# Patient Record
Sex: FEMALE | Race: BLACK OR AFRICAN AMERICAN | NOT HISPANIC OR LATINO | Employment: UNEMPLOYED
[De-identification: names, ages, dates, MRNs, and addresses within clinical notes are randomized per-mention and may not be internally consistent; named-entity substitution may affect disease eponyms.]

---

## 2018-04-10 ENCOUNTER — RECORDS - HEALTHEAST (OUTPATIENT)
Dept: ADMINISTRATIVE | Facility: OTHER | Age: 53
End: 2018-04-10
Payer: MEDICAID

## 2018-04-10 ENCOUNTER — AMBULATORY - HEALTHEAST (OUTPATIENT)
Dept: INTERNAL MEDICINE | Facility: CLINIC | Age: 53
End: 2018-04-10

## 2018-04-11 ENCOUNTER — OFFICE VISIT - HEALTHEAST (OUTPATIENT)
Dept: INTERNAL MEDICINE | Facility: CLINIC | Age: 53
End: 2018-04-11

## 2018-04-11 DIAGNOSIS — Z12.31 VISIT FOR SCREENING MAMMOGRAM: ICD-10-CM

## 2018-04-11 DIAGNOSIS — R20.0 LEFT ARM NUMBNESS: ICD-10-CM

## 2018-04-11 DIAGNOSIS — Z86.73 HISTORY OF CVA (CEREBROVASCULAR ACCIDENT): ICD-10-CM

## 2018-04-11 DIAGNOSIS — I25.10 CAD (CORONARY ARTERY DISEASE): ICD-10-CM

## 2018-04-11 DIAGNOSIS — E11.42 TYPE 2 DIABETES MELLITUS WITH DIABETIC POLYNEUROPATHY, WITH LONG-TERM CURRENT USE OF INSULIN (H): ICD-10-CM

## 2018-04-11 DIAGNOSIS — F25.0 SCHIZOAFFECTIVE DISORDER, BIPOLAR TYPE (H): ICD-10-CM

## 2018-04-11 DIAGNOSIS — G89.29 OTHER CHRONIC PAIN: ICD-10-CM

## 2018-04-11 DIAGNOSIS — I10 ESSENTIAL HYPERTENSION: ICD-10-CM

## 2018-04-11 DIAGNOSIS — F19.20 DRUG DEPENDENCE (H): ICD-10-CM

## 2018-04-11 DIAGNOSIS — Z79.4 TYPE 2 DIABETES MELLITUS WITH DIABETIC POLYNEUROPATHY, WITH LONG-TERM CURRENT USE OF INSULIN (H): ICD-10-CM

## 2018-04-11 LAB
ATRIAL RATE - MUSE: 86 BPM
DIASTOLIC BLOOD PRESSURE - MUSE: NORMAL MMHG
INTERPRETATION ECG - MUSE: NORMAL
P AXIS - MUSE: 75 DEGREES
PR INTERVAL - MUSE: 166 MS
QRS DURATION - MUSE: 76 MS
QT - MUSE: 392 MS
QTC - MUSE: 469 MS
R AXIS - MUSE: 11 DEGREES
SYSTOLIC BLOOD PRESSURE - MUSE: NORMAL MMHG
T AXIS - MUSE: 87 DEGREES
VENTRICULAR RATE- MUSE: 86 BPM

## 2018-04-11 ASSESSMENT — MIFFLIN-ST. JEOR: SCORE: 1547.97

## 2018-04-12 ENCOUNTER — COMMUNICATION - HEALTHEAST (OUTPATIENT)
Dept: SCHEDULING | Facility: CLINIC | Age: 53
End: 2018-04-12

## 2018-04-17 ENCOUNTER — COMMUNICATION - HEALTHEAST (OUTPATIENT)
Dept: NURSING | Facility: CLINIC | Age: 53
End: 2018-04-17

## 2018-04-23 ENCOUNTER — AMBULATORY - HEALTHEAST (OUTPATIENT)
Dept: CARDIOLOGY | Facility: CLINIC | Age: 53
End: 2018-04-23

## 2018-04-23 ENCOUNTER — RECORDS - HEALTHEAST (OUTPATIENT)
Dept: ADMINISTRATIVE | Facility: OTHER | Age: 53
End: 2018-04-23

## 2018-04-25 ENCOUNTER — OFFICE VISIT - HEALTHEAST (OUTPATIENT)
Dept: CARDIOLOGY | Facility: CLINIC | Age: 53
End: 2018-04-25

## 2018-04-25 DIAGNOSIS — R07.9 CHEST PAIN: ICD-10-CM

## 2018-04-25 ASSESSMENT — MIFFLIN-ST. JEOR: SCORE: 1534.36

## 2018-04-26 LAB
ATRIAL RATE - MUSE: 80 BPM
DIASTOLIC BLOOD PRESSURE - MUSE: NORMAL MMHG
INTERPRETATION ECG - MUSE: NORMAL
P AXIS - MUSE: 58 DEGREES
PR INTERVAL - MUSE: 160 MS
QRS DURATION - MUSE: 82 MS
QT - MUSE: 408 MS
QTC - MUSE: 470 MS
R AXIS - MUSE: 5 DEGREES
SYSTOLIC BLOOD PRESSURE - MUSE: NORMAL MMHG
T AXIS - MUSE: 110 DEGREES
VENTRICULAR RATE- MUSE: 80 BPM

## 2018-04-27 ENCOUNTER — HOSPITAL ENCOUNTER (OUTPATIENT)
Dept: PHYSICAL MEDICINE AND REHAB | Facility: CLINIC | Age: 53
Discharge: HOME | End: 2018-04-27
Attending: INTERNAL MEDICINE

## 2018-04-27 DIAGNOSIS — M54.6 THORACIC SPINE PAIN: ICD-10-CM

## 2018-04-27 DIAGNOSIS — M79.2 NEUROPATHIC PAIN: ICD-10-CM

## 2018-04-27 DIAGNOSIS — M79.18 MYOFASCIAL PAIN: ICD-10-CM

## 2018-04-27 DIAGNOSIS — R20.0 LEFT ARM NUMBNESS: ICD-10-CM

## 2018-04-27 ASSESSMENT — MIFFLIN-ST. JEOR: SCORE: 1534.36

## 2018-04-30 ENCOUNTER — COMMUNICATION - HEALTHEAST (OUTPATIENT)
Dept: ENDOCRINOLOGY | Facility: CLINIC | Age: 53
End: 2018-04-30

## 2018-04-30 ENCOUNTER — AMBULATORY - HEALTHEAST (OUTPATIENT)
Dept: EDUCATION SERVICES | Facility: CLINIC | Age: 53
End: 2018-04-30

## 2018-04-30 DIAGNOSIS — E11.42 TYPE 2 DIABETES MELLITUS WITH DIABETIC POLYNEUROPATHY, WITH LONG-TERM CURRENT USE OF INSULIN (H): ICD-10-CM

## 2018-04-30 DIAGNOSIS — Z79.4 TYPE 2 DIABETES MELLITUS WITH DIABETIC POLYNEUROPATHY, WITH LONG-TERM CURRENT USE OF INSULIN (H): ICD-10-CM

## 2018-05-03 ENCOUNTER — COMMUNICATION - HEALTHEAST (OUTPATIENT)
Dept: INTERNAL MEDICINE | Facility: CLINIC | Age: 53
End: 2018-05-03

## 2018-05-04 ENCOUNTER — OFFICE VISIT - HEALTHEAST (OUTPATIENT)
Dept: PHYSICAL THERAPY | Facility: REHABILITATION | Age: 53
End: 2018-05-04

## 2018-05-04 DIAGNOSIS — M62.81 MUSCLE WEAKNESS (GENERALIZED): ICD-10-CM

## 2018-05-04 DIAGNOSIS — M54.6 PAIN IN THORACIC SPINE: ICD-10-CM

## 2018-05-04 DIAGNOSIS — M79.18 MYOFASCIAL PAIN: ICD-10-CM

## 2018-05-21 ENCOUNTER — COMMUNICATION - HEALTHEAST (OUTPATIENT)
Dept: PHYSICAL MEDICINE AND REHAB | Facility: CLINIC | Age: 53
End: 2018-05-21

## 2018-05-21 ENCOUNTER — COMMUNICATION - HEALTHEAST (OUTPATIENT)
Dept: PALLIATIVE MEDICINE | Facility: OTHER | Age: 53
End: 2018-05-21

## 2018-05-22 ENCOUNTER — COMMUNICATION - HEALTHEAST (OUTPATIENT)
Dept: PALLIATIVE MEDICINE | Facility: OTHER | Age: 53
End: 2018-05-22

## 2018-05-22 ENCOUNTER — COMMUNICATION - HEALTHEAST (OUTPATIENT)
Dept: INTERNAL MEDICINE | Facility: CLINIC | Age: 53
End: 2018-05-22

## 2018-06-14 ENCOUNTER — RECORDS - HEALTHEAST (OUTPATIENT)
Dept: RADIOLOGY | Facility: CLINIC | Age: 53
End: 2018-06-14

## 2018-06-16 ENCOUNTER — COMMUNICATION - HEALTHEAST (OUTPATIENT)
Dept: CARDIOLOGY | Facility: CLINIC | Age: 53
End: 2018-06-16

## 2018-06-16 DIAGNOSIS — R07.9 CHEST PAIN: ICD-10-CM

## 2018-10-23 ENCOUNTER — HOME CARE/HOSPICE - HEALTHEAST (OUTPATIENT)
Dept: HOME HEALTH SERVICES | Facility: HOME HEALTH | Age: 53
End: 2018-10-23

## 2018-10-25 ENCOUNTER — COMMUNICATION - HEALTHEAST (OUTPATIENT)
Dept: CARE COORDINATION | Facility: CLINIC | Age: 53
End: 2018-10-25

## 2018-10-26 ENCOUNTER — HOME CARE/HOSPICE - HEALTHEAST (OUTPATIENT)
Dept: HOME HEALTH SERVICES | Facility: HOME HEALTH | Age: 53
End: 2018-10-26

## 2018-10-27 ENCOUNTER — HOME CARE/HOSPICE - HEALTHEAST (OUTPATIENT)
Dept: HOME HEALTH SERVICES | Facility: HOME HEALTH | Age: 53
End: 2018-10-27

## 2018-10-29 ENCOUNTER — HOME CARE/HOSPICE - HEALTHEAST (OUTPATIENT)
Dept: HOME HEALTH SERVICES | Facility: HOME HEALTH | Age: 53
End: 2018-10-29

## 2018-11-01 ENCOUNTER — HOME CARE/HOSPICE - HEALTHEAST (OUTPATIENT)
Dept: HOME HEALTH SERVICES | Facility: HOME HEALTH | Age: 53
End: 2018-11-01

## 2018-11-01 ASSESSMENT — MIFFLIN-ST. JEOR: SCORE: 1511.68

## 2018-11-05 ENCOUNTER — HOME CARE/HOSPICE - HEALTHEAST (OUTPATIENT)
Dept: HOME HEALTH SERVICES | Facility: HOME HEALTH | Age: 53
End: 2018-11-05

## 2018-11-07 ENCOUNTER — HOME CARE/HOSPICE - HEALTHEAST (OUTPATIENT)
Dept: HOME HEALTH SERVICES | Facility: HOME HEALTH | Age: 53
End: 2018-11-07

## 2018-11-08 ENCOUNTER — HOME CARE/HOSPICE - HEALTHEAST (OUTPATIENT)
Dept: HOME HEALTH SERVICES | Facility: HOME HEALTH | Age: 53
End: 2018-11-08

## 2018-11-08 ENCOUNTER — COMMUNICATION - HEALTHEAST (OUTPATIENT)
Dept: CARDIOLOGY | Facility: CLINIC | Age: 53
End: 2018-11-08

## 2018-11-08 DIAGNOSIS — R55 SYNCOPE: ICD-10-CM

## 2018-11-09 ENCOUNTER — COMMUNICATION - HEALTHEAST (OUTPATIENT)
Dept: CARE COORDINATION | Facility: CLINIC | Age: 53
End: 2018-11-09

## 2018-11-12 ENCOUNTER — HOME CARE/HOSPICE - HEALTHEAST (OUTPATIENT)
Dept: HOME HEALTH SERVICES | Facility: HOME HEALTH | Age: 53
End: 2018-11-12

## 2019-04-30 ENCOUNTER — COMMUNICATION - HEALTHEAST (OUTPATIENT)
Dept: INTERNAL MEDICINE | Facility: CLINIC | Age: 54
End: 2019-04-30

## 2019-04-30 ENCOUNTER — COMMUNICATION - HEALTHEAST (OUTPATIENT)
Dept: SCHEDULING | Facility: CLINIC | Age: 54
End: 2019-04-30

## 2019-04-30 DIAGNOSIS — K21.9 GASTROESOPHAGEAL REFLUX DISEASE, ESOPHAGITIS PRESENCE NOT SPECIFIED: ICD-10-CM

## 2019-04-30 DIAGNOSIS — I25.10 CAD (CORONARY ARTERY DISEASE): ICD-10-CM

## 2019-05-09 ENCOUNTER — COMMUNICATION - HEALTHEAST (OUTPATIENT)
Dept: INTERNAL MEDICINE | Facility: CLINIC | Age: 54
End: 2019-05-09

## 2019-05-09 DIAGNOSIS — I25.10 CAD (CORONARY ARTERY DISEASE): ICD-10-CM

## 2019-06-09 ENCOUNTER — COMMUNICATION - HEALTHEAST (OUTPATIENT)
Dept: INTERNAL MEDICINE | Facility: CLINIC | Age: 54
End: 2019-06-09

## 2019-06-09 DIAGNOSIS — Z79.4 TYPE 2 DIABETES MELLITUS WITH DIABETIC POLYNEUROPATHY, WITH LONG-TERM CURRENT USE OF INSULIN (H): ICD-10-CM

## 2019-06-09 DIAGNOSIS — E11.42 TYPE 2 DIABETES MELLITUS WITH DIABETIC POLYNEUROPATHY, WITH LONG-TERM CURRENT USE OF INSULIN (H): ICD-10-CM

## 2019-06-09 DIAGNOSIS — I25.10 CAD (CORONARY ARTERY DISEASE): ICD-10-CM

## 2019-08-01 ENCOUNTER — COMMUNICATION - HEALTHEAST (OUTPATIENT)
Dept: INTERNAL MEDICINE | Facility: CLINIC | Age: 54
End: 2019-08-01

## 2019-08-01 DIAGNOSIS — E11.42 TYPE 2 DIABETES MELLITUS WITH DIABETIC POLYNEUROPATHY, WITH LONG-TERM CURRENT USE OF INSULIN (H): ICD-10-CM

## 2019-08-01 DIAGNOSIS — I25.10 CAD (CORONARY ARTERY DISEASE): ICD-10-CM

## 2019-08-01 DIAGNOSIS — Z79.4 TYPE 2 DIABETES MELLITUS WITH DIABETIC POLYNEUROPATHY, WITH LONG-TERM CURRENT USE OF INSULIN (H): ICD-10-CM

## 2019-09-02 ENCOUNTER — COMMUNICATION - HEALTHEAST (OUTPATIENT)
Dept: INTERNAL MEDICINE | Facility: CLINIC | Age: 54
End: 2019-09-02

## 2019-09-02 DIAGNOSIS — Z79.4 TYPE 2 DIABETES MELLITUS WITH DIABETIC POLYNEUROPATHY, WITH LONG-TERM CURRENT USE OF INSULIN (H): ICD-10-CM

## 2019-09-02 DIAGNOSIS — I25.10 CAD (CORONARY ARTERY DISEASE): ICD-10-CM

## 2019-09-02 DIAGNOSIS — E11.42 TYPE 2 DIABETES MELLITUS WITH DIABETIC POLYNEUROPATHY, WITH LONG-TERM CURRENT USE OF INSULIN (H): ICD-10-CM

## 2019-11-16 ENCOUNTER — COMMUNICATION - HEALTHEAST (OUTPATIENT)
Dept: INTERNAL MEDICINE | Facility: CLINIC | Age: 54
End: 2019-11-16

## 2019-11-16 DIAGNOSIS — I25.10 CAD (CORONARY ARTERY DISEASE): ICD-10-CM

## 2019-11-16 DIAGNOSIS — Z79.4 TYPE 2 DIABETES MELLITUS WITH DIABETIC POLYNEUROPATHY, WITH LONG-TERM CURRENT USE OF INSULIN (H): ICD-10-CM

## 2019-11-16 DIAGNOSIS — E11.42 TYPE 2 DIABETES MELLITUS WITH DIABETIC POLYNEUROPATHY, WITH LONG-TERM CURRENT USE OF INSULIN (H): ICD-10-CM

## 2020-05-08 ENCOUNTER — COMMUNICATION - HEALTHEAST (OUTPATIENT)
Dept: INTERNAL MEDICINE | Facility: CLINIC | Age: 55
End: 2020-05-08

## 2020-05-08 DIAGNOSIS — I25.10 CAD (CORONARY ARTERY DISEASE): ICD-10-CM

## 2020-05-15 DIAGNOSIS — Z12.4 CERVICAL CANCER SCREENING: Primary | ICD-10-CM

## 2021-05-02 ENCOUNTER — HOSPITAL ENCOUNTER (EMERGENCY)
Dept: EMERGENCY MEDICINE | Facility: HOSPITAL | Age: 56
Discharge: HOME | End: 2021-05-02
Attending: EMERGENCY MEDICINE
Payer: MEDICAID

## 2021-05-02 DIAGNOSIS — G89.29 CHRONIC MIDLINE LOW BACK PAIN, UNSPECIFIED WHETHER SCIATICA PRESENT: ICD-10-CM

## 2021-05-02 DIAGNOSIS — M79.604 PAIN OF RIGHT LOWER EXTREMITY: ICD-10-CM

## 2021-05-02 DIAGNOSIS — M54.50 CHRONIC MIDLINE LOW BACK PAIN, UNSPECIFIED WHETHER SCIATICA PRESENT: ICD-10-CM

## 2021-05-02 LAB
ANION GAP SERPL CALCULATED.3IONS-SCNC: 8 MMOL/L (ref 5–18)
BASOPHILS # BLD AUTO: 0 THOU/UL (ref 0–0.2)
BASOPHILS NFR BLD AUTO: 0 % (ref 0–2)
BUN SERPL-MCNC: 12 MG/DL (ref 8–22)
CALCIUM SERPL-MCNC: 8.3 MG/DL (ref 8.5–10.5)
CHLORIDE BLD-SCNC: 107 MMOL/L (ref 98–107)
CO2 SERPL-SCNC: 24 MMOL/L (ref 22–31)
CREAT SERPL-MCNC: 0.78 MG/DL (ref 0.6–1.1)
EOSINOPHIL # BLD AUTO: 0.4 THOU/UL (ref 0–0.4)
EOSINOPHIL NFR BLD AUTO: 4 % (ref 0–6)
ERYTHROCYTE [DISTWIDTH] IN BLOOD BY AUTOMATED COUNT: 13.4 % (ref 11–14.5)
GFR SERPL CREATININE-BSD FRML MDRD: >60 ML/MIN/1.73M2
GLUCOSE BLD-MCNC: 230 MG/DL (ref 70–125)
HCT VFR BLD AUTO: 32.1 % (ref 35–47)
HGB BLD-MCNC: 10 G/DL (ref 12–16)
IMM GRANULOCYTES # BLD: 0.1 THOU/UL
IMM GRANULOCYTES NFR BLD: 1 %
LYMPHOCYTES # BLD AUTO: 2.1 THOU/UL (ref 0.8–4.4)
LYMPHOCYTES NFR BLD AUTO: 23 % (ref 20–40)
MCH RBC QN AUTO: 26.6 PG (ref 27–34)
MCHC RBC AUTO-ENTMCNC: 31.2 G/DL (ref 32–36)
MCV RBC AUTO: 85 FL (ref 80–100)
MONOCYTES # BLD AUTO: 0.7 THOU/UL (ref 0–0.9)
MONOCYTES NFR BLD AUTO: 8 % (ref 2–10)
NEUTROPHILS # BLD AUTO: 5.7 THOU/UL (ref 2–7.7)
NEUTROPHILS NFR BLD AUTO: 63 % (ref 50–70)
PLATELET # BLD AUTO: 370 THOU/UL (ref 140–440)
PMV BLD AUTO: 11 FL (ref 8.5–12.5)
POTASSIUM BLD-SCNC: 3.7 MMOL/L (ref 3.5–5)
RBC # BLD AUTO: 3.76 MILL/UL (ref 3.8–5.4)
SODIUM SERPL-SCNC: 139 MMOL/L (ref 136–145)
WBC: 9.1 THOU/UL (ref 4–11)

## 2021-05-02 ASSESSMENT — MIFFLIN-ST. JEOR: SCORE: 1575.18

## 2021-05-03 ENCOUNTER — COMMUNICATION - HEALTHEAST (OUTPATIENT)
Dept: NEUROLOGY | Facility: CLINIC | Age: 56
End: 2021-05-03

## 2021-05-03 ENCOUNTER — HOSPITAL ENCOUNTER (EMERGENCY)
Dept: EMERGENCY MEDICINE | Facility: HOSPITAL | Age: 56
Discharge: HOME | End: 2021-05-03
Attending: EMERGENCY MEDICINE
Payer: MEDICAID

## 2021-05-03 DIAGNOSIS — M54.31 SCIATICA OF RIGHT SIDE: ICD-10-CM

## 2021-05-28 NOTE — TELEPHONE ENCOUNTER
Valerie Thacker for refills requested below?  Refills have been set up for you to review.  Dulce RAMEY CMA/MARTY....................3:21 PM

## 2021-05-28 NOTE — TELEPHONE ENCOUNTER
Medication Request  Medication name:   losartan (COZAAR) 50 MG tablet 30 tablet 0 10/24/2018     Sig - Route: Take 1 tablet (50 mg total) by mouth daily. - Oral    Sent to pharmacy as: losartan (COZAAR) 50 MG tablet      omeprazole (PRILOSEC) 20 MG capsule 30 capsule 0 10/24/2018     Sig - Route: Take 1 capsule (20 mg total) by mouth daily before breakfast. - Oral        Pharmacy Name and Location: [facility]  Reason for request: Fax request received from pharmacy.    Okay to leave a detailed message: yes     Statement Selected

## 2021-05-28 NOTE — TELEPHONE ENCOUNTER
Medication Request  Medication name: amlodipine 10 mg - take  Pharmacy Name and Location: [facility]  Reason for request: Refill  When did you use medication last?:  Last filled on 3/5/19 by a hospitalist  Patient offered appointment:  n/a  Okay to leave a detailed message: no

## 2021-05-29 NOTE — TELEPHONE ENCOUNTER
RN cannot approve Refill Request    RN can NOT refill this medication -->> PCP messaged that patient is overdue for Office Visit and Labs.  Last office visit: 4/11/2018.  No pending appt.    Stephanie Roger, [facility] Connection Triage/Med Refill 6/10/2019    Requested Prescriptions   Pending Prescriptions Disp Refills     metFORMIN (GLUCOPHAGE) 1000 MG tablet 60 tablet 0     Sig: Take 1 tablet (1,000 mg total) by mouth 2 (two) times a day with meals.       Metformin Refill Protocol Failed - 6/9/2019 10:33 PM        Failed - Visit with PCP or prescribing provider visit in last 6 months or next 3 months     Last office visit with prescriber/PCP: Visit date not found OR same dept: Visit date not found OR same specialty: Visit date not found Last physical: Visit date not found Last MTM visit: Visit date not found         Next appt within 3 mo: Visit date not found  Next physical within 3 mo: Visit date not found  Prescriber OR PCP: Fortunato Patterson MD  Last diagnosis associated with med order: 1. CAD (coronary artery disease)  - metFORMIN (GLUCOPHAGE) 1000 MG tablet; Take 1 tablet (1,000 mg total) by mouth 2 (two) times a day with meals.  Dispense: 60 tablet; Refill: 0    2. Type 2 diabetes mellitus with diabetic polyneuropathy, with long-term current use of insulin (H)  - metFORMIN (GLUCOPHAGE) 1000 MG tablet; Take 1 tablet (1,000 mg total) by mouth 2 (two) times a day with meals.  Dispense: 60 tablet; Refill: 0     If protocol passes may refill for 12 months if within 3 months of last provider visit (or a total of 15 months).           Failed - A1C in last 6 months     Hemoglobin A1c   Date Value Ref Range Status   11/08/2018 12.1 (H) 4.2 - 6.1 % Final               Failed - Microalbumin in last year      No results found for: MICROALBUR               Passed - Blood pressure in last 12 months     BP Readings from Last 1 Encounters:   06/06/19 143/77             Passed - LFT or AST or ALT in last 12 months      Albumin   Date Value Ref Range Status   11/07/2018 4.2 3.5 - 5.0 g/dL Final     Bilirubin, Total   Date Value Ref Range Status   11/07/2018 0.4 0.0 - 1.0 mg/dL Final     Bilirubin, Direct   Date Value Ref Range Status   04/18/2014 0.2 <0.6 mg/dL Final     Alkaline Phosphatase   Date Value Ref Range Status   11/07/2018 72 45 - 120 U/L Final     AST   Date Value Ref Range Status   11/07/2018 10 0 - 40 U/L Final     ALT   Date Value Ref Range Status   11/07/2018 16 0 - 45 U/L Final     Protein, Total   Date Value Ref Range Status   11/07/2018 7.3 6.0 - 8.0 g/dL Final                Passed - GFR or Serum Creatinine in last 6 months     GFR MDRD Non Af Amer   Date Value Ref Range Status   11/08/2018 >60 >60 mL/min/1.73m2 Final     GFR MDRD Af Amer   Date Value Ref Range Status   11/08/2018 >60 >60 mL/min/1.73m2 Final

## 2021-05-30 ENCOUNTER — RECORDS - HEALTHEAST (OUTPATIENT)
Dept: ADMINISTRATIVE | Facility: CLINIC | Age: 56
End: 2021-05-30

## 2021-05-30 ENCOUNTER — RECORDS - HEALTHEAST (OUTPATIENT)
Dept: ADMINISTRATIVE | Facility: OTHER | Age: 56
End: 2021-05-30

## 2021-05-30 ASSESSMENT — MIFFLIN-ST. JEOR: SCORE: 1508.49

## 2021-05-31 ENCOUNTER — RECORDS - HEALTHEAST (OUTPATIENT)
Dept: ADMINISTRATIVE | Facility: OTHER | Age: 56
End: 2021-05-31

## 2021-05-31 ENCOUNTER — RECORDS - HEALTHEAST (OUTPATIENT)
Dept: ADMINISTRATIVE | Facility: CLINIC | Age: 56
End: 2021-05-31

## 2021-05-31 ASSESSMENT — MIFFLIN-ST. JEOR: SCORE: 1502.14

## 2021-05-31 NOTE — TELEPHONE ENCOUNTER
RN cannot approve Refill Request    RN can NOT refill this medication PCP messaged that patient is overdue for Labs and Office Visit and Protocol failed and NO refill given. No established PCP in chart.     Analy Salgado, [facility] [place] Connection Triage/Med Refill 9/2/2019    Requested Prescriptions   Pending Prescriptions Disp Refills     metFORMIN (GLUCOPHAGE) 1000 MG tablet [Pharmacy Med Name: METFORMIN HCL 1,000 MG TABLET] 60 tablet 0     Sig: TAKE 1 TABLET BY MOUTH TWICE A DAY WITH MEALS       Metformin Refill Protocol Failed - 9/2/2019 11:18 AM        Failed - Visit with PCP or prescribing provider visit in last 6 months or next 3 months     Last office visit with prescriber/PCP: Visit date not found OR same dept: Visit date not found OR same specialty: Visit date not found Last physical: Visit date not found Last MTM visit: Visit date not found         Next appt within 3 mo: Visit date not found  Next physical within 3 mo: Visit date not found  Prescriber OR PCP: Fortunato Patterson MD  Last diagnosis associated with med order: 1. CAD (coronary artery disease)  - metFORMIN (GLUCOPHAGE) 1000 MG tablet [Pharmacy Med Name: METFORMIN HCL 1,000 MG TABLET]; TAKE 1 TABLET BY MOUTH TWICE A DAY WITH MEALS  Dispense: 60 tablet; Refill: 0    2. Type 2 diabetes mellitus with diabetic polyneuropathy, with long-term current use of insulin (H)  - metFORMIN (GLUCOPHAGE) 1000 MG tablet [Pharmacy Med Name: METFORMIN HCL 1,000 MG TABLET]; TAKE 1 TABLET BY MOUTH TWICE A DAY WITH MEALS  Dispense: 60 tablet; Refill: 0     If protocol passes may refill for 12 months if within 3 months of last provider visit (or a total of 15 months).           Failed - A1C in last 6 months     Hemoglobin A1c   Date Value Ref Range Status   11/08/2018 12.1 (H) 4.2 - 6.1 % Final               Failed - Microalbumin in last year      No results found for: MICROALBUR               Passed - Blood pressure in last 12 months     BP Readings from Last 1  Encounters:   07/15/19 152/89             Passed - LFT or AST or ALT in last 12 months     Albumin   Date Value Ref Range Status   11/07/2018 4.2 3.5 - 5.0 g/dL Final     Bilirubin, Total   Date Value Ref Range Status   11/07/2018 0.4 0.0 - 1.0 mg/dL Final     Bilirubin, Direct   Date Value Ref Range Status   04/18/2014 0.2 <0.6 mg/dL Final     Alkaline Phosphatase   Date Value Ref Range Status   11/07/2018 72 45 - 120 U/L Final     AST   Date Value Ref Range Status   11/07/2018 10 0 - 40 U/L Final     ALT   Date Value Ref Range Status   11/07/2018 16 0 - 45 U/L Final     Protein, Total   Date Value Ref Range Status   11/07/2018 7.3 6.0 - 8.0 g/dL Final                Passed - GFR or Serum Creatinine in last 6 months     GFR MDRD Non Af Amer   Date Value Ref Range Status   06/22/2019 >60 >60 mL/min/1.73m2 Final     GFR MDRD Af Amer   Date Value Ref Range Status   06/22/2019 >60 >60 mL/min/1.73m2 Final

## 2021-05-31 NOTE — TELEPHONE ENCOUNTER
RN cannot approve Refill Request    RN can NOT refill this medication PCP messaged that patient is overdue for Labs and Office Visit. Last office visit: Visit date not found Last Physical: 4/11/2018 Last MTM visit: Visit date not found Last visit same specialty: Visit date not found.  Next visit within 3 mo: Visit date not found  Next physical within 3 mo: Visit date not found      Hang Harmon, [facility] Triage/Med Refill 8/1/2019    Requested Prescriptions   Pending Prescriptions Disp Refills     metFORMIN (GLUCOPHAGE) 1000 MG tablet [Pharmacy Med Name: METFORMIN HCL 1,000 MG TABLET] 60 tablet 0     Sig: TAKE 1 TABLET BY MOUTH TWICE A DAY WITH MEALS       Metformin Refill Protocol Failed - 8/1/2019 10:09 AM        Failed - Visit with PCP or prescribing provider visit in last 6 months or next 3 months     Last office visit with prescriber/PCP: Visit date not found OR same dept: Visit date not found OR same specialty: Visit date not found Last physical: Visit date not found Last MTM visit: Visit date not found         Next appt within 3 mo: Visit date not found  Next physical within 3 mo: Visit date not found  Prescriber OR PCP: Fortunato Patterson MD  Last diagnosis associated with med order: 1. CAD (coronary artery disease)  - metFORMIN (GLUCOPHAGE) 1000 MG tablet [Pharmacy Med Name: METFORMIN HCL 1,000 MG TABLET]; TAKE 1 TABLET BY MOUTH TWICE A DAY WITH MEALS  Dispense: 60 tablet; Refill: 0    2. Type 2 diabetes mellitus with diabetic polyneuropathy, with long-term current use of insulin (H)  - metFORMIN (GLUCOPHAGE) 1000 MG tablet [Pharmacy Med Name: METFORMIN HCL 1,000 MG TABLET]; TAKE 1 TABLET BY MOUTH TWICE A DAY WITH MEALS  Dispense: 60 tablet; Refill: 0     If protocol passes may refill for 12 months if within 3 months of last provider visit (or a total of 15 months).           Failed - A1C in last 6 months     Hemoglobin A1c   Date Value Ref Range Status   11/08/2018 12.1 (H) 4.2 - 6.1 % Final                Failed - Microalbumin in last year      No results found for: MICROALBUR               Passed - Blood pressure in last 12 months     BP Readings from Last 1 Encounters:   07/15/19 152/89             Passed - LFT or AST or ALT in last 12 months     Albumin   Date Value Ref Range Status   11/07/2018 4.2 3.5 - 5.0 g/dL Final     Bilirubin, Total   Date Value Ref Range Status   11/07/2018 0.4 0.0 - 1.0 mg/dL Final     Bilirubin, Direct   Date Value Ref Range Status   04/18/2014 0.2 <0.6 mg/dL Final     Alkaline Phosphatase   Date Value Ref Range Status   11/07/2018 72 45 - 120 U/L Final     AST   Date Value Ref Range Status   11/07/2018 10 0 - 40 U/L Final     ALT   Date Value Ref Range Status   11/07/2018 16 0 - 45 U/L Final     Protein, Total   Date Value Ref Range Status   11/07/2018 7.3 6.0 - 8.0 g/dL Final                Passed - GFR or Serum Creatinine in last 6 months     GFR MDRD Non Af Amer   Date Value Ref Range Status   06/22/2019 >60 >60 mL/min/1.73m2 Final     GFR MDRD Af Amer   Date Value Ref Range Status   06/22/2019 >60 >60 mL/min/1.73m2 Final

## 2021-06-01 ENCOUNTER — RECORDS - HEALTHEAST (OUTPATIENT)
Dept: ADMINISTRATIVE | Facility: CLINIC | Age: 56
End: 2021-06-01

## 2021-06-01 ENCOUNTER — RECORDS - HEALTHEAST (OUTPATIENT)
Dept: ADMINISTRATIVE | Facility: OTHER | Age: 56
End: 2021-06-01

## 2021-06-01 VITALS — WEIGHT: 201 LBS | HEIGHT: 67 IN | BODY MASS INDEX: 31.55 KG/M2

## 2021-06-01 VITALS — BODY MASS INDEX: 31.55 KG/M2 | HEIGHT: 67 IN | WEIGHT: 201 LBS

## 2021-06-01 VITALS — BODY MASS INDEX: 32.02 KG/M2 | HEIGHT: 67 IN | WEIGHT: 204 LBS

## 2021-06-01 ASSESSMENT — MIFFLIN-ST. JEOR: SCORE: 1507.13

## 2021-06-02 ENCOUNTER — RECORDS - HEALTHEAST (OUTPATIENT)
Dept: ADMINISTRATIVE | Facility: CLINIC | Age: 56
End: 2021-06-02

## 2021-06-02 ENCOUNTER — SURGERY - HEALTHEAST (OUTPATIENT)
Dept: CARDIOLOGY | Facility: HOSPITAL | Age: 56
End: 2021-06-02
Payer: MEDICAID

## 2021-06-02 ENCOUNTER — RECORDS - HEALTHEAST (OUTPATIENT)
Dept: ADMINISTRATIVE | Facility: OTHER | Age: 56
End: 2021-06-02

## 2021-06-02 VITALS — WEIGHT: 196 LBS | BODY MASS INDEX: 30.76 KG/M2 | HEIGHT: 67 IN

## 2021-06-02 ASSESSMENT — MIFFLIN-ST. JEOR: SCORE: 1499.88

## 2021-06-03 ENCOUNTER — RECORDS - HEALTHEAST (OUTPATIENT)
Dept: ADMINISTRATIVE | Facility: OTHER | Age: 56
End: 2021-06-03

## 2021-06-03 ASSESSMENT — MIFFLIN-ST. JEOR: SCORE: 1518.02

## 2021-06-03 NOTE — TELEPHONE ENCOUNTER
Former patient of ? & has not established care with another provider.  Please assign refill request to covering provider per Clinic standard process.      RN cannot approve Refill Request    RN can NOT refill this medication Protocol failed and NO refill given.       Maryjane Bhardwaj, [facility] Triage/Med Refill 11/17/2019    Requested Prescriptions   Pending Prescriptions Disp Refills     metFORMIN (GLUCOPHAGE) 1000 MG tablet [Pharmacy Med Name: METFORMIN HCL 1,000 MG TABLET] 180 tablet 0     Sig: TAKE 1 TABLET BY MOUTH TWICE A DAY WITH MEALS       Metformin Refill Protocol Failed - 11/16/2019  9:42 AM        Failed - LFT or AST or ALT in last 12 months     Albumin   Date Value Ref Range Status   11/07/2018 4.2 3.5 - 5.0 g/dL Final     Bilirubin, Total   Date Value Ref Range Status   11/07/2018 0.4 0.0 - 1.0 mg/dL Final     Bilirubin, Direct   Date Value Ref Range Status   04/18/2014 0.2 <0.6 mg/dL Final     Alkaline Phosphatase   Date Value Ref Range Status   11/07/2018 72 45 - 120 U/L Final     AST   Date Value Ref Range Status   11/07/2018 10 0 - 40 U/L Final     ALT   Date Value Ref Range Status   11/07/2018 16 0 - 45 U/L Final     Protein, Total   Date Value Ref Range Status   11/07/2018 7.3 6.0 - 8.0 g/dL Final                Failed - Visit with PCP or prescribing provider visit in last 6 months or next 3 months     Last office visit with prescriber/PCP: Visit date not found OR same dept: Visit date not found OR same specialty: Visit date not found Last physical: Visit date not found Last MTM visit: Visit date not found         Next appt within 3 mo: Visit date not found  Next physical within 3 mo: Visit date not found  Prescriber OR PCP: Fortunato Patterson MD  Last diagnosis associated with med order: 1. CAD (coronary artery disease)  - metFORMIN (GLUCOPHAGE) 1000 MG tablet [Pharmacy Med Name: METFORMIN HCL 1,000 MG TABLET]; TAKE 1 TABLET BY MOUTH TWICE A DAY WITH MEALS  Dispense: 60 tablet; Refill:  0    2. Type 2 diabetes mellitus with diabetic polyneuropathy, with long-term current use of insulin (H)  - metFORMIN (GLUCOPHAGE) 1000 MG tablet [Pharmacy Med Name: METFORMIN HCL 1,000 MG TABLET]; TAKE 1 TABLET BY MOUTH TWICE A DAY WITH MEALS  Dispense: 60 tablet; Refill: 0     If protocol passes may refill for 12 months if within 3 months of last provider visit (or a total of 15 months).           Failed - A1C in last 6 months     Hemoglobin A1c   Date Value Ref Range Status   11/08/2018 12.1 (H) 4.2 - 6.1 % Final               Failed - Microalbumin in last year      No results found for: MICROALBUR               Passed - Blood pressure in last 12 months     BP Readings from Last 1 Encounters:   07/15/19 152/89             Passed - GFR or Serum Creatinine in last 6 months     GFR MDRD Non Af Amer   Date Value Ref Range Status   06/22/2019 >60 >60 mL/min/1.73m2 Final     GFR MDRD Af Amer   Date Value Ref Range Status   06/22/2019 >60 >60 mL/min/1.73m2 Final

## 2021-06-04 ENCOUNTER — RECORDS - HEALTHEAST (OUTPATIENT)
Dept: ADMINISTRATIVE | Facility: OTHER | Age: 56
End: 2021-06-04

## 2021-06-04 ASSESSMENT — MIFFLIN-ST. JEOR: SCORE: 1517.57

## 2021-06-05 VITALS — BODY MASS INDEX: 32.89 KG/M2 | WEIGHT: 210 LBS | HEIGHT: 67 IN | WEIGHT: 210 LBS | BODY MASS INDEX: 32.96 KG/M2

## 2021-06-09 ENCOUNTER — RECORDS - HEALTHEAST (OUTPATIENT)
Dept: ADMINISTRATIVE | Facility: CLINIC | Age: 56
End: 2021-06-09
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
## 2021-06-17 NOTE — TREATMENT PLAN
Discussed MRI lumbar with Dr Reyes and clinical exam. Moderate to severe stenosis due to disc protrusion at L3-4, but also similar to 2019 MRI. Right S1 nerve root impacted by bulge also seen on 2019 MRI - did have discectomy L5-S1 with Dr Reaves 6/2019.  No red flags on exam. Patient endorses back pain, right leg pain. No numbness or tingling. MRI reviewed. Patient goes to pain clinic - has a number of pain meds and muscle relaxer's on his list. Has DM which makes steroids less optional. We can arrange OP consultation with patient.  Encourage her to seek attention sooner if any red flags develop.    Shaista Bledsoe, JIMMY-CNP  [facility]  O: [phone]

## 2021-06-17 NOTE — TELEPHONE ENCOUNTER
5/6/21 Called patient for the third time. Voicemail still full. Sent letter regarding scheduling w Neurosurgery and scanned to chart.

## 2021-06-17 NOTE — PROGRESS NOTES
[facility] Certification Request    May 7, 2018      Patient: Sarah Rahman  MR Number: [id number]  YOB: 1965  Date of Visit: 5/4/2018      Dear Dr. Curt Ramos, DO:    Thank you for this referral.   We are seeing Sarah Rahman for Physical Therapy.    Medicare and/or Medicaid requires physician review and approval of the treatment plan. Please review the plan of care and verify that you agree with the therapy plan of care by co-signing this note.      Plan of Care  Authorization / Certification Start Date: 05/04/18  Authorization / Certification End Date: 06/29/18  Communication with: Referral Source  Patient Related Instruction: Nature of Condition;Treatment plan and rationale;Self Care instruction;Basis of treatment;Body mechanics;Posture;Precautions;Next steps;Expected outcome  Times per Week: 1  Number of Weeks: 8  Number of Visits: up tp 8 sessions  Therapeutic Exercise: ROM;Stretching;Strengthening  Neuromuscular Reeducation: kinesio tape;posture;postural restoration;core  Manual Therapy: soft tissue mobilization;myofascial release;joint mobilization;muscle energy;strain counterstrain;craniosacral therapy  Modalities: electrical stimulation;TENS;ultrasound;cold pack;hot pack    Goals:  Pt. will be independent with home exercise program in : 6 weeks  Pt. will have improved quality of sleep: with less pain;waking less times/night;in 6 weeks  Pt. will bend: to dress;to clean;with less pain;with less difficulty;for self care;in 6 weeks  Patient will twist/turn : in bed;while standing;while walking;with less difficulty;with less pain;for bed mobilitiy;for cooking/cleaning;in 6 weeks  No Data Recorded      If you have any questions or concerns, please don't hesitate to call.    Sincerely,      Darshana Otero, PT        Physician recommendation:     __X_ Follow therapist's recommendation        ___ Modify therapy      *Physician co-signature indicates they certify the need for  these services furnished within this plan and while under their care.          [facility]   [facility] Initial Evaluation    Patient Name: Sarah Rahman  Date of evaluation: 5/4/2018  Referral Diagnosis: Thoracic spine pain  Referring provider: Curt Ramos DO  Visit Diagnosis:     ICD-10-CM    1. Pain in thoracic spine M54.6        Assessment:     Sarah Rahman is a 52 y.o. female who presents to therapy today with chief complaints of lower left thoracic and upper lumbar pain that started in January 2018 without a known injury and has been progressively getting worse. She started seeing the pain clinic finally after a couple of months of seeing her primary MD and they did an MRI per the patient report, they told her there was nothing on the MRI to explain the pain. Patient was seen at spine center and then referred to PT for assessment and TENS trial. Patient is limited in sleep, lifting, bending and twisting motions that make ADLs and recreational activities difficult in general. Patient has a PMHx that includes double bypass CAD, CVA with left sided weakness and DM type II. Patient has limited ROM in all motions due to pain, MMT indicated some pain with resistance. She has pain with SLR and slump on the left side and is very tender to palpation and gentle PAs to the throacic spine and left rib angles. Patient will benefit from skilled PT services and a trial of TENS to decrease pain and inflammation to the area and improve sleep and ADLs.     Impairments in  pain, posture, ROM, joint mobility, strength  The POC is dynamic and will be modified on an ongoing basis.  Barriers to achieving goals as noted in the assessment section may affect outcome.  Prognosis to achieve goals is  fair   Pt. is appropriate for skilled PT intervention as outlined in the Plan of Care (POC).  Pt. is a good candidate for skilled PT services to improve pain levels and function.    Goals:  Pt. will be  independent with home exercise program in : 6 weeks  Pt. will have improved quality of sleep: with less pain;waking less times/night;in 6 weeks  Pt. will bend: to dress;to clean;with less pain;with less difficulty;for self care;in 6 weeks  Patient will twist/turn : in bed;while standing;while walking;with less difficulty;with less pain;for bed mobilitiy;for cooking/cleaning;in 6 weeks  No Data Recorded    Patient's expectations/goals are realistic.    Barriers to Learning or Achieving Goals:  Chronicity of the problem.  Co-morbidities or other medical factors.  CAD, HTN, hx of CVA, asthma, schizoaffective disorder, bipolar, DM type II       Plan / Patient Instructions:        Plan of Care:   Authorization / Certification Start Date: 05/04/18  Authorization / Certification End Date: 06/29/18  Communication with: Referral Source  Patient Related Instruction: Nature of Condition;Treatment plan and rationale;Self Care instruction;Basis of treatment;Body mechanics;Posture;Precautions;Next steps;Expected outcome  Times per Week: 1  Number of Weeks: 8  Number of Visits: up tp 8 sessions  Therapeutic Exercise: ROM;Stretching;Strengthening  Neuromuscular Reeducation: kinesio tape;posture;postural restoration;core  Manual Therapy: soft tissue mobilization;myofascial release;joint mobilization;muscle energy;strain counterstrain;craniosacral therapy  Modalities: electrical stimulation;TENS;ultrasound;cold pack;hot pack    Plan for next visit: TENS, heat, trial light ROM and stretching exercises, gentle PAs or STM as tolerated      Subjective:         Social information:   Living Situation:single family home and lives with others    Occupation:retired and unemployed   Work Status:NA   Equipment Available: None    History of Present Illness:    Sarah is a 52 y.o. female who presents to therapy today with complaints of left sided lower thoracolumar pain that started in Jan 2018 without a known injury. She states she had a a  fine day on news year eve and day and then early in the morning on Jan 2nd she woke up to a feeling of burning hot pain and swelling along the left mid back after having vommitted and using the restroom at the same time. She started to see her MD throughout January until she finally had an MRI on the low back. She was told she did not have any issues for the the low back, no tumor, no cyst to explain her pain but per chart review the MRI report is not scanned into out system at this time. She has been seeing the Pain clinic at [facility] and they did the MRI, results are not in the system. Denies hx of the back pain in the past. She is frustrated with her care so far and she is not understanding why she has pain or why no one is making her pain better after going to so many appointments. Extra time spent with patient today.  Nothing changes her pain, she will get increase in pain without a known reason. Heat makes the pain better but nothing else.     Pain Rating:10  Pain rating at best: 10  Pain rating at worst: 10  Pain description: burning, pain and soreness    Functional limitations are described as occurring with:   ascending and descending stairs or curbs  bending  lifting  looking up, looking down and turning head    Patient reports benefit from:  pain medication, heat         Objective:      Note: Items left blank indicates the item was not performed or not indicated at the time of the evaluation.    Patient Outcome Measures :    Modified Oswestry Low Back Pain Disablity Questionnaire  in %: 28   Scores range from 0-100%, where a score of 0% represents minimal pain and maximal function. The minimal clinically important difference is a score reduction of 12%.    Examination  1. Pain in thoracic spine       Involved side: Left  Posture Observation:      General sitting posture is  normal.  Cervical:  Mild forward head    Lumbar ROM:  5/4/2018  Date: 5/4/2018     *Indicate scale AROM AROM AROM   Lumbar  Flexion To mid tibia, pain in the low      Lumbar Extension Mod dec, pain in the left low back      Right Left Right Left Right Left   Lumbar Sidebending Min dec, pain in the left side  Mod dec, tight on the left        Lumbar Rotation         Thoracic Flexion      Thoracic Extension      Thoracic Sidebending         Thoracic Rotation Min dec, pain Mod pain         Lower Extremity Strength:   5/4/2018  Date: 5/4/2018     LE strength/5 Right Left Right Left Right Left   Hip Flexion (L1-3) 5 4 pain in back       Hip Extension (L5-S1)         Hip Abduction (L4-5)  Painful in sitting       Hip Adduction (L2-3)  5 pain in back       Hip External Rotation         Hip Internal Rotation         Knee Extension (L3-4) 5 5       Knee Flexion 5 painful       Ankle Dorsiflexion (L4-5)  Able to heel walk       Great Toe Extension (L5)         Ankle Plantar flexion (S1)  Unable to toe walk due to pain and weakness       Abdominals        Sensation    5/4/2018 reports diminished on the left side from T4 down to T/L junction out along the ribs  Reflex Testing - Not tested today     Palpation: moderate tenderness to the left paraspinals and muscle along the left rib cage with increase in tension noted today tried light PAs to the mid thoracic spine was but very tender to light pressure T4-6, T10-12 as well     Lumbar Special Tests:   5/4/2018  Lumbar Special Tests Right Left SI Tests Right  Left   Quadrant test   SI Compression     Straight leg raise  Pain in back and thoracic spine at 35 def SI Distraction     Crossover response   POSH Test     Slump  Painful  Sacral Thrust     Sit-up test  FADIR - Tight and pain in back   Trunk extensor endurance test  Resisted Abduction     Prone instability test  Other: MANJEET - Pain in the back   Pubic shotgun  Other:         Treatment Today   5/4/2018  TREATMENT MINUTES COMMENTS   Evaluation 40    Self-care/ Home management     Manual therapy     Neuromuscular Re-education     Therapeutic  Activity     Therapeutic Exercises     Gait training     Modality______TENS and Heat Pad ____________ 20 Set up and explanation of the TENS unit and how it works on managing pain.   Set up TENS in 2 electrode pattern on left thoracic spine  Added heat pad and had patient in sitting               Total 60     Blank areas are intentional and mean the treatment did not include these items.   PT Evaluation Code: (Please list factors)  Patient History/Comorbidities: as above   Examination: as above   Clinical Presentation: stable   Clinical Decision Making: low     Patient History/  Comorbidities Examination  (body structures and functions, activity limitations, and/or participation restrictions) Clinical Presentation Clinical Decision Making (Complexity)   No documented Comorbidities or personal factors 1-2 Elements Stable and/or uncomplicated Low   1-2 documented comorbidities or personal factor 3 Elements Evolving clinical presentation with changing characteristics Moderate   3-4 documented comorbidities or personal factors 4 or more Unstable and unpredictable High Darshana Otero, PT, DPT   5/4/2018  2:47 PM

## 2021-06-17 NOTE — TELEPHONE ENCOUNTER
5/3/21 Attempted to reach patient. No answer, voicemail was full. First available surgeon appt okay to schedule.

## 2021-06-17 NOTE — PROGRESS NOTES
Sarah Rahman did not show for the appointment with the diabetes educator.  Will attempt to reschedule.

## 2021-06-17 NOTE — PROGRESS NOTES
Patient was referred for CCC. Patient has not established care with a [facility] provider and is therefore ineligible for CCC at this time. Chart will be reviewed again in 1 month for updates.
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
## 2021-06-17 NOTE — TELEPHONE ENCOUNTER
5/4/21 Attempted to reach patient again this morning. Voicemail still full, unable to leave message.

## 2021-06-17 NOTE — PROGRESS NOTES
Assessment/Plan:      Diagnoses and all orders for this visit:    Thoracic spine pain  -     Ambulatory referral to Physical Therapy    Left arm numbness  -     EMG- Left Arm; Standing  -     EMG- Left Arm    Neuropathic pain  -     Discontinue: capsaicin (ZOSTRIX) 0.025 % cream; Apply to affected area three times daily  Dispense: 60 g; Refill: 0  -     Ambulatory referral to Physical Therapy  -     capsaicin (ZOSTRIX) 0.025 % cream; Apply to affected area three times daily  Dispense: 60 g; Refill: 0    Myofascial pain  -     Discontinue: capsaicin (ZOSTRIX) 0.025 % cream; Apply to affected area three times daily  Dispense: 60 g; Refill: 0  -     Ambulatory referral to Physical Therapy  -     capsaicin (ZOSTRIX) 0.025 % cream; Apply to affected area three times daily  Dispense: 60 g; Refill: 0        Assessment: 52-year-old female with a past medical history of diabetes mellitus, COPD, reflux, hypertension,Substance abuse, schizoaffective disorder with:    1.  2-3 months of significant left flank pain at the thoracolumbar junction in the T11-L1 region with burning pain consistent with neuropathic pain.  Has benefited from intercostal blocks but only for several hours.  I question neuropathic pain such as shingles/postherpetic neuralgia or radicular pain around T11 region.  2.  Myofascial pain.  3.  She has associated pain at the thoracolumbar junction T10 through L1 region as well.  4.  Left arm paresthesias with potential weakness of .  Had been initially scheduled for EMG wanted to do a consultation today for the flank pain.      Discussion:    1.  I discussed options and diagnosis.  Difficult to know what is causing her pain.  This seems most consistent with a lower thoracic radiculopathy versus myofascial pain/neuropathic pain.  We discussed that an MRI was done we discussed options of therapy along with topical medications.  2.  Trial capsaicin cream 3 times a day prescription provided.  3.  I will obtain  MRI images of the chest to evaluate for any central stenosis or foraminal stenosis.  4.  Start physical therapy.  Thoracolumbar strengthening TENS unit may be beneficial.  5.  Would like to avoid medications at this time.  She has a pain clinic patient at [place].  Tells me that she is only a patient therefore this current condition.  6.  She initially was scheduled for an EMG of the left upper extremity to evaluate the paresthesias per PCP.  She wanted to hold off on that today and do a consultation for her flank pain thoracolumbar pain.  She will reschedule EMG.  7.  We will follow-up with her for her EMG at her earliest convenience per    It was our pleasure caring for your patient today, if there any questions or concerns please do not hesitate to contact us.      Subjective:   Patient ID: Sarah Rahman is a 52 y.o. female.    History of Present Illness: Patient presents for an evaluation of left-sided thoracolumbar pain.  She sees Dr. Fortunato Patterson.  She has some left arm numbness tingling throughout all the fingers and the whole arm with pain and weakness as well.  Was initially scheduled for an EMG but she does not want the EMG today and wants to have a consult for her left thoracolumbar pain.  We will reschedule the EMG if she desires.    For the past 3 months she has had pain over the left thoracolumbar junction and flank just under her brassiere strap on the left side.  Insidious in onset.  She feels that this is a constant pain feels as if she is being stung by bees with burning stabbing sensations in the cervical and the inferior aspect of the angle of the scapula through the thoracolumbar junction.  Does not extend laterally to the midaxillary line.  She feels this is red hot swollen at times but no rashes.  No illnesses prior to this no fevers chills or signs of shingles.    Her pain has been persistent and quite severe.  Seems to be better with heat.  She has tried ibuprofen Tylenol icy hot.  Not  had physical therapy.  She has been seen at [facility] on a few occasions and received intercostal nerve blocks from Dr. Vera.  MRI of the chest has been done.  Intercostal nerve blocks to last for approximately 1 day and then the symptoms returned.  She is not on any medications for the pain.  There is no radiation down the legs numbness tingling or weakness.      Imaging: MRI chest wall results from March 2018 revealed postoperative sternotomy.  No spinal cord signal abnormality no spinal canal or neuroforaminal stenosis no masses or fluid collection or signal abnormality near the marker of her pain.  Exam otherwise negative.    Review of Systems: Complains of changes in vision chest pressure dizziness swelling of the feet shortness of breath anxiety abdominal pain poor sleep back pain joint pain leg pain depression blood sugar changes in color changes of her hands and feet. Remainder of 12 point review systems negative unless listed above.    Past Medical History:   Diagnosis Date     Asthma      CAD (coronary artery disease)      COPD (chronic obstructive pulmonary disease)      CVA (cerebral infarction)      Diabetes      GERD (gastroesophageal reflux disease)      Hypertension      Polysubstance abuse      S/P CABG (coronary artery bypass graft)      Schizoaffective disorder        Social history: She is on disability.  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  She also has a PCA.    The following portions of the patient's history were reviewed and updated as appropriate: allergies, current medications, past family history, past medical history, past social history, past surgical history and problem list.      WHO 5: 2    HELEN Score: 52      Objective:   Physical Exam:    Vitals:    04/27/18 1036   BP: (!) 174/96   Pulse: 90       General:  Well-appearing female in no acute distress.  Pleasant, cooperative, and interactive throughout the examination and interview.   CV: No lower extremity edema on inspection or paltation.  Lymphatics: No cervical lymphadenopathy palpated. Eyes: sclera clear. Skin: No rashes or lesions seen over the head/neck, hairline, arms, legs, trunk.  Respirations unlabored.  MSK: Gait is normal.  Able to heel-toe stand without difficulty.  Negative Romberg.  Spine: normal AP curves of the C, T, and L spine.  Decreased thoracolumbar flexion secondary to pain..  Palpation: Tenderness to palpation over left thoracolumbar paraspinals T10 through L1 region.  Minimal tenderness along the ribs on the left.  Negative Ankur's test on the left.* Extremities: Full range of motion of the elbows, and wrists with no effusions or tenderness to palpation.   Full range of motion of the hips, knees, and ankles with no effusions or tenderness to palpation.    No hypermobility of the upper or lower extremities.  Neurologic exam: Mental status: Patient is alert and oriented with normal affect.  Attention, knowledge, memory, and language are intact.  Normal coordination throughout the examination.  Reflexes are  1+patellar, and Achilles with  Negative Fernando's.  Sensation is intact to light touch throughout the upper and lower extremities bilaterally.  Manual muscle testing reveals 5 out of 5 in the hip flexors, knee flexors/extensors, ankle plantar flexors, ankle  dorsiflexors, and EHL.  Upper extremities: May have slight weakness of the left arm and .  Normal muscle bulk and tone in the arms and legs.    Negative seated straight leg raise bilaterally.

## 2021-06-17 NOTE — TELEPHONE ENCOUNTER
Clinic appointment with first available surgeon for lumbar stenosis. Has MRI from ED visit. Not urgent. No red flags on exam.     HARIS Paz  [facility]  O: [phone]

## 2021-06-17 NOTE — PROGRESS NOTES
Office Visit - New Patient   Sarah Rahman   52 y.o.  female    Date of visit: 4/11/2018  Physician: Fortunato Patterson MD     Assessment and Plan   1. Visit for screening mammogram    2. Type 2 diabetes mellitus with diabetic polyneuropathy, with long-term current use of insulin  She is poorly controlled and needs a comprehensive management plan  - Ambulatory referral to Diabetes Education Program (Existing Diagnosis)  - Ambulatory referral to Medication Management    3. Schizoaffective disorder, bipolar type  - Ambulatory referral to Care Management (Primary Care)  - Ambulatory referral to Psychiatry    4. CAD (coronary artery disease)  Continue secondary prevention with aspirin statin, beta-blocker, excellent blood pressure control  - Ambulatory referral to Cardiology    5. Essential hypertension  She is on numerous medications, not entirely clear what she is taking will have her meet with her pharmacist    6. Drug dependence  She does have some drug-seeking behavior, not clear if she is currently using    7. History of CVA (cerebrovascular accident)  Continue secondary prevention    8. Other chronic pain  - Ambulatory referral to Pain Clinic    9. Left arm numbness  Her numbness appears neuropathic we will evaluate with an EMG, no evidence of acute coronary syndrome  - EMG- Left Arm; Future  - Electrocardiogram Perform - Clinic    Return in about 4 weeks (around 5/9/2018) for recheck.     Chief Complaint   Chief Complaint   Patient presents with     [facility]        Patient Profile   Social History     Social History Narrative    Lives alone in a condo.  On disability.  Three living children.          Past Medical History   Patient Active Problem List   Diagnosis     Chest pain     ACP (advance care planning)     CAD (coronary artery disease)     Drug dependence     Essential hypertension     GERD (gastroesophageal reflux disease)     History of CVA (cerebrovascular accident)     Moderate  persistent asthma     Schizoaffective disorder, bipolar type     Type 2 diabetes mellitus with diabetic polyneuropathy, with long-term current use of insulin       Past Surgical History  She has a past surgical history that includes Coronary artery bypass graft (2009); Total abdominal hysterectomy w/ bilateral salpingoophorectomy; ORIF ulnar / radial shaft fracture (Right); and Coronary stent placement.     History of Present Illness   This 52 y.o. old woman comes in for an initial visit.  She is a complicated patient and has a history of significant chronic problems along with significant mental illness.  She is a history of drug dependence and drug-seeking tendencies.  She is unclear if she was establish care.  In numerous times during the visit she states that I am just like her review his physicians and she does not want me to be her physician.  She then tends to change her mind.  She does not definitively establish care today.  She has chronic chest pain.  She has been evaluated for acute coronary syndrome numerous times including recently in our emergency room.  She recently had an MRI of her chest wall.  She does have history of coronary heart disease.  She is prescribed excellent medications although I am not sure she is taking these.  She does continue to smoke.  She would like a new pain clinic.  She would like to control her diabetes but does not take her insulin.  She does take metformin per her report.  She apparently has a history of a stroke although details are not entirely clear.  She does not currently have a psychiatrist although she has a diagnosis of schizoaffective disorder and bipolar disease.  She has chronic numbness of her left arm and hand.  She states she has never had an EMG.  She denies any neck pain.  She denies ongoing use of illicit drugs.    Review of Systems: A comprehensive review of systems was negative except as noted.     Medications and Allergies   Current Outpatient  Prescriptions   Medication Sig Dispense Refill     acetaminophen (TYLENOL) 325 MG tablet Take 650 mg by mouth every 6 (six) hours as needed for pain.       albuterol (PROVENTIL HFA;VENTOLIN HFA) 90 mcg/actuation inhaler Inhale 2 puffs every 6 (six) hours as needed for wheezing.       amLODIPine (NORVASC) 10 MG tablet Take 10 mg by mouth daily.       aspirin 81 MG EC tablet Take 81 mg by mouth daily.       atenolol (TENORMIN) 50 MG tablet Take 50 mg by mouth daily.       atorvastatin (LIPITOR) 80 MG tablet Take 80 mg by mouth.       budesonide-formoterol (SYMBICORT) 160-4.5 mcg/actuation inhaler Inhale.       carvedilol (COREG) 6.25 MG tablet Take 6.25 mg by mouth 2 (two) times a day with meals.       insulin glargine (LANTUS) 100 unit/mL injection Inject 40 Units under the skin bedtime.       loratadine (CLARITIN) 10 mg tablet Take 10 mg by mouth daily as needed for allergies.       metFORMIN (GLUMETZA) 500 MG (MOD) 24 hr tablet Take 1,000 mg by mouth daily with breakfast.       omeprazole (PRILOSEC) 20 MG capsule TAKE 1 CAPSULE BY ORAL ROUTE 2 TIMES EVERY DAY BEFORE A MEAL  0     polyethylene glycol (MIRALAX) 17 gram packet Take 17 g by mouth daily.       senna (SENOKOT) 8.6 mg tablet Take 3 tablets by mouth daily.       No current facility-administered medications for this visit.      Allergies   Allergen Reactions     Tramadol Angiodema     Demerol [Meperidine]      Haldol Decanoate [Haloperidol Decanoate]      Latex Itching     Lisinopril      Penicillins      Thorazine [Chlorpromazine]      Hydroxyzine Rash     Tongue swelling        Family and Social History   Family History   Problem Relation Age of Onset     Heart disease Mother      Heart disease Father      Heart disease Sister      Diabetes Brother      Heart disease Sister         Social History   Substance Use Topics     Smoking status: Current Every Day Smoker     Smokeless tobacco: Never Used     Alcohol use Yes      Comment: occ        Physical Exam  "  General Appearance:   No acute distress    /70 (Patient Site: Right Arm, Patient Position: Sitting, Cuff Size: Adult Large)  Pulse 86  Ht 5' 7\" (1.702 m)  Wt 204 lb (92.5 kg)  SpO2 97%  BMI 31.95 kg/m2    EYES: Eyelids, conjunctiva, and sclera were normal. Pupils were normal. Cornea, iris, and lens were normal bilaterally.  HEAD, EARS, NOSE, MOUTH, AND THROAT: Head and face were normal. Hearing was normal to voice and the ears were normal to external exam. Nose appearance was normal and there was no discharge. Oropharynx was normal.  NECK: Neck appearance was normal. There were no neck masses and the thyroid was not enlarged.  RESPIRATORY: Breathing pattern was normal and the chest moved symmetrically.  Percussion/auscultatory percussion was normal.  Lung sounds were normal and there were no abnormal sounds.  CARDIOVASCULAR: Heart rate and rhythm were normal.  S1 and S2 were normal and there were no extra sounds or murmurs. Peripheral pulses in arms and legs were normal.  Jugular venous pressure was normal.  There was no peripheral edema.  GASTROINTESTINAL: The abdomen was normal in contour.  Bowel sounds were present.  Percussion detected no organ enlargement or tenderness.  Palpation detected no tenderness, mass, or enlarged organs.   MUSCULOSKELETAL: Skeletal configuration was normal and muscle mass was normal for age. Joint appearance was overall normal.  LYMPHATIC: There were no enlarged nodes.  SKIN/HAIR/NAILS: Skin color was normal.  There were no skin lesions.  Hair and nails were normal.  NEUROLOGIC: The patient was alert and oriented to person, place, time, and circumstance. Speech was normal. Cranial nerves were normal. Motor strength was normal for age. The patient was normally coordinated.  PSYCHIATRIC:  Mood and affect were normal and the patient had normal recent and remote memory. The patient's judgment and insight were probably normal       Additional Information   Review and/or order " of clinical lab tests: Stents of review  Review and/or order of radiology tests: Extensive review  Review and/or order of medicine tests:  Discussion of test results with performing physician:  Decision to obtain old records and/or obtain history from someone other than the patient:  Review and summarization of old records and/or obtaining history from someone other than the patient and.or discussion of case with another health care provider: Extensive review, summarized above  Independent visualization of image, tracing or specimen itself: EKG personally reviewed shows normal sinus rhythm, possible left atrial enlargement and nonspecific ST and T-wave abnormalities, compared to March 20 no significant change    Time: total time spent with the patient was 60 minutes of which >50% was spent in counseling and coordination of care     Fortunato Patterson MD  Internal Medicine  Contact me at None

## 2021-06-17 NOTE — ED NOTES
"Pt refused medications. Pt stated \"toradol, man, I got percocet at home. I'm never coming here again, [facility]'m going [facility]\".   "

## 2021-06-17 NOTE — ED NOTES
"Assisted pt to bathroom in lobby - pt states \"I cant walk\". Pt able to stand with no assistance and walk with no assistance in the bathroom.   "

## 2021-06-17 NOTE — PROGRESS NOTES
Consultation - [facility]  Sarah Rahman,  1965, MRN [id number]    PCP: Fortunato Patterson MD, [phone]    Assessment and Plan: Chest pain syndrome atypical for angina, history of coronary artery disease, history of coronary bypass surgery two-vessel LIMA to the LAD vein graft to the diagonal, hypertension, hyperlipidemia.  History of muscle skeletal chest pains or nonischemic noncardiac chest pain  Recommendations: I obtain an EKG in clinic here today and I do not see any acute changes.  His appears to be normal tracing with some flattening of the T waves however there is significant artifact as she was unable to stay motionless for the study.  I do not believe that the symptoms she is describing anyway suggest an acute ischemic syndrome.  I seems to be a chronic pain and more musculoskeletal in origin.  She has had extensive evaluations frequently at [facility] for the symptoms.  Including a angiogram 1 year ago.  Reluctant to make any changes in her medical regimen.  She should continue with aspirin atenolol atorvastatin insulin metformin.  We could offer her a stress echo study to assess whether or not there is evidence to suggest any recurrent ischemia.  The pains tends to intensify or if he more typical anginal symptom occurs recommend return to the ER for evaluation    Chief Complaint: Chest pain    HPI:  We have been requested by Dr. Patterson to evaluate Sarah Rahman for consultation who is a  52 y.o. year old female for chest pain.   Hx: 52-year-old woman who was seen in consultation for evaluation of chest pain syndrome.  Patient has had extensive evaluation both here and primarily at [facility] for the symptoms.  She states that I had seen her in consultation many years ago although I am unable to find records of this interaction.  In  she presented with a chest pain syndrome was found to have severe disease of the left anterior descending and first  diagonal and underwent two-vessel coronary bypass procedure LIMA to the LAD and a vein graft to the diagonal.  Since that time she has had almost constant recurrent chest pains.  His eyebrows to the records from [facility]'s ICU multiple evaluations in the ER and a clinic.  She has had evaluations that included angiography in 2012 2014 and last year 2017.  Each of these occasions mild grade coronary disease is seen except for the noted previous lesion and both bypass grafts are patent.  She has had a number of stress tests including recently stress nuclear study in 2011 echo study 2012 nuclear in 2014 and these have been consistently normal.  Cardiac echo most recently obtained last year was normal.    Her complaint is of a sharp infracostal chest pain on the left side.  Is a sharp stabbing knifelike pain.  The pain seems to be there most of the time.  She is very anxious about the symptom.  She also complains of a ache over her left deltoid.  Again this is a chronic pain is seems to have been there for some time likely at least several days.  Which is difficult is getting her to pin down exactly the type of symptom the nature of the symptoms duration.  She is very anxious and agitated here in clinic.  Is not complaining of acute dyspnea.  The pain syndromes that she is experiencing have    Medical History  Active Ambulatory (Non-Hospital) Problems    Diagnosis     Schizoaffective disorder, bipolar type     Chest pain     ACP (advance care planning)     GERD (gastroesophageal reflux disease)     History of CVA (cerebrovascular accident)     Moderate persistent asthma     Type 2 diabetes mellitus with diabetic polyneuropathy, with long-term current use of insulin     CAD (coronary artery disease)     Drug dependence     Essential hypertension     Past Medical History:   Diagnosis Date     Asthma      CAD (coronary artery disease)      COPD (chronic obstructive pulmonary disease)      CVA (cerebral infarction)       Diabetes      GERD (gastroesophageal reflux disease)      Hypertension      Polysubstance abuse      S/P CABG (coronary artery bypass graft)      Schizoaffective disorder        Surgical History  She  has a past surgical history that includes Coronary artery bypass graft (2009); Total abdominal hysterectomy w/ bilateral salpingoophorectomy; ORIF ulnar / radial shaft fracture (Right); and Coronary stent placement.    Social History  Reviewed, and she  reports that she has been smoking.  She has never used smokeless tobacco. She reports that she drinks alcohol. She reports that she does not use illicit drugs.  Smoking status reviewed.  Social history othrwise not contributory to HPI.  Allergies  Allergies   Allergen Reactions     Tramadol Angiodema     Demerol [Meperidine]      Haldol Decanoate [Haloperidol Decanoate]      Latex Itching     Lisinopril      Penicillins      Thorazine [Chlorpromazine]      Hydroxyzine Rash     Tongue swelling       Family History  Reviewed, and family history includes Diabetes in her brother; Heart disease in her father, mother, sister, and sister.  Extended Emergency Contact Information  Primary Emergency Contact: Zay Moya   [facility]  Home Phone: [phone]  Relation: Friend  Family history otherwise negative or not conributory to HPI.    Psychosocial Needs  Social History     Social History Narrative    Lives alone in a condo.  On disability.  Three living children.       Additional psychosocial needs reviewed per nursing assessment.    Prior to Admission Medications    (Not in a hospital admission)    Review of Systems:  A 12 point comprehensive review of systems was negative except as noted.  Review of systems is negative except for HPI  Physical Exam:  Less than 10 mL of urine  Vitals:    04/25/18 1538   BP: 130/80   Pulse: 76   Resp: 18     Head and neck without focal cranial neurologic defects.  JVD not distended.  Carotid upstroke normal without  bruit.  External eye exam normal without icterus.  External ear exam normal.  Neck without cervical lymphadenopathy or thyromegaly.  Lungs: clear to auscultation bilaterally  Heart: regular rate and rhythm, S1, S2 normal, no murmur, click, rub or gallop   Abdomen with normal bowel tones.  Skin without rash, ecchymosis, lesions.  Neuromuscular tone normal.  Peripheral pulse intact and equal.  Joints without swelling or erythema.      [unfilled]    Pertinent Labs  Lab Results: personally reviewed.   Lab Results   Component Value Date     03/20/2018    K 3.6 03/20/2018     03/20/2018    CO2 25 03/20/2018    BUN 10 03/20/2018    CREATININE 0.71 03/20/2018    CALCIUM 9.0 03/20/2018     Lab Results   Component Value Date    CKTOTAL 58 01/21/2011    CKMB 1 10/02/2015    TROPONINI <0.01 03/20/2018     Lab Results   Component Value Date    WBC 6.6 03/20/2018    WBC 5.6 03/16/2015    HGB 11.8 (L) 03/20/2018    HCT 35.8 03/20/2018    MCV 87 03/20/2018     03/20/2018     Lab Results   Component Value Date    CHOL 141 12/13/2009    TRIG 110 12/13/2009    HDL 35 (L) 12/13/2009       Pertinent Radiology  Radiology Results: See Report  EKG Results: personally reviewed.  and See Report       Current Outpatient Prescriptions:      acetaminophen (TYLENOL) 325 MG tablet, Take 650 mg by mouth every 6 (six) hours as needed for pain., Disp: , Rfl:      albuterol (PROVENTIL HFA;VENTOLIN HFA) 90 mcg/actuation inhaler, Inhale 2 puffs every 6 (six) hours as needed for wheezing., Disp: , Rfl:      amLODIPine (NORVASC) 10 MG tablet, Take 10 mg by mouth daily., Disp: , Rfl:      aspirin 81 MG EC tablet, Take 81 mg by mouth daily., Disp: , Rfl:      atenolol (TENORMIN) 50 MG tablet, Take 50 mg by mouth daily., Disp: , Rfl:      atorvastatin (LIPITOR) 80 MG tablet, Take 80 mg by mouth., Disp: , Rfl:      budesonide-formoterol (SYMBICORT) 160-4.5 mcg/actuation inhaler, Inhale., Disp: , Rfl:      carvedilol (COREG) 6.25 MG  tablet, Take 6.25 mg by mouth 2 (two) times a day with meals., Disp: , Rfl:      insulin glargine (LANTUS) 100 unit/mL injection, Inject 40 Units under the skin bedtime., Disp: , Rfl:      loratadine (CLARITIN) 10 mg tablet, Take 10 mg by mouth daily as needed for allergies., Disp: , Rfl:      metFORMIN (GLUMETZA) 500 MG (MOD) 24 hr tablet, Take 1,000 mg by mouth daily with breakfast., Disp: , Rfl:      omeprazole (PRILOSEC) 20 MG capsule, TAKE 1 CAPSULE BY ORAL ROUTE 2 TIMES EVERY DAY BEFORE A MEAL, Disp: , Rfl: 0     polyethylene glycol (MIRALAX) 17 gram packet, Take 17 g by mouth daily., Disp: , Rfl:      senna (SENOKOT) 8.6 mg tablet, Take 3 tablets by mouth daily., Disp: , Rfl:

## 2021-06-17 NOTE — ED TRIAGE NOTES
Patient presents here with back pain and she states that she can't walk on her feet. She was seen in this ED last night for the same symptoms. She was brought here by [facility]. Her blood glucose was 335. She is here because her symptoms are the same.

## 2021-06-21 NOTE — PROGRESS NOTES
TCM DISCHARGE FOLLOW UP CALL    Discharge Date:  10/24/2018  Reason for hospital stay (discharge diagnosis)::  Syncope, groin pain. leg weakness  Are you feeling better, the same or worse since your discharge?:  Patient is feeling the same (weakness in legs continues, (L) hand tingling and weak)  Do you feel like you have a plan in the event of a health emergency?: No    (RN) Patient provided with information to call us in the event of a health emergency: Yes (Informed pt of nurse triage availability)    As part of your discharge plan, were  home care services ordered for you?: Yes    Have you seen them yet, or are they scheduled to visit?: Yes    Do you have any follow up visits scheduled with your PCP or Specialist?:  Yes, with PCP  (RN) Is PCP appt scheduled soon enough (within 14 days of discharge date)?: Yes    RN NOTES::  Pt hadn't picked up omeprazole from pharmacy yet. Reviewed instructions with pt.

## 2021-06-21 NOTE — PROGRESS NOTES
Admission History and Physical 
[facility] Patient: Lance Morales MRN: [id number]  [facility]: [id number] YOB: 1940  Age: 77 y.o. Sex: male DOA: 7/9/2018 HPI:  
 
Lance Morales is a 77 y.o. male with PMH of COPD, CHF EF of 55% (4/18), persistent Afib on coumadin, AAA repair (0259,0751) now presenting with complaint of fatigue, nausea, SOB, and trouble sleeping and admitted for hyponatremia. Patient states over the past several weeks he has been \"feeling crappy\". He reports fatigue, nausea, and SOB, on exertion. He has lost his appetite because he has been feeling so bad. He was recently admitted in the hospital for for a CHF exacerbation and PNA after being seen by his physician. During this admission he was also found to be hyponatremic. He denies any headache or changes in his vision. Patient denies any alcohol use. He is a former smoker and quit in 2012. He takes coumadin for his Afib (2.5 mg Wednesday and Friday, and 5 mg the remaining days). Patient is also on digoxin 125 mcg/day. ED Course: In the ED he was found to be hyponatremic at 119. Also with a K of 3.1. He was ordered: CXR, chest CT for concern of lung mass on CXR, EKG showed Afib, and troponin's 0.02. He was started on 75 mL/ NS. Review of Systems Constitutional: Negative for fever, chills and diaphoresis. HENT: Negative for hearing loss and sore throat. Eyes: Negative for blurred vision and double vision. Respiratory: Negative for cough and hemoptysis. Cardiovascular: Negative for chest pain and palpitations. Gastrointestinal: Positive for nausea. Negative for vomiting. Genitourinary: Negative for dysuria and hematuria. Musculoskeletal: Negative for myalgias and joint pain. Skin: Negative for itching and rash. Neurological: Negative for dizziness and headaches. Psychiatric: Negative for depression and suicidal ideas.   
 
 
Past Medical History: TCM DISCHARGE FOLLOW UP CALL    Discharge Date:  11/8/2018  Reason for hospital stay (discharge diagnosis)::  Syncope  Are you feeling better, the same or worse since your discharge?:  Patient is feeling better (No further episodes of passing out)  Do you feel like you have a plan in the event of a health emergency?: Yes (Call 911, or use her life line button)    As part of your discharge plan, were  home care services ordered for you?: Yes    Have you seen them yet, or are they scheduled to visit?: Yes    Do you have any follow up visits scheduled with your PCP or Specialist?:  Yes, with PCP (Dr. Patterson 11/13/18)  (RN) Is PCP appt scheduled soon enough (within 14 days of discharge date)?: Yes        May Montana RN Care Manager, Population Health     Diagnosis Date  Aneurysm ([facility] AAA repair 2006 & 2012  Aorto-iliac duplex 02/13/2015 Tech difficult. Patent aorta bi-iliac endograft w/o leak or limb dysfunction.  Arrhythmia   
 a fib  Cardiac cath 11/01/2012 RCA (sm, nondom) patent. LM patent. LAD 25%. CX (dom) 45% mid. LVEDP 12 mmHg. No WMA. PA 27/12. W 10-12. CO/CI 5.2/2.6 (TD).  Cardiac echocardiogram, abnormal 02/20/2016 EF 55%. No WMA. Indeterminate diastolic fx. RVSP 60 mmHg. Severe LAE. Mild MR. Mod TR.  IVCE. Similar to study of 10/26/12.  Cardiovascular aorto-iliac duplex 02/13/2015 Patent aorta bi-iliac endovascular graft w/o leak or limb dysfunction. Sac measures 4.21 x 4.47 cm (4.4 x 4.6 cm on 1/31/14).  Cardiovascular LE peripheral arterial testing 07/29/2013 No significant LE arterial disease bilaterally. R GHADA 1. 12.  L GHADA 1. 12.  R DBI 0.83. L DBI 0.71. Exercise deferred.  Cardiovascular renal duplex 10/31/2012 No RA stenosis. Intrinsic/med disease in left kidney. Patent aortic endograft. Patent, thrombus-free renal veins bilaterally.  Carotid duplex 07/29/2013 Mild <50% bilateral ICA plaquing.  Chronic lung disease  Cigarette smoker  Congestive heart failure (CHF) ([facility]  COPD (chronic obstructive pulmonary disease) (HCC)   
 and emphysema; likely secondary to tobacco abuse  Difficult intubation  Dyslipidemia   
 low HDL  Emphysema   
 HTN (hypertension)  Hypercholesterolemia  Increased prostate specific antigen (PSA) velocity  Long term (current) use of anticoagulants   
 coumadin  Osteoarthritis  Osteomyelitis ([address]  Paroxysmal atrial fibrillation (HCC)  Peripheral vascular disease ([facility] AAA repair 12/2007  Persistent atrial Fibrillation  Persistent atrial fibrillation (HCC)  Unspecified adverse effect of anesthesia   
 difficulty breathing placed in ICU Oct 2012 (AAA repair) Past Surgical History:  
Procedure Laterality Date  BRONCHOSCOPY DIAGNOSTIC  11/2/2012  CARDIAC CATHETERIZATION  11/1/2012  COLONOSCOPY N/A 7/26/2016 COLONOSCOPY with Polypectomies performed by Sonal Mott MD at [address] HX AAA REPAIR    
 2006 & 2012  HX HEART CATHETERIZATION  3/4/2009 1. RCA small, nondominant; patent. 2. LMCA patent. 3. LAD is long, wrapped around apical vessel. Diffuse, 20-30% stenosis noted. 4. CCA is large, dominant vessel. Diffuse 20-30% stenosis. 5. LVEDP is 16 mmHg. 6. Overall left ventricular systolic function mildly diminshed with est. EF 45%. Mild, global hypokinesis noted. 7. No significant mitral regurgitation or aortic stenosis noted. (see report)  HX HERNIA REPAIR  2/2014  
 rt inguinal   
 HX HERNIA REPAIR  01/2016 LEFT INGUINAL HERNIA REPAIR DR. Natalie Griffiths  REPAIR ING HERNIA,5+Y/O,JESSIE Left 1-20-16 Dr. Lemuel LONG  11/1/2012 Family History Problem Relation Age of Onset  Heart Surgery Father BYPASS  Stroke Father  Heart Surgery Mother BYPASS  Coronary Artery Disease Mother  Stroke Mother Social History Social History  Marital status:  Spouse name: N/A  
 Number of children: N/A  
 Years of education: N/A Social History Main Topics  Smoking status: Former Smoker Packs/day: 1.00 Years: 54.00 Types: Cigarettes Quit date: 10/23/2012  Smokeless tobacco: Never Used  Alcohol use No  
   Comment: quit drinking alcohol 26 years ago, patient states stopped in \"1501\"  Drug use: No  
 Sexual activity: Not on file Other Topics Concern  Not on file Social History Narrative Allergies Allergen Reactions  Codeine Swelling  Morphine Itching  Sulfa (Sulfonamide Antibiotics) Other (comments) Kidney problems. Prior to Admission Medications Prescriptions Last Dose Informant Patient Reported? Taking?   
OXYGEN-AIR DELIVERY SYSTEMS   Yes No  
Si L/min by Does Not Apply route daily. Continuous. Company is [employer] 
   
albuterol (PROVENTIL VENTOLIN) 2.5 mg /3 mL (0.083 %) nebulizer solution   No No  
Sig: 3 mL by Nebulization route every four (4) hours as needed for Wheezing. amLODIPine (NORVASC) 10 mg tablet   Yes No  
Sig: Take 10 mg by mouth daily. aspirin delayed-release 81 mg tablet   Yes No  
Sig: Take 81 mg by mouth daily. chlorthalidone (HYGROTEN) 25 mg tablet   Yes No  
Sig: Take 25 mg by mouth daily. digoxin (DIGITEK) 0.125 mg tablet   No No  
Sig: take 1 tablet by mouth once daily  
docusate sodium (COLACE) 100 mg capsule   Yes No  
Sig: Take 100 mg by mouth two (2) times daily as needed for Constipation. levoFLOXacin (LEVAQUIN) 250 mg tablet   No No  
Sig: Take 1 Tab by mouth every twenty-four (24) hours. melatonin 3 mg tablet   No No  
Sig: Take 1 Tab by mouth nightly as needed. ondansetron hcl (ZOFRAN) 4 mg tablet   No No  
Sig: Take 1 Tab by mouth every eight (8) hours as needed for Nausea. polyethylene glycol (MIRALAX) 17 gram packet   Yes No  
Sig: Take 17 g by mouth daily as needed. potassium chloride (KLOR-CON) 20 mEq packet   No No  
Sig: Take 2 Packets by mouth two (2) times daily (with meals). pravastatin (PRAVACHOL) 40 mg tablet   Yes No  
Sig: Take 40 mg by mouth nightly. tiotropium-olodaterol (STIOLTO RESPIMAT) 2.5-2.5 mcg/actuation mist   No No  
Sig: Take 2 Inhalation by inhalation daily. warfarin (COUMADIN) 2.5 mg tablet   No No  
Sig: Take 1 Tab by mouth daily. Take 2 pills (5 mg) Mon, Tues, Wed, Thur, Sat, Sun; and 1 pill only (2.5 mg) Fri Patient taking differently: Take 2.5 mg by mouth daily. Take 2 pills (5 mg) Mon, Tues, Thur, Sat, Sun; and 1 pill only (2.5 mg) Wed, Fri Facility-Administered Medications: None Physical Exam:  
 
Patient Vitals for the past 24 hrs: 
 Temp Pulse Resp BP SpO2  
18 1152 97.1 °F (36.2 °C) 71 18 157/77 93 % Physical Exam:  
General:  Alert and Responsive and in No acute distress. HEENT: Conjunctiva pink, sclera anicteric. EOMI. Pharynx moist, nonerythematous. Moist mucous membranes. No cervical, supraclavicular, occipital or submandibular lymphadenopathy. No other gross abnormalities appreciated. CV:  Irregular rate. No murmurs, rubs, or gallops appreciated. RESP:  Unlabored breathing. Lungs clear to auscultation. No wheeze, rales, or rhonchi. ABD:  Soft, nontender, nondistended. Normoactive bowel sounds. MS:  No joint deformity or instability. No atrophy. Neuro:  5/5 strength bilateral upper extremities and lower extremities. Ext:  2+ peripheral edema. 2+ radial and dp pulses bilaterally. Skin:  No rashes, lesions, or ulcers. IMAGING: Xr Chest [facility] Result Date: 7/9/2018 IMPRESSION: Further increased interstitial lung process since 6/27/2018, new from 6/17/2018. Stable additional recently new small left pleural effusion. Consider worsening or recurrent interstitial infection, and ongoing interstitial pneumonitis. Consider CT chest evaluation to exclude mass as discussed on prior chest film. Recent Results (from the past 24 hour(s)) EKG, 12 LEAD, INITIAL Collection Time: 07/09/18 11:56 AM  
Result Value Ref Range Ventricular Rate 71 BPM  
 Atrial Rate 77 BPM  
 QRS Duration 110 ms  
 Q-T Interval 390 ms QTC Calculation (Bezet) 423 ms Calculated R Axis -41 degrees Calculated T Axis 89 degrees Diagnosis Atrial fibrillation Left axis deviation Anteroseptal infarct (cited on or before 25-AUG-2017) Abnormal ECG When compared with ECG of 27-JUN-2018 17:10, 
QRS duration has decreased Confirmed by Brandie Reina ([id number]) on 7/9/2018 12:40:11 PM 
  
CBC WITH AUTOMATED DIFF Collection Time: 07/09/18 12:00 PM  
Result Value Ref Range WBC 7.1 4.6 - 13.2 K/uL  
 RBC 3.61 (L) 4.70 - 5.50 M/uL HGB 8.2 (L) 13.0 - 16.0 g/dL HCT 26.1 (L) 36.0 - 48.0 % MCV 72.3 (L) 74.0 - 97.0 FL  
 MCH 22.7 (L) 24.0 - 34.0 PG  
 MCHC 31.4 31.0 - 37.0 g/dL  
 RDW 15.1 (H) 11.6 - 14.5 % PLATELET 118 407 - 147 K/uL MPV 8.3 (L) 9.2 - 11.8 FL  
 NEUTROPHILS 78 (H) 40 - 73 % LYMPHOCYTES 14 (L) 21 - 52 % MONOCYTES 8 3 - 10 % EOSINOPHILS 0 0 - 5 % BASOPHILS 0 0 - 2 %  
 ABS. NEUTROPHILS 5.5 1.8 - 8.0 K/UL  
 ABS. LYMPHOCYTES 1.0 0.9 - 3.6 K/UL  
 ABS. MONOCYTES 0.6 0.05 - 1.2 K/UL  
 ABS. EOSINOPHILS 0.0 0.0 - 0.4 K/UL  
 ABS. BASOPHILS 0.0 0.0 - 0.06 K/UL  
 DF AUTOMATED METABOLIC PANEL, COMPREHENSIVE Collection Time: 07/09/18 12:00 PM  
Result Value Ref Range Sodium 119 (LL) 136 - 145 mmol/L Potassium 3.1 (L) 3.5 - 5.5 mmol/L Chloride 79 (L) 100 - 108 mmol/L  
 CO2 32 21 - 32 mmol/L Anion gap 8 3.0 - 18 mmol/L Glucose 106 (H) 74 - 99 mg/dL BUN 13 7.0 - 18 MG/DL Creatinine 1.17 0.6 - 1.3 MG/DL  
 BUN/Creatinine ratio 11 (L) 12 - 20 GFR est AA >60 >60 ml/min/1.73m2 GFR est non-AA >60 >60 ml/min/1.73m2 Calcium 8.5 8.5 - 10.1 MG/DL Bilirubin, total 0.7 0.2 - 1.0 MG/DL  
 ALT (SGPT) 26 16 - 61 U/L  
 AST (SGOT) 34 15 - 37 U/L Alk. phosphatase 84 45 - 117 U/L Protein, total 7.7 6.4 - 8.2 g/dL Albumin 2.8 (L) 3.4 - 5.0 g/dL Globulin 4.9 (H) 2.0 - 4.0 g/dL A-G Ratio 0.6 (L) 0.8 - 1.7 PROTHROMBIN TIME + INR Collection Time: 07/09/18 12:00 PM  
Result Value Ref Range Prothrombin time 19.3 (H) 11.5 - 15.2 sec INR 1.7 (H) 0.8 - 1.2 MAGNESIUM Collection Time: 07/09/18 12:00 PM  
Result Value Ref Range Magnesium 2.0 1.6 - 2.6 mg/dL CARDIAC PANEL,(CK, CKMB & TROPONIN) Collection Time: 07/09/18 12:00 PM  
Result Value Ref Range CK 41 39 - 308 U/L  
 CK - MB 1.7 <3.6 ng/ml CK-MB Index 4.1 (H) 0.0 - 4.0 % Troponin-I, Qt. 0.02 0.0 - 0.045 NG/ML  
LIPASE Collection Time: 07/09/18 12:00 PM  
Result Value Ref Range Lipase 130 73 - 393 U/L Assessment/Plan:  
77 y.o. male with PMH of COPD, CHF, AFIB , presenting with several weeks fatigue, SOB, and trouble sleeping now admitted with hyopnatremia. Hyponatremia Likely in setting of volume loss in setting of nausea, and poor PO intake. Patient appears euvolemic. Electrolyte abnormalities were noted on last admission. Other potential causes CHF, renal losses in setting of diuretic use. Less likely causes SIADH. -BMP every 6 hours  
-75 mL NS with goal to not exceed 6-8 mEq/l/day 
-hold Chlorthalidone  
- zofran for nausea 
-FU CT chest  
-FU urine and serum osmolality Hypokalemia 3.1 today. Likely in setting of nausea and and GI losses, vs diuretics. -hold chlorthalidone  
-replete K  
-FU BMP 
 
COPD Patient reports SOB over the past several weeks. He is on 4L O2 at home.  
- duonebs  
- continue home stiloto 
- supplemental oxygen CHF, HTN, HLD last echo April 2018 showed EF 55%  
-continue home amlodipine  
- will start on lisinopril  
- will order pravastatin AFib he takes warfarin at home. 2.5 mg on wednesdays and fridays and 5 mg for the remaining days - INR 1.7 
-Daily coumadin dosing - 5 mg today 
-FU PT/INR Diet: cardiac DVT prophylaxis: SCDs Code: Full POC: Niko Gosselin [phone] Randa Guzman MD PGY-1 
[facility]  
 
 
  
Senior Resident History and Physical 
[facility] 
  
HPI:  
  
Lillian Sosa is a 77 y.o. male with a PMH of afib, anemia, BPH, CAD, CHF, HTN, pulmonary HTN, HLD, chronic back pain now admitted with complaint of intractable nausea and vomiting. 
  
Patient reports that he developed nausea and vomiting about four days ago and it has been persistent, seems to be getting worse. He reports that he has had very little eat or drink in the past four days due to the nausea. He was recently admitted at the end of June 2018 for CHF exacerbation. 
  
Of note, a prior chest xray recommended CT for possible lung mass.   Patient quit smoking in 2012 and denies any tobacco use since. Denies alcohol or illicit drug use. Denies weight loss.  
  
He is on coumadin for his afib, however is INR is subtherapeutic today in the ER. Patient noted to be hyponatremic with sodium of 119. He does have a history of hyponatremia, with values mostly in the 125-135 range during previous admission and on outpatient records. 
  
HPI, ROS, PMH, PSH, Family Hx, Social Hx, home medications, and allergies as above in intern H&P. I agree with history as above. Additional comments to the subjective history include: 
  
Physical Exam:  
  
    
Visit Vitals  /77 (BP 1 Location: Left arm)  Pulse 71  Temp 97.1 °F (36.2 °C)  Resp 18  Ht 5' 6\" (1.676 m)  Wt 74.4 kg (164 lb)  SpO2 93%  BMI 26.47 kg/m2  
  
  
General:  WD, WN, NAD HEENT:  NCAT. Conjunctiva pink, sclera anicteric. PERRL. EOMI. Pharynx moist, nonerythematous. Moist mucous membranes. Thyroid not enlarged, no nodules. No cervical, supraclavicular, or submandibular lymphadenopathy. CV:  Irregularly irregular rhythm, no murmurs appreciated. PMI not displaced. RESP:  Unlabored breathing. CTAB, no wheeze, rales, or rhonchi. ABD:  Soft, nontender, nondistended. Normoactive bowel sounds. No hepatosplenomegaly. No suprapubic tenderness. No CVA tenderness. MS:  No joint deformity or instability. No atrophy. Neuro:  5/5 strength bilateral upper extremities and lower extremities. CN II-XII intact. Sensation grossly intact. A+Ox3. Ext:  1+ pitting edema to the bilateral shins. 2+ radial and dp pulses bilaterally. Skin:  No rashes, lesions, or ulcers. Good turgor. 
  
Labs and imaging reviewed 
  
Assessment/Plan:  
77 y.o. male with a PMH of afib, CHF, CAD, HTN, COPD on home oxygen, pulmonary HTN now admitted with intractable nausea and vomiting. 
  
Nausea and Vomiting in the Setting of Hyponatremia:  Patient appears to be euvolemic at this time with very minimal pitting edema.   DDx includes SIADH vs lung mass vs thiazide diuretic use.  
-will obtain serum and urine osmolarity. -BMP q6h. Goal increase in sodium is 4-6 mEq in a 24 hour period, not to exceed 8. -ER ordered chest CT without contrast, will follow. 
-zofran for nausea. -will start normal saline at 75mL/hr 
  
COPD 
-Continue PRN oxygen. 
-Continue home stiolto respimat (tiotropim-olodatero) 2 puffs daily 
-PRN duonebs. 
  
Atrial Fibrillation: INR subtherapeutic in the ER. 
-admit to tele 
-continue home coumadin 5mg daily with 2.5mg on Wednesday and Friday 
-continue digoxin 125mcg daily 
-daily INR 
  
CHF, HTN, HLD:  BP above goal in the ED. 
-Continue home amlodipine, pravastatin. 
-Hold chlorthalidone in the setting of hyponatremia 
-Will start 20mg lisinopril. 
  
*Please see intern note above for additional chronic disease mgmt. 
  
Jamar Farrar MD 
7/9/2018, 1:20 PM 
   
   
  
Revision History    
  Date/Time User Provider Type Action  
  07/09/18 1420 Maryfrances Oppenheim, MD Resident Share  
  07/09/18 1410 Maryfrances Oppenheim, MD Resident Share  
  07/09/18 1344 Maryfrances Oppenheim, MD Resident Share  
  07/09/18 1343 Maryfrances Oppenheim, MD Resident Share  
  07/09/18 1342 Maryfrances Oppenheim, MD Resident Share  
  View Details Report

## 2021-07-06 VITALS
HEIGHT: 67 IN | HEIGHT: 67 IN | WEIGHT: 198.5 LBS | BODY MASS INDEX: 31.09 KG/M2 | WEIGHT: 204 LBS | BODY MASS INDEX: 30.76 KG/M2 | WEIGHT: 196.4 LBS | WEIGHT: 196.1 LBS | BODY MASS INDEX: 30.46 KG/M2 | BODY MASS INDEX: 30.71 KG/M2 | BODY MASS INDEX: 31.07 KG/M2 | BODY MASS INDEX: 30.46 KG/M2 | BODY MASS INDEX: 30.76 KG/M2 | WEIGHT: 198.4 LBS | WEIGHT: 195 LBS | BODY MASS INDEX: 31.95 KG/M2 | WEIGHT: 194.5 LBS | BODY MASS INDEX: 30.54 KG/M2

## 2021-07-09 ENCOUNTER — HOSPITAL ENCOUNTER (EMERGENCY)
Dept: EMERGENCY MEDICINE | Facility: HOSPITAL | Age: 56
Discharge: HOME | End: 2021-07-09
Attending: EMERGENCY MEDICINE
Payer: MEDICAID

## 2021-07-09 DIAGNOSIS — R06.00 DYSPNEA, UNSPECIFIED TYPE: ICD-10-CM

## 2021-07-09 DIAGNOSIS — R07.9 CHEST PAIN, UNSPECIFIED TYPE: ICD-10-CM

## 2021-07-09 DIAGNOSIS — J15.9 COMMUNITY ACQUIRED BACTERIAL PNEUMONIA: ICD-10-CM

## 2021-07-09 LAB
ANION GAP SERPL CALCULATED.3IONS-SCNC: 6 MMOL/L (ref 5–18)
BASOPHILS # BLD AUTO: 0.1 THOU/UL (ref 0–0.2)
BASOPHILS NFR BLD AUTO: 1 % (ref 0–2)
BUN SERPL-MCNC: 17 MG/DL (ref 8–22)
CALCIUM SERPL-MCNC: 8.7 MG/DL (ref 8.5–10.5)
CHLORIDE BLD-SCNC: 111 MMOL/L (ref 98–107)
CO2 SERPL-SCNC: 23 MMOL/L (ref 22–31)
CREAT SERPL-MCNC: 1.01 MG/DL (ref 0.6–1.1)
D DIMER PPP FEU-MCNC: 0.46 FEU UG/ML
EOSINOPHIL # BLD AUTO: 0.3 THOU/UL (ref 0–0.4)
EOSINOPHIL NFR BLD AUTO: 6 % (ref 0–6)
ERYTHROCYTE [DISTWIDTH] IN BLOOD BY AUTOMATED COUNT: 14.4 % (ref 11–14.5)
GFR SERPL CREATININE-BSD FRML MDRD: 57 ML/MIN/1.73M2
GLUCOSE BLD-MCNC: 173 MG/DL (ref 70–125)
HCT VFR BLD AUTO: 31 % (ref 35–47)
HGB BLD-MCNC: 9.5 G/DL (ref 12–16)
IMM GRANULOCYTES # BLD: 0 THOU/UL
IMM GRANULOCYTES NFR BLD: 0 %
INR PPP: 1.01 (ref 0.9–1.1)
LYMPHOCYTES # BLD AUTO: 2.4 THOU/UL (ref 0.8–4.4)
LYMPHOCYTES NFR BLD AUTO: 47 % (ref 20–40)
MAGNESIUM SERPL-MCNC: 2.1 MG/DL (ref 1.8–2.6)
MCH RBC QN AUTO: 26.4 PG (ref 27–34)
MCHC RBC AUTO-ENTMCNC: 30.6 G/DL (ref 32–36)
MCV RBC AUTO: 86 FL (ref 80–100)
MONOCYTES # BLD AUTO: 0.3 THOU/UL (ref 0–0.9)
MONOCYTES NFR BLD AUTO: 6 % (ref 2–10)
NEUTROPHILS # BLD AUTO: 2.1 THOU/UL (ref 2–7.7)
NEUTROPHILS NFR BLD AUTO: 40 % (ref 50–70)
PLATELET # BLD AUTO: 205 THOU/UL (ref 140–440)
PMV BLD AUTO: 11.7 FL (ref 8.5–12.5)
POTASSIUM BLD-SCNC: 3.6 MMOL/L (ref 3.5–5)
RBC # BLD AUTO: 3.6 MILL/UL (ref 3.8–5.4)
SODIUM SERPL-SCNC: 140 MMOL/L (ref 136–145)
TROPONIN I SERPL-MCNC: <0.01 NG/ML (ref 0–0.29)
TROPONIN I SERPL-MCNC: <0.01 NG/ML (ref 0–0.29)
WBC: 5.2 THOU/UL (ref 4–11)

## 2021-07-09 ASSESSMENT — MIFFLIN-ST. JEOR: SCORE: 1529.82

## 2021-07-09 NOTE — ED NOTES
ED Notes by Torsten Bhatt RN at 7/9/2021  9:09 AM     Author: Torsten Bhatt RN Service: -- Author Type: Registered Nurse    Filed: 7/9/2021  9:10 AM Date of Service: 7/9/2021  9:09 AM Status: Signed    : Torsten Bhatt RN (Registered Nurse)       She has ambulated to the restroom

## 2021-07-09 NOTE — ED TRIAGE NOTES
ED Triage Notes by Curtis Dowell RN at 7/9/2021  5:55 AM     Author: Curtis Dowell RN Service: -- Author Type: Registered Nurse    Filed: 7/9/2021  5:57 AM Date of Service: 7/9/2021  5:55 AM Status: Signed    : Curtis Dowell RN (Registered Nurse)       Patient arrives via [facility] coming from her apartment. Patient complains of chest pain that started this morning around 0530AM. Patient has hx of MI in 2009, diabetes, and new onset a-fib. Patient refused IV, ASA, and nitroglycerin from EMS. EMS reports patient HR high 40s-170s and patient hypertensive. Patient endorses bilateral shoulder pain and jaw pain.

## 2021-07-09 NOTE — ED NOTES
ED Notes by Torsten Bhatt RN at 7/9/2021  8:52 AM     Author: Torsten Bhatt RN Service: -- Author Type: Registered Nurse    Filed: 7/9/2021  8:52 AM Date of Service: 7/9/2021  8:52 AM Status: Signed    : Torsten Bhatt RN (Registered Nurse)       She feels her breathing is better. But her chest pain has not changed. She still has 10 out of 10 chest pain.

## 2021-07-09 NOTE — ED NOTES
ED Notes by Curtis Dowell, RN at 7/9/2021  6:38 AM     Author: Curtis Dowell RN Service: -- Author Type: Registered Nurse    Filed: 7/9/2021  6:38 AM Date of Service: 7/9/2021  6:38 AM Status: Signed    : Curtis Dowell RN (Registered Nurse)       Portable XR at bedside.

## 2021-07-09 NOTE — ED NOTES
"ED Notes by Torsten Bhatt, RN at 7/9/2021  7:15 AM     Author: Torsten Bhatt, RN Service: -- Author Type: Registered Nurse    Filed: 7/9/2021  7:47 AM Date of Service: 7/9/2021  7:15 AM Status: Signed    : Torsten Bhatt RN (Registered Nurse)       She is quite upset. I walked in as the first provider was finishing talking to her. The plan at that time was to have toradol and percocet and delta trop in a few hours. She has calmed somewhat and is agreeable with the treatment plan.     She is complaining of chest pain 10 out of 10. She says she cannot breathe. She has a great deal of nasal congestion and she has been blowing her nose. She says she woke up because she could not breathe through her nose. She is asking for a \"breathing treatment.\" She sates the provider was going to give her one. I did not hear that part of the plan from the provider as she left at the end of her shift. I did speak to the new provider about the neb treatment and her congestion. He was going to place an order for it. The plan with the new provider is not to give any IV narcotics per the treatment plan from 2015 in the chart. I did start her on 1Lpm oxygen as she feels she needs it.       "

## 2021-07-09 NOTE — ED NOTES
ED Notes by Curtis Dowell RN at 7/9/2021  7:12 AM     Author: Curtis Dowell RN Service: -- Author Type: Registered Nurse    Filed: 7/9/2021  7:12 AM Date of Service: 7/9/2021  7:12 AM Status: Signed    : Curtis Dowell RN (Registered Nurse)       Report given to CHEO Sarmiento.

## 2021-07-09 NOTE — ED NOTES
"ED Notes by Cutris Dowell, RN at 7/9/2021  7:07 AM     Author: Curtis Dowell RN Service: -- Author Type: Registered Nurse    Filed: 7/9/2021  7:08 AM Date of Service: 7/9/2021  7:07 AM Status: Signed    : Curtis Dowell RN (Registered Nurse)       Patient is stating, \" I can't breathe, I can't breathe\". Writer informed patient that her oxygen saturation is 100% on room air. Patient asked for MD. Writer notified MD.       "

## 2021-07-09 NOTE — ED PROVIDER NOTES
ED Provider Notes by Papo Dang MD at 7/9/2021  7:31 AM     Author: Papo Dang MD Service: -- Author Type: Physician    Filed: 7/9/2021 11:16 AM Date of Service: 7/9/2021  7:31 AM Status: Addendum    : Papo Dang MD (Physician)    Related Notes: Original Note by Papo Dang MD (Physician) filed at 7/9/2021  9:27 AM       eMERGENCY dEPARTMENT PROGRESS NOTE         ED COURSE AND MEDICAL DECISION MAKING  Patient was signed out to me by Dr. Casas at 7:31 AM      Sarah Rahman is a 55 y.o. female who presents chest pain.     The patient reports she awoke suddenly 20 minutes ago with left-sided chest pain, radiating to her jaw, and dyspnea. She was feeling well prior to going to bed. She took one nitroglycerin at home in addition to her daily 81 mg aspirin; she had mild chest pain alleviation. However, since taking the nitroglycerin, she developed a mild headache as well.      At the time of shift change the plan was to do a delta troponin and EKG and discharge to have her follow-up with cardiology if this returns negative.    I did independently interpret repeat EKG at 9:05 AM today.  Normal sinus rhythm with a rate of 72 bpm.  Prolonged QT.  The remainder of the intervals are normal.  Axis is normal.  No significant ST elevation or depression.  No pathological Q waves.  Similar to EKG that was done earlier today at 5:57 AM    Second troponin returns negative as well.  Given that she had a recent angiogram and cardiology states that should be medically managed anyway plan would be to have her discharge and follow-up with her cardiologist.      She does endorse a cough and some left-sided chest pain.  She does have some evidence for pneumonia on that left side, however there is no signs of sepsis or respiratory failure here.  I do feel that this can be managed as an outpatient have prescribed antibiotics.      I did notify her of these findings and I recommended and  prescribed azithromycin for her.  She did seem angry about her results and stated that she would leave here to go to a different emergency department.  I did explain to her that I cannot stop her from doing that but I did recommend that she fill her antibiotics and take them.            LAB  Pertinent labs results reviewed   Results for orders placed or performed during the hospital encounter of 07/09/21   Basic Metabolic Panel   Result Value Ref Range    Sodium 140 136 - 145 mmol/L    Potassium 3.6 3.5 - 5.0 mmol/L    Chloride 111 (H) 98 - 107 mmol/L    CO2 23 22 - 31 mmol/L    Anion Gap, Calculation 6 5 - 18 mmol/L    Glucose 173 (H) 70 - 125 mg/dL    Calcium 8.7 8.5 - 10.5 mg/dL    BUN 17 8 - 22 mg/dL    Creatinine 1.01 0.60 - 1.10 mg/dL    GFR MDRD Af Amer >60 >60 mL/min/1.73m2    GFR MDRD Non Af Amer 57 (L) >60 mL/min/1.73m2   Magnesium   Result Value Ref Range    Magnesium 2.1 1.8 - 2.6 mg/dL   INR   Result Value Ref Range    INR 1.01 0.90 - 1.10   Troponin I   Result Value Ref Range    Troponin I <0.01 0.00 - 0.29 ng/mL   D-dimer, Quantitative   Result Value Ref Range    D-Dimer, Quant 0.46 <=0.50 FEU ug/mL   HM1 (CBC with Diff)   Result Value Ref Range    WBC 5.2 4.0 - 11.0 thou/uL    RBC 3.60 (L) 3.80 - 5.40 mill/uL    Hemoglobin 9.5 (L) 12.0 - 16.0 g/dL    Hematocrit 31.0 (L) 35.0 - 47.0 %    MCV 86 80 - 100 fL    MCH 26.4 (L) 27.0 - 34.0 pg    MCHC 30.6 (L) 32.0 - 36.0 g/dL    RDW 14.4 11.0 - 14.5 %    Platelets 205 140 - 440 thou/uL    MPV 11.7 8.5 - 12.5 fL    Neutrophils % 40 (L) 50 - 70 %    Lymphocytes % 47 (H) 20 - 40 %    Monocytes % 6 2 - 10 %    Eosinophils % 6 0 - 6 %    Basophils % 1 0 - 2 %    Immature Granulocyte % 0 <=0 %    Neutrophils Absolute 2.1 2.0 - 7.7 thou/uL    Lymphocytes Absolute 2.4 0.8 - 4.4 thou/uL    Monocytes Absolute 0.3 0.0 - 0.9 thou/uL    Eosinophils Absolute 0.3 0.0 - 0.4 thou/uL    Basophils Absolute 0.1 0.0 - 0.2 thou/uL    Immature Granulocyte Absolute 0.0 <=0.0  [facility]/         RADIOLOGY    Pertinent imaging reviewed   Please see official radiology report.  XR Chest 1 View Portable   Final Result   Cardiac silhouette mildly enlarged, status post CABG. Near complete resolution of previous left mid and lower lung zone opacities. Right lung remains clear. No visible pneumothorax.          FINAL IMPRESSION    Final diagnoses:   Chest pain, unspecified type   Dyspnea, unspecified type               Papo Dang MD  07/09/21 0977       Papo Dang MD  07/09/21 1110

## 2021-07-09 NOTE — ED NOTES
ED Notes by Curtis Dowell RN at 7/9/2021  6:40 AM     Author: Curtis Dowell RN Service: -- Author Type: Registered Nurse    Filed: 7/9/2021  6:40 AM Date of Service: 7/9/2021  6:40 AM Status: Signed    : Curtis Dowell RN (Registered Nurse)       PICC at bedside.

## 2021-07-09 NOTE — ED NOTES
ED Notes by Curtis Dowell, RN at 7/9/2021  6:29 AM     Author: Curtis Dowell RN Service: -- Author Type: Registered Nurse    Filed: 7/9/2021  6:40 AM Date of Service: 7/9/2021  6:29 AM Status: Signed    : Curtis Dowell RN (Registered Nurse)       Per Charge RN, writer to hold NTG until IV access is gained.

## 2021-07-09 NOTE — ED PROVIDER NOTES
ED Provider Notes by Analy Casas MD at 2021  5:55 AM     Author: Analy Casas MD Service: Emergency Medicine Author Type: Physician    Filed: 2021  6:55 AM Date of Service: 2021  5:55 AM Status: Signed    : Analy Casas MD (Physician)       EMERGENCY DEPARTMENT ENCOUNTER      NAME: Sarah Rahman  AGE: 55 y.o. female  YOB: 1965  MRN: [id number]  EVALUATION DATE & TIME: 2021  5:48 AM    PCP: Provider, No Primary Care    ED PROVIDER: Analy Casas M.D.      Chief Complaint   Patient presents with   ? Chest Pain         FINAL IMPRESSION:  1. Chest pain, unspecified type    2. Dyspnea, unspecified type          ED COURSE & MEDICAL DECISION MAKIN:47 AM I met with the patient to gather history and to perform my initial exam. I discussed the plan for care while in the Emergency Department. PPE (gloves, eye protection, N95 mask, cloth cap, and cloth mask) was worn by me during patient encounters while patient wore a mask.     Pertinent Labs & Imaging studies reviewed. (See chart for details)  55 y.o. female presents to the Emergency Department for evaluation of chest pain and difficulty breathing.  Her vital signs are notable for hypertension.  Oxygenation, heart rate and temperature are all normal.  She is highly anxious in appearance.  I reviewed her most recent hospital admission on 21.  CT angiogram to evaluate for PE was negative.  Was positive for COVID-19 at that time.  He recently had a coronary angiogram on 21.  This shows some remaining disease including 100% stenosis of the distal LM to mid LAD and 100% stenosis of origin to insertion lesion.  Her LIMA to LAD is patent without obstruction.  The cardiology plan during this visit was to continue with medical management including aspirin, statin, Coreg, losartan and nitroglycerin.  Her ECG does not show any concerning ST changes.  She has some nonspecific ST findings but these are present on  her prior study.  I actually suspect more of an anxiety presentation but because of her double bypass surgery and known coronary disease, will get labs, chest x-ray, and give her metoprolol, nitroglycerin and pain medications.    Checks x-ray shows resolution of her Covid pneumonia.  No other abnormalities.  Unfortunately due to difficulty even with ultrasound-guided IV, the PICC team will be needed in order to get blood.  She will be signed out to Dr. Dang pending the results of her labs.  Based upon her risk factors, will likely need Magallanes on cardiac telemetry for rule out.      MEDICATIONS GIVEN IN THE EMERGENCY:  Medications   aspirin chewable tablet 324 mg (has no administration in time range)   metoprolol tartrate injection 5 mg (LOPRESSOR) (has no administration in time range)   nitroglycerin SL tablet 0.4 mg (NITROSTAT) (has no administration in time range)   ketorolac injection 15 mg (TORADOL) (has no administration in time range)       NEW PRESCRIPTIONS STARTED AT TODAY'S ER VISIT  Current Discharge Medication List      CONTINUE these medications which have NOT CHANGED    Details   acetaminophen (TYLENOL) 325 MG tablet Take 1-2 tablets (325-650 mg total) by mouth every 4 (four) hours as needed.  Qty:  , Refills: 0    Associated Diagnoses: Unstable angina pectoris (H)      albuterol (PROVENTIL HFA;VENTOLIN HFA) 90 mcg/actuation inhaler Inhale 2 puffs every 6 (six) hours as needed for wheezing.      aspirin 81 MG EC tablet Take 1 tablet (81 mg total) by mouth daily.  Qty: 30 tablet, Refills: 0    Associated Diagnoses: CAD (coronary artery disease)      atorvastatin (LIPITOR) 80 MG tablet Take 1 tablet (80 mg total) by mouth at bedtime.  Qty: 30 tablet, Refills: 0    Associated Diagnoses: Coronary artery disease involving native coronary artery of native heart without angina pectoris      budesonide-formoterol (SYMBICORT) 160-4.5 mcg/actuation inhaler Inhale 2 puffs 2 (two) times a day.       carvediloL  (COREG) 25 MG tablet Take 1 tablet (25 mg total) by mouth 2 (two) times a day with meals.  Qty: 60 tablet, Refills: 0    Associated Diagnoses: Essential hypertension, benign      diphenhydrAMINE (BENADRYL) 25 mg tablet Take 25-50 mg by mouth every 6 (six) hours as needed.      fluticasone propionate (FLONASE) 50 mcg/actuation nasal spray Apply 1 spray into each nostril daily.      glipiZIDE (GLUCOTROL XL) 5 MG 24 hr tablet Take 10 mg by mouth daily with breakfast.      ibuprofen (ADVIL,MOTRIN) 800 MG tablet Take 800 mg by mouth 2 (two) times a day as needed for pain.      ipratropium-albuterol (DUO-NEB) 0.5-2.5 mg/3 mL nebulizer Inhale 3 mL every 6 (six) hours as needed.      LANTUS SOLOSTAR U-100 INSULIN 100 unit/mL (3 mL) pen Inject 10 Units under the skin at bedtime. 11.65 Type 2 with hyperglycemia  Contact provider if insulin prescribed is not the preferred insulin per insurance.  Qty: 15 mL, Refills: 0    Comments: BD Ultra-fine [employer] - NDC [address]-89 - dispense 1 case, refill PRN for 1 year  Associated Diagnoses: Type 2 diabetes mellitus with diabetic polyneuropathy, with long-term current use of insulin (H)      lidocaine (XYLOCAINE) 5 % ointment Apply topically 3 (three) times a day.  Qty: 35.44 g, Refills: 0    Associated Diagnoses: Chronic midline low back pain without sciatica      losartan (COZAAR) 50 MG tablet Take 1 tablet (50 mg total) by mouth daily.  Qty: 30 tablet, Refills: 2    Associated Diagnoses: CAD (coronary artery disease)      !! nitroglycerin (NITROSTAT) 0.4 MG SL tablet PLACE 1 TABLET (0.4 MG TOTAL) UNDER THE TONGUE EVERY 5 (FIVE) MINUTES AS NEEDED FOR CHEST PAIN.  Qty: 3 Bottle, Refills: 2    Associated Diagnoses: Chest pain      !! nitroglycerin (NITROSTAT) 0.4 MG SL tablet Place 1 tablet (0.4 mg total) under the tongue every 5 (five) minutes as needed for chest pain.  Qty: 1 Bottle, Refills: 0    Associated Diagnoses: Other forms of angina pectoris (H)      omeprazole  (PRILOSEC) 20 MG capsule Take 1 capsule (20 mg total) by mouth daily before breakfast.  Qty: 30 capsule, Refills: 2    Associated Diagnoses: Gastroesophageal reflux disease, esophagitis presence not specified      oxyCODONE-acetaminophen (PERCOCET/ENDOCET) 5-325 mg per tablet Take 1-2 tablets by mouth every 6 (six) hours as needed.  Qty: 13 tablet, Refills: 0    Associated Diagnoses: Chronic midline low back pain without sciatica      pregabalin (LYRICA) 150 MG capsule Take 150 mg by mouth 3 (three) times a day.      senna (SENOKOT) 8.6 mg tablet Take 2 tablets by mouth daily as needed.       SITagliptin (JANUVIA) 100 MG tablet Take 1 tablet (100 mg total) by mouth daily.  Qty: 30 tablet, Refills: 0    Associated Diagnoses: Type 2 diabetes mellitus with diabetic polyneuropathy, with long-term current use of insulin (H)      sodium chloride (OCEAN) 0.65 % nasal spray 1 spray into each nostril 4 (four) times a day as needed for congestion.  Qty: 15 mL, Refills: 0    Associated Diagnoses: Chronic obstructive pulmonary disease, unspecified COPD type (H)      triamterene-hydrochlorothiazide (DYAZIDE) 37.5-25 mg per capsule Take 1 capsule by mouth every morning.       !! - Potential duplicate medications found. Please discuss with provider.             =================================================================    HPI  Patient information was obtained from: Patient  Use of Intrepreter: N/A       Sarah CLARK Rahman is a 55 y.o. female with a pertinent history of CAD s/p CABG (2009), CHF, COPD, asthma, previous CVA, DM II, HTN, HLD, chronic pain with signed controlled substance agreement, and schizoaffective disorder, who presents to the ED by EMS from home for evaluation of chest pain and dyspnea.     The patient reports she awoke suddenly 20 minutes ago with left-sided chest pain, radiating to her jaw, and dyspnea. She was feeling well prior to going to bed. She took one nitroglycerin at home in addition to her  daily 81 mg aspirin; she had mild chest pain alleviation. However, since taking the nitroglycerin, she developed a mild headache as well.     Per medics, patient had oxygen saturation levels at 96% on RA and she was given supplemental oxygen by re-breather for comfort. She was also tachypneic due to anxiety per medics.     Patient otherwise denies recent fevers, cough, emesis, abdominal pain, or additional medical concerns or complaints at this time.     Patient denies a personal hx of pulmonary emboli. No recent surgeries. She is not anticoagulated.    Coronary angiogram 06/02/21: Ramus lesion is 50% stenosed. Ost LM to Dist LM lesion is 30% stenosed. Origin to Insertion lesion is 100% stenosed. Dist LM to Mid LAD lesion is 100% stenosed. LIMA to LAD is patent without obstruction. SVG to small 1st diagonal is occluded and fills from LAD collaterals.     NM Cardiac stress test 06/01/21: ischemia in anterior segments.    Echocardiogram 10/2018: When compared to the previous study dated 1/9/2011, no significant change. Mild concentric left ventricular hypertrophy. LVEF is normal at an estimated 60%. Normal right ventricular size and systolic function. No hemodynamically significant valvular heart abnormalities.    Patient was advised to continue medication management of her chest pain with aspirin, statin, coreg, losartan, and nitroglycerin PRN, after she was admitted for chest pain in 05/2021.     REVIEW OF SYSTEMS   Review of Systems   Constitutional: Negative for chills and fever.   HENT:        Positive for jaw pain (radiation from CP).   Respiratory: Positive for shortness of breath. Negative for cough.    Cardiovascular: Positive for chest pain (L-sided).   Gastrointestinal: Negative for abdominal pain and vomiting.   All other systems reviewed and are negative.       PAST MEDICAL HISTORY:  Past Medical History:   Diagnosis Date   ? Asthma    ? CAD (coronary artery disease)    ? COPD (chronic obstructive  pulmonary disease) (H)    ? CVA (cerebral infarction)    ? Diabetes (H)    ? GERD (gastroesophageal reflux disease)    ? Hypertension    ? Polysubstance abuse (H)    ? S/P CABG (coronary artery bypass graft)    ? S/P lumbar discectomy 06/13/2019    L5/S1 by  Dr. Hamm at [facility]   ? Schizoaffective disorder (H)        PAST SURGICAL HISTORY:  Past Surgical History:   Procedure Laterality Date   ? CORONARY ARTERY BYPASS GRAFT  2009    x2   ? CORONARY STENT PLACEMENT     ? CV CORONARY ANGIOGRAM N/A 6/2/2021    Procedure: Coronary Angiogram;  Surgeon: Juventino Rivera MD;  Location: [facility] Cardiac Cath Lab;  Service: Cardiology   ? ORIF ULNAR / RADIAL SHAFT FRACTURE Right    ? TOTAL ABDOMINAL HYSTERECTOMY W/ BILATERAL SALPINGOOPHORECTOMY         CURRENT MEDICATIONS:    No current facility-administered medications on file prior to encounter.      Current Outpatient Medications on File Prior to Encounter   Medication Sig   ? acetaminophen (TYLENOL) 325 MG tablet Take 1-2 tablets (325-650 mg total) by mouth every 4 (four) hours as needed.   ? albuterol (PROVENTIL HFA;VENTOLIN HFA) 90 mcg/actuation inhaler Inhale 2 puffs every 6 (six) hours as needed for wheezing.   ? aspirin 81 MG EC tablet Take 1 tablet (81 mg total) by mouth daily.   ? atorvastatin (LIPITOR) 80 MG tablet Take 1 tablet (80 mg total) by mouth at bedtime.   ? budesonide-formoterol (SYMBICORT) 160-4.5 mcg/actuation inhaler Inhale 2 puffs 2 (two) times a day.    ? carvediloL (COREG) 25 MG tablet Take 1 tablet (25 mg total) by mouth 2 (two) times a day with meals.   ? diphenhydrAMINE (BENADRYL) 25 mg tablet Take 25-50 mg by mouth every 6 (six) hours as needed.   ? fluticasone propionate (FLONASE) 50 mcg/actuation nasal spray Apply 1 spray into each nostril daily.   ? glipiZIDE (GLUCOTROL XL) 5 MG 24 hr tablet Take 10 mg by mouth daily with breakfast.   ? ibuprofen (ADVIL,MOTRIN) 800 MG tablet Take 800 mg by mouth 2 (two) times a day as needed for  pain.   ? ipratropium-albuterol (DUO-NEB) 0.5-2.5 mg/3 mL nebulizer Inhale 3 mL every 6 (six) hours as needed.   ? LANTUS SOLOSTAR U-100 INSULIN 100 unit/mL (3 mL) pen Inject 10 Units under the skin at bedtime. 11.65 Type 2 with hyperglycemia  Contact provider if insulin prescribed is not the preferred insulin per insurance.   ? lidocaine (XYLOCAINE) 5 % ointment Apply topically 3 (three) times a day.   ? losartan (COZAAR) 50 MG tablet Take 1 tablet (50 mg total) by mouth daily.   ? nitroglycerin (NITROSTAT) 0.4 MG SL tablet PLACE 1 TABLET (0.4 MG TOTAL) UNDER THE TONGUE EVERY 5 (FIVE) MINUTES AS NEEDED FOR CHEST PAIN.   ? nitroglycerin (NITROSTAT) 0.4 MG SL tablet Place 1 tablet (0.4 mg total) under the tongue every 5 (five) minutes as needed for chest pain.   ? omeprazole (PRILOSEC) 20 MG capsule Take 1 capsule (20 mg total) by mouth daily before breakfast.   ? oxyCODONE-acetaminophen (PERCOCET/ENDOCET) 5-325 mg per tablet Take 1-2 tablets by mouth every 6 (six) hours as needed.   ? pregabalin (LYRICA) 150 MG capsule Take 150 mg by mouth 3 (three) times a day.   ? senna (SENOKOT) 8.6 mg tablet Take 2 tablets by mouth daily as needed.    ? SITagliptin (JANUVIA) 100 MG tablet Take 1 tablet (100 mg total) by mouth daily.   ? sodium chloride (OCEAN) 0.65 % nasal spray 1 spray into each nostril 4 (four) times a day as needed for congestion.   ? triamterene-hydrochlorothiazide (DYAZIDE) 37.5-25 mg per capsule Take 1 capsule by mouth every morning.       ALLERGIES:  Allergies   Allergen Reactions   ? Tramadol Angioedema   ? Demerol [Meperidine]    ? Haldol Decanoate [Haloperidol Decanoate]    ? Latex Itching   ? Lisinopril    ? Penicillins    ? Thorazine [Chlorpromazine]    ? Hydroxyzine Rash     Tongue swelling       FAMILY HISTORY:  Family History   Problem Relation Age of Onset   ? Heart disease Mother    ? Heart disease Father    ? Heart disease Sister    ? Diabetes Brother    ? Heart disease Sister        SOCIAL  "HISTORY:   Social History     Socioeconomic History   ? Marital status: Single     Spouse name: None   ? Number of children: None   ? Years of education: None   ? Highest education level: None   Occupational History   ? None   Social Needs   ? Financial resource strain: None   ? Food insecurity     Worry: None     Inability: None   ? Transportation needs     Medical: None     Non-medical: None   Tobacco Use   ? Smoking status: Current Every Day Smoker   ? Smokeless tobacco: Never Used   Substance and Sexual Activity   ? Alcohol use: Yes     Comment: occ   ? Drug use: No   ? Sexual activity: None   Lifestyle   ? Physical activity     Days per week: None     Minutes per session: None   ? Stress: None   Relationships   ? Social connections     Talks on phone: None     Gets together: None     Attends Cheondoism service: None     Active member of club or organization: None     Attends meetings of clubs or organizations: None     Relationship status: None   ? Intimate partner violence     Fear of current or ex partner: None     Emotionally abused: None     Physically abused: None     Forced sexual activity: None   Other Topics Concern   ? None   Social History Narrative    Lives alone in a condo.  On disability.  Three living children.         VITALS:  Patient Vitals for the past 24 hrs:   BP Temp Temp src Pulse Resp SpO2 Height Weight   07/09/21 0630 139/80 -- -- 72 27 95 % -- --   07/09/21 0557 (!) 232/113 98.5  F (36.9  C) Oral 73 (!) 37 99 % 5' 7\" (1.702 m) 200 lb (90.7 kg)       PHYSICAL EXAM    Constitutional:  Well developed, well nourished, highly anxious, tearful  EYES: Conjunctivae clear  HENT:  Atraumatic, normocephalic. Bilateral external ears normal, nose normal, oropharynx moist. Neck- supple   Respiratory:  Tachypneic, no accessory muscle use, normal breath sounds, speaking in full sentences, no rales, no wheezing, no cough, no stridor   Cardiovascular:  Normal rate, normal rhythm, no murmurs, capillary " refill normal.  No chest wall tenderness  GI:  Obese, soft, nondistended, nontender, no palpable masses, no rebound, no guarding   : No CVA tenderness.    Musculoskeletal:  No edema.  No cyanosis.  Range of motion major extremities intact. No tenderness to palpation or major deformities noted.    Integument: Warm, Dry, No erythema, No rash.   Neurologic:  Alert & oriented, no focal deficits noted, ambulatory  Psych: Anxious affect, Mood normal.     LAB:  Pending    RADIOLOGY:  Reviewed all pertinent imaging. Please see official radiology report.  Xr Chest 1 View Portable    Result Date: 7/9/2021  EXAM: XR CHEST 1 VIEW PORTABLE LOCATION: [facility] DATE/TIME: 7/9/2021 6:41 AM INDICATION: chest pain COMPARISON: 05/30/2021.     Cardiac silhouette mildly enlarged, status post CABG. Near complete resolution of previous left mid and lower lung zone opacities. Right lung remains clear. No visible pneumothorax.      EKG:    Normal sinus rhythm  Nonspecific ST and T wave abnormality that appears similar to prior ECG of 5-30-21  QTC is slightly prolonged at 472    I have independently reviewed and interpreted the EKG(s) documented above.      I, Rylee Yakymi, am serving as a scribe to document services personally performed by Analy Casas M.D. based on my observation and the provider's statements to me. I, Analy Casas MD attest that Rylee Yakymi is acting in a scribe capacity, has observed my performance of the services and has documented them in accordance with my direction.    Analy Casas M.D.  Emergency Medicine  [facility] EMERGENCY DEPARTMENT  [address]  [facility] [address]  Dept: [phone]  Loc: [phone]       Analy Casas MD  07/09/21 0655

## 2021-07-09 NOTE — ED NOTES
ED Notes by Torsten Bhatt RN at 7/9/2021  7:47 AM     Author: Torsten Bhatt RN Service: -- Author Type: Registered Nurse    Filed: 7/9/2021  7:48 AM Date of Service: 7/9/2021  7:47 AM Status: Signed    : Torsten Bhatt RN (Registered Nurse)       She is feeling much better now with the 1Lpm oxygen. Likely more of a psychological sense of relief as her oxygen saturations have always been % on room air. She has calmed down quite a bit now after her arrival in the ED. She is cooperative and more relaxed.

## 2021-07-09 NOTE — PROGRESS NOTES
"Progress Notes by Julian Miller LRT at 7/9/2021  8:01 AM     Author: Julian Miller LRT Service: Respiratory Care Author Type: Respiratory Therapist    Filed: 7/9/2021 11:07 AM Date of Service: 7/9/2021  8:01 AM Status: Signed    : Julian Miller LRT (Respiratory Therapist)       PT was on 1L NC with an SpO2 of 99%.  Breathing pattern regular Breath sounds diminished pre and post Cough type none noted.  Duoneb given as directed.  PT tolerated treatments well.  RT will continue to follow.      /80   Pulse 77   Temp 98.5  F (36.9  C) (Oral)   Resp 20   Ht 5' 7\" (1.702 m)   Wt 200 lb (90.7 kg)   SpO2 95%   BMI 31.32 kg/m      JABARI Stevens  7/9/2021           "

## 2021-07-09 NOTE — ED NOTES
"ED Notes by Torsten Bhatt RN at 7/9/2021 11:25 AM     Author: Torsten Bhatt RN Service: -- Author Type: Registered Nurse    Filed: 7/9/2021 11:31 AM Date of Service: 7/9/2021 11:25 AM Status: Signed    : Torsten Bhatt RN (Registered Nurse)       I walked into the room to perform the discharge and provider was just finishing up talking to her. She was quite upset and throwing items around the room saying \"you don't give a damn about me!\" \"You only treat white people here!\" She removed her own IV and threw it across the room. She removed all monitoring devices as well. She is yelling about \"how they ever listened to me!\" \"I have pneumonia and I will die!\" She does say to me \"You are the only one who cared about me today.\" We talked about her discharge and the need to take the antibiotics the provider prescribed. She said \"I don't need no antibiotics!\" I told her with pneumonia the antibiotics will help. In the end she left and took her paperwork but would not sign the discharge form. Walking out she yelled at the proiders area \"you only care about white people!\" I was able to get her relatively peacefully out the door.       "

## 2021-07-09 NOTE — ED NOTES
ED Notes by Torsten Bhatt RN at 7/9/2021 10:12 AM     Author: Torsten Bhatt RN Service: -- Author Type: Registered Nurse    Filed: 7/9/2021 10:15 AM Date of Service: 7/9/2021 10:12 AM Status: Signed    : Torsten Bhatt RN (Registered Nurse)       She continues to struggle with pain in her chest up into her left shoulder. She becomes upset and she escalates her emotional state. She does not feel her pain and her heart are being addressed here. She is talking about leaving. I am able to calm and de-escalate her. She is back to feeling more relaxed and is willing to wait for the provider to come in and see her. Provider is aware of her pain.

## 2021-07-10 VITALS — HEIGHT: 67 IN | BODY MASS INDEX: 31.39 KG/M2 | WEIGHT: 200 LBS

## 2021-07-10 LAB
ATRIAL RATE - MUSE: 72 BPM
ATRIAL RATE - MUSE: 75 BPM
DIASTOLIC BLOOD PRESSURE - MUSE: 113 MMHG
DIASTOLIC BLOOD PRESSURE - MUSE: 80 MMHG
INTERPRETATION ECG - MUSE: NORMAL
INTERPRETATION ECG - MUSE: NORMAL
P AXIS - MUSE: 72 DEGREES
P AXIS - MUSE: 79 DEGREES
PR INTERVAL - MUSE: 176 MS
PR INTERVAL - MUSE: 186 MS
QRS DURATION - MUSE: 80 MS
QRS DURATION - MUSE: 80 MS
QT - MUSE: 426 MS
QT - MUSE: 426 MS
QTC - MUSE: 466 MS
QTC - MUSE: 472 MS
R AXIS - MUSE: 0 DEGREES
R AXIS - MUSE: 17 DEGREES
SYSTOLIC BLOOD PRESSURE - MUSE: 148 MMHG
SYSTOLIC BLOOD PRESSURE - MUSE: 232 MMHG
T AXIS - MUSE: 81 DEGREES
T AXIS - MUSE: 94 DEGREES
VENTRICULAR RATE- MUSE: 72 BPM
VENTRICULAR RATE- MUSE: 74 BPM

## 2021-07-21 ENCOUNTER — TRANSFERRED RECORDS (OUTPATIENT)
Dept: HEALTH INFORMATION MANAGEMENT | Facility: CLINIC | Age: 56
End: 2021-07-21

## 2021-10-27 ENCOUNTER — APPOINTMENT (OUTPATIENT)
Dept: CT IMAGING | Facility: HOSPITAL | Age: 56
End: 2021-10-27
Attending: STUDENT IN AN ORGANIZED HEALTH CARE EDUCATION/TRAINING PROGRAM
Payer: COMMERCIAL

## 2021-10-27 ENCOUNTER — HOSPITAL ENCOUNTER (EMERGENCY)
Facility: HOSPITAL | Age: 56
Discharge: HOME | End: 2021-10-27
Attending: STUDENT IN AN ORGANIZED HEALTH CARE EDUCATION/TRAINING PROGRAM | Admitting: STUDENT IN AN ORGANIZED HEALTH CARE EDUCATION/TRAINING PROGRAM
Payer: COMMERCIAL

## 2021-10-27 ENCOUNTER — APPOINTMENT (OUTPATIENT)
Dept: RADIOLOGY | Facility: HOSPITAL | Age: 56
End: 2021-10-27
Attending: STUDENT IN AN ORGANIZED HEALTH CARE EDUCATION/TRAINING PROGRAM
Payer: COMMERCIAL

## 2021-10-27 VITALS
OXYGEN SATURATION: 98 % | HEIGHT: 67 IN | SYSTOLIC BLOOD PRESSURE: 169 MMHG | WEIGHT: 200 LBS | HEART RATE: 71 BPM | TEMPERATURE: 98.6 F | RESPIRATION RATE: 18 BRPM | BODY MASS INDEX: 31.39 KG/M2 | DIASTOLIC BLOOD PRESSURE: 78 MMHG

## 2021-10-27 DIAGNOSIS — M54.42 CHRONIC LEFT-SIDED LOW BACK PAIN WITH LEFT-SIDED SCIATICA: ICD-10-CM

## 2021-10-27 DIAGNOSIS — G89.29 CHRONIC LEFT-SIDED LOW BACK PAIN WITH LEFT-SIDED SCIATICA: ICD-10-CM

## 2021-10-27 LAB
ALBUMIN SERPL-MCNC: 3.3 G/DL (ref 3.5–5)
ALBUMIN UR-MCNC: NEGATIVE MG/DL
ALP SERPL-CCNC: 76 U/L (ref 45–120)
ALT SERPL W P-5'-P-CCNC: 17 U/L (ref 0–45)
ANION GAP SERPL CALCULATED.3IONS-SCNC: 8 MMOL/L (ref 5–18)
APPEARANCE UR: CLEAR
AST SERPL W P-5'-P-CCNC: 10 U/L (ref 0–40)
BASOPHILS # BLD AUTO: 0 10E3/UL (ref 0–0.2)
BASOPHILS NFR BLD AUTO: 0 %
BILIRUB SERPL-MCNC: 0.3 MG/DL (ref 0–1)
BILIRUB UR QL STRIP: NEGATIVE
BUN SERPL-MCNC: 16 MG/DL (ref 8–22)
CALCIUM SERPL-MCNC: 8.8 MG/DL (ref 8.5–10.5)
CHLORIDE BLD-SCNC: 105 MMOL/L (ref 98–107)
CO2 SERPL-SCNC: 25 MMOL/L (ref 22–31)
COLOR UR AUTO: COLORLESS
CREAT SERPL-MCNC: 0.85 MG/DL (ref 0.6–1.1)
EOSINOPHIL # BLD AUTO: 0.2 10E3/UL (ref 0–0.7)
EOSINOPHIL NFR BLD AUTO: 2 %
ERYTHROCYTE [DISTWIDTH] IN BLOOD BY AUTOMATED COUNT: 13.5 % (ref 10–15)
GFR SERPL CREATININE-BSD FRML MDRD: 76 ML/MIN/1.73M2
GLUCOSE BLD-MCNC: 436 MG/DL (ref 70–125)
GLUCOSE UR STRIP-MCNC: >1000 MG/DL
HCT VFR BLD AUTO: 35.4 % (ref 35–47)
HGB BLD-MCNC: 11.2 G/DL (ref 11.7–15.7)
HGB UR QL STRIP: NEGATIVE
IMM GRANULOCYTES # BLD: 0 10E3/UL
IMM GRANULOCYTES NFR BLD: 1 %
KETONES UR STRIP-MCNC: NEGATIVE MG/DL
LEUKOCYTE ESTERASE UR QL STRIP: NEGATIVE
LYMPHOCYTES # BLD AUTO: 2.1 10E3/UL (ref 0.8–5.3)
LYMPHOCYTES NFR BLD AUTO: 24 %
MCH RBC QN AUTO: 27.1 PG (ref 26.5–33)
MCHC RBC AUTO-ENTMCNC: 31.6 G/DL (ref 31.5–36.5)
MCV RBC AUTO: 86 FL (ref 78–100)
MONOCYTES # BLD AUTO: 0.3 10E3/UL (ref 0–1.3)
MONOCYTES NFR BLD AUTO: 4 %
NEUTROPHILS # BLD AUTO: 5.9 10E3/UL (ref 1.6–8.3)
NEUTROPHILS NFR BLD AUTO: 69 %
NITRATE UR QL: NEGATIVE
NRBC # BLD AUTO: 0 10E3/UL
NRBC BLD AUTO-RTO: 0 /100
PH UR STRIP: 6 [PH] (ref 5–7)
PLATELET # BLD AUTO: 197 10E3/UL (ref 150–450)
POTASSIUM BLD-SCNC: 4.3 MMOL/L (ref 3.5–5)
PROT SERPL-MCNC: 6.3 G/DL (ref 6–8)
RBC # BLD AUTO: 4.13 10E6/UL (ref 3.8–5.2)
RBC URINE: 1 /HPF
SODIUM SERPL-SCNC: 138 MMOL/L (ref 136–145)
SP GR UR STRIP: 1.04 (ref 1–1.03)
SQUAMOUS EPITHELIAL: 1 /HPF
UROBILINOGEN UR STRIP-MCNC: <2 MG/DL
WBC # BLD AUTO: 8.5 10E3/UL (ref 4–11)
WBC URINE: 1 /HPF

## 2021-10-27 PROCEDURE — 85025 COMPLETE CBC W/AUTO DIFF WBC: CPT | Performed by: STUDENT IN AN ORGANIZED HEALTH CARE EDUCATION/TRAINING PROGRAM

## 2021-10-27 PROCEDURE — 99285 EMERGENCY DEPT VISIT HI MDM: CPT | Mod: 25

## 2021-10-27 PROCEDURE — 71046 X-RAY EXAM CHEST 2 VIEWS: CPT

## 2021-10-27 PROCEDURE — 250N000011 HC RX IP 250 OP 636: Performed by: STUDENT IN AN ORGANIZED HEALTH CARE EDUCATION/TRAINING PROGRAM

## 2021-10-27 PROCEDURE — 96374 THER/PROPH/DIAG INJ IV PUSH: CPT | Mod: 59

## 2021-10-27 PROCEDURE — 96376 TX/PRO/DX INJ SAME DRUG ADON: CPT | Mod: 59

## 2021-10-27 PROCEDURE — 82040 ASSAY OF SERUM ALBUMIN: CPT | Performed by: STUDENT IN AN ORGANIZED HEALTH CARE EDUCATION/TRAINING PROGRAM

## 2021-10-27 PROCEDURE — 96375 TX/PRO/DX INJ NEW DRUG ADDON: CPT | Mod: 59

## 2021-10-27 PROCEDURE — 70498 CT ANGIOGRAPHY NECK: CPT

## 2021-10-27 PROCEDURE — 36415 COLL VENOUS BLD VENIPUNCTURE: CPT | Performed by: STUDENT IN AN ORGANIZED HEALTH CARE EDUCATION/TRAINING PROGRAM

## 2021-10-27 PROCEDURE — 81003 URINALYSIS AUTO W/O SCOPE: CPT | Performed by: STUDENT IN AN ORGANIZED HEALTH CARE EDUCATION/TRAINING PROGRAM

## 2021-10-27 RX ORDER — METHYLPREDNISOLONE 4 MG
TABLET, DOSE PACK ORAL
Qty: 21 TABLET | Refills: 0 | Status: SHIPPED | OUTPATIENT
Start: 2021-10-27 | End: 2022-04-03

## 2021-10-27 RX ORDER — KETOROLAC TROMETHAMINE 30 MG/ML
30 INJECTION, SOLUTION INTRAMUSCULAR; INTRAVENOUS ONCE
Status: COMPLETED | OUTPATIENT
Start: 2021-10-27 | End: 2021-10-27

## 2021-10-27 RX ORDER — IOPAMIDOL 755 MG/ML
75 INJECTION, SOLUTION INTRAVASCULAR ONCE
Status: COMPLETED | OUTPATIENT
Start: 2021-10-27 | End: 2021-10-27

## 2021-10-27 RX ADMIN — KETOROLAC TROMETHAMINE 30 MG: 30 INJECTION, SOLUTION INTRAMUSCULAR; INTRAVENOUS at 13:17

## 2021-10-27 RX ADMIN — HYDROMORPHONE HYDROCHLORIDE 0.5 MG: 1 INJECTION, SOLUTION INTRAMUSCULAR; INTRAVENOUS; SUBCUTANEOUS at 13:16

## 2021-10-27 RX ADMIN — IOPAMIDOL 75 ML: 755 INJECTION, SOLUTION INTRAVENOUS at 14:17

## 2021-10-27 RX ADMIN — HYDROMORPHONE HYDROCHLORIDE 1 MG: 1 INJECTION, SOLUTION INTRAMUSCULAR; INTRAVENOUS; SUBCUTANEOUS at 14:45

## 2021-10-27 ASSESSMENT — MIFFLIN-ST. JEOR: SCORE: 1524.82

## 2021-10-27 ASSESSMENT — ENCOUNTER SYMPTOMS: ABDOMINAL PAIN: 1

## 2021-10-27 NOTE — ED NOTES
Introduced self to pt, she refused heart monitor placement, and A/C IV writer stated would have to place if MD ordered will wait to see what is ordered.

## 2021-10-27 NOTE — ED PROVIDER NOTES
EMERGENCY DEPARTMENT ENCOUNTER      NAME: Sarah Rahman  AGE: 56 year old female  YOB: 1965  MRN: [id number]  EVALUATION DATE & TIME: 10/27/2021 11:54 AM    PCP: Emigdio Martinez    ED PROVIDER: Aroldo Mcdaniel M.D.      Chief Complaint   Patient presents with     right sided swelling and pain.         FINAL IMPRESSION:  1. Chronic left-sided low back pain with left-sided sciatica          ED COURSE & MEDICAL DECISION MAKING:    Pertinent Labs & Imaging studies reviewed. (See chart for details)  56 year old female presents to the Emergency Department for evaluation of multiple symptoms including pain along her entire right side and sweling in her jaw.  Recent admission for stroke at [facility].  Patient is very dififcuilt historian. CTA of neck is normal.  No vascular issue.  No blockage.  CXR is normal.  Blood work is unremarkable.  I think the pain she describes is her sciatica and we will treat that.  No evidence of facial or dental infection to account for facial swelling.  I dont appreciate any significan swelling. Will try to reassure patient and have her follow up.     12:24 PM I met with the patient for the initial interview and physical examination. Discussed plan for treatment and workup in the ED.     At the conclusion of the encounter I discussed the results of all of the tests and the disposition. The questions were answered. The patient or family acknowledged understanding and was agreeable with the care plan.         PPE: Provider wore gloves, N95 mask, eye protection  MEDICATIONS GIVEN IN THE EMERGENCY:  Medications   ketorolac (TORADOL) injection 30 mg (30 mg Intravenous Given 10/27/21 1317)   HYDROmorphone (DILAUDID) injection 0.5 mg (0.5 mg Intravenous Given 10/27/21 1316)   iopamidol (ISOVUE-370) solution 75 mL (75 mLs Intravenous Given 10/27/21 1417)   HYDROmorphone (DILAUDID) injection 1 mg (1 mg Intravenous Given 10/27/21 1445)       NEW PRESCRIPTIONS STARTED AT TODAY'S ER  VISIT  Discharge Medication List as of 10/27/2021  3:19 PM      START taking these medications    Details   methylPREDNISolone (MEDROL DOSEPAK) 4 MG tablet therapy pack Follow Package Directions, Disp-21 tablet, R-0, Local Print                =================================================================    HPI    Patient information was obtained from: Patient    Use of : N/A        Sarahcarolee Rahman is a 56 year old female with a pertinent history of coronary atherosclerosis, diabetes mellitus type 2, schizoaffective disorder, CVA, COPD, hypertension, sensorineural hearing loss, and CHF who presents to this ED via private car for evaluation of right sided swelling and pain.    Per chart review (10/25/2021), the patient presented to Dr. Gabi Galicia, ENT, for evaluation of right acute labyrinthitis with right severe sensorineural hearing loss. Upon evaluation of carotid ultrasound, it is unclear is acute labyrinthitis was viral vascular in etiology. Patient feels imbalanced and used a tripod cane from home. She took the recommended steroids, but declines undergoing a rescue treatment with steroid injection.     Per chart review (10/25/2021), the patient presented to Dr. Dandre Ward, Vascular Surgery, for further evaluation of a left internal carotid stenosis with soft atheroma.    Since yesterday (10/26), the patient endorses pain and swelling to her right side. She states that the swelling is from her right side face down to her right foot. This past week, the swelling was so bad that her right eye was shut, and she was unable to open her right hand. Additionally, she endorses mild neck pain. 3 weeks ago, the patient had a stroke, and has had problems with her right side since. She also lost approximately 60% of her hearing on that side due to the stroke. 2 days ago (10/25), she visited the ENT for evaluation of her facial swelling, and she was encouraged to present to the ED. Afterward,  "she visited with her surgeon for evaluation of a blocked left artery in her neck, due to the stroke. The patient denies pain to teeth.    Additionally, the patient has been experiencing urinary frequency, but denies dysuria. Due to the excessive wiping, she endorses a rash to that region. She has not seen her PCP for any of these issues.    The patient endorses tingling along her right leg, all going down to her right foot. She states that her right foot feels heavy, like it is a \"ball of fire.\" She has visited Dr. Hughes, the pain doctor at [place]. The patient takes percocet and has a lidocaine 5% patch for the pain, although she states that it does not provide relief. This tingling and foot pain are not new.    REVIEW OF SYSTEMS   Review of Systems   HENT:        Positive for facial swelling   Gastrointestinal: Positive for abdominal pain (right side. pain and swelling).   Musculoskeletal:        Positive for swelling to right side, down right leg.   All other systems reviewed and are negative.       PAST MEDICAL HISTORY:  Past Medical History:   Diagnosis Date     Asthma      CAD (coronary artery disease)      COPD (chronic obstructive pulmonary disease) (H)      CVA (cerebral infarction)      Diabetes (H)      GERD (gastroesophageal reflux disease)      Hypertension      Polysubstance abuse (H)      S/P CABG (coronary artery bypass graft)      S/P lumbar discectomy 06/13/2019    L5/S1 by  Dr. Hamm at [facility]     Schizoaffective disorder (H)        PAST SURGICAL HISTORY:  Past Surgical History:   Procedure Laterality Date     BYPASS GRAFT ARTERY CORONARY  2009    x2     CORONARY STENT PLACEMENT       CV CORONARY ANGIOGRAM N/A 6/2/2021    Procedure: Coronary Angiogram;  Surgeon: Juventino Rivera MD;  Location: [facility] Cardiac Cath Lab;  Service: Cardiology     HYSTERECTOMY TOTAL ABDOMINAL, BILATERAL SALPINGO-OOPHORECTOMY, COMBINED       ORIF ULNAR / RADIAL SHAFT FRACTURE Right            CURRENT " "MEDICATIONS:    methylPREDNISolone (MEDROL DOSEPAK) 4 MG tablet therapy pack  acyclovir (ZOVIRAX) 800 MG tablet  albuterol (PROAIR HFA) 108 (90 BASE) MCG/ACT inhaler  aspirin 325 MG tablet  atenolol (TENORMIN) 50 MG tablet  Esomeprazole Magnesium (NEXIUM PO)  gabapentin (NEURONTIN) 300 MG capsule  glucose blood VI test strips strip  hydrochlorothiazide (HYDRODIURIL) 25 MG tablet  Insulin Aspart Prot & Aspart (NOVOLOG MIX 70/30 FLEXPEN SC)  insulin pen needle 31G X 8 MM MISC  ketorolac (TORADOL) 30 MG/ML injection  loratadine (CLARITIN) 10 MG capsule  losartan (COZAAR) 50 MG tablet  metFORMIN (GLUCOPHAGE) 500 MG tablet  naphazoline-pheniramine (NAPHCON-A) 0.025-0.3 % SOLN  nicotine (NICODERM CQ) 14 MG/24HR patch 2h hr  nitroglycerin (NITROSTAT) 0.4 MG SL tablet  ONETOUCH ULTRASOFT LANCETS  PARoxetine (PAXIL) 20 MG tablet  Propylene Glycol-Glycerin (ARTIFICIAL TEARS) 1-0.3 % SOLN  QUEtiapine (SEROQUEL) 300 MG tablet  simvastatin (ZOCOR) 40 MG tablet  sulfamethoxazole-trimethoprim (BACTRIM DS,SEPTRA DS) 800-160 MG per tablet        ALLERGIES:  Allergies   Allergen Reactions     Haloperidol Unknown     Patient states it stops her heart       Phenothiazines Other (See Comments)     Other reaction(s): CARDIAC DISTURBANCES  Patient states it stops her heart  \"I swell up\"  \"stopped by heart\"  \"I swell up\"  \"I swell up\"       Tramadol Other (See Comments)     Other reaction(s): Angioedema  Throat itch       Demerol      Latex Itching     Lisinopril Other (See Comments)     ACE cough  Itchy throat  Throat itches  Itchy throat       Penicillins      Hydroxyzine Other (See Comments) and Rash     Tongue swelling  Tongue swelling  Tongue swelling         FAMILY HISTORY:  Family History   Problem Relation Age of Onset     Heart Disease Mother      Heart Disease Father      Heart Disease Sister      Diabetes Brother      Heart Disease Sister        SOCIAL HISTORY:   Social History     Socioeconomic History     Marital status: " "Single     Spouse name: Not on file     Number of children: Not on file     Years of education: Not on file     Highest education level: Not on file   Occupational History     Not on file   Tobacco Use     Smoking status: Current Every Day Smoker     Smokeless tobacco: Never Used   Substance and Sexual Activity     Alcohol use: Yes     Comment: Alcoholic Drinks/day: occ     Drug use: No     Sexual activity: Not on file   Other Topics Concern     Not on file   Social History Narrative    Lives alone in a condo.  On disability.  Three living children.       Social Determinants of Health     Financial Resource Strain:      Difficulty of Paying Living Expenses:    Food Insecurity:      Worried About Running Out of Food in the Last Year:      Ran Out of Food in the Last Year:    Transportation Needs:      Lack of Transportation (Medical):      Lack of Transportation (Non-Medical):    Physical Activity:      Days of Exercise per Week:      Minutes of Exercise per Session:    Stress:      Feeling of Stress :    Social Connections:      Frequency of Communication with Friends and Family:      Frequency of Social Gatherings with Friends and Family:      Attends Holiness Services:      Active Member of Clubs or Organizations:      Attends Club or Organization Meetings:      Marital Status:    Intimate Partner Violence:      Fear of Current or Ex-Partner:      Emotionally Abused:      Physically Abused:      Sexually Abused:        VITALS:  BP (!) 169/78   Pulse 71   Temp 98.6  F (37  C) (Oral)   Resp 18   Ht 1.702 m (5' 7\")   Wt 90.7 kg (200 lb)   SpO2 98%   BMI 31.32 kg/m        PHYSICAL EXAM    Constitutional: Well developed, Well nourished, NAD, GCS 15  HENT: Normocephalic, Atraumatic, Bilateral external ears normal, Oropharynx normal, mucous membranes moist, Nose normal. Neck-  Normal range of motion, No tenderness, Supple, No stridor. Mild swelling to right cheek. No intra-aural lesions or abscess.  Eyes: PERRL, " EOMI, Conjunctiva normal, No discharge.   Respiratory: Normal breath sounds, No respiratory distress, No wheezing, Speaks full sentences easily. No cough.  Cardiovascular: Normal heart rate, Regular rhythm, No murmurs, No rubs, No gallops. Chest wall nontender.  GI:Soft, No tenderness, No masses, No flank tenderness. No rebound or guarding.   Musculoskeletal: 2+ DP pulses. No edema.No cyanosis, No clubbing. Good range of motion in all major joints. No tenderness to palpation or major deformities noted.   Integument: Warm, Dry, No erythema, No rash. No petechiae.   Neurologic: Alert & oriented x 3,  CN 3-12 intact Normal motor function, Normal sensory function, No focal deficits noted. Normal gait. Normal finger to nose bilaterally  Psychiatric: Affect normal, Judgment normal, Mood normal. Cooperative.          LAB:  All pertinent labs reviewed and interpreted.  Labs Ordered and Resulted from Time of ED Arrival to Time of ED Departure   ROUTINE UA WITH MICROSCOPIC REFLEX TO CULTURE - Abnormal       Result Value    Color Urine Colorless      Appearance Urine Clear      Glucose Urine >1000 (*)     Bilirubin Urine Negative      Ketones Urine Negative      Specific Gravity Urine 1.039 (*)     Blood Urine Negative      pH Urine 6.0      Protein Albumin Urine Negative      Urobilinogen Urine <2.0      Nitrite Urine Negative      Leukocyte Esterase Urine Negative      RBC Urine 1      WBC Urine 1      Squamous Epithelials Urine 1     COMPREHENSIVE METABOLIC PANEL - Abnormal    Sodium 138      Potassium 4.3      Chloride 105      Carbon Dioxide (CO2) 25      Anion Gap 8      Urea Nitrogen 16      Creatinine 0.85      Calcium 8.8      Glucose 436 (*)     Alkaline Phosphatase 76      AST 10      ALT 17      Protein Total 6.3      Albumin 3.3 (*)     Bilirubin Total 0.3      GFR Estimate 76     CBC WITH PLATELETS AND DIFFERENTIAL - Abnormal    WBC Count 8.5      RBC Count 4.13      Hemoglobin 11.2 (*)     Hematocrit 35.4       MCV 86      MCH 27.1      MCHC 31.6      RDW 13.5      Platelet Count 197      % Neutrophils 69      % Lymphocytes 24      % Monocytes 4      % Eosinophils 2      % Basophils 0      % Immature Granulocytes 1      NRBCs per 100 WBC 0      Absolute Neutrophils 5.9      Absolute Lymphocytes 2.1      Absolute Monocytes 0.3      Absolute Eosinophils 0.2      Absolute Basophils 0.0      Absolute Immature Granulocytes 0.0      Absolute NRBCs 0.0         RADIOLOGY:  Reviewed all pertinent imaging. Please see official radiology report.  Chest XR,  PA & LAT   Final Result   IMPRESSION:       Unchanged enlargement of the cardiac silhouette. Findings of previous median sternotomy and coronary revascularization are present. The vascular pedicle with is not increased.      The lungs are symmetrically inflated. No lung edema or consolidation is present. Diaphragm curvature is preserved. No pleural fluid.      Mild diffuse thoracic spine degenerative disc disease. No acute displaced rib fractures. Sternal wires are aligned in the midline.      CTA Neck with Contrast   Final Result   IMPRESSION:    1.  No interval change versus 10/5/2021.   2.  65% stenosis proximal left internal carotid artery.   3.  Less than 50% stenosis proximal right internal carotid   4.  Severe narrowing proximal left vertebral artery. No dissection.   5.  Dental disease as above.                I, Ambika Zarco, am serving as a scribe to document services personally performed by Dr. Aroldo Mcdaniel based on my observation and the provider's statements to me. I, Aroldo Mcdaniel MD attest that Ambika Zarco is acting in a scribe capacity, has observed my performance of the services and has documented them in accordance with my direction.    Aroldo Mcdaniel M.D.  Emergency Medicine  [facility] EMERGENCY DEPARTMENT  [address] [id number] [phone]  Dept: [phone]       Aroldo Mcdaniel  MD HOWIE  11/05/21 [id number]

## 2021-10-27 NOTE — ED TRIAGE NOTES
Pt was at [facility] 3 weeks ago with a stroke, residual right sided weakness. She is having swelling and pain in her right side, that is what brings her in. She does take percocet and lyrica for the pain She is scheduled 11/17 for a right carotid endart.

## 2021-10-27 NOTE — DISCHARGE INSTRUCTIONS
Please follow up with your primary physician to discuss the ongoing pain and to adjust your pain medicines.  The steriods will help for now.    Your physician may also want to address the facial swelling if it continues.

## 2022-01-12 VITALS — WEIGHT: 198.4 LBS | BODY MASS INDEX: 31.14 KG/M2 | HEIGHT: 67 IN

## 2022-04-03 ENCOUNTER — HOSPITAL ENCOUNTER (OUTPATIENT)
Facility: HOSPITAL | Age: 57
Setting detail: OBSERVATION
Discharge: HOME | End: 2022-04-07
Attending: EMERGENCY MEDICINE | Admitting: FAMILY MEDICINE
Payer: COMMERCIAL

## 2022-04-03 ENCOUNTER — APPOINTMENT (OUTPATIENT)
Dept: MRI IMAGING | Facility: HOSPITAL | Age: 57
End: 2022-04-03
Attending: EMERGENCY MEDICINE
Payer: COMMERCIAL

## 2022-04-03 ENCOUNTER — APPOINTMENT (OUTPATIENT)
Dept: RADIOLOGY | Facility: HOSPITAL | Age: 57
End: 2022-04-03
Attending: EMERGENCY MEDICINE
Payer: COMMERCIAL

## 2022-04-03 ENCOUNTER — APPOINTMENT (OUTPATIENT)
Dept: CT IMAGING | Facility: HOSPITAL | Age: 57
End: 2022-04-03
Attending: EMERGENCY MEDICINE
Payer: COMMERCIAL

## 2022-04-03 DIAGNOSIS — N89.8 VAGINAL ITCHING: ICD-10-CM

## 2022-04-03 DIAGNOSIS — Z79.4 TYPE 2 DIABETES MELLITUS WITH DIABETIC POLYNEUROPATHY, WITH LONG-TERM CURRENT USE OF INSULIN (H): Primary | ICD-10-CM

## 2022-04-03 DIAGNOSIS — E11.42 TYPE 2 DIABETES MELLITUS WITH DIABETIC POLYNEUROPATHY, WITH LONG-TERM CURRENT USE OF INSULIN (H): Primary | ICD-10-CM

## 2022-04-03 DIAGNOSIS — M54.41 BILATERAL LOW BACK PAIN WITH RIGHT-SIDED SCIATICA, UNSPECIFIED CHRONICITY: ICD-10-CM

## 2022-04-03 DIAGNOSIS — R29.90 STROKE-LIKE SYMPTOMS: ICD-10-CM

## 2022-04-03 LAB
ALBUMIN UR-MCNC: NEGATIVE MG/DL
ANION GAP SERPL CALCULATED.3IONS-SCNC: 10 MMOL/L (ref 5–18)
APPEARANCE UR: CLEAR
APTT PPP: 27 SECONDS (ref 22–38)
BASOPHILS # BLD AUTO: 0.1 10E3/UL (ref 0–0.2)
BASOPHILS NFR BLD AUTO: 1 %
BILIRUB UR QL STRIP: NEGATIVE
BNP SERPL-MCNC: 139 PG/ML (ref 0–87)
BUN SERPL-MCNC: 19 MG/DL (ref 8–22)
CALCIUM SERPL-MCNC: 8.8 MG/DL (ref 8.5–10.5)
CHLORIDE BLD-SCNC: 103 MMOL/L (ref 98–107)
CO2 SERPL-SCNC: 23 MMOL/L (ref 22–31)
COLOR UR AUTO: ABNORMAL
CREAT SERPL-MCNC: 0.68 MG/DL (ref 0.6–1.1)
CREAT SERPL-MCNC: 0.92 MG/DL (ref 0.6–1.1)
EOSINOPHIL # BLD AUTO: 0.3 10E3/UL (ref 0–0.7)
EOSINOPHIL NFR BLD AUTO: 5 %
ERYTHROCYTE [DISTWIDTH] IN BLOOD BY AUTOMATED COUNT: 13 % (ref 10–15)
GFR SERPL CREATININE-BSD FRML MDRD: 72 ML/MIN/1.73M2
GFR SERPL CREATININE-BSD FRML MDRD: >90 ML/MIN/1.73M2
GLUCOSE BLD-MCNC: 345 MG/DL (ref 70–125)
GLUCOSE BLDC GLUCOMTR-MCNC: 107 MG/DL (ref 70–99)
GLUCOSE BLDC GLUCOMTR-MCNC: 319 MG/DL (ref 70–99)
GLUCOSE UR STRIP-MCNC: >1000 MG/DL
HBA1C MFR BLD: >14 %
HCT VFR BLD AUTO: 37.8 % (ref 35–47)
HGB BLD-MCNC: 12.1 G/DL (ref 11.7–15.7)
HGB UR QL STRIP: NEGATIVE
IMM GRANULOCYTES # BLD: 0 10E3/UL
IMM GRANULOCYTES NFR BLD: 0 %
INR PPP: 0.95 (ref 0.9–1.15)
KETONES BLD-SCNC: 0.12 MMOL/L
KETONES UR STRIP-MCNC: NEGATIVE MG/DL
LEUKOCYTE ESTERASE UR QL STRIP: NEGATIVE
LYMPHOCYTES # BLD AUTO: 2.1 10E3/UL (ref 0.8–5.3)
LYMPHOCYTES NFR BLD AUTO: 37 %
MCH RBC QN AUTO: 26.9 PG (ref 26.5–33)
MCHC RBC AUTO-ENTMCNC: 32 G/DL (ref 31.5–36.5)
MCV RBC AUTO: 84 FL (ref 78–100)
MONOCYTES # BLD AUTO: 0.3 10E3/UL (ref 0–1.3)
MONOCYTES NFR BLD AUTO: 5 %
NEUTROPHILS # BLD AUTO: 3.1 10E3/UL (ref 1.6–8.3)
NEUTROPHILS NFR BLD AUTO: 52 %
NITRATE UR QL: NEGATIVE
NRBC # BLD AUTO: 0 10E3/UL
NRBC BLD AUTO-RTO: 0 /100
PH UR STRIP: 6.5 [PH] (ref 5–7)
PLATELET # BLD AUTO: 237 10E3/UL (ref 150–450)
POTASSIUM BLD-SCNC: 4.3 MMOL/L (ref 3.5–5)
RBC # BLD AUTO: 4.49 10E6/UL (ref 3.8–5.2)
RBC URINE: 2 /HPF
SARS-COV-2 RNA RESP QL NAA+PROBE: NEGATIVE
SODIUM SERPL-SCNC: 136 MMOL/L (ref 136–145)
SP GR UR STRIP: >1.05 (ref 1–1.03)
SQUAMOUS EPITHELIAL: 1 /HPF
TROPONIN I SERPL-MCNC: <0.01 NG/ML (ref 0–0.29)
UROBILINOGEN UR STRIP-MCNC: <2 MG/DL
WBC # BLD AUTO: 5.8 10E3/UL (ref 4–11)
WBC URINE: <1 /HPF

## 2022-04-03 PROCEDURE — 82962 GLUCOSE BLOOD TEST: CPT

## 2022-04-03 PROCEDURE — 80048 BASIC METABOLIC PNL TOTAL CA: CPT | Performed by: EMERGENCY MEDICINE

## 2022-04-03 PROCEDURE — 87635 SARS-COV-2 COVID-19 AMP PRB: CPT | Performed by: EMERGENCY MEDICINE

## 2022-04-03 PROCEDURE — 71046 X-RAY EXAM CHEST 2 VIEWS: CPT

## 2022-04-03 PROCEDURE — 93005 ELECTROCARDIOGRAM TRACING: CPT | Performed by: EMERGENCY MEDICINE

## 2022-04-03 PROCEDURE — 84484 ASSAY OF TROPONIN QUANT: CPT | Performed by: EMERGENCY MEDICINE

## 2022-04-03 PROCEDURE — 96372 THER/PROPH/DIAG INJ SC/IM: CPT | Performed by: EMERGENCY MEDICINE

## 2022-04-03 PROCEDURE — 258N000003 HC RX IP 258 OP 636: Performed by: EMERGENCY MEDICINE

## 2022-04-03 PROCEDURE — 85004 AUTOMATED DIFF WBC COUNT: CPT | Performed by: EMERGENCY MEDICINE

## 2022-04-03 PROCEDURE — 36415 COLL VENOUS BLD VENIPUNCTURE: CPT | Performed by: EMERGENCY MEDICINE

## 2022-04-03 PROCEDURE — 99451 NTRPROF PH1/NTRNET/EHR 5/>: CPT | Performed by: STUDENT IN AN ORGANIZED HEALTH CARE EDUCATION/TRAINING PROGRAM

## 2022-04-03 PROCEDURE — 72148 MRI LUMBAR SPINE W/O DYE: CPT

## 2022-04-03 PROCEDURE — 96361 HYDRATE IV INFUSION ADD-ON: CPT

## 2022-04-03 PROCEDURE — G0378 HOSPITAL OBSERVATION PER HR: HCPCS

## 2022-04-03 PROCEDURE — 250N000013 HC RX MED GY IP 250 OP 250 PS 637: Performed by: MASSAGE THERAPIST

## 2022-04-03 PROCEDURE — 250N000011 HC RX IP 250 OP 636: Performed by: EMERGENCY MEDICINE

## 2022-04-03 PROCEDURE — 83036 HEMOGLOBIN GLYCOSYLATED A1C: CPT | Performed by: MASSAGE THERAPIST

## 2022-04-03 PROCEDURE — 81001 URINALYSIS AUTO W/SCOPE: CPT | Performed by: EMERGENCY MEDICINE

## 2022-04-03 PROCEDURE — 96374 THER/PROPH/DIAG INJ IV PUSH: CPT | Mod: XU

## 2022-04-03 PROCEDURE — 250N000012 HC RX MED GY IP 250 OP 636 PS 637: Performed by: EMERGENCY MEDICINE

## 2022-04-03 PROCEDURE — 82565 ASSAY OF CREATININE: CPT | Performed by: MASSAGE THERAPIST

## 2022-04-03 PROCEDURE — C9803 HOPD COVID-19 SPEC COLLECT: HCPCS

## 2022-04-03 PROCEDURE — 82010 KETONE BODYS QUAN: CPT | Performed by: EMERGENCY MEDICINE

## 2022-04-03 PROCEDURE — 96372 THER/PROPH/DIAG INJ SC/IM: CPT | Mod: XU | Performed by: MASSAGE THERAPIST

## 2022-04-03 PROCEDURE — 85610 PROTHROMBIN TIME: CPT | Performed by: EMERGENCY MEDICINE

## 2022-04-03 PROCEDURE — 99285 EMERGENCY DEPT VISIT HI MDM: CPT | Mod: 25

## 2022-04-03 PROCEDURE — 85730 THROMBOPLASTIN TIME PARTIAL: CPT | Performed by: EMERGENCY MEDICINE

## 2022-04-03 PROCEDURE — 250N000012 HC RX MED GY IP 250 OP 636 PS 637: Performed by: MASSAGE THERAPIST

## 2022-04-03 PROCEDURE — 250N000011 HC RX IP 250 OP 636: Performed by: MASSAGE THERAPIST

## 2022-04-03 PROCEDURE — 250N000013 HC RX MED GY IP 250 OP 250 PS 637: Performed by: EMERGENCY MEDICINE

## 2022-04-03 PROCEDURE — 36415 COLL VENOUS BLD VENIPUNCTURE: CPT | Performed by: MASSAGE THERAPIST

## 2022-04-03 PROCEDURE — 70496 CT ANGIOGRAPHY HEAD: CPT

## 2022-04-03 PROCEDURE — 83880 ASSAY OF NATRIURETIC PEPTIDE: CPT | Performed by: EMERGENCY MEDICINE

## 2022-04-03 PROCEDURE — 70551 MRI BRAIN STEM W/O DYE: CPT

## 2022-04-03 RX ORDER — CARVEDILOL 12.5 MG/1
12.5 TABLET ORAL 2 TIMES DAILY WITH MEALS
Status: DISCONTINUED | OUTPATIENT
Start: 2022-04-03 | End: 2022-04-07 | Stop reason: HOSPADM

## 2022-04-03 RX ORDER — DEXTROSE MONOHYDRATE 25 G/50ML
25-50 INJECTION, SOLUTION INTRAVENOUS
Status: DISCONTINUED | OUTPATIENT
Start: 2022-04-03 | End: 2022-04-07 | Stop reason: HOSPADM

## 2022-04-03 RX ORDER — IOPAMIDOL 755 MG/ML
75 INJECTION, SOLUTION INTRAVASCULAR ONCE
Status: COMPLETED | OUTPATIENT
Start: 2022-04-03 | End: 2022-04-03

## 2022-04-03 RX ORDER — LIDOCAINE 40 MG/G
CREAM TOPICAL
Status: DISCONTINUED | OUTPATIENT
Start: 2022-04-03 | End: 2022-04-07 | Stop reason: HOSPADM

## 2022-04-03 RX ORDER — ROSUVASTATIN CALCIUM 20 MG/1
20 TABLET, COATED ORAL DAILY
Status: ON HOLD | COMMUNITY
End: 2022-09-29

## 2022-04-03 RX ORDER — LOSARTAN POTASSIUM 25 MG/1
50 TABLET ORAL DAILY
Status: DISCONTINUED | OUTPATIENT
Start: 2022-04-03 | End: 2022-04-07 | Stop reason: HOSPADM

## 2022-04-03 RX ORDER — NITROGLYCERIN 0.4 MG/1
0.4 TABLET SUBLINGUAL EVERY 5 MIN PRN
COMMUNITY
End: 2022-12-11

## 2022-04-03 RX ORDER — NALOXONE HYDROCHLORIDE 0.4 MG/ML
0.4 INJECTION, SOLUTION INTRAMUSCULAR; INTRAVENOUS; SUBCUTANEOUS
Status: DISCONTINUED | OUTPATIENT
Start: 2022-04-03 | End: 2022-04-07 | Stop reason: HOSPADM

## 2022-04-03 RX ORDER — LIDOCAINE 50 MG/G
1 PATCH TOPICAL EVERY 24 HOURS
COMMUNITY
End: 2024-01-14

## 2022-04-03 RX ORDER — MONTELUKAST SODIUM 10 MG/1
10 TABLET ORAL AT BEDTIME
COMMUNITY
End: 2023-05-05

## 2022-04-03 RX ORDER — OXYCODONE AND ACETAMINOPHEN 5; 325 MG/1; MG/1
2 TABLET ORAL ONCE
Status: COMPLETED | OUTPATIENT
Start: 2022-04-03 | End: 2022-04-03

## 2022-04-03 RX ORDER — FUROSEMIDE 20 MG
20 TABLET ORAL DAILY PRN
COMMUNITY
End: 2023-05-05

## 2022-04-03 RX ORDER — FLUTICASONE PROPIONATE 50 MCG
1 SPRAY, SUSPENSION (ML) NASAL DAILY PRN
COMMUNITY
End: 2024-01-14

## 2022-04-03 RX ORDER — ROSUVASTATIN CALCIUM 10 MG/1
20 TABLET, COATED ORAL DAILY
Status: DISCONTINUED | OUTPATIENT
Start: 2022-04-03 | End: 2022-04-07 | Stop reason: HOSPADM

## 2022-04-03 RX ORDER — MONTELUKAST SODIUM 10 MG/1
10 TABLET ORAL AT BEDTIME
Status: DISCONTINUED | OUTPATIENT
Start: 2022-04-03 | End: 2022-04-07 | Stop reason: HOSPADM

## 2022-04-03 RX ORDER — ACETAMINOPHEN 325 MG/1
975 TABLET ORAL EVERY 8 HOURS
Status: DISCONTINUED | OUTPATIENT
Start: 2022-04-03 | End: 2022-04-07 | Stop reason: HOSPADM

## 2022-04-03 RX ORDER — NALOXONE HYDROCHLORIDE 0.4 MG/ML
0.2 INJECTION, SOLUTION INTRAMUSCULAR; INTRAVENOUS; SUBCUTANEOUS
Status: DISCONTINUED | OUTPATIENT
Start: 2022-04-03 | End: 2022-04-07 | Stop reason: HOSPADM

## 2022-04-03 RX ORDER — AMOXICILLIN 250 MG
1 CAPSULE ORAL 2 TIMES DAILY PRN
Status: DISCONTINUED | OUTPATIENT
Start: 2022-04-03 | End: 2022-04-05

## 2022-04-03 RX ORDER — AMOXICILLIN 250 MG
2 CAPSULE ORAL 2 TIMES DAILY PRN
Status: DISCONTINUED | OUTPATIENT
Start: 2022-04-03 | End: 2022-04-05

## 2022-04-03 RX ORDER — OXYCODONE AND ACETAMINOPHEN 10; 325 MG/1; MG/1
1 TABLET ORAL EVERY 6 HOURS PRN
Status: ON HOLD | COMMUNITY
End: 2022-04-07

## 2022-04-03 RX ORDER — CODEINE PHOSPHATE AND GUAIFENESIN 10; 100 MG/5ML; MG/5ML
1-2 SOLUTION ORAL EVERY 4 HOURS PRN
Status: ON HOLD | COMMUNITY
End: 2022-09-29

## 2022-04-03 RX ORDER — PREGABALIN 150 MG/1
150 CAPSULE ORAL 3 TIMES DAILY
Status: ON HOLD | COMMUNITY
End: 2022-07-20

## 2022-04-03 RX ORDER — LORAZEPAM 0.5 MG/1
1 TABLET ORAL ONCE
Status: COMPLETED | OUTPATIENT
Start: 2022-04-03 | End: 2022-04-03

## 2022-04-03 RX ORDER — POLYETHYLENE GLYCOL 3350 17 G/17G
17 POWDER, FOR SOLUTION ORAL DAILY PRN
Status: DISCONTINUED | OUTPATIENT
Start: 2022-04-03 | End: 2022-04-07 | Stop reason: HOSPADM

## 2022-04-03 RX ORDER — AMOXICILLIN 250 MG
1 CAPSULE ORAL DAILY PRN
Status: ON HOLD | COMMUNITY
End: 2022-09-29

## 2022-04-03 RX ORDER — METHOCARBAMOL 750 MG/1
750 TABLET, FILM COATED ORAL 4 TIMES DAILY
Status: DISCONTINUED | OUTPATIENT
Start: 2022-04-03 | End: 2022-04-07 | Stop reason: HOSPADM

## 2022-04-03 RX ORDER — FLUTICASONE PROPIONATE 50 MCG
1 SPRAY, SUSPENSION (ML) NASAL DAILY
Status: DISCONTINUED | OUTPATIENT
Start: 2022-04-03 | End: 2022-04-07 | Stop reason: HOSPADM

## 2022-04-03 RX ORDER — METOCLOPRAMIDE HYDROCHLORIDE 5 MG/ML
5 INJECTION INTRAMUSCULAR; INTRAVENOUS ONCE
Status: COMPLETED | OUTPATIENT
Start: 2022-04-03 | End: 2022-04-03

## 2022-04-03 RX ORDER — DIPHENHYDRAMINE HCL 25 MG
25 TABLET ORAL EVERY 6 HOURS PRN
Status: ON HOLD | COMMUNITY
End: 2022-09-29

## 2022-04-03 RX ORDER — ONDANSETRON 4 MG/1
4 TABLET, ORALLY DISINTEGRATING ORAL EVERY 6 HOURS PRN
Status: DISCONTINUED | OUTPATIENT
Start: 2022-04-03 | End: 2022-04-07 | Stop reason: HOSPADM

## 2022-04-03 RX ORDER — CETIRIZINE HYDROCHLORIDE 10 MG/1
10 TABLET ORAL DAILY
COMMUNITY
End: 2023-01-17

## 2022-04-03 RX ORDER — ALBUTEROL SULFATE 90 UG/1
1-2 AEROSOL, METERED RESPIRATORY (INHALATION) EVERY 4 HOURS PRN
Status: DISCONTINUED | OUTPATIENT
Start: 2022-04-03 | End: 2022-04-07 | Stop reason: HOSPADM

## 2022-04-03 RX ORDER — CARVEDILOL 12.5 MG/1
12.5 TABLET ORAL 2 TIMES DAILY WITH MEALS
Status: ON HOLD | COMMUNITY
End: 2022-09-29

## 2022-04-03 RX ORDER — HYDROMORPHONE HYDROCHLORIDE 4 MG/1
4 TABLET ORAL EVERY 4 HOURS PRN
Status: DISCONTINUED | OUTPATIENT
Start: 2022-04-03 | End: 2022-04-05

## 2022-04-03 RX ORDER — IPRATROPIUM BROMIDE AND ALBUTEROL SULFATE 2.5; .5 MG/3ML; MG/3ML
1 SOLUTION RESPIRATORY (INHALATION) EVERY 6 HOURS PRN
COMMUNITY
End: 2024-01-14

## 2022-04-03 RX ORDER — NICOTINE POLACRILEX 4 MG
15-30 LOZENGE BUCCAL
Status: DISCONTINUED | OUTPATIENT
Start: 2022-04-03 | End: 2022-04-07 | Stop reason: HOSPADM

## 2022-04-03 RX ORDER — BUDESONIDE AND FORMOTEROL FUMARATE DIHYDRATE 160; 4.5 UG/1; UG/1
2 AEROSOL RESPIRATORY (INHALATION) 2 TIMES DAILY
COMMUNITY
End: 2023-05-05

## 2022-04-03 RX ORDER — ONDANSETRON 2 MG/ML
4 INJECTION INTRAMUSCULAR; INTRAVENOUS EVERY 6 HOURS PRN
Status: DISCONTINUED | OUTPATIENT
Start: 2022-04-03 | End: 2022-04-07 | Stop reason: HOSPADM

## 2022-04-03 RX ORDER — METHOCARBAMOL 750 MG/1
750 TABLET, FILM COATED ORAL 4 TIMES DAILY
Status: ON HOLD | COMMUNITY
End: 2022-07-21

## 2022-04-03 RX ORDER — PREGABALIN 75 MG/1
150 CAPSULE ORAL 3 TIMES DAILY
Status: DISCONTINUED | OUTPATIENT
Start: 2022-04-03 | End: 2022-04-07 | Stop reason: HOSPADM

## 2022-04-03 RX ORDER — DULOXETIN HYDROCHLORIDE 30 MG/1
30 CAPSULE, DELAYED RELEASE ORAL 2 TIMES DAILY
Status: DISCONTINUED | OUTPATIENT
Start: 2022-04-03 | End: 2022-04-07 | Stop reason: HOSPADM

## 2022-04-03 RX ORDER — NICOTINE POLACRILEX 4 MG/1
20 GUM, CHEWING ORAL DAILY
COMMUNITY
End: 2023-06-01

## 2022-04-03 RX ORDER — LIDOCAINE 4 G/G
2 PATCH TOPICAL
Status: DISCONTINUED | OUTPATIENT
Start: 2022-04-03 | End: 2022-04-07 | Stop reason: HOSPADM

## 2022-04-03 RX ORDER — PANTOPRAZOLE SODIUM 20 MG/1
40 TABLET, DELAYED RELEASE ORAL
Status: DISCONTINUED | OUTPATIENT
Start: 2022-04-03 | End: 2022-04-07 | Stop reason: HOSPADM

## 2022-04-03 RX ORDER — GLIPIZIDE 5 MG/1
10 TABLET, FILM COATED, EXTENDED RELEASE ORAL DAILY
Status: ON HOLD | COMMUNITY
End: 2022-04-07

## 2022-04-03 RX ADMIN — ROSUVASTATIN CALCIUM 20 MG: 10 TABLET, FILM COATED ORAL at 20:40

## 2022-04-03 RX ADMIN — LORAZEPAM 1 MG: 0.5 TABLET ORAL at 16:18

## 2022-04-03 RX ADMIN — ACETAMINOPHEN 975 MG: 325 TABLET ORAL at 21:02

## 2022-04-03 RX ADMIN — HYDROMORPHONE HYDROCHLORIDE 4 MG: 4 TABLET ORAL at 20:39

## 2022-04-03 RX ADMIN — INSULIN ASPART 4 UNITS: 100 INJECTION, SOLUTION INTRAVENOUS; SUBCUTANEOUS at 21:50

## 2022-04-03 RX ADMIN — SODIUM CHLORIDE 500 ML: 9 INJECTION, SOLUTION INTRAVENOUS at 15:30

## 2022-04-03 RX ADMIN — LOSARTAN POTASSIUM 50 MG: 25 TABLET, FILM COATED ORAL at 20:39

## 2022-04-03 RX ADMIN — ASPIRIN 325 MG: 325 TABLET ORAL at 20:39

## 2022-04-03 RX ADMIN — MONTELUKAST 10 MG: 10 TABLET, FILM COATED ORAL at 20:39

## 2022-04-03 RX ADMIN — DULOXETINE HYDROCHLORIDE 30 MG: 30 CAPSULE, DELAYED RELEASE ORAL at 20:39

## 2022-04-03 RX ADMIN — INSULIN GLARGINE 12 UNITS: 100 INJECTION, SOLUTION SUBCUTANEOUS at 21:05

## 2022-04-03 RX ADMIN — PANTOPRAZOLE SODIUM 40 MG: 20 TABLET, DELAYED RELEASE ORAL at 21:02

## 2022-04-03 RX ADMIN — LIDOCAINE 2 PATCH: 246 PATCH TOPICAL at 22:01

## 2022-04-03 RX ADMIN — PREGABALIN 150 MG: 75 CAPSULE ORAL at 21:02

## 2022-04-03 RX ADMIN — METHOCARBAMOL 750 MG: 750 TABLET ORAL at 20:39

## 2022-04-03 RX ADMIN — OXYCODONE HYDROCHLORIDE AND ACETAMINOPHEN 2 TABLET: 5; 325 TABLET ORAL at 17:09

## 2022-04-03 RX ADMIN — CARVEDILOL 12.5 MG: 12.5 TABLET, FILM COATED ORAL at 21:02

## 2022-04-03 RX ADMIN — IOPAMIDOL 75 ML: 755 INJECTION, SOLUTION INTRAVENOUS at 14:55

## 2022-04-03 RX ADMIN — METOCLOPRAMIDE HYDROCHLORIDE 5 MG: 5 INJECTION INTRAMUSCULAR; INTRAVENOUS at 16:21

## 2022-04-03 RX ADMIN — INSULIN ASPART 6 UNITS: 100 INJECTION, SOLUTION INTRAVENOUS; SUBCUTANEOUS at 18:45

## 2022-04-03 RX ADMIN — ENOXAPARIN SODIUM 40 MG: 40 INJECTION SUBCUTANEOUS at 20:43

## 2022-04-03 NOTE — ED PROVIDER NOTES
Emergency Department Encounter     Evaluation Date & Time:   No admission date for patient encounter.    CHIEF COMPLAINT:  Generalized Weakness      Triage Note:No notes on file      Impression and Plan       FINAL IMPRESSION:    ICD-10-CM    1. Bilateral low back pain with right-sided sciatica, unspecified chronicity  M54.41    2. Stroke-like symptoms  R29.90          ED COURSE & MEDICAL DECISION MAKIN:07 PM I met with the patient, obtained history, performed an initial exam, and discussed options and plan for diagnostics and treatment here in the ED. PPE worn: N95 mask, surgical mask, gown  2:29 PM I spoke to stroke neurology.  3:00 PM I updated the patient.  3:12 PM I spoke to radiology.  6:27 PM I spoke to [place].     55 yo F, history of reported previous stroke (10/2021), CAD s/p CABG and stent placement, DM, HTN, COPD and schizoaffective disorder, who presents for evaluation of stroke-like symptoms. Patient reports onset of BL (R > L) and RUE weakness and paresthesias. She also reports right-sided facial numbness. Initially she reported that the leg weakness started ~1 day ago, but then reported that it started last night before bed and was significantly worse upon waking this morning at 10:30. The arm and facial symptoms were present upon waking this morning (3.5-4 hours ago).    On exam, she has right-sided facial droop with 3/5 strength RUE and RLE with associated decreased sensation to light touch right side.    Tier 2 Stroke Code called.  I spoke with Dr. Sena, Stroke Neurologist, who recommends proceeding with CT / CTA imaging and, if normal, performing MRI imaging.    Patient placed on cardiac monitor, IV access established and blood sent for labs.    Head CT performed and negative for acute findings.     CTA head performed and demonstrated:  1.  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  2.  Mild to moderate multifocal atherosclerotic stenoses of the bilateral  cavernous internal carotid artery segments, also unchanged.     CTA neck performed and demonstrated:  1.  Less than 50% atherosclerotic stenosis in the proximal internal carotid arteries BL.   2.  The balanced vertebral arteries widely patent in the neck.    I spoke with the Stroke Neurologist who agrees with plan to proceed with brain MRI (with and without contrast). Given low back pain and history of sciatica, will also proceed with MRI lumbar spine.    EKG performed and demonstrated NSR with no ischemic changes. Troponin WNL (<0.01).     CXR performed given cough and demonstrated:  1.  Lungs are underpenetrated compared to the prior study.  2.  Cardiac silhouette enlargement, sternal cerclage wires and multiple mediastinal clips (indicating prior CABG) are again noted.  3.  Pulmonary vascular congestion appears slightly worsened as compared to the prior study. This could be partially due to technique but could also represent mild congestive heart failure. BNP mildly elevated (139). COVID negative.  4.  Mild perihilar peribronchial cuffing could be associated with mild bronchitis.  5.  No definite pneumothorax or significant pleural fluid collection.     Labs otherwise remarkable for hyperglycemia (glucose 345) with no acidosis or ketosis to suggest DKA. She has no leukocytosis, anemia, significant electrolyte derangements or renal impairment. Coags WNL.    MRI imaging brain was motion degraded, however demonstrated no acute findings with age-related changes.    MRI lumbar spine demonstrated:  1.  Progressive disc degeneration at L5-S1 with new moderate endplate edema most consistent with type I endplate change. Right-sided facet edema at L5-S1 consistent with active facet arthropathy. Moderate to severe BL foraminal stenoses at this level.  2.  Unchanged severe disc degeneration L3-L4 with disc bulge and marginal osteophyte resulting in moderate spinal canal and foraminal stenoses.  3.  Mild spondylosis at other  levels without significant spinal canal or foraminal stenosis.    On repeat exam, patient still with RUE weakness, however now has full BL lower extremity strength.     Given ongoing weakness and lumbar spine MRI findings, decision made to admit patient for further monitoring and evaluation. Patient stable throughout ED course.      At the conclusion of the encounter I discussed the results of all the tests and the disposition. The questions were answered. The patient acknowledged understanding and was agreeable with the care plan.      MEDICATIONS GIVEN IN THE EMERGENCY DEPARTMENT:  Medications   iopamidol (ISOVUE-370) solution 75 mL (75 mLs Intravenous Given 4/3/22 1455)   metoclopramide (REGLAN) injection 5 mg (5 mg Intravenous Given 4/3/22 1621)   0.9% sodium chloride BOLUS (0 mLs Intravenous Stopped 4/3/22 1804)   LORazepam (ATIVAN) tablet 1 mg (1 mg Oral Given 4/3/22 1618)   insulin aspart (NovoLOG) injection (RAPID ACTING) (6 Units Subcutaneous Given 4/3/22 1845)   oxyCODONE-acetaminophen (PERCOCET) 5-325 MG per tablet 2 tablet (2 tablets Oral Given 4/3/22 1709)       NEW PRESCRIPTIONS STARTED AT TODAY'S ED VISIT:  New Prescriptions    No medications on file       HPI     HPI     Sarah Rahman is a 56 year old female, history of reported previous stroke (10/2021), CAD s/p CABG and stent placement, DM, HTN, COPD and schizoaffective disorder, who presents to this ED by walk-in for evaluation of weakness.     The patient reports waking up today at approximately 10:30 AM (3.5-4 hours ago), getting out of bed and falling due to generalized weakness in both her legs and feet bilaterally, right greater than left, along with weakness in her right arm. The patient also reports burning and tingling in both feet and numbness to her right face. She notes that before going to bed, she had some sensation of weakness in her legs, however it was much worse today. She went to bed at approximately 11:00 PM last night.  The patient has been dealing with weakness for awhile due to her sciatica. She also endorses a generalized headache that has been ongoing x 2 days and blurry vision in her right eye since yesterday.     The patient also reports urinary frequency, but denies urinary and fecal incontinence.    She is not on blood thinners, but takes aspirin daily. She does report a history of a stroke back in October 2021 where she has since lost hearing in her right ear.    She reports cough productive of green sputum with subjective fever one day ago. She has otherwise been in her usual state of health and denies chest pain, shortness of breath, abdominal pain and N/V/D.     REVIEW OF SYSTEMS:  All other systems reviewed and are negative.      Medical History     Past Medical History:   Diagnosis Date     Asthma      CAD (coronary artery disease)      COPD (chronic obstructive pulmonary disease) (H)      CVA (cerebral infarction)      Diabetes (H)      GERD (gastroesophageal reflux disease)      Hypertension      Polysubstance abuse (H)      S/P CABG (coronary artery bypass graft)      S/P lumbar discectomy 06/13/2019     Schizoaffective disorder (H)        Past Surgical History:   Procedure Laterality Date     BYPASS GRAFT ARTERY CORONARY  2009    x2     CORONARY STENT PLACEMENT       CV CORONARY ANGIOGRAM N/A 6/2/2021    Procedure: Coronary Angiogram;  Surgeon: Juventino Rivera MD;  Location: [facility] Cardiac Cath Lab;  Service: Cardiology     HYSTERECTOMY TOTAL ABDOMINAL, BILATERAL SALPINGO-OOPHORECTOMY, COMBINED       ORIF ULNAR / RADIAL SHAFT FRACTURE Right        Family History   Problem Relation Age of Onset     Heart Disease Mother      Heart Disease Father      Heart Disease Sister      Diabetes Brother      Heart Disease Sister        Social History     Tobacco Use     Smoking status: Current Every Day Smoker     Smokeless tobacco: Never Used   Substance Use Topics     Alcohol use: Yes     Comment: Alcoholic Drinks/day:  occ     Drug use: No       albuterol (PROAIR HFA) 108 (90 BASE) MCG/ACT inhaler  aspirin (ASA) 325 MG EC tablet  budesonide-formoterol (SYMBICORT) 160-4.5 MCG/ACT Inhaler  carvedilol (COREG) 12.5 MG tablet  cetirizine (ZYRTEC) 10 MG tablet  COMPOUND CONTAINING CONTROLLED SUBSTANCE (CMPD RX) - PHARMACY TO MIX COMPOUNDED MEDICATION  diclofenac (VOLTAREN) 1 % topical gel  diphenhydrAMINE (BENADRYL) 25 MG tablet  DULoxetine (CYMBALTA) 30 MG capsule  fluticasone (FLONASE) 50 MCG/ACT nasal spray  furosemide (LASIX) 20 MG tablet  glipiZIDE (GLUCOTROL XL) 5 MG 24 hr tablet  guaiFENesin-codeine (ROBITUSSIN AC) 100-10 MG/5ML solution  insulin glargine (LANTUS PEN) 100 UNIT/ML pen  ipratropium - albuterol 0.5 mg/2.5 mg/3 mL (DUONEB) 0.5-2.5 (3) MG/3ML neb solution  Lidocaine (LIDOCARE) 4 % Patch  losartan (COZAAR) 50 MG tablet  methocarbamol (ROBAXIN) 750 MG tablet  montelukast (SINGULAIR) 10 MG tablet  nitroGLYcerin (NITROSTAT) 0.4 MG sublingual tablet  omeprazole 20 MG tablet  oxyCODONE-acetaminophen (PERCOCET)  MG per tablet  pregabalin (LYRICA) 150 MG capsule  rosuvastatin (CRESTOR) 20 MG tablet  senna-docusate (SENOKOT-S/PERICOLACE) 8.6-50 MG tablet  urea (DERMAL THERAPY FINGER CARE) 20 % external lotion        Physical Exam     First Vitals:  Patient Vitals for the past 24 hrs:   BP Temp Temp src Pulse Resp SpO2 Weight   04/03/22 1426 -- -- -- -- -- -- 91.6 kg (202 lb)   04/03/22 1424 -- 98.2  F (36.8  C) Oral -- -- -- --   04/03/22 1415 (!) 195/94 -- -- 72 12 100 % --       PHYSICAL EXAM:   Physical Exam    GENERAL: Awake, alert.  In no acute distress.   HEENT: Normocephalic, atraumatic. Pupils equal, round and reactive. Conjunctiva normal. EOMI. No visual field deficits. Tongue is midline.   NECK: No stridor.  PULMONARY: Symmetrical breath sounds without distress.  Lungs clear to auscultation bilaterally without wheezes, rhonchi or rales.  CARDIO: Regular rate and rhythm.  No significant murmur, rub or gallop.   Radial pulses strong and symmetrical.  ABDOMINAL: Abdomen soft, non-distended and non-tender to palpation.    EXTREMITIES: No lower extremity swelling or edema.      NEURO: Alert and oriented to person, place and time. Right-sided facial droop and decreased sensation to light touch right face compared to left face; cranial nerves II-XII otherwise intact.  Strength 3/5 RUE and RLE compared to 5/5 LUE and LLE (hip flexion, knee flexion / extension, dorsiflexion / plantarflexion ankle and great toe). Decreased sensation to light touch of RUE and RLE compared to LUE and LLE. Normal finger-nose-finger testing LUE; grossly normal RUE, however some difficulty fully testing due to RUE weakness. No ataxia on heel-shin testing LLE; grossly normal RLE, however some difficulty fully testing due to RLE weakness. Unable to test for drift RUE and RLE secondary to patient stating inability to lift either RUE or RLE.    [employer] Stroke Scale  Exam Interval: Baseline   Score    Level of consciousness: (0)   Alert, keenly responsive    LOC questions: (0)   Answers both questions correctly    LOC commands: (0)   Performs both tasks correctly    Best gaze: (0)   Normal    Visual: (0)   No visual loss    Facial palsy: (1)   Minor paralysis (flat nasolabial fold, smile asymmetry)    Motor arm (left): (0)   No drift    Motor arm (right): (2)   Some effort against gravity    Motor leg (left): (0)   No drift    Motor leg (right): (2)   Some effort against gravity    Limb ataxia: (0)   Absent    Sensory: (1)   Mild to moderate sensory loss    Best language: (0)   Normal- no aphasia    Dysarthria: (0)   Normal    Extinction and inattention: (0)   No abnormality        Total Score:  6     PSYCH: Normal mood and affect.  SKIN: No rashes.     Results     LAB:  All pertinent labs reviewed and interpreted  Labs Ordered and Resulted from Time of ED Arrival to Time of ED Departure   BASIC METABOLIC PANEL - Abnormal       Result  Value    Sodium 136      Potassium 4.3      Chloride 103      Carbon Dioxide (CO2) 23      Anion Gap 10      Urea Nitrogen 19      Creatinine 0.92      Calcium 8.8      Glucose 345 (*)     GFR Estimate 72     GLUCOSE BY METER - Abnormal    GLUCOSE BY METER POCT 319 (*)    ROUTINE UA WITH MICROSCOPIC REFLEX TO CULTURE - Abnormal    Color Urine Light Yellow      Appearance Urine Clear      Glucose Urine >1000 (*)     Bilirubin Urine Negative      Ketones Urine Negative      Specific Gravity Urine >1.050 (*)     Blood Urine Negative      pH Urine 6.5      Protein Albumin Urine Negative      Urobilinogen Urine <2.0      Nitrite Urine Negative      Leukocyte Esterase Urine Negative      RBC Urine 2      WBC Urine <1      Squamous Epithelials Urine 1     B-TYPE NATRIURETIC PEPTIDE ([facility] ONLY) - Abnormal     (*)    INR - Normal    INR 0.95     PARTIAL THROMBOPLASTIN TIME - Normal    aPTT 27     TROPONIN I - Normal    Troponin I <0.01     KETONE BETA-HYDROXYBUTYRATE QUANTITATIVE, RAPID - Normal    Ketone (Beta-Hydroxybutyrate) Quantitative 0.12     COVID-19 VIRUS (CORONAVIRUS) BY PCR - Normal    SARS CoV2 PCR Negative     GLUCOSE MONITOR NURSING POCT   CBC WITH PLATELETS AND DIFFERENTIAL    WBC Count 5.8      RBC Count 4.49      Hemoglobin 12.1      Hematocrit 37.8      MCV 84      MCH 26.9      MCHC 32.0      RDW 13.0      Platelet Count 237      % Neutrophils 52      % Lymphocytes 37      % Monocytes 5      % Eosinophils 5      % Basophils 1      % Immature Granulocytes 0      NRBCs per 100 WBC 0      Absolute Neutrophils 3.1      Absolute Lymphocytes 2.1      Absolute Monocytes 0.3      Absolute Eosinophils 0.3      Absolute Basophils 0.1      Absolute Immature Granulocytes 0.0      Absolute NRBCs 0.0         RADIOLOGY:  Chest XR,  PA & LAT   Final Result   IMPRESSION:    1.  Lungs are underpenetrated as compared to the prior study.   2.  Cardiac silhouette enlargement, sternal cerclage wires and multiple  mediastinal clips (indicating prior CABG) are again noted.   3.  Pulmonary vascular congestion appears slightly worsened as compared to the prior study. This could be partially due to technique but could also represent mild congestive heart failure.   4.  Mild perihilar peribronchial cuffing could be associated with mild bronchitis.   5.  No definite pneumothorax or significant pleural fluid collection. No acute osseous fracture.      MR Brain w/o Contrast   Final Result   IMPRESSION:   1.  Motion degraded exam.   2.  No acute findings.   3.  Age-related changes.      Lumbar spine MRI w/o contrast   Final Result   IMPRESSION:   1.  Progressive disc degeneration at L5-S1 with new moderate endplate edema most consistent with type I endplate change. Right-sided facet edema at L5-S1 consistent with active facet arthropathy. Moderate to severe bilateral foraminal stenoses at this    level.   2.  Unchanged severe disc degeneration L3-L4 with disc bulge and marginal osteophyte resulting in moderate spinal canal and foraminal stenoses.   3.  Mild spondylosis at other levels without significant spinal canal or foraminal stenosis.      CTA Head Neck with Contrast   Final Result   IMPRESSION:    HEAD CT:   1.  No acute intracranial hemorrhage, mass effect, or CT evidence for acute infarction.   2.  Mild global brain parenchymal volume loss with presumed sequelae of mild chronic small vessel ischemic disease.      HEAD CTA:    1.  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   2.  Mild to moderate multifocal atherosclerotic stenoses of the bilateral cavernous internal carotid artery segments, also unchanged.      NECK CTA:   1.  Less than 50% atherosclerotic stenosis in the proximal internal carotid arteries bilaterally.   2.  The balanced vertebral arteries widely patent in the neck.         Results discussed with Vonnie Flood on 4/3/2022 2:57 PM.   Results discussed with Vonnie Flood on 4/3/2022 3:13 PM.         EC/3/2022, 15:13; NSR with rate of 70 bpm; normal intervals; normal conduction; non-specific T wave flattening / inversions with no ST-T wave elevation consistent with ACS or pericarditis; compared to previous EKG dated 2021, there are no significant changes      EKG independently reviewed and interpreted by Vonnie Otto MD      I, Eric Green, am serving as a scribe to document services personally performed by Vonnie Otto MD based on my observation and the provider's statements to me. I, Vonnie Otto MD attest that Eric Green is acting in a scribe capacity, has observed my performance of the services and has documented them in accordance with my direction.    Vonnie Otto MD  Emergency Medicine  [facility] EMERGENCY DEPARTMENT         Vonnie Otto MD  22 [id number]

## 2022-04-03 NOTE — H&P
[facility]    History and Physical - Hospitalist Service       Date of Admission:  4/3/2022    Assessment & Plan      Sarah Rahman is a 56 year old female admitted on 4/3/2022. 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    Bilateral leg pain and weakness, R> L  Right arm weakness  Chronic pain exacerbation  Patient describes 1 day of burning pain in both feet that radiates up to her lower back, morning of admission also noted weakness in her legs which made it difficult to ambulate and weakness in her right arm.  MRI brain on admission negative for acute process.  MR lumbar spine with progressive worsening of her degenerative disc disease, but no signs of abscess, malignancy or other acute process.  Have lower suspicion for, but still considering GBS, she has been hypertensive here but afebrile, normal white count and not with flaccid paralysis.  Most likely etiology appears to be lumbar radiculopathy perhaps combined with diabetic peripheral neuropathy.  -Pain team consulted-appreciate recommendations  -Consider neurosurgery consult in a.m.  -Current pain plan  Scheduled Tylenol  Lidocaine patch  IV Dilaudid for breakthrough pain  Gabapentin  Continue PTA Lyrica, Robaxin, Voltaren gel  -Every 4 hours neuro checks    Diabetes mellitus.   Blood sugar on arrival 345.  Glucose on UA.  She reports she is not currently on insulin at home  -12 units Lantus at bedtime-this will likely need to be titrated up  -Carb coverage and medium sliding scale  -A1c pending     COPD  Asthma  Continue PTA albuterol, Symbicort, Singulair    CAD, s/p CABG  Hypertension  Hyperlipidemia  Some pulmonary congestion seen on chest x-ray, does not appear significantly volume up on exam  Continue PTA aspirin, carvedilol, losartan,  rosuvastatin    Seasonal allergies- Continue PTA Zyrtec, Flonase  Schizoaffective disorder- Continue PTA Cymbalta  GERD-continue PTA omeprazole       Diet: Regular diet  DVT Prophylaxis: Enoxaparin (Lovenox) SQ  Diehl Catheter: Not present  Fluids: PO  Central Lines: None  Cardiac Monitoring: None  Code Status:   Full Code    Clinically Significant Risk Factors Present on Admission              # Platelet Defect: home medication list includes an antiplatelet medication       Disposition Plan   Expected Discharge: 04/05/2022   Anticipated discharge location:  Awaiting care coordination huddle  Delays: TBD          The patient's care was discussed with the Patient.    Joel Washburn MD, PGY1   [facility]   Pgr: [phone]  [facility]        ______________________________________________________________________    Chief Complaint   Bilateral leg pain, weakness    History is obtained from the patient    History of Present Illness   Sarah Ramhan is a 56 year old female who has a history of chronic pain, CVA (10/2021), CAD (s/p CABG), DM, HTN, COPD, schizoaffective disorder and is admitted for pain and weakness in her bilateral legs, right arm.  She has been in her usual state of health until the night before admission when she had burning pain in both feet that radiated up her legs.  When she awoke the morning of admission the pain was worse and she also noted weakness in her legs, right more than left.  Weakness was to the point that she was having trouble walking.  Also felt like she had some weakness in her right arm, but no pain in her right arm.  She does endorse some sore throat.  But denies headache, fever, chills, belly pain, dysuria, vomiting, diarrhea, unintentional changes in weight, loss of bowel or bladder control.      Review of Systems    The 10 point Review of Systems is negative other than noted in the HPI or here.     Past Medical History     I have reviewed this patient's medical history and updated it with pertinent information if needed.   Past Medical History:   Diagnosis Date     Asthma      CAD (coronary artery disease)      COPD (chronic obstructive pulmonary disease) (H)      CVA (cerebral infarction)      Diabetes (H)      GERD (gastroesophageal reflux disease)      Hypertension      Polysubstance abuse (H)      S/P CABG (coronary artery bypass graft)      S/P lumbar discectomy 06/13/2019    L5/S1 by  Dr. Hamm at [facility]     Schizoaffective disorder (H)         Past Surgical History   I have reviewed this patient's surgical history and updated it with pertinent information if needed.  Past Surgical History:   Procedure Laterality Date     BYPASS GRAFT ARTERY CORONARY  2009    x2     CORONARY STENT PLACEMENT       CV CORONARY ANGIOGRAM N/A 6/2/2021    Procedure: Coronary Angiogram;  Surgeon: Juventino Rivera MD;  Location: [facility];  Service: Cardiology     HYSTERECTOMY TOTAL ABDOMINAL, BILATERAL SALPINGO-OOPHORECTOMY, COMBINED       ORIF ULNAR / RADIAL SHAFT FRACTURE Right        Social History   I have reviewed this patient's social history and updated it with pertinent information if needed. Sarah E Josiah  reports that she has been smoking. She has never used smokeless tobacco. She reports current alcohol use. She reports that she does not use drugs.  She lives in an apartment building alone.  Is on disability, used to work as a PCA.    Family History   I have reviewed this patient's family history and updated it with pertinent information if needed.  Family History   Problem Relation Age of Onset     Heart Disease Mother      Heart Disease Father      Heart Disease Sister      Diabetes Brother      Heart Disease Sister        Prior to Admission Medications   Prior to Admission Medications   Prescriptions Last Dose Informant Patient Reported? Taking?   COMPOUND CONTAINING CONTROLLED SUBSTANCE (CMPD RX) -  PHARMACY TO MIX COMPOUNDED MEDICATION   Yes Yes   Sig: neuro PLO pain gel w/ lidocaine(ketamine 8%-gabapentin 6%-lidocaine 2.5%)([facility] AMB MIXTURE)   DULoxetine (CYMBALTA) 30 MG capsule 4/2/2022 at pm  Yes Yes   Sig: Take 30 mg by mouth 2 times daily   Lidocaine (LIDOCARE) 4 % Patch Past Week  Yes Yes   Sig: Place 1 patch onto the skin every 24 hours To prevent lidocaine toxicity, patient should be patch free for 12 hrs daily. BACK   albuterol (PROAIR HFA) 108 (90 BASE) MCG/ACT inhaler   Yes Yes   Sig: Inhale  into the lungs. Inhale 1-2 puffs every 4 to 6 hours as needed   aspirin (ASA) 325 MG EC tablet 4/2/2022 at am  Yes Yes   Sig: Take 325 mg by mouth daily    budesonide-formoterol (SYMBICORT) 160-4.5 MCG/ACT Inhaler 4/2/2022 at pm  Yes Yes   Sig: Inhale 2 puffs into the lungs 2 times daily   carvedilol (COREG) 12.5 MG tablet 4/2/2022 at pm  Yes Yes   Sig: Take 12.5 mg by mouth 2 times daily (with meals)   cetirizine (ZYRTEC) 10 MG tablet 4/2/2022 at am  Yes Yes   Sig: Take 10 mg by mouth daily   diclofenac (VOLTAREN) 1 % topical gel More than a month  Yes Yes   Sig: Apply 2 g topically 3 times daily as needed for moderate pain (feet)   diphenhydrAMINE (BENADRYL) 25 MG tablet   Yes Yes   Sig: Take 25 mg by mouth every 6 hours as needed for itching or allergies   fluticasone (FLONASE) 50 MCG/ACT nasal spray 4/2/2022 at am  Yes Yes   Sig: Spray 1 spray into both nostrils daily   furosemide (LASIX) 20 MG tablet   Yes Yes   Sig: Take 20 mg by mouth daily as needed (swelling)   glipiZIDE (GLUCOTROL XL) 5 MG 24 hr tablet 4/2/2022 at am  Yes Yes   Sig: Take 10 mg by mouth daily   guaiFENesin-codeine (ROBITUSSIN AC) 100-10 MG/5ML solution   Yes Yes   Sig: Take 1-2 teaspoonful by mouth every 4 hours as needed for cough   insulin glargine (LANTUS PEN) 100 UNIT/ML pen   Yes Yes   Sig: Inject 20 Units Subcutaneous At Bedtime   ipratropium - albuterol 0.5 mg/2.5 mg/3 mL (DUONEB) 0.5-2.5 (3) MG/3ML neb solution   Yes Yes   Sig:  "Take 1 vial by nebulization every 6 hours as needed for shortness of breath / dyspnea or wheezing   losartan (COZAAR) 50 MG tablet 4/2/2022 at am  Yes Yes   Sig: Take 50 mg by mouth daily.   methocarbamol (ROBAXIN) 750 MG tablet 4/2/2022 at pm  Yes Yes   Sig: Take 750 mg by mouth 4 times daily   montelukast (SINGULAIR) 10 MG tablet 4/2/2022 at hs  Yes Yes   Sig: Take 10 mg by mouth At Bedtime   nitroGLYcerin (NITROSTAT) 0.4 MG sublingual tablet   Yes Yes   Sig: Place 0.4 mg under the tongue every 5 minutes as needed for chest pain For chest pain place 1 tablet under the tongue every 5 minutes for 3 doses. If symptoms persist 5 minutes after 1st dose call 911.   omeprazole 20 MG tablet 4/2/2022 at am  Yes Yes   Sig: Take 20 mg by mouth daily   oxyCODONE-acetaminophen (PERCOCET)  MG per tablet   Yes Yes   Sig: Take 1 tablet by mouth every 6 hours as needed for severe pain   pregabalin (LYRICA) 150 MG capsule 4/2/2022 at pm  Yes Yes   Sig: Take 150 mg by mouth 3 times daily   rosuvastatin (CRESTOR) 20 MG tablet 4/2/2022 at am  Yes Yes   Sig: Take 20 mg by mouth daily   senna-docusate (SENOKOT-S/PERICOLACE) 8.6-50 MG tablet 4/2/2022 at am  Yes Yes   Sig: Take 1 tablet by mouth daily   urea (DERMAL THERAPY FINGER CARE) 20 % external lotion   Yes Yes   Sig: Feet as needed       Facility-Administered Medications: None     Allergies   Allergies   Allergen Reactions     Haloperidol Unknown     Patient states it stops her heart       Phenothiazines Other (See Comments)     Other reaction(s): CARDIAC DISTURBANCES  Patient states it stops her heart  \"I swell up\"  \"stopped by heart\"  \"I swell up\"  \"I swell up\"       Tramadol Other (See Comments)     Other reaction(s): Angioedema  Throat itch       Demerol      Latex Itching     Lisinopril Other (See Comments)     ACE cough  Itchy throat  Throat itches  Itchy throat       Penicillins      Hydroxyzine Other (See Comments) and Rash     Tongue swelling  Tongue swelling  Tongue " swelling         Physical Exam   Vital Signs: Temp: 98.2  F (36.8  C) Temp src: Oral BP: (!) 195/94 Pulse: 72   Resp: 12 SpO2: 100 %      Weight: 202 lbs 0 oz    EXAM  General: Alert, well-appearing, Sleepy but arousable  Head: Nontraumatic   Eyes: PERRL, EOM intact   Oropharynx: moist oral mucus membranes  Pulm: CTA BL, no tachypnea, speaking in full sentences  CV: RRR, no murmur  Abd: soft, NTND, no masses  Ext: Warm, well perfused, 2+ BL radial pulses, no LE edema  Skin: No rash on limited skin exam  Neuro: CN II-XII intact, weakness with right , moves toes on both legs, weakness r>l with both plantar and dorsi flexion, normal finger-nose-finger, although she uses her left hand to steady her right  Psych: Mood appropriate to visit content, full affect, rational thought content and process      Data   Data reviewed today: I reviewed all medications, new labs and imaging results over the last 24 hours. I personally reviewed the EKG tracing showing Sinus rhythm, the chest x-ray image(s) showing Pulmonary congestion, the brain MRI image(s) showing No acute intracranial process and the CT head image(s) showing No acute intracranial process.  MR lumbar spine with progression of degenerative disc disease    Recent Labs   Lab 04/03/22  1436 04/03/22  1432   WBC 5.8  --    HGB 12.1  --    MCV 84  --      --    INR 0.95  --      --    POTASSIUM 4.3  --    CHLORIDE 103  --    CO2 23  --    BUN 19  --    CR 0.92  --    ANIONGAP 10  --    MAXIMO 8.8  --    * 319*

## 2022-04-03 NOTE — ED TRIAGE NOTES
Patient presents with weakness since last night with numb numbess as of this am. Left side is weaker. Hx of stroke in 10/2021

## 2022-04-03 NOTE — CONSULTS
"    [facility]    Stroke Telephone Note    I was called by Vonnie Otto on 04/03/22 regarding patient Sarah Rahman. The patient is a 56 year old female with history of reported stroke in 10/2021 who presents as a stroke code for burning in the feet and legs, more so in the RLE in the past couple of days, worsening the evening prior to presentation and now RUE weakness and R facial numbness noted upon awakening this morning.    Stroke Code Data (for stroke code without tele)  Stroke code activated 04/03/22   1427   Stroke provider first response  04/03/22   1430            Last known normal 04/02/22   2200        Time of discovery   (or onset of symptoms) 04/03/22   1030   Head CT read by Stroke Neuro Dr/Provider 04/03/22   1445   Was stroke code de-escalated? Yes 04/03/22 1455          Imaging Findings   No hemorrhage or early ischemic changes on CT head  CTA notable for multifocal intracranial stenoses but no LVO    Thrombolytic Treatment   Not given due to:   - unclear or unfavorable risk-benefit profile for extended window thrombolysis beyond the conventional 4.5 hour time window    Endovascular Treatment  Not initiated due to absence of proximal vessel occlusion    Impression  R hemiparesis and sensory disturbance- etiology indeterminate, cannot exclude cerebral ischemia    Recommendations   Brain MRI w/ and w/o contrast    My recommendations are based on the information provided over the phone by Sarah Rahman's in-person providers. I spent >5 minutes in consultation with them. They are not intended to replace the clinical judgment of her in-person providers. I was not requested to personally see or examine the patient at this time.    Tierra Sena MD  Vascular Neurology  To page me or covering stroke neurology team member, click here: AMCOM  Choose \"On Call\" tab at top, then search dropdown box for \"Neurology Adult\", select location, press Enter, then look for " stroke/neuro ICU/Telestroke.

## 2022-04-04 LAB
ANION GAP SERPL CALCULATED.3IONS-SCNC: 10 MMOL/L (ref 5–18)
ATRIAL RATE - MUSE: 70 BPM
BUN SERPL-MCNC: 21 MG/DL (ref 8–22)
CALCIUM SERPL-MCNC: 8.2 MG/DL (ref 8.5–10.5)
CHLORIDE BLD-SCNC: 106 MMOL/L (ref 98–107)
CO2 SERPL-SCNC: 19 MMOL/L (ref 22–31)
CREAT SERPL-MCNC: 1.05 MG/DL (ref 0.6–1.1)
DIASTOLIC BLOOD PRESSURE - MUSE: 93 MMHG
ERYTHROCYTE [DISTWIDTH] IN BLOOD BY AUTOMATED COUNT: 13.1 % (ref 10–15)
GFR SERPL CREATININE-BSD FRML MDRD: 62 ML/MIN/1.73M2
GLUCOSE BLD-MCNC: 277 MG/DL (ref 70–125)
GLUCOSE BLDC GLUCOMTR-MCNC: 217 MG/DL (ref 70–99)
GLUCOSE BLDC GLUCOMTR-MCNC: 283 MG/DL (ref 70–99)
GLUCOSE BLDC GLUCOMTR-MCNC: 316 MG/DL (ref 70–99)
GLUCOSE BLDC GLUCOMTR-MCNC: 328 MG/DL (ref 70–99)
GLUCOSE BLDC GLUCOMTR-MCNC: 368 MG/DL (ref 70–99)
HCT VFR BLD AUTO: 35.8 % (ref 35–47)
HGB BLD-MCNC: 11.7 G/DL (ref 11.7–15.7)
INTERPRETATION ECG - MUSE: NORMAL
MCH RBC QN AUTO: 26.9 PG (ref 26.5–33)
MCHC RBC AUTO-ENTMCNC: 32.7 G/DL (ref 31.5–36.5)
MCV RBC AUTO: 82 FL (ref 78–100)
P AXIS - MUSE: 66 DEGREES
PLATELET # BLD AUTO: 211 10E3/UL (ref 150–450)
POTASSIUM BLD-SCNC: 4.7 MMOL/L (ref 3.5–5)
PR INTERVAL - MUSE: 188 MS
QRS DURATION - MUSE: 82 MS
QT - MUSE: 420 MS
QTC - MUSE: 453 MS
R AXIS - MUSE: -25 DEGREES
RBC # BLD AUTO: 4.35 10E6/UL (ref 3.8–5.2)
SODIUM SERPL-SCNC: 135 MMOL/L (ref 136–145)
SYSTOLIC BLOOD PRESSURE - MUSE: 158 MMHG
T AXIS - MUSE: 90 DEGREES
VENTRICULAR RATE- MUSE: 70 BPM
WBC # BLD AUTO: 5.9 10E3/UL (ref 4–11)

## 2022-04-04 PROCEDURE — 250N000011 HC RX IP 250 OP 636: Performed by: MASSAGE THERAPIST

## 2022-04-04 PROCEDURE — 96375 TX/PRO/DX INJ NEW DRUG ADDON: CPT

## 2022-04-04 PROCEDURE — 85027 COMPLETE CBC AUTOMATED: CPT | Performed by: MASSAGE THERAPIST

## 2022-04-04 PROCEDURE — 250N000013 HC RX MED GY IP 250 OP 250 PS 637: Performed by: MASSAGE THERAPIST

## 2022-04-04 PROCEDURE — 96372 THER/PROPH/DIAG INJ SC/IM: CPT | Mod: XU | Performed by: MASSAGE THERAPIST

## 2022-04-04 PROCEDURE — 82962 GLUCOSE BLOOD TEST: CPT

## 2022-04-04 PROCEDURE — 250N000013 HC RX MED GY IP 250 OP 250 PS 637: Performed by: CLINICAL NURSE SPECIALIST

## 2022-04-04 PROCEDURE — 80048 BASIC METABOLIC PNL TOTAL CA: CPT | Performed by: MASSAGE THERAPIST

## 2022-04-04 PROCEDURE — 36415 COLL VENOUS BLD VENIPUNCTURE: CPT | Performed by: MASSAGE THERAPIST

## 2022-04-04 PROCEDURE — 99205 OFFICE O/P NEW HI 60 MIN: CPT | Performed by: CLINICAL NURSE SPECIALIST

## 2022-04-04 PROCEDURE — 250N000013 HC RX MED GY IP 250 OP 250 PS 637

## 2022-04-04 PROCEDURE — 99204 OFFICE O/P NEW MOD 45 MIN: CPT | Performed by: NURSE PRACTITIONER

## 2022-04-04 PROCEDURE — 250N000011 HC RX IP 250 OP 636: Performed by: CLINICAL NURSE SPECIALIST

## 2022-04-04 PROCEDURE — G0378 HOSPITAL OBSERVATION PER HR: HCPCS

## 2022-04-04 PROCEDURE — 99220 PR INITIAL OBSERVATION CARE,LEVEL III: CPT | Mod: GC | Performed by: MASSAGE THERAPIST

## 2022-04-04 RX ORDER — OXYCODONE HYDROCHLORIDE 5 MG/1
5-10 TABLET ORAL EVERY 4 HOURS PRN
Status: DISCONTINUED | OUTPATIENT
Start: 2022-04-04 | End: 2022-04-05

## 2022-04-04 RX ORDER — OXYCODONE AND ACETAMINOPHEN 5; 325 MG/1; MG/1
1 TABLET ORAL EVERY 4 HOURS PRN
Status: DISCONTINUED | OUTPATIENT
Start: 2022-04-04 | End: 2022-04-07 | Stop reason: HOSPADM

## 2022-04-04 RX ORDER — HYDROMORPHONE HYDROCHLORIDE 1 MG/ML
0.5 INJECTION, SOLUTION INTRAMUSCULAR; INTRAVENOUS; SUBCUTANEOUS ONCE
Status: COMPLETED | OUTPATIENT
Start: 2022-04-04 | End: 2022-04-04

## 2022-04-04 RX ORDER — HYDROMORPHONE HYDROCHLORIDE 2 MG/1
2 TABLET ORAL ONCE
Status: COMPLETED | OUTPATIENT
Start: 2022-04-04 | End: 2022-04-04

## 2022-04-04 RX ADMIN — INSULIN GLARGINE 12 UNITS: 100 INJECTION, SOLUTION SUBCUTANEOUS at 20:40

## 2022-04-04 RX ADMIN — FLUTICASONE FUROATE AND VILANTEROL TRIFENATATE 1 PUFF: 200; 25 POWDER RESPIRATORY (INHALATION) at 08:40

## 2022-04-04 RX ADMIN — ENOXAPARIN SODIUM 40 MG: 40 INJECTION SUBCUTANEOUS at 20:40

## 2022-04-04 RX ADMIN — OXYCODONE HYDROCHLORIDE 10 MG: 5 TABLET ORAL at 20:41

## 2022-04-04 RX ADMIN — INSULIN ASPART 8 UNITS: 100 INJECTION, SOLUTION INTRAVENOUS; SUBCUTANEOUS at 18:23

## 2022-04-04 RX ADMIN — METHOCARBAMOL 750 MG: 750 TABLET ORAL at 08:40

## 2022-04-04 RX ADMIN — MONTELUKAST 10 MG: 10 TABLET, FILM COATED ORAL at 20:41

## 2022-04-04 RX ADMIN — ACETAMINOPHEN 975 MG: 325 TABLET ORAL at 14:16

## 2022-04-04 RX ADMIN — OXYCODONE HYDROCHLORIDE AND ACETAMINOPHEN 1 TABLET: 5; 325 TABLET ORAL at 08:46

## 2022-04-04 RX ADMIN — DULOXETINE HYDROCHLORIDE 30 MG: 30 CAPSULE, DELAYED RELEASE ORAL at 08:40

## 2022-04-04 RX ADMIN — OXYCODONE HYDROCHLORIDE 10 MG: 5 TABLET ORAL at 12:59

## 2022-04-04 RX ADMIN — ROSUVASTATIN CALCIUM 20 MG: 10 TABLET, FILM COATED ORAL at 08:52

## 2022-04-04 RX ADMIN — METHOCARBAMOL 750 MG: 750 TABLET ORAL at 20:41

## 2022-04-04 RX ADMIN — OXYCODONE HYDROCHLORIDE AND ACETAMINOPHEN 1 TABLET: 5; 325 TABLET ORAL at 04:17

## 2022-04-04 RX ADMIN — METHOCARBAMOL 750 MG: 750 TABLET ORAL at 16:24

## 2022-04-04 RX ADMIN — LIDOCAINE 2 PATCH: 246 PATCH TOPICAL at 20:37

## 2022-04-04 RX ADMIN — PREGABALIN 150 MG: 75 CAPSULE ORAL at 08:40

## 2022-04-04 RX ADMIN — ASPIRIN 325 MG: 325 TABLET ORAL at 08:40

## 2022-04-04 RX ADMIN — PANTOPRAZOLE SODIUM 40 MG: 20 TABLET, DELAYED RELEASE ORAL at 06:39

## 2022-04-04 RX ADMIN — HYDROMORPHONE HYDROCHLORIDE 4 MG: 4 TABLET ORAL at 02:10

## 2022-04-04 RX ADMIN — FLUTICASONE PROPIONATE 1 SPRAY: 50 SPRAY, METERED NASAL at 08:40

## 2022-04-04 RX ADMIN — PREGABALIN 150 MG: 75 CAPSULE ORAL at 20:40

## 2022-04-04 RX ADMIN — HYDROMORPHONE HYDROCHLORIDE 0.5 MG: 1 INJECTION, SOLUTION INTRAMUSCULAR; INTRAVENOUS; SUBCUTANEOUS at 11:45

## 2022-04-04 RX ADMIN — METHOCARBAMOL 750 MG: 750 TABLET ORAL at 12:59

## 2022-04-04 RX ADMIN — LOSARTAN POTASSIUM 50 MG: 25 TABLET, FILM COATED ORAL at 08:40

## 2022-04-04 RX ADMIN — DULOXETINE HYDROCHLORIDE 30 MG: 30 CAPSULE, DELAYED RELEASE ORAL at 20:41

## 2022-04-04 RX ADMIN — CARVEDILOL 12.5 MG: 12.5 TABLET, FILM COATED ORAL at 16:24

## 2022-04-04 RX ADMIN — CARVEDILOL 12.5 MG: 12.5 TABLET, FILM COATED ORAL at 08:40

## 2022-04-04 RX ADMIN — HYDROMORPHONE HYDROCHLORIDE 2 MG: 2 TABLET ORAL at 09:54

## 2022-04-04 RX ADMIN — PREGABALIN 150 MG: 75 CAPSULE ORAL at 14:16
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
## 2022-04-04 NOTE — CONSULTS
DIABETES CARE  Situation:  Consulted by Provider for Diabetes Education  56 year old female with type 2 Diabetes. Patient was admitted for pain and weakness in both legs and right arm.  .     Background:  Related Co-morbidities include: CAD, HTN, COPD, CABG sp, CVA  PCP: Emigdio Martinez MD  Social:Lives at home with spouse  Diabetes History:   History of type 2 diabetes with orals and has taken insulin in the past.       Meds for BG Management PTA:  Glipizide ER 10 mg once daily     Current Inpatient Meds for BG Management:  Novolog 1:15 meals  Novolog mediuam correction scale  Lantus 12 units daily.     Labs:  Hemoglobin A1C:>14  SCr: 1.05  GFR: 62   Blood Glucose POC:   Results for LAUREN HAIRSTON (MRN [id number]) as of 4/4/2022 10:59   Ref. Range 6/3/2021 20:56 6/4/2021 07:56 6/4/2021 12:32 6/4/2021 17:55 6/4/2021 20:58 6/5/2021 08:44 7/9/2021 06:51 10/27/2021 12:58 4/3/2022 14:32 4/3/2022 14:36 4/3/2022 21:03 4/4/2022 06:21 4/4/2022 07:27 4/4/2022 12:06   Glucose Latest Ref Range: 70 - 125 mg/dL       173 (H) 436 (HH)  345 (H)  277 (H)     GLUCOSE BY METER POCT Latest Ref Range: 70 - 99 mg/dL 288 (H) 163 (H) 234 (H) 240 (H) 189 (H) 212 (H)   319 (H)  107 (H)  283 (H) 217 (H)     Diet Order:  Regular diet   Intake: 100%   Weight: 89.7kg    BMI: 30.96    DM EDUCATION/COUNSELING:  Barriers to Learning and/or DM Self-Management: None known  Previous DM Education: yes  Current Education and/or visit with Patient and/or caregiver  Educated/reviewed diabetes basics: pathophysiology, hyperglycemia, long term complications, treatment.  Educated/reviewed hypoglycemia: sx, causes, treatment/ She states she knows how to treat lows.    Explained normal/goals of blood sugar control,A1C, when to call provider.    Reviewed meter and she knows how to use. Left the meter with her and she will get supplies ordered at discharge.   Did tell her she will likely be discharge on some insulin. She states she knows how to  "inject.  Will have nurses have her do some injections while hospitalized.     Also discussed site rotation, proper storage and safe needle disposal.   Carbohydrates were briefly discussed and their effect on BG. Reinforced healthy eating.      Did not want any handouts.      Recommendations:  1. Insulin as prescribed by attending provider.  2. 1. Accuchek Guide strips box of 100  2. SoftClix lancets, 1 box  Type 2 Insulin requiring  To test before meals and at bedtime as needed.   3. May want to get CGM and can meet with Outpatient Diabetes or endocrinology to do so.   Refer to \"Guidelines for Insulin Initiation and Care in Hospitalized Adults\"  link in Diabetes Management Order set for dosing guidelines    Hospital BG goals for blood glucose levels are < 180 for improved health outcomes.      DISCHARGE NEEDS:   RX needed at DC (preferred by patient's insurance):  1. Accuchek Guide strips, box of 100   2. Softclix lancets, box of 100  Insulin requiring  E11.65  To test BG 3-4 x per day.  Insulin pens as needed  Pen needles 32 gauge x 4mm    Thank you,   Florida Brice RN, [facility]  VM: [phone]  Pager: [phone]             "

## 2022-04-04 NOTE — PROVIDER NOTIFICATION
"House Officer notified.  Sign and held order for dilaudid triggers warning when released d/t patient's allergy of tramadol. Says \"Cross-sensitive Class Match with TRAMADOL\"    Wondering if medication is still OK to give patient?    Geneva Langley RN        "

## 2022-04-04 NOTE — PHARMACY-ADMISSION MEDICATION HISTORY
Pharmacy Note - Admission Medication History    Pertinent Provider Information: complains of constant burning in her skin. Duloxetine dose recently increased. Unclear if taking Lantus. She states no but refill history suggests otherwise.      ______________________________________________________________________    Prior To Admission (PTA) med list completed and updated in EMR.       PTA Med List   Medication Sig Last Dose     albuterol (PROAIR HFA) 108 (90 BASE) MCG/ACT inhaler Inhale  into the lungs. Inhale 1-2 puffs every 4 to 6 hours as needed      aspirin (ASA) 325 MG EC tablet Take 325 mg by mouth daily  4/2/2022 at am     budesonide-formoterol (SYMBICORT) 160-4.5 MCG/ACT Inhaler Inhale 2 puffs into the lungs 2 times daily 4/2/2022 at pm     carvedilol (COREG) 12.5 MG tablet Take 12.5 mg by mouth 2 times daily (with meals) 4/2/2022 at pm     cetirizine (ZYRTEC) 10 MG tablet Take 10 mg by mouth daily 4/2/2022 at am     COMPOUND CONTAINING CONTROLLED SUBSTANCE (CMPD RX) - PHARMACY TO MIX COMPOUNDED MEDICATION neuro PLO pain gel w/ lidocaine(ketamine 8%-gabapentin 6%-lidocaine 2.5%)([facility] AMB MIXTURE)      diclofenac (VOLTAREN) 1 % topical gel Apply 2 g topically 3 times daily as needed for moderate pain (feet) More than a month     diphenhydrAMINE (BENADRYL) 25 MG tablet Take 25 mg by mouth every 6 hours as needed for itching or allergies      DULoxetine (CYMBALTA) 30 MG capsule Take 30 mg by mouth 2 times daily 4/2/2022 at pm     fluticasone (FLONASE) 50 MCG/ACT nasal spray Spray 1 spray into both nostrils daily 4/2/2022 at am     furosemide (LASIX) 20 MG tablet Take 20 mg by mouth daily as needed (swelling)      glipiZIDE (GLUCOTROL XL) 5 MG 24 hr tablet Take 10 mg by mouth daily 4/2/2022 at am     guaiFENesin-codeine (ROBITUSSIN AC) 100-10 MG/5ML solution Take 1-2 teaspoonful by mouth every 4 hours as needed for cough      ipratropium - albuterol 0.5 mg/2.5 mg/3 mL (DUONEB) 0.5-2.5 (3) MG/3ML neb solution Take  1 vial by nebulization every 6 hours as needed for shortness of breath / dyspnea or wheezing      Lidocaine (LIDOCARE) 4 % Patch Place 1 patch onto the skin every 24 hours To prevent lidocaine toxicity, patient should be patch free for 12 hrs daily. BACK Past Week     losartan (COZAAR) 50 MG tablet Take 50 mg by mouth daily. 4/2/2022 at am     methocarbamol (ROBAXIN) 750 MG tablet Take 750 mg by mouth 4 times daily 4/2/2022 at pm     montelukast (SINGULAIR) 10 MG tablet Take 10 mg by mouth At Bedtime 4/2/2022 at hs     omeprazole 20 MG tablet Take 20 mg by mouth daily 4/2/2022 at am     oxyCODONE-acetaminophen (PERCOCET)  MG per tablet Take 1 tablet by mouth every 6 hours as needed for severe pain      pregabalin (LYRICA) 150 MG capsule Take 150 mg by mouth 3 times daily 4/2/2022 at pm     rosuvastatin (CRESTOR) 20 MG tablet Take 20 mg by mouth daily 4/2/2022 at am     senna-docusate (SENOKOT-S/PERICOLACE) 8.6-50 MG tablet Take 1 tablet by mouth daily 4/2/2022 at am     Urea 40 % CREA Feet as needed      [DISCONTINUED] nitroglycerin (NITROSTAT) 0.4 MG SL tablet Place  under the tongue every 5 minutes as needed.        Information source(s): Patient, Clinic records and [facility]  Method of interview communication: in-person    Summary of Changes to PTA Med List  New/discontinued: to numerous to list as last time list was updated was 2013.  Changed: increased duloxetine     Patient was asked about OTC/herbal products specifically.  PTA med list reflects this.    In the past week, patient estimated taking medication this percent of the time:  greater than 90%.    Allergies were reviewed, assessed, and updated with the patient.      Patient did not bring any medications to the hospital and can't retrieve from home. No multi-dose medications are available for use during hospital stay.     The information provided in this note is only as accurate as the sources available at the time of the  update(s).    Thank you for the opportunity to participate in the care of this patient.    Dakotah Santana [facility]  4/3/2022 7:24 PM

## 2022-04-04 NOTE — CONSULTS
[facility] ACUTE PAIN SERVICE    ([facility], [facility], [facility])   Consult Note    Date of Admission:  4/3/2022  Date of Consult: 04/04/22    Physician requesting consult: Joel Washburn MD   Reason for consult: Chronic pain patient, here with new leg pain and weakness     Assessment/Plan:     Sarah Rahman is a 56 year old female who was admitted on 4/3/2022.   Pain Service is asked to see the patient for evaluation after falling out of bed due to weakness in both her legs and feet right side greater than left along with right arm weakness. 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   Neurology has been Consulted.    CT imaging of head not demonstrating any acute findings, early ischemic changes R hemiparesis and sensory disturbance- etiology indeterminate, cannot exclude cerebral ischemia   MR lumbar spine with progressive worsening of her degenerative disc disease, but no signs of abscess, malignancy or other acute process.    Chronic pain which patient states is not under control.  Pain in lower back and goes down into feet and up her spine.  Pain is burning lower extremities.  She follows at [facility] and is frustrated that they aren't doing more to give her pain relief.  She is not interested in injections, spinal stimulator which has been discussed with her in the past.    The patient does smoke and drinks alcohol denies chemical dependency history.     PLAN:   1) Pain is consistent with chronic pain. The patient's home MME was up to 60 mg daily.   2)Multimodal Medication Therapy  Topical: lidocaine patches for back and lidocaine cream to lower extremity   NSAID'S: none  Muscle Relaxants: Robaxin 750 mg 4 times daily  Adjuvants: acetaminophen 975 mg every 8 hours (currently being held), lyrica 150 mg tid  Antidepressants/anxiolytics:cymbalta 30 bid bid,   Opioids: oxycodone 5-10  mg every four hours prn   IV Pain medication: one time dose of IV hydromorphone now then d/c  3)Non-medication interventions  Pharmacy consult- appreciate recommendations   Acupuncture consult- as available Mon and Thursday    Integrative consult - called referral to [phone]   4)Constipation Prophylaxis  Senna/docusate 1 tablet bid prn, miralax daily prn  5) Follow up   -Opioid prescriber has been [facility]  -Discharge Recommendations - We recommend prescribing the following at the time of discharge: no opioid prescriptions follow up with Chronic Pain Specialist.  Resume home prescription          History of Present Illness (HPI):       Sarah Rahman is a 56 year old female with chronic pain.  The pain is reported to be acute worsening of  Chronic pain.  Pain is located entire spine, starts in lower back and goes up back with pain down bilateral lower extremities and feet.  Pain in lower extremities in burning making it difficult to walk.        Past pain treatments have included pain clinic    Last UA:none in chart       [place]  pulled from system on 04/04/22. Last refill on 3/31/22 Codeine-Guaifen Cough Syrup  3/12/22 Lyrica 150 mg 90 for 30 days (monthly prescription)   [id number] Oxycodone/APAP 10/325 mg tablets 120 for 30 days (monthly prescription)   9 total number of prescribing providers noted. Most common prescriber is Chuy Hughes MD [facility]        Medical History  Patient Active Problem List    Diagnosis Date Noted     Bilateral low back pain with right-sided sciatica, unspecified chronicity 04/03/2022     Priority: Medium     Superficial injury of cornea 03/21/2013     Priority: Medium     Coronary atherosclerosis 03/21/2013     Priority: Medium     Problem list name updated by automated process. Provider to review       Diabetes mellitus, type 2 (H) 03/21/2013     Priority: Medium     Problem list name updated by automated process. Provider to review       Schizoaffective disorder (H)  "03/21/2013     Priority: Medium     Problem list name updated by automated process. Provider to review  Diagnosis updated by automated process. Provider to review and confirm.       Health Care Home 03/21/2013     Priority: Medium     Tier 1  DX V65.8 REPLACED WITH 21987 HEALTH CARE HOME (04/08/2013)          Surgical History  She  has a past surgical history that includes Bypass graft artery coronary (2009); Hysterectomy total abdominal, bilateral salpingo-oophorectomy, combined; Orif Ulnar / Radial Shaft Fracture (Right); Coronary Stent Placement; and Cv Coronary Angiogram (N/A, 6/2/2021).     Past Surgical History:   Procedure Laterality Date     BYPASS GRAFT ARTERY CORONARY  2009    x2     CORONARY STENT PLACEMENT       CV CORONARY ANGIOGRAM N/A 6/2/2021    Procedure: Coronary Angiogram;  Surgeon: Juventino Rivera MD;  Location: [facility] Cardiac Cath Lab;  Service: Cardiology     HYSTERECTOMY TOTAL ABDOMINAL, BILATERAL SALPINGO-OOPHORECTOMY, COMBINED       ORIF ULNAR / RADIAL SHAFT FRACTURE Right        Allergies  Allergies   Allergen Reactions     Haloperidol Unknown     Patient states it stops her heart       Phenothiazines Other (See Comments)     Other reaction(s): CARDIAC DISTURBANCES  Patient states it stops her heart  \"I swell up\"  \"stopped by heart\"  \"I swell up\"  \"I swell up\"       Tramadol Other (See Comments)     Other reaction(s): Angioedema  Throat itch       Demerol      Latex Itching     Lisinopril Other (See Comments)     ACE cough  Itchy throat  Throat itches  Itchy throat       Penicillins      Hydroxyzine Other (See Comments) and Rash     Tongue swelling  Tongue swelling  Tongue swelling         Prior to Admission Medications   Medications Prior to Admission   Medication Sig Dispense Refill Last Dose     albuterol (PROAIR HFA) 108 (90 BASE) MCG/ACT inhaler Inhale  into the lungs. Inhale 1-2 puffs every 4 to 6 hours as needed        aspirin (ASA) 325 MG EC tablet Take 325 mg by mouth daily   "  4/2/2022 at am     budesonide-formoterol (SYMBICORT) 160-4.5 MCG/ACT Inhaler Inhale 2 puffs into the lungs 2 times daily   4/2/2022 at pm     carvedilol (COREG) 12.5 MG tablet Take 12.5 mg by mouth 2 times daily (with meals)   4/2/2022 at pm     cetirizine (ZYRTEC) 10 MG tablet Take 10 mg by mouth daily   4/2/2022 at am     COMPOUND CONTAINING CONTROLLED SUBSTANCE (CMPD RX) - PHARMACY TO MIX COMPOUNDED MEDICATION neuro PLO pain gel w/ lidocaine(ketamine 8%-gabapentin 6%-lidocaine 2.5%)([facility] AMB MIXTURE)        diclofenac (VOLTAREN) 1 % topical gel Apply 2 g topically 3 times daily as needed for moderate pain (feet)   More than a month     diphenhydrAMINE (BENADRYL) 25 MG tablet Take 25 mg by mouth every 6 hours as needed for itching or allergies        DULoxetine (CYMBALTA) 30 MG capsule Take 30 mg by mouth 2 times daily   4/2/2022 at pm     fluticasone (FLONASE) 50 MCG/ACT nasal spray Spray 1 spray into both nostrils daily   4/2/2022 at am     furosemide (LASIX) 20 MG tablet Take 20 mg by mouth daily as needed (swelling)        glipiZIDE (GLUCOTROL XL) 5 MG 24 hr tablet Take 10 mg by mouth daily   4/2/2022 at am     guaiFENesin-codeine (ROBITUSSIN AC) 100-10 MG/5ML solution Take 1-2 teaspoonful by mouth every 4 hours as needed for cough        insulin glargine (LANTUS PEN) 100 UNIT/ML pen Inject 20 Units Subcutaneous At Bedtime        ipratropium - albuterol 0.5 mg/2.5 mg/3 mL (DUONEB) 0.5-2.5 (3) MG/3ML neb solution Take 1 vial by nebulization every 6 hours as needed for shortness of breath / dyspnea or wheezing        Lidocaine (LIDOCARE) 4 % Patch Place 1 patch onto the skin every 24 hours To prevent lidocaine toxicity, patient should be patch free for 12 hrs daily. BACK   Past Week     losartan (COZAAR) 50 MG tablet Take 50 mg by mouth daily.   4/2/2022 at am     methocarbamol (ROBAXIN) 750 MG tablet Take 750 mg by mouth 4 times daily   4/2/2022 at pm     montelukast (SINGULAIR) 10 MG tablet Take 10 mg by mouth  "At Bedtime   4/2/2022 at hs     nitroGLYcerin (NITROSTAT) 0.4 MG sublingual tablet Place 0.4 mg under the tongue every 5 minutes as needed for chest pain For chest pain place 1 tablet under the tongue every 5 minutes for 3 doses. If symptoms persist 5 minutes after 1st dose call 911.        omeprazole 20 MG tablet Take 20 mg by mouth daily   4/2/2022 at am     oxyCODONE-acetaminophen (PERCOCET)  MG per tablet Take 1 tablet by mouth every 6 hours as needed for severe pain        pregabalin (LYRICA) 150 MG capsule Take 150 mg by mouth 3 times daily   4/2/2022 at pm     rosuvastatin (CRESTOR) 20 MG tablet Take 20 mg by mouth daily   4/2/2022 at am     senna-docusate (SENOKOT-S/PERICOLACE) 8.6-50 MG tablet Take 1 tablet by mouth daily   4/2/2022 at am     urea (DERMAL THERAPY FINGER CARE) 20 % external lotion Feet as needed           Social History  Reviewed, and she  reports that she has been smoking. She has never used smokeless tobacco. She reports current alcohol use. She reports that she does not use drugs.  Social History     Tobacco Use     Smoking status: Current Every Day Smoker     Smokeless tobacco: Never Used   Substance Use Topics     Alcohol use: Yes     Comment: Alcoholic Drinks/day: occ       Family History  Reviewed, and family history includes Diabetes in her brother; Heart Disease in her father, mother, sister, and sister.    Review of Systems  Complete ROS reviewed, unless noted, all other systems reviewed and found to be negative.        Objective:     Physical Exam:  /72 (BP Location: Left arm)   Pulse 65   Temp 97.2  F (36.2  C) (Oral)   Resp 18   Ht 1.702 m (5' 7\")   Wt 89.7 kg (197 lb 11.2 oz)   SpO2 96%   Breastfeeding No   BMI 30.96 kg/m    Weight:   Weight change:   Body mass index is 30.96 kg/m .      General Appearance:  Alert, irritable, sitting up in room   Head:  Normocephalic, without obvious abnormality, atraumatic   Eyes:  PERRL, conjunctiva/corneas clear, EOM's " intact   ENT/Throat: Lips, mucosa, and tongue normal; teeth and gums normal   Lymph/Neck: Supple, symmetrical, trachea midline   Lungs:   respirations unlabored   Chest Wall:  No tenderness or deformity   Cardiovascular/Heart:  Regular rate and rhythm, . Edema: trace   Abdomen:   Soft, non-tender, bowel sounds    Musculoskeletal: Spine and extremities normal, atraumatic, range of motion,    Skin: Skin color, texture, turgor normal, no rashes or lesions   Neurologic: cooridanated movement   Alert and oriented X 3       Psych: Affect is limited range, somatic focus            Imaging Reviewed   MR Brain w/o Contrast    Result Date: 4/3/2022  EXAM: MR BRAIN W/O CONTRAST LOCATION: [facility] DATE/TIME: 4/3/2022 4:58 PM INDICATION: Right-sided weakness and numbness COMPARISON: CTA head and neck on the same day TECHNIQUE: Routine multiplanar multisequence head MRI without intravenous contrast.     IMPRESSION: 1.  Motion degraded exam. 2.  No acute findings. 3.  Age-related changes.    Chest XR,  PA & LAT    Result Date: 4/3/2022  EXAM: XR CHEST 2 VW LOCATION: [facility] DATE/TIME: 4/3/2022 5:34 PM INDICATION: Cough. COMPARISON: Chest x-rays dated 10/27/2021.     IMPRESSION: 1.  Lungs are underpenetrated as compared to the prior study. 2.  Cardiac silhouette enlargement, sternal cerclage wires and multiple mediastinal clips (indicating prior CABG) are again noted. 3.  Pulmonary vascular congestion appears slightly worsened as compared to the prior study. This could be partially due to technique but could also represent mild congestive heart failure. 4.  Mild perihilar peribronchial cuffing could be associated with mild bronchitis. 5.  No definite pneumothorax or significant pleural fluid collection. No acute osseous fracture.    Lumbar spine MRI w/o contrast    Result Date: 4/3/2022  EXAM: MR LUMBAR SPINE W/O CONTRAST LOCATION: [facility] DATE/TIME:  4/3/2022 4:58 PM INDICATION: Bilateral leg weakness, right more than left. COMPARISON: Lumbar MRI 05/02/2021. TECHNIQUE: Routine Lumbar Spine MRI without IV contrast.    IMPRESSION: 1.  Progressive disc degeneration at L5-S1 with new moderate endplate edema most consistent with type I endplate change. Right-sided facet edema at L5-S1 consistent with active facet arthropathy. Moderate to severe bilateral foraminal stenoses at this level. 2.  Unchanged severe disc degeneration L3-L4 with disc bulge and marginal osteophyte resulting in moderate spinal canal and foraminal stenoses. 3.  Mild spondylosis at other levels without significant spinal canal or foraminal stenosis.    CTA Head Neck with Contrast    Result Date: 4/3/2022  EXAM: CTA  HEAD NECK WITH CONTRAST LOCATION: [facility] DATE/TIME: 4/3/2022 2:53 PM INDICATION: Code Stroke to evaluate for potential thrombolysis and thrombectomy.  PLEASE READ IMMEDIATELY. COMPARISON: 12/03/2021 CONTRAST: ISOVUE 370 75 mL TECHNIQUE: Head and neck CT angiogram with IV contrast. Noncontrast head CT followed by axial helical CT images of the head and neck vessels obtained during the arterial phase of intravenous contrast administration. Axial 2D reconstructed images and multiplanar 3D MIP reconstructed images of the head and neck vessels were performed by the technologist. Dose reduction techniques were used. All stenosis measurements made according to NASCET criteria unless otherwise specified.    IMPRESSION: HEAD CT: 1.  No acute intracranial hemorrhage, mass effect, or CT evidence for acute infarction. 2.  Mild global brain parenchymal volume loss with presumed sequelae of mild chronic small vessel ischemic disease. HEAD CTA: 1.  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 2.  Mild to moderate multifocal atherosclerotic stenoses of the bilateral cavernous internal carotid artery segments, also unchanged. NECK CTA: 1.  Less than 50%  atherosclerotic stenosis in the proximal internal carotid arteries bilaterally. 2.  The balanced vertebral arteries widely patent in the neck. Results discussed with Vonnie Flood on 4/3/2022 2:57 PM. Results discussed with Vonnie Flood on 4/3/2022 3:13 PM.      Labs Reviewed       Total time spent 82 minutes with greater than 50% in consultation, education and coordination of care.     Also discussed with RN    Thank you for this consultation.      Lore Nguyen APRN, CNS-BC, DNP  Acute Care Pain Management Program  [facility] ([place], [place])   With questions call [phone]  Preference if for [employer] Paging - Patrick  Click HERE to page [place]

## 2022-04-04 NOTE — PLAN OF CARE
Problem: Plan of Care - These are the overarching goals to be used throughout the patient stay.    Goal: Absence of Hospital-Acquired Illness or Injury  Intervention: Identify and Manage Fall Risk  Recent Flowsheet Documentation  Taken 4/4/2022 0100 by Rowdy Michel RN  Safety Promotion/Fall Prevention:   activity supervised   assistive device/personal items within reach   bed alarm on   increased rounding and observation   lighting adjusted   mobility aid in reach   nonskid shoes/slippers when out of bed   Goal Outcome Evaluation:  Pt to call for assistance before getting up,no fall during hospital stay

## 2022-04-04 NOTE — ED NOTES
[facility] ED Handoff Report    ED Chief Complaint: Weakness    ED Diagnosis:  (M54.41) Bilateral low back pain with right-sided sciatica, unspecified chronicity  Comment:   Plan:        PMH:    Past Medical History:   Diagnosis Date     Asthma      CAD (coronary artery disease)      COPD (chronic obstructive pulmonary disease) (H)      CVA (cerebral infarction)      Diabetes (H)      GERD (gastroesophageal reflux disease)      Hypertension      Polysubstance abuse (H)      S/P CABG (coronary artery bypass graft)      S/P lumbar discectomy 06/13/2019    L5/S1 by  Dr. Hamm at [facility]     Schizoaffective disorder (H)         Code Status:  No Order     Falls Risk: Yes Band: Applied    Current Living Situation/Residence: lives alone     Elimination Status: Continent: Yes and bedpan     Activity Level: 2 assist    Patients Preferred Language:  English     Needed: No    Vital Signs:  BP (!) 195/94   Pulse 72   Temp 98.2  F (36.8  C) (Oral)   Resp 12   Wt 91.6 kg (202 lb)   SpO2 100%   Breastfeeding No   BMI 31.64 kg/m       Cardiac Rhythm: Sinus    Pain Score: 10/10- patient is allergic to many things and only wanted percocet. When she finally got it, she was sleeping, but upon sleeping patient still reports pain.   Is the Patient Confused:  No    Last Food or Drink: 04/03/22 at sips of water in ER    Focused Assessment:  Patient presents with stroke like symptoms with increasing weakness and sensation this am. CT unremarkable. Patient has chronic back problems with small changes. Diabetic, insulin given. Patient with many demands.     Tests Performed: Done: Labs and Imaging    Treatments Provided:  Labs, medications, therapeutic interventions    Family Dynamics/Concerns: No    Family Updated On Visitor Policy: Yes    Plan of Care Communicated to Family: No    Who Was Updated about Plan of Care: patient able to communicate with family     Belongings Checklist Done and Signed by Patient:  No    Medications sent with patient: insulin pen    Covid: asymptomatic , negative    Additional Information: none    RN: Kranthi Houston   4/3/2022 7:18 PM

## 2022-04-04 NOTE — PLAN OF CARE
"Goal Outcome Evaluation:    Plan of Care Reviewed With: patient   Pt alert and oriented x4, VSS,cratinine 1.05 today MD aware, encourage po hydration, acute on chronic pain 10/10, MD aware, seen by pain team with medication adjustment, one time IV dose dilaudid given, oxycodone with scheduled tylenol, pt reports having some pain decrease 7/10, verbalizes understanding of POC, one assist to BSC,   Problem: Plan of Care - These are the overarching goals to be used throughout the patient stay.    Goal: Plan of Care Review/Shift Note  Description: The Plan of Care Review/Shift note should be completed every shift.  The Outcome Evaluation is a brief statement about your assessment that the patient is improving, declining, or no change.  This information will be displayed automatically on your shift note.  Outcome: Ongoing, Progressing  Flowsheets (Taken 4/4/2022 1434)  Plan of Care Reviewed With: patient  Goal: Patient-Specific Goal (Individualized)  Description: You can add care plan individualizations to a care plan. Examples of Individualization might be:  \"Parent requests to be called daily at 9am for status\", \"I have a hard time hearing out of my right ear\", or \"Do not touch me to wake me up as it startles me\".  Outcome: Ongoing, Progressing  Goal: Absence of Hospital-Acquired Illness or Injury  Outcome: Ongoing, Progressing  Intervention: Identify and Manage Fall Risk  Recent Flowsheet Documentation  Taken 4/4/2022 1300 by Beba Weaver RN  Safety Promotion/Fall Prevention: activity supervised  Taken 4/4/2022 0900 by Beab Weaver RN  Safety Promotion/Fall Prevention: activity supervised  Intervention: Prevent Skin Injury  Recent Flowsheet Documentation  Taken 4/4/2022 1300 by Beba Weaver, RN  Body Position: position changed independently  Taken 4/4/2022 0900 by Beba Weaver, RN  Body Position: position changed independently  Intervention: Prevent and Manage VTE (Venous Thromboembolism) Risk  Recent Flowsheet " Documentation  Taken 4/4/2022 1300 by Beba Weaver, RN  Activity Management: activity adjusted per tolerance  Taken 4/4/2022 0900 by Beba Weaver, RN  Activity Management: activity adjusted per tolerance  Goal: Optimal Comfort and Wellbeing  Outcome: Ongoing, Progressing  Goal: Readiness for Transition of Care  Outcome: Ongoing, Progressing    will continue to monitor

## 2022-04-04 NOTE — PLAN OF CARE
"Pt came up to unit around 2000. Drowsy and sleeping in between cares. Disoriented to time. Upon assessment, pt stated \"I will not do anything until I get something for my pain.\"  Pain in back and BLE. Given PRN Dilaudid and scheduled robaxin and tylenol. Effective per pt. Refused inhaler and flonase at this time.  at bedtime. Pt states she has not eaten since 12PM yesterday. Ate late dinner this evening. Given 4units of novolog for carb coverage and Lantus 12 units. Pt reports right extremities weaker than left with numbness and tingling in BLE, including R/L arm. Pt states, \"I'm in so much pain and I can't do anything.\" VSS.     PRIMARY DIAGNOSIS: GENERALIZED WEAKNESS    OUTPATIENT/OBSERVATION GOALS TO BE MET BEFORE DISCHARGE  1. Orthostatic performed: N/A    2. Tolerating PO medications: Yes    3. Return to near baseline physical activity: No    4. Cleared for discharge by consultants (if involved): No    Discharge Planner Nurse   Safe discharge environment identified: No  Barriers to discharge: Yes       Entered by: Luis Green 04/03/2022 10:23 PM     Please review provider order for any additional goals.   Nurse to notify provider when observation goals have been met and patient is ready for discharge.     Luis Green RN on 4/3/2022 at 10:29 PM        "

## 2022-04-04 NOTE — PROGRESS NOTES
PRIMARY DIAGNOSIS: GENERALIZED WEAKNESS    OUTPATIENT/OBSERVATION GOALS TO BE MET BEFORE DISCHARGE  1. Orthostatic performed: N/A    2. Tolerating PO medications: Yes    3. Return to near baseline physical activity: No    4. Cleared for discharge by consultants (if involved): No    Discharge Planner Nurse   Safe discharge environment identified: Yes  Barriers to discharge: Yes weakness/pain        Entered by: Beba Weaver 04/04/2022 11:22 AM     Please review provider order for any additional goals.   Nurse to notify provider when observation goals have been met and patient is ready for discharge.

## 2022-04-05 ENCOUNTER — APPOINTMENT (OUTPATIENT)
Dept: OCCUPATIONAL THERAPY | Facility: HOSPITAL | Age: 57
End: 2022-04-05
Payer: COMMERCIAL

## 2022-04-05 ENCOUNTER — APPOINTMENT (OUTPATIENT)
Dept: PHYSICAL THERAPY | Facility: HOSPITAL | Age: 57
End: 2022-04-05
Payer: COMMERCIAL

## 2022-04-05 LAB
GLUCOSE BLDC GLUCOMTR-MCNC: 145 MG/DL (ref 70–99)
GLUCOSE BLDC GLUCOMTR-MCNC: 252 MG/DL (ref 70–99)
GLUCOSE BLDC GLUCOMTR-MCNC: 301 MG/DL (ref 70–99)
GLUCOSE BLDC GLUCOMTR-MCNC: 381 MG/DL (ref 70–99)
GLUCOSE BLDC GLUCOMTR-MCNC: 383 MG/DL (ref 70–99)

## 2022-04-05 PROCEDURE — 99226 PR SUBSEQUENT OBSERVATION CARE,LEVEL III: CPT | Mod: GC | Performed by: STUDENT IN AN ORGANIZED HEALTH CARE EDUCATION/TRAINING PROGRAM

## 2022-04-05 PROCEDURE — 97535 SELF CARE MNGMENT TRAINING: CPT | Mod: GO

## 2022-04-05 PROCEDURE — 97161 PT EVAL LOW COMPLEX 20 MIN: CPT | Mod: GP

## 2022-04-05 PROCEDURE — 250N000013 HC RX MED GY IP 250 OP 250 PS 637: Performed by: MASSAGE THERAPIST

## 2022-04-05 PROCEDURE — 82962 GLUCOSE BLOOD TEST: CPT

## 2022-04-05 PROCEDURE — 97116 GAIT TRAINING THERAPY: CPT | Mod: GP

## 2022-04-05 PROCEDURE — 250N000011 HC RX IP 250 OP 636: Performed by: MASSAGE THERAPIST

## 2022-04-05 PROCEDURE — G0378 HOSPITAL OBSERVATION PER HR: HCPCS

## 2022-04-05 PROCEDURE — 99205 OFFICE O/P NEW HI 60 MIN: CPT | Performed by: PSYCHIATRY & NEUROLOGY

## 2022-04-05 PROCEDURE — 96372 THER/PROPH/DIAG INJ SC/IM: CPT | Performed by: MASSAGE THERAPIST

## 2022-04-05 PROCEDURE — 250N000013 HC RX MED GY IP 250 OP 250 PS 637: Performed by: CLINICAL NURSE SPECIALIST

## 2022-04-05 PROCEDURE — 250N000013 HC RX MED GY IP 250 OP 250 PS 637: Performed by: PAIN MEDICINE

## 2022-04-05 PROCEDURE — 99214 OFFICE O/P EST MOD 30 MIN: CPT | Performed by: PAIN MEDICINE

## 2022-04-05 PROCEDURE — 97166 OT EVAL MOD COMPLEX 45 MIN: CPT | Mod: GO

## 2022-04-05 RX ORDER — AMOXICILLIN 250 MG
2 CAPSULE ORAL 2 TIMES DAILY
Status: DISCONTINUED | OUTPATIENT
Start: 2022-04-05 | End: 2022-04-07 | Stop reason: HOSPADM

## 2022-04-05 RX ADMIN — OXYCODONE HYDROCHLORIDE 10 MG: 5 TABLET ORAL at 01:36

## 2022-04-05 RX ADMIN — DULOXETINE HYDROCHLORIDE 30 MG: 30 CAPSULE, DELAYED RELEASE ORAL at 20:17

## 2022-04-05 RX ADMIN — METHOCARBAMOL 750 MG: 750 TABLET ORAL at 07:57

## 2022-04-05 RX ADMIN — ROSUVASTATIN CALCIUM 20 MG: 10 TABLET, FILM COATED ORAL at 07:55

## 2022-04-05 RX ADMIN — INSULIN ASPART 9 UNITS: 100 INJECTION, SOLUTION INTRAVENOUS; SUBCUTANEOUS at 17:15

## 2022-04-05 RX ADMIN — OXYCODONE HYDROCHLORIDE AND ACETAMINOPHEN 1 TABLET: 5; 325 TABLET ORAL at 13:27

## 2022-04-05 RX ADMIN — OXYCODONE HYDROCHLORIDE AND ACETAMINOPHEN 1 TABLET: 5; 325 TABLET ORAL at 17:24

## 2022-04-05 RX ADMIN — METHOCARBAMOL 750 MG: 750 TABLET ORAL at 13:30

## 2022-04-05 RX ADMIN — DICLOFENAC SODIUM 2 G: 10 GEL TOPICAL at 13:39

## 2022-04-05 RX ADMIN — ACETAMINOPHEN 975 MG: 325 TABLET ORAL at 05:54

## 2022-04-05 RX ADMIN — LOSARTAN POTASSIUM 50 MG: 25 TABLET, FILM COATED ORAL at 07:57

## 2022-04-05 RX ADMIN — METHOCARBAMOL 750 MG: 750 TABLET ORAL at 17:16

## 2022-04-05 RX ADMIN — LIDOCAINE 2 PATCH: 246 PATCH TOPICAL at 20:16

## 2022-04-05 RX ADMIN — PREGABALIN 150 MG: 75 CAPSULE ORAL at 13:25

## 2022-04-05 RX ADMIN — MONTELUKAST 10 MG: 10 TABLET, FILM COATED ORAL at 20:17

## 2022-04-05 RX ADMIN — CARVEDILOL 12.5 MG: 12.5 TABLET, FILM COATED ORAL at 07:50

## 2022-04-05 RX ADMIN — OXYCODONE HYDROCHLORIDE 10 MG: 5 TABLET ORAL at 05:54

## 2022-04-05 RX ADMIN — CARVEDILOL 12.5 MG: 12.5 TABLET, FILM COATED ORAL at 17:16

## 2022-04-05 RX ADMIN — PANTOPRAZOLE SODIUM 40 MG: 20 TABLET, DELAYED RELEASE ORAL at 05:55

## 2022-04-05 RX ADMIN — PREGABALIN 150 MG: 75 CAPSULE ORAL at 09:15

## 2022-04-05 RX ADMIN — OXYCODONE HYDROCHLORIDE AND ACETAMINOPHEN 1 TABLET: 5; 325 TABLET ORAL at 09:15

## 2022-04-05 RX ADMIN — DICLOFENAC SODIUM 2 G: 10 GEL TOPICAL at 20:18

## 2022-04-05 RX ADMIN — PREGABALIN 150 MG: 75 CAPSULE ORAL at 20:17

## 2022-04-05 RX ADMIN — OXYCODONE HYDROCHLORIDE AND ACETAMINOPHEN 1 TABLET: 5; 325 TABLET ORAL at 22:17

## 2022-04-05 RX ADMIN — DOCUSATE SODIUM 50 MG AND SENNOSIDES 8.6 MG 2 TABLET: 8.6; 5 TABLET, FILM COATED ORAL at 14:46

## 2022-04-05 RX ADMIN — ACETAMINOPHEN 650 MG: 325 TABLET ORAL at 13:26

## 2022-04-05 RX ADMIN — ASPIRIN 325 MG: 325 TABLET ORAL at 07:58

## 2022-04-05 RX ADMIN — ENOXAPARIN SODIUM 40 MG: 40 INJECTION SUBCUTANEOUS at 20:18

## 2022-04-05 RX ADMIN — METHOCARBAMOL 750 MG: 750 TABLET ORAL at 20:17

## 2022-04-05 RX ADMIN — DULOXETINE HYDROCHLORIDE 30 MG: 30 CAPSULE, DELAYED RELEASE ORAL at 07:54

## 2022-04-05 RX ADMIN — DOCUSATE SODIUM 50 MG AND SENNOSIDES 8.6 MG 2 TABLET: 8.6; 5 TABLET, FILM COATED ORAL at 20:17

## 2022-04-05 RX ADMIN — ACETAMINOPHEN 650 MG: 325 TABLET ORAL at 22:17

## 2022-04-05 NOTE — PROGRESS NOTES
[facility]    Medicine Progress Note - Hospitalist Service    Date of Admission:  4/3/2022    Assessment & Plan          Sarah Rahman is a 56 year old female admitted on 4/3/2022. 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 Continues to be admitted for PT and DM management.    Bilateral leg pain and weakness, R> L -- improving  Right arm weakness   Chronic pain exacerbation  Patient describes 1 day of burning pain in both feet that radiates up to her lower back, morning of admission also noted weakness in her legs which made it difficult to ambulate and weakness in her right arm. Patient reports history of CVA in October, and a cervical MRI from that time shows myelomalacia. MRI brain on admission negative for acute process.  MR lumbar spine with progressive worsening of her degenerative disc disease, but no signs of abscess, malignancy or other acute process.  Most likely etiology appears to be lumbar radiculopathy combined with undiagnosed diabetic peripheral neuropathy. Neuruosurgery saw patient and recommended outpatient follow-up if patient desires but no surgical need at this time. They recommended neurology, DM control and PT.  Patient doing much better today, sensation improved to RLE and is more willing to try PT today.   -Pain team consulted-appreciate recommendations   oxycodone PRN   acupuncture and integrative medicine  -Neurosurgery consult -- no surgery at this time, can follow OP if patient desires   Neurology consult   OP EMG  -Current pain plan  Scheduled Tylenol  Lidocaine patch  Cymbalta  Continue PTA Lyrica, Robaxin, Voltaren gel  -Every 4 hours neuro checks  -PT/OT     Diabetes mellitus  Blood sugar on arrival 345.  Glucose on UA. A1c >14.0  She reports she is not currently on insulin at  "home, appears she was potentially on it in the passed. Diabetes education saw patient yesterday as well. Sugars continue to be elevated today. Will increase to Lantus. Meal time carb coverage was adjusted late last night. Patient aware will need to be on insulin on discharge.  -15 units Lantus at bedtime  -1:10 Carb coverage  - Medium correction scale    Chronic concerns:   COPD  Asthma  Continue PTA albuterol, Symbicort, Singulair    CAD, s/p CABG  Hypertension  Hyperlipidemia  Some pulmonary congestion seen on admission chest x-ray, not appear significantly volume up on exam. Satting well on RA.  Continue PTA aspirin, carvedilol, losartan, rosuvastatin    Seasonal allergies- Continue PTA Zyrtec, Flonase  Schizoaffective disorder- Continue PTA Cymbalta  GERD-continue PTA omeprazole       Diet: Combination Diet Regular Diet Adult    DVT Prophylaxis: Enoxaparin (Lovenox) SQ  Diehl Catheter: Not present  Central Lines: None  Cardiac Monitoring: None  Code Status: Full Code      Disposition Plan   Expected Discharge: 04/06/2022     Anticipated discharge location:  Awaiting PT -- patient declined PT yesterday  Delays:  pain control, uncontrolled DM, weakness       The patient's care was discussed with the Attending Physician, Dr. Rosenstein.    Deana Meza MD  Hospitalist Service  [facility]  Securely message with the Vocera Web Console (learn more here)  Text page via [employer] Paging/Directory         Clinically Significant Risk Factors Present on Admission                # Platelet Defect: home medication list includes an antiplatelet medication   # Obesity: Estimated body mass index is 30.96 kg/m  as calculated from the following:    Height as of this encounter: 1.702 m (5' 7\").    Weight as of this encounter: 89.7 kg (197 lb 11.2 oz).      ______________________________________________________________________    Interval History   Patient feeling much better this morning. Her pain is much " improved. She has sensation to RLE now which was not present yesterday. Moving her legs well today. She is willing to attempt PT today. Also discussed DM and education done yesterday. She feels comfortable being sent home on insulin.    Data reviewed today: I reviewed all medications, new labs and imaging results over the last 24 hours. I personally reviewed no images or EKG's today.    Physical Exam   Vital Signs: Temp: 98  F (36.7  C) Temp src: Oral BP: (!) 168/95 Pulse: 72   Resp: 18 SpO2: 100 % O2 Device: None (Room air)    Weight: 197 lbs 11.2 oz  Constitutional: laying comfortably in bed, awake, alert, and appears stated age  Eyes: Lids and lashes normal, pupils equal, round and reactive to light conjunctiva normal  Respiratory: No increased work of breathing, good air exchange, clear to auscultation bilaterally, no crackles or wheezing  Cardiovascular: regular rate and rhythm, normal S1 and S2, no S3 or S4, and no murmur noted  GI: normal bowel sounds and non-distended  Skin: no bruising or bleeding and normal skin color, texture, turgor  Musculoskeletal: no lower extremity pitting edema present, bilateral LE strength, ROM and sensation intact      Data   Recent Labs   Lab 04/05/22  0720 04/05/22  0143 04/04/22  2055 04/04/22  0727 04/04/22  0621 04/03/22  2130 04/03/22  2103 04/03/22  1436   WBC  --   --   --   --  5.9  --   --  5.8   HGB  --   --   --   --  11.7  --   --  12.1   MCV  --   --   --   --  82  --   --  84   PLT  --   --   --   --  211  --   --  237   INR  --   --   --   --   --   --   --  0.95   NA  --   --   --   --  135*  --   --  136   POTASSIUM  --   --   --   --  4.7  --   --  4.3   CHLORIDE  --   --   --   --  106  --   --  103   CO2  --   --   --   --  19*  --   --  23   BUN  --   --   --   --  21  --   --  19   CR  --   --   --   --  1.05 0.68  --  0.92   ANIONGAP  --   --   --   --  10  --   --  10   MAXIMO  --   --   --   --  8.2*  --   --  8.8   * 383* 368*   < > 277*  --    < >  345*    < > = values in this interval not displayed.     No results found for this or any previous visit (from the past 24 hour(s)).

## 2022-04-05 NOTE — PROGRESS NOTES
[facility]      OUTPATIENT OCCUPATIONAL THERAPY  EVALUATION  PLAN OF TREATMENT FOR OUTPATIENT REHABILITATION  (COMPLETE FOR INITIAL CLAIMS ONLY)  Patient's Last Name, First Name, M.I.  YOB: 1965  Sarah Rahman                          Provider's Name  [facility] Medical Record No.  [id number]                               Onset Date:  04/03/22   Start of Care Date:  04/05/22     Type:     ___PT   _X_OT   ___SLP Medical Diagnosis:  stroke like symptoms                        OT Diagnosis:  impaired self cares and functional mobility   Visits from SOC:  1   _________________________________________________________________________________  Plan of Treatment/Functional Goals    Planned Interventions: ADL retraining, balance training, fine motor coordination training, motor coordination training, ROM, strengthening, transfer training, home program guidelines, progressive activity/exercise   Goals: See Occupational Therapy Goals on Care Plan in [employer] electronic health record.    Therapy Frequency: Daily  Predicted Duration of Therapy Intervention: 04/12/22  _________________________________________________________________________________    I CERTIFY THE NEED FOR THESE SERVICES FURNISHED UNDER        THIS PLAN OF TREATMENT AND WHILE UNDER MY CARE     (Physician co-signature of this document indicates review and certification of the therapy plan).                Certification date from: 04/05/22, Certification date to: 04/12/22    Referring Physician: Joel Washburn MD            Initial Assessment        See Occupational Therapy evaluation dated 04/05/22 in Epic electronic health record.

## 2022-04-05 NOTE — PLAN OF CARE
"Goal Outcome Evaluation:        Pt is A&Ox4, able to make needs known. Pt can be demanding, refused cares but cooperated after re-approached and cares/meds/ procedures explained and pt then complied. Pt used bedside commode with assist x1. Reported pain in low back/ celina LE, received prn oxycodone x1 and scheduled medications which was effective. Pt stated her pain was 10/10 after being woke up, pt then was able to get herself to edge of the bed and transferred to commode with SBA. Eating/drinking well. Pt \"accidentally\" removed IV. Bed alarm in [facility]. Encouraged pt to get out of bed, pt slept majority of the shift.  Problem: Mobility Impairment  Goal: Optimal Mobility  4/4/2022 2235 by Shaista Bah RN  Outcome: Ongoing, Progressing  4/4/2022 2232 by Shaista Bah RN  Outcome: Ongoing, Progressing  Intervention: Optimize Mobility  Recent Flowsheet Documentation  Taken 4/4/2022 2013 by Shaista Bah, RN  Assistive Device Utilized:   walker   gait belt  Activity Management: activity encouraged  Taken 4/4/2022 1640 by Shaista Bah, RN  Assistive Device Utilized:   gait belt   walker  Activity Management:   activity adjusted per tolerance   activity encouraged     Problem: Pain Acute  Goal: Acceptable Pain Control and Functional Ability  Outcome: Ongoing, Progressing     Problem: Plan of Care - These are the overarching goals to be used throughout the patient stay.    Goal: Absence of Hospital-Acquired Illness or Injury  Intervention: Identify and Manage Fall Risk  Recent Flowsheet Documentation  Taken 4/4/2022 2013 by Shaista Bah, RN  Safety Promotion/Fall Prevention:   activity supervised   assistive device/personal items within reach   bed alarm on   chair alarm on   patient and family education   nonskid shoes/slippers when out of bed  Taken 4/4/2022 1640 by Shaista Bah, RN  Safety Promotion/Fall Prevention:   activity supervised   assistive device/personal items within reach   patient and family " education   nonskid shoes/slippers when out of bed  Intervention: Prevent Skin Injury  Recent Flowsheet Documentation  Taken 4/4/2022 2013 by Shaista Bah RN  Body Position: position changed independently  Taken 4/4/2022 1640 by Shaista Bah, RN  Body Position: position changed independently  Intervention: Prevent and Manage VTE (Venous Thromboembolism) Risk  Recent Flowsheet Documentation  Taken 4/4/2022 2013 by Shaista Bah, RN  Activity Management: activity encouraged  Taken 4/4/2022 1640 by Shaista Bah RN  Activity Management:   activity adjusted per tolerance   activity encouraged

## 2022-04-05 NOTE — PROGRESS NOTES
[facility]      OUTPATIENT PHYSICAL THERAPY EVALUATION  PLAN OF TREATMENT FOR OUTPATIENT REHABILITATION  (COMPLETE FOR INITIAL CLAIMS ONLY)  Patient's Last Name, First Name, M.I.  YOB: 1965  Serg Rahmanonette  CLARK                        Provider's Name  [facility] Medical Record No.  [id number]                               Onset Date:  04/03/22   Start of Care Date:  04/05/22      Type:     _X_PT   ___OT   ___SLP Medical Diagnosis:  bilateral low back pain with right sided sciatica                        PT Diagnosis:  impaired functional moility, gait abnormality   Visits from SOC:  1   _________________________________________________________________________________  Plan of Treatment/Functional Goals    Planned Interventions: balance training, gait training, bed mobility training, home exercise program, neuromuscular re-education, patient/family education, ROM (range of motion), stair training, strengthening, transfer training     Goals: See Physical Therapy Goals on Care Plan in [employer] electronic health record.    Therapy Frequency: Daily  Predicted Duration of Therapy Intervention: 04/12/22  _________________________________________________________________________________    I CERTIFY THE NEED FOR THESE SERVICES FURNISHED UNDER        THIS PLAN OF TREATMENT AND WHILE UNDER MY CARE     (Physician co-signature of this document indicates review and certification of the therapy plan).                Certification date from: 04/05/22, Certification date to: 04/12/22    Referring Physician: Joel Washburn MD/Benjamin Rosenstein, MD            Initial Assessment        See Physical Therapy evaluation dated 04/05/22 in Epic electronic health record.

## 2022-04-05 NOTE — PROGRESS NOTES
Per Dr Meza, pt needs a new PCP, as one listed in her chart no longer accepts pt's insurance. Doctor requested that CM speak with pt to assist her with this process.  Per PT Sherly, recommendation is for home PT.  OT is recommending home OT as well.      FOLLOW UP NEEDED on 4/6 with pt regarding securing a new PCP as well as to review home care recommendations.      CALI Novak, LGSW 04/05/22 6:27 PM
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
## 2022-04-05 NOTE — PROGRESS NOTES
[facility]    Medicine Progress Note - Hospitalist Service    Date of Admission:  4/3/2022    Assessment & Plan               Sarah Rahman is a 56 year old female admitted on 4/3/2022. 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    Bilateral leg pain and weakness, R> L  Right arm weakness  Chronic pain exacerbation  Patient describes 1 day of burning pain in both feet that radiates up to her lower back, morning of admission also noted weakness in her legs which made it difficult to ambulate and weakness in her right arm. Patient reports history of CVA in October, and a cervical MRI from that time shows myelomalacia. MRI brain on admission negative for acute process.  MR lumbar spine with progressive worsening of her degenerative disc disease, but no signs of abscess, malignancy or other acute process.  Have lower suspicion for, but still considering GBS, she has been hypertensive here but afebrile, normal white count and not with flaccid paralysis.  Most likely etiology appears to be lumbar radiculopathy combined with undiagnosed diabetic peripheral neuropathy. Neuruosurgery saw patient today and recommended outpatient follow-up if patient desires but no surgical need at this time. They recommended neurology, DM control and PT.  During my exam today continued to have R>L lower extremity weakness and decreased sensation to right. Able to move self to commode while I was in the toom.   -Pain team consulted-appreciate recommendations   oxycodone PRN   acupuncture and integrative medicine  -Neurosurgery consult -- no surgery at this time, can follow OP if patient desires   Neurology consult   OP EMG  -Current pain plan  Scheduled Tylenol  Lidocaine patch  Cymbalta  Continue PTA Lyrica, Robaxin, Voltaren gel  -Every 4  "hours neuro checks  -PT/OT     Diabetes mellitus.   Blood sugar on arrival 345.  Glucose on UA. A1c >14.0  She reports she is not currently on insulin at home. Will watch sugars today and adjust as needed after more data.   -12 units Lantus at bedtime  -1:15 Carb coverage and medium sliding scale    Chronic concerns:   COPD  Asthma  Continue PTA albuterol, Symbicort, Singulair    CAD, s/p CABG  Hypertension  Hyperlipidemia  Some pulmonary congestion seen on chest x-ray, does not appear significantly volume up on exam.  Continue PTA aspirin, carvedilol, losartan, rosuvastatin    Seasonal allergies- Continue PTA Zyrtec, Flonase  Schizoaffective disorder- Continue PTA Cymbalta  GERD-continue PTA omeprazole         Diet: Combination Diet Regular Diet Adult    DVT Prophylaxis: Enoxaparin (Lovenox) SQ  Diehl Catheter: Not present  Central Lines: None  Cardiac Monitoring: None  Code Status: Full Code      Disposition Plan   Expected Discharge: 04/05/2022     Anticipated discharge location:  Awaiting PT -- patient declined PT today  Delays:  pain control, uncontrolled DM, weakness       The patient's care was discussed with the Attending Physician, Dr. Rosenstein.    Deana Meza MD  Hospitalist Service  [facility]  Securely message with the Vocera Web Console (learn more here)  Text page via [employer] Paging/Directory         Clinically Significant Risk Factors Present on Admission                # Platelet Defect: home medication list includes an antiplatelet medication   # Obesity: Estimated body mass index is 30.96 kg/m  as calculated from the following:    Height as of this encounter: 1.702 m (5' 7\").    Weight as of this encounter: 89.7 kg (197 lb 11.2 oz).      ______________________________________________________________________    Interval History   Patient noting weakness and tingling to bilateral lower and upper extremities. Also notes significant low back pain that is chronic for her. No " breathing issues or chest pain. She says this was new a couple of days ago. She endorses right sided deficits since her stroke in October.     Data reviewed today: I reviewed all medications, new labs and imaging results over the last 24 hours. I personally reviewed no images or EKG's today.    Physical Exam   Vital Signs: Temp: 98.4  F (36.9  C) Temp src: Oral BP: 120/57 Pulse: 68   Resp: 16 SpO2: 97 % O2 Device: None (Room air)    Weight: 197 lbs 11.2 oz  Constitutional: laying comfortably in bed but noting 10/10 pain, awake, alert, and appears stated age  Eyes: Lids and lashes normal, pupils equal, round and reactive to light conjunctiva normal  Respiratory: No increased work of breathing, good air exchange, clear to auscultation bilaterally, no crackles or wheezing  Cardiovascular: regular rate and rhythm, normal S1 and S2, no S3 or S4, and no murmur noted  GI: normal bowel sounds and non-distended  Skin: no bruising or bleeding and normal skin color, texture, turgor  Musculoskeletal: no lower extremity pitting edema present  right hand grasp decreased compared to left  Right ANKLE:  Unable to dorsiflex or plantarflex, no sensation at all to right leg, Left leg/ankle: 4/5 strength, able to dorsi and plantar flex. Moved to commode on her own slowly  Neurologic: Awake, alert, oriented to name, place and time.  Cranial nerves II-XII are grossly intact.  Sensory is intact (except RLE).        Data   Recent Labs   Lab 04/04/22 2055 04/04/22 2037 04/04/22  1731 04/04/22  0727 04/04/22  0621 04/03/22  2130 04/03/22  2103 04/03/22  1436   WBC  --   --   --   --  5.9  --   --  5.8   HGB  --   --   --   --  11.7  --   --  12.1   MCV  --   --   --   --  82  --   --  84   PLT  --   --   --   --  211  --   --  237   INR  --   --   --   --   --   --   --  0.95   NA  --   --   --   --  135*  --   --  136   POTASSIUM  --   --   --   --  4.7  --   --  4.3   CHLORIDE  --   --   --   --  106  --   --  103   CO2  --   --   --    --  19*  --   --  23   BUN  --   --   --   --  21  --   --  19   CR  --   --   --   --  1.05 0.68  --  0.92   ANIONGAP  --   --   --   --  10  --   --  10   MAXIMO  --   --   --   --  8.2*  --   --  8.8   * 328* 316*   < > 277*  --    < > 345*    < > = values in this interval not displayed.     No results found for this or any previous visit (from the past 24 hour(s)).

## 2022-04-05 NOTE — PROGRESS NOTES
[facility] ACUTE PAIN SERVICE    ([facility], [facility], [facility])  Daily PAIN Progress Note    Assessment/Plan:  Sarah Rahman is a 56 year old female who was admitted on 4/3/2022.   Pain Service is asked to see the patient for evaluation after falling out of bed due to weakness in both her legs and feet right side greater than left along with right arm weakness. 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   Neurology has been Consulted.   CT imaging of head not demonstrating any acute findings.   MR lumbar spine with progressive worsening of her degenerative disc disease, but no signs of abscess, malignancy or other acute process.     Chronic pain which patient states is not under control. Pain in lower back and goes down into feet and up her spine. Pain is 10/10 burning/tingling in right foot.  She follows at [facility] and is frustrated that they aren't doing more to give her pain relief. She reports injections do not help and is not interested in spinal stimulator due to infection risk which has been discussed with her in the past.      The patient does smoke (about 1/4 PPD) and drinks alcohol denies chemical dependency history (although malingering and cocaine abuse noted in her chart for >10 years, also pos for cannabinoids in urine).       PLAN:   1) Chronic lower extremity pain and acute right sided worsening of pain. The patient's home MME was up to 60 mg daily. Discussed follow-up with neurology to investigate neuropathy etiology that can help with pain control. Discontinued oxycodone--use Percocet prn. Recommend using Voltaren for right foot arthritis. Recommended smoking cessation that may be contributing to lower extremity pain. Senokot-s scheduled for chronic constipation. Minimal benefit with lyrica and would not recommend robaxin for this pain.   2)Multimodal Medication  "Therapy  Topical: lidocaine patches for back, voltaren gel to lower extremities TID prn  NSAID'S: none  Muscle Relaxants: Robaxin 750 mg 4 times daily  Adjuvants: acetaminophen 975 mg every 8 hours, lyrica 150 mg tid  Antidepressants/anxiolytics: cymbalta 30 bid per home dose   Opioids: Continue percocet 5-325 mg q4h prn   IV Pain medication: None. One time dose of IV hydromorphone was given 4/4/2022  3)Non-medication interventions- would recommend weight loss, exercise program, CBT   4)Constipation Prophylaxis  Senna/docusate 1 tablet bid switched to scheduled, miralax daily prn  5) Follow up   -Opioid prescriber has been [facility]  -Discharge Recommendations - We recommend prescribing the following at the time of discharge: no opioid prescriptions. Referral to a different Pain Clinic per patient request.  Resume home prescription - Do NOT refill.   Agree with EMG at discharge         Subjective:  Patient awake in bed. Reporting 10/10 burning/tingling pain in her right foot and slight swelling. States that \"nothing helps\" the pain. We discussed the significance of her diabetes contributing to neuropathy and other widespread complications. Poor insight.. We discussed the importance of smoking cessation and the use of Chantix. She does not say if she is actually interested in that.  Patient states that she does not have a PCP--will set up referral. Patient also states that she will not be returning to the [facility] because provider retired on Friday (Dr. Chuy Hughes)--will set up referral to a different pain clinic. Has not had a BM since admission--will schedule senakot-s. Denies SOB, nausea, vomiting, chest pain.            <principal problem not specified>   Patient Active Problem List   Diagnosis     Superficial injury of cornea     Coronary atherosclerosis     Diabetes mellitus, type 2 (H)     Schizoaffective disorder (H)     Health Care Home     Bilateral low back pain with right-sided sciatica, " "unspecified chronicity        History   Drug Use No         Tobacco Use      Smoking status: Current Every Day Smoker      Smokeless tobacco: Never Used          acetaminophen  975 mg Oral Q8H     aspirin  325 mg Oral Daily     carvedilol  12.5 mg Oral BID w/meals     DULoxetine  30 mg Oral BID     enoxaparin ANTICOAGULANT  40 mg Subcutaneous Q24H     fluticasone  1 spray Both Nostrils Daily     fluticasone-vilanterol  1 puff Inhalation Daily     insulin aspart   Subcutaneous Daily with breakfast     insulin aspart   Subcutaneous Daily with lunch     insulin aspart   Subcutaneous Daily with supper     insulin aspart  1-7 Units Subcutaneous TID AC     insulin aspart  1-5 Units Subcutaneous At Bedtime     insulin glargine  12 Units Subcutaneous At Bedtime     lidocaine  2 patch Transdermal Q24h    And     lidocaine   Transdermal Q8H     losartan  50 mg Oral Daily     methocarbamol  750 mg Oral 4x Daily     montelukast  10 mg Oral At Bedtime     pantoprazole  40 mg Oral QAM AC     pregabalin  150 mg Oral TID     rosuvastatin  20 mg Oral Daily     sodium chloride (PF)  3 mL Intracatheter Q8H       Objective:  Vital signs in last 24 hours:  B/P: 168/95, T: 98, P: 72, R: 18   Blood pressure (!) 168/95, pulse 72, temperature 98  F (36.7  C), temperature source Oral, resp. rate 18, height 1.702 m (5' 7\"), weight 89.7 kg (197 lb 11.2 oz), SpO2 100 %, not currently breastfeeding.      Weight:   Wt Readings from Last 2 Encounters:   04/03/22 89.7 kg (197 lb 11.2 oz)   10/27/21 90.7 kg (200 lb)           Intake/Output:    Intake/Output Summary (Last 24 hours) at 4/5/2022 0917  Last data filed at 4/5/2022 0724  Gross per 24 hour   Intake 580 ml   Output 1025 ml   Net -445 ml        Review of Systems:   As per subjective, all others negative.    Physical Exam:     General Appearance:  Alert, no distress, appears stated age  Patient is awake in bed, somewhat guarded with interview questions.   Head:  Normocephalic, without obvious " abnormality, atraumatic   Eyes:  PERRL, conjunctiva/corneas clear, EOM's intact   ENT/Throat: Lips normal    Lymph/Neck: Supple, symmetrical, trachea midline, no adenopathy, thyroid: not enlarged, symmetric    Lungs:   Clear to auscultation bilaterally, respirations unlabored   Chest Wall:  No tenderness or deformity   Cardiovascular/Heart:  Regular rate and rhythm, S1, S2 normal,no murmur, rub or gallop.     Abdomen:   Soft, non-tender, bowel sounds active all four quadrants,  no masses, no organomegaly   Musculoskeletal: Extremities normal, atraumatic   Skin: Skin color normal, no lesions noted   Neurologic: Alert and oriented X 3, Moves all 4 extremities       Psych: Affect is somatic focused.  Grooming is adequate            Imaging:  Personally Reviewed.  No images are attached to the encounter.     Lab Results:  Personally Reviewed.   Last Comprehensive Metabolic Panel:  Sodium   Date Value Ref Range Status   04/04/2022 135 (L) 136 - 145 mmol/L Final   05/18/2010 136 133 - 144 mmol/L Final     Potassium   Date Value Ref Range Status   04/04/2022 4.7 3.5 - 5.0 mmol/L Final   05/18/2010 3.7 3.4 - 5.3 mmol/L Final     Chloride   Date Value Ref Range Status   04/04/2022 106 98 - 107 mmol/L Final   05/18/2010 105 94 - 109 mmol/L Final     Carbon Dioxide   Date Value Ref Range Status   05/18/2010 26 20 - 32 mmol/L Final     Carbon Dioxide (CO2)   Date Value Ref Range Status   04/04/2022 19 (L) 22 - 31 mmol/L Final     Anion Gap   Date Value Ref Range Status   04/04/2022 10 5 - 18 mmol/L Final   05/18/2010 6 6 - 17 mmol/L Final     Glucose   Date Value Ref Range Status   04/04/2022 277 (H) 70 - 125 mg/dL Final   04/22/2011 159.0 (H) 60.0 - 109.0 mg/dL Final     GLUCOSE BY METER POCT   Date Value Ref Range Status   04/05/2022 252 (H) 70 - 99 mg/dL Final     Urea Nitrogen   Date Value Ref Range Status   04/04/2022 21 8 - 22 mg/dL Final   05/18/2010 15 5 - 24 mg/dL Final     Creatinine   Date Value Ref Range Status    04/04/2022 1.05 0.60 - 1.10 mg/dL Final   05/18/2010 0.68 0.52 - 1.04 mg/dL Final     Comment:     New IDMS-traceable calibration  beginning 5/1/08     GFR Estimate   Date Value Ref Range Status   04/04/2022 62 >60 mL/min/1.73m2 Final     Comment:     Effective December 21, 2021 eGFRcr in adults is calculated using the 2021 CKD-EPI creatinine equation which includes age and gender (Cary et al., [place], DOI: 10.1056/WKFHim1754866)   07/09/2021 57 (L) >60 mL/min/1.73m2 Final   05/18/2010 >90 >60 mL/min/1.7m2 Final     Calcium   Date Value Ref Range Status   04/04/2022 8.2 (L) 8.5 - 10.5 mg/dL Final   05/18/2010 8.2 (L) 8.5 - 10.4 mg/dL Final        UA:   Amphetamines Urine   Date Value Ref Range Status   10/23/2018 Screen Negative Screen Negative Final     Barbiturates Urine   Date Value Ref Range Status   10/23/2018 Screen Negative Screen Negative Final     Cannabinoids Urine   Date Value Ref Range Status   10/23/2018 (A) Screen Negative Final    Screen Positive (Confirmation available on request)     Cocaine Urine   Date Value Ref Range Status   10/23/2018 Screen Negative Screen Negative Final     Opiates Urine   Date Value Ref Range Status   10/23/2018 (A) Screen Negative Final    Screen Positive (Confirmation available on request)     PCP Urine   Date Value Ref Range Status   10/23/2018 Screen Negative Screen Negative Final            Total time spent 27 minutes with greater than 50% in consultation, education and coordination of care.     Also discussed with RN.         I, Avril Miner CNP personally performed the services described in this documentation, as scribed by Travis Castelan  (DNPstudent) in my presence, and it is both accurate and complete.       Avril Miner APRN, CNS-BC, CNP, ACHPN  Acute Care Pain Management Program Hour 7a-1700  [facility] ([place], [place], [place])   Page via [employer]- Click HERE to page Avril or call [phone]

## 2022-04-05 NOTE — PROGRESS NOTES
04/05/22 1055   Quick Adds   Type of Visit Initial Occupational Therapy Evaluation   Living Environment   People in Home alone   Current Living Arrangements apartment   Home Accessibility no concerns  (elevator)   Living Environment Comments tub/shower combo, no GB or chair   Self-Care   Usual Activity Tolerance good   Current Activity Tolerance fair   Equipment Currently Used at Home cane, quad  (Currently does not use)   Fall history within last six months yes   Number of times patient has fallen within last six months 1   Activity/Exercise/Self-Care Comment Ind. with ADL/IADL, family assists with driving since CVA in 10/21   General Information   Onset of Illness/Injury or Date of Surgery 04/03/22   Referring Physician Joel Washburn MD   Patient/Family Therapy Goal Statement (OT) Return home   Existing Precautions/Restrictions fall   General Observations and Info Pt. admit w/ stroke like symptoms, fall, RUE/LE weakness and pain. PMH: chronic pain, CVA (10/2021, with residual right-sided hearing loss), CAD (s/p CABG), DM, HTN, COPD, schizoaffective disorder   Cognitive Status Examination   Orientation Status orientation to person, place and time   Follows Commands WFL   Visual Perception   Visual Impairment/Limitations WNL   Impact of Vision Impairment on Function (Vision) Pt. reports she has no vision deficits/changes   Sensory   Sensory Comments Partial deficits in RUE.    Pain Assessment   Patient Currently in Pain Yes, see Vital Sign flowsheet   Integumentary/Edema   Integumentary/Edema no deficits were identifed   Posture   Posture not impaired   Range of Motion Comprehensive   General Range of Motion upper extremity range of motion deficits identified   General Upper Extremity Assessment (Range of Motion)   Upper Extremity: Range of Motion shoulder, right: UE ROM;elbow/forearm, right: UE ROM;wrist, right: UE ROM   Strength Comprehensive (MMT)   General Manual Muscle Testing (MMT) Assessment upper  extremity strength deficits identified   Upper Extremity (Manual Muscle Testing)   Upper Extremity: Manual Muscle Testing (MMT) right shoulder strength deficit;right elbow/forearm strength deficit;right wrist strength deficit   Comment, MMT: Upper Extremity Grossly 2+-3/5   Coordination   Upper Extremity Coordination Right UE impaired   Gross Motor Coordination Impaired   Fine Motor Coordination Impaired   Bed Mobility   Comment (Bed Mobility) SBA supine to sit bed flat w/ railing   Transfers   Transfer Comments CGA bed, toilet and chair transf. without device.    Balance   Balance Comments CGA static/dynamic standing balance.    Activities of Daily Living   BADL Assessment/Intervention toileting;lower body dressing;grooming   Lower Body Dressing Assessment/Training   Comment, (Lower Body Dressing) Ind. don/doff socks   Grooming Assessment/Training   [place] Level (Grooming) contact guard assist   Position (Grooming) supported standing   Toileting   [place] Level (Toileting) contact guard assist   Comment, (Toileting) Ind. toilet hygiene seated, CGA clothing mgmt. standing.    Clinical Impression   Criteria for Skilled Therapeutic Interventions Met (OT) Yes, treatment indicated   OT Diagnosis impaired self cares and functional mobility   OT Problem List-Impairments impacting ADL balance;coordination;mobility;motor control;range of motion (ROM);strength   Assessment of Occupational Performance 3-5 Performance Deficits   Identified Performance Deficits toileting, dressing, g/h, transfers, IADL   Planned Therapy Interventions (OT) ADL retraining;balance training;fine motor coordination training;motor coordination training;ROM;strengthening;transfer training;home program guidelines;progressive activity/exercise   Clinical Decision Making Complexity (OT) moderate complexity   Anticipated Equipment Needs Upon Discharge (OT) shower chair  (grab bars by toilet and in shower)   Risk & Benefits of therapy have been  explained evaluation/treatment results reviewed;care plan/treatment goals reviewed;participants voiced agreement with care plan;participants included;patient   OT Discharge Planning   OT Discharge Recommendation (DC Rec) Transitional Care Facility;home with home care occupational therapy   OT Rationale for DC Rec Pt. completes basic self cares/mobility w/ CGA.  Pt. will benefit from further rehab services, however pt. does not want TCU.  Pt. states she is hoping a friend will stay with her during recovery.  Rec.  OT to assist with safe transition then progress to outpatient as needed.     Therapy Certification   Start of Care Date 04/05/22   Certification date from 04/05/22   Certification date to 04/12/22   Medical Diagnosis stroke like symptoms   Total Evaluation Time (Minutes)   Total Evaluation Time (Minutes) 10   OT Goals   Therapy Frequency (OT) Daily   OT Predicated Duration/Target Date for Goal Attainment 04/12/22   OT Goals Transfers;Toilet Transfer/Toileting;OT Goal 1;OT Goal 2;Hygiene/Grooming   OT: Hygiene/Grooming modified independent;while standing   OT: Transfer Modified independent;with assistive device   OT: Toilet Transfer/Toileting Modified independent;cleaning and garment management   OT: Goal 1 Pt. will complete full body dressing w/ mod. I.    OT: Goal 2 Pt. will complete RUE ROM/Strengthening HEP with min. cues using handout.

## 2022-04-05 NOTE — UTILIZATION REVIEW
Concurrent stay review; Secondary Review Determination     Under the authority of the Utilization Management Committee, the utilization review process indicated a secondary review on Sarah Rahman.  The review outcome is based on review of the medical records, discussions with staff, and applying clinical experience noted on the date of the review.        (x) Observation Status Appropriate - Concurrent stay review    RATIONALE FOR DETERMINATION   56 yr old female presented 4/3/22.  Chronic pain and hx CVA with CAD, DM, HLD, COPD, schizoaffective disorder.  Acute pain and weakness.  Neurosurgery reviewed. Outpatient f/u recommended.  Anticipated that her DM is exacerbating neuropathy.  Working on diabetic education and therapy.  Patient has been at times unwilling to participate.     Patient is clinically improving and there is no clear indication to change patient's status to inpatient. The severity of illness, intensity of service provided, expected LOS and risk for adverse outcome make the care appropriate for observation.    The information on this document is developed by the utilization review team in order for the business office to ensure compliance.  This only denotes the appropriateness of proper admission status and does not reflect the quality of care rendered.         The definitions of Inpatient Status and Observation Status used in making the determination above are those provided in the CMS Coverage Manual, Chapter 1 and Chapter 6, section 70.4.      Sincerely,   Aspen Mendes MD  Utilization Review  Physician Advisor  [facility]

## 2022-04-05 NOTE — PLAN OF CARE
"Patient mood liable, refusing cares, assessments, medications at times. Patient would not allow neuro assessment or full head to toe. Did not want Nursing student to assist in cares. Patient would ask questions and when Writer would try to explain to patient she would cut writer off and then just refuse care. Blood glucose elevated allowed insulin to be given in am but for lunch RN attempted to give carb count and sliding scale patient states \"that's not how I do it, I only take 3 units\" Attempted to educate patient however she states \"forget it I don't want any insulin you frustrate me your too slow\" Continues to have pain 10/10 back and shooting pain in right leg down to toes. Voiding independently in bathroom.      Problem: Plan of Care - These are the overarching goals to be used throughout the patient stay.    Goal: Plan of Care Review/Shift Note  Description: The Plan of Care Review/Shift note should be completed every shift.  The Outcome Evaluation is a brief statement about your assessment that the patient is improving, declining, or no change.  This information will be displayed automatically on your shift note.  Outcome: Ongoing, Progressing  Flowsheets (Taken 4/5/2022 1416)  Plan of Care Reviewed With: patient  Overall Patient Progress: no change     Problem: Pain Acute  Goal: Acceptable Pain Control and Functional Ability  Outcome: Ongoing, Progressing  Intervention: Develop Pain Management Plan  Recent Flowsheet Documentation  Taken 4/5/2022 0915 by Shikha Castrejon, RN  Pain Management Interventions: MD notified (comment)  Taken 4/5/2022 0746 by Shikha Castrejon, RN  Pain Management Interventions:   MD notified (comment)   Provider notified (comment)   Goal Outcome Evaluation:    Plan of Care Reviewed With: patient     Overall Patient Progress: no change           "

## 2022-04-05 NOTE — PROGRESS NOTES
04/05/22 1354   Quick Adds   Quick Adds Certification   Type of Visit Initial PT Evaluation   Living Environment   Living Environment Comments see OT note   Self-Care   Activity/Exercise/Self-Care Comment see OT note   General Information   Onset of Illness/Injury or Date of Surgery 04/03/22   Referring Physician Joel Washburn MD/Benjamin Rosenstein, MD   Patient/Family Therapy Goals Statement (PT) go home   Pertinent History of Current Problem (include personal factors and/or comorbidities that impact the POC) history of chronic pain, CVA (10/2021, with residual right-sided hearing loss), CAD (s/p CABG), DM, HTN, COPD, schizoaffective disorder and is admitted for pain and weakness in her bilateral legs, right arm   Pain Assessment   Patient Currently in Pain Yes, see Vital Sign flowsheet  (low back)   Range of Motion (ROM)   Range of Motion ROM is WFL   ROM Comment RLE dorsiflexion limited   Strength (Manual Muscle Testing)   Strength (Manual Muscle Testing) strength is WFL   Strength Comments LLE 3-5/5 throughout. RLE: DF 3-/5, PF 3+/5, KE 4-/5   Bed Mobility   Bed Mobility supine-sit;sit-supine   Supine-Sit [place] (Bed Mobility) independent   Sit-Supine [place] (Bed Mobility) independent   Comment, (Bed Mobility) HOB slightly elevated, does not complain of pain with bed mob   Transfers   Transfers sit-stand transfer   Sit-Stand Transfer   Sit-Stand [place] (Transfers) supervision   Assistive Device (Sit-Stand Transfers) other (see comments)  (none)   Comment, (Sit-Stand Transfer) impulsive, stands without warning   Gait/Stairs (Locomotion)   [place] Level (Gait) contact guard;supervision   Assistive Device (Gait) other (see comments)  (none)   Distance in Feet (Required for LE Total Joints) 150'   Comment, (Gait/Stairs) poor coordination with ambulation, slightly unsteady having several small LOB needing CGA for recovery. No AD - pt strongly refusing use of AD or rail in hallway.    Balance   Balance other (describe)   Balance Comments static standing upon first standing SBA x 30 seconds. During ambulation, pt having several small LOB needing CGA for recovery.   Clinical Impression   Criteria for Skilled Therapeutic Intervention Yes, treatment indicated   PT Diagnosis (PT) impaired functional moility, gait abnormality   Influenced by the following impairments pain, weakness, decreased endurance, decreased balance   Functional limitations due to impairments transfers, ambulation   Clinical Presentation (PT Evaluation Complexity) Stable/Uncomplicated   Clinical Presentation Rationale pt presents as medically diagnosed   Clinical Decision Making (Complexity) low complexity   Planned Therapy Interventions (PT) balance training;gait training;bed mobility training;home exercise program;neuromuscular re-education;patient/family education;ROM (range of motion);stair training;strengthening;transfer training   Anticipated Equipment Needs at Discharge (PT) other (see comments)  (none currently)   Risk & Benefits of therapy have been explained evaluation/treatment results reviewed;patient   PT Discharge Planning   PT Discharge Recommendation (DC Rec) home with assist;home with home care physical therapy   Therapy Certification   Start of care date 04/05/22   Certification date from 04/05/22   Certification date to 04/12/22   Medical Diagnosis bilateral low back pain with right sided sciatica   Total Evaluation Time   Total Evaluation Time (Minutes) 10   Physical Therapy Goals   PT Frequency Daily   PT Predicated Duration/Target Date for Goal Attainment 04/12/22   PT Goals Bed Mobility;Transfers;Gait;PT Goal 1   PT: Bed Mobility Independent;Supine to/from sit   PT: Transfers Independent;Sit to/from stand   PT: Gait Supervision/stand-by assist;150 feet   PT: Goal 1 SBA BLE ex to improve strength and endurance for functional mobility

## 2022-04-05 NOTE — PLAN OF CARE
Problem: Pain Acute  Goal: Acceptable Pain Control and Functional Ability  Outcome: Ongoing, Progressing  Intervention: Develop Pain Management Plan  Recent Flowsheet Documentation  Taken 4/5/2022 0123 by Mitzi Tsang RN  Pain Management Interventions: medication (see MAR)  Intervention: Prevent or Manage Pain  Recent Flowsheet Documentation  Taken 4/5/2022 0523 by Mitzi Tsang, RN  Medication Review/Management: medications reviewed  Taken 4/5/2022 0123 by Mitzi Tsang RN  Medication Review/Management: medications reviewed   Goal Outcome Evaluation:      Bilateral low back pain managed with scheduled Tylenol, PRN oxycodone 10 mg and ice packs. Mobility improved overnight. Able to transfer self to bedside commode with SBA.

## 2022-04-06 ENCOUNTER — APPOINTMENT (OUTPATIENT)
Dept: PHYSICAL THERAPY | Facility: HOSPITAL | Age: 57
End: 2022-04-06
Payer: COMMERCIAL

## 2022-04-06 LAB
AMPHETAMINES UR QL SCN: ABNORMAL
BARBITURATES UR QL: ABNORMAL
BENZODIAZ UR QL: ABNORMAL
CANNABINOIDS UR QL SCN: ABNORMAL
COCAINE UR QL: ABNORMAL
CREAT SERPL-MCNC: 0.81 MG/DL (ref 0.6–1.1)
CREAT UR-MCNC: 59 MG/DL
GFR SERPL CREATININE-BSD FRML MDRD: 84 ML/MIN/1.73M2
GLUCOSE BLDC GLUCOMTR-MCNC: 127 MG/DL (ref 70–99)
GLUCOSE BLDC GLUCOMTR-MCNC: 229 MG/DL (ref 70–99)
GLUCOSE BLDC GLUCOMTR-MCNC: 259 MG/DL (ref 70–99)
GLUCOSE BLDC GLUCOMTR-MCNC: 307 MG/DL (ref 70–99)
GLUCOSE BLDC GLUCOMTR-MCNC: 315 MG/DL (ref 70–99)
OPIATES UR QL SCN: ABNORMAL
OXYCODONE UR QL: ABNORMAL
PCP UR QL SCN: ABNORMAL
PLATELET # BLD AUTO: 205 10E3/UL (ref 150–450)

## 2022-04-06 PROCEDURE — 36415 COLL VENOUS BLD VENIPUNCTURE: CPT | Performed by: MASSAGE THERAPIST

## 2022-04-06 PROCEDURE — G0378 HOSPITAL OBSERVATION PER HR: HCPCS

## 2022-04-06 PROCEDURE — 96372 THER/PROPH/DIAG INJ SC/IM: CPT | Performed by: MASSAGE THERAPIST

## 2022-04-06 PROCEDURE — 80307 DRUG TEST PRSMV CHEM ANLYZR: CPT | Performed by: PAIN MEDICINE

## 2022-04-06 PROCEDURE — 99214 OFFICE O/P EST MOD 30 MIN: CPT | Performed by: PAIN MEDICINE

## 2022-04-06 PROCEDURE — 82565 ASSAY OF CREATININE: CPT | Performed by: MASSAGE THERAPIST

## 2022-04-06 PROCEDURE — 82962 GLUCOSE BLOOD TEST: CPT

## 2022-04-06 PROCEDURE — 85049 AUTOMATED PLATELET COUNT: CPT | Performed by: MASSAGE THERAPIST

## 2022-04-06 PROCEDURE — 97116 GAIT TRAINING THERAPY: CPT | Mod: GP

## 2022-04-06 PROCEDURE — 99225 PR SUBSEQUENT OBSERVATION CARE,LEVEL II: CPT | Mod: GC

## 2022-04-06 PROCEDURE — 250N000011 HC RX IP 250 OP 636: Performed by: MASSAGE THERAPIST

## 2022-04-06 PROCEDURE — 97110 THERAPEUTIC EXERCISES: CPT | Mod: GP

## 2022-04-06 PROCEDURE — 250N000013 HC RX MED GY IP 250 OP 250 PS 637

## 2022-04-06 PROCEDURE — 250N000013 HC RX MED GY IP 250 OP 250 PS 637: Performed by: PAIN MEDICINE

## 2022-04-06 PROCEDURE — 250N000013 HC RX MED GY IP 250 OP 250 PS 637: Performed by: MASSAGE THERAPIST

## 2022-04-06 RX ORDER — GLIPIZIDE 10 MG/1
10 TABLET, FILM COATED, EXTENDED RELEASE ORAL
Status: DISCONTINUED | OUTPATIENT
Start: 2022-04-07 | End: 2022-04-07 | Stop reason: HOSPADM

## 2022-04-06 RX ORDER — TRAZODONE HYDROCHLORIDE 50 MG/1
50 TABLET, FILM COATED ORAL AT BEDTIME
Status: DISCONTINUED | OUTPATIENT
Start: 2022-04-06 | End: 2022-04-07 | Stop reason: HOSPADM

## 2022-04-06 RX ADMIN — ACETAMINOPHEN 975 MG: 325 TABLET ORAL at 13:20

## 2022-04-06 RX ADMIN — METFORMIN HYDROCHLORIDE 500 MG: 500 TABLET, FILM COATED ORAL at 16:49

## 2022-04-06 RX ADMIN — MONTELUKAST 10 MG: 10 TABLET, FILM COATED ORAL at 22:00

## 2022-04-06 RX ADMIN — CARVEDILOL 12.5 MG: 12.5 TABLET, FILM COATED ORAL at 08:38

## 2022-04-06 RX ADMIN — DOCUSATE SODIUM 50 MG AND SENNOSIDES 8.6 MG 2 TABLET: 8.6; 5 TABLET, FILM COATED ORAL at 21:59

## 2022-04-06 RX ADMIN — ROSUVASTATIN CALCIUM 20 MG: 10 TABLET, FILM COATED ORAL at 08:38

## 2022-04-06 RX ADMIN — LIDOCAINE 2 PATCH: 246 PATCH TOPICAL at 20:23

## 2022-04-06 RX ADMIN — FLUTICASONE FUROATE AND VILANTEROL TRIFENATATE 1 PUFF: 200; 25 POWDER RESPIRATORY (INHALATION) at 08:41

## 2022-04-06 RX ADMIN — METHOCARBAMOL 750 MG: 750 TABLET ORAL at 21:59

## 2022-04-06 RX ADMIN — DICLOFENAC SODIUM 2 G: 10 GEL TOPICAL at 13:20

## 2022-04-06 RX ADMIN — OXYCODONE HYDROCHLORIDE AND ACETAMINOPHEN 1 TABLET: 5; 325 TABLET ORAL at 08:53

## 2022-04-06 RX ADMIN — ACETAMINOPHEN 650 MG: 325 TABLET ORAL at 22:00

## 2022-04-06 RX ADMIN — METHOCARBAMOL 750 MG: 750 TABLET ORAL at 16:49

## 2022-04-06 RX ADMIN — INSULIN ASPART 3 UNITS: 100 INJECTION, SOLUTION INTRAVENOUS; SUBCUTANEOUS at 17:50

## 2022-04-06 RX ADMIN — METHOCARBAMOL 750 MG: 750 TABLET ORAL at 08:40

## 2022-04-06 RX ADMIN — PANTOPRAZOLE SODIUM 40 MG: 20 TABLET, DELAYED RELEASE ORAL at 06:27

## 2022-04-06 RX ADMIN — DULOXETINE HYDROCHLORIDE 30 MG: 30 CAPSULE, DELAYED RELEASE ORAL at 21:58

## 2022-04-06 RX ADMIN — LOSARTAN POTASSIUM 50 MG: 25 TABLET, FILM COATED ORAL at 08:39

## 2022-04-06 RX ADMIN — ASPIRIN 325 MG: 325 TABLET ORAL at 08:40

## 2022-04-06 RX ADMIN — DOCUSATE SODIUM 50 MG AND SENNOSIDES 8.6 MG 2 TABLET: 8.6; 5 TABLET, FILM COATED ORAL at 08:40

## 2022-04-06 RX ADMIN — ENOXAPARIN SODIUM 40 MG: 40 INJECTION SUBCUTANEOUS at 22:02

## 2022-04-06 RX ADMIN — PREGABALIN 150 MG: 75 CAPSULE ORAL at 08:40

## 2022-04-06 RX ADMIN — PREGABALIN 150 MG: 75 CAPSULE ORAL at 21:59

## 2022-04-06 RX ADMIN — ACETAMINOPHEN 650 MG: 325 TABLET ORAL at 06:28

## 2022-04-06 RX ADMIN — DICLOFENAC SODIUM 2 G: 10 GEL TOPICAL at 22:02

## 2022-04-06 RX ADMIN — PREGABALIN 150 MG: 75 CAPSULE ORAL at 13:20

## 2022-04-06 RX ADMIN — METHOCARBAMOL 750 MG: 750 TABLET ORAL at 13:19

## 2022-04-06 RX ADMIN — DICLOFENAC SODIUM 2 G: 10 GEL TOPICAL at 08:43

## 2022-04-06 RX ADMIN — OXYCODONE HYDROCHLORIDE AND ACETAMINOPHEN 1 TABLET: 5; 325 TABLET ORAL at 16:10

## 2022-04-06 RX ADMIN — OXYCODONE HYDROCHLORIDE AND ACETAMINOPHEN 1 TABLET: 5; 325 TABLET ORAL at 02:41

## 2022-04-06 RX ADMIN — CARVEDILOL 12.5 MG: 12.5 TABLET, FILM COATED ORAL at 16:48

## 2022-04-06 RX ADMIN — DULOXETINE HYDROCHLORIDE 30 MG: 30 CAPSULE, DELAYED RELEASE ORAL at 08:38

## 2022-04-06 RX ADMIN — OXYCODONE HYDROCHLORIDE AND ACETAMINOPHEN 1 TABLET: 5; 325 TABLET ORAL at 20:23

## 2022-04-06 NOTE — PROGRESS NOTES
[facility] ACUTE PAIN SERVICE    ([facility], [facility], [facility])  Daily PAIN Progress Note    Assessment/Plan:  Sarah Rahman is a 56 year old female who was admitted on 4/3/2022.   Pain Service is asked to see the patient for evaluation after falling out of bed due to weakness in both her legs and feet right side greater than left along with right arm weakness. 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   Neurology has been Consulted.   CT imaging of head not demonstrating any acute findings.   MR lumbar spine with progressive worsening of her degenerative disc disease, but no signs of abscess, malignancy or other acute process.     Chronic pain which patient states is not under control. Pain in lower back and goes down into feet and up her spine. Pain is 10/10 burning/tingling in right foot.  She follows at [facility] and is frustrated that they aren't doing more to give her pain relief. She reports injections do not help and is not interested in spinal stimulator due to infection risk which has been discussed with her in the past.       The patient does smoke (about 1/4 PPD) and drinks alcohol denies chemical dependency history (although malingering and cocaine abuse noted in her chart for >10 years, also pos for cannabinoids in urine).     In the last 24 hours, she had 5 tablets of 5-325 mg percocet, about 37.5 MME/day.   PCP visit appointment (4/27 1540 at [facility] [place] [address]) and pain clinic ([facility]) referral were made.    PLAN:   1) Chronic lower extremity pain and acute right sided worsening of pain. The patient's home MME was up to 60 mg daily. Discussed follow-up with neurology to investigate neuropathy etiology that can help with pain control. Discontinued oxycodone--use Percocet prn. Recommend using Voltaren for right foot arthritis.  Recommended smoking cessation that may be contributing to lower extremity pain. Senokot-s scheduled for chronic constipation. Minimal benefit with lyrica and would not recommend robaxin for this pain.   2)Multimodal Medication Therapy  Topical: lidocaine patches for back, voltaren gel to lower extremities TID prn  NSAID'S: none  Muscle Relaxants: Robaxin 750 mg 4 times daily  Adjuvants: acetaminophen 975 mg every 8 hours, lyrica 150 mg tid  Antidepressants/anxiolytics: cymbalta 30 bid per home dose   Opioids: Continue percocet 5-325 mg q4h prn   IV Pain medication: None. One time dose of IV hydromorphone was given 4/4/2022  3)Non-medication interventions- would recommend weight loss, exercise program, CBT   4)Constipation Prophylaxis  Senna/docusate 1 tablet bid switched to scheduled, miralax daily prn  5) Follow up   -Opioid prescriber has been [facility]  -Discharge Recommendations - We recommend prescribing the following at the time of discharge: no opioid prescriptions. Referral to a different Pain Clinic per patient request.  Resume home prescription - Do NOT refill.   Agree with EMG at discharge and offered new pain clinic referral   -Made apt with PCP on April 27th at 3:40pm. [facility].       Subjective:  Patient was sleeping in bed upon arrival, easily arousal. Reported pain 10/10 over there right toes and foot. Described the pain as tingling and burning, and stated percocet did not help much with her pain. Explained to patient that her neuropathetic pain is could contributed by her uncontrolled diabetes, and opioids are not the best treatment for neuropathy and discussed with patient about the importance of diabetic management. Also discussed with patient about primary care clinic visit and pain clinic referral after discharge. Patient walked to bathroom independently, denied SOB, nausea, vomiting and constipation, last BM was yesterday.     Call [facility] pain clinic to make  "appointment.  Did need to fax referral to new pain clinic.    Did call [facility] primary care clinic to make primary care appointment.    <principal problem not specified>   Patient Active Problem List   Diagnosis     Superficial injury of cornea     Coronary atherosclerosis     Diabetes mellitus, type 2 (H)     Schizoaffective disorder (H)     Health Care Home     Bilateral low back pain with right-sided sciatica, unspecified chronicity        History   Drug Use No         Tobacco Use      Smoking status: Current Every Day Smoker      Smokeless tobacco: Never Used          acetaminophen  975 mg Oral Q8H     aspirin  325 mg Oral Daily     carvedilol  12.5 mg Oral BID w/meals     diclofenac  2 g Topical TID     DULoxetine  30 mg Oral BID     enoxaparin ANTICOAGULANT  40 mg Subcutaneous Q24H     fluticasone  1 spray Both Nostrils Daily     fluticasone-vilanterol  1 puff Inhalation Daily     insulin aspart   Subcutaneous Daily with breakfast     insulin aspart   Subcutaneous Daily with lunch     insulin aspart   Subcutaneous Daily with supper     insulin aspart  1-7 Units Subcutaneous TID AC     insulin aspart  1-5 Units Subcutaneous At Bedtime     insulin glargine  15 Units Subcutaneous At Bedtime     lidocaine  2 patch Transdermal Q24h    And     lidocaine   Transdermal Q8H     losartan  50 mg Oral Daily     methocarbamol  750 mg Oral 4x Daily     montelukast  10 mg Oral At Bedtime     pantoprazole  40 mg Oral QAM AC     pregabalin  150 mg Oral TID     rosuvastatin  20 mg Oral Daily     senna-docusate  2 tablet Oral BID     sodium chloride (PF)  3 mL Intracatheter Q8H       Objective:  Vital signs in last 24 hours:  B/P: 147/74, T: 97.8, P: 67, R: 18   Blood pressure (!) 147/74, pulse 67, temperature 97.8  F (36.6  C), temperature source Oral, resp. rate 18, height 1.702 m (5' 7\"), weight 89.7 kg (197 lb 11.2 oz), SpO2 98 %, not currently breastfeeding.      Weight:   Wt Readings from Last 2 " Encounters:   04/03/22 89.7 kg (197 lb 11.2 oz)   10/27/21 90.7 kg (200 lb)           Intake/Output:    Intake/Output Summary (Last 24 hours) at 4/6/2022 1021  Last data filed at 4/5/2022 1730  Gross per 24 hour   Intake 720 ml   Output --   Net 720 ml        Review of Systems:   As per subjective, all others negative.    Physical Exam:     General Appearance:  Alert, cooperative, no distress, appears stated age  Patient is sleeping and resting in bed   Head:  Normocephalic, without obvious abnormality, atraumatic   Eyes:  PERRL, conjunctiva/corneas clear, EOM's intact   ENT/Throat: Lips normal    Lymph/Neck: Supple, symmetrical, trachea midline, no adenopathy, thyroid: not enlarged, symmetric    Lungs:   Clear to auscultation bilaterally, respirations unlabored   Chest Wall:  No tenderness or deformity   Cardiovascular/Heart:  Regular rate and rhythm, S1, S2 normal,no murmur, rub or gallop.     Abdomen:   Soft, non-tender, bowel sounds active all four quadrants,  no masses, no organomegaly   Musculoskeletal: Extremities normal, atraumatic   Skin: Skin color normal, no lesions noted   Neurologic: Alert and oriented X 3, Moves all 4 extremities       Psych: Affect is somatic focused  Grooming is adequeat           Imaging:  Personally Reviewed.  No images are attached to the encounter.     Lab Results:  Personally Reviewed.   Last Comprehensive Metabolic Panel:  Sodium   Date Value Ref Range Status   04/04/2022 135 (L) 136 - 145 mmol/L Final   05/18/2010 136 133 - 144 mmol/L Final     Potassium   Date Value Ref Range Status   04/04/2022 4.7 3.5 - 5.0 mmol/L Final   05/18/2010 3.7 3.4 - 5.3 mmol/L Final     Chloride   Date Value Ref Range Status   04/04/2022 106 98 - 107 mmol/L Final   05/18/2010 105 94 - 109 mmol/L Final     Carbon Dioxide   Date Value Ref Range Status   05/18/2010 26 20 - 32 mmol/L Final     Carbon Dioxide (CO2)   Date Value Ref Range Status   04/04/2022 19 (L) 22 - 31 mmol/L Final     Anion Gap    Date Value Ref Range Status   04/04/2022 10 5 - 18 mmol/L Final   05/18/2010 6 6 - 17 mmol/L Final     Glucose   Date Value Ref Range Status   04/04/2022 277 (H) 70 - 125 mg/dL Final   04/22/2011 159.0 (H) 60.0 - 109.0 mg/dL Final     GLUCOSE BY METER POCT   Date Value Ref Range Status   04/06/2022 307 (H) 70 - 99 mg/dL Final     Urea Nitrogen   Date Value Ref Range Status   04/04/2022 21 8 - 22 mg/dL Final   05/18/2010 15 5 - 24 mg/dL Final     Creatinine   Date Value Ref Range Status   04/06/2022 0.81 0.60 - 1.10 mg/dL Final   05/18/2010 0.68 0.52 - 1.04 mg/dL Final     Comment:     New IDMS-traceable calibration  beginning 5/1/08     GFR Estimate   Date Value Ref Range Status   04/06/2022 84 >60 mL/min/1.73m2 Final     Comment:     Effective December 21, 2021 eGFRcr in adults is calculated using the 2021 CKD-EPI creatinine equation which includes age and gender (Cary et al., NEJM, DOI: 10.1056/JXLNlc2914229)   07/09/2021 57 (L) >60 mL/min/1.73m2 Final   05/18/2010 >90 >60 mL/min/1.7m2 Final     Calcium   Date Value Ref Range Status   04/04/2022 8.2 (L) 8.5 - 10.5 mg/dL Final   05/18/2010 8.2 (L) 8.5 - 10.4 mg/dL Final        UA:   Amphetamines Urine   Date Value Ref Range Status   10/23/2018 Screen Negative Screen Negative Final     Barbiturates Urine   Date Value Ref Range Status   10/23/2018 Screen Negative Screen Negative Final     Cannabinoids Urine   Date Value Ref Range Status   10/23/2018 (A) Screen Negative Final    Screen Positive (Confirmation available on request)     Cocaine Urine   Date Value Ref Range Status   10/23/2018 Screen Negative Screen Negative Final     Opiates Urine   Date Value Ref Range Status   10/23/2018 (A) Screen Negative Final    Screen Positive (Confirmation available on request)     PCP Urine   Date Value Ref Range Status   10/23/2018 Screen Negative Screen Negative Final            Total time spent 25 minutes with greater than 50% in consultation, education and coordination of  care.     Also discussed with CHEO Merritt,  we discussed the pain treatment plan and med changes.      I, Avril Miner CNP personally performed the services described in this documentation, as scribed by Ashley Patel ([facility]) in my presence, and it is both accurate and complete.       Avril BREWSTER, CNS-BC, CNP, [facility]  Acute Care Pain Management Program Hour 7a-1700  [facility] ([place], [place], [place])   Page via [facility]- Click HERE to page Avril or call [phone]

## 2022-04-06 NOTE — PROGRESS NOTES
[facility]    Medicine Progress Note - Hospitalist Service    Date of Admission:  4/3/2022    Assessment & Plan          Sarah Rahman is a 56 year old female admitted on 4/3/2022. 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 Continues to be admitted for PT and DM management.    Bilateral leg pain and weakness, R> L -- improving  Right arm weakness   Chronic pain exacerbation  Patient describes 1 day of burning pain in both feet that radiates up to her lower back, morning of admission also noted weakness in her legs which made it difficult to ambulate and weakness in her right arm. Patient reports history of CVA in October, and a cervical MRI from that time shows myelomalacia. MRI brain on admission negative for acute process.  MR lumbar spine with progressive worsening of her degenerative disc disease, but no signs of abscess, malignancy or other acute process.  Most likely etiology appears to be lumbar radiculopathy combined with diabetic peripheral neuropathy. Neuruosurgery saw patient and recommended outpatient follow-up if patient desires but no surgical need at this time.  Patient continues to improve and is now ambulating alone to the bathroom. Integrative medicine saw patient this morning and per chart review patient seemed to have good response to this.   -Pain team consulted-appreciate recommendations   Percocet PRN - to discontinue on discharge   acupuncture and integrative medicine -- will try to get set up with integrative medicine on discharge  -Will need outpatient EMG  -Current pain plan  Scheduled Tylenol  Lidocaine patch  Cymbalta  Continue PTA Lyrica, Robaxin, Voltaren gel  -PT/OT -- recommend outpatient therapy, resources provided    Diabetes mellitus  Blood sugar on arrival 345.  Glucose on UA.  A1c >14.0  She reports she is not currently on insulin at home, appears she was potentially on it in the passed. Diabetes education saw patient and discussed outpatient management. Sugars continue to be elevated and needing a lot of carb correction. Will increase to Lantus. Patient not on metformin so will add this today. Patient would likely benefit from GLP-1 or SGLT-2 outpatient in setting of other comorbidities -- potential financial concerns however. Will defer to new PCP for this.   -increase to 20 units Lantus at bedtime  -Metformin 500mg daily  -1:10 Carb coverage  - Medium correction scale  - holding PTA glipizide    Chronic concerns:   COPD  Asthma  Continue PTA albuterol, Symbicort, Singulair    CAD, s/p CABG  Hypertension  Hyperlipidemia  Some pulmonary congestion seen on admission chest x-ray, not appear significantly volume up on exam. Satting well on RA.  Continue PTA aspirin, carvedilol, losartan, rosuvastatin    Seasonal allergies- Continue PTA Zyrtec, Flonase  Schizoaffective disorder- Continue PTA Cymbalta  GERD-continue PTA omeprazole       Diet: Combination Diet Regular Diet Adult    DVT Prophylaxis: Enoxaparin (Lovenox) SQ  Diehl Catheter: Not present  Central Lines: None  Cardiac Monitoring: None  Code Status: Full Code      Disposition Plan   Expected Discharge: 04/06/2022     Anticipated discharge location:  Home  Delays:  pain control, uncontrolled DM, weakness       The patient's care was discussed with the Attending Physician, Dr. Dejesus.    Deana Meza MD  Hospitalist Service  [facility]  Securely message with the Vocera Web Console (learn more here)  Text page via [employer] Paging/Directory         Clinically Significant Risk Factors Present on Admission                # Platelet Defect: home medication list includes an antiplatelet medication   # Obesity: Estimated body mass index is 30.96 kg/m  as calculated from the following:    Height as of this encounter:  "1.702 m (5' 7\").    Weight as of this encounter: 89.7 kg (197 lb 11.2 oz).      ______________________________________________________________________    Interval History   Patient doing well this morning. Just waking up. Still rating high pain in legs however she is now able to ambulate on her own. Watched her ambulate to the bathroom without difficulty. She would like to stay another day. Denies shortness of breath, or chest pain.     Data reviewed today: I reviewed all medications, new labs and imaging results over the last 24 hours. I personally reviewed no images or EKG's today.    Physical Exam   Vital Signs: Temp: 97.6  F (36.4  C) Temp src: Oral BP: (!) 144/67 Pulse: 66   Resp: 19 SpO2: 100 % O2 Device: None (Room air)    Weight: 197 lbs 11.2 oz  Constitutional: laying comfortably in bed, awake, alert, and appears stated age. Ambulated to bathroom  Eyes: Lids and lashes normal, pupils equal, round and reactive to light conjunctiva normal  Respiratory: No increased work of breathing, good air exchange, clear to auscultation bilaterally, no crackles or wheezing  Cardiovascular: regular rate and rhythm, normal S1 and S2, no S3 or S4, and no murmur noted  GI: normal bowel sounds and non-distended  Skin: no bruising or bleeding and normal skin color, texture, turgor  Musculoskeletal: no lower extremity pitting edema present, bilateral LE strength 5/5, ROM and sensation intact      Data   Recent Labs   Lab 04/06/22  1249 04/06/22  0750 04/06/22  0624 04/06/22  0302 04/04/22  0727 04/04/22  0621 04/03/22  2130 04/03/22  2103 04/03/22  1436   WBC  --   --   --   --   --  5.9  --   --  5.8   HGB  --   --   --   --   --  11.7  --   --  12.1   MCV  --   --   --   --   --  82  --   --  84   PLT  --   --  205  --   --  211  --   --  237   INR  --   --   --   --   --   --   --   --  0.95   NA  --   --   --   --   --  135*  --   --  136   POTASSIUM  --   --   --   --   --  4.7  --   --  4.3   CHLORIDE  --   --   --   --   " --  106  --   --  103   CO2  --   --   --   --   --  19*  --   --  23   BUN  --   --   --   --   --  21  --   --  19   CR  --   --  0.81  --   --  1.05 0.68  --  0.92   ANIONGAP  --   --   --   --   --  10  --   --  10   MAXIMO  --   --   --   --   --  8.2*  --   --  8.8   * 307*  --  315*   < > 277*  --    < > 345*    < > = values in this interval not displayed.     No results found for this or any previous visit (from the past 24 hour(s)).

## 2022-04-06 NOTE — PLAN OF CARE
PRIMARY DIAGNOSIS: ACUTE PAIN/Bilateral lower back right leg.  OUTPATIENT/OBSERVATION GOALS TO BE MET BEFORE DISCHARGE:  1. Pain Status: Improved with use of alternative comfort measures i.e.: positioning    2. Return to near baseline physical activity: Yes    3. Cleared for discharge by consultants (if involved): Yes    Discharge Planner Nurse   Safe discharge environment identified: Yes  Barriers to discharge: No       Entered by: Shaina Rios 04/06/2022 12:16 PM     Please review provider order for any additional goals.   Nurse to notify provider when observation goals have been met and patient is ready for discharge.Goal Outcome Evaluation:    Patient continues to complain of back pain more so on right always 10/10. Getting scheduled and PRN medications. Has been up to bathroom  several time independently. Urine specimen collected and sent. Patient verbalizing wanting to stay till tomorrow. MD will discuss with patient. Eating well refuses diabetic diet. Complains of numbness and tingling of LE educated on neuropathy due to diabetes. No further issues at this time. Will continue to offer emotional support.

## 2022-04-06 NOTE — CONSULTS
Integrative Therapy Consult    Healing PresenceYes  Essential Oils: Topical (EO/Topical Oil)     Lavender Massage Oil - HC       Healing Music: TV/Care channel     Breathwork:       Guided Imagery:       Acupressure:       Oshibori:       Energy Therapy:       Healing Touch:       Reiki:       Qi Gong:     Massage: Foot      Targeted Massage:    Sleep Promotion:       Other Therapy:       Intervention Reason: Anxiety/Stress, Pain     Pre and Post Session Scores: Patient Desires Treatment: yes  Pre-Session Anxiety: 7  Post-session Anxiety: 3     Pre-session Pain: 10 Post-session Pain: 2               Delivery:         Referrals:      Arely Maynard

## 2022-04-06 NOTE — PLAN OF CARE
"  Problem: Mobility Impairment  Goal: Optimal Mobility  Outcome: Ongoing, Progressing     Problem: Pain Acute  Goal: Acceptable Pain Control and Functional Ability  Outcome: Ongoing, Progressing  Intervention: Develop Pain Management Plan  Recent Flowsheet Documentation  Taken 4/6/2022 0241 by Mitzi Tsang RN  Pain Management Interventions: medication (see MAR)  Intervention: Prevent or Manage Pain  Recent Flowsheet Documentation  Taken 4/6/2022 0030 by Mitzi Tsang RN  Medication Review/Management: medications reviewed   Goal Outcome Evaluation:        Observed pt ambulating into bathroom independently without difficulties. Pain appears to be managed with scheduled Tylenol and PRN Percocet. Consistently rates pain \"severe\" 10/10 although does not exhibit those symptoms. Has been lying in bed quietly, no tense facial appearance, talking normally.            "

## 2022-04-06 NOTE — DISCHARGE SUMMARY
[facility]  Hospitalist Discharge Summary      Date of Admission:  4/3/2022  Date of Discharge:  4/7/2022  Discharging Provider: Deana Meza MD  Discharge Service: Hospitalist Service    Discharge Diagnoses   Bilateral leg pain and weakness  Neuropathy, likely diabetic neuropathy  Acute on Chronic pain exacerbation  Uncontrolled DM    Follow-ups Needed After Discharge   -medication management    -insulin and metformin started on discharge   -EMG in June for confirmation of diabetic neuropathy  -PT/OT outpatient -- places provided on discharge  -new pain clinic referral sent and schedule as below  -integrative medicine services provided to patient as this was the most helpful with pain relief while inpatient    -Made apt with PCP on April 27th at 3:40pm. [facility].  -Made apt with Pain Clinic on May 4th at 10am. [facility] [place].  -Made apt for EMG on June 6th at 8:30am. [facility]    Discharge Disposition   Discharged to home  Condition at discharge: Stable    Hospital Course          Sarah Rahman is a 56 year old female admitted on 4/3/2022. 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 She was admitted for acute on chronic lower extremity pain and weakness in setting of uncontrolled DM and worsening lumbar disc disease.     Bilateral leg pain and weakness, R> L  Chronic pain exacerbation  Patient described 1 day of burning pain in both feet that radiated up to her lower back, morning of admission also noted weakness in her legs which made it difficult to ambulate and weakness in her right arm. Patient reports history of CVA in October (although unclear on this diagnosis), and a cervical MRI from that time shows myelomalacia. MRI brain on admission was  negative for acute process.  MR lumbar spine with progressive worsening of her degenerative disc disease. Most likely etiology of pain is lumbar radiculopathy combined with diabetic peripheral neuropathy in setting of uncontrolled diabetes (A1c of 14). Neuruosurgery saw patient while here and recommended outpatient follow-up if patient desires but no surgical need at this time.  She was ambulating on her own on day of discharge. Pain team followed along with patient and had integrative medicine see the patient. PT and OT recommended outpatient rehab and services were provided. She had the most success with pain relief with integrative medicine and massage while inpatient. Per pain team, no opioids prescribed on discharge given nerve pain as main cause of most of her pain.     Uncontrolled Diabetes mellitus  Blood sugar on arrival 345.  Glucose on UA. A1c >14.0  She was not on insulin prior to admission. Appears she was potentially on it in the passed. Diabetes education saw patient yesterday as well. Blood sugars slowly improved throughout stay. On day of discharge fasting was 163. She had been requiring significant amount of meal correction that was not continued on discharge as insulin was new for her. Her regimen sent on discharge was 20U of lantus, PTA 10mg glipizide, and metformin 500mg XL. Patient would likely benefit from GLP-1 or SGLT-2 outpatient in setting of other comorbidities -- potential financial concerns however. Will need ongoing titration of medications.     Chronic concerns:   COPD  Asthma  No issues while inpatient and her PTA meds were continued (albuterol, Symbicort, Singulair)    CAD, s/p CABG  Hypertension  Hyperlipidemia  Some pulmonary congestion seen on admission chest x-ray, not appear significantly volume up on exam. Remained on RA throughout stay. No further intervention. Continued PTA aspirin, carvedilol, losartan, rosuvastatin.    Consultations This Hospital Stay   PHYSICAL THERAPY  ADULT IP CONSULT  OCCUPATIONAL THERAPY ADULT IP CONSULT  PAIN MANAGEMENT ADULT IP CONSULT  DIABETES EDUCATION IP CONSULT  NEUROSURGERY IP CONSULT  NEUROLOGY IP CONSULT    Code Status   Full Code    Precepted patient with Dr. Saul Meza MD  [address] [address]  Phone: [phone]  Fax: [phone]  ______________________________________________________________________    Physical Exam   Vital Signs: Temp: 97.4  F (36.3  C) Temp src: Oral BP: 134/64 Pulse: 64   Resp: 18 SpO2: 96 % O2 Device: None (Room air)    Weight: 197 lbs 11.2 oz  Constitutional: laying comfortably in bed, awake, alert, and appears stated age.  Eyes: Lids and lashes normal, pupils equal, round and reactive to light conjunctiva normal  Respiratory: No increased work of breathing, good air exchange, clear to auscultation bilaterally, no crackles or wheezing  Cardiovascular: regular rate and rhythm, normal S1 and S2, no S3 or S4, and no murmur noted  GI: normal bowel sounds, nontender, and non-distended  Skin: no bruising or bleeding and normal skin color, texture, turgor  Musculoskeletal: no lower extremity pitting edema present, bilateral LE strength 5/5, ROM and sensation intact          Primary Care Physician   HARRIET RIZVI    Discharge Orders       Significant Results and Procedures   Most Recent 3 CBC's:Recent Labs   Lab Test 04/06/22  0624 04/04/22  0621 04/03/22  1436 10/27/21  1258   WBC  --  5.9 5.8 8.5   HGB  --  11.7 12.1 11.2*   MCV  --  82 84 86    211 237 197     Most Recent 3 BMP's:Recent Labs   Lab Test 04/07/22  0719 04/06/22  2115 04/06/22  1653 04/06/22  0750 04/06/22  0624 04/04/22  0727 04/04/22  0621 04/03/22 2130 04/03/22  2103 04/03/22  1436 04/03/22  1432 10/27/21  1258   NA  --   --   --   --   --   --  135*  --   --  136  --  138   POTASSIUM  --   --   --   --   --   --  4.7  --   --  4.3  --  4.3   CHLORIDE  --   --   --   --    --   --  106  --   --  103  --  105   CO2  --   --   --   --   --   --  19*  --   --  23  --  25   BUN  --   --   --   --   --   --  21  --   --  19  --  16   CR  --   --   --   --  0.81  --  1.05 0.68  --  0.92  --  0.85   ANIONGAP  --   --   --   --   --   --  10  --   --  10  --  8   MAXIMO  --   --   --   --   --   --  8.2*  --   --  8.8  --  8.8   * 259* 229*   < >  --    < > 277*  --    < > 345*   < > 436*    < > = values in this interval not displayed.     Most Recent Hemoglobin A1c:Recent Labs   Lab Test 04/03/22  1436   A1C >14.0*     Most Recent 6 glucoses:Recent Labs   Lab Test 04/07/22  0719 04/06/22  2115 04/06/22  1653 04/06/22  1249 04/06/22  0750 04/06/22  0302   * 259* 229* 127* 307* 315*   ,   Results for orders placed or performed during the hospital encounter of 04/03/22   CTA Head Neck with Contrast    Narrative    EXAM: CTA  HEAD NECK WITH CONTRAST  LOCATION: [facility]  DATE/TIME: 4/3/2022 2:53 PM    INDICATION: Code Stroke to evaluate for potential thrombolysis and thrombectomy.  PLEASE READ IMMEDIATELY.  COMPARISON: 12/03/2021  CONTRAST: ISOVUE 370 75 mL  TECHNIQUE: Head and neck CT angiogram with IV contrast. Noncontrast head CT followed by axial helical CT images of the head and neck vessels obtained during the arterial phase of intravenous contrast administration. Axial 2D reconstructed images and   multiplanar 3D MIP reconstructed images of the head and neck vessels were performed by the technologist. Dose reduction techniques were used. All stenosis measurements made according to NASCET criteria unless otherwise specified.    FINDINGS:   NONCONTRAST HEAD CT:   INTRACRANIAL CONTENTS: No intracranial hemorrhage, extraaxial collection, or mass effect.  No CT evidence of acute infarct. Mild presumed chronic small vessel ischemic changes. Mild generalized volume loss. No hydrocephalus.     VISUALIZED ORBITS/SINUSES/MASTOIDS: No intraorbital abnormality. No  paranasal sinus mucosal disease. No middle ear or mastoid effusion.    BONES/SOFT TISSUES: No acute abnormality.      HEAD CTA:  ANTERIOR CIRCULATION: Mild to moderate multifocal stenoses of the bilateral cavernous internal carotid arteries. No high-grade stenosis, vascular occlusion, aneurysm, or high flow vascular malformation. Standard [place] of [place] anatomy.    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 Mild focal stenosis of the proximal V4 segment of the right vertebral artery. No high-grade stenosis, vascular   occlusion, aneurysm, or high flow vascular malformation. Balanced vertebral arteries supply a normal basilar artery.     DURAL VENOUS SINUSES: Expected enhancement of the major dural venous sinuses.      NECK CTA:  RIGHT CAROTID: Atherosclerotic plaque results in less than 50% stenosis in the right ICA. No dissection.    LEFT CAROTID: Atherosclerotic plaque results in less than 50% stenosis in the left ICA. No dissection.    VERTEBRAL ARTERIES: No focal stenosis or dissection. Balanced vertebral arteries.    AORTIC ARCH: Common origin of the brachiocephalic and left common carotid arteries. No significant stenosis at the origin of the great vessels.    NONVASCULAR STRUCTURES: Mild degenerative cervical spondylosis. Visualized lung apices are unremarkable. Prior median sternotomy.          Impression    IMPRESSION:   HEAD CT:  1.  No acute intracranial hemorrhage, mass effect, or CT evidence for acute infarction.  2.  Mild global brain parenchymal volume loss with presumed sequelae of mild chronic small vessel ischemic disease.    HEAD CTA:   1.  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  2.  Mild to moderate multifocal atherosclerotic stenoses of the bilateral cavernous internal carotid artery segments, also unchanged.    NECK CTA:  1.  Less than 50% atherosclerotic stenosis in the  proximal internal carotid arteries bilaterally.  2.  The balanced vertebral arteries widely patent in the neck.      Results discussed with Vonnie Flood on 4/3/2022 2:57 PM.  Results discussed with Vonnie Flood on 4/3/2022 3:13 PM.   Lumbar spine MRI w/o contrast    Narrative    EXAM: MR LUMBAR SPINE W/O CONTRAST  LOCATION: [facility]  DATE/TIME: 4/3/2022 4:58 PM    INDICATION: Bilateral leg weakness, right more than left.  COMPARISON: Lumbar MRI 05/02/2021.  TECHNIQUE: Routine Lumbar Spine MRI without IV contrast.    FINDINGS: Image quality is degraded by motion. The patient terminated the study prior to acquisition of axial T1 imaging. Due to motion, the axial T2 images do not correlate with localization markers on sagittal images.    Nomenclature is based on 5 lumbar type vertebral bodies. Normal vertebral body heights and alignment. Redemonstrated disc degeneration with type II endplate changes L3-L4. Progressive disc degeneration at L5-S1 with development of prominent endplate   edema extending to the mid L5 vertebral body. Mild facet edema at L5-S1. Marrow signal intensity is otherwise unremarkable. Normal distal spinal cord and cauda equina with conus medullaris at mid L2. No extraspinal abnormality. Unremarkable visualized   bony pelvis.    T12-L1: Normal disc height without herniation. Mild facet arthropathy. No spinal canal or foraminal stenosis.     L1-L2: Normal disc height. No herniation. Mild facet arthropathy. No spinal canal or foraminal stenosis.     L2-L3: Mild disc space narrowing with mild disc bulge. No herniation. Mild facet arthropathy. No spinal canal or foraminal stenosis.    L3-L4: Severe disc space narrowing with moderate disc bulge and marginal osteophyte, similar to the previous study. Mild facet arthropathy. Moderate spinal canal stenosis. Moderate bilateral foraminal stenoses.     L4-L5: Mild disc space narrowing with mild disc bulge. Moderate facet arthropathy  on the right and mild facet arthropathy on the left. No spinal canal stenosis. No foraminal stenosis.    L5-S1: Severe disc space narrowing with moderate disc bulge and marginal osteophyte. Mild facet arthropathy. Previous right-sided laminectomy. No spinal canal stenosis. Moderate to severe bilateral foraminal stenoses.      Impression    IMPRESSION:  1.  Progressive disc degeneration at L5-S1 with new moderate endplate edema most consistent with type I endplate change. Right-sided facet edema at L5-S1 consistent with active facet arthropathy. Moderate to severe bilateral foraminal stenoses at this   level.  2.  Unchanged severe disc degeneration L3-L4 with disc bulge and marginal osteophyte resulting in moderate spinal canal and foraminal stenoses.  3.  Mild spondylosis at other levels without significant spinal canal or foraminal stenosis.   Chest XR,  PA & LAT    Narrative    EXAM: XR CHEST 2 VW  LOCATION: [facility]  DATE/TIME: 4/3/2022 5:34 PM    INDICATION: Cough.  COMPARISON: Chest x-rays dated 10/27/2021.      Impression    IMPRESSION:   1.  Lungs are underpenetrated as compared to the prior study.  2.  Cardiac silhouette enlargement, sternal cerclage wires and multiple mediastinal clips (indicating prior CABG) are again noted.  3.  Pulmonary vascular congestion appears slightly worsened as compared to the prior study. This could be partially due to technique but could also represent mild congestive heart failure.  4.  Mild perihilar peribronchial cuffing could be associated with mild bronchitis.  5.  No definite pneumothorax or significant pleural fluid collection. No acute osseous fracture.   MR Brain w/o Contrast    Narrative    EXAM: MR BRAIN W/O CONTRAST  LOCATION: [facility]  DATE/TIME: 4/3/2022 4:58 PM    INDICATION: Right-sided weakness and numbness  COMPARISON: CTA head and neck on the same day  TECHNIQUE: Routine multiplanar multisequence head MRI  without intravenous contrast.    FINDINGS:  The exam is moderately degraded by motion.    INTRACRANIAL CONTENTS: No acute or subacute infarct. No mass, acute hemorrhage, or extra-axial fluid collections. Patchy nonspecific T2/FLAIR hyperintensities within the cerebral white matter most consistent with mild to moderate chronic microvascular   ischemic change. Mild generalized cerebral atrophy. No hydrocephalus. Normal position of the cerebellar tonsils.     SELLA: No abnormality accounting for technique.    OSSEOUS STRUCTURES/SOFT TISSUES: Normal marrow signal. The major intracranial vascular flow voids are maintained.     ORBITS: No abnormality accounting for technique.     SINUSES/MASTOIDS: No paranasal sinus mucosal disease. Scattered fluid/membrane thickening in the right mastoid air cells. No apparent mass in the posterior nasopharynx or skull base.       Impression    IMPRESSION:  1.  Motion degraded exam.  2.  No acute findings.  3.  Age-related changes.       Discharge Medications   Current Discharge Medication List      START taking these medications    Details   acetaminophen (TYLENOL) 325 MG tablet Take 3 tablets (975 mg) by mouth every 8 hours  Qty: 270 tablet, Refills: 0    Associated Diagnoses: Bilateral low back pain with right-sided sciatica, unspecified chronicity      alcohol swab prep pads Use to swab area of injection/zac as directed.  Qty: 100 each, Refills: 6    Associated Diagnoses: Type 2 diabetes mellitus with diabetic polyneuropathy, with long-term current use of insulin (H)      blood glucose (ACCU-CHEK DARRIN PLUS) test strip Use to test blood sugar 3-4 times daily or as directed.  Qty: 100 strip, Refills: 0    Associated Diagnoses: Type 2 diabetes mellitus with diabetic polyneuropathy, with long-term current use of insulin (H)      blood glucose (ACCU-CHEK SOFTCLIX) lancing device Lancing device to be used with lancets.  Qty: 1 each, Refills: 0    Associated Diagnoses: Type 2 diabetes  mellitus with diabetic polyneuropathy, with long-term current use of insulin (H)      blood glucose monitoring (SOFTCLIX) lancets Use to test blood sugar 3-4 times daily.  Qty: 100 each, Refills: 1    Associated Diagnoses: Type 2 diabetes mellitus with diabetic polyneuropathy, with long-term current use of insulin (H)      insulin pen needle (32G X 4 MM) 32G X 4 MM miscellaneous Use 1 pen needles daily or as directed.  Qty: 100 each, Refills: 0    Associated Diagnoses: Type 2 diabetes mellitus with diabetic polyneuropathy, with long-term current use of insulin (H)      metFORMIN (GLUCOPHAGE) 500 MG tablet Take 1 tablet (500 mg) by mouth daily (with dinner)  Qty: 30 tablet, Refills: 0    Associated Diagnoses: Type 2 diabetes mellitus with diabetic polyneuropathy, with long-term current use of insulin (H)      nystatin (MYCOSTATIN) 299712 UNIT/GM external ointment Apply topically 2 times daily  Qty: 15 g, Refills: 0    Associated Diagnoses: Vaginal itching         CONTINUE these medications which have CHANGED    Details   glipiZIDE (GLUCOTROL XL) 10 MG 24 hr tablet Take 1 tablet (10 mg) by mouth daily (with breakfast)  Qty: 30 tablet, Refills: 0    Associated Diagnoses: Type 2 diabetes mellitus with diabetic polyneuropathy, with long-term current use of insulin (H)      insulin glargine (LANTUS PEN) 100 UNIT/ML pen Inject 20 Units Subcutaneous At Bedtime  Qty: 15 mL, Refills: 0    Comments: If Lantus is not covered by insurance, may substitute Basaglar or Semglee or other insulin glargine product per insurance preference at same dose and frequency.    Associated Diagnoses: Type 2 diabetes mellitus with diabetic polyneuropathy, with long-term current use of insulin (H)         CONTINUE these medications which have NOT CHANGED    Details   albuterol (PROAIR HFA) 108 (90 BASE) MCG/ACT inhaler Inhale  into the lungs. Inhale 1-2 puffs every 4 to 6 hours as needed      aspirin (ASA) 325 MG EC tablet Take 325 mg by mouth  daily       budesonide-formoterol (SYMBICORT) 160-4.5 MCG/ACT Inhaler Inhale 2 puffs into the lungs 2 times daily      carvedilol (COREG) 12.5 MG tablet Take 12.5 mg by mouth 2 times daily (with meals)      cetirizine (ZYRTEC) 10 MG tablet Take 10 mg by mouth daily      COMPOUND CONTAINING CONTROLLED SUBSTANCE (CMPD RX) - PHARMACY TO MIX COMPOUNDED MEDICATION neuro PLO pain gel w/ lidocaine(ketamine 8%-gabapentin 6%-lidocaine 2.5%)([facility] AMB MIXTURE)      diclofenac (VOLTAREN) 1 % topical gel Apply 2 g topically 3 times daily as needed for moderate pain (feet)      diphenhydrAMINE (BENADRYL) 25 MG tablet Take 25 mg by mouth every 6 hours as needed for itching or allergies      DULoxetine (CYMBALTA) 30 MG capsule Take 30 mg by mouth 2 times daily      fluticasone (FLONASE) 50 MCG/ACT nasal spray Spray 1 spray into both nostrils daily      furosemide (LASIX) 20 MG tablet Take 20 mg by mouth daily as needed (swelling)      guaiFENesin-codeine (ROBITUSSIN AC) 100-10 MG/5ML solution Take 1-2 teaspoonful by mouth every 4 hours as needed for cough      ipratropium - albuterol 0.5 mg/2.5 mg/3 mL (DUONEB) 0.5-2.5 (3) MG/3ML neb solution Take 1 vial by nebulization every 6 hours as needed for shortness of breath / dyspnea or wheezing      Lidocaine (LIDOCARE) 4 % Patch Place 1 patch onto the skin every 24 hours To prevent lidocaine toxicity, patient should be patch free for 12 hrs daily. BACK      losartan (COZAAR) 50 MG tablet Take 50 mg by mouth daily.      methocarbamol (ROBAXIN) 750 MG tablet Take 750 mg by mouth 4 times daily      montelukast (SINGULAIR) 10 MG tablet Take 10 mg by mouth At Bedtime      nitroGLYcerin (NITROSTAT) 0.4 MG sublingual tablet Place 0.4 mg under the tongue every 5 minutes as needed for chest pain For chest pain place 1 tablet under the tongue every 5 minutes for 3 doses. If symptoms persist 5 minutes after 1st dose call 911.      omeprazole 20 MG tablet Take 20 mg by mouth daily      pregabalin  "(LYRICA) 150 MG capsule Take 150 mg by mouth 3 times daily      rosuvastatin (CRESTOR) 20 MG tablet Take 20 mg by mouth daily      senna-docusate (SENOKOT-S/PERICOLACE) 8.6-50 MG tablet Take 1 tablet by mouth daily      urea (DERMAL THERAPY FINGER CARE) 20 % external lotion Feet as needed          STOP taking these medications       oxyCODONE-acetaminophen (PERCOCET)  MG per tablet Comments:   Reason for Stopping:             Allergies   Allergies   Allergen Reactions     Haloperidol Unknown     Patient states it stops her heart       Phenothiazines Other (See Comments)     Other reaction(s): CARDIAC DISTURBANCES  Patient states it stops her heart  \"I swell up\"  \"stopped by heart\"  \"I swell up\"  \"I swell up\"       Tramadol Other (See Comments)     Other reaction(s): Angioedema  Throat itch       Demerol      Latex Itching     Lisinopril Other (See Comments)     ACE cough  Itchy throat  Throat itches  Itchy throat       Penicillins      Hydroxyzine Other (See Comments) and Rash     Tongue swelling  Tongue swelling  Tongue swelling       "

## 2022-04-06 NOTE — PLAN OF CARE
PRIMARY DIAGNOSIS: ACUTE PAIN/bilateral lower back right leg  OUTPATIENT/OBSERVATION GOALS TO BE MET BEFORE DISCHARGE:  1. Pain Status: Improved-controlled with oral pain medications.    2. Return to near baseline physical activity: Yes    3. Cleared for discharge by consultants (if involved): Yes    Discharge Planner Nurse   Safe discharge environment identified: Yes  Barriers to discharge: No       Entered by: Shaina Rios 04/06/2022 1:27 PM     Please review provider order for any additional goals.   Nurse to notify provider when observation goals have been met and patient is ready for discharge.Goal Outcome Evaluation:  Patient reporting pain 5/10 this afternoon! Said therapist came in to room and did some touch therapy to her. Given scheduled medications this afternoon and warm blanket. Ate 100% meal. Patient carb intake this morning was 185, this afternoon 104 then ordered another snack worth 69 carbs.

## 2022-04-06 NOTE — PROGRESS NOTES
Care Management Follow Up    Length of Stay (days): 0    Expected Discharge Date: 04/06/2022     Concerns to be Addressed:     Medical Progression-pain control.   Patient plan of care discussed at interdisciplinary rounds: Yes    Anticipated Discharge Disposition:  Home     Anticipated Discharge Services:  Therapy recommending home care, due to no PCP, may need to attend outpatient therapy.   Anticipated Discharge DME:  To be determined    Patient/family educated on Medicare website which has current facility and service quality ratings:  Yes  Education Provided on the Discharge Plan:  Per Team  Patient/Family in Agreement with the Plan:  Yes    Referrals Placed by CM/SW:  None  Private pay costs discussed: Not applicable    Additional Information:  Met with patient in room to discuss new primary care provider; she does not have a clinic preference, would like to remain in [facility]. Patient needing new PCP as her current primary care clinic does not accept her insurance. Received notification from resident that the pain team has set up an appointment for new PCP with [facility] for 4/27. Patient would not qualify for home care until visit to establish primary care clinic. Attached list of outpatient therapy locations to discharge instructions. Family to transport. Care manager to follow.       Veena Sanford RN

## 2022-04-06 NOTE — PLAN OF CARE
Problem: Diabetes Comorbidity  Goal: Blood Glucose Level Within Targeted Range  Outcome: Ongoing, Progressing   Goal Outcome Evaluation:   and 127    Ate 100% meals. Patient carb intake this morning was 185, this afternoon 104 then ordered another snack worth 69 carbs. Received Insulin per meal time orders.

## 2022-04-06 NOTE — PLAN OF CARE
"PRIMARY DIAGNOSIS: \"GENERIC\" NURSING  OUTPATIENT/OBSERVATION GOALS TO BE MET BEFORE DISCHARGE:  ADLs back to baseline: No    Activity and level of assistance: Ambulating independently.    Pain status: Improved-controlled with oral pain medications.    Return to near baseline physical activity: Yes     Discharge Planner Nurse   Safe discharge environment identified: Yes  Barriers to discharge: Yes       Entered by: Ariana Langley 04/06/2022 5:25 PM     Please review provider order for any additional goals.   Nurse to notify provider when observation goals have been met and patient is ready for discharge.Goal Outcome Evaluation:                      "

## 2022-04-07 VITALS
WEIGHT: 197.7 LBS | SYSTOLIC BLOOD PRESSURE: 182 MMHG | OXYGEN SATURATION: 100 % | TEMPERATURE: 97.5 F | DIASTOLIC BLOOD PRESSURE: 84 MMHG | HEART RATE: 60 BPM | RESPIRATION RATE: 20 BRPM | BODY MASS INDEX: 31.03 KG/M2 | HEIGHT: 67 IN

## 2022-04-07 LAB
GLUCOSE BLDC GLUCOMTR-MCNC: 163 MG/DL (ref 70–99)
GLUCOSE BLDC GLUCOMTR-MCNC: 178 MG/DL (ref 70–99)

## 2022-04-07 PROCEDURE — 250N000013 HC RX MED GY IP 250 OP 250 PS 637: Performed by: FAMILY MEDICINE

## 2022-04-07 PROCEDURE — 99217 PR OBSERVATION CARE DISCHARGE: CPT | Mod: GC

## 2022-04-07 PROCEDURE — 99214 OFFICE O/P EST MOD 30 MIN: CPT | Performed by: PAIN MEDICINE

## 2022-04-07 PROCEDURE — 250N000013 HC RX MED GY IP 250 OP 250 PS 637: Performed by: PAIN MEDICINE

## 2022-04-07 PROCEDURE — G0378 HOSPITAL OBSERVATION PER HR: HCPCS

## 2022-04-07 PROCEDURE — 250N000013 HC RX MED GY IP 250 OP 250 PS 637: Performed by: MASSAGE THERAPIST

## 2022-04-07 PROCEDURE — 250N000013 HC RX MED GY IP 250 OP 250 PS 637: Performed by: STUDENT IN AN ORGANIZED HEALTH CARE EDUCATION/TRAINING PROGRAM

## 2022-04-07 PROCEDURE — 82962 GLUCOSE BLOOD TEST: CPT

## 2022-04-07 PROCEDURE — 96372 THER/PROPH/DIAG INJ SC/IM: CPT

## 2022-04-07 RX ORDER — GLUCOSAMINE HCL/CHONDROITIN SU 500-400 MG
CAPSULE ORAL
Qty: 100 EACH | Refills: 6 | Status: SHIPPED | OUTPATIENT
Start: 2022-04-07 | End: 2023-05-05

## 2022-04-07 RX ORDER — NYSTATIN 100000 U/G
OINTMENT TOPICAL 2 TIMES DAILY
Qty: 15 G | Refills: 0 | Status: SHIPPED | OUTPATIENT
Start: 2022-04-07 | End: 2022-09-24

## 2022-04-07 RX ORDER — LANCETS
EACH MISCELLANEOUS
Qty: 100 EACH | Refills: 1 | Status: SHIPPED | OUTPATIENT
Start: 2022-04-07 | End: 2023-05-05

## 2022-04-07 RX ORDER — BLOOD SUGAR DIAGNOSTIC
STRIP MISCELLANEOUS
Qty: 100 STRIP | Refills: 0 | Status: SHIPPED | OUTPATIENT
Start: 2022-04-07 | End: 2023-05-05

## 2022-04-07 RX ORDER — GLIPIZIDE 10 MG/1
10 TABLET, FILM COATED, EXTENDED RELEASE ORAL
Qty: 30 TABLET | Refills: 0 | Status: SHIPPED | OUTPATIENT
Start: 2022-04-08 | End: 2022-09-24

## 2022-04-07 RX ORDER — LANCING DEVICE/LANCETS
KIT MISCELLANEOUS
Qty: 1 EACH | Refills: 0 | Status: SHIPPED | OUTPATIENT
Start: 2022-04-07 | End: 2023-05-05

## 2022-04-07 RX ORDER — NYSTATIN 100000 U/G
OINTMENT TOPICAL 2 TIMES DAILY
Status: DISCONTINUED | OUTPATIENT
Start: 2022-04-07 | End: 2022-04-07 | Stop reason: HOSPADM

## 2022-04-07 RX ORDER — ACETAMINOPHEN 325 MG/1
975 TABLET ORAL EVERY 8 HOURS
Qty: 270 TABLET | Refills: 0 | Status: SHIPPED | OUTPATIENT
Start: 2022-04-07 | End: 2022-06-07

## 2022-04-07 RX ADMIN — PREGABALIN 150 MG: 75 CAPSULE ORAL at 08:15

## 2022-04-07 RX ADMIN — DOCUSATE SODIUM 50 MG AND SENNOSIDES 8.6 MG 2 TABLET: 8.6; 5 TABLET, FILM COATED ORAL at 08:15

## 2022-04-07 RX ADMIN — ACETAMINOPHEN 650 MG: 325 TABLET ORAL at 06:22

## 2022-04-07 RX ADMIN — DICLOFENAC SODIUM 2 G: 10 GEL TOPICAL at 06:23

## 2022-04-07 RX ADMIN — NYSTATIN: 100000 OINTMENT TOPICAL at 02:37

## 2022-04-07 RX ADMIN — NYSTATIN: 100000 OINTMENT TOPICAL at 08:22

## 2022-04-07 RX ADMIN — CARVEDILOL 12.5 MG: 12.5 TABLET, FILM COATED ORAL at 08:16

## 2022-04-07 RX ADMIN — FLUTICASONE PROPIONATE 1 SPRAY: 50 SPRAY, METERED NASAL at 08:23

## 2022-04-07 RX ADMIN — FLUTICASONE FUROATE AND VILANTEROL TRIFENATATE 1 PUFF: 200; 25 POWDER RESPIRATORY (INHALATION) at 08:23

## 2022-04-07 RX ADMIN — DULOXETINE HYDROCHLORIDE 30 MG: 30 CAPSULE, DELAYED RELEASE ORAL at 08:14

## 2022-04-07 RX ADMIN — OXYCODONE HYDROCHLORIDE AND ACETAMINOPHEN 1 TABLET: 5; 325 TABLET ORAL at 08:34

## 2022-04-07 RX ADMIN — METHOCARBAMOL 750 MG: 750 TABLET ORAL at 12:37

## 2022-04-07 RX ADMIN — OXYCODONE HYDROCHLORIDE AND ACETAMINOPHEN 1 TABLET: 5; 325 TABLET ORAL at 00:22

## 2022-04-07 RX ADMIN — OXYCODONE HYDROCHLORIDE AND ACETAMINOPHEN 1 TABLET: 5; 325 TABLET ORAL at 04:24

## 2022-04-07 RX ADMIN — PANTOPRAZOLE SODIUM 40 MG: 20 TABLET, DELAYED RELEASE ORAL at 06:37

## 2022-04-07 RX ADMIN — ASPIRIN 325 MG: 325 TABLET ORAL at 08:15

## 2022-04-07 RX ADMIN — OXYCODONE HYDROCHLORIDE AND ACETAMINOPHEN 1 TABLET: 5; 325 TABLET ORAL at 12:39

## 2022-04-07 RX ADMIN — ROSUVASTATIN CALCIUM 20 MG: 10 TABLET, FILM COATED ORAL at 08:14

## 2022-04-07 RX ADMIN — LOSARTAN POTASSIUM 50 MG: 25 TABLET, FILM COATED ORAL at 08:14

## 2022-04-07 RX ADMIN — GLIPIZIDE 10 MG: 10 TABLET, EXTENDED RELEASE ORAL at 08:14

## 2022-04-07 RX ADMIN — METHOCARBAMOL 750 MG: 750 TABLET ORAL at 08:14

## 2022-04-07 NOTE — PLAN OF CARE
Patient is A&O. She c/o low back pain at start of shift. Given prn Percocet with relief. She continues to have higher BG's and was given carb coverage and sliding scale. Patient ambulating to bathroom independently.   Problem: Plan of Care - These are the overarching goals to be used throughout the patient stay.    Goal: Absence of Hospital-Acquired Illness or Injury  Intervention: Identify and Manage Fall Risk  Recent Flowsheet Documentation  Taken 4/6/2022 1700 by Ariana Langley RN  Safety Promotion/Fall Prevention:   assistive device/personal items within reach   nonskid shoes/slippers when out of bed  Intervention: Prevent Skin Injury  Recent Flowsheet Documentation  Taken 4/6/2022 1700 by Ariana Langley RN  Body Position: position changed independently  Intervention: Prevent and Manage VTE (Venous Thromboembolism) Risk  Recent Flowsheet Documentation  Taken 4/6/2022 1700 by Ariana Langley RN  Activity Management: ambulated to bathroom  Goal: Optimal Comfort and Wellbeing  Intervention: Monitor Pain and Promote Comfort  Recent Flowsheet Documentation  Taken 4/6/2022 1700 by Ariana Langley RN  Pain Management Interventions: medication (see MAR)     Problem: Mobility Impairment  Goal: Optimal Mobility  Outcome: Ongoing, Progressing  Intervention: Optimize Mobility  Recent Flowsheet Documentation  Taken 4/6/2022 1700 by Ariana Langley RN  Assistive Device Utilized:   walker   gait belt  Activity Management: ambulated to bathroom  Positioning/Transfer Devices:   pillows   in use     Problem: Pain Acute  Goal: Acceptable Pain Control and Functional Ability  Outcome: Ongoing, Progressing  Intervention: Develop Pain Management Plan  Recent Flowsheet Documentation  Taken 4/6/2022 1700 by Ariana Langley RN  Pain Management Interventions: medication (see MAR)  Intervention: Prevent or Manage Pain  Recent Flowsheet Documentation  Taken 4/6/2022 1700 by Ariana Langley RN  Medication Review/Management: medications  reviewed

## 2022-04-07 NOTE — PLAN OF CARE
Problem: Plan of Care - These are the overarching goals to be used throughout the patient stay.    Goal: Plan of Care Review/Shift Note  Description: The Plan of Care Review/Shift note should be completed every shift.  The Outcome Evaluation is a brief statement about your assessment that the patient is improving, declining, or no change.  This information will be displayed automatically on your shift note.  Outcome: Adequate for Care Transition  Flowsheets (Taken 4/7/2022 1445)  Plan of Care Reviewed With: patient  Goal: Absence of Hospital-Acquired Illness or Injury  Intervention: Identify and Manage Fall Risk  Recent Flowsheet Documentation  Taken 4/7/2022 0815 by Shaina Rios, RN  Safety Promotion/Fall Prevention: assistive device/personal items within reach  Goal: Optimal Comfort and Wellbeing  Intervention: Monitor Pain and Promote Comfort  Recent Flowsheet Documentation  Taken 4/7/2022 0707 by Shaina Rios, RN  Pain Management Interventions: medication (see MAR)     Problem: Pain Acute  Goal: Acceptable Pain Control and Functional Ability  Intervention: Develop Pain Management Plan  Recent Flowsheet Documentation  Taken 4/7/2022 0707 by Shaina Rios, RN  Pain Management Interventions: medication (see MAR)   Goal Outcome Evaluation:    Plan of Care Reviewed With: patient   Patient discharge to home tried to go over discharge paper work but patient was in hurry hit the basics about insulin and blood sugars. Patient stated she'll read the information. Informed of appointments. Had complaints of pain this morning of lower back and right leg 10/10 requesting percocet x 2 this shift. Otherwise independent in room.  and 178.

## 2022-04-07 NOTE — PLAN OF CARE
Pt slept minimal amount overnight though she states this is not uncommon for her. Pt given PRN percocet 1 tab x 2 overnight for complaints of chronic pain, pt notes this dose is helpful for a couple of hours but then wears off. Pt also complained that her right leg was having a lot of nerve pain this AM and requested voltaren gel be applied early to see if this would help. Pt seemed to feel indifferent about the idea that she may be ready for discharge today.

## 2022-04-07 NOTE — PLAN OF CARE
Occupational Therapy Discharge Summary    Reason for therapy discharge:    Discharged to home with outpatient therapy.    Progress towards therapy goal(s). See goals on Care Plan in [facility] electronic health record for goal details.  Goals partially met.  Barriers to achieving goals:   discharge from facility.    Therapy recommendation(s):    Continued therapy is recommended.  Rationale/Recommendations:  Home OT evaluation or outpatient OT services to progress strength and functional independence. .

## 2022-04-07 NOTE — PROGRESS NOTES
[facility] ACUTE PAIN SERVICE    ([facility], [facility], [facility])  Daily PAIN Progress Note    Assessment/Plan:  Sarah Rahman is a 56 year old female who was admitted on 4/3/2022.   Pain Service is asked to see the patient for evaluation after falling out of bed due to weakness in both her legs and feet right side greater than left along with right arm weakness. 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   Neurology has been Consulted.   CT imaging of head not demonstrating any acute findings.   MR lumbar spine with progressive worsening of her degenerative disc disease, but no signs of abscess, malignancy or other acute process.     Chronic pain which patient states is not under control. Pain in lower back and goes down into feet and up her spine. Pain is 10/10 burning/tingling in right foot.  She follows at [facility] and is frustrated that they aren't doing more to give her pain relief. She reports injections do not help and is not interested in spinal stimulator due to infection risk which has been discussed with her in the past.       The patient does smoke (about 1/4 PPD) and drinks alcohol denies chemical dependency history (although malingering and cocaine abuse noted in her chart for >10 years, also pos for cannabinoids in urine).      In the last 24 hours, she had 5 tablets of 5-325 mg percocet, about 37.5 MME/day.   Appointment of PCP, pain clinic and EMG visit were made.    PLAN:   1) Chronic lower extremity pain and acute right sided worsening of pain. The patient's home MME was up to 60 mg daily. Discussed follow-up with neurology to investigate neuropathy etiology that can help with pain control. Discontinued oxycodone--use Percocet prn. Recommend using Voltaren for right foot arthritis. Recommended smoking cessation that may be contributing to lower extremity pain.  Senokot-s scheduled for chronic constipation. Minimal benefit with lyrica and would not recommend robaxin for this pain.   2)Multimodal Medication Therapy  Topical: lidocaine patches for back, voltaren gel to lower extremities TID prn  NSAID'S: none  Muscle Relaxants: Robaxin 750 mg 4 times daily  Adjuvants: acetaminophen 975 mg every 8 hours, lyrica 150 mg tid  Antidepressants/anxiolytics: cymbalta 30 bid per home dose   Opioids: Continue percocet 5-325 mg q4h prn   IV Pain medication: None. One time dose of IV hydromorphone was given 4/4/2022  3)Non-medication interventions- would recommend weight loss, exercise program, CBT   4)Constipation Prophylaxis  Senna/docusate 1 tablet bid switched to scheduled, miralax daily prn  5) Follow up   -Opioid prescriber has been [facility]  -Discharge Recommendations - We recommend prescribing the following at the time of discharge: no opioid prescriptions. Referral to a different Pain Clinic per patient request.  Resume home prescription - Do NOT refill.   Agree with EMG at discharge and offered new pain clinic referral   -Made apt with PCP on April 27th at 3:40pm. [facility].  -Made apt with Pain Clinic on May 4th at 10am. [facility] [place].  -Made apt for EMG on June 6th at 8:30am. [facility].          Subjective:  Patient was awake and resting in bed. Throughout the interview her 2 cell phones were ringing often. Patient described the pain as burning, tingling over her right toes, foot and leg, and radiates to her right waist. Present for years. Discussed with the patient about EMG to investigate further about her chronic pain. Patient also stated that the foot and leg massage helped a lot with pain alleviation. Instructed patient to find massage services closer to her and continue with this integrative therapy as needed. Reinforced the importance of diabetic managementand also recommenced patient to maintain  "routine follow ups with PCP.  We talked about integrative services available and the out of pocket cost. Patient states, \"Money is not a problem. I have plenty of money.\"     Discussed with attending.       <principal problem not specified>   Patient Active Problem List   Diagnosis     Superficial injury of cornea     Coronary atherosclerosis     Diabetes mellitus, type 2 (H)     Schizoaffective disorder (H)     Health Care Home     Bilateral low back pain with right-sided sciatica, unspecified chronicity        History   Drug Use No         Tobacco Use      Smoking status: Current Every Day Smoker      Smokeless tobacco: Never Used          acetaminophen  975 mg Oral Q8H     aspirin  325 mg Oral Daily     carvedilol  12.5 mg Oral BID w/meals     diclofenac  2 g Topical TID     DULoxetine  30 mg Oral BID     enoxaparin ANTICOAGULANT  40 mg Subcutaneous Q24H     fluticasone  1 spray Both Nostrils Daily     fluticasone-vilanterol  1 puff Inhalation Daily     glipiZIDE  10 mg Oral Daily with breakfast     insulin aspart   Subcutaneous Daily with breakfast     insulin aspart   Subcutaneous Daily with lunch     insulin aspart   Subcutaneous Daily with supper     insulin aspart  1-7 Units Subcutaneous TID AC     insulin aspart  1-5 Units Subcutaneous At Bedtime     insulin glargine  20 Units Subcutaneous At Bedtime     lidocaine  2 patch Transdermal Q24h    And     lidocaine   Transdermal Q8H     losartan  50 mg Oral Daily     metFORMIN  500 mg Oral Daily with supper     methocarbamol  750 mg Oral 4x Daily     montelukast  10 mg Oral At Bedtime     nystatin   Topical BID     pantoprazole  40 mg Oral QAM AC     pregabalin  150 mg Oral TID     rosuvastatin  20 mg Oral Daily     senna-docusate  2 tablet Oral BID     sodium chloride (PF)  3 mL Intracatheter Q8H     traZODone  50 mg Oral At Bedtime       Objective:  Vital signs in last 24 hours:  B/P: 134/64, T: 97.4, P: 64, R: 18   Blood pressure 134/64, pulse 64, temperature " "97.4  F (36.3  C), temperature source Oral, resp. rate 18, height 1.702 m (5' 7\"), weight 89.7 kg (197 lb 11.2 oz), SpO2 96 %, not currently breastfeeding.      Weight:   Wt Readings from Last 2 Encounters:   04/03/22 89.7 kg (197 lb 11.2 oz)   10/27/21 90.7 kg (200 lb)           Intake/Output:    Intake/Output Summary (Last 24 hours) at 4/7/2022 0932  Last data filed at 4/6/2022 2300  Gross per 24 hour   Intake 620 ml   Output --   Net 620 ml        Review of Systems:   As per subjective, all others negative.    Physical Exam:     General Appearance:  Alert, cooperative, no distress, appears stated age  Patient is awake in bed   Head:  Normocephalic, without obvious abnormality, atraumatic   Eyes:  PERRL, conjunctiva/corneas clear, EOM's intact   ENT/Throat: Lips normal    Lymph/Neck: Supple, symmetrical, trachea midline, no adenopathy, thyroid: not enlarged, symmetric    Lungs:   Clear to auscultation bilaterally, respirations unlabored   Chest Wall:  No tenderness or deformity   Cardiovascular/Heart:  Regular rate and rhythm, S1, S2 normal,no murmur, rub or gallop.     Abdomen:   Soft, non-tender, bowel sounds active all four quadrants,  no masses, no organomegaly   Musculoskeletal: Extremities normal, atraumatic   Skin: Skin color normal, no lesions noted   Neurologic: Alert and oriented X 3, Moves all 4 extremities       Psych: Affect is somatic focused  Grooming is adequant           Imaging:  Personally Reviewed.  No images are attached to the encounter.     Lab Results:  Personally Reviewed.   Last Comprehensive Metabolic Panel:  Sodium   Date Value Ref Range Status   04/04/2022 135 (L) 136 - 145 mmol/L Final   05/18/2010 136 133 - 144 mmol/L Final     Potassium   Date Value Ref Range Status   04/04/2022 4.7 3.5 - 5.0 mmol/L Final   05/18/2010 3.7 3.4 - 5.3 mmol/L Final     Chloride   Date Value Ref Range Status   04/04/2022 106 98 - 107 mmol/L Final   05/18/2010 105 94 - 109 mmol/L Final     Carbon Dioxide "   Date Value Ref Range Status   05/18/2010 26 20 - 32 mmol/L Final     Carbon Dioxide (CO2)   Date Value Ref Range Status   04/04/2022 19 (L) 22 - 31 mmol/L Final     Anion Gap   Date Value Ref Range Status   04/04/2022 10 5 - 18 mmol/L Final   05/18/2010 6 6 - 17 mmol/L Final     Glucose   Date Value Ref Range Status   04/04/2022 277 (H) 70 - 125 mg/dL Final   04/22/2011 159.0 (H) 60.0 - 109.0 mg/dL Final     GLUCOSE BY METER POCT   Date Value Ref Range Status   04/07/2022 163 (H) 70 - 99 mg/dL Final     Urea Nitrogen   Date Value Ref Range Status   04/04/2022 21 8 - 22 mg/dL Final   05/18/2010 15 5 - 24 mg/dL Final     Creatinine   Date Value Ref Range Status   04/06/2022 0.81 0.60 - 1.10 mg/dL Final   05/18/2010 0.68 0.52 - 1.04 mg/dL Final     Comment:     New IDMS-traceable calibration  beginning 5/1/08     GFR Estimate   Date Value Ref Range Status   04/06/2022 84 >60 mL/min/1.73m2 Final     Comment:     Effective December 21, 2021 eGFRcr in adults is calculated using the 2021 CKD-EPI creatinine equation which includes age and gender (Cary et al., NEJ, DOI: 10.1056/UQBMmb6811108)   07/09/2021 57 (L) >60 mL/min/1.73m2 Final   05/18/2010 >90 >60 mL/min/1.7m2 Final     Calcium   Date Value Ref Range Status   04/04/2022 8.2 (L) 8.5 - 10.5 mg/dL Final   05/18/2010 8.2 (L) 8.5 - 10.4 mg/dL Final        UA:   Amphetamines Urine   Date Value Ref Range Status   04/06/2022 Screen Negative Screen Negative Final     Barbiturates Urine   Date Value Ref Range Status   04/06/2022 Screen Negative Screen Negative Final     Cannabinoids Urine   Date Value Ref Range Status   04/06/2022 Screen Negative Screen Negative Final     Cocaine Urine   Date Value Ref Range Status   04/06/2022 Screen Negative Screen Negative Final     Opiates Urine   Date Value Ref Range Status   04/06/2022 Screen Positive (A) Screen Negative Final     PCP Urine   Date Value Ref Range Status   04/06/2022 Screen Negative Screen Negative Final             Total time spent 25 minutes with greater than 50% in consultation, education and coordination of care.     Also discussed with CHEO Merritt MD resident. We discussed the pain treatment plan and med changes.         I, Avril Miner CNP personally performed the services described in this documentation, as scribed by Ashley Patel (DNPstudent) in my presence, and it is both accurate and complete.         Avril Miner APRN, CNS-BC, CNP, ACHPN  Acute Care Pain Management Program Hour 7a-1700  [facility] ([facility], [place], [place])   Page via [facility]- Click HERE to page Avril or call [phone]

## 2022-04-07 NOTE — DISCHARGE INSTRUCTIONS
Upcoming appointments:  -new PCP on April 27th at 3:40pm. [facility].   -Pain Clinic on May 4th at 10am. [facility] [place].  -EMG on June 6th at 8:30am. [facility]    DIABETES REMINDERS:  1) Check your blood sugar before all meals and at bedtime if on meal insulin. See#2 for goals.   Always bring your blood sugar log and meter to your diabetes-related appointments.  2) Your blood sugar goals:  80-130mg/dL before eating  and  mg/dL 2 hours after eating (or per your doctor).  3) Always be prepared to treat a low blood sugar should it happen. Keep a sugar-containing beverage or food nearby.  4) When to call your clinic:     Blood sugar over 400 mg/dL.     If you have 2 to 3 low blood sugars (under 70mg/dL) in a row,     Low reading the same time of day several days in a row,    Blood sugars elevated and you can not get them down with your usual diabetes regimen,    You are ill and can't keep blood sugars controlled.   5) When to call 911:  If your blood glucose does not get better with treatment, or if you/someone else is unable to give you treatment.  6) Talk to your primary care doctor about an appointment with a Certified Diabetes Educator to assist you with your BG management.  7) Follow insulin regimen on discharge orders until able to see provider where doses may be adjusted based on blood sugar patterns.      [facility]  if you have any questions.     [facility]    [facility] [phone]  [facility] [place]  [address]  [place] [address]    [facility] [phone]  [address] [place] [address]    [facility] [phone]  [address] [place]  [address]    Yhupifw-Xcjdawum-[phone]  [facility]  [address] [place] [address]    [facility] [phone]  [address]  [place]   [address]    [facility] [phone]  [facility]  [address]  [place] [address]      [facility] [place] [phone]  [address] [place] [address]  [facility] [place]    [facility]    [facility] [phone]  [employer] Building  [address]  [place]  [address]    [facility] [phone]  [facility]  [address] [place]  [address]    [facility] [phone]  [address] [place]  [address]    [facility] [phone]  [address] [place] [address]    [facility] [phone] [address] [place] [address]    [facility] [phone]  [address] [place]  [address]    [facility] [phone]  [address] [place] [address]    [facility] [phone]  [address] [place] [address]     [facility] [phone]  [address]  [address]      [facility]    [facility] [phone]  [address] [place] [address], [address]  [place] [address]    [facility] [place] [phone]  [address]  [place], [place]    [facility] [place] [phone]  [address]  [place], [place]    [facility] [place] [phone]  [address] [place] [address], [address]  [place], [place]    [facility] [phone]  [address]  [place], [place]    [facility]    [facility] [phone]  [address] [place] [address]    [facility] [phone]  [address] [place] [address]    [facility] [phone]  [address] [place] [address]    [facility] [phone]  [address] [place] [address]    [facility] [phone]  [address] [place] [address]  [address]    [facility] [phone]  [address] [place] [address]    [facility] [phone]  [address] [place] [address]    [facility] [phone]  [address] [place] [address]          Integrative Therapies in the Community   Please use this as a resource for finding integrative therapy practitioners in your area. In  addition to the resources listed here, you can ask friends and families for referrals, look people  up on line and see if they have reviews from past patients/clients or ask the practitioner for a  patient you could call. Additionally, the [place] Department of Health has the Office of Unlicensed  Complementary and Alternative Health Care Practice. More information can be found at    http://www.health.[facility].[place]/divs/hpsc/hop/ocap/geninfo.html   Healing Touch   There are many Certified Healing Touch Practitioners in the community. You can search the  [employer] practitioner directory:    http://www.healingbeyondborders.org/index.php/member-login   website: www.healingBMdryondborders.org   Acupuncture   The national certification website has a search engine where you search by zip code. This is the  one search engine that will recommend individuals are nationally board certified.   Web site: www.nccaom.org   Massage   Go to ABMP.com and select \"find a massage therapist\". You can enter the zip code to find  therapists in that area. This listing includes practitioners that carry their insurance through  [facility], however, the registry is voluntary and not all therapists are listed.   This registry does not indicate if the therapist is nationally certified which is not required in the  [facility]. To find a nationally certified therapist who also has insurance coverage go to the    Website: http://www.ncbtmb.org/ and on the lower left side of the screen select, Find a Board  Certified Therapist. http://www.ncbtmb.org/tools/find-a-certified-massage-therapist   Other   [facility] is a non- profit organization that provides a variety of  therapies to individuals free of charge. All practitioners at [facility] are volunteers. You can  learn more about this by going to their website.   website: http://[facility].org/    INTEGRATIVE THERAPIES RESOURCES   Healing Touch: www.healingbeyondboarders.org   www.healingtouchprogram.com   Reiki: www.reiki.org   Acupuncture: www.nccaom.org   Guided Imagery: www.Patient Conversation Media   Breathwork: www.drweil.Mesh Systems   Healing Music: www.musicalreflections.com   Free apps for meditation, guided imagery and music   Calm, Insight, Headspace, Relax Meditation, [place]ton, Mindfulness  Mediation, Simply Being, Breathing Zone, Healing through Breath   Go to [employer] or Google Play and search in search bar.

## 2022-04-26 ENCOUNTER — TELEPHONE (OUTPATIENT)
Dept: INTERNAL MEDICINE | Facility: CLINIC | Age: 57
End: 2022-04-26

## 2022-05-18 ENCOUNTER — HOSPITAL ENCOUNTER (EMERGENCY)
Facility: HOSPITAL | Age: 57
Discharge: HOME | End: 2022-05-18
Attending: FAMILY MEDICINE | Admitting: FAMILY MEDICINE
Payer: COMMERCIAL

## 2022-05-18 ENCOUNTER — APPOINTMENT (OUTPATIENT)
Dept: CT IMAGING | Facility: HOSPITAL | Age: 57
End: 2022-05-18
Attending: FAMILY MEDICINE
Payer: COMMERCIAL

## 2022-05-18 VITALS
HEART RATE: 77 BPM | HEIGHT: 67 IN | OXYGEN SATURATION: 98 % | WEIGHT: 201 LBS | RESPIRATION RATE: 20 BRPM | DIASTOLIC BLOOD PRESSURE: 88 MMHG | SYSTOLIC BLOOD PRESSURE: 181 MMHG | TEMPERATURE: 98 F | BODY MASS INDEX: 31.55 KG/M2

## 2022-05-18 DIAGNOSIS — E11.65 TYPE 2 DIABETES MELLITUS WITH HYPERGLYCEMIA, WITH LONG-TERM CURRENT USE OF INSULIN (H): ICD-10-CM

## 2022-05-18 DIAGNOSIS — M54.41 BILATERAL LOW BACK PAIN WITH RIGHT-SIDED SCIATICA, UNSPECIFIED CHRONICITY: ICD-10-CM

## 2022-05-18 DIAGNOSIS — Z79.4 TYPE 2 DIABETES MELLITUS WITH HYPERGLYCEMIA, WITH LONG-TERM CURRENT USE OF INSULIN (H): ICD-10-CM

## 2022-05-18 LAB
ALBUMIN UR-MCNC: 20 MG/DL
AMPHETAMINES UR QL SCN: ABNORMAL
ANION GAP SERPL CALCULATED.3IONS-SCNC: 9 MMOL/L (ref 5–18)
APPEARANCE UR: CLEAR
BACTERIA #/AREA URNS HPF: ABNORMAL /HPF
BARBITURATES UR QL: ABNORMAL
BASOPHILS # BLD AUTO: 0 10E3/UL (ref 0–0.2)
BASOPHILS NFR BLD AUTO: 1 %
BENZODIAZ UR QL: ABNORMAL
BILIRUB UR QL STRIP: NEGATIVE
BUN SERPL-MCNC: 25 MG/DL (ref 8–22)
CALCIUM SERPL-MCNC: 8.5 MG/DL (ref 8.5–10.5)
CANNABINOIDS UR QL SCN: ABNORMAL
CHLORIDE BLD-SCNC: 102 MMOL/L (ref 98–107)
CO2 SERPL-SCNC: 23 MMOL/L (ref 22–31)
COCAINE UR QL: ABNORMAL
COLOR UR AUTO: ABNORMAL
CREAT SERPL-MCNC: 1.03 MG/DL (ref 0.6–1.1)
CREAT UR-MCNC: 141 MG/DL
EOSINOPHIL # BLD AUTO: 0.2 10E3/UL (ref 0–0.7)
EOSINOPHIL NFR BLD AUTO: 3 %
ERYTHROCYTE [DISTWIDTH] IN BLOOD BY AUTOMATED COUNT: 13 % (ref 10–15)
GFR SERPL CREATININE-BSD FRML MDRD: 63 ML/MIN/1.73M2
GLUCOSE BLD-MCNC: 472 MG/DL (ref 70–125)
GLUCOSE BLDC GLUCOMTR-MCNC: 200 MG/DL (ref 70–99)
GLUCOSE UR STRIP-MCNC: >1000 MG/DL
HCT VFR BLD AUTO: 36.5 % (ref 35–47)
HGB BLD-MCNC: 11.4 G/DL (ref 11.7–15.7)
HGB UR QL STRIP: NEGATIVE
IMM GRANULOCYTES # BLD: 0 10E3/UL
IMM GRANULOCYTES NFR BLD: 0 %
KETONES UR STRIP-MCNC: NEGATIVE MG/DL
LEUKOCYTE ESTERASE UR QL STRIP: NEGATIVE
LYMPHOCYTES # BLD AUTO: 1.8 10E3/UL (ref 0.8–5.3)
LYMPHOCYTES NFR BLD AUTO: 28 %
MCH RBC QN AUTO: 26.5 PG (ref 26.5–33)
MCHC RBC AUTO-ENTMCNC: 31.2 G/DL (ref 31.5–36.5)
MCV RBC AUTO: 85 FL (ref 78–100)
METHADONE UR QL SCN: ABNORMAL
MONOCYTES # BLD AUTO: 0.3 10E3/UL (ref 0–1.3)
MONOCYTES NFR BLD AUTO: 5 %
MUCOUS THREADS #/AREA URNS LPF: PRESENT /LPF
NEUTROPHILS # BLD AUTO: 4.1 10E3/UL (ref 1.6–8.3)
NEUTROPHILS NFR BLD AUTO: 63 %
NITRATE UR QL: NEGATIVE
NRBC # BLD AUTO: 0 10E3/UL
NRBC BLD AUTO-RTO: 0 /100
OPIATES UR QL SCN: ABNORMAL
OXYCODONE UR QL: ABNORMAL
PCP UR QL SCN: ABNORMAL
PH UR STRIP: 5.5 [PH] (ref 5–7)
PLAT MORPH BLD: NORMAL
PLATELET # BLD AUTO: 203 10E3/UL (ref 150–450)
POTASSIUM BLD-SCNC: 4 MMOL/L (ref 3.5–5)
RBC # BLD AUTO: 4.3 10E6/UL (ref 3.8–5.2)
RBC MORPH BLD: NORMAL
RBC URINE: 4 /HPF
SODIUM SERPL-SCNC: 134 MMOL/L (ref 136–145)
SP GR UR STRIP: 1.04 (ref 1–1.03)
SQUAMOUS EPITHELIAL: 9 /HPF
UROBILINOGEN UR STRIP-MCNC: <2 MG/DL
WBC # BLD AUTO: 6.4 10E3/UL (ref 4–11)
WBC URINE: 1 /HPF

## 2022-05-18 PROCEDURE — 80307 DRUG TEST PRSMV CHEM ANLYZR: CPT | Performed by: FAMILY MEDICINE

## 2022-05-18 PROCEDURE — 85025 COMPLETE CBC W/AUTO DIFF WBC: CPT | Performed by: FAMILY MEDICINE

## 2022-05-18 PROCEDURE — 96374 THER/PROPH/DIAG INJ IV PUSH: CPT

## 2022-05-18 PROCEDURE — 258N000003 HC RX IP 258 OP 636: Performed by: FAMILY MEDICINE

## 2022-05-18 PROCEDURE — 74176 CT ABD & PELVIS W/O CONTRAST: CPT

## 2022-05-18 PROCEDURE — 96375 TX/PRO/DX INJ NEW DRUG ADDON: CPT

## 2022-05-18 PROCEDURE — 96376 TX/PRO/DX INJ SAME DRUG ADON: CPT

## 2022-05-18 PROCEDURE — 36415 COLL VENOUS BLD VENIPUNCTURE: CPT | Performed by: FAMILY MEDICINE

## 2022-05-18 PROCEDURE — 99285 EMERGENCY DEPT VISIT HI MDM: CPT | Mod: 25

## 2022-05-18 PROCEDURE — 80048 BASIC METABOLIC PNL TOTAL CA: CPT | Performed by: FAMILY MEDICINE

## 2022-05-18 PROCEDURE — 250N000012 HC RX MED GY IP 250 OP 636 PS 637: Performed by: FAMILY MEDICINE

## 2022-05-18 PROCEDURE — 250N000011 HC RX IP 250 OP 636: Performed by: FAMILY MEDICINE

## 2022-05-18 PROCEDURE — 250N000013 HC RX MED GY IP 250 OP 250 PS 637: Performed by: FAMILY MEDICINE

## 2022-05-18 PROCEDURE — 81001 URINALYSIS AUTO W/SCOPE: CPT | Performed by: FAMILY MEDICINE

## 2022-05-18 RX ORDER — KETOROLAC TROMETHAMINE 30 MG/ML
15 INJECTION, SOLUTION INTRAMUSCULAR; INTRAVENOUS ONCE
Status: COMPLETED | OUTPATIENT
Start: 2022-05-18 | End: 2022-05-18

## 2022-05-18 RX ORDER — OXYCODONE AND ACETAMINOPHEN 5; 325 MG/1; MG/1
1 TABLET ORAL ONCE
Status: COMPLETED | OUTPATIENT
Start: 2022-05-18 | End: 2022-05-18

## 2022-05-18 RX ADMIN — HYDROMORPHONE HYDROCHLORIDE 0.5 MG: 1 INJECTION, SOLUTION INTRAMUSCULAR; INTRAVENOUS; SUBCUTANEOUS at 13:25

## 2022-05-18 RX ADMIN — HYDROMORPHONE HYDROCHLORIDE 0.5 MG: 1 INJECTION, SOLUTION INTRAMUSCULAR; INTRAVENOUS; SUBCUTANEOUS at 10:29

## 2022-05-18 RX ADMIN — KETOROLAC TROMETHAMINE 15 MG: 30 INJECTION, SOLUTION INTRAMUSCULAR at 08:21

## 2022-05-18 RX ADMIN — HYDROMORPHONE HYDROCHLORIDE 0.5 MG: 1 INJECTION, SOLUTION INTRAMUSCULAR; INTRAVENOUS; SUBCUTANEOUS at 09:30

## 2022-05-18 RX ADMIN — SODIUM CHLORIDE 10 UNITS: 9 INJECTION, SOLUTION INTRAVENOUS at 10:08

## 2022-05-18 RX ADMIN — OXYCODONE HYDROCHLORIDE AND ACETAMINOPHEN 1 TABLET: 5; 325 TABLET ORAL at 08:20

## 2022-05-18 ASSESSMENT — ENCOUNTER SYMPTOMS
HEMATURIA: 0
DIFFICULTY URINATING: 1
BACK PAIN: 1
FREQUENCY: 1

## 2022-05-18 NOTE — ED NOTES
"IV will not infuse. Pt states, \"please get me a professional. \" Pt refused another IV start by this RN. The pt swearing at RN. \"You told me you dont need urine, I cant pee.\" RN offered to cath patient for the urine and offered IV fluids. Pt states \"Im getting the fuck out ouf here.\" RN states that it is her right to leave if she wants. Pt states \"I know its my fucking Im 56 years old.\" Pt request a different RN. Charge RN notified.  "

## 2022-05-18 NOTE — ED TRIAGE NOTES
Right leg pain and right back pain for 2 days. She states she has HX of sciatica. Also having trouble urinating. Denies kidney stone HX. No intervention at home. Pain a 10. Denies injury or exertion.

## 2022-05-18 NOTE — ED NOTES
"Pt voided in the commode, states, \"You said I did not need it.\" Pt was informed she could go to CT scan prior to leaving urine sample. Pt re-informed urine sample requested. Pt talking on the phone at this time.   "

## 2022-05-18 NOTE — ED PROVIDER NOTES
EMERGENCY DEPARTMENT ENCOUNTER      NAME: Sarah Rahman  AGE: 56 year old female  YOB: 1965  MRN: [id number]  EVALUATION DATE & TIME: 5/18/2022  7:11 AM    PCP: Emigdio Martinez    ED PROVIDER: Santi Sharif M.D.    Chief Complaint   Patient presents with     right leg pain/back pain       FINAL IMPRESSION:  1. Bilateral low back pain with right-sided sciatica, unspecified chronicity    2. Type 2 diabetes mellitus with hyperglycemia, with long-term current use of insulin (H)        ED COURSE & MEDICAL DECISION MAKING:    Pertinent Labs & Imaging studies personally reviewed and interpreted by me. (See chart for details)  7:30 AM Patient seen and examined, prior records reviewed. PPE worn including surgical mask, surgical gloves.  Differential diagnosis includes but not limited to pyelonephritis, ureteral stone, aortic dissection, aortic aneurysm, musculoskeletal pain, disc herniation.  Patient with chronic back pain which has caused pain in the legs in the past, she is having this usual pain as well as some new right flank pain.  Also has difficulty urinating.  Bladder scan to be performed.  She has had urgency and frequency, possible UTI.  Concern also for possible renal stone.  CT scan is ordered along with Toradol and oxycodone  8:30 AM Glucose is 472.  Bladder scan 184mL.  CT scan is negative for any acute intra-abdominal findings, CBC is normal.  Patient is not yet provided urine sample.  Dilaudid IV is ordered as she is continuing to complain of severe pain.  9:41 AM labs reviewed, basic panel other than hyperglycemia is reassuring, no evidence for DKA.  Insulin IV is ordered.  12:10 PM I rechecked the patient, pain improved, encouraged her to give a urine sample to complete evaluation.  She is laying on her left side on the bed, is able to twist her torso fully to point to the commode on the right side of the bed.  1:34 PM  Repeat blood glucose 200.      At the conclusion of the  encounter I discussed the results of all of the tests and the disposition. The questions were answered. The patient or family acknowledged understanding and was agreeable with the care plan.     PROCEDURES:   Procedures    MEDICATIONS GIVEN IN THE EMERGENCY:  Medications   0.9% sodium chloride BOLUS (has no administration in time range)   ketorolac (TORADOL) injection 15 mg (15 mg Intravenous Given 5/18/22 0821)   oxyCODONE-acetaminophen (PERCOCET) 5-325 MG per tablet 1 tablet (1 tablet Oral Given 5/18/22 0820)   HYDROmorphone (DILAUDID) injection 0.5 mg (0.5 mg Intravenous Given 5/18/22 0930)   insulin regular 1 unit/mL injection 10 Units (10 Units Intravenous Given 5/18/22 1008)   HYDROmorphone (DILAUDID) injection 0.5 mg (0.5 mg Intravenous Given 5/18/22 1029)       NEW PRESCRIPTIONS STARTED AT TODAY'S ER VISIT  New Prescriptions    No medications on file       =================================================================    HPI    Patient information was obtained from: Patient      Sarah Rahman is a 56 year old female with a pertinent history of right sided sciatica, diabetes mellitus type 2, schizoaffective disorder, diabetes mellitus type 2, hyperlipidemia, hypertension, CVA who presents to this ED by walk in for evaluation of back pain, leg pain, difficulty urinating, multiple symptoms. Patient reports she has baseline right leg pain which is worse. She states this pain is consistent with her prior sciatica flare ups. Her pain is waxing and waning and takes 1 percocet when her pain is at its most mild and 3 when her pain is its most severe.    However, for the past 2 days (since 05/16), she reports new right low back pain which is not consistent with her prior sciatica symptoms. No associated palliative or provoking factors. Denies prior history of kidney stones.    For the past week, she reports she has been having difficulty urinating and states it takes her a long time to urinate. When she does  urinate, she often feels to need to urinate again soon afterwards. She states she has been unable to urinate since 6 AM this morning and could not urinate while in the ED. Denies hematuria. No other reported symptoms at this time.    Per chart review, patient was admitted to this hospital on 04/03/2022 - 04/07/2022. Patient initially presented to the ED with bilateral foot pain which shot up into her low back with associated bilateral leg weakness. MR brain without any acute process on admission. MR lumbar spine showed progressive worsening of her degenerative disc disease. Plan with plans for outpatient rehab, neurosurgery follow up.     REVIEW OF SYSTEMS   Review of Systems   Genitourinary: Positive for difficulty urinating and frequency. Negative for hematuria.   Musculoskeletal: Positive for back pain (right low back).        Positive for leg pain (right).   Neurological:        Positive for paraesthesias (bilateral feet).       All other systems reviewed and negative    PAST MEDICAL HISTORY:  Past Medical History:   Diagnosis Date     Asthma      CAD (coronary artery disease)      COPD (chronic obstructive pulmonary disease) (H)      CVA (cerebral infarction)      Diabetes (H)      GERD (gastroesophageal reflux disease)      Hypertension      Polysubstance abuse (H)      S/P CABG (coronary artery bypass graft)      S/P lumbar discectomy 06/13/2019    L5/S1 by  Dr. Hamm at [facility]     Schizoaffective disorder (H)        PAST SURGICAL HISTORY:  Past Surgical History:   Procedure Laterality Date     BYPASS GRAFT ARTERY CORONARY  2009    x2     CORONARY STENT PLACEMENT       CV CORONARY ANGIOGRAM N/A 6/2/2021    Procedure: Coronary Angiogram;  Surgeon: Juventino Rivera MD;  Location: [facility] Cardiac Cath Lab;  Service: Cardiology     HYSTERECTOMY TOTAL ABDOMINAL, BILATERAL SALPINGO-OOPHORECTOMY, COMBINED       ORIF ULNAR / RADIAL SHAFT FRACTURE Right        CURRENT MEDICATIONS:    Current  Facility-Administered Medications   Medication     0.9% sodium chloride BOLUS     Current Outpatient Medications   Medication     acetaminophen (TYLENOL) 325 MG tablet     albuterol (PROAIR HFA) 108 (90 BASE) MCG/ACT inhaler     alcohol swab prep pads     aspirin (ASA) 325 MG EC tablet     blood glucose (ACCU-CHEK DARRIN PLUS) test strip     blood glucose (ACCU-CHEK SOFTCLIX) lancing device     blood glucose monitoring (SOFTCLIX) lancets     budesonide-formoterol (SYMBICORT) 160-4.5 MCG/ACT Inhaler     carvedilol (COREG) 12.5 MG tablet     cetirizine (ZYRTEC) 10 MG tablet     COMPOUND CONTAINING CONTROLLED SUBSTANCE (CMPD RX) - PHARMACY TO MIX COMPOUNDED MEDICATION     diclofenac (VOLTAREN) 1 % topical gel     diphenhydrAMINE (BENADRYL) 25 MG tablet     DULoxetine (CYMBALTA) 30 MG capsule     fluticasone (FLONASE) 50 MCG/ACT nasal spray     furosemide (LASIX) 20 MG tablet     glipiZIDE (GLUCOTROL XL) 10 MG 24 hr tablet     guaiFENesin-codeine (ROBITUSSIN AC) 100-10 MG/5ML solution     insulin glargine (LANTUS PEN) 100 UNIT/ML pen     insulin pen needle (32G X 4 MM) 32G X 4 MM miscellaneous     ipratropium - albuterol 0.5 mg/2.5 mg/3 mL (DUONEB) 0.5-2.5 (3) MG/3ML neb solution     Lidocaine (LIDOCARE) 4 % Patch     losartan (COZAAR) 50 MG tablet     metFORMIN (GLUCOPHAGE) 500 MG tablet     methocarbamol (ROBAXIN) 750 MG tablet     montelukast (SINGULAIR) 10 MG tablet     nitroGLYcerin (NITROSTAT) 0.4 MG sublingual tablet     nystatin (MYCOSTATIN) 789947 UNIT/GM external ointment     omeprazole 20 MG tablet     pregabalin (LYRICA) 150 MG capsule     rosuvastatin (CRESTOR) 20 MG tablet     senna-docusate (SENOKOT-S/PERICOLACE) 8.6-50 MG tablet     urea (DERMAL THERAPY FINGER CARE) 20 % external lotion       ALLERGIES:  Allergies   Allergen Reactions     Haloperidol Unknown     Patient states it stops her heart       Phenothiazines Other (See Comments)     Other reaction(s): CARDIAC DISTURBANCES  Patient states it stops  "her heart  \"I swell up\"  \"stopped by heart\"  \"I swell up\"  \"I swell up\"       Tramadol Other (See Comments)     Other reaction(s): Angioedema  Throat itch       Demerol      Latex Itching     Lisinopril Other (See Comments)     ACE cough  Itchy throat  Throat itches  Itchy throat       Penicillins      Hydroxyzine Other (See Comments) and Rash     Tongue swelling  Tongue swelling  Tongue swelling         FAMILY HISTORY:  Family History   Problem Relation Age of Onset     Heart Disease Mother      Heart Disease Father      Heart Disease Sister      Diabetes Brother      Heart Disease Sister        SOCIAL HISTORY:   Social History     Socioeconomic History     Marital status: Single   Tobacco Use     Smoking status: Current Every Day Smoker     Smokeless tobacco: Never Used   Substance and Sexual Activity     Alcohol use: Yes     Comment: Alcoholic Drinks/day: occ     Drug use: No   Social History Narrative    Lives alone in a condo.  On disability.  Three living children.         VITALS:  BP (!) 181/88   Pulse 77   Temp 98  F (36.7  C) (Oral)   Resp 20   Ht 1.702 m (5' 7\")   Wt 91.2 kg (201 lb)   LMP  (LMP Unknown)   SpO2 98%   BMI 31.48 kg/m      PHYSICAL EXAM:  Physical Exam  Vitals and nursing note reviewed.   Constitutional:       Appearance: Normal appearance.   HENT:      Head: Normocephalic and atraumatic.      Right Ear: External ear normal.      Left Ear: External ear normal.      Nose: Nose normal.      Mouth/Throat:      Mouth: Mucous membranes are moist.   Eyes:      Extraocular Movements: Extraocular movements intact.      Conjunctiva/sclera: Conjunctivae normal.      Pupils: Pupils are equal, round, and reactive to light.   Cardiovascular:      Rate and Rhythm: Normal rate and regular rhythm.   Pulmonary:      Effort: Pulmonary effort is normal.      Breath sounds: Normal breath sounds. No wheezing or rales.   Abdominal:      General: Abdomen is flat. There is no distension.      Palpations: " Abdomen is soft.      Tenderness: There is no abdominal tenderness. There is no guarding.   Musculoskeletal:         General: Normal range of motion.      Cervical back: Normal range of motion and neck supple.      Right lower leg: No edema.      Left lower leg: No edema.      Comments: Right low lumbar tenderness.   Lymphadenopathy:      Cervical: No cervical adenopathy.   Skin:     General: Skin is warm and dry.   Neurological:      General: No focal deficit present.      Mental Status: She is alert and oriented to person, place, and time. Mental status is at baseline.      Comments: No gross focal neurologic deficits   Psychiatric:         Mood and Affect: Mood normal.         Behavior: Behavior normal.         Thought Content: Thought content normal.          LAB:  All pertinent labs reviewed and interpreted.  Results for orders placed or performed during the hospital encounter of 05/18/22   CT Abdomen Pelvis w/o Contrast    Impression    IMPRESSION:   1.  No abnormalities are seen to explain flank pain. Specifically, no evidence of calculi or obstruction.  2.  Appendix is normal.  3.  Moderate atherosclerotic calcification.     Basic metabolic panel   Result Value Ref Range    Sodium 134 (L) 136 - 145 mmol/L    Potassium 4.0 3.5 - 5.0 mmol/L    Chloride 102 98 - 107 mmol/L    Carbon Dioxide (CO2) 23 22 - 31 mmol/L    Anion Gap 9 5 - 18 mmol/L    Urea Nitrogen 25 (H) 8 - 22 mg/dL    Creatinine 1.03 0.60 - 1.10 mg/dL    Calcium 8.5 8.5 - 10.5 mg/dL    Glucose 472 (HH) 70 - 125 mg/dL    GFR Estimate 63 >60 mL/min/1.73m2   CBC with platelets and differential   Result Value Ref Range    WBC Count 6.4 4.0 - 11.0 10e3/uL    RBC Count 4.30 3.80 - 5.20 10e6/uL    Hemoglobin 11.4 (L) 11.7 - 15.7 g/dL    Hematocrit 36.5 35.0 - 47.0 %    MCV 85 78 - 100 fL    MCH 26.5 26.5 - 33.0 pg    MCHC 31.2 (L) 31.5 - 36.5 g/dL    RDW 13.0 10.0 - 15.0 %    Platelet Count 203 150 - 450 10e3/uL    % Neutrophils 63 %    % Lymphocytes 28  %    % Monocytes 5 %    % Eosinophils 3 %    % Basophils 1 %    % Immature Granulocytes 0 %    NRBCs per 100 WBC 0 <1 /100    Absolute Neutrophils 4.1 1.6 - 8.3 10e3/uL    Absolute Lymphocytes 1.8 0.8 - 5.3 10e3/uL    Absolute Monocytes 0.3 0.0 - 1.3 10e3/uL    Absolute Eosinophils 0.2 0.0 - 0.7 10e3/uL    Absolute Basophils 0.0 0.0 - 0.2 10e3/uL    Absolute Immature Granulocytes 0.0 <=0.4 10e3/uL    Absolute NRBCs 0.0 10e3/uL   RBC and Platelet Morphology   Result Value Ref Range    Platelet Assessment  Automated Count Confirmed. Platelet morphology is normal.     Automated Count Confirmed. Platelet morphology is normal.    RBC Morphology Confirmed RBC Indices        RADIOLOGY:  Reviewed all pertinent imaging. Please see official radiology report.  CT Abdomen Pelvis w/o Contrast   Final Result   IMPRESSION:    1.  No abnormalities are seen to explain flank pain. Specifically, no evidence of calculi or obstruction.   2.  Appendix is normal.   3.  Moderate atherosclerotic calcification.           I, Flash Katz, am serving as a scribe to document services personally performed by Dr. Sharif based on my observation and the provider's statements to me. I, Santi Sharif MD attest that Flash Katz is acting in a scribe capacity, has observed my performance of the services and has documented them in accordance with my direction.    Santi Sharif M.D.  Emergency Medicine  [facility] EMERGENCY DEPARTMENT  [facility] [facility] [id number] [phone]  Dept: [phone]     Santi Sharif MD  05/18/22 1604

## 2022-05-19 ENCOUNTER — HOSPITAL ENCOUNTER (EMERGENCY)
Facility: HOSPITAL | Age: 57
Discharge: HOME | End: 2022-05-19
Payer: COMMERCIAL

## 2022-05-20 ENCOUNTER — HOSPITAL ENCOUNTER (EMERGENCY)
Facility: HOSPITAL | Age: 57
Discharge: HOME | End: 2022-05-20
Attending: EMERGENCY MEDICINE | Admitting: EMERGENCY MEDICINE
Payer: COMMERCIAL

## 2022-05-20 VITALS
SYSTOLIC BLOOD PRESSURE: 192 MMHG | DIASTOLIC BLOOD PRESSURE: 84 MMHG | HEART RATE: 73 BPM | RESPIRATION RATE: 20 BRPM | BODY MASS INDEX: 32.49 KG/M2 | TEMPERATURE: 97.6 F | WEIGHT: 207 LBS | HEIGHT: 67 IN | OXYGEN SATURATION: 97 %

## 2022-05-20 DIAGNOSIS — M79.2 NEUROPATHIC PAIN: ICD-10-CM

## 2022-05-20 DIAGNOSIS — M54.10 RADICULAR LOW BACK PAIN: ICD-10-CM

## 2022-05-20 PROBLEM — K81.0 ACUTE CHOLECYSTITIS: Status: ACTIVE | Noted: 2022-05-20

## 2022-05-20 PROCEDURE — 96372 THER/PROPH/DIAG INJ SC/IM: CPT | Performed by: EMERGENCY MEDICINE

## 2022-05-20 PROCEDURE — 250N000012 HC RX MED GY IP 250 OP 636 PS 637: Performed by: EMERGENCY MEDICINE

## 2022-05-20 PROCEDURE — 120N000001 HC R&B MED SURG/OB

## 2022-05-20 PROCEDURE — 99285 EMERGENCY DEPT VISIT HI MDM: CPT | Mod: 25

## 2022-05-20 PROCEDURE — 250N000013 HC RX MED GY IP 250 OP 250 PS 637: Performed by: EMERGENCY MEDICINE

## 2022-05-20 PROCEDURE — 250N000011 HC RX IP 250 OP 636: Performed by: EMERGENCY MEDICINE

## 2022-05-20 RX ORDER — GABAPENTIN 300 MG/1
300 CAPSULE ORAL ONCE
Status: COMPLETED | OUTPATIENT
Start: 2022-05-20 | End: 2022-05-20

## 2022-05-20 RX ORDER — LIDOCAINE 4 G/G
1 PATCH TOPICAL
Status: DISCONTINUED | OUTPATIENT
Start: 2022-05-20 | End: 2022-05-20

## 2022-05-20 RX ORDER — LIDOCAINE 4 G/G
1 PATCH TOPICAL ONCE
Status: DISCONTINUED | OUTPATIENT
Start: 2022-05-20 | End: 2022-05-20 | Stop reason: HOSPADM

## 2022-05-20 RX ORDER — KETOROLAC TROMETHAMINE 10 MG/1
10 TABLET, FILM COATED ORAL EVERY 6 HOURS PRN
Qty: 20 TABLET | Refills: 0 | Status: SHIPPED | OUTPATIENT
Start: 2022-05-20 | End: 2022-07-29

## 2022-05-20 RX ORDER — KETOROLAC TROMETHAMINE 30 MG/ML
30 INJECTION, SOLUTION INTRAMUSCULAR; INTRAVENOUS ONCE
Status: COMPLETED | OUTPATIENT
Start: 2022-05-20 | End: 2022-05-20

## 2022-05-20 RX ADMIN — LIDOCAINE 1 PATCH: 246 PATCH TOPICAL at 03:32

## 2022-05-20 RX ADMIN — KETOROLAC TROMETHAMINE 30 MG: 30 INJECTION, SOLUTION INTRAMUSCULAR; INTRAVENOUS at 03:18

## 2022-05-20 RX ADMIN — HYDROMORPHONE HYDROCHLORIDE 1 MG: 1 INJECTION, SOLUTION INTRAMUSCULAR; INTRAVENOUS; SUBCUTANEOUS at 03:17

## 2022-05-20 RX ADMIN — LIDOCAINE 1 PATCH: 246 PATCH TOPICAL at 03:18

## 2022-05-20 RX ADMIN — GABAPENTIN 300 MG: 300 CAPSULE ORAL at 04:26

## 2022-05-20 RX ADMIN — INSULIN GLARGINE 20 UNITS: 100 INJECTION, SOLUTION SUBCUTANEOUS at 04:07

## 2022-05-20 NOTE — DISCHARGE INSTRUCTIONS
You were seen today in the emergency department at [facility] for a flareup of low back pain and burning nerve pain in your legs.  You were treated with topical lidocaine patches, intramuscular pain injections, and nerve pain medication here.  We also gave you your dose of long-acting insulin.  We agree with other providers that have seen you recently who feel that this is likely combination of diabetic neuropathy and degenerative disc disease in your back.  We agree with your plan to continue following up with pain management and primary care to discuss this chronic pain flareup.  In the meantime you can add Toradol 10 mg every 6 hours for pain and inflammation in addition to the other medications that you are taking like Lyrica, muscle relaxers, and Percocet as needed for breakthrough severe pain.
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
## 2022-05-20 NOTE — ED TRIAGE NOTES
"Complaining of back pain and foot pain. She is a poorly controlled diabetic seeking pain relief for her \"tingly feet\". DR Oliveira at bedside.     Triage Assessment     Row Name 05/20/22 0308       Triage Assessment (Adult)    Airway WDL WDL       Respiratory WDL    Respiratory WDL WDL       Skin Circulation/Temperature WDL    Skin Circulation/Temperature WDL WDL       Peripheral/Neurovascular WDL    Peripheral Neurovascular WDL X  tingly feet/back pain       Cognitive/Neuro/Behavioral WDL    Cognitive/Neuro/Behavioral WDL WDL       [place] Coma Scale    Best Eye Response 4-->(E4) spontaneous    Best Motor Response 6-->(M6) obeys commands    Best Verbal Response 5-->(V5) oriented    [place] Coma Scale Score 15              "

## 2022-05-21 DIAGNOSIS — Z71.89 OTHER SPECIFIED COUNSELING: ICD-10-CM

## 2022-05-22 ENCOUNTER — PATIENT OUTREACH (OUTPATIENT)
Dept: CARE COORDINATION | Facility: CLINIC | Age: 57
End: 2022-05-22
Payer: COMMERCIAL

## 2022-05-22 NOTE — PROGRESS NOTES
Clinic Care Coordination Contact  [facility]/Voicemail     Clinical Data: Care Coordinator Outreach - TCM      Outreach attempted x 1.  Left message on patient's voicemail, providing [facility]'s 24/7 scheduling and nurse triage phone number [phone] ([phone]) for questions/concerns and/or to schedule an appt with an [facility] provider, if they do not have a PCP.      Plan:  Care Coordinator will try to reach patient again in 1-2 business days.       May Montana RN  [facility], [facility]

## 2022-05-23 NOTE — PROGRESS NOTES
ANTICOAGULATION FOLLOW-UP CLINIC VISIT    Patient Name:  Lara Marc  Date:  7/10/2020  Contact Type:  Telephone/ Patient    SUBJECTIVE:  Patient Findings     Positives:   Change in activity (Some increase in activity. ), Other complaints (Increase in sweaty d/t heat and working outside. )    Comments:   The patient was assessed for diet and medication changes, missed or extra doses, bruising or bleeding, with no problem findings.           OBJECTIVE    Recent labs: (last 7 days)     07/10/20  1050   INR 2.20*       ASSESSMENT / PLAN  INR assessment THER    Recheck INR In: 2 WEEKS    INR Location Clinic      Anticoagulation Summary  As of 7/10/2020    INR goal:   2.0-3.0   TTR:   94.3 % (1.8 mo)   INR used for dosin.20 (7/10/2020)   Warfarin maintenance plan:   2.5 mg (2.5 mg x 1) every day   Full warfarin instructions:   2.5 mg every day   Weekly warfarin total:   17.5 mg   No change documented:   Monse Jackson RN   Plan last modified:   Monse Jackson RN (2020)   Next INR check:   2020   Target end date:   2020    Indications    Deep vein thrombosis (DVT) (H) [I82.409]  Chronic deep vein thrombosis (DVT) of distal vein of lower extremity  unspecified laterality (H) [I82.5Z9]             Anticoagulation Episode Summary     INR check location:       Preferred lab:       Send INR reminders to:   KRYSTLE WEINER    Comments:         Anticoagulation Care Providers     Provider Role Specialty Phone number    Yecenia Dickerson MD Referring [facility] [phone]            See the Encounter Report to view Anticoagulation Flowsheet and Dosing Calendar (Go to Encounters tab in chart review, and find the Anticoagulation Therapy Visit)    Patient advised to continue maintenance dose and recheck in two weeks.     Patient made aware if signs of clotting (pain, tenderness, swelling, or color change in any extremity) AND/OR bleeding occur (nosebleeds, bleeding gums, bruising, or blood  Clinic Care Coordination Contact  [facility]/Voicemail       Clinical Data: Care Coordinator Outreach  Outreach attempted x 2.  Left message on patient's voicemail with call back information and requested return call.  Plan: Care Coordinator will do no further outreaches at this time.        AIMEE Joyce  [phone]  [facility]     in stool or urine) to notify provider & seek medical attention. If severe symptoms develop, such as major bleeding, chest pain, shortness of breath, fall, trauma or s/s of stroke, patient to call 911 immediately.     Dosage adjustment made based on physician directed care plan.    Monse Jackson RN

## 2022-05-24 ENCOUNTER — HOSPITAL ENCOUNTER (EMERGENCY)
Facility: HOSPITAL | Age: 57
Discharge: HOME | End: 2022-05-24
Attending: STUDENT IN AN ORGANIZED HEALTH CARE EDUCATION/TRAINING PROGRAM | Admitting: STUDENT IN AN ORGANIZED HEALTH CARE EDUCATION/TRAINING PROGRAM
Payer: COMMERCIAL

## 2022-05-24 ENCOUNTER — APPOINTMENT (OUTPATIENT)
Dept: MRI IMAGING | Facility: HOSPITAL | Age: 57
End: 2022-05-24
Payer: COMMERCIAL

## 2022-05-24 VITALS
HEART RATE: 76 BPM | SYSTOLIC BLOOD PRESSURE: 174 MMHG | HEIGHT: 67 IN | RESPIRATION RATE: 18 BRPM | DIASTOLIC BLOOD PRESSURE: 118 MMHG | TEMPERATURE: 97.5 F | WEIGHT: 207 LBS | BODY MASS INDEX: 32.49 KG/M2 | OXYGEN SATURATION: 99 %

## 2022-05-24 DIAGNOSIS — R73.9 HYPERGLYCEMIA: ICD-10-CM

## 2022-05-24 DIAGNOSIS — M54.31 RIGHT SIDED SCIATICA: ICD-10-CM

## 2022-05-24 DIAGNOSIS — E11.42 TYPE 2 DIABETES MELLITUS WITH DIABETIC POLYNEUROPATHY (H): ICD-10-CM

## 2022-05-24 LAB
ALBUMIN UR-MCNC: 30 MG/DL
ANION GAP SERPL CALCULATED.3IONS-SCNC: 9 MMOL/L (ref 5–18)
APPEARANCE UR: CLEAR
APTT PPP: 22 SECONDS (ref 22–38)
ATRIAL RATE - MUSE: 69 BPM
BILIRUB UR QL STRIP: NEGATIVE
BUN SERPL-MCNC: 20 MG/DL (ref 8–22)
CALCIUM SERPL-MCNC: 9.2 MG/DL (ref 8.5–10.5)
CHLORIDE BLD-SCNC: 106 MMOL/L (ref 98–107)
CO2 SERPL-SCNC: 23 MMOL/L (ref 22–31)
COLOR UR AUTO: ABNORMAL
CREAT SERPL-MCNC: 0.97 MG/DL (ref 0.6–1.1)
DIASTOLIC BLOOD PRESSURE - MUSE: NORMAL MMHG
ERYTHROCYTE [DISTWIDTH] IN BLOOD BY AUTOMATED COUNT: 13.2 % (ref 10–15)
GFR SERPL CREATININE-BSD FRML MDRD: 68 ML/MIN/1.73M2
GLUCOSE BLD-MCNC: 360 MG/DL (ref 70–125)
GLUCOSE BLDC GLUCOMTR-MCNC: 317 MG/DL (ref 70–99)
GLUCOSE UR STRIP-MCNC: >1000 MG/DL
HCT VFR BLD AUTO: 39.6 % (ref 35–47)
HGB BLD-MCNC: 12.4 G/DL (ref 11.7–15.7)
HGB UR QL STRIP: NEGATIVE
HYALINE CASTS: 1 /LPF
INR PPP: 0.95 (ref 0.85–1.15)
INTERPRETATION ECG - MUSE: NORMAL
KETONES UR STRIP-MCNC: NEGATIVE MG/DL
LEUKOCYTE ESTERASE UR QL STRIP: NEGATIVE
MCH RBC QN AUTO: 26.5 PG (ref 26.5–33)
MCHC RBC AUTO-ENTMCNC: 31.3 G/DL (ref 31.5–36.5)
MCV RBC AUTO: 85 FL (ref 78–100)
MUCOUS THREADS #/AREA URNS LPF: PRESENT /LPF
NITRATE UR QL: NEGATIVE
P AXIS - MUSE: 67 DEGREES
PH UR STRIP: 6 [PH] (ref 5–7)
PLATELET # BLD AUTO: ABNORMAL 10*3/UL
POTASSIUM BLD-SCNC: 4.6 MMOL/L (ref 3.5–5)
PR INTERVAL - MUSE: 184 MS
QRS DURATION - MUSE: 76 MS
QT - MUSE: 412 MS
QTC - MUSE: 441 MS
R AXIS - MUSE: -1 DEGREES
RBC # BLD AUTO: 4.68 10E6/UL (ref 3.8–5.2)
RBC URINE: 5 /HPF
SODIUM SERPL-SCNC: 138 MMOL/L (ref 136–145)
SP GR UR STRIP: 1.03 (ref 1–1.03)
SQUAMOUS EPITHELIAL: 2 /HPF
SYSTOLIC BLOOD PRESSURE - MUSE: NORMAL MMHG
T AXIS - MUSE: 100 DEGREES
TROPONIN I SERPL-MCNC: 0.01 NG/ML (ref 0–0.29)
UROBILINOGEN UR STRIP-MCNC: 2 MG/DL
VENTRICULAR RATE- MUSE: 69 BPM
WBC # BLD AUTO: 6.9 10E3/UL (ref 4–11)
WBC URINE: 2 /HPF

## 2022-05-24 PROCEDURE — 85610 PROTHROMBIN TIME: CPT | Performed by: PHYSICIAN ASSISTANT

## 2022-05-24 PROCEDURE — 250N000013 HC RX MED GY IP 250 OP 250 PS 637: Performed by: PHYSICIAN ASSISTANT

## 2022-05-24 PROCEDURE — 93005 ELECTROCARDIOGRAM TRACING: CPT | Performed by: PHYSICIAN ASSISTANT

## 2022-05-24 PROCEDURE — 80048 BASIC METABOLIC PNL TOTAL CA: CPT | Performed by: PHYSICIAN ASSISTANT

## 2022-05-24 PROCEDURE — 81003 URINALYSIS AUTO W/O SCOPE: CPT | Performed by: PHYSICIAN ASSISTANT

## 2022-05-24 PROCEDURE — 85048 AUTOMATED LEUKOCYTE COUNT: CPT | Performed by: PHYSICIAN ASSISTANT

## 2022-05-24 PROCEDURE — 96374 THER/PROPH/DIAG INJ IV PUSH: CPT

## 2022-05-24 PROCEDURE — 70551 MRI BRAIN STEM W/O DYE: CPT

## 2022-05-24 PROCEDURE — 99285 EMERGENCY DEPT VISIT HI MDM: CPT | Mod: 25

## 2022-05-24 PROCEDURE — 85730 THROMBOPLASTIN TIME PARTIAL: CPT | Performed by: PHYSICIAN ASSISTANT

## 2022-05-24 PROCEDURE — 250N000011 HC RX IP 250 OP 636: Performed by: PHYSICIAN ASSISTANT

## 2022-05-24 PROCEDURE — 70544 MR ANGIOGRAPHY HEAD W/O DYE: CPT | Mod: XU

## 2022-05-24 PROCEDURE — 84484 ASSAY OF TROPONIN QUANT: CPT | Performed by: PHYSICIAN ASSISTANT

## 2022-05-24 PROCEDURE — 36415 COLL VENOUS BLD VENIPUNCTURE: CPT | Performed by: PHYSICIAN ASSISTANT

## 2022-05-24 PROCEDURE — 70547 MR ANGIOGRAPHY NECK W/O DYE: CPT

## 2022-05-24 RX ORDER — OXYCODONE HYDROCHLORIDE 5 MG/1
5 TABLET ORAL ONCE
Status: COMPLETED | OUTPATIENT
Start: 2022-05-24 | End: 2022-05-24

## 2022-05-24 RX ORDER — KETOROLAC TROMETHAMINE 30 MG/ML
15 INJECTION, SOLUTION INTRAMUSCULAR; INTRAVENOUS ONCE
Status: COMPLETED | OUTPATIENT
Start: 2022-05-24 | End: 2022-05-24

## 2022-05-24 RX ORDER — LORAZEPAM 2 MG/ML
1 INJECTION INTRAMUSCULAR ONCE
Status: DISCONTINUED | OUTPATIENT
Start: 2022-05-24 | End: 2022-05-24

## 2022-05-24 RX ORDER — LORAZEPAM 0.5 MG/1
2 TABLET ORAL ONCE
Status: COMPLETED | OUTPATIENT
Start: 2022-05-24 | End: 2022-05-24

## 2022-05-24 RX ORDER — KETOROLAC TROMETHAMINE 30 MG/ML
30 INJECTION, SOLUTION INTRAMUSCULAR; INTRAVENOUS ONCE
Status: DISCONTINUED | OUTPATIENT
Start: 2022-05-24 | End: 2022-05-24

## 2022-05-24 RX ORDER — GADOBUTROL 604.72 MG/ML
9 INJECTION INTRAVENOUS ONCE
Status: DISCONTINUED | OUTPATIENT
Start: 2022-05-24 | End: 2022-05-24 | Stop reason: HOSPADM

## 2022-05-24 RX ORDER — GABAPENTIN 300 MG/1
300 CAPSULE ORAL ONCE
Status: DISCONTINUED | OUTPATIENT
Start: 2022-05-24 | End: 2022-05-24

## 2022-05-24 RX ADMIN — KETOROLAC TROMETHAMINE 15 MG: 30 INJECTION, SOLUTION INTRAMUSCULAR at 10:38

## 2022-05-24 RX ADMIN — OXYCODONE HYDROCHLORIDE 5 MG: 5 TABLET ORAL at 12:49

## 2022-05-24 RX ADMIN — LORAZEPAM 2 MG: 0.5 TABLET ORAL at 12:49

## 2022-05-24 ASSESSMENT — ENCOUNTER SYMPTOMS
HEADACHES: 1
SHORTNESS OF BREATH: 0
WEAKNESS: 1
LIGHT-HEADEDNESS: 1
FEVER: 0
FREQUENCY: 1
ABDOMINAL PAIN: 0
CHILLS: 0
CONFUSION: 1
NUMBNESS: 1
DIZZINESS: 1

## 2022-05-24 NOTE — ED TRIAGE NOTES
Patient comes in weeping and crying.  Says that she is having sciatic nerve pain in her right leg which is causing her weakness. She also says that her blood sugar was over 700 this am.      Triage Assessment     Row Name 05/24/22 5561       Triage Assessment (Adult)    Airway WDL WDL       Respiratory WDL    Respiratory WDL WDL       Skin Circulation/Temperature WDL    Skin Circulation/Temperature WDL WDL       Cardiac WDL    Cardiac WDL WDL       Peripheral/Neurovascular WDL    Peripheral Neurovascular WDL WDL       Cognitive/Neuro/Behavioral WDL    Cognitive/Neuro/Behavioral WDL WDL

## 2022-05-24 NOTE — ED PROVIDER NOTES
"Emergency Department Midlevel Supervisory Note     I personally saw the patient and performed a substantive portion of the visit including all aspects of the medical decision making.    ED Course:  4:09 PM Kristel Lou PA-C staffed patient with me. I agree with their assessment and plan of management, and I will see the patient.  4:47 PM I met with the patient to introduce myself and update her on imaging results. She states she needs more management of her sciatica. I explained she should follow-up with her PCP. She reports she does not have a PCP. I offered to make a referral for a PCP. She said, \"get the fuck out of my face.\" I explained I would put in the referral and given normal work-up, she will be discharged home.        Brief HPI:     Sarah Rahman is a 56 year old female who presents for evaluation of right leg paresthesias, new right upper extremity paresthesias.  She reports going to sleep at 10 PM last evening, and waking up at 730 this morning with the symptoms.  The patient reported that she was unable to use her right arm, but then was able to use her arm to move her wheelchair.    Brief Physical Exam: BP (!) 174/118   Pulse 76   Temp 97.5  F (36.4  C) (Oral)   Resp 18   Ht 1.702 m (5' 7\")   Wt 93.9 kg (207 lb)   LMP  (LMP Unknown)   SpO2 99%   BMI 32.42 kg/m    Constitutional:  Alert, in no acute distress  EYES: Conjunctivae clear  HENT:  Atraumatic, normocephalic  Respiratory:  Respirations even, unlabored, in no acute respiratory distress  Musculoskeletal:  Movement of all extremities  Integument: Warm, Dry, No erythema, No rash.      MDM:  Patient was signed out to me to follow-up on MRI and MRA of the head and neck.  If there are no significant findings, the patient can be discharged home.       1. Hyperglycemia    2. Right sided sciatica    3. Type 2 diabetes mellitus with diabetic polyneuropathy (H)        Labs and Imaging:  Results for orders placed or performed during the " hospital encounter of 05/24/22   Glucose by meter   Result Value Ref Range    GLUCOSE BY METER POCT 317 (H) 70 - 99 mg/dL   UA reflex to Microscopic and Culture    Specimen: Urine, Clean Catch   Result Value Ref Range    Color Urine Light Yellow Colorless, Straw, Light Yellow, Yellow    Appearance Urine Clear Clear    Glucose Urine >1000 (A) Negative mg/dL    Bilirubin Urine Negative Negative    Ketones Urine Negative Negative mg/dL    Specific Gravity Urine 1.034 (H) 1.001 - 1.030    Blood Urine Negative Negative    pH Urine 6.0 5.0 - 7.0    Protein Albumin Urine 30  (A) Negative mg/dL    Urobilinogen Urine 2.0 (A) <2.0 mg/dL    Nitrite Urine Negative Negative    Leukocyte Esterase Urine Negative Negative    Mucus Urine Present (A) None Seen /LPF    RBC Urine 5 (H) <=2 /HPF    WBC Urine 2 <=5 /HPF    Squamous Epithelials Urine 2 (H) <=1 /HPF    Hyaline Casts Urine 1 <=2 /LPF   CBC (+ platelets, no diff)   Result Value Ref Range    WBC Count 6.9 4.0 - 11.0 10e3/uL    RBC Count 4.68 3.80 - 5.20 10e6/uL    Hemoglobin 12.4 11.7 - 15.7 g/dL    Hematocrit 39.6 35.0 - 47.0 %    MCV 85 78 - 100 fL    MCH 26.5 26.5 - 33.0 pg    MCHC 31.3 (L) 31.5 - 36.5 g/dL    RDW 13.2 10.0 - 15.0 %    Platelet Count     Basic metabolic panel   Result Value Ref Range    Sodium 138 136 - 145 mmol/L    Potassium 4.6 3.5 - 5.0 mmol/L    Chloride 106 98 - 107 mmol/L    Carbon Dioxide (CO2) 23 22 - 31 mmol/L    Anion Gap 9 5 - 18 mmol/L    Urea Nitrogen 20 8 - 22 mg/dL    Creatinine 0.97 0.60 - 1.10 mg/dL    Calcium 9.2 8.5 - 10.5 mg/dL    Glucose 360 (H) 70 - 125 mg/dL    GFR Estimate 68 >60 mL/min/1.73m2   Result Value Ref Range    INR 0.95 0.85 - 1.15   Result Value Ref Range    aPTT 22 22 - 38 Seconds   Troponin I (now)   Result Value Ref Range    Troponin I 0.01 0.00 - 0.29 ng/mL   ECG 12-LEAD WITH MUSE (LHE)   Result Value Ref Range    Systolic Blood Pressure  mmHg    Diastolic Blood Pressure  mmHg    Ventricular Rate 69 BPM    Atrial  Rate 69 BPM    ND Interval 184 ms    QRS Duration 76 ms     ms    QTc 441 ms    P Axis 67 degrees    R AXIS -1 degrees    T Axis 100 degrees    Interpretation ECG       Sinus rhythm  Possible Left atrial enlargement  T wave abnormality, consider lateral ischemia  Abnormal ECG  When compared with ECG of 03-APR-2022 15:13,  No significant change was found  Confirmed by SEE ED PROVIDER NOTE FOR, ECG INTERPRETATION ([id number]),  LISSET PATRICK ([id number]) on 5/24/2022 2:13:05 PM       I have reviewed the relevant laboratory and radiology studies    Tracy Godoy MD  [facility] EMERGENCY DEPARTMENT  [address] [place] [id number]  [phone]     Tracy Godoy MD  05/24/22 1800

## 2022-05-24 NOTE — ED PROVIDER NOTES
"Emergency Department Encounter   NAME: Sarah Rahman ; AGE: 56 year old female ; YOB: 1965 ; MRN: [id number] ; PCP: Emigdio Martinez   ED PROVIDER: Polly Lou PA-C    Evaluation Date & Time:   No admission date for patient encounter.    CHIEF COMPLAINT:  Leg Pain and Hyperglycemia      Impression and Plan   MDM: Sarah Rahman is a 56 year old female with a pertinent history of acute cholecystitis, diabetes mellitus type 2, CVA, chronic obstructive lung disease, GERD, syphilis, hyperlipidemia, hypertension, myalgia, nephrolithiasis, CHF, coronary atherosclerosis, and schizoaffective disorder who presents to the ED by private car for evaluation of leg pain and hyperglycemia.  The patient presents to the emergency department for evaluation after noticing urinary frequency and hyperglycemia with a blood glucose of 700 yesterday evening.  She took 20 units of her Lantus and went to bed around 10 PM.  She woke up this morning around 7 AM on the floor of her bedroom.  She is unsure of how she got there, and was having increased right-sided leg pain, numbness, and weakness as well as new tingling and weakness to the right hand and arm prompting her visit to the ED.  On initial exam, patient is intermittently sobbing -when asked why, she states she is \"tired of this\".  She is alert, oriented, and does not appear toxic.  She has a wavering right-sided facial droop on exam that she intermittently corrects as well as subjective decrease sensation to light touch over right lower face and right hand and leg.  She is moving her right arm freely except when asked to participate in strength exam at which time she is unable to lift the arm, drops the arm onto the wheelchair.  She states that she cannot move the right leg at all and does not participate in exam.  Patient does have a history of previous CVA in 2012.  She presented with similar symptoms in October 2021 at which time MR showed no " evidence of acute stroke.  She does have known bilateral carotid stenosis, worse on the left, with plan to continue to treat medically.  Patient is not a tPA candidate as this is a wake-up event.  My suspicion is that the symptoms are likely psychosomatic, however given her significant history, we will work her up further with MRI/MRA brain and neck.  EKG, blood work, and urine ordered.  Her blood glucose was ~300's in the waiting room.  Given her right leg pain/numbness which has thought to be due to a combination of degenerative disc disease as well as diabetic neuropathy after having a recent admission and MRI of her lumbar spine -we will treat this with Toradol and gabapentin as this appeared to work well at her last ED visit 4 days ago.     EKG shows normal sinus rhythm with possible left atrial enlargement.  She does have nonspecific T wave inversions in lead I, aVL, and aVR.  When compared to EKG from 4/3/2022, these T wave inversions are not new.  Troponin negative.  Low suspicion for ACS.  Normal coags.  Glucose 360 due to her poorly controlled diabetes as well as poor medication compliance.  Normal renal function.  No metabolic derangements.  No anemia.  UA with evidence of spilling glucose.  Contaminated with squamous epithelial cells though no nitrates, leukocytes, or WBCs to suggest infection.      Patient was noted by nursing staff and myself, to be pushing her wheels on her wheelchair around the emergency department waiting room successfully with good use of her right arm which does require some strength which is reassuring.  We are still awaiting MRI imaging to determine her ultimate disposition at this time.  Care signed out to my colleague at the end of my shift pending MR.     ED COURSE:  9:37 AM I met and introduced myself to the patient. I gathered initial history and performed my physical exam. We discussed plan for initial workup.   9:54 AM Staffed the patient with Dr. Mcdaneil.  10:37 AM Patient  told nursing staff that she is allergic to gabapentin.  11:55 AM Discussed with the patient, who is requesting pain medications. She is using her hands to wheel herself around in her wheelchair.  12:00 PM nursing staff is frustrated with the patient in triage.  She has been grabbing at every nurse that passes her by, is wheeling herself into the walkways and then not moving and requesting pain medication.  Was stating that she will leave.  12:01 PM Spoke with the patient again.  She is agreeable to stay and complete work-up, however was instructed that she needs to stay within the designated seating areas.  She is agreeable to this.  2:33 PM Patient is finally in MRI.   4:00 PM Patient's care signed out to Dr. Godoy, ED MD, at the end of my shift for follow up on MRI imaging and ultimate disposition.  At the conclusion of the encounter I discussed the results of all the tests and the disposition. The questions were answered. The patient or family acknowledged understanding and was agreeable with the care plan.    FINAL IMPRESSION:    ICD-10-CM    1. Hyperglycemia  R73.9    2. Right sided sciatica  M54.31    3. Type 2 diabetes mellitus with diabetic polyneuropathy (H)  E11.42          MEDICATIONS GIVEN IN THE EMERGENCY DEPARTMENT:  Medications   gadobutrol (GADAVIST) injection 9 mL (has no administration in time range)   ketorolac (TORADOL) injection 15 mg (15 mg Intravenous Given 5/24/22 1038)   oxyCODONE (ROXICODONE) tablet 5 mg (5 mg Oral Given 5/24/22 1249)   LORazepam (ATIVAN) tablet 2 mg (2 mg Oral Given 5/24/22 1249)         NEW PRESCRIPTIONS STARTED AT TODAY'S ED VISIT:  New Prescriptions    No medications on file         HPI   Patient information was obtained from: Patient  Use of Intrepreter: N/A    Sarah CLARK Rahman is a 56 year old female with a pertinent history of acute cholecystitis, diabetes mellitus type 2, CVA, chronic obstructive lung disease, GERD, syphilis, hyperlipidemia, hypertension,  "myalgia, nephrolithiasis, CHF, coronary atherosclerosis, and schizoaffective disorder who presents to the ED by private car for evaluation of leg pain and hyperglycemia.     Per chart review:  10/3/2021, the patient presents with right sided weakness. Patient stated she was unable to use fine motor tasks, later in ER, RN notes her spontaneously moving right upper and lower extremity. MRI brain negative for acute stroke.  4/30/2022, the patient reports chronic lower extremity pain radiating up lower back and weakness in legs making it difficult to ambulate. Preious MRI showed myelomalacia, MR lumbar spine shows progressively worsening degenerative disc disease, contributed by diabetic peripheral neuropathy. Patient is ambulating on day of discharge. Patient presents with sugars of 345, and glucose on UA A1c>14.0. Patient is not on insulin prior to admission. New regimen upon discharge is 20U lantus, PTA 10mg glipizide, and metformin 500mg XL.    Last night, the patient reports having urinary urgency and frequency, and measured her blood sugar at 700 (reports taking all medication yesterday). She took 20 units of insulin and went to bed at 2200. The patient noted her sciatica acting up, and at 0000 used a lidocaine patch on her back. She woke at 0730 on the foor of her home and was unsure of how she got there. She noted increased weakness and numbness to her right side that she says \"feels the same as when I had a stroke\" (10/2021, MRI negative). With her past stroke, she reported being unable to walk and loss of hearing in her right ear. She is not on any Plavix of statin. She states that she cannot feel her toes. This is her third visit to the ER this week, as well as an admission on 4/30. The numbness/weakness in her right leg is unchanged and not new, but she reports new numbness and weakness to her right arm. The patient lives by herself, and is normally able to ambulate by herself. When she woke up this morning, " "she felt \"confused, dizzy, lightheaded,\" and endorsed pain to her head. The patient states that she is scared and tired. In addition, she reports drooping and numbness to her right side face. The patient denies chest pain, abdominal pain, breathing problems, or any other complaints at this time.    REVIEW OF SYSTEMS:  Review of Systems   Constitutional: Negative for chills and fever.   Respiratory: Negative for shortness of breath.    Cardiovascular: Negative for chest pain.   Gastrointestinal: Negative for abdominal pain.   Genitourinary: Positive for frequency and urgency.   Neurological: Positive for dizziness, weakness, light-headedness, numbness and headaches.        Positive for weakness and numbness to right leg (old) and right arm (new)   Psychiatric/Behavioral: Positive for confusion.   All other systems reviewed and are negative.      Medical History     Past Medical History:   Diagnosis Date     Asthma      CAD (coronary artery disease)      COPD (chronic obstructive pulmonary disease) (H)      CVA (cerebral infarction)      Diabetes (H)      GERD (gastroesophageal reflux disease)      Hypertension      Polysubstance abuse (H)      S/P CABG (coronary artery bypass graft)      S/P lumbar discectomy 06/13/2019     Schizoaffective disorder (H)        Past Surgical History:   Procedure Laterality Date     BYPASS GRAFT ARTERY CORONARY  2009    x2     CORONARY STENT PLACEMENT       CV CORONARY ANGIOGRAM N/A 6/2/2021    Procedure: Coronary Angiogram;  Surgeon: Juventino Rivera MD;  Location: [facility] Cardiac Cath Lab;  Service: Cardiology     HYSTERECTOMY TOTAL ABDOMINAL, BILATERAL SALPINGO-OOPHORECTOMY, COMBINED       ORIF ULNAR / RADIAL SHAFT FRACTURE Right        Family History   Problem Relation Age of Onset     Heart Disease Mother      Heart Disease Father      Heart Disease Sister      Diabetes Brother      Heart Disease Sister        Social History     Tobacco Use     Smoking status: Current Every Day " "Smoker     Smokeless tobacco: Never Used   Substance Use Topics     Alcohol use: Yes     Comment: Alcoholic Drinks/day: occ     Drug use: No       acetaminophen (TYLENOL) 325 MG tablet  albuterol (PROAIR HFA) 108 (90 BASE) MCG/ACT inhaler  alcohol swab prep pads  aspirin (ASA) 325 MG EC tablet  blood glucose (ACCU-CHEK DARRIN PLUS) test strip  blood glucose (ACCU-CHEK SOFTCLIX) lancing device  blood glucose monitoring (SOFTCLIX) lancets  budesonide-formoterol (SYMBICORT) 160-4.5 MCG/ACT Inhaler  carvedilol (COREG) 12.5 MG tablet  cetirizine (ZYRTEC) 10 MG tablet  COMPOUND CONTAINING CONTROLLED SUBSTANCE (CMPD RX) - PHARMACY TO MIX COMPOUNDED MEDICATION  diclofenac (VOLTAREN) 1 % topical gel  diphenhydrAMINE (BENADRYL) 25 MG tablet  DULoxetine (CYMBALTA) 30 MG capsule  fluticasone (FLONASE) 50 MCG/ACT nasal spray  furosemide (LASIX) 20 MG tablet  glipiZIDE (GLUCOTROL XL) 10 MG 24 hr tablet  guaiFENesin-codeine (ROBITUSSIN AC) 100-10 MG/5ML solution  insulin glargine (LANTUS PEN) 100 UNIT/ML pen  insulin pen needle (32G X 4 MM) 32G X 4 MM miscellaneous  ipratropium - albuterol 0.5 mg/2.5 mg/3 mL (DUONEB) 0.5-2.5 (3) MG/3ML neb solution  ketorolac (TORADOL) 10 MG tablet  Lidocaine (LIDOCARE) 4 % Patch  losartan (COZAAR) 50 MG tablet  metFORMIN (GLUCOPHAGE) 500 MG tablet  methocarbamol (ROBAXIN) 750 MG tablet  montelukast (SINGULAIR) 10 MG tablet  nitroGLYcerin (NITROSTAT) 0.4 MG sublingual tablet  nystatin (MYCOSTATIN) 009601 UNIT/GM external ointment  omeprazole 20 MG tablet  pregabalin (LYRICA) 150 MG capsule  rosuvastatin (CRESTOR) 20 MG tablet  senna-docusate (SENOKOT-S/PERICOLACE) 8.6-50 MG tablet  urea (DERMAL THERAPY FINGER CARE) 20 % external lotion          Physical Exam     First Vitals:  Patient Vitals for the past 24 hrs:   BP Temp Temp src Pulse Resp SpO2 Height Weight   05/24/22 0852 (!) 174/118 97.5  F (36.4  C) Oral 76 18 99 % 1.702 m (5' 7\") 93.9 kg (207 lb)         PHYSICAL EXAM:   Physical " Exam  Vitals and nursing note reviewed.   Constitutional:       Appearance: She is not ill-appearing or toxic-appearing.      Comments: Patient intermittently sobbing.    HENT:      Head: Normocephalic and atraumatic.      Mouth/Throat:      Mouth: Mucous membranes are moist.      Pharynx: Oropharynx is clear.   Eyes:      Extraocular Movements: Extraocular movements intact.      Conjunctiva/sclera: Conjunctivae normal.      Pupils: Pupils are equal, round, and reactive to light.   Cardiovascular:      Rate and Rhythm: Normal rate and regular rhythm.   Pulmonary:      Effort: Pulmonary effort is normal.      Breath sounds: Normal breath sounds.   Musculoskeletal:      Cervical back: Normal range of motion and neck supple. No rigidity.      Comments: Tenderness over right buttock and posterior thigh.   Skin:     General: Skin is warm and dry.   Neurological:      Mental Status: She is alert and oriented to person, place, and time. Mental status is at baseline.      Comments: [place] right-sided facial droop noted that patient intermittently corrects.  Subjective decrease sensation to light touch over the right cheek.  Subjective decrease sensation diffusely to the right hand.  5 out of 5 strength with .  Patient refusing to participate in further upper right-sided extremity exam.  She does move the arm freely, however when we attempt pronator drift, arm drops to the chair.  She is unable to perform right hip flexion, right knee flexion or extension, or right dorsiflexion or plantarflexion.  Decreased effort to perform strength on the left which was a 4/5 with all previous noted.              Results     LAB:  All pertinent labs reviewed and interpreted  Labs Ordered and Resulted from Time of ED Arrival to Time of ED Departure   GLUCOSE BY METER - Abnormal       Result Value    GLUCOSE BY METER POCT 317 (*)    UA MACROSCOPIC WITH REFLEX TO MICRO AND CULTURE - Abnormal    Color Urine Light Yellow      Appearance  Urine Clear      Glucose Urine >1000 (*)     Bilirubin Urine Negative      Ketones Urine Negative      Specific Gravity Urine 1.034 (*)     Blood Urine Negative      pH Urine 6.0      Protein Albumin Urine 30  (*)     Urobilinogen Urine 2.0 (*)     Nitrite Urine Negative      Leukocyte Esterase Urine Negative      Mucus Urine Present (*)     RBC Urine 5 (*)     WBC Urine 2      Squamous Epithelials Urine 2 (*)     Hyaline Casts Urine 1     CBC WITH PLATELETS - Abnormal    WBC Count 6.9      RBC Count 4.68      Hemoglobin 12.4      Hematocrit 39.6      MCV 85      MCH 26.5      MCHC 31.3 (*)     RDW 13.2      Platelet Count       BASIC METABOLIC PANEL - Abnormal    Sodium 138      Potassium 4.6      Chloride 106      Carbon Dioxide (CO2) 23      Anion Gap 9      Urea Nitrogen 20      Creatinine 0.97      Calcium 9.2      Glucose 360 (*)     GFR Estimate 68     INR - Normal    INR 0.95     PARTIAL THROMBOPLASTIN TIME - Normal    aPTT 22     TROPONIN I - Normal    Troponin I 0.01         RADIOLOGY:  MR Brain w/o & w Contrast    (Results Pending)   MRA Brain ([place]) wo Contrast    (Results Pending)   MRA Neck (Carotids) wo & w Contrast    (Results Pending)     EKG:  Performed at: 24-MAY-2022 10:28:57  Impression: Normal sinus rhythm  Possible left atrial enlargement  T wave abnormality, consider lateral ischemia  Abnormal ECG    Rate: 69 BPM  MS Interval: 184 ms  QRS Duration: 76 ms  Qtc Interval: 441 ms  No previous available for comparison.    Dr. Mina Brush reviewed and interpreted this ECG and agrees with the above findings. See scanned document for further details.       I, Ambika Zarco, am serving as a scribe to document services personally performed by Polly Lou PA-C, based on my observation and the provider's statements to me. I, Polly Lou PA-C attest that Ambika Zarco is acting in a scribe capacity, has observed my performance of the services and has documented them in  accordance with my direction.       Polly Lou PA-C   Emergency Medicine   [facility] EMERGENCY DEPARTMENT       Polly Lou PA-C  05/24/22 2344

## 2022-05-24 NOTE — ED PROVIDER NOTES
Emergency Department Midlevel Supervisory Note     I personally saw the patient and performed a substantive portion of the visit including all aspects of the medical decision making.    ED Course:  9:41 AM Kristel Lou PA-C staffed patient with me. I agree with their assessment and plan of management, and I will see the patient.  1:13 PM*** I met with the patient to introduce myself, gather additional history, perform my initial exam, and discuss the plan.     Brief HPI:     Sarah Rahman is a 56 year old female who presents for evaluation of leg pain, hyperglycemia. Patient measured her blood sugar yesterday which was 700 and took 20 units of insulin and then went to bed. Todoay she woke up on the floor unsure of how she got there with increased right right-sided weakness, right-sided numbness, confusion, dizziness, lightheadedness at that time. She states these symptoms are similar to her prior stroke in 2021, but chart review shows her MRI on 10/2021 was negative.      Brief Physical Exam:  Constitutional:  Alert, in no acute distress  EYES: Conjunctivae clear  HENT:  Atraumatic, normocephalic  Respiratory:  Respirations even, unlabored, in no acute respiratory distress  Cardiovascular:  Regular rate and rhythm, good peripheral perfusion  GI: Soft, nondistended, nontender, no palpable masses, no rebound, no guarding   Musculoskeletal:  No edema. No cyanosis. Range of motion major extremities intact.    Integument: Warm, Dry, No erythema, No rash.   Neurologic:  Alert & oriented, no focal deficits noted  Psych: Normal mood and affect     MDM:  ***       No diagnosis found.    Labs and Imaging:  Results for orders placed or performed during the hospital encounter of 05/24/22   Glucose by meter   Result Value Ref Range    GLUCOSE BY METER POCT 317 (H) 70 - 99 mg/dL   UA reflex to Microscopic and Culture    Specimen: Urine, Clean Catch   Result Value Ref Range    Color Urine Light Yellow Colorless, Straw, Light  Yellow, Yellow    Appearance Urine Clear Clear    Glucose Urine >1000 (A) Negative mg/dL    Bilirubin Urine Negative Negative    Ketones Urine Negative Negative mg/dL    Specific Gravity Urine 1.034 (H) 1.001 - 1.030    Blood Urine Negative Negative    pH Urine 6.0 5.0 - 7.0    Protein Albumin Urine 30  (A) Negative mg/dL    Urobilinogen Urine 2.0 (A) <2.0 mg/dL    Nitrite Urine Negative Negative    Leukocyte Esterase Urine Negative Negative    Mucus Urine Present (A) None Seen /LPF    RBC Urine 5 (H) <=2 /HPF    WBC Urine 2 <=5 /HPF    Squamous Epithelials Urine 2 (H) <=1 /HPF    Hyaline Casts Urine 1 <=2 /LPF   CBC (+ platelets, no diff)   Result Value Ref Range    WBC Count 6.9 4.0 - 11.0 10e3/uL    RBC Count 4.68 3.80 - 5.20 10e6/uL    Hemoglobin 12.4 11.7 - 15.7 g/dL    Hematocrit 39.6 35.0 - 47.0 %    MCV 85 78 - 100 fL    MCH 26.5 26.5 - 33.0 pg    MCHC 31.3 (L) 31.5 - 36.5 g/dL    RDW 13.2 10.0 - 15.0 %    Platelet Count     Basic metabolic panel   Result Value Ref Range    Sodium 138 136 - 145 mmol/L    Potassium 4.6 3.5 - 5.0 mmol/L    Chloride 106 98 - 107 mmol/L    Carbon Dioxide (CO2) 23 22 - 31 mmol/L    Anion Gap 9 5 - 18 mmol/L    Urea Nitrogen 20 8 - 22 mg/dL    Creatinine 0.97 0.60 - 1.10 mg/dL    Calcium 9.2 8.5 - 10.5 mg/dL    Glucose 360 (H) 70 - 125 mg/dL    GFR Estimate 68 >60 mL/min/1.73m2   Result Value Ref Range    INR 0.95 0.85 - 1.15   Result Value Ref Range    aPTT 22 22 - 38 Seconds   Troponin I (now)   Result Value Ref Range    Troponin I 0.01 0.00 - 0.29 ng/mL     I have reviewed the relevant laboratory and radiology studies    Procedures:  I was present for the key portions of this procedure: ***none        I, Flash Katz, am serving as a scribe to document services personally performed by Dr. Aroldo Mcdaniel, based on my observations and the provider's statements to me.  I, Aroldo Mcdaniel MD, attest that Flash Katz is acting in a scribe capacity, has  observed my performance of the services and has documented them in accordance with my direction.    Aroldo Mcdaniel MD  [facility] EMERGENCY DEPARTMENT  [facility] [facility] [id number] [phone]

## 2022-05-24 NOTE — DISCHARGE INSTRUCTIONS
As we discussed, please continue to monitor your blood sugars closely and use your insulin as needed.  Please follow-up closely with your primary care provider to discuss her uncontrolled blood sugars.  Your right leg pain is likely due to inflammation of your sciatic nerve in combination with neuropathy from your uncontrolled diabetes.  Please rest, stretch, use your home with Toradol, lidocaine patches and topical lidocaine products to help with this.  If it anytime you develop any new or concerning symptoms such as severe headache, worsening weakness or numbness, loss of control of your bladder or bowel, numbness in the groin, vomiting, or any new or concerning symptoms please return to the ER for further evaluation.    Your blood pressure was elevated in the emergencydepartment today and requires recheck and close follow-up in your primary care clinic. Untreated blood pressure can cause serious complications including, but not limited to stroke, heart attack/failure, and kidney disease.  Please make a close follow-up appointment to have this recheck performed. Please return to the emergency department immediately if you develop a severe headache, vision changes, chest pain, shortness of breath, orabdominal pain.

## 2022-06-07 ENCOUNTER — OFFICE VISIT (OUTPATIENT)
Dept: INTERNAL MEDICINE | Facility: CLINIC | Age: 57
End: 2022-06-07
Payer: COMMERCIAL

## 2022-06-07 VITALS
SYSTOLIC BLOOD PRESSURE: 126 MMHG | HEIGHT: 67 IN | BODY MASS INDEX: 31.09 KG/M2 | WEIGHT: 198.1 LBS | DIASTOLIC BLOOD PRESSURE: 71 MMHG | OXYGEN SATURATION: 99 % | HEART RATE: 71 BPM | TEMPERATURE: 98.4 F | RESPIRATION RATE: 18 BRPM

## 2022-06-07 DIAGNOSIS — E11.42 DIABETIC POLYNEUROPATHY ASSOCIATED WITH TYPE 2 DIABETES MELLITUS (H): ICD-10-CM

## 2022-06-07 DIAGNOSIS — Z79.4 TYPE 2 DIABETES MELLITUS WITH HYPERGLYCEMIA, WITH LONG-TERM CURRENT USE OF INSULIN (H): ICD-10-CM

## 2022-06-07 DIAGNOSIS — J33.9 NASAL POLYP: ICD-10-CM

## 2022-06-07 DIAGNOSIS — M54.41 BILATERAL LOW BACK PAIN WITH RIGHT-SIDED SCIATICA, UNSPECIFIED CHRONICITY: Primary | ICD-10-CM

## 2022-06-07 DIAGNOSIS — E11.65 TYPE 2 DIABETES MELLITUS WITH HYPERGLYCEMIA, WITH LONG-TERM CURRENT USE OF INSULIN (H): ICD-10-CM

## 2022-06-07 PROCEDURE — 99205 OFFICE O/P NEW HI 60 MIN: CPT | Performed by: NURSE PRACTITIONER

## 2022-06-07 RX ORDER — FLASH GLUCOSE SENSOR
1 KIT MISCELLANEOUS
Qty: 2 EACH | Refills: 5 | Status: SHIPPED | OUTPATIENT
Start: 2022-06-07 | End: 2023-05-05

## 2022-06-07 RX ORDER — LIDOCAINE 50 MG/G
OINTMENT TOPICAL 3 TIMES DAILY PRN
Qty: 35 G | Refills: 1 | Status: ON HOLD | OUTPATIENT
Start: 2022-06-07 | End: 2022-07-20

## 2022-06-07 RX ORDER — ACETAMINOPHEN 325 MG/1
650 TABLET ORAL EVERY 8 HOURS
Qty: 270 TABLET | Refills: 0 | Status: SHIPPED | OUTPATIENT
Start: 2022-06-07 | End: 2022-09-24

## 2022-06-07 RX ORDER — CODEINE PHOSPHATE AND GUAIFENESIN 10; 100 MG/5ML; MG/5ML
1-2 SOLUTION ORAL EVERY 4 HOURS PRN
Status: CANCELLED | OUTPATIENT
Start: 2022-06-07

## 2022-06-07 RX ORDER — ACETAMINOPHEN 325 MG/1
975 TABLET ORAL EVERY 8 HOURS
Qty: 270 TABLET | Refills: 0 | Status: CANCELLED | OUTPATIENT
Start: 2022-06-07

## 2022-06-07 RX ORDER — FLASH GLUCOSE SCANNING READER
1 EACH MISCELLANEOUS ONCE
Qty: 1 EACH | Refills: 0 | Status: SHIPPED | OUTPATIENT
Start: 2022-06-07 | End: 2022-06-07

## 2022-06-07 RX ORDER — DULAGLUTIDE 0.75 MG/.5ML
0.75 INJECTION, SOLUTION SUBCUTANEOUS
Qty: 2 ML | Refills: 0 | Status: ON HOLD | OUTPATIENT
Start: 2022-06-07 | End: 2022-07-19

## 2022-06-07 ASSESSMENT — PATIENT HEALTH QUESTIONNAIRE - PHQ9
10. IF YOU CHECKED OFF ANY PROBLEMS, HOW DIFFICULT HAVE THESE PROBLEMS MADE IT FOR YOU TO DO YOUR WORK, TAKE CARE OF THINGS AT HOME, OR GET ALONG WITH OTHER PEOPLE: SOMEWHAT DIFFICULT
SUM OF ALL RESPONSES TO PHQ QUESTIONS 1-9: 9
SUM OF ALL RESPONSES TO PHQ QUESTIONS 1-9: 9

## 2022-06-07 ASSESSMENT — PAIN SCALES - GENERAL: PAINLEVEL: WORST PAIN (10)
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
## 2022-06-07 NOTE — PATIENT INSTRUCTIONS
- Do not take take over 3000 mg of acetaminophen (Tylenol) in a 24-hour period. There is 325 mg of acetaminophen (Tylenol) in each percocet tablet.

## 2022-06-08 ENCOUNTER — TELEPHONE (OUTPATIENT)
Dept: INTERNAL MEDICINE | Facility: CLINIC | Age: 57
End: 2022-06-08
Payer: COMMERCIAL

## 2022-06-10 PROBLEM — J44.9 CHRONIC OBSTRUCTIVE PULMONARY DISEASE (H): Status: ACTIVE | Noted: 2022-01-15

## 2022-06-10 PROBLEM — E11.42 DIABETIC POLYNEUROPATHY ASSOCIATED WITH TYPE 2 DIABETES MELLITUS (H): Status: ACTIVE | Noted: 2022-06-10

## 2022-06-10 PROBLEM — J33.9 NASAL POLYP: Status: ACTIVE | Noted: 2022-06-10

## 2022-06-10 PROBLEM — I63.239: Status: ACTIVE | Noted: 2021-10-17

## 2022-06-10 PROBLEM — F25.9 CHRONIC SCHIZOAFFECTIVE DISORDER (H): Status: ACTIVE | Noted: 2022-06-10

## 2022-06-10 NOTE — ASSESSMENT & PLAN NOTE
Advised fluticasone nasal spray 1 spray each nostril daily.  She declines a referral to ENT, she has had a nasal polyp in the past which responded to fluticasone.

## 2022-06-10 NOTE — ASSESSMENT & PLAN NOTE
Type 2 diabetes is not well controlled but she is using the Lantus injection regularly.  She ran out of the freestyle hosea sensors so she does not have any recent glucose readings to base medication adjustments on at this time.  She was prescribed Trulicity in the past but was nervous about taking this medication so she never started it.  We reviewed possible side effects associated with the medication and what she may expect in the first 2 weeks of taking this medication.  She would be comfortable trying this medication again, Trulicity 0.75 mg weekly sent to her pharmacy.  She is advised that this is a small dose and will need to be gradually increased in order for her to obtain maximal benefit from the medication.  She will be scheduled for a follow-up appointment and establish care visit with another provider in the next 4 weeks.  Of note, she would likely benefit from increasing her dose of metformin back to maximal dose but was concerned that this caused her to have increased urinary frequency.  We did not address this today.

## 2022-06-10 NOTE — ASSESSMENT & PLAN NOTE
Because of her uncontrolled diabetes, she is not a good candidate to use steroid medication to treat lumbar radiculopathy.  She is familiar with the effects of a steroid medication on her blood sugar and understands this conundrum.  Fortunately, she does experience some relief when she is given Toradol in the emergency department.  We discussed trial of a nonsteroidal anti-inflammatory medication, diclofenac 3 times daily.  She is open to this idea so this is sent to her pharmacy.  She is advised that she can continue with acetaminophen as well.

## 2022-06-10 NOTE — ASSESSMENT & PLAN NOTE
We reviewed that the paresthesias in the legs is likely due to a combination of diabetic neuropathy in addition to lumbar radiculopathy.  She is already taking pregabalin which is not providing her much relief.  She is also taking duloxetine.  I suggested a trial of lidocaine gel that she could apply to the feet when she goes to bed that would provide temporary anesthesia.  She is open to trying this.  An alternative option could be to wean off of the duloxetine and try nortriptyline or amitriptyline in the future.

## 2022-06-14 NOTE — TELEPHONE ENCOUNTER
Central Prior Authorization Team   Phone: [phone]    PA Initiation    Medication: Trulicity 0.75MG/0.5ML pen-injectors  Insurance Company: [employer] - Phone [phone] Fax [phone]  Pharmacy Filling the Rx: [place], [place] - [address]  Filling Pharmacy Phone: [phone]  Filling Pharmacy Fax:    Start Date: 6/14/2022

## 2022-06-16 NOTE — TELEPHONE ENCOUNTER
Prior Authorization Approval    Authorization Effective Date: 5/15/2022  Authorization Expiration Date: 6/15/2023  Medication: Trulicity 0.75MG/0.5ML pen-injectors  Approved Dose/Quantity:    Reference #:     Insurance Company: [employer] - Phone [phone] Fax [phone]  Expected CoPay:       CoPay Card Available:      Foundation Assistance Needed:    Which Pharmacy is filling the prescription (Not needed for infusion/clinic administered): [facility] [place], [place] - [address] [facility]  Pharmacy Notified: Yes  Patient Notified: Yes

## 2022-07-17 ENCOUNTER — APPOINTMENT (OUTPATIENT)
Dept: MRI IMAGING | Facility: HOSPITAL | Age: 57
End: 2022-07-17
Attending: EMERGENCY MEDICINE
Payer: COMMERCIAL

## 2022-07-17 ENCOUNTER — HOSPITAL ENCOUNTER (OUTPATIENT)
Facility: HOSPITAL | Age: 57
Setting detail: OBSERVATION
Discharge: HOME | End: 2022-07-21
Attending: EMERGENCY MEDICINE | Admitting: HOSPITALIST
Payer: COMMERCIAL

## 2022-07-17 ENCOUNTER — APPOINTMENT (OUTPATIENT)
Dept: RADIOLOGY | Facility: HOSPITAL | Age: 57
End: 2022-07-17
Attending: EMERGENCY MEDICINE
Payer: COMMERCIAL

## 2022-07-17 DIAGNOSIS — R53.1 RIGHT SIDED WEAKNESS: ICD-10-CM

## 2022-07-17 DIAGNOSIS — E11.65 TYPE 2 DIABETES MELLITUS WITH HYPERGLYCEMIA, WITH LONG-TERM CURRENT USE OF INSULIN (H): ICD-10-CM

## 2022-07-17 DIAGNOSIS — G89.4 CHRONIC PAIN SYNDROME: ICD-10-CM

## 2022-07-17 DIAGNOSIS — Z79.4 TYPE 2 DIABETES MELLITUS WITH HYPERGLYCEMIA, WITH LONG-TERM CURRENT USE OF INSULIN (H): ICD-10-CM

## 2022-07-17 DIAGNOSIS — E11.42 DIABETIC POLYNEUROPATHY ASSOCIATED WITH TYPE 2 DIABETES MELLITUS (H): Primary | ICD-10-CM

## 2022-07-17 DIAGNOSIS — M54.41 CHRONIC MIDLINE LOW BACK PAIN WITH RIGHT-SIDED SCIATICA: ICD-10-CM
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
## 2022-07-17 DIAGNOSIS — M62.830 BACK MUSCLE SPASM: ICD-10-CM

## 2022-07-17 DIAGNOSIS — G89.29 CHRONIC MIDLINE LOW BACK PAIN WITH RIGHT-SIDED SCIATICA: ICD-10-CM

## 2022-07-17 PROBLEM — M79.10 MYALGIA: Status: ACTIVE | Noted: 2018-02-20

## 2022-07-17 PROBLEM — M51.26 LUMBAR DISC HERNIATION: Status: ACTIVE | Noted: 2019-06-14

## 2022-07-17 PROBLEM — U09.9 POST-COVID SYNDROME: Status: ACTIVE | Noted: 2021-07-11

## 2022-07-17 PROBLEM — I65.22 CAROTID STENOSIS, LEFT: Status: ACTIVE | Noted: 2021-10-03

## 2022-07-17 PROBLEM — G58.8 INTERCOSTAL NEURITIS: Status: ACTIVE | Noted: 2018-02-06

## 2022-07-17 PROBLEM — J45.909 ASTHMA IN ADULT, UNSPECIFIED ASTHMA SEVERITY, UNCOMPLICATED: Status: ACTIVE | Noted: 2022-07-17

## 2022-07-17 PROBLEM — J01.01 ACUTE RECURRENT MAXILLARY SINUSITIS: Status: ACTIVE | Noted: 2021-10-17

## 2022-07-17 PROBLEM — F41.1 ANXIETY AS ACUTE REACTION TO GROSS STRESS: Status: ACTIVE | Noted: 2022-07-17

## 2022-07-17 PROBLEM — F43.0 ANXIETY AS ACUTE REACTION TO GROSS STRESS: Status: ACTIVE | Noted: 2022-07-17

## 2022-07-17 PROBLEM — G95.89 MYELOMALACIA OF CERVICAL CORD (H): Status: ACTIVE | Noted: 2021-10-08

## 2022-07-17 PROBLEM — E66.9 OBESITY: Status: ACTIVE | Noted: 2022-07-17

## 2022-07-17 PROBLEM — M53.3 SACROILIAC JOINT DISEASE: Status: ACTIVE | Noted: 2018-10-09

## 2022-07-17 PROBLEM — F19.11 HISTORY OF DRUG ABUSE (H): Status: ACTIVE | Noted: 2022-07-17

## 2022-07-17 PROBLEM — R55 SYNCOPE AND COLLAPSE: Status: ACTIVE | Noted: 2018-10-21

## 2022-07-17 PROBLEM — G47.9 SLEEP DIFFICULTIES: Status: ACTIVE | Noted: 2022-07-17

## 2022-07-17 PROBLEM — E78.5 DYSLIPIDEMIA: Status: ACTIVE | Noted: 2021-05-02

## 2022-07-17 PROBLEM — F06.30 MOOD DISORDER DUE TO MEDICAL CONDITION: Status: ACTIVE | Noted: 2022-07-17

## 2022-07-17 PROBLEM — M79.89 LEG SWELLING: Status: ACTIVE | Noted: 2019-10-11

## 2022-07-17 PROBLEM — H90.5 SENSORINEURAL HEARING LOSS (SNHL) OF RIGHT EAR: Status: ACTIVE | Noted: 2021-10-08

## 2022-07-17 PROBLEM — Z91.199 NONCOMPLIANCE: Status: ACTIVE | Noted: 2021-10-08

## 2022-07-17 PROBLEM — K81.0 ACUTE CHOLECYSTITIS: Status: RESOLVED | Noted: 2022-05-20 | Resolved: 2022-07-17

## 2022-07-17 PROBLEM — Z86.19 HX OF SYPHILIS: Status: ACTIVE | Noted: 2020-09-16

## 2022-07-17 LAB
ANION GAP SERPL CALCULATED.3IONS-SCNC: 7 MMOL/L (ref 5–18)
BASOPHILS # BLD MANUAL: 0 10E3/UL (ref 0–0.2)
BASOPHILS NFR BLD MANUAL: 0 %
BUN SERPL-MCNC: 19 MG/DL (ref 8–22)
CALCIUM SERPL-MCNC: 9.2 MG/DL (ref 8.5–10.5)
CHLORIDE BLD-SCNC: 107 MMOL/L (ref 98–107)
CO2 SERPL-SCNC: 25 MMOL/L (ref 22–31)
CREAT SERPL-MCNC: 0.78 MG/DL (ref 0.6–1.1)
EOSINOPHIL # BLD MANUAL: 0.1 10E3/UL (ref 0–0.7)
EOSINOPHIL NFR BLD MANUAL: 2 %
ERYTHROCYTE [DISTWIDTH] IN BLOOD BY AUTOMATED COUNT: 13.7 % (ref 10–15)
GFR SERPL CREATININE-BSD FRML MDRD: 88 ML/MIN/1.73M2
GLUCOSE BLD-MCNC: 162 MG/DL (ref 70–125)
HCT VFR BLD AUTO: 37.7 % (ref 35–47)
HGB BLD-MCNC: 11.8 G/DL (ref 11.7–15.7)
LYMPHOCYTES # BLD MANUAL: 2.5 10E3/UL (ref 0.8–5.3)
LYMPHOCYTES NFR BLD MANUAL: 41 %
MAGNESIUM SERPL-MCNC: 2 MG/DL (ref 1.8–2.6)
MCH RBC QN AUTO: 26.9 PG (ref 26.5–33)
MCHC RBC AUTO-ENTMCNC: 31.3 G/DL (ref 31.5–36.5)
MCV RBC AUTO: 86 FL (ref 78–100)
MONOCYTES # BLD MANUAL: 0.2 10E3/UL (ref 0–1.3)
MONOCYTES NFR BLD MANUAL: 4 %
NEUTROPHILS # BLD MANUAL: 3.3 10E3/UL (ref 1.6–8.3)
NEUTROPHILS NFR BLD MANUAL: 53 %
PLAT MORPH BLD: NORMAL
PLATELET # BLD AUTO: 233 10E3/UL (ref 150–450)
POTASSIUM BLD-SCNC: 3.9 MMOL/L (ref 3.5–5)
RBC # BLD AUTO: 4.39 10E6/UL (ref 3.8–5.2)
RBC MORPH BLD: NORMAL
SARS-COV-2 RNA RESP QL NAA+PROBE: NEGATIVE
SODIUM SERPL-SCNC: 139 MMOL/L (ref 136–145)
TROPONIN I SERPL-MCNC: <0.01 NG/ML (ref 0–0.29)
WBC # BLD AUTO: 6.2 10E3/UL (ref 4–11)

## 2022-07-17 PROCEDURE — 70544 MR ANGIOGRAPHY HEAD W/O DYE: CPT

## 2022-07-17 PROCEDURE — A9585 GADOBUTROL INJECTION: HCPCS | Performed by: EMERGENCY MEDICINE

## 2022-07-17 PROCEDURE — 70553 MRI BRAIN STEM W/O & W/DYE: CPT

## 2022-07-17 PROCEDURE — 87635 SARS-COV-2 COVID-19 AMP PRB: CPT | Performed by: EMERGENCY MEDICINE

## 2022-07-17 PROCEDURE — 96375 TX/PRO/DX INJ NEW DRUG ADDON: CPT

## 2022-07-17 PROCEDURE — G0378 HOSPITAL OBSERVATION PER HR: HCPCS

## 2022-07-17 PROCEDURE — 99285 EMERGENCY DEPT VISIT HI MDM: CPT | Mod: 25

## 2022-07-17 PROCEDURE — 85007 BL SMEAR W/DIFF WBC COUNT: CPT | Performed by: EMERGENCY MEDICINE

## 2022-07-17 PROCEDURE — 36415 COLL VENOUS BLD VENIPUNCTURE: CPT | Performed by: EMERGENCY MEDICINE

## 2022-07-17 PROCEDURE — 250N000012 HC RX MED GY IP 250 OP 636 PS 637: Performed by: HOSPITALIST

## 2022-07-17 PROCEDURE — 250N000013 HC RX MED GY IP 250 OP 250 PS 637: Performed by: EMERGENCY MEDICINE

## 2022-07-17 PROCEDURE — 96376 TX/PRO/DX INJ SAME DRUG ADON: CPT

## 2022-07-17 PROCEDURE — 80048 BASIC METABOLIC PNL TOTAL CA: CPT | Performed by: EMERGENCY MEDICINE

## 2022-07-17 PROCEDURE — 250N000011 HC RX IP 250 OP 636: Performed by: EMERGENCY MEDICINE

## 2022-07-17 PROCEDURE — 93005 ELECTROCARDIOGRAM TRACING: CPT | Performed by: EMERGENCY MEDICINE

## 2022-07-17 PROCEDURE — 85027 COMPLETE CBC AUTOMATED: CPT | Performed by: EMERGENCY MEDICINE

## 2022-07-17 PROCEDURE — C9803 HOPD COVID-19 SPEC COLLECT: HCPCS

## 2022-07-17 PROCEDURE — 70549 MR ANGIOGRAPH NECK W/O&W/DYE: CPT

## 2022-07-17 PROCEDURE — 999N000127 HC STATISTIC PERIPHERAL IV START W US GUIDANCE

## 2022-07-17 PROCEDURE — 258N000003 HC RX IP 258 OP 636: Performed by: EMERGENCY MEDICINE

## 2022-07-17 PROCEDURE — 82962 GLUCOSE BLOOD TEST: CPT

## 2022-07-17 PROCEDURE — 84484 ASSAY OF TROPONIN QUANT: CPT | Performed by: EMERGENCY MEDICINE

## 2022-07-17 PROCEDURE — 255N000002 HC RX 255 OP 636: Performed by: EMERGENCY MEDICINE

## 2022-07-17 PROCEDURE — 999N000285 HC STATISTIC VASC ACCESS LAB DRAW WITH PIV START

## 2022-07-17 PROCEDURE — 99220 PR INITIAL OBSERVATION CARE,LEVEL III: CPT | Performed by: HOSPITALIST

## 2022-07-17 PROCEDURE — 250N000013 HC RX MED GY IP 250 OP 250 PS 637: Performed by: HOSPITALIST

## 2022-07-17 PROCEDURE — 96361 HYDRATE IV INFUSION ADD-ON: CPT

## 2022-07-17 PROCEDURE — 96372 THER/PROPH/DIAG INJ SC/IM: CPT | Performed by: HOSPITALIST

## 2022-07-17 PROCEDURE — 71045 X-RAY EXAM CHEST 1 VIEW: CPT

## 2022-07-17 PROCEDURE — 83735 ASSAY OF MAGNESIUM: CPT | Performed by: EMERGENCY MEDICINE

## 2022-07-17 PROCEDURE — 96374 THER/PROPH/DIAG INJ IV PUSH: CPT

## 2022-07-17 RX ORDER — NICOTINE POLACRILEX 4 MG/1
20 GUM, CHEWING ORAL DAILY
Status: DISCONTINUED | OUTPATIENT
Start: 2022-07-18 | End: 2022-07-17

## 2022-07-17 RX ORDER — AMOXICILLIN 250 MG
2 CAPSULE ORAL 2 TIMES DAILY PRN
Status: DISCONTINUED | OUTPATIENT
Start: 2022-07-17 | End: 2022-07-21 | Stop reason: HOSPADM

## 2022-07-17 RX ORDER — FLUTICASONE PROPIONATE 50 MCG
1 SPRAY, SUSPENSION (ML) NASAL DAILY
Status: DISCONTINUED | OUTPATIENT
Start: 2022-07-18 | End: 2022-07-21 | Stop reason: HOSPADM

## 2022-07-17 RX ORDER — GADOBUTROL 604.72 MG/ML
9 INJECTION INTRAVENOUS ONCE
Status: COMPLETED | OUTPATIENT
Start: 2022-07-17 | End: 2022-07-17

## 2022-07-17 RX ORDER — METOCLOPRAMIDE HYDROCHLORIDE 5 MG/ML
10 INJECTION INTRAMUSCULAR; INTRAVENOUS ONCE
Status: COMPLETED | OUTPATIENT
Start: 2022-07-17 | End: 2022-07-17

## 2022-07-17 RX ORDER — LIDOCAINE 50 MG/G
OINTMENT TOPICAL 3 TIMES DAILY PRN
Status: DISCONTINUED | OUTPATIENT
Start: 2022-07-17 | End: 2022-07-18

## 2022-07-17 RX ORDER — NICOTINE POLACRILEX 4 MG
15-30 LOZENGE BUCCAL
Status: DISCONTINUED | OUTPATIENT
Start: 2022-07-17 | End: 2022-07-21 | Stop reason: HOSPADM

## 2022-07-17 RX ORDER — ACETAMINOPHEN 325 MG/1
650 TABLET ORAL EVERY 6 HOURS PRN
Status: DISCONTINUED | OUTPATIENT
Start: 2022-07-17 | End: 2022-07-21 | Stop reason: HOSPADM

## 2022-07-17 RX ORDER — CETIRIZINE HYDROCHLORIDE 10 MG/1
10 TABLET ORAL DAILY
Status: DISCONTINUED | OUTPATIENT
Start: 2022-07-18 | End: 2022-07-21 | Stop reason: HOSPADM

## 2022-07-17 RX ORDER — OXYCODONE HYDROCHLORIDE 5 MG/1
10 TABLET ORAL EVERY 6 HOURS PRN
Status: DISCONTINUED | OUTPATIENT
Start: 2022-07-17 | End: 2022-07-21 | Stop reason: HOSPADM

## 2022-07-17 RX ORDER — MORPHINE SULFATE 4 MG/ML
4 INJECTION, SOLUTION INTRAMUSCULAR; INTRAVENOUS ONCE
Status: COMPLETED | OUTPATIENT
Start: 2022-07-17 | End: 2022-07-17

## 2022-07-17 RX ORDER — POLYETHYLENE GLYCOL 3350 17 G/17G
17 POWDER, FOR SOLUTION ORAL DAILY PRN
Status: DISCONTINUED | OUTPATIENT
Start: 2022-07-17 | End: 2022-07-19

## 2022-07-17 RX ORDER — DOCUSATE SODIUM 100 MG/1
100 CAPSULE, LIQUID FILLED ORAL 2 TIMES DAILY
Status: DISCONTINUED | OUTPATIENT
Start: 2022-07-17 | End: 2022-07-21 | Stop reason: HOSPADM

## 2022-07-17 RX ORDER — ONDANSETRON 2 MG/ML
4 INJECTION INTRAMUSCULAR; INTRAVENOUS EVERY 6 HOURS PRN
Status: DISCONTINUED | OUTPATIENT
Start: 2022-07-17 | End: 2022-07-21 | Stop reason: HOSPADM

## 2022-07-17 RX ORDER — MONTELUKAST SODIUM 10 MG/1
10 TABLET ORAL AT BEDTIME
Status: DISCONTINUED | OUTPATIENT
Start: 2022-07-17 | End: 2022-07-21 | Stop reason: HOSPADM

## 2022-07-17 RX ORDER — HYDRALAZINE HYDROCHLORIDE 20 MG/ML
5 INJECTION INTRAMUSCULAR; INTRAVENOUS EVERY 6 HOURS PRN
Status: DISCONTINUED | OUTPATIENT
Start: 2022-07-17 | End: 2022-07-21 | Stop reason: HOSPADM

## 2022-07-17 RX ORDER — METHOCARBAMOL 500 MG/1
500 TABLET, FILM COATED ORAL 4 TIMES DAILY
Status: DISCONTINUED | OUTPATIENT
Start: 2022-07-18 | End: 2022-07-21 | Stop reason: HOSPADM

## 2022-07-17 RX ORDER — ROSUVASTATIN CALCIUM 10 MG/1
20 TABLET, COATED ORAL DAILY
Status: DISCONTINUED | OUTPATIENT
Start: 2022-07-18 | End: 2022-07-21 | Stop reason: HOSPADM

## 2022-07-17 RX ORDER — ACETAMINOPHEN 650 MG/1
650 SUPPOSITORY RECTAL EVERY 6 HOURS PRN
Status: DISCONTINUED | OUTPATIENT
Start: 2022-07-17 | End: 2022-07-21 | Stop reason: HOSPADM

## 2022-07-17 RX ORDER — DEXTROSE MONOHYDRATE 25 G/50ML
25-50 INJECTION, SOLUTION INTRAVENOUS
Status: DISCONTINUED | OUTPATIENT
Start: 2022-07-17 | End: 2022-07-21 | Stop reason: HOSPADM

## 2022-07-17 RX ORDER — PANTOPRAZOLE SODIUM 40 MG/1
40 TABLET, DELAYED RELEASE ORAL
Status: DISCONTINUED | OUTPATIENT
Start: 2022-07-18 | End: 2022-07-21 | Stop reason: HOSPADM

## 2022-07-17 RX ORDER — ONDANSETRON 4 MG/1
4 TABLET, ORALLY DISINTEGRATING ORAL EVERY 6 HOURS PRN
Status: DISCONTINUED | OUTPATIENT
Start: 2022-07-17 | End: 2022-07-21 | Stop reason: HOSPADM

## 2022-07-17 RX ORDER — AMOXICILLIN 250 MG
1 CAPSULE ORAL 2 TIMES DAILY PRN
Status: DISCONTINUED | OUTPATIENT
Start: 2022-07-17 | End: 2022-07-21 | Stop reason: HOSPADM

## 2022-07-17 RX ORDER — OXYCODONE AND ACETAMINOPHEN 10; 325 MG/1; MG/1
1 TABLET ORAL EVERY 6 HOURS PRN
COMMUNITY
End: 2024-08-26

## 2022-07-17 RX ORDER — IPRATROPIUM BROMIDE AND ALBUTEROL SULFATE 2.5; .5 MG/3ML; MG/3ML
1 SOLUTION RESPIRATORY (INHALATION) EVERY 6 HOURS PRN
Status: DISCONTINUED | OUTPATIENT
Start: 2022-07-17 | End: 2022-07-21 | Stop reason: HOSPADM

## 2022-07-17 RX ORDER — KETOROLAC TROMETHAMINE 15 MG/ML
15 INJECTION, SOLUTION INTRAMUSCULAR; INTRAVENOUS ONCE
Status: COMPLETED | OUTPATIENT
Start: 2022-07-17 | End: 2022-07-17

## 2022-07-17 RX ORDER — CARVEDILOL 3.12 MG/1
6.25 TABLET ORAL 2 TIMES DAILY WITH MEALS
Status: DISCONTINUED | OUTPATIENT
Start: 2022-07-18 | End: 2022-07-18

## 2022-07-17 RX ORDER — LIDOCAINE 4 G/G
1-2 PATCH TOPICAL
Status: DISCONTINUED | OUTPATIENT
Start: 2022-07-18 | End: 2022-07-18

## 2022-07-17 RX ORDER — DIPHENHYDRAMINE HYDROCHLORIDE 50 MG/ML
25 INJECTION INTRAMUSCULAR; INTRAVENOUS ONCE
Status: COMPLETED | OUTPATIENT
Start: 2022-07-17 | End: 2022-07-17

## 2022-07-17 RX ORDER — OXYCODONE AND ACETAMINOPHEN 5; 325 MG/1; MG/1
2 TABLET ORAL ONCE
Status: COMPLETED | OUTPATIENT
Start: 2022-07-17 | End: 2022-07-17

## 2022-07-17 RX ORDER — BISACODYL 10 MG
10 SUPPOSITORY, RECTAL RECTAL DAILY PRN
Status: DISCONTINUED | OUTPATIENT
Start: 2022-07-17 | End: 2022-07-21 | Stop reason: HOSPADM

## 2022-07-17 RX ORDER — ALBUTEROL SULFATE 90 UG/1
2 AEROSOL, METERED RESPIRATORY (INHALATION) EVERY 4 HOURS PRN
Status: DISCONTINUED | OUTPATIENT
Start: 2022-07-17 | End: 2022-07-21 | Stop reason: HOSPADM

## 2022-07-17 RX ORDER — DULOXETIN HYDROCHLORIDE 30 MG/1
30 CAPSULE, DELAYED RELEASE ORAL 2 TIMES DAILY
Status: DISCONTINUED | OUTPATIENT
Start: 2022-07-17 | End: 2022-07-21 | Stop reason: HOSPADM

## 2022-07-17 RX ORDER — LORAZEPAM 0.5 MG/1
1 TABLET ORAL ONCE
Status: COMPLETED | OUTPATIENT
Start: 2022-07-17 | End: 2022-07-17

## 2022-07-17 RX ADMIN — KETOROLAC TROMETHAMINE 15 MG: 15 INJECTION, SOLUTION INTRAMUSCULAR; INTRAVENOUS at 19:41

## 2022-07-17 RX ADMIN — MORPHINE SULFATE 4 MG: 4 INJECTION INTRAVENOUS at 16:08

## 2022-07-17 RX ADMIN — METOCLOPRAMIDE HYDROCHLORIDE 10 MG: 5 INJECTION INTRAMUSCULAR; INTRAVENOUS at 19:43

## 2022-07-17 RX ADMIN — SODIUM CHLORIDE 1000 ML: 9 INJECTION, SOLUTION INTRAVENOUS at 19:45

## 2022-07-17 RX ADMIN — DULOXETINE HYDROCHLORIDE 30 MG: 30 CAPSULE, DELAYED RELEASE ORAL at 23:56

## 2022-07-17 RX ADMIN — INSULIN GLARGINE 20 UNITS: 100 INJECTION, SOLUTION SUBCUTANEOUS at 23:57

## 2022-07-17 RX ADMIN — LORAZEPAM 1 MG: 0.5 TABLET ORAL at 15:38

## 2022-07-17 RX ADMIN — DIPHENHYDRAMINE HYDROCHLORIDE 25 MG: 50 INJECTION, SOLUTION INTRAMUSCULAR; INTRAVENOUS at 19:30

## 2022-07-17 RX ADMIN — MORPHINE SULFATE 4 MG: 4 INJECTION INTRAVENOUS at 17:33

## 2022-07-17 RX ADMIN — SODIUM CHLORIDE 500 ML: 9 INJECTION, SOLUTION INTRAVENOUS at 15:36

## 2022-07-17 RX ADMIN — DOCUSATE SODIUM 100 MG: 100 CAPSULE, LIQUID FILLED ORAL at 23:57

## 2022-07-17 RX ADMIN — OXYCODONE HYDROCHLORIDE AND ACETAMINOPHEN 2 TABLET: 5; 325 TABLET ORAL at 15:37

## 2022-07-17 RX ADMIN — GADOBUTROL 9 ML: 604.72 INJECTION INTRAVENOUS at 17:00

## 2022-07-17 RX ADMIN — MONTELUKAST SODIUM 10 MG: 10 TABLET, FILM COATED ORAL at 23:57

## 2022-07-17 NOTE — ED PROVIDER NOTES
EMERGENCY DEPARTMENT ENCOUNTER      NAME: Sarah Rahman  AGE: 56 year old female  YOB: 1965  MRN: [id number]  EVALUATION DATE & TIME: No admission date for patient encounter.    PCP: Emigdio Martinez    ED PROVIDER: Holli To M.D.      Chief Complaint   Patient presents with     Fall     Leg Pain     FINAL IMPRESSION:  1. Right sided weakness    2. Fall, initial encounter      ED COURSE & MEDICAL DECISION MAKING:    Pertinent Labs & Imaging studies reviewed. (See chart for details)  ED Course as of 07/17/22 2151   Sun Jul 17, 2022   1440 Patient is a 56-year-old female who comes in today for evaluation after she had a couple falls last 24 hours.  She then had an episode where she woke up on the ground and does not know why.  She complains of some right-sided weakness and does have a little bit of a facial droop and some weakness to her right arm for sure.  Leg testing was limited.  She has decreased sensation to her right arm and leg and face.  She had a history of a stroke before and it did affect her right side but she said this feels different.  She just feels like her right leg is weak.  She also complains of radicular pain down the leg in addition to the weakness of the leg and the arm and the face.  She like something for the pain.  She took a Percocet at home without good relief.  We can give her a couple of Percocet here while she is waiting in the waiting room and then go to something IV once an IV is placed.  I think with the fall we will get a CT scan of the head to make sure there is no blood and if that looks okay then we can get MRI imaging to look for any signs of a stroke.  She has had a left carotid endarterectomy but we will get MRA to evaluate her vessels as well.  We will check basic labs.  We will give her a little fluid and see how she does.  I discussed all this with the patient and she is in agreement with the plan.   1713 Patient's hemoglobin and white count  are normal.  Platelet count is within normal limits.  Her troponin is negative.  Glucose was slightly elevated at 162 but electrolytes look good.  Chest x-ray showed no edema but a little bit of vascular congestion.  She is not complaining of shortness of breath.  She is down an MRI right now and that should come back shortly.   1841 No new finding was seen.   1848 Imaging came back unremarkable.  I will discuss this with the patient and we will try to get her discharged home.   1901 Patient still feels quite weak on her entire right side.   1916 I briefly spoke with Dr. Fowler and was looking through the patient's history little bit more.  She has had several presentations like this in the past.  She has had MRIs that look okay in the past.  Willing to try a little different combination of medications and see if we can get her feeling better to a point that we can get her home.  I had her smile again and it is a weird exam.  Initially I thought she had some facial droop and now I wonder if she is malingering a little bit.  We will see where we are at in about an hour and if we can get her to a point where we can get her home then we will try to do that but if not then we can admit her to the hospitalist at that point.   1923 I went back and talk to the patient.  She feels like this is far different than anything she is ever presented with before.  We will give a try a little combination of medications and see if he gets better.  She is in agreement with the plan.   2102 Patient did not have significant provement in her symptoms so I called Dr. Fowler who accepted the patient as a MedSurg observation for this weakness and falls.       2:28 PM I met with the patient, obtained history, performed an initial exam, and discussed options and plan for diagnostics and treatment here in the ED.  7:12 PM I spoke to the Hospitalist, Dr. Fowler.       At the conclusion of the encounter I discussed  the results of all of the tests  "and the disposition with patient.   All questions were answered.  The patient acknowledged understanding and was involved in the decision making regarding the overall care plan.      MEDICATIONS GIVEN IN THE EMERGENCY:  Medications   0.9% sodium chloride BOLUS (0 mLs Intravenous Stopped 7/17/22 1615)   oxyCODONE-acetaminophen (PERCOCET) 5-325 MG per tablet 2 tablet (2 tablets Oral Given 7/17/22 1537)   LORazepam (ATIVAN) tablet 1 mg (1 mg Oral Given 7/17/22 1538)   morphine (PF) injection 4 mg (4 mg Intravenous Given 7/17/22 1608)   gadobutrol (GADAVIST) injection 9 mL (9 mLs Intravenous Given 7/17/22 1700)   morphine (PF) injection 4 mg (4 mg Intravenous Given 7/17/22 1733)   ketorolac (TORADOL) injection 15 mg (15 mg Intravenous Given 7/17/22 1941)   diphenhydrAMINE (BENADRYL) injection 25 mg (25 mg Intravenous Given 7/17/22 1930)   metoclopramide (REGLAN) injection 10 mg (10 mg Intravenous Given 7/17/22 1943)   0.9% sodium chloride BOLUS (1,000 mLs Intravenous New Bag 7/17/22 1945)     =================================================================    HPI    Triage Note: Patient states she stood up last night to use the bathroom and fell, states her legs have been feeling more weak lately and slightly dizzy, has had a couple falls over the past 2 days. States \"feels like pins and needles\" at times    Patient information was obtained from: patient    Use of : N/A    Sarah Rahman is a 56 year old female who presents with leg pain.     The patient has a pertinent medical history of bilateral lower back pain with right sided sciatica, hyperlipidemia, hypertension, CHF, COPD, DM2, drug dependence, CVA, asthma, obesity, depression with anxiety, and CAD s/p CABG.    Patient states she was feeling a flare up of her right sided sciatic back pain. Describes that her right leg feels \"stinging, burning, tingling, and achy\". She has also felt weakness throughout her right leg and her right foot is more " swollen than her left. Last night, patient had a fall which she thought was from standing up too quickly. Early this morning, patient was walking into her kitchen when she had another fall which she attributed to her right leg pain. She took a percocet at 9:00 AM without relief. While she remembers what happened in both of these falls and denies any head trauma, she woke up on the floor at 11:30 AM today with no memory of how she got there or how long she had been down. Patient attempted to get up by grabbing onto her lift chair nearby but her right leg was very weak and she was unable to walk. When she awoke on the floor, she also noticed left-sided chest pressure that has been constant since. Of note, her friend also remarked that the right side of her face appeared droopy.     Patient notes that she had a stroke in the past which had affected the entire right side of her body. States current symptoms feel different than anything she's experienced before. Denies being anticoagulated. Denies having a pacemaker. Patient denies shortness of breath, fever, chills, back pain, or any other complaints at this time.     REVIEW OF SYSTEMS   Except as stated in the HPI all other systems reviewed and are negative.    PAST MEDICAL HISTORY:  Past Medical History:   Diagnosis Date     Asthma      CAD (coronary artery disease)      COPD (chronic obstructive pulmonary disease) (H)      CVA (cerebral infarction)      Diabetes (H)      GERD (gastroesophageal reflux disease)      Hypertension      Polysubstance abuse (H)      S/P CABG (coronary artery bypass graft)      S/P lumbar discectomy 06/13/2019    L5/S1 by  Dr. Hamm at [facility]     Schizoaffective disorder (H)        PAST SURGICAL HISTORY:  Past Surgical History:   Procedure Laterality Date     BYPASS GRAFT ARTERY CORONARY  2009    x2     CORONARY STENT PLACEMENT       CV CORONARY ANGIOGRAM N/A 6/2/2021    Procedure: Coronary Angiogram;  Surgeon: Juventino Rivera,  MD;  Location: [facility] Cardiac Cath Lab;  Service: Cardiology     HYSTERECTOMY TOTAL ABDOMINAL, BILATERAL SALPINGO-OOPHORECTOMY, COMBINED       ORIF ULNAR / RADIAL SHAFT FRACTURE Right        CURRENT MEDICATIONS:    No current facility-administered medications for this encounter.    Current Outpatient Medications:      acetaminophen (TYLENOL) 325 MG tablet, Take 2 tablets (650 mg) by mouth every 8 hours, Disp: 270 tablet, Rfl: 0     albuterol (PROAIR HFA) 108 (90 BASE) MCG/ACT inhaler, Inhale 1-2 puffs every 4 to 6 hours as needed, Disp: , Rfl:      alcohol swab prep pads, Use to swab area of injection/zac as directed., Disp: 100 each, Rfl: 6     aspirin (ASA) 325 MG EC tablet, Take 325 mg by mouth daily , Disp: , Rfl:      blood glucose (ACCU-CHEK DARRIN PLUS) test strip, Use to test blood sugar 3-4 times daily or as directed., Disp: 100 strip, Rfl: 0     blood glucose (ACCU-CHEK SOFTCLIX) lancing device, Lancing device to be used with lancets., Disp: 1 each, Rfl: 0     blood glucose monitoring (SOFTCLIX) lancets, Use to test blood sugar 3-4 times daily., Disp: 100 each, Rfl: 1     budesonide-formoterol (SYMBICORT) 160-4.5 MCG/ACT Inhaler, Inhale 2 puffs into the lungs 2 times daily, Disp: , Rfl:      carvedilol (COREG) 12.5 MG tablet, Take 12.5 mg by mouth 2 times daily (with meals), Disp: , Rfl:      cetirizine (ZYRTEC) 10 MG tablet, Take 10 mg by mouth daily, Disp: , Rfl:      COMPOUND CONTAINING CONTROLLED SUBSTANCE (CMPD RX) - PHARMACY TO MIX COMPOUNDED MEDICATION, neuro PLO pain gel w/ lidocaine(ketamine 8%-gabapentin 6%-lidocaine 2.5%)([facility] AMB MIXTURE), Disp: , Rfl:      Continuous Blood Gluc Sensor ([employer] SHEBA 14 DAY SENSOR) [facility], 1 each every 14 days Use 1 Sensor every 14 days. Use to read blood sugars per 's instructions., Disp: 2 each, Rfl: 5     diclofenac (VOLTAREN) 1 % topical gel, Apply 2 g topically 3 times daily as needed for moderate pain (feet), Disp: , Rfl:      diclofenac  (VOLTAREN) 50 MG EC tablet, Take 1 tablet (50 mg) by mouth 3 times daily for 10 days, Disp: 30 tablet, Rfl: 0     diphenhydrAMINE (BENADRYL) 25 MG tablet, Take 25 mg by mouth every 6 hours as needed for itching or allergies, Disp: , Rfl:      dulaglutide (TRULICITY) 0.75 MG/0.5ML pen, Inject 0.75 mg Subcutaneous every 7 days, Disp: 2 mL, Rfl: 0     DULoxetine (CYMBALTA) 30 MG capsule, Take 30 mg by mouth 2 times daily, Disp: , Rfl:      fluticasone (FLONASE) 50 MCG/ACT nasal spray, Spray 1 spray into both nostrils daily, Disp: , Rfl:      furosemide (LASIX) 20 MG tablet, Take 20 mg by mouth daily as needed (swelling), Disp: , Rfl:      glipiZIDE (GLUCOTROL XL) 10 MG 24 hr tablet, Take 1 tablet (10 mg) by mouth daily (with breakfast), Disp: 30 tablet, Rfl: 0     guaiFENesin-codeine (ROBITUSSIN AC) 100-10 MG/5ML solution, Take 1-2 teaspoonful by mouth every 4 hours as needed for cough, Disp: , Rfl:      insulin glargine (LANTUS PEN) 100 UNIT/ML pen, Inject 20 Units Subcutaneous At Bedtime, Disp: 30 mL, Rfl: 0     insulin pen needle (32G X 4 MM) 32G X 4 MM miscellaneous, Use 1 pen needles daily or as directed., Disp: 100 each, Rfl: 0     ipratropium - albuterol 0.5 mg/2.5 mg/3 mL (DUONEB) 0.5-2.5 (3) MG/3ML neb solution, Take 1 vial by nebulization every 6 hours as needed for shortness of breath / dyspnea or wheezing, Disp: , Rfl:      ketorolac (TORADOL) 10 MG tablet, Take 1 tablet (10 mg) by mouth every 6 hours as needed for moderate pain, Disp: 20 tablet, Rfl: 0     Lidocaine (LIDOCARE) 4 % Patch, Place 1 patch onto the skin every 24 hours To prevent lidocaine toxicity, patient should be patch free for 12 hrs daily. BACK, Disp: , Rfl:      lidocaine (XYLOCAINE) 5 % external ointment, Apply topically 3 times daily as needed for other (feet neuropathy), Disp: 35 g, Rfl: 1     losartan (COZAAR) 50 MG tablet, Take 50 mg by mouth daily., Disp: , Rfl:      metFORMIN (GLUCOPHAGE) 500 MG tablet, Take 1 tablet (500 mg) by  "mouth daily (with dinner) (Patient not taking: Reported on 6/7/2022), Disp: 30 tablet, Rfl: 0     methocarbamol (ROBAXIN) 750 MG tablet, Take 750 mg by mouth 4 times daily, Disp: , Rfl:      montelukast (SINGULAIR) 10 MG tablet, Take 10 mg by mouth At Bedtime, Disp: , Rfl:      nitroGLYcerin (NITROSTAT) 0.4 MG sublingual tablet, Place 0.4 mg under the tongue every 5 minutes as needed for chest pain For chest pain place 1 tablet under the tongue every 5 minutes for 3 doses. If symptoms persist 5 minutes after 1st dose call 911., Disp: , Rfl:      nystatin (MYCOSTATIN) 996095 UNIT/GM external ointment, Apply topically 2 times daily (Patient not taking: Reported on 6/7/2022), Disp: 15 g, Rfl: 0     omeprazole 20 MG tablet, Take 20 mg by mouth daily (Patient not taking: Reported on 6/7/2022), Disp: , Rfl:      pregabalin (LYRICA) 150 MG capsule, Take 150 mg by mouth 3 times daily, Disp: , Rfl:      rosuvastatin (CRESTOR) 20 MG tablet, Take 20 mg by mouth daily, Disp: , Rfl:      senna-docusate (SENOKOT-S/PERICOLACE) 8.6-50 MG tablet, Take 1 tablet by mouth daily, Disp: , Rfl:      urea (DERMAL THERAPY FINGER CARE) 20 % external lotion, Feet as needed, Disp: , Rfl:     ALLERGIES:  Allergies   Allergen Reactions     Haloperidol Unknown     Patient states it stops her heart       Meperidine Angioedema, Difficulty breathing, Other (See Comments), Rash and Shortness Of Breath     Throat closes  Other reaction(s): Breathing Difficulty  Other reaction(s): Breathing Difficulty       Phenothiazines Other (See Comments)     Other reaction(s): CARDIAC DISTURBANCES  Patient states it stops her heart  \"I swell up\"  \"stopped by heart\"  \"I swell up\"  \"I swell up\"       Tramadol Other (See Comments)     Other reaction(s): Angioedema  Throat itch       Butyrophenones Angioedema     Demerol      Januvia [Sitagliptin] Other (See Comments)     Swelling in the neck      Latex Itching     Lisinopril Other (See Comments)     ACE cough  Itchy " "throat  Throat itches  Itchy throat       Penicillins      Hydroxyzine Other (See Comments) and Rash     Tongue swelling  Tongue swelling  Tongue swelling         FAMILY HISTORY:  Family History   Problem Relation Age of Onset     Heart Disease Mother      Heart Disease Father      Heart Disease Sister      Diabetes Brother      Heart Disease Sister        SOCIAL HISTORY:   Social History     Socioeconomic History     Marital status: Single   Tobacco Use     Smoking status: Current Every Day Smoker     Smokeless tobacco: Never Used   Substance and Sexual Activity     Alcohol use: Yes     Comment: Alcoholic Drinks/day: occ     Drug use: No   Social History Narrative    Lives alone in a condo.  On disability.  Three living children.         PHYSICAL EXAM    VITAL SIGNS: BP (!) 176/86   Pulse 72   Temp 98  F (36.7  C) (Oral)   Resp 16   Ht 1.702 m (5' 7\")   Wt 88.5 kg (195 lb)   LMP  (LMP Unknown)   SpO2 100%   BMI 30.54 kg/m     GENERAL: Awake, Alert, answering questions, No acute distress, Well nourished  HEENT: Normal cephalic, Atraumatic, bilateral external ears normal, No scleral icterus, mask in place  NECK: No obvious swelling or abnormality, No stridor  PULMONARY:Normal and symmetric breath sounds, No respiratory distress, Lungs clear to auscultation bilaterally. No wheezing  CARDIOVASCULAR: Regular rate and rhythm, Distal pulses present and normal.  ABDOMINAL: Soft, Nondistended, Nontender, No flank tenderness, No palpable masses  BACK: No tenderness.  EXTREMITIES: Moves all extremities spontaneously, warm, no edema, No major deformities  NEURO: Facial droop, Normal speech. Weak right arm. Decreased sensation to right face, arm, and leg.  PSYCH: Normal mood and affect  SKIN: No rashes on visualized skin, dry, warm     LAB:  All pertinent labs reviewed and interpreted.  Results for orders placed or performed during the hospital encounter of 07/17/22   MR Brain w/o & w Contrast    Impression    " CONCLUSION:  HEAD MRI:   1.  No acute/subacute infarction, intracranial hemorrhage, mass effect, hydrocephalus, or abnormal enhancement.  2.  Stable chronic ischemic and mild to moderate age-related changes.    HEAD MRA:   1.  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  2.  No new high-grade stenosis, branch vessel occlusion, aneurysm, or high flow vascular malformation.    NECK MRA:  1.  No significant stenosis in the neck vessels based on NASCET criteria.  2.  No evidence for dissection or pseudoaneurysm.   MRA Brain ([place]) wo Contrast    Impression    CONCLUSION:  HEAD MRI:   1.  No acute/subacute infarction, intracranial hemorrhage, mass effect, hydrocephalus, or abnormal enhancement.  2.  Stable chronic ischemic and mild to moderate age-related changes.    HEAD MRA:   1.  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  2.  No new high-grade stenosis, branch vessel occlusion, aneurysm, or high flow vascular malformation.    NECK MRA:  1.  No significant stenosis in the neck vessels based on NASCET criteria.  2.  No evidence for dissection or pseudoaneurysm.   MRA Neck (Carotids) wo & w Contrast    Impression    CONCLUSION:  HEAD MRI:   1.  No acute/subacute infarction, intracranial hemorrhage, mass effect, hydrocephalus, or abnormal enhancement.  2.  Stable chronic ischemic and mild to moderate age-related changes.    HEAD MRA:   1.  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  2.  No new high-grade stenosis, branch vessel occlusion, aneurysm, or high flow vascular malformation.    NECK MRA:  1.  No  significant stenosis in the neck vessels based on NASCET criteria.  2.  No evidence for dissection or pseudoaneurysm.   XR Chest 1 View    Impression    IMPRESSION: Stable enlarged cardiomediastinal silhouette status post median sternotomy and CABG. Likely pulmonary vascular congestion without michele edema or focal airspace disease. No pleural effusion or pneumothorax. No acute bony abnormality.   Basic metabolic panel   Result Value Ref Range    Sodium 139 136 - 145 mmol/L    Potassium 3.9 3.5 - 5.0 mmol/L    Chloride 107 98 - 107 mmol/L    Carbon Dioxide (CO2) 25 22 - 31 mmol/L    Anion Gap 7 5 - 18 mmol/L    Urea Nitrogen 19 8 - 22 mg/dL    Creatinine 0.78 0.60 - 1.10 mg/dL    Calcium 9.2 8.5 - 10.5 mg/dL    Glucose 162 (H) 70 - 125 mg/dL    GFR Estimate 88 >60 mL/min/1.73m2   Result Value Ref Range    Troponin I <0.01 0.00 - 0.29 ng/mL   Result Value Ref Range    Magnesium 2.0 1.8 - 2.6 mg/dL   Asymptomatic COVID-19 Virus (Coronavirus) by PCR Nasopharyngeal    Specimen: Nasopharyngeal; Swab   Result Value Ref Range    SARS CoV2 PCR Negative Negative   CBC with platelets and differential   Result Value Ref Range    WBC Count 6.2 4.0 - 11.0 10e3/uL    RBC Count 4.39 3.80 - 5.20 10e6/uL    Hemoglobin 11.8 11.7 - 15.7 g/dL    Hematocrit 37.7 35.0 - 47.0 %    MCV 86 78 - 100 fL    MCH 26.9 26.5 - 33.0 pg    MCHC 31.3 (L) 31.5 - 36.5 g/dL    RDW 13.7 10.0 - 15.0 %    Platelet Count 233 150 - 450 10e3/uL   Manual Differential   Result Value Ref Range    % Neutrophils 53 %    % Lymphocytes 41 %    % Monocytes 4 %    % Eosinophils 2 %    % Basophils 0 %    Absolute Neutrophils 3.3 1.6 - 8.3 10e3/uL    Absolute Lymphocytes 2.5 0.8 - 5.3 10e3/uL    Absolute Monocytes 0.2 0.0 - 1.3 10e3/uL    Absolute Eosinophils 0.1 0.0 - 0.7 10e3/uL    Absolute Basophils 0.0 0.0 - 0.2 10e3/uL    RBC Morphology Confirmed RBC Indices     Platelet Assessment  Automated Count Confirmed. Platelet morphology is normal.     Automated Count  Confirmed. Platelet morphology is normal.       RADIOLOGY:  MR Brain w/o & w Contrast   Final Result   CONCLUSION:   HEAD MRI:    1.  No acute/subacute infarction, intracranial hemorrhage, mass effect, hydrocephalus, or abnormal enhancement.   2.  Stable chronic ischemic and mild to moderate age-related changes.      HEAD MRA:    1.  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   2.  No new high-grade stenosis, branch vessel occlusion, aneurysm, or high flow vascular malformation.      NECK MRA:   1.  No significant stenosis in the neck vessels based on NASCET criteria.   2.  No evidence for dissection or pseudoaneurysm.      MRA Neck (Carotids) wo & w Contrast   Final Result   CONCLUSION:   HEAD MRI:    1.  No acute/subacute infarction, intracranial hemorrhage, mass effect, hydrocephalus, or abnormal enhancement.   2.  Stable chronic ischemic and mild to moderate age-related changes.      HEAD MRA:    1.  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   2.  No new high-grade stenosis, branch vessel occlusion, aneurysm, or high flow vascular malformation.      NECK MRA:   1.  No significant stenosis in the neck vessels based on NASCET criteria.   2.  No evidence for dissection or pseudoaneurysm.      MRA Brain ([place]) wo Contrast   Final Result   CONCLUSION:   HEAD MRI:    1.  No acute/subacute infarction, intracranial hemorrhage, mass effect, hydrocephalus, or abnormal enhancement.   2.  Stable chronic ischemic and mild to moderate age-related changes.      HEAD MRA:    1.  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   2.  No new high-grade stenosis, branch vessel occlusion, aneurysm, or high flow vascular malformation.      NECK MRA:   1.  No significant stenosis in the neck vessels based on NASCET criteria.   2.  No evidence for dissection or pseudoaneurysm.      XR Chest 1 View   Final Result   IMPRESSION: Stable enlarged cardiomediastinal silhouette status post median sternotomy and CABG. Likely pulmonary vascular congestion without michele edema or focal airspace disease. No pleural effusion or pneumothorax. No acute bony abnormality.          EKG:    Date and time: July 17, 2022 1822  Rate: 67 bpm  Rhythm: Sinus rhythm  SC interval: 192 ms  QRS interval: 78 ms  QT/QTc: 430/454 ms  ST changes or T wave changes: No acute ST or T wave abnormalities  Change from prior ECG: No significant change from prior  I have independently reviewed and interpreted this EKG.     I, Hira Reyes, am serving as a scribe to document services personally performed by Dr. To based on my observation and the provider's statements to me. I, Holli To MD attest that Hira Reyes is acting in a scribe capacity, has observed my performance of the services and has documented them in accordance with my direction.    Holli To M.D.  Emergency Medicine  [facility] EMERGENCY DEPARTMENT  [facility] [facility] [id number] [phone]  Dept: [phone]\     Holli To MD  07/17/22 7316

## 2022-07-17 NOTE — ED TRIAGE NOTES
"Patient states she stood up last night to use the bathroom and fell, states her legs have been feeling more weak lately and slightly dizzy, has had a couple falls over the past 2 days. States \"feels like pins and needles\" at times      "

## 2022-07-18 ENCOUNTER — APPOINTMENT (OUTPATIENT)
Dept: OCCUPATIONAL THERAPY | Facility: HOSPITAL | Age: 57
End: 2022-07-18
Attending: HOSPITALIST
Payer: COMMERCIAL

## 2022-07-18 ENCOUNTER — APPOINTMENT (OUTPATIENT)
Dept: PHYSICAL THERAPY | Facility: HOSPITAL | Age: 57
End: 2022-07-18
Attending: HOSPITALIST
Payer: COMMERCIAL

## 2022-07-18 LAB
GLUCOSE BLDC GLUCOMTR-MCNC: 108 MG/DL (ref 70–99)
GLUCOSE BLDC GLUCOMTR-MCNC: 144 MG/DL (ref 70–99)
GLUCOSE BLDC GLUCOMTR-MCNC: 176 MG/DL (ref 70–99)
GLUCOSE BLDC GLUCOMTR-MCNC: 88 MG/DL (ref 70–99)
GLUCOSE BLDC GLUCOMTR-MCNC: 98 MG/DL (ref 70–99)

## 2022-07-18 PROCEDURE — 250N000012 HC RX MED GY IP 250 OP 636 PS 637: Performed by: HOSPITALIST

## 2022-07-18 PROCEDURE — 250N000013 HC RX MED GY IP 250 OP 250 PS 637: Performed by: HOSPITALIST

## 2022-07-18 PROCEDURE — 82962 GLUCOSE BLOOD TEST: CPT

## 2022-07-18 PROCEDURE — 99417 PROLNG OP E/M EACH 15 MIN: CPT | Performed by: PSYCHIATRY & NEUROLOGY

## 2022-07-18 PROCEDURE — G0378 HOSPITAL OBSERVATION PER HR: HCPCS

## 2022-07-18 PROCEDURE — 97162 PT EVAL MOD COMPLEX 30 MIN: CPT | Mod: GP | Performed by: PHYSICAL THERAPIST

## 2022-07-18 PROCEDURE — 99225 PR SUBSEQUENT OBSERVATION CARE,LEVEL II: CPT | Performed by: HOSPITALIST

## 2022-07-18 PROCEDURE — 250N000011 HC RX IP 250 OP 636: Performed by: NURSE PRACTITIONER

## 2022-07-18 PROCEDURE — 96372 THER/PROPH/DIAG INJ SC/IM: CPT | Performed by: HOSPITALIST

## 2022-07-18 PROCEDURE — 96372 THER/PROPH/DIAG INJ SC/IM: CPT

## 2022-07-18 PROCEDURE — 250N000011 HC RX IP 250 OP 636: Performed by: HOSPITALIST

## 2022-07-18 PROCEDURE — 250N000013 HC RX MED GY IP 250 OP 250 PS 637: Performed by: NURSE PRACTITIONER

## 2022-07-18 PROCEDURE — 96376 TX/PRO/DX INJ SAME DRUG ADON: CPT

## 2022-07-18 PROCEDURE — 97166 OT EVAL MOD COMPLEX 45 MIN: CPT | Mod: GO

## 2022-07-18 PROCEDURE — 99215 OFFICE O/P EST HI 40 MIN: CPT | Performed by: PSYCHIATRY & NEUROLOGY

## 2022-07-18 PROCEDURE — 250N000013 HC RX MED GY IP 250 OP 250 PS 637: Performed by: PSYCHIATRY & NEUROLOGY

## 2022-07-18 PROCEDURE — 99220 PR INITIAL OBSERVATION CARE,LEVEL III: CPT | Performed by: NURSE PRACTITIONER

## 2022-07-18 RX ORDER — CARVEDILOL 3.12 MG/1
6.25 TABLET ORAL ONCE
Status: COMPLETED | OUTPATIENT
Start: 2022-07-18 | End: 2022-07-18

## 2022-07-18 RX ORDER — LOSARTAN POTASSIUM 50 MG/1
100 TABLET ORAL DAILY
Status: DISCONTINUED | OUTPATIENT
Start: 2022-07-19 | End: 2022-07-21 | Stop reason: HOSPADM

## 2022-07-18 RX ORDER — LIDOCAINE 4 G/G
1-2 PATCH TOPICAL
Status: DISCONTINUED | OUTPATIENT
Start: 2022-07-18 | End: 2022-07-18

## 2022-07-18 RX ORDER — LOSARTAN POTASSIUM 50 MG/1
50 TABLET ORAL ONCE
Status: COMPLETED | OUTPATIENT
Start: 2022-07-18 | End: 2022-07-18

## 2022-07-18 RX ORDER — MORPHINE SULFATE 4 MG/ML
4 INJECTION, SOLUTION INTRAMUSCULAR; INTRAVENOUS ONCE
Status: COMPLETED | OUTPATIENT
Start: 2022-07-18 | End: 2022-07-18

## 2022-07-18 RX ORDER — LOSARTAN POTASSIUM 25 MG/1
25 TABLET ORAL DAILY
Status: DISCONTINUED | OUTPATIENT
Start: 2022-07-18 | End: 2022-07-18

## 2022-07-18 RX ORDER — CARVEDILOL 12.5 MG/1
12.5 TABLET ORAL 2 TIMES DAILY WITH MEALS
Status: DISCONTINUED | OUTPATIENT
Start: 2022-07-18 | End: 2022-07-21 | Stop reason: HOSPADM

## 2022-07-18 RX ORDER — MORPHINE SULFATE 4 MG/ML
4 INJECTION, SOLUTION INTRAMUSCULAR; INTRAVENOUS
Status: COMPLETED | OUTPATIENT
Start: 2022-07-18 | End: 2022-07-18

## 2022-07-18 RX ORDER — LOSARTAN POTASSIUM 50 MG/1
50 TABLET ORAL DAILY
Status: DISCONTINUED | OUTPATIENT
Start: 2022-07-18 | End: 2022-07-18

## 2022-07-18 RX ADMIN — MORPHINE SULFATE 4 MG: 4 INJECTION INTRAVENOUS at 03:11

## 2022-07-18 RX ADMIN — CARVEDILOL 6.25 MG: 3.12 TABLET, FILM COATED ORAL at 11:05

## 2022-07-18 RX ADMIN — INSULIN ASPART 1 UNITS: 100 INJECTION, SOLUTION INTRAVENOUS; SUBCUTANEOUS at 13:02

## 2022-07-18 RX ADMIN — LOSARTAN POTASSIUM 50 MG: 50 TABLET, FILM COATED ORAL at 11:06

## 2022-07-18 RX ADMIN — AMITRIPTYLINE HYDROCHLORIDE 25 MG: 25 TABLET, FILM COATED ORAL at 21:20

## 2022-07-18 RX ADMIN — MORPHINE SULFATE 4 MG: 4 INJECTION INTRAVENOUS at 13:02

## 2022-07-18 RX ADMIN — DOCUSATE SODIUM 100 MG: 100 CAPSULE, LIQUID FILLED ORAL at 08:24

## 2022-07-18 RX ADMIN — PANTOPRAZOLE SODIUM 40 MG: 40 TABLET, DELAYED RELEASE ORAL at 08:23

## 2022-07-18 RX ADMIN — LOSARTAN POTASSIUM 50 MG: 50 TABLET, FILM COATED ORAL at 17:04

## 2022-07-18 RX ADMIN — ACETAMINOPHEN 650 MG: 325 TABLET ORAL at 22:06

## 2022-07-18 RX ADMIN — ACETAMINOPHEN 650 MG: 325 TABLET ORAL at 04:58

## 2022-07-18 RX ADMIN — CARVEDILOL 12.5 MG: 12.5 TABLET, FILM COATED ORAL at 17:06

## 2022-07-18 RX ADMIN — METHOCARBAMOL 500 MG: 500 TABLET ORAL at 11:07

## 2022-07-18 RX ADMIN — DICLOFENAC 2 G: 10 GEL TOPICAL at 22:09

## 2022-07-18 RX ADMIN — ASPIRIN 325 MG: 325 TABLET, COATED ORAL at 08:23

## 2022-07-18 RX ADMIN — OXYCODONE HYDROCHLORIDE 10 MG: 5 TABLET ORAL at 08:29

## 2022-07-18 RX ADMIN — DULOXETINE HYDROCHLORIDE 30 MG: 30 CAPSULE, DELAYED RELEASE ORAL at 21:20

## 2022-07-18 RX ADMIN — INSULIN ASPART 1 UNITS: 100 INJECTION, SOLUTION INTRAVENOUS; SUBCUTANEOUS at 18:47

## 2022-07-18 RX ADMIN — DULOXETINE HYDROCHLORIDE 30 MG: 30 CAPSULE, DELAYED RELEASE ORAL at 08:27

## 2022-07-18 RX ADMIN — OXYCODONE HYDROCHLORIDE 10 MG: 5 TABLET ORAL at 00:04

## 2022-07-18 RX ADMIN — CETIRIZINE HYDROCHLORIDE 10 MG: 10 TABLET ORAL at 08:28

## 2022-07-18 RX ADMIN — METHOCARBAMOL 500 MG: 500 TABLET ORAL at 21:20

## 2022-07-18 RX ADMIN — METHOCARBAMOL 500 MG: 500 TABLET ORAL at 17:04

## 2022-07-18 RX ADMIN — MONTELUKAST SODIUM 10 MG: 10 TABLET, FILM COATED ORAL at 21:20

## 2022-07-18 RX ADMIN — INSULIN GLARGINE 20 UNITS: 100 INJECTION, SOLUTION SUBCUTANEOUS at 22:07

## 2022-07-18 RX ADMIN — OXYCODONE HYDROCHLORIDE 10 MG: 5 TABLET ORAL at 23:47

## 2022-07-18 RX ADMIN — ROSUVASTATIN CALCIUM 20 MG: 10 TABLET, FILM COATED ORAL at 08:25

## 2022-07-18 RX ADMIN — METHOCARBAMOL 500 MG: 500 TABLET ORAL at 08:24

## 2022-07-18 RX ADMIN — LIDOCAINE 2 PATCH: 246 PATCH TOPICAL at 03:17

## 2022-07-18 RX ADMIN — ACETAMINOPHEN 650 MG: 325 TABLET ORAL at 11:03

## 2022-07-18 RX ADMIN — DICLOFENAC 2 G: 10 GEL TOPICAL at 13:03

## 2022-07-18 RX ADMIN — OXYCODONE HYDROCHLORIDE 10 MG: 5 TABLET ORAL at 17:03

## 2022-07-18 RX ADMIN — DOCUSATE SODIUM 100 MG: 100 CAPSULE, LIQUID FILLED ORAL at 21:20

## 2022-07-18 NOTE — TELEPHONE ENCOUNTER
"Routing refill request to provider for review/approval because:  Labs out of range:  A1C    Last Written Prescription Date:  6/7/2022  Last Fill Quantity: 2 ml,  # refills: 0   Last office visit provider:  6/7/2022     Requested Prescriptions   Pending Prescriptions Disp Refills     TRULICITY 0.75 MG/0.5ML pen [Pharmacy Med Name: TRULICITY 0.75MG/0.5ML SOPN] 2 mL 0     Sig: INJECT CONTENTS OF 1 SYRINGE (0.75 MG) SUBCUTANEOUSLY EVERY 7 DAYS       GLP-1 Agonists Protocol Failed - 7/18/2022  3:09 AM        Failed - HgbA1C in past 3 or 6 months     If HgbA1C is 8 or greater, it needs to be on file within the past 3 months.  If less than 8, must be on file within the past 6 months.     Recent Labs   Lab Test 04/03/22  1436   A1C >14.0*             Passed - Medication is active on med list        Passed - Patient is age 18 or older        Passed - No active pregnancy on record        Passed - Normal serum creatinine on file in past 12 months     Recent Labs   Lab Test 07/17/22  1520   CR 0.78       Ok to refill medication if creatinine is low          Passed - No positive pregnancy test in past 12 months        Passed - Recent (6 mo) or future (30 days) visit within the authorizing provider's specialty     Patient had office visit in the last 6 months or has a visit in the next 30 days with authorizing provider.  See \"Patient Info\" tab in inbasket, or \"Choose Columns\" in Meds & Orders section of the refill encounter.                 America Jeffries RN 07/18/22 3:09 AM  "

## 2022-07-18 NOTE — CONSULTS
NEUROLOGY CONSULTATION NOTE     Sarah Rahman,  1965, MRN [id number] Date: 2022     [facility]   Code status:  Full Code   PCP: Emigdio Martinez, [phone]      ASSESSMENT & PLAN   Diagnosis code: Right-sided weakness/pain, falls.    56-year-old woman with history of stroke presenting with right-sided weakness and falls.  History of known peripheral neuropathy, some lumbar degenerative changes.      Brain MRI shows nothing acute.     Vessel imaging shows unchanged stenosis of the proximal/mid basilar artery with reconstitution distally.  No new findings.    History of stroke: Continue home aspirin and statin.    Suspect chronic difficulties with the leg are due to neuropathy and lumbar radiculopathy.    PT/OT.    Continue current pain regimen as directed by hospitalist.  I did speak with Kristin about adding amitriptyline.  She has not tried this in the past.  Start with 25 mg 1-2 hours before bedtime.    Possible syncopal event as patient awoke on the floor at home.  EEG pending.  EEG from 2018 was normal.    Exam is tough because it is limited by pain.    Neurology will follow along.       Chief Complaint   Patient presents with     Fall     Leg Pain        HISTORY OF PRESENT ILLNESS     We have been requested by Dr. Fowler to evaluate Sarah Rahman who is a 56 year old female for right-sided weakness and numbness.  The patient presented to the emergency room on 22 after having had a couple of falls.  She apparently lost consciousness prior to presentation.  She reported right-sided weakness and some facial drooping.  Reported history of previous stroke affecting the right side but she felt that things were different.  She also reported pain down into the right leg.  History of left carotid endarterectomy, CABG, schizoaffective disorder, hypertension, asthma and GERD.  Imaging is without acute findings.  Emergency room physician noted that the patient had similar  presentations like this in the past.  Previous work-up was unremarkable.  She was given a migraine cocktail for headache and knee emergency as well.  Episodes and eye closing well hospitalist was interviewing the patient.    Our team has seen the patient in the past for peripheral neuropathy.  She has history of being followed through [facility] at the pain clinic.  Recent lumbar imaging was stable.  Her neuropathy was felt to be due to poorly controlled diabetes.  Notes indicate that the patient was not convinced that her neuropathy was due to her diabetes.    Kristin tells me that she is mainly having burning pain in the right leg and hand.  Both legs bother her but it is the right leg that is worse.  She relays to me that she has tried both gabapentin and Lyrica in the past with difficulties with swelling.  She is taking Cymbalta. She has not tried amitriptyline as far as she knows.  Kristin tells me that her blood sugars have been under pretty good control at home.  No history of seizures.  She does not think that she passed out at home, but rather went down due to pain and weakness.  She does remember waking on the floor however.     PAST MEDICAL & SURGICAL HISTORY     Medical History  Past Medical History:   Diagnosis Date     Asthma      CAD (coronary artery disease)      COPD (chronic obstructive pulmonary disease) (H)      CVA (cerebral infarction)      Diabetes (H)      GERD (gastroesophageal reflux disease)      Hypertension      Polysubstance abuse (H)      S/P CABG (coronary artery bypass graft)      S/P lumbar discectomy 06/13/2019    L5/S1 by  Dr. Hamm at [facility]     Schizoaffective disorder (H)      Surgical History  Past Surgical History:   Procedure Laterality Date     BYPASS GRAFT ARTERY CORONARY  2009    x2     CORONARY STENT PLACEMENT       CV CORONARY ANGIOGRAM N/A 6/2/2021    Procedure: Coronary Angiogram;  Surgeon: Juventino Rivera MD;  Location: [facility];   "Service: Cardiology     HYSTERECTOMY TOTAL ABDOMINAL, BILATERAL SALPINGO-OOPHORECTOMY, COMBINED       ORIF ULNAR / RADIAL SHAFT FRACTURE Right         SOCIAL HISTORY     Social History      FAMILY HISTORY     Reviewed, and family history includes Diabetes in her brother; Heart Disease in her father, mother, sister, and sister.     ALLERGIES     Allergies   Allergen Reactions     Haloperidol Unknown     Patient states it stops her heart       Meperidine Angioedema, Difficulty breathing, Other (See Comments), Rash and Shortness Of Breath     Throat closes  Other reaction(s): Breathing Difficulty  Other reaction(s): Breathing Difficulty       Phenothiazines Other (See Comments)     Other reaction(s): CARDIAC DISTURBANCES  Patient states it stops her heart  \"I swell up\"  \"stopped by heart\"  \"I swell up\"  \"I swell up\"       Tramadol Other (See Comments)     Other reaction(s): Angioedema  Throat itch       Butyrophenones Angioedema     Januvia [Sitagliptin] Other (See Comments)     Swelling in the neck      Latex Itching     Lisinopril Other (See Comments)     ACE cough  Itchy throat  Throat itches  Itchy throat       Penicillins Swelling     Hydroxyzine Other (See Comments) and Rash     Tongue swelling  Tongue swelling  Tongue swelling          REVIEW OF SYSTEMS     Pertinent items are noted in HPI.     HOME & HOSPITAL MEDICATIONS     Prior to Admission Medications  (Not in a hospital admission)      Hospital Medications    aspirin  325 mg Oral Daily     carvedilol  6.25 mg Oral BID w/meals     cetirizine  10 mg Oral Daily     docusate sodium  100 mg Oral BID     DULoxetine  30 mg Oral BID     fluticasone  1 spray Both Nostrils Daily     fluticasone-vilanterol  1 puff Inhalation Daily     insulin aspart  1-7 Units Subcutaneous TID AC     insulin glargine  20 Units Subcutaneous At Bedtime     lidocaine  1-2 patch Transdermal Q24H     lidocaine   Transdermal Q8H     methocarbamol  500 mg Oral 4x Daily     montelukast  10 " mg Oral At Bedtime     pantoprazole  40 mg Oral QAM AC     rosuvastatin  20 mg Oral Daily        PHYSICAL EXAM     Vital signs  Temp:  [97.1  F (36.2  C)-98.6  F (37  C)] 98.6  F (37  C)  Pulse:  [60-72] 61  Resp:  [15-47] 16  BP: (127-188)/() 163/113  SpO2:  [96 %-100 %] 100 %    Weight:   [unfilled]    General Physical Exam: Patient is alert and oriented x 3. Vital signs were reviewed and are documented in EMR. Neck was supple.  No carotid bruit, thyromegaly, JVD or lymphadenopathy noted.  Neurological Exam:  Mental status: Alert, attentive, interactive.  Speech: Fluent.  Cranial nerves: 2-12 intact, except the patient says the right cheek feels differently than the left to touch.  Motor: Difficult exam because there are limitations due to her pain.  Some give-way weakness with testing of both the right arm and right leg.  Left-sided strength appears normal.  Reflexes: Intact and symmetric.  Sensory: She reports decreased sensation throughout the right side to pinprick compared to the left.  Coordination: Normal fine motor movements of the hands, no dysmetria.  Gait: Deferred for safety.     DIAGNOSTIC STUDIES     Pertinent Radiology   Radiology Results: Personally reviewed image/s    MRI Lumbar Spine (4.3.22):  IMPRESSION:  1.  Progressive disc degeneration at L5-S1 with new moderate endplate edema most consistent with type I endplate change. Right-sided facet edema at L5-S1 consistent with active facet arthropathy. Moderate to severe bilateral foraminal stenoses at this   level.  2.  Unchanged severe disc degeneration L3-L4 with disc bulge and marginal osteophyte resulting in moderate spinal canal and foraminal stenoses.  3.  Mild spondylosis at other levels without significant spinal canal or foraminal stenosis.    MRI/MRA Head/Neck (7.17.22):  CONCLUSION:  HEAD MRI:   1.  No acute/subacute infarction, intracranial hemorrhage, mass effect, hydrocephalus, or abnormal enhancement.  2.  Stable chronic  ischemic and mild to moderate age-related changes.     HEAD MRA:   1.  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  2.  No new high-grade stenosis, branch vessel occlusion, aneurysm, or high flow vascular malformation.     NECK MRA:  1.  No significant stenosis in the neck vessels based on NASCET criteria.  2.  No evidence for dissection or pseudoaneurysm.     Electroencephalogram (2018):  Significant movement artifact during the recording  Impression  Essentially normal awake electroencephalogram    Pertinent Labs   Lab Results: personally reviewed.   Recent Results (from the past 24 hour(s))   Basic metabolic panel    Collection Time: 07/17/22  3:20 PM   Result Value Ref Range    Sodium 139 136 - 145 mmol/L    Potassium 3.9 3.5 - 5.0 mmol/L    Chloride 107 98 - 107 mmol/L    Carbon Dioxide (CO2) 25 22 - 31 mmol/L    Anion Gap 7 5 - 18 mmol/L    Urea Nitrogen 19 8 - 22 mg/dL    Creatinine 0.78 0.60 - 1.10 mg/dL    Calcium 9.2 8.5 - 10.5 mg/dL    Glucose 162 (H) 70 - 125 mg/dL    GFR Estimate 88 >60 mL/min/1.73m2   Troponin I    Collection Time: 07/17/22  3:20 PM   Result Value Ref Range    Troponin I <0.01 0.00 - 0.29 ng/mL   Magnesium    Collection Time: 07/17/22  3:20 PM   Result Value Ref Range    Magnesium 2.0 1.8 - 2.6 mg/dL   CBC with platelets and differential    Collection Time: 07/17/22  3:20 PM   Result Value Ref Range    WBC Count 6.2 4.0 - 11.0 10e3/uL    RBC Count 4.39 3.80 - 5.20 10e6/uL    Hemoglobin 11.8 11.7 - 15.7 g/dL    Hematocrit 37.7 35.0 - 47.0 %    MCV 86 78 - 100 fL    MCH 26.9 26.5 - 33.0 pg    MCHC 31.3 (L) 31.5 - 36.5 g/dL    RDW 13.7 10.0 - 15.0 %    Platelet Count 233 150 - 450 10e3/uL   Manual Differential    Collection Time: 07/17/22  3:20 PM   Result Value Ref Range    % Neutrophils 53 %    % Lymphocytes 41 %    % Monocytes 4 %    % Eosinophils 2 %     % Basophils 0 %    Absolute Neutrophils 3.3 1.6 - 8.3 10e3/uL    Absolute Lymphocytes 2.5 0.8 - 5.3 10e3/uL    Absolute Monocytes 0.2 0.0 - 1.3 10e3/uL    Absolute Eosinophils 0.1 0.0 - 0.7 10e3/uL    Absolute Basophils 0.0 0.0 - 0.2 10e3/uL    RBC Morphology Confirmed RBC Indices     Platelet Assessment  Automated Count Confirmed. Platelet morphology is normal.     Automated Count Confirmed. Platelet morphology is normal.   Asymptomatic COVID-19 Virus (Coronavirus) by PCR Nasopharyngeal    Collection Time: 07/17/22  5:49 PM    Specimen: Nasopharyngeal; Swab   Result Value Ref Range    SARS CoV2 PCR Negative Negative   Glucose by meter    Collection Time: 07/17/22 11:55 PM   Result Value Ref Range    GLUCOSE BY METER POCT 98 70 - 99 mg/dL   Glucose by meter    Collection Time: 07/18/22  8:11 AM   Result Value Ref Range    GLUCOSE BY METER POCT 108 (H) 70 - 99 mg/dL       Total time spent for face to face visit, reviewing labs/imaging studies, counseling and coordination of care was: 1 Hour 15 Minutes. More than 50% of this time was spent on counseling and coordination of care.    Dragon software used in the dictation of this note.    Avril Son MD  [facility] - [address], [address]  [place] [address] [phone]

## 2022-07-18 NOTE — PROGRESS NOTES
"Care Management Note    Length of Stay (days): 0    Expected Discharge Date: 07/18/2022    Concerns to be Addressed:   Discharge disposition: needing assist of two, adamantly refusing to consider TCU.  Patient plan of care discussed at interdisciplinary rounds: Yes    Anticipated Discharge Disposition:  Discharge goal is home pending response to treatment, medical needs and mobility closer to discharge.      Anticipated Discharge Services:  To be determined.  Patient does have a [facility] payer (fow which it is typically difficult to find home care)  Anticipated Discharge DME:  Per therapy (if indicated).    Patient/family educated on Medicare website which has current facility and service quality ratings:  Refused  Education Provided on the Discharge Plan:  Per team  Patient/Family in Agreement with the Plan:  See below    Referrals Placed by CM/SW:  none  Private pay costs discussed: Not applicable     Additional Information:  Patient lives alone in an apartment and has PCA services. Per therapy, patient is independent for most IADLs with assist from PCA for med setup. Patient drives \"sometimes\" but also gets assist for transportation. Per therapy notes, patient currently needing assist of two and TCU is recommended. Writer met with patient to review the plan and offer the list of local transitional care units (which includes the medicare.gov website) for patient and family to review. Patient became angry, began yelling at writer, stating \"I ain't going to no nursing home!\". When writer tried to explain that it is not a nursing home, patient cut writer off, yelling, \"I don't care how you explain it. I ain't going to no nursing home!\".  When asked if her plan was home with family support, patient stated, \"I take care of myself!\".  Conversation ended, writer exited room.     Tiara Aquino RN      "

## 2022-07-18 NOTE — PROGRESS NOTES
"Hospitalist Progress Note        CODE STATUS:  Full Code    07/18/22  Assessment/Plan:  Sarah Rahman is a 56 year old female admitted on 7/17/2022. She has history of coronary artery disease status post CABG, previous stroke, asthma, GERD, hypertension, schizoaffective disorder, substance abuse presents for evaluation of right-sided weakness and numbness     Right-sided weakness and numbness  Falling frequently  - Unclear etiology of weakness. She does also report \"burning, pins, needles, tingling\" pain of her both upper and lower extremities, worse in right which is suggestive of diabetic neuropathy.   - MRI showing small chronic lacunar infarctions in bilateral cerebellar hemispheres, mild to moderate chronic microvascular ischemic change. 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 No acute changes.   - TIA cannot be ruled out vs Atypical migraine   - Neurology consulted  - Continue ASA & Crestor  - EEG ordered and pending  - PT/OT     Type II DM  Diabetic Neuropathy, likely  - Home regimen Trulicity, glipizide, Lantus 20 units nightly. Does not take metformin.   - Continue home Lantus & sliding scale     Chronic pain syndrome  - Just saw her pain clinic ([facility]) on 7/15  - She states gabapentin makes her legs swell. She is prescribed lyrica but does not take as she reports this also makes her leg swell.  - She does not feel percocet is helping her current \"burning, pins, needles, tingling\" pain   - Requesting \" the IV pain med\" she got earlier, suspect morphine (4 mg IV x 2)  - Continue Tylenol, topicals PRN  - Continue decreased dose of robaxin (decreased due to falls)  - Continue home Cymbalta  - Consult pain management      HTN  - BP elevated. Doubt acute stroke so unlikely she needs permissive HTN  - Resume normal dose coreg. Add losartan 50 mg daily.     Hx CAD s/p CABG x2 in 2009  Home aspirin, statin     Asthma  - Continue Home albuterol, Symbicort, " "DuoNeb     GERD  - Continue PPI     Disposition: TCU recommended  Barrier to discharge: Neuro eval, Placement     LOS: 0 days     Subjective:  Interval History:   Patient seen and examined.   Reports weakness in right upper and lower extremities. Also reporting burning, pins, needles, tingling\" of all extremities but worse on the right side. States percocet does not help. States she received IV medication earlier that helped.  Does not want gabapentin or lyrica because they cause swelling.  \" Did you not read my chart?, they make my legs swell up\"  \" I do not think you know what you are doing\"  \" You are a doctor but I don't trust you to care for me\"  \" I need a new doctor\"  \"Why would you want to give me gabapentin or Lyrica when it makes me sick\"  \" I need a new doctor\" \"It is my right\"  \"If I were a white woman you would give me whatever I want\" \"This is why I can't work with black doctors\".  Patient asked that I leave and that she was calling her sister.     Discussed plan with RN.    Review of Systems:   As mentioned in subjective.    Patient Active Problem List   Diagnosis     Coronary atherosclerosis     Type 2 diabetes mellitus with diabetic polyneuropathy, with long-term current use of insulin (H)     Schizoaffective disorder (H)     Health Care Home     Bilateral low back pain with right-sided sciatica, unspecified chronicity     Diabetic polyneuropathy associated with type 2 diabetes mellitus (H)     Nasal polyp     Cerebrovascular accident (CVA) due to stenosis of carotid artery (H)     Chronic obstructive pulmonary disease (H)     Chronic schizoaffective disorder (H)     ACP (advance care planning)     Acute recurrent maxillary sinusitis     Anemia     Anxiety as acute reaction to gross stress     Carotid stenosis, left     Depression with anxiety     Drug dependence (H)     Dyshidrosis (pompholyx)     Dyslipidemia     GERD (gastroesophageal reflux disease)     Chest pain     History of CVA " (cerebrovascular accident)     History of drug abuse (H)     Hx of syphilis     Hyperlipidemia with target low density lipoprotein (LDL) cholesterol less than 70 mg/dL     Essential hypertension, benign     Intercostal neuritis     Leg swelling     Lumbar disc herniation     Menopausal flushing     Moderate persistent asthma     Mood disorder due to medical condition     Myalgia     Myelomalacia of cervical cord (H)     Nephrolithiasis     Noncompliance     Obesity     Post-COVID syndrome     Pulmonary nodule     Sacroiliac joint disease     Seasonal allergies     Sensorineural hearing loss (SNHL) of right ear     Sleep difficulties     Smoker     Syncope and collapse     Systolic and diastolic CHF, chronic (H)     Uncontrolled type 2 diabetes mellitus with hyperglycemia (H)     Weakness     Right sided weakness     Chronic pain syndrome     Asthma in adult, unspecified asthma severity, uncomplicated       Scheduled Meds:    aspirin  325 mg Oral Daily     carvedilol  6.25 mg Oral BID w/meals     cetirizine  10 mg Oral Daily     docusate sodium  100 mg Oral BID     DULoxetine  30 mg Oral BID     fluticasone  1 spray Both Nostrils Daily     fluticasone-vilanterol  1 puff Inhalation Daily     insulin aspart  1-7 Units Subcutaneous TID AC     insulin glargine  20 Units Subcutaneous At Bedtime     lidocaine  1-2 patch Transdermal Q24H     lidocaine   Transdermal Q8H     methocarbamol  500 mg Oral 4x Daily     montelukast  10 mg Oral At Bedtime     pantoprazole  40 mg Oral QAM AC     rosuvastatin  20 mg Oral Daily     Continuous Infusions:  PRN Meds:.acetaminophen **OR** acetaminophen, albuterol, bisacodyl, glucose **OR** dextrose **OR** glucagon, diclofenac, hydrALAZINE, ipratropium - albuterol 0.5 mg/2.5 mg/3 mL, lidocaine, melatonin, ondansetron **OR** ondansetron, oxyCODONE, polyethylene glycol, senna-docusate **OR** senna-docusate    Objective:  Vital signs in last 24 hours:  Temp:  [97.1  F (36.2  C)-98.6  F (37   C)] 98.6  F (37  C)  Pulse:  [60-72] 64  Resp:  [15-47] 20  BP: (127-207)/() 182/111  SpO2:  [96 %-100 %] 98 %        Intake/Output Summary (Last 24 hours) at 7/18/2022 0953  Last data filed at 7/17/2022 1615  Gross per 24 hour   Intake 500 ml   Output --   Net 500 ml       Physical Exam:  General: Well appearing, in NAD.  HEENT: NCAT, EOMI, MMM  Neck: Supple  CV: Normal S1S2, regular rhythm, normal rate  Lungs: CTAB, no crackles  Abdomen: Soft, NT, ND, +BS  Extremities: No LE edema b/l  Skin: No rashes on exposed skin  Neuro: Fully AAOx3. No slurred speech noted. Able to move all extremities. Normal strength of left upper and lower extremities.  She is able to lift RUE but then carries it with left hand. Normal  in right hand, normal strength of RLE. Notes pins and needle sensation R>L      Lab Results:    Recent Results (from the past 24 hour(s))   Basic metabolic panel    Collection Time: 07/17/22  3:20 PM   Result Value Ref Range    Sodium 139 136 - 145 mmol/L    Potassium 3.9 3.5 - 5.0 mmol/L    Chloride 107 98 - 107 mmol/L    Carbon Dioxide (CO2) 25 22 - 31 mmol/L    Anion Gap 7 5 - 18 mmol/L    Urea Nitrogen 19 8 - 22 mg/dL    Creatinine 0.78 0.60 - 1.10 mg/dL    Calcium 9.2 8.5 - 10.5 mg/dL    Glucose 162 (H) 70 - 125 mg/dL    GFR Estimate 88 >60 mL/min/1.73m2   Troponin I    Collection Time: 07/17/22  3:20 PM   Result Value Ref Range    Troponin I <0.01 0.00 - 0.29 ng/mL   Magnesium    Collection Time: 07/17/22  3:20 PM   Result Value Ref Range    Magnesium 2.0 1.8 - 2.6 mg/dL   CBC with platelets and differential    Collection Time: 07/17/22  3:20 PM   Result Value Ref Range    WBC Count 6.2 4.0 - 11.0 10e3/uL    RBC Count 4.39 3.80 - 5.20 10e6/uL    Hemoglobin 11.8 11.7 - 15.7 g/dL    Hematocrit 37.7 35.0 - 47.0 %    MCV 86 78 - 100 fL    MCH 26.9 26.5 - 33.0 pg    MCHC 31.3 (L) 31.5 - 36.5 g/dL    RDW 13.7 10.0 - 15.0 %    Platelet Count 233 150 - 450 10e3/uL   Manual Differential     Collection Time: 07/17/22  3:20 PM   Result Value Ref Range    % Neutrophils 53 %    % Lymphocytes 41 %    % Monocytes 4 %    % Eosinophils 2 %    % Basophils 0 %    Absolute Neutrophils 3.3 1.6 - 8.3 10e3/uL    Absolute Lymphocytes 2.5 0.8 - 5.3 10e3/uL    Absolute Monocytes 0.2 0.0 - 1.3 10e3/uL    Absolute Eosinophils 0.1 0.0 - 0.7 10e3/uL    Absolute Basophils 0.0 0.0 - 0.2 10e3/uL    RBC Morphology Confirmed RBC Indices     Platelet Assessment  Automated Count Confirmed. Platelet morphology is normal.     Automated Count Confirmed. Platelet morphology is normal.   Asymptomatic COVID-19 Virus (Coronavirus) by PCR Nasopharyngeal    Collection Time: 07/17/22  5:49 PM    Specimen: Nasopharyngeal; Swab   Result Value Ref Range    SARS CoV2 PCR Negative Negative   Glucose by meter    Collection Time: 07/17/22 11:55 PM   Result Value Ref Range    GLUCOSE BY METER POCT 98 70 - 99 mg/dL   Glucose by meter    Collection Time: 07/18/22  8:11 AM   Result Value Ref Range    GLUCOSE BY METER POCT 108 (H) 70 - 99 mg/dL   Glucose by meter    Collection Time: 07/18/22  9:40 AM   Result Value Ref Range    GLUCOSE BY METER POCT 88 70 - 99 mg/dL       Serum Glucose range:   Recent Labs   Lab 07/18/22  0940 07/18/22  0811 07/17/22  2355 07/17/22  1520   GLC 88 108* 98 162*     ABG: No lab results found in last 7 days.  CBC:   Recent Labs   Lab 07/17/22  1520   WBC 6.2   HGB 11.8   HCT 37.7   MCV 86      NEUTROPHIL 53   LYMPH 41   MONOCYTE 4   EOSINOPHIL 2     Chemistry:   Recent Labs   Lab 07/17/22  1520      POTASSIUM 3.9   CHLORIDE 107   CO2 25   BUN 19   CR 0.78   GFRESTIMATED 88   MAXIMO 9.2   MAG 2.0     Coags:  No results for input(s): INR, PROTIME, PTT in the last 168 hours.    Invalid input(s): APTT  Cardiac Markers:  Recent Labs   Lab 07/17/22  1520   TROPONINI <0.01          XR Chest 1 View    Result Date: 7/17/2022  EXAM: XR CHEST 1 VIEW LOCATION: [facility] DATE/TIME: 7/17/2022 4:07 PM  INDICATION: chest pain COMPARISON: Chest radiograph 04/03/2022     IMPRESSION: Stable enlarged cardiomediastinal silhouette status post median sternotomy and CABG. Likely pulmonary vascular congestion without michele edema or focal airspace disease. No pleural effusion or pneumothorax. No acute bony abnormality.    MRA Brain ([place]) wo Contrast    Result Date: 7/17/2022  [facility] 1. HEAD MRI WITHOUT AND WITH IV CONTRAST 2. HEAD MRA WITHOUT IV CONTRAST 3. NECK MRA WITHOUT AND WITH IV CONTRAST 7/17/2022 4:38 PM INDICATION: Right-sided weakness, dizziness, multiple recent falls. TECHNIQUE: 1. Head MRI without and with intravenous contrast. 2. 3D time-of-flight head MRA without intravenous contrast. 3. Neck MRA without and with intravenous contrast. CONTRAST: 9 ml Gadavist COMPARISON: 05/24/2022 FINDINGS: HEAD MRI: INTRACRANIAL CONTENTS: No evidence for acute or subacute infarction based on diffusion-weighted imaging small chronic lacunar infarctions in the bilateral cerebellar hemispheres. No mass, acute hemorrhage, or extra-axial fluid collections. Patchy nonspecific T2/FLAIR hyperintensities within the cerebral white matter and amelia most consistent with mild to moderate chronic microvascular ischemic change. Mild generalized cerebral atrophy. No hydrocephalus. Normal position of the cerebellar tonsils. No definite pathologic brain parenchymal or meningeal contrast enhancement, though evaluation is partially limited by motion artifact on postcontrast acquisitions. SELLA: No abnormality accounting for technique. OSSEOUS STRUCTURES/SOFT TISSUES: Normal marrow signal. The major intracranial vascular flow voids are maintained. ORBITS: No abnormality accounting for technique. SINUSES/MASTOIDS: Mild mucosal thickening scattered about the paranasal sinuses. Small mastoid effusions . HEAD MRA: ANTERIOR CIRCULATION: No high-grade stenosis, branch vessel occlusion, aneurysm, or high flow vascular  malformation. Standard [place] anatomy. 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 No other high-grade stenosis, branch vessel occlusion, aneurysm, or high flow vascular malformation. Mildly dominant right and smaller left vertebral artery contribute to a normal basilar artery. NECK MRA: RIGHT CAROTID: Mild atherosclerotic irregularity at the origin of the right internal carotid artery, though without measurable stenosis based on NASCET criteria. LEFT CAROTID: Mild atherosclerotic irregularity at the origin of the left internal carotid artery, though without measurable stenosis based on NASCET criteria. VERTEBRAL ARTERIES: Mildly dominant right and smaller left vertebral arteries are patent in the neck and into the head. AORTIC ARCH: Classic aortic arch anatomy with no significant stenosis at the origin of the great vessels.     CONCLUSION: HEAD MRI: 1.  No acute/subacute infarction, intracranial hemorrhage, mass effect, hydrocephalus, or abnormal enhancement. 2.  Stable chronic ischemic and mild to moderate age-related changes. HEAD MRA: 1.  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 2.  No new high-grade stenosis, branch vessel occlusion, aneurysm, or high flow vascular malformation. NECK MRA: 1.  No significant stenosis in the neck vessels based on NASCET criteria. 2.  No evidence for dissection or pseudoaneurysm.    MRA Neck (Carotids) wo & w Contrast    Result Date: 7/17/2022  [facility] 1. HEAD MRI WITHOUT AND WITH IV CONTRAST 2. HEAD MRA WITHOUT IV CONTRAST 3. NECK MRA WITHOUT AND WITH IV CONTRAST 7/17/2022 4:38 PM INDICATION: Right-sided weakness, dizziness, multiple recent falls.  TECHNIQUE: 1. Head MRI without and with intravenous contrast. 2. 3D time-of-flight head MRA without intravenous contrast. 3. Neck MRA without and with intravenous contrast. CONTRAST: 9 ml Gadavist COMPARISON: 05/24/2022 FINDINGS: HEAD MRI: INTRACRANIAL CONTENTS: No evidence for acute or subacute infarction based on diffusion-weighted imaging small chronic lacunar infarctions in the bilateral cerebellar hemispheres. No mass, acute hemorrhage, or extra-axial fluid collections. Patchy nonspecific T2/FLAIR hyperintensities within the cerebral white matter and amelia most consistent with mild to moderate chronic microvascular ischemic change. Mild generalized cerebral atrophy. No hydrocephalus. Normal position of the cerebellar tonsils. No definite pathologic brain parenchymal or meningeal contrast enhancement, though evaluation is partially limited by motion artifact on postcontrast acquisitions. SELLA: No abnormality accounting for technique. OSSEOUS STRUCTURES/SOFT TISSUES: Normal marrow signal. The major intracranial vascular flow voids are maintained. ORBITS: No abnormality accounting for technique. SINUSES/MASTOIDS: Mild mucosal thickening scattered about the paranasal sinuses. Small mastoid effusions . HEAD MRA: ANTERIOR CIRCULATION: No high-grade stenosis, branch vessel occlusion, aneurysm, or high flow vascular malformation. Standard [place] anatomy. 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 No other high-grade stenosis, branch vessel occlusion, aneurysm, or high flow vascular malformation. Mildly dominant right and smaller left vertebral artery contribute to a normal basilar artery. NECK MRA: RIGHT CAROTID: Mild atherosclerotic irregularity at the origin of the right internal carotid artery, though without measurable stenosis based on NASCET  criteria. LEFT CAROTID: Mild atherosclerotic irregularity at the origin of the left internal carotid artery, though without measurable stenosis based on NASCET criteria. VERTEBRAL ARTERIES: Mildly dominant right and smaller left vertebral arteries are patent in the neck and into the head. AORTIC ARCH: Classic aortic arch anatomy with no significant stenosis at the origin of the great vessels.     CONCLUSION: HEAD MRI: 1.  No acute/subacute infarction, intracranial hemorrhage, mass effect, hydrocephalus, or abnormal enhancement. 2.  Stable chronic ischemic and mild to moderate age-related changes. HEAD MRA: 1.  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 2.  No new high-grade stenosis, branch vessel occlusion, aneurysm, or high flow vascular malformation. NECK MRA: 1.  No significant stenosis in the neck vessels based on NASCET criteria. 2.  No evidence for dissection or pseudoaneurysm.    MR Brain w/o & w Contrast    Result Date: 7/17/2022  [facility] 1. HEAD MRI WITHOUT AND WITH IV CONTRAST 2. HEAD MRA WITHOUT IV CONTRAST 3. NECK MRA WITHOUT AND WITH IV CONTRAST 7/17/2022 4:38 PM INDICATION: Right-sided weakness, dizziness, multiple recent falls. TECHNIQUE: 1. Head MRI without and with intravenous contrast. 2. 3D time-of-flight head MRA without intravenous contrast. 3. Neck MRA without and with intravenous contrast. CONTRAST: 9 ml Gadavist COMPARISON: 05/24/2022 FINDINGS: HEAD MRI: INTRACRANIAL CONTENTS: No evidence for acute or subacute infarction based on diffusion-weighted imaging small chronic lacunar infarctions in the bilateral cerebellar hemispheres. No mass, acute hemorrhage, or extra-axial fluid collections. Patchy nonspecific T2/FLAIR hyperintensities within the cerebral white matter and amelia most consistent with mild to moderate chronic microvascular  ischemic change. Mild generalized cerebral atrophy. No hydrocephalus. Normal position of the cerebellar tonsils. No definite pathologic brain parenchymal or meningeal contrast enhancement, though evaluation is partially limited by motion artifact on postcontrast acquisitions. SELLA: No abnormality accounting for technique. OSSEOUS STRUCTURES/SOFT TISSUES: Normal marrow signal. The major intracranial vascular flow voids are maintained. ORBITS: No abnormality accounting for technique. SINUSES/MASTOIDS: Mild mucosal thickening scattered about the paranasal sinuses. Small mastoid effusions . HEAD MRA: ANTERIOR CIRCULATION: No high-grade stenosis, branch vessel occlusion, aneurysm, or high flow vascular malformation. Standard [place] anatomy. 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 No other high-grade stenosis, branch vessel occlusion, aneurysm, or high flow vascular malformation. Mildly dominant right and smaller left vertebral artery contribute to a normal basilar artery. NECK MRA: RIGHT CAROTID: Mild atherosclerotic irregularity at the origin of the right internal carotid artery, though without measurable stenosis based on NASCET criteria. LEFT CAROTID: Mild atherosclerotic irregularity at the origin of the left internal carotid artery, though without measurable stenosis based on NASCET criteria. VERTEBRAL ARTERIES: Mildly dominant right and smaller left vertebral arteries are patent in the neck and into the head. AORTIC ARCH: Classic aortic arch anatomy with no significant stenosis at the origin of the great vessels.     CONCLUSION: HEAD MRI: 1.  No acute/subacute infarction, intracranial hemorrhage, mass effect, hydrocephalus, or abnormal enhancement. 2.  Stable chronic ischemic and mild to moderate age-related changes. HEAD MRA: 1.  Unchanged  severe stenosis of the proximal/mid basilar artery, though with reconstitution of normal vascular caliber distally and maintained patency to the vertebrobasilar junction. 2.  No new high-grade stenosis, branch vessel occlusion, aneurysm, or high flow vascular malformation. NECK MRA: 1.  No significant stenosis in the neck vessels based on NASCET criteria. 2.  No evidence for dissection or pseudoaneurysm.          07/18/2022   Mahnaz Villegas MD  Hospitalist  Pager: [phone]

## 2022-07-18 NOTE — PROGRESS NOTES
[facility]      OUTPATIENT PHYSICAL THERAPY EVALUATION  PLAN OF TREATMENT FOR OUTPATIENT REHABILITATION  (COMPLETE FOR INITIAL CLAIMS ONLY)  Patient's Last Name, First Name, M.I.  YOB: 1965  Sarah Rahman                        Provider's Name  [facility] Medical Record No.  [id number]                               Onset Date:  07/17/22   Start of Care Date:  07/18/22      Type:     _X_PT   ___OT   ___SLP Medical Diagnosis:  (P) R sided weakness                        PT Diagnosis:  impaired functional mobility   Visits from SOC:  1   _________________________________________________________________________________  Plan of Treatment/Functional Goals    Planned Interventions: balance training, bed mobility training, gait training, home exercise program, neuromuscular re-education, strengthening, transfer training     Goals: See Physical Therapy Goals on Care Plan in [facility] electronic health record.    Therapy Frequency: Daily  Predicted Duration of Therapy Intervention: 07/25/22  _________________________________________________________________________________    I CERTIFY THE NEED FOR THESE SERVICES FURNISHED UNDER        THIS PLAN OF TREATMENT AND WHILE UNDER MY CARE     (Physician co-signature of this document indicates review and certification of the therapy plan).              Certification date from: 07/18/22, Certification date to: 07/25/22    Referring Physician: Sunshine Fowler MD            Initial Assessment        See Physical Therapy evaluation dated 07/18/22 in [employer] electronic health record.

## 2022-07-18 NOTE — ED NOTES
"[facility] ED Handoff Report    ED Chief Complaint: fall    ED Diagnosis:  (R53.1) Right sided weakness  Comment:   Plan:     (W19.XXXA) Fall, initial encounter  Comment:   Plan:        PMH:    Past Medical History:   Diagnosis Date     Asthma      CAD (coronary artery disease)      COPD (chronic obstructive pulmonary disease) (H)      CVA (cerebral infarction)      Diabetes (H)      GERD (gastroesophageal reflux disease)      Hypertension      Polysubstance abuse (H)      S/P CABG (coronary artery bypass graft)      S/P lumbar discectomy 06/13/2019    L5/S1 by  Dr. Hamm at [facility]     Schizoaffective disorder (H)         Code Status:  Full Code     Falls Risk: Yes Band: Applied    Current Living Situation/Residence: lives alone     Elimination Status: Continent: Yes     Activity Level: SBA    Patients Preferred Language:  English     Needed: No    Vital Signs:  BP (!) 159/86   Pulse 63   Temp 97.4  F (36.3  C)   Resp 25   Ht 1.702 m (5' 7\")   Wt 88.5 kg (195 lb)   LMP  (LMP Unknown)   SpO2 93%   BMI 30.54 kg/m       Cardiac Rhythm: NSR    Pain Score: 10/10    Is the Patient Confused:  No    Last Food or Drink: 07/18/22 at PM    Focused Assessment:  Pt is 55 yo pt with pain mangment. -200s. gen weaknbess. Refuses gabapentin. alerat and oriented x 4.     Tests Performed: Done: Labs and Imaging    Treatments Provided:  See chart    Family Dynamics/Concerns: No    Family Updated On Visitor Policy: No    Plan of Care Communicated to Family: No    Who Was Updated about Plan of Care: n/a    Belongings Checklist Done and Signed by Patient: No, belongings remain at bedside    Medications sent with patient: yes    Covid: asymptomatic , negative    Additional Information: see pain managment    RN: Leanna Khoury RN   7/18/2022 5:20 PM       "

## 2022-07-18 NOTE — PROGRESS NOTES
[facility]      OUTPATIENT OCCUPATIONAL THERAPY  EVALUATION  PLAN OF TREATMENT FOR OUTPATIENT REHABILITATION  (COMPLETE FOR INITIAL CLAIMS ONLY)  Patient's Last Name, First Name, M.I.  YOB: 1965  Sarah Rahman                          Provider's Name  [facility] Medical Record No.  [id number]                               Onset Date:  07/17/22   Start of Care Date:  07/18/22     Type:     ___PT   _X_OT   ___SLP Medical Diagnosis:  R side weakness                        OT Diagnosis:  dec ADL indep   Visits from SOC:  1   _________________________________________________________________________________  Plan of Treatment/Functional Goals    Planned Interventions: ADL retraining, IADL retraining, balance training, bed mobility training, fine motor coordination training, motor coordination training, neuromuscular re-education, ROM, strengthening, stretching, transfer training, home program guidelines, progressive activity/exercise   Goals: See Occupational Therapy Goals on Care Plan in [facility] electronic health record.    Therapy Frequency: Daily  Predicted Duration of Therapy Intervention: 07/25/22  _________________________________________________________________________________    I CERTIFY THE NEED FOR THESE SERVICES FURNISHED UNDER        THIS PLAN OF TREATMENT AND WHILE UNDER MY CARE     (Physician co-signature of this document indicates review and certification of the therapy plan).                Certification date from: 07/18/22, Certification date to: 07/25/22    Referring Physician: Sunshine Fowler MD            Initial Assessment        See Occupational Therapy evaluation dated 07/18/22 in Epic electronic health record.

## 2022-07-18 NOTE — CONSULTS
[facility] ACUTE PAIN SERVICE CONSULTATION     Date of Admission:  7/17/2022  Date of Consult (When I saw the patient): 07/18/22  Physician requesting consult: Dr Villegas  Reason for consult: acute on chronic pain      Assessment/Plan:     Sarah Rahman is a 56 year old female who was admitted on 7/17/2022.  Pain team was asked to see the patient for acute on chronic pain. Presented for evaluation of right-sided weakness and numbness. 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     Patient with history of chronic back pain, neuropathy pain. Follows with [facility] and has seen pain team in the past. Last seen by pain team 4/7/22. Last Lumbar MRI reviewed 4/3/22: MR lumbar spine with progressive worsening of her degenerative disc disease, but no signs of abscess, malignancy or other acute process. Patient reports today that pain is in both lower extremities, right worse than left. Pain is burning, tingling, aching. She reports she has not had pain like this before however per review of notes, when last seen by pain team 4/7/22 patient reported she had pain in lower back and went down into feet and up her spine. At that time, neuropathy was felt to be due to poorly controlled diabetes.  Notes indicate that the patient was not convinced that her neuropathy was due to her diabetes. Pain is currently 10/10 burning/tingling in mostly right foot and leg. She relays to me that she has tried both gabapentin and Lyrica in the past with difficulties with swelling.  She is taking Cymbalta. She has not tried amitriptyline and Neurology has started this.     The patient does smoke and she denies chemical dependency history  (although malingering and cocaine abuse noted in her chart for >10 years, also has had pos cannabinoids in  urine).     Opioid Induced Respiratory Depression Risk Assessment:?high  (Low 0-1; Moderate 2-4; or High >4 or >/= 3 if two of the risk factors are age > 60 and opioid naive) due to the following risk factors: [place], COPD/Asthma/pulmonary disease, CHF, renal dysfunction, hepatic dysfunction, Obesity, Smoker, Age>60, >2 opioid therapies, concomitant CNS depressants, opioid naive status, or post surgical ?     PLAN:   1) Pain is consistent with  chronic lower extremity pain and acute right sided worsening of pain. Patient reports new pain of the RLE but patient is with known history of known peripheral neuropathy, chronic back pain with lumbar degenerative changes on imaging.  Labs and imaging indicated: I have personally reviewed pertinent labs, tests, and radiologic imaging in patient's chart. MRI showing small chronic lacunar infarctions in bilateral cerebellar hemispheres, mild to moderate chronic microvascular ischemic change. 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 Vessel imaging shows unchanged stenosis of the proximal/mid basilar artery with reconstitution distally. No acute changes. TIA possible? Atypical migraine? Consult Neurology. EEG pending.  EEG from 2018 was normal. Treatment plan includes multimodal pain approach, Hospital Medicine Service for medical management, Neurology, Amitriptyline per Neurology. Patient educated regarding multimodal pain approach, medications as listed below. Patient reports minimal benefit with Lyrica and Gabapentin, also reports BLE edema with use of both. Reports little benefit with lidocaine, voltaren. Reports IV opioids given earlier were of most help at lowering pain. Discussed with patient that IV opioids and an increase in home opioids is not recommended for this pain. Patient is understanding of the plan. All questions and concerns addressed to patient's satisfaction.   2)Multimodal Medication Therapy  Topical: will order  Ketamine 8%, Gabapentin 6% & Lidocaine 2.5% PLO ointment - was recently recommended this per [facility] vist 7/15/22. She has script at [facility] for this but reports she has not picked this up yet. Will order for inpatient use. Discontinue lidocaine patches and ointment as patient refused. Discussed and patient refused capsaicin ointment   NSAID'S: crcl 91, voltaren gel prn   Muscle Relaxants: robaxin 500 mg qid   Adjuvants: APAP prn   Antidepressants/anxiolytics: Amitriptyline 25 mg at bedtime added per Neruology, Cymbalta 30 mg bid  Opioids: Oxycodone 10 mg q6h prn (home med Percocet  mg q6h prn)  IV Pain medication: one time IV Morphine 4 mg for severe acute pain - no further doses recommended    3)Non-medication interventions: rest, smoking cessation  Acupuncture consult - offered and declined  Integrative consult - offered and agreeable   4)Constipation Prophylaxis: Scheduled and prn    -Opioid prescriber has been [facility]   -[place]  pulled from system on 7/18/22. This indicates   7/5/22 Lyrica 150 mg #90 - same on 5/3/22  7/5/22 Percocet  mg #120 - same on 6/2/22, 5/3/22  Discharge Recommendations - We recommend prescribing the following at the time of discharge: home meds, topical PLO ointment ordered per [facility] at [facility].      History of Present Illness (HPI):       Sarah Rahman is a 56 year old old female who presented for right-sided weakness and numbness.  Past medical history as above. The pain is reported to be acute, chronic, located in the low back and RLE. Current pain is rated at 8-10/10 and goal is 2-4/10.  The patient presented to the emergency room on 7.17.22 after having had a couple of falls.  She apparently lost consciousness prior to presentation.  She reported right-sided weakness and some facial drooping.  Reported history of previous stroke affecting the right side but she felt that things were different.  She also reported pain down into the  "right leg. She reports this pain in the RLE is new and she has never had pain like this before. She does endorse pain in both legs but the right side is worse. Per notes she has had similar presentations like this in the past. She reports little to no benefit with current home medications at controlling her pain. She does report and does repeatedly ask for \"IV medication that was given earlier helped, please order this\".     Per [place]  review, the patient does have an opioid tolerance. Opioid induced side effects noted and include: disease of addiction noted in chart review.      Reviewed medical record, labs, imaging, ED note, and care everywhere:  Patient was seen 7/15/22 with [facility]. Plan for pain management at this visit.   1. Trial of compounded medication (Ketamine 8%, Gabapentin 6% & Lidocaine 2.5%). Discussed with patient that most compounded medications are not covered by insurance. Spoke with [facility] pharmacy staff during visit and the patient was notified of the range of cost. She was OK with sending the medication to the pharmacy and she will follow up directly with the pharmacy.   2. Order placed for lumbar medial branch block for management of facet arthritis.   3. SW to follow up with patient about establishing with a new PCP.  4. Discussed physical therapy but she /would like to hold off at this time.  5. Urine requested today to ensure safety and compliance monitoring.  6. Script of Toradol given for acute pain flare up.  7. Follow up in 3 month (s), sooner if needed with questions/concerns.     Past pain treatments have included: injections, percocet, lyrica, gabapentin, APAP, Toradol, tramadol, voltaren, lidocaine, icy hot    Last UDS: 5/18/22, 10/2018 pos for cannabis     Medical History   PAST MEDICAL HISTORY:   Past Medical History:   Diagnosis Date     Asthma      CAD (coronary artery disease)      COPD (chronic obstructive pulmonary disease) (H)      CVA (cerebral infarction)      " "Diabetes (H)      GERD (gastroesophageal reflux disease)      Hypertension      Polysubstance abuse (H)      S/P CABG (coronary artery bypass graft)      S/P lumbar discectomy 06/13/2019    L5/S1 by  Dr. Hamm at [facility]     Schizoaffective disorder (H)        PAST SURGICAL HISTORY:   Past Surgical History:   Procedure Laterality Date     BYPASS GRAFT ARTERY CORONARY  2009    x2     CORONARY STENT PLACEMENT       CV CORONARY ANGIOGRAM N/A 6/2/2021    Procedure: Coronary Angiogram;  Surgeon: Juventino Rivera MD;  Location: [facility] Cardiac Cath Lab;  Service: Cardiology     HYSTERECTOMY TOTAL ABDOMINAL, BILATERAL SALPINGO-OOPHORECTOMY, COMBINED       ORIF ULNAR / RADIAL SHAFT FRACTURE Right        FAMILY HISTORY:   Family History   Problem Relation Age of Onset     Heart Disease Mother      Heart Disease Father      Heart Disease Sister      Diabetes Brother      Heart Disease Sister        SOCIAL HISTORY:   Social History     Tobacco Use     Smoking status: Current Every Day Smoker     Packs/day: 0.50     Types: Cigarettes     Smokeless tobacco: Never Used   Substance Use Topics     Alcohol use: Yes     Comment: Alcoholic Drinks/day: [facility]        HEALTH & LIFESTYLE PRACTICES  Tobacco:  reports that she has been smoking cigarettes. She has been smoking about 0.50 packs per day. She has never used smokeless tobacco.  Alcohol:  reports current alcohol use.  Illicit drugs:  reports no history of drug use.    Allergies  Allergies   Allergen Reactions     Haloperidol Unknown     Patient states it stops her heart       Meperidine Angioedema, Difficulty breathing, Other (See Comments), Rash and Shortness Of Breath     Throat closes  Other reaction(s): Breathing Difficulty  Other reaction(s): Breathing Difficulty       Phenothiazines Other (See Comments)     Other reaction(s): CARDIAC DISTURBANCES  Patient states it stops her heart  \"I swell up\"  \"stopped by heart\"  \"I swell up\"  \"I swell up\"       Tramadol Other " (See Comments)     Other reaction(s): Angioedema  Throat itch       Butyrophenones Angioedema     Januvia [Sitagliptin] Other (See Comments)     Swelling in the neck      Latex Itching     Lisinopril Other (See Comments)     ACE cough  Itchy throat  Throat itches  Itchy throat       Penicillins Swelling     Hydroxyzine Other (See Comments) and Rash     Tongue swelling  Tongue swelling  Tongue swelling         Problem List  Patient Active Problem List    Diagnosis Date Noted     Anemia 07/17/2022     Priority: Medium     Anxiety as acute reaction to gross stress 07/17/2022     Priority: Medium     History of drug abuse (H) 07/17/2022     Priority: Medium     Formatting of this note might be different from the original.  Cocaine history, per psych records last 11/2010, used in setting of grief after her daughter's death  Formatting of this note might be different from the original.  Formatting of this note might be different from the original.  Cocaine history, per psych records last 11/2010, used in setting of grief after her daughter's death       Mood disorder due to medical condition 07/17/2022     Priority: Medium     Obesity 07/17/2022     Priority: Medium     Sleep difficulties 07/17/2022     Priority: Medium     Right sided weakness 07/17/2022     Priority: Medium     Chronic pain syndrome 07/17/2022     Priority: Medium     Asthma in adult, unspecified asthma severity, uncomplicated 07/17/2022     Priority: Medium     Diabetic polyneuropathy associated with type 2 diabetes mellitus (H) 06/10/2022     Priority: Medium     Nasal polyp 06/10/2022     Priority: Medium     Chronic schizoaffective disorder (H) 06/10/2022     Priority: Medium     Bilateral low back pain with right-sided sciatica, unspecified chronicity 04/03/2022     Priority: Medium     Chronic obstructive pulmonary disease (H) 01/15/2022     Priority: Medium     Uncontrolled type 2 diabetes mellitus with hyperglycemia (H) 12/13/2021     Priority:  Medium     Cerebrovascular accident (CVA) due to stenosis of carotid artery (H) 10/17/2021     Priority: Medium     Acute recurrent maxillary sinusitis 10/17/2021     Priority: Medium     Myelomalacia of cervical cord (H) 10/08/2021     Priority: Medium     Noncompliance 10/08/2021     Priority: Medium     Sensorineural hearing loss (SNHL) of right ear 10/08/2021     Priority: Medium     Carotid stenosis, left 10/03/2021     Priority: Medium     Formatting of this note might be different from the original.  Added automatically from request for surgery [id number]       Depression with anxiety 08/01/2021     Priority: Medium     Post-COVID syndrome 07/11/2021     Priority: Medium     Dyslipidemia 05/02/2021     Priority: Medium     Formatting of this note might be different from the original.  Formatting of this note might be different from the original.    Low HDL  Formatting of this note might be different from the original.    Low HDL       Hx of syphilis 09/16/2020     Priority: Medium     Formatting of this note might be different from the original.  09/08/20  TPPA positive, Treponema screen positive  09/14/20  Treatment: Doxycycline 100mg po bid x 30 days  10/27/20  TPPA positive       Weakness 01/29/2020     Priority: Medium     Leg swelling 10/11/2019     Priority: Medium     Lumbar disc herniation 06/14/2019     Priority: Medium     Syncope and collapse 10/21/2018     Priority: Medium     Sacroiliac joint disease 10/09/2018     Priority: Medium     Myalgia 02/20/2018     Priority: Medium     Intercostal neuritis 02/06/2018     Priority: Medium     Dyshidrosis (pompholyx) 10/21/2016     Priority: Medium     Chest pain 10/02/2015     Priority: Medium     Seasonal allergies 07/24/2015     Priority: Medium     Systolic and diastolic CHF, chronic (H) 09/15/2014     Priority: Medium     Formatting of this note might be different from the original.  Formatting of this note might be different from the original.  Sep  '14: Mildly diminished LV function ~50%, grade 1 diastolic dysfunction.  Formatting of this note might be different from the original.  Sep '14: Mildly diminished LV function ~50%, grade 1 diastolic dysfunction.       Pulmonary nodule 09/11/2014     Priority: Medium     Formatting of this note might be different from the original.  Needs repeat CT Chest late Sep 2014 for interval change  Formatting of this note might be different from the original.  Needs repeat CT Chest late Sep 2014 for interval change  Formatting of this note might be different from the original.  Formatting of this note might be different from the original.  Needs repeat CT Chest late Sep 2014 for interval change       Menopausal flushing 04/30/2014     Priority: Medium     ACP (advance care planning) 02/12/2014     Priority: Medium     Formatting of this note might be different from the original.  Overview:   Formatting of this note may be different from the original.  Health Care Directives: Patient has identified Health Care Agent(s): Yes  Add Health Care Agents: Yes    Health Care Agent(s):  Primary Health Care Agent: Elsei Rahman  Relationship: Daughter Phone: [phone]   Secondary Health Care Agent:  Relationship:  Phone:    Conservator:  Relationship:  Phone:    Guardian: Relationship:  Phone:      Patient has Advance Care Plan Documents (Health Care Directive, POLST): No, Health Care Packet given to patient.    Patient has identified Specific Treatment Preferences: No   Specific limits to treatment preferences NOT identified: ASSUME FULL TREATMENT.  Formatting of this note might be different from the original.  Overview:   Overview:   Formatting of this note may be different from the original.  Health Care Directives: Patient has identified Health Care Agent(s): Yes  Add Health Care Agents: Yes    Health Care Agent(s):  Primary Health Care Agent: Elsie Rahman  Relationship: Daughter Phone: [phone]   Secondary Health Care Agent:   Relationship:  Phone:    Conservator:  Relationship:  Phone:    Guardian: Relationship:  Phone:      Patient has Advance Care Plan Documents (Health Care Directive, POLST): No, Health Care Packet given to patient.    Patient has identified Specific Treatment Preferences: No   Specific limits to treatment preferences NOT identified: ASSUME FULL TREATMENT.  Formatting of this note might be different from the original.  Health Care Directives: Patient has identified Health Care Agent(s): YesAdd Health Care Agents: Yes  Health Care Agent(s):Primary Health Care Agent: Elsie Rahman  Relationship: Daughter Phone: [phone]   Secondary Health Care Agent:  Relationship:  Phone:    Conservator:  Relationship:  Phone:    Guardian: Relationship:  Phone:      Patient has Advance Care Plan Documents (Health Care Directive, POLST): No, Health Care Packet given to patient.    Patient has identified Specific Treatment Preferences: No   Specific limits to treatment preferences NOT identified: ASSUME FULL TREATMENT.       Nephrolithiasis 08/29/2013     Priority: Medium     Coronary atherosclerosis 03/21/2013     Priority: Medium     Type 2 diabetes mellitus with diabetic polyneuropathy, with long-term current use of insulin (H) 03/21/2013     Priority: Medium     Problem list name updated by automated process. Provider to review       Schizoaffective disorder (H) 03/21/2013     Priority: Medium     Problem list name updated by automated process. Provider to review  Diagnosis updated by automated process. Provider to review and confirm.       Health Care Home 03/21/2013     Priority: Medium     Tier 1  DX V65.8 REPLACED WITH [id number] HEALTH CARE HOME (04/08/2013)       GERD (gastroesophageal reflux disease) 10/15/2012     Priority: Medium     History of CVA (cerebrovascular accident) 04/01/2012     Priority: Medium     Formatting of this note might be different from the original.  Overview:   Bilateral subacute cerebellar infarcts  "seen on MRI at [facility] 4/2012.  Pt was noted to have no residual deficits at [facility].  Formatting of this note might be different from the original.  Bilateral subacute cerebellar infarcts seen on MRI at [facility] 4/2012.  Pt was noted to have no residual deficits at [facility].  Formatting of this note might be different from the original.  Bilateral subacute cerebellar infarcts seen on MRI at [facility] 4/2012.  Pt was noted to have no residual deficits at [facility].  Formatting of this note might be different from the original.  Formatting of this note might be different from the original.  Bilateral subacute cerebellar infarcts seen on MRI at [facility] 4/2012.  Pt was noted to have no residual deficits at [facility].       Hyperlipidemia with target low density lipoprotein (LDL) cholesterol less than 70 mg/dL 01/30/2012     Priority: Medium     Moderate persistent asthma 09/12/2011     Priority: Medium     Smoker 02/03/2008     Priority: Medium     Drug dependence (H) 02/15/2006     Priority: Medium     Formatting of this note might be different from the original.  Overview:   Epic       Essential hypertension, benign 02/15/2006     Priority: Medium     Formatting of this note might be different from the original.  Overview:   [facility]         Prior to Admission Medications   (Not in a hospital admission)      Review of Systems  Complete ROS reviewed, unless noted in HPI, all other systems reviewed (with patient) and all others found to be negative.      Objective:     Physical Exam:  BP (!) 177/95   Pulse 64   Temp 97.4  F (36.3  C)   Resp 20   Ht 1.702 m (5' 7\")   Wt 88.5 kg (195 lb)   LMP  (LMP Unknown)   SpO2 95%   BMI 30.54 kg/m    Weight:   Vitals:    07/17/22 1542   Weight: 88.5 kg (195 lb)      Body mass index is 30.54 kg/m .    General Appearance:  Alert, cooperative, no distress   Head:  Normocephalic, without obvious " abnormality, atraumatic   Eyes:  PERRL, conjunctiva/corneas clear, EOM's intact   ENT/Throat: Lips, mucosa, and tongue normal; teeth and gums normal   Lymph/Neck: Supple, symmetrical, trachea midline   Lungs:   Clear to auscultation bilaterally, respirations unlabored   Cardiovascular/Heart:  Regular rate and rhythm, S1, S2 normal,no murmur, rub or gallop.    Abdomen:   Soft, non-tender, bowel sounds active all four quadrants,  no masses, no organomegaly   Musculoskeletal: Extremities normal, atraumatic   Skin: Skin warm, dry    Neurologic: Alert and oriented X 3, difficult to assess strength due to pain, speech fluent     Psych: Affect is anxious     Imaging: Reviewed I have personally reviewed pertinent labs, tests, and radiologic imaging in patient's chart.  XR Chest 1 View    Result Date: 7/17/2022  EXAM: XR CHEST 1 VIEW LOCATION: [facility] DATE/TIME: 7/17/2022 4:07 PM INDICATION: chest pain COMPARISON: Chest radiograph 04/03/2022     IMPRESSION: Stable enlarged cardiomediastinal silhouette status post median sternotomy and CABG. Likely pulmonary vascular congestion without michele edema or focal airspace disease. No pleural effusion or pneumothorax. No acute bony abnormality.    MRA Brain ([place]) wo Contrast    Result Date: 7/17/2022  [facility] 1. HEAD MRI WITHOUT AND WITH IV CONTRAST 2. HEAD MRA WITHOUT IV CONTRAST 3. NECK MRA WITHOUT AND WITH IV CONTRAST 7/17/2022 4:38 PM INDICATION: Right-sided weakness, dizziness, multiple recent falls. TECHNIQUE: 1. Head MRI without and with intravenous contrast. 2. 3D time-of-flight head MRA without intravenous contrast. 3. Neck MRA without and with intravenous contrast. CONTRAST: 9 ml Gadavist COMPARISON: 05/24/2022 FINDINGS: HEAD MRI: INTRACRANIAL CONTENTS: No evidence for acute or subacute infarction based on diffusion-weighted imaging small chronic lacunar infarctions in the bilateral cerebellar hemispheres. No  mass, acute hemorrhage, or extra-axial fluid collections. Patchy nonspecific T2/FLAIR hyperintensities within the cerebral white matter and amelia most consistent with mild to moderate chronic microvascular ischemic change. Mild generalized cerebral atrophy. No hydrocephalus. Normal position of the cerebellar tonsils. No definite pathologic brain parenchymal or meningeal contrast enhancement, though evaluation is partially limited by motion artifact on postcontrast acquisitions. SELLA: No abnormality accounting for technique. OSSEOUS STRUCTURES/SOFT TISSUES: Normal marrow signal. The major intracranial vascular flow voids are maintained. ORBITS: No abnormality accounting for technique. SINUSES/MASTOIDS: Mild mucosal thickening scattered about the paranasal sinuses. Small mastoid effusions . HEAD MRA: ANTERIOR CIRCULATION: No high-grade stenosis, branch vessel occlusion, aneurysm, or high flow vascular malformation. Standard [place] anatomy. 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 No other high-grade stenosis, branch vessel occlusion, aneurysm, or high flow vascular malformation. Mildly dominant right and smaller left vertebral artery contribute to a normal basilar artery. NECK MRA: RIGHT CAROTID: Mild atherosclerotic irregularity at the origin of the right internal carotid artery, though without measurable stenosis based on NASCET criteria. LEFT CAROTID: Mild atherosclerotic irregularity at the origin of the left internal carotid artery, though without measurable stenosis based on NASCET criteria. VERTEBRAL ARTERIES: Mildly dominant right and smaller left vertebral arteries are patent in the neck and into the head. AORTIC ARCH: Classic aortic arch anatomy with no significant stenosis at the origin of the great vessels.     CONCLUSION: HEAD MRI: 1.   No acute/subacute infarction, intracranial hemorrhage, mass effect, hydrocephalus, or abnormal enhancement. 2.  Stable chronic ischemic and mild to moderate age-related changes. HEAD MRA: 1.  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 2.  No new high-grade stenosis, branch vessel occlusion, aneurysm, or high flow vascular malformation. NECK MRA: 1.  No significant stenosis in the neck vessels based on NASCET criteria. 2.  No evidence for dissection or pseudoaneurysm.    MRA Neck (Carotids) wo & w Contrast    Result Date: 7/17/2022  [facility] 1. HEAD MRI WITHOUT AND WITH IV CONTRAST 2. HEAD MRA WITHOUT IV CONTRAST 3. NECK MRA WITHOUT AND WITH IV CONTRAST 7/17/2022 4:38 PM INDICATION: Right-sided weakness, dizziness, multiple recent falls. TECHNIQUE: 1. Head MRI without and with intravenous contrast. 2. 3D time-of-flight head MRA without intravenous contrast. 3. Neck MRA without and with intravenous contrast. CONTRAST: 9 ml Gadavist COMPARISON: 05/24/2022 FINDINGS: HEAD MRI: INTRACRANIAL CONTENTS: No evidence for acute or subacute infarction based on diffusion-weighted imaging small chronic lacunar infarctions in the bilateral cerebellar hemispheres. No mass, acute hemorrhage, or extra-axial fluid collections. Patchy nonspecific T2/FLAIR hyperintensities within the cerebral white matter and amelia most consistent with mild to moderate chronic microvascular ischemic change. Mild generalized cerebral atrophy. No hydrocephalus. Normal position of the cerebellar tonsils. No definite pathologic brain parenchymal or meningeal contrast enhancement, though evaluation is partially limited by motion artifact on postcontrast acquisitions. SELLA: No abnormality accounting for technique. OSSEOUS STRUCTURES/SOFT TISSUES: Normal marrow signal. The major intracranial vascular  flow voids are maintained. ORBITS: No abnormality accounting for technique. SINUSES/MASTOIDS: Mild mucosal thickening scattered about the paranasal sinuses. Small mastoid effusions . HEAD MRA: ANTERIOR CIRCULATION: No high-grade stenosis, branch vessel occlusion, aneurysm, or high flow vascular malformation. Standard [place] of [place] anatomy. 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 No other high-grade stenosis, branch vessel occlusion, aneurysm, or high flow vascular malformation. Mildly dominant right and smaller left vertebral artery contribute to a normal basilar artery. NECK MRA: RIGHT CAROTID: Mild atherosclerotic irregularity at the origin of the right internal carotid artery, though without measurable stenosis based on NASCET criteria. LEFT CAROTID: Mild atherosclerotic irregularity at the origin of the left internal carotid artery, though without measurable stenosis based on NASCET criteria. VERTEBRAL ARTERIES: Mildly dominant right and smaller left vertebral arteries are patent in the neck and into the head. AORTIC ARCH: Classic aortic arch anatomy with no significant stenosis at the origin of the great vessels.     CONCLUSION: HEAD MRI: 1.  No acute/subacute infarction, intracranial hemorrhage, mass effect, hydrocephalus, or abnormal enhancement. 2.  Stable chronic ischemic and mild to moderate age-related changes. HEAD MRA: 1.  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 2.  No new high-grade stenosis, branch vessel occlusion, aneurysm, or high flow vascular malformation. NECK MRA: 1.  No significant stenosis in the neck vessels based on NASCET criteria. 2.  No evidence for dissection or pseudoaneurysm.    MR  Brain w/o & w Contrast    Result Date: 7/17/2022  [facility] 1. HEAD MRI WITHOUT AND WITH IV CONTRAST 2. HEAD MRA WITHOUT IV CONTRAST 3. NECK MRA WITHOUT AND WITH IV CONTRAST 7/17/2022 4:38 PM INDICATION: Right-sided weakness, dizziness, multiple recent falls. TECHNIQUE: 1. Head MRI without and with intravenous contrast. 2. 3D time-of-flight head MRA without intravenous contrast. 3. Neck MRA without and with intravenous contrast. CONTRAST: 9 ml Gadavist COMPARISON: 05/24/2022 FINDINGS: HEAD MRI: INTRACRANIAL CONTENTS: No evidence for acute or subacute infarction based on diffusion-weighted imaging small chronic lacunar infarctions in the bilateral cerebellar hemispheres. No mass, acute hemorrhage, or extra-axial fluid collections. Patchy nonspecific T2/FLAIR hyperintensities within the cerebral white matter and amelia most consistent with mild to moderate chronic microvascular ischemic change. Mild generalized cerebral atrophy. No hydrocephalus. Normal position of the cerebellar tonsils. No definite pathologic brain parenchymal or meningeal contrast enhancement, though evaluation is partially limited by motion artifact on postcontrast acquisitions. SELLA: No abnormality accounting for technique. OSSEOUS STRUCTURES/SOFT TISSUES: Normal marrow signal. The major intracranial vascular flow voids are maintained. ORBITS: No abnormality accounting for technique. SINUSES/MASTOIDS: Mild mucosal thickening scattered about the paranasal sinuses. Small mastoid effusions . HEAD MRA: ANTERIOR CIRCULATION: No high-grade stenosis, branch vessel occlusion, aneurysm, or high flow vascular malformation. Standard [place] of [place] anatomy. 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 No other high-grade stenosis, branch vessel occlusion, aneurysm, or high flow  vascular malformation. Mildly dominant right and smaller left vertebral artery contribute to a normal basilar artery. NECK MRA: RIGHT CAROTID: Mild atherosclerotic irregularity at the origin of the right internal carotid artery, though without measurable stenosis based on NASCET criteria. LEFT CAROTID: Mild atherosclerotic irregularity at the origin of the left internal carotid artery, though without measurable stenosis based on NASCET criteria. VERTEBRAL ARTERIES: Mildly dominant right and smaller left vertebral arteries are patent in the neck and into the head. AORTIC ARCH: Classic aortic arch anatomy with no significant stenosis at the origin of the great vessels.     CONCLUSION: HEAD MRI: 1.  No acute/subacute infarction, intracranial hemorrhage, mass effect, hydrocephalus, or abnormal enhancement. 2.  Stable chronic ischemic and mild to moderate age-related changes. HEAD MRA: 1.  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 2.  No new high-grade stenosis, branch vessel occlusion, aneurysm, or high flow vascular malformation. NECK MRA: 1.  No significant stenosis in the neck vessels based on NASCET criteria. 2.  No evidence for dissection or pseudoaneurysm.      Labs: Reviewed I have personally reviewed pertinent labs, tests, and radiologic imaging in patient's chart.  Recent Results (from the past 24 hour(s))   Basic metabolic panel    Collection Time: 07/17/22  3:20 PM   Result Value Ref Range    Sodium 139 136 - 145 mmol/L    Potassium 3.9 3.5 - 5.0 mmol/L    Chloride 107 98 - 107 mmol/L    Carbon Dioxide (CO2) 25 22 - 31 mmol/L    Anion Gap 7 5 - 18 mmol/L    Urea Nitrogen 19 8 - 22 mg/dL    Creatinine 0.78 0.60 - 1.10 mg/dL    Calcium 9.2 8.5 - 10.5 mg/dL    Glucose 162 (H) 70 - 125 mg/dL    GFR Estimate 88 >60 mL/min/1.73m2   Troponin I    Collection Time: 07/17/22  3:20  PM   Result Value Ref Range    Troponin I <0.01 0.00 - 0.29 ng/mL   Magnesium    Collection Time: 07/17/22  3:20 PM   Result Value Ref Range    Magnesium 2.0 1.8 - 2.6 mg/dL   CBC with platelets and differential    Collection Time: 07/17/22  3:20 PM   Result Value Ref Range    WBC Count 6.2 4.0 - 11.0 10e3/uL    RBC Count 4.39 3.80 - 5.20 10e6/uL    Hemoglobin 11.8 11.7 - 15.7 g/dL    Hematocrit 37.7 35.0 - 47.0 %    MCV 86 78 - 100 fL    MCH 26.9 26.5 - 33.0 pg    MCHC 31.3 (L) 31.5 - 36.5 g/dL    RDW 13.7 10.0 - 15.0 %    Platelet Count 233 150 - 450 10e3/uL   Manual Differential    Collection Time: 07/17/22  3:20 PM   Result Value Ref Range    % Neutrophils 53 %    % Lymphocytes 41 %    % Monocytes 4 %    % Eosinophils 2 %    % Basophils 0 %    Absolute Neutrophils 3.3 1.6 - 8.3 10e3/uL    Absolute Lymphocytes 2.5 0.8 - 5.3 10e3/uL    Absolute Monocytes 0.2 0.0 - 1.3 10e3/uL    Absolute Eosinophils 0.1 0.0 - 0.7 10e3/uL    Absolute Basophils 0.0 0.0 - 0.2 10e3/uL    RBC Morphology Confirmed RBC Indices     Platelet Assessment  Automated Count Confirmed. Platelet morphology is normal.     Automated Count Confirmed. Platelet morphology is normal.   Asymptomatic COVID-19 Virus (Coronavirus) by PCR Nasopharyngeal    Collection Time: 07/17/22  5:49 PM    Specimen: Nasopharyngeal; Swab   Result Value Ref Range    SARS CoV2 PCR Negative Negative   Glucose by meter    Collection Time: 07/17/22 11:55 PM   Result Value Ref Range    GLUCOSE BY METER POCT 98 70 - 99 mg/dL   Glucose by meter    Collection Time: 07/18/22  8:11 AM   Result Value Ref Range    GLUCOSE BY METER POCT 108 (H) 70 - 99 mg/dL   Glucose by meter    Collection Time: 07/18/22  9:40 AM   Result Value Ref Range    GLUCOSE BY METER POCT 88 70 - 99 mg/dL   Glucose by meter    Collection Time: 07/18/22 12:18 PM   Result Value Ref Range    GLUCOSE BY METER POCT 144 (H) 70 - 99 mg/dL       Total time spent 92 minutes with greater than 50% in consultation,  education and coordination of care.     Also discussed with RN, pharmacist.     Thank you for this consultation.    Nathaly BREWSTER, FNP-C  Acute Care Pain Management Program  [facility] ([place], [place], [place])  Monday-Friday 8a-4p   Page via online paging system or call [phone]

## 2022-07-18 NOTE — PLAN OF CARE
Problem: Plan of Care - These are the overarching goals to be used throughout the patient stay.    Goal: Absence of Hospital-Acquired Illness or Injury  Intervention: Identify and Manage Fall Risk  Recent Flowsheet Documentation  Taken 7/18/2022 1800 by Yahaira Olsen RN  Safety Promotion/Fall Prevention: activity supervised  Intervention: Prevent and Manage VTE (Venous Thromboembolism) Risk  Recent Flowsheet Documentation  Taken 7/18/2022 1800 by Yahaira Olsen RN  VTE Prevention/Management: SCDs (sequential compression devices) off  Goal: Optimal Comfort and Wellbeing  Intervention: Monitor Pain and Promote Comfort  Recent Flowsheet Documentation  Taken 7/18/2022 1800 by Yahaira Olsen RN  Pain Management Interventions: repositioned     Problem: Plan of Care - These are the overarching goals to be used throughout the patient stay.    Goal: Absence of Hospital-Acquired Illness or Injury  Intervention: Identify and Manage Fall Risk  Recent Flowsheet Documentation  Taken 7/18/2022 1800 by Yahaira Olsen RN  Safety Promotion/Fall Prevention: activity supervised  Intervention: Prevent and Manage VTE (Venous Thromboembolism) Risk  Recent Flowsheet Documentation  Taken 7/18/2022 1800 by Yahaira Olsen RN  VTE Prevention/Management: SCDs (sequential compression devices) off  Goal: Optimal Comfort and Wellbeing  Intervention: Monitor Pain and Promote Comfort  Recent Flowsheet Documentation  Taken 7/18/2022 1800 by Yahaira Olsen RN  Pain Management Interventions: repositioned   Goal Outcome Evaluation:  Pt reports pain is coming down in both legs after administration of Oxycodone and Robaxin in ED, but still a 7/10.  She requested her therapy dog to come and her  brought it.   will leave with the dog.

## 2022-07-18 NOTE — H&P
[facility]    History and Physical - Hospitalist Service       Date of Admission:  2022  Sarah Rahman,  1965, MRN [id number]  PCP: HARRIET RIZVI    Assessment & Plan      Sarah Rahman is a 56 year old female admitted on 2022. She has history of coronary artery disease status post CABG, previous stroke, asthma, GERD, hypertension, schizoaffective disorder, substance abuse presents for evaluation of right-sided weakness and numbness    #Right-sided weakness and numbness  #Falling frequently  Patient complains of new onset right-sided weakness, involving face, arm, leg.  The leg is the most affected.  Also complaining of some numbness in the leg.  Having difficulty walking and could not ambulate safely in the ED. Reporting falls at home.  MRI showing small chronic lacunar infarctions in bilateral cerebellar hemispheres, mild to moderate chronic microvascular ischemic change. 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 No acute changes.  TIA possible. Allow permissive HTN to some extent. Neuro checks q shift.  Possible atypical migraine, she did note a headache that began yesterday. Was given IV benadryl, toradol, morphine, Reglan, IVF for migraine treatment without improvement in symptoms.  Noted closing eyes mid sentence at times during encounter, easily roused and completely alert once roused- possible absence seizures? Will check EEG.  Will ask Neuro to see  PT and OT evals    #DM  Home regimen Trulicity, glipizide, Lantus 20 units nightly.  Not taking metformin although this remains on her med list  Continue home Lantus  Sliding scale    #Chronic pain syndrome  Just saw her pain clinic ([facility]) on 7/15  She is not taking Lyrica by report  Continue home oxycodone,  reviewed to confirm dose  Tylenol, topicals PRN  Decrease home Robaxin dose a bit due to weakness, falls, and ?spells  Continue home  "Cymbalta    #HTN  Home Coreg at half dose  Hold home losartan    #Hx CAD s/p CABG x2 in 2009  Home aspirin, statin    #Asthma  Home albuterol, Symbicort, DuoNeb    #GERD, home PPI       Diet: Moderate Consistent Carb (60 g CHO per Meal) Diet  DVT Prophylaxis: Moderate risk. ambulation, short stay    Diehl Catheter: Not present  Central Lines: None  Code Status: Full Code    Clinically Significant Risk Factors Present on Admission                 # Hypertension: home medication list includes antihypertensive(s)    # Obesity: Estimated body mass index is 30.54 kg/m  as calculated from the following:    Height as of this encounter: 1.702 m (5' 7\").    Weight as of this encounter: 88.5 kg (195 lb).        Disposition Plan      Expected Discharge Date: 07/18/2022                The patient's care was discussed with the Patient.    Sunshine Fowler MD  [facility]  Text page via [employer] Paging/Directory      ______________________________________________________________________    Chief Complaint   Sarah Rahman is a 56 year old female who presents for evaluation of right-sided weakness and numbness    History of Present Illness   Patient reports yesterday she began to have headache and dizziness.  This morning, she woke up on the floor of the kitchen.  Prior to that she remembered she had gotten up to get some water.  She is unsure how long she may have been unconscious for.  Complains of a swollen feeling in her feet as well as pain.  Endorses some chills.  Weakness on the right side of her body persists, she notes this is worse in her right leg.  She denies any chest pain, trouble breathing, nausea, vomiting, diarrhea, constipation, urinary issues.    Review of Systems    The 10 point Review of Systems is negative other than noted in the HPI or here.    Past Medical History    I have reviewed this patient's medical history and updated it with pertinent information if needed.   Past Medical " History:   Diagnosis Date     Asthma      CAD (coronary artery disease)      COPD (chronic obstructive pulmonary disease) (H)      CVA (cerebral infarction)      Diabetes (H)      GERD (gastroesophageal reflux disease)      Hypertension      Polysubstance abuse (H)      S/P CABG (coronary artery bypass graft)      S/P lumbar discectomy 06/13/2019    L5/S1 by  Dr. Hamm at [facility]     Schizoaffective disorder (H)        Past Surgical History   I have reviewed this patient's surgical history and updated it with pertinent information if needed.  Past Surgical History:   Procedure Laterality Date     BYPASS GRAFT ARTERY CORONARY  2009    x2     CORONARY STENT PLACEMENT       CV CORONARY ANGIOGRAM N/A 6/2/2021    Procedure: Coronary Angiogram;  Surgeon: Juventino Rivera MD;  Location: [facility] Cardiac Cath Lab;  Service: Cardiology     HYSTERECTOMY TOTAL ABDOMINAL, BILATERAL SALPINGO-OOPHORECTOMY, COMBINED       ORIF ULNAR / RADIAL SHAFT FRACTURE Right        Social History   I have reviewed this patient's social history and updated it with pertinent information if needed.  Social History     Tobacco Use     Smoking status: Current Every Day Smoker     Packs/day: 0.50     Types: Cigarettes     Smokeless tobacco: Never Used   Substance Use Topics     Alcohol use: Yes     Comment: Alcoholic Drinks/day: occ     Drug use: No       Family History   I have reviewed this patient's family history and updated it with pertinent information if needed.  Family History   Problem Relation Age of Onset     Heart Disease Mother      Heart Disease Father      Heart Disease Sister      Diabetes Brother      Heart Disease Sister        Prior to Admission Medications   Prior to Admission Medications   Prescriptions Last Dose Informant Patient Reported? Taking?   COMPOUND CONTAINING CONTROLLED SUBSTANCE (CMPD RX) - PHARMACY TO MIX COMPOUNDED MEDICATION   Yes No   Sig: neuro PLO pain gel w/ lidocaine(ketamine 8%-gabapentin  6%-lidocaine 2.5%)([facility] AMB MIXTURE)   Continuous Blood Gluc Sensor (FREESTYLE SHEBA 14 DAY SENSOR) [facility]   No No   Si each every 14 days Use 1 Sensor every 14 days. Use to read blood sugars per 's instructions.   DULoxetine (CYMBALTA) 30 MG capsule   Yes No   Sig: Take 30 mg by mouth 2 times daily   Lidocaine (LIDOCARE) 4 % Patch   Yes No   Sig: Place 1 patch onto the skin every 24 hours To prevent lidocaine toxicity, patient should be patch free for 12 hrs daily. BACK   acetaminophen (TYLENOL) 325 MG tablet   No No   Sig: Take 2 tablets (650 mg) by mouth every 8 hours   albuterol (PROAIR HFA) 108 (90 BASE) MCG/ACT inhaler   Yes No   Sig: Inhale 1-2 puffs every 4 to 6 hours as needed   alcohol swab prep pads   No No   Sig: Use to swab area of injection/zac as directed.   aspirin (ASA) 325 MG EC tablet   Yes No   Sig: Take 325 mg by mouth daily    blood glucose (ACCU-CHEK DARRIN PLUS) test strip   No No   Sig: Use to test blood sugar 3-4 times daily or as directed.   blood glucose (ACCU-CHEK SOFTCLIX) lancing device   No No   Sig: Lancing device to be used with lancets.   blood glucose monitoring (SOFTCLIX) lancets   No No   Sig: Use to test blood sugar 3-4 times daily.   budesonide-formoterol (SYMBICORT) 160-4.5 MCG/ACT Inhaler   Yes No   Sig: Inhale 2 puffs into the lungs 2 times daily   carvedilol (COREG) 12.5 MG tablet   Yes No   Sig: Take 12.5 mg by mouth 2 times daily (with meals)   cetirizine (ZYRTEC) 10 MG tablet   Yes No   Sig: Take 10 mg by mouth daily   diclofenac (VOLTAREN) 1 % topical gel   Yes No   Sig: Apply 2 g topically 3 times daily as needed for moderate pain (feet)   diclofenac (VOLTAREN) 50 MG EC tablet   No No   Sig: Take 1 tablet (50 mg) by mouth 3 times daily for 10 days   diphenhydrAMINE (BENADRYL) 25 MG tablet   Yes No   Sig: Take 25 mg by mouth every 6 hours as needed for itching or allergies   dulaglutide (TRULICITY) 0.75 MG/0.5ML pen   No No   Sig: Inject 0.75 mg  Subcutaneous every 7 days   fluticasone (FLONASE) 50 MCG/ACT nasal spray   Yes No   Sig: Spray 1 spray into both nostrils daily   furosemide (LASIX) 20 MG tablet   Yes No   Sig: Take 20 mg by mouth daily as needed (swelling)   glipiZIDE (GLUCOTROL XL) 10 MG 24 hr tablet   No No   Sig: Take 1 tablet (10 mg) by mouth daily (with breakfast)   guaiFENesin-codeine (ROBITUSSIN AC) 100-10 MG/5ML solution   Yes No   Sig: Take 1-2 teaspoonful by mouth every 4 hours as needed for cough   insulin glargine (LANTUS PEN) 100 UNIT/ML pen   No No   Sig: Inject 20 Units Subcutaneous At Bedtime   insulin pen needle (32G X 4 MM) 32G X 4 MM miscellaneous   No No   Sig: Use 1 pen needles daily or as directed.   ipratropium - albuterol 0.5 mg/2.5 mg/3 mL (DUONEB) 0.5-2.5 (3) MG/3ML neb solution   Yes No   Sig: Take 1 vial by nebulization every 6 hours as needed for shortness of breath / dyspnea or wheezing   ketorolac (TORADOL) 10 MG tablet   No No   Sig: Take 1 tablet (10 mg) by mouth every 6 hours as needed for moderate pain   lidocaine (XYLOCAINE) 5 % external ointment   No No   Sig: Apply topically 3 times daily as needed for other (feet neuropathy)   losartan (COZAAR) 50 MG tablet   Yes No   Sig: Take 50 mg by mouth daily.   metFORMIN (GLUCOPHAGE) 500 MG tablet   No No   Sig: Take 1 tablet (500 mg) by mouth daily (with dinner)   Patient not taking: Reported on 6/7/2022   methocarbamol (ROBAXIN) 750 MG tablet   Yes No   Sig: Take 750 mg by mouth 4 times daily   montelukast (SINGULAIR) 10 MG tablet   Yes No   Sig: Take 10 mg by mouth At Bedtime   nitroGLYcerin (NITROSTAT) 0.4 MG sublingual tablet   Yes No   Sig: Place 0.4 mg under the tongue every 5 minutes as needed for chest pain For chest pain place 1 tablet under the tongue every 5 minutes for 3 doses. If symptoms persist 5 minutes after 1st dose call 911.   nystatin (MYCOSTATIN) 239297 UNIT/GM external ointment   No No   Sig: Apply topically 2 times daily   Patient not taking:  "Reported on 6/7/2022   omeprazole 20 MG tablet   Yes No   Sig: Take 20 mg by mouth daily   Patient not taking: Reported on 6/7/2022   pregabalin (LYRICA) 150 MG capsule   Yes No   Sig: Take 150 mg by mouth 3 times daily   rosuvastatin (CRESTOR) 20 MG tablet   Yes No   Sig: Take 20 mg by mouth daily   senna-docusate (SENOKOT-S/PERICOLACE) 8.6-50 MG tablet   Yes No   Sig: Take 1 tablet by mouth daily   urea (DERMAL THERAPY FINGER CARE) 20 % external lotion   Yes No   Sig: Feet as needed      Facility-Administered Medications: None     Allergies   Allergies   Allergen Reactions     Haloperidol Unknown     Patient states it stops her heart       Meperidine Angioedema, Difficulty breathing, Other (See Comments), Rash and Shortness Of Breath     Throat closes  Other reaction(s): Breathing Difficulty  Other reaction(s): Breathing Difficulty       Phenothiazines Other (See Comments)     Other reaction(s): CARDIAC DISTURBANCES  Patient states it stops her heart  \"I swell up\"  \"stopped by heart\"  \"I swell up\"  \"I swell up\"       Tramadol Other (See Comments)     Other reaction(s): Angioedema  Throat itch       Butyrophenones Angioedema     Demerol      Januvia [Sitagliptin] Other (See Comments)     Swelling in the neck      Latex Itching     Lisinopril Other (See Comments)     ACE cough  Itchy throat  Throat itches  Itchy throat       Penicillins      Hydroxyzine Other (See Comments) and Rash     Tongue swelling  Tongue swelling  Tongue swelling         Physical Exam   Vital Signs: Temp: 98  F (36.7  C) Temp src: Oral BP: (!) 176/86 Pulse: 72   Resp: 16 SpO2: 100 % O2 Device: None (Room air)    Weight: 195 lbs 0 oz    General: in no apparent distress, non-toxic and alert female lying in hospital bed oriented x3  HEENT: Head normocephalic atraumatic, oral mucosa moist. Sclerae anicteric  CV: Regular rhythm, normal rate, no murmurs  Resp: No wheezes, no rales or rhonchi, no focal consolidations  GI: Belly soft, nondistended, " nontender, bowel sounds present  Skin: No rashes or lesions  Extremities: No peripheral edema  Psych: Normal affect, mood euthymic  Neuro: Speech is subtly slurred. Reports decreased light touch sensation RLE compared to LLE. Moving all 4 extremities. 2x during encounter she closed her eyes and stopped talking until I called her name, then opened eyes and was interactive again, stated the lights were just too bright.    Data   Data reviewed today: I reviewed all medications, new labs and imaging results over the last 24 hours.  EKG personally reviewed shows: NSR rate 67 bpm    Recent Results (from the past 12 hour(s))   Basic metabolic panel    Collection Time: 07/17/22  3:20 PM   Result Value Ref Range    Sodium 139 136 - 145 mmol/L    Potassium 3.9 3.5 - 5.0 mmol/L    Chloride 107 98 - 107 mmol/L    Carbon Dioxide (CO2) 25 22 - 31 mmol/L    Anion Gap 7 5 - 18 mmol/L    Urea Nitrogen 19 8 - 22 mg/dL    Creatinine 0.78 0.60 - 1.10 mg/dL    Calcium 9.2 8.5 - 10.5 mg/dL    Glucose 162 (H) 70 - 125 mg/dL    GFR Estimate 88 >60 mL/min/1.73m2   Troponin I    Collection Time: 07/17/22  3:20 PM   Result Value Ref Range    Troponin I <0.01 0.00 - 0.29 ng/mL   Magnesium    Collection Time: 07/17/22  3:20 PM   Result Value Ref Range    Magnesium 2.0 1.8 - 2.6 mg/dL   CBC with platelets and differential    Collection Time: 07/17/22  3:20 PM   Result Value Ref Range    WBC Count 6.2 4.0 - 11.0 10e3/uL    RBC Count 4.39 3.80 - 5.20 10e6/uL    Hemoglobin 11.8 11.7 - 15.7 g/dL    Hematocrit 37.7 35.0 - 47.0 %    MCV 86 78 - 100 fL    MCH 26.9 26.5 - 33.0 pg    MCHC 31.3 (L) 31.5 - 36.5 g/dL    RDW 13.7 10.0 - 15.0 %    Platelet Count 233 150 - 450 10e3/uL   Manual Differential    Collection Time: 07/17/22  3:20 PM   Result Value Ref Range    % Neutrophils 53 %    % Lymphocytes 41 %    % Monocytes 4 %    % Eosinophils 2 %    % Basophils 0 %    Absolute Neutrophils 3.3 1.6 - 8.3 10e3/uL    Absolute Lymphocytes 2.5 0.8 - 5.3 10e3/uL     Absolute Monocytes 0.2 0.0 - 1.3 10e3/uL    Absolute Eosinophils 0.1 0.0 - 0.7 10e3/uL    Absolute Basophils 0.0 0.0 - 0.2 10e3/uL    RBC Morphology Confirmed RBC Indices     Platelet Assessment  Automated Count Confirmed. Platelet morphology is normal.     Automated Count Confirmed. Platelet morphology is normal.   Asymptomatic COVID-19 Virus (Coronavirus) by PCR Nasopharyngeal    Collection Time: 07/17/22  5:49 PM    Specimen: Nasopharyngeal; Swab   Result Value Ref Range    SARS CoV2 PCR Negative Negative

## 2022-07-18 NOTE — PROGRESS NOTES
"   07/18/22 0741   Quick Adds   Quick Adds Certification   Type of Visit Initial Occupational Therapy Evaluation   Living Environment   People in Home alone   Current Living Arrangements apartment   Living Environment Comments Tub shower   Self-Care   Equipment Currently Used at Home grab bar, tub/shower  (had shower chair but doesn't use anymore)   Activity/Exercise/Self-Care Comment ind for BADLs   Instrumental Activities of Daily Living (IADL)   IADL Comments ind for most IADLs, assist from PCA for med setup, pt drives \"sometimes\" but also gets assist for transportation   General Information   Onset of Illness/Injury or Date of Surgery 07/17/22   Referring Physician Sunshine Fowler MD   Patient/Family Therapy Goal Statement (OT) feel better   Additional Occupational Profile Info/Pertinent History of Current Problem admitted with R sided weakness; hx of CVA, chronic pain syndrome, schizoaffective disorder   Existing Precautions/Restrictions fall   Cognitive Status Examination   Orientation Status orientation to person, place and time   Behavioral Issues other (see comments)  (emotional during session d/t LLE pain)   Affect/Mental Status (Cognitive) anxious   Follows Commands WFL   Sensory   Sensory Comments RUE forearm / hand light touch impaired   Pain Assessment   Patient Currently in Pain Yes, see Vital Sign flowsheet   Range of Motion Comprehensive   Comment, General Range of Motion R shoulder impaired   Strength Comprehensive (MMT)   Comment, General Manual Muscle Testing (MMT) Assessment R shoulder 2-/5 - unable to actively complete full ROM   Coordination   Fine Motor Coordination R opposition impaired   Bed Mobility   Bed Mobility supine-sit;sit-supine   Supine-Sit [place] (Bed Mobility) minimum assist (75% patient effort)   Sit-Supine [place] (Bed Mobility) minimum assist (75% patient effort)   Transfers   Transfers sit-stand transfer;toilet transfer   Sit-Stand Transfer   Sit-Stand [place] " (Transfers) minimum assist (75% patient effort);2 person assist   Toilet Transfer   Type (Toilet Transfer) sit-stand;stand-sit   [place] Level (Toilet Transfer) minimum assist (75% patient effort);2 person assist   Assistive Device (Toilet Transfer) commode chair   Balance   Balance Comments CGA / min A of 2 to maintain standing balance   Activities of Daily Living   BADL Assessment/Intervention lower body dressing;toileting;grooming   Lower Body Dressing Assessment/Training   [place] Level (Lower Body Dressing) dependent (less than 25% patient effort)   Grooming Assessment/Training   [place] Level (Grooming) dependent (less than 25% patient effort)   Comment, (Grooming) to tie hair scarf   Toileting   Comment, (Toileting) for bennie cares   [place] Level (Toileting) maximum assist (25% patient effort)   Clinical Impression   Criteria for Skilled Therapeutic Interventions Met (OT) Yes, treatment indicated   OT Diagnosis dec ADL indep   OT Problem List-Impairments impacting ADL problems related to;activity tolerance impaired;balance;coordination;mobility;sensation;strength;pain;sensory feedback   Assessment of Occupational Performance 3-5 Performance Deficits   Identified Performance Deficits dressing, toileting, grooming, bathing, transfers, functional mobility   Planned Therapy Interventions (OT) ADL retraining;IADL retraining;balance training;bed mobility training;fine motor coordination training;motor coordination training;neuromuscular re-education;ROM;strengthening;stretching;transfer training;home program guidelines;progressive activity/exercise   Clinical Decision Making Complexity (OT) moderate complexity   Risk & Benefits of therapy have been explained evaluation/treatment results reviewed;participants included;patient   OT Discharge Planning   OT Discharge Recommendation (DC Rec) Transitional Care Facility   OT Rationale for DC Rec Requires assist of 2 for functional transfers and ADL  today. Pt lives alone.   Therapy Certification   Start of Care Date 07/18/22   Certification date from 07/18/22   Certification date to 07/25/22   Medical Diagnosis R side weakness   Total Evaluation Time (Minutes)   Total Evaluation Time (Minutes) 20  (coeval with PT)   OT Goals   Therapy Frequency (OT) Daily   OT Predicted Duration/Target Date for Goal Attainment 07/25/22   OT Goals Hygiene/Grooming;Lower Body Dressing;Toilet Transfer/Toileting   OT: Hygiene/Grooming supervision/stand-by assist   OT: Lower Body Dressing Supervision/stand-by assist   OT: Toilet Transfer/Toileting Supervision/stand-by assist

## 2022-07-18 NOTE — PHARMACY-ADMISSION MEDICATION HISTORY
Pharmacy Note - Admission Medication History    Pertinent Provider Information: sporadic fill history on many medications. She states she needs refills for: Symbicort, Coreg, Trulicity, glipizide, and Singluair.      ______________________________________________________________________    Prior To Admission (PTA) med list completed and updated in EMR.       PTA Med List   Medication Sig Last Dose     acetaminophen (TYLENOL) 325 MG tablet Take 2 tablets (650 mg) by mouth every 8 hours 7/17/2022 at x2     albuterol (PROAIR HFA) 108 (90 BASE) MCG/ACT inhaler Inhale 2 puffs into the lungs every 4 hours as needed      aspirin (ASA) 325 MG EC tablet Take 325 mg by mouth daily  7/17/2022 at am     budesonide-formoterol (SYMBICORT) 160-4.5 MCG/ACT Inhaler Inhale 2 puffs into the lungs 2 times daily 7/17/2022 at am     carvedilol (COREG) 12.5 MG tablet Take 12.5 mg by mouth 2 times daily (with meals) 7/17/2022 at am     cetirizine (ZYRTEC) 10 MG tablet Take 10 mg by mouth daily 7/17/2022 at am     COMPOUND CONTAINING CONTROLLED SUBSTANCE (CMPD RX) - PHARMACY TO MIX COMPOUNDED MEDICATION neuro PLO pain gel w/ lidocaine(ketamine 8%-gabapentin 6%-lidocaine 2.5%)([facility] AMB MIXTURE) hasn't started taking yet     diclofenac (VOLTAREN) 1 % topical gel Apply 2 g topically 3 times daily as needed for moderate pain (feet)      diphenhydrAMINE (BENADRYL) 25 MG tablet Take 25 mg by mouth every 6 hours as needed for itching or allergies      dulaglutide (TRULICITY) 0.75 MG/0.5ML pen Inject 0.75 mg Subcutaneous every 7 days 7/12/2022 at am     DULoxetine (CYMBALTA) 30 MG capsule Take 30 mg by mouth 2 times daily 7/17/2022 at am     fluticasone (FLONASE) 50 MCG/ACT nasal spray Spray 1 spray into both nostrils daily 7/17/2022 at am     furosemide (LASIX) 20 MG tablet Take 20 mg by mouth daily as needed (swelling)      glipiZIDE (GLUCOTROL XL) 10 MG 24 hr tablet Take 1 tablet (10 mg) by mouth daily (with breakfast) 7/17/2022 at am      guaiFENesin-codeine (ROBITUSSIN AC) 100-10 MG/5ML solution Take 1-2 teaspoonful by mouth every 4 hours as needed for cough      insulin glargine (LANTUS PEN) 100 UNIT/ML pen Inject 20 Units Subcutaneous At Bedtime 7/16/2022 at hs     ipratropium - albuterol 0.5 mg/2.5 mg/3 mL (DUONEB) 0.5-2.5 (3) MG/3ML neb solution Take 1 vial by nebulization every 6 hours as needed for shortness of breath / dyspnea or wheezing      ketorolac (TORADOL) 10 MG tablet Take 1 tablet (10 mg) by mouth every 6 hours as needed for moderate pain      Lidocaine (LIDOCARE) 4 % Patch Place 1 patch onto the skin every 24 hours To prevent lidocaine toxicity, patient should be patch free for 12 hrs daily. BACK 7/17/2022 at am     lidocaine (XYLOCAINE) 5 % external ointment Apply topically 3 times daily as needed for other (feet neuropathy)      losartan (COZAAR) 100 MG tablet Take 100 mg by mouth daily 7/17/2022 at am     methocarbamol (ROBAXIN) 750 MG tablet Take 750 mg by mouth 4 times daily 7/17/2022 at x2     montelukast (SINGULAIR) 10 MG tablet Take 10 mg by mouth At Bedtime 7/16/2022 at hs     nitroGLYcerin (NITROSTAT) 0.4 MG sublingual tablet Place 0.4 mg under the tongue every 5 minutes as needed for chest pain For chest pain place 1 tablet under the tongue every 5 minutes for 3 doses. If symptoms persist 5 minutes after 1st dose call 911.      omeprazole 20 MG tablet Take 20 mg by mouth daily 7/17/2022 at am     oxyCODONE-acetaminophen (PERCOCET)  MG per tablet Take 1 tablet by mouth every 6 hours as needed for severe pain 7/17/2022 at x1     rosuvastatin (CRESTOR) 20 MG tablet Take 20 mg by mouth daily 7/17/2022 at am     senna-docusate (SENOKOT-S/PERICOLACE) 8.6-50 MG tablet Take 1 tablet by mouth daily as needed      urea (DERMAL THERAPY FINGER CARE) 20 % external lotion Externally apply topically 2 times daily as needed Feet        Information source(s): Patient, Clinic records and CareEverywhere/SureScripts  Method of  interview communication: in-person    Summary of Changes to PTA Med List  New: PLO pain gel, Trulicity   Discontinued: metformin, Lyrica  Changed: losartan, senna-s    Patient was asked about OTC/herbal products specifically.  PTA med list reflects this.    In the past week, patient estimated taking medication this percent of the time:  50-90% due to running out.    Allergies were reviewed, assessed, and updated with the patient.      Patient did not bring any medications to the hospital and can't retrieve from home. No multi-dose medications are available for use during hospital stay.     The information provided in this note is only as accurate as the sources available at the time of the update(s).    Thank you for the opportunity to participate in the care of this patient.    Dakotah Santana BLAISE  7/17/2022 10:20 PM

## 2022-07-18 NOTE — ED NOTES
"Pt up to bedside commode.  Pt requesting different doctor, which Dr. Villegas is aware of. States she needs something for pain control and doesn't feel like anyone is listening to her. Pt requesting morphine, which was given earlier in the night. Declines lyrica or gabapentin, stating that those medications make her legs and feet swell. Education provided by this RN regarding types of pain medication.   Pt feels like her feet are swollen currently. No edema noted in her feet by this RN.   Pt requesting to speak to \"head nurse.\" This RN explained that nurses do not have the ability to reassign doctors, but she does still want to speak to head nurse. Pt is cooperative and pleasant with this RN.   "

## 2022-07-18 NOTE — PROGRESS NOTES
"   07/18/22 0740   Quick Adds   Type of Visit Initial PT Evaluation   Living Environment   People in Home alone   Current Living Arrangements apartment   Home Accessibility no concerns  (elevator)   Self-Care   Fall history within last six months yes   Number of times patient has fallen within last six months 2   Activity/Exercise/Self-Care Comment Pt independent with ADLs/IADLs at baseline. Does not use AD for ambulation.   General Information   Onset of Illness/Injury or Date of Surgery 07/17/22   Referring Physician Sunshine Fowler MD   Patient/Family Therapy Goals Statement (PT) None stated.   Pertinent History of Current Problem (include personal factors and/or comorbidities that impact the POC) Per H&P: \"56 year old female admitted on 7/17/2022. She has history of coronary artery disease status post CABG, previous stroke, asthma, GERD, hypertension, schizoaffective disorder, substance abuse presents for evaluation of right-sided weakness and numbness\"   Existing Precautions/Restrictions fall   Pain Assessment   Patient Currently in Pain Yes, see Vital Sign flowsheet  (reports \"burning in B LE\")   Range of Motion (ROM)   Range of Motion ROM is WFL   Strength (Manual Muscle Testing)   Strength (Manual Muscle Testing) Deficits observed during functional mobility   Bed Mobility   Bed Mobility supine-sit   Supine-Sit [place] (Bed Mobility) minimum assist (75% patient effort);verbal cues   Bed Mobility Limitations decreased ability to use arms for pushing/pulling;decreased ability to use legs for bridging/pushing   Impairments Contributing to Impaired Bed Mobility impaired balance;decreased strength   Transfers   Transfers sit-stand transfer;bed-chair transfer   Transfer Safety Concerns Noted decreased weight-shifting ability   Impairments Contributing to Impaired Transfers impaired balance;pain   Bed-Chair Transfer   Assistive Device (Bed-Chair Transfers) other (see comments)  (arm-in-arm)   Bed-Chair " [place] (Transfers) minimum assist (75% patient effort);2 person assist   Sit-Stand Transfer   Sit-Stand [place] (Transfers) minimum assist (75% patient effort);2 person assist   Assistive Device (Sit-Stand Transfers) other (see comments)  (arm-in-arm)   Gait/Stairs (Locomotion)   [place] Level (Gait) minimum assist (75% patient effort);2 person assist   Assistive Device (Gait) other (see comments)  (arm-in-arm)   Distance in Feet (Required for LE Total Joints) 3 x 2 bed <> commode   Pattern (Gait) step-to   Deviations/Abnormal Patterns (Gait) terry decreased;gait speed decreased   Sensory Examination   Sensory Perception Comments Patient reports burning in B LEs. Light touch sensation testing completed, pt 25% accurate with R lower leg testing, 100% accurate with L lower leg testing. Continues to demonstrate inaccurate response to increased pressure.   Sensory Perception Quick Adds Light touch   Clinical Impression   Criteria for Skilled Therapeutic Intervention Yes, treatment indicated   PT Diagnosis (PT) impaired functional mobility   Influenced by the following impairments decreased strength, impaired balance, decreased sensation   Functional limitations due to impairments transfers, ambulation   Clinical Presentation (PT Evaluation Complexity) Evolving/Changing   Clinical Presentation Rationale Pt presents as medically diagnosed.   Clinical Decision Making (Complexity) moderate complexity   Planned Therapy Interventions (PT) balance training;bed mobility training;gait training;home exercise program;neuromuscular re-education;strengthening;transfer training   Anticipated Equipment Needs at Discharge (PT) walker, rolling  (pending progress with mobility)   Risk & Benefits of therapy have been explained evaluation/treatment results reviewed;participants voiced agreement with care plan;participants included;patient   PT Discharge Planning   PT Discharge Recommendation (DC Rec) Transitional Care  Facility   PT Rationale for DC Rec Pt lives alone at baseline. Currently requiring min assist of 2 for transfers. Recommend  TCU at discharge.   Total Evaluation Time   Total Evaluation Time (Minutes) 20   Physical Therapy Goals   PT Frequency Daily   PT Predicted Duration/Target Date for Goal Attainment 07/25/22   PT Goals Bed Mobility;Transfers;Gait   PT: Bed Mobility Independent;Supine to/from sit   PT: Transfers Supervision/stand-by assist;Sit to/from stand;Assistive device   PT: Gait Supervision/stand-by assist;Assistive device;Rolling walker;50 feet     Tierra García, PT  7/18/2022

## 2022-07-19 ENCOUNTER — APPOINTMENT (OUTPATIENT)
Dept: OCCUPATIONAL THERAPY | Facility: HOSPITAL | Age: 57
End: 2022-07-19
Payer: COMMERCIAL

## 2022-07-19 LAB
ATRIAL RATE - MUSE: 67 BPM
DIASTOLIC BLOOD PRESSURE - MUSE: NORMAL MMHG
GLUCOSE BLDC GLUCOMTR-MCNC: 118 MG/DL (ref 70–99)
GLUCOSE BLDC GLUCOMTR-MCNC: 130 MG/DL (ref 70–99)
GLUCOSE BLDC GLUCOMTR-MCNC: 185 MG/DL (ref 70–99)
GLUCOSE BLDC GLUCOMTR-MCNC: 208 MG/DL (ref 70–99)
GLUCOSE BLDC GLUCOMTR-MCNC: 92 MG/DL (ref 70–99)
HBA1C MFR BLD: 10.1 %
HOLD SPECIMEN: NORMAL
INTERPRETATION ECG - MUSE: NORMAL
P AXIS - MUSE: 73 DEGREES
PR INTERVAL - MUSE: 192 MS
QRS DURATION - MUSE: 78 MS
QT - MUSE: 430 MS
QTC - MUSE: 454 MS
R AXIS - MUSE: -34 DEGREES
SYSTOLIC BLOOD PRESSURE - MUSE: NORMAL MMHG
T AXIS - MUSE: 90 DEGREES
VENTRICULAR RATE- MUSE: 67 BPM

## 2022-07-19 PROCEDURE — 250N000013 HC RX MED GY IP 250 OP 250 PS 637: Performed by: NURSE PRACTITIONER

## 2022-07-19 PROCEDURE — 250N000013 HC RX MED GY IP 250 OP 250 PS 637: Performed by: INTERNAL MEDICINE

## 2022-07-19 PROCEDURE — 99225 PR SUBSEQUENT OBSERVATION CARE,LEVEL II: CPT | Performed by: HOSPITALIST

## 2022-07-19 PROCEDURE — 82962 GLUCOSE BLOOD TEST: CPT

## 2022-07-19 PROCEDURE — 36415 COLL VENOUS BLD VENIPUNCTURE: CPT | Performed by: HOSPITALIST

## 2022-07-19 PROCEDURE — 99214 OFFICE O/P EST MOD 30 MIN: CPT | Performed by: PSYCHIATRY & NEUROLOGY

## 2022-07-19 PROCEDURE — 83036 HEMOGLOBIN GLYCOSYLATED A1C: CPT | Performed by: HOSPITALIST

## 2022-07-19 PROCEDURE — G0378 HOSPITAL OBSERVATION PER HR: HCPCS

## 2022-07-19 PROCEDURE — 250N000013 HC RX MED GY IP 250 OP 250 PS 637: Performed by: HOSPITALIST

## 2022-07-19 PROCEDURE — 250N000013 HC RX MED GY IP 250 OP 250 PS 637: Performed by: PSYCHIATRY & NEUROLOGY

## 2022-07-19 PROCEDURE — 97110 THERAPEUTIC EXERCISES: CPT | Mod: GO

## 2022-07-19 PROCEDURE — 96372 THER/PROPH/DIAG INJ SC/IM: CPT

## 2022-07-19 RX ORDER — LIDOCAINE 4 G/G
2 PATCH TOPICAL
Status: DISCONTINUED | OUTPATIENT
Start: 2022-07-19 | End: 2022-07-21 | Stop reason: HOSPADM

## 2022-07-19 RX ORDER — DULAGLUTIDE 0.75 MG/.5ML
INJECTION, SOLUTION SUBCUTANEOUS
Qty: 2 ML | Refills: 0 | Status: SHIPPED | OUTPATIENT
Start: 2022-07-19 | End: 2022-09-24

## 2022-07-19 RX ORDER — POLYETHYLENE GLYCOL 3350 17 G/17G
17 POWDER, FOR SOLUTION ORAL DAILY
Status: DISCONTINUED | OUTPATIENT
Start: 2022-07-19 | End: 2022-07-21 | Stop reason: HOSPADM

## 2022-07-19 RX ADMIN — METHOCARBAMOL 500 MG: 500 TABLET ORAL at 08:54

## 2022-07-19 RX ADMIN — ACETAMINOPHEN 650 MG: 325 TABLET ORAL at 21:19

## 2022-07-19 RX ADMIN — DULOXETINE HYDROCHLORIDE 30 MG: 30 CAPSULE, DELAYED RELEASE ORAL at 20:26

## 2022-07-19 RX ADMIN — CARVEDILOL 12.5 MG: 12.5 TABLET, FILM COATED ORAL at 08:53

## 2022-07-19 RX ADMIN — ACETAMINOPHEN 650 MG: 325 TABLET ORAL at 14:01

## 2022-07-19 RX ADMIN — METHOCARBAMOL 500 MG: 500 TABLET ORAL at 20:25

## 2022-07-19 RX ADMIN — FLUTICASONE FUROATE AND VILANTEROL TRIFENATATE 1 PUFF: 200; 25 POWDER RESPIRATORY (INHALATION) at 08:56

## 2022-07-19 RX ADMIN — DULOXETINE HYDROCHLORIDE 30 MG: 30 CAPSULE, DELAYED RELEASE ORAL at 09:05

## 2022-07-19 RX ADMIN — MONTELUKAST SODIUM 10 MG: 10 TABLET, FILM COATED ORAL at 20:25

## 2022-07-19 RX ADMIN — AMITRIPTYLINE HYDROCHLORIDE 25 MG: 25 TABLET, FILM COATED ORAL at 20:25

## 2022-07-19 RX ADMIN — INSULIN ASPART 1 UNITS: 100 INJECTION, SOLUTION INTRAVENOUS; SUBCUTANEOUS at 13:10

## 2022-07-19 RX ADMIN — CETIRIZINE HYDROCHLORIDE 10 MG: 10 TABLET ORAL at 08:54

## 2022-07-19 RX ADMIN — POLYETHYLENE GLYCOL 3350 17 G: 17 POWDER, FOR SOLUTION ORAL at 17:35

## 2022-07-19 RX ADMIN — PANTOPRAZOLE SODIUM 40 MG: 40 TABLET, DELAYED RELEASE ORAL at 08:53

## 2022-07-19 RX ADMIN — CARVEDILOL 12.5 MG: 12.5 TABLET, FILM COATED ORAL at 17:35

## 2022-07-19 RX ADMIN — INSULIN GLARGINE 20 UNITS: 100 INJECTION, SOLUTION SUBCUTANEOUS at 20:26

## 2022-07-19 RX ADMIN — ASPIRIN 325 MG: 325 TABLET, COATED ORAL at 08:53

## 2022-07-19 RX ADMIN — LOSARTAN POTASSIUM 100 MG: 50 TABLET, FILM COATED ORAL at 08:54

## 2022-07-19 RX ADMIN — FLUTICASONE PROPIONATE 1 SPRAY: 50 SPRAY, METERED NASAL at 08:58

## 2022-07-19 RX ADMIN — METHOCARBAMOL 500 MG: 500 TABLET ORAL at 17:35

## 2022-07-19 RX ADMIN — LIDOCAINE 2 PATCH: 246 PATCH TOPICAL at 21:14

## 2022-07-19 RX ADMIN — ROSUVASTATIN CALCIUM 20 MG: 10 TABLET, FILM COATED ORAL at 09:05

## 2022-07-19 RX ADMIN — DOCUSATE SODIUM 100 MG: 100 CAPSULE, LIQUID FILLED ORAL at 20:25

## 2022-07-19 RX ADMIN — DOCUSATE SODIUM 100 MG: 100 CAPSULE, LIQUID FILLED ORAL at 08:53

## 2022-07-19 RX ADMIN — OXYCODONE HYDROCHLORIDE 10 MG: 5 TABLET ORAL at 09:05

## 2022-07-19 RX ADMIN — DICLOFENAC 2 G: 10 GEL TOPICAL at 11:24

## 2022-07-19 RX ADMIN — METHOCARBAMOL 500 MG: 500 TABLET ORAL at 13:10

## 2022-07-19 RX ADMIN — OXYCODONE HYDROCHLORIDE 10 MG: 5 TABLET ORAL at 17:35

## 2022-07-19 NOTE — PROGRESS NOTES
NEUROLOGY PROGRESS NOTE     ASSESSMENT & PLAN     Diagnosis code: Right-sided weakness/pain, falls.     56-year-old woman with history of stroke presenting with right-sided weakness and falls.  History of known peripheral neuropathy, some lumbar degenerative changes.       Brain MRI shows nothing acute.     Vessel imaging shows unchanged stenosis of the proximal/mid basilar artery with reconstitution distally.  No new findings.    History of stroke: Continue home aspirin and statin.    Suspect chronic difficulties with the leg are due to neuropathy and lumbar radiculopathy.  History of lumbar laminectomy.  Normal cervical spine CT in 2019.    PT/OT.    Continue current pain regimen as directed by hospitalist.  I did speak with Kristin about adding amitriptyline.  She has not tried this in the past.  Start with 25mg 1-2 hours before bedtime.  She can discuss this further with her primary pain physician after discharge.    Possible syncopal event as patient awoke on the floor at home.  EEG is attempted but could not do because of patient's hair extensions.  EEG from 2018 was normal.    Exam is improved today, at least in terms of strength, decreased pain.    Neurology will sign off.  I think would be useful for the patient to have an updated EMG of the right upper and lower extremity as an outpatient.  She should also continue to follow with her pain clinic.        Neurology Discharge Planning: Per primary team.    Patient Active Problem List    Diagnosis Date Noted     Anemia 07/17/2022     Priority: Medium     Anxiety as acute reaction to gross stress 07/17/2022     Priority: Medium     History of drug abuse (H) 07/17/2022     Priority: Medium     Formatting of this note might be different from the original.  Cocaine history, per psych records last 11/2010, used in setting of grief after her daughter's death  Formatting of this note might be different from the original.  Formatting of this note might be different  from the original.  Cocaine history, per psych records last 11/2010, used in setting of grief after her daughter's death       Mood disorder due to medical condition 07/17/2022     Priority: Medium     Obesity 07/17/2022     Priority: Medium     Sleep difficulties 07/17/2022     Priority: Medium     Right sided weakness 07/17/2022     Priority: Medium     Chronic pain syndrome 07/17/2022     Priority: Medium     Asthma in adult, unspecified asthma severity, uncomplicated 07/17/2022     Priority: Medium     Diabetic polyneuropathy associated with type 2 diabetes mellitus (H) 06/10/2022     Priority: Medium     Nasal polyp 06/10/2022     Priority: Medium     Chronic schizoaffective disorder (H) 06/10/2022     Priority: Medium     Bilateral low back pain with right-sided sciatica, unspecified chronicity 04/03/2022     Priority: Medium     Chronic obstructive pulmonary disease (H) 01/15/2022     Priority: Medium     Uncontrolled type 2 diabetes mellitus with hyperglycemia (H) 12/13/2021     Priority: Medium     Cerebrovascular accident (CVA) due to stenosis of carotid artery (H) 10/17/2021     Priority: Medium     Acute recurrent maxillary sinusitis 10/17/2021     Priority: Medium     Myelomalacia of cervical cord (H) 10/08/2021     Priority: Medium     Noncompliance 10/08/2021     Priority: Medium     Sensorineural hearing loss (SNHL) of right ear 10/08/2021     Priority: Medium     Carotid stenosis, left 10/03/2021     Priority: Medium     Formatting of this note might be different from the original.  Added automatically from request for surgery [id number]       Depression with anxiety 08/01/2021     Priority: Medium     Post-COVID syndrome 07/11/2021     Priority: Medium     Dyslipidemia 05/02/2021     Priority: Medium     Formatting of this note might be different from the original.  Formatting of this note might be different from the original.    Low HDL  Formatting of this note might be different from the original.     Low HDL       Hx of syphilis 09/16/2020     Priority: Medium     Formatting of this note might be different from the original.  09/08/20  TPPA positive, Treponema screen positive  09/14/20  Treatment: Doxycycline 100mg po bid x 30 days  10/27/20  TPPA positive       Weakness 01/29/2020     Priority: Medium     Leg swelling 10/11/2019     Priority: Medium     Lumbar disc herniation 06/14/2019     Priority: Medium     Syncope and collapse 10/21/2018     Priority: Medium     Sacroiliac joint disease 10/09/2018     Priority: Medium     Myalgia 02/20/2018     Priority: Medium     Intercostal neuritis 02/06/2018     Priority: Medium     Dyshidrosis (pompholyx) 10/21/2016     Priority: Medium     Chest pain 10/02/2015     Priority: Medium     Seasonal allergies 07/24/2015     Priority: Medium     Systolic and diastolic CHF, chronic (H) 09/15/2014     Priority: Medium     Formatting of this note might be different from the original.  Formatting of this note might be different from the original.  Sep '14: Mildly diminished LV function ~50%, grade 1 diastolic dysfunction.  Formatting of this note might be different from the original.  Sep '14: Mildly diminished LV function ~50%, grade 1 diastolic dysfunction.       Pulmonary nodule 09/11/2014     Priority: Medium     Formatting of this note might be different from the original.  Needs repeat CT Chest late Sep 2014 for interval change  Formatting of this note might be different from the original.  Needs repeat CT Chest late Sep 2014 for interval change  Formatting of this note might be different from the original.  Formatting of this note might be different from the original.  Needs repeat CT Chest late Sep 2014 for interval change       Menopausal flushing 04/30/2014     Priority: Medium     ACP (advance care planning) 02/12/2014     Priority: Medium     Formatting of this note might be different from the original.  Overview:   Formatting of this note may be different from  the original.  Health Care Directives: Patient has identified Health Care Agent(s): Yes  Add Health Care Agents: Yes    Health Care Agent(s):  Primary Health Care Agent: Elsie Rahman  Relationship: Daughter Phone: [phone]   Secondary Health Care Agent:  Relationship:  Phone:    Conservator:  Relationship:  Phone:    Guardian: Relationship:  Phone:      Patient has Advance Care Plan Documents (Health Care Directive, POLST): No, Health Care Packet given to patient.    Patient has identified Specific Treatment Preferences: No   Specific limits to treatment preferences NOT identified: ASSUME FULL TREATMENT.  Formatting of this note might be different from the original.  Overview:   Overview:   Formatting of this note may be different from the original.  Health Care Directives: Patient has identified Health Care Agent(s): Yes  Add Health Care Agents: Yes    Health Care Agent(s):  Primary Health Care Agent: Elsie Rahman  Relationship: Daughter Phone: [phone]   Secondary Health Care Agent:  Relationship:  Phone:    Conservator:  Relationship:  Phone:    Guardian: Relationship:  Phone:      Patient has Advance Care Plan Documents (Health Care Directive, POLST): No, Health Care Packet given to patient.    Patient has identified Specific Treatment Preferences: No   Specific limits to treatment preferences NOT identified: ASSUME FULL TREATMENT.  Formatting of this note might be different from the original.  Health Care Directives: Patient has identified Health Care Agent(s): YesAdd Health Care Agents: Yes  Health Care Agent(s):Primary Health Care Agent: Elsie Rahman  Relationship: Daughter Phone: [phone]   Secondary Health Care Agent:  Relationship:  Phone:    Conservator:  Relationship:  Phone:    Guardian: Relationship:  Phone:      Patient has Advance Care Plan Documents (Health Care Directive, POLST): No, Health Care Packet given to patient.    Patient has identified Specific Treatment Preferences: No    Specific limits to treatment preferences NOT identified: ASSUME FULL TREATMENT.       Nephrolithiasis 08/29/2013     Priority: Medium     Coronary atherosclerosis 03/21/2013     Priority: Medium     Type 2 diabetes mellitus with diabetic polyneuropathy, with long-term current use of insulin (H) 03/21/2013     Priority: Medium     Problem list name updated by automated process. Provider to review       Schizoaffective disorder (H) 03/21/2013     Priority: Medium     Problem list name updated by automated process. Provider to review  Diagnosis updated by automated process. Provider to review and confirm.       Health Care Home 03/21/2013     Priority: Medium     Tier 1  DX V65.8 REPLACED WITH [id number] HEALTH CARE HOME (04/08/2013)       GERD (gastroesophageal reflux disease) 10/15/2012     Priority: Medium     History of CVA (cerebrovascular accident) 04/01/2012     Priority: Medium     Formatting of this note might be different from the original.  Overview:   Bilateral subacute cerebellar infarcts seen on MRI at [facility] 4/2012.  Pt was noted to have no residual deficits at [facility].  Formatting of this note might be different from the original.  Bilateral subacute cerebellar infarcts seen on MRI at [facility] 4/2012.  Pt was noted to have no residual deficits at [facility].  Formatting of this note might be different from the original.  Bilateral subacute cerebellar infarcts seen on MRI at [facility] 4/2012.  Pt was noted to have no residual deficits at [facility].  Formatting of this note might be different from the original.  Formatting of this note might be different from the original.  Bilateral subacute cerebellar infarcts seen on MRI at [facility] 4/2012.  Pt was noted to have no residual deficits at [facility].       Hyperlipidemia with target low density lipoprotein (LDL) cholesterol less than 70 mg/dL 01/30/2012     Priority: Medium      Moderate persistent asthma 09/12/2011     Priority: Medium     Smoker 02/03/2008     Priority: Medium     Drug dependence (H) 02/15/2006     Priority: Medium     Formatting of this note might be different from the original.  Overview:   Epic       Essential hypertension, benign 02/15/2006     Priority: Medium     Formatting of this note might be different from the original.  Overview:   Epic       Medical History  Past Medical History:   Diagnosis Date     Asthma      CAD (coronary artery disease)      COPD (chronic obstructive pulmonary disease) (H)      CVA (cerebral infarction)      Diabetes (H)      GERD (gastroesophageal reflux disease)      Hypertension      Polysubstance abuse (H)      S/P CABG (coronary artery bypass graft)      S/P lumbar discectomy 06/13/2019    L5/S1 by  Dr. Hamm at [facility]     Schizoaffective disorder (H)         SUBJECTIVE     No acute events overnight.  Patient received oxycodone for pain.    Kristin says her pain has been under pretty good control.  No problems with the amitriptyline.     OBJECTIVE     Vital signs in last 24 hours  Temp:  [97.4  F (36.3  C)-98.3  F (36.8  C)] 98  F (36.7  C)  Pulse:  [63-79] 64  Resp:  [19-47] 20  BP: (138-228)/() 147/73  SpO2:  [92 %-100 %] 98 %    Weight:   [unfilled]    Review of Systems   Pertinent items are noted in HPI.    General Physical Exam: Patient is alert and oriented x 3. Vital signs were reviewed and are documented in EMR. Neck was supple.  No carotid bruit, thyromegaly, JVD or lymphadenopathy noted.  Neurological Exam:  Mental status: Alert, attentive, interactive.  Speech: Fluent.  Cranial nerves: 2-12 intact, except the patient says the right cheek feels differently than the left to touch.  Motor: Still somewhat limited effort with testing of both the right arm and right leg, however improved.  Left-sided strength appears normal.  Reflexes: Intact and symmetric.  Sensory: She reports decreased sensation throughout  the right side to pinprick compared to the left.  Coordination: Normal fine motor movements of the hands, no dysmetria.  Gait: Deferred for safety.     DIAGNOSTIC STUDIES     Pertinent Radiology   Radiology Results: Personally reviewed image/s    CT Cervical Spine (1.2.19):  CERVICAL SPINE CT:  1.  No CT evidence for acute fracture or post traumatic subluxation.    Pertinent Labs   Lab Results: personally reviewed.   Recent Results (from the past 24 hour(s))   Glucose by meter    Collection Time: 07/18/22  9:40 AM   Result Value Ref Range    GLUCOSE BY METER POCT 88 70 - 99 mg/dL   Glucose by meter    Collection Time: 07/18/22 12:18 PM   Result Value Ref Range    GLUCOSE BY METER POCT 144 (H) 70 - 99 mg/dL   Glucose by meter    Collection Time: 07/18/22  6:16 PM   Result Value Ref Range    GLUCOSE BY METER POCT 176 (H) 70 - 99 mg/dL   Glucose by meter    Collection Time: 07/18/22  9:44 PM   Result Value Ref Range    GLUCOSE BY METER POCT 208 (H) 70 - 99 mg/dL   Glucose by meter    Collection Time: 07/19/22  2:27 AM   Result Value Ref Range    GLUCOSE BY METER POCT 130 (H) 70 - 99 mg/dL         HOSPITAL MEDICATIONS       amitriptyline  25 mg Oral At Bedtime     aspirin  325 mg Oral Daily     carvedilol  12.5 mg Oral BID w/meals     cetirizine  10 mg Oral Daily     docusate sodium  100 mg Oral BID     DULoxetine  30 mg Oral BID     fluticasone  1 spray Both Nostrils Daily     fluticasone-vilanterol  1 puff Inhalation Daily     insulin aspart  1-7 Units Subcutaneous TID AC     insulin glargine  20 Units Subcutaneous At Bedtime     losartan  100 mg Oral Daily     methocarbamol  500 mg Oral 4x Daily     montelukast  10 mg Oral At Bedtime     pantoprazole  40 mg Oral QAM AC     rosuvastatin  20 mg Oral Daily        Total time spent for face to face visit, reviewing labs/imaging studies, counseling and coordination of care was: 30 Minutes. More than 50% of this time was spent on counseling and coordination of  care.    Dragon software used in the dictation on this note.    Avril Son MD  [facility] [address], [address]  [place] [address]

## 2022-07-19 NOTE — PLAN OF CARE
Pt  asked for Oxycodone 10 mg.  She is snoring, sleeping upon arrival to room with medication.  She reports pain is 9/10.

## 2022-07-19 NOTE — PROGRESS NOTES
"Hospitalist Progress Note        CODE STATUS:  Full Code    Assessment/Plan:  Sarah Rahman is a 56 year old female admitted on 7/17/2022. She has history of coronary artery disease status post CABG, previous stroke, asthma, GERD, hypertension, schizoaffective disorder, substance abuse presents for evaluation of right-sided weakness and numbness     Right-sided weakness and numbness  Falling frequently  - Unclear etiology of weakness but improving. She does also report \"burning, pins, needles, tingling\" pain of her both upper and lower extremities, worse in right which is suggestive of diabetic neuropathy.   - MRI showing small chronic lacunar infarctions in bilateral cerebellar hemispheres, mild to moderate chronic microvascular ischemic change. 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 No acute changes.   - Appreciate neurology consult  - Continue ASA & Crestor  - EEG could not be done due to patient's hair extensions   - PT/OT : recommended for TCU. She was initially against this but now open.     Type II DM  Diabetic Neuropathy, likely  - Home regimen Trulicity, glipizide, Lantus 20 units nightly. Does not take metformin.   - Continue home Lantus & sliding scale     Chronic pain syndrome  - Just saw her pain clinic ([facility]) on 7/15  - She states gabapentin makes her legs swell. She is prescribed lyrica but does not take as she reports this also makes her leg swell.  - She does not feel percocet is helping her current \"burning, pins, needles, tingling\" pain   - Requesting IV morphine. She has received 3 doses of morphine 4 mg IV x 3. Will avoid further doses  - Started on amitriptyline which was recommended by neuro for her neuropathic pain  - Appreciate pain management consult  - Continue Tylenol, topicals PRN  - Continue decreased dose of robaxin (decreased due to falls)  - Continue home Cymbalta     HTN  - Resume home medications : Coreg 12.5 mg BID & Losartan " 100 mg daily     Hx CAD s/p CABG x2 in 2009  - Continue home aspirin, statin     Asthma  - Continue Home albuterol, Symbicort, DuoNeb     GERD  - Continue PPI     Disposition: TCU recommended  Barrier to discharge: Neuro eval, Placement     LOS: 0 days     Subjective:  Interval History:   Patient seen and examined.    Nursing notes indicate patient has been refusing cares.   Seen with RN in note this AM.   Patient cooperative this morning.   Reports she is still feeling burning pains.  Feels weakness is improved.  Afebrile. No CP or SOB.      Review of Systems:   As mentioned in subjective.    Patient Active Problem List   Diagnosis     Coronary atherosclerosis     Type 2 diabetes mellitus with diabetic polyneuropathy, with long-term current use of insulin (H)     Schizoaffective disorder (H)     Health Care Home     Bilateral low back pain with right-sided sciatica, unspecified chronicity     Diabetic polyneuropathy associated with type 2 diabetes mellitus (H)     Nasal polyp     Cerebrovascular accident (CVA) due to stenosis of carotid artery (H)     Chronic obstructive pulmonary disease (H)     Chronic schizoaffective disorder (H)     ACP (advance care planning)     Acute recurrent maxillary sinusitis     Anemia     Anxiety as acute reaction to gross stress     Carotid stenosis, left     Depression with anxiety     Drug dependence (H)     Dyshidrosis (pompholyx)     Dyslipidemia     GERD (gastroesophageal reflux disease)     Chest pain     History of CVA (cerebrovascular accident)     History of drug abuse (H)     Hx of syphilis     Hyperlipidemia with target low density lipoprotein (LDL) cholesterol less than 70 mg/dL     Essential hypertension, benign     Intercostal neuritis     Leg swelling     Lumbar disc herniation     Menopausal flushing     Moderate persistent asthma     Mood disorder due to medical condition     Myalgia     Myelomalacia of cervical cord (H)     Nephrolithiasis     Noncompliance      Obesity     Post-COVID syndrome     Pulmonary nodule     Sacroiliac joint disease     Seasonal allergies     Sensorineural hearing loss (SNHL) of right ear     Sleep difficulties     Smoker     Syncope and collapse     Systolic and diastolic CHF, chronic (H)     Uncontrolled type 2 diabetes mellitus with hyperglycemia (H)     Weakness     Right sided weakness     Chronic pain syndrome     Asthma in adult, unspecified asthma severity, uncomplicated       Scheduled Meds:    amitriptyline  25 mg Oral At Bedtime     aspirin  325 mg Oral Daily     carvedilol  12.5 mg Oral BID w/meals     cetirizine  10 mg Oral Daily     docusate sodium  100 mg Oral BID     DULoxetine  30 mg Oral BID     fluticasone  1 spray Both Nostrils Daily     fluticasone-vilanterol  1 puff Inhalation Daily     insulin aspart  1-7 Units Subcutaneous TID AC     insulin glargine  20 Units Subcutaneous At Bedtime     losartan  100 mg Oral Daily     methocarbamol  500 mg Oral 4x Daily     montelukast  10 mg Oral At Bedtime     pantoprazole  40 mg Oral QAM AC     rosuvastatin  20 mg Oral Daily     Continuous Infusions:  PRN Meds:.acetaminophen **OR** acetaminophen, albuterol, bisacodyl, COMPOUND CONTAINING CONTROLLED SUBSTANCE, glucose **OR** dextrose **OR** glucagon, diclofenac, hydrALAZINE, ipratropium - albuterol 0.5 mg/2.5 mg/3 mL, melatonin, ondansetron **OR** ondansetron, oxyCODONE, polyethylene glycol, senna-docusate **OR** senna-docusate    Objective:  Vital signs in last 24 hours:  Temp:  [97.4  F (36.3  C)-98.3  F (36.8  C)] 98  F (36.7  C)  Pulse:  [63-79] 64  Resp:  [15-47] 20  BP: (138-228)/() 147/73  SpO2:  [92 %-100 %] 98 %        Intake/Output Summary (Last 24 hours) at 7/18/2022 0953  Last data filed at 7/17/2022 1615  Gross per 24 hour   Intake 500 ml   Output --   Net 500 ml       Physical Exam:  General: Well appearing, in NAD.  HEENT: NCAT, EOMI, MMM  CV: Normal S1S2, regular rhythm, normal rate  Lungs: CTAB  Abdomen: Soft, NT,  ND, +BS  Extremities: No LE edema b/l  Skin: No rashes on exposed skin  Neuro: AAOx3    Lab Results:    Recent Results (from the past 24 hour(s))   Glucose by meter    Collection Time: 07/18/22  8:11 AM   Result Value Ref Range    GLUCOSE BY METER POCT 108 (H) 70 - 99 mg/dL   Glucose by meter    Collection Time: 07/18/22  9:40 AM   Result Value Ref Range    GLUCOSE BY METER POCT 88 70 - 99 mg/dL   Glucose by meter    Collection Time: 07/18/22 12:18 PM   Result Value Ref Range    GLUCOSE BY METER POCT 144 (H) 70 - 99 mg/dL   Glucose by meter    Collection Time: 07/18/22  6:16 PM   Result Value Ref Range    GLUCOSE BY METER POCT 176 (H) 70 - 99 mg/dL   Glucose by meter    Collection Time: 07/18/22  9:44 PM   Result Value Ref Range    GLUCOSE BY METER POCT 208 (H) 70 - 99 mg/dL   Glucose by meter    Collection Time: 07/19/22  2:27 AM   Result Value Ref Range    GLUCOSE BY METER POCT 130 (H) 70 - 99 mg/dL       Serum Glucose range:   Recent Labs   Lab 07/19/22  0227 07/18/22  2144 07/18/22  1816 07/18/22  1218   * 208* 176* 144*     ABG: No lab results found in last 7 days.  CBC:   Recent Labs   Lab 07/17/22  1520   WBC 6.2   HGB 11.8   HCT 37.7   MCV 86      NEUTROPHIL 53   LYMPH 41   MONOCYTE 4   EOSINOPHIL 2     Chemistry:   Recent Labs   Lab 07/17/22  1520      POTASSIUM 3.9   CHLORIDE 107   CO2 25   BUN 19   CR 0.78   GFRESTIMATED 88   MAXIMO 9.2   MAG 2.0     Coags:  No results for input(s): INR, PROTIME, PTT in the last 168 hours.    Invalid input(s): APTT  Cardiac Markers:  Recent Labs   Lab 07/17/22  1520   TROPONINI <0.01          XR Chest 1 View    Result Date: 7/17/2022  EXAM: XR CHEST 1 VIEW LOCATION: [facility] DATE/TIME: 7/17/2022 4:07 PM INDICATION: chest pain COMPARISON: Chest radiograph 04/03/2022     IMPRESSION: Stable enlarged cardiomediastinal silhouette status post median sternotomy and CABG. Likely pulmonary vascular congestion without michele edema or focal  airspace disease. No pleural effusion or pneumothorax. No acute bony abnormality.    MRA Brain ([place]) wo Contrast    Result Date: 7/17/2022  [facility] 1. HEAD MRI WITHOUT AND WITH IV CONTRAST 2. HEAD MRA WITHOUT IV CONTRAST 3. NECK MRA WITHOUT AND WITH IV CONTRAST 7/17/2022 4:38 PM INDICATION: Right-sided weakness, dizziness, multiple recent falls. TECHNIQUE: 1. Head MRI without and with intravenous contrast. 2. 3D time-of-flight head MRA without intravenous contrast. 3. Neck MRA without and with intravenous contrast. CONTRAST: 9 ml Gadavist COMPARISON: 05/24/2022 FINDINGS: HEAD MRI: INTRACRANIAL CONTENTS: No evidence for acute or subacute infarction based on diffusion-weighted imaging small chronic lacunar infarctions in the bilateral cerebellar hemispheres. No mass, acute hemorrhage, or extra-axial fluid collections. Patchy nonspecific T2/FLAIR hyperintensities within the cerebral white matter and amelia most consistent with mild to moderate chronic microvascular ischemic change. Mild generalized cerebral atrophy. No hydrocephalus. Normal position of the cerebellar tonsils. No definite pathologic brain parenchymal or meningeal contrast enhancement, though evaluation is partially limited by motion artifact on postcontrast acquisitions. SELLA: No abnormality accounting for technique. OSSEOUS STRUCTURES/SOFT TISSUES: Normal marrow signal. The major intracranial vascular flow voids are maintained. ORBITS: No abnormality accounting for technique. SINUSES/MASTOIDS: Mild mucosal thickening scattered about the paranasal sinuses. Small mastoid effusions . HEAD MRA: ANTERIOR CIRCULATION: No high-grade stenosis, branch vessel occlusion, aneurysm, or high flow vascular malformation. Standard [place] anatomy. POSTERIOR CIRCULATION: Unchanged severe stenosis of the proximal/mid basilar artery, though with reconstitution of normal vascular caliber distally and maintained patency to the  vertebrobasilar junction. No other high-grade stenosis, branch vessel occlusion, aneurysm, or high flow vascular malformation. Mildly dominant right and smaller left vertebral artery contribute to a normal basilar artery. NECK MRA: RIGHT CAROTID: Mild atherosclerotic irregularity at the origin of the right internal carotid artery, though without measurable stenosis based on NASCET criteria. LEFT CAROTID: Mild atherosclerotic irregularity at the origin of the left internal carotid artery, though without measurable stenosis based on NASCET criteria. VERTEBRAL ARTERIES: Mildly dominant right and smaller left vertebral arteries are patent in the neck and into the head. AORTIC ARCH: Classic aortic arch anatomy with no significant stenosis at the origin of the great vessels.     CONCLUSION: HEAD MRI: 1.  No acute/subacute infarction, intracranial hemorrhage, mass effect, hydrocephalus, or abnormal enhancement. 2.  Stable chronic ischemic and mild to moderate age-related changes. HEAD MRA: 1.  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 2.  No new high-grade stenosis, branch vessel occlusion, aneurysm, or high flow vascular malformation. NECK MRA: 1.  No significant stenosis in the neck vessels based on NASCET criteria. 2.  No evidence for dissection or pseudoaneurysm.    MRA Neck (Carotids) wo & w Contrast    Result Date: 7/17/2022  [facility] 1. HEAD MRI WITHOUT AND WITH IV CONTRAST 2. HEAD MRA WITHOUT IV CONTRAST 3. NECK MRA WITHOUT AND WITH IV CONTRAST 7/17/2022 4:38 PM INDICATION: Right-sided weakness, dizziness, multiple recent falls. TECHNIQUE: 1. Head MRI without and with intravenous contrast. 2. 3D time-of-flight head MRA without intravenous contrast. 3. Neck MRA without and with intravenous contrast. CONTRAST: 9 ml Gadavist COMPARISON: 05/24/2022 FINDINGS:  HEAD MRI: INTRACRANIAL CONTENTS: No evidence for acute or subacute infarction based on diffusion-weighted imaging small chronic lacunar infarctions in the bilateral cerebellar hemispheres. No mass, acute hemorrhage, or extra-axial fluid collections. Patchy nonspecific T2/FLAIR hyperintensities within the cerebral white matter and amelia most consistent with mild to moderate chronic microvascular ischemic change. Mild generalized cerebral atrophy. No hydrocephalus. Normal position of the cerebellar tonsils. No definite pathologic brain parenchymal or meningeal contrast enhancement, though evaluation is partially limited by motion artifact on postcontrast acquisitions. SELLA: No abnormality accounting for technique. OSSEOUS STRUCTURES/SOFT TISSUES: Normal marrow signal. The major intracranial vascular flow voids are maintained. ORBITS: No abnormality accounting for technique. SINUSES/MASTOIDS: Mild mucosal thickening scattered about the paranasal sinuses. Small mastoid effusions . HEAD MRA: ANTERIOR CIRCULATION: No high-grade stenosis, branch vessel occlusion, aneurysm, or high flow vascular malformation. Standard [place] anatomy. 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 No other high-grade stenosis, branch vessel occlusion, aneurysm, or high flow vascular malformation. Mildly dominant right and smaller left vertebral artery contribute to a normal basilar artery. NECK MRA: RIGHT CAROTID: Mild atherosclerotic irregularity at the origin of the right internal carotid artery, though without measurable stenosis based on NASCET criteria. LEFT CAROTID: Mild atherosclerotic irregularity at the origin of the left internal carotid artery, though without measurable stenosis based on NASCET criteria. VERTEBRAL ARTERIES: Mildly dominant right and smaller left  vertebral arteries are patent in the neck and into the head. AORTIC ARCH: Classic aortic arch anatomy with no significant stenosis at the origin of the great vessels.     CONCLUSION: HEAD MRI: 1.  No acute/subacute infarction, intracranial hemorrhage, mass effect, hydrocephalus, or abnormal enhancement. 2.  Stable chronic ischemic and mild to moderate age-related changes. HEAD MRA: 1.  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 2.  No new high-grade stenosis, branch vessel occlusion, aneurysm, or high flow vascular malformation. NECK MRA: 1.  No significant stenosis in the neck vessels based on NASCET criteria. 2.  No evidence for dissection or pseudoaneurysm.    MR Brain w/o & w Contrast    Result Date: 7/17/2022  [facility] 1. HEAD MRI WITHOUT AND WITH IV CONTRAST 2. HEAD MRA WITHOUT IV CONTRAST 3. NECK MRA WITHOUT AND WITH IV CONTRAST 7/17/2022 4:38 PM INDICATION: Right-sided weakness, dizziness, multiple recent falls. TECHNIQUE: 1. Head MRI without and with intravenous contrast. 2. 3D time-of-flight head MRA without intravenous contrast. 3. Neck MRA without and with intravenous contrast. CONTRAST: 9 ml Gadavist COMPARISON: 05/24/2022 FINDINGS: HEAD MRI: INTRACRANIAL CONTENTS: No evidence for acute or subacute infarction based on diffusion-weighted imaging small chronic lacunar infarctions in the bilateral cerebellar hemispheres. No mass, acute hemorrhage, or extra-axial fluid collections. Patchy nonspecific T2/FLAIR hyperintensities within the cerebral white matter and amelia most consistent with mild to moderate chronic microvascular ischemic change. Mild generalized cerebral atrophy. No hydrocephalus. Normal position of the cerebellar tonsils. No definite pathologic brain parenchymal or meningeal contrast enhancement, though evaluation is partially limited  by motion artifact on postcontrast acquisitions. SELLA: No abnormality accounting for technique. OSSEOUS STRUCTURES/SOFT TISSUES: Normal marrow signal. The major intracranial vascular flow voids are maintained. ORBITS: No abnormality accounting for technique. SINUSES/MASTOIDS: Mild mucosal thickening scattered about the paranasal sinuses. Small mastoid effusions . HEAD MRA: ANTERIOR CIRCULATION: No high-grade stenosis, branch vessel occlusion, aneurysm, or high flow vascular malformation. Standard [place] anatomy. 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 No other high-grade stenosis, branch vessel occlusion, aneurysm, or high flow vascular malformation. Mildly dominant right and smaller left vertebral artery contribute to a normal basilar artery. NECK MRA: RIGHT CAROTID: Mild atherosclerotic irregularity at the origin of the right internal carotid artery, though without measurable stenosis based on NASCET criteria. LEFT CAROTID: Mild atherosclerotic irregularity at the origin of the left internal carotid artery, though without measurable stenosis based on NASCET criteria. VERTEBRAL ARTERIES: Mildly dominant right and smaller left vertebral arteries are patent in the neck and into the head. AORTIC ARCH: Classic aortic arch anatomy with no significant stenosis at the origin of the great vessels.     CONCLUSION: HEAD MRI: 1.  No acute/subacute infarction, intracranial hemorrhage, mass effect, hydrocephalus, or abnormal enhancement. 2.  Stable chronic ischemic and mild to moderate age-related changes. HEAD MRA: 1.  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 2.  No new high-grade stenosis, branch vessel occlusion,  aneurysm, or high flow vascular malformation. NECK MRA: 1.  No significant stenosis in the neck vessels based on NASCET criteria. 2.  No evidence for dissection or pseudoaneurysm.          07/19/2022   Mahnaz Villegas MD  Hospitalist  Pager: [phone]

## 2022-07-19 NOTE — PLAN OF CARE
Goal Outcome Evaluation:         PRIMARY DIAGNOSIS: GENERALIZED WEAKNESS    OUTPATIENT/OBSERVATION GOALS TO BE MET BEFORE DISCHARGE  1. Orthostatic performed: No    2. Tolerating PO medications: Yes    3. Return to near baseline physical activity: No    4. Cleared for discharge by consultants (if involved): No    Discharge Planner Nurse   Safe discharge environment identified: No  Barriers to discharge: Yes- awaiting therapy evaluation       Entered by: Eliza Espana RN 07/19/2022 12:12 PM     Please review provider order for any additional goals.   Nurse to notify provider when observation goals have been met and patient is ready for discharge.

## 2022-07-19 NOTE — PLAN OF CARE
Problem: Plan of Care - These are the overarching goals to be used throughout the patient stay.    Goal: Absence of Hospital-Acquired Illness or Injury  Intervention: Identify and Manage Fall Risk  Recent Flowsheet Documentation  Taken 7/18/2022 2130 by Yahaira Olsen RN  Safety Promotion/Fall Prevention: activity supervised  Taken 7/18/2022 1800 by Yahaira Olsen RN  Safety Promotion/Fall Prevention: activity supervised  Intervention: Prevent and Manage VTE (Venous Thromboembolism) Risk  Recent Flowsheet Documentation  Taken 7/18/2022 2130 by Yahaira Olsen RN  VTE Prevention/Management: SCDs (sequential compression devices) off  Taken 7/18/2022 1800 by Yahaira Olsen RN  VTE Prevention/Management: SCDs (sequential compression devices) off  Goal: Optimal Comfort and Wellbeing  Intervention: Monitor Pain and Promote Comfort  Recent Flowsheet Documentation  Taken 7/18/2022 2130 by Yahaira Olsen RN  Pain Management Interventions: repositioned  Taken 7/18/2022 1800 by Yahaira Olsen RN  Pain Management Interventions: repositioned     Goal Outcome Evaluation: Pt continues to report pain in the right foot 8/10 but is up walking and moving in bed without problems.  Neuro checks intact, she does report weakness in the right extremeties.

## 2022-07-19 NOTE — PROGRESS NOTES
[facility] ACUTE PAIN SERVICE    Daily PAIN Progress Note    Assessment/Plan:    Sarah Rahman is a 56 year old female who was admitted on 7/17/2022.  Pain team was asked to see the patient for acute on chronic pain. Presented for evaluation of right-sided weakness and numbness. 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   Patient with history of chronic back pain, neuropathy pain. Follows with [facility] and has seen pain team in the past. Last seen by pain team 4/7/22. Last Lumbar MRI reviewed 4/3/22: MR lumbar spine with progressive worsening of her degenerative disc disease, but no signs of abscess, malignancy or other acute process.   Opioid Induced Respiratory Depression Risk Assessment:?high    3x 10mg oxycodone in the last 24 hours along with 1 x morphine IV dose (now discontinued)     PLAN:   1) Pain is consistent with  chronic lower extremity pain and acute right sided worsening of pain. Patient reports new pain of the RLE but patient is with known history of known peripheral neuropathy, chronic back pain with lumbar degenerative changes on imaging.  Labs and imaging indicated: I have personally reviewed pertinent labs, tests, and radiologic imaging in patient's chart. MRI showing small chronic lacunar infarctions in bilateral cerebellar hemispheres, mild to moderate chronic microvascular ischemic change. 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 Vessel imaging shows unchanged stenosis of the proximal/mid basilar artery with reconstitution distally. No acute changes. TIA possible? Atypical migraine? Consult Neurology. EEG pending.  EEG from 2018 was normal. Treatment plan includes multimodal pain approach, Hospital Medicine Service for medical management, Neurology,  Amitriptyline per Neurology. Patient educated regarding multimodal pain approach, medications as listed below. Patient reports minimal benefit with Lyrica and Gabapentin, also reports BLE edema with use of both. Reports little benefit with lidocaine, voltaren. Reports IV opioids given earlier were of most help at lowering pain. Discussed with patient that IV opioids and an increase in home opioids is not recommended for this pain. Patient is understanding of the plan. All questions and concerns addressed to patient's satisfaction.   2)Multimodal Medication Therapy  Topical: Ketamine 8%, Gabapentin 6% & Lidocaine 2.5% PLO ointment - was recently recommended this per [facility] vist 7/15/22. She has script at [facility] for this but reports she has not picked this up yet. Will order for inpatient use. Discontinue lidocaine patches and ointment as patient refused. Discussed and patient refused capsaicin ointment   NSAID'S: crcl 91, voltaren gel prn   Muscle Relaxants: robaxin 500 mg qid   Adjuvants: APAP prn   Antidepressants/anxiolytics: Amitriptyline 25 mg at bedtime added per Neruology, Cymbalta 30 mg bid  Opioids: Oxycodone 10 mg q6h prn (home med Percocet  mg q6h prn)  IV Pain medication: no further doses recommended    3)Non-medication interventions: rest, smoking cessation  Acupuncture consult - offered and declined  Integrative consult - offered and agreeable   4)Constipation Prophylaxis: Scheduled and prn     -Opioid prescriber has been [facility]   -[place]  pulled from system on 7/18/22. This indicates   7/5/22 Lyrica 150 mg #90 - same on 5/3/22  7/5/22 Percocet  mg #120 - same on 6/2/22, 5/3/22  Discharge Recommendations - We recommend prescribing the following at the time of discharge: home meds, topical PLO ointment ordered per [facility] at [facility].     Subjective:  She is eagerly awaiting PLO cream (coming from [facility]--I will call if this does not arrive by mid  afternoon)    She is pleasant today, does not ask for more opioids    Does not notice an effect of amitriptyline yet, but also no side effects. Discussed this can often take a few doses to see improvement in neuropathic pain.  Is eating breakfast, eating well, no nausea. Has not had BM x 4 days. Says this is 'sort of' normal for 4. Does have bowel regimen: will keep monitoring. Not feeling constipated 'yet' but feels bowels are 'full'.     In no acute distress, eating breakfast and chatting on facetime. I do not think pain management is a barrier to discharge here: we will try the cream when it comes from compounding pharmacy. Would recommend vascular consult as outpatient--I do not know if there is a vascular component to pain or how much is from that, would require work up and consult.    Active Problems:    Type 2 diabetes mellitus with diabetic polyneuropathy, with long-term current use of insulin (H)    History of CVA (cerebrovascular accident)    Essential hypertension, benign    Right sided weakness    Chronic pain syndrome    Asthma in adult, unspecified asthma severity, uncomplicated    Radha Muñoz, Linnea  Acute Care Pain Management Program  [facility] ([place], [place], [place])  Monday-Friday 8a-4p   Page via online paging system or call [phone]

## 2022-07-19 NOTE — PLAN OF CARE
"  Problem: Plan of Care - These are the overarching goals to be used throughout the patient stay.    Goal: Absence of Hospital-Acquired Illness or Injury  Intervention: Identify and Manage Fall Risk  Recent Flowsheet Documentation  Taken 7/19/2022 0845 by Eliza Espana, RN  Safety Promotion/Fall Prevention:   activity supervised   clutter free environment maintained     Problem: Plan of Care - These are the overarching goals to be used throughout the patient stay.    Goal: Absence of Hospital-Acquired Illness or Injury  Intervention: Prevent and Manage VTE (Venous Thromboembolism) Risk  Recent Flowsheet Documentation  Taken 7/19/2022 0845 by Eliza Espana, RN  Activity Management: ambulated to bathroom   Goal Outcome Evaluation:  Complain of 9/10 pain in lower extremities and back, administered 10 mg oral oxycodone, patient stated \"she was comfortable after taking oxy but was short lived\". Ambulates to bathroom voids without difficulty.                     "

## 2022-07-19 NOTE — PLAN OF CARE
PRIMARY DIAGNOSIS: GENERALIZED WEAKNESS    OUTPATIENT/OBSERVATION GOALS TO BE MET BEFORE DISCHARGE  1. Orthostatic performed: No    2. Tolerating PO medications: Yes    3. Return to near baseline physical activity: Yes    4. Cleared for discharge by consultants (if involved): No    Discharge Planner Nurse   Safe discharge environment identified: No  Barriers to discharge: Yes  PT/OT eval       Entered by: Veena Cevallos RN 07/19/2022 3:09 AM     Please review provider order for any additional goals.   Nurse to notify provider when observation goals have been met and patient is ready for discharge.Goal Outcome Evaluation:  Pt reluctant to let staff do assessments during the night.  Attempted cares and assessments when pt awake and up to bathroom.  Pt then agreeable.  Ambulates with walker with slightly unsteady gait.

## 2022-07-19 NOTE — PROGRESS NOTES
Care Management Follow Up    Length of Stay (days): 0    Expected Discharge Date: 07/20/2022     Concerns to be Addressed:       Patient plan of care discussed at interdisciplinary rounds: Yes    Anticipated Discharge Disposition:  TCU     Anticipated Discharge Services:  TCU  Anticipated Discharge DME:  TBD by treatment team    Patient/family educated on Medicare website which has current facility and service quality ratings:    Education Provided on the Discharge Plan:  yes  Patient/Family in Agreement with the Plan:  yes    Referrals Placed by CM/SW:  TCU list given  Private pay costs discussed: Not applicable    Additional Information:  YANIV LAUREN met with Pt in room.  Pt lives alone in an apartment and reports having limited support.  Pt has a PCA 6 hours a day with [facility] and reports having transportation upon discharge.      SW talked with Pt about discharge recommendation of TCU.  Pt reports she is open to it and was accepting of TCU list.  Pt agreeable to reviewing it and identifying some TCU's that SW can make referrals to.  Pt also agreeable to in-home care (home PT/OT, nursing) if that becomes an option'/recommendation.      YANIV thanked Pt for her time and will follow up tomorrow.    Care management following for progression and discharge planning.      HEIDY FuchsSW

## 2022-07-19 NOTE — UTILIZATION REVIEW
Concurrent stay review; Secondary Review Determination     Under the authority of the Utilization Management Committee, the utilization review process indicated a secondary review on Sarah Rahman.  The review outcome is based on review of the medical records, discussions with staff, and applying clinical experience noted on the date of the review.        (x) Observation Status Appropriate - Concurrent stay review    RATIONALE FOR DETERMINATION   56 yr old female presented 7/17/22.  Hx CAD, prior CVA, asthma, GERD, HTN, schizoaffective disorder and substance abuse.  She presented with right sided numbness and weakness with frequent falls.  Neurology work-up unremarkable.  Pain team has seen and recommended no further IV medications.  Working on placement.  Medically stable.      Patient is clinically improving and there is no clear indication to change patient's status to inpatient. The severity of illness, intensity of service provided, expected LOS and risk for adverse outcome make the care appropriate for observation.    The information on this document is developed by the utilization review team in order for the business office to ensure compliance.  This only denotes the appropriateness of proper admission status and does not reflect the quality of care rendered.         The definitions of Inpatient Status and Observation Status used in making the determination above are those provided in the CMS Coverage Manual, Chapter 1 and Chapter 6, section 70.4.      Sincerely,   Aspen Mendes MD  Utilization Review  Physician Advisor  [facility]

## 2022-07-19 NOTE — PLAN OF CARE
PRIMARY DIAGNOSIS: GENERALIZED WEAKNESS    OUTPATIENT/OBSERVATION GOALS TO BE MET BEFORE DISCHARGE  1. Orthostatic performed: No    2. Tolerating PO medications: Yes    3. Return to near baseline physical activity: Yes    4. Cleared for discharge by consultants (if involved): No    Discharge Planner Nurse   Safe discharge environment identified: No  Barriers to discharge: Yes       Entered by: Veena Cevallos RN 07/19/2022 5:05 AM     Please review provider order for any additional goals.   Nurse to notify provider when observation goals have been met and patient is ready for discharge.Goal Outcome Evaluation:    No acute events overnight.  Pt sleeping between cares.   Encouraged to use call light for assistance.  Oxycodone utilized for pain control.

## 2022-07-20 ENCOUNTER — APPOINTMENT (OUTPATIENT)
Dept: PHYSICAL THERAPY | Facility: HOSPITAL | Age: 57
End: 2022-07-20
Payer: COMMERCIAL

## 2022-07-20 LAB
GLUCOSE BLDC GLUCOMTR-MCNC: 160 MG/DL (ref 70–99)
GLUCOSE BLDC GLUCOMTR-MCNC: 181 MG/DL (ref 70–99)
GLUCOSE BLDC GLUCOMTR-MCNC: 200 MG/DL (ref 70–99)
GLUCOSE BLDC GLUCOMTR-MCNC: 205 MG/DL (ref 70–99)

## 2022-07-20 PROCEDURE — 99225 PR SUBSEQUENT OBSERVATION CARE,LEVEL II: CPT | Performed by: HOSPITALIST

## 2022-07-20 PROCEDURE — 250N000013 HC RX MED GY IP 250 OP 250 PS 637: Performed by: HOSPITALIST

## 2022-07-20 PROCEDURE — 99225 PR SUBSEQUENT OBSERVATION CARE,LEVEL II: CPT | Performed by: NURSE PRACTITIONER

## 2022-07-20 PROCEDURE — 250N000013 HC RX MED GY IP 250 OP 250 PS 637: Performed by: INTERNAL MEDICINE

## 2022-07-20 PROCEDURE — 82962 GLUCOSE BLOOD TEST: CPT

## 2022-07-20 PROCEDURE — 250N000013 HC RX MED GY IP 250 OP 250 PS 637: Performed by: PSYCHIATRY & NEUROLOGY

## 2022-07-20 PROCEDURE — 97530 THERAPEUTIC ACTIVITIES: CPT | Mod: GP

## 2022-07-20 PROCEDURE — 96372 THER/PROPH/DIAG INJ SC/IM: CPT

## 2022-07-20 PROCEDURE — G0378 HOSPITAL OBSERVATION PER HR: HCPCS

## 2022-07-20 PROCEDURE — 97116 GAIT TRAINING THERAPY: CPT | Mod: GP

## 2022-07-20 PROCEDURE — 250N000013 HC RX MED GY IP 250 OP 250 PS 637: Performed by: NURSE PRACTITIONER

## 2022-07-20 RX ADMIN — METHOCARBAMOL 500 MG: 500 TABLET ORAL at 08:00

## 2022-07-20 RX ADMIN — INSULIN ASPART 1 UNITS: 100 INJECTION, SOLUTION INTRAVENOUS; SUBCUTANEOUS at 09:02

## 2022-07-20 RX ADMIN — ASPIRIN 325 MG: 325 TABLET, COATED ORAL at 09:04

## 2022-07-20 RX ADMIN — POLYETHYLENE GLYCOL 3350 17 G: 17 POWDER, FOR SOLUTION ORAL at 09:04

## 2022-07-20 RX ADMIN — METHOCARBAMOL 500 MG: 500 TABLET ORAL at 20:38

## 2022-07-20 RX ADMIN — METHOCARBAMOL 500 MG: 500 TABLET ORAL at 16:12

## 2022-07-20 RX ADMIN — MONTELUKAST SODIUM 10 MG: 10 TABLET, FILM COATED ORAL at 20:37

## 2022-07-20 RX ADMIN — CARVEDILOL 12.5 MG: 12.5 TABLET, FILM COATED ORAL at 09:04

## 2022-07-20 RX ADMIN — DOCUSATE SODIUM 100 MG: 100 CAPSULE, LIQUID FILLED ORAL at 09:05

## 2022-07-20 RX ADMIN — DULOXETINE HYDROCHLORIDE 30 MG: 30 CAPSULE, DELAYED RELEASE ORAL at 09:17

## 2022-07-20 RX ADMIN — PANTOPRAZOLE SODIUM 40 MG: 40 TABLET, DELAYED RELEASE ORAL at 07:18

## 2022-07-20 RX ADMIN — DOCUSATE SODIUM 100 MG: 100 CAPSULE, LIQUID FILLED ORAL at 20:37

## 2022-07-20 RX ADMIN — ACETAMINOPHEN 650 MG: 325 TABLET ORAL at 20:38

## 2022-07-20 RX ADMIN — CARVEDILOL 12.5 MG: 12.5 TABLET, FILM COATED ORAL at 16:17

## 2022-07-20 RX ADMIN — INSULIN GLARGINE 20 UNITS: 100 INJECTION, SOLUTION SUBCUTANEOUS at 20:37

## 2022-07-20 RX ADMIN — CETIRIZINE HYDROCHLORIDE 10 MG: 10 TABLET ORAL at 09:05

## 2022-07-20 RX ADMIN — OXYCODONE HYDROCHLORIDE 10 MG: 5 TABLET ORAL at 22:53

## 2022-07-20 RX ADMIN — METHOCARBAMOL 500 MG: 500 TABLET ORAL at 12:18

## 2022-07-20 RX ADMIN — DULOXETINE HYDROCHLORIDE 30 MG: 30 CAPSULE, DELAYED RELEASE ORAL at 20:43

## 2022-07-20 RX ADMIN — LOSARTAN POTASSIUM 100 MG: 50 TABLET, FILM COATED ORAL at 09:04

## 2022-07-20 RX ADMIN — CETIRIZINE HYDROCHLORIDE 10 MG: 10 TABLET ORAL at 20:36

## 2022-07-20 RX ADMIN — METHOCARBAMOL 500 MG: 500 TABLET ORAL at 20:36

## 2022-07-20 RX ADMIN — OXYCODONE HYDROCHLORIDE 10 MG: 5 TABLET ORAL at 16:12

## 2022-07-20 RX ADMIN — OXYCODONE HYDROCHLORIDE 10 MG: 5 TABLET ORAL at 00:15

## 2022-07-20 RX ADMIN — FLUTICASONE FUROATE AND VILANTEROL TRIFENATATE 1 PUFF: 200; 25 POWDER RESPIRATORY (INHALATION) at 09:06

## 2022-07-20 RX ADMIN — OXYCODONE HYDROCHLORIDE 10 MG: 5 TABLET ORAL at 09:04

## 2022-07-20 RX ADMIN — INSULIN ASPART 2 UNITS: 100 INJECTION, SOLUTION INTRAVENOUS; SUBCUTANEOUS at 16:14

## 2022-07-20 RX ADMIN — AMITRIPTYLINE HYDROCHLORIDE 25 MG: 25 TABLET, FILM COATED ORAL at 20:38

## 2022-07-20 RX ADMIN — ACETAMINOPHEN 650 MG: 325 TABLET ORAL at 12:18

## 2022-07-20 RX ADMIN — INSULIN ASPART 1 UNITS: 100 INJECTION, SOLUTION INTRAVENOUS; SUBCUTANEOUS at 12:19

## 2022-07-20 RX ADMIN — ROSUVASTATIN CALCIUM 20 MG: 10 TABLET, FILM COATED ORAL at 09:04

## 2022-07-20 RX ADMIN — LIDOCAINE 2 PATCH: 246 PATCH TOPICAL at 20:35

## 2022-07-20 NOTE — PLAN OF CARE
PRIMARY DIAGNOSIS: GENERALIZED WEAKNESS    OUTPATIENT/OBSERVATION GOALS TO BE MET BEFORE DISCHARGE  1. Orthostatic performed: N/A    2. Tolerating PO medications: Yes    3. Return to near baseline physical activity: Yes    4. Cleared for discharge by consultants (if involved): Yes    Discharge Planner Nurse   Safe discharge environment identified: Yes  Barriers to discharge: No       Entered by: Veena Cevallos RN 07/20/2022 7:40 AM   No events overnight.  Slept the whole shift.  Please review provider order for any additional goals.   Nurse to notify provider when observation goals have been met and patient is ready for discharge.Goal Outcome Evaluation:

## 2022-07-20 NOTE — PLAN OF CARE
"PRIMARY DIAGNOSIS: \"GENERIC\" NURSING  OUTPATIENT/OBSERVATION GOALS TO BE MET BEFORE DISCHARGE:  ADLs back to baseline: No    Activity and level of assistance: Ambulating independently.    Pain status: Improved-controlled with oral pain medications.    Return to near baseline physical activity: Yes     Discharge Planner Nurse   Safe discharge environment identified: Yes  Barriers to discharge: Yes       Entered by: Ermelinda Charles RN 07/19/2022 8:48 PM     Please review provider order for any additional goals.   Nurse to notify provider when observation goals have been met and patient is ready for discharge.Goal Outcome Evaluation:                      "

## 2022-07-20 NOTE — PLAN OF CARE
PRIMARY DIAGNOSIS: GENERALIZED WEAKNESS    OUTPATIENT/OBSERVATION GOALS TO BE MET BEFORE DISCHARGE  1. Orthostatic performed: N/A    2. Tolerating PO medications: Yes    3. Return to near baseline physical activity: Yes    4. Cleared for discharge by consultants (if involved): Yes    Discharge Planner Nurse   Safe discharge environment identified: Yes  Barriers to discharge: No       Entered by: Veena Cevallos RN 07/20/2022 1:16 AM     Please review provider order for any additional goals.   Nurse to notify provider when observation goals have been met and patient is ready for discharge.Goal Outcome Evaluation:  Pt continues to c/o RLE pain from hip to foot.  Oxycodone given and ketamine cream applied.

## 2022-07-20 NOTE — PLAN OF CARE
"PRIMARY DIAGNOSIS: \"GENERIC\" NURSING  OUTPATIENT/OBSERVATION GOALS TO BE MET BEFORE DISCHARGE:  ADLs back to baseline: No    Activity and level of assistance: Ambulating independently.    Pain status: Improved-controlled with oral pain medications.    Return to near baseline physical activity: No     Discharge Planner Nurse   Safe discharge environment identified: Yes  Barriers to discharge: Yes       Entered by: Ermelinda Charles RN 07/20/2022 12:45 PM     Please review provider order for any additional goals.   Nurse to notify provider when observation goals have been met and patient is ready for discharge.Goal Outcome Evaluation:                      "

## 2022-07-20 NOTE — PLAN OF CARE
"PRIMARY DIAGNOSIS: \"GENERIC\" NURSING  OUTPATIENT/OBSERVATION GOALS TO BE MET BEFORE DISCHARGE:  ADLs back to baseline: No    Activity and level of assistance: Ambulating independently.    Pain status: Improved-controlled with oral pain medications.    Return to near baseline physical activity: No     Discharge Planner Nurse   Safe discharge environment identified: Yes  Barriers to discharge: Yes       Entered by: Ermelinda Charles RN 07/20/2022 9:46 AM     Please review provider order for any additional goals.   Nurse to notify provider when observation goals have been met and patient is ready for discharge.Goal Outcome Evaluation:                      "

## 2022-07-20 NOTE — PROGRESS NOTES
"Hospitalist Progress Note        CODE STATUS:  Full Code    Assessment/Plan:  Sarah Rahman is a 56 year old female admitted on 7/17/2022. She has history of coronary artery disease status post CABG, previous stroke, asthma, GERD, hypertension, schizoaffective disorder, substance abuse presents for evaluation of right-sided weakness and numbness     Right-sided weakness and numbness  Falling frequently  - Continues to express improvement in weakness.  \"burning, pins, needles, tingling\" pain of her both upper and lower extremities, worse in right which is suggestive of diabetic neuropathy. Stable.   - MRI showing small chronic lacunar infarctions in bilateral cerebellar hemispheres, mild to moderate chronic microvascular ischemic change. 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 No acute changes.   - Appreciate neurology consult  - Continue ASA & Crestor  - EEG could not be done due to patient's hair extensions   - PT/OT : recommended for TCU. She was initially against this but now open and SW aware.     Type II DM  Diabetic Neuropathy, likely  - HgbA1C 10%  - Home regimen Trulicity, glipizide, Lantus 20 units nightly. Does not take metformin.   - Continue home Lantus & sliding scale     Chronic pain syndrome  - Just saw her pain clinic ([facility]) on 7/15  - She states gabapentin makes her legs swell. She is prescribed lyrica but does not take as she reports this also makes her leg swell.  - She does not feel percocet is helping her current \"burning, pins, needles, tingling\" pain   - Requesting IV morphine. She has received 3 doses of morphine 4 mg IV x 3. Will avoid further doses  - Started on amitriptyline which was recommended by neuro for her neuropathic pain  - Appreciate pain management consult  - Continue Tylenol, topicals PRN  - Continue decreased dose of robaxin (decreased due to falls)  - Continue home Cymbalta     HTN  - Resume home medications : Coreg 12.5 " mg BID & Losartan 100 mg daily     Hx CAD s/p CABG x2 in 2009  - Continue home aspirin, statin     Asthma  - Continue Home albuterol, Symbicort, DuoNeb     GERD  - Continue PPI     Disposition: TCU recommended  Barrier to discharge: Neuro eval, Placement     LOS: 0 days     Subjective:  Interval History:   Patient seen and examined.    No events overnight.  Continues to feel weakness is improved.  Afebrile. No CP or SOB.      Review of Systems:   As mentioned in subjective.    Patient Active Problem List   Diagnosis     Coronary atherosclerosis     Type 2 diabetes mellitus with diabetic polyneuropathy, with long-term current use of insulin (H)     Schizoaffective disorder (H)     Health Care Home     Bilateral low back pain with right-sided sciatica, unspecified chronicity     Diabetic polyneuropathy associated with type 2 diabetes mellitus (H)     Nasal polyp     Cerebrovascular accident (CVA) due to stenosis of carotid artery (H)     Chronic obstructive pulmonary disease (H)     Chronic schizoaffective disorder (H)     ACP (advance care planning)     Acute recurrent maxillary sinusitis     Anemia     Anxiety as acute reaction to gross stress     Carotid stenosis, left     Depression with anxiety     Drug dependence (H)     Dyshidrosis (pompholyx)     Dyslipidemia     GERD (gastroesophageal reflux disease)     Chest pain     History of CVA (cerebrovascular accident)     History of drug abuse (H)     Hx of syphilis     Hyperlipidemia with target low density lipoprotein (LDL) cholesterol less than 70 mg/dL     Essential hypertension, benign     Intercostal neuritis     Leg swelling     Lumbar disc herniation     Menopausal flushing     Moderate persistent asthma     Mood disorder due to medical condition     Myalgia     Myelomalacia of cervical cord (H)     Nephrolithiasis     Noncompliance     Obesity     Post-COVID syndrome     Pulmonary nodule     Sacroiliac joint disease     Seasonal allergies     Sensorineural  hearing loss (SNHL) of right ear     Sleep difficulties     Smoker     Syncope and collapse     Systolic and diastolic CHF, chronic (H)     Uncontrolled type 2 diabetes mellitus with hyperglycemia (H)     Weakness     Right sided weakness     Chronic pain syndrome     Asthma in adult, unspecified asthma severity, uncomplicated       Scheduled Meds:    amitriptyline  25 mg Oral At Bedtime     aspirin  325 mg Oral Daily     carvedilol  12.5 mg Oral BID w/meals     cetirizine  10 mg Oral Daily     docusate sodium  100 mg Oral BID     DULoxetine  30 mg Oral BID     fluticasone  1 spray Both Nostrils Daily     fluticasone-vilanterol  1 puff Inhalation Daily     insulin aspart  1-7 Units Subcutaneous TID AC     insulin glargine  20 Units Subcutaneous At Bedtime     lidocaine  2 patch Transdermal Q24h    And     lidocaine   Transdermal Q8H TREVOR     losartan  100 mg Oral Daily     methocarbamol  500 mg Oral 4x Daily     montelukast  10 mg Oral At Bedtime     pantoprazole  40 mg Oral QAM AC     polyethylene glycol  17 g Oral Daily     rosuvastatin  20 mg Oral Daily     Continuous Infusions:  PRN Meds:.acetaminophen **OR** acetaminophen, albuterol, bisacodyl, COMPOUND CONTAINING CONTROLLED SUBSTANCE, glucose **OR** dextrose **OR** glucagon, diclofenac, hydrALAZINE, ipratropium - albuterol 0.5 mg/2.5 mg/3 mL, melatonin, ondansetron **OR** ondansetron, oxyCODONE, senna-docusate **OR** senna-docusate    Objective:  Vital signs in last 24 hours:  Temp:  [98  F (36.7  C)-98.6  F (37  C)] 98.3  F (36.8  C)  Pulse:  [62-65] 65  Resp:  [18-20] 18  BP: (124-148)/(52-77) 148/77  SpO2:  [98 %-100 %] 100 %        Intake/Output Summary (Last 24 hours) at 7/18/2022 0953  Last data filed at 7/17/2022 1615  Gross per 24 hour   Intake 500 ml   Output --   Net 500 ml       Physical Exam:  General: Well appearing, in NAD.  HEENT: NCAT, EOMI, MMM  CV: Normal S1S2, regular rhythm, normal rate  Lungs: CTAB  Abdomen: Soft, NT, ND, +BS  Extremities:  No LE edema b/l  Skin: No rashes on exposed skin  Neuro: AAOx3    Lab Results:    Recent Results (from the past 24 hour(s))   Glucose by meter    Collection Time: 07/19/22  8:38 AM   Result Value Ref Range    GLUCOSE BY METER POCT 92 70 - 99 mg/dL   Hemoglobin A1c    Collection Time: 07/19/22  3:31 PM   Result Value Ref Range    Hemoglobin A1C 10.1 (H) <=5.6 %   Extra Blue Top Tube    Collection Time: 07/19/22  3:31 PM   Result Value Ref Range    Hold Specimen JIC    Extra Red Top Tube    Collection Time: 07/19/22  3:31 PM   Result Value Ref Range    Hold Specimen JIC    Extra Green Top (Lithium Heparin) Tube    Collection Time: 07/19/22  3:31 PM   Result Value Ref Range    Hold Specimen JIC    Extra Purple Top Tube    Collection Time: 07/19/22  3:31 PM   Result Value Ref Range    Hold Specimen JIC    Glucose by meter    Collection Time: 07/19/22  6:04 PM   Result Value Ref Range    GLUCOSE BY METER POCT 118 (H) 70 - 99 mg/dL   Glucose by meter    Collection Time: 07/19/22  8:24 PM   Result Value Ref Range    GLUCOSE BY METER POCT 185 (H) 70 - 99 mg/dL       Serum Glucose range:   Recent Labs   Lab 07/19/22  2024 07/19/22  1804 07/19/22  0838 07/19/22  0227   * 118* 92 130*     ABG: No lab results found in last 7 days.  CBC:   Recent Labs   Lab 07/17/22  1520   WBC 6.2   HGB 11.8   HCT 37.7   MCV 86      NEUTROPHIL 53   LYMPH 41   MONOCYTE 4   EOSINOPHIL 2     Chemistry:   Recent Labs   Lab 07/17/22  1520      POTASSIUM 3.9   CHLORIDE 107   CO2 25   BUN 19   CR 0.78   GFRESTIMATED 88   MAXMIO 9.2   MAG 2.0     Coags:  No results for input(s): INR, PROTIME, PTT in the last 168 hours.    Invalid input(s): APTT  Cardiac Markers:  Recent Labs   Lab 07/17/22  1520   TROPONINI <0.01          XR Chest 1 View    Result Date: 7/17/2022  EXAM: XR CHEST 1 VIEW LOCATION: [facility] DATE/TIME: 7/17/2022 4:07 PM INDICATION: chest pain COMPARISON: Chest radiograph 04/03/2022      IMPRESSION: Stable enlarged cardiomediastinal silhouette status post median sternotomy and CABG. Likely pulmonary vascular congestion without michele edema or focal airspace disease. No pleural effusion or pneumothorax. No acute bony abnormality.    MRA Brain ([place]) wo Contrast    Result Date: 7/17/2022  [facility] 1. HEAD MRI WITHOUT AND WITH IV CONTRAST 2. HEAD MRA WITHOUT IV CONTRAST 3. NECK MRA WITHOUT AND WITH IV CONTRAST 7/17/2022 4:38 PM INDICATION: Right-sided weakness, dizziness, multiple recent falls. TECHNIQUE: 1. Head MRI without and with intravenous contrast. 2. 3D time-of-flight head MRA without intravenous contrast. 3. Neck MRA without and with intravenous contrast. CONTRAST: 9 ml Gadavist COMPARISON: 05/24/2022 FINDINGS: HEAD MRI: INTRACRANIAL CONTENTS: No evidence for acute or subacute infarction based on diffusion-weighted imaging small chronic lacunar infarctions in the bilateral cerebellar hemispheres. No mass, acute hemorrhage, or extra-axial fluid collections. Patchy nonspecific T2/FLAIR hyperintensities within the cerebral white matter and amelia most consistent with mild to moderate chronic microvascular ischemic change. Mild generalized cerebral atrophy. No hydrocephalus. Normal position of the cerebellar tonsils. No definite pathologic brain parenchymal or meningeal contrast enhancement, though evaluation is partially limited by motion artifact on postcontrast acquisitions. SELLA: No abnormality accounting for technique. OSSEOUS STRUCTURES/SOFT TISSUES: Normal marrow signal. The major intracranial vascular flow voids are maintained. ORBITS: No abnormality accounting for technique. SINUSES/MASTOIDS: Mild mucosal thickening scattered about the paranasal sinuses. Small mastoid effusions . HEAD MRA: ANTERIOR CIRCULATION: No high-grade stenosis, branch vessel occlusion, aneurysm, or high flow vascular malformation. Standard [place] anatomy. POSTERIOR  CIRCULATION: Unchanged severe stenosis of the proximal/mid basilar artery, though with reconstitution of normal vascular caliber distally and maintained patency to the vertebrobasilar junction. No other high-grade stenosis, branch vessel occlusion, aneurysm, or high flow vascular malformation. Mildly dominant right and smaller left vertebral artery contribute to a normal basilar artery. NECK MRA: RIGHT CAROTID: Mild atherosclerotic irregularity at the origin of the right internal carotid artery, though without measurable stenosis based on NASCET criteria. LEFT CAROTID: Mild atherosclerotic irregularity at the origin of the left internal carotid artery, though without measurable stenosis based on NASCET criteria. VERTEBRAL ARTERIES: Mildly dominant right and smaller left vertebral arteries are patent in the neck and into the head. AORTIC ARCH: Classic aortic arch anatomy with no significant stenosis at the origin of the great vessels.     CONCLUSION: HEAD MRI: 1.  No acute/subacute infarction, intracranial hemorrhage, mass effect, hydrocephalus, or abnormal enhancement. 2.  Stable chronic ischemic and mild to moderate age-related changes. HEAD MRA: 1.  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 2.  No new high-grade stenosis, branch vessel occlusion, aneurysm, or high flow vascular malformation. NECK MRA: 1.  No significant stenosis in the neck vessels based on NASCET criteria. 2.  No evidence for dissection or pseudoaneurysm.    MRA Neck (Carotids) wo & w Contrast    Result Date: 7/17/2022  [facility] 1. HEAD MRI WITHOUT AND WITH IV CONTRAST 2. HEAD MRA WITHOUT IV CONTRAST 3. NECK MRA WITHOUT AND WITH IV CONTRAST 7/17/2022 4:38 PM INDICATION: Right-sided weakness, dizziness, multiple recent falls. TECHNIQUE: 1. Head MRI without and with intravenous contrast. 2.  3D time-of-flight head MRA without intravenous contrast. 3. Neck MRA without and with intravenous contrast. CONTRAST: 9 ml Gadavist COMPARISON: 05/24/2022 FINDINGS: HEAD MRI: INTRACRANIAL CONTENTS: No evidence for acute or subacute infarction based on diffusion-weighted imaging small chronic lacunar infarctions in the bilateral cerebellar hemispheres. No mass, acute hemorrhage, or extra-axial fluid collections. Patchy nonspecific T2/FLAIR hyperintensities within the cerebral white matter and amelia most consistent with mild to moderate chronic microvascular ischemic change. Mild generalized cerebral atrophy. No hydrocephalus. Normal position of the cerebellar tonsils. No definite pathologic brain parenchymal or meningeal contrast enhancement, though evaluation is partially limited by motion artifact on postcontrast acquisitions. SELLA: No abnormality accounting for technique. OSSEOUS STRUCTURES/SOFT TISSUES: Normal marrow signal. The major intracranial vascular flow voids are maintained. ORBITS: No abnormality accounting for technique. SINUSES/MASTOIDS: Mild mucosal thickening scattered about the paranasal sinuses. Small mastoid effusions . HEAD MRA: ANTERIOR CIRCULATION: No high-grade stenosis, branch vessel occlusion, aneurysm, or high flow vascular malformation. Standard [place] of [place] anatomy. 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 No other high-grade stenosis, branch vessel occlusion, aneurysm, or high flow vascular malformation. Mildly dominant right and smaller left vertebral artery contribute to a normal basilar artery. NECK MRA: RIGHT CAROTID: Mild atherosclerotic irregularity at the origin of the right internal carotid artery, though without measurable stenosis based on NASCET criteria. LEFT CAROTID: Mild atherosclerotic irregularity at the  origin of the left internal carotid artery, though without measurable stenosis based on NASCET criteria. VERTEBRAL ARTERIES: Mildly dominant right and smaller left vertebral arteries are patent in the neck and into the head. AORTIC ARCH: Classic aortic arch anatomy with no significant stenosis at the origin of the great vessels.     CONCLUSION: HEAD MRI: 1.  No acute/subacute infarction, intracranial hemorrhage, mass effect, hydrocephalus, or abnormal enhancement. 2.  Stable chronic ischemic and mild to moderate age-related changes. HEAD MRA: 1.  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 2.  No new high-grade stenosis, branch vessel occlusion, aneurysm, or high flow vascular malformation. NECK MRA: 1.  No significant stenosis in the neck vessels based on NASCET criteria. 2.  No evidence for dissection or pseudoaneurysm.    MR Brain w/o & w Contrast    Result Date: 7/17/2022  [facility] 1. HEAD MRI WITHOUT AND WITH IV CONTRAST 2. HEAD MRA WITHOUT IV CONTRAST 3. NECK MRA WITHOUT AND WITH IV CONTRAST 7/17/2022 4:38 PM INDICATION: Right-sided weakness, dizziness, multiple recent falls. TECHNIQUE: 1. Head MRI without and with intravenous contrast. 2. 3D time-of-flight head MRA without intravenous contrast. 3. Neck MRA without and with intravenous contrast. CONTRAST: 9 ml Gadavist COMPARISON: 05/24/2022 FINDINGS: HEAD MRI: INTRACRANIAL CONTENTS: No evidence for acute or subacute infarction based on diffusion-weighted imaging small chronic lacunar infarctions in the bilateral cerebellar hemispheres. No mass, acute hemorrhage, or extra-axial fluid collections. Patchy nonspecific T2/FLAIR hyperintensities within the cerebral white matter and amelia most consistent with mild to moderate chronic microvascular ischemic change. Mild generalized cerebral atrophy. No  hydrocephalus. Normal position of the cerebellar tonsils. No definite pathologic brain parenchymal or meningeal contrast enhancement, though evaluation is partially limited by motion artifact on postcontrast acquisitions. SELLA: No abnormality accounting for technique. OSSEOUS STRUCTURES/SOFT TISSUES: Normal marrow signal. The major intracranial vascular flow voids are maintained. ORBITS: No abnormality accounting for technique. SINUSES/MASTOIDS: Mild mucosal thickening scattered about the paranasal sinuses. Small mastoid effusions . HEAD MRA: ANTERIOR CIRCULATION: No high-grade stenosis, branch vessel occlusion, aneurysm, or high flow vascular malformation. Standard [place] anatomy. 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 No other high-grade stenosis, branch vessel occlusion, aneurysm, or high flow vascular malformation. Mildly dominant right and smaller left vertebral artery contribute to a normal basilar artery. NECK MRA: RIGHT CAROTID: Mild atherosclerotic irregularity at the origin of the right internal carotid artery, though without measurable stenosis based on NASCET criteria. LEFT CAROTID: Mild atherosclerotic irregularity at the origin of the left internal carotid artery, though without measurable stenosis based on NASCET criteria. VERTEBRAL ARTERIES: Mildly dominant right and smaller left vertebral arteries are patent in the neck and into the head. AORTIC ARCH: Classic aortic arch anatomy with no significant stenosis at the origin of the great vessels.     CONCLUSION: HEAD MRI: 1.  No acute/subacute infarction, intracranial hemorrhage, mass effect, hydrocephalus, or abnormal enhancement. 2.  Stable chronic ischemic and mild to moderate age-related changes. HEAD MRA: 1.  Unchanged severe stenosis of the proximal/mid basilar artery,  though with reconstitution of normal vascular caliber distally and maintained patency to the vertebrobasilar junction. 2.  No new high-grade stenosis, branch vessel occlusion, aneurysm, or high flow vascular malformation. NECK MRA: 1.  No significant stenosis in the neck vessels based on NASCET criteria. 2.  No evidence for dissection or pseudoaneurysm.          07/20/2022   Mahnaz Villegas MD  Hospitalist  Pager: [phone]

## 2022-07-20 NOTE — PLAN OF CARE
"Goal Outcome Evaluation:                    PRIMARY DIAGNOSIS: \"GENERIC\" NURSING  OUTPATIENT/OBSERVATION GOALS TO BE MET BEFORE DISCHARGE:  ADLs back to baseline: No    Activity and level of assistance: Ambulating independently.    Pain status: Improved-controlled with oral pain medications.    Return to near baseline physical activity: No     Discharge Planner Nurse   Safe discharge environment identified: Yes  Barriers to discharge: Yes       Entered by: Ermelinda Charles RN 07/20/2022 6:10 PM     Please review provider order for any additional goals.   Nurse to notify provider when observation goals have been met and patient is ready for discharge.  "

## 2022-07-20 NOTE — PROGRESS NOTES
Care Management Follow Up    Length of Stay (days): 0    Expected Discharge Date: 07/21/2022     Concerns to be Addressed: discharge planning      Patient plan of care discussed at interdisciplinary rounds: Yes    Anticipated Discharge Disposition:  TCU vs. Home w/home care     Anticipated Discharge Services: TCU vs. Home PT, OT, skilled nursing  Anticipated Discharge DME:  TBD     Patient/family educated on Medicare website which has current facility and service quality ratings:    Education Provided on the Discharge Plan:  YES  Patient/Family in Agreement with the Plan:  Other (see comments)    Referrals Placed by CM/SW:  Home care referrals sent to:  [facility], [facility], [facility]), [facility], and [facility].    Private pay costs discussed: Not applicable    Additional Information:    YANIV LAUREN received call from Jhonatan Alvarez, mental health  with [facility] ([facility]).  YANIV requested REGINALD to continue speaking with Jhonatan and asked for it to be faxed to CM office.  Jhonatan provided call back number of [phone].      Fax received and placed in Pt's chart.  YANIV returned call to Jhonatan.  Jhonatan has worked with Pt for about 3 years.  Pt has diagnosis of Schizoaffective disorder, PTSD, depression.  Pt mistrustful of hospital staff; had situation at [facility] a few years ago where she was given an injectable medication involuntarily (unknown if Florian or not) and Pt had bad reaction which may have triggered PTSD per Jhonatan.  Per Jhonatan, Pt has a topical cream prescribed while inpatient at [facility] that is not covered by insurance and would like SW/CM assistance in helping Pt appeal insurance.  YANIV explained she was unsure whether this is something she can assist with and would need to look into this more.  Jhonatan stated that [facility]  looked into cost and they cannot assist.     Jhonatan also stated that Pt needs a new diagnostic assessment to continue her case management  "services with [facility].  YANIV explained that Pt can have a diagnostic assessment (DA); Jhonatan can have staff or come in and do this with Pt.  Jhonatan stated he and [facility] not able to come in hospital to do this and inquired if hospital staff can do DA.  YANIV stated that she can inquire.  Jhonatan asked if psychiatry can do DA.  YANIV explained psychiatry can do psychiatric consult, but not DA as they are billed differently.      Jhonatan asked that nursing be provided his contact info and reach out to discuss Pt's care.      YANIV informed several times in afternoon that Pt was demanding to see SWCM immediately by nursing staff.  SW let nursing staff know that SW would talk to Pt as soon as SW was able.  SW informed this was in regards to a medication.  YANIV spoke with bedside RN and also spoke with  Care Management Manager Christine Hannah.  SW instructed that we do not appeal insurance on behalf of patients and instructed to call pharmacy liaison.      SW spoke with pharmacy liaison and then pain medicine provider.  After speaking with both, YANIV met with Pt in Pt's room.      Pt verbalized frustration that SW was \"just now\" getting to her, and was speaking rapidly at SW.  Once done, SW validated Pt's feelings and explained that SW wanted to ensure that SW had answers before coming in to see Pt as well as urgent matters that were being addressed earlier and Pt was not being ignored.    Pt angry that mental health  contacted SW and stated she did not sign REGINALD.  SW offered to provide Pt copy of REGINALD; Pt accepted and stated it was not her signature.  She called  while SW in room and left a voice mail that she did not give consent for him to call SW and was calling his supervisor.  Pt proceeded to share she was firing her .  SW talked with Pt about concerns; Pt stated she is aware her medication is not covered by insurance.  Pt stated she was not agreeable to TCU but agreeable to home care.  SW stated that was Pt's " decision and that SW wanted to ensure a safe discharge plan.  Pt has a PCA, and Pt was talking about son and daughter in law (not previously mentioned to SW).  Pt did not provide names of those individuals.      Pt relaxed and was able to express desire to return home and that she will be able to have needs met upon return home.  SW let Pt know she would put home care referrals in, that this SW was not in tomorrow but that Care Management was here and would work to help with discharge planning.  Pt was agreeable with that plan.    Care management following for progression and discharge planning.          Yoshi Flores, HEIDYSW

## 2022-07-20 NOTE — PROGRESS NOTES
[facility] ACUTE PAIN SERVICE    Daily PAIN Progress Note    Assessment/Plan:  Sarah Rahman is a 56 year old female who was admitted on 7/17/2022 for evaluation of right-sided weakness and numbness. Pain team was asked to see the patient for acute on chronic pain. 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 The patient does smoke (reported that she was thinking about quitting) and she denies chemical dependency history (although malingering and cocaine abuse noted in her chart for >10 years, also has had positive cannabinoids in urine).     Of note:  Patient does have a history of known peripheral neuropathy and chronic low back pain with lumbar degenerative changes noted on imaging. Follows with [facility] and has seen pain team in the past. Last seen by pain team 4/7/22. 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 Vessel imaging shows unchanged stenosis of the proximal/mid basilar artery with reconstitution distally. Patient has tried gabapentin and lyrica in the past without noted improvement, so neurology started Amitriptyline at HS on this admission.    In the last 24 hours, patient has utilized four doses of 10 mg PO oxycodone for an MME 60 mg (yesterday MME was 60 mg).    Opioid Induced Respiratory Depression Risk Assessment: moderate r/t COPD, obesity, and concomitant CNS depressants. ?    PLAN:   1) Pain is consistent with chronic right lower extremity pain with acute right sided worsening of neuropathic pain with associated weakness likely residual from previous stroke. Patient does have a history of known peripheral neuropathy and chronic low  back pain with lumbar degenerative changes noted on imaging. Labs and imaging indicated: I have personally reviewed pertinent labs, tests, and radiologic imaging in patient's chart. Treatment plan includes: multimodal pain approach, Hospital Medicine Service for medical management, neurology, amitriptyline per neurology, and PT/OT. Patient educated regarding: multimodal pain approach, medications as listed below, educated on tapering off as pain improves, watch for constipation and stool softeners. Patient is understanding of the plan. All questions and concerns addressed to patient's satisfaction.   2) Multimodal Medication Therapy  Topical: PLO gel (lidocaine, ketamine, gabapentin) BID PRN, lidocaine 4% patch x2, 2g voltaren 1% gel TID  NSAID'S: none, CrCl: 94.7ml/min  Muscle Relaxants: 500mg Robaxin QID  Adjuvants: 650mg tylenol Q6H PRN,   Antidepressants/anxiolytics: 25mg Amitriptyline at bedtime added per Neruology, 30mg Cymbalta BID  Opioids: 10mg Oxycodone Q6H PRN (PTA percocet 10-325mg Q6H PRN)  IV Pain medication: none  3) Non-medication interventions: ice, heat, OT, PT  Acupuncture consult - offered and declined  Integrative consult - offered and agreeable  4) Constipation Prophylaxis: Scheduled and PRN ordered  5) Discharge Recommendations: Continue to follow up with pain clinic. We recommend prescribing the following at the time of discharge: home meds, continue adjuvants/topicals that patient finds useful for pain management. Topical PLO ointment was ordered per [facility] and is available at [facility] for patient to pick-up; patient aware.    - Opioid prescriber has been [facility]  - [place]  pulled from system on 7/18/22. This indicates:   - 7/5/22 Lyrica 150 mg #90 - same on 5/3/22   - 7/5/22 Percocet  mg #120 - same on 6/2/22, 5/3/22    Subjective:  Patient lying in bed in no apparent distress. Patient reported PLO cream started the previous day helped dull the right foot pain.  "Patient rated pain on as 5/10 after intervention, but reported that this increases to a 10/10 as the cream and medications wear off. Patient expressed fear of falling due to weakness in RLE. Encouraged patient to continue using PLO cream and particpating with PT/OT for rehab and strengthing of her RLE. Also encouraged her to continue to follow up with her pain center.     Active Problems:    Type 2 diabetes mellitus with diabetic polyneuropathy, with long-term current use of insulin (H)    History of CVA (cerebrovascular accident)    Essential hypertension, benign    Right sided weakness    Chronic pain syndrome    Asthma in adult, unspecified asthma severity, uncomplicated      Objective:  Vital signs in last 24 hours:  /82 (BP Location: Right arm)   Pulse 60   Temp 98.1  F (36.7  C) (Oral)   Resp 20   Ht 1.702 m (5' 7\")   Wt 96.2 kg (212 lb)   LMP  (LMP Unknown)   SpO2 98%   BMI 33.20 kg/m    Weight:   Vitals:    07/17/22 1542 07/18/22 1745   Weight: 88.5 kg (195 lb) 96.2 kg (212 lb)      Weight change:   Body mass index is 33.2 kg/m .    Intake/Output last 3 shifts:  I/O last 3 completed shifts:  In: 980 [P.O.:980]  Out: -   Intake/Output this shift:  No intake/output data recorded.    Review of Systems:   As per subjective, all others negative.    Physical Exam:  General Appearance:  Alert, cooperative, no distress   Head:  Normocephalic, without obvious abnormality   Eyes:  PERRL, conjunctiva/corneas clear, EOM's intact   Nose: Nares normal, septum midline   Throat: Lips, mucosa, and tongue normal; teeth and gums normal   Neck: Supple, symmetrical, trachea midline   Back:   Symmetric, no curvature, ROM normal   Lungs:   Room air, respirations unlabored   Chest Wall:  No tenderness or deformity   Abdomen:   Soft, non-tender, bowel sounds active all four quadrants   Extremities: Extremities normal, atraumatic   Skin: Skin intact, warm, and dry.   Neurologic: Alert and oriented X 3, Moves all 4 " extremities     Imaging: Reviewed I have personally reviewed pertinent labs, tests, and radiologic imaging in patient's chart.      Labs: Reviewed I have personally reviewed pertinent labs, tests, and radiologic imaging in patient's chart.  Recent Results (from the past 24 hour(s))   Hemoglobin A1c    Collection Time: 07/19/22  3:31 PM   Result Value Ref Range    Hemoglobin A1C 10.1 (H) <=5.6 %   Extra Blue Top Tube    Collection Time: 07/19/22  3:31 PM   Result Value Ref Range    Hold Specimen JIC    Extra Red Top Tube    Collection Time: 07/19/22  3:31 PM   Result Value Ref Range    Hold Specimen JIC    Extra Green Top (Lithium Heparin) Tube    Collection Time: 07/19/22  3:31 PM   Result Value Ref Range    Hold Specimen JIC    Extra Purple Top Tube    Collection Time: 07/19/22  3:31 PM   Result Value Ref Range    Hold Specimen JIC    Glucose by meter    Collection Time: 07/19/22  6:04 PM   Result Value Ref Range    GLUCOSE BY METER POCT 118 (H) 70 - 99 mg/dL   Glucose by meter    Collection Time: 07/19/22  8:24 PM   Result Value Ref Range    GLUCOSE BY METER POCT 185 (H) 70 - 99 mg/dL   Glucose by meter    Collection Time: 07/20/22  8:45 AM   Result Value Ref Range    GLUCOSE BY METER POCT 160 (H) 70 - 99 mg/dL       Total time spent 26 minutes with greater than 50% in consultation, education and coordination of care.     Also discussed with pharmacist.       I was present with Natividad Byrd the [employer]ARC[employer] student who participated in the service and in the documentation of the note. I have verified the history and personally performed the physical exam and medical decision making. I agree with the assessment and plan of care as documented in the note.    LEV Cancino, DNP Student   7/20/2022       Nathaly BREWSTER, FNP-C  Acute Care Pain Management Program  [facility] ([place], [place], [place])  Monday-Friday 8a-4p   Page via online paging system or call [phone]

## 2022-07-20 NOTE — PLAN OF CARE
"PRIMARY DIAGNOSIS: \"GENERIC\" NURSING  OUTPATIENT/OBSERVATION GOALS TO BE MET BEFORE DISCHARGE:  ADLs back to baseline: No    Activity and level of assistance: Ambulating independently.    Pain status: Improved-controlled with oral pain medications.    Return to near baseline physical activity: Yes     Discharge Planner Nurse   Safe discharge environment identified: Yes  Barriers to discharge: Yes       Entered by: Ermelinda Charles RN 07/19/2022 8:50 PM     Please review provider order for any additional goals.   Nurse to notify provider when observation goals have been met and patient is ready for discharge.Goal Outcome Evaluation:                      "

## 2022-07-21 VITALS
HEART RATE: 60 BPM | DIASTOLIC BLOOD PRESSURE: 79 MMHG | OXYGEN SATURATION: 100 % | RESPIRATION RATE: 16 BRPM | TEMPERATURE: 97.8 F | BODY MASS INDEX: 33.27 KG/M2 | SYSTOLIC BLOOD PRESSURE: 157 MMHG | HEIGHT: 67 IN | WEIGHT: 212 LBS

## 2022-07-21 LAB — GLUCOSE BLDC GLUCOMTR-MCNC: 140 MG/DL (ref 70–99)

## 2022-07-21 PROCEDURE — 96372 THER/PROPH/DIAG INJ SC/IM: CPT

## 2022-07-21 PROCEDURE — 250N000013 HC RX MED GY IP 250 OP 250 PS 637: Performed by: NURSE PRACTITIONER

## 2022-07-21 PROCEDURE — 250N000013 HC RX MED GY IP 250 OP 250 PS 637: Performed by: HOSPITALIST

## 2022-07-21 PROCEDURE — G0378 HOSPITAL OBSERVATION PER HR: HCPCS

## 2022-07-21 PROCEDURE — 99217 PR OBSERVATION CARE DISCHARGE: CPT | Performed by: HOSPITALIST

## 2022-07-21 PROCEDURE — 82962 GLUCOSE BLOOD TEST: CPT

## 2022-07-21 RX ORDER — METHOCARBAMOL 500 MG/1
500 TABLET, FILM COATED ORAL 4 TIMES DAILY
Qty: 28 TABLET | Refills: 0 | Status: SHIPPED | OUTPATIENT
Start: 2022-07-21 | End: 2022-07-29

## 2022-07-21 RX ADMIN — ROSUVASTATIN CALCIUM 20 MG: 10 TABLET, FILM COATED ORAL at 09:34

## 2022-07-21 RX ADMIN — Medication 1 MG: at 00:03

## 2022-07-21 RX ADMIN — INSULIN ASPART 1 UNITS: 100 INJECTION, SOLUTION INTRAVENOUS; SUBCUTANEOUS at 09:33

## 2022-07-21 RX ADMIN — FLUTICASONE FUROATE AND VILANTEROL TRIFENATATE 1 PUFF: 200; 25 POWDER RESPIRATORY (INHALATION) at 09:33

## 2022-07-21 RX ADMIN — DOCUSATE SODIUM 100 MG: 100 CAPSULE, LIQUID FILLED ORAL at 09:35

## 2022-07-21 RX ADMIN — METHOCARBAMOL 500 MG: 500 TABLET ORAL at 09:35

## 2022-07-21 RX ADMIN — ASPIRIN 325 MG: 325 TABLET, COATED ORAL at 09:35

## 2022-07-21 RX ADMIN — LOSARTAN POTASSIUM 100 MG: 50 TABLET, FILM COATED ORAL at 09:34

## 2022-07-21 RX ADMIN — CARVEDILOL 12.5 MG: 12.5 TABLET, FILM COATED ORAL at 09:35

## 2022-07-21 RX ADMIN — PANTOPRAZOLE SODIUM 40 MG: 40 TABLET, DELAYED RELEASE ORAL at 09:35

## 2022-07-21 RX ADMIN — CETIRIZINE HYDROCHLORIDE 10 MG: 10 TABLET ORAL at 09:34

## 2022-07-21 RX ADMIN — DICLOFENAC 2 G: 10 GEL TOPICAL at 00:07

## 2022-07-21 RX ADMIN — ACETAMINOPHEN 650 MG: 325 TABLET ORAL at 02:17

## 2022-07-21 RX ADMIN — DULOXETINE HYDROCHLORIDE 30 MG: 30 CAPSULE, DELAYED RELEASE ORAL at 09:43

## 2022-07-21 RX ADMIN — OXYCODONE HYDROCHLORIDE 10 MG: 5 TABLET ORAL at 09:34

## 2022-07-21 RX ADMIN — OXYCODONE HYDROCHLORIDE 10 MG: 5 TABLET ORAL at 04:49

## 2022-07-21 NOTE — PLAN OF CARE
"PRIMARY DIAGNOSIS: \"GENERIC\" NURSING  OUTPATIENT/OBSERVATION GOALS TO BE MET BEFORE DISCHARGE:  ADLs back to baseline: Yes    Activity and level of assistance: Ambulating independently.    Pain status: Improved-controlled with oral pain medications.    Return to near baseline physical activity: Yes     Discharge Planner Nurse   Safe discharge environment identified: Yes  Barriers to discharge: No       Entered by: Ermelinda Charles RN 07/20/2022 9:37 PM     Please review provider order for any additional goals.   Nurse to notify provider when observation goals have been met and patient is ready for discharge.Goal Outcome Evaluation:                      "

## 2022-07-21 NOTE — DISCHARGE SUMMARY
[facility] MEDICINE  DISCHARGE SUMMARY     Primary Care Physician: Emigdio Rizvi  Admission Date: 7/17/2022   Discharge Provider: Mahnaz Villegas MD Discharge Date: 7/21/2022   Diet: Low Carb, Low salt   Code Status: Full Code   Activity: DCACTIVITY: Activity as tolerated        Condition at Discharge: Stable     REASON FOR PRESENTATION(See Admission Note for Details)   Weakness     PRINCIPAL & ACTIVE DISCHARGE DIAGNOSES     Active Problems:    Type 2 diabetes mellitus with diabetic polyneuropathy, with long-term current use of insulin (H)    History of CVA (cerebrovascular accident)    Essential hypertension, benign    Right sided weakness    Chronic pain syndrome    Asthma in adult, unspecified asthma severity, uncomplicated      PENDING LABS     Unresulted Labs Ordered in the Past 30 Days of this Admission     No orders found from 6/17/2022 to 7/18/2022.            PROCEDURES ( this hospitalization only)          RECOMMENDATIONS TO OUTPATIENT PROVIDER FOR F/U VISIT     Follow-up Appointments     Follow-up and recommended labs and tests       Follow up with primary care provider, EMIGDIO RIZVI, within 7 days   for hospital follow- up.        Follow up with [facility] for hospital follow- up.                 DISPOSITION     Home with home care    SUMMARY OF HOSPITAL COURSE:    Sarah Rahman is a 56 year old female admitted on 7/17/2022. She has history of coronary artery disease status post CABG, previous stroke, asthma, GERD, hypertension, schizoaffective disorder, substance abuse presents for evaluation of right-sided weakness and numbness     Right-sided weakness and numbness  Falling frequently  - Weakness is improved. Patient is now able to independently ambulate.   - MRI showing small chronic lacunar infarctions in bilateral cerebellar hemispheres, mild to moderate chronic microvascular ischemic change. Chronic severe stenosis of the proximal/mix  "basilar artery with distal reconstitution. No acute changes.   - Appreciate neurology consult  - Continue ASA & Crestor  - EEG could not be done due to patient's hair extensions   - PT/OT : recommended for TCU. She was initially against this and then became open to it. Now that her weakness is improved and she is ambulating well, she wants to go home with home PT.     Type II DM  Diabetic Neuropathy, likely  - HgbA1C 10%  - Resume home medications.     Chronic pain syndrome  - Continues to express improvement in weakness.  \"burning, pins, needles, tingling\" pain of her both upper and lower extremities, worse in right which is consistent with her chronic diabetic neuropathy. Followed in pain clinic.  - Just saw her pain clinic ([facility]) on 7/15  - She states gabapentin makes her legs swell. She is prescribed lyrica but does not take as she reports this also makes her leg swell.  - She does not feel percocet is helping her current \"burning, pins, needles, tingling\" pain   - Requesting IV morphine. She has received 3 doses of morphine 4 mg IV x 3. Avoided any further doses.  - Started on amitriptyline which was recommended by neuro for her neuropathic pain  - Appreciate pain management consult & involvement.   - No changes made to her regimen besides addition of amitriptyline.   - Continue home Cymbalta  - Follow up in pain clinic      HTN  - Continue home meds: Coreg 12.5 mg BID & Losartan 100 mg daily     Hx CAD s/p CABG x2 in 2009  - Continue home aspirin, statin     Asthma  - Continue Home albuterol, Symbicort, DuoNeb     GERD  - Continue PPI    Discharge Medications with Med changes:     Current Discharge Medication List      START taking these medications    Details   amitriptyline (ELAVIL) 25 MG tablet Take 1 tablet (25 mg) by mouth At Bedtime for 30 days  Qty: 30 tablet, Refills: 0    Associated Diagnoses: Diabetic polyneuropathy associated with type 2 diabetes mellitus (H)         CONTINUE these medications " which have NOT CHANGED    Details   acetaminophen (TYLENOL) 325 MG tablet Take 2 tablets (650 mg) by mouth every 8 hours  Qty: 270 tablet, Refills: 0    Associated Diagnoses: Bilateral low back pain with right-sided sciatica, unspecified chronicity      albuterol (PROAIR HFA) 108 (90 BASE) MCG/ACT inhaler Inhale 2 puffs into the lungs every 4 hours as needed      aspirin (ASA) 325 MG EC tablet Take 325 mg by mouth daily       budesonide-formoterol (SYMBICORT) 160-4.5 MCG/ACT Inhaler Inhale 2 puffs into the lungs 2 times daily      carvedilol (COREG) 12.5 MG tablet Take 12.5 mg by mouth 2 times daily (with meals)      cetirizine (ZYRTEC) 10 MG tablet Take 10 mg by mouth daily      COMPOUND CONTAINING CONTROLLED SUBSTANCE (CMPD RX) - PHARMACY TO MIX COMPOUNDED MEDICATION neuro PLO pain gel w/ lidocaine(ketamine 8%-gabapentin 6%-lidocaine 2.5%)([facility] AMB MIXTURE)      diclofenac (VOLTAREN) 1 % topical gel Apply 2 g topically 3 times daily as needed for moderate pain (feet)      diphenhydrAMINE (BENADRYL) 25 MG tablet Take 25 mg by mouth every 6 hours as needed for itching or allergies      DULoxetine (CYMBALTA) 30 MG capsule Take 30 mg by mouth 2 times daily      fluticasone (FLONASE) 50 MCG/ACT nasal spray Spray 1 spray into both nostrils daily      furosemide (LASIX) 20 MG tablet Take 20 mg by mouth daily as needed (swelling)      glipiZIDE (GLUCOTROL XL) 10 MG 24 hr tablet Take 1 tablet (10 mg) by mouth daily (with breakfast)  Qty: 30 tablet, Refills: 0    Associated Diagnoses: Type 2 diabetes mellitus with diabetic polyneuropathy, with long-term current use of insulin (H)      guaiFENesin-codeine (ROBITUSSIN AC) 100-10 MG/5ML solution Take 1-2 teaspoonful by mouth every 4 hours as needed for cough      insulin glargine (LANTUS PEN) 100 UNIT/ML pen Inject 20 Units Subcutaneous At Bedtime  Qty: 30 mL, Refills: 0    Comments: If Lantus is not covered by insurance, may substitute Basaglar or Semglee or other insulin  glargine product per insurance preference at same dose and frequency.    Associated Diagnoses: Type 2 diabetes mellitus with diabetic polyneuropathy, with long-term current use of insulin (H)      ipratropium - albuterol 0.5 mg/2.5 mg/3 mL (DUONEB) 0.5-2.5 (3) MG/3ML neb solution Take 1 vial by nebulization every 6 hours as needed for shortness of breath / dyspnea or wheezing      ketorolac (TORADOL) 10 MG tablet Take 1 tablet (10 mg) by mouth every 6 hours as needed for moderate pain  Qty: 20 tablet, Refills: 0      Lidocaine (LIDOCARE) 4 % Patch Place 1 patch onto the skin every 24 hours To prevent lidocaine toxicity, patient should be patch free for 12 hrs daily. BACK      losartan (COZAAR) 100 MG tablet Take 100 mg by mouth daily      methocarbamol (ROBAXIN) 750 MG tablet Take 750 mg by mouth 4 times daily      montelukast (SINGULAIR) 10 MG tablet Take 10 mg by mouth At Bedtime      nitroGLYcerin (NITROSTAT) 0.4 MG sublingual tablet Place 0.4 mg under the tongue every 5 minutes as needed for chest pain For chest pain place 1 tablet under the tongue every 5 minutes for 3 doses. If symptoms persist 5 minutes after 1st dose call 911.      omeprazole 20 MG tablet Take 20 mg by mouth daily      oxyCODONE-acetaminophen (PERCOCET)  MG per tablet Take 1 tablet by mouth every 6 hours as needed for severe pain      rosuvastatin (CRESTOR) 20 MG tablet Take 20 mg by mouth daily      senna-docusate (SENOKOT-S/PERICOLACE) 8.6-50 MG tablet Take 1 tablet by mouth daily as needed      urea (DERMAL THERAPY FINGER CARE) 20 % external lotion Externally apply topically 2 times daily as needed Feet      alcohol swab prep pads Use to swab area of injection/zac as directed.  Qty: 100 each, Refills: 6    Associated Diagnoses: Type 2 diabetes mellitus with diabetic polyneuropathy, with long-term current use of insulin (H)      blood glucose (ACCU-CHEK DARRIN PLUS) test strip Use to test blood sugar 3-4 times daily or as  directed.  Qty: 100 strip, Refills: 0    Associated Diagnoses: Type 2 diabetes mellitus with diabetic polyneuropathy, with long-term current use of insulin (H)      blood glucose (ACCU-CHEK SOFTCLIX) lancing device Lancing device to be used with lancets.  Qty: 1 each, Refills: 0    Associated Diagnoses: Type 2 diabetes mellitus with diabetic polyneuropathy, with long-term current use of insulin (H)      blood glucose monitoring (SOFTCLIX) lancets Use to test blood sugar 3-4 times daily.  Qty: 100 each, Refills: 1    Associated Diagnoses: Type 2 diabetes mellitus with diabetic polyneuropathy, with long-term current use of insulin (H)      Continuous Blood Gluc Sensor (FREESTYLE SHEBA 14 DAY SENSOR) MISC 1 each every 14 days Use 1 Sensor every 14 days. Use to read blood sugars per 's instructions.  Qty: 2 each, Refills: 5    Associated Diagnoses: Type 2 diabetes mellitus with hyperglycemia, with long-term current use of insulin (H)      insulin pen needle (32G X 4 MM) 32G X 4 MM miscellaneous Use 1 pen needles daily or as directed.  Qty: 100 each, Refills: 0    Associated Diagnoses: Type 2 diabetes mellitus with diabetic polyneuropathy, with long-term current use of insulin (H)      metFORMIN (GLUCOPHAGE) 500 MG tablet Take 1 tablet (500 mg) by mouth daily (with dinner)  Qty: 30 tablet, Refills: 0    Associated Diagnoses: Type 2 diabetes mellitus with diabetic polyneuropathy, with long-term current use of insulin (H)      nystatin (MYCOSTATIN) 867213 UNIT/GM external ointment Apply topically 2 times daily  Qty: 15 g, Refills: 0    Associated Diagnoses: Vaginal itching      TRULICITY 0.75 MG/0.5ML pen INJECT CONTENTS OF 1 SYRINGE (0.75 MG) SUBCUTANEOUSLY EVERY 7 DAYS  Qty: 2 mL, Refills: 0    Associated Diagnoses: Type 2 diabetes mellitus with hyperglycemia, with long-term current use of insulin (H)         STOP taking these medications       diclofenac (VOLTAREN) 50 MG EC tablet Comments:   Reason for  Stopping:         lidocaine (XYLOCAINE) 5 % external ointment Comments:   Reason for Stopping:         pregabalin (LYRICA) 150 MG capsule Comments:   Reason for Stopping:               Consults   Neurology, Acute Pain Team      Immunizations given this encounter     Most Recent Immunizations   Administered Date(s) Administered     COVID-19,PF,Pfizer (12+ Yrs) 07/15/2021     COVID-19,PF,Pfizer 12+ Yrs (2022 and After) 02/05/2022     TD (ADULT, 7+) 01/09/2000           Anticoagulation Information      Recent INR results: No results for input(s): INR in the last 168 hours.      SIGNIFICANT IMAGING FINDINGS     Results for orders placed or performed during the hospital encounter of 07/17/22   MR Brain w/o & w Contrast    Impression    CONCLUSION:  HEAD MRI:   1.  No acute/subacute infarction, intracranial hemorrhage, mass effect, hydrocephalus, or abnormal enhancement.  2.  Stable chronic ischemic and mild to moderate age-related changes.    HEAD MRA:   1.  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  2.  No new high-grade stenosis, branch vessel occlusion, aneurysm, or high flow vascular malformation.    NECK MRA:  1.  No significant stenosis in the neck vessels based on NASCET criteria.  2.  No evidence for dissection or pseudoaneurysm.   MRA Brain ([place]) wo Contrast    Impression    CONCLUSION:  HEAD MRI:   1.  No acute/subacute infarction, intracranial hemorrhage, mass effect, hydrocephalus, or abnormal enhancement.  2.  Stable chronic ischemic and mild to moderate age-related changes.    HEAD MRA:   1.  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  2.  No new high-grade stenosis, branch vessel occlusion, aneurysm, or high flow vascular  malformation.    NECK MRA:  1.  No significant stenosis in the neck vessels based on NASCET criteria.  2.  No evidence for dissection or pseudoaneurysm.   MRA Neck (Carotids) wo & w Contrast    Impression    CONCLUSION:  HEAD MRI:   1.  No acute/subacute infarction, intracranial hemorrhage, mass effect, hydrocephalus, or abnormal enhancement.  2.  Stable chronic ischemic and mild to moderate age-related changes.    HEAD MRA:   1.  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  2.  No new high-grade stenosis, branch vessel occlusion, aneurysm, or high flow vascular malformation.    NECK MRA:  1.  No significant stenosis in the neck vessels based on NASCET criteria.  2.  No evidence for dissection or pseudoaneurysm.   XR Chest 1 View    Impression    IMPRESSION: Stable enlarged cardiomediastinal silhouette status post median sternotomy and CABG. Likely pulmonary vascular congestion without michele edema or focal airspace disease. No pleural effusion or pneumothorax. No acute bony abnormality.       SIGNIFICANT LABORATORY FINDINGS     Discharge Orders        Home Care Referral      Reason for your hospital stay    Weakness     Activity    Your activity upon discharge: activity as tolerated     Follow-up and recommended labs and tests     Follow up with primary care provider, HARRIET RIZVI, within 7 days for hospital follow- up.        Follow up with [facility] for hospital follow- up.     Diet    Follow this diet upon discharge: Carb controlled diet       Examination   General: Well appearing, in NAD.  HEENT: NCAT, EOMI, MMM  CV: Normal S1S2, regular rhythm, normal rate  Lungs: CTAB  Abdomen: Soft, NT, ND, +BS  Extremities: No LE edema b/l  Neuro: AAOx3    Please see EMR for more detailed significant labs, imaging, consultant notes etc.    IMahnaz MD, personally  saw the patient today and spent greater than 30 minutes discharging this patient.    Mahnaz Villegas MD  [facility]    CC:Emigdio Martinez

## 2022-07-21 NOTE — PLAN OF CARE
Occupational Therapy Discharge Summary    Reason for therapy discharge:    Discharged to home.    Progress towards therapy goal(s). See goals on Care Plan in [facility] electronic health record for goal details.  Goals partially met.  Barriers to achieving goals:   discharge from facility.    Therapy recommendation(s):    To improve overall ADL independence/safety by engaging in home therapy     Goal Outcome Evaluation:

## 2022-07-21 NOTE — PLAN OF CARE
Problem: Plan of Care - These are the overarching goals to be used throughout the patient stay.    Goal: Plan of Care Review/Shift Note  Description: The Plan of Care Review/Shift note should be completed every shift.  The Outcome Evaluation is a brief statement about your assessment that the patient is improving, declining, or no change.  This information will be displayed automatically on your shift note.  Outcome: Ongoing, Progressing   Goal Outcome Evaluation:  Plan of care discussed.      Problem: Plan of Care - These are the overarching goals to be used throughout the patient stay.    Goal: Optimal Comfort and Wellbeing  Outcome: Ongoing, Progressing       Pain in bilateral feet (right more than left) was rated about a 7/10.  Patient requested Oxycodone and has already had topical pain cream applied to her foot.  She is being seen by the pain team and will follow up after discharge.  She already knows she needs to get her topical cream from [facility] in [place].

## 2022-07-21 NOTE — PROGRESS NOTES
"Care Management Discharge Note    Discharge Date: 07/21/2022       Discharge Disposition: Home    Discharge Services: PCA    Discharge DME:      Discharge Transportation:      Private pay costs discussed: Not applicable    PAS Confirmation Code:    Patient/family educated on Medicare website which has current facility and service quality ratings:      Education Provided on the Discharge Plan:    Persons Notified of Discharge Plans: patient   Patient/Family in Agreement with the Plan:      Handoff Referral Completed: Yes    Additional Information:  See below         Tiara Lyons RN              Additional home care referrals sent. Many declining due to insurance. Met with patient. Told her I am still trying to find home care but might not have anything secured before discharge and she may need to follow up with PCP. Patient says she doesn't have a PCP and the PCP listed is not accurate. Patient requests new PCP appointment be made with a provider \"across the street\". Patient understands and is agreeable that she might not have home care upon discharge. MD updated.     Spoke to nurse Gann. She will attempt to set up PCP appointment for patient.     Patient says she will have transport at discharge.   "

## 2022-07-21 NOTE — PLAN OF CARE
PRIMARY DIAGNOSIS: ACUTE PAIN  OUTPATIENT/OBSERVATION GOALS TO BE MET BEFORE DISCHARGE:  1. Pain Status: Improved-controlled with oral pain medications.    2. Return to near baseline physical activity: Yes    3. Cleared for discharge by consultants (if involved): No    Discharge Planner Nurse   Safe discharge environment identified: Yes  Barriers to discharge: No       Entered by: Laura Caldwlel RN 07/21/2022 6:27 AM     Please review provider order for any additional goals.   Nurse to notify provider when observation goals have been met and patient is ready for discharge.Goal Outcome Evaluation:

## 2022-07-21 NOTE — PROGRESS NOTES
[facility] ACUTE PAIN SERVICE    Daily PAIN Progress Note     Assessment/Plan:  Sarah Rahman is a 56 year old female who was admitted on 7/17/2022 for evaluation of right-sided weakness and numbness. Pain team was asked to see the patient for acute on chronic pain. 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 The patient does smoke (reported that she was thinking about quitting) and she denies chemical dependency history (although malingering and cocaine abuse noted in her chart for >10 years, also has had positive cannabinoids in urine).     No new concerns with pain during assessment, pt looking forward to going home and asking questions about medications. Questions answered educated her to not take her home robaxin 750 mg, to use the 500 mg dose instead, home percocet, and to fill the compounded gel at the compounding pharmacy to fill. Pt verbalized understanding, stated she was going to dispose of the robaxin 750 mg dose. Also asking if she was going to go home with amitripytline, assured her she would get a prescription for this and to follow up with PCP, pain clinic, or regulary rx prescriber to continue rx. Lidocaine patch resumed at discharge.      PLAN:   1) Pain is consistent with chronic right lower extremity pain with acute right sided worsening of neuropathic pain with associated weakness likely residual from previous stroke. Patient does have a history of known peripheral neuropathy and chronic low back pain with lumbar degenerative changes noted on imaging. Labs and imaging indicated: I have personally reviewed pertinent labs, tests, and radiologic imaging in patient's chart. Treatment plan includes: multimodal pain approach, Hospital Medicine Service for medical management, neurology, amitriptyline per neurology, and PT/OT. Patient educated regarding:  multimodal pain approach, medications as listed below, educated on tapering off as pain improves, watch for constipation and stool softeners. Patient is understanding of the plan. All questions and concerns addressed to patient's satisfaction.   2) Multimodal Medication Therapy  Topical: PLO gel (lidocaine, ketamine, gabapentin) BID PRN, lidocaine 4% patch x2, 2g voltaren 1% gel TID  NSAID'S: none, CrCl: 94.7ml/min  Muscle Relaxants: 500mg Robaxin QID  Adjuvants: 650mg tylenol Q6H PRN,   Antidepressants/anxiolytics: 25mg Amitriptyline at bedtime added per Neruology, 30mg Cymbalta BID  Opioids: 10mg Oxycodone Q6H PRN (PTA percocet 10-325mg Q6H PRN)  IV Pain medication: none  3) Non-medication interventions: ice, heat, OT, PT  Acupuncture consult - offered and declined  Integrative consult - offered and agreeable  4) Constipation Prophylaxis: Scheduled and PRN ordered  5) Discharge Recommendations: Continue to follow up with pain clinic. Agree with discharge scripts.      - Opioid prescriber has been [facility]  - [place]  pulled from system on 7/18/22. This indicates:              - 7/5/22 Lyrica 150 mg #90 - same on 5/3/22              - 7/5/22 Percocet  mg #120 - same on 6/2/22, 5/3/22    Edwin Rodriguez, PharmD, BCPS, CPE  Acute Care Pain Management Program  [facility] ([facility], [place], [place])  Monday-Friday 8a-4p   Page via online paging system or call [phone]

## 2022-07-22 ENCOUNTER — PATIENT OUTREACH (OUTPATIENT)
Dept: CARE COORDINATION | Facility: CLINIC | Age: 57
End: 2022-07-22

## 2022-07-22 DIAGNOSIS — Z71.89 OTHER SPECIFIED COUNSELING: ICD-10-CM

## 2022-07-22 NOTE — PROGRESS NOTES
Clinic Care Coordination Contact  [facility]/Voicemail       Clinical Data: Care Coordinator Outreach  Outreach attempted x 1.  Left message on patient's voicemail with call back information and requested return call.   Care Coordinator will try to reach patient again in 1-2 business days.      Donna Aldana  [phone]  Care

## 2022-07-23 NOTE — PROGRESS NOTES
Clinic Care Coordination Contact  [facility]/Voicemail       Clinical Data: Care Coordinator Outreach  Outreach attempted x 2.  Left message on patient's voicemail with call back information and requested return call.  Plan:  Care Coordinator will do no further outreaches at this time.    Saloni TRIVEDI Community Health Worker  Clinic Care Coordination  [facility]  Phone: [phone]

## 2022-07-29 ENCOUNTER — OFFICE VISIT (OUTPATIENT)
Dept: FAMILY MEDICINE | Facility: CLINIC | Age: 57
End: 2022-07-29
Payer: COMMERCIAL

## 2022-07-29 VITALS
OXYGEN SATURATION: 100 % | DIASTOLIC BLOOD PRESSURE: 82 MMHG | HEART RATE: 60 BPM | BODY MASS INDEX: 31.23 KG/M2 | SYSTOLIC BLOOD PRESSURE: 132 MMHG | WEIGHT: 199 LBS | TEMPERATURE: 98.2 F | HEIGHT: 67 IN | RESPIRATION RATE: 20 BRPM

## 2022-07-29 DIAGNOSIS — E11.42 TYPE 2 DIABETES MELLITUS WITH DIABETIC POLYNEUROPATHY, WITH LONG-TERM CURRENT USE OF INSULIN (H): ICD-10-CM

## 2022-07-29 DIAGNOSIS — Z79.4 TYPE 2 DIABETES MELLITUS WITH DIABETIC POLYNEUROPATHY, WITH LONG-TERM CURRENT USE OF INSULIN (H): ICD-10-CM

## 2022-07-29 DIAGNOSIS — M54.41 BILATERAL LOW BACK PAIN WITH RIGHT-SIDED SCIATICA, UNSPECIFIED CHRONICITY: Primary | ICD-10-CM

## 2022-07-29 PROCEDURE — 99214 OFFICE O/P EST MOD 30 MIN: CPT | Performed by: FAMILY MEDICINE

## 2022-07-29 RX ORDER — MELOXICAM 15 MG/1
15 TABLET ORAL DAILY
Qty: 30 TABLET | Refills: 0 | Status: SHIPPED | OUTPATIENT
Start: 2022-07-29 | End: 2022-09-24

## 2022-07-29 RX ORDER — ONDANSETRON 8 MG/1
8 TABLET, FILM COATED ORAL EVERY 8 HOURS PRN
COMMUNITY
Start: 2022-07-26 | End: 2023-05-18

## 2022-07-29 RX ORDER — METHOCARBAMOL 500 MG/1
500 TABLET, FILM COATED ORAL 4 TIMES DAILY
Qty: 120 TABLET | Refills: 3 | Status: SHIPPED | OUTPATIENT
Start: 2022-07-29 | End: 2022-09-24

## 2022-07-29 ASSESSMENT — PAIN SCALES - GENERAL: PAINLEVEL: EXTREME PAIN (8)

## 2022-08-30 DIAGNOSIS — E11.42 DIABETIC POLYNEUROPATHY ASSOCIATED WITH TYPE 2 DIABETES MELLITUS (H): ICD-10-CM

## 2022-08-30 NOTE — TELEPHONE ENCOUNTER
"Routing refill request to provider for review/approval because:  rx sig was for 30 days    Last Written Prescription Date:  7/21/22  Last Fill Quantity: 30,  # refills: 0   Last office visit provider:  7/29/22     Requested Prescriptions   Pending Prescriptions Disp Refills     amitriptyline (ELAVIL) 25 MG tablet 30 tablet 0     Sig: Take 1 tablet (25 mg) by mouth At Bedtime       Tricyclic Agents ( Annual appt and no PHQ9) Passed - 8/30/2022 10:22 AM        Passed - Blood Pressure under 140/90 in past 12 mos     BP Readings from Last 3 Encounters:   07/29/22 132/82   07/21/22 (!) 157/79   06/07/22 126/71                 Passed - Recent (12 mo) or future (30 days) visit within authorizing provider's specialty     Patient has had an office visit with the authorizing provider or a provider within the authorizing providers department within the previous 12 mos or has a future within next 30 days. See \"Patient Info\" tab in inbasket, or \"Choose Columns\" in Meds & Orders section of the refill encounter.              Passed - Medication is active on med list        Passed - Patient is age 18 or older        Passed - Patient is not pregnant        Passed - No positive pregnancy test on record in past 12 mos             Maryjane Bhardwaj RN 08/30/22 6:08 PM  "

## 2022-08-31 NOTE — TELEPHONE ENCOUNTER
"Routing refill request to provider for review/approval because:  rx sig  For 30 days    Last Written Prescription Date:  7/21/22  Last Fill Quantity: 30,  # refills: 0   Last office visit provider:  7/29/22     Requested Prescriptions   Pending Prescriptions Disp Refills     amitriptyline (ELAVIL) 25 MG tablet [Pharmacy Med Name: AMITRIPTYLINE HCL 25MG TABS] 30 tablet 0     Sig: TAKE 1 TABLET (25 MG) BY MOUTH AT BEDTIME FOR 30 DAYS       Tricyclic Agents ( Annual appt and no PHQ9) Passed - 8/30/2022  3:28 PM        Passed - Blood Pressure under 140/90 in past 12 mos     BP Readings from Last 3 Encounters:   07/29/22 132/82   07/21/22 (!) 157/79   06/07/22 126/71                 Passed - Recent (12 mo) or future (30 days) visit within authorizing provider's specialty     Patient has had an office visit with the authorizing provider or a provider within the authorizing providers department within the previous 12 mos or has a future within next 30 days. See \"Patient Info\" tab in inbasket, or \"Choose Columns\" in Meds & Orders section of the refill encounter.              Passed - Medication is active on med list        Passed - Patient is age 18 or older        Passed - Patient is not pregnant        Passed - No positive pregnancy test on record in past 12 mos             Maryjane Bhardwaj RN 08/30/22 8:33 PM  "

## 2022-09-15 NOTE — ED NOTES
Bed: JNED[facility]-01  Expected date: 5/20/22  Expected time: 2:53 AM  Means of arrival: Ambulance  Comments:  [place]  Back/Leg pain, hyperglycemia  
none

## 2022-09-24 ENCOUNTER — HOSPITAL ENCOUNTER (OUTPATIENT)
Facility: HOSPITAL | Age: 57
Setting detail: OBSERVATION
Discharge: HOME | End: 2022-09-29
Attending: EMERGENCY MEDICINE | Admitting: RADIOLOGY
Payer: COMMERCIAL

## 2022-09-24 ENCOUNTER — APPOINTMENT (OUTPATIENT)
Dept: CT IMAGING | Facility: HOSPITAL | Age: 57
End: 2022-09-24
Attending: EMERGENCY MEDICINE
Payer: COMMERCIAL

## 2022-09-24 ENCOUNTER — APPOINTMENT (OUTPATIENT)
Dept: MRI IMAGING | Facility: HOSPITAL | Age: 57
End: 2022-09-24
Attending: EMERGENCY MEDICINE
Payer: COMMERCIAL

## 2022-09-24 DIAGNOSIS — E78.5 DYSLIPIDEMIA: ICD-10-CM

## 2022-09-24 DIAGNOSIS — M79.605 BILATERAL LEG PAIN: ICD-10-CM

## 2022-09-24 DIAGNOSIS — Z79.4 TYPE 2 DIABETES MELLITUS WITH DIABETIC POLYNEUROPATHY, WITH LONG-TERM CURRENT USE OF INSULIN (H): ICD-10-CM

## 2022-09-24 DIAGNOSIS — M54.41 BILATERAL LOW BACK PAIN WITH RIGHT-SIDED SCIATICA, UNSPECIFIED CHRONICITY: ICD-10-CM

## 2022-09-24 DIAGNOSIS — E11.42 TYPE 2 DIABETES MELLITUS WITH DIABETIC POLYNEUROPATHY, WITH LONG-TERM CURRENT USE OF INSULIN (H): ICD-10-CM

## 2022-09-24 DIAGNOSIS — M79.604 BILATERAL LEG PAIN: ICD-10-CM

## 2022-09-24 DIAGNOSIS — Z86.79 HISTORY OF CORONARY ARTERY DISEASE: ICD-10-CM

## 2022-09-24 DIAGNOSIS — R07.9 CHEST PAIN, UNSPECIFIED TYPE: ICD-10-CM

## 2022-09-24 DIAGNOSIS — F11.90 CHRONIC, CONTINUOUS USE OF OPIOIDS: ICD-10-CM

## 2022-09-24 DIAGNOSIS — I10 ESSENTIAL HYPERTENSION, BENIGN: Primary | ICD-10-CM

## 2022-09-24 DIAGNOSIS — K59.00 CONSTIPATION, UNSPECIFIED CONSTIPATION TYPE: ICD-10-CM

## 2022-09-24 LAB
AMPHETAMINES UR QL SCN: ABNORMAL
ANION GAP SERPL CALCULATED.3IONS-SCNC: 12 MMOL/L (ref 7–15)
BARBITURATES UR QL SCN: ABNORMAL
BASOPHILS # BLD MANUAL: 0 10E3/UL (ref 0–0.2)
BASOPHILS NFR BLD MANUAL: 0 %
BENZODIAZ UR QL SCN: ABNORMAL
BUN SERPL-MCNC: 14.6 MG/DL (ref 6–20)
BZE UR QL SCN: ABNORMAL
CALCIUM SERPL-MCNC: 9.2 MG/DL (ref 8.6–10)
CANNABINOIDS UR QL SCN: ABNORMAL
CHLORIDE SERPL-SCNC: 101 MMOL/L (ref 98–107)
CREAT SERPL-MCNC: 0.76 MG/DL (ref 0.51–0.95)
CRP SERPL-MCNC: 6.5 MG/L
D DIMER PPP FEU-MCNC: 0.56 UG/ML FEU (ref 0–0.5)
DEPRECATED HCO3 PLAS-SCNC: 24 MMOL/L (ref 22–29)
EOSINOPHIL # BLD MANUAL: 0.3 10E3/UL (ref 0–0.7)
EOSINOPHIL NFR BLD MANUAL: 6 %
ERYTHROCYTE [DISTWIDTH] IN BLOOD BY AUTOMATED COUNT: 12.8 % (ref 10–15)
GFR SERPL CREATININE-BSD FRML MDRD: 90 ML/MIN/1.73M2
GLUCOSE BLDC GLUCOMTR-MCNC: 210 MG/DL (ref 70–99)
GLUCOSE BLDC GLUCOMTR-MCNC: 326 MG/DL (ref 70–99)
GLUCOSE SERPL-MCNC: 201 MG/DL (ref 70–99)
HCT VFR BLD AUTO: 39.2 % (ref 35–47)
HGB BLD-MCNC: 12.4 G/DL (ref 11.7–15.7)
HOLD SPECIMEN: NORMAL
LYMPHOCYTES # BLD MANUAL: 2 10E3/UL (ref 0.8–5.3)
LYMPHOCYTES NFR BLD MANUAL: 37 %
MCH RBC QN AUTO: 27.1 PG (ref 26.5–33)
MCHC RBC AUTO-ENTMCNC: 31.6 G/DL (ref 31.5–36.5)
MCV RBC AUTO: 86 FL (ref 78–100)
MONOCYTES # BLD MANUAL: 0.2 10E3/UL (ref 0–1.3)
MONOCYTES NFR BLD MANUAL: 3 %
NEUTROPHILS # BLD MANUAL: 3 10E3/UL (ref 1.6–8.3)
NEUTROPHILS NFR BLD MANUAL: 54 %
OPIATES UR QL SCN: ABNORMAL
PCP QUAL URINE (ROCHE): ABNORMAL
PLAT MORPH BLD: NORMAL
PLATELET # BLD AUTO: 263 10E3/UL (ref 150–450)
POTASSIUM SERPL-SCNC: 4.4 MMOL/L (ref 3.4–5.3)
RBC # BLD AUTO: 4.57 10E6/UL (ref 3.8–5.2)
RBC MORPH BLD: NORMAL
SARS-COV-2 RNA RESP QL NAA+PROBE: NEGATIVE
SODIUM SERPL-SCNC: 137 MMOL/L (ref 136–145)
TROPONIN T SERPL HS-MCNC: 13 NG/L
TROPONIN T SERPL HS-MCNC: 15 NG/L
TROPONIN T SERPL HS-MCNC: 16 NG/L
WBC # BLD AUTO: 5.5 10E3/UL (ref 4–11)

## 2022-09-24 PROCEDURE — 96372 THER/PROPH/DIAG INJ SC/IM: CPT | Performed by: INTERNAL MEDICINE

## 2022-09-24 PROCEDURE — 86140 C-REACTIVE PROTEIN: CPT | Performed by: EMERGENCY MEDICINE

## 2022-09-24 PROCEDURE — 93005 ELECTROCARDIOGRAM TRACING: CPT | Performed by: EMERGENCY MEDICINE

## 2022-09-24 PROCEDURE — 72158 MRI LUMBAR SPINE W/O & W/DYE: CPT

## 2022-09-24 PROCEDURE — 250N000012 HC RX MED GY IP 250 OP 636 PS 637: Performed by: INTERNAL MEDICINE

## 2022-09-24 PROCEDURE — 99220 PR INITIAL OBSERVATION CARE,LEVEL III: CPT | Performed by: INTERNAL MEDICINE

## 2022-09-24 PROCEDURE — 96374 THER/PROPH/DIAG INJ IV PUSH: CPT

## 2022-09-24 PROCEDURE — 250N000011 HC RX IP 250 OP 636: Performed by: EMERGENCY MEDICINE

## 2022-09-24 PROCEDURE — 250N000013 HC RX MED GY IP 250 OP 250 PS 637: Performed by: INTERNAL MEDICINE

## 2022-09-24 PROCEDURE — 96376 TX/PRO/DX INJ SAME DRUG ADON: CPT

## 2022-09-24 PROCEDURE — 80048 BASIC METABOLIC PNL TOTAL CA: CPT | Performed by: EMERGENCY MEDICINE

## 2022-09-24 PROCEDURE — 96375 TX/PRO/DX INJ NEW DRUG ADDON: CPT

## 2022-09-24 PROCEDURE — 99213 OFFICE O/P EST LOW 20 MIN: CPT | Performed by: PHYSICIAN ASSISTANT

## 2022-09-24 PROCEDURE — 85379 FIBRIN DEGRADATION QUANT: CPT | Performed by: EMERGENCY MEDICINE

## 2022-09-24 PROCEDURE — 71275 CT ANGIOGRAPHY CHEST: CPT

## 2022-09-24 PROCEDURE — 250N000011 HC RX IP 250 OP 636: Performed by: INTERNAL MEDICINE

## 2022-09-24 PROCEDURE — G0378 HOSPITAL OBSERVATION PER HR: HCPCS

## 2022-09-24 PROCEDURE — 36415 COLL VENOUS BLD VENIPUNCTURE: CPT | Performed by: EMERGENCY MEDICINE

## 2022-09-24 PROCEDURE — 84484 ASSAY OF TROPONIN QUANT: CPT | Performed by: INTERNAL MEDICINE

## 2022-09-24 PROCEDURE — 99285 EMERGENCY DEPT VISIT HI MDM: CPT | Mod: 25

## 2022-09-24 PROCEDURE — 999N000127 HC STATISTIC PERIPHERAL IV START W US GUIDANCE

## 2022-09-24 PROCEDURE — 255N000002 HC RX 255 OP 636: Performed by: EMERGENCY MEDICINE

## 2022-09-24 PROCEDURE — 250N000013 HC RX MED GY IP 250 OP 250 PS 637: Performed by: EMERGENCY MEDICINE

## 2022-09-24 PROCEDURE — A9585 GADOBUTROL INJECTION: HCPCS | Performed by: EMERGENCY MEDICINE

## 2022-09-24 PROCEDURE — 84484 ASSAY OF TROPONIN QUANT: CPT | Performed by: EMERGENCY MEDICINE

## 2022-09-24 PROCEDURE — C9803 HOPD COVID-19 SPEC COLLECT: HCPCS

## 2022-09-24 PROCEDURE — 85007 BL SMEAR W/DIFF WBC COUNT: CPT | Performed by: EMERGENCY MEDICINE

## 2022-09-24 PROCEDURE — 36415 COLL VENOUS BLD VENIPUNCTURE: CPT | Performed by: INTERNAL MEDICINE

## 2022-09-24 PROCEDURE — 85027 COMPLETE CBC AUTOMATED: CPT | Performed by: EMERGENCY MEDICINE

## 2022-09-24 PROCEDURE — U0003 INFECTIOUS AGENT DETECTION BY NUCLEIC ACID (DNA OR RNA); SEVERE ACUTE RESPIRATORY SYNDROME CORONAVIRUS 2 (SARS-COV-2) (CORONAVIRUS DISEASE [COVID-19]), AMPLIFIED PROBE TECHNIQUE, MAKING USE OF HIGH THROUGHPUT TECHNOLOGIES AS DESCRIBED BY CMS-2020-01-R: HCPCS | Performed by: EMERGENCY MEDICINE

## 2022-09-24 PROCEDURE — 82962 GLUCOSE BLOOD TEST: CPT

## 2022-09-24 PROCEDURE — 80307 DRUG TEST PRSMV CHEM ANLYZR: CPT | Performed by: EMERGENCY MEDICINE

## 2022-09-24 RX ORDER — DEXTROSE MONOHYDRATE 25 G/50ML
25-50 INJECTION, SOLUTION INTRAVENOUS
Status: DISCONTINUED | OUTPATIENT
Start: 2022-09-24 | End: 2022-09-29 | Stop reason: HOSPADM

## 2022-09-24 RX ORDER — CARVEDILOL 12.5 MG/1
12.5 TABLET ORAL 2 TIMES DAILY WITH MEALS
Status: DISCONTINUED | OUTPATIENT
Start: 2022-09-24 | End: 2022-09-27

## 2022-09-24 RX ORDER — MORPHINE SULFATE 4 MG/ML
4 INJECTION, SOLUTION INTRAMUSCULAR; INTRAVENOUS ONCE
Status: COMPLETED | OUTPATIENT
Start: 2022-09-24 | End: 2022-09-24

## 2022-09-24 RX ORDER — NITROGLYCERIN 0.4 MG/1
0.4 TABLET SUBLINGUAL EVERY 5 MIN PRN
Status: DISCONTINUED | OUTPATIENT
Start: 2022-09-24 | End: 2022-09-29 | Stop reason: HOSPADM

## 2022-09-24 RX ORDER — ASPIRIN 325 MG
325 TABLET ORAL ONCE
Status: COMPLETED | OUTPATIENT
Start: 2022-09-24 | End: 2022-09-24

## 2022-09-24 RX ORDER — ACETAMINOPHEN 325 MG/1
650 TABLET ORAL EVERY 8 HOURS PRN
COMMUNITY
End: 2024-01-14

## 2022-09-24 RX ORDER — METHYLPREDNISOLONE 4 MG/1
4 TABLET ORAL ONCE
Status: COMPLETED | OUTPATIENT
Start: 2022-09-24 | End: 2022-09-24

## 2022-09-24 RX ORDER — OXYCODONE HYDROCHLORIDE 5 MG/1
5-10 TABLET ORAL EVERY 4 HOURS PRN
Status: DISCONTINUED | OUTPATIENT
Start: 2022-09-24 | End: 2022-09-29 | Stop reason: HOSPADM

## 2022-09-24 RX ORDER — METHYLPREDNISOLONE 4 MG/1
8 TABLET ORAL AT BEDTIME
Status: COMPLETED | OUTPATIENT
Start: 2022-09-24 | End: 2022-09-25

## 2022-09-24 RX ORDER — ACETAMINOPHEN 325 MG/1
975 TABLET ORAL 3 TIMES DAILY
Status: DISCONTINUED | OUTPATIENT
Start: 2022-09-24 | End: 2022-09-29 | Stop reason: HOSPADM

## 2022-09-24 RX ORDER — FLUTICASONE PROPIONATE 50 MCG
1 SPRAY, SUSPENSION (ML) NASAL DAILY
Status: DISCONTINUED | OUTPATIENT
Start: 2022-09-24 | End: 2022-09-29 | Stop reason: HOSPADM

## 2022-09-24 RX ORDER — GADOBUTROL 604.72 MG/ML
10 INJECTION INTRAVENOUS ONCE
Status: COMPLETED | OUTPATIENT
Start: 2022-09-24 | End: 2022-09-24

## 2022-09-24 RX ORDER — DULOXETIN HYDROCHLORIDE 30 MG/1
30 CAPSULE, DELAYED RELEASE ORAL 2 TIMES DAILY
Status: DISCONTINUED | OUTPATIENT
Start: 2022-09-24 | End: 2022-09-29 | Stop reason: HOSPADM

## 2022-09-24 RX ORDER — METHYLPREDNISOLONE 4 MG/1
4 TABLET ORAL AT BEDTIME
Status: DISCONTINUED | OUTPATIENT
Start: 2022-09-26 | End: 2022-09-27

## 2022-09-24 RX ORDER — METHYLPREDNISOLONE 4 MG/1
4 TABLET ORAL
Status: COMPLETED | OUTPATIENT
Start: 2022-09-25 | End: 2022-09-27

## 2022-09-24 RX ORDER — METHYLPREDNISOLONE 4 MG/1
4 TABLET ORAL
Status: DISCONTINUED | OUTPATIENT
Start: 2022-09-25 | End: 2022-09-27

## 2022-09-24 RX ORDER — PREGABALIN 150 MG/1
150 CAPSULE ORAL 3 TIMES DAILY
COMMUNITY
End: 2022-09-29

## 2022-09-24 RX ORDER — NICOTINE POLACRILEX 4 MG
15-30 LOZENGE BUCCAL
Status: DISCONTINUED | OUTPATIENT
Start: 2022-09-24 | End: 2022-09-29 | Stop reason: HOSPADM

## 2022-09-24 RX ORDER — EXENATIDE 2 MG/.85ML
2 INJECTION, SUSPENSION, EXTENDED RELEASE SUBCUTANEOUS
COMMUNITY
End: 2023-01-17

## 2022-09-24 RX ORDER — MONTELUKAST SODIUM 10 MG/1
10 TABLET ORAL AT BEDTIME
Status: DISCONTINUED | OUTPATIENT
Start: 2022-09-24 | End: 2022-09-29 | Stop reason: HOSPADM

## 2022-09-24 RX ORDER — KETOROLAC TROMETHAMINE 15 MG/ML
15 INJECTION, SOLUTION INTRAMUSCULAR; INTRAVENOUS ONCE
Status: COMPLETED | OUTPATIENT
Start: 2022-09-24 | End: 2022-09-24

## 2022-09-24 RX ORDER — GLIPIZIDE 5 MG/1
5 TABLET, FILM COATED, EXTENDED RELEASE ORAL DAILY
Status: ON HOLD | COMMUNITY
End: 2022-09-29

## 2022-09-24 RX ORDER — CETIRIZINE HYDROCHLORIDE 10 MG/1
10 TABLET ORAL DAILY
Status: DISCONTINUED | OUTPATIENT
Start: 2022-09-24 | End: 2022-09-29 | Stop reason: HOSPADM

## 2022-09-24 RX ORDER — LOSARTAN POTASSIUM 50 MG/1
100 TABLET ORAL DAILY
Status: DISCONTINUED | OUTPATIENT
Start: 2022-09-24 | End: 2022-09-28

## 2022-09-24 RX ORDER — METHYLPREDNISOLONE 4 MG/1
8 TABLET ORAL ONCE
Status: COMPLETED | OUTPATIENT
Start: 2022-09-24 | End: 2022-09-24

## 2022-09-24 RX ORDER — ROSUVASTATIN CALCIUM 10 MG/1
20 TABLET, COATED ORAL DAILY
Status: DISCONTINUED | OUTPATIENT
Start: 2022-09-24 | End: 2022-09-26

## 2022-09-24 RX ORDER — PANTOPRAZOLE SODIUM 20 MG/1
20 TABLET, DELAYED RELEASE ORAL DAILY
Status: DISCONTINUED | OUTPATIENT
Start: 2022-09-24 | End: 2022-09-29 | Stop reason: HOSPADM

## 2022-09-24 RX ORDER — NORTRIPTYLINE HCL 10 MG
10 CAPSULE ORAL AT BEDTIME
COMMUNITY
End: 2024-03-05

## 2022-09-24 RX ORDER — METHOCARBAMOL 500 MG/1
500 TABLET, FILM COATED ORAL 4 TIMES DAILY
Status: DISCONTINUED | OUTPATIENT
Start: 2022-09-24 | End: 2022-09-29 | Stop reason: HOSPADM

## 2022-09-24 RX ORDER — METHOCARBAMOL 500 MG/1
500 TABLET, FILM COATED ORAL 4 TIMES DAILY PRN
COMMUNITY
End: 2023-01-17

## 2022-09-24 RX ORDER — HEPARIN SODIUM 10000 [USP'U]/100ML
0-5000 INJECTION, SOLUTION INTRAVENOUS CONTINUOUS
Status: DISCONTINUED | OUTPATIENT
Start: 2022-09-24 | End: 2022-09-25

## 2022-09-24 RX ORDER — IOPAMIDOL 755 MG/ML
100 INJECTION, SOLUTION INTRAVASCULAR ONCE
Status: COMPLETED | OUTPATIENT
Start: 2022-09-24 | End: 2022-09-24

## 2022-09-24 RX ORDER — NORTRIPTYLINE HCL 10 MG
10 CAPSULE ORAL AT BEDTIME
Status: DISCONTINUED | OUTPATIENT
Start: 2022-09-24 | End: 2022-09-29 | Stop reason: HOSPADM

## 2022-09-24 RX ORDER — PREGABALIN 75 MG/1
150 CAPSULE ORAL 3 TIMES DAILY
Status: DISCONTINUED | OUTPATIENT
Start: 2022-09-24 | End: 2022-09-25

## 2022-09-24 RX ORDER — LORAZEPAM 2 MG/ML
0.5 INJECTION INTRAMUSCULAR ONCE
Status: COMPLETED | OUTPATIENT
Start: 2022-09-24 | End: 2022-09-24

## 2022-09-24 RX ORDER — AMOXICILLIN 250 MG
1 CAPSULE ORAL DAILY PRN
Status: DISCONTINUED | OUTPATIENT
Start: 2022-09-24 | End: 2022-09-29 | Stop reason: HOSPADM

## 2022-09-24 RX ORDER — METHYLPREDNISOLONE 4 MG/1
4 TABLET ORAL
Status: COMPLETED | OUTPATIENT
Start: 2022-09-25 | End: 2022-09-26

## 2022-09-24 RX ORDER — MORPHINE SULFATE 2 MG/ML
2-4 INJECTION, SOLUTION INTRAMUSCULAR; INTRAVENOUS EVERY 4 HOURS PRN
Status: DISCONTINUED | OUTPATIENT
Start: 2022-09-24 | End: 2022-09-25

## 2022-09-24 RX ADMIN — GADOBUTROL 10 ML: 604.72 INJECTION INTRAVENOUS at 09:21

## 2022-09-24 RX ADMIN — MORPHINE SULFATE 4 MG: 2 INJECTION, SOLUTION INTRAMUSCULAR; INTRAVENOUS at 17:00

## 2022-09-24 RX ADMIN — AMITRIPTYLINE HYDROCHLORIDE 25 MG: 25 TABLET, FILM COATED ORAL at 21:26

## 2022-09-24 RX ADMIN — MONTELUKAST 10 MG: 10 TABLET, FILM COATED ORAL at 21:26

## 2022-09-24 RX ADMIN — MORPHINE SULFATE 4 MG: 4 INJECTION INTRAVENOUS at 11:22

## 2022-09-24 RX ADMIN — DULOXETINE HYDROCHLORIDE 30 MG: 30 CAPSULE, DELAYED RELEASE ORAL at 20:07

## 2022-09-24 RX ADMIN — KETOROLAC TROMETHAMINE 15 MG: 15 INJECTION, SOLUTION INTRAMUSCULAR; INTRAVENOUS at 07:58

## 2022-09-24 RX ADMIN — DICLOFENAC 2 G: 10 GEL TOPICAL at 20:21

## 2022-09-24 RX ADMIN — NORTRIPTYLINE HYDROCHLORIDE 10 MG: 10 CAPSULE ORAL at 21:27

## 2022-09-24 RX ADMIN — METHYLPREDNISOLONE 4 MG: 4 TABLET ORAL at 20:00

## 2022-09-24 RX ADMIN — PANTOPRAZOLE SODIUM 20 MG: 20 TABLET, DELAYED RELEASE ORAL at 15:57

## 2022-09-24 RX ADMIN — METHYLPREDNISOLONE 8 MG: 4 TABLET ORAL at 23:58

## 2022-09-24 RX ADMIN — LOSARTAN POTASSIUM 100 MG: 50 TABLET, FILM COATED ORAL at 15:57

## 2022-09-24 RX ADMIN — OXYCODONE HYDROCHLORIDE 10 MG: 5 TABLET ORAL at 23:54

## 2022-09-24 RX ADMIN — IOPAMIDOL 100 ML: 755 INJECTION, SOLUTION INTRAVENOUS at 11:54

## 2022-09-24 RX ADMIN — CETIRIZINE HYDROCHLORIDE 10 MG: 10 TABLET ORAL at 15:58

## 2022-09-24 RX ADMIN — MORPHINE SULFATE 4 MG: 2 INJECTION, SOLUTION INTRAMUSCULAR; INTRAVENOUS at 21:18

## 2022-09-24 RX ADMIN — ROSUVASTATIN CALCIUM 20 MG: 10 TABLET, FILM COATED ORAL at 15:59

## 2022-09-24 RX ADMIN — INSULIN GLARGINE 20 UNITS: 100 INJECTION, SOLUTION SUBCUTANEOUS at 21:30

## 2022-09-24 RX ADMIN — METHOCARBAMOL 500 MG: 500 TABLET, FILM COATED ORAL at 20:08

## 2022-09-24 RX ADMIN — ASPIRIN 325 MG: 325 TABLET ORAL at 09:02

## 2022-09-24 RX ADMIN — METHOCARBAMOL 500 MG: 500 TABLET, FILM COATED ORAL at 15:57

## 2022-09-24 RX ADMIN — OXYCODONE HYDROCHLORIDE 10 MG: 5 TABLET ORAL at 18:26

## 2022-09-24 RX ADMIN — CARVEDILOL 12.5 MG: 12.5 TABLET, FILM COATED ORAL at 18:26

## 2022-09-24 RX ADMIN — MORPHINE SULFATE 4 MG: 4 INJECTION, SOLUTION INTRAMUSCULAR; INTRAVENOUS at 08:00

## 2022-09-24 RX ADMIN — HEPARIN SODIUM AND DEXTROSE 1150 UNITS/HR: 10000; 5 INJECTION INTRAVENOUS at 20:00

## 2022-09-24 RX ADMIN — LORAZEPAM 0.5 MG: 2 INJECTION INTRAMUSCULAR; INTRAVENOUS at 09:01

## 2022-09-24 RX ADMIN — METHYLPREDNISOLONE 4 MG: 4 TABLET ORAL at 21:14

## 2022-09-24 RX ADMIN — DICLOFENAC 2 G: 10 GEL TOPICAL at 16:00

## 2022-09-24 RX ADMIN — METHYLPREDNISOLONE 8 MG: 4 TABLET ORAL at 18:38

## 2022-09-24 ASSESSMENT — ENCOUNTER SYMPTOMS
HEMATURIA: 0
MYALGIAS: 1
COUGH: 0
ABDOMINAL PAIN: 0
CHILLS: 0
FEVER: 0
JOINT SWELLING: 1
SHORTNESS OF BREATH: 0

## 2022-09-24 ASSESSMENT — ACTIVITIES OF DAILY LIVING (ADL)
ADLS_ACUITY_SCORE: 32
ADLS_ACUITY_SCORE: 32
ADLS_ACUITY_SCORE: 37
ADLS_ACUITY_SCORE: 38
ADLS_ACUITY_SCORE: 37
ADLS_ACUITY_SCORE: 38
ADLS_ACUITY_SCORE: 32
ADLS_ACUITY_SCORE: 38
ADLS_ACUITY_SCORE: 37

## 2022-09-24 NOTE — ED NOTES
The pt requested to speak with the charge nurse. This author responded. The pt reports her leg pain is terrible and she would like medication for her discomfort. This author notified the patient that her primary nurse would be consulted and MD as needed.

## 2022-09-24 NOTE — ED TRIAGE NOTES
" Bilateral back and leg pain \"shooting down both legs, hard to lift my feet to walk, pain a 10, since 11PM last night\". She took Percocet and muscle relaxer at 11PM. Tried Tylenol at 0200 with no relief. She wants pain relief. She reports HX of double bypass in 6/2009.     Triage Assessment     Row Name 09/24/22 0528       Triage Assessment (Adult)    Airway WDL WDL       Respiratory WDL    Respiratory WDL WDL       Skin Circulation/Temperature WDL    Skin Circulation/Temperature WDL WDL       Cardiac WDL    Cardiac WDL WDL       Peripheral/Neurovascular WDL    Peripheral Neurovascular WDL X  shooting pains in legs       Cognitive/Neuro/Behavioral WDL    Cognitive/Neuro/Behavioral WDL WDL              "
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
## 2022-09-24 NOTE — ED NOTES
Patient sleeping, woke patient to give pain medications and informed patient that we will ambulate her after her CT.  Patient reports pain is too bad to walk.  Will allow pain medications to work and we will walk then.
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
## 2022-09-24 NOTE — PHARMACY-ADMISSION MEDICATION HISTORY
Pharmacy Note - Admission Medication History    Pertinent Provider Information: patient states she is taking both nortriptyline and amitriptyline at home, she was started on 9/23/22 on nortriptyline, with no reference to discontinuing amitriptyline.    ______________________________________________________________________    Prior To Admission (PTA) med list completed and updated in EMR.       PTA Med List   Medication Sig Note Last Dose     acetaminophen (TYLENOL) 325 MG tablet Take 650 mg by mouth every 8 hours as needed for mild pain  9/23/2022 at Unknown time     albuterol (PROAIR HFA) 108 (90 BASE) MCG/ACT inhaler Inhale 1-2 puffs into the lungs every 4 hours as needed  Past Week at Unknown time     amitriptyline (ELAVIL) 25 MG tablet Take 1 tablet (25 mg) by mouth At Bedtime  9/23/2022 at Unknown time     aspirin (ASA) 325 MG EC tablet Take 325 mg by mouth daily   9/23/2022 at Unknown time     budesonide-formoterol (SYMBICORT) 160-4.5 MCG/ACT Inhaler Inhale 2 puffs into the lungs 2 times daily 9/24/2022: Patient states she takes every day, last fill was 2/23/22 9/23/2022 at Unknown time     carvedilol (COREG) 12.5 MG tablet Take 12.5 mg by mouth 2 times daily (with meals)  9/23/2022 at Unknown time     cetirizine (ZYRTEC) 10 MG tablet Take 10 mg by mouth daily  9/23/2022 at Unknown time     diclofenac (VOLTAREN) 1 % topical gel Apply 2 g topically 3 times daily as needed for moderate pain (feet)  9/23/2022 at Unknown time     diphenhydrAMINE (BENADRYL) 25 MG tablet Take 25 mg by mouth every 6 hours as needed for itching or allergies  9/23/2022 at Unknown time     DULoxetine (CYMBALTA) 30 MG capsule Take 30 mg by mouth 2 times daily  9/23/2022 at Unknown time     exenatide ER (BYDUREON BCISE) 2 MG/0.85ML auto-injector Inject 2 mg Subcutaneous every 7 days On Sundays  9/18/2022     fluticasone (FLONASE) 50 MCG/ACT nasal spray Spray 1 spray into both nostrils daily  9/23/2022 at Unknown time     furosemide  (LASIX) 20 MG tablet Take 20 mg by mouth daily as needed (swelling)  9/23/2022 at Unknown time     glipiZIDE (GLUCOTROL XL) 5 MG 24 hr tablet Take 5 mg by mouth daily  9/23/2022 at Unknown time     guaiFENesin-codeine (ROBITUSSIN AC) 100-10 MG/5ML solution Take 1-2 teaspoonful by mouth every 4 hours as needed for cough  Past Month at Unknown time     insulin glargine (LANTUS PEN) 100 UNIT/ML pen Inject 20 Units Subcutaneous At Bedtime  9/23/2022 at Unknown time     ipratropium - albuterol 0.5 mg/2.5 mg/3 mL (DUONEB) 0.5-2.5 (3) MG/3ML neb solution Take 1 vial by nebulization every 6 hours as needed for shortness of breath / dyspnea or wheezing  Past Week at Unknown time     lidocaine (LIDODERM) 5 % patch Place 1 patch onto the skin every 24 hours To prevent lidocaine toxicity, patient should be patch free for 12 hrs daily. BACK  9/23/2022 at Unknown time     losartan (COZAAR) 100 MG tablet Take 100 mg by mouth daily  9/23/2022 at Unknown time     methocarbamol (ROBAXIN) 500 MG tablet Take 500 mg by mouth 4 times daily as needed for muscle spasms  9/23/2022 at Unknown time     naloxone (NARCAN) 4 MG/0.1ML nasal spray Spray 4 mg into one nostril alternating nostrils as needed for opioid reversal every 2-3 minutes until assistance arrives  Unknown at Unknown time     nitroGLYcerin (NITROSTAT) 0.4 MG sublingual tablet Place 0.4 mg under the tongue every 5 minutes as needed for chest pain For chest pain place 1 tablet under the tongue every 5 minutes for 3 doses. If symptoms persist 5 minutes after 1st dose call 911.  Unknown at Unknown time     nortriptyline (PAMELOR) 10 MG capsule Take 10 mg by mouth At Bedtime  9/23/2022 at Unknown time     omeprazole 20 MG tablet Take 20 mg by mouth daily  Unknown at Unknown time     ondansetron (ZOFRAN) 8 MG tablet Take 8 mg by mouth every 8 hours as needed  Unknown at Unknown time     oxyCODONE-acetaminophen (PERCOCET)  MG per tablet Take 1 tablet by mouth every 6 hours as  needed for severe pain  Past Week at Unknown time     pregabalin (LYRICA) 150 MG capsule Take 150 mg by mouth 3 times daily  9/23/2022 at Unknown time     rosuvastatin (CRESTOR) 20 MG tablet Take 20 mg by mouth daily  9/23/2022 at Unknown time     senna-docusate (SENOKOT-S/PERICOLACE) 8.6-50 MG tablet Take 1 tablet by mouth daily as needed  Past Week at Unknown time     urea (DERMAL THERAPY FINGER CARE) 20 % external lotion Externally apply topically 2 times daily as needed Feet  9/23/2022 at Unknown time       Information source(s): Patient and CareEverywhere/SureScripts  Method of interview communication: in-person    Summary of Changes to PTA Med List  New: nortriptyline, lyrica, Bydureon  Discontinued: trulicity, nystatin, neuro PLO gel  Changed: glipizide (from 10mg to 5mg)    Patient was asked about OTC/herbal products specifically.  PTA med list reflects this.    In the past week, patient estimated taking medication this percent of the time:  greater than 90%.    Allergies were reviewed, assessed, and updated with the patient.      Patient does not anticipate needing any multi-use medications during admission.    The information provided in this note is only as accurate as the sources available at the time of the update(s).    Thank you for the opportunity to participate in the care of this patient.    Carolyne Diamond  9/24/2022 11:06 AM

## 2022-09-24 NOTE — ED NOTES
"Patient requesting pain medications via her IV.  Will page hospitalist with patient request.  Patient states \"you no giving me anything because I'm black\"  Informed patient that there is no other pain medications that she has not declined ordered.  Patient angry because her food tray did not have salt and pepper on it.    "

## 2022-09-24 NOTE — ED NOTES
Patient requesting food, writer brought patient a menu and patient is sleeping.  Left menu in room.

## 2022-09-24 NOTE — ED NOTES
"14:53 ED Notes Filed [facility] ED Handoff Report     ED Chief Complaint: Back pain     ED Diagnosis:  (R07.9) Chest pain, unspecified type  Comment: Patient denies pain at this time  Plan:      (M79.604,  M79.605) Bilateral leg pain  Comment: Back pain radiates down both legs  Plan:      (Z86.79) History of coronary artery disease  Comment:   Plan:      (F11.90) Chronic, continuous use of opioids  Comment: Patient reports last dose of Percocet was 1130 last night, UDS from this am was negative for opiates  Plan:         PMH:         Past Medical History:   Diagnosis Date     Asthma       CAD (coronary artery disease)       COPD (chronic obstructive pulmonary disease) (H)       CVA (cerebral infarction)       Diabetes (H)       GERD (gastroesophageal reflux disease)       Hypertension       Polysubstance abuse (H)       S/P CABG (coronary artery bypass graft)       S/P lumbar discectomy 06/13/2019     L5/S1 by  Dr. Hamm at [facility]     Schizoaffective disorder (H)           Code Status:  Prior      Falls Risk: No Band: Not applicable     Current Living Situation/Residence: lives with a significant other      Elimination Status: Continent: Yes and No      Activity Level: SBA     Patients Preferred Language:  English      Needed: No     Vital Signs:  /61   Pulse 80   Temp 98.6  F (37  C) (Oral)   Resp 14   Ht 1.702 m (5' 7\")   Wt 95.3 kg (210 lb)   LMP  (LMP Unknown)   SpO2 100%   BMI 32.89 kg/m        Cardiac Rhythm: NSR- patient has removed monitoring several times.     Pain Score: 8/10     Is the Patient Confused:  No     Last Food or Drink: 09/24/22 at tray ordered at 1430     Focused Assessment:  Reports back pain- chronic, worsening since last night.  Reports radiation down bilateral legs.  Patient able to move all extremities.     Tests Performed: Done: Labs and Imaging     Treatments Provided:  SEE MAR     Family Dynamics/Concerns: No     Family Updated On Visitor " Policy: Yes     Plan of Care Communicated to Family: Yes     Who Was Updated about Plan of Care: Patient updating family      Belongings Checklist Done and Signed by Patient: Yes     Medications sent with patient: NA     Covid: asymptomatic , negative     Additional Information: Neurosurgery consulted with patient in ED.  Patient able to get in and out of bed to use commode unassisted.  Continues to rate pain 8/10.  Given 3 doses of Morphine, 1 dose of Toradol and Ativan.  Is A&O x 4, can verbalize needs.   Last dose of IV morphine given at 1700- ordered every 4 hours.  Patient is agitated about lack of pain medications.    Troponin is trending upward, patient declined draw but has since said she would allow it.  Phlebotomist has been called.      RN: Ermelinda Zepeda RN   9/24/2022 2:53 PM
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
## 2022-09-25 ENCOUNTER — APPOINTMENT (OUTPATIENT)
Dept: CARDIOLOGY | Facility: HOSPITAL | Age: 57
End: 2022-09-25
Attending: INTERNAL MEDICINE
Payer: COMMERCIAL

## 2022-09-25 LAB
GLUCOSE BLDC GLUCOMTR-MCNC: 360 MG/DL (ref 70–99)
GLUCOSE BLDC GLUCOMTR-MCNC: 367 MG/DL (ref 70–99)
GLUCOSE BLDC GLUCOMTR-MCNC: 368 MG/DL (ref 70–99)
GLUCOSE BLDC GLUCOMTR-MCNC: 388 MG/DL (ref 70–99)
HOLD SPECIMEN: NORMAL
LVEF ECHO: NORMAL
TROPONIN T SERPL HS-MCNC: 11 NG/L
TROPONIN T SERPL HS-MCNC: 12 NG/L
TROPONIN T SERPL HS-MCNC: 13 NG/L
UFH PPP CHRO-ACNC: 1.07 IU/ML
UFH PPP CHRO-ACNC: <0.1 IU/ML

## 2022-09-25 PROCEDURE — G0378 HOSPITAL OBSERVATION PER HR: HCPCS

## 2022-09-25 PROCEDURE — 36415 COLL VENOUS BLD VENIPUNCTURE: CPT | Performed by: INTERNAL MEDICINE

## 2022-09-25 PROCEDURE — 82962 GLUCOSE BLOOD TEST: CPT

## 2022-09-25 PROCEDURE — 93306 TTE W/DOPPLER COMPLETE: CPT | Mod: 26 | Performed by: GENERAL ACUTE CARE HOSPITAL

## 2022-09-25 PROCEDURE — 250N000011 HC RX IP 250 OP 636: Performed by: INTERNAL MEDICINE

## 2022-09-25 PROCEDURE — 93306 TTE W/DOPPLER COMPLETE: CPT

## 2022-09-25 PROCEDURE — 96372 THER/PROPH/DIAG INJ SC/IM: CPT | Mod: XU | Performed by: INTERNAL MEDICINE

## 2022-09-25 PROCEDURE — 99226 PR SUBSEQUENT OBSERVATION CARE,LEVEL III: CPT | Performed by: INTERNAL MEDICINE

## 2022-09-25 PROCEDURE — 250N000012 HC RX MED GY IP 250 OP 636 PS 637: Performed by: INTERNAL MEDICINE

## 2022-09-25 PROCEDURE — 250N000013 HC RX MED GY IP 250 OP 250 PS 637: Performed by: INTERNAL MEDICINE

## 2022-09-25 PROCEDURE — 84484 ASSAY OF TROPONIN QUANT: CPT | Performed by: INTERNAL MEDICINE

## 2022-09-25 PROCEDURE — 99204 OFFICE O/P NEW MOD 45 MIN: CPT | Performed by: INTERNAL MEDICINE

## 2022-09-25 PROCEDURE — 85520 HEPARIN ASSAY: CPT | Performed by: INTERNAL MEDICINE

## 2022-09-25 PROCEDURE — 96376 TX/PRO/DX INJ SAME DRUG ADON: CPT

## 2022-09-25 RX ORDER — NALOXONE HYDROCHLORIDE 0.4 MG/ML
0.2 INJECTION, SOLUTION INTRAMUSCULAR; INTRAVENOUS; SUBCUTANEOUS
Status: DISCONTINUED | OUTPATIENT
Start: 2022-09-25 | End: 2022-09-29 | Stop reason: HOSPADM

## 2022-09-25 RX ORDER — MORPHINE SULFATE 2 MG/ML
2 INJECTION, SOLUTION INTRAMUSCULAR; INTRAVENOUS EVERY 12 HOURS PRN
Status: DISPENSED | OUTPATIENT
Start: 2022-09-25 | End: 2022-09-26

## 2022-09-25 RX ORDER — NALOXONE HYDROCHLORIDE 0.4 MG/ML
0.4 INJECTION, SOLUTION INTRAMUSCULAR; INTRAVENOUS; SUBCUTANEOUS
Status: DISCONTINUED | OUTPATIENT
Start: 2022-09-25 | End: 2022-09-29 | Stop reason: HOSPADM

## 2022-09-25 RX ADMIN — DICLOFENAC 2 G: 10 GEL TOPICAL at 20:34

## 2022-09-25 RX ADMIN — METHYLPREDNISOLONE 4 MG: 4 TABLET ORAL at 13:18

## 2022-09-25 RX ADMIN — FLUTICASONE PROPIONATE 1 SPRAY: 50 SPRAY, METERED NASAL at 08:20

## 2022-09-25 RX ADMIN — METHOCARBAMOL 500 MG: 500 TABLET, FILM COATED ORAL at 20:33

## 2022-09-25 RX ADMIN — OXYCODONE HYDROCHLORIDE 10 MG: 5 TABLET ORAL at 16:32

## 2022-09-25 RX ADMIN — DICLOFENAC 2 G: 10 GEL TOPICAL at 08:19

## 2022-09-25 RX ADMIN — OXYCODONE HYDROCHLORIDE 10 MG: 5 TABLET ORAL at 23:47

## 2022-09-25 RX ADMIN — METHYLPREDNISOLONE 8 MG: 4 TABLET ORAL at 21:43

## 2022-09-25 RX ADMIN — AMITRIPTYLINE HYDROCHLORIDE 25 MG: 25 TABLET, FILM COATED ORAL at 21:43

## 2022-09-25 RX ADMIN — MORPHINE SULFATE 4 MG: 2 INJECTION, SOLUTION INTRAMUSCULAR; INTRAVENOUS at 02:51

## 2022-09-25 RX ADMIN — ROSUVASTATIN CALCIUM 20 MG: 10 TABLET, FILM COATED ORAL at 08:19

## 2022-09-25 RX ADMIN — OXYCODONE HYDROCHLORIDE 10 MG: 5 TABLET ORAL at 05:08

## 2022-09-25 RX ADMIN — CARVEDILOL 12.5 MG: 12.5 TABLET, FILM COATED ORAL at 18:58

## 2022-09-25 RX ADMIN — METHOCARBAMOL 500 MG: 500 TABLET, FILM COATED ORAL at 08:18

## 2022-09-25 RX ADMIN — MORPHINE SULFATE 4 MG: 2 INJECTION, SOLUTION INTRAMUSCULAR; INTRAVENOUS at 06:59

## 2022-09-25 RX ADMIN — METHOCARBAMOL 500 MG: 500 TABLET, FILM COATED ORAL at 16:27

## 2022-09-25 RX ADMIN — DICLOFENAC 2 G: 10 GEL TOPICAL at 16:27

## 2022-09-25 RX ADMIN — LOSARTAN POTASSIUM 100 MG: 50 TABLET, FILM COATED ORAL at 08:19

## 2022-09-25 RX ADMIN — METHOCARBAMOL 500 MG: 500 TABLET, FILM COATED ORAL at 12:07

## 2022-09-25 RX ADMIN — PANTOPRAZOLE SODIUM 20 MG: 20 TABLET, DELAYED RELEASE ORAL at 08:19

## 2022-09-25 RX ADMIN — NORTRIPTYLINE HYDROCHLORIDE 10 MG: 10 CAPSULE ORAL at 21:43

## 2022-09-25 RX ADMIN — ASPIRIN 325 MG: 325 TABLET, COATED ORAL at 08:30

## 2022-09-25 RX ADMIN — ACETAMINOPHEN 975 MG: 325 TABLET, FILM COATED ORAL at 08:18

## 2022-09-25 RX ADMIN — DICLOFENAC 2 G: 10 GEL TOPICAL at 12:09

## 2022-09-25 RX ADMIN — ACETAMINOPHEN 975 MG: 325 TABLET, FILM COATED ORAL at 20:33

## 2022-09-25 RX ADMIN — METHYLPREDNISOLONE 4 MG: 4 TABLET ORAL at 16:26

## 2022-09-25 RX ADMIN — OXYCODONE HYDROCHLORIDE 10 MG: 5 TABLET ORAL at 10:43

## 2022-09-25 RX ADMIN — INSULIN ASPART 10 UNITS: 100 INJECTION, SOLUTION INTRAVENOUS; SUBCUTANEOUS at 07:10

## 2022-09-25 RX ADMIN — CARVEDILOL 12.5 MG: 12.5 TABLET, FILM COATED ORAL at 08:18

## 2022-09-25 RX ADMIN — MORPHINE SULFATE 2 MG: 2 INJECTION, SOLUTION INTRAMUSCULAR; INTRAVENOUS at 18:58

## 2022-09-25 RX ADMIN — HEPARIN SODIUM AND DEXTROSE 1450 UNITS/HR: 10000; 5 INJECTION INTRAVENOUS at 05:07

## 2022-09-25 RX ADMIN — DULOXETINE HYDROCHLORIDE 30 MG: 30 CAPSULE, DELAYED RELEASE ORAL at 08:29

## 2022-09-25 RX ADMIN — ACETAMINOPHEN 975 MG: 325 TABLET, FILM COATED ORAL at 13:18

## 2022-09-25 RX ADMIN — CETIRIZINE HYDROCHLORIDE 10 MG: 10 TABLET ORAL at 08:18

## 2022-09-25 RX ADMIN — DULOXETINE HYDROCHLORIDE 30 MG: 30 CAPSULE, DELAYED RELEASE ORAL at 20:37

## 2022-09-25 RX ADMIN — INSULIN ASPART 5 UNITS: 100 INJECTION, SOLUTION INTRAVENOUS; SUBCUTANEOUS at 13:20

## 2022-09-25 RX ADMIN — METHYLPREDNISOLONE 4 MG: 4 TABLET ORAL at 06:58

## 2022-09-25 RX ADMIN — MONTELUKAST 10 MG: 10 TABLET, FILM COATED ORAL at 21:43

## 2022-09-25 ASSESSMENT — ACTIVITIES OF DAILY LIVING (ADL)
ADLS_ACUITY_SCORE: 33
ADLS_ACUITY_SCORE: 32
ADLS_ACUITY_SCORE: 33
ADLS_ACUITY_SCORE: 32
ADLS_ACUITY_SCORE: 33
DEPENDENT_IADLS:: TRANSPORTATION
ADLS_ACUITY_SCORE: 33

## 2022-09-25 NOTE — PROGRESS NOTES
[facility] ACUTE PAIN SERVICE CONSULTATION     Date of Admission:  9/24/2022  Date of Consult (When I saw the patient): 09/25/22   Reason for consult: acute on chronic low back pain  Primary Care Physician: Flako Rosen MD     Assessment/Plan:     Sarah Rahman is a 57 year old female who was admitted on 9/24/2022.  Pain team was asked to see the patient for acute on chronic low back pain. Admitted for pain. History of Diabetes, hypertension, CAD s/p CABG, asthma, chronic lower back pain, schizoaffective disorder, and substance abuse presents to ER for evaluation of back pain and chest pain.     .  Describes pain as 8/10 and a little light headed. The patient denies nausea. Diet is low carb. The patient does   The patient does smoke (reported that she was thinking about quitting) and she denies chemical dependency history (although malingering and cocaine abuse noted in her chart for >10 years, also has had positive cannabinoids in urine).     Patient is well known to our team, last seen 7/ 2022. During last admission, found success with PLO gel, lidocaine patches, Amitryptyline, Cymbalta, and Percocet. She follows at [facility]. She is not interested in injections, spinal stimulator which has been discussed with her in the past.      Opioid Induced Respiratory Depression Risk Assessment: high?multiple sedatives\    She tells me that there was no oxycodone in her urinalysis because 'I wanted to see how long I could go without taking it so I didn't take it for 4 days prior to admission'. Discussed if this was possibly a precipitating event to needing to come to ER with severe pain. She is unsure. Discussed caution with self tapering and self restarting sedatives such as opioids. She declines using opioids in any way or for any other people other than prescribed.     Did discuss that we will taper down (today) and then off (tomorrow AM) IV opioids, as I do not see a clear indication per  Imaging and Neurosurgery consult for IV opioids or dose escalations. She does feel frustrated by this change, as 'my pain is so bad though'. I do verbalize empathy for this and discuss non opioid pharmacologic and non pharmacologic approaches to dealing with these acute on chronic painful flares. She does endorse that this feels 'the same' as her chronic pain, just worse (aka no different sensations, locations, etc. Just worse than usual). Will decrease dose and frequency of IV today, and plan to stop order first thing tomorrow AM. She verbalizes frustration of this but does acknowledge understanding.     PLAN:   1) Pain is consistent with chronic leg and back pain.  Labs and imaging indicated: I have personally reviewed pertinent labs, tests, and radiologic imaging in patient's chart. Treatment plan includes multimodal pain approach, Hospital Medicine Service for medical management.  Patient does have a history of known peripheral neuropathy and chronic low back pain with lumbar degenerative changes noted on imaging.  . Patient educated regarding multimodal pain approach, medications as listed below, reviewed discharge medications, advised keeping track of doses, educated on tapering off as pain improves, watch for constipation and stool softeners, no driving or alcohol with opioids, store medications in a safe place, advised to take no more than the prescribed dose as increased doses may cause respiratory depression or death, . Patient is understanding of the plan. All questions and concerns addressed to patient's satisfaction.   2)Multimodal Medication Therapy  Topical: diclofenac gel qid  NSAID'S: CrCl 95ml/min, none  Medrol dose presley. : this should be extremely beneficial for current inflammatory pain, discussed with patient that this will likely make even more of an impact that an opioid escalation would, she is thankful for this discussion on how the steroids work.  Muscle Relaxants: Robaxin 500mg  qid  Adjuvants:  APAP 975mg TID, Lyrica 150mg TID; 'i'm not going to take this, it doesn't help and it just keeps me swollen up'. Patient says she does not use this at home.  Will discontinue this now ,watch for any s/s of withdrawal just in case she has been using it but is unaware (she tells me she hasn't taken this for weeks)  She does not want to try gabapentin instead, despite our discussion on how the gabapentinoids are first line for nerve pain.  Antidepressants/anxiolytics: Cymbalta 30mg BID, amitriptyline and nortriptyline: it does appear she uses both of these, and she verbalizes this. (has picked up both from pharmacy within last 30 days: 2 different prescribers, patient says that her care team purposely wants her on both of these)  Opioids: oxycodone 5-10mg q4hprn: discussed that tomorrow we will decrease this to (home) frequency of q6h. She is unsure if she will be 'ready' but we discussed that we don't really have an indication for increase in opioids here, per MRI reports.   IV Pain medication: Morphine 2-4mg q4hprn: decrease to 2mg [id number], order to stop tomorrow AM at 0900.     3)Non-medication interventions:   Acupuncture consult - offered and would like to receive this week     4)Constipation Prophylaxis: Scheduled and prn: sennaS prn  5) Care Teams: cardiology  neurosurgery  -Opioid prescriber has been Gustavo VILLALOBOS PMP pulled from system on 9/25. This indicates   Chronic pregabalin  Chronic percocet, 9/2 10/325mg QID, also 8/3, 7/5  Ketamine powder  Discharge Recommendations - We recommend prescribing the following at the time of discharge: TBD, no opioid Rx, follows with pain clinic, last got 30 day supply on 9/2/22.  . Follow up : pain clinic and primary care.       History of Present Illness (HPI):       Sarah Rahman is a 57 year old old female who presented for back pain.  Past medical history as above. The pain is reported to be acute on chronic , located in the low back, and it  does l radiate to legs.        Per [place]  review, the patient does  have an opioid tolerance. Opioid induced side effects noted and include: sedation, nausea, and constipation, sleep dysfunction, sleep apnea, and disease of addiction: denies.      Reviewed medical record, labs, imaging, ED note, and care everywhere.     Past pain treatments have included: Pain Clinic      Home pain medications/psych medications/anticoagulation medications include: as above per      Video-Visit Details    Type of service:  Video Visit    Video Start Time (time video started): 0835    Video End Time (time video stopped): 0850    Originating Location: remote    Distant Location (location):  [facility]     Mode of Communication:  Video Conference via [employer]    Physician has received verbal consent for a Video Visit from the patient? Yes      Medical History   PAST MEDICAL HISTORY:   Past Medical History:   Diagnosis Date     Asthma      CAD (coronary artery disease)      COPD (chronic obstructive pulmonary disease) (H)      CVA (cerebral infarction)      Diabetes (H)      GERD (gastroesophageal reflux disease)      Hypertension      Polysubstance abuse (H)      S/P CABG (coronary artery bypass graft)      S/P lumbar discectomy 06/13/2019    L5/S1 by  Dr. Hamm at [facility]     Schizoaffective disorder (H)        PAST SURGICAL HISTORY:   Past Surgical History:   Procedure Laterality Date     BYPASS GRAFT ARTERY CORONARY  2009    x2     CORONARY STENT PLACEMENT       CV CORONARY ANGIOGRAM N/A 6/2/2021    Procedure: Coronary Angiogram;  Surgeon: Juventino Rivera MD;  Location: [facility] Cardiac Cath Lab;  Service: Cardiology     HYSTERECTOMY TOTAL ABDOMINAL, BILATERAL SALPINGO-OOPHORECTOMY, COMBINED       ORIF ULNAR / RADIAL SHAFT FRACTURE Right        FAMILY HISTORY:   Family History   Problem Relation Age of Onset     Heart Disease Mother      Heart Disease Father      Heart Disease  "Sister      Diabetes Brother      Heart Disease Sister        SOCIAL HISTORY:   Social History     Tobacco Use     Smoking status: Current Every Day Smoker     Packs/day: 0.50     Types: Cigarettes     Smokeless tobacco: Never Used   Substance Use Topics     Alcohol use: Not Currently     Comment: Alcoholic Drinks/day: [facility]        HEALTH & LIFESTYLE PRACTICES  Tobacco:  reports that she has been smoking cigarettes. She has been smoking about 0.50 packs per day. She has never used smokeless tobacco.  Alcohol:  reports previous alcohol use.  Illicit drugs:  reports no history of drug use.    Allergies  Allergies   Allergen Reactions     Haloperidol Unknown     Patient states it stops her heart       Meperidine Angioedema, Difficulty breathing, Other (See Comments), Rash and Shortness Of Breath     Throat closes  Other reaction(s): Breathing Difficulty  Other reaction(s): Breathing Difficulty       Phenothiazines Other (See Comments)     Other reaction(s): CARDIAC DISTURBANCES  Patient states it stops her heart  \"I swell up\"  \"stopped by heart\"  \"I swell up\"  \"I swell up\"       Tramadol Other (See Comments)     Other reaction(s): Angioedema  Throat itch       Butyrophenones Angioedema     Januvia [Sitagliptin] Other (See Comments)     Swelling in the neck      Latex Itching     Lisinopril Other (See Comments)     ACE cough  Itchy throat  Throat itches  Itchy throat       Penicillins Swelling     Hydroxyzine Other (See Comments) and Rash     Tongue swelling  Tongue swelling  Tongue swelling         Problem List  Patient Active Problem List    Diagnosis Date Noted     Anemia 07/17/2022     Priority: Medium     Anxiety as acute reaction to gross stress 07/17/2022     Priority: Medium     History of drug abuse (H) 07/17/2022     Priority: Medium     Formatting of this note might be different from the original.  Cocaine history, per psych records last 11/2010, used in setting of grief after her daughter's death  Formatting " of this note might be different from the original.  Formatting of this note might be different from the original.  Cocaine history, per psych records last 11/2010, used in setting of grief after her daughter's death       Mood disorder due to medical condition 07/17/2022     Priority: Medium     Obesity 07/17/2022     Priority: Medium     Sleep difficulties 07/17/2022     Priority: Medium     Right sided weakness 07/17/2022     Priority: Medium     Chronic pain syndrome 07/17/2022     Priority: Medium     Asthma in adult, unspecified asthma severity, uncomplicated 07/17/2022     Priority: Medium     Diabetic polyneuropathy associated with type 2 diabetes mellitus (H) 06/10/2022     Priority: Medium     Nasal polyp 06/10/2022     Priority: Medium     Chronic schizoaffective disorder (H) 06/10/2022     Priority: Medium     Bilateral low back pain with right-sided sciatica, unspecified chronicity 04/03/2022     Priority: Medium     Chronic obstructive pulmonary disease (H) 01/15/2022     Priority: Medium     Uncontrolled type 2 diabetes mellitus with hyperglycemia (H) 12/13/2021     Priority: Medium     Cerebrovascular accident (CVA) due to stenosis of carotid artery (H) 10/17/2021     Priority: Medium     Acute recurrent maxillary sinusitis 10/17/2021     Priority: Medium     Myelomalacia of cervical cord (H) 10/08/2021     Priority: Medium     Noncompliance 10/08/2021     Priority: Medium     Sensorineural hearing loss (SNHL) of right ear 10/08/2021     Priority: Medium     Carotid stenosis, left 10/03/2021     Priority: Medium     Formatting of this note might be different from the original.  Added automatically from request for surgery [id number]       Depression with anxiety 08/01/2021     Priority: Medium     Post-COVID syndrome 07/11/2021     Priority: Medium     Dyslipidemia 05/02/2021     Priority: Medium     Formatting of this note might be different from the original.  Formatting of this note might be different  from the original.    Low HDL  Formatting of this note might be different from the original.    Low HDL       Hx of syphilis 09/16/2020     Priority: Medium     Formatting of this note might be different from the original.  09/08/20  TPPA positive, Treponema screen positive  09/14/20  Treatment: Doxycycline 100mg po bid x 30 days  10/27/20  TPPA positive       Weakness 01/29/2020     Priority: Medium     Leg swelling 10/11/2019     Priority: Medium     Lumbar disc herniation 06/14/2019     Priority: Medium     Syncope and collapse 10/21/2018     Priority: Medium     Sacroiliac joint disease 10/09/2018     Priority: Medium     Myalgia 02/20/2018     Priority: Medium     Intercostal neuritis 02/06/2018     Priority: Medium     Dyshidrosis (pompholyx) 10/21/2016     Priority: Medium     Chest pain 10/02/2015     Priority: Medium     Seasonal allergies 07/24/2015     Priority: Medium     Systolic and diastolic CHF, chronic (H) 09/15/2014     Priority: Medium     Formatting of this note might be different from the original.  Formatting of this note might be different from the original.  Sep '14: Mildly diminished LV function ~50%, grade 1 diastolic dysfunction.  Formatting of this note might be different from the original.  Sep '14: Mildly diminished LV function ~50%, grade 1 diastolic dysfunction.       Pulmonary nodule 09/11/2014     Priority: Medium     Formatting of this note might be different from the original.  Needs repeat CT Chest late Sep 2014 for interval change  Formatting of this note might be different from the original.  Needs repeat CT Chest late Sep 2014 for interval change  Formatting of this note might be different from the original.  Formatting of this note might be different from the original.  Needs repeat CT Chest late Sep 2014 for interval change       Menopausal flushing 04/30/2014     Priority: Medium     ACP (advance care planning) 02/12/2014     Priority: Medium     Formatting of this note  might be different from the original.  Overview:   Formatting of this note may be different from the original.  Health Care Directives: Patient has identified Health Care Agent(s): Yes  Add Health Care Agents: Yes    Health Care Agent(s):  Primary Health Care Agent: Elsie Rahman  Relationship: Daughter Phone: [phone]   Secondary Health Care Agent:  Relationship:  Phone:    Conservator:  Relationship:  Phone:    Guardian: Relationship:  Phone:      Patient has Advance Care Plan Documents (Health Care Directive, POLST): No, Health Care Packet given to patient.    Patient has identified Specific Treatment Preferences: No   Specific limits to treatment preferences NOT identified: ASSUME FULL TREATMENT.  Formatting of this note might be different from the original.  Overview:   Overview:   Formatting of this note may be different from the original.  Health Care Directives: Patient has identified Health Care Agent(s): Yes  Add Health Care Agents: Yes    Health Care Agent(s):  Primary Health Care Agent: Elsie Rahman  Relationship: Daughter Phone: [phone]   Secondary Health Care Agent:  Relationship:  Phone:    Conservator:  Relationship:  Phone:    Guardian: Relationship:  Phone:      Patient has Advance Care Plan Documents (Health Care Directive, POLST): No, Health Care Packet given to patient.    Patient has identified Specific Treatment Preferences: No   Specific limits to treatment preferences NOT identified: ASSUME FULL TREATMENT.  Formatting of this note might be different from the original.  Health Care Directives: Patient has identified Health Care Agent(s): YesAdd Health Care Agents: Yes  Health Care Agent(s):Primary Health Care Agent: Elsie Rahman  Relationship: Daughter Phone: [phone]   Secondary Health Care Agent:  Relationship:  Phone:    Conservator:  Relationship:  Phone:    Guardian: Relationship:  Phone:      Patient has Advance Care Plan Documents (Health Care Directive, POLST): No,  Health Care Packet given to patient.    Patient has identified Specific Treatment Preferences: No   Specific limits to treatment preferences NOT identified: ASSUME FULL TREATMENT.       Nephrolithiasis 08/29/2013     Priority: Medium     Coronary atherosclerosis 03/21/2013     Priority: Medium     Type 2 diabetes mellitus with diabetic polyneuropathy, with long-term current use of insulin (H) 03/21/2013     Priority: Medium     Problem list name updated by automated process. Provider to review       Schizoaffective disorder (H) 03/21/2013     Priority: Medium     Problem list name updated by automated process. Provider to review  Diagnosis updated by automated process. Provider to review and confirm.       Health Care Home 03/21/2013     Priority: Medium     Tier 1  DX V65.8 REPLACED WITH [id number] HEALTH CARE HOME (04/08/2013)       GERD (gastroesophageal reflux disease) 10/15/2012     Priority: Medium     History of CVA (cerebrovascular accident) 04/01/2012     Priority: Medium     Formatting of this note might be different from the original.  Overview:   Bilateral subacute cerebellar infarcts seen on MRI at [facility] 4/2012.  Pt was noted to have no residual deficits at [facility].  Formatting of this note might be different from the original.  Bilateral subacute cerebellar infarcts seen on MRI at [facility] 4/2012.  Pt was noted to have no residual deficits at [facility].  Formatting of this note might be different from the original.  Bilateral subacute cerebellar infarcts seen on MRI at [facility] 4/2012.  Pt was noted to have no residual deficits at [facility].  Formatting of this note might be different from the original.  Formatting of this note might be different from the original.  Bilateral subacute cerebellar infarcts seen on MRI at [facility] 4/2012.  Pt was noted to have no residual deficits at [facility].       Hyperlipidemia with target  low density lipoprotein (LDL) cholesterol less than 70 mg/dL 01/30/2012     Priority: Medium     Moderate persistent asthma 09/12/2011     Priority: Medium     Smoker 02/03/2008     Priority: Medium     Drug dependence (H) 02/15/2006     Priority: Medium     Formatting of this note might be different from the original.  Overview:   Epic       Essential hypertension, benign 02/15/2006     Priority: Medium     Formatting of this note might be different from the original.  Overview:   [facility]         Prior to Admission Medications   Medications Prior to Admission   Medication Sig Dispense Refill Last Dose     acetaminophen (TYLENOL) 325 MG tablet Take 650 mg by mouth every 8 hours as needed for mild pain   9/23/2022 at Unknown time     albuterol (PROAIR HFA) 108 (90 BASE) MCG/ACT inhaler Inhale 1-2 puffs into the lungs every 4 hours as needed   Past Week at Unknown time     amitriptyline (ELAVIL) 25 MG tablet Take 1 tablet (25 mg) by mouth At Bedtime 90 tablet 3 9/23/2022 at Unknown time     aspirin (ASA) 325 MG EC tablet Take 325 mg by mouth daily    9/23/2022 at Unknown time     budesonide-formoterol (SYMBICORT) 160-4.5 MCG/ACT Inhaler Inhale 2 puffs into the lungs 2 times daily   9/23/2022 at Unknown time     carvedilol (COREG) 12.5 MG tablet Take 12.5 mg by mouth 2 times daily (with meals)   9/23/2022 at Unknown time     cetirizine (ZYRTEC) 10 MG tablet Take 10 mg by mouth daily   9/23/2022 at Unknown time     diclofenac (VOLTAREN) 1 % topical gel Apply 2 g topically 3 times daily as needed for moderate pain (feet)   9/23/2022 at Unknown time     diphenhydrAMINE (BENADRYL) 25 MG tablet Take 25 mg by mouth every 6 hours as needed for itching or allergies   9/23/2022 at Unknown time     DULoxetine (CYMBALTA) 30 MG capsule Take 30 mg by mouth 2 times daily   9/23/2022 at Unknown time     exenatide ER (BYDUREON BCISE) 2 MG/0.85ML auto-injector Inject 2 mg Subcutaneous every 7 days On Sundays   9/18/2022     fluticasone  (FLONASE) 50 MCG/ACT nasal spray Spray 1 spray into both nostrils daily   9/23/2022 at Unknown time     furosemide (LASIX) 20 MG tablet Take 20 mg by mouth daily as needed (swelling)   9/23/2022 at Unknown time     glipiZIDE (GLUCOTROL XL) 5 MG 24 hr tablet Take 5 mg by mouth daily   9/23/2022 at Unknown time     guaiFENesin-codeine (ROBITUSSIN AC) 100-10 MG/5ML solution Take 1-2 teaspoonful by mouth every 4 hours as needed for cough   Past Month at Unknown time     insulin glargine (LANTUS PEN) 100 UNIT/ML pen Inject 20 Units Subcutaneous At Bedtime 30 mL 0 9/23/2022 at Unknown time     ipratropium - albuterol 0.5 mg/2.5 mg/3 mL (DUONEB) 0.5-2.5 (3) MG/3ML neb solution Take 1 vial by nebulization every 6 hours as needed for shortness of breath / dyspnea or wheezing   Past Week at Unknown time     lidocaine (LIDODERM) 5 % patch Place 1 patch onto the skin every 24 hours To prevent lidocaine toxicity, patient should be patch free for 12 hrs daily. BACK   9/23/2022 at Unknown time     losartan (COZAAR) 100 MG tablet Take 100 mg by mouth daily   9/23/2022 at Unknown time     methocarbamol (ROBAXIN) 500 MG tablet Take 500 mg by mouth 4 times daily as needed for muscle spasms   9/23/2022 at Unknown time     naloxone (NARCAN) 4 MG/0.1ML nasal spray Spray 4 mg into one nostril alternating nostrils as needed for opioid reversal every 2-3 minutes until assistance arrives   Unknown at Unknown time     nitroGLYcerin (NITROSTAT) 0.4 MG sublingual tablet Place 0.4 mg under the tongue every 5 minutes as needed for chest pain For chest pain place 1 tablet under the tongue every 5 minutes for 3 doses. If symptoms persist 5 minutes after 1st dose call 911.   Unknown at Unknown time     nortriptyline (PAMELOR) 10 MG capsule Take 10 mg by mouth At Bedtime   9/23/2022 at Unknown time     omeprazole 20 MG tablet Take 20 mg by mouth daily   Unknown at Unknown time     ondansetron (ZOFRAN) 8 MG tablet Take 8 mg by mouth every 8 hours as  needed   Unknown at Unknown time     oxyCODONE-acetaminophen (PERCOCET)  MG per tablet Take 1 tablet by mouth every 6 hours as needed for severe pain   Past Week at Unknown time     pregabalin (LYRICA) 150 MG capsule Take 150 mg by mouth 3 times daily   9/23/2022 at Unknown time     rosuvastatin (CRESTOR) 20 MG tablet Take 20 mg by mouth daily   9/23/2022 at Unknown time     senna-docusate (SENOKOT-S/PERICOLACE) 8.6-50 MG tablet Take 1 tablet by mouth daily as needed   Past Week at Unknown time     urea (DERMAL THERAPY FINGER CARE) 20 % external lotion Externally apply topically 2 times daily as needed Feet   9/23/2022 at Unknown time     alcohol swab prep pads Use to swab area of injection/zac as directed. 100 each 6      blood glucose (ACCU-CHEK DARRIN PLUS) test strip Use to test blood sugar 3-4 times daily or as directed. 100 strip 0      blood glucose (ACCU-CHEK SOFTCLIX) lancing device Lancing device to be used with lancets. 1 each 0      blood glucose monitoring (SOFTCLIX) lancets Use to test blood sugar 3-4 times daily. 100 each 1      Continuous Blood Gluc Sensor (FREESTYLE SHEBA 14 DAY SENSOR) MIS 1 each every 14 days Use 1 Sensor every 14 days. Use to read blood sugars per 's instructions. 2 each 5      insulin pen needle (32G X 4 MM) 32G X 4 MM miscellaneous Use 1 pen needles daily or as directed. 100 each 0      montelukast (SINGULAIR) 10 MG tablet Take 10 mg by mouth At Bedtime          Radha Muñoz PharmD  Acute Care Pain Management Program  [facility] ([place], [place], [place])  Monday-Friday 8a-4p   Page via online paging system or call [phone]

## 2022-09-25 NOTE — PLAN OF CARE
Problem: Pain Chronic (Persistent)  Goal: Acceptable Pain Control and Functional Ability  Outcome: Ongoing, Not Progressing  Intervention: Manage Persistent Pain  Recent Flowsheet Documentation  Taken 9/24/2022 2118 by Alba Alonso, RN  Medication Review/Management: medications reviewed  Taken 9/24/2022 1806 by Alba Alonso, RN  Medication Review/Management: medications reviewed     Problem: Chest Pain  Goal: Resolution of Chest Pain Symptoms  Outcome: Ongoing, Progressing     Goal Outcome Evaluation:         Troponin mildly elevated. Started on heparin drip. Denied any chest pain. Cardiology to see. Complaint of right leg pain, 10 out of 10. Gave PRN IV morphine 4 mg and oxycodone 10 mg. Also getting medrol dose pack, she does has vidya edema on her L5-S1 on her MRI. Blood sugar elevated 210 and 306. Got insulin coverage as well as lantus.

## 2022-09-25 NOTE — CONSULTS
Cardiology Consult Note    Thank you, Dr. Miller, for asking the [facility] team to see Sarah Rahman in consultation at [facility] to evaluate chest pain, mild troponin elevation.      Assessment:   1.  Chest pain, etiology unclear as patient describes both sharp jabbing chest discomfort as well as a pressure feeling with discomfort into her left arm.  Troponin minimally elevated and not consistent with acute myocardial injury.  ECG also shows no ischemic changes despite prolonged discomfort.  Await results of echocardiogram.  If no evidence of a regional wall motion abnormality, would discontinue heparin infusion.  2.  Coronary artery disease, status post two-vessel coronary artery bypass grafting in 2009.  Her last coronary angiogram 1 year ago demonstrated patent LIMA to the LAD but occluded saphenous vein graft to a diagonal branch which was filled by collaterals.  Left circumflex, ramus intermedius and right coronary artery unremarkable.  3.  Right lower back pain due to sciatica.  4.  Essential hypertension, borderline controlled.  Resume prior to admission losartan, carvedilol.  If blood pressure remains high, consider addition of low-dose amlodipine which would provide coronary vasodilatation.     Plan:   1.  Review echocardiogram.  If no evidence of regional wall motion abnormality, would discontinue heparin  2.  Consider adding amlodipine 2.5 mg daily for additional blood pressure control as well as coronary vasodilatation    Primary cardiologist: Dr. Pedrito Munoz     Current History:   Ms. Sarah Rahman is a 57 year old female with history of coronary artery disease, status post two-vessel coronary bypass grafting in 2009 with LIMA to the LAD and vein graft to diagonal branch with last angiogram in June 2021 demonstrating occlusion of the vein graft for patency of the LIMA graft, essential hypertension, type 2 diabetes mellitus, hypercholesterolemia who  presented to the ED yesterday for evaluation of chest discomfort as well as lower back pain.  States she began to have pain in her lower back beginning 2 days prior to her presentation with radiation down the right leg.  The following day, she began to note sharp stabbing pain in the left upper chest region as well as left arm.  The sharpness went away but she began to note pressure feeling in the upper chest.  This persisted over the course of the next 24 hours at which point she began to note again sharp stabbing feelings and subsequently presented to the ED for evaluation.  Her initial troponin was normal with EKG showing no acute ischemic changes.  She did undergo evaluation of her lower back which was followed by a second troponin which came back elevated at 15.  She has had 2 additional troponins at 16 and more recently 13 and cardiac consult requested.  Was initiated on heparin infusion following the second troponin.  Currently reports no chest discomfort.    Past Medical History:     Past Medical History:   Diagnosis Date     Asthma      CAD (coronary artery disease)      COPD (chronic obstructive pulmonary disease) (H)      CVA (cerebral infarction)      Diabetes (H)      GERD (gastroesophageal reflux disease)      Hypertension      Polysubstance abuse (H)      S/P CABG (coronary artery bypass graft)      S/P lumbar discectomy 06/13/2019    L5/S1 by  Dr. Hamm at [facility]     Schizoaffective disorder (H)        Past Surgical History:     Past Surgical History:   Procedure Laterality Date     BYPASS GRAFT ARTERY CORONARY  2009    x2     CORONARY STENT PLACEMENT       CV CORONARY ANGIOGRAM N/A 6/2/2021    Procedure: Coronary Angiogram;  Surgeon: Juventino Rivera MD;  Location: [facility] Cardiac [facility] Lab;  Service: Cardiology     HYSTERECTOMY TOTAL ABDOMINAL, BILATERAL SALPINGO-OOPHORECTOMY, COMBINED       ORIF ULNAR / RADIAL SHAFT FRACTURE Right        Family History:     Family History   Problem  Relation Age of Onset     Heart Disease Mother      Heart Disease Father      Heart Disease Sister      Diabetes Brother      Heart Disease Sister        Social History:    reports that she has been smoking cigarettes. She has been smoking about 0.50 packs per day. She has never used smokeless tobacco. She reports previous alcohol use. She reports that she does not use drugs.    Meds:     Current Facility-Administered Medications:      acetaminophen (TYLENOL) tablet 975 mg, 975 mg, Oral, TID, Sukumar Miller MD     amitriptyline (ELAVIL) tablet 25 mg, 25 mg, Oral, At Bedtime, Sukumar Miller MD, 25 mg at 09/24/22 2126     aspirin (ASA) EC tablet 325 mg, 325 mg, Oral, Daily, Sukumar Miller MD     carvedilol (COREG) tablet 12.5 mg, 12.5 mg, Oral, BID w/meals, Sukumar Miller MD, 12.5 mg at 09/24/22 1826     cetirizine (zyrTEC) tablet 10 mg, 10 mg, Oral, Daily, Sukumar Miller MD, 10 mg at 09/24/22 1558     glucose gel 15-30 g, 15-30 g, Oral, Q15 Min PRN **OR** dextrose 50 % injection 25-50 mL, 25-50 mL, Intravenous, Q15 Min PRN **OR** glucagon injection 1 mg, 1 mg, Subcutaneous, Q15 Min PRN, Sukumar Miller MD     diclofenac (VOLTAREN) 1 % topical gel 2 g, 2 g, Topical, 4x Daily, Sukumar Miller MD, 2 g at 09/24/22 2021     DULoxetine (CYMBALTA) DR capsule 30 mg, 30 mg, Oral, BID, Sukumar Miller MD, 30 mg at 09/24/22 2007     fluticasone (FLONASE) 50 MCG/ACT spray 1 spray, 1 spray, Both Nostrils, Daily, Sukumar Miller MD     heparin infusion 25,000 units in D5W 250 mL ANTICOAGULANT, 0-5,000 Units/hr, Intravenous, Continuous, Sukumar Miller MD, Last Rate: 14.5 mL/hr at 09/25/22 0507, 1,450 Units/hr at 09/25/22 0507     insulin aspart (NovoLOG) injection (RAPID ACTING), 1-10 Units, Subcutaneous, TID AC, Sukumar Miller MD, 10 Units at 09/25/22 0710     insulin aspart (NovoLOG) injection (RAPID ACTING), 1-7 Units, Subcutaneous, At Bedtime, Sukumar Miller MD, 6 Units at 09/24/22 2127     insulin aspart (NovoLOG) injection (RAPID  ACTING), , Subcutaneous, Daily with breakfast, Sukumar Miller MD     insulin aspart (NovoLOG) injection (RAPID ACTING), , Subcutaneous, Daily with lunch, Sukumar Miller MD     insulin aspart (NovoLOG) injection (RAPID ACTING), , Subcutaneous, Daily with supper, Sukumar Miller MD     insulin glargine (LANTUS PEN) injection 20 Units, 20 Units, Subcutaneous, BID, KyleGaetano MD     losartan (COZAAR) tablet 100 mg, 100 mg, Oral, Daily, Sukumar Miller MD, 100 mg at 09/24/22 1557     melatonin tablet 1 mg, 1 mg, Oral, At Bedtime PRN, Sukumar Miller MD     methocarbamol (ROBAXIN) tablet 500 mg, 500 mg, Oral, 4x Daily, Sukumar Miller MD, 500 mg at 09/24/22 2008     [COMPLETED] methylPREDNISolone (MEDROL) tablet 8 mg, 8 mg, Oral, Once, 8 mg at 09/24/22 1838 **AND** [COMPLETED] methylPREDNISolone (MEDROL) tablet 4 mg, 4 mg, Oral, Once, 4 mg at 09/24/22 2000 **AND** [COMPLETED] methylPREDNISolone (MEDROL) tablet 4 mg, 4 mg, Oral, Once, 4 mg at 09/24/22 2114 **AND** methylPREDNISolone (MEDROL) tablet 8 mg, 8 mg, Oral, At Bedtime, 8 mg at 09/24/22 2358 **AND** methylPREDNISolone (MEDROL) tablet 4 mg, 4 mg, Oral, QAM AC, 4 mg at 09/25/22 0658 **AND** methylPREDNISolone (MEDROL) tablet 4 mg, 4 mg, Oral, Daily with lunch **AND** methylPREDNISolone (MEDROL) tablet 4 mg, 4 mg, Oral, Daily with supper **AND** [START ON 9/26/2022] methylPREDNISolone (MEDROL) tablet 4 mg, 4 mg, Oral, At Bedtime, Sukumar Miller MD     montelukast (SINGULAIR) tablet 10 mg, 10 mg, Oral, At Bedtime, Sukumar Miller MD, 10 mg at 09/24/22 2126     morphine (PF) injection 2-4 mg, 2-4 mg, Intravenous, Q4H PRN, Sukumar Miller MD, 4 mg at 09/25/22 0659     nitroGLYcerin (NITROSTAT) sublingual tablet 0.4 mg, 0.4 mg, Sublingual, Q5 Min PRN, Teri Vu MD     nortriptyline (PAMELOR) capsule 10 mg, 10 mg, Oral, At Bedtime, Sukumar Miller MD, 10 mg at 09/24/22 2127     oxyCODONE (ROXICODONE) tablet 5-10 mg, 5-10 mg, Oral, Q4H PRN, Sukumar Miller MD, 10 mg at  09/25/22 0508     pantoprazole (PROTONIX) EC tablet 20 mg, 20 mg, Oral, Daily, Sukumar Miller MD, 20 mg at 09/24/22 1557     pregabalin (LYRICA) capsule 150 mg, 150 mg, Oral, TID, Sukumar Miller MD     rosuvastatin (CRESTOR) tablet 20 mg, 20 mg, Oral, Daily, Sukumar Miller MD, 20 mg at 09/24/22 1559     senna-docusate (SENOKOT-S/PERICOLACE) 8.6-50 MG per tablet 1 tablet, 1 tablet, Oral, Daily PRN, Sukumar Miller MD    acetaminophen  975 mg Oral TID     amitriptyline  25 mg Oral At Bedtime     aspirin  325 mg Oral Daily     carvedilol  12.5 mg Oral BID w/meals     cetirizine  10 mg Oral Daily     diclofenac  2 g Topical 4x Daily     DULoxetine  30 mg Oral BID     fluticasone  1 spray Both Nostrils Daily     insulin aspart  1-10 Units Subcutaneous TID AC     insulin aspart  1-7 Units Subcutaneous At Bedtime     insulin aspart   Subcutaneous Daily with breakfast     insulin aspart   Subcutaneous Daily with lunch     insulin aspart   Subcutaneous Daily with supper     insulin glargine  20 Units Subcutaneous BID     losartan  100 mg Oral Daily     methocarbamol  500 mg Oral 4x Daily     methylPREDNISolone  8 mg Oral At Bedtime    And     methylPREDNISolone  4 mg Oral QAM AC    And     methylPREDNISolone  4 mg Oral Daily with lunch    And     methylPREDNISolone  4 mg Oral Daily with supper    And     [START ON 9/26/2022] methylPREDNISolone  4 mg Oral At Bedtime     montelukast  10 mg Oral At Bedtime     nortriptyline  10 mg Oral At Bedtime     pantoprazole  20 mg Oral Daily     pregabalin  150 mg Oral TID     rosuvastatin  20 mg Oral Daily       Allergies:   Haloperidol, Meperidine, Phenothiazines, Tramadol, Butyrophenones, Januvia [sitagliptin], Latex, Lisinopril, Penicillins, and Hydroxyzine    Review of Systems:   A 12 point comprehensive review of systems was negative except as noted in the current history.    Objective:      Physical Exam  Body mass index is 32.89 kg/m .  /65 (BP Location: Left arm)   Pulse 77  "  Temp 97.8  F (36.6  C) (Oral)   Resp 18   Ht 1.702 m (5' 7\")   Wt 95.3 kg (210 lb)   LMP  (LMP Unknown)   SpO2 96%   BMI 32.89 kg/m      General Appearance:    Well-developed, well-nourished -American female in no acute distress although reports ongoing back pain.   HEENT:   Normocephalic, atraumatic.  Sclera nonicteric.  Mucous membranes moist.   Neck:  No jugular venous distention or carotid bruit   Chest:  Symmetric with normal AP diameter.  No chest wall tenderness on exam   Lungs:    Clear to auscultation percussion bilaterally   Cardiovascular:    Regular rate and rhythm.  S1, S2 normal.  No murmur or gallop   Abdomen:   Soft, nondistended, nontender.  No organomegaly or mass.   Extremities:  No peripheral edema, clubbing or cyanosis   Skin:  No rash or lesions   Neurologic:  Alert and oriented x3.  No gross focal deficits.             Cardiographics (personally reviewed)  ECG demonstrates normal sinus rhythm with nonspecific T wave abnormality.  No change from prior ECGs.    Imaging (personally reviewed)  Echocardiogram currently pending    EXAM: CT CHEST PULMONARY EMBOLISM WITH CONTRAST  LOCATION: [facility]  DATE/TIME: 09/24/2022, 11:53 AM    IMPRESSION:  1.  No evidence for pulmonary embolism.  2.  No cause for chest pain is identified.           Lab Review (personally reviewed all results)  Recent Labs   Lab Test 10/24/18  0523   CHOL 210*   HDL 33*   *   TRIG 219*     Recent Labs   Lab Test 10/24/18  0523   *     Recent Labs   Lab Test 09/25/22  0659 09/24/22  1824 09/24/22  0742   NA  --   --  137   POTASSIUM  --   --  4.4   CHLORIDE  --   --  101   CO2  --   --  24   *   < > 201*   BUN  --   --  14.6   CR  --   --  0.76   GFRESTIMATED  --   --  90   MAXIMO  --   --  9.2    < > = values in this interval not displayed.     Recent Labs   Lab Test 09/24/22  0742 07/17/22  1520 05/24/22  1022   CR 0.76 0.78 0.97     Recent Labs   Lab Test " 07/19/22  1531 04/03/22  1436 05/31/21  0548   A1C 10.1* >14.0* 10.0*          Recent Labs   Lab Test 09/24/22  0742   WBC 5.5   HGB 12.4   HCT 39.2   MCV 86        Recent Labs   Lab Test 09/24/22  0742 07/17/22  1520 05/24/22  1022   HGB 12.4 11.8 12.4    Recent Labs   Lab Test 07/17/22  1520 05/24/22  1022 04/03/22  1436   TROPONINI <0.01 0.01 <0.01     Recent Labs   Lab Test 04/03/22  1436 05/30/21  1101 10/21/18  1335   * 103* 140*     No results for input(s): TSH in the last 36082 hours.  Recent Labs   Lab Test 05/24/22  1022 04/03/22  1436 07/09/21  0651   INR 0.95 0.95 1.01

## 2022-09-25 NOTE — PROGRESS NOTES
Got PRN morphine for 7/10 lower back and right leg pain this morning. Took scheduled methyl prednisone. She ordered breakfast. Blood sugar was 388; got 10 units of insulin before food tray had arrived.     Heparin continues at 1450 Units/hr. Next anti-xa 0830

## 2022-09-25 NOTE — PROGRESS NOTES
PRIMARY DIAGNOSIS: ACUTE PAIN  OUTPATIENT/OBSERVATION GOALS TO BE MET BEFORE DISCHARGE:  1. Pain Status: in pain; 10/10, given PRN Oxy and scheduled methyl prednisone     2. Return to near baseline physical activity: Yes    3. Cleared for discharge by consultants (if involved): No; cards consulted and to see patient; echo ordered    Discharge Planner Nurse   Safe discharge environment identified: Yes home  Barriers to discharge: Pain control       Entered by: Veena Hobbs RN 09/25/2022 5:52 AM     Please review provider order for any additional goals.   Nurse to notify provider when observation goals have been met and patient is ready for discharge.

## 2022-09-25 NOTE — PROGRESS NOTES
PRIMARY DIAGNOSIS: CHEST PAIN  OUTPATIENT/OBSERVATION GOALS TO BE MET BEFORE DISCHARGE:  1. Ruled out acute coronary syndrome (negative or stable Troponin):  No; trops have been positive  2. Pain Status: In pain, low back and right leg; PRN pain medicines Q4hr  3. Appropriate provocative testing performed: Ordered, to be completed: echo  - Stress Test Procedure: to be determined; to be seen by cardiology today  - Interpretation of cardiac rhythm per telemetry tech: NSR    4. Cleared by Consultants (if applicable): No, cards consult to see  5. Return to near baseline physical activity: yes   Discharge Planner Nurse   Safe discharge environment identified: yes  Barriers to discharge: Heparin drip with positive trops; acute pain management       Entered by: Veena Hobbs RN 09/25/2022 5:54 AM     Please review provider order for any additional goals.   Nurse to notify provider when observation goals have been met and patient is ready for discharge.

## 2022-09-25 NOTE — PROGRESS NOTES
[facility]    PROGRESS NOTE - Hospitalist Service    Assessment and Plan    57 year old female with past medical history of Diabetes, hypertension, CAD s/p CABG, asthma, chronic lower back pain, schizoaffective disorder, and substance abuse presents to ER for evaluation of back pain and chest pain.    She was admitted with,     Chest pain:   - Patient reports having left sided chest pain.   - Her troponin initially was at the upper normal, then slightly went up above the normal range. At the time of admission, patient reports that she continues to have left sided chest pain.  - Continue telemetry  - Continue IV heparin for now  - Cardiology consult, appreciate input  - Pending echocardiogram  - Defer further cardiac work-up to cardiology     Bilateral lower back pain with right sided siatica:   - Presents with acute on chronic lower back pain, radiating down her right leg.   -, MRI reveals marked degenerative changes in the lumbar spine at L5-S1 with severe right and moderate to severe left neural foraminal narrowing, marked edema at the L5-S1 level also extending to the right facet joint.   - Neurosurgery consult, appreciate input  - Per ER communication with neurosurgery, no surgical intervention and recommends pain control  - Start medro dose pack. Patient is agreeable  - Scheduled tylenol tid  - Continue PTA robaxin, topical diclofenac, lyrica, amitriptyline   - She takes percocet  10 mg q4h prn at home. Typically, takes 3 tabs a day. Will continue with oxycodone 5-10 mg q4h prn. Start morphine IV 2-4 mg prn.  - Pain management consult, appreciate input  - PT/OT evaluation     History of CAD:   - S/P  CABG in 2009.   - Continue PTA aspirin, carvedilol, and lipitor  - Chest pain management as above  - Cardiology is following     Diabetes, type II:   - not well controlled with HbA1C 10.1 on 7/19/22.   - PTA Lantus, glipizide 10 mg daily and exenatide  - Significant elevation of blood  glucose today secondary to steroid  - Restart home Lantus.  Increase dose today to 30 units twice daily  - Add Novolog carb count at 1:5 ratio   - Continue sliding scale coverage     Asthma:  -  no tin exacerbation.  -  Continue home inhalers.  - Patient is on Medrol pack for back pain      Essential hypertension:   - Stable blood pressure   - Resume PTA losartan and carvedilol    Active Problems:    * No active hospital problems. *      VTE prophylaxis: IV heparin  DIET: Orders Placed This Encounter      Combination Diet Moderate Consistent Carb (60 g CHO per Meal) Diet; 2 gm NA Diet      Disposition/Barriers to discharge: Pending echo, monitor troponin, IV heparin and pain control  Code Status: Full Code    Subjective:  Kristin is feeling better today, improving back pain but still has some.  Denies any chest pain or shortness of breath    PHYSICAL EXAM  Vitals:    09/24/22 0525   Weight: 95.3 kg (210 lb)     B/P:130/60 T:98 P:72 R:18     Intake/Output Summary (Last 24 hours) at 9/25/2022 1408  Last data filed at 9/25/2022 1329  Gross per 24 hour   Intake 720 ml   Output --   Net 720 ml      Body mass index is 32.89 kg/m .    Constitutional: awake, alert, cooperative, no apparent distress, and appears stated age  Respiratory: No increased work of breathing, good air exchange, clear to auscultation bilaterally, no crackles or wheezing  Cardiovascular: Normal apical impulse, regular rate and rhythm, normal S1 and S2, no S3 or S4, and no murmur noted  GI: No scars, normal bowel sounds, soft, non-distended, non-tender, no masses palpated, no hepatosplenomegally  Skin: no bruising or bleeding and normal skin color, texture, turgor  Musculoskeletal: There is no redness, warmth, or swelling of the joints.  Full range of motion noted.  no lower extremity pitting edema present  Neurologic: Awake, alert, oriented to name, place and time.  Cranial nerves II-XII are grossly intact.  Motor is 5 out of 5 bilaterally.    Sensory is intact.    Neuropsychiatric: Appropriate with examiner      PERTINENT LABS/IMAGING:  Recent Labs   Lab 09/25/22  1135 09/25/22  0659 09/24/22  2116 09/24/22  1824 09/24/22  0742   WBC  --   --   --   --  5.5   HGB  --   --   --   --  12.4   MCV  --   --   --   --  86   PLT  --   --   --   --  263   NA  --   --   --   --  137   POTASSIUM  --   --   --   --  4.4   CHLORIDE  --   --   --   --  101   CO2  --   --   --   --  24   BUN  --   --   --   --  14.6   CR  --   --   --   --  0.76   ANIONGAP  --   --   --   --  12   MAXIMO  --   --   --   --  9.2   * 388* 326*   < > 201*    < > = values in this interval not displayed.       Discussed with patient, family, cardiology, nursing staff and discharge planner    Gaetano Wright MD  [facility] Medicine Service  [phone]

## 2022-09-25 NOTE — PROGRESS NOTES
Heparin drip is on hold for one hour per protocol. Discussed with Dr. Trevino. After discussion she would like to continue to hold heparin gtt until Echo is resulted to guide further treatment.     Heparin drip was discontinued.

## 2022-09-25 NOTE — PLAN OF CARE
Problem: Plan of Care - These are the overarching goals to be used throughout the patient stay.    Goal: Plan of Care Review/Shift Note  Description: The Plan of Care Review/Shift note should be completed every shift.  The Outcome Evaluation is a brief statement about your assessment that the patient is improving, declining, or no change.  This information will be displayed automatically on your shift note.  Outcome: Ongoing, Progressing  Goal: Optimal Comfort and Wellbeing  Outcome: Ongoing, Progressing     Problem: Pain Chronic (Persistent)  Goal: Acceptable Pain Control and Functional Ability  Outcome: Ongoing, Progressing  Intervention: Manage Persistent Pain  Recent Flowsheet Documentation  Taken 9/25/2022 1330 by Sherry Merritt RN  Medication Review/Management: medications reviewed  Taken 9/25/2022 0830 by Sherry Merritt RN  Medication Review/Management: medications reviewed     Problem: Plan of Care - These are the overarching goals to be used throughout the patient stay.    Goal: Absence of Hospital-Acquired Illness or Injury  Intervention: Identify and Manage Fall Risk  Recent Flowsheet Documentation  Taken 9/25/2022 1330 by Sherry Merritt RN  Safety Promotion/Fall Prevention:   activity supervised   assistive device/personal items within reach  Taken 9/25/2022 0830 by Sherry Merritt RN  Safety Promotion/Fall Prevention:   activity supervised   assistive device/personal items within reach  Intervention: Prevent and Manage VTE (Venous Thromboembolism) Risk  Recent Flowsheet Documentation  Taken 9/25/2022 1330 by Sherry Merritt RN  Activity Management:   activity adjusted per tolerance   up ad addi  Taken 9/25/2022 0830 by Sherry Merritt RN  Activity Management:   activity adjusted per tolerance   up ad addi   Goal Outcome Evaluation:      Patient is alert and able to let her needs be known. Denies any chest pain or shortness of breath this shift. Report pain to lower back  and feet bilat., PRN oxycodone was given x1, this was effective for her. ECHO was done per orders. Cardiology following. Discontinued heparin drip. Pain team following. MS was changed to Q12hrs. Discontinued lyrica. Neurosurgery was also consulted- put in for IR consult for TF DIAZ(steriod injection). This is not done on weekend, will be done tomorrow possibly. VSS and is NSR On the monitor. Will continue to monitor.

## 2022-09-25 NOTE — PLAN OF CARE
Problem: Chest Pain  Goal: Resolution of Chest Pain Symptoms  Outcome: Ongoing, Progressing     Problem: Pain Chronic (Persistent)  Goal: Acceptable Pain Control and Functional Ability  Outcome: Ongoing, Progressing    A&O x 4. PRN morphine given x1 for pain of 8/10. PRN Oxy was given x1 for pain of 7/10. BS's have been high 368 at supper time and 367 at HS.  She can make her needs known and uses call light appropriately. NSR.
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
## 2022-09-25 NOTE — CONSULTS
Care Management Initial Consult    General Information  Assessment completed with: Patient, Patient  Type of CM/SW Visit: Initial Assessment    Primary Care Provider verified and updated as needed: Yes   Readmission within the last 30 days: no previous admission in last 30 days         Advance Care Planning: Advance Care Planning Reviewed: no concerns identified          Communication Assessment  Patient's communication style: spoken language (English or Bilingual)    Hearing Difficulty or Deaf: no        Cognitive  Cognitive/Neuro/Behavioral: WDL                      Living Environment:   People in home: alone     Current living Arrangements: apartment      Able to return to prior arrangements: yes       Family/Social Support:  Care provided by: self  Provides care for: no one  Marital Status: Single  Other (specify) ()          Description of Support System: Supportive         Current Resources:   Patient receiving home care services: Yes     Community Resources: PCA  Equipment currently used at home: cane, straight  Supplies currently used at home:      Employment/Financial:  Employment Status:          Financial Concerns:             Lifestyle & Psychosocial Needs:  Social Determinants of Health     Tobacco Use: High Risk     Smoking Tobacco Use: Current Every Day Smoker     Smokeless Tobacco Use: Never Used   Alcohol Use: Not on file   Financial Resource Strain: Not on file   Food Insecurity: Not on file   Transportation Needs: Not on file   Physical Activity: Not on file   Stress: Not on file   Social Connections: Not on file   Intimate Partner Violence: Not on file   Depression: At risk     PHQ-2 Score: 3   Housing Stability: Not on file       Functional Status:  Prior to admission patient needed assistance:   Dependent ADLs:: Ambulation-cane  Dependent IADLs:: Transportation (Says drives but would need a cab home which is very close to hosptial)       Mental Health Status:          Chemical Dependency  Status:                Values/Beliefs:  Spiritual, Cultural Beliefs, Methodist Practices, Values that affect care:                 Additional Information:  Assessed. Pt lives alone in an apartment. There is elevator access. Ind, but moves at times around with a cane when leg gives out. Pt states having PCA services, for assistance, does not know the name of the company. Did not have any financial concerns. Has [employer] for coverage.  is support system. Pt states, still being able to drive, but would need a cab ride home.    CM will continue to follow plan of care, review recommendations, and assist with any discharge needs anticipated.     Laura Wasserman RN

## 2022-09-26 ENCOUNTER — TELEPHONE (OUTPATIENT)
Dept: NEUROSURGERY | Facility: CLINIC | Age: 57
End: 2022-09-26

## 2022-09-26 LAB
ALBUMIN SERPL BCG-MCNC: 3.8 G/DL (ref 3.5–5.2)
ALP SERPL-CCNC: 95 U/L (ref 35–104)
ALT SERPL W P-5'-P-CCNC: 22 U/L (ref 10–35)
ANION GAP SERPL CALCULATED.3IONS-SCNC: 9 MMOL/L (ref 7–15)
AST SERPL W P-5'-P-CCNC: 18 U/L (ref 10–35)
BILIRUB SERPL-MCNC: <0.2 MG/DL
BUN SERPL-MCNC: 22.7 MG/DL (ref 6–20)
CALCIUM SERPL-MCNC: 8.9 MG/DL (ref 8.6–10)
CHLORIDE SERPL-SCNC: 99 MMOL/L (ref 98–107)
CREAT SERPL-MCNC: 0.75 MG/DL (ref 0.51–0.95)
DEPRECATED HCO3 PLAS-SCNC: 21 MMOL/L (ref 22–29)
GFR SERPL CREATININE-BSD FRML MDRD: >90 ML/MIN/1.73M2
GLUCOSE BLDC GLUCOMTR-MCNC: 167 MG/DL (ref 70–99)
GLUCOSE BLDC GLUCOMTR-MCNC: 293 MG/DL (ref 70–99)
GLUCOSE BLDC GLUCOMTR-MCNC: 318 MG/DL (ref 70–99)
GLUCOSE SERPL-MCNC: 339 MG/DL (ref 70–99)
POTASSIUM SERPL-SCNC: 4.8 MMOL/L (ref 3.4–5.3)
PROT SERPL-MCNC: 6.8 G/DL (ref 6.4–8.3)
SODIUM SERPL-SCNC: 129 MMOL/L (ref 136–145)

## 2022-09-26 PROCEDURE — 99215 OFFICE O/P EST HI 40 MIN: CPT | Performed by: NURSE PRACTITIONER

## 2022-09-26 PROCEDURE — 250N000011 HC RX IP 250 OP 636: Performed by: INTERNAL MEDICINE

## 2022-09-26 PROCEDURE — 82962 GLUCOSE BLOOD TEST: CPT

## 2022-09-26 PROCEDURE — 99225 PR SUBSEQUENT OBSERVATION CARE,LEVEL II: CPT | Performed by: HOSPITALIST

## 2022-09-26 PROCEDURE — 99417 PROLNG OP E/M EACH 15 MIN: CPT | Performed by: NURSE PRACTITIONER

## 2022-09-26 PROCEDURE — 250N000012 HC RX MED GY IP 250 OP 636 PS 637: Performed by: INTERNAL MEDICINE

## 2022-09-26 PROCEDURE — 99214 OFFICE O/P EST MOD 30 MIN: CPT | Performed by: INTERNAL MEDICINE

## 2022-09-26 PROCEDURE — 250N000013 HC RX MED GY IP 250 OP 250 PS 637: Performed by: INTERNAL MEDICINE

## 2022-09-26 PROCEDURE — 36415 COLL VENOUS BLD VENIPUNCTURE: CPT | Performed by: INTERNAL MEDICINE

## 2022-09-26 PROCEDURE — 80053 COMPREHEN METABOLIC PANEL: CPT | Performed by: INTERNAL MEDICINE

## 2022-09-26 PROCEDURE — 96376 TX/PRO/DX INJ SAME DRUG ADON: CPT | Mod: XU

## 2022-09-26 PROCEDURE — 96372 THER/PROPH/DIAG INJ SC/IM: CPT | Mod: XU

## 2022-09-26 PROCEDURE — 82040 ASSAY OF SERUM ALBUMIN: CPT | Performed by: INTERNAL MEDICINE

## 2022-09-26 PROCEDURE — G0378 HOSPITAL OBSERVATION PER HR: HCPCS

## 2022-09-26 RX ORDER — AMLODIPINE BESYLATE 2.5 MG/1
2.5 TABLET ORAL DAILY
Status: DISCONTINUED | OUTPATIENT
Start: 2022-09-26 | End: 2022-09-26

## 2022-09-26 RX ORDER — ROSUVASTATIN CALCIUM 40 MG/1
40 TABLET, COATED ORAL DAILY
Status: DISCONTINUED | OUTPATIENT
Start: 2022-09-27 | End: 2022-09-29 | Stop reason: HOSPADM

## 2022-09-26 RX ORDER — AMLODIPINE BESYLATE 5 MG/1
5 TABLET ORAL DAILY
Status: DISCONTINUED | OUTPATIENT
Start: 2022-09-27 | End: 2022-09-28

## 2022-09-26 RX ORDER — MORPHINE SULFATE 2 MG/ML
2 INJECTION, SOLUTION INTRAMUSCULAR; INTRAVENOUS EVERY 6 HOURS PRN
Status: ACTIVE | OUTPATIENT
Start: 2022-09-26 | End: 2022-09-27

## 2022-09-26 RX ADMIN — METHYLPREDNISOLONE 4 MG: 4 TABLET ORAL at 18:16

## 2022-09-26 RX ADMIN — NORTRIPTYLINE HYDROCHLORIDE 10 MG: 10 CAPSULE ORAL at 21:36

## 2022-09-26 RX ADMIN — CETIRIZINE HYDROCHLORIDE 10 MG: 10 TABLET ORAL at 08:14

## 2022-09-26 RX ADMIN — AMLODIPINE BESYLATE 2.5 MG: 2.5 TABLET ORAL at 12:05

## 2022-09-26 RX ADMIN — FLUTICASONE PROPIONATE 1 SPRAY: 50 SPRAY, METERED NASAL at 08:24

## 2022-09-26 RX ADMIN — MONTELUKAST 10 MG: 10 TABLET, FILM COATED ORAL at 21:36

## 2022-09-26 RX ADMIN — METHOCARBAMOL 500 MG: 500 TABLET, FILM COATED ORAL at 08:13

## 2022-09-26 RX ADMIN — ROSUVASTATIN CALCIUM 20 MG: 10 TABLET, FILM COATED ORAL at 08:13

## 2022-09-26 RX ADMIN — PANTOPRAZOLE SODIUM 20 MG: 20 TABLET, DELAYED RELEASE ORAL at 08:13

## 2022-09-26 RX ADMIN — AMITRIPTYLINE HYDROCHLORIDE 25 MG: 25 TABLET, FILM COATED ORAL at 21:37

## 2022-09-26 RX ADMIN — LOSARTAN POTASSIUM 100 MG: 50 TABLET, FILM COATED ORAL at 08:13

## 2022-09-26 RX ADMIN — ACETAMINOPHEN 975 MG: 325 TABLET, FILM COATED ORAL at 19:53

## 2022-09-26 RX ADMIN — DULOXETINE HYDROCHLORIDE 30 MG: 30 CAPSULE, DELAYED RELEASE ORAL at 19:53

## 2022-09-26 RX ADMIN — DICLOFENAC 2 G: 10 GEL TOPICAL at 01:43

## 2022-09-26 RX ADMIN — MORPHINE SULFATE 2 MG: 2 INJECTION, SOLUTION INTRAMUSCULAR; INTRAVENOUS at 08:24

## 2022-09-26 RX ADMIN — ACETAMINOPHEN 975 MG: 325 TABLET, FILM COATED ORAL at 15:15

## 2022-09-26 RX ADMIN — CARVEDILOL 12.5 MG: 12.5 TABLET, FILM COATED ORAL at 18:16

## 2022-09-26 RX ADMIN — INSULIN ASPART 2 UNITS: 100 INJECTION, SOLUTION INTRAVENOUS; SUBCUTANEOUS at 12:06

## 2022-09-26 RX ADMIN — METHYLPREDNISOLONE 4 MG: 4 TABLET ORAL at 08:13

## 2022-09-26 RX ADMIN — METHOCARBAMOL 500 MG: 500 TABLET, FILM COATED ORAL at 12:05

## 2022-09-26 RX ADMIN — DULOXETINE HYDROCHLORIDE 30 MG: 30 CAPSULE, DELAYED RELEASE ORAL at 08:22

## 2022-09-26 RX ADMIN — METHOCARBAMOL 500 MG: 500 TABLET, FILM COATED ORAL at 19:53

## 2022-09-26 RX ADMIN — ACETAMINOPHEN 975 MG: 325 TABLET, FILM COATED ORAL at 08:13

## 2022-09-26 RX ADMIN — OXYCODONE HYDROCHLORIDE 10 MG: 5 TABLET ORAL at 05:08

## 2022-09-26 RX ADMIN — METHYLPREDNISOLONE 4 MG: 4 TABLET ORAL at 21:36

## 2022-09-26 RX ADMIN — OXYCODONE HYDROCHLORIDE 10 MG: 5 TABLET ORAL at 15:15

## 2022-09-26 RX ADMIN — OXYCODONE HYDROCHLORIDE 10 MG: 5 TABLET ORAL at 20:01

## 2022-09-26 RX ADMIN — INSULIN ASPART 7 UNITS: 100 INJECTION, SOLUTION INTRAVENOUS; SUBCUTANEOUS at 08:14

## 2022-09-26 RX ADMIN — METHYLPREDNISOLONE 4 MG: 4 TABLET ORAL at 12:06

## 2022-09-26 RX ADMIN — ASPIRIN 325 MG: 325 TABLET, COATED ORAL at 08:22

## 2022-09-26 RX ADMIN — DICLOFENAC 2 G: 10 GEL TOPICAL at 20:02

## 2022-09-26 RX ADMIN — METHOCARBAMOL 500 MG: 500 TABLET, FILM COATED ORAL at 15:15

## 2022-09-26 RX ADMIN — CARVEDILOL 12.5 MG: 12.5 TABLET, FILM COATED ORAL at 08:13

## 2022-09-26 ASSESSMENT — ACTIVITIES OF DAILY LIVING (ADL)
ADLS_ACUITY_SCORE: 33

## 2022-09-26 NOTE — PLAN OF CARE
Problem: Pain Chronic (Persistent)  Goal: Acceptable Pain Control and Functional Ability  Outcome: Ongoing, Progressing  Intervention: Manage Persistent Pain  Recent Flowsheet Documentation  Taken 9/26/2022 1515 by Lanie Martinez RN  Medication Review/Management: medications reviewed  Taken 9/26/2022 0825 by Lanie Martinez RN  Medication Review/Management: medications reviewed   Unable to do IR procedure today as patient ate breakfast and wanted sedation for the procedure. Sleeping after prn oxycodone.

## 2022-09-26 NOTE — PLAN OF CARE
See previous note. Patient continues to report pain 7-8/10; gave PRN oxy twice during the night; patient expressed being frustrated that they changed the frequency of the IV morphine and she is still in this pain. She also requested a dose of the topical voltaren gel for the pain, dose rescheduled and was given. Patient is getting more and more frustrated with routines and vital signs this morning. Tele NSR. She does doze off between cares but is also easily aroused by voice. Hopefully IR sees the patient and can develop a plan with the patient. Sticky note left for physicians about her complaints and demands while she is still here including checking her hgb A1c and reducing lab draws.     Vitals:    09/25/22 1614 09/25/22 1900 09/25/22 2341 09/26/22 0504   BP: 131/66 (!) 164/74 (!) 156/72 (!) 169/79   BP Location: Right arm Right arm Right arm Right arm   Patient Position:  Supine     Cuff Size:  Adult Regular     Pulse: 86 81 75 70   Resp: 20  19 18   Temp: 98.2  F (36.8  C) 97.8  F (36.6  C) 97.8  F (36.6  C) 97.7  F (36.5  C)   TempSrc: Oral Oral Oral Oral   SpO2: 99% 98% 96% 94%   Weight:    90.9 kg (200 lb 7 oz)   Height:            Problem: Plan of Care - These are the overarching goals to be used throughout the patient stay.    Goal: Optimal Comfort and Wellbeing  Outcome: Ongoing, Progressing  Intervention: Monitor Pain and Promote Comfort  Recent Flowsheet Documentation  Taken 9/26/2022 0504 by Veena Hobbs RN  Pain Management Interventions: medication (see MAR)  Taken 9/25/2022 2341 by Veena Hobbs RN  Pain Management Interventions: medication (see MAR)     Problem: Pain Chronic (Persistent)  Goal: Acceptable Pain Control and Functional Ability  Outcome: Ongoing, Progressing  Intervention: Develop Pain Management Plan  Recent Flowsheet Documentation  Taken 9/26/2022 0504 by Veena Hobbs RN  Pain Management Interventions: medication (see MAR)  Taken 9/25/2022 2341 by Veena Hobbs  NINA, RN  Pain Management Interventions: medication (see MAR)  Intervention: Manage Persistent Pain  Recent Flowsheet Documentation  Taken 9/25/2022 2343 by Veena Hobbs, RN  Medication Review/Management: medications reviewed   Goal Outcome Evaluation:

## 2022-09-26 NOTE — PROVIDER NOTIFICATION
Patient insisting to have her hgb A1c checked.  Writer informed her that the lab should be ordered by the day shift doctors and the patient INSISTED asking the doctor NOW.     Dr. Thompson paged via [employer] asking to order and add-on A1c and he responded to leave a sticky note and have the day shift address.     Was able to confirm with Lab that they have the sample from 9/24 which they could use for the test ordered, so the patient doesn't need to have blood drawn again for this test.     She is getting upset. She is unhappy with the changed frequency of morphine PRN (went from Q4 to Q12). She doesn't want daily blood draws at all. She wants her hgb A1C checked; again patient doesn't want her blood drawn again today. In general she doesn't like it here, doesn't like the food, is resistive to having vitals signs checked or weight obtained.

## 2022-09-26 NOTE — TELEPHONE ENCOUNTER
PATIENT NAME:  Sarah Rahman  YOB: 1965  MRN: [id number]  SURGEON: Dr. Mcdaniel  DATE of CONSULT: 09/24/2022  Consult for lumbar radiculopathy    FOLLOW-UP PLAN:    Hospital Follow Up Visit: 2 weeks  Provider: TODD    DIAGNOSTICS: n/a  DISPOSITION:  pending    ADDITIONAL INSTRUCTIONS FOR MEDICAL STAFF:      Kaycee Wei RN, CNRN

## 2022-09-26 NOTE — PROGRESS NOTES
[facility]    PROGRESS NOTE - Hospitalist Service    Assessment and Plan    57 year old female with past medical history of Diabetes, hypertension, CAD s/p CABG, asthma, chronic lower back pain, schizoaffective disorder, and substance abuse presents to ER for evaluation of back pain and chest pain.    She was admitted with,     Chest pain: On off chest discomfort with very minimally elevated troponin.  No EKG changes or evidence of segmental wall motion abnormality on echocardiogram  CAD, History of CABG x2 in 2009 with known occluded SVG to diagonal with collaterals from LIMA to LAD  - Patient reports having on off left sided chest pain, denies specific triggers. It can happen with or without exertion   - Her troponin initially was at the upper normal, then slightly went up above the normal range.  - Cardiology consult, appreciate input  - Echocardiogram did not show any new wall motion changes. EF is  65-70%. E/E prime ratio is elevated suggestive of diastolic HF. Sevee LA enlargement noted.   - Defer further cardiac work-up to cardiology  - Cardiology upped the Lipitor and added Norvasc and recommend smoking cessation  - Ct ASA, Coreg, Crestor, losartan  - Cardiology added Norvasc 5 mg po daily for better BP control     Bilateral lower back pain with right sided siatica:   - Presents with acute on chronic lower back pain, radiating down her right leg.   -, MRI reveals marked degenerative changes in the lumbar spine at L5-S1 with severe right and moderate to severe left neural foraminal narrowing, marked edema at the L5-S1 level also extending to the right facet joint.   - Neurosurgery consult, appreciate input. They plan to do DIAZ with IR.   - Ctt medro dose pack  - Scheduled tylenol tid  - Continue PTA robaxin, topical diclofenac, lyrica, amitriptyline   - She takes percocet  10 mg q4h prn at home. Typically, takes 3 tabs a day. Will continue with oxycodone 5-10 mg q4h prn. Start  morphine IV 2-4 mg prn.  - Pain management consult, appreciate input  - PT/OT evaluation     History of CAD:   - S/P  CABG in 2009.   - Continue - Ct ASA, Coreg, Crestor, losartan. Plan is to add norvasc for better BP control and probably will help anginal symptoms too.   - Chest pain management as above  - Cardiology is following     Diabetes, type II:   - not well controlled with HbA1C 10.1 on 7/19/22.   - PTA Lantus, glipizide 10 mg daily and exenatide  - Significant elevation of blood glucose today secondary to steroid  - Restart home Lantus.  Increase dose today to 30 units twice daily  - Add Novolog carb count at 1:5 ratio   - Continue sliding scale coverage     Asthma:  - No exacerbation.  - Continue home inhalers.  - Patient is on Medrol pack for back pain      Essential hypertension:   - Stable blood pressure   - Resume PTA losartan and carvedilol    VTE prophylaxis: SCDs  DIET: Orders Placed This Encounter      NPO for Medical/Clinical Reasons Except for: Meds, Ice Chips      Disposition/Barriers to discharge: Pending echo, monitor troponin, IV heparin and pain control  Code Status: Full Code    Subjective:    She continues to report chronic back pain with right foot radicular symptoms  Reports chronic right leg numbness  Does not use Lyrica as it causes numbness  No fevers or chills  Reports on off chest pain as ongoing issues  Some Sob with activity      PHYSICAL EXAM  Vitals:    09/24/22 0525 09/26/22 0504   Weight: 95.3 kg (210 lb) 90.9 kg (200 lb 7 oz)     B/P:130/60 T:98 P:72 R:18     Intake/Output Summary (Last 24 hours) at 9/25/2022 1408  Last data filed at 9/25/2022 1329  Gross per 24 hour   Intake 720 ml   Output --   Net 720 ml      Body mass index is 31.39 kg/m .    Constitutional: awake, alert, cooperative, no apparent distress, and appears stated age  Respiratory: No increased work of breathing, good air exchange, clear to auscultation bilaterally, no crackles or wheezing  Cardiovascular:  Normal apical impulse, regular rate and rhythm, normal S1 and S2, no S3 or S4, and no murmur noted  GI: No scars, normal bowel sounds, soft, non-distended, non-tender, no masses palpated, no hepatosplenomegally  Skin: no bruising or bleeding and normal skin color, texture, turgor  Musculoskeletal: There is no redness, warmth, or swelling of the joints.  Full range of motion noted.  no lower extremity pitting edema present  Neurologic: Awake, alert, oriented to name, place and time.  Cranial nerves II-XII are grossly intact.  Motor is 5 out of 5 bilaterally.   Sensory is intact.    Neuropsychiatric: Appropriate with examiner      PERTINENT LABS/IMAGING:  Recent Labs   Lab 09/26/22  1201 09/26/22  0755 09/26/22  0511 09/24/22  1824 09/24/22  0742   WBC  --   --   --   --  5.5   HGB  --   --   --   --  12.4   MCV  --   --   --   --  86   PLT  --   --   --   --  263   NA  --   --  129*  --  137   POTASSIUM  --   --  4.8  --  4.4   CHLORIDE  --   --  99  --  101   CO2  --   --  21*  --  24   BUN  --   --  22.7*  --  14.6   CR  --   --  0.75  --  0.76   ANIONGAP  --   --  9  --  12   MAXIMO  --   --  8.9  --  9.2   * 293* 339*   < > 201*   ALBUMIN  --   --  3.8  --   --    PROTTOTAL  --   --  6.8  --   --    BILITOTAL  --   --  <0.2  --   --    ALKPHOS  --   --  95  --   --    ALT  --   --  22  --   --    AST  --   --  18  --   --     < > = values in this interval not displayed.

## 2022-09-26 NOTE — PROGRESS NOTES
"  HEART CARE CONSULTATON NOTE        Assessment/Recommendations   Assessment:  1.  Chest pain: Constant chest discomfort with very minimally elevated troponin.  No EKG changes or evidence of segmental wall motion abnormality on echocardiogram  2.  Coronary artery disease: History of CABG x2 in 2009 with known occluded SVG to diagonal with collaterals from LIMA to LAD  3.  Chronic pain syndrome: With severe back pain with plans for interventional neurology pain relief  4.  Hypertension: Poorly controlled  5.  History of polysubstance abuse  6.  Tobacco abuse  7.  Schizoaffective disorder  Plan:  1.  Add Norvasc for better blood pressure control  2.  Increase Crestor to 40 mg daily  3.  Smoking cessation counseling       History of Present Illness/Subjective    Continued back pain and chest discomfort.  Chest pain described as \"sharp\" in nature    Coronary angiogram 6/2/2020    Ramus lesion is 50% stenosed.    Ost LM to Dist LM lesion is 30% stenosed.    Origin to Insertion lesion is 100% stenosed.    Dist LM to Mid LAD lesion is 100% stenosed.    LIMA to LAD is patent without obstruction    SVG to small 1st diagonal is occluded and fills from LAD collaterals          Physical Examination  Review of Systems   VITALS: /59 (BP Location: Right arm)   Pulse 68   Temp 97.8  F (36.6  C) (Oral)   Resp 20   Ht 1.702 m (5' 7\")   Wt 90.9 kg (200 lb 7 oz)   LMP  (LMP Unknown)   SpO2 97%   BMI 31.39 kg/m    BMI: Body mass index is 31.39 kg/m .  Wt Readings from Last 3 Encounters:   09/26/22 90.9 kg (200 lb 7 oz)   07/29/22 90.3 kg (199 lb)   07/18/22 96.2 kg (212 lb)       Intake/Output Summary (Last 24 hours) at 9/26/2022 1231  Last data filed at 9/26/2022 0825  Gross per 24 hour   Intake 840 ml   Output --   Net 840 ml     General Appearance:   no distress, normal body habitus   ENT/Mouth: membranes moist, no oral lesions or bleeding gums.      EYES:  no scleral icterus, normal conjunctivae   Neck: no carotid " bruits or thyromegaly   Chest/Lungs:   lungs are clear to auscultation   Cardiovascular:   Regular. Normal first and second heart sounds with no murmurs no edema bilaterally    Abdomen:  no organomegaly, masses, bruits, or tenderness; bowel sounds are present   Extremities: no cyanosis or clubbing   Skin: no xanthelasma, warm.    Neurologic: normal  bilateral, no tremors     Psychiatric: alert and oriented x3, calm     Review Of Systems  Skin: negative  Eyes: negative  Ears/Nose/Throat: negative  Respiratory: No shortness of breath, dyspnea on exertion, cough, or hemoptysis  Cardiovascular: negative  Gastrointestinal: negative  Genitourinary: negative  Musculoskeletal: negative  Neurologic: negative  Psychiatric: negative  Hematologic/Lymphatic/Immunologic: negative  Endocrine: negative          Lab Results    Chemistry/lipid CBC Cardiac Enzymes/BNP/TSH/INR   Recent Labs   Lab Test 10/24/18  0523   CHOL 210*   HDL 33*   *   TRIG 219*     Recent Labs   Lab Test 10/24/18  0523   *     Recent Labs   Lab Test 09/26/22  1201 09/26/22  0755 09/26/22  0511   NA  --   --  129*   POTASSIUM  --   --  4.8   CHLORIDE  --   --  99   CO2  --   --  21*   *   < > 339*   BUN  --   --  22.7*   CR  --   --  0.75   GFRESTIMATED  --   --  >90   MAXIMO  --   --  8.9    < > = values in this interval not displayed.     Recent Labs   Lab Test 09/26/22  0511 09/24/22  0742 07/17/22  1520   CR 0.75 0.76 0.78     Recent Labs   Lab Test 07/19/22  1531 04/03/22  1436 05/31/21  0548   A1C 10.1* >14.0* 10.0*          Recent Labs   Lab Test 09/24/22  0742   WBC 5.5   HGB 12.4   HCT 39.2   MCV 86        Recent Labs   Lab Test 09/24/22  0742 07/17/22  1520 05/24/22  1022   HGB 12.4 11.8 12.4    Recent Labs   Lab Test 07/17/22  1520 05/24/22  1022 04/03/22  1436   TROPONINI <0.01 0.01 <0.01     Recent Labs   Lab Test 04/03/22  1436 05/30/21  1101 10/21/18  1335   * 103* 140*     No results for input(s): TSH in the  last 65440 hours.  Recent Labs   Lab Test 05/24/22  1022 04/03/22  1436 07/09/21  0651   INR 0.95 0.95 1.01        Medical History  Surgical History Family History Social History   Past Medical History:   Diagnosis Date     Asthma      CAD (coronary artery disease)      COPD (chronic obstructive pulmonary disease) (H)      CVA (cerebral infarction)      Diabetes (H)      GERD (gastroesophageal reflux disease)      Hypertension      Polysubstance abuse (H)      S/P CABG (coronary artery bypass graft)      S/P lumbar discectomy 06/13/2019    L5/S1 by  Dr. Hamm at [facility]     Schizoaffective disorder (H)      Past Surgical History:   Procedure Laterality Date     BYPASS GRAFT ARTERY CORONARY  2009    x2     CORONARY STENT PLACEMENT       CV CORONARY ANGIOGRAM N/A 6/2/2021    Procedure: Coronary Angiogram;  Surgeon: Juventino Rivera MD;  Location: [facility] Cardiac Cath Lab;  Service: Cardiology     HYSTERECTOMY TOTAL ABDOMINAL, BILATERAL SALPINGO-OOPHORECTOMY, COMBINED       ORIF ULNAR / RADIAL SHAFT FRACTURE Right      Family History   Problem Relation Age of Onset     Heart Disease Mother      Heart Disease Father      Heart Disease Sister      Diabetes Brother      Heart Disease Sister         Social History     Socioeconomic History     Marital status: Single     Spouse name: Not on file     Number of children: Not on file     Years of education: Not on file     Highest education level: Not on file   Occupational History     Not on file   Tobacco Use     Smoking status: Current Every Day Smoker     Packs/day: 0.50     Types: Cigarettes     Smokeless tobacco: Never Used   Vaping Use     Vaping Use: Never used   Substance and Sexual Activity     Alcohol use: Not Currently     Comment: Alcoholic Drinks/day: occ     Drug use: No     Sexual activity: Not Currently   Other Topics Concern     Not on file   Social History Narrative    Lives alone in a condo.  On disability.  Three living children.       Social  "Determinants of Health     Financial Resource Strain: Not on file   Food Insecurity: Not on file   Transportation Needs: Not on file   Physical Activity: Not on file   Stress: Not on file   Social Connections: Not on file   Intimate Partner Violence: Not on file   Housing Stability: Not on file         Medications  Allergies   No current outpatient medications on file.        Allergies   Allergen Reactions     Haloperidol Unknown     Patient states it stops her heart       Meperidine Angioedema, Difficulty breathing, Other (See Comments), Rash and Shortness Of Breath     Throat closes  Other reaction(s): Breathing Difficulty  Other reaction(s): Breathing Difficulty       Phenothiazines Other (See Comments)     Other reaction(s): CARDIAC DISTURBANCES  Patient states it stops her heart  \"I swell up\"  \"stopped by heart\"  \"I swell up\"  \"I swell up\"       Tramadol Other (See Comments)     Other reaction(s): Angioedema  Throat itch       Butyrophenones Angioedema     Januvia [Sitagliptin] Other (See Comments)     Swelling in the neck      Latex Itching     Lisinopril Other (See Comments)     ACE cough  Itchy throat  Throat itches  Itchy throat       Penicillins Swelling     Hydroxyzine Other (See Comments) and Rash     Tongue swelling  Tongue swelling  Tongue swelling           Daysi Vegas MD    "

## 2022-09-26 NOTE — CONSULTS
Interventional Neuroradiology    Received order from RADHA Diaz for bilateral L5-S1 TF DIAZ.  Will schedule for today    NARCISA Vaca CNP

## 2022-09-26 NOTE — CONSULTS
[facility] ACUTE PAIN SERVICE CONSULTATION     Date of Admission:  9/24/2022  Date of Consult (When I saw the patient): 09/26/22  Physician requesting consult: Dr Jam Miller      Assessment/Plan:     Sarah Rahman is a 57 year old female who was admitted on 9/24/2022.  Pain team was asked to see the patient for acute on chronic low back pain. Admitted for pain. History of Diabetes, hypertension, CAD s/p CABG, asthma, chronic lower back pain, schizoaffective disorder, and substance abuse. Presented to ER for evaluation of back pain and chest pain. Describes pain as 4-6/10 and aching in the low back and radiates to RLE. She denies chest pain at time of visit. Denies nausea, vomiting, constipation, diarrhea, shortness of breath. The patient does smoke (reported that she was thinking about quitting) and she denies chemical dependency history ( per notes malingering and cocaine abuse noted in her chart for >10 years, also has had positive cannabinoids in urine in the past).     UDS 9/24/22: positive for benzodiazepines, no opiates in urine - not expected - is prescribed opioids per .       Patient is well known to our team, last seen 7/ 2022. During last admission, found success with PLO gel, lidocaine patches, Amitryptyline, Cymbalta, and Percocet. She follows at [facility].      Opioid Induced Respiratory Depression Risk Assessment: high?    PLAN:   1) Pain is consistent with acute on chronic back pain, lumbar radiculopathy.   Labs and imaging indicated: I have personally reviewed pertinent labs, tests, and radiologic imaging in patient's chart. Treatment plan includes multimodal pain approach, Hospital Medicine Service for medical management, NRS, IR for injection. Patient educated regarding multimodal pain approach, medications as listed below. Patient is understanding of the plan. All questions and concerns addressed to patient's satisfaction.   2)Multimodal Medication Therapy  Topical:  diclofenac gel qid  NSAID'S: CrCl 95ml/min, none. Medrol dose presley.  Muscle Relaxants: Robaxin 500mg qid  Adjuvants:  APAP 975mg TID, Lyrica 150mg TID (stopped taking, reports she will not take this, and reports edema with use. She does not want to try gabapentin instead, despite our discussion on how the gabapentinoids are first line for nerve pain.   Antidepressants/anxiolytics: Cymbalta 30mg BID, amitriptyline and nortriptyline (it does appear she uses both of these, and she verbalizes this. Has picked up both from pharmacy within last 30 days: 2 different prescribers)  Opioids: Oxycodone 5-10 mg q4h prn   IV Pain medication: discontinue morphine   3)Non-medication interventions: ice, rest, pt, ot, IR for injection   Acupuncture consult - offered and agreeable   Integrative consult - offered and agreeable   4)Constipation Prophylaxis: Scheduled and prn  5) Care Teams: hms, NRS, Cardiology    -Opioid prescriber has been Gustavo Reinoso  -[place]  pulled from system on 9/25. This indicates   Chronic pregabalin  Chronic percocet, 9/2 10/325mg QID, also 8/3, 7/5  Ketamine powder  Discharge Recommendations - We recommend prescribing the following at the time of discharge: No opioids, follows with pain clinic, last got 30 day supply on 9/2/22. Follow up : pain clinic and primary care.     History of Present Illness (HPI):       Sarah Rahman is a 57 year old old female who presented for back pain.  Past medical history as above. The pain is reported to be acute on chronic , located in the low back, and it does radiate to RLE.         Per [place]  review, the patient does  have an opioid tolerance. Opioid induced side effects noted and include: history hemical dependency history ( per notes malingering and cocaine abuse noted in her chart for >10 years, also has had positive cannabinoids in urine in the past).     Reviewed medical record, labs, imaging, ED note, and care everywhere.      Past pain treatments have  included: Pain Clinic, APAP, Amitriptyline, Nortriptyline, Voltaren,  Cymbalta, lidocaine patches, Percocet, Lyrica (reports not taking), Robaxin      Home pain medications/psych medications/anticoagulation medications include: as above per     Medical History   PAST MEDICAL HISTORY:   Past Medical History:   Diagnosis Date     Asthma      CAD (coronary artery disease)      COPD (chronic obstructive pulmonary disease) (H)      CVA (cerebral infarction)      Diabetes (H)      GERD (gastroesophageal reflux disease)      Hypertension      Polysubstance abuse (H)      S/P CABG (coronary artery bypass graft)      S/P lumbar discectomy 06/13/2019    L5/S1 by  Dr. Hamm at [facility]     Schizoaffective disorder (H)        PAST SURGICAL HISTORY:   Past Surgical History:   Procedure Laterality Date     BYPASS GRAFT ARTERY CORONARY  2009    x2     CORONARY STENT PLACEMENT       CV CORONARY ANGIOGRAM N/A 6/2/2021    Procedure: Coronary Angiogram;  Surgeon: Juventino Rivera MD;  Location: [facility] Cardiac Cath Lab;  Service: Cardiology     HYSTERECTOMY TOTAL ABDOMINAL, BILATERAL SALPINGO-OOPHORECTOMY, COMBINED       ORIF ULNAR / RADIAL SHAFT FRACTURE Right        FAMILY HISTORY:   Family History   Problem Relation Age of Onset     Heart Disease Mother      Heart Disease Father      Heart Disease Sister      Diabetes Brother      Heart Disease Sister        SOCIAL HISTORY:   Social History     Tobacco Use     Smoking status: Current Every Day Smoker     Packs/day: 0.50     Types: Cigarettes     Smokeless tobacco: Never Used   Substance Use Topics     Alcohol use: Not Currently     Comment: Alcoholic Drinks/day: [facility]        [employer]  Tobacco:  reports that she has been smoking cigarettes. She has been smoking about 0.50 packs per day. She has never used smokeless tobacco.  Alcohol:  reports previous alcohol use.  Illicit drugs:  reports no history of drug use.    Allergies  Allergies   Allergen  "Reactions     Haloperidol Unknown     Patient states it stops her heart       Meperidine Angioedema, Difficulty breathing, Other (See Comments), Rash and Shortness Of Breath     Throat closes  Other reaction(s): Breathing Difficulty  Other reaction(s): Breathing Difficulty       Phenothiazines Other (See Comments)     Other reaction(s): CARDIAC DISTURBANCES  Patient states it stops her heart  \"I swell up\"  \"stopped by heart\"  \"I swell up\"  \"I swell up\"       Tramadol Other (See Comments)     Other reaction(s): Angioedema  Throat itch       Butyrophenones Angioedema     Januvia [Sitagliptin] Other (See Comments)     Swelling in the neck      Latex Itching     Lisinopril Other (See Comments)     ACE cough  Itchy throat  Throat itches  Itchy throat       Penicillins Swelling     Hydroxyzine Other (See Comments) and Rash     Tongue swelling  Tongue swelling  Tongue swelling         Problem List  Patient Active Problem List    Diagnosis Date Noted     Anemia 07/17/2022     Priority: Medium     Anxiety as acute reaction to gross stress 07/17/2022     Priority: Medium     History of drug abuse (H) 07/17/2022     Priority: Medium     Formatting of this note might be different from the original.  Cocaine history, per psych records last 11/2010, used in setting of grief after her daughter's death  Formatting of this note might be different from the original.  Formatting of this note might be different from the original.  Cocaine history, per psych records last 11/2010, used in setting of grief after her daughter's death       Mood disorder due to medical condition 07/17/2022     Priority: Medium     Obesity 07/17/2022     Priority: Medium     Sleep difficulties 07/17/2022     Priority: Medium     Right sided weakness 07/17/2022     Priority: Medium     Chronic pain syndrome 07/17/2022     Priority: Medium     Asthma in adult, unspecified asthma severity, uncomplicated 07/17/2022     Priority: Medium     Diabetic " polyneuropathy associated with type 2 diabetes mellitus (H) 06/10/2022     Priority: Medium     Nasal polyp 06/10/2022     Priority: Medium     Chronic schizoaffective disorder (H) 06/10/2022     Priority: Medium     Bilateral low back pain with right-sided sciatica, unspecified chronicity 04/03/2022     Priority: Medium     Chronic obstructive pulmonary disease (H) 01/15/2022     Priority: Medium     Uncontrolled type 2 diabetes mellitus with hyperglycemia (H) 12/13/2021     Priority: Medium     Cerebrovascular accident (CVA) due to stenosis of carotid artery (H) 10/17/2021     Priority: Medium     Acute recurrent maxillary sinusitis 10/17/2021     Priority: Medium     Myelomalacia of cervical cord (H) 10/08/2021     Priority: Medium     Noncompliance 10/08/2021     Priority: Medium     Sensorineural hearing loss (SNHL) of right ear 10/08/2021     Priority: Medium     Carotid stenosis, left 10/03/2021     Priority: Medium     Formatting of this note might be different from the original.  Added automatically from request for surgery [id number]       Depression with anxiety 08/01/2021     Priority: Medium     Post-COVID syndrome 07/11/2021     Priority: Medium     Dyslipidemia 05/02/2021     Priority: Medium     Formatting of this note might be different from the original.  Formatting of this note might be different from the original.    Low HDL  Formatting of this note might be different from the original.    Low HDL       Hx of syphilis 09/16/2020     Priority: Medium     Formatting of this note might be different from the original.  09/08/20  TPPA positive, Treponema screen positive  09/14/20  Treatment: Doxycycline 100mg po bid x 30 days  10/27/20  TPPA positive       Weakness 01/29/2020     Priority: Medium     Leg swelling 10/11/2019     Priority: Medium     Lumbar disc herniation 06/14/2019     Priority: Medium     Syncope and collapse 10/21/2018     Priority: Medium     Sacroiliac joint disease 10/09/2018      Priority: Medium     Myalgia 02/20/2018     Priority: Medium     Intercostal neuritis 02/06/2018     Priority: Medium     Dyshidrosis (pompholyx) 10/21/2016     Priority: Medium     Chest pain 10/02/2015     Priority: Medium     Seasonal allergies 07/24/2015     Priority: Medium     Systolic and diastolic CHF, chronic (H) 09/15/2014     Priority: Medium     Formatting of this note might be different from the original.  Formatting of this note might be different from the original.  Sep '14: Mildly diminished LV function ~50%, grade 1 diastolic dysfunction.  Formatting of this note might be different from the original.  Sep '14: Mildly diminished LV function ~50%, grade 1 diastolic dysfunction.       Pulmonary nodule 09/11/2014     Priority: Medium     Formatting of this note might be different from the original.  Needs repeat CT Chest late Sep 2014 for interval change  Formatting of this note might be different from the original.  Needs repeat CT Chest late Sep 2014 for interval change  Formatting of this note might be different from the original.  Formatting of this note might be different from the original.  Needs repeat CT Chest late Sep 2014 for interval change       Menopausal flushing 04/30/2014     Priority: Medium     ACP (advance care planning) 02/12/2014     Priority: Medium     Formatting of this note might be different from the original.  Overview:   Formatting of this note may be different from the original.  Health Care Directives: Patient has identified Health Care Agent(s): Yes  Add Health Care Agents: Yes    Health Care Agent(s):  Primary Health Care Agent: Elsie Rahman  Relationship: Daughter Phone: [phone]   Secondary Health Care Agent:  Relationship:  Phone:    Conservator:  Relationship:  Phone:    Guardian: Relationship:  Phone:      Patient has Advance Care Plan Documents (Health Care Directive, POLST): No, Health Care Packet given to patient.    Patient has identified Specific Treatment  Preferences: No   Specific limits to treatment preferences NOT identified: ASSUME FULL TREATMENT.  Formatting of this note might be different from the original.  Overview:   Overview:   Formatting of this note may be different from the original.  Health Care Directives: Patient has identified Health Care Agent(s): Yes  Add Health Care Agents: Yes    Health Care Agent(s):  Primary Health Care Agent: Elsie Rahman  Relationship: Daughter Phone: [phone]   Secondary Health Care Agent:  Relationship:  Phone:    Conservator:  Relationship:  Phone:    Guardian: Relationship:  Phone:      Patient has Advance Care Plan Documents (Health Care Directive, POLST): No, Health Care Packet given to patient.    Patient has identified Specific Treatment Preferences: No   Specific limits to treatment preferences NOT identified: ASSUME FULL TREATMENT.  Formatting of this note might be different from the original.  Health Care Directives: Patient has identified Health Care Agent(s): YesAdd Health Care Agents: Yes  Health Care Agent(s):Primary Health Care Agent: Elsie Rahman  Relationship: Daughter Phone: [phone]   Secondary Health Care Agent:  Relationship:  Phone:    Conservator:  Relationship:  Phone:    Guardian: Relationship:  Phone:      Patient has Advance Care Plan Documents (Health Care Directive, POLST): No, Health Care Packet given to patient.    Patient has identified Specific Treatment Preferences: No   Specific limits to treatment preferences NOT identified: ASSUME FULL TREATMENT.       Nephrolithiasis 08/29/2013     Priority: Medium     Coronary atherosclerosis 03/21/2013     Priority: Medium     Type 2 diabetes mellitus with diabetic polyneuropathy, with long-term current use of insulin (H) 03/21/2013     Priority: Medium     Problem list name updated by automated process. Provider to review       Schizoaffective disorder (H) 03/21/2013     Priority: Medium     Problem list name updated by automated process.  Provider to review  Diagnosis updated by automated process. Provider to review and confirm.       Health Care Home 03/21/2013     Priority: Medium     Tier 1  DX V65.8 REPLACED WITH 13918 HEALTH CARE HOME (04/08/2013)       GERD (gastroesophageal reflux disease) 10/15/2012     Priority: Medium     History of CVA (cerebrovascular accident) 04/01/2012     Priority: Medium     Formatting of this note might be different from the original.  Overview:   Bilateral subacute cerebellar infarcts seen on MRI at [facility] 4/2012.  Pt was noted to have no residual deficits at [facility].  Formatting of this note might be different from the original.  Bilateral subacute cerebellar infarcts seen on MRI at [facility] 4/2012.  Pt was noted to have no residual deficits at [facility].  Formatting of this note might be different from the original.  Bilateral subacute cerebellar infarcts seen on MRI at [facility] 4/2012.  Pt was noted to have no residual deficits at [facility].  Formatting of this note might be different from the original.  Formatting of this note might be different from the original.  Bilateral subacute cerebellar infarcts seen on MRI at [facility] 4/2012.  Pt was noted to have no residual deficits at [facility].       Hyperlipidemia with target low density lipoprotein (LDL) cholesterol less than 70 mg/dL 01/30/2012     Priority: Medium     Moderate persistent asthma 09/12/2011     Priority: Medium     Smoker 02/03/2008     Priority: Medium     Drug dependence (H) 02/15/2006     Priority: Medium     Formatting of this note might be different from the original.  Overview:   Epic       Essential hypertension, benign 02/15/2006     Priority: Medium     Formatting of this note might be different from the original.  Overview:   [facility]         Prior to Admission Medications   Medications Prior to Admission   Medication Sig Dispense Refill Last Dose      acetaminophen (TYLENOL) 325 MG tablet Take 650 mg by mouth every 8 hours as needed for mild pain   9/23/2022 at Unknown time     albuterol (PROAIR HFA) 108 (90 BASE) MCG/ACT inhaler Inhale 1-2 puffs into the lungs every 4 hours as needed   Past Week at Unknown time     amitriptyline (ELAVIL) 25 MG tablet Take 1 tablet (25 mg) by mouth At Bedtime 90 tablet 3 9/23/2022 at Unknown time     aspirin (ASA) 325 MG EC tablet Take 325 mg by mouth daily    9/23/2022 at Unknown time     budesonide-formoterol (SYMBICORT) 160-4.5 MCG/ACT Inhaler Inhale 2 puffs into the lungs 2 times daily   9/23/2022 at Unknown time     carvedilol (COREG) 12.5 MG tablet Take 12.5 mg by mouth 2 times daily (with meals)   9/23/2022 at Unknown time     cetirizine (ZYRTEC) 10 MG tablet Take 10 mg by mouth daily   9/23/2022 at Unknown time     diclofenac (VOLTAREN) 1 % topical gel Apply 2 g topically 3 times daily as needed for moderate pain (feet)   9/23/2022 at Unknown time     diphenhydrAMINE (BENADRYL) 25 MG tablet Take 25 mg by mouth every 6 hours as needed for itching or allergies   9/23/2022 at Unknown time     DULoxetine (CYMBALTA) 30 MG capsule Take 30 mg by mouth 2 times daily   9/23/2022 at Unknown time     exenatide ER (BYDUREON BCISE) 2 MG/0.85ML auto-injector Inject 2 mg Subcutaneous every 7 days On Sundays   9/18/2022     fluticasone (FLONASE) 50 MCG/ACT nasal spray Spray 1 spray into both nostrils daily   9/23/2022 at Unknown time     furosemide (LASIX) 20 MG tablet Take 20 mg by mouth daily as needed (swelling)   9/23/2022 at Unknown time     glipiZIDE (GLUCOTROL XL) 5 MG 24 hr tablet Take 5 mg by mouth daily   9/23/2022 at Unknown time     guaiFENesin-codeine (ROBITUSSIN AC) 100-10 MG/5ML solution Take 1-2 teaspoonful by mouth every 4 hours as needed for cough   Past Month at Unknown time     insulin glargine (LANTUS PEN) 100 UNIT/ML pen Inject 20 Units Subcutaneous At Bedtime 30 mL 0 9/23/2022 at Unknown time     ipratropium -  albuterol 0.5 mg/2.5 mg/3 mL (DUONEB) 0.5-2.5 (3) MG/3ML neb solution Take 1 vial by nebulization every 6 hours as needed for shortness of breath / dyspnea or wheezing   Past Week at Unknown time     lidocaine (LIDODERM) 5 % patch Place 1 patch onto the skin every 24 hours To prevent lidocaine toxicity, patient should be patch free for 12 hrs daily. BACK   9/23/2022 at Unknown time     losartan (COZAAR) 100 MG tablet Take 100 mg by mouth daily   9/23/2022 at Unknown time     methocarbamol (ROBAXIN) 500 MG tablet Take 500 mg by mouth 4 times daily as needed for muscle spasms   9/23/2022 at Unknown time     naloxone (NARCAN) 4 MG/0.1ML nasal spray Spray 4 mg into one nostril alternating nostrils as needed for opioid reversal every 2-3 minutes until assistance arrives   Unknown at Unknown time     nitroGLYcerin (NITROSTAT) 0.4 MG sublingual tablet Place 0.4 mg under the tongue every 5 minutes as needed for chest pain For chest pain place 1 tablet under the tongue every 5 minutes for 3 doses. If symptoms persist 5 minutes after 1st dose call 911.   Unknown at Unknown time     nortriptyline (PAMELOR) 10 MG capsule Take 10 mg by mouth At Bedtime   9/23/2022 at Unknown time     omeprazole 20 MG tablet Take 20 mg by mouth daily   Unknown at Unknown time     ondansetron (ZOFRAN) 8 MG tablet Take 8 mg by mouth every 8 hours as needed   Unknown at Unknown time     oxyCODONE-acetaminophen (PERCOCET)  MG per tablet Take 1 tablet by mouth every 6 hours as needed for severe pain   Past Week at Unknown time     pregabalin (LYRICA) 150 MG capsule Take 150 mg by mouth 3 times daily   9/23/2022 at Unknown time     rosuvastatin (CRESTOR) 20 MG tablet Take 20 mg by mouth daily   9/23/2022 at Unknown time     senna-docusate (SENOKOT-S/PERICOLACE) 8.6-50 MG tablet Take 1 tablet by mouth daily as needed   Past Week at Unknown time     urea (DERMAL THERAPY FINGER CARE) 20 % external lotion Externally apply topically 2 times daily as  "needed Feet   9/23/2022 at Unknown time     alcohol swab prep pads Use to swab area of injection/zac as directed. 100 each 6      blood glucose (ACCU-CHEK DARRIN PLUS) test strip Use to test blood sugar 3-4 times daily or as directed. 100 strip 0      blood glucose (ACCU-CHEK SOFTCLIX) lancing device Lancing device to be used with lancets. 1 each 0      blood glucose monitoring (SOFTCLIX) lancets Use to test blood sugar 3-4 times daily. 100 each 1      Continuous Blood Gluc Sensor (FREESTYLE SHEBA 14 DAY SENSOR) [facility] 1 each every 14 days Use 1 Sensor every 14 days. Use to read blood sugars per 's instructions. 2 each 5      insulin pen needle (32G X 4 MM) 32G X 4 MM miscellaneous Use 1 pen needles daily or as directed. 100 each 0      montelukast (SINGULAIR) 10 MG tablet Take 10 mg by mouth At Bedtime          Review of Systems  Complete ROS reviewed, unless noted in HPI, all other systems reviewed (with patient) and all others found to be negative.      Objective:     Physical Exam:  /59 (BP Location: Right arm)   Pulse 68   Temp 97.8  F (36.6  C) (Oral)   Resp 20   Ht 1.702 m (5' 7\")   Wt 90.9 kg (200 lb 7 oz)   LMP  (LMP Unknown)   SpO2 97%   BMI 31.39 kg/m    Weight:   Vitals:    09/24/22 0525 09/26/22 0504   Weight: 95.3 kg (210 lb) 90.9 kg (200 lb 7 oz)      Body mass index is 31.39 kg/m .    General Appearance:  Alert, cooperative, no distress   Head:  Normocephalic, without obvious abnormality, atraumatic   Eyes:  PERRL, conjunctiva/corneas clear, EOM's intact   ENT/Throat: Lips, mucosa, and tongue normal; teeth and gums normal   Lymph/Neck: Supple, symmetrical, trachea midline   Lungs:   Clear to auscultation bilaterally, respirations unlabored, room air   Chest Wall:  No tenderness or deformity   Cardiovascular/Heart:  Regular rate and rhythm, S1, S2 normal   Abdomen:   Soft, non-tender, bowel sounds active all four quadrants   Musculoskeletal: Extremities normal, atraumatic "   Skin: Skin warm, dry    Neurologic: Alert and oriented X 3, Moves all 4 extremities     Psych: Affect is appropriate      Imaging: Reviewed I have personally reviewed pertinent labs, tests, and radiologic imaging in patient's chart.  Echocardiogram Complete    Result Date: 2022  [id number] [address]  Name: LAUREN HAIRSTON MRN: [id number] : 1965 Study Date: 2022 10:17 AM Age: 57 yrs Gender: Female Patient Location: [facility] Reason For Study: Chest Pain Ordering Physician: LIZZ JAIMES Referring Physician: LIZZ JAIMES Performed By: SYLVIA  BSA: 2.1 m2 Height: 67 in Weight: 210 lb HR: 72 ______________________________________________________________________________ Procedure Complete Echo Adult. Adequate quality two-dimensional was performed and interpreted. Adequate quality color and spectral Doppler were performed and interpreted. Compared to the prior study dated 10/23/2018, there have been no changes. ______________________________________________________________________________ Interpretation Summary  1. Left ventricular size, wall thickness, and systolic function are normal. The estimated left ventricular ejection fraction is 65-70%. 2. Right ventricular size and systolic function are normal. 3. Severe left atrial enlargement. 4. No hemodynamically significant valvular abnormalities. 5. Compared to the prior study dated 10/23/2018, there has been no significant change. ______________________________________________________________________________ I      WMSI = 1.00     % Normal = 100  X - Cannot   0 -                      (2) - Mildly 2 -          Segments  Size Interpret    Hyperkinetic 1 - Normal  Hypokinetic  Hypokinetic  1-2     small                                                    7 -          3-5    moderate 3 - Akinetic 4 -          5 -         6 - Akinetic Dyskinetic   6-14    large               Dyskinetic   Aneurysmal  w/scar       w/scar       15-16   diffuse  Left Ventricle The left ventricle is normal in size. There is normal left ventricular wall thickness. Left ventricular systolic function is normal. The visual ejection fraction is 65-70%. Left ventricular diastolic function is abnormal. Diastolic Doppler findings (E/E' ratio and/or other parameters) suggest left ventricular filling pressures are increased. No regional wall motion abnormalities noted.  Right Ventricle Normal right ventricle size and systolic function.  Atria The left atrium is severely dilated. Right atrial size is normal.  Mitral Valve The mitral valve leaflets are mildly thickened. There is mild (1+) mitral regurgitation. There is no mitral valve stenosis.  Tricuspid Valve Tricuspid valve leaflets appear normal. There is mild (1+) tricuspid regurgitation. The right ventricular systolic pressure is approximated at 29.8 mmHg plus the right atrial pressure. Right ventricle systolic pressure estimate normal. There is no tricuspid stenosis.  Aortic Valve Aortic valve leaflets appear normal. The aortic valve is trileaflet. No aortic regurgitation is present. No aortic stenosis is present.  Pulmonic Valve The pulmonic valve is not well seen, but is grossly normal. There is trace pulmonic valvular regurgitation. There is no pulmonic valvular stenosis.  Vessels The aorta root is normal. The thoracic aorta is normal. IVC diameter <2.1 cm collapsing >50% with sniff suggests a normal RA pressure of 3 mmHg.  Pericardium There is no pericardial effusion.  Rhythm Sinus rhythm was noted.  ______________________________________________________________________________ MMode/2D Measurements & Calculations IVSd: 0.90 cm LVIDd: 4.9 cm LVIDs: 3.5 cm LVPWd: 0.90 cm FS: 29.1 %  LV mass(C)d: 153.5 grams LV mass(C)dI: 74.3 grams/m2 Ao root diam: 2.8 cm LA dimension: 4.5 cm asc Aorta Diam: 3.4 cm LA/Ao: 1.6 LVOT diam: 2.0 cm LVOT area: 3.1 cm2 LA Volume  Indexed (AL/bp): 62.0 ml/m2 RWT: 0.37  Time Measurements MM HR: 72.0 BPM  Doppler Measurements & Calculations MV E max jairo: 134.0 cm/sec MV A max jairo: 161.0 cm/sec MV E/A: 0.83 MV max P.1 mmHg MV mean P.0 mmHg MV V2 VTI: 51.0 cm MVA(VTI): 1.5 cm2 MV dec slope: 453.0 cm/sec2 MV dec time: 0.30 sec Ao V2 max: 150.0 cm/sec Ao max P.0 mmHg Ao V2 mean: 97.9 cm/sec Ao mean P.0 mmHg Ao V2 VTI: 28.6 cm HERNANDEZ(I,D): 2.7 cm2 HERNANDEZ(V,D): 2.7 cm2 LV V1 max P.5 mmHg LV V1 max: 127.0 cm/sec LV V1 VTI: 24.6 cm SV(LVOT): 77.3 ml SI(LVOT): 37.4 ml/m2 TV max P.0 mmHg PA acc time: 0.11 sec TR max jairo: 270.0 cm/sec TR max P.8 mmHg AV Jairo Ratio (DI): 0.85 HERNANDEZ Index (cm2/m2): 1.3 E/E' av.6 Lateral E/e': 15.6 Medial E/e': 25.7  ______________________________________________________________________________ Report approved by: Kyrie Ceron 2022 11:58 AM       MR Lumbar Spine w/o & w Contrast    Result Date: 2022  EXAM: MR LUMBAR SPINE WITHOUT AND WITH CONTRAST LOCATION: [facility] DATE/TIME: 2022, 10:00 AM INDICATION: Back pain. COMPARISON: Lumbar spine MR 2022. CONTRAST: 10 mL Gadavist. TECHNIQUE: Routine Lumbar Spine MRI without and with IV contrast. FINDINGS: Nomenclature is based on five lumbar-type vertebral bodies. Normal distal spinal cord and cauda equina with conus medullaris at L2-L3. No abnormal enhancement of the conus or cauda equina nerve roots. Lumbar spine alignment is grossly within normal limits. No significant loss of vertebral body height. Presumed Schmorl's node deformities at the opposing L3 and L4 endplates. Marked STIR hyperintense endplate edema (Modic type I changes) at L5-S1. Moderate edema also within the right L5-S1 facet joints. Postoperative changes of right-sided laminectomy at L5-S1. No suspicious fluid collection in the laminectomy bed. No extraspinal abnormality. Unremarkable visualized bony pelvis. T12-L1: Normal disc  height and signal. No herniation. Normal facets. No spinal canal or neural foraminal stenosis. L1-L2: Normal disc height and signal. No herniation. Right greater than left facet hypertrophy. No spinal canal narrowing. No significant neural foraminal narrowing. L2-L3: Mild loss of disc height and signal. Circumferential disc bulge. Mild facet hypertrophy. No spinal canal narrowing. No significant neural foraminal narrowing. L3-L4: Marked loss of disc height and signal with degenerative endplate changes. Circumferential disc bulge with endplate osteophytic spurring. Bilateral facet hypertrophy. Moderate to severe spinal canal narrowing. Moderate bilateral neural foraminal narrowing. L4-L5: Mild loss of disc height and signal. Shallow posterior disc bulge. Moderate bilateral facet hypertrophy. No spinal canal narrowing. No significant neural foraminal narrowing. L5-S1: Marked loss of disc height and signal with degenerative endplate changes. Circumferential disc bulge with endplate osteophytic spurring. Sequela of previous right-sided laminectomy. Bilateral facet hypertrophy. No spinal canal narrowing. Severe right neural foraminal narrowing. Moderate to severe left neural foraminal narrowing.     IMPRESSION: 1.  Marked degenerative changes in the lumbar spine at L5-S1 and to lesser extent L3-L4 as detailed above. 2.  Marked edema at the L5-S1 level also extending to the right facet joint. This could be a source of pain. 3.  At L5-S1, there is severe right and moderate to severe left neural foraminal narrowing. 4.  At L3-L4, there is moderate to severe spinal canal narrowing as well as moderate bilateral neural foraminal narrowing. 5.  Additional mild degenerative changes at the other levels in the lumbar spine as detailed above. 6.  Overall, no significant change since prior MR 04/03/2022.     CT Chest Pulmonary Embolism w Contrast    Result Date: 9/24/2022  EXAM: CT CHEST PULMONARY EMBOLISM WITH CONTRAST LOCATION: [facility] DATE/TIME: 09/24/2022, 11:53 AM INDICATION: Chest pain. Elevated d-dimer. COMPARISON: CT of the chest, abdomen, and pelvis performed 07/09/2021. TECHNIQUE: CT chest pulmonary angiogram during arterial phase injection of IV contrast. Multiplanar reformats and MIP reconstructions were performed. Dose reduction techniques were used. CONTRAST: Isovue 370, 100 mL. FINDINGS: ANGIOGRAM CHEST: Pulmonary arteries are normal caliber and negative for pulmonary emboli. The thoracic aorta is not well opacified, however, there is no evidence for thoracic aortic aneurysm. No CT evidence of right heart strain. LUNGS AND PLEURA: No pleural effusions. A 0.4 cm right lower lobe pulmonary nodule (series 6 image 132), unchanged. No lung masses or consolidations. No pneumothorax. MEDIASTINUM/AXILLAE: No enlarged lymph nodes in the chest. No pericardial effusion. CORONARY ARTERY CALCIFICATION: Previous intervention (stents or CABG). UPPER ABDOMEN: Small hiatal hernia. Limited views of the upper abdomen are unremarkable. MUSCULOSKELETAL: Sternotomy. No destructive bony lesions are identified.     IMPRESSION: 1.  No evidence for pulmonary embolism. 2.  No cause for chest pain is identified.       Labs: Reviewed I have personally reviewed pertinent labs, tests, and radiologic imaging in patient's chart.  Recent Results (from the past 24 hour(s))   Glucose by meter    Collection Time: 09/25/22  4:14 PM   Result Value Ref Range    GLUCOSE BY METER POCT 368 (H) 70 - 99 mg/dL   Troponin T, High Sensitivity    Collection Time: 09/25/22  7:52 PM   Result Value Ref Range    Troponin T, High Sensitivity 11 <=14 ng/L   Glucose by meter    Collection Time: 09/25/22  9:38 PM   Result Value Ref Range    GLUCOSE BY METER POCT 367 (H) 70 - 99 mg/dL   Comprehensive metabolic panel    Collection Time: 09/26/22  5:11 AM   Result Value Ref Range    Sodium 129 (L) 136 - 145 mmol/L    Potassium 4.8 3.4 - 5.3 mmol/L    Chloride  99 98 - 107 mmol/L    Carbon Dioxide (CO2) 21 (L) 22 - 29 mmol/L    Anion Gap 9 7 - 15 mmol/L    Urea Nitrogen 22.7 (H) 6.0 - 20.0 mg/dL    Creatinine 0.75 0.51 - 0.95 mg/dL    Calcium 8.9 8.6 - 10.0 mg/dL    Glucose 339 (H) 70 - 99 mg/dL    Alkaline Phosphatase 95 35 - 104 U/L    AST 18 10 - 35 U/L    ALT 22 10 - 35 U/L    Protein Total 6.8 6.4 - 8.3 g/dL    Albumin 3.8 3.5 - 5.2 g/dL    Bilirubin Total <0.2 <=1.2 mg/dL    GFR Estimate >90 >60 mL/min/1.73m2   Glucose by meter    Collection Time: 09/26/22  7:55 AM   Result Value Ref Range    GLUCOSE BY METER POCT 293 (H) 70 - 99 mg/dL   Glucose by meter    Collection Time: 09/26/22 12:01 PM   Result Value Ref Range    GLUCOSE BY METER POCT 167 (H) 70 - 99 mg/dL       Total time spent 84 minutes with greater than 50% in consultation, education and coordination of care.     Also discussed with RN, pharmacist.     Thank you for this consultation.    DORIS PriestP-C  Acute Care Pain Management Program  [facility] ([place], [place], [place])  Monday-Friday 8a-4p   Page via online paging system or call [phone]

## 2022-09-27 ENCOUNTER — APPOINTMENT (OUTPATIENT)
Dept: INTERVENTIONAL RADIOLOGY/VASCULAR | Facility: HOSPITAL | Age: 57
End: 2022-09-27
Attending: PHYSICIAN ASSISTANT
Payer: COMMERCIAL

## 2022-09-27 LAB
ANION GAP SERPL CALCULATED.3IONS-SCNC: 10 MMOL/L (ref 7–15)
ATRIAL RATE - MUSE: 75 BPM
BUN SERPL-MCNC: 24.7 MG/DL (ref 6–20)
CALCIUM SERPL-MCNC: 8.7 MG/DL (ref 8.6–10)
CHLORIDE SERPL-SCNC: 99 MMOL/L (ref 98–107)
CREAT SERPL-MCNC: 0.82 MG/DL (ref 0.51–0.95)
DEPRECATED HCO3 PLAS-SCNC: 25 MMOL/L (ref 22–29)
DIASTOLIC BLOOD PRESSURE - MUSE: NORMAL MMHG
ERYTHROCYTE [DISTWIDTH] IN BLOOD BY AUTOMATED COUNT: 12.7 % (ref 10–15)
GFR SERPL CREATININE-BSD FRML MDRD: 82 ML/MIN/1.73M2
GLUCOSE BLDC GLUCOMTR-MCNC: 139 MG/DL (ref 70–99)
GLUCOSE BLDC GLUCOMTR-MCNC: 305 MG/DL (ref 70–99)
GLUCOSE BLDC GLUCOMTR-MCNC: 307 MG/DL (ref 70–99)
GLUCOSE BLDC GLUCOMTR-MCNC: 322 MG/DL (ref 70–99)
GLUCOSE BLDC GLUCOMTR-MCNC: 431 MG/DL (ref 70–99)
GLUCOSE BLDC GLUCOMTR-MCNC: 508 MG/DL (ref 70–99)
GLUCOSE BLDC GLUCOMTR-MCNC: 513 MG/DL (ref 70–99)
GLUCOSE SERPL-MCNC: 349 MG/DL (ref 70–99)
HCT VFR BLD AUTO: 34.5 % (ref 35–47)
HGB BLD-MCNC: 11 G/DL (ref 11.7–15.7)
HOLD SPECIMEN: NORMAL
INTERPRETATION ECG - MUSE: NORMAL
MCH RBC QN AUTO: 27.4 PG (ref 26.5–33)
MCHC RBC AUTO-ENTMCNC: 31.9 G/DL (ref 31.5–36.5)
MCV RBC AUTO: 86 FL (ref 78–100)
P AXIS - MUSE: 29 DEGREES
PLATELET # BLD AUTO: 260 10E3/UL (ref 150–450)
POTASSIUM SERPL-SCNC: 5.1 MMOL/L (ref 3.4–5.3)
PR INTERVAL - MUSE: 198 MS
QRS DURATION - MUSE: 80 MS
QT - MUSE: 410 MS
QTC - MUSE: 457 MS
R AXIS - MUSE: 11 DEGREES
RBC # BLD AUTO: 4.01 10E6/UL (ref 3.8–5.2)
SODIUM SERPL-SCNC: 134 MMOL/L (ref 136–145)
SYSTOLIC BLOOD PRESSURE - MUSE: NORMAL MMHG
T AXIS - MUSE: 92 DEGREES
VENTRICULAR RATE- MUSE: 75 BPM
WBC # BLD AUTO: 8.3 10E3/UL (ref 4–11)

## 2022-09-27 PROCEDURE — 250N000013 HC RX MED GY IP 250 OP 250 PS 637: Performed by: INTERNAL MEDICINE

## 2022-09-27 PROCEDURE — 255N000002 HC RX 255 OP 636: Performed by: RADIOLOGY

## 2022-09-27 PROCEDURE — 82962 GLUCOSE BLOOD TEST: CPT

## 2022-09-27 PROCEDURE — 96372 THER/PROPH/DIAG INJ SC/IM: CPT | Mod: XU

## 2022-09-27 PROCEDURE — 99224 PR SUBSEQUENT OBSERVATION CARE,LEVEL I: CPT | Performed by: HOSPITALIST

## 2022-09-27 PROCEDURE — G0378 HOSPITAL OBSERVATION PER HR: HCPCS

## 2022-09-27 PROCEDURE — 96375 TX/PRO/DX INJ NEW DRUG ADDON: CPT | Mod: XU

## 2022-09-27 PROCEDURE — 85014 HEMATOCRIT: CPT | Performed by: INTERNAL MEDICINE

## 2022-09-27 PROCEDURE — 250N000011 HC RX IP 250 OP 636: Performed by: RADIOLOGY

## 2022-09-27 PROCEDURE — 36415 COLL VENOUS BLD VENIPUNCTURE: CPT | Performed by: INTERNAL MEDICINE

## 2022-09-27 PROCEDURE — 99152 MOD SED SAME PHYS/QHP 5/>YRS: CPT

## 2022-09-27 PROCEDURE — 250N000012 HC RX MED GY IP 250 OP 636 PS 637: Performed by: INTERNAL MEDICINE

## 2022-09-27 PROCEDURE — 80048 BASIC METABOLIC PNL TOTAL CA: CPT | Performed by: HOSPITALIST

## 2022-09-27 PROCEDURE — 99214 OFFICE O/P EST MOD 30 MIN: CPT | Performed by: INTERNAL MEDICINE

## 2022-09-27 RX ORDER — FLUMAZENIL 0.1 MG/ML
0.2 INJECTION, SOLUTION INTRAVENOUS
Status: DISCONTINUED | OUTPATIENT
Start: 2022-09-27 | End: 2022-09-29 | Stop reason: HOSPADM

## 2022-09-27 RX ORDER — NALOXONE HYDROCHLORIDE 0.4 MG/ML
0.2 INJECTION, SOLUTION INTRAMUSCULAR; INTRAVENOUS; SUBCUTANEOUS
Status: DISCONTINUED | OUTPATIENT
Start: 2022-09-27 | End: 2022-09-27

## 2022-09-27 RX ORDER — NICOTINE POLACRILEX 4 MG
15-30 LOZENGE BUCCAL
Status: DISCONTINUED | OUTPATIENT
Start: 2022-09-27 | End: 2022-09-29 | Stop reason: HOSPADM

## 2022-09-27 RX ORDER — SODIUM CHLORIDE 9 MG/ML
INJECTION, SOLUTION INTRAVENOUS CONTINUOUS
Status: DISCONTINUED | OUTPATIENT
Start: 2022-09-27 | End: 2022-09-29

## 2022-09-27 RX ORDER — DEXTROSE MONOHYDRATE 25 G/50ML
25-50 INJECTION, SOLUTION INTRAVENOUS
Status: DISCONTINUED | OUTPATIENT
Start: 2022-09-27 | End: 2022-09-29 | Stop reason: HOSPADM

## 2022-09-27 RX ORDER — DEXAMETHASONE SODIUM PHOSPHATE 10 MG/ML
20 INJECTION, SOLUTION INTRAMUSCULAR; INTRAVENOUS ONCE
Status: COMPLETED | OUTPATIENT
Start: 2022-09-27 | End: 2022-09-27

## 2022-09-27 RX ORDER — NALOXONE HYDROCHLORIDE 0.4 MG/ML
0.4 INJECTION, SOLUTION INTRAMUSCULAR; INTRAVENOUS; SUBCUTANEOUS
Status: DISCONTINUED | OUTPATIENT
Start: 2022-09-27 | End: 2022-09-27

## 2022-09-27 RX ORDER — DEXTROSE MONOHYDRATE 100 MG/ML
INJECTION, SOLUTION INTRAVENOUS CONTINUOUS PRN
Status: DISCONTINUED | OUTPATIENT
Start: 2022-09-27 | End: 2022-09-29 | Stop reason: HOSPADM

## 2022-09-27 RX ORDER — CARVEDILOL 12.5 MG/1
25 TABLET ORAL 2 TIMES DAILY WITH MEALS
Status: DISCONTINUED | OUTPATIENT
Start: 2022-09-27 | End: 2022-09-29 | Stop reason: HOSPADM

## 2022-09-27 RX ORDER — FENTANYL CITRATE 50 UG/ML
25-50 INJECTION, SOLUTION INTRAMUSCULAR; INTRAVENOUS EVERY 5 MIN PRN
Status: DISCONTINUED | OUTPATIENT
Start: 2022-09-27 | End: 2022-09-27

## 2022-09-27 RX ADMIN — CARVEDILOL 25 MG: 12.5 TABLET, FILM COATED ORAL at 17:06

## 2022-09-27 RX ADMIN — DICLOFENAC 2 G: 10 GEL TOPICAL at 20:51

## 2022-09-27 RX ADMIN — CARVEDILOL 12.5 MG: 12.5 TABLET, FILM COATED ORAL at 08:13

## 2022-09-27 RX ADMIN — ACETAMINOPHEN 975 MG: 325 TABLET, FILM COATED ORAL at 08:12

## 2022-09-27 RX ADMIN — FENTANYL CITRATE 25 MCG: 50 INJECTION, SOLUTION INTRAMUSCULAR; INTRAVENOUS at 10:55

## 2022-09-27 RX ADMIN — AMLODIPINE BESYLATE 5 MG: 5 TABLET ORAL at 08:12

## 2022-09-27 RX ADMIN — DEXAMETHASONE SODIUM PHOSPHATE 20 MG: 10 INJECTION INTRAMUSCULAR; INTRAVENOUS at 10:54

## 2022-09-27 RX ADMIN — METHYLPREDNISOLONE 4 MG: 4 TABLET ORAL at 06:35

## 2022-09-27 RX ADMIN — INSULIN ASPART 10 UNITS: 100 INJECTION, SOLUTION INTRAVENOUS; SUBCUTANEOUS at 16:32

## 2022-09-27 RX ADMIN — METHOCARBAMOL 500 MG: 500 TABLET, FILM COATED ORAL at 20:52

## 2022-09-27 RX ADMIN — METHOCARBAMOL 500 MG: 500 TABLET, FILM COATED ORAL at 08:13

## 2022-09-27 RX ADMIN — DICLOFENAC 2 G: 10 GEL TOPICAL at 17:07

## 2022-09-27 RX ADMIN — ACETAMINOPHEN 975 MG: 325 TABLET, FILM COATED ORAL at 20:50

## 2022-09-27 RX ADMIN — MIDAZOLAM HYDROCHLORIDE 1 MG: 1 INJECTION, SOLUTION INTRAMUSCULAR; INTRAVENOUS at 10:45

## 2022-09-27 RX ADMIN — PANTOPRAZOLE SODIUM 20 MG: 20 TABLET, DELAYED RELEASE ORAL at 08:12

## 2022-09-27 RX ADMIN — ASPIRIN 325 MG: 325 TABLET, COATED ORAL at 08:12

## 2022-09-27 RX ADMIN — CETIRIZINE HYDROCHLORIDE 10 MG: 10 TABLET ORAL at 08:12

## 2022-09-27 RX ADMIN — FENTANYL CITRATE 50 MCG: 50 INJECTION, SOLUTION INTRAMUSCULAR; INTRAVENOUS at 10:47

## 2022-09-27 RX ADMIN — MONTELUKAST 10 MG: 10 TABLET, FILM COATED ORAL at 20:53

## 2022-09-27 RX ADMIN — OXYCODONE HYDROCHLORIDE 5 MG: 5 TABLET ORAL at 21:00

## 2022-09-27 RX ADMIN — METHOCARBAMOL 500 MG: 500 TABLET, FILM COATED ORAL at 11:19

## 2022-09-27 RX ADMIN — OXYCODONE HYDROCHLORIDE 10 MG: 5 TABLET ORAL at 09:04

## 2022-09-27 RX ADMIN — OXYCODONE HYDROCHLORIDE 5 MG: 5 TABLET ORAL at 17:07

## 2022-09-27 RX ADMIN — FLUTICASONE PROPIONATE 1 SPRAY: 50 SPRAY, METERED NASAL at 08:13

## 2022-09-27 RX ADMIN — AMITRIPTYLINE HYDROCHLORIDE 25 MG: 25 TABLET, FILM COATED ORAL at 20:53

## 2022-09-27 RX ADMIN — OXYCODONE HYDROCHLORIDE 10 MG: 5 TABLET ORAL at 04:42

## 2022-09-27 RX ADMIN — DICLOFENAC 2 G: 10 GEL TOPICAL at 08:12

## 2022-09-27 RX ADMIN — OXYCODONE HYDROCHLORIDE 10 MG: 5 TABLET ORAL at 00:04

## 2022-09-27 RX ADMIN — ROSUVASTATIN CALCIUM 40 MG: 40 TABLET, FILM COATED ORAL at 08:12

## 2022-09-27 RX ADMIN — DULOXETINE HYDROCHLORIDE 30 MG: 30 CAPSULE, DELAYED RELEASE ORAL at 08:12

## 2022-09-27 RX ADMIN — LOSARTAN POTASSIUM 100 MG: 50 TABLET, FILM COATED ORAL at 08:12

## 2022-09-27 RX ADMIN — METHYLPREDNISOLONE 4 MG: 4 TABLET ORAL at 11:19

## 2022-09-27 RX ADMIN — NORTRIPTYLINE HYDROCHLORIDE 10 MG: 10 CAPSULE ORAL at 20:54

## 2022-09-27 RX ADMIN — METHOCARBAMOL 500 MG: 500 TABLET, FILM COATED ORAL at 17:05

## 2022-09-27 RX ADMIN — IOHEXOL 2 ML: 180 INJECTION INTRAVENOUS at 11:04

## 2022-09-27 RX ADMIN — INSULIN GLARGINE 36 UNITS: 100 INJECTION, SOLUTION SUBCUTANEOUS at 20:37

## 2022-09-27 RX ADMIN — DULOXETINE HYDROCHLORIDE 30 MG: 30 CAPSULE, DELAYED RELEASE ORAL at 21:00

## 2022-09-27 RX ADMIN — INSULIN ASPART 7 UNITS: 100 INJECTION, SOLUTION INTRAVENOUS; SUBCUTANEOUS at 08:15

## 2022-09-27 RX ADMIN — MIDAZOLAM HYDROCHLORIDE 0.5 MG: 1 INJECTION, SOLUTION INTRAMUSCULAR; INTRAVENOUS at 10:53

## 2022-09-27 ASSESSMENT — ACTIVITIES OF DAILY LIVING (ADL)
ADLS_ACUITY_SCORE: 33

## 2022-09-27 NOTE — UTILIZATION REVIEW
Continued stay Observation     Concurrent stay review; Secondary Review Determination         Under the authority of the Utilization Management Committee, the utilization review process indicated a secondary review on the above patient.  The review outcome is based on review of the medical records, discussions with staff, and applying clinical experience noted on the date of the review.          (x) Observation Status Appropriate - Concurrent stay review    RATIONALE FOR DETERMINATION   57 year old female with past medical history of Diabetes, hypertension, CAD s/p CABG, asthma, chronic lower back pain, schizoaffective disorder, and substance abuse presents to ER for evaluation of back pain and chest pain.  Work-up for acute coronary syndrome has been negative.  Cardiologist does not recommend any further cardiac work-up at this point.  Neurosurgery was consulted for acute on chronic low back pain.  Plan for DIAZ with IR.  In the meantime continue with pain management and steroid.      Patient is clinically improving and there is no clear indication to change patient's status to inpatient. The severity of illness, intensity of service provided, expected LOS and risk for adverse outcome make the care appropriate for observation.    The information on this document is developed by the utilization review team in order for the business office to ensure compliance.  This only denotes the appropriateness of proper admission status and does not reflect the quality of care rendered.         The definitions of Inpatient Status and Observation Status used in making the determination above are those provided in the CMS Coverage Manual, Chapter 1 and Chapter 6, section 70.4.      Sincerely,   Keenan Teresa MD    Utilization Review  Physician Advisor  [facility].

## 2022-09-27 NOTE — PROGRESS NOTES
Neurosurgery chart check    Patient went for DIAZ today with IR. We will follow up with patient tomorrow to eval response. Please feel free to contact our team with questions or concerns    Vonnie REYNAGA  [facility] Neurosurgery  . [phone]
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
## 2022-09-27 NOTE — PROGRESS NOTES
"  Neurointerventional Surgery  Pre Sedation Assessment    We are asked to perform a bilateral L5-S1 transforaminal DIAZ on this 57 year old female     HPI: 57 year old female who presents with past medical history of CAD, COPD, diabetes, hypertension and polysubstance abuse presented to ED with worsening complaint of acute on chronic low back pain and bilateral leg pain.       Allergies   Allergen Reactions     Haloperidol Unknown     Patient states it stops her heart       Meperidine Angioedema, Difficulty breathing, Other (See Comments), Rash and Shortness Of Breath     Throat closes  Other reaction(s): Breathing Difficulty  Other reaction(s): Breathing Difficulty       Phenothiazines Other (See Comments)     Other reaction(s): CARDIAC DISTURBANCES  Patient states it stops her heart  \"I swell up\"  \"stopped by heart\"  \"I swell up\"  \"I swell up\"       Tramadol Other (See Comments)     Other reaction(s): Angioedema  Throat itch       Butyrophenones Angioedema     Januvia [Sitagliptin] Other (See Comments)     Swelling in the neck      Latex Itching     Lisinopril Other (See Comments)     ACE cough  Itchy throat  Throat itches  Itchy throat       Penicillins Swelling     Hydroxyzine Other (See Comments) and Rash     Tongue swelling  Tongue swelling  Tongue swelling         Sedation history: Previous problems with sedation. No though states she has a \"high tolerance\"  History of sleep apnea No    Exam:   BP (!) 173/84 (BP Location: Right arm)   Pulse 84   Temp 97.7  F (36.5  C) (Oral)   Resp 20   Ht 5' 7\" (1.702 m)   Wt 201 lb 8 oz (91.4 kg)   LMP  (LMP Unknown)   SpO2 100%   BMI 31.56 kg/m    Neuro: A/O x4, speech clear. EVERETT  Cardiac: regular  Lungs: clear  Mallampati:  III - Only soft palate is visible. Intubation is predicted to be difficult  ASA: 2 - Mild systemic disease, 3 - Severe systemic disease, but not incapacitating  NPO since last evening    Attempted to perform DIAZ yesterday however patient refused " unless she had sedation and she was not NPO. Okay to proceed with planned procedure using moderate IV sedation today    Procedure its risks, benefits, and alternatives were discussed with patient.  Questions and answered and they give written and verbal consent to proceed.    Patient states she has had some procedure at a pain center (?[facility] trigger point?) and she received versed, fentanyl, lidocaine IV, and propofol.  I discussed with her that we are using versed and fentanyl IV, lidocaine in her back, but no propofol.  She states her understanding.    NARCISA Vaca CNP    Office: [phone]

## 2022-09-27 NOTE — PROGRESS NOTES
3:03 PM  Chart reviewed. Pt lives alone in an apartment. Pt is independent at baseline and uses a cane as needed. Pt has PCA services for support. CM to check-in with Pt about transportation home when medically ready for discharge.     PATRICIA Chew

## 2022-09-27 NOTE — PROCEDURES
Neurointerventional Surgery  Post-procedure     Patient Name: Sarah Rahman  MRN: [id number]  Date of Procedure: September 27, 2022    Procedure: bilateral L5-S1 TFESI  Radiologist: Fredo Foreman MD    Contrast: 2 ml  Fluoro Time: 1.6 minutes  Air Kerma: 74 mGy    Medications: Versed 1.5 mg IV                       Fentanyl 75 mcg IV  Sedation Time: 30 minutes    EBL: 0 ml   Complications: none    Patient reevaluated immediately prior to sedation and prior to procedure.    Preliminary Report:   (See dictation for full detail)   bilateral L5-S1 TFESI    Assess/Plan:  Bedrest x 2 hours       Fredo Foreman MD MD  Emergency pager: [phone]  Office: [phone]

## 2022-09-27 NOTE — PLAN OF CARE
Problem: Pain Chronic (Persistent)  Goal: Acceptable Pain Control and Functional Ability  Outcome: Ongoing, Progressing  Intervention: Develop Pain Management Plan  Recent Flowsheet Documentation  Taken 9/27/2022 0442 by Wilian Green RN  Pain Management Interventions:   medication (see MAR)   emotional support   repositioned   rest  Taken 9/27/2022 0004 by Wilian Green RN  Pain Management Interventions:   medication (see MAR)   emotional support   repositioned   rest  Taken 9/26/2022 2001 by Wilian Green RN  Pain Management Interventions:   medication (see MAR)   emotional support   pillow support provided   repositioned   rest  Intervention: Manage Persistent Pain  Recent Flowsheet Documentation  Taken 9/27/2022 0004 by Wliian Green RN  Medication Review/Management: medications reviewed  Taken 9/26/2022 2011 by Wilian Green RN  Medication Review/Management: medications reviewed     Problem: Chest Pain  Goal: Resolution of Chest Pain Symptoms  Outcome: Ongoing, Progressing   Goal Outcome Evaluation:  NPO for epidural lumbar injection in IR today. Patient understanding of POC.  Reported ongoing chronic back pain radiating to bilateral legs and feet. PRN oxycodone given several times overnight with effectiveness noted. Patient sleeping upon each pain recheck.  HS BG elevated, covered with sliding scale insulin and Lantus.

## 2022-09-27 NOTE — CONSULTS
Integrative Therapy Consult    Healing PresenceYes  Essential Oils: Inhalation (EO/Topical oil), Topical (EO/Topical Oil)     Support Healing process blend - HC, Lavender Massage Oil - HC       Healing Music: TV/Care channel     Breathwork:       Guided Imagery:       Acupressure:       Oshibori:       Energy Therapy:       Healing Touch:       Reiki:       Qi Gong:     Massage: Foot      Targeted Massage:    Sleep Promotion:       Other Therapy:       Intervention Reason: Anxiety/Stress, Pain     Pre and Post Session Scores: Patient Desires Treatment: yes  Pre-Session Anxiety: 7  Post-session Anxiety: 3     Pre-session Pain: 10 Post-session Pain: 8               Delivery:         Referrals:      Areyl Maynard

## 2022-09-27 NOTE — PROGRESS NOTES
"  HEART CARE CONSULTATON NOTE        Assessment/Recommendations   Assessment:  1.  Chest pain: Admitted with constant chest discomfort with very minimally elevated troponin.  No EKG changes or evidence of segmental wall motion abnormality on echocardiogram  2.  Coronary artery disease: History of CABG x2 in 2009 with known occluded SVG to diagonal with collaterals from LIMA to LAD  3.  Chronic pain syndrome: With severe back pain with plans for interventional neurology pain relief  4.  Hypertension: Poorly controlled  5.  History of polysubstance abuse  6.  Tobacco abuse  7.  Schizoaffective disorder  8.  Diabetes mellitus type 2: Poorly controlled  Plan:  1.    Increase carvedilol for better blood pressure control  2.    Continue amlodipine, consider increasing further if blood pressure still poorly controlled  3.    Continue losartan, statin therapy  Primary cardiologist Dr. Munoz.  Recommend follow-up as outpatient     History of Present Illness/Subjective    Continued complaints of back pain.  No complaints of chest pain       Physical Examination  Review of Systems   VITALS: BP (!) 170/100   Pulse 80   Temp 97.4  F (36.3  C) (Oral)   Resp 17   Ht 1.702 m (5' 7\")   Wt 91.4 kg (201 lb 8 oz)   LMP  (LMP Unknown)   SpO2 91%   BMI 31.56 kg/m    BMI: Body mass index is 31.56 kg/m .  Wt Readings from Last 3 Encounters:   09/27/22 91.4 kg (201 lb 8 oz)   07/29/22 90.3 kg (199 lb)   07/18/22 96.2 kg (212 lb)       Intake/Output Summary (Last 24 hours) at 9/27/2022 1229  Last data filed at 9/27/2022 0620  Gross per 24 hour   Intake 960 ml   Output 0 ml   Net 960 ml     General Appearance:   no distress, normal body habitus   ENT/Mouth: membranes moist, no oral lesions or bleeding gums.      EYES:  no scleral icterus, normal conjunctivae   Neck: no carotid bruits or thyromegaly   Chest/Lungs:   lungs are clear to auscultation   Cardiovascular:   Regular. Normal first and second heart sounds with no murmurs  no " edema bilaterally    Abdomen:  no organomegaly, masses, bruits, or tenderness; bowel sounds are present   Extremities: no cyanosis or clubbing   Skin: no xanthelasma, warm.    Neurologic: normal  bilateral, no tremors     Psychiatric: alert and oriented x3, calm     Review Of Systems  Skin: negative  Eyes: negative  Ears/Nose/Throat: negative  Respiratory: No shortness of breath, dyspnea on exertion, cough, or hemoptysis  Cardiovascular: negative  Gastrointestinal: negative  Genitourinary: negative  Musculoskeletal: negative  Neurologic: negative  Psychiatric: negative  Hematologic/Lymphatic/Immunologic: negative  Endocrine: negative          Lab Results    Chemistry/lipid CBC Cardiac Enzymes/BNP/TSH/INR   Recent Labs   Lab Test 10/24/18  0523   CHOL 210*   HDL 33*   *   TRIG 219*     Recent Labs   Lab Test 10/24/18  0523   *     Recent Labs   Lab Test 09/27/22  1121 09/27/22  0634 09/27/22  0433   NA  --   --  134*   POTASSIUM  --   --  5.1   CHLORIDE  --   --  99   CO2  --   --  25   *   < > 349*   BUN  --   --  24.7*   CR  --   --  0.82   GFRESTIMATED  --   --  82   MAXIMO  --   --  8.7    < > = values in this interval not displayed.     Recent Labs   Lab Test 09/27/22  0433 09/26/22  0511 09/24/22  0742   CR 0.82 0.75 0.76     Recent Labs   Lab Test 07/19/22  1531 04/03/22  1436 05/31/21  0548   A1C 10.1* >14.0* 10.0*          Recent Labs   Lab Test 09/27/22  0436   WBC 8.3   HGB 11.0*   HCT 34.5*   MCV 86        Recent Labs   Lab Test 09/27/22  0436 09/24/22  0742 07/17/22  1520   HGB 11.0* 12.4 11.8    Recent Labs   Lab Test 07/17/22  1520 05/24/22  1022 04/03/22  1436   TROPONINI <0.01 0.01 <0.01     Recent Labs   Lab Test 04/03/22  1436 05/30/21  1101 10/21/18  1335   * 103* 140*     No results for input(s): TSH in the last [id number] hours.  Recent Labs   Lab Test 05/24/22  1022 04/03/22  1436 07/09/21  0651   INR 0.95 0.95 1.01        Medical History  Surgical History Family  History Social History   Past Medical History:   Diagnosis Date     Asthma      CAD (coronary artery disease)      COPD (chronic obstructive pulmonary disease) (H)      CVA (cerebral infarction)      Diabetes (H)      GERD (gastroesophageal reflux disease)      Hypertension      Polysubstance abuse (H)      S/P CABG (coronary artery bypass graft)      S/P lumbar discectomy 06/13/2019    L5/S1 by  Dr. Hamm at [facility]     Schizoaffective disorder (H)      Past Surgical History:   Procedure Laterality Date     BYPASS GRAFT ARTERY CORONARY  2009    x2     CORONARY STENT PLACEMENT       CV CORONARY ANGIOGRAM N/A 6/2/2021    Procedure: Coronary Angiogram;  Surgeon: Juventino Rivera MD;  Location: [facility] Cardiac Cath Lab;  Service: Cardiology     HYSTERECTOMY TOTAL ABDOMINAL, BILATERAL SALPINGO-OOPHORECTOMY, COMBINED       ORIF ULNAR / RADIAL SHAFT FRACTURE Right      Family History   Problem Relation Age of Onset     Heart Disease Mother      Heart Disease Father      Heart Disease Sister      Diabetes Brother      Heart Disease Sister         Social History     Socioeconomic History     Marital status: Single     Spouse name: Not on file     Number of children: Not on file     Years of education: Not on file     Highest education level: Not on file   Occupational History     Not on file   Tobacco Use     Smoking status: Current Every Day Smoker     Packs/day: 0.50     Types: Cigarettes     Smokeless tobacco: Never Used   Vaping Use     Vaping Use: Never used   Substance and Sexual Activity     Alcohol use: Not Currently     Comment: Alcoholic Drinks/day: occ     Drug use: No     Sexual activity: Not Currently   Other Topics Concern     Not on file   Social History Narrative    Lives alone in a condo.  On disability.  Three living children.       Social Determinants of Health     Financial Resource Strain: Not on file   Food Insecurity: Not on file   Transportation Needs: Not on file   Physical Activity: Not  "on file   Stress: Not on file   Social Connections: Not on file   Intimate Partner Violence: Not on file   Housing Stability: Not on file         Medications  Allergies   No current outpatient medications on file.        Allergies   Allergen Reactions     Haloperidol Unknown     Patient states it stops her heart       Meperidine Angioedema, Difficulty breathing, Other (See Comments), Rash and Shortness Of Breath     Throat closes  Other reaction(s): Breathing Difficulty  Other reaction(s): Breathing Difficulty       Phenothiazines Other (See Comments)     Other reaction(s): CARDIAC DISTURBANCES  Patient states it stops her heart  \"I swell up\"  \"stopped by heart\"  \"I swell up\"  \"I swell up\"       Tramadol Other (See Comments)     Other reaction(s): Angioedema  Throat itch       Butyrophenones Angioedema     Januvia [Sitagliptin] Other (See Comments)     Swelling in the neck      Latex Itching     Lisinopril Other (See Comments)     ACE cough  Itchy throat  Throat itches  Itchy throat       Penicillins Swelling     Hydroxyzine Other (See Comments) and Rash     Tongue swelling  Tongue swelling  Tongue swelling           Daysi Vegas MD  "

## 2022-09-27 NOTE — PROGRESS NOTES
BG are very high after Epidural steroid injection. She is on oral steroids too for back pain per spine team. We will start her on insulin drip as BG are more than 500. Keep Meal time novolog per carb counts. Hold lantus and stop sliding scale insulin.    Apparently, they can not do insulin drip on the regular floor. She needs to go to ICU it seems for insulin drip. We will try to give her bigger dose of novolog and see if BG comes down. increase lantus to 36 units BID. If BG still remain out of control, consider transfer to ICU for insulin drip .     To nursing, I am still waiting for the mammogram report from 10/18/18. Not sure how to get the report–it says \"exam ended\" in epic without report. No fax report received yet. Thanks.

## 2022-09-27 NOTE — PLAN OF CARE
"  Problem: Pain Chronic (Persistent)  Goal: Acceptable Pain Control and Functional Ability  Outcome: Ongoing, Progressing  Intervention: Develop Pain Management Plan  Recent Flowsheet Documentation  Taken 9/27/2022 0904 by Lanie Martinez RN  Pain Management Interventions: medication (see MAR)  Taken 9/27/2022 0815 by Lanie Martinez RN  Pain Management Interventions: medication (see MAR)  Intervention: Manage Persistent Pain  Recent Flowsheet Documentation  Taken 9/27/2022 0815 by Lanie Martinez RN  Medication Review/Management: medications reviewed   IR procedure done this morning. Sleeping on and off post procedure.   Pt very irritable about diet, insistent that she can have \"more carbs and sweets\" because she \"monitors it at home\". Education provided about effects of steroids on BG.   "

## 2022-09-27 NOTE — PROGRESS NOTES
[facility]  Internal Medicine Progress Note    Assessment and Plans:     57 year old female with past medical history of Diabetes, hypertension, CAD s/p CABG, asthma, chronic lower back pain, schizoaffective disorder, and substance abuse presents to ER for evaluation of back pain and chest pain.    She was admitted with,     Chest pain: On off chest discomfort with very minimally elevated troponin.  No EKG changes or evidence of segmental wall motion abnormality on echocardiogram  CAD, History of CABG x2 in 2009 with known occluded SVG to diagonal with collaterals from LIMA to LAD  - Patient reports having on off left sided chest pain, denies specific triggers. It can happen with or without exertion   - Her troponin initially was at the upper normal, then slightly went up above the normal range.  - Cardiology consult, appreciate input  - Echocardiogram did not show any new wall motion changes. EF is  65-70%. E/E prime ratio is elevated suggestive of diastolic HF. Sevee LA enlargement noted.   - Cardiology not planning any further work-up at this point  - Cardiology upped the Lipitor and added Norvasc and increased coreg to better control BP.   - Recommend smoking cessation  - Ct ASA, Coreg, Crestor, losartan  - If BP still high side, can up the dose of Norvasc     Bilateral lower back pain with right sided siatica:   - Presents with acute on chronic lower back pain, radiating down her right leg.   - MRI reveals marked degenerative changes in the lumbar spine at L5-S1 with severe right and moderate to severe left neural foraminal narrowing, marked edema at the L5-S1 level also extending to the right facet joint.   - Neurosurgery consult, appreciate input. Plan is to do DIAZ with IR.   - Ct medro dose pack  - Scheduled tylenol tid  - Continue PTA robaxin, topical diclofenac, lyrica, amitriptyline   - She takes percocet  10 mg q4h prn at home. Typically, takes 3 tabs a day. Will continue  with oxycodone 5-10 mg q4h prn. Start morphine IV 2-4 mg prn.  - Pain management consult, appreciate input  - PT/OT evaluation     History of CAD:   - S/P  CABG in 2009.   - Continue - Ct ASA, Coreg, Crestor, losartan. Plan is to add norvasc for better BP control and probably will help anginal symptoms too.   - Chest pain management as above  - Cardiology is following     Diabetes, type II:   - not well controlled with HbA1C 10.1 on 7/19/22.   - PTA Lantus, glipizide 10 mg daily and exenatide  - Significant elevation of blood glucose today secondary to steroid  - Restart home Lantus.  Increase dose today to 30 units twice daily  - Add Novolog carb count at 1:5 ratio   - Resume Home Exenatide which she takes every Sunday. She missed last Sunday dose  - Continue sliding scale coverage     Asthma:  - No exacerbation.  - Continue home inhalers.  - Patient is on Medrol pack for back pain      Essential hypertension:   - BP poorly controlled.    - Resume PTA losartan 100 mg every day. Cardiology increased carvedilol to 25 mg BID from 12.5 mg BID  - Cardiology added Norvasc 5 mg every day as well.   - Based on BP, titrate Norvasc.   - At home on lasix at 20 mg every day  As needed.  - Na on low side. Defer diuretics for now.     Diet: Carb consistent  DVT Prophylaxis: SCDs  Code Status: Full Code  Disposition/Barriers:  1-2 days, based on progress with back pain. Home Vs TCU      Montse Nieto MD, MPH.  Internal Medicine Attending  [place], [place]    Click on the link below to page me.   Text Page  (7am - 5pm, M-F)    Subjective:     Last night had some chest pain while resting in the bed  Denies any new sob  No cough  Still has back pain as before limiting activity  No fevers or chills    -Data reviewed today: I reviewed all new labs and imaging results over the last 24 hours.     Exam:       Temp: 97.4  F (36.3  C) Temp src: Oral BP: 131/65 Pulse: 86   Resp: 17 SpO2: 100 % O2 Device:  None (Room air)    Vitals:    09/24/22 0525 09/26/22 0504 09/27/22 0444   Weight: 95.3 kg (210 lb) 90.9 kg (200 lb 7 oz) 91.4 kg (201 lb 8 oz)     Vital Signs with Ranges  Temp:  [97.4  F (36.3  C)-98.2  F (36.8  C)] 97.4  F (36.3  C)  Pulse:  [68-86] 86  Resp:  [16-20] 17  BP: (131-173)/() 131/65  SpO2:  [91 %-100 %] 100 %  I/O last 3 completed shifts:  In: 1200 [P.O.:1200]  Out: 0     Constitutional: No distress noted, Alert, Answering questions appropriately  Respiratory: AE is good on both sides, no wheezing onr crackles  Cardiovascular: S1S2 normal, no new murmur  GI: Soft, non tender  Skin/Integumen: No rash      Medications       acetaminophen  975 mg Oral TID     amitriptyline  25 mg Oral At Bedtime     amLODIPine  5 mg Oral Daily     aspirin  325 mg Oral Daily     carvedilol  25 mg Oral BID w/meals     cetirizine  10 mg Oral Daily     diclofenac  2 g Topical 4x Daily     DULoxetine  30 mg Oral BID     fluticasone  1 spray Both Nostrils Daily     insulin aspart  1-10 Units Subcutaneous TID AC     insulin aspart  1-7 Units Subcutaneous At Bedtime     insulin aspart   Subcutaneous Daily with breakfast     insulin aspart   Subcutaneous Daily with lunch     insulin aspart   Subcutaneous Daily with supper     insulin glargine  30 Units Subcutaneous BID     losartan  100 mg Oral Daily     methocarbamol  500 mg Oral 4x Daily     methylPREDNISolone  4 mg Oral QAM AC    And     methylPREDNISolone  4 mg Oral At Bedtime     montelukast  10 mg Oral At Bedtime     nortriptyline  10 mg Oral At Bedtime     pantoprazole  20 mg Oral Daily     rosuvastatin  40 mg Oral Daily       Data   Recent Labs   Lab 09/27/22  1121 09/27/22  0736 09/27/22  0634 09/27/22  0436 09/27/22  0433 09/26/22  0755 09/26/22  0511 09/24/22  1824 09/24/22  0742   WBC  --   --   --  8.3  --   --   --   --  5.5   HGB  --   --   --  11.0*  --   --   --   --  12.4   MCV  --   --   --  86  --   --   --   --  86   PLT  --   --   --  260  --   --   --    --  263   NA  --   --   --   --  134*  --  129*  --  137   POTASSIUM  --   --   --   --  5.1  --  4.8  --  4.4   CHLORIDE  --   --   --   --  99  --  99  --  101   CO2  --   --   --   --  25  --  21*  --  24   BUN  --   --   --   --  24.7*  --  22.7*  --  14.6   CR  --   --   --   --  0.82  --  0.75  --  0.76   ANIONGAP  --   --   --   --  10  --  9  --  12   MAXIMO  --   --   --   --  8.7  --  8.9  --  9.2   * 305* 307*  --  349*   < > 339*   < > 201*   ALBUMIN  --   --   --   --   --   --  3.8  --   --    PROTTOTAL  --   --   --   --   --   --  6.8  --   --    BILITOTAL  --   --   --   --   --   --  <0.2  --   --    ALKPHOS  --   --   --   --   --   --  95  --   --    ALT  --   --   --   --   --   --  22  --   --    AST  --   --   --   --   --   --  18  --   --     < > = values in this interval not displayed.       No results found for this or any previous visit (from the past 24 hour(s)).

## 2022-09-28 LAB
GLUCOSE BLDC GLUCOMTR-MCNC: 172 MG/DL (ref 70–99)
GLUCOSE BLDC GLUCOMTR-MCNC: 186 MG/DL (ref 70–99)
GLUCOSE BLDC GLUCOMTR-MCNC: 215 MG/DL (ref 70–99)
GLUCOSE BLDC GLUCOMTR-MCNC: 240 MG/DL (ref 70–99)
GLUCOSE BLDC GLUCOMTR-MCNC: 240 MG/DL (ref 70–99)
GLUCOSE BLDC GLUCOMTR-MCNC: 272 MG/DL (ref 70–99)
GLUCOSE BLDC GLUCOMTR-MCNC: 423 MG/DL (ref 70–99)

## 2022-09-28 PROCEDURE — 250N000013 HC RX MED GY IP 250 OP 250 PS 637: Performed by: CLINICAL NURSE SPECIALIST

## 2022-09-28 PROCEDURE — G0378 HOSPITAL OBSERVATION PER HR: HCPCS

## 2022-09-28 PROCEDURE — 99225 PR SUBSEQUENT OBSERVATION CARE,LEVEL II: CPT | Performed by: HOSPITALIST

## 2022-09-28 PROCEDURE — 99214 OFFICE O/P EST MOD 30 MIN: CPT | Performed by: INTERNAL MEDICINE

## 2022-09-28 PROCEDURE — 82962 GLUCOSE BLOOD TEST: CPT

## 2022-09-28 PROCEDURE — 250N000013 HC RX MED GY IP 250 OP 250 PS 637: Performed by: INTERNAL MEDICINE

## 2022-09-28 PROCEDURE — 250N000013 HC RX MED GY IP 250 OP 250 PS 637: Performed by: HOSPITALIST

## 2022-09-28 PROCEDURE — 99214 OFFICE O/P EST MOD 30 MIN: CPT | Performed by: CLINICAL NURSE SPECIALIST

## 2022-09-28 PROCEDURE — 96372 THER/PROPH/DIAG INJ SC/IM: CPT | Mod: XU

## 2022-09-28 RX ORDER — HYDRALAZINE HYDROCHLORIDE 25 MG/1
25 TABLET, FILM COATED ORAL 3 TIMES DAILY
Status: DISCONTINUED | OUTPATIENT
Start: 2022-09-28 | End: 2022-09-28

## 2022-09-28 RX ORDER — ISOSORBIDE MONONITRATE 30 MG/1
30 TABLET, EXTENDED RELEASE ORAL DAILY
Status: DISCONTINUED | OUTPATIENT
Start: 2022-09-28 | End: 2022-09-29 | Stop reason: HOSPADM

## 2022-09-28 RX ORDER — VALSARTAN 80 MG/1
80 TABLET ORAL DAILY
Status: DISCONTINUED | OUTPATIENT
Start: 2022-09-28 | End: 2022-09-29 | Stop reason: HOSPADM

## 2022-09-28 RX ORDER — SENNOSIDES 8.6 MG
2 TABLET ORAL 2 TIMES DAILY
Status: DISCONTINUED | OUTPATIENT
Start: 2022-09-28 | End: 2022-09-29 | Stop reason: HOSPADM

## 2022-09-28 RX ORDER — POLYETHYLENE GLYCOL 3350 17 G/17G
17 POWDER, FOR SOLUTION ORAL DAILY
Status: DISCONTINUED | OUTPATIENT
Start: 2022-09-28 | End: 2022-09-29 | Stop reason: HOSPADM

## 2022-09-28 RX ORDER — AMLODIPINE BESYLATE 5 MG/1
5 TABLET ORAL 2 TIMES DAILY
Status: DISCONTINUED | OUTPATIENT
Start: 2022-09-28 | End: 2022-09-29 | Stop reason: HOSPADM

## 2022-09-28 RX ADMIN — NORTRIPTYLINE HYDROCHLORIDE 10 MG: 10 CAPSULE ORAL at 21:12

## 2022-09-28 RX ADMIN — METHOCARBAMOL 500 MG: 500 TABLET, FILM COATED ORAL at 12:09

## 2022-09-28 RX ADMIN — ISOSORBIDE MONONITRATE 30 MG: 30 TABLET, EXTENDED RELEASE ORAL at 10:58

## 2022-09-28 RX ADMIN — INSULIN GLARGINE 36 UNITS: 100 INJECTION, SOLUTION SUBCUTANEOUS at 21:19

## 2022-09-28 RX ADMIN — ROSUVASTATIN CALCIUM 40 MG: 40 TABLET, FILM COATED ORAL at 08:03

## 2022-09-28 RX ADMIN — AMLODIPINE BESYLATE 5 MG: 5 TABLET ORAL at 08:08

## 2022-09-28 RX ADMIN — INSULIN GLARGINE 36 UNITS: 100 INJECTION, SOLUTION SUBCUTANEOUS at 09:03

## 2022-09-28 RX ADMIN — POLYETHYLENE GLYCOL 3350 17 G: 17 POWDER, FOR SOLUTION ORAL at 13:02

## 2022-09-28 RX ADMIN — AMLODIPINE BESYLATE 5 MG: 5 TABLET ORAL at 21:14

## 2022-09-28 RX ADMIN — OXYCODONE HYDROCHLORIDE 10 MG: 5 TABLET ORAL at 05:36

## 2022-09-28 RX ADMIN — DULOXETINE HYDROCHLORIDE 30 MG: 30 CAPSULE, DELAYED RELEASE ORAL at 08:15

## 2022-09-28 RX ADMIN — METHOCARBAMOL 500 MG: 500 TABLET, FILM COATED ORAL at 08:02

## 2022-09-28 RX ADMIN — OXYCODONE HYDROCHLORIDE 10 MG: 5 TABLET ORAL at 17:14

## 2022-09-28 RX ADMIN — OXYCODONE HYDROCHLORIDE 10 MG: 5 TABLET ORAL at 12:18

## 2022-09-28 RX ADMIN — ACETAMINOPHEN 975 MG: 325 TABLET, FILM COATED ORAL at 13:02

## 2022-09-28 RX ADMIN — AMITRIPTYLINE HYDROCHLORIDE 25 MG: 25 TABLET, FILM COATED ORAL at 21:12

## 2022-09-28 RX ADMIN — OXYCODONE HYDROCHLORIDE 5 MG: 5 TABLET ORAL at 21:11

## 2022-09-28 RX ADMIN — METHOCARBAMOL 500 MG: 500 TABLET, FILM COATED ORAL at 17:10

## 2022-09-28 RX ADMIN — PANTOPRAZOLE SODIUM 20 MG: 20 TABLET, DELAYED RELEASE ORAL at 08:04

## 2022-09-28 RX ADMIN — CARVEDILOL 25 MG: 12.5 TABLET, FILM COATED ORAL at 17:10

## 2022-09-28 RX ADMIN — OXYCODONE HYDROCHLORIDE 10 MG: 5 TABLET ORAL at 01:08

## 2022-09-28 RX ADMIN — CETIRIZINE HYDROCHLORIDE 10 MG: 10 TABLET ORAL at 08:03

## 2022-09-28 RX ADMIN — ACETAMINOPHEN 975 MG: 325 TABLET, FILM COATED ORAL at 08:02

## 2022-09-28 RX ADMIN — ASPIRIN 325 MG: 325 TABLET, COATED ORAL at 08:15

## 2022-09-28 RX ADMIN — MONTELUKAST 10 MG: 10 TABLET, FILM COATED ORAL at 21:13

## 2022-09-28 RX ADMIN — SENNOSIDES 2 TABLET: 8.6 TABLET, FILM COATED ORAL at 21:14

## 2022-09-28 RX ADMIN — CARVEDILOL 25 MG: 12.5 TABLET, FILM COATED ORAL at 08:03

## 2022-09-28 RX ADMIN — ACETAMINOPHEN 975 MG: 325 TABLET, FILM COATED ORAL at 21:13

## 2022-09-28 RX ADMIN — DICLOFENAC 2 G: 10 GEL TOPICAL at 08:04

## 2022-09-28 RX ADMIN — DICLOFENAC 2 G: 10 GEL TOPICAL at 21:18

## 2022-09-28 RX ADMIN — DULOXETINE HYDROCHLORIDE 30 MG: 30 CAPSULE, DELAYED RELEASE ORAL at 21:16

## 2022-09-28 RX ADMIN — METHOCARBAMOL 500 MG: 500 TABLET, FILM COATED ORAL at 21:13

## 2022-09-28 RX ADMIN — VALSARTAN 80 MG: 80 TABLET, FILM COATED ORAL at 09:38

## 2022-09-28 RX ADMIN — SENNOSIDES AND DOCUSATE SODIUM 1 TABLET: 50; 8.6 TABLET ORAL at 12:26

## 2022-09-28 RX ADMIN — DICLOFENAC 2 G: 10 GEL TOPICAL at 17:10

## 2022-09-28 ASSESSMENT — ACTIVITIES OF DAILY LIVING (ADL)
ADLS_ACUITY_SCORE: 33

## 2022-09-28 NOTE — PLAN OF CARE
Problem: Chest Pain  Goal: Resolution of Chest Pain Symptoms  Outcome: Ongoing, Progressing     Problem: Pain Chronic (Persistent)  Goal: Acceptable Pain Control and Functional Ability  Outcome: Ongoing, Progressing  Intervention: Develop Pain Management Plan  Recent Flowsheet Documentation  Taken 9/28/2022 0107 by Mahi Devlin RN  Pain Management Interventions: medication (see MAR)  Taken 9/28/2022 0019 by Mahi Devlin RN  Pain Management Interventions: (patient would like to wait until 0100, so she can have prn pain med.)    other (see comments)    pillow support provided  Intervention: Manage Persistent Pain  Recent Flowsheet Documentation  Taken 9/28/2022 0019 by Mahi Devlin RN  Medication Review/Management: medications reviewed    Goal Outcome Evaluation: Patient is alert and oriented. Patient c/o slight chest pain which resolved without intervention. Patient also c/o back pain, which radiates to right leg. Patient rated back pain as 10/10. Prn 10 mg PO oxycodone given x2. Pain only down to 7/10. Patient's SBP also elevated to 170s with pain. SBP decreased to 160s after prn pain med.  around 2226. MD notified. One time 5 units rapid acting insulin given at 0000. Recheck BG around 0200 down to 240. Continue to monitor.
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
## 2022-09-28 NOTE — PROGRESS NOTES
[facility]  Internal Medicine Progress Note    Assessment and Plans:     57 year old female with past medical history of Diabetes, hypertension, CAD s/p CABG, asthma, chronic lower back pain, schizoaffective disorder, and substance abuse presents to ER for evaluation of back pain and chest pain.    She was admitted with,     Chest pain: On off chest discomfort with very minimally elevated troponin.  No EKG changes or evidence of segmental wall motion abnormality on echocardiogram  CAD, History of CABG x2 in 2009 with known occluded SVG to diagonal with collaterals from LIMA to LAD  - Patient reports having on off left sided chest pain, denies specific triggers. It can happen with or without exertion   - Her troponin initially was at the upper normal, then slightly went up above the normal range.  - Cardiology consult, appreciate input  - Echocardiogram did not show any new wall motion changes. EF is  65-70%. E/E prime ratio is elevated suggestive of diastolic HF. Sevee LA enlargement noted.   - Cardiology not planning any further work-up at this point  - Needs better control of BP.   - Recommend smoking cessation  - Ct ASA. New Crestor instead of lipitor. Coreg dose increased to 25 mg BID to control BP better. Change losartan to valsartan for better BP control.   - Cardiology added Imdur 30 mg every day for angina control  - We added Norvasc and increased it to 5 mg BID for BP control and angina control.      Bilateral lower back pain with right sided siatica:   - Presents with acute on chronic lower back pain, radiating down her right leg.   - MRI reveals marked degenerative changes in the lumbar spine at L5-S1 with severe right and moderate to severe left neural foraminal narrowing, marked edema at the L5-S1 level also extending to the right facet joint.   - Neurosurgery consult, appreciate input. Plan is to do DIAZ with IR.   - SP medrol dose pack. Now stopped after DIAZ.   - Scheduled  tylenol tid  - Continue PTA robaxin, topical diclofenac, lyrica, amitriptyline   - She takes percocet  10 mg q4h prn at home. Typically, takes 3 tabs a day. Will continue with oxycodone 5-10 mg q4h prn. Start morphine IV 2-4 mg prn.  - SP DIAZ on 09/27/22, she has significant improvement in pain. Follow-up with Neuro surgery as out pt  - Pain management consult, appreciate input  - PT/OT evaluation     History of CAD:   - S/P  CABG in 2009.   - Continue - Ct ASA, Coreg, Crestor, Valsartan, Norvasc, Imdur. Plan discussed as above.   - Chest pain management as above  - Cardiology is following, no further work up     Diabetes, type II:   - not well controlled with HbA1C 10.1 on 7/19/22.   - PTA Lantus, glipizide 10 mg daily and exenatide  - She can not get exenatide here. Glipizide on hold.  - Significant elevation of blood glucose today secondary to steroid  - Increase Lantus to 36 units BID  - Ct Novolog carb count at 1:5 ratio and high sliding scale insulin     Asthma:  - No exacerbation.  - Continue home inhalers.      Essential hypertension:   - BP poorly controlled.    - See aobove manage of BP meds  - If BP still runs high, she needs diuretics.  - At home on lasix at 20 mg every day  As needed.  - Na on low side. Defer diuretics for now.     Diet: Carb consistent  DVT Prophylaxis: SCDs  Code Status: Full Code  Disposition/Barriers:  1-2 days, based on progress with back pain. Home Vs TCU      Montse Nieto MD, MPH.  Internal Medicine Attending  [place], [place]    Click on the link below to page me.   Text Page  (7am - 5pm, M-F)    Subjective:     SP DIAZ  She feels better  She has some tingling in the feet after DIAZ  Chest pain is better too  Denies any new sob  No cough  No fevers or chills    -Data reviewed today: I reviewed all new labs and imaging results over the last 24 hours.     Exam:       Temp: 98.2  F (36.8  C) Temp src: Oral BP: 138/82 Pulse: 75   Resp: 20  SpO2: 98 % O2 Device: None (Room air)    Vitals:    09/24/22 0525 09/26/22 0504 09/27/22 0444   Weight: 95.3 kg (210 lb) 90.9 kg (200 lb 7 oz) 91.4 kg (201 lb 8 oz)     Vital Signs with Ranges  Temp:  [98  F (36.7  C)-98.2  F (36.8  C)] 98.2  F (36.8  C)  Pulse:  [70-92] 75  Resp:  [16-20] 20  BP: (138-173)/(67-90) 138/82  SpO2:  [93 %-100 %] 98 %  I/O last 3 completed shifts:  In: 960 [P.O.:960]  Out: 0     Constitutional: No distress noted, Alert, Answering questions appropriately  Respiratory: AE is good on both sides, no wheezing onr crackles  Cardiovascular: S1S2 normal, no new murmur  GI: Soft, non tender  Skin/Integumen: No rash      Medications     dextrose       sodium chloride         acetaminophen  975 mg Oral TID     amitriptyline  25 mg Oral At Bedtime     amLODIPine  5 mg Oral BID     aspirin  325 mg Oral Daily     carvedilol  25 mg Oral BID w/meals     cetirizine  10 mg Oral Daily     diclofenac  2 g Topical 4x Daily     DULoxetine  30 mg Oral BID     [START ON 9/30/2022] exenatide ER  2 mg Subcutaneous Q7 Days     fluticasone  1 spray Both Nostrils Daily     insulin aspart  1-10 Units Subcutaneous TID AC     insulin aspart  1-7 Units Subcutaneous At Bedtime     insulin aspart   Subcutaneous Daily with breakfast     insulin aspart   Subcutaneous Daily with lunch     insulin aspart   Subcutaneous Daily with supper     insulin glargine  36 Units Subcutaneous BID     isosorbide mononitrate  30 mg Oral Daily     methocarbamol  500 mg Oral 4x Daily     montelukast  10 mg Oral At Bedtime     nortriptyline  10 mg Oral At Bedtime     pantoprazole  20 mg Oral Daily     polyethylene glycol  17 g Oral Daily     rosuvastatin  40 mg Oral Daily     sennosides  2 tablet Oral BID     valsartan  80 mg Oral Daily       Data   Recent Labs   Lab 09/28/22  1111 09/28/22  0833 09/28/22  0241 09/27/22  0634 09/27/22  0436 09/27/22  0433 09/26/22  0755 09/26/22  0511 09/24/22  1824 09/24/22  0742   WBC  --   --   --   --   8.3  --   --   --   --  5.5   HGB  --   --   --   --  11.0*  --   --   --   --  12.4   MCV  --   --   --   --  86  --   --   --   --  86   PLT  --   --   --   --  260  --   --   --   --  263   NA  --   --   --   --   --  134*  --  129*  --  137   POTASSIUM  --   --   --   --   --  5.1  --  4.8  --  4.4   CHLORIDE  --   --   --   --   --  99  --  99  --  101   CO2  --   --   --   --   --  25  --  21*  --  24   BUN  --   --   --   --   --  24.7*  --  22.7*  --  14.6   CR  --   --   --   --   --  0.82  --  0.75  --  0.76   ANIONGAP  --   --   --   --   --  10  --  9  --  12   MAXIMO  --   --   --   --   --  8.7  --  8.9  --  9.2   * 172* 240*   < >  --  349*   < > 339*   < > 201*   ALBUMIN  --   --   --   --   --   --   --  3.8  --   --    PROTTOTAL  --   --   --   --   --   --   --  6.8  --   --    BILITOTAL  --   --   --   --   --   --   --  <0.2  --   --    ALKPHOS  --   --   --   --   --   --   --  95  --   --    ALT  --   --   --   --   --   --   --  22  --   --    AST  --   --   --   --   --   --   --  18  --   --     < > = values in this interval not displayed.       No results found for this or any previous visit (from the past 24 hour(s)).
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
## 2022-09-28 NOTE — PLAN OF CARE
PRIMARY DIAGNOSIS: ACUTE PAIN  OUTPATIENT/OBSERVATION GOALS TO BE MET BEFORE DISCHARGE:  1. Pain Status: Pain free.    2. Return to near baseline physical activity: Yes    3. Cleared for discharge by consultants (if involved): No    Discharge Planner Nurse   Safe discharge environment identified: Yes  Barriers to discharge: Yes       Entered by: Lore Lynn RN 09/28/2022 10:28 AM   Patient said she feels  a little better but wanted her pain med for her chronic pain. The pain med was too early to give since last given at 5:36 am and q 4 hours prn. BP elevated and BP meds adjusted. Blood sugar much improved since steroids held. Blood sugar 172. Appetite good and on insulin and carb count. NSR. Call light in reach. Walked the bathroom independently with steady gait to void. She did not use walker.   Please review provider order for any additional goals.   Nurse to notify provider when observation goals have been met and patient is ready for discharge.Goal Outcome Evaluation:

## 2022-09-28 NOTE — PROGRESS NOTES
"  HEART CARE CONSULTATON NOTE        Assessment/Recommendations   Assessment:  1.  Chest pain: Admitted with constant chest discomfort with very minimally elevated troponin.  No EKG changes or evidence of segmental wall motion abnormality on echocardiogram  2.  Coronary artery disease: History of CABG x2 in 2009 with known occluded SVG to diagonal with collaterals from LIMA to LAD  3.  Chronic pain syndrome: With severe back pain with plans for interventional neurology pain relief  4.  Hypertension: Poorly controlled  5.  History of polysubstance abuse  6.  Tobacco abuse  7.  Schizoaffective disorder  8.  Diabetes mellitus type 2: Poorly controlled  Plan:  1.      Continue carvedilol.  Amlodipine increased for better blood pressure control and add a low-dose of Imdur  2.       Continue losartan, statin therapy  No further cardiac recommendations please call if any further questions or concerns.  Primary cardiologist Dr. Munoz.  Recommend follow-up as outpatient     History of Present Illness/Subjective    Complains of back and leg pain.  No complaints of chest pain       Physical Examination  Review of Systems   VITALS: /82 (BP Location: Right arm)   Pulse 75   Temp 98.2  F (36.8  C) (Oral)   Resp 20   Ht 1.702 m (5' 7\")   Wt 91.4 kg (201 lb 8 oz)   LMP  (LMP Unknown)   SpO2 98%   BMI 31.56 kg/m    BMI: Body mass index is 31.56 kg/m .  Wt Readings from Last 3 Encounters:   09/27/22 91.4 kg (201 lb 8 oz)   07/29/22 90.3 kg (199 lb)   07/18/22 96.2 kg (212 lb)       Intake/Output Summary (Last 24 hours) at 9/28/2022 1224  Last data filed at 9/28/2022 1200  Gross per 24 hour   Intake 1700 ml   Output 0 ml   Net 1700 ml     General Appearance:   no distress, normal body habitus   ENT/Mouth: membranes moist, no oral lesions or bleeding gums.      EYES:  no scleral icterus, normal conjunctivae       Chest/Lungs:   lungs are clear to auscultation   Cardiovascular:   Regular. Normal first and second heart " sounds with no murmurs  no edema bilaterally    Abdomen:  no organomegaly, masses, bruits, or tenderness; bowel sounds are present   Extremities: no cyanosis or clubbing   Skin: no xanthelasma, warm.    Neurologic: normal  bilateral, no tremors     Psychiatric: alert and oriented x3, calm     Review Of Systems  Skin: negative  Eyes: negative  Ears/Nose/Throat: negative  Respiratory: No shortness of breath, dyspnea on exertion, cough, or hemoptysis  Cardiovascular: negative  Gastrointestinal: negative  Genitourinary: negative  Musculoskeletal: negative  Neurologic: negative  Psychiatric: negative  Hematologic/Lymphatic/Immunologic: negative  Endocrine: negative          Lab Results    Chemistry/lipid CBC Cardiac Enzymes/BNP/TSH/INR   Recent Labs   Lab Test 10/24/18  0523   CHOL 210*   HDL 33*   *   TRIG 219*     Recent Labs   Lab Test 10/24/18  0523   *     Recent Labs   Lab Test 09/28/22  1111 09/27/22  0634 09/27/22  0433   NA  --   --  134*   POTASSIUM  --   --  5.1   CHLORIDE  --   --  99   CO2  --   --  25   *   < > 349*   BUN  --   --  24.7*   CR  --   --  0.82   GFRESTIMATED  --   --  82   MAXIMO  --   --  8.7    < > = values in this interval not displayed.     Recent Labs   Lab Test 09/27/22  0433 09/26/22  0511 09/24/22  0742   CR 0.82 0.75 0.76     Recent Labs   Lab Test 07/19/22  1531 04/03/22  1436 05/31/21  0548   A1C 10.1* >14.0* 10.0*          Recent Labs   Lab Test 09/27/22  0436   WBC 8.3   HGB 11.0*   HCT 34.5*   MCV 86        Recent Labs   Lab Test 09/27/22  0436 09/24/22  0742 07/17/22  1520   HGB 11.0* 12.4 11.8    Recent Labs   Lab Test 07/17/22  1520 05/24/22  1022 04/03/22  1436   TROPONINI <0.01 0.01 <0.01     Recent Labs   Lab Test 04/03/22  1436 05/30/21  1101 10/21/18  1335   * 103* 140*     No results for input(s): TSH in the last 73433 hours.  Recent Labs   Lab Test 05/24/22  1022 04/03/22  1436 07/09/21  0651   INR 0.95 0.95 1.01        Medical  History  Surgical History Family History Social History   Past Medical History:   Diagnosis Date     Asthma      CAD (coronary artery disease)      COPD (chronic obstructive pulmonary disease) (H)      CVA (cerebral infarction)      Diabetes (H)      GERD (gastroesophageal reflux disease)      Hypertension      Polysubstance abuse (H)      S/P CABG (coronary artery bypass graft)      S/P lumbar discectomy 06/13/2019    L5/S1 by  Dr. Hamm at [facility]     Schizoaffective disorder (H)      Past Surgical History:   Procedure Laterality Date     BYPASS GRAFT ARTERY CORONARY  2009    x2     CORONARY STENT PLACEMENT       CV CORONARY ANGIOGRAM N/A 6/2/2021    Procedure: Coronary Angiogram;  Surgeon: Juventino Rivera MD;  Location: [facility] Cardiac Cath Lab;  Service: Cardiology     HYSTERECTOMY TOTAL ABDOMINAL, BILATERAL SALPINGO-OOPHORECTOMY, COMBINED       ORIF ULNAR / RADIAL SHAFT FRACTURE Right      Family History   Problem Relation Age of Onset     Heart Disease Mother      Heart Disease Father      Heart Disease Sister      Diabetes Brother      Heart Disease Sister         Social History     Socioeconomic History     Marital status: Single     Spouse name: Not on file     Number of children: Not on file     Years of education: Not on file     Highest education level: Not on file   Occupational History     Not on file   Tobacco Use     Smoking status: Current Every Day Smoker     Packs/day: 0.50     Types: Cigarettes     Smokeless tobacco: Never Used   Vaping Use     Vaping Use: Never used   Substance and Sexual Activity     Alcohol use: Not Currently     Comment: Alcoholic Drinks/day: occ     Drug use: No     Sexual activity: Not Currently   Other Topics Concern     Not on file   Social History Narrative    Lives alone in a condo.  On disability.  Three living children.       Social Determinants of Health     Financial Resource Strain: Not on file   Food Insecurity: Not on file   Transportation Needs: Not  "on file   Physical Activity: Not on file   Stress: Not on file   Social Connections: Not on file   Intimate Partner Violence: Not on file   Housing Stability: Not on file         Medications  Allergies   No current outpatient medications on file.        Allergies   Allergen Reactions     Haloperidol Unknown     Patient states it stops her heart       Meperidine Angioedema, Difficulty breathing, Other (See Comments), Rash and Shortness Of Breath     Throat closes  Other reaction(s): Breathing Difficulty  Other reaction(s): Breathing Difficulty       Phenothiazines Other (See Comments)     Other reaction(s): CARDIAC DISTURBANCES  Patient states it stops her heart  \"I swell up\"  \"stopped by heart\"  \"I swell up\"  \"I swell up\"       Tramadol Other (See Comments)     Other reaction(s): Angioedema  Throat itch       Butyrophenones Angioedema     Januvia [Sitagliptin] Other (See Comments)     Swelling in the neck      Latex Itching     Lisinopril Other (See Comments)     ACE cough  Itchy throat  Throat itches  Itchy throat       Penicillins Swelling     Hydroxyzine Other (See Comments) and Rash     Tongue swelling  Tongue swelling  Tongue swelling           Daysi Vegas MD    "

## 2022-09-28 NOTE — DISCHARGE INSTRUCTIONS
Call Neurosurgery  if you have return of your back and leg symptoms that you were having while hospitalized. HARIS Paz  [facility] Neurosurgery  O: [phone]    Daysi Vegas MD [facility] o: [phone]

## 2022-09-28 NOTE — PROGRESS NOTES
Chart review. Oral steroids are still ordered. Patient had bilateral DIAZ today. Will discontinue oral steroids. Please call with questions.     HARIS Paz  [facility] Neurosurgery  O: [phone]

## 2022-09-28 NOTE — PROGRESS NOTES
Increase norvasc to 10 mg daily. Change Losartan to Valsartan to better control BP. BP still very high

## 2022-09-28 NOTE — PLAN OF CARE
Problem: Plan of Care - These are the overarching goals to be used throughout the patient stay.    Goal: Optimal Comfort and Wellbeing  Outcome: Ongoing, Progressing  Intervention: Monitor Pain and Promote Comfort  Recent Flowsheet Documentation  Taken 9/27/2022 2200 by Jeanette Menjivar RN  Pain Management Interventions: medication (see MAR)  Taken 9/27/2022 1900 by Jeanette Menjivar RN  Pain Management Interventions: medication (see MAR)  Taken 9/27/2022 1800 by Jeanette Menjivar RN  Pain Management Interventions: medication (see MAR)  Taken 9/27/2022 1707 by Jeanette Menjivar RN  Pain Management Interventions: medication (see MAR)     Problem: Pain Chronic (Persistent)  Goal: Acceptable Pain Control and Functional Ability  Outcome: Ongoing, Progressing  Intervention: Develop Pain Management Plan  Recent Flowsheet Documentation  Taken 9/27/2022 2200 by Jeanette Menjivar RN  Pain Management Interventions: medication (see MAR)  Taken 9/27/2022 1900 by Jeanette Menjivar RN  Pain Management Interventions: medication (see MAR)  Taken 9/27/2022 1800 by Jeanette Menjivar RN  Pain Management Interventions: medication (see MAR)  Taken 9/27/2022 1707 by Jeanette Menjivar RN  Pain Management Interventions: medication (see MAR)  Intervention: Manage Persistent Pain  Recent Flowsheet Documentation  Taken 9/27/2022 1923 by Jeanette Menjivar RN  Medication Review/Management: medications reviewed  Taken 9/27/2022 1600 by Jeanette Menjivar RN  Medication Review/Management: medications reviewed       Goal Outcome Evaluation:    Pt aox4. LS clear. Tele NSR. Pt  at 1700 today, Dr. Nieto notified, insulin orders changed. BG trending down.  at 2230. Dr. Shukla notified. Prn oxycodone given for back and leg pain.

## 2022-09-28 NOTE — PLAN OF CARE
PRIMARY DIAGNOSIS: ACUTE PAIN  OUTPATIENT/OBSERVATION GOALS TO BE MET BEFORE DISCHARGE:  1. Pain Status: Improved-controlled with oral pain medications.    2. Return to near baseline physical activity: Yes    3. Cleared for discharge by consultants (if involved): No    Discharge Planner Nurse   Safe discharge environment identified: Yes  Barriers to discharge: Yes       Entered by: Lore Lynn RN 09/28/2022 1:16 PM   The patient talked with pain nurse and she ordered stool softeners for the patient since no BM for a couple days. Patient requested pain meds oxycodone for her back and legs and given. BP medications adjusted and cardiology signed off. NSR . Plan for discharge tomorrow if able. Call light in reach.   Please review provider order for any additional goals.   Nurse to notify provider when observation goals have been met and patient is ready for discharge.Goal Outcome Evaluation:

## 2022-09-29 VITALS
BODY MASS INDEX: 31.63 KG/M2 | OXYGEN SATURATION: 98 % | HEART RATE: 80 BPM | WEIGHT: 201.5 LBS | HEIGHT: 67 IN | TEMPERATURE: 97.8 F | DIASTOLIC BLOOD PRESSURE: 60 MMHG | RESPIRATION RATE: 18 BRPM | SYSTOLIC BLOOD PRESSURE: 105 MMHG

## 2022-09-29 LAB
GLUCOSE BLDC GLUCOMTR-MCNC: 92 MG/DL (ref 70–99)
GLUCOSE BLDC GLUCOMTR-MCNC: 94 MG/DL (ref 70–99)

## 2022-09-29 PROCEDURE — 82962 GLUCOSE BLOOD TEST: CPT

## 2022-09-29 PROCEDURE — 250N000013 HC RX MED GY IP 250 OP 250 PS 637: Performed by: INTERNAL MEDICINE

## 2022-09-29 PROCEDURE — G0378 HOSPITAL OBSERVATION PER HR: HCPCS

## 2022-09-29 PROCEDURE — 250N000013 HC RX MED GY IP 250 OP 250 PS 637: Performed by: CLINICAL NURSE SPECIALIST

## 2022-09-29 PROCEDURE — 250N000012 HC RX MED GY IP 250 OP 636 PS 637: Performed by: HOSPITALIST

## 2022-09-29 PROCEDURE — 99217 PR OBSERVATION CARE DISCHARGE: CPT | Performed by: HOSPITALIST

## 2022-09-29 PROCEDURE — 96372 THER/PROPH/DIAG INJ SC/IM: CPT | Performed by: HOSPITALIST

## 2022-09-29 PROCEDURE — 250N000013 HC RX MED GY IP 250 OP 250 PS 637: Performed by: HOSPITALIST

## 2022-09-29 RX ORDER — ISOSORBIDE MONONITRATE 30 MG/1
30 TABLET, EXTENDED RELEASE ORAL DAILY
Qty: 30 TABLET | Refills: 0 | Status: SHIPPED | OUTPATIENT
Start: 2022-09-30 | End: 2022-09-29

## 2022-09-29 RX ORDER — ISOSORBIDE MONONITRATE 30 MG/1
30 TABLET, EXTENDED RELEASE ORAL DAILY
Qty: 30 TABLET | Refills: 0 | Status: SHIPPED | OUTPATIENT
Start: 2022-09-30 | End: 2023-05-05

## 2022-09-29 RX ORDER — SENNOSIDES 8.6 MG
2 TABLET ORAL 2 TIMES DAILY
Qty: 60 TABLET | Refills: 0 | Status: SHIPPED | OUTPATIENT
Start: 2022-09-29 | End: 2022-09-29

## 2022-09-29 RX ORDER — GLIPIZIDE 5 MG/1
10 TABLET, FILM COATED, EXTENDED RELEASE ORAL DAILY
Qty: 30 TABLET | Refills: 0 | Status: SHIPPED | OUTPATIENT
Start: 2022-09-29 | End: 2022-09-29

## 2022-09-29 RX ORDER — CARVEDILOL 25 MG/1
25 TABLET ORAL 2 TIMES DAILY WITH MEALS
Qty: 60 TABLET | Refills: 0 | Status: SHIPPED | OUTPATIENT
Start: 2022-09-29 | End: 2022-09-29

## 2022-09-29 RX ORDER — VALSARTAN 80 MG/1
80 TABLET ORAL DAILY
Qty: 30 TABLET | Refills: 0 | Status: SHIPPED | OUTPATIENT
Start: 2022-09-30 | End: 2022-09-29

## 2022-09-29 RX ORDER — SENNOSIDES 8.6 MG
2 TABLET ORAL 2 TIMES DAILY
Qty: 60 TABLET | Refills: 0 | Status: SHIPPED | OUTPATIENT
Start: 2022-09-29 | End: 2023-05-05

## 2022-09-29 RX ORDER — ROSUVASTATIN CALCIUM 20 MG/1
40 TABLET, COATED ORAL DAILY
Qty: 30 TABLET | Refills: 0 | Status: SHIPPED | OUTPATIENT
Start: 2022-09-29 | End: 2022-09-29

## 2022-09-29 RX ORDER — POLYETHYLENE GLYCOL 3350 17 G/17G
17 POWDER, FOR SOLUTION ORAL DAILY
Qty: 510 G | Refills: 0 | Status: SHIPPED | OUTPATIENT
Start: 2022-09-29 | End: 2022-09-29

## 2022-09-29 RX ORDER — ROSUVASTATIN CALCIUM 20 MG/1
40 TABLET, COATED ORAL DAILY
Qty: 30 TABLET | Refills: 0 | Status: SHIPPED | OUTPATIENT
Start: 2022-09-29 | End: 2023-05-05

## 2022-09-29 RX ORDER — CARVEDILOL 25 MG/1
25 TABLET ORAL 2 TIMES DAILY WITH MEALS
Qty: 60 TABLET | Refills: 0 | Status: ON HOLD | OUTPATIENT
Start: 2022-09-29 | End: 2022-10-08

## 2022-09-29 RX ORDER — BLOOD PRESSURE TEST KIT
KIT MISCELLANEOUS
Qty: 100 EACH | Refills: 0 | Status: SHIPPED | OUTPATIENT
Start: 2022-09-29 | End: 2023-05-05

## 2022-09-29 RX ORDER — POLYETHYLENE GLYCOL 3350 17 G/17G
17 POWDER, FOR SOLUTION ORAL DAILY
Qty: 510 G | Refills: 0 | Status: SHIPPED | OUTPATIENT
Start: 2022-09-29 | End: 2023-05-05

## 2022-09-29 RX ORDER — AMLODIPINE BESYLATE 5 MG/1
5 TABLET ORAL 2 TIMES DAILY
Qty: 60 TABLET | Refills: 0 | Status: SHIPPED | OUTPATIENT
Start: 2022-09-29 | End: 2022-10-08

## 2022-09-29 RX ORDER — GLIPIZIDE 5 MG/1
10 TABLET, FILM COATED, EXTENDED RELEASE ORAL DAILY
Qty: 30 TABLET | Refills: 0 | Status: SHIPPED | OUTPATIENT
Start: 2022-09-29 | End: 2023-03-13

## 2022-09-29 RX ORDER — VALSARTAN 80 MG/1
80 TABLET ORAL DAILY
Qty: 30 TABLET | Refills: 0 | Status: ON HOLD | OUTPATIENT
Start: 2022-09-30 | End: 2022-10-08

## 2022-09-29 RX ORDER — AMLODIPINE BESYLATE 5 MG/1
5 TABLET ORAL 2 TIMES DAILY
Qty: 60 TABLET | Refills: 0 | Status: SHIPPED | OUTPATIENT
Start: 2022-09-29 | End: 2022-09-29

## 2022-09-29 RX ADMIN — CETIRIZINE HYDROCHLORIDE 10 MG: 10 TABLET ORAL at 08:02

## 2022-09-29 RX ADMIN — ACETAMINOPHEN 975 MG: 325 TABLET, FILM COATED ORAL at 08:03

## 2022-09-29 RX ADMIN — INSULIN GLARGINE 20 UNITS: 100 INJECTION, SOLUTION SUBCUTANEOUS at 08:06

## 2022-09-29 RX ADMIN — DULOXETINE HYDROCHLORIDE 30 MG: 30 CAPSULE, DELAYED RELEASE ORAL at 08:28

## 2022-09-29 RX ADMIN — ACETAMINOPHEN 975 MG: 325 TABLET, FILM COATED ORAL at 13:44

## 2022-09-29 RX ADMIN — METHOCARBAMOL 500 MG: 500 TABLET, FILM COATED ORAL at 08:02

## 2022-09-29 RX ADMIN — ISOSORBIDE MONONITRATE 30 MG: 30 TABLET, EXTENDED RELEASE ORAL at 05:01

## 2022-09-29 RX ADMIN — OXYCODONE HYDROCHLORIDE 10 MG: 5 TABLET ORAL at 13:44

## 2022-09-29 RX ADMIN — VALSARTAN 80 MG: 80 TABLET, FILM COATED ORAL at 08:04

## 2022-09-29 RX ADMIN — INSULIN GLARGINE 16 UNITS: 100 INJECTION, SOLUTION SUBCUTANEOUS at 10:23

## 2022-09-29 RX ADMIN — METHOCARBAMOL 500 MG: 500 TABLET, FILM COATED ORAL at 11:55

## 2022-09-29 RX ADMIN — OXYCODONE HYDROCHLORIDE 10 MG: 5 TABLET ORAL at 01:11

## 2022-09-29 RX ADMIN — CARVEDILOL 25 MG: 12.5 TABLET, FILM COATED ORAL at 08:03

## 2022-09-29 RX ADMIN — OXYCODONE HYDROCHLORIDE 10 MG: 5 TABLET ORAL at 05:45

## 2022-09-29 RX ADMIN — PANTOPRAZOLE SODIUM 20 MG: 20 TABLET, DELAYED RELEASE ORAL at 08:03

## 2022-09-29 RX ADMIN — ASPIRIN 325 MG: 325 TABLET, COATED ORAL at 08:09

## 2022-09-29 RX ADMIN — SENNOSIDES 2 TABLET: 8.6 TABLET, FILM COATED ORAL at 08:03

## 2022-09-29 RX ADMIN — ROSUVASTATIN CALCIUM 40 MG: 40 TABLET, FILM COATED ORAL at 08:03

## 2022-09-29 RX ADMIN — AMLODIPINE BESYLATE 5 MG: 5 TABLET ORAL at 08:03

## 2022-09-29 RX ADMIN — OXYCODONE HYDROCHLORIDE 10 MG: 5 TABLET ORAL at 10:23

## 2022-09-29 RX ADMIN — DICLOFENAC 2 G: 10 GEL TOPICAL at 05:47

## 2022-09-29 RX ADMIN — FLUTICASONE PROPIONATE 1 SPRAY: 50 SPRAY, METERED NASAL at 08:05

## 2022-09-29 ASSESSMENT — ACTIVITIES OF DAILY LIVING (ADL)
ADLS_ACUITY_SCORE: 33

## 2022-09-29 NOTE — PROGRESS NOTES
Discharge pharmacy stated they will have to fill blood glucose strips late. Pt updated and will  glucose strips tomorrow at our pharmacy. Awaiting for ride to discharge home.

## 2022-09-29 NOTE — PLAN OF CARE
PRIMARY DIAGNOSIS: ACUTE PAIN  OUTPATIENT/OBSERVATION GOALS TO BE MET BEFORE DISCHARGE:  1. Pain Status: Improved-controlled with oral pain medications.    2. Return to near baseline physical activity: Yes    3. Cleared for discharge by consultants (if involved): No    Discharge Planner Nurse   Safe discharge environment identified: Yes  Barriers to discharge: Yes       Entered by: Sherly Pardo RN 09/29/2022 2:30 AM     Please review provider order for any additional goals.   Nurse to notify provider when observation goals have been met and patient is ready for discharge.Goal Outcome Evaluation:     Patient is alert and oriented continues to request pain medication for back pain.  Heart rhythm NSR.  Hopefully with discharge in am.  Will continue to monitor.

## 2022-09-29 NOTE — DISCHARGE SUMMARY
[facility]  Discharge Summary    Sarah Rahman MRN# [id number]   YOB: 1965 Age: 57 year old     Date of Admission:  9/24/2022  Date of Discharge:  9/29/2022  Admitting Physician:  Ambika Allen MD  Discharge Physician:  Montse Nieto MD  Discharging Service:  Internal Medicine     Primary Provider: Flako Rosen              Discharge Diagnosis:     Primary Discharge Diagnosis:  Anginal chest pain, Stable Angina. On off chest discomfort with very minimally elevated troponin.  No EKG changes or evidence of segmental wall motion abnormality on echocardiogram  CAD, History of CABG x2 in 2009 with known occluded SVG to diagonal with collaterals from WILLARD to LAD  Bilateral lower back pain with right sided siatica, SP Epidural Steroid injection   History of CAD, S/P  CABG in 2009.   Diabetes, type II, Poor control  Asthma    Essential hypertension:       Past Medical History:   Diagnosis Date     Asthma      CAD (coronary artery disease)      COPD (chronic obstructive pulmonary disease) (H)      CVA (cerebral infarction)      Diabetes (H)      GERD (gastroesophageal reflux disease)      Hypertension      Polysubstance abuse (H)      S/P CABG (coronary artery bypass graft)      S/P lumbar discectomy 06/13/2019    L5/S1 by  Dr. Hamm at [facility]     Schizoaffective disorder (H)                     Discharge Medications:     Current Discharge Medication List      START taking these medications    Details   !! Alcohol Swabs PADS Use to swab the area of the injection or zac as directed Per insurance coverage  Qty: 100 each, Refills: 0    Associated Diagnoses: Type 2 diabetes mellitus with diabetic polyneuropathy, with long-term current use of insulin (H)      amLODIPine (NORVASC) 5 MG tablet Take 1 tablet (5 mg) by mouth 2 times daily  Qty: 60 tablet, Refills: 0    Associated Diagnoses: Essential hypertension, benign      blood glucose (NO BRAND SPECIFIED) lancets  standard To use to test glucose level in the blood Use to test blood sugar  4  times daily as directed. To accompany glucose monitor brands per insurance coverage.  Qty: 100 each, Refills: 0    Associated Diagnoses: Type 2 diabetes mellitus with diabetic polyneuropathy, with long-term current use of insulin (H)      !! blood glucose (NO BRAND SPECIFIED) test strip To use to test glucose level in the blood Use to test blood sugar  4 times daily as directed. To accompany glucose monitor brands per insurance coverage.  Qty: 100 strip, Refills: 0    Associated Diagnoses: Type 2 diabetes mellitus with diabetic polyneuropathy, with long-term current use of insulin (H)      blood glucose calibration (NO BRAND SPECIFIED) solution Used to calibrate the blood glucose monitor as needed and as directed.  To accompany  blood glucose brands per insurance coverage  Qty: 2 each, Refills: 0    Associated Diagnoses: Type 2 diabetes mellitus with diabetic polyneuropathy, with long-term current use of insulin (H)      blood glucose monitoring (NO BRAND SPECIFIED) meter device kit Use as directed Per insurance coverage  Qty: 1 kit, Refills: 0    Associated Diagnoses: Type 2 diabetes mellitus with diabetic polyneuropathy, with long-term current use of insulin (H)      isosorbide mononitrate (IMDUR) 30 MG 24 hr tablet Take 1 tablet (30 mg) by mouth daily  Qty: 30 tablet, Refills: 0    Associated Diagnoses: Essential hypertension, benign      polyethylene glycol (MIRALAX) 17 GM/Dose powder Take 17 g by mouth daily  Qty: 510 g, Refills: 0    Associated Diagnoses: Constipation, unspecified constipation type      sennosides (SENOKOT) 8.6 MG tablet Take 2 tablets by mouth 2 times daily  Qty: 60 tablet, Refills: 0    Associated Diagnoses: Constipation, unspecified constipation type      valsartan (DIOVAN) 80 MG tablet Take 1 tablet (80 mg) by mouth daily  Qty: 30 tablet, Refills: 0    Associated Diagnoses: Essential hypertension, benign        !! - Potential duplicate medications found. Please discuss with provider.      CONTINUE these medications which have CHANGED    Details   carvedilol (COREG) 25 MG tablet Take 1 tablet (25 mg) by mouth 2 times daily (with meals)  Qty: 60 tablet, Refills: 0    Associated Diagnoses: Essential hypertension, benign      glipiZIDE (GLUCOTROL XL) 5 MG 24 hr tablet Take 2 tablets (10 mg) by mouth daily  Qty: 30 tablet, Refills: 0    Associated Diagnoses: Type 2 diabetes mellitus with diabetic polyneuropathy, with long-term current use of insulin (H)      insulin glargine (LANTUS PEN) 100 UNIT/ML pen Inject 25 Units Subcutaneous 2 times daily  Qty: 30 mL, Refills: 0    Comments: If Lantus is not covered by insurance, may substitute Basaglar or Semglee or other insulin glargine product per insurance preference at same dose and frequency.    Associated Diagnoses: Type 2 diabetes mellitus with diabetic polyneuropathy, with long-term current use of insulin (H)      rosuvastatin (CRESTOR) 20 MG tablet Take 2 tablets (40 mg) by mouth daily  Qty: 30 tablet, Refills: 0    Associated Diagnoses: Dyslipidemia         CONTINUE these medications which have NOT CHANGED    Details   acetaminophen (TYLENOL) 325 MG tablet Take 650 mg by mouth every 8 hours as needed for mild pain      albuterol (PROAIR HFA) 108 (90 BASE) MCG/ACT inhaler Inhale 1-2 puffs into the lungs every 4 hours as needed      amitriptyline (ELAVIL) 25 MG tablet Take 1 tablet (25 mg) by mouth At Bedtime  Qty: 90 tablet, Refills: 3    Associated Diagnoses: Diabetic polyneuropathy associated with type 2 diabetes mellitus (H)      aspirin (ASA) 325 MG EC tablet Take 325 mg by mouth daily       budesonide-formoterol (SYMBICORT) 160-4.5 MCG/ACT Inhaler Inhale 2 puffs into the lungs 2 times daily      cetirizine (ZYRTEC) 10 MG tablet Take 10 mg by mouth daily      diclofenac (VOLTAREN) 1 % topical gel Apply 2 g topically 3 times daily as needed for moderate pain (feet)       DULoxetine (CYMBALTA) 30 MG capsule Take 30 mg by mouth 2 times daily      exenatide ER (BYDUREON BCISE) 2 MG/0.85ML auto-injector Inject 2 mg Subcutaneous every 7 days On Sundays      fluticasone (FLONASE) 50 MCG/ACT nasal spray Spray 1 spray into both nostrils daily      furosemide (LASIX) 20 MG tablet Take 20 mg by mouth daily as needed (swelling)      ipratropium - albuterol 0.5 mg/2.5 mg/3 mL (DUONEB) 0.5-2.5 (3) MG/3ML neb solution Take 1 vial by nebulization every 6 hours as needed for shortness of breath / dyspnea or wheezing      lidocaine (LIDODERM) 5 % patch Place 1 patch onto the skin every 24 hours To prevent lidocaine toxicity, patient should be patch free for 12 hrs daily. BACK      methocarbamol (ROBAXIN) 500 MG tablet Take 500 mg by mouth 4 times daily as needed for muscle spasms      naloxone (NARCAN) 4 MG/0.1ML nasal spray Spray 4 mg into one nostril alternating nostrils as needed for opioid reversal every 2-3 minutes until assistance arrives      nitroGLYcerin (NITROSTAT) 0.4 MG sublingual tablet Place 0.4 mg under the tongue every 5 minutes as needed for chest pain For chest pain place 1 tablet under the tongue every 5 minutes for 3 doses. If symptoms persist 5 minutes after 1st dose call 911.      nortriptyline (PAMELOR) 10 MG capsule Take 10 mg by mouth At Bedtime      omeprazole 20 MG tablet Take 20 mg by mouth daily      ondansetron (ZOFRAN) 8 MG tablet Take 8 mg by mouth every 8 hours as needed      oxyCODONE-acetaminophen (PERCOCET)  MG per tablet Take 1 tablet by mouth every 6 hours as needed for severe pain             urea (DERMAL THERAPY FINGER CARE) 20 % external lotion Externally apply topically 2 times daily as needed Feet      !! alcohol swab prep pads Use to swab area of injection/zac as directed.  Qty: 100 each, Refills: 6    Associated Diagnoses: Type 2 diabetes mellitus with diabetic polyneuropathy, with long-term current use of insulin (H)      !! blood glucose  (ACCU-CHEK DARRIN PLUS) test strip Use to test blood sugar 3-4 times daily or as directed.  Qty: 100 strip, Refills: 0    Associated Diagnoses: Type 2 diabetes mellitus with diabetic polyneuropathy, with long-term current use of insulin (H)      blood glucose (ACCU-CHEK SOFTCLIX) lancing device Lancing device to be used with lancets.  Qty: 1 each, Refills: 0    Associated Diagnoses: Type 2 diabetes mellitus with diabetic polyneuropathy, with long-term current use of insulin (H)      blood glucose monitoring (SOFTCLIX) lancets Use to test blood sugar 3-4 times daily.  Qty: 100 each, Refills: 1    Associated Diagnoses: Type 2 diabetes mellitus with diabetic polyneuropathy, with long-term current use of insulin (H)      Continuous Blood Gluc Sensor (FREESTYLE SHEBA 14 DAY SENSOR) MISC 1 each every 14 days Use 1 Sensor every 14 days. Use to read blood sugars per 's instructions.  Qty: 2 each, Refills: 5    Associated Diagnoses: Type 2 diabetes mellitus with hyperglycemia, with long-term current use of insulin (H)      insulin pen needle (32G X 4 MM) 32G X 4 MM miscellaneous Use 1 pen needles daily or as directed.  Qty: 100 each, Refills: 0    Associated Diagnoses: Type 2 diabetes mellitus with diabetic polyneuropathy, with long-term current use of insulin (H)      montelukast (SINGULAIR) 10 MG tablet Take 10 mg by mouth At Bedtime       !! - Potential duplicate medications found. Please discuss with provider.      STOP taking these medications       diphenhydrAMINE (BENADRYL) 25 MG tablet Comments:   Reason for Stopping:         guaiFENesin-codeine (ROBITUSSIN AC) 100-10 MG/5ML solution Comments:   Reason for Stopping:         losartan (COZAAR) 100 MG tablet Comments:   Reason for Stopping:         senna-docusate (SENOKOT-S/PERICOLACE) 8.6-50 MG tablet Comments:   Reason for Stopping:                    Hospital Course:     57 year old female with past medical history of Diabetes, hypertension, CAD s/p CABG,  asthma, chronic lower back pain, schizoaffective disorder, and substance abuse presents to ER for evaluation of back pain and chest pain.  Pleas see admission HP for details.      Chest pain: On off chest discomfort with very minimally elevated troponin.  No EKG changes or evidence of segmental wall motion abnormality on echocardiogram  CAD, History of CABG x2 in 2009 with known occluded SVG to diagonal with collaterals from LIM[place] to LAD  - Patient reports having on off left sided chest pain, denies specific triggers. It can happen with or without exertion   - Her troponin initially was at the upper normal, then slightly went up above the normal range.  - Cardiology consult, appreciate input  - Echocardiogram did not show any new wall motion changes. EF is  65-70%. E/E prime ratio is elevated suggestive of diastolic HF. Sevee LA enlargement noted.   - Cardiology not planning any further work-up at this point  - Needs better control of BP and better diabetes control. Her HbA1c was 10.   - Recommend smoking cessation as well.   - Ct ASA. New Crestor instead of lipitor. Coreg dose increased to 25 mg BID to control BP better. Change losartan to valsartan for better BP control.   - Cardiology added Imdur 30 mg every day for angina control  - Cardiology added Norvasc and increased it to 5 mg BID for BP control and angina control.   - Out pt follow up with cardiology     Bilateral lower back pain with right sided siatica:   - Presents with acute on chronic lower back pain, radiating down her right leg.   - MRI reveals marked degenerative changes in the lumbar spine at L5-S1 with severe right and moderate to severe left neural foraminal narrowing, marked edema at the L5-S1 level also extending to the right facet joint.   - Neurosurgery consult, appreciate input. DIAZ with IR.   - She was tried on medrol dose pack without improvement. Then she underwent Epidural steroids injection by IR with good relief of pain.   - Scheduled  tylenol tid  - Continue PTA robaxin, topical diclofenac, amitriptyline. She does not take lyrica as it caused leg edema.   - She takes percocet  10 mg q4h prn at home. Typically, takes 3 tabs a day.  - SP DIAZ on 09/27/22, she has significant improvement in pain.   - Pain management consult, appreciate input  - Out pt PT follow up  - Follow-up with Neuro surgery as out pt     History of CAD:   - S/P  CABG in 2009.   - Continue - Ct ASA, Coreg, Crestor, Valsartan, Norvasc, Imdur. Plan discussed as above.   - Chest pain management as above  - Cardiology - no further work up  - Follow-up as out pt     Diabetes, type II:   - not well controlled with HbA1C 10.1 on 7/19/22.   - PTA Lantus, glipizide 10 mg daily and exenatide  - She can not get exenatide here. Glipizide on hold. She got steroids too. BG was running very high here.   - Her Hb in the past has been 10. BG are not well controlled.   - We increased her lantus to 25 units bid and Ct glipizide at 10 mg and exenatide at current dose.   - Out pt referral to endocrine given  - She does not check her BG at home. We gave her a glucometer and lancets and test strips to go with     Asthma:  - No exacerbation.  - Continue home inhalers.      Essential hypertension:   - BP poorly controlled.    - See aobove manage of BP meds  - If BP still runs high, she needs diuretics.  - At home on lasix at 20 mg every day  As needed.  - Na on low side. Defer diuretics for now.      Diet: Carb consistent  DVT Prophylaxis: SCDs  Code Status: Full Code  Disposition/Barriers:  1-2 days, based on progress with back pain. Home Vs TCU         Discharge Disposition:   Discharged to home           Condition on Discharge:   Discharge condition: Stable   Code status on discharge: Full Code           Procedures:   DIAZ in the lumbar spine          Consultations:   Consultation during this admission received from Cardiology, Neuro SX, PT, OT.               Final Day of Progress before  "Discharge:       Physical Exam:  Blood pressure 105/60, pulse 80, temperature 97.8  F (36.6  C), temperature source Oral, resp. rate 18, height 1.702 m (5' 7\"), weight 91.4 kg (201 lb 8 oz), SpO2 98 %, not currently breastfeeding.  GENERAL: Alert and oriented x 3. NAD.   HEENT: Anicteric sclera. PERRL. Mucous membranes moist and without lesions.   CV: RRR. S1, S2. No murmurs appreciated.   RESPIRATORY: Effort normal. Lungs CTAB with no wheezing, rales, rhonchi.   GI: Abdomen soft and non distended with normoactive bowel sounds present in all quadrants. No tenderness, rebound, guarding.      Data:  All laboratory data reviewed             Significant Results:     Results for orders placed or performed during the hospital encounter of 09/24/22   MR Lumbar Spine w/o & w Contrast     Status: None    Narrative    EXAM: MR LUMBAR SPINE WITHOUT AND WITH CONTRAST  LOCATION: [facility]  DATE/TIME: 09/24/2022, 10:00 AM    INDICATION: Back pain.  COMPARISON: Lumbar spine MR 04/03/2022.  CONTRAST: 10 mL Gadavist.   TECHNIQUE: Routine Lumbar Spine MRI without and with IV contrast.    FINDINGS:   Nomenclature is based on five lumbar-type vertebral bodies. Normal distal spinal cord and cauda equina with conus medullaris at L2-L3. No abnormal enhancement of the conus or cauda equina nerve roots.    Lumbar spine alignment is grossly within normal limits. No significant loss of vertebral body height. Presumed Schmorl's node deformities at the opposing L3 and L4 endplates. Marked STIR hyperintense endplate edema (Modic type I changes) at L5-S1.   Moderate edema also within the right L5-S1 facet joints. Postoperative changes of right-sided laminectomy at L5-S1. No suspicious fluid collection in the laminectomy bed.    No extraspinal abnormality. Unremarkable visualized bony pelvis.    T12-L1: Normal disc height and signal. No herniation. Normal facets. No spinal canal or neural foraminal stenosis.     L1-L2: " Normal disc height and signal. No herniation. Right greater than left facet hypertrophy. No spinal canal narrowing. No significant neural foraminal narrowing.    L2-L3: Mild loss of disc height and signal. Circumferential disc bulge. Mild facet hypertrophy. No spinal canal narrowing. No significant neural foraminal narrowing.     L3-L4: Marked loss of disc height and signal with degenerative endplate changes. Circumferential disc bulge with endplate osteophytic spurring. Bilateral facet hypertrophy. Moderate to severe spinal canal narrowing. Moderate bilateral neural foraminal   narrowing.    L4-L5: Mild loss of disc height and signal. Shallow posterior disc bulge. Moderate bilateral facet hypertrophy. No spinal canal narrowing. No significant neural foraminal narrowing.    L5-S1: Marked loss of disc height and signal with degenerative endplate changes. Circumferential disc bulge with endplate osteophytic spurring. Sequela of previous right-sided laminectomy. Bilateral facet hypertrophy. No spinal canal narrowing. Severe   right neural foraminal narrowing. Moderate to severe left neural foraminal narrowing.      Impression    IMPRESSION:  1.  Marked degenerative changes in the lumbar spine at L5-S1 and to lesser extent L3-L4 as detailed above.  2.  Marked edema at the L5-S1 level also extending to the right facet joint. This could be a source of pain.  3.  At L5-S1, there is severe right and moderate to severe left neural foraminal narrowing.  4.  At L3-L4, there is moderate to severe spinal canal narrowing as well as moderate bilateral neural foraminal narrowing.  5.  Additional mild degenerative changes at the other levels in the lumbar spine as detailed above.  6.  Overall, no significant change since prior MR 04/03/2022.     CT Chest Pulmonary Embolism w Contrast     Status: None    Narrative    EXAM: CT CHEST PULMONARY EMBOLISM WITH CONTRAST  LOCATION: [facility]  DATE/TIME:  09/24/2022, 11:53 AM    INDICATION: Chest pain. Elevated d-dimer.  COMPARISON: CT of the chest, abdomen, and pelvis performed 07/09/2021.  TECHNIQUE: CT chest pulmonary angiogram during arterial phase injection of IV contrast. Multiplanar reformats and MIP reconstructions were performed. Dose reduction techniques were used.   CONTRAST: Isovue 370, 100 mL.    FINDINGS:  ANGIOGRAM CHEST: Pulmonary arteries are normal caliber and negative for pulmonary emboli. The thoracic aorta is not well opacified, however, there is no evidence for thoracic aortic aneurysm. No CT evidence of right heart strain.    LUNGS AND PLEURA: No pleural effusions. A 0.4 cm right lower lobe pulmonary nodule (series 6 image 132), unchanged. No lung masses or consolidations. No pneumothorax.    MEDIASTINUM/AXILLAE: No enlarged lymph nodes in the chest. No pericardial effusion.    CORONARY ARTERY CALCIFICATION: Previous intervention (stents or CABG).    UPPER ABDOMEN: Small hiatal hernia. Limited views of the upper abdomen are unremarkable.    MUSCULOSKELETAL: Sternotomy. No destructive bony lesions are identified.      Impression    IMPRESSION:  1.  No evidence for pulmonary embolism.  2.  No cause for chest pain is identified.     IR Translaminar Epidural Lumbar Inj Incl Imaging     Status: None    [facility]  INTERVENTIONAL NEURORADIOLOGY  9/27/2022 11:05 AM    FLUOROSCOPICALLY GUIDED PERCUTANEOUS TRANSFORAMINAL LUMBOSACRAL EPIDURAL STEROID INJECTION (RIGHT AND LEFT L5-S1)    INDICATION: History of right and left lower extremity radiculopathy. Epidural steroid injection requested due to poor pain control via conservative measures.    CONSENT: The procedure and its indications, major risks, benefits, and alternatives were discussed. Risks including, but not limited to, pain, headache, hemorrhage, infection, nerve damage, spinal cord damage, and allergic reaction were discussed.   Understanding was  acknowledged, and a signed informed consent was obtained.    MODERATE SEDATION: Versed 1.5 mg. Fentanyl 75 mcg. The procedure was performed with the administration of intravenous conscious sedation with appropriate preoperative, intraoperative, and postoperative evaluation. 10 minutes of supervised face to face   conscious sedation time was provided by a radiology nurse under my direct supervision. During the time-out, immediately prior to administration of medications, the patient was re-assessed for adequacy to receive conscious sedation.    MEDICATIONS:  Versed 1.5 mg IV  Fentanyl 75 mcg IV      FLUOROSCOPIC TIME: 1.6 minutes (5 images)    DOSE: Air Kerma: 74 mGy    ESTIMATED BLOOD LOSS: Minimal    PERFORMING PHYSICIAN(S):  Fredo Foreman M.D.  [facility]    PROCEDURE: The patient was placed in the prone position upon the fluoroscopic table. The skin of the back was prepped and draped in sterile fashion. Using fluoroscopic guidance, the left L5-S1 level was localized. The skin was infiltrated with 1%   lidocaine. Using direct fluoroscopic visualization a 22 G spinal needle was navigated to the region of the left L5-S1 neural foramen. Appropriate positioning was confirmed with the injection of 1 cc of Omnipaque 180. No arterial opacification was   evident. We then proceeded with injection of a solution containing 3 cc of 1% lidocaine and 1 cc of dexamethasone 10 mg/mL. The needle was then removed. A dressing was applied. The procedure appeared well-tolerated. There was no apparent complication.    This same procedure was then utilized to perform right L5-S1 transforaminal epidural steroid injection.      Impression    CONCLUSION:     1. Technically successful fluoroscopically-guided percutaneous transforaminal lumbosacral epidural steroid injection (right and left L5-S1).    Pain severity:  Pre-procedure: 10/10  Post-procedure: 7/10  _____________________________________________       Basic metabolic  panel     Status: Abnormal   Result Value Ref Range    Sodium 137 136 - 145 mmol/L    Potassium 4.4 3.4 - 5.3 mmol/L    Chloride 101 98 - 107 mmol/L    Carbon Dioxide (CO2) 24 22 - 29 mmol/L    Anion Gap 12 7 - 15 mmol/L    Urea Nitrogen 14.6 6.0 - 20.0 mg/dL    Creatinine 0.76 0.51 - 0.95 mg/dL    Calcium 9.2 8.6 - 10.0 mg/dL    Glucose 201 (H) 70 - 99 mg/dL    GFR Estimate 90 >60 mL/min/1.73m2   CRP inflammation     Status: Abnormal   Result Value Ref Range    CRP Inflammation 6.50 (H) <5.00 mg/L   Troponin T, High Sensitivity (now)     Status: Normal   Result Value Ref Range    Troponin T, High Sensitivity 13 <=14 ng/L   CBC with platelets and differential     Status: Normal   Result Value Ref Range    WBC Count 5.5 4.0 - 11.0 10e3/uL    RBC Count 4.57 3.80 - 5.20 10e6/uL    Hemoglobin 12.4 11.7 - 15.7 g/dL    Hematocrit 39.2 35.0 - 47.0 %    MCV 86 78 - 100 fL    MCH 27.1 26.5 - 33.0 pg    MCHC 31.6 31.5 - 36.5 g/dL    RDW 12.8 10.0 - 15.0 %    Platelet Count 263 150 - 450 10e3/uL   Drug abuse screen 77 urine (FL, RH, SH)     Status: Abnormal   Result Value Ref Range    Amphetamines Urine Screen Negative Screen Negative    Barbituates Urine Screen Negative Screen Negative    Benzodiazepine Urine Screen Positive (A) Screen Negative    Cannabinoids Urine Screen Negative Screen Negative    Opiates Urine Screen Negative Screen Negative    PCP Urine Screen Negative Screen Negative    Cocaine Urine Screen Negative Screen Negative   D dimer quantitative     Status: Abnormal   Result Value Ref Range    D-Dimer Quantitative 0.56 (H) 0.00 - 0.50 ug/mL FEU    Narrative    This D-dimer assay is intended for use in conjunction with a clinical pretest probability assessment model to exclude pulmonary embolism (PE) and deep venous thrombosis (DVT) in outpatients suspected of PE or DVT. The cut-off value is 0.50 ug/mL FEU.   Troponin T, High Sensitivity (now)     Status: Abnormal   Result Value Ref Range    Troponin T, High  Sensitivity 15 (H) <=14 ng/L   Manual Differential     Status: None   Result Value Ref Range    % Neutrophils 54 %    % Lymphocytes 37 %    % Monocytes 3 %    % Eosinophils 6 %    % Basophils 0 %    Absolute Neutrophils 3.0 1.6 - 8.3 10e3/uL    Absolute Lymphocytes 2.0 0.8 - 5.3 10e3/uL    Absolute Monocytes 0.2 0.0 - 1.3 10e3/uL    Absolute Eosinophils 0.3 0.0 - 0.7 10e3/uL    Absolute Basophils 0.0 0.0 - 0.2 10e3/uL    RBC Morphology Confirmed RBC Indices     Platelet Assessment  Automated Count Confirmed. Platelet morphology is normal.     Automated Count Confirmed. Platelet morphology is normal.   Asymptomatic COVID-19 Virus (Coronavirus) by PCR Nasopharyngeal     Status: Normal    Specimen: Nasopharyngeal; Swab   Result Value Ref Range    SARS CoV2 PCR Negative Negative    Narrative    Testing was performed using the Xpert Xpress SARS-CoV-2 Assay on the   Cepheid Gene-Xpert Instrument Systems. Additional information about   this Emergency Use Authorization (EUA) assay can be found via the Lab   Guide. This test should be ordered for the detection of SARS-CoV-2 in   individuals who meet SARS-CoV-2 clinical and/or epidemiological   criteria. Test performance is unknown in asymptomatic patients. This   test is for in vitro diagnostic use under the FDA EUA for   laboratories certified under CLIA to perform high complexity testing.   This test has not been FDA cleared or approved. A negative result   does not rule out the presence of PCR inhibitors in the specimen or   target RNA in concentration below the limit of detection for the   assay. The possibility of a false negative should be considered if   the patient's recent exposure or clinical presentation suggests   COVID-19. This test was validated by the [facility]. This laboratory is certified under the Clinical Laboratory Improvement Amendments of 1988 (CLIA-88) as qualified to perform high complexity laboratory testing.      Troponin T, High Sensitivity     Status: Abnormal   Result Value Ref Range    Troponin T, High Sensitivity 16 (H) <=14 ng/L   Extra Tube     Status: None    Narrative    The following orders were created for panel order Extra Tube.  Procedure                               Abnormality         Status                     ---------                               -----------         ------                     Extra Red Top Tube[[id number]]                               Final result                 Please view results for these tests on the individual orders.   Extra Red Top Tube     Status: None   Result Value Ref Range    Hold Specimen [facility]    Glucose by meter     Status: Abnormal   Result Value Ref Range    GLUCOSE BY METER POCT 210 (H) 70 - 99 mg/dL   Troponin T, High Sensitivity     Status: Normal   Result Value Ref Range    Troponin T, High Sensitivity 13 <=14 ng/L   Heparin Unfractionated Anti Xa Level     Status: Normal   Result Value Ref Range    Anti Xa Unfractionated Heparin <0.10 For Reference Range, See Comment IU/mL    Narrative    Therapeutic Range: UFH: 0.25-0.50 IU/mL for low intensity dosing,  0.30-0.70 IU/mL for high intensity dosing DVT and PE.  This test is not validated for other direct factor X inhibitors (e.g. rivaroxaban, apixaban, edoxaban, betrixaban, fondaparinux) and should not be used for monitoring of other medications.   Glucose by meter     Status: Abnormal   Result Value Ref Range    GLUCOSE BY METER POCT 326 (H) 70 - 99 mg/dL   Troponin T, High Sensitivity     Status: Normal   Result Value Ref Range    Troponin T, High Sensitivity 12 <=14 ng/L   Heparin Unfractionated Anti Xa Level     Status: Normal   Result Value Ref Range    Anti Xa Unfractionated Heparin 1.07 For Reference Range, See Comment IU/mL    Narrative    Therapeutic Range: UFH: 0.25-0.50 IU/mL for low intensity dosing,  0.30-0.70 IU/mL for high intensity dosing DVT and PE.  This test is not validated for other direct factor X  inhibitors (e.g. rivaroxaban, apixaban, edoxaban, betrixaban, fondaparinux) and should not be used for monitoring of other medications.   Glucose by meter     Status: Abnormal   Result Value Ref Range    GLUCOSE BY METER POCT 388 (H) 70 - 99 mg/dL   Extra Tube     Status: None    Narrative    The following orders were created for panel order Extra Tube.  Procedure                               Abnormality         Status                     ---------                               -----------         ------                     Extra Red Top Tube[[id number]]                               Final result                 Please view results for these tests on the individual orders.   Extra Red Top Tube     Status: None   Result Value Ref Range    Hold Specimen [facility]    Glucose by meter     Status: Abnormal   Result Value Ref Range    GLUCOSE BY METER POCT 360 (H) 70 - 99 mg/dL   Troponin T, High Sensitivity     Status: Normal   Result Value Ref Range    Troponin T, High Sensitivity 11 <=14 ng/L   Glucose by meter     Status: Abnormal   Result Value Ref Range    GLUCOSE BY METER POCT 368 (H) 70 - 99 mg/dL   Glucose by meter     Status: Abnormal   Result Value Ref Range    GLUCOSE BY METER POCT 367 (H) 70 - 99 mg/dL   Comprehensive metabolic panel     Status: Abnormal   Result Value Ref Range    Sodium 129 (L) 136 - 145 mmol/L    Potassium 4.8 3.4 - 5.3 mmol/L    Chloride 99 98 - 107 mmol/L    Carbon Dioxide (CO2) 21 (L) 22 - 29 mmol/L    Anion Gap 9 7 - 15 mmol/L    Urea Nitrogen 22.7 (H) 6.0 - 20.0 mg/dL    Creatinine 0.75 0.51 - 0.95 mg/dL    Calcium 8.9 8.6 - 10.0 mg/dL    Glucose 339 (H) 70 - 99 mg/dL    Alkaline Phosphatase 95 35 - 104 U/L    AST 18 10 - 35 U/L    ALT 22 10 - 35 U/L    Protein Total 6.8 6.4 - 8.3 g/dL    Albumin 3.8 3.5 - 5.2 g/dL    Bilirubin Total <0.2 <=1.2 mg/dL    GFR Estimate >90 >60 mL/min/1.73m2   Glucose by meter     Status: Abnormal   Result Value Ref Range    GLUCOSE BY METER POCT 293 (H) 70 -  99 mg/dL   Glucose by meter     Status: Abnormal   Result Value Ref Range    GLUCOSE BY METER POCT 167 (H) 70 - 99 mg/dL   Glucose by meter     Status: Abnormal   Result Value Ref Range    GLUCOSE BY METER POCT 318 (H) 70 - 99 mg/dL   CBC with platelets     Status: Abnormal   Result Value Ref Range    WBC Count 8.3 4.0 - 11.0 10e3/uL    RBC Count 4.01 3.80 - 5.20 10e6/uL    Hemoglobin 11.0 (L) 11.7 - 15.7 g/dL    Hematocrit 34.5 (L) 35.0 - 47.0 %    MCV 86 78 - 100 fL    MCH 27.4 26.5 - 33.0 pg    MCHC 31.9 31.5 - 36.5 g/dL    RDW 12.7 10.0 - 15.0 %    Platelet Count 260 150 - 450 10e3/uL   Glucose by meter     Status: Abnormal   Result Value Ref Range    GLUCOSE BY METER POCT 307 (H) 70 - 99 mg/dL   Glucose by meter     Status: Abnormal   Result Value Ref Range    GLUCOSE BY METER POCT 305 (H) 70 - 99 mg/dL   Basic metabolic panel     Status: Abnormal   Result Value Ref Range    Sodium 134 (L) 136 - 145 mmol/L    Potassium 5.1 3.4 - 5.3 mmol/L    Chloride 99 98 - 107 mmol/L    Carbon Dioxide (CO2) 25 22 - 29 mmol/L    Anion Gap 10 7 - 15 mmol/L    Urea Nitrogen 24.7 (H) 6.0 - 20.0 mg/dL    Creatinine 0.82 0.51 - 0.95 mg/dL    Calcium 8.7 8.6 - 10.0 mg/dL    Glucose 349 (H) 70 - 99 mg/dL    GFR Estimate 82 >60 mL/min/1.73m2   Extra Tube     Status: None    Narrative    The following orders were created for panel order Extra Tube.  Procedure                               Abnormality         Status                     ---------                               -----------         ------                     Extra Red Top Tube[[id number]]                               Final result               Extra Purple Top Tube[[id number]]                                                         Please view results for these tests on the individual orders.   Extra Red Top Tube     Status: None   Result Value Ref Range    Hold Specimen [facility]    Glucose by meter     Status: Abnormal   Result Value Ref Range    GLUCOSE BY METER POCT 139 (H)  70 - 99 mg/dL   Glucose by meter     Status: Abnormal   Result Value Ref Range    GLUCOSE BY METER POCT 513 (HH) 70 - 99 mg/dL   Glucose by meter     Status: Abnormal   Result Value Ref Range    GLUCOSE BY METER POCT 508 (HH) 70 - 99 mg/dL   Glucose by meter     Status: Abnormal   Result Value Ref Range    GLUCOSE BY METER POCT 431 (H) 70 - 99 mg/dL   Glucose by meter     Status: Abnormal   Result Value Ref Range    GLUCOSE BY METER POCT 322 (H) 70 - 99 mg/dL   Glucose by meter     Status: Abnormal   Result Value Ref Range    GLUCOSE BY METER POCT 272 (H) 70 - 99 mg/dL   Glucose by meter     Status: Abnormal   Result Value Ref Range    GLUCOSE BY METER POCT 240 (H) 70 - 99 mg/dL   Glucose by meter     Status: Abnormal   Result Value Ref Range    GLUCOSE BY METER POCT 172 (H) 70 - 99 mg/dL   Glucose by meter     Status: Abnormal   Result Value Ref Range    GLUCOSE BY METER POCT 186 (H) 70 - 99 mg/dL   Glucose by meter     Status: Abnormal   Result Value Ref Range    GLUCOSE BY METER POCT 423 (H) 70 - 99 mg/dL   Glucose by meter     Status: Abnormal   Result Value Ref Range    GLUCOSE BY METER POCT 240 (H) 70 - 99 mg/dL   Glucose by meter     Status: Abnormal   Result Value Ref Range    GLUCOSE BY METER POCT 215 (H) 70 - 99 mg/dL   Glucose by meter     Status: Normal   Result Value Ref Range    GLUCOSE BY METER POCT 92 70 - 99 mg/dL   Glucose by meter     Status: Normal   Result Value Ref Range    GLUCOSE BY METER POCT 94 70 - 99 mg/dL   ECG 12-LEAD WITH MUSE (LHE)     Status: None   Result Value Ref Range    Systolic Blood Pressure  mmHg    Diastolic Blood Pressure  mmHg    Ventricular Rate 75 BPM    Atrial Rate 75 BPM    MN Interval 198 ms    QRS Duration 80 ms     ms    QTc 457 ms    P Axis 29 degrees    R AXIS 11 degrees    T Axis 92 degrees    Interpretation ECG       Sinus rhythm  Nonspecific ST and T wave abnormality  Abnormal ECG  When compared with ECG of 17-JUL-2022 18:22,  No significant change was  found  Confirmed by SEE ED PROVIDER NOTE FOR, ECG INTERPRETATION ([id number]),  LISSET PATRICK ([id number]) on 2022 8:23:57 AM     Echocardiogram Complete     Status: None   Result Value Ref Range    LVEF  65-70%     [facility]    [id number]  [id number]  [id number]  [id number] [address]     Name: LAUREN HAIRSTON  MRN: [id number]  : 1965  Study Date: 2022 10:17 AM  Age: 57 yrs  Gender: Female  Patient Location: [facility]  Reason For Study: Chest Pain  Ordering Physician: LIZZ JAIMES  Referring Physician: LIZZ JAIMES  Performed By: SYLVIA     BSA: 2.1 m2  Height: 67 in  Weight: 210 lb  HR: 72  ______________________________________________________________________________  Procedure  Complete Echo Adult. Adequate quality two-dimensional was performed and  interpreted. Adequate quality color and spectral Doppler were performed and  interpreted. Compared to the prior study dated 10/23/2018, there have been no  changes.  ______________________________________________________________________________  Interpretation Summary     1. Left ventricular size, wall thickness, and systolic function are normal.  The estimated left ventricular ejection fraction is 65-70%.  2. Right ventricular size and systolic function are normal.  3. Severe left atrial enlargement.  4. No hemodynamically significant valvular abnormalities.  5. Compared to the prior study dated 10/23/2018, there has been no significant  change.  ______________________________________________________________________________  I      WMSI = 1.00     % Normal = 100     X - Cannot   0 -                      (2) - Mildly 2 -          Segments  Size  Interpret    Hyperkinetic 1 - Normal  Hypokinetic  Hypokinetic  1-2     small                                                     7 -          3-5      moderate  3 - Akinetic 4 -          5 -         6 - Akinetic Dyskinetic   6-14    large                Dyskinetic   Aneurysmal  w/scar       w/scar       15-16   diffuse     Left Ventricle  The left ventricle is normal in size. There is normal left ventricular wall  thickness. Left ventricular systolic function is normal. The visual ejection  fraction is 65-70%. Left ventricular diastolic function is abnormal. Diastolic  Doppler findings (E/E' ratio and/or other parameters) suggest left ventricular  filling pressures are increased. No regional wall motion abnormalities noted.     Right Ventricle  Normal right ventricle size and systolic function.     Atria  The left atrium is severely dilated. Right atrial size is normal.     Mitral Valve  The mitral valve leaflets are mildly thickened. There is mild (1+) mitral  regurgitation. There is no mitral valve stenosis.     Tricuspid Valve  Tricuspid valve leaflets appear normal. There is mild (1+) tricuspid  regurgitation. The right ventricular systolic pressure is approximated at 29.8  mmHg plus the right atrial pressure. Right ventricle systolic pressure  estimate normal. There is no tricuspid stenosis.     Aortic Valve  Aortic valve leaflets appear normal. The aortic valve is trileaflet. No aortic  regurgitation is present. No aortic stenosis is present.     Pulmonic Valve  The pulmonic valve is not well seen, but is grossly normal. There is trace  pulmonic valvular regurgitation. There is no pulmonic valvular stenosis.     Vessels  The aorta root is normal. The thoracic aorta is normal. IVC diameter <2.1 cm  collapsing >50% with sniff suggests a normal RA pressure of 3 mmHg.     Pericardium  There is no pericardial effusion.     Rhythm  Sinus rhythm was noted.     ______________________________________________________________________________  MMode/2D Measurements & Calculations  IVSd: 0.90 cm  LVIDd: 4.9 cm  LVIDs: 3.5 cm  LVPWd: 0.90 cm  FS: 29.1 %     LV mass(C)d: 153.5 grams  LV mass(C)dI: 74.3 grams/m2  Ao root diam: 2.8 cm  LA dimension: 4.5 cm  asc Aorta Diam:  3.4 cm  LA/Ao: 1.6  LVOT diam: 2.0 cm  LVOT area: 3.1 cm2  LA Volume Indexed (AL/bp): 62.0 ml/m2  RWT: 0.37     Time Measurements  MM HR: 72.0 BPM     Doppler Measurements & Calculations  MV E max jairo: 134.0 cm/sec  MV A max jairo: 161.0 cm/sec  MV E/A: 0.83  MV max P.1 mmHg  MV mean P.0 mmHg  MV V2 VTI: 51.0 cm  MVA(VTI): 1.5 cm2  MV dec slope: 453.0 cm/sec2  MV dec time: 0.30 sec  Ao V2 max: 150.0 cm/sec  Ao max P.0 mmHg  Ao V2 mean: 97.9 cm/sec  Ao mean P.0 mmHg  Ao V2 VTI: 28.6 cm  HERNANDEZ(I,D): 2.7 cm2  HERNANDEZ(V,D): 2.7 cm2  LV V1 max P.5 mmHg  LV V1 max: 127.0 cm/sec  LV V1 VTI: 24.6 cm  SV(LVOT): 77.3 ml  SI(LVOT): 37.4 ml/m2  TV max P.0 mmHg  PA acc time: 0.11 sec  TR max jairo: 270.0 cm/sec  TR max P.8 mmHg  AV Jairo Ratio (DI): 0.85  HERNANDEZ Index (cm2/m2): 1.3  E/E' av.6  Lateral E/e': 15.6  Medial E/e': 25.7     ______________________________________________________________________________  Report approved by: Kyrie Ceron 2022 11:58 AM         Urine Drugs of Abuse Screen Panel 1 - Drug Screen (Full) *Canceled*     Status: None ()    Narrative    The following orders were created for panel order Urine Drugs of Abuse Screen Panel 1 - Drug Screen (Full).  Procedure                               Abnormality         Status                     ---------                               -----------         ------                       Please view results for these tests on the individual orders.   CBC with platelets + differential     Status: None    Narrative    The following orders were created for panel order CBC with platelets + differential.  Procedure                               Abnormality         Status                     ---------                               -----------         ------                     CBC with platelets and d...[[id number]]  Normal              Final result               Manual Differential[[id number]]                              Final result                  Please view results for these tests on the individual orders.   Urine Drugs of Abuse Screen Panel 1 - Drug Screen (Full) *Canceled*     Status: None ()    Narrative    The following orders were created for panel order Urine Drugs of Abuse Screen Panel 1 - Drug Screen (Full).  Procedure                               Abnormality         Status                     ---------                               -----------         ------                     Drugs of Abuse 1 Panel, ...[[id number]]                                                   Please view results for these tests on the individual orders.      No results found for this or any previous visit (from the past 48 hour(s)).             Pending Results:   Unresulted Labs Ordered in the Past 30 Days of this Admission     No orders found from 8/25/2022 to 9/25/2022.                  Discharge Instructions and Follow-Up:     Discharge Procedure Orders   Physical Therapy Referral   Standing Status: Future   Referral Priority: Routine Referral Type: Rehab Therapy Physical Therapy   Number of Visits Requested: 1     Adult Endocrinology  Referral   Standing Status: Future   Referral Priority: Routine: Next available opening Referral Type: Consultation   Requested Specialty: Endocrinology, Diabetes, and Metabolism   Number of Visits Requested: 1     Primary Care - Care Coordination Referral   Standing Status: Future   Referral Priority: Routine: Next available opening Referral Type: Care Coordination   Number of Visits Requested: 1     Reason for your hospital stay   Order Comments: See discharge summary for details. Admitted for back pain due to spondylosis, SP DIAZ. Had anginal Chest pain. Conservative treatment. Out Pt Follow-up with PCP and Cardiology and spine surgery.     Follow-up and recommended labs and tests    Order Comments: Follow up with primary care provider, Flako Rosen, within 7 days for hospital follow- up.  No follow up labs or test are  needed.    Follow-up with Neuro Surgery as out pt in 2 weeks time    Follow-up with Cardiology as out pt in 1 months time.     Activity   Order Comments: Your activity upon discharge: activity as tolerated     Order Specific Question Answer Comments   Is discharge order? Yes      Diet   Order Comments: Follow this diet upon discharge: Orders Placed This Encounter      Room Service      Consistent Carbohydrate Diet Moderate Consistent Carb (60 g CHO per Meal) Diet     Order Specific Question Answer Comments   Is discharge order? Yes             Montse Nieto MD  Internal Medicine Staff Hospitalist  [phone]  September 29, 2022        Time spent on patient: 45 minutes total including face to face and coordinating care time reviewing current illness, any medication changes, and the care plan for today.

## 2022-09-29 NOTE — PROGRESS NOTES
Care Management Discharge Note    Discharge Date: 09/29/2022       Discharge Disposition:  Home    Discharge Services:  NA    Discharge DME:  NA    Discharge Transportation: public transportation    Private pay costs discussed: Not applicable      Education Provided on the Discharge Plan:  Yes  Persons Notified of Discharge Plans: MD, RN, SW, Patient  Patient/Family in Agreement with the Plan:  Yes    Handoff Referral Completed: Yes    Additional Information:   Pt lives alone in an apartment. There is elevator access. Ind, but moves at times around with a cane when leg gives out. Pt states having PCA services, for assistance, does not know the name of the company. Did not have any financial concerns. Has [email or web address] for coverage.  is support system. Pt states, still being able to drive.     Patient will discharge home today with no services. Anticipate patient needing cab voucher.         Lo Tucker

## 2022-09-29 NOTE — PLAN OF CARE
Goal Outcome Evaluation:    Plan of Care Reviewed With: patient     Overall Patient Progress: improvingPRIMARY DIAGNOSIS:Pain, chest pain  OUTPATIENT/OBSERVATION GOALS TO BE MET BEFORE DISCHARGE:  ADLs back to baseline: Yes    Activity and level of assistance: Ambulating independently.    Pain status: Improved-controlled with oral pain medications.    Return to near baseline physical activity: Yes     Discharge Planner Nurse   Safe discharge environment identified: Yes  Barriers to discharge: No       Entered by: Ramya Narvaez RN 09/29/2022 10:18 AM     Please review provider order for any additional goals.   Nurse to notify provider when observation goals have been met and patient is ready for discharge.

## 2022-09-29 NOTE — PLAN OF CARE
"Goal Outcome Evaluation:    Plan of Care Reviewed With: patient     Overall Patient Progress: improving    Outcome Evaluation: Pt is ready for discharge.    Update 1430- RN attempted to go over the discharge paper work with pt.  Pt is refusing to go over it with nurse.  Pt states she wants to read it herself in a \"little bit\".  RN explained the importance of going over tthe paperwork to make sure pt does not have any questions about her meds and her upcoming appointments.  Pt adamantly refused still.  RN left paperwork at bedside for pt to read.  RN will check to make sure that pt has no questions.      Meds filled by [facility] pharmacy and pt has them at bedside.    Ride will be here at 1600.    "

## 2022-09-29 NOTE — PLAN OF CARE
PRIMARY DIAGNOSIS: ACUTE PAIN  OUTPATIENT/OBSERVATION GOALS TO BE MET BEFORE DISCHARGE:  1. Pain Status: Improved-controlled with oral pain medications.    2. Return to near baseline physical activity: Yes    3. Cleared for discharge by consultants (if involved): No    Discharge Planner Nurse   Safe discharge environment identified: Yes  Barriers to discharge: Yes       Entered by: Sherly Pardo RN 09/29/2022 5:10 AM     Please review provider order for any additional goals.   Nurse to notify provider when observation goals have been met and patient is ready for discharge.Goal Outcome Evaluation:        Patient is alert and oriented.  She reported oxycodone given at 0111 brought pain from 10/10 to 5/10.  Vital signs stable.  Patient declined to be weighed on standing scale.  Able to ambulate independently in room.  Will continue to monitor.

## 2022-09-29 NOTE — PROGRESS NOTES
PRIMARY DIAGNOSIS: bilateral leg pain, chest pain  OUTPATIENT/OBSERVATION GOALS TO BE MET BEFORE DISCHARGE:  1. ADLs back to baseline: Yes    2. Activity and level of assistance: Ambulating independently.    3. Pain status: chronic back pain rated 9/10 this afternoon.    4. Return to near baseline physical activity: Yes     Discharge Planner Nurse   Safe discharge environment identified: Yes  Barriers to discharge: Yes       Entered by: Willow Green RN 09/28/2022 7:20 PM   NSR, lungs clear, VSS. C/O back pain 9/10 and oxycodone administered; pain reassessment pt reported 7/10 and tolerable. Ambulating independently in room. Blood sugar at supper was 240 and insulin provided. Reports last BM was 4 days ago and not normal pattern. Senna scheduled this evening.   Please review provider order for any additional goals.   Nurse to notify provider when observation goals have been met and patient is ready for discharge.

## 2022-09-29 NOTE — PROGRESS NOTES
PRIMARY DIAGNOSIS: Bilateral leg pain, chest pain  OUTPATIENT/OBSERVATION GOALS TO BE MET BEFORE DISCHARGE:  1. ADLs back to baseline: Yes    2. Activity and level of assistance: Ambulating independently.    3. Pain status: Improved-controlled with oral pain medications.    4. Return to near baseline physical activity: Yes     Discharge Planner Nurse   Safe discharge environment identified: Yes  Barriers to discharge: Yes       Entered by: Willow Green RN 09/28/2022 10:22 PM   Pt reported back and leg pain 9/10. Oxycodone and scheduled tylenol administered and effective per pt. Bedtime blood sugar was 215. Sinus dysrhythmia, VSS. Lung sounds clear. Independent in room.   Please review provider order for any additional goals.   Nurse to notify provider when observation goals have been met and patient is ready for discharge.

## 2022-09-30 NOTE — TELEPHONE ENCOUNTER
Spoke with patient and she states that she has some upcoming appt lined up and services from Neurosurg is not needed at this time.   She states that she will call back if anything changes,

## 2022-10-01 ENCOUNTER — PATIENT OUTREACH (OUTPATIENT)
Dept: CARE COORDINATION | Facility: CLINIC | Age: 57
End: 2022-10-01

## 2022-10-01 NOTE — PROGRESS NOTES
[facility] Contact  [facility]/Voicemail     Clinical Data: Transitional Care Management Outreach     Outreach attempted x 2.  Left message on patient's voicemail, providing [facility]'s 24/7 scheduling and nurse triage phone number [phone] ([phone]) for questions/concerns and/or to schedule an appt with an [facility] provider, if they do not have a PCP.      Plan:  [facility] will do no further outreaches at this time.       Rani Christine RN  Connected Care Resource [place], [facility]    *Connected Care Resource Team does NOT follow patient ongoing. Referrals are identified based on internal discharge reports and the outreach is to ensure patient has an understanding of their discharge instructions.

## 2022-10-06 ENCOUNTER — HOSPITAL ENCOUNTER (OUTPATIENT)
Facility: HOSPITAL | Age: 57
Setting detail: OBSERVATION
Discharge: HOME | End: 2022-10-08
Attending: EMERGENCY MEDICINE | Admitting: EMERGENCY MEDICINE
Payer: COMMERCIAL

## 2022-10-06 ENCOUNTER — APPOINTMENT (OUTPATIENT)
Dept: CT IMAGING | Facility: HOSPITAL | Age: 57
End: 2022-10-06
Attending: EMERGENCY MEDICINE
Payer: COMMERCIAL

## 2022-10-06 ENCOUNTER — APPOINTMENT (OUTPATIENT)
Dept: MRI IMAGING | Facility: HOSPITAL | Age: 57
End: 2022-10-06
Attending: PHYSICIAN ASSISTANT
Payer: COMMERCIAL

## 2022-10-06 DIAGNOSIS — R53.1 RIGHT SIDED WEAKNESS: ICD-10-CM

## 2022-10-06 DIAGNOSIS — I10 ESSENTIAL HYPERTENSION, BENIGN: ICD-10-CM

## 2022-10-06 DIAGNOSIS — G45.9 TIA (TRANSIENT ISCHEMIC ATTACK): Primary | ICD-10-CM

## 2022-10-06 LAB
ANION GAP SERPL CALCULATED.3IONS-SCNC: 11 MMOL/L (ref 7–15)
APTT PPP: 24 SECONDS (ref 22–38)
BASOPHILS # BLD AUTO: 0.1 10E3/UL (ref 0–0.2)
BASOPHILS NFR BLD AUTO: 1 %
BUN SERPL-MCNC: 15.5 MG/DL (ref 6–20)
CALCIUM SERPL-MCNC: 8.6 MG/DL (ref 8.6–10)
CHLORIDE SERPL-SCNC: 104 MMOL/L (ref 98–107)
CREAT SERPL-MCNC: 0.7 MG/DL (ref 0.51–0.95)
CREAT SERPL-MCNC: 0.76 MG/DL (ref 0.51–0.95)
DEPRECATED HCO3 PLAS-SCNC: 22 MMOL/L (ref 22–29)
EOSINOPHIL # BLD AUTO: 0.4 10E3/UL (ref 0–0.7)
EOSINOPHIL NFR BLD AUTO: 5 %
ERYTHROCYTE [DISTWIDTH] IN BLOOD BY AUTOMATED COUNT: 13 % (ref 10–15)
GFR SERPL CREATININE-BSD FRML MDRD: 90 ML/MIN/1.73M2
GFR SERPL CREATININE-BSD FRML MDRD: >90 ML/MIN/1.73M2
GLUCOSE BLDC GLUCOMTR-MCNC: 145 MG/DL (ref 70–99)
GLUCOSE BLDC GLUCOMTR-MCNC: 202 MG/DL (ref 70–99)
GLUCOSE BLDC GLUCOMTR-MCNC: 227 MG/DL (ref 70–99)
GLUCOSE SERPL-MCNC: 187 MG/DL (ref 70–99)
HCT VFR BLD AUTO: 36 % (ref 35–47)
HGB BLD-MCNC: 11 G/DL (ref 11.7–15.7)
HOLD SPECIMEN: NORMAL
IMM GRANULOCYTES # BLD: 0.1 10E3/UL
IMM GRANULOCYTES NFR BLD: 1 %
INR PPP: 1.04 (ref 0.85–1.15)
LYMPHOCYTES # BLD AUTO: 2.1 10E3/UL (ref 0.8–5.3)
LYMPHOCYTES NFR BLD AUTO: 28 %
MCH RBC QN AUTO: 26.6 PG (ref 26.5–33)
MCHC RBC AUTO-ENTMCNC: 30.6 G/DL (ref 31.5–36.5)
MCV RBC AUTO: 87 FL (ref 78–100)
MONOCYTES # BLD AUTO: 0.5 10E3/UL (ref 0–1.3)
MONOCYTES NFR BLD AUTO: 6 %
NEUTROPHILS # BLD AUTO: 4.5 10E3/UL (ref 1.6–8.3)
NEUTROPHILS NFR BLD AUTO: 59 %
NRBC # BLD AUTO: 0 10E3/UL
NRBC BLD AUTO-RTO: 1 /100
PLATELET # BLD AUTO: 287 10E3/UL (ref 150–450)
POTASSIUM SERPL-SCNC: 4.1 MMOL/L (ref 3.4–5.3)
RBC # BLD AUTO: 4.14 10E6/UL (ref 3.8–5.2)
SARS-COV-2 RNA RESP QL NAA+PROBE: NEGATIVE
SODIUM SERPL-SCNC: 137 MMOL/L (ref 136–145)
TROPONIN T SERPL HS-MCNC: 13 NG/L
TSH SERPL DL<=0.005 MIU/L-ACNC: 1.38 UIU/ML (ref 0.3–4.2)
WBC # BLD AUTO: 7.5 10E3/UL (ref 4–11)

## 2022-10-06 PROCEDURE — 250N000011 HC RX IP 250 OP 636: Performed by: EMERGENCY MEDICINE

## 2022-10-06 PROCEDURE — 82607 VITAMIN B-12: CPT | Performed by: PSYCHIATRY & NEUROLOGY

## 2022-10-06 PROCEDURE — 84443 ASSAY THYROID STIM HORMONE: CPT | Performed by: PSYCHIATRY & NEUROLOGY

## 2022-10-06 PROCEDURE — 84484 ASSAY OF TROPONIN QUANT: CPT | Performed by: EMERGENCY MEDICINE

## 2022-10-06 PROCEDURE — G0378 HOSPITAL OBSERVATION PER HR: HCPCS

## 2022-10-06 PROCEDURE — 36415 COLL VENOUS BLD VENIPUNCTURE: CPT | Performed by: EMERGENCY MEDICINE

## 2022-10-06 PROCEDURE — 85730 THROMBOPLASTIN TIME PARTIAL: CPT | Performed by: EMERGENCY MEDICINE

## 2022-10-06 PROCEDURE — 80048 BASIC METABOLIC PNL TOTAL CA: CPT | Performed by: EMERGENCY MEDICINE

## 2022-10-06 PROCEDURE — 70551 MRI BRAIN STEM W/O DYE: CPT

## 2022-10-06 PROCEDURE — 250N000011 HC RX IP 250 OP 636: Performed by: INTERNAL MEDICINE

## 2022-10-06 PROCEDURE — 82962 GLUCOSE BLOOD TEST: CPT

## 2022-10-06 PROCEDURE — 250N000013 HC RX MED GY IP 250 OP 250 PS 637: Performed by: EMERGENCY MEDICINE

## 2022-10-06 PROCEDURE — U0003 INFECTIOUS AGENT DETECTION BY NUCLEIC ACID (DNA OR RNA); SEVERE ACUTE RESPIRATORY SYNDROME CORONAVIRUS 2 (SARS-COV-2) (CORONAVIRUS DISEASE [COVID-19]), AMPLIFIED PROBE TECHNIQUE, MAKING USE OF HIGH THROUGHPUT TECHNOLOGIES AS DESCRIBED BY CMS-2020-01-R: HCPCS | Performed by: EMERGENCY MEDICINE

## 2022-10-06 PROCEDURE — 250N000012 HC RX MED GY IP 250 OP 636 PS 637: Performed by: INTERNAL MEDICINE

## 2022-10-06 PROCEDURE — 70496 CT ANGIOGRAPHY HEAD: CPT

## 2022-10-06 PROCEDURE — C9803 HOPD COVID-19 SPEC COLLECT: HCPCS

## 2022-10-06 PROCEDURE — 85610 PROTHROMBIN TIME: CPT | Performed by: EMERGENCY MEDICINE

## 2022-10-06 PROCEDURE — 99285 EMERGENCY DEPT VISIT HI MDM: CPT | Mod: 25

## 2022-10-06 PROCEDURE — 99220 PR INITIAL OBSERVATION CARE,LEVEL III: CPT | Performed by: INTERNAL MEDICINE

## 2022-10-06 PROCEDURE — 250N000013 HC RX MED GY IP 250 OP 250 PS 637: Performed by: PSYCHIATRY & NEUROLOGY

## 2022-10-06 PROCEDURE — 99215 OFFICE O/P EST HI 40 MIN: CPT | Performed by: PSYCHIATRY & NEUROLOGY

## 2022-10-06 PROCEDURE — 85025 COMPLETE CBC W/AUTO DIFF WBC: CPT | Performed by: EMERGENCY MEDICINE

## 2022-10-06 PROCEDURE — 999N000127 HC STATISTIC PERIPHERAL IV START W US GUIDANCE

## 2022-10-06 PROCEDURE — 99207 PR NO CHARGE LOS: CPT | Performed by: STUDENT IN AN ORGANIZED HEALTH CARE EDUCATION/TRAINING PROGRAM

## 2022-10-06 PROCEDURE — 96375 TX/PRO/DX INJ NEW DRUG ADDON: CPT | Mod: XU

## 2022-10-06 PROCEDURE — 82565 ASSAY OF CREATININE: CPT | Mod: 91 | Performed by: INTERNAL MEDICINE

## 2022-10-06 PROCEDURE — 96372 THER/PROPH/DIAG INJ SC/IM: CPT | Performed by: INTERNAL MEDICINE

## 2022-10-06 PROCEDURE — 93005 ELECTROCARDIOGRAM TRACING: CPT | Performed by: EMERGENCY MEDICINE

## 2022-10-06 PROCEDURE — 999N000285 HC STATISTIC VASC ACCESS LAB DRAW WITH PIV START

## 2022-10-06 PROCEDURE — 96374 THER/PROPH/DIAG INJ IV PUSH: CPT | Mod: 59

## 2022-10-06 PROCEDURE — 36415 COLL VENOUS BLD VENIPUNCTURE: CPT | Performed by: PSYCHIATRY & NEUROLOGY

## 2022-10-06 PROCEDURE — 70498 CT ANGIOGRAPHY NECK: CPT

## 2022-10-06 PROCEDURE — 99417 PROLNG OP E/M EACH 15 MIN: CPT | Performed by: PSYCHIATRY & NEUROLOGY

## 2022-10-06 PROCEDURE — 250N000013 HC RX MED GY IP 250 OP 250 PS 637: Performed by: INTERNAL MEDICINE

## 2022-10-06 RX ORDER — ROSUVASTATIN CALCIUM 40 MG/1
40 TABLET, COATED ORAL EVERY EVENING
Status: DISCONTINUED | OUTPATIENT
Start: 2022-10-06 | End: 2022-10-08 | Stop reason: HOSPADM

## 2022-10-06 RX ORDER — ACETAMINOPHEN 325 MG/1
650 TABLET ORAL EVERY 8 HOURS PRN
Status: DISCONTINUED | OUTPATIENT
Start: 2022-10-06 | End: 2022-10-08 | Stop reason: HOSPADM

## 2022-10-06 RX ORDER — OXYCODONE HYDROCHLORIDE 5 MG/1
5 TABLET ORAL ONCE
Status: COMPLETED | OUTPATIENT
Start: 2022-10-06 | End: 2022-10-06

## 2022-10-06 RX ORDER — VALSARTAN 80 MG/1
80 TABLET ORAL DAILY
Status: DISCONTINUED | OUTPATIENT
Start: 2022-10-06 | End: 2022-10-07

## 2022-10-06 RX ORDER — UREA 200 MG/G
CREAM TOPICAL 2 TIMES DAILY PRN
Status: DISCONTINUED | OUTPATIENT
Start: 2022-10-06 | End: 2022-10-08 | Stop reason: HOSPADM

## 2022-10-06 RX ORDER — ONDANSETRON 4 MG/1
4 TABLET, FILM COATED ORAL EVERY 8 HOURS PRN
Status: DISCONTINUED | OUTPATIENT
Start: 2022-10-06 | End: 2022-10-08 | Stop reason: HOSPADM

## 2022-10-06 RX ORDER — FUROSEMIDE 20 MG
20 TABLET ORAL DAILY PRN
Status: DISCONTINUED | OUTPATIENT
Start: 2022-10-06 | End: 2022-10-08 | Stop reason: HOSPADM

## 2022-10-06 RX ORDER — NALOXONE HYDROCHLORIDE 0.4 MG/ML
0.4 INJECTION, SOLUTION INTRAMUSCULAR; INTRAVENOUS; SUBCUTANEOUS
Status: DISCONTINUED | OUTPATIENT
Start: 2022-10-06 | End: 2022-10-08 | Stop reason: HOSPADM

## 2022-10-06 RX ORDER — DEXTROSE MONOHYDRATE 25 G/50ML
25-50 INJECTION, SOLUTION INTRAVENOUS
Status: DISCONTINUED | OUTPATIENT
Start: 2022-10-06 | End: 2022-10-08 | Stop reason: HOSPADM

## 2022-10-06 RX ORDER — ACETAMINOPHEN 325 MG/1
325 TABLET ORAL EVERY 6 HOURS PRN
Status: DISCONTINUED | OUTPATIENT
Start: 2022-10-06 | End: 2022-10-07 | Stop reason: ALTCHOICE

## 2022-10-06 RX ORDER — NITROGLYCERIN 0.4 MG/1
0.4 TABLET SUBLINGUAL EVERY 5 MIN PRN
Status: DISCONTINUED | OUTPATIENT
Start: 2022-10-06 | End: 2022-10-08 | Stop reason: HOSPADM

## 2022-10-06 RX ORDER — ISOSORBIDE MONONITRATE 30 MG/1
30 TABLET, EXTENDED RELEASE ORAL DAILY
Status: DISCONTINUED | OUTPATIENT
Start: 2022-10-06 | End: 2022-10-08 | Stop reason: HOSPADM

## 2022-10-06 RX ORDER — MORPHINE SULFATE 2 MG/ML
1 INJECTION, SOLUTION INTRAMUSCULAR; INTRAVENOUS EVERY 6 HOURS PRN
Status: DISCONTINUED | OUTPATIENT
Start: 2022-10-06 | End: 2022-10-08

## 2022-10-06 RX ORDER — METHOCARBAMOL 500 MG/1
500 TABLET, FILM COATED ORAL ONCE
Status: COMPLETED | OUTPATIENT
Start: 2022-10-06 | End: 2022-10-06

## 2022-10-06 RX ORDER — IOPAMIDOL 755 MG/ML
75 INJECTION, SOLUTION INTRAVASCULAR ONCE
Status: COMPLETED | OUTPATIENT
Start: 2022-10-06 | End: 2022-10-06

## 2022-10-06 RX ORDER — IPRATROPIUM BROMIDE AND ALBUTEROL SULFATE 2.5; .5 MG/3ML; MG/3ML
1 SOLUTION RESPIRATORY (INHALATION) EVERY 6 HOURS PRN
Status: DISCONTINUED | OUTPATIENT
Start: 2022-10-06 | End: 2022-10-08 | Stop reason: HOSPADM

## 2022-10-06 RX ORDER — NALOXONE HYDROCHLORIDE 0.4 MG/ML
0.2 INJECTION, SOLUTION INTRAMUSCULAR; INTRAVENOUS; SUBCUTANEOUS
Status: DISCONTINUED | OUTPATIENT
Start: 2022-10-06 | End: 2022-10-08 | Stop reason: HOSPADM

## 2022-10-06 RX ORDER — KETOROLAC TROMETHAMINE 15 MG/ML
15 INJECTION, SOLUTION INTRAMUSCULAR; INTRAVENOUS ONCE
Status: COMPLETED | OUTPATIENT
Start: 2022-10-06 | End: 2022-10-06

## 2022-10-06 RX ORDER — MONTELUKAST SODIUM 10 MG/1
10 TABLET ORAL AT BEDTIME
Status: DISCONTINUED | OUTPATIENT
Start: 2022-10-06 | End: 2022-10-08 | Stop reason: HOSPADM

## 2022-10-06 RX ORDER — NICOTINE POLACRILEX 4 MG
15-30 LOZENGE BUCCAL
Status: DISCONTINUED | OUTPATIENT
Start: 2022-10-06 | End: 2022-10-08 | Stop reason: HOSPADM

## 2022-10-06 RX ORDER — ALBUTEROL SULFATE 90 UG/1
1-2 AEROSOL, METERED RESPIRATORY (INHALATION) EVERY 6 HOURS PRN
Status: DISCONTINUED | OUTPATIENT
Start: 2022-10-06 | End: 2022-10-08 | Stop reason: HOSPADM

## 2022-10-06 RX ORDER — OXYCODONE AND ACETAMINOPHEN 10; 325 MG/1; MG/1
1 TABLET ORAL EVERY 6 HOURS PRN
Status: DISCONTINUED | OUTPATIENT
Start: 2022-10-06 | End: 2022-10-06 | Stop reason: CLARIF

## 2022-10-06 RX ORDER — METHOCARBAMOL 500 MG/1
500 TABLET, FILM COATED ORAL 4 TIMES DAILY PRN
Status: DISCONTINUED | OUTPATIENT
Start: 2022-10-06 | End: 2022-10-08 | Stop reason: HOSPADM

## 2022-10-06 RX ORDER — NORTRIPTYLINE HCL 10 MG
10 CAPSULE ORAL AT BEDTIME
Status: DISCONTINUED | OUTPATIENT
Start: 2022-10-06 | End: 2022-10-08 | Stop reason: HOSPADM

## 2022-10-06 RX ORDER — CARVEDILOL 12.5 MG/1
25 TABLET ORAL 2 TIMES DAILY WITH MEALS
Status: DISCONTINUED | OUTPATIENT
Start: 2022-10-06 | End: 2022-10-07

## 2022-10-06 RX ORDER — LIDOCAINE 40 MG/G
CREAM TOPICAL
Status: DISCONTINUED | OUTPATIENT
Start: 2022-10-06 | End: 2022-10-08 | Stop reason: HOSPADM

## 2022-10-06 RX ORDER — SENNOSIDES 8.6 MG
2 TABLET ORAL 2 TIMES DAILY
Status: DISCONTINUED | OUTPATIENT
Start: 2022-10-06 | End: 2022-10-08 | Stop reason: HOSPADM

## 2022-10-06 RX ORDER — POLYETHYLENE GLYCOL 3350 17 G/17G
17 POWDER, FOR SOLUTION ORAL DAILY
Status: DISCONTINUED | OUTPATIENT
Start: 2022-10-07 | End: 2022-10-08 | Stop reason: HOSPADM

## 2022-10-06 RX ORDER — CLOPIDOGREL BISULFATE 75 MG/1
75 TABLET ORAL DAILY
Status: DISCONTINUED | OUTPATIENT
Start: 2022-10-06 | End: 2022-10-08 | Stop reason: HOSPADM

## 2022-10-06 RX ORDER — PANTOPRAZOLE SODIUM 40 MG/1
40 TABLET, DELAYED RELEASE ORAL
Status: DISCONTINUED | OUTPATIENT
Start: 2022-10-06 | End: 2022-10-08 | Stop reason: HOSPADM

## 2022-10-06 RX ORDER — DULOXETIN HYDROCHLORIDE 30 MG/1
30 CAPSULE, DELAYED RELEASE ORAL 2 TIMES DAILY
Status: DISCONTINUED | OUTPATIENT
Start: 2022-10-06 | End: 2022-10-08 | Stop reason: HOSPADM

## 2022-10-06 RX ORDER — LIDOCAINE 4 G/G
1 PATCH TOPICAL EVERY 24 HOURS
Status: DISCONTINUED | OUTPATIENT
Start: 2022-10-07 | End: 2022-10-08 | Stop reason: HOSPADM

## 2022-10-06 RX ORDER — ONDANSETRON 2 MG/ML
4 INJECTION INTRAMUSCULAR; INTRAVENOUS EVERY 30 MIN PRN
Status: DISCONTINUED | OUTPATIENT
Start: 2022-10-06 | End: 2022-10-08 | Stop reason: HOSPADM

## 2022-10-06 RX ORDER — NICOTINE POLACRILEX 4 MG/1
20 GUM, CHEWING ORAL DAILY
Status: DISCONTINUED | OUTPATIENT
Start: 2022-10-06 | End: 2022-10-06 | Stop reason: CLARIF

## 2022-10-06 RX ORDER — AMLODIPINE BESYLATE 5 MG/1
5 TABLET ORAL 2 TIMES DAILY
Status: DISCONTINUED | OUTPATIENT
Start: 2022-10-06 | End: 2022-10-07

## 2022-10-06 RX ORDER — CETIRIZINE HYDROCHLORIDE 5 MG/1
5 TABLET ORAL DAILY
Status: DISCONTINUED | OUTPATIENT
Start: 2022-10-06 | End: 2022-10-08 | Stop reason: HOSPADM

## 2022-10-06 RX ORDER — GLIPIZIDE 10 MG/1
10 TABLET, FILM COATED, EXTENDED RELEASE ORAL DAILY
Status: DISCONTINUED | OUTPATIENT
Start: 2022-10-07 | End: 2022-10-08 | Stop reason: HOSPADM

## 2022-10-06 RX ORDER — OXYCODONE HYDROCHLORIDE 5 MG/1
10 TABLET ORAL EVERY 6 HOURS PRN
Status: DISCONTINUED | OUTPATIENT
Start: 2022-10-06 | End: 2022-10-08 | Stop reason: HOSPADM

## 2022-10-06 RX ORDER — ENOXAPARIN SODIUM 100 MG/ML
40 INJECTION SUBCUTANEOUS EVERY 24 HOURS
Status: DISCONTINUED | OUTPATIENT
Start: 2022-10-06 | End: 2022-10-08 | Stop reason: HOSPADM

## 2022-10-06 RX ORDER — MORPHINE SULFATE 2 MG/ML
1 INJECTION, SOLUTION INTRAMUSCULAR; INTRAVENOUS ONCE
Status: COMPLETED | OUTPATIENT
Start: 2022-10-06 | End: 2022-10-06

## 2022-10-06 RX ORDER — FLUTICASONE PROPIONATE 50 MCG
1 SPRAY, SUSPENSION (ML) NASAL DAILY
Status: DISCONTINUED | OUTPATIENT
Start: 2022-10-06 | End: 2022-10-08 | Stop reason: HOSPADM

## 2022-10-06 RX ORDER — ASPIRIN 81 MG/1
81 TABLET ORAL DAILY
Status: DISCONTINUED | OUTPATIENT
Start: 2022-10-07 | End: 2022-10-08 | Stop reason: HOSPADM

## 2022-10-06 RX ADMIN — INSULIN ASPART 1 UNITS: 100 INJECTION, SOLUTION INTRAVENOUS; SUBCUTANEOUS at 17:35

## 2022-10-06 RX ADMIN — MORPHINE SULFATE 1 MG: 2 INJECTION, SOLUTION INTRAMUSCULAR; INTRAVENOUS at 15:40

## 2022-10-06 RX ADMIN — OXYCODONE HYDROCHLORIDE 10 MG: 5 TABLET ORAL at 20:31

## 2022-10-06 RX ADMIN — VALSARTAN 80 MG: 80 TABLET, FILM COATED ORAL at 17:36

## 2022-10-06 RX ADMIN — METHOCARBAMOL 500 MG: 500 TABLET, FILM COATED ORAL at 16:34

## 2022-10-06 RX ADMIN — IOPAMIDOL 75 ML: 755 INJECTION, SOLUTION INTRAVENOUS at 09:04

## 2022-10-06 RX ADMIN — DICLOFENAC SODIUM 2 G: 10 GEL TOPICAL at 17:38

## 2022-10-06 RX ADMIN — AMITRIPTYLINE HYDROCHLORIDE 25 MG: 25 TABLET, FILM COATED ORAL at 20:31

## 2022-10-06 RX ADMIN — DULOXETINE HYDROCHLORIDE 30 MG: 30 CAPSULE, DELAYED RELEASE ORAL at 22:15

## 2022-10-06 RX ADMIN — CARVEDILOL 25 MG: 12.5 TABLET, FILM COATED ORAL at 16:33

## 2022-10-06 RX ADMIN — INSULIN GLARGINE 25 UNITS: 100 INJECTION, SOLUTION SUBCUTANEOUS at 20:33

## 2022-10-06 RX ADMIN — KETOROLAC TROMETHAMINE 15 MG: 15 INJECTION, SOLUTION INTRAMUSCULAR; INTRAVENOUS at 12:01

## 2022-10-06 RX ADMIN — ROSUVASTATIN CALCIUM 40 MG: 40 TABLET, COATED ORAL at 20:31

## 2022-10-06 RX ADMIN — OXYCODONE HYDROCHLORIDE 5 MG: 5 TABLET ORAL at 14:28

## 2022-10-06 RX ADMIN — METHOCARBAMOL 500 MG: 500 TABLET, FILM COATED ORAL at 12:01

## 2022-10-06 RX ADMIN — ASPIRIN 325 MG: 325 TABLET, COATED ORAL at 16:33

## 2022-10-06 RX ADMIN — ISOSORBIDE MONONITRATE 30 MG: 30 TABLET, EXTENDED RELEASE ORAL at 16:32

## 2022-10-06 RX ADMIN — SENNOSIDES 2 TABLET: 8.6 TABLET, FILM COATED ORAL at 20:32

## 2022-10-06 RX ADMIN — CLOPIDOGREL BISULFATE 75 MG: 75 TABLET ORAL at 20:32

## 2022-10-06 RX ADMIN — ACETAMINOPHEN 650 MG: 325 TABLET, FILM COATED ORAL at 16:32

## 2022-10-06 RX ADMIN — NORTRIPTYLINE HYDROCHLORIDE 10 MG: 10 CAPSULE ORAL at 20:31

## 2022-10-06 RX ADMIN — AMLODIPINE BESYLATE 5 MG: 5 TABLET ORAL at 20:32

## 2022-10-06 RX ADMIN — METHOCARBAMOL 500 MG: 500 TABLET, FILM COATED ORAL at 20:31

## 2022-10-06 RX ADMIN — PANTOPRAZOLE SODIUM 40 MG: 40 TABLET, DELAYED RELEASE ORAL at 16:33

## 2022-10-06 RX ADMIN — FLUTICASONE PROPIONATE 1 SPRAY: 50 SPRAY, METERED NASAL at 16:35

## 2022-10-06 RX ADMIN — MONTELUKAST 10 MG: 10 TABLET, FILM COATED ORAL at 20:31

## 2022-10-06 RX ADMIN — ENOXAPARIN SODIUM 40 MG: 40 INJECTION SUBCUTANEOUS at 20:32

## 2022-10-06 RX ADMIN — CETIRIZINE HYDROCHLORIDE 5 MG: 5 TABLET ORAL at 16:34

## 2022-10-06 RX ADMIN — FLUTICASONE FUROATE AND VILANTEROL TRIFENATATE 1 PUFF: 100; 25 POWDER RESPIRATORY (INHALATION) at 20:33

## 2022-10-06 ASSESSMENT — ACTIVITIES OF DAILY LIVING (ADL)
ADLS_ACUITY_SCORE: 36
ADLS_ACUITY_SCORE: 35
ADLS_ACUITY_SCORE: 36
ADLS_ACUITY_SCORE: 35
ADLS_ACUITY_SCORE: 35
ADLS_ACUITY_SCORE: 36
ADLS_ACUITY_SCORE: 36
ADLS_ACUITY_SCORE: 35

## 2022-10-06 NOTE — ED PROVIDER NOTES
EMERGENCY DEPARTMENT ENCOUNTER      NAME: Sarah Rahman  AGE: 57 year old female  YOB: 1965  MRN: [id number]  EVALUATION DATE & TIME: No admission date for patient encounter.    PCP: Flako Rosen    ED PROVIDER: Koko Koch M.D.      Chief Complaint   Patient presents with     Stroke Symptoms         FINAL IMPRESSION:  1. Right sided weakness          ED COURSE & MEDICAL DECISION MAKIN:35 AM Called to triage for neurological evaluation.  8:38 AM Tier I stroke code called.  8:45 AM Spoke with stroke neurology team, Kirsten Edwards PA-C.  8:58 AM Spoke with the neuroradiologist, noncontrast head CT unremarkable.  9:16 AM Spoke with the neuroradiologist, CTA unchanged from prior.  9:17 AM Rechecked and updated the patient. Stroke neurology team performing bedside evaluation.  9:31 AM Spoke with the stroke neurology team, Kirsten Edwards PA-C, after completion of their bedside evaluation. Does not recommend tenecteplase at this time. Recommend otaining MRI imaging. They will place hyperacute MRI order and provide further recommendations when imaging has resulted.  9:44 AM Rechecked the patient. Nursing staff notified me that they are struggling to remove the patient's earring prior to MRI.  10:21 AM Spoke with stroke neurology team. Stroke code deescalated. MRI unremarkable Recommends patient be admitted with plan for neurology consult while admitted.  10:22 AM Repeat exam benign, patient endorses her chronic low back pain. Updated patient. Patient willing to transfer, will start looking for beds at other hospitals.  1:02 PM Call placed to admitting physician.  Unable to find open beds for transfer.  1:16 PM I spoke with the hospitalist, Dr. Pascual. We discussed the patient's case, and they agree to admit the patient.    Pertinent Labs & Imaging studies reviewed. (See chart for details)  57 year old female presents to the Emergency Department for evaluation of low back and leg pain,  right-sided weakness. Patient appears non toxic with stable vitals signs, patient is afebrile with no tachycardia or hypoxia, no increased work of breathing.  Exam significant for right upper and lower extremity weakness with right-sided facial droop, symptoms started at approximately 7 AM, she awoke at 5 AM without the symptoms.  Given timeline and focal deficits on exam concern for CVA, patient has prior history, review of the medical record shows no history of intracranial bleed or use of anticoagulation.  Tier 1 code stroke was activated from triage.  Did discuss patient case with the stroke neurology team who agrees with current care plan and will update after imaging studies resulted.  We also obtain screening labs and ECG as well as the CTA imaging of the head and neck.    Reassessment: Labs, ECG and CT imaging reported no acute concerning findings.  Discussed patient case with neuro stroke team who at this time does not recommend acute intervention and so therefore code stroke was de-escalated.  Neuro stroke recommended MRI imaging which was obtained and reported no acute concerning findings.  At this time neuro stroke is recommending admission for observation and neurology consultation.  Discussed these findings recommendations the patient was in agreement.  Discussed care plan to admitting service.    At the conclusion of the encounter I discussed the results of all of the tests and the disposition. The questions were answered and return precautions provided. The patient or family acknowledged understanding and was agreeable with the care plan.         MEDICATIONS GIVEN IN THE EMERGENCY:  Medications   ondansetron (ZOFRAN) injection 4 mg (has no administration in time range)   oxyCODONE (ROXICODONE) tablet 5 mg (has no administration in time range)   iopamidol (ISOVUE-370) solution 75 mL (75 mLs Intravenous Given 10/6/22 0904)   ketorolac (TORADOL) injection 15 mg (15 mg Intravenous Given 10/6/22 1201)    methocarbamol (ROBAXIN) tablet 500 mg (500 mg Oral Given 10/6/22 1201)       NEW PRESCRIPTIONS STARTED AT TODAY'S ER VISIT  New Prescriptions    No medications on file            =================================================================    HPI    Patient information was obtained from: Patient    Use of Intrepreter: N/A       Sarahcarolee Rahman is a 57 year old female, history of CVA, COPD, STEMI, diabetes mellitus type II, HLD, HTN, CHF, who presents for evaluation of back pain and right sided weakness.    Per chart review, patient was admitted to [facility] from 11/28/12 to 11/30/12. She initially presented with left arm weakness and mouth tingling. CT Head with no acute findings. MRA brain with a redemonstration of a focal high-grade stenosis involving a left M2 branch, no definite slow limitation or major vessel occlusion.    Per chart review, the patient was admitted to [facility] from 7/17/22 to 7/21/22 (4 days). The patient initially presented to the ED for evaluation of right sided weakness and numbness with frequent falls. 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 Neurology consulted, recommends cotinuing ASA and Crestor. On labs, HgbA1C was 10%, resumed home mediations for diabetes management. Patient was discharged to home with home PT.    The patient reports yesterday afternoon between 1500 and 1900 she developed low back pain which radiated down her right leg and felt similar to her previous episodes of sciatica. This morning she woke up around 0500 and still had her back pain, but otherwise felt her normal self. Around 0700 she noticed right upper extremity, right lower extremity, and right facial numbness which has since progressed  to right upper extremity weakness, right lower extremity weakness, and a right sided facial droop. She describes the extremity weakness as if her limbs are being pulled down.    The patient reports she had a stroke in October 2021, and her current symptoms feel similar to her previous stroke. 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      REVIEW OF SYSTEMS   Constitutional:  Denies fever, chills  Respiratory:  Denies productive cough or increased work of breathing  Cardiovascular:  Denies chest pain, palpitations  GI:  Denies abdominal pain, nausea, vomiting, or change in bowel or bladder habits   Musculoskeletal:  Denies any new muscle/joint swelling. Endorses back pain.  Skin:  Denies rash   Neurologic:  Endorses right upper extremity and right lower extremity weakness and numbness, right facial asymmetry.  All systems negative except as marked.     PAST MEDICAL HISTORY:  Past Medical History:   Diagnosis Date     Asthma      CAD (coronary artery disease)      COPD (chronic obstructive pulmonary disease) (H)      CVA (cerebral infarction)      Diabetes (H)      GERD (gastroesophageal reflux disease)      Hypertension      Polysubstance abuse (H)      S/P CABG (coronary artery bypass graft)      S/P lumbar discectomy 06/13/2019    L5/S1 by  Dr. Hamm at [facility]     Schizoaffective disorder (H)        PAST SURGICAL HISTORY:  Past Surgical History:   Procedure Laterality Date     BYPASS GRAFT ARTERY CORONARY  2009    x2     CORONARY STENT PLACEMENT       CV CORONARY ANGIOGRAM N/A 6/2/2021    Procedure: Coronary Angiogram;  Surgeon: Juventino Rivera MD;  Location: [facility] Cardiac Cath Lab;  Service: Cardiology     HYSTERECTOMY TOTAL ABDOMINAL, BILATERAL SALPINGO-OOPHORECTOMY, COMBINED       IR TRANSLAMINAR EPIDURAL LUMBAR INJ INCL IMAGING  9/27/2022     ORIF ULNAR / RADIAL SHAFT FRACTURE Right          CURRENT MEDICATIONS:    Prior to Admission medications     Medication Sig Start Date End Date Taking? Authorizing Provider   acetaminophen (TYLENOL) 325 MG tablet Take 650 mg by mouth every 8 hours as needed for mild pain    Unknown, Entered By History   albuterol (PROAIR HFA) 108 (90 BASE) MCG/ACT inhaler Inhale 1-2 puffs into the lungs every 4 hours as needed    Reported, Patient   alcohol swab prep pads Use to swab area of injection/zac as directed. 4/7/22   Deana Meza MD   Alcohol Swabs PADS Use to swab the area of the injection or zac as directed Per insurance coverage 9/29/22   Montse Nieto MD   amitriptyline (ELAVIL) 25 MG tablet Take 1 tablet (25 mg) by mouth At Bedtime 8/31/22   Flako Rosen MD   amLODIPine (NORVASC) 5 MG tablet Take 1 tablet (5 mg) by mouth 2 times daily 9/29/22   Montse Nieto MD   aspirin (ASA) 325 MG EC tablet Take 325 mg by mouth daily     Reported, Patient   blood glucose (ACCU-CHEK DARRIN PLUS) test strip Use to test blood sugar 3-4 times daily or as directed. 4/7/22   Deana Meza MD   blood glucose (ACCU-CHEK SOFTCLIX) lancing device Lancing device to be used with lancets. 4/7/22   Deana Meza MD   blood glucose (NO BRAND SPECIFIED) lancets standard To use to test glucose level in the blood Use to test blood sugar  4  times daily as directed. To accompany glucose monitor brands per insurance coverage. 9/29/22   Montse Nieto MD   blood glucose (NO BRAND SPECIFIED) test strip To use to test glucose level in the blood Use to test blood sugar  4 times daily as directed. To accompany glucose monitor brands per insurance coverage. 9/29/22   Montse Nieto MD   blood glucose calibration (NO BRAND SPECIFIED) solution Used to calibrate the blood glucose monitor as needed and as directed.  To accompany  blood glucose brands per insurance coverage 9/29/22   Montse Nieto MD   blood glucose monitoring (NO BRAND SPECIFIED) meter device kit Use as directed Per insurance coverage 9/29/22   Montse Nieto MD    blood glucose monitoring (SOFTCLIX) lancets Use to test blood sugar 3-4 times daily. 4/7/22   Deana Meza MD   budesonide-formoterol (SYMBICORT) 160-4.5 MCG/ACT Inhaler Inhale 2 puffs into the lungs 2 times daily    Unknown, Entered By History   carvedilol (COREG) 25 MG tablet Take 1 tablet (25 mg) by mouth 2 times daily (with meals) 9/29/22   Montse Nieto MD   cetirizine (ZYRTEC) 10 MG tablet Take 10 mg by mouth daily    Unknown, Entered By History   Continuous Blood Gluc Sensor (FREESTYLE SHEBA 14 DAY SENSOR) MISC 1 each every 14 days Use 1 Sensor every 14 days. Use to read blood sugars per 's instructions. 6/7/22   Mara Scott NP   diclofenac (VOLTAREN) 1 % topical gel Apply 2 g topically 3 times daily as needed for moderate pain (feet)    Unknown, Entered By History   DULoxetine (CYMBALTA) 30 MG capsule Take 30 mg by mouth 2 times daily    Unknown, Entered By History   exenatide ER (BYDUREON BCISE) 2 MG/0.85ML auto-injector Inject 2 mg Subcutaneous every 7 days On Sundays    Unknown, Entered By History   fluticasone (FLONASE) 50 MCG/ACT nasal spray Spray 1 spray into both nostrils daily    Unknown, Entered By History   furosemide (LASIX) 20 MG tablet Take 20 mg by mouth daily as needed (swelling)    Unknown, Entered By History   glipiZIDE (GLUCOTROL XL) 5 MG 24 hr tablet Take 2 tablets (10 mg) by mouth daily 9/29/22   Montse Nieto MD   insulin glargine (LANTUS PEN) 100 UNIT/ML pen Inject 25 Units Subcutaneous 2 times daily 9/29/22   Montse Nieto MD   insulin pen needle (32G X 4 MM) 32G X 4 MM miscellaneous Use 1 pen needles daily or as directed. 4/7/22   Deana Meza MD   ipratropium - albuterol 0.5 mg/2.5 mg/3 mL (DUONEB) 0.5-2.5 (3) MG/3ML neb solution Take 1 vial by nebulization every 6 hours as needed for shortness of breath / dyspnea or wheezing    Unknown, Entered By History   isosorbide mononitrate (IMDUR) 30 MG 24 hr tablet Take 1 tablet (30 mg) by mouth daily 9/30/22    Montse Nieto MD   lidocaine (LIDODERM) 5 % patch Place 1 patch onto the skin every 24 hours To prevent lidocaine toxicity, patient should be patch free for 12 hrs daily. BACK    Unknown, Entered By History   methocarbamol (ROBAXIN) 500 MG tablet Take 500 mg by mouth 4 times daily as needed for muscle spasms    Unknown, Entered By History   montelukast (SINGULAIR) 10 MG tablet Take 10 mg by mouth At Bedtime    Unknown, Entered By History   naloxone (NARCAN) 4 MG/0.1ML nasal spray Spray 4 mg into one nostril alternating nostrils as needed for opioid reversal every 2-3 minutes until assistance arrives    Unknown, Entered By History   nitroGLYcerin (NITROSTAT) 0.4 MG sublingual tablet Place 0.4 mg under the tongue every 5 minutes as needed for chest pain For chest pain place 1 tablet under the tongue every 5 minutes for 3 doses. If symptoms persist 5 minutes after 1st dose call 911.    Unknown, Entered By History   nortriptyline (PAMELOR) 10 MG capsule Take 10 mg by mouth At Bedtime    Unknown, Entered By History   omeprazole 20 MG tablet Take 20 mg by mouth daily    Unknown, Entered By History   ondansetron (ZOFRAN) 8 MG tablet Take 8 mg by mouth every 8 hours as needed 7/26/22   Reported, Patient   oxyCODONE-acetaminophen (PERCOCET)  MG per tablet Take 1 tablet by mouth every 6 hours as needed for severe pain    Unknown, Entered By History   polyethylene glycol (MIRALAX) 17 GM/Dose powder Take 17 g by mouth daily 9/29/22   Montse Nieto MD   rosuvastatin (CRESTOR) 20 MG tablet Take 2 tablets (40 mg) by mouth daily 9/29/22   Montse Nieto MD   sennosides (SENOKOT) 8.6 MG tablet Take 2 tablets by mouth 2 times daily 9/29/22   Montse Nieto MD   urea (DERMAL THERAPY FINGER CARE) 20 % external lotion Externally apply topically 2 times daily as needed Feet    Unknown, Entered By History   valsartan (DIOVAN) 80 MG tablet Take 1 tablet (80 mg) by mouth daily 9/30/22   Montse Nieto MD   diclofenac  "(VOLTAREN) 50 MG EC tablet Take 1 tablet (50 mg) by mouth 3 times daily for 10 days 6/7/22 7/20/22  Mara Scott, ANDREEA   pregabalin (LYRICA) 150 MG capsule Take 150 mg by mouth 3 times daily  9/29/22  Unknown, Entered By History        ALLERGIES:  Allergies   Allergen Reactions     Haloperidol Unknown     Patient states it stops her heart       Meperidine Angioedema, Difficulty breathing, Other (See Comments), Rash and Shortness Of Breath     Throat closes  Other reaction(s): Breathing Difficulty  Other reaction(s): Breathing Difficulty       Phenothiazines Other (See Comments)     Other reaction(s): CARDIAC DISTURBANCES  Patient states it stops her heart  \"I swell up\"  \"stopped by heart\"  \"I swell up\"  \"I swell up\"       Tramadol Other (See Comments)     Other reaction(s): Angioedema  Throat itch       Butyrophenones Angioedema     Januvia [Sitagliptin] Other (See Comments)     Swelling in the neck      Latex Itching     Lisinopril Other (See Comments)     ACE cough  Itchy throat  Throat itches  Itchy throat       Penicillins Swelling     Hydroxyzine Other (See Comments) and Rash     Tongue swelling  Tongue swelling  Tongue swelling         FAMILY HISTORY:  Family History   Problem Relation Age of Onset     Heart Disease Mother      Heart Disease Father      Heart Disease Sister      Diabetes Brother      Heart Disease Sister        SOCIAL HISTORY:   Social History     Socioeconomic History     Marital status: Single   Tobacco Use     Smoking status: Current Every Day Smoker     Packs/day: 0.50     Types: Cigarettes     Smokeless tobacco: Never Used   Vaping Use     Vaping Use: Never used   Substance and Sexual Activity     Alcohol use: Not Currently     Comment: Alcoholic Drinks/day: occ     Drug use: No     Sexual activity: Not Currently   Social History Narrative    Lives alone in a condo.  On disability.  Three living children.         VITALS:  Patient Vitals for the past 24 hrs:   BP Temp Temp src Pulse Resp SpO2 " "Height Weight   10/06/22 1046 -- -- -- 78 23 100 % -- --   10/06/22 0951 (!) 176/96 -- -- 82 -- 100 % -- --   10/06/22 0930 (!) 159/74 -- -- 80 24 100 % -- --   10/06/22 0927 (!) 159/74 -- -- 80 19 100 % -- --   10/06/22 0839 (!) 147/90 97.3  F (36.3  C) Temporal 88 20 99 % 1.702 m (5' 7\") 89.4 kg (197 lb)        PHYSICAL EXAM    Constitutional:  Awake, alert, in no apparent distress  HENT:  Normocephalic, Atraumatic. Bilateral external ears normal. Oropharynx moist. Nose normal. Neck- Normal range of motion with no guarding, No midline cervical tenderness, Supple, No stridor.   Eyes:  PERRL, EOMI with no signs of entrapment, Conjunctiva normal, No discharge.   Respiratory:  Normal breath sounds, No respiratory distress, No wheezing.    Cardiovascular:  Normal heart rate, Normal rhythm, No appreciable rubs or gallops.   GI:  Soft, No tenderness, No distension, No palpable masses  Musculoskeletal:  Intact distal pulses, No edema. Good range of motion in all major joints. No tenderness to palpation or major deformities noted.  Integument:  Warm, Dry, No erythema, No rash.   Neurologic:  Alert & oriented, focal weakness to right upper and lower extremities with drift of the right upper extremity and difficulty keeping the arm elevated against gravity, endorse decreased sensation to the right face and upper and lower extremity, mild right-sided facial droop.  Psychiatric:  Affect normal, Judgment normal, Mood normal.       National Institutes of Health Stroke Scale  Exam Interval: Baseline   Score    Level of consciousness: (0)   Alert, keenly responsive    LOC questions: (0)   Answers both questions correctly    LOC commands: (0)   Performs both tasks correctly    Best gaze: (0)   Normal    Visual: (0)   No visual loss    Facial palsy: (1)   Minor paralysis (flat nasolabial fold, smile asymmetry)    Motor arm (left): (0)   No drift    Motor arm (right): (2)   Some effort against gravity    Motor leg (left): (0)   No " drift    Motor leg (right): (2)   Some effort against gravity    Limb ataxia: (0)   Absent    Sensory: (1)   Mild to moderate sensory loss    Best language: (0)   Normal- no aphasia    Dysarthria: (0)   Normal    Extinction and inattention: (0)   No abnormality        Total Score:  6         LAB:  All pertinent labs reviewed and interpreted.  Results for orders placed or performed during the hospital encounter of 10/06/22   CTA Head Neck with Contrast    Impression    CONCLUSION:  HEAD CT:  1.  No intracranial hemorrhage, mass, or definite CT evidence of recent ischemia.    HEAD CTA:  1.  No interval change versus 4/3/2022.  2.  Severe narrowing of the proximal basilar artery is unchanged.  3.  Anterior circulation atherosclerosis predominantly affecting the carotid siphons without high-grade narrowing.  4.  No proximal large vessel occlusion.    NECK CTA:  1.  Mild carotid artery atherosclerosis without hemodynamically significant narrowing in either vessel by NASCET criteria.  2.  Unchanged moderate to severe proximal left vertebral artery narrowing.    Noncontrast head CT findings were discussed with Dr. Koko Koch via telephone at 857 hours, and CTA results were discussed with Dr. Koko Koch via telephone at 916 hours on 10/6/2022.     MR Brain w/o Contrast    Impression    IMPRESSION:  1.  Atrophy and chronic small vessel vascular/lacunar changes.    2.  Negative for acute intracranial process.   Basic metabolic panel   Result Value Ref Range    Sodium 137 136 - 145 mmol/L    Potassium 4.1 3.4 - 5.3 mmol/L    Chloride 104 98 - 107 mmol/L    Carbon Dioxide (CO2) 22 22 - 29 mmol/L    Anion Gap 11 7 - 15 mmol/L    Urea Nitrogen 15.5 6.0 - 20.0 mg/dL    Creatinine 0.70 0.51 - 0.95 mg/dL    Calcium 8.6 8.6 - 10.0 mg/dL    Glucose 187 (H) 70 - 99 mg/dL    GFR Estimate >90 >60 mL/min/1.73m2   Result Value Ref Range    INR 1.04 0.85 - 1.15   Partial thromboplastin time   Result Value Ref Range    aPTT 24 22 -  38 Seconds   Result Value Ref Range    Troponin T, High Sensitivity 13 <=14 ng/L   Glucose by meter   Result Value Ref Range    GLUCOSE BY METER POCT 202 (H) 70 - 99 mg/dL   CBC with platelets and differential   Result Value Ref Range    WBC Count 7.5 4.0 - 11.0 10e3/uL    RBC Count 4.14 3.80 - 5.20 10e6/uL    Hemoglobin 11.0 (L) 11.7 - 15.7 g/dL    Hematocrit 36.0 35.0 - 47.0 %    MCV 87 78 - 100 fL    MCH 26.6 26.5 - 33.0 pg    MCHC 30.6 (L) 31.5 - 36.5 g/dL    RDW 13.0 10.0 - 15.0 %    Platelet Count 287 150 - 450 10e3/uL    % Neutrophils 59 %    % Lymphocytes 28 %    % Monocytes 6 %    % Eosinophils 5 %    % Basophils 1 %    % Immature Granulocytes 1 %    NRBCs per 100 WBC 1 (H) <1 /100    Absolute Neutrophils 4.5 1.6 - 8.3 10e3/uL    Absolute Lymphocytes 2.1 0.8 - 5.3 10e3/uL    Absolute Monocytes 0.5 0.0 - 1.3 10e3/uL    Absolute Eosinophils 0.4 0.0 - 0.7 10e3/uL    Absolute Basophils 0.1 0.0 - 0.2 10e3/uL    Absolute Immature Granulocytes 0.1 <=0.4 10e3/uL    Absolute NRBCs 0.0 10e3/uL   Extra Red Top Tube   Result Value Ref Range    Hold Specimen [facility]        RADIOLOGY:  MR Brain w/o Contrast   Final Result   IMPRESSION:   1.  Atrophy and chronic small vessel vascular/lacunar changes.      2.  Negative for acute intracranial process.      CTA Head Neck with Contrast   Final Result   CONCLUSION:   HEAD CT:   1.  No intracranial hemorrhage, mass, or definite CT evidence of recent ischemia.      HEAD CTA:   1.  No interval change versus 4/3/2022.   2.  Severe narrowing of the proximal basilar artery is unchanged.   3.  Anterior circulation atherosclerosis predominantly affecting the carotid siphons without high-grade narrowing.   4.  No proximal large vessel occlusion.      NECK CTA:   1.  Mild carotid artery atherosclerosis without hemodynamically significant narrowing in either vessel by NASCET criteria.   2.  Unchanged moderate to severe proximal left vertebral artery narrowing.      Noncontrast head CT  findings were discussed with Dr. Koko Koch via telephone at 857 hours, and CTA results were discussed with Dr. Koko Koch via telephone at 916 hours on 10/6/2022.                EKG:    Sinus rhythm, no specific ST acute ischemic changes, no concerning dysrhythmias or interval prolongation, compared to ECG of September 24, 2022, no specific ST acute ischemic changes appreciated  I have independently reviewed and interpreted the EKG(s) documented above.    PROCEDURES:        [employer] [facility] Documentation:   CMS Diagnoses: The patient has stroke symptoms:         ED Stroke specific documentation           NIHSS PDF     Patient last known well time: yesterday  ED Provider first to bedside at: see above  CT Results received at: see above    Thrombolytics:   Not given due to:   - Negative work up, Neuro Stroke did not recommend    If treating with thrombolytics: Ensure SBP<180 and DBP<105 prior to treatment with thrombolytics.  Administering thrombolytics after treatment with IV labetalol, hydralazine, or nicardipine is reasonable once BP control is established.    Endovascular Retrieval:  Not initiated due to absence of proximal vessel occlusion    National Institutes of Health Stroke Scale (Baseline)  Time Performed: see above            I, Marcus Alarcon, am serving as a scribe to document services personally performed by Koko Koch MD, based on my observation and the provider's statements to me. I, Koko Koch MD attest that Marcus Alarcon is acting in a scribe capacity, has observed my performance of the services and has documented them in accordance with my direction.    Koko Koch M.D.  Emergency Medicine  [facility] EMERGENCY DEPARTMENT  [facility] [facility] [address]  [phone]  Dept: [phone]       Koko Koch MD  10/06/22 2972

## 2022-10-06 NOTE — ED NOTES
"[facility] ED Handoff Report    ED Chief Complaint: EMS to triage.  Pt called ems for burning in legs and back pain and R shoulder pain since about 0300 today.  Was seen here last week for sciatica.  Pt also with hx of stroke 1 yr ago.  Upon arrival ems reports pt started c/o R arm weaker and bilat blurry vision and R face numbness and R facial droop.  Denies headache or pain.  Provider to triage for assessment.  Teir 1 stroke code called.  .  GCS-15.  Pt to CT.            ED Diagnosis:  (R53.1) Right sided weakness  Comment: weak hand  right hand when arrived and now pt seen using hand without problems.  Plan: neuro consult       PMH:    Past Medical History:   Diagnosis Date     Asthma      CAD (coronary artery disease)      COPD (chronic obstructive pulmonary disease) (H)      CVA (cerebral infarction)      Diabetes (H)      GERD (gastroesophageal reflux disease)      Hypertension      Polysubstance abuse (H)      S/P CABG (coronary artery bypass graft)      S/P lumbar discectomy 06/13/2019    L5/S1 by  Dr. Hamm at [facility]     Schizoaffective disorder (H)         Code Status:  Prior     Falls Risk: Yes Band: Applied    Current Living Situation/Residence: lives with a significant other     Elimination Status: Continent: Yes     Activity Level: Independent    Patients Preferred Language:  English     Needed: No    Vital Signs:  BP (!) 176/96   Pulse 78   Temp 97.3  F (36.3  C) (Temporal)   Resp 23   Ht 1.702 m (5' 7\")   Wt 89.4 kg (197 lb)   LMP  (LMP Unknown)   SpO2 100%   BMI 30.85 kg/m       Cardiac Rhythm: sr    Pain Score: 6/10    Is the Patient Confused:  No    Last Food or Drink: 10/06/22 at     Focused Assessment:  neuro    Tests Performed: Done: Labs and Imaging  Labs Ordered and Resulted from Time of ED Arrival to Time of ED Departure   BASIC METABOLIC PANEL - Abnormal       Result Value    Sodium 137      Potassium 4.1      Chloride 104      Carbon Dioxide " (CO2) 22      Anion Gap 11      Urea Nitrogen 15.5      Creatinine 0.70      Calcium 8.6      Glucose 187 (*)     GFR Estimate >90     GLUCOSE BY METER - Abnormal    GLUCOSE BY METER POCT 202 (*)    CBC WITH PLATELETS AND DIFFERENTIAL - Abnormal    WBC Count 7.5      RBC Count 4.14      Hemoglobin 11.0 (*)     Hematocrit 36.0      MCV 87      MCH 26.6      MCHC 30.6 (*)     RDW 13.0      Platelet Count 287      % Neutrophils 59      % Lymphocytes 28      % Monocytes 6      % Eosinophils 5      % Basophils 1      % Immature Granulocytes 1      NRBCs per 100 WBC 1 (*)     Absolute Neutrophils 4.5      Absolute Lymphocytes 2.1      Absolute Monocytes 0.5      Absolute Eosinophils 0.4      Absolute Basophils 0.1      Absolute Immature Granulocytes 0.1      Absolute NRBCs 0.0     INR - Normal    INR 1.04     PARTIAL THROMBOPLASTIN TIME - Normal    aPTT 24     TROPONIN T, HIGH SENSITIVITY - Normal    Troponin T, High Sensitivity 13     COVID-19 VIRUS (CORONAVIRUS) BY PCR - Normal    SARS CoV2 PCR Negative     GLUCOSE MONITOR NURSING POCT     MR Brain w/o Contrast   Final Result   IMPRESSION:   1.  Atrophy and chronic small vessel vascular/lacunar changes.      2.  Negative for acute intracranial process.      CTA Head Neck with Contrast   Final Result   CONCLUSION:   HEAD CT:   1.  No intracranial hemorrhage, mass, or definite CT evidence of recent ischemia.      HEAD CTA:   1.  No interval change versus 4/3/2022.   2.  Severe narrowing of the proximal basilar artery is unchanged.   3.  Anterior circulation atherosclerosis predominantly affecting the carotid siphons without high-grade narrowing.   4.  No proximal large vessel occlusion.      NECK CTA:   1.  Mild carotid artery atherosclerosis without hemodynamically significant narrowing in either vessel by NASCET criteria.   2.  Unchanged moderate to severe proximal left vertebral artery narrowing.      Noncontrast head CT findings were discussed with Dr. Koko Koch  via telephone at 857 hours, and CTA results were discussed with Dr. Koko Koch via telephone at 916 hours on 10/6/2022.           Treatments Provided:  See mat    Family Dynamics/Concerns: No    Family Updated On Visitor Policy: No    Plan of Care Communicated to Family: No        Belongings Checklist Done and Signed by Patient: No    Medications sent with patient: no    Covid: asymptomatic , negative    Additional Information: seen for same in past, cva at [facility], unclear date    RN: Tash Odonnell RN 10/6/2022 2:47 PM

## 2022-10-06 NOTE — CONSULTS
This is a neurological consultation    57-year-old male to hospital on 10/6/2022      Chief complaint  Right face arm and leg numbness/weakness  MRI scan negative for acute stroke  Known history of basilar artery stenosis severe        HPI  57-year-old presents to the hospital line October 6, 2022 with a complaint of low back pain and leg pain.  She also had though right-sided weakness    Exam showed weakness of the right face right arm and right leg.  Patient awoke at 5 AM without symptoms  Symptom onset at 7 AM  Patient presented to the ER at 8:24 AM  Patient evaluated by stroke code service  Stroke code team evaluated the patient during the stroke code in the ER and did not recommend any acute intervention.    Patient has a known history of basilar artery stenosis  Some previous scans have shown lacunar type ischemia chronic      Past medical history  CVA  COPD  Diabetes  Hypertension  Hyperlipidemia  CABG  CHF  STEMI  Polysubstance abuse  Status post lumbar discectomy L5/S1 2019  Schizoaffective disorder      Habits  Smokes half a pack of cigarettes per day  Rare to occasional alcohol    Family history  Mother heart disease  Father heart disease  Sister heart disease  Brother heart disease  Sister heart disease    Past work-up reviewed  EEG essentially normal 9-10 Hz percent rhythm, (10/23/2018)  Echo 9/25/2022, 65-70% ejection fraction, severe left atrial enlargement  Urine tox screen (9/24/2022), positive benzodiazepine,  Hemoglobin A1c greater than 14% (4/3/2022)  Hemoglobin A1c 10.1% (7/19/2022)         Work-up  MRI scan head 10/6/2022  1.  Atrophy and chronic small vessel vascular/lacunar changes.         (Patchy confluent nonspecific T2/FLAIR hyperintensities cerebral and pontine areas consistent with moderate chronic small vessel changes)       Chronic lacunar infarcts deep left frontal white matter and left thalamus  2.  Mild generalized atrophy  3.  Negative for acute intracranial process.  HEAD CT:  10/6/2022  1.  No intracranial hemorrhage, mass, or definite CT evidence of recent ischemia.  HEAD CTA: 10/6/2022  1.  No interval change versus 4/3/2022.  2.  Severe narrowing of the proximal basilar artery is unchanged.  3.  Anterior circulation atherosclerosis predominantly affecting the carotid siphons without high-grade narrowing.  4.  No proximal large vessel occlusion.  NECK CTA: 10/6/2022  1.  Mild carotid artery atherosclerosis without hemodynamically significant narrowing in either vessel by NASCET criteria.  2.  Unchanged moderate to severe proximal left vertebral artery narrowing.     High-sensitivity troponin negative (13)  COVID-19 negative        Labs  Sodium 137 potassium 4.1  BUN 15.5, creatinine 0.7  Glucose 187-145  White blood count 7.5, hemoglobin 11.0, platelets 287,000        Exam  Blood pressure 149/94, pulse 82, temperature 98.0  Blood pressure range 138//96  Pulse range 76- 88  T-max 98.8      Review of systems  No chest pain no shortness of breath no nausea vomiting no diarrhea no fever chills  Currently no headache at this time    No diplopia dysarthria dysphagia    Had more numbness of the right face arm and leg on admission  Is able to move everything    Has had some back pain    Otherwise review systems negative    General exam per primary MD  Alert attentive  HEENT no signs of trauma  Lungs clear  Heart rate regular  Abdomen soft  No edema the feet  Pulses symmetrical    Neurologic exam  Alert oriented x3  Prosody of speech normal  Naming normal  Repetition normal  Comprehension normal  No aphasia  No neglect  Memory recall okay    Cranial nerves II through XII significant for  No ophthalmoplegia  No nystagmus  Visual fields intact  Tongue twisters good      Upper extremities  Nonphysiologic exam of the upper extremities  Patient does not move the right arm up at all  Later than when I am talking to her and talking about the trail mix that had fallen to the side on her bed I  "handed it to her and she used both hands to manipulate it without difficulty.    Lower extremities  Nonphysiologic weakness of the right leg  With observation seems to move the right leg better than with testing    Gait  Deferred due to the above            Assessment/plan    1.   Right face arm and leg transient symptoms (MRI scan negative for acute stroke)        Vascular risk factors        Hypertension/hyperlipidemia/diabetes/CAD/CABG/CHF        Posterior circulation severe atherosclerosis of the basilar artery      2.  Posterior circulation severe atherosclerosis       Severe narrowing of the proximal basilar artery        Moderate to severe proximal left vertebral artery narrowing.        3.   Echo 9/25/2022, 65-70% ejection fraction, severe left atrial enlargement      Plan  Basilar artery stenosis  Question VBI TIA    Difficult with some nonphysiologic weakness now  Does have history of schizoaffective disorder  Has chronic pain disorder follows at the pain clinic and they are \"thinking of putting her on marijuana\" per her recollection.    Difficult to put together how much of the symptoms this morning and the fluctuations are true ischemia versus nonphysiologic or conversion type symptoms.    Check B12  Check TSH  Check lipid panel      Prior to admission vascular disease stroke prevention medication  Aspirin 325 mg  Crestor 40 mg    Consider switching to dual antiplatelet therapy.  Plavix 75 mg once per day  Aspirin 81 mg once per day    110 minutes total care time today greater than 50% face-to-face patient care team discussing and evaluating the above  Reviewed work-up and testing  Also discussed with nursing staff face-to-face      "

## 2022-10-06 NOTE — PHARMACY-ADMISSION MEDICATION HISTORY
Pharmacy Note - Admission Medication History    Pertinent Provider Information: Patient still reports taking both Amitriptyline and Nortriptyline. She was unsure why she got prescribed both. Recommend using just amitriptyline and stopping nortriptyline. Cardiology added Imdur 30 mg daily at last hospitalization but patient never started taking. Based on fill history I do not believe she has good adherence to medications, though she did report consistently taking them.    ______________________________________________________________________    Prior To Admission (PTA) med list completed and updated in EMR.       PTA Med List   Medication Sig Note Last Dose     acetaminophen (TYLENOL) 325 MG tablet Take 650 mg by mouth every 8 hours as needed for mild pain  Unknown at Unknown time     albuterol (PROAIR HFA) 108 (90 BASE) MCG/ACT inhaler Inhale 1-2 puffs into the lungs every 4 hours as needed  Unknown at Unknown time     alcohol swab prep pads Use to swab area of injection/zac as directed.  Unknown at Unknown time     Alcohol Swabs PADS Use to swab the area of the injection or zac as directed Per insurance coverage  Unknown at Unknown time     amitriptyline (ELAVIL) 25 MG tablet Take 1 tablet (25 mg) by mouth At Bedtime  10/5/2022 at PM     amLODIPine (NORVASC) 5 MG tablet Take 1 tablet (5 mg) by mouth 2 times daily  10/5/2022 at PM     aspirin (ASA) 325 MG EC tablet Take 325 mg by mouth daily   10/5/2022 at AM     blood glucose (ACCU-CHEK DARRIN PLUS) test strip Use to test blood sugar 3-4 times daily or as directed.  Unknown at Unknown time     blood glucose (ACCU-CHEK SOFTCLIX) lancing device Lancing device to be used with lancets.  Unknown at Unknown time     blood glucose (NO BRAND SPECIFIED) lancets standard To use to test glucose level in the blood Use to test blood sugar  4  times daily as directed. To accompany glucose monitor brands per insurance coverage.  Unknown at Unknown time     blood glucose (NO  BRAND SPECIFIED) test strip To use to test glucose level in the blood Use to test blood sugar  4 times daily as directed. To accompany glucose monitor brands per insurance coverage.  Unknown at Unknown time     blood glucose calibration (NO BRAND SPECIFIED) solution Used to calibrate the blood glucose monitor as needed and as directed.  To accompany  blood glucose brands per insurance coverage  Unknown at Unknown time     blood glucose monitoring (NO BRAND SPECIFIED) meter device kit Use as directed Per insurance coverage  Unknown at Unknown time     blood glucose monitoring (SOFTCLIX) lancets Use to test blood sugar 3-4 times daily.  Unknown at Unknown time     budesonide-formoterol (SYMBICORT) 160-4.5 MCG/ACT Inhaler Inhale 2 puffs into the lungs 2 times daily  10/5/2022 at PM     carvedilol (COREG) 25 MG tablet Take 1 tablet (25 mg) by mouth 2 times daily (with meals)  10/5/2022 at PM     cetirizine (ZYRTEC) 10 MG tablet Take 10 mg by mouth daily  10/5/2022 at AM     diclofenac (VOLTAREN) 1 % topical gel Apply 2 g topically 3 times daily as needed for moderate pain (feet)  Unknown at Unknown time     DULoxetine (CYMBALTA) 30 MG capsule Take 30 mg by mouth 2 times daily  10/5/2022 at PM     exenatide ER (BYDUREON BCISE) 2 MG/0.85ML auto-injector Inject 2 mg Subcutaneous every 7 days On Mondays  Past Week at Unknown time     fluticasone (FLONASE) 50 MCG/ACT nasal spray Spray 1 spray into both nostrils daily  10/5/2022 at AM     furosemide (LASIX) 20 MG tablet Take 20 mg by mouth daily as needed (swelling)  10/5/2022 at AM     glipiZIDE (GLUCOTROL XL) 5 MG 24 hr tablet Take 2 tablets (10 mg) by mouth daily  10/5/2022 at AM     insulin glargine (LANTUS PEN) 100 UNIT/ML pen Inject 25 Units Subcutaneous 2 times daily  10/5/2022 at PM     insulin pen needle (32G X 4 MM) 32G X 4 MM miscellaneous Use 1 pen needles daily or as directed.  Unknown at Unknown time     ipratropium - albuterol 0.5 mg/2.5 mg/3 mL (DUONEB)  0.5-2.5 (3) MG/3ML neb solution Take 1 vial by nebulization every 6 hours as needed for shortness of breath / dyspnea or wheezing  Unknown at Unknown time     isosorbide mononitrate (IMDUR) 30 MG 24 hr tablet Take 1 tablet (30 mg) by mouth daily 10/6/2022: Patient never started taking, cards recommended adding during last hospitalization at end of September.      lidocaine (LIDODERM) 5 % patch Place 1 patch onto the skin every 24 hours To prevent lidocaine toxicity, patient should be patch free for 12 hrs daily. BACK  10/5/2022 at AM     methocarbamol (ROBAXIN) 500 MG tablet Take 500 mg by mouth 4 times daily as needed for muscle spasms  Unknown at Unknown time     montelukast (SINGULAIR) 10 MG tablet Take 10 mg by mouth At Bedtime  10/5/2022 at PM     naloxone (NARCAN) 4 MG/0.1ML nasal spray Spray 4 mg into one nostril alternating nostrils as needed for opioid reversal every 2-3 minutes until assistance arrives  Unknown at Unknown time     nitroGLYcerin (NITROSTAT) 0.4 MG sublingual tablet Place 0.4 mg under the tongue every 5 minutes as needed for chest pain For chest pain place 1 tablet under the tongue every 5 minutes for 3 doses. If symptoms persist 5 minutes after 1st dose call 911.  Unknown at Unknown time     nortriptyline (PAMELOR) 10 MG capsule Take 10 mg by mouth At Bedtime  10/5/2022 at PM     omeprazole 20 MG tablet Take 20 mg by mouth daily  10/5/2022 at AM     ondansetron (ZOFRAN) 8 MG tablet Take 8 mg by mouth every 8 hours as needed  Unknown at Unknown time     oxyCODONE-acetaminophen (PERCOCET)  MG per tablet Take 1 tablet by mouth every 6 hours as needed for severe pain  10/5/2022 at PM     polyethylene glycol (MIRALAX) 17 GM/Dose powder Take 17 g by mouth daily  10/5/2022 at AM     rosuvastatin (CRESTOR) 20 MG tablet Take 2 tablets (40 mg) by mouth daily  10/5/2022 at PM     sennosides (SENOKOT) 8.6 MG tablet Take 2 tablets by mouth 2 times daily  10/5/2022 at Unknown time     urea (DERMAL  THERAPY FINGER CARE) 20 % external lotion Externally apply topically 2 times daily as needed Feet  Unknown at Unknown time     valsartan (DIOVAN) 80 MG tablet Take 1 tablet (80 mg) by mouth daily  10/5/2022 at AM       Information source(s): Patient and [facility]  Method of interview communication: in-person    Summary of Changes to PTA Med List  New: None  Discontinued: N/A  Changed: N/A    Patient was asked about OTC/herbal products specifically.  PTA med list reflects this.    In the past week, patient estimated taking medication this percent of the time:  50-90% due to running out.    Allergies were reviewed, assessed, and updated with the patient.      Patient did not bring any medications to the hospital and can't retrieve from home. No multi-dose medications are available for use during hospital stay.     The information provided in this note is only as accurate as the sources available at the time of the update(s).    Thank you for the opportunity to participate in the care of this patient.    JYOTI CYR RPH  10/6/2022 12:07 PM

## 2022-10-06 NOTE — CONSULTS
"[facility]    Stroke Consult Note    Reason for Consult: Stroke Code     Chief Complaint: Stroke Symptoms      HPI  Sarah Rahman is a 57 year old female who presents to the ER with complaints of R arm, face, and leg sensory change since about 0630-7 this morning. She reports she awoke at 4:30 and didn't have any symptoms except for increased low back pain and b/l leg pain that she had last night.    Per chart review, she has had similar episodes of R side weakness in the past. The last 5 brain MRIs she has had have not showed stroke, though one from 10/2018 showed a R temporal stroke. She has a history of known basilar stenosis.    Imaging Findings  CT head no acute findings  CTA head/neck severe basilar stenosis per radiology unchanged compared to prior imaging  Hyperacute brain MRI no acute stroke    Intravenous Thrombolysis  Not given due to:   - negative hyperacute brain MRI and atypical exam findings less suggestive of acute stroke. See neuro exam for complete details.     Endovascular Treatment  Not initiated due to absence of proximal vessel occlusion    Impression   R side weakness of unclear etiology    Recommendations  - Admission and general neurology consultation  - Continue aspirin and statin for known basilar stenosis.    Thank you for this consult. No further stroke evaluation is recommended, so we will sign off. Please contact us with any additional questions.     Kirsten Edwards PA-C  Vascular Neurology  10/06/2022 1:53 PM  To page me or covering stroke neurology team member, click here: AMCOM   Choose \"On Call\" tab at top, then search dropdown box for \"Neurology Adult\", select location, press Enter, then look for stroke/neuro ICU/telestroke.    ______________________________________________________    Clinically Significant Risk Factors Present on Admission                     Past Medical History   Past Medical History:   Diagnosis Date     Asthma      CAD " (coronary artery disease)      COPD (chronic obstructive pulmonary disease) (H)      CVA (cerebral infarction)      Diabetes (H)      GERD (gastroesophageal reflux disease)      Hypertension      Polysubstance abuse (H)      S/P CABG (coronary artery bypass graft)      S/P lumbar discectomy 06/13/2019    L5/S1 by  Dr. Hamm at [facility]     Schizoaffective disorder (H)      Past Surgical History   Past Surgical History:   Procedure Laterality Date     BYPASS GRAFT ARTERY CORONARY  2009    x2     CORONARY STENT PLACEMENT       CV CORONARY ANGIOGRAM N/A 6/2/2021    Procedure: Coronary Angiogram;  Surgeon: Juventino Rivera MD;  Location: [facility] Cardiac Cath Lab;  Service: Cardiology     HYSTERECTOMY TOTAL ABDOMINAL, BILATERAL SALPINGO-OOPHORECTOMY, COMBINED       IR TRANSLAMINAR EPIDURAL LUMBAR INJ INCL IMAGING  9/27/2022     ORIF ULNAR / RADIAL SHAFT FRACTURE Right      Medications   Home Meds  Prior to Admission medications    Medication Sig Start Date End Date Taking? Authorizing Provider   acetaminophen (TYLENOL) 325 MG tablet Take 650 mg by mouth every 8 hours as needed for mild pain    Unknown, Entered By History   albuterol (PROAIR HFA) 108 (90 BASE) MCG/ACT inhaler Inhale 1-2 puffs into the lungs every 4 hours as needed    Reported, Patient   alcohol swab prep pads Use to swab area of injection/zac as directed. 4/7/22   Deana Meza MD   Alcohol Swabs PADS Use to swab the area of the injection or zac as directed Per insurance coverage 9/29/22   Montse Nieto MD   amitriptyline (ELAVIL) 25 MG tablet Take 1 tablet (25 mg) by mouth At Bedtime 8/31/22   Flako Rosen MD   amLODIPine (NORVASC) 5 MG tablet Take 1 tablet (5 mg) by mouth 2 times daily 9/29/22   Montse Nieto MD   aspirin (ASA) 325 MG EC tablet Take 325 mg by mouth daily     Reported, Patient   blood glucose (ACCU-CHEK DARRIN PLUS) test strip Use to test blood sugar 3-4 times daily or as directed. 4/7/22   Deana Meza MD    blood glucose (ACCU-CHEK SOFTCLIX) lancing device Lancing device to be used with lancets. 4/7/22   Deana Meza MD   blood glucose (NO BRAND SPECIFIED) lancets standard To use to test glucose level in the blood Use to test blood sugar  4  times daily as directed. To accompany glucose monitor brands per insurance coverage. 9/29/22   Montse Nieto MD   blood glucose (NO BRAND SPECIFIED) test strip To use to test glucose level in the blood Use to test blood sugar  4 times daily as directed. To accompany glucose monitor brands per insurance coverage. 9/29/22   Montse Nieto MD   blood glucose calibration (NO BRAND SPECIFIED) solution Used to calibrate the blood glucose monitor as needed and as directed.  To accompany  blood glucose brands per insurance coverage 9/29/22   Montse Nieto MD   blood glucose monitoring (NO BRAND SPECIFIED) meter device kit Use as directed Per insurance coverage 9/29/22   Montse Nieto MD   blood glucose monitoring (SOFTCLIX) lancets Use to test blood sugar 3-4 times daily. 4/7/22   Deana Meza MD   budesonide-formoterol (SYMBICORT) 160-4.5 MCG/ACT Inhaler Inhale 2 puffs into the lungs 2 times daily    Unknown, Entered By History   carvedilol (COREG) 25 MG tablet Take 1 tablet (25 mg) by mouth 2 times daily (with meals) 9/29/22   Montse Nieto MD   cetirizine (ZYRTEC) 10 MG tablet Take 10 mg by mouth daily    Unknown, Entered By History   Continuous Blood Gluc Sensor (FREESTYLE SHEBA 14 DAY SENSOR) MISC 1 each every 14 days Use 1 Sensor every 14 days. Use to read blood sugars per 's instructions. 6/7/22   Mara Scott, ANDREEA   diclofenac (VOLTAREN) 1 % topical gel Apply 2 g topically 3 times daily as needed for moderate pain (feet)    Unknown, Entered By History   DULoxetine (CYMBALTA) 30 MG capsule Take 30 mg by mouth 2 times daily    Unknown, Entered By History   exenatide ER (BYDUREON BCISE) 2 MG/0.85ML auto-injector Inject 2 mg Subcutaneous every 7 days On  Sundays    Unknown, Entered By History   fluticasone (FLONASE) 50 MCG/ACT nasal spray Spray 1 spray into both nostrils daily    Unknown, Entered By History   furosemide (LASIX) 20 MG tablet Take 20 mg by mouth daily as needed (swelling)    Unknown, Entered By History   glipiZIDE (GLUCOTROL XL) 5 MG 24 hr tablet Take 2 tablets (10 mg) by mouth daily 9/29/22   Montse Nieto MD   insulin glargine (LANTUS PEN) 100 UNIT/ML pen Inject 25 Units Subcutaneous 2 times daily 9/29/22   Montse Nieto MD   insulin pen needle (32G X 4 MM) 32G X 4 MM miscellaneous Use 1 pen needles daily or as directed. 4/7/22   Deana Meza MD   ipratropium - albuterol 0.5 mg/2.5 mg/3 mL (DUONEB) 0.5-2.5 (3) MG/3ML neb solution Take 1 vial by nebulization every 6 hours as needed for shortness of breath / dyspnea or wheezing    Unknown, Entered By History   isosorbide mononitrate (IMDUR) 30 MG 24 hr tablet Take 1 tablet (30 mg) by mouth daily 9/30/22   Montse Nieto MD   lidocaine (LIDODERM) 5 % patch Place 1 patch onto the skin every 24 hours To prevent lidocaine toxicity, patient should be patch free for 12 hrs daily. BACK    Unknown, Entered By History   methocarbamol (ROBAXIN) 500 MG tablet Take 500 mg by mouth 4 times daily as needed for muscle spasms    Unknown, Entered By History   montelukast (SINGULAIR) 10 MG tablet Take 10 mg by mouth At Bedtime    Unknown, Entered By History   naloxone (NARCAN) 4 MG/0.1ML nasal spray Spray 4 mg into one nostril alternating nostrils as needed for opioid reversal every 2-3 minutes until assistance arrives    Unknown, Entered By History   nitroGLYcerin (NITROSTAT) 0.4 MG sublingual tablet Place 0.4 mg under the tongue every 5 minutes as needed for chest pain For chest pain place 1 tablet under the tongue every 5 minutes for 3 doses. If symptoms persist 5 minutes after 1st dose call 911.    Unknown, Entered By History   nortriptyline (PAMELOR) 10 MG capsule Take 10 mg by mouth At Bedtime     "Unknown, Entered By History   omeprazole 20 MG tablet Take 20 mg by mouth daily    Unknown, Entered By History   ondansetron (ZOFRAN) 8 MG tablet Take 8 mg by mouth every 8 hours as needed 7/26/22   Reported, Patient   oxyCODONE-acetaminophen (PERCOCET)  MG per tablet Take 1 tablet by mouth every 6 hours as needed for severe pain    Unknown, Entered By History   polyethylene glycol (MIRALAX) 17 GM/Dose powder Take 17 g by mouth daily 9/29/22   Montse Nieto MD   rosuvastatin (CRESTOR) 20 MG tablet Take 2 tablets (40 mg) by mouth daily 9/29/22   Montse Nieto MD   sennosides (SENOKOT) 8.6 MG tablet Take 2 tablets by mouth 2 times daily 9/29/22   Montse Nieto MD   urea (DERMAL THERAPY FINGER CARE) 20 % external lotion Externally apply topically 2 times daily as needed Feet    Unknown, Entered By History   valsartan (DIOVAN) 80 MG tablet Take 1 tablet (80 mg) by mouth daily 9/30/22   Montse Nieto MD   diclofenac (VOLTAREN) 50 MG EC tablet Take 1 tablet (50 mg) by mouth 3 times daily for 10 days 6/7/22 7/20/22  Mara Scott, ANDREEA   pregabalin (LYRICA) 150 MG capsule Take 150 mg by mouth 3 times daily  9/29/22  Unknown, Entered By History       Scheduled Meds      Infusion Meds      PRN Meds      Allergies   Allergies   Allergen Reactions     Haloperidol Unknown     Patient states it stops her heart       Meperidine Angioedema, Difficulty breathing, Other (See Comments), Rash and Shortness Of Breath     Throat closes  Other reaction(s): Breathing Difficulty  Other reaction(s): Breathing Difficulty       Phenothiazines Other (See Comments)     Other reaction(s): CARDIAC DISTURBANCES  Patient states it stops her heart  \"I swell up\"  \"stopped by heart\"  \"I swell up\"  \"I swell up\"       Tramadol Other (See Comments)     Other reaction(s): Angioedema  Throat itch       Butyrophenones Angioedema     Januvia [Sitagliptin] Other (See Comments)     Swelling in the neck      Latex Itching     Lisinopril Other (See " Comments)     ACE cough  Itchy throat  Throat itches  Itchy throat       Penicillins Swelling     Hydroxyzine Other (See Comments) and Rash     Tongue swelling  Tongue swelling  Tongue swelling       Family History   Family History   Problem Relation Age of Onset     Heart Disease Mother      Heart Disease Father      Heart Disease Sister      Diabetes Brother      Heart Disease Sister      Social History   Social History     Tobacco Use     Smoking status: Current Every Day Smoker     Packs/day: 0.50     Types: Cigarettes     Smokeless tobacco: Never Used   Vaping Use     Vaping Use: Never used   Substance Use Topics     Alcohol use: Not Currently     Comment: Alcoholic Drinks/day: occ     Drug use: No       Review of Systems   The 5 point Review of Systems is negative other than noted in the HPI or here.        PHYSICAL EXAMINATION  Temp:  [97.3  F (36.3  C)] 97.3  F (36.3  C)  Pulse:  [78-88] 78  Resp:  [19-24] 23  BP: (147-176)/(74-96) 176/96  SpO2:  [99 %-100 %] 100 %     General:  patient lying in bed without any acute distress    HEENT:  normocephalic/atraumatic  Pulmonary:  no respiratory distress    Neurologic  Mental Status:  alert, oriented x 3, follows commands, speech clear and fluent, naming and repetition normal  Cranial Nerves:  visual fields intact (tested by nurse), EOMI with normal smooth pursuit, facial sensation intact and symmetric (tested by nurse),smile not symmetric but appears to have volitional contraction downward of R mouth, and no nasolabial fold flattening observed, hearing not formally tested but intact to conversation, no dysarthria, shoulder shrug equal bilaterally, tongue protrusion midline  Motor:  no abnormal movements, R arm not antigravity more than a couple inches off bed, able to move wrist and hand somewhat including good wrist extension, but unable to make fist, R leg breifly antigravity, L arm and leg no drift  Reflexes:  unable to test (telestroke)  Sensory:  Patient  reports R side arm and leg light touch sensation decreased by 50% compare to L (assessed by nurse), during testing for double simultaneous stimulation reports cannot feel R side at all (assessed by nurse)  Coordination:  No ataxia on L, unable to test on R due to weakness  Station/Gait:  unable to test due to telestroke      Dysphagia Screen  Per Nursing    Stroke Scales    NIHSS  1a. Level of Consciousness 0-->Alert, keenly responsive   1b. LOC Questions 0-->Answers both questions correctly   1c. LOC Commands 0-->Performs both tasks correctly   2.   Best Gaze 0-->Normal   3.   Visual 0-->No visual loss   4.   Facial Palsy 1-->Minor paralysis (flattened nasolabial fold, asymmetry on smiling)   5a. Motor Arm, Left 0-->No drift, limb holds 90 (or 45) degrees for full 10 secs   5b. Motor Arm, Right 2-->Some effort against gravity, limb cannot get to or maintain (if cued) 90 (or 45) degrees, drifts down to bed, but has some effort against gravity   6a. Motor Leg, Left 0-->No drift, leg holds 30 degree position for full 5 secs   6b. Motor Leg, right 2-->Some effort against gravity, leg falls to bed by 5 secs, but has some effort against gravity   7.   Limb Ataxia 0   8.   Sensory 1-->Mild-to-moderate sensory loss, patient feels pinprick is less sharp or is dull on the affected side, or there is a loss of superficial pain with pinprick, but patient is aware of being touched   9.   Best Language 0   10. Dysarthria 0   11. Extinction and Inattention  0   Total 6         Imaging  I personally reviewed all imaging; relevant findings per HPI.     Lab Results Data   CBC  Recent Labs   Lab 10/06/22  0918   WBC 7.5   RBC 4.14   HGB 11.0*   HCT 36.0        Basic Metabolic Panel    Recent Labs   Lab 10/06/22  0918 10/06/22  0838     --    POTASSIUM 4.1  --    CHLORIDE 104  --    CO2 22  --    BUN 15.5  --    CR 0.70  --    * 202*   MAXIMO 8.6  --      Liver Panel  No results for input(s): PROTTOTAL, ALBUMIN,  BILITOTAL, ALKPHOS, AST, ALT, BILIDIRECT in the last 168 hours.  INR    Recent Labs   Lab Test 10/06/22  0918 05/24/22  1022 04/03/22  1436   INR 1.04 0.95 0.95      Lipid Profile    Recent Labs   Lab Test 10/24/18  0523   CHOL 210*   HDL 33*   *   TRIG 219*     A1C    Recent Labs   Lab Test 07/19/22  1531 04/03/22  1436 05/31/21  0548   A1C 10.1* >14.0* 10.0*     Troponin    Recent Labs   Lab 10/06/22  0918   CTROPT 13          Stroke Code Data Data   Stroke Code Data  (for stroke code with tele)  Stroke code activated 10/06/22   0842   First stroke provider response 10/06/22   0843   Video start time 10/06/22   0910   Video end time 10/06/22   0929   Last known normal 10/06/22   0629   Time of discovery  (or onset of symptoms)  10/06/22   0630   Head CT read by Stroke Neuro Dr/Provider 10/06/22   0855   Was stroke code de-escalated? Yes 10/06/22 1024           Telestroke Service Details  Type of service telemedicine diagnostic assessment of acute neurological changes   Reason telemedicine is appropriate patient requires assessment with a specialist for diagnosis and treatment of neurological symptoms   Mode of transmission secure interactive audio and video communication per [place]   Originating site (patient location) [facility]    Distant site (provider location) [facility]       I personally examined and evaluated the patient today. At the time of my evaluation and management the patient was in critical condition today due to possible acute stroke. I personally managed imaging. Key decisions made today included imaging. I spent a total of 70 minutes providing critical care services, evaluating the patient, directing care and reviewing laboratory values and radiologic reports.

## 2022-10-06 NOTE — PLAN OF CARE
"  Problem: Pain Chronic (Persistent)  Goal: Acceptable Pain Control and Functional Ability  Outcome: Ongoing, Not Progressing  Intervention: Manage Persistent Pain  Recent Flowsheet Documentation  Taken 10/6/2022 1531 by Shikha Castrejon RN  Medication Review/Management: medications reviewed   Goal Outcome Evaluation:  Patient has ongoing pain in lower back and down both legs right worse then Left. States \"It feels like someone has bricks on my Legs crushing and burning them\" Given oxycodone and Toradol in ED with no improvement. Given Morphine 1 mg, tylenol, and robaxin with no improvement. Order for 2 oxycodone now placed. Continue to monitor. No changes in neuro's. Patient also states she has no urinated since this am. Patient was bladder scanned for 215ml. Was NPO all day. Encouraged to increase po.                    "

## 2022-10-06 NOTE — ED TRIAGE NOTES
EMS to triage.  Pt called ems for burning in legs and back pain and R shoulder pain since about 0300 today.  Was seen here last week for sciatica.  Pt also with hx of stroke 1 yr ago.  Upon arrival ems reports pt started c/o R arm weaker and bilat blurry vision and R face numbness and R facial droop.  Denies headache or pain.  Provider to triage for assessment.  Teir 1 stroke code called.  .  GCS-15.  Pt to CT.

## 2022-10-07 ENCOUNTER — APPOINTMENT (OUTPATIENT)
Dept: CARDIOLOGY | Facility: HOSPITAL | Age: 57
End: 2022-10-07
Attending: HOSPITALIST
Payer: COMMERCIAL

## 2022-10-07 PROBLEM — G45.9 TIA (TRANSIENT ISCHEMIC ATTACK): Status: ACTIVE | Noted: 2022-10-07

## 2022-10-07 LAB
ANION GAP SERPL CALCULATED.3IONS-SCNC: 10 MMOL/L (ref 7–15)
BUN SERPL-MCNC: 27.5 MG/DL (ref 6–20)
CALCIUM SERPL-MCNC: 8.7 MG/DL (ref 8.6–10)
CHLORIDE SERPL-SCNC: 103 MMOL/L (ref 98–107)
CHOLEST SERPL-MCNC: 144 MG/DL
CREAT SERPL-MCNC: 0.85 MG/DL (ref 0.51–0.95)
DEPRECATED HCO3 PLAS-SCNC: 23 MMOL/L (ref 22–29)
ERYTHROCYTE [DISTWIDTH] IN BLOOD BY AUTOMATED COUNT: 12.8 % (ref 10–15)
GFR SERPL CREATININE-BSD FRML MDRD: 79 ML/MIN/1.73M2
GLUCOSE BLDC GLUCOMTR-MCNC: 163 MG/DL (ref 70–99)
GLUCOSE BLDC GLUCOMTR-MCNC: 164 MG/DL (ref 70–99)
GLUCOSE BLDC GLUCOMTR-MCNC: 173 MG/DL (ref 70–99)
GLUCOSE BLDC GLUCOMTR-MCNC: 182 MG/DL (ref 70–99)
GLUCOSE SERPL-MCNC: 190 MG/DL (ref 70–99)
HBA1C MFR BLD: 11.3 %
HCT VFR BLD AUTO: 34.1 % (ref 35–47)
HDLC SERPL-MCNC: 38 MG/DL
HGB BLD-MCNC: 10.5 G/DL (ref 11.7–15.7)
LDLC SERPL CALC-MCNC: 83 MG/DL
MCH RBC QN AUTO: 26.6 PG (ref 26.5–33)
MCHC RBC AUTO-ENTMCNC: 30.8 G/DL (ref 31.5–36.5)
MCV RBC AUTO: 87 FL (ref 78–100)
NONHDLC SERPL-MCNC: 106 MG/DL
PLATELET # BLD AUTO: 251 10E3/UL (ref 150–450)
POTASSIUM SERPL-SCNC: 4.2 MMOL/L (ref 3.4–5.3)
RBC # BLD AUTO: 3.94 10E6/UL (ref 3.8–5.2)
SODIUM SERPL-SCNC: 136 MMOL/L (ref 136–145)
TRIGL SERPL-MCNC: 113 MG/DL
VIT B12 SERPL-MCNC: 316 PG/ML (ref 232–1245)
WBC # BLD AUTO: 7.3 10E3/UL (ref 4–11)

## 2022-10-07 PROCEDURE — 82962 GLUCOSE BLOOD TEST: CPT

## 2022-10-07 PROCEDURE — 93325 DOPPLER ECHO COLOR FLOW MAPG: CPT | Mod: 26 | Performed by: INTERNAL MEDICINE

## 2022-10-07 PROCEDURE — 250N000013 HC RX MED GY IP 250 OP 250 PS 637: Performed by: PSYCHIATRY & NEUROLOGY

## 2022-10-07 PROCEDURE — 250N000011 HC RX IP 250 OP 636: Performed by: INTERNAL MEDICINE

## 2022-10-07 PROCEDURE — 85027 COMPLETE CBC AUTOMATED: CPT | Performed by: INTERNAL MEDICINE

## 2022-10-07 PROCEDURE — 99214 OFFICE O/P EST MOD 30 MIN: CPT | Performed by: PSYCHIATRY & NEUROLOGY

## 2022-10-07 PROCEDURE — 99225 PR SUBSEQUENT OBSERVATION CARE,LEVEL II: CPT | Performed by: HOSPITALIST

## 2022-10-07 PROCEDURE — 83036 HEMOGLOBIN GLYCOSYLATED A1C: CPT | Performed by: HOSPITALIST

## 2022-10-07 PROCEDURE — 80048 BASIC METABOLIC PNL TOTAL CA: CPT | Performed by: INTERNAL MEDICINE

## 2022-10-07 PROCEDURE — 96372 THER/PROPH/DIAG INJ SC/IM: CPT | Performed by: INTERNAL MEDICINE

## 2022-10-07 PROCEDURE — 250N000013 HC RX MED GY IP 250 OP 250 PS 637

## 2022-10-07 PROCEDURE — 80061 LIPID PANEL: CPT | Performed by: PSYCHIATRY & NEUROLOGY

## 2022-10-07 PROCEDURE — 93321 DOPPLER ECHO F-UP/LMTD STD: CPT

## 2022-10-07 PROCEDURE — 93321 DOPPLER ECHO F-UP/LMTD STD: CPT | Mod: 26 | Performed by: INTERNAL MEDICINE

## 2022-10-07 PROCEDURE — 96372 THER/PROPH/DIAG INJ SC/IM: CPT

## 2022-10-07 PROCEDURE — G0378 HOSPITAL OBSERVATION PER HR: HCPCS

## 2022-10-07 PROCEDURE — 250N000013 HC RX MED GY IP 250 OP 250 PS 637: Performed by: HOSPITALIST

## 2022-10-07 PROCEDURE — 250N000013 HC RX MED GY IP 250 OP 250 PS 637: Performed by: INTERNAL MEDICINE

## 2022-10-07 PROCEDURE — 96376 TX/PRO/DX INJ SAME DRUG ADON: CPT

## 2022-10-07 PROCEDURE — 93308 TTE F-UP OR LMTD: CPT | Mod: 26 | Performed by: INTERNAL MEDICINE

## 2022-10-07 PROCEDURE — 93325 DOPPLER ECHO COLOR FLOW MAPG: CPT

## 2022-10-07 PROCEDURE — 36415 COLL VENOUS BLD VENIPUNCTURE: CPT | Performed by: INTERNAL MEDICINE

## 2022-10-07 RX ORDER — LIDOCAINE 4 G/G
1 PATCH TOPICAL EVERY 24 HOURS
Status: DISCONTINUED | OUTPATIENT
Start: 2022-10-07 | End: 2022-10-07

## 2022-10-07 RX ORDER — DIPHENHYDRAMINE HCL 25 MG
25 CAPSULE ORAL EVERY 6 HOURS PRN
Status: DISCONTINUED | OUTPATIENT
Start: 2022-10-07 | End: 2022-10-08 | Stop reason: HOSPADM

## 2022-10-07 RX ORDER — CARVEDILOL 12.5 MG/1
12.5 TABLET ORAL 2 TIMES DAILY WITH MEALS
Status: DISCONTINUED | OUTPATIENT
Start: 2022-10-07 | End: 2022-10-08 | Stop reason: HOSPADM

## 2022-10-07 RX ORDER — ACETAMINOPHEN 325 MG/1
975 TABLET ORAL 3 TIMES DAILY
Status: DISCONTINUED | OUTPATIENT
Start: 2022-10-07 | End: 2022-10-08 | Stop reason: HOSPADM

## 2022-10-07 RX ORDER — VALSARTAN 40 MG/1
40 TABLET ORAL DAILY
Status: DISCONTINUED | OUTPATIENT
Start: 2022-10-08 | End: 2022-10-08 | Stop reason: HOSPADM

## 2022-10-07 RX ADMIN — DICLOFENAC SODIUM 2 G: 10 GEL TOPICAL at 00:06

## 2022-10-07 RX ADMIN — DIPHENHYDRAMINE HYDROCHLORIDE 25 MG: 25 CAPSULE ORAL at 21:52

## 2022-10-07 RX ADMIN — CLOPIDOGREL BISULFATE 75 MG: 75 TABLET ORAL at 09:03

## 2022-10-07 RX ADMIN — SENNOSIDES 2 TABLET: 8.6 TABLET, FILM COATED ORAL at 21:52

## 2022-10-07 RX ADMIN — ISOSORBIDE MONONITRATE 30 MG: 30 TABLET, EXTENDED RELEASE ORAL at 09:09

## 2022-10-07 RX ADMIN — GLIPIZIDE 10 MG: 10 TABLET, FILM COATED, EXTENDED RELEASE ORAL at 09:03

## 2022-10-07 RX ADMIN — METHOCARBAMOL 500 MG: 500 TABLET, FILM COATED ORAL at 00:31

## 2022-10-07 RX ADMIN — NORTRIPTYLINE HYDROCHLORIDE 10 MG: 10 CAPSULE ORAL at 21:51

## 2022-10-07 RX ADMIN — OXYCODONE HYDROCHLORIDE 10 MG: 5 TABLET ORAL at 21:51

## 2022-10-07 RX ADMIN — ACETAMINOPHEN 975 MG: 325 TABLET, FILM COATED ORAL at 17:07

## 2022-10-07 RX ADMIN — OXYCODONE HYDROCHLORIDE 10 MG: 5 TABLET ORAL at 09:30

## 2022-10-07 RX ADMIN — CETIRIZINE HYDROCHLORIDE 5 MG: 5 TABLET ORAL at 09:02

## 2022-10-07 RX ADMIN — DULOXETINE HYDROCHLORIDE 30 MG: 30 CAPSULE, DELAYED RELEASE ORAL at 21:55

## 2022-10-07 RX ADMIN — OXYCODONE HYDROCHLORIDE 10 MG: 5 TABLET ORAL at 03:37

## 2022-10-07 RX ADMIN — Medication 2 G: at 17:14

## 2022-10-07 RX ADMIN — ENOXAPARIN SODIUM 40 MG: 40 INJECTION SUBCUTANEOUS at 21:49

## 2022-10-07 RX ADMIN — DICLOFENAC SODIUM 2 G: 10 GEL TOPICAL at 13:10

## 2022-10-07 RX ADMIN — Medication 2 G: at 23:14

## 2022-10-07 RX ADMIN — Medication 81 MG: at 09:02

## 2022-10-07 RX ADMIN — METHOCARBAMOL 500 MG: 500 TABLET, FILM COATED ORAL at 10:43

## 2022-10-07 RX ADMIN — MORPHINE SULFATE 1 MG: 2 INJECTION, SOLUTION INTRAMUSCULAR; INTRAVENOUS at 19:24

## 2022-10-07 RX ADMIN — OXYCODONE HYDROCHLORIDE 10 MG: 5 TABLET ORAL at 15:51

## 2022-10-07 RX ADMIN — ROSUVASTATIN CALCIUM 40 MG: 40 TABLET, COATED ORAL at 21:53

## 2022-10-07 RX ADMIN — METHOCARBAMOL 500 MG: 500 TABLET, FILM COATED ORAL at 17:07

## 2022-10-07 RX ADMIN — ACETAMINOPHEN 975 MG: 325 TABLET, FILM COATED ORAL at 21:52

## 2022-10-07 RX ADMIN — INSULIN ASPART 1 UNITS: 100 INJECTION, SOLUTION INTRAVENOUS; SUBCUTANEOUS at 17:09

## 2022-10-07 RX ADMIN — MONTELUKAST 10 MG: 10 TABLET, FILM COATED ORAL at 21:53

## 2022-10-07 RX ADMIN — MORPHINE SULFATE 1 MG: 2 INJECTION, SOLUTION INTRAMUSCULAR; INTRAVENOUS at 00:02

## 2022-10-07 RX ADMIN — INSULIN ASPART 1 UNITS: 100 INJECTION, SOLUTION INTRAVENOUS; SUBCUTANEOUS at 11:50

## 2022-10-07 RX ADMIN — MORPHINE SULFATE 1 MG: 2 INJECTION, SOLUTION INTRAMUSCULAR; INTRAVENOUS at 12:57

## 2022-10-07 RX ADMIN — LIDOCAINE PATCH 4% 1 PATCH: 40 PATCH TOPICAL at 09:11

## 2022-10-07 RX ADMIN — PANTOPRAZOLE SODIUM 40 MG: 40 TABLET, DELAYED RELEASE ORAL at 06:41

## 2022-10-07 RX ADMIN — LIDOCAINE PATCH 4% 1 PATCH: 40 PATCH TOPICAL at 03:33

## 2022-10-07 RX ADMIN — AMITRIPTYLINE HYDROCHLORIDE 25 MG: 25 TABLET, FILM COATED ORAL at 21:52

## 2022-10-07 RX ADMIN — DICLOFENAC SODIUM 2 G: 10 GEL TOPICAL at 03:41

## 2022-10-07 RX ADMIN — FLUTICASONE FUROATE AND VILANTEROL TRIFENATATE 1 PUFF: 100; 25 POWDER RESPIRATORY (INHALATION) at 21:50

## 2022-10-07 RX ADMIN — CARVEDILOL 25 MG: 12.5 TABLET, FILM COATED ORAL at 09:08

## 2022-10-07 RX ADMIN — DULOXETINE HYDROCHLORIDE 30 MG: 30 CAPSULE, DELAYED RELEASE ORAL at 09:30

## 2022-10-07 RX ADMIN — METHOCARBAMOL 500 MG: 500 TABLET, FILM COATED ORAL at 23:14

## 2022-10-07 RX ADMIN — MORPHINE SULFATE 1 MG: 2 INJECTION, SOLUTION INTRAMUSCULAR; INTRAVENOUS at 06:52

## 2022-10-07 ASSESSMENT — ACTIVITIES OF DAILY LIVING (ADL)
ADLS_ACUITY_SCORE: 36
ADLS_ACUITY_SCORE: 36
DEPENDENT_IADLS:: TRANSPORTATION;LAUNDRY;CLEANING
ADLS_ACUITY_SCORE: 36

## 2022-10-07 NOTE — PROGRESS NOTES
This is a neurological follow-up note    57-year-old admitted to hospital on 10/6/2022      Chief complaint  Right face arm and leg numbness/weakness  MRI scan negative for acute stroke  Known history of basilar artery stenosis severe        HPI  57-year-old presents to the hospital line October 6, 2022 with a complaint of low back pain and leg pain.  She also had though right-sided weakness    Exam showed weakness of the right face right arm and right leg.  Patient awoke at 5 AM without symptoms  Symptom onset at 7 AM  Patient presented to the ER at 8:24 AM  Patient evaluated by stroke code service  Stroke code team evaluated the patient during the stroke code in the ER and did not recommend any acute intervention.    Patient has a known history of basilar artery stenosis  Some previous scans have shown lacunar type ischemia chronic      Past medical history  CVA  COPD  Diabetes (hemoglobin A1c 12.1%,   11/22/2021)  Hypertension  Hyperlipidemia  CABG  CHF  STEMI  Polysubstance abuse  Status post lumbar discectomy L5/S1 2019  Schizoaffective disorder      Habits  Smokes half a pack of cigarettes per day  Rare to occasional alcohol    Family history  Mother heart disease  Father heart disease  Sister heart disease  Brother heart disease  Sister heart disease    Past work-up reviewed  EEG essentially normal 9-10 Hz percent rhythm, (10/23/2018)  Echo 9/25/2022, 65-70% ejection fraction, severe left atrial enlargement  Urine tox screen (9/24/2022), positive benzodiazepine,  Hemoglobin A1c greater than 14% (4/3/2022)  Hemoglobin A1c 10.1% (7/19/2022)     MRI lumbar spine 9/24/2022  L3-L4, moderate bilateral foraminal narrowing, moderate-severe spinal canal narrowing  L4-L5, no significant foraminal narrowing or canal narrowing  L5-S1, sequelae right sided laminectomy, severe right foraminal narrowing, moderate to severe left foraminal narrowing, no spinal canal narrowing  1.  Marked degenerative changes in the lumbar  spine at L5-S1 and to lesser extent L3-L4, detailed report official report  2.  Marked edema at the L5-S1 level also extending to the right facet joint. This could be a source of pain.  3.  At L5-S1, there is severe right and moderate to severe left neural foraminal narrowing.  4.  At L3-L4, there is moderate to severe spinal canal narrowing as well as moderate bilateral neural foraminal narrowing.  5.  Additional mild degenerative changes at the other levels in the lumbar spine see official report  6.  Overall, no significant change since prior MR 04/03/2022.              Work-up  MRI scan head 10/6/2022  1.  Atrophy and chronic small vessel vascular/lacunar changes.         (Patchy confluent nonspecific T2/FLAIR hyperintensities cerebral and pontine areas consistent with moderate chronic small vessel changes)       Chronic lacunar infarcts deep left frontal white matter and left thalamus  2.  Mild generalized atrophy  3.  Negative for acute intracranial process.  HEAD CT: 10/6/2022  1.  No intracranial hemorrhage, mass, or definite CT evidence of recent ischemia.  HEAD CTA: 10/6/2022  1.  No interval change versus 4/3/2022.  2.  Severe narrowing of the proximal basilar artery is unchanged.  3.  Anterior circulation atherosclerosis predominantly affecting the carotid siphons without high-grade narrowing.  4.  No proximal large vessel occlusion.  NECK CTA: 10/6/2022  1.  Mild carotid artery atherosclerosis without hemodynamically significant narrowing in either vessel by NASCET criteria.  2.  Unchanged moderate to severe proximal left vertebral artery narrowing.  TSH 1.38 (10/6/2022)  High-sensitivity troponin negative (13)  COVID-19 negative  B12 316, (10/7/2022)  TSH 1.38 (10/7/2022)      Labs  Sodium 136 potassium 4.2  BUN 27.5, creatinine 0.85  Glucose 190-164  White blood count 7.3, hemoglobin 10.5, platelets 251,000            Exam  Blood pressure 112/67, pulse 79, temperature 97.8  Blood pressure range  "100//96  Pulse range 78-88  T-max 98.8        Review of systems  No chest pain no shortness of breath no nausea vomiting no diarrhea no fever chills  Currently no headache at this time    No diplopia dysarthria dysphagia    Patient complains today of back pain and leg pain  Does not want to do physical therapy due to her leg pain and needs \"pain meds\"  Has history of chronic back pain    Otherwise review systems negative    General exam per primary MD  Alert attentive  HEENT no signs of trauma  Lungs clear  Heart rate regular  Abdomen soft  No edema the feet  Pulses symmetrical    Neurologic exam  Alert oriented x3  Prosody of speech normal  Naming normal  Repetition normal  Comprehension normal  No aphasia  No neglect  Memory recall okay    Cranial nerves II through XII significant for  No ophthalmoplegia  No nystagmus  Visual fields intact  Tongue twisters good      Upper extremities  10/7/2022  Patient holding her phone in her right hand and talking handles phone with good dexterity moves arm well as I entered the room.  Normal movement of the right arm today  Major complaint is that she does not want to do any therapy or walk around because of her leg pain      10/6/2022  Nonphysiologic exam of the upper extremities  Patient does not move the right arm up at all  Later than when I am talking to her and talking about the trail mix that had fallen to the side on her bed I handed it to her and she used both hands to manipulate it without difficulty.    Lower extremities  10/7/2022  Right leg crossed over her left  Able to move it to uncross it   when I start to test the leg does not want a movement  If not clear that it is just because of pain  Previous exam also looks nonphysiologic in nature    10/6/2022  Nonphysiologic weakness of the right leg  With observation seems to move the right leg better than with testing    Gait  Deferred due to the above            Assessment/plan    1.   Right face arm and leg " "transient symptoms (MRI scan negative for acute stroke)        Vascular risk factors        Hypertension/hyperlipidemia/diabetes/CAD/CABG/CHF        Posterior circulation severe atherosclerosis of the basilar artery      2.  Posterior circulation severe atherosclerosis       Severe narrowing of the proximal basilar artery        Moderate to severe proximal left vertebral artery narrowing.        3.   Echo 9/25/2022, 65-70% ejection fraction, severe left atrial enlargement      4.  Patient examination after hospitalized had some nonphysiologic components.       Marked variability and arm and leg movement on the right during the same exam depending on distraction.       Concern for some nonphysiologic weakness also.    5.  Patient with some history of \"chronic pain\" follows at the pain clinic       History of schizoaffective disorder       Possibly complicating the above    6.  Chronic low back pain with right leg pain       Lumbar surgery L5/S1 2019 per chart   MRI lumbar spine 9/24/2022  L3-L4, moderate bilateral foraminal narrowing, moderate-severe spinal canal narrowing  L4-L5, no significant foraminal narrowing or canal narrowing  L5-S1, sequelae right sided laminectomy, severe right foraminal narrowing, moderate to severe left foraminal narrowing, no spinal canal narrowing  1.  Marked degenerative changes in the lumbar spine at L5-S1 and to lesser extent L3-L4, detailed report official report  2.  Marked edema at the L5-S1 level also extending to the right facet joint. This could be a source of pain.  3.  At L5-S1, there is severe right and moderate to severe left neural foraminal narrowing.  4.  At L3-L4, there is moderate to severe spinal canal narrowing as well as moderate bilateral neural foraminal narrowing.  5.  Additional mild degenerative changes at the other levels in the lumbar spine see official report  6.  Overall, no significant change since prior MR 04/03/2022.           Follows with pain " "management per chart looks like through [facility] system.       Review of some meds tried and or being used       Topical lidocaine       Acetaminophen       Flexeril         Neurontin       Toradol       Robaxin       Neuro  PLO gel with lidocaine (ketamine gabapentin teen)       Nortriptyline       Lyrica       Prednisone       Tramadol       Oxycodone       Cymbalta        Plan    Diagnosis  Basilar artery stenosis  Question VBI/ TIA    Difficult with some nonphysiologic weakness now  Does have history of schizoaffective disorder  Has chronic pain disorder follows at the pain clinic and they are \"thinking of putting her on marijuana\" per her recollection.    Difficult to put together how much of the symptoms this morning and the fluctuations are true ischemia versus nonphysiologic or conversion type symptoms.    B12 316, (10/7/2022)  TSH 1.38 (10/7/2022)    Check lipid panel pending  Echo pending    Prior to admission vascular disease stroke prevention medication  Aspirin 325 mg  Crestor 40 mg    Consider switching to dual antiplatelet therapy.  Plavix 75 mg once per day  Aspirin 81 mg once per day    In regards to her vascular disease recommendations as above discussed with patient but also discussed with primary MD  Patient with chronic low back pain follows with pain clinic previous surgery in the past should follow-up with her spine specialist.    Extensive discussion about the above    Try PT/OT    Total care time today 35 minutes greater than 50% this visit patient care team discussing and evaluating the above.    "

## 2022-10-07 NOTE — PLAN OF CARE
Problem: Plan of Care - These are the overarching goals to be used throughout the patient stay.    Goal: Plan of Care Review/Shift Note  Description: The Plan of Care Review/Shift note should be completed every shift.  The Outcome Evaluation is a brief statement about your assessment that the patient is improving, declining, or no change.  This information will be displayed automatically on your shift note.  Outcome: Ongoing, Progressing     Problem: Pain Chronic (Persistent)  Goal: Acceptable Pain Control and Functional Ability  Intervention: Develop Pain Management Plan  Recent Flowsheet Documentation  Taken 10/7/2022 0337 by Daysi Yo RN  Pain Management Interventions: medication (see MAR)   Goal Outcome Evaluation:      Patient anxious with pain 10/10 in back and bilateral feet. Patient was given 1 mg of Morphine iv at 0002, Robaxin 500 mg's at 0031. Patient asked for lidocaine patch, Dr rosas and 1 patch ordered and given at 0333 with Oxycodone 10 mg's at 0337 and Voltaren applied to bilateral feet at 0341. Rhythm=NSR, 02 sat's 100% on room air. Patient has slept 95% of the night. Patient a little shakey on feet, bed alarm on for safety.

## 2022-10-07 NOTE — PROGRESS NOTES
[facility]    Medicine Progress Note - Hospitalist Service       Date of Admission:  10/6/2022    Assessment & Plan            Sarah Ramhan is a 57 year old female admitted on 10/6/2022. She has history of coronary artery disease status post CABG, previous stroke, asthma, GERD, hypertension, schizoaffective disorder, substance abuse presents for evaluation of right-sided weakness and numbness. Frequent presentations for same. Hospital Day: 2      #Right-sided weakness and numbness  Patient complains of new onset right-sided weakness, involving face, arm, leg. I admitted her for same symptoms in July and she is frequently seen with these complaints in ED, but she tells me issue is new.  MRI showing unchanged small chronic lacunar infarctions, moderate chronic microvascular ischemic change. 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  TIA possible. Allow permissive HTN to some extent.   Neurology starting her on DAPT given possible TIA, recommending continue indefinitely unless side effects  Continue home Crestor  PT and OT evals     #Chronic pain syndrome  Complains of acutely worse BLE pain, tingling, burning  Follows with pain clinic ([facility])  Outside chart reviewed- patient had called pain clinic for oxycodone refill 10/3 as she was almost out but was told she could not  until 10/8. Possible that she is here due to running out of meds at home.  Just saw our Pain Team here ~10 days ago  Resume interventions advised last time by Pain Team: topical Voltaren, Robaxin, scheduled tylenol, home Cymbalta, home amitriptyline/nortriptyline (does take both), ice, rest  Lidocaine patch ordered  Patient recently had Medrol dosepak and spinal steroid injection, so would not repeat these at this time  Patient should follow up as outpatient with Neurosurgery. Could consider EMG testing due to difficult to sort out weakness concerns.  I do not see  "an indication to re-consult Pain Team right now as the situation is unchanged      #DM  Home regimen glipizide, Lantus 25 units nightly (documented as BID on med rec but apparently this is incorrect), Bydureon.  Continue home Lantus and glipizide  Sliding scale  Patient requesting A1c check, this is fine    #Decreased UOP  Random bladder scan ok, no significant retention    #HTN  BP running low, as above concern may have contributed to TIA episode  Decrease home Coreg and valsartan to half dose  Hold home amlodipine     #Hx CAD s/p CABG x2 in 2009  Home aspirin, statin, Imdur     #Asthma  Home albuterol, Symbicort, DuoNeb, Singulair     #GERD, home PPI       Diet: Combination Diet Regular Diet; 2 gm NA Diet; Low Saturated Fat Diet    DVT Prophylaxis: Moderate risk. Lovenox   Diehl Catheter: Not present  Central Lines: None  Code Status: Full Code        Disposition Plan   Disposition: Home     Expected Discharge Date: 10/08/2022        Discharge Comments: PT and OT evals, pain control     Medically ready to discharge today: No     The patient's care was discussed with the Patient.    Sunshine Fowler MD  Hospitalist Service  [facility]  Securely message with the Vocera Web Console (learn more here)  Text page via [employer] Paging/Directory         Clinically Significant Risk Factors Present on Admission                 # Hypertension: home medication list includes antihypertensive(s)    # DMII: A1C = N/A within past 3 months  # Obesity: Estimated body mass index is 31.36 kg/m  as calculated from the following:    Height as of this encounter: 1.702 m (5' 7\").    Weight as of this encounter: 90.8 kg (200 lb 3.2 oz).        ____________        Physical Exam   Vital Signs: Temp: 97.9  F (36.6  C) Temp src: Oral BP: 96/56 Pulse: 81   Resp: 18 SpO2: 98 % O2 Device: None (Room air)    Weight: 200 lbs 3.2 oz  General: in no apparent distress, non-toxic and alert female lying in hospital bed oriented " x3  HEENT: Head normocephalic atraumatic, oral mucosa moist. Sclerae anicteric  CV: Regular rhythm, normal rate, no murmurs  Resp: No wheezes, no rales or rhonchi, no focal consolidations  GI: Belly soft, nondistended, nontender, bowel sounds present  Skin: No rashes or lesions  Extremities: No peripheral edema  Psych: Normal affect, anxious mood  Neuro: CNII-XII grossly intact, moving all 4 extremities. When wiggling toes, L toes wiggled faster and larger amplitude than R      Data   Recent Results (from the past 24 hour(s))   Glucose by meter    Collection Time: 10/06/22  4:02 PM   Result Value Ref Range    GLUCOSE BY METER POCT 145 (H) 70 - 99 mg/dL   Creatinine    Collection Time: 10/06/22  6:58 PM   Result Value Ref Range    Creatinine 0.76 0.51 - 0.95 mg/dL    GFR Estimate 90 >60 mL/min/1.73m2   Vitamin B12    Collection Time: 10/06/22  6:58 PM   Result Value Ref Range    Vitamin B12 316 232 - 1,245 pg/mL   TSH    Collection Time: 10/06/22  6:58 PM   Result Value Ref Range    TSH 1.38 0.30 - 4.20 uIU/mL   Glucose by meter    Collection Time: 10/06/22  8:39 PM   Result Value Ref Range    GLUCOSE BY METER POCT 227 (H) 70 - 99 mg/dL   Basic metabolic panel    Collection Time: 10/07/22  6:12 AM   Result Value Ref Range    Sodium 136 136 - 145 mmol/L    Potassium 4.2 3.4 - 5.3 mmol/L    Chloride 103 98 - 107 mmol/L    Carbon Dioxide (CO2) 23 22 - 29 mmol/L    Anion Gap 10 7 - 15 mmol/L    Urea Nitrogen 27.5 (H) 6.0 - 20.0 mg/dL    Creatinine 0.85 0.51 - 0.95 mg/dL    Calcium 8.7 8.6 - 10.0 mg/dL    Glucose 190 (H) 70 - 99 mg/dL    GFR Estimate 79 >60 mL/min/1.73m2   CBC with platelets    Collection Time: 10/07/22  6:12 AM   Result Value Ref Range    WBC Count 7.3 4.0 - 11.0 10e3/uL    RBC Count 3.94 3.80 - 5.20 10e6/uL    Hemoglobin 10.5 (L) 11.7 - 15.7 g/dL    Hematocrit 34.1 (L) 35.0 - 47.0 %    MCV 87 78 - 100 fL    MCH 26.6 26.5 - 33.0 pg    MCHC 30.8 (L) 31.5 - 36.5 g/dL    RDW 12.8 10.0 - 15.0 %    Platelet  Count 251 150 - 450 10e3/uL   Lipid panel reflex to direct LDL    Collection Time: 10/07/22  6:12 AM   Result Value Ref Range    Cholesterol 144 <200 mg/dL    Triglycerides 113 <150 mg/dL    Direct Measure HDL 38 (L) >=50 mg/dL    LDL Cholesterol Calculated 83 <=100 mg/dL    Non HDL Cholesterol 106 <130 mg/dL   Glucose by meter    Collection Time: 10/07/22  6:48 AM   Result Value Ref Range    GLUCOSE BY METER POCT 164 (H) 70 - 99 mg/dL   Glucose by meter    Collection Time: 10/07/22 11:24 AM   Result Value Ref Range    GLUCOSE BY METER POCT 173 (H) 70 - 99 mg/dL     ____________  Interval History   Data reviewed today: I reviewed all medications, new labs and imaging results over the last 24 hours. I personally reviewed no images or EKG's today.  Patient complains of burning, tingling, weakness in bilateral legs.  Possible right-sided weakness on exam.  Blood pressure has been on the low side, stopped one of her blood pressure pills.  Put parameters on the other blood pressure pills.  Question whether low blood pressure contributing to TIA episode.  Patient complains of only urinating twice a day and difficulty emptying her bladder.  We will asked nurse to do a bladder scan.  We will ask PT and OT to evaluate, patient does not feel able to return home at this time.  Neurology to follow-up today.

## 2022-10-07 NOTE — PLAN OF CARE
PRIMARY DIAGNOSIS: GENERALIZED WEAKNESS    OUTPATIENT/OBSERVATION GOALS TO BE MET BEFORE DISCHARGE  1. Orthostatic performed: No    2. Tolerating PO medications: Yes    3. Return to near baseline physical activity: No    4. Cleared for discharge by consultants (if involved): No    Discharge Planner Nurse   Safe discharge environment identified: No  Barriers to discharge: Yes       Entered by: Allison Nieves RN 10/07/2022 4:49 PM     Please review provider order for any additional goals.   Nurse to notify provider when observation goals have been met and patient is ready for discharge.  Problem: Plan of Care - These are the overarching goals to be used throughout the patient stay.    Goal: Readiness for Transition of Care  Outcome: Ongoing, Not Progressing   Goal Outcome Evaluation:        Patient's right arm and leg weak, although she is able to ambulate with assist.  Pain medications given: IV morphine, oxycodone oral, robaxin, voltaren gel, lidocaine patch.    Patient became upset when told to try the oral pain medicine first, wanted to get another nurse.  Then charge nurse spoke with patient and she understood that she can only get the IV mophine every 6 hours.  She refused therapy, and when staff offered to assist with ambulation.  Pain ranges from 6-10.  Will continue to monitor.

## 2022-10-07 NOTE — H&P
"[facility]    History and Physical - Hospitalist Service       Date of Admission:  10/6/2022    Assessment & Plan      Sarah Rahman is a 57 year old female admitted on 10/6/2022. Shecarry hx of rt sided wekaness     From stroke 1 year ago , dm type 2, htn , chronic paina nd chronic narcotic use ,who presented with rt sided  wakness with negative imaging   Unclear if from previous stroke 1 year ago then she start asking for iv pain meds etc     TIA r/o CVA    ? Pain seeking behavior     Dm type 2    HTN    Chronic pain    Chronic narcotic use     Plan;   On asa ,will get cho study  Anticoagulation per neurology ,d/w neurology ,they agree with diagnosis of narctic seeking behavior      Diet: Combination Diet Regular Diet; 2 gm NA Diet; Low Saturated Fat Diet    DVT Prophylaxis: Pneumatic Compression Devices  Diehl Catheter: Not present  Central Lines: None  Cardiac Monitoring: None  Code Status: Full Code      Clinically Significant Risk Factors Present on Admission                 # Hypertension: home medication list includes antihypertensive(s)    # DMII: A1C = N/A within past 3 months  # Obesity: Estimated body mass index is 30.85 kg/m  as calculated from the following:    Height as of this encounter: 1.702 m (5' 7\").    Weight as of this encounter: 89.4 kg (197 lb).        Disposition Plan      Expected Discharge Date: 10/07/2022                The patient's care was discussed with the Patient.    Martha Pascual MD  Hospitalist Service  [facility]  Securely message with the Vocera Web Console (learn more here)  Text page via Group[employer] Paging/Directory         ______________________________________________________________________    Chief Complaint   Rt sided wekness     History is obtained from the patient    History of Present Illness   Sarah Rahman is a 57 year old female who presented with rt sided weakness    Review of Systems    The 10 point Review " of Systems is negative other than noted in the HPI     Past Medical History    I have reviewed this patient's medical history and updated it with pertinent information if needed.   Past Medical History:   Diagnosis Date     Asthma      CAD (coronary artery disease)      COPD (chronic obstructive pulmonary disease) (H)      CVA (cerebral infarction)      Diabetes (H)      GERD (gastroesophageal reflux disease)      Hypertension      Polysubstance abuse (H)      S/P CABG (coronary artery bypass graft)      S/P lumbar discectomy 06/13/2019    L5/S1 by  Dr. Hamm at [facility]     Schizoaffective disorder (H)        Past Surgical History   I have reviewed this patient's surgical history and updated it with pertinent information if needed.  Past Surgical History:   Procedure Laterality Date     BYPASS GRAFT ARTERY CORONARY  2009    x2     CORONARY STENT PLACEMENT       CV CORONARY ANGIOGRAM N/A 6/2/2021    Procedure: Coronary Angiogram;  Surgeon: Juventino Rivera MD;  Location: [facility];  Service: Cardiology     HYSTERECTOMY TOTAL ABDOMINAL, BILATERAL SALPINGO-OOPHORECTOMY, COMBINED       IR TRANSLAMINAR EPIDURAL LUMBAR INJ INCL IMAGING  9/27/2022     ORIF ULNAR / RADIAL SHAFT FRACTURE Right        Social History   I have reviewed this patient's social history and updated it with pertinent information if needed.  Social History     Tobacco Use     Smoking status: Current Every Day Smoker     Packs/day: 0.50     Types: Cigarettes     Smokeless tobacco: Never Used   Vaping Use     Vaping Use: Never used   Substance Use Topics     Alcohol use: Not Currently     Comment: Alcoholic Drinks/day: occ     Drug use: No       Family History   I have reviewed this patient's family history and updated it with pertinent information if needed.  Family History   Problem Relation Age of Onset     Heart Disease Mother      Heart Disease Father      Heart Disease Sister      Diabetes Brother      Heart Disease  Sister        Prior to Admission Medications   Prior to Admission Medications   Prescriptions Last Dose Informant Patient Reported? Taking?   Alcohol Swabs PADS Unknown at Unknown time  No Yes   Sig: Use to swab the area of the injection or zac as directed Per insurance coverage   Continuous Blood Gluc Sensor (FREESTYLE SHEBA 14 DAY SENSOR) [facility]   No No   Si each every 14 days Use 1 Sensor every 14 days. Use to read blood sugars per 's instructions.   DULoxetine (CYMBALTA) 30 MG capsule 10/5/2022 at PM  Yes Yes   Sig: Take 30 mg by mouth 2 times daily   acetaminophen (TYLENOL) 325 MG tablet Unknown at Unknown time  Yes Yes   Sig: Take 650 mg by mouth every 8 hours as needed for mild pain   albuterol (PROAIR HFA) 108 (90 BASE) MCG/ACT inhaler Unknown at Unknown time  Yes Yes   Sig: Inhale 1-2 puffs into the lungs every 4 hours as needed   alcohol swab prep pads Unknown at Unknown time  No Yes   Sig: Use to swab area of injection/zac as directed.   amLODIPine (NORVASC) 5 MG tablet 10/5/2022 at PM  No Yes   Sig: Take 1 tablet (5 mg) by mouth 2 times daily   amitriptyline (ELAVIL) 25 MG tablet 10/5/2022 at PM  No Yes   Sig: Take 1 tablet (25 mg) by mouth At Bedtime   aspirin (ASA) 325 MG EC tablet 10/5/2022 at AM  Yes Yes   Sig: Take 325 mg by mouth daily    blood glucose (ACCU-CHEK DARRIN PLUS) test strip Unknown at Unknown time  No Yes   Sig: Use to test blood sugar 3-4 times daily or as directed.   blood glucose (ACCU-CHEK SOFTCLIX) lancing device Unknown at Unknown time  No Yes   Sig: Lancing device to be used with lancets.   blood glucose (NO BRAND SPECIFIED) lancets standard Unknown at Unknown time  No Yes   Sig: To use to test glucose level in the blood Use to test blood sugar  4  times daily as directed. To accompany glucose monitor brands per insurance coverage.   blood glucose (NO BRAND SPECIFIED) test strip Unknown at Unknown time  No Yes   Sig: To use to test glucose level in the blood Use  to test blood sugar  4 times daily as directed. To accompany glucose monitor brands per insurance coverage.   blood glucose calibration (NO BRAND SPECIFIED) solution Unknown at Unknown time  No Yes   Sig: Used to calibrate the blood glucose monitor as needed and as directed.  To accompany  blood glucose brands per insurance coverage   blood glucose monitoring (NO BRAND SPECIFIED) meter device kit Unknown at Unknown time  No Yes   Sig: Use as directed Per insurance coverage   blood glucose monitoring (SOFTCLIX) lancets Unknown at Unknown time  No Yes   Sig: Use to test blood sugar 3-4 times daily.   budesonide-formoterol (SYMBICORT) 160-4.5 MCG/ACT Inhaler 10/5/2022 at PM  Yes Yes   Sig: Inhale 2 puffs into the lungs 2 times daily   carvedilol (COREG) 25 MG tablet 10/5/2022 at PM  No Yes   Sig: Take 1 tablet (25 mg) by mouth 2 times daily (with meals)   cetirizine (ZYRTEC) 10 MG tablet 10/5/2022 at AM  Yes Yes   Sig: Take 10 mg by mouth daily   diclofenac (VOLTAREN) 1 % topical gel Unknown at Unknown time  Yes Yes   Sig: Apply 2 g topically 3 times daily as needed for moderate pain (feet)   exenatide ER (BYDUREON BCISE) 2 MG/0.85ML auto-injector Past Week at Unknown time  Yes Yes   Sig: Inject 2 mg Subcutaneous every 7 days On Mondays   fluticasone (FLONASE) 50 MCG/ACT nasal spray 10/5/2022 at AM  Yes Yes   Sig: Spray 1 spray into both nostrils daily   furosemide (LASIX) 20 MG tablet 10/5/2022 at AM  Yes Yes   Sig: Take 20 mg by mouth daily as needed (swelling)   glipiZIDE (GLUCOTROL XL) 5 MG 24 hr tablet 10/5/2022 at AM  No Yes   Sig: Take 2 tablets (10 mg) by mouth daily   insulin glargine (LANTUS PEN) 100 UNIT/ML pen 10/5/2022 at PM  No Yes   Sig: Inject 25 Units Subcutaneous 2 times daily   insulin pen needle (32G X 4 MM) 32G X 4 MM miscellaneous Unknown at Unknown time  No Yes   Sig: Use 1 pen needles daily or as directed.   ipratropium - albuterol 0.5 mg/2.5 mg/3 mL (DUONEB) 0.5-2.5 (3) MG/3ML neb solution  Unknown at Unknown time  Yes Yes   Sig: Take 1 vial by nebulization every 6 hours as needed for shortness of breath / dyspnea or wheezing   isosorbide mononitrate (IMDUR) 30 MG 24 hr tablet   No Yes   Sig: Take 1 tablet (30 mg) by mouth daily   lidocaine (LIDODERM) 5 % patch 10/5/2022 at AM  Yes Yes   Sig: Place 1 patch onto the skin every 24 hours To prevent lidocaine toxicity, patient should be patch free for 12 hrs daily. BACK   methocarbamol (ROBAXIN) 500 MG tablet Unknown at Unknown time  Yes Yes   Sig: Take 500 mg by mouth 4 times daily as needed for muscle spasms   montelukast (SINGULAIR) 10 MG tablet 10/5/2022 at PM  Yes Yes   Sig: Take 10 mg by mouth At Bedtime   naloxone (NARCAN) 4 MG/0.1ML nasal spray Unknown at Unknown time  Yes Yes   Sig: Spray 4 mg into one nostril alternating nostrils as needed for opioid reversal every 2-3 minutes until assistance arrives   nitroGLYcerin (NITROSTAT) 0.4 MG sublingual tablet Unknown at Unknown time  Yes Yes   Sig: Place 0.4 mg under the tongue every 5 minutes as needed for chest pain For chest pain place 1 tablet under the tongue every 5 minutes for 3 doses. If symptoms persist 5 minutes after 1st dose call 911.   nortriptyline (PAMELOR) 10 MG capsule 10/5/2022 at PM  Yes Yes   Sig: Take 10 mg by mouth At Bedtime   omeprazole 20 MG tablet 10/5/2022 at AM  Yes Yes   Sig: Take 20 mg by mouth daily   ondansetron (ZOFRAN) 8 MG tablet Unknown at Unknown time  Yes Yes   Sig: Take 8 mg by mouth every 8 hours as needed   oxyCODONE-acetaminophen (PERCOCET)  MG per tablet 10/5/2022 at PM  Yes Yes   Sig: Take 1 tablet by mouth every 6 hours as needed for severe pain   polyethylene glycol (MIRALAX) 17 GM/Dose powder 10/5/2022 at AM  No Yes   Sig: Take 17 g by mouth daily   rosuvastatin (CRESTOR) 20 MG tablet 10/5/2022 at PM  No Yes   Sig: Take 2 tablets (40 mg) by mouth daily   sennosides (SENOKOT) 8.6 MG tablet 10/5/2022 at Unknown time  No Yes   Sig: Take 2 tablets by  "mouth 2 times daily   urea (DERMAL THERAPY FINGER CARE) 20 % external lotion Unknown at Unknown time  Yes Yes   Sig: Externally apply topically 2 times daily as needed Feet   valsartan (DIOVAN) 80 MG tablet 10/5/2022 at AM  No Yes   Sig: Take 1 tablet (80 mg) by mouth daily      Facility-Administered Medications: None     Allergies   Allergies   Allergen Reactions     Haloperidol Unknown     Patient states it stops her heart       Meperidine Angioedema, Difficulty breathing, Other (See Comments), Rash and Shortness Of Breath     Throat closes  Other reaction(s): Breathing Difficulty  Other reaction(s): Breathing Difficulty       Phenothiazines Other (See Comments)     Other reaction(s): CARDIAC DISTURBANCES  Patient states it stops her heart  \"I swell up\"  \"stopped by heart\"  \"I swell up\"  \"I swell up\"       Tramadol Other (See Comments)     Other reaction(s): Angioedema  Throat itch       Butyrophenones Angioedema     Januvia [Sitagliptin] Other (See Comments)     Swelling in the neck      Latex Itching     Lisinopril Other (See Comments)     ACE cough  Itchy throat  Throat itches  Itchy throat       Penicillins Swelling     Hydroxyzine Other (See Comments) and Rash     Tongue swelling  Tongue swelling  Tongue swelling         Physical Exam   Vital Signs: Temp: 98.7  F (37.1  C) Temp src: Oral BP: 124/73 Pulse: 80   Resp: 20 SpO2: 97 % O2 Device: None (Room air)    Weight: 197 lbs 0 oz    General Appearance: alert, no distress  Eyes: eomi  HEENT: wnl  Respiratory: clear to auscultation  Cardiovascular: RRR  GI: ssoft ,no masses  Lymph/Hematologic: no lymphadenoapthy  Genitourinary:deferred  Skin: no rash  Musculoskeletal: no joint swelling   Neurologic: rt sided weakness   Psychiatric: borderline personality    Data   Data reviewed today: I reviewed all medications, new labs and imaging results over the last 24 hours. I personally reviewed no images or EKG's today.    Recent Labs   Lab 10/06/22  1602 10/06/22  0918 " 10/06/22  0838   WBC  --  7.5  --    HGB  --  11.0*  --    MCV  --  87  --    PLT  --  287  --    INR  --  1.04  --    NA  --  137  --    POTASSIUM  --  4.1  --    CHLORIDE  --  104  --    CO2  --  22  --    BUN  --  15.5  --    CR  --  0.70  --    ANIONGAP  --  11  --    MAXIMO  --  8.6  --    * 187* 202*

## 2022-10-07 NOTE — PROGRESS NOTES
"PRIMARY DIAGNOSIS: \"GENERIC\" NURSING  OUTPATIENT/OBSERVATION GOALS TO BE MET BEFORE DISCHARGE:  1. ADLs back to baseline: Yes    2. Activity and level of assistance: Up with standby assistance.    3. Pain status: Improved-controlled with oral pain medications.    4. Return to near baseline physical activity: Yes     Discharge Planner Nurse   Safe discharge environment identified: Yes  Barriers to discharge: No       Entered by: Thao Bo RN 10/06/2022 10:14 PM     Please review provider order for any additional goals.   Nurse to notify provider when observation goals have been met and patient is ready for discharge.    Pt noted to be using phone with both hands without difficulty upon writer arrival to room. Rates pain 10/10 while calmly texting.   "

## 2022-10-08 ENCOUNTER — APPOINTMENT (OUTPATIENT)
Dept: MRI IMAGING | Facility: HOSPITAL | Age: 57
End: 2022-10-08
Attending: PSYCHIATRY & NEUROLOGY
Payer: COMMERCIAL

## 2022-10-08 ENCOUNTER — APPOINTMENT (OUTPATIENT)
Dept: PHYSICAL THERAPY | Facility: HOSPITAL | Age: 57
End: 2022-10-08
Attending: HOSPITALIST
Payer: COMMERCIAL

## 2022-10-08 ENCOUNTER — APPOINTMENT (OUTPATIENT)
Dept: OCCUPATIONAL THERAPY | Facility: HOSPITAL | Age: 57
End: 2022-10-08
Attending: HOSPITALIST
Payer: COMMERCIAL

## 2022-10-08 VITALS
OXYGEN SATURATION: 98 % | TEMPERATURE: 98.5 F | HEART RATE: 75 BPM | HEIGHT: 67 IN | RESPIRATION RATE: 18 BRPM | BODY MASS INDEX: 31.71 KG/M2 | WEIGHT: 202 LBS | SYSTOLIC BLOOD PRESSURE: 129 MMHG | DIASTOLIC BLOOD PRESSURE: 59 MMHG

## 2022-10-08 LAB
GLUCOSE BLDC GLUCOMTR-MCNC: 136 MG/DL (ref 70–99)
GLUCOSE BLDC GLUCOMTR-MCNC: 154 MG/DL (ref 70–99)

## 2022-10-08 PROCEDURE — G0378 HOSPITAL OBSERVATION PER HR: HCPCS

## 2022-10-08 PROCEDURE — 82962 GLUCOSE BLOOD TEST: CPT

## 2022-10-08 PROCEDURE — 255N000002 HC RX 255 OP 636: Performed by: HOSPITALIST

## 2022-10-08 PROCEDURE — 250N000013 HC RX MED GY IP 250 OP 250 PS 637: Performed by: HOSPITALIST

## 2022-10-08 PROCEDURE — 250N000013 HC RX MED GY IP 250 OP 250 PS 637: Performed by: INTERNAL MEDICINE

## 2022-10-08 PROCEDURE — 97166 OT EVAL MOD COMPLEX 45 MIN: CPT | Mod: GO

## 2022-10-08 PROCEDURE — 97530 THERAPEUTIC ACTIVITIES: CPT | Mod: GP

## 2022-10-08 PROCEDURE — A9585 GADOBUTROL INJECTION: HCPCS | Performed by: HOSPITALIST

## 2022-10-08 PROCEDURE — 72156 MRI NECK SPINE W/O & W/DYE: CPT

## 2022-10-08 PROCEDURE — 97535 SELF CARE MNGMENT TRAINING: CPT | Mod: GO

## 2022-10-08 PROCEDURE — 97161 PT EVAL LOW COMPLEX 20 MIN: CPT | Mod: GP

## 2022-10-08 PROCEDURE — 96376 TX/PRO/DX INJ SAME DRUG ADON: CPT

## 2022-10-08 PROCEDURE — 96372 THER/PROPH/DIAG INJ SC/IM: CPT

## 2022-10-08 PROCEDURE — 250N000013 HC RX MED GY IP 250 OP 250 PS 637: Performed by: PSYCHIATRY & NEUROLOGY

## 2022-10-08 PROCEDURE — 250N000011 HC RX IP 250 OP 636: Performed by: INTERNAL MEDICINE

## 2022-10-08 PROCEDURE — 99215 OFFICE O/P EST HI 40 MIN: CPT | Performed by: PSYCHIATRY & NEUROLOGY

## 2022-10-08 PROCEDURE — 99217 PR OBSERVATION CARE DISCHARGE: CPT | Performed by: HOSPITALIST

## 2022-10-08 RX ORDER — CARVEDILOL 12.5 MG/1
12.5 TABLET ORAL 2 TIMES DAILY WITH MEALS
Qty: 60 TABLET | Refills: 0 | COMMUNITY
Start: 2022-10-08 | End: 2023-12-17

## 2022-10-08 RX ORDER — GADOBUTROL 604.72 MG/ML
9 INJECTION INTRAVENOUS ONCE
Status: COMPLETED | OUTPATIENT
Start: 2022-10-08 | End: 2022-10-08

## 2022-10-08 RX ORDER — CLOPIDOGREL BISULFATE 75 MG/1
75 TABLET ORAL DAILY
Qty: 30 TABLET | Refills: 0 | Status: SHIPPED | OUTPATIENT
Start: 2022-10-08 | End: 2022-11-17

## 2022-10-08 RX ORDER — DIAZEPAM 5 MG
5 TABLET ORAL
Status: COMPLETED | OUTPATIENT
Start: 2022-10-08 | End: 2022-10-08

## 2022-10-08 RX ORDER — VALSARTAN 40 MG/1
40 TABLET ORAL DAILY
Qty: 30 TABLET | Refills: 0 | Status: SHIPPED | OUTPATIENT
Start: 2022-10-08 | End: 2022-11-17

## 2022-10-08 RX ADMIN — Medication 2 G: at 06:11

## 2022-10-08 RX ADMIN — MORPHINE SULFATE 1 MG: 2 INJECTION, SOLUTION INTRAMUSCULAR; INTRAVENOUS at 06:53

## 2022-10-08 RX ADMIN — ACETAMINOPHEN 975 MG: 325 TABLET, FILM COATED ORAL at 14:08

## 2022-10-08 RX ADMIN — CETIRIZINE HYDROCHLORIDE 5 MG: 5 TABLET ORAL at 08:09

## 2022-10-08 RX ADMIN — GLIPIZIDE 10 MG: 10 TABLET, FILM COATED, EXTENDED RELEASE ORAL at 08:08

## 2022-10-08 RX ADMIN — OXYCODONE HYDROCHLORIDE 10 MG: 5 TABLET ORAL at 03:58

## 2022-10-08 RX ADMIN — MORPHINE SULFATE 1 MG: 2 INJECTION, SOLUTION INTRAMUSCULAR; INTRAVENOUS at 00:58

## 2022-10-08 RX ADMIN — CARVEDILOL 12.5 MG: 12.5 TABLET, FILM COATED ORAL at 08:06

## 2022-10-08 RX ADMIN — METHOCARBAMOL 500 MG: 500 TABLET, FILM COATED ORAL at 11:52

## 2022-10-08 RX ADMIN — ACETAMINOPHEN 975 MG: 325 TABLET, FILM COATED ORAL at 08:05

## 2022-10-08 RX ADMIN — LIDOCAINE PATCH 4% 1 PATCH: 40 PATCH TOPICAL at 08:12

## 2022-10-08 RX ADMIN — METHOCARBAMOL 500 MG: 500 TABLET, FILM COATED ORAL at 06:11

## 2022-10-08 RX ADMIN — PANTOPRAZOLE SODIUM 40 MG: 40 TABLET, DELAYED RELEASE ORAL at 08:06

## 2022-10-08 RX ADMIN — VALSARTAN 40 MG: 40 TABLET, FILM COATED ORAL at 08:07

## 2022-10-08 RX ADMIN — ISOSORBIDE MONONITRATE 30 MG: 30 TABLET, EXTENDED RELEASE ORAL at 08:07

## 2022-10-08 RX ADMIN — OXYCODONE HYDROCHLORIDE 10 MG: 5 TABLET ORAL at 10:06

## 2022-10-08 RX ADMIN — CLOPIDOGREL BISULFATE 75 MG: 75 TABLET ORAL at 08:07

## 2022-10-08 RX ADMIN — INSULIN ASPART 1 UNITS: 100 INJECTION, SOLUTION INTRAVENOUS; SUBCUTANEOUS at 08:10

## 2022-10-08 RX ADMIN — DIAZEPAM 5 MG: 5 TABLET ORAL at 12:12

## 2022-10-08 RX ADMIN — DULOXETINE HYDROCHLORIDE 30 MG: 30 CAPSULE, DELAYED RELEASE ORAL at 08:16

## 2022-10-08 RX ADMIN — DIPHENHYDRAMINE HYDROCHLORIDE 25 MG: 25 CAPSULE ORAL at 03:58

## 2022-10-08 RX ADMIN — GADOBUTROL 9 ML: 604.72 INJECTION INTRAVENOUS at 13:43

## 2022-10-08 RX ADMIN — Medication 81 MG: at 08:09

## 2022-10-08 RX ADMIN — OXYCODONE HYDROCHLORIDE 10 MG: 5 TABLET ORAL at 16:04

## 2022-10-08 RX ADMIN — Medication 2 G: at 14:10

## 2022-10-08 ASSESSMENT — ACTIVITIES OF DAILY LIVING (ADL)
ADLS_ACUITY_SCORE: 36
ADLS_ACUITY_SCORE: 37
ADLS_ACUITY_SCORE: 37
ADLS_ACUITY_SCORE: 36
ADLS_ACUITY_SCORE: 37

## 2022-10-08 NOTE — PLAN OF CARE
"Patient had an MRI done.  She is rating her pain down to a 7/10, treated with oxycodone, tylenol, voltaren cream, and lidocaine patch.  Patient has stayed in bed since therapy this morning, will do therapy again.   PRIMARY DIAGNOSIS: \"GENERIC\" NURSING  OUTPATIENT/OBSERVATION GOALS TO BE MET BEFORE DISCHARGE:  1. ADLs back to baseline: No    2. Activity and level of assistance: Up with standby assistance.    3. Pain status: Improved-controlled with oral pain medications.    4. Return to near baseline physical activity: Yes     Discharge Planner Nurse   Safe discharge environment identified: Yes  Barriers to discharge: Yes       Entered by: Allison Nieevs RN 10/08/2022 3:54 PM     Please review provider order for any additional goals.   Nurse to notify provider when observation goals have been met and patient is ready for discharge.  Problem: Pain Chronic (Persistent)  Goal: Acceptable Pain Control and Functional Ability  Intervention: Develop Pain Management Plan  Recent Flowsheet Documentation  Taken 10/8/2022 1152 by Allison Nieves, RN  Pain Management Interventions: medication (see MAR)   Goal Outcome Evaluation:                        "

## 2022-10-08 NOTE — PLAN OF CARE
"Goal Outcome Evaluation:      Patient discharged at 1645.  Her friend here to pick her up and transport her home.  The Doctor had come in to tell her about the discharge around 1600, the patient immediately started making phone calls and waved the nurse away when attempted to check her blood pressure.  She did not have any questions for the doctor, and busy making arrangements while the doctor was in the room.  States, \"take out my IV!\" and holds out her hand, starts to get dressed as soon as the doctor is leaving.  The patient was ambulating in the room and changed into her own clothes at time of discharge, refused any help.       The patient speaking on speaker phone and refused to get off of the phone as the nurse, the author of this note, was giving her discharge instructions.  Patient states that she would leave as soon as her ride would get here, regardless of if her paperwork was given to her.   Gave paperwork to patient, explained to patient there is a consult for her to follow up with the spine clinic.  Gave discharge instructions and medication list, explained,to patient, she states she has no other questions and signed the discharge paperwork.  Prescriptions sent to her pharmacy at discharge, she is picking them up.                 "

## 2022-10-08 NOTE — DISCHARGE SUMMARY
[facility] MEDICINE  DISCHARGE SUMMARY     Primary Care Physician: Flako Rosen  Admission Date: 10/6/2022   Discharge Provider: Sunshine Fowler MD Discharge Date: 10/8/2022   Diet:   Active Diet and Nourishment Order   Procedures     Combination Diet Regular Diet; 2 gm NA Diet; Low Saturated Fat Diet     Diet       Code Status: Full Code   Activity: DCACTIVITY: Activity as tolerated        Condition at Discharge: Stable     REASON FOR PRESENTATION(See Admission Note for Details)   Right sided weakness, BLE pain    PRINCIPAL & ACTIVE DISCHARGE DIAGNOSES     Active Problems:    Type 2 diabetes mellitus with diabetic polyneuropathy, with long-term current use of insulin (H)    History of CVA (cerebrovascular accident)    Essential hypertension, benign    Chronic pain syndrome    Asthma in adult, unspecified asthma severity, uncomplicated    TIA (transient ischemic attack)      PENDING LABS     Unresulted Labs Ordered in the Past 30 Days of this Admission     No orders found from 9/6/2022 to 10/7/2022.          PROCEDURES ( this hospitalization only)      none    RECOMMENDATIONS TO OUTPATIENT PROVIDER FOR F/U VISIT     Follow-up Appointments     Follow-up and recommended labs and tests      Follow up with primary care provider, Flako Rosen, within 7 days for   hospital follow- up.  The following labs/tests are recommended: blood   pressure check.  Follow up with your pain clinic soon.  Follow up with the spine clinic soon. Call [facility]   at [phone] to schedule.             DISPOSITION     Home    SUMMARY OF HOSPITAL COURSE:      Sarah Rahman is a 57 year old female admitted on 10/6/2022. She has history of coronary artery disease status post CABG, previous stroke, asthma, GERD, hypertension, schizoaffective disorder, substance abuse presented for evaluation of right-sided weakness and numbness. Frequent presentations for same. Hospital Day:  3     #Right-sided weakness and numbness  Patient complains of new onset right-sided weakness, involving face, arm, leg. I admitted her for same symptoms in July and she is frequently seen with these complaints in ED, but she tells me issue is new.  MRI showing unchanged small chronic lacunar infarctions, moderate chronic microvascular ischemic change. 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  TIA possible. BPs were running low, may have contributed. BP meds adjusted.  Neurology starting her on DAPT given possible TIA, recommending continue indefinitely unless side effects.  MRI C spine with multilevel stenosis ranging from moderate to severe. Unclear if contributing to symptoms. Patient can follow up with Neurosurgery as outpatient.  Patient with ongoing fluctuating weakness. Difficult to sort out organic weakness and some somatiform spectrum weakness. Patient does have schizoaffective disorder, nonorganic findings may or may not be voluntary (some question of secondary gain from hospital stay, she may be out of oxycodone at home). At any rate presentation seems to be stable, no new concerning findings, and PT felt that patient could safely ambulate with use of walker and someone to help her in and out of the house. Of note patient declined to use a walker stating she does not need it. She does have family coming to stay with her.     #Chronic pain syndrome  Complains of acutely worse BLE pain, tingling, burning  Follows with pain clinic ([facility])  Outside chart reviewed- patient had called pain clinic for oxycodone refill 10/3 as she was almost out but was told she could not  until 10/8. Possible that she is here due to running out of meds at home.  Just saw our Pain Team here ~10 days ago  Resumed interventions advised last time by Pain Team: topical Voltaren, Robaxin, scheduled tylenol, home Cymbalta, home amitriptyline/nortriptyline (does take both), ice,  rest  Lidocaine patch ordered  Patient recently had Medrol dosepak and spinal steroid injection, so did not repeat these  She does not tolerate Lyrica or gabapentin  Patient should follow up as outpatient with Neurosurgery. Could consider EMG testing due to difficult to sort out weakness concerns.  Follow up with her Pain Clinic. For purposes of pill accounting, note that patient received 2 days worth (8 doses) of 10mg oxycodone here and was on her home schedule (4 tabs per day).      #DM  Home regimen glipizide, Lantus 25 units nightly (documented as BID on med rec but apparently this is incorrect), Bydureon.  Continue home meds  A1c 11.3 shows poor control. This may be the cause of her worsening neuropathy symptoms.     #HTN  BP running low, as above concern may have contributed to TIA episode  Decreased home Coreg and valsartan to half doses, sent smaller size tabs to pharmacy at patient request  Stop home amlodipine     #Hx CAD s/p CABG x2 in 2009  Home aspirin, statin, Imdur. Apparently she does not take Imdur as prescribed (was recommended by cardiology in recent hospital stay)      Discharge Medications with Med changes:     Current Discharge Medication List      START taking these medications    Details   clopidogrel (PLAVIX) 75 MG tablet Take 1 tablet (75 mg) by mouth daily  Qty: 30 tablet, Refills: 0    Associated Diagnoses: TIA (transient ischemic attack)         CONTINUE these medications which have CHANGED    Details   aspirin (ASA) 81 MG EC tablet Take 1 tablet (81 mg) by mouth daily  Qty: 90 tablet, Refills: 0    Associated Diagnoses: TIA (transient ischemic attack)      carvedilol (COREG) 12.5 MG tablet Take 1 tablet (12.5 mg) by mouth 2 times daily (with meals)  Qty: 60 tablet, Refills: 0    Associated Diagnoses: Essential hypertension, benign      valsartan (DIOVAN) 40 MG tablet Take 1 tablet (40 mg) by mouth daily  Qty: 30 tablet, Refills: 0    Associated Diagnoses: Essential hypertension, benign          CONTINUE these medications which have NOT CHANGED    Details   acetaminophen (TYLENOL) 325 MG tablet Take 650 mg by mouth every 8 hours as needed for mild pain      albuterol (PROAIR HFA) 108 (90 BASE) MCG/ACT inhaler Inhale 1-2 puffs into the lungs every 4 hours as needed      !! alcohol swab prep pads Use to swab area of injection/zac as directed.  Qty: 100 each, Refills: 6    Associated Diagnoses: Type 2 diabetes mellitus with diabetic polyneuropathy, with long-term current use of insulin (H)      !! Alcohol Swabs PADS Use to swab the area of the injection or zac as directed Per insurance coverage  Qty: 100 each, Refills: 0    Associated Diagnoses: Type 2 diabetes mellitus with diabetic polyneuropathy, with long-term current use of insulin (H)      amitriptyline (ELAVIL) 25 MG tablet Take 1 tablet (25 mg) by mouth At Bedtime  Qty: 90 tablet, Refills: 3    Associated Diagnoses: Diabetic polyneuropathy associated with type 2 diabetes mellitus (H)      !! blood glucose (ACCU-CHEK DARRIN PLUS) test strip Use to test blood sugar 3-4 times daily or as directed.  Qty: 100 strip, Refills: 0    Associated Diagnoses: Type 2 diabetes mellitus with diabetic polyneuropathy, with long-term current use of insulin (H)      blood glucose (ACCU-CHEK SOFTCLIX) lancing device Lancing device to be used with lancets.  Qty: 1 each, Refills: 0    Associated Diagnoses: Type 2 diabetes mellitus with diabetic polyneuropathy, with long-term current use of insulin (H)      blood glucose (NO BRAND SPECIFIED) lancets standard To use to test glucose level in the blood Use to test blood sugar  4  times daily as directed. To accompany glucose monitor brands per insurance coverage.  Qty: 100 each, Refills: 0    Associated Diagnoses: Type 2 diabetes mellitus with diabetic polyneuropathy, with long-term current use of insulin (H)      !! blood glucose (NO BRAND SPECIFIED) test strip To use to test glucose level in the blood Use to test  blood sugar  4 times daily as directed. To accompany glucose monitor brands per insurance coverage.  Qty: 100 strip, Refills: 0    Associated Diagnoses: Type 2 diabetes mellitus with diabetic polyneuropathy, with long-term current use of insulin (H)      blood glucose calibration (NO BRAND SPECIFIED) solution Used to calibrate the blood glucose monitor as needed and as directed.  To accompany  blood glucose brands per insurance coverage  Qty: 2 each, Refills: 0    Associated Diagnoses: Type 2 diabetes mellitus with diabetic polyneuropathy, with long-term current use of insulin (H)      blood glucose monitoring (NO BRAND SPECIFIED) meter device kit Use as directed Per insurance coverage  Qty: 1 kit, Refills: 0    Associated Diagnoses: Type 2 diabetes mellitus with diabetic polyneuropathy, with long-term current use of insulin (H)      blood glucose monitoring (SOFTCLIX) lancets Use to test blood sugar 3-4 times daily.  Qty: 100 each, Refills: 1    Associated Diagnoses: Type 2 diabetes mellitus with diabetic polyneuropathy, with long-term current use of insulin (H)      budesonide-formoterol (SYMBICORT) 160-4.5 MCG/ACT Inhaler Inhale 2 puffs into the lungs 2 times daily      cetirizine (ZYRTEC) 10 MG tablet Take 10 mg by mouth daily      diclofenac (VOLTAREN) 1 % topical gel Apply 2 g topically 3 times daily as needed for moderate pain (feet)      DULoxetine (CYMBALTA) 30 MG capsule Take 30 mg by mouth 2 times daily      exenatide ER (BYDUREON BCISE) 2 MG/0.85ML auto-injector Inject 2 mg Subcutaneous every 7 days On Mondays      fluticasone (FLONASE) 50 MCG/ACT nasal spray Spray 1 spray into both nostrils daily      furosemide (LASIX) 20 MG tablet Take 20 mg by mouth daily as needed (swelling)      glipiZIDE (GLUCOTROL XL) 5 MG 24 hr tablet Take 2 tablets (10 mg) by mouth daily  Qty: 30 tablet, Refills: 0    Associated Diagnoses: Type 2 diabetes mellitus with diabetic polyneuropathy, with long-term current use of  insulin (H)      insulin glargine (LANTUS PEN) 100 UNIT/ML pen Inject 25 Units Subcutaneous 2 times daily  Qty: 30 mL, Refills: 0    Comments: If Lantus is not covered by insurance, may substitute Basaglar or Semglee or other insulin glargine product per insurance preference at same dose and frequency.    Associated Diagnoses: Type 2 diabetes mellitus with diabetic polyneuropathy, with long-term current use of insulin (H)      insulin pen needle (32G X 4 MM) 32G X 4 MM miscellaneous Use 1 pen needles daily or as directed.  Qty: 100 each, Refills: 0    Associated Diagnoses: Type 2 diabetes mellitus with diabetic polyneuropathy, with long-term current use of insulin (H)      ipratropium - albuterol 0.5 mg/2.5 mg/3 mL (DUONEB) 0.5-2.5 (3) MG/3ML neb solution Take 1 vial by nebulization every 6 hours as needed for shortness of breath / dyspnea or wheezing      isosorbide mononitrate (IMDUR) 30 MG 24 hr tablet Take 1 tablet (30 mg) by mouth daily  Qty: 30 tablet, Refills: 0    Associated Diagnoses: Essential hypertension, benign      lidocaine (LIDODERM) 5 % patch Place 1 patch onto the skin every 24 hours To prevent lidocaine toxicity, patient should be patch free for 12 hrs daily. BACK      methocarbamol (ROBAXIN) 500 MG tablet Take 500 mg by mouth 4 times daily as needed for muscle spasms      montelukast (SINGULAIR) 10 MG tablet Take 10 mg by mouth At Bedtime      naloxone (NARCAN) 4 MG/0.1ML nasal spray Spray 4 mg into one nostril alternating nostrils as needed for opioid reversal every 2-3 minutes until assistance arrives      nitroGLYcerin (NITROSTAT) 0.4 MG sublingual tablet Place 0.4 mg under the tongue every 5 minutes as needed for chest pain For chest pain place 1 tablet under the tongue every 5 minutes for 3 doses. If symptoms persist 5 minutes after 1st dose call 911.      nortriptyline (PAMELOR) 10 MG capsule Take 10 mg by mouth At Bedtime      omeprazole 20 MG tablet Take 20 mg by mouth daily       ondansetron (ZOFRAN) 8 MG tablet Take 8 mg by mouth every 8 hours as needed      oxyCODONE-acetaminophen (PERCOCET)  MG per tablet Take 1 tablet by mouth every 6 hours as needed for severe pain      polyethylene glycol (MIRALAX) 17 GM/Dose powder Take 17 g by mouth daily  Qty: 510 g, Refills: 0    Associated Diagnoses: Constipation, unspecified constipation type      rosuvastatin (CRESTOR) 20 MG tablet Take 2 tablets (40 mg) by mouth daily  Qty: 30 tablet, Refills: 0    Associated Diagnoses: Dyslipidemia      sennosides (SENOKOT) 8.6 MG tablet Take 2 tablets by mouth 2 times daily  Qty: 60 tablet, Refills: 0    Associated Diagnoses: Constipation, unspecified constipation type      urea (DERMAL THERAPY FINGER CARE) 20 % external lotion Externally apply topically 2 times daily as needed Feet      Continuous Blood Gluc Sensor (Quotations BookSTYLE SHEBA 14 DAY SENSOR) MISC 1 each every 14 days Use 1 Sensor every 14 days. Use to read blood sugars per 's instructions.  Qty: 2 each, Refills: 5    Associated Diagnoses: Type 2 diabetes mellitus with hyperglycemia, with long-term current use of insulin (H)       !! - Potential duplicate medications found. Please discuss with provider.      STOP taking these medications       amLODIPine (NORVASC) 5 MG tablet Comments:   Reason for Stopping:                 Consults     NEUROLOGY IP CONSULT  OCCUPATIONAL THERAPY ADULT IP CONSULT  PHYSICAL THERAPY ADULT IP CONSULT      SIGNIFICANT IMAGING FINDINGS     Results for orders placed or performed during the hospital encounter of 10/06/22   CTA Head Neck with Contrast    Impression    CONCLUSION:  HEAD CT:  1.  No intracranial hemorrhage, mass, or definite CT evidence of recent ischemia.    HEAD CTA:  1.  No interval change versus 4/3/2022.  2.  Severe narrowing of the proximal basilar artery is unchanged.  3.  Anterior circulation atherosclerosis predominantly affecting the carotid siphons without high-grade narrowing.  4.   No proximal large vessel occlusion.    NECK CTA:  1.  Mild carotid artery atherosclerosis without hemodynamically significant narrowing in either vessel by NASCET criteria.  2.  Unchanged moderate to severe proximal left vertebral artery narrowing.    Noncontrast head CT findings were discussed with Dr. Koko Koch via telephone at 857 hours, and CTA results were discussed with Dr. Koko Koch via telephone at 916 hours on 10/6/2022.     MR Brain w/o Contrast    Impression    IMPRESSION:  1.  Atrophy and chronic small vessel vascular/lacunar changes.    2.  Negative for acute intracranial process.   MR Cervical Spine w/o & w Contrast    Impression    IMPRESSION:  1.  Multilevel cervical spondylosis.  2.  At C4-C5, moderate to severe right neural foraminal stenosis, moderate left neural foraminal stenosis, and mild spinal canal stenosis.  3.  At C5-C6, moderate spinal canal stenosis and moderate bilateral neural foraminal stenosis.  4.  No abnormal cord signal.         SIGNIFICANT LABORATORY FINDINGS     See emr    Discharge Orders        Reason for your hospital stay    TIA     Follow-up and recommended labs and tests    Follow up with primary care provider, Flako Rosen, within 7 days for hospital follow- up.  The following labs/tests are recommended: blood pressure check.  Follow up with your pain clinic soon.  Follow up with the spine clinic soon. Call [facility] at [phone] to schedule.     Activity    Your activity upon discharge: activity as tolerated. Use walker for all mobility. Recommend someone to assist you getting into and out of your house.     When to contact your care team    Call your primary doctor if you have any of the following: chest pain, shortness of breath, fever, chills, fainting, dizziness, bleeding, or worsening stroke symptoms, or any other symptoms that are new or concerning to you.     Diet    Follow this diet upon discharge: diabetic        Examination   Physical Exam   Temp:  [97.9  F (36.6  C)-99  F (37.2  C)] 98.5  F (36.9  C)  Pulse:  [75-94] 75  Resp:  [18-20] 18  BP: (107-160)/(59-86) 129/59  SpO2:  [95 %-98 %] 98 %  Wt Readings from Last 1 Encounters:   10/08/22 91.6 kg (202 lb)       General: in no apparent distress, non-toxic and alert female lying in hospital bed oriented x3  HEENT: Head normocephalic atraumatic, oral mucosa moist. Sclerae anicteric  Skin: No rashes or lesions  Extremities: No peripheral edema  Psych: Normal affect, anxious mood  Neuro: CNII-XII grossly intact, moving all 4 extremities. Using both hands very well despite elaborate artificial nails in place.    Please see EMR for more detailed significant labs, imaging, consultant notes etc.    I, Sunshine Fowler MD, personally saw the patient today and spent greater than 30 minutes discharging this patient.    Sunshine Fowler MD  [facility]    CC:Flako Rosen

## 2022-10-08 NOTE — PROGRESS NOTES
10/08/22 0935   Quick Adds   Quick Adds Certification   Type of Visit Initial Occupational Therapy Evaluation   Living Environment   People in Home alone   Current Living Arrangements apartment   Home Accessibility no concerns   Self-Care   Current Activity Tolerance poor   Equipment Currently Used at Home cane, straight;grab bar, tub/shower  (stated does not normally use AD,unsure if pt has FWW,)   Fall history within last six months yes  (stated R LE gives out and pt falls)   Number of times patient has fallen within last six months   (pt did not state)   Instrumental Activities of Daily Living (IADL)   IADL Comments reports PCA 6 hrs/day, stated sister will be staying w/ pt for up 1 month, questionable hx,   General Information   Onset of Illness/Injury or Date of Surgery 10/06/22   Referring Physician Sunshine Fowler   Additional Occupational Profile Info/Pertinent History of Current Problem Sarah Rahman is a 57 year old female admitted on 10/6/2022.  Hx of rt sided weakness, schizoaffective disorder, hx of substance abuse, TIA, chronic pain   Existing Precautions/Restrictions fall   Cognitive Status Examination   Affect/Mental Status (Cognitive)   (answers Q's, at times uncooperative w/ safety precautions)   Safety Deficit insight into deficits/self-awareness;awareness of need for assistance   Visual Perception   Visual Impairment/Limitations WFL   Range of Motion Comprehensive   General Range of Motion   (L UE AROM WFL, R UE PROM WFL)   Strength Comprehensive (MMT)   General Manual Muscle Testing (MMT) Assessment   (L UE strength WFL, R UE unable to move against gravity, weakness in hand as well,)   Coordination   Upper Extremity Coordination Right UE impaired   Bed Mobility   Bed Mobility supine-sit   Supine-Sit [place] (Bed Mobility) contact guard   Transfers   Transfers sit-stand transfer;bed-chair transfer   Transfer Skill: Bed to Chair/Chair to Bed   Bed-Chair [place] (Transfers) minimum  assist (75% patient effort)   Sit-Stand Transfer   Sit-Stand [place] (Transfers) minimum assist (75% patient effort)   Balance   Balance Assessment standing balance: dynamic   Balance Comments poor   Clinical Impression   Criteria for Skilled Therapeutic Interventions Met (OT) Yes, treatment indicated   OT Diagnosis decreased ADLs   OT Problem List-Impairments impacting ADL balance;cognition;coordination;strength;mobility;pain   Assessment of Occupational Performance 3-5 Performance Deficits   Identified Performance Deficits bed mob., trsfs, drsg   Planned Therapy Interventions (OT) ADL retraining;cognition;transfer training   Clinical Decision Making Complexity (OT) moderate complexity   Anticipated Equipment Needs Upon Discharge (OT) walker, rolling;shower chair;commode chair   Risk & Benefits of therapy have been explained evaluation/treatment results reviewed;care plan/treatment goals reviewed;risks/benefits reviewed;current/potential barriers reviewed;patient   OT Discharge Planning   OT Discharge Recommendation (DC Rec) Transitional Care Facility;home with home care occupational therapy;home with assist   OT Rationale for DC Rec currently Ax1-2 for close transfers and ADLs as pt R LE gives out, high fall risk, rec. TCU to increase strength and balance for ADLs and mobility, if home rec. 24 hr assist of 1-2, bedside commode, shower chair, w/c,   Total Evaluation Time (Minutes)   Total Evaluation Time (Minutes) 10  Certfication: Start of care:10/8/22 Cert.date  from 10/8/22, Cert date to: 10/15/22, dx : TIA   OT Goals   Therapy Frequency (OT) Daily   OT Predicted Duration/Target Date for Goal Attainment 10/13/22   OT Goals Transfers;Lower Body Dressing;Cognition   OT: Lower Body Dressing Supervision/stand-by assist   OT: Transfer with assistive device  (CGA)   OT: Cognitive Patient/caregiver will verbalize understanding of cognitive assessment results/recommendations as needed for safe discharge planning

## 2022-10-08 NOTE — PLAN OF CARE
[facility]      OUTPATIENT OCCUPATIONAL THERAPY  EVALUATION  PLAN OF TREATMENT FOR OUTPATIENT REHABILITATION  (COMPLETE FOR INITIAL CLAIMS ONLY)  Patient's Last Name, First Name, M.I.  YOB: 1965  Sarah Rahman                          Provider's Name  [facility] Medical Record No.  [id number]                               Onset Date:  10/06/22   Start of Care Date:  10/08/22     Type:     ___PT   _X_OT   ___SLP Medical Diagnosis:  TIA                        OT Diagnosis:  decreased ADLs   Visits from SOC:  1   _________________________________________________________________________________  Plan of Treatment/Functional Goals    Planned Interventions: ADL retraining, cognition, transfer training   Goals: See Occupational Therapy Goals on Care Plan in [employer] electronic health record.    Therapy Frequency: Daily  Predicted Duration of Therapy Intervention: 10/13/22  _________________________________________________________________________________    I CERTIFY THE NEED FOR THESE SERVICES FURNISHED UNDER        THIS PLAN OF TREATMENT AND WHILE UNDER MY CARE     (Physician co-signature of this document indicates review and certification of the therapy plan).                Certification date from: 10/08/22, Certification date to: 10/15/22    Referring Physician: Sunshine Fowler            Initial Assessment        See Occupational Therapy evaluation dated 10/08/22 in [employer] electronic health record.              Goal Outcome Evaluation:

## 2022-10-08 NOTE — PROGRESS NOTES
[facility]      OUTPATIENT PHYSICAL THERAPY EVALUATION  PLAN OF TREATMENT FOR OUTPATIENT REHABILITATION  (COMPLETE FOR INITIAL CLAIMS ONLY)  Patient's Last Name, First Name, M.I.  YOB: 1965  Sarah Rahman                        Provider's Name  [facility] Medical Record No.  [id number]                               Onset Date:  10/06/22   Start of Care Date:  10/08/22      Type:     _X_PT   ___OT   ___SLP Medical Diagnosis:  TIA                        PT Diagnosis:  impaired functional mobility, gait abnoramlity   Visits from SOC:  1   _________________________________________________________________________________  Plan of Treatment/Functional Goals    Planned Interventions: balance training, gait training, home exercise program, neuromuscular re-education, strengthening, transfer training     Goals: See Physical Therapy Goals on Care Plan in [employer] electronic health record.    Therapy Frequency: One time eval and treatment only  Predicted Duration of Therapy Intervention: 10/08/22  _________________________________________________________________________________    I CERTIFY THE NEED FOR THESE SERVICES FURNISHED UNDER        THIS PLAN OF TREATMENT AND WHILE UNDER MY CARE     (Physician co-signature of this document indicates review and certification of the therapy plan).              Certification date from: 10/08/22, Certification date to: 10/08/22    Referring Physician: Sunshine Fowler MD            Initial Assessment        See Physical Therapy evaluation dated 10/08/22 in [employer] electronic health record.

## 2022-10-08 NOTE — PROGRESS NOTES
Patient states she is doing better today.  Feels strength is improving.  Continues to complain of burning pain in her legs and feet.  Also numbness in her right hand.  She agrees to work with PT and OT who are scheduled for later this morning.  Plan is home later today as long as there are no serious concerns from PT and OT.  Patient has a follow-up appointment scheduled in 2 days.  Janeth CRUZ full note to follow

## 2022-10-08 NOTE — PLAN OF CARE
Physical Therapy Discharge Summary    Reason for therapy discharge:    Discharged to home.    Progress towards therapy goal(s). See goals on Care Plan in [facility] electronic health record for goal details.  Goals not met.  Barriers to achieving goals:   discharge on same date as initial evaluation.    Therapy recommendation(s):    Continued therapy is recommended.  Rationale/Recommendations:  recommending home care PT and assist of 1 with assistive device to prevent/reduce falls at home.

## 2022-10-08 NOTE — PROGRESS NOTES
NEUROLOGY PROGRESS NOTE     ASSESSMENT & PLAN   Visit diagnosis: Rule out TIA    Right arm and leg weakness/rule out TIA  Severe basilar artery stenosis      MRI negative for acute stroke    CT angiogram shows severe narrowing of the proximal basilar artery    Echocardiogram shows 65% ejection fraction    Per previous neurology notes patient's weakness was thought to be nonphysiological    Per previous neurologist recommendation of aspirin and Plavix with 40 mg of Crestor    LDL 83    Physical therapy    Check MRI C-spine to rule out any other causes for right arm and leg weakness though most likely this will be noncontributory.    Vascular risk factor management per primary team    Neurology Discharge Planning : Per primary team.  Awaiting MRI C-spine.      Patient Active Problem List    Diagnosis Date Noted     TIA (transient ischemic attack) 10/07/2022     Priority: Medium     Anemia 07/17/2022     Priority: Medium     Anxiety as acute reaction to gross stress 07/17/2022     Priority: Medium     History of drug abuse (H) 07/17/2022     Priority: Medium     Formatting of this note might be different from the original.  Cocaine history, per psych records last 11/2010, used in setting of grief after her daughter's death  Formatting of this note might be different from the original.  Formatting of this note might be different from the original.  Cocaine history, per psych records last 11/2010, used in setting of grief after her daughter's death       Mood disorder due to medical condition 07/17/2022     Priority: Medium     Obesity 07/17/2022     Priority: Medium     Sleep difficulties 07/17/2022     Priority: Medium     Right sided weakness 07/17/2022     Priority: Medium     Chronic pain syndrome 07/17/2022     Priority: Medium     Asthma in adult, unspecified asthma severity, uncomplicated 07/17/2022     Priority: Medium     Diabetic polyneuropathy associated with type 2 diabetes mellitus (H) 06/10/2022      Priority: Medium     Nasal polyp 06/10/2022     Priority: Medium     Chronic schizoaffective disorder (H) 06/10/2022     Priority: Medium     Bilateral low back pain with right-sided sciatica, unspecified chronicity 04/03/2022     Priority: Medium     Chronic obstructive pulmonary disease (H) 01/15/2022     Priority: Medium     Uncontrolled type 2 diabetes mellitus with hyperglycemia (H) 12/13/2021     Priority: Medium     Cerebrovascular accident (CVA) due to stenosis of carotid artery (H) 10/17/2021     Priority: Medium     Acute recurrent maxillary sinusitis 10/17/2021     Priority: Medium     Myelomalacia of cervical cord (H) 10/08/2021     Priority: Medium     Noncompliance 10/08/2021     Priority: Medium     Sensorineural hearing loss (SNHL) of right ear 10/08/2021     Priority: Medium     Carotid stenosis, left 10/03/2021     Priority: Medium     Formatting of this note might be different from the original.  Added automatically from request for surgery [id number]       Depression with anxiety 08/01/2021     Priority: Medium     Post-COVID syndrome 07/11/2021     Priority: Medium     Dyslipidemia 05/02/2021     Priority: Medium     Formatting of this note might be different from the original.  Formatting of this note might be different from the original.    Low HDL  Formatting of this note might be different from the original.    Low HDL       Hx of syphilis 09/16/2020     Priority: Medium     Formatting of this note might be different from the original.  09/08/20  TPPA positive, Treponema screen positive  09/14/20  Treatment: Doxycycline 100mg po bid x 30 days  10/27/20  TPPA positive       Weakness 01/29/2020     Priority: Medium     Leg swelling 10/11/2019     Priority: Medium     Lumbar disc herniation 06/14/2019     Priority: Medium     Syncope and collapse 10/21/2018     Priority: Medium     Sacroiliac joint disease 10/09/2018     Priority: Medium     Myalgia 02/20/2018     Priority: Medium     Intercostal  neuritis 02/06/2018     Priority: Medium     Dyshidrosis (pompholyx) 10/21/2016     Priority: Medium     Chest pain 10/02/2015     Priority: Medium     Seasonal allergies 07/24/2015     Priority: Medium     Systolic and diastolic CHF, chronic (H) 09/15/2014     Priority: Medium     Formatting of this note might be different from the original.  Formatting of this note might be different from the original.  Sep '14: Mildly diminished LV function ~50%, grade 1 diastolic dysfunction.  Formatting of this note might be different from the original.  Sep '14: Mildly diminished LV function ~50%, grade 1 diastolic dysfunction.       Pulmonary nodule 09/11/2014     Priority: Medium     Formatting of this note might be different from the original.  Needs repeat CT Chest late Sep 2014 for interval change  Formatting of this note might be different from the original.  Needs repeat CT Chest late Sep 2014 for interval change  Formatting of this note might be different from the original.  Formatting of this note might be different from the original.  Needs repeat CT Chest late Sep 2014 for interval change       Menopausal flushing 04/30/2014     Priority: Medium     ACP (advance care planning) 02/12/2014     Priority: Medium     Formatting of this note might be different from the original.  Overview:   Formatting of this note may be different from the original.  Health Care Directives: Patient has identified Health Care Agent(s): Yes  Add Health Care Agents: Yes    Health Care Agent(s):  Primary Health Care Agent: Elsie Rahman  Relationship: Daughter Phone: [phone]   Secondary Health Care Agent:  Relationship:  Phone:    Conservator:  Relationship:  Phone:    Guardian: Relationship:  Phone:      Patient has Advance Care Plan Documents (Health Care Directive, POLST): No, Health Care Packet given to patient.    Patient has identified Specific Treatment Preferences: No   Specific limits to treatment preferences NOT identified:  ASSUME FULL TREATMENT.  Formatting of this note might be different from the original.  Overview:   Overview:   Formatting of this note may be different from the original.  Health Care Directives: Patient has identified Health Care Agent(s): Yes  Add Health Care Agents: Yes    Health Care Agent(s):  Primary Health Care Agent: Elsiedixon Rahman  Relationship: Daughter Phone: [phone]   Secondary Health Care Agent:  Relationship:  Phone:    Conservator:  Relationship:  Phone:    Guardian: Relationship:  Phone:      Patient has Advance Care Plan Documents (Health Care Directive, POLST): No, Health Care Packet given to patient.    Patient has identified Specific Treatment Preferences: No   Specific limits to treatment preferences NOT identified: ASSUME FULL TREATMENT.  Formatting of this note might be different from the original.  Health Care Directives: Patient has identified Health Care Agent(s): YesAdd Health Care Agents: Yes  Health Care Agent(s):Primary Health Care Agent: Elsie Rahman  Relationship: Daughter Phone: [phone]   Secondary Health Care Agent:  Relationship:  Phone:    Conservator:  Relationship:  Phone:    Guardian: Relationship:  Phone:      Patient has Advance Care Plan Documents (Health Care Directive, POLST): No, Health Care Packet given to patient.    Patient has identified Specific Treatment Preferences: No   Specific limits to treatment preferences NOT identified: ASSUME FULL TREATMENT.       Nephrolithiasis 08/29/2013     Priority: Medium     Coronary atherosclerosis 03/21/2013     Priority: Medium     Type 2 diabetes mellitus with diabetic polyneuropathy, with long-term current use of insulin (H) 03/21/2013     Priority: Medium     Problem list name updated by automated process. Provider to review       Schizoaffective disorder (H) 03/21/2013     Priority: Medium     Problem list name updated by automated process. Provider to review  Diagnosis updated by automated process. Provider to  review and confirm.       Health Care Home 03/21/2013     Priority: Medium     Tier 1  DX V65.8 REPLACED WITH [id number] (04/08/2013)       GERD (gastroesophageal reflux disease) 10/15/2012     Priority: Medium     History of CVA (cerebrovascular accident) 04/01/2012     Priority: Medium     Formatting of this note might be different from the original.  Overview:   Bilateral subacute cerebellar infarcts seen on MRI at [facility] 4/2012.  Pt was noted to have no residual deficits at [facility].  Formatting of this note might be different from the original.  Bilateral subacute cerebellar infarcts seen on MRI at [facility] 4/2012.  Pt was noted to have no residual deficits at [facility].  Formatting of this note might be different from the original.  Bilateral subacute cerebellar infarcts seen on MRI at [facility] 4/2012.  Pt was noted to have no residual deficits at [facility].  Formatting of this note might be different from the original.  Formatting of this note might be different from the original.  Bilateral subacute cerebellar infarcts seen on MRI at [facility] 4/2012.  Pt was noted to have no residual deficits at [facility].       Hyperlipidemia with target low density lipoprotein (LDL) cholesterol less than 70 mg/dL 01/30/2012     Priority: Medium     Moderate persistent asthma 09/12/2011     Priority: Medium     Smoker 02/03/2008     Priority: Medium     Drug dependence (H) 02/15/2006     Priority: Medium     Formatting of this note might be different from the original.  Overview:   Epic       Essential hypertension, benign 02/15/2006     Priority: Medium     Formatting of this note might be different from the original.  Overview:   Epic       Medical History  Past Medical History:   Diagnosis Date     Asthma      CAD (coronary artery disease)      COPD (chronic obstructive pulmonary disease) (H)      CVA (cerebral infarction)       "Diabetes (H)      GERD (gastroesophageal reflux disease)      Hypertension      Polysubstance abuse (H)      S/P CABG (coronary artery bypass graft)      S/P lumbar discectomy 06/13/2019    L5/S1 by  Dr. Hamm at [facility]     Schizoaffective disorder (H)         SUBJECTIVE     Patient seen in follow-up for Dr. Kendrick.  Patient presented with right arm and leg weakness.  She did have severe basilar stenosis.  This was thought to be a TIA.  On exam patient continues to have right-sided weakness which seems to fluctuate.  There is also right leg pain.  She is being worked up at [facility] for her back issues/right leg issues in the past.  She denies any other new concerns/new issues today.     OBJECTIVE     Vital signs in last 24 hours  Temp:  [97.8  F (36.6  C)-99  F (37.2  C)] 97.9  F (36.6  C)  Pulse:  [74-94] 83  Resp:  [18-20] 20  BP: ()/(55-86) 160/86  SpO2:  [95 %-98 %] 98 %    Review of Systems   Comprehensive ROS was done in the HPI this was negative.  Pertinent positives noted in the HPI.    PHYSICAL EXAMINATION  VITALS: BP (!) 160/86 (BP Location: Right arm)   Pulse 83   Temp 97.9  F (36.6  C) (Oral)   Resp 20   Ht 1.702 m (5' 7\")   Wt 91.6 kg (202 lb)   LMP  (LMP Unknown)   SpO2 98%   BMI 31.64 kg/m    GENERAL -Health appearing, No apparent distress  EYES- No scleral icterus, no eyelid droop, Pupils - see Neuro section  HEENT - Normocephalic, atraumatic, Hearing grossly intact; Oral mucosa moist and pink in color. External Ears and nose intact.   Neck - supple with no obvious lymphadenopathy or thyromegaly  PULM - Good spontaneous respiratory effort; Normal palpation of chest.   CV- Pedal pulses present with no peripheral edema/ No significant varicosities.  MSK- Gait - see Neuro section; Strength and tone- see Neuro section; Range of motion grossly intact.  PSYCH -cooperative    Neurological  Mental status - Patient is awake and grossly oriented to self, place and time. Attention " span is normal. Language is fluent and follows commands appropriately.   Cranial nerves - CN II-XII intact. Pupils are reactive and symmetric; EOMI, VFIT, NLF symmetric  Motor -good strength in the left upper and lower extremity.  3 out of 5 right arm and leg weakness.  Tone - Tone is symmetric bilaterally in upper and lower extremities.  Sensation -decreased sensation in the right arm and leg to light touch.  Coordination - Finger to nose is without dysmetria.  Unable to do heel-to-shin due to pain.  Gait and station --unable to safely ambulate due to weakness.  Formal gait testing cannot be done due to safety concerns from ongoing medical issues.       DIAGNOSTIC STUDIES     Pertinent Radiology   MRI brain  IMPRESSION:  1.  Atrophy and chronic small vessel vascular/lacunar changes.     2.  Negative for acute intracranial process.    CTA  HEAD CT:  1.  No intracranial hemorrhage, mass, or definite CT evidence of recent ischemia.     HEAD CTA:  1.  No interval change versus 4/3/2022.  2.  Severe narrowing of the proximal basilar artery is unchanged.  3.  Anterior circulation atherosclerosis predominantly affecting the carotid siphons without high-grade narrowing.  4.  No proximal large vessel occlusion.     NECK CTA:  1.  Mild carotid artery atherosclerosis without hemodynamically significant narrowing in either vessel by NASCET criteria.  2.  Unchanged moderate to severe proximal left vertebral artery narrowing.     ECHO  Interpretation Summary     1. Limited echocardiographic study.  2. Normal left ventricular size and systolic performance with a visually  estimated ejection fraction of 65%.  3. There is moderate mitral insufficiency.  4. There is probable moderate tricuspid insufficiency (the tricuspid  insufficiency jet is poorly visualized on the study).  5. Probable normal right ventricular size and systolic performance though  right-sided structures are not clearly visualized on all views on this study.  6.  Probable moderate-severe left atrial enlargement (the left atrium is poorly  visualized).     When compared to the prior real-time echocardiogram dated 25 September 2022,  the degree of mitral insufficiency & tricuspid insufficiency have both  increased from mild to now moderate. Left ventricular systolic performance  appears similar in both studies.    Neurology notes-previous      LABS  Component      Latest Ref Rng & Units 10/6/2022 10/7/2022   Cholesterol      <200 mg/dL  144   Triglycerides      <150 mg/dL  113   HDL Cholesterol      >=50 mg/dL  38 (L)   LDL Cholesterol Calculated      <=100 mg/dL  83   Non HDL Cholesterol      <130 mg/dL  106   Hemoglobin A1C      <5.7 %  11.3 (H)       Recent Results (from the past 24 hour(s))   Glucose by meter    Collection Time: 10/07/22 11:24 AM   Result Value Ref Range    GLUCOSE BY METER POCT 173 (H) 70 - 99 mg/dL   Glucose by meter    Collection Time: 10/07/22  4:48 PM   Result Value Ref Range    GLUCOSE BY METER POCT 163 (H) 70 - 99 mg/dL   Glucose by meter    Collection Time: 10/07/22  8:46 PM   Result Value Ref Range    GLUCOSE BY METER POCT 182 (H) 70 - 99 mg/dL   Glucose by meter    Collection Time: 10/08/22  8:02 AM   Result Value Ref Range    GLUCOSE BY METER POCT 154 (H) 70 - 99 mg/dL         HOSPITAL MEDICATIONS       acetaminophen  975 mg Oral TID     amitriptyline  25 mg Oral At Bedtime     aspirin  81 mg Oral Daily     carvedilol  12.5 mg Oral BID w/meals     cetirizine  5 mg Oral Daily     clopidogrel  75 mg Oral Daily     DULoxetine  30 mg Oral BID     enoxaparin ANTICOAGULANT  40 mg Subcutaneous Q24H     fluticasone  1 spray Both Nostrils Daily     fluticasone-vilanterol  1 puff Inhalation QPM     glipiZIDE  10 mg Oral Daily     insulin aspart  1-7 Units Subcutaneous TID AC     insulin glargine  25 Units Subcutaneous At Bedtime     isosorbide mononitrate  30 mg Oral Daily     Lidocaine  1 patch Transdermal Q24H     montelukast  10 mg Oral At Bedtime      nortriptyline  10 mg Oral At Bedtime     pantoprazole  40 mg Oral QAM AC     polyethylene glycol  17 g Oral Daily     rosuvastatin  40 mg Oral QPM     sennosides  2 tablet Oral BID     sodium chloride (PF)  3 mL Intracatheter Q8H     valsartan  40 mg Oral Daily        Total time spent for face to face visit, reviewing labs/imaging studies, counseling and coordination of care was: Over 40 min More than 50% of this time was spent on counseling and coordination of care.    Patient new to me.  Reviewing chart/imaging studies.  Coordination of care with the primary team.    Lefty Wadsworth MD  Neurologist  [facility]  Tel:- [phone]

## 2022-10-08 NOTE — PROGRESS NOTES
"   10/08/22 1400   Quick Adds   Quick Adds Certification   Type of Visit Initial PT Evaluation   Living Environment   People in Home alone   Current Living Arrangements apartment   Home Accessibility no concerns   Self-Care   Usual Activity Tolerance moderate   Current Activity Tolerance fair   Equipment Currently Used at Home cane, quad   Fall history within last six months yes   General Information   Onset of Illness/Injury or Date of Surgery 10/06/22   Referring Physician Sunshine Fowler MD   Patient/Family Therapy Goals Statement (PT) go home   Pertinent History of Current Problem (include personal factors and/or comorbidities that impact the POC) She has history of coronary artery disease status post CABG, previous stroke, asthma, GERD, hypertension, schizoaffective disorder, substance abuse presented for evaluation of right-sided weakness and numbness. Frequent presentations for same. Hospital Day: 3     #Right-sided weakness and numbness   Existing Precautions/Restrictions fall   Pain Assessment   Patient Currently in Pain No   Range of Motion (ROM)   Range of Motion ROM is WFL   Strength (Manual Muscle Testing)   Strength (Manual Muscle Testing) Deficits observed during functional mobility   Strength Comments LLE 5/5, RLE inconsistent. \"breaks\" too easily with little resistance. Great toe extension 5/5.   Bed Mobility   Bed Mobility supine-sit;sit-supine   Supine-Sit [place] (Bed Mobility) supervision   Sit-Supine [place] (Bed Mobility) supervision   Comment, (Bed Mobility) HOB elevated slightly, use of bed rail   Transfers   Transfers sit-stand transfer;bed-chair transfer   Bed-Chair Transfer   Assistive Device (Bed-Chair Transfers) other (see comments)  (none)   Bed-Chair [place] (Transfers) minimum assist (75% patient effort);verbal cues   Comment, (Bed-Chair Transfer) RLE evelin at times   Sit-Stand Transfer   Sit-Stand [place] (Transfers) minimum assist (75% patient effort) "   Assistive Device (Sit-Stand Transfers) other (see comments)  (none)   Comment, (Sit-Stand Transfer) needs minimal assist at R knee to prevent buckling   Gait/Stairs (Locomotion)   Comment, (Gait/Stairs) pt declined need to amb   Balance   Balance other (describe)   Balance Comments static sitting ind. static standing CGA with hand on bed rail x 30 seconds.   Clinical Impression   Criteria for Skilled Therapeutic Intervention Yes, treatment indicated   PT Diagnosis (PT) impaired functional mobility, gait abnoramlity   Influenced by the following impairments decreased strength, decreased endurance, decreased balance   Functional limitations due to impairments gait, transfers, stairs   Clinical Presentation (PT Evaluation Complexity) Stable/Uncomplicated   Clinical Presentation Rationale pt presents as medically diagnosed   Clinical Decision Making (Complexity) low complexity   Planned Therapy Interventions (PT) balance training;gait training;home exercise program;neuromuscular re-education;strengthening;transfer training   Anticipated Equipment Needs at Discharge (PT) walker, rolling  (pt declined need)   Risk & Benefits of therapy have been explained evaluation/treatment results reviewed;participants voiced agreement with care plan;participants included;patient   PT Discharge Planning   PT Discharge Recommendation (DC Rec) home with assist;home with home care physical therapy   PT Rationale for DC Rec okay for d/c to home with assist of 1 and use of FWW recommended.   Therapy Certification   Start of care date 10/08/22   Certification date from 10/08/22   Certification date to 10/08/22   Medical Diagnosis TIA   Total Evaluation Time   Total Evaluation Time (Minutes) 10   Physical Therapy Goals   PT Frequency One time eval and treatment only   PT Predicted Duration/Target Date for Goal Attainment 10/08/22   PT Goals Transfers;Gait   PT: Transfers Supervision/stand-by assist;Sit to/from stand;Bed to/from  chair;Assistive device   PT: Gait Minimal assist;Quad cane;25 feet;Assistive device

## 2022-10-08 NOTE — PLAN OF CARE
"  Problem: Pain Chronic (Persistent)  Goal: Acceptable Pain Control and Functional Ability  Outcome: Ongoing, Progressing  Intervention: Manage Persistent Pain  Recent Flowsheet Documentation  Taken 10/8/2022 0030 by Kaity Phillips RN  Medication Review/Management: medications reviewed     Problem: Plan of Care - These are the overarching goals to be used throughout the patient stay.    Goal: Absence of Hospital-Acquired Illness or Injury  Outcome: Met  Intervention: Identify and Manage Fall Risk  Recent Flowsheet Documentation  Taken 10/8/2022 0030 by Kaity Phillips RN  Safety Promotion/Fall Prevention:    bed alarm on    clutter free environment maintained    nonskid shoes/slippers when out of bed    patient and family education    Intervention: Prevent Skin Injury  Recent Flowsheet Documentation  Taken 10/8/2022 0030 by Kaity Phillips RN  Body Position: position changed independently  Intervention: Prevent and Manage VTE (Venous Thromboembolism) Risk  Recent Flowsheet Documentation  Taken 10/8/2022 0030 by Kaity Phillips RN  Activity Management: activity adjusted per tolerance    Goal: Optimal Comfort and Wellbeing  Outcome: Met   Goal Outcome Evaluation:  PRIMARY DIAGNOSIS: \"GENERIC\" NURSING-   OUTPATIENT/OBSERVATION GOALS TO BE MET BEFORE DISCHARGE:  1. ADLs back to baseline: No    2. Activity and level of assistance: Up with standby assistance.    3. Pain status: Improved but still requiring IV narcotics.    4. Return to near baseline physical activity: No     Discharge Planner Nurse   Safe discharge environment identified: Yes  Barriers to discharge: Yes-still requires IV pain control and right side weakness       Entered by: Kaity Phillips RN 10/08/2022 4:25 AM     Please review provider order for any additional goals.   Nurse to notify provider when observation goals have been met and patient is ready for discharge.       Pt. Alert and oriented. Irritable and demanding at times. Refuses cares at times. " On telemetry NSR. Still endorses right side weakness. Able to ambulate slightly unsteady. Neurology following. Complains of continuous pain. Feet and back.  All prns used. Pt. Asking more pain meds. Will continue to monitor.    Kaity Phillips RN

## 2022-10-08 NOTE — PLAN OF CARE
"Goal Outcome Evaluation:       PRIMARY DIAGNOSIS: \"GENERIC\" NURSING  OUTPATIENT/OBSERVATION GOALS TO BE MET BEFORE DISCHARGE:  1. ADLs back to baseline: No    2. Activity and level of assistance: Up with maximum assistance. Consider SW and/or PT evaluation.     3. Pain status: Improved-controlled with oral pain medications.    4. Return to near baseline physical activity: Yes     Discharge Planner Nurse   Safe discharge environment identified: No  Barriers to discharge: Yes       Entered by: Allison Nieves RN 10/08/2022 10:51 AM     Please review provider order for any additional goals.   Nurse to notify provider when observation goals have been met and patient is ready for discharge.    Patient is agreeable to do PT, however when PT working with patient she states that she is not able to ambulate to the bathroom, as she was able to do earlier today. When assist of two with gait belt her right leg is giving out.  Notified MD.  Checked neuro status, no change from prior.  Pain controlled with oxycodone, robaxin, tylneol, lidocaine, and voltaren cream, pain 8/10.  Patient states that this has been happening on and off at home.   "

## 2022-10-09 NOTE — PLAN OF CARE
Occupational Therapy Discharge Summary    Reason for therapy discharge:    Discharged to home.    Progress towards therapy goal(s). See goals on Care Plan in [facility] electronic health record for goal details.  Goals partially met.  Barriers to achieving goals:   discharge from facility.    Therapy recommendation(s):    Continued therapy is recommended.  Rationale/Recommendations:  to improve ADL safety.

## 2022-10-10 NOTE — PROGRESS NOTES
[facility] - Primary [facility] Specialists    Comprehensive and complex medical care - Chronic disease management - Shared decision making - Care coordination - Compassionate care    Patient advocacy - Rational deprescribing - Minimally disruptive medicine - Ethical focus - Customized care         Date of Service: 10/11/2022  Primary Provider: Kike David    Patient Care Team:  Kike David MD as PCP - General (Internal Medicine)  Flako Rosen MD as Assigned PCP  Rosario Byrne NP as Nurse Practitioner          Patient's Pharmacy:    [facility]/pharmacy #[id number] [place], [place] - [address] [facility]  [address] [facility] [address]  Phone: [phone] Fax: [phone]    [facility] [address]  [address] [address]  Phone: [phone] Fax: [phone]    [facility]/pharmacy #[id number] [place], [place] - [address]  [address] [facility] [address]  Phone: [phone] Fax: [phone]     Patient's Contacts:  Name Home Phone Work Phone Mobile Phone Relationship Lgl JordySALONI De Luna [phone]   Friend    JOSE SANTANA   [phone] Spouse      Patient's Insurance:    Payor: ARE / Plan: [facility] PMAP / Product Type: HMO /            Active Problem List:  Problem List as of 10/11/2022 Reviewed: 10/6/2022  8:40 AM by Marcus Alarcon       Other    Coronary atherosclerosis    Type 2 diabetes mellitus with diabetic polyneuropathy, with long-term current use of insulin (H)    Last Assessment & Plan 6/7/2022 Office Visit Edited 6/10/2022  8:06 AM by Mara Scott NP     Type 2 diabetes is not well controlled but she is using the Lantus injection regularly.  She ran out of the freestyle hosea sensors so she does not have any recent glucose readings to base medication adjustments on at this time.  She was prescribed Trulicity in the past but was nervous about taking this medication so she  never started it.  We reviewed possible side effects associated with the medication and what she may expect in the first 2 weeks of taking this medication.  She would be comfortable trying this medication again, Trulicity 0.75 mg weekly sent to her pharmacy.  She is advised that this is a small dose and will need to be gradually increased in order for her to obtain maximal benefit from the medication.  She will be scheduled for a follow-up appointment and establish care visit with another provider in the next 4 weeks.  Of note, she would likely benefit from increasing her dose of metformin back to maximal dose but was concerned that this caused her to have increased urinary frequency.  We did not address this today.         Schizoaffective disorder (H)    Health Care Home       Other    Bilateral low back pain with right-sided sciatica, unspecified chronicity    Last Assessment & Plan 6/7/2022 Office Visit Written 6/10/2022  8:01 AM by Mara Scott NP     Because of her uncontrolled diabetes, she is not a good candidate to use steroid medication to treat lumbar radiculopathy.  She is familiar with the effects of a steroid medication on her blood sugar and understands this conundrum.  Fortunately, she does experience some relief when she is given Toradol in the emergency department.  We discussed trial of a nonsteroidal anti-inflammatory medication, diclofenac 3 times daily.  She is open to this idea so this is sent to her pharmacy.  She is advised that she can continue with acetaminophen as well.         Diabetic polyneuropathy associated with type 2 diabetes mellitus (H)    Last Assessment & Plan 6/7/2022 Office Visit Edited 6/10/2022  8:02 AM by Mara Scott, ANDREEA     We reviewed that the paresthesias in the legs is likely due to a combination of diabetic neuropathy in addition to lumbar radiculopathy.  She is already taking pregabalin which is not providing her much relief.  She is also taking duloxetine.  I  suggested a trial of lidocaine gel that she could apply to the feet when she goes to bed that would provide temporary anesthesia.  She is open to trying this.  An alternative option could be to wean off of the duloxetine and try nortriptyline or amitriptyline in the future.         Nasal polyp    Last Assessment & Plan 6/7/2022 Office Visit Written 6/10/2022  8:03 AM by Mara Scott, ANDREEA     Advised fluticasone nasal spray 1 spray each nostril daily.  She declines a referral to ENT, she has had a nasal polyp in the past which responded to fluticasone.         Cerebrovascular accident (CVA) due to stenosis of carotid artery (H)    Chronic obstructive pulmonary disease (H)    Chronic schizoaffective disorder (H)    ACP (advance care planning)    Acute recurrent maxillary sinusitis    Anemia    Anxiety as acute reaction to gross stress    Carotid stenosis, left    Depression with anxiety    Drug dependence (H)    Dyshidrosis (pompholyx)    Dyslipidemia    GERD (gastroesophageal reflux disease)    Chest pain    History of CVA (cerebrovascular accident)    History of drug abuse (H)    Hx of syphilis    Hyperlipidemia with target low density lipoprotein (LDL) cholesterol less than 70 mg/dL    Essential hypertension, benign    Intercostal neuritis    Leg swelling    Lumbar disc herniation    Menopausal flushing    Moderate persistent asthma    Mood disorder due to medical condition    Myalgia    Myelomalacia of cervical cord (H)    Nephrolithiasis    Noncompliance    Obesity    Post-COVID syndrome    Pulmonary nodule    Sacroiliac joint disease    Seasonal allergies    Sensorineural hearing loss (SNHL) of right ear    Sleep difficulties    Smoker    Syncope and collapse    Systolic and diastolic CHF, chronic (H)    Uncontrolled type 2 diabetes mellitus with hyperglycemia (H)    Weakness    Right sided weakness    Chronic pain syndrome    Asthma in adult, unspecified asthma severity, uncomplicated    TIA (transient ischemic  attack)        Current Outpatient Medications   Medication Instructions     acetaminophen (TYLENOL) 650 mg, Oral, EVERY 8 HOURS PRN     albuterol (PROAIR HFA) 108 (90 BASE) MCG/ACT inhaler 1-2 puffs, Inhalation, EVERY 4 HOURS PRN     alcohol swab prep pads Use to swab area of injection/zac as directed.     Alcohol Swabs PADS Use to swab the area of the injection or zac as directed Per insurance coverage     amitriptyline (ELAVIL) 25 mg, Oral, AT BEDTIME     aspirin (ASA) 81 mg, Oral, DAILY     blood glucose (ACCU-CHEK DARRIN PLUS) test strip Use to test blood sugar 3-4 times daily or as directed.     blood glucose (ACCU-CHEK SOFTCLIX) lancing device Lancing device to be used with lancets.     blood glucose (NO BRAND SPECIFIED) lancets standard To use to test glucose level in the blood Use to test blood sugar  4  times daily as directed. To accompany glucose monitor brands per insurance coverage.     blood glucose (NO BRAND SPECIFIED) test strip To use to test glucose level in the blood Use to test blood sugar  4 times daily as directed. To accompany glucose monitor brands per insurance coverage.     blood glucose calibration (NO BRAND SPECIFIED) solution Used to calibrate the blood glucose monitor as needed and as directed.  To accompany  blood glucose brands per insurance coverage     blood glucose monitoring (NO BRAND SPECIFIED) meter device kit Use as directed Per insurance coverage     blood glucose monitoring (SOFTCLIX) lancets Use to test blood sugar 3-4 times daily.     budesonide-formoterol (SYMBICORT) 160-4.5 MCG/ACT Inhaler 2 puffs, Inhalation, 2 TIMES DAILY     Bydureon BCise 2 mg, Subcutaneous, EVERY 7 DAYS, On Mondays     carvedilol (COREG) 12.5 mg, Oral, 2 TIMES DAILY WITH MEALS     cetirizine (ZYRTEC) 10 mg, Oral, DAILY     clopidogrel (PLAVIX) 75 mg, Oral, DAILY     Continuous Blood Gluc Sensor (EtherpadYLE SHEBA 14 DAY SENSOR) [facility] 1 each, Does not apply, EVERY 14 DAYS, Use 1 Sensor every 14 days.  Use to read blood sugars per 's instructions.     diclofenac (VOLTAREN) 2 g, Topical, 3 TIMES DAILY PRN     DULoxetine (CYMBALTA) 30 mg, Oral, 2 TIMES DAILY     fluticasone (FLONASE) 50 MCG/ACT nasal spray 1 spray, Both Nostrils, DAILY     furosemide (LASIX) 20 mg, Oral, DAILY PRN     glipiZIDE (GLUCOTROL XL) 10 mg, Oral, DAILY     insulin glargine (LANTUS PEN) 25 Units, Subcutaneous, 2 TIMES DAILY     insulin pen needle (32G X 4 MM) 32G X 4 MM miscellaneous Use 1 pen needles daily or as directed.     ipratropium - albuterol 0.5 mg/2.5 mg/3 mL (DUONEB) 0.5-2.5 (3) MG/3ML neb solution 1 vial, Nebulization, EVERY 6 HOURS PRN     isosorbide mononitrate (IMDUR) 30 mg, Oral, DAILY     lidocaine (LIDODERM) 5 % patch 1 patch, Transdermal, EVERY 24 HOURS, To prevent lidocaine toxicity, patient should be patch free for 12 hrs daily. BACK      methocarbamol (ROBAXIN) 500 mg, Oral, 4 TIMES DAILY PRN     montelukast (SINGULAIR) 10 mg, Oral, AT BEDTIME     naloxone (NARCAN) 4 mg, Alternating Nostrils, PRN, every 2-3 minutes until assistance arrives     nitroGLYcerin (NITROSTAT) 0.4 mg, Sublingual, EVERY 5 MIN PRN, For chest pain place 1 tablet under the tongue every 5 minutes for 3 doses. If symptoms persist 5 minutes after 1st dose call 911.      nortriptyline (PAMELOR) 10 mg, Oral, AT BEDTIME     omeprazole 20 mg, Oral, DAILY     ondansetron (ZOFRAN) 8 mg, Oral, EVERY 8 HOURS PRN     oxyCODONE-acetaminophen (PERCOCET)  MG per tablet 1 tablet, Oral, EVERY 6 HOURS PRN     polyethylene glycol (MIRALAX) 17 g, Oral, DAILY     rosuvastatin (CRESTOR) 40 mg, Oral, DAILY     sennosides (SENOKOT) 8.6 MG tablet 2 tablets, Oral, 2 TIMES DAILY     urea (DERMAL THERAPY FINGER CARE) 20 % external lotion Apply externally, 2 TIMES DAILY PRN, Feet     valsartan (DIOVAN) 40 mg, Oral, DAILY      Social History     Social History Narrative    Lives alone in a condo.          On disability.  Three living children.          Subjective:     Sarah Rahman is a 57 year old female who comes in today for:    Chief Complaint   Patient presents with     Hospital F/U      Comes in to establish care.  Very complex patient.    Regular primary care provider retired.    Has care through [facility].  They manage her pain.    Recent discharge from [facility] reviewed as well.    Feels tired since her hospital stay.  On a number of medications at this time.    Main problem for her is peripheral neuropathy (PN) symptoms.  Legs burn, sting and are achy.  Did not tolerate pregabalin (LYRICA).  Is on duloxetine (Cymbalta) and had been on gabapentin (Neurontin).  Also notes some sciatic issues as well in the past.    Diagnosed with TIA in the hospital and started on clopidogrel (Plavix).  Carotid endarterectomy (CEA) in the past in 2021.  No new disease of the carotids.    Has hearing loss and hearing aids.      Diabetes has been out of control usually.  We will need to continue to address this.  On 10 mg of glipizide (Glucotrol), 25 unit(s) twice a day of insulin glargine (Lantus) - just started, and exanatide (Bydureon).  Had bad diarrhea with metformin.  Renal function intact.  Does occasionally get clammy or sweaty in relationship to her diabetes.    Mom and dad had bad diabetes.    Would like to deal with issues related to Freestyle Shivani meter.  Some tape/glue issues.  Would like to see diabetic education for this.    Working on getting her health better.    We reviewed her other issues noted in the assessment but not specifically addressed in the HPI above.     Objective:     Wt Readings from Last 3 Encounters:   10/08/22 91.6 kg (202 lb)   09/27/22 91.4 kg (201 lb 8 oz)   07/29/22 90.3 kg (199 lb)     BP Readings from Last 3 Encounters:   10/11/22 124/74   10/08/22 129/59   09/29/22 105/60     /74 (BP Location: Right arm, Patient Position: Right side, Cuff Size: Adult Regular)   Pulse 75   Temp 97.7  F (36.5  " C) (Temporal)   Ht 1.702 m (5' 7\")   LMP  (LMP Unknown)   SpO2 99%   BMI 31.64 kg/m     The patient is comfortable, no acute distress.  Mood good.  Insight good.  Eyes are nonicteric.  Neck is supple without mass.  No cervical adenopathy.  No thyromegaly. Heart regular rate and rhythm.  Lungs clear to auscultation bilaterally.  Respiratory effort is good.  Abdomen soft and nontender.  No hepatosplenomegaly.  Extremities no edema.      Diagnostics:     Admission on 10/06/2022, Discharged on 10/08/2022   Component Date Value Ref Range Status     Sodium 10/06/2022 137  136 - 145 mmol/L Final     Potassium 10/06/2022 4.1  3.4 - 5.3 mmol/L Final     Chloride 10/06/2022 104  98 - 107 mmol/L Final     Carbon Dioxide (CO2) 10/06/2022 22  22 - 29 mmol/L Final     Anion Gap 10/06/2022 11  7 - 15 mmol/L Final     Urea Nitrogen 10/06/2022 15.5  6.0 - 20.0 mg/dL Final     Creatinine 10/06/2022 0.70  0.51 - 0.95 mg/dL Final     Calcium 10/06/2022 8.6  8.6 - 10.0 mg/dL Final     Glucose 10/06/2022 187 (H)  70 - 99 mg/dL Final     GFR Estimate 10/06/2022 >90  >60 mL/min/1.73m2 Final    Effective December 21, 2021 eGFRcr in adults is calculated using the 2021 CKD-EPI creatinine equation which includes age and gender (Cary et al., NE, DOI: 10.1056/KFOMni5965474)     INR 10/06/2022 1.04  0.85 - 1.15 Final     aPTT 10/06/2022 24  22 - 38 Seconds Final     Troponin T, High Sensitivity 10/06/2022 13  <=14 ng/L Final     Ventricular Rate 10/06/2022 84  BPM Final     Atrial Rate 10/06/2022 84  BPM Final     ME Interval 10/06/2022 196  ms Final     QRS Duration 10/06/2022 78  ms Final     QT 10/06/2022 382  ms Final     QTc 10/06/2022 451  ms Final     P Axis 10/06/2022 16  degrees Final     R AXIS 10/06/2022 47  degrees Final     T Axis 10/06/2022 97  degrees Final     Interpretation ECG 10/06/2022    Final                    Value:Sinus rhythm  Nonspecific ST and T wave abnormality  Abnormal ECG  When compared with ECG of " 24-SEP-2022 07:25,  No significant change was found  Confirmed by SEE ED PROVIDER NOTE FOR, ECG INTERPRETATION ([id number]),  LISSET PATRICK ([id number]) on 10/11/2022 8:19:19 AM       GLUCOSE BY METER POCT 10/06/2022 202 (H)  70 - 99 mg/dL Final     WBC Count 10/06/2022 7.5  4.0 - 11.0 10e3/uL Final     RBC Count 10/06/2022 4.14  3.80 - 5.20 10e6/uL Final     Hemoglobin 10/06/2022 11.0 (L)  11.7 - 15.7 g/dL Final     Hematocrit 10/06/2022 36.0  35.0 - 47.0 % Final     MCV 10/06/2022 87  78 - 100 fL Final     MCH 10/06/2022 26.6  26.5 - 33.0 pg Final     MCHC 10/06/2022 30.6 (L)  31.5 - 36.5 g/dL Final     RDW 10/06/2022 13.0  10.0 - 15.0 % Final     Platelet Count 10/06/2022 287  150 - 450 10e3/uL Final     % Neutrophils 10/06/2022 59  % Final     % Lymphocytes 10/06/2022 28  % Final     % Monocytes 10/06/2022 6  % Final     % Eosinophils 10/06/2022 5  % Final     % Basophils 10/06/2022 1  % Final     % Immature Granulocytes 10/06/2022 1  % Final     NRBCs per 100 WBC 10/06/2022 1 (H)  <1 /100 Final     Absolute Neutrophils 10/06/2022 4.5  1.6 - 8.3 10e3/uL Final     Absolute Lymphocytes 10/06/2022 2.1  0.8 - 5.3 10e3/uL Final     Absolute Monocytes 10/06/2022 0.5  0.0 - 1.3 10e3/uL Final     Absolute Eosinophils 10/06/2022 0.4  0.0 - 0.7 10e3/uL Final     Absolute Basophils 10/06/2022 0.1  0.0 - 0.2 10e3/uL Final     Absolute Immature Granulocytes 10/06/2022 0.1  <=0.4 10e3/uL Final     Absolute NRBCs 10/06/2022 0.0  10e3/uL Final     Hold Specimen 10/06/2022 [facility]   Final     SARS CoV2 PCR 10/06/2022 Negative  Negative Final    NEGATIVE: SARS-CoV-2 (COVID-19) RNA not detected, presumed negative.     Creatinine 10/06/2022 0.76  0.51 - 0.95 mg/dL Final     GFR Estimate 10/06/2022 90  >60 mL/min/1.73m2 Final    Effective December 21, 2021 eGFRcr in adults is calculated using the 2021 CKD-EPI creatinine equation which includes age and gender (Cary verduzco al., NEJM, DOI: 10.1056/JAJQbd7565659)     GLUCOSE BY METER POCT  10/06/2022 145 (H)  70 - 99 mg/dL Final     Vitamin B12 10/06/2022 316  232 - 1,245 pg/mL Final     TSH 10/06/2022 1.38  0.30 - 4.20 uIU/mL Final     GLUCOSE BY METER POCT 10/06/2022 227 (H)  70 - 99 mg/dL Final     Sodium 10/07/2022 136  136 - 145 mmol/L Final     Potassium 10/07/2022 4.2  3.4 - 5.3 mmol/L Final     Chloride 10/07/2022 103  98 - 107 mmol/L Final     Carbon Dioxide (CO2) 10/07/2022 23  22 - 29 mmol/L Final     Anion Gap 10/07/2022 10  7 - 15 mmol/L Final     Urea Nitrogen 10/07/2022 27.5 (H)  6.0 - 20.0 mg/dL Final     Creatinine 10/07/2022 0.85  0.51 - 0.95 mg/dL Final     Calcium 10/07/2022 8.7  8.6 - 10.0 mg/dL Final     Glucose 10/07/2022 190 (H)  70 - 99 mg/dL Final     GFR Estimate 10/07/2022 79  >60 mL/min/1.73m2 Final    Effective December 21, 2021 eGFRcr in adults is calculated using the 2021 CKD-EPI creatinine equation which includes age and gender (Cary et al., NE, DOI: 10.1056/UOOVgr8521934)     WBC Count 10/07/2022 7.3  4.0 - 11.0 10e3/uL Final     RBC Count 10/07/2022 3.94  3.80 - 5.20 10e6/uL Final     Hemoglobin 10/07/2022 10.5 (L)  11.7 - 15.7 g/dL Final     Hematocrit 10/07/2022 34.1 (L)  35.0 - 47.0 % Final     MCV 10/07/2022 87  78 - 100 fL Final     MCH 10/07/2022 26.6  26.5 - 33.0 pg Final     MCHC 10/07/2022 30.8 (L)  31.5 - 36.5 g/dL Final     RDW 10/07/2022 12.8  10.0 - 15.0 % Final     Platelet Count 10/07/2022 251  150 - 450 10e3/uL Final     Cholesterol 10/07/2022 144  <200 mg/dL Final     Triglycerides 10/07/2022 113  <150 mg/dL Final     Direct Measure HDL 10/07/2022 38 (L)  >=50 mg/dL Final     LDL Cholesterol Calculated 10/07/2022 83  <=100 mg/dL Final     Non HDL Cholesterol 10/07/2022 106  <130 mg/dL Final     GLUCOSE BY METER POCT 10/07/2022 164 (H)  70 - 99 mg/dL Final     GLUCOSE BY METER POCT 10/07/2022 173 (H)  70 - 99 mg/dL Final     Hemoglobin A1C 10/07/2022 11.3 (H)  <5.7 % Final    Normal <5.7%   Prediabetes 5.7-6.4%    Diabetes 6.5% or higher     Note:  Adopted from ADA consensus guidelines.     GLUCOSE BY METER POCT 10/07/2022 163 (H)  70 - 99 mg/dL Final     GLUCOSE BY METER POCT 10/07/2022 182 (H)  70 - 99 mg/dL Final     GLUCOSE BY METER POCT 10/08/2022 154 (H)  70 - 99 mg/dL Final     GLUCOSE BY METER POCT 10/08/2022 136 (H)  70 - 99 mg/dL Final       No results found for any visits on 10/11/22.     Assessment:     1. Diabetic polyneuropathy associated with type 2 diabetes mellitus (H)    2. Uncontrolled type 2 diabetes mellitus with hyperglycemia (H)    3. Chronic obstructive pulmonary disease (H)    4. History of drug abuse (H)    5. Hx of syphilis    6. Pulmonary nodule    7. Carotid stenosis, left    8. Chronic pain syndrome    9. Coronary artery disease involving native coronary artery of native heart without angina pectoris    10. Primary hypertension    11. Mixed hyperlipidemia    12. TIA (transient ischemic attack)    13. Myelomalacia of cervical cord (H)    14. Hospital discharge follow-up        Plan:     1. Lots of time spent in reviewing and updating the chart.   Will continue to work on this.  2. County forms done and faxed today.  3. May need a care coordination referral in the future.  4. Referral to diabetic education   5. Close follow up to address issues.  This might need to be ongoing.  6. Freestyle Shivani prescriptions done today.  Does not want other monitor - has been aware of this.  7. Increase insulin glargine (Lantus) to twice a day as hospital recommended.  8. Consider other options for peripheral neuropathy (PN).  9. Continue current medications otherwise.  10. Follow up sooner if issues.    Orders Placed This Encounter   Procedures     AMB Adult Diabetes Educator Referral       75 minutes or greater was spent today on the patient's care on the day of service.      This includes time for chart preparation, reviewing medical tests done before or during the visit, talking with the patient, review of quality indicators, required  documentation, and other elements of care.        Kike David MD  General Internal Medicine  [facility]    Return in about 1 month (around 11/11/2022), or if symptoms worsen or fail to improve, for follow up visit.     Future Appointments   Date Time Provider Department Center   1/10/2023  3:00 PM Rosario Byrne NP [facility]         Depression Screening Follow Up    Follow Up Actions Taken  Patient to follow up with PCP.  Clinic staff to schedule appointment if able.      Post Discharge Medication Reconciliation Status: discharge medications reconciled, continue medications without change        Wt Readings from Last 20 Encounters:   10/08/22 91.6 kg (202 lb)   09/27/22 91.4 kg (201 lb 8 oz)   07/29/22 90.3 kg (199 lb)   07/18/22 96.2 kg (212 lb)   06/07/22 89.9 kg (198 lb 1.6 oz)   05/20/22 93.9 kg (207 lb)   05/18/22 91.2 kg (201 lb)   04/03/22 89.7 kg (197 lb 11.2 oz)   10/27/21 90.7 kg (200 lb)   06/04/21 90 kg (198 lb 6.4 oz)   06/03/21 90 kg (198 lb 8 oz)   06/04/21 90 kg (198 lb 6.4 oz)   06/02/21 88.2 kg (194 lb 8 oz)   06/01/21 89 kg (196 lb 1.6 oz)   05/31/21 88.5 kg (195 lb)   05/30/21 92.5 kg (204 lb)   05/30/21 89.1 kg (196 lb 6.4 oz)   11/01/18 88.9 kg (196 lb)   04/25/18 91.2 kg (201 lb)   04/11/18 92.5 kg (204 lb)     BP Readings from Last 20 Encounters:   10/11/22 124/74   10/08/22 129/59   09/29/22 105/60   07/29/22 132/82   07/21/22 (!) 157/79   06/07/22 126/71   05/20/22 (!) 192/84   05/18/22 (!) 181/88   04/07/22 (!) 182/84   10/27/21 (!) 169/78      Pulse Readings from Last 20 Encounters:   10/11/22 75   10/08/22 75   09/29/22 80   07/29/22 60   07/21/22 60   06/07/22 71   05/20/22 73   05/18/22 77   04/07/22 60   10/27/21 71     SpO2 Readings from Last 20 Encounters:   10/11/22 99%   10/08/22 98%   09/29/22 98%   07/29/22 100%   07/21/22 100%   06/07/22 99%   05/20/22 97%   05/18/22 98%   04/07/22 100%   10/27/21 98%           Wt Readings from Last 20  Encounters:   10/08/22 91.6 kg (202 lb)   09/27/22 91.4 kg (201 lb 8 oz)   07/29/22 90.3 kg (199 lb)   07/18/22 96.2 kg (212 lb)   06/07/22 89.9 kg (198 lb 1.6 oz)   05/20/22 93.9 kg (207 lb)   05/18/22 91.2 kg (201 lb)   04/03/22 89.7 kg (197 lb 11.2 oz)   10/27/21 90.7 kg (200 lb)   06/04/21 90 kg (198 lb 6.4 oz)   06/03/21 90 kg (198 lb 8 oz)   06/04/21 90 kg (198 lb 6.4 oz)   06/02/21 88.2 kg (194 lb 8 oz)   06/01/21 89 kg (196 lb 1.6 oz)   05/31/21 88.5 kg (195 lb)   05/30/21 92.5 kg (204 lb)   05/30/21 89.1 kg (196 lb 6.4 oz)   11/01/18 88.9 kg (196 lb)   04/25/18 91.2 kg (201 lb)   04/11/18 92.5 kg (204 lb)     BP Readings from Last 20 Encounters:   10/11/22 124/74   10/08/22 129/59   09/29/22 105/60   07/29/22 132/82   07/21/22 (!) 157/79   06/07/22 126/71   05/20/22 (!) 192/84   05/18/22 (!) 181/88   04/07/22 (!) 182/84   10/27/21 (!) 169/78      Pulse Readings from Last 20 Encounters:   10/11/22 75   10/08/22 75   09/29/22 80   07/29/22 60   07/21/22 60   06/07/22 71   05/20/22 73   05/18/22 77   04/07/22 60   10/27/21 71     SpO2 Readings from Last 20 Encounters:   10/11/22 99%   10/08/22 98%   09/29/22 98%   07/29/22 100%   07/21/22 100%   06/07/22 99%   05/20/22 97%   05/18/22 98%   04/07/22 100%   10/27/21 98%

## 2022-10-11 ENCOUNTER — OFFICE VISIT (OUTPATIENT)
Dept: INTERNAL MEDICINE | Facility: CLINIC | Age: 57
End: 2022-10-11
Payer: COMMERCIAL

## 2022-10-11 ENCOUNTER — PATIENT OUTREACH (OUTPATIENT)
Dept: CARE COORDINATION | Facility: CLINIC | Age: 57
End: 2022-10-11

## 2022-10-11 VITALS
DIASTOLIC BLOOD PRESSURE: 74 MMHG | TEMPERATURE: 97.7 F | BODY MASS INDEX: 31.64 KG/M2 | OXYGEN SATURATION: 99 % | SYSTOLIC BLOOD PRESSURE: 124 MMHG | HEIGHT: 67 IN | HEART RATE: 75 BPM

## 2022-10-11 DIAGNOSIS — G95.89 MYELOMALACIA OF CERVICAL CORD (H): ICD-10-CM

## 2022-10-11 DIAGNOSIS — G45.9 TIA (TRANSIENT ISCHEMIC ATTACK): ICD-10-CM

## 2022-10-11 DIAGNOSIS — J44.9 CHRONIC OBSTRUCTIVE PULMONARY DISEASE, UNSPECIFIED COPD TYPE (H): ICD-10-CM

## 2022-10-11 DIAGNOSIS — E11.42 DIABETIC POLYNEUROPATHY ASSOCIATED WITH TYPE 2 DIABETES MELLITUS (H): Primary | ICD-10-CM

## 2022-10-11 DIAGNOSIS — I25.10 CORONARY ARTERY DISEASE INVOLVING NATIVE CORONARY ARTERY OF NATIVE HEART WITHOUT ANGINA PECTORIS: ICD-10-CM

## 2022-10-11 DIAGNOSIS — Z09 HOSPITAL DISCHARGE FOLLOW-UP: ICD-10-CM

## 2022-10-11 DIAGNOSIS — G89.4 CHRONIC PAIN SYNDROME: Chronic | ICD-10-CM

## 2022-10-11 DIAGNOSIS — E11.65 UNCONTROLLED TYPE 2 DIABETES MELLITUS WITH HYPERGLYCEMIA (H): ICD-10-CM

## 2022-10-11 DIAGNOSIS — Z86.19 HX OF SYPHILIS: ICD-10-CM

## 2022-10-11 DIAGNOSIS — R91.1 PULMONARY NODULE: ICD-10-CM

## 2022-10-11 DIAGNOSIS — F19.11 HISTORY OF DRUG ABUSE (H): ICD-10-CM

## 2022-10-11 DIAGNOSIS — E78.2 MIXED HYPERLIPIDEMIA: ICD-10-CM

## 2022-10-11 DIAGNOSIS — I65.22 CAROTID STENOSIS, LEFT: ICD-10-CM

## 2022-10-11 DIAGNOSIS — I10 PRIMARY HYPERTENSION: Chronic | ICD-10-CM

## 2022-10-11 LAB
ATRIAL RATE - MUSE: 84 BPM
DIASTOLIC BLOOD PRESSURE - MUSE: NORMAL MMHG
INTERPRETATION ECG - MUSE: NORMAL
P AXIS - MUSE: 16 DEGREES
PR INTERVAL - MUSE: 196 MS
QRS DURATION - MUSE: 78 MS
QT - MUSE: 382 MS
QTC - MUSE: 451 MS
R AXIS - MUSE: 47 DEGREES
SYSTOLIC BLOOD PRESSURE - MUSE: NORMAL MMHG
T AXIS - MUSE: 97 DEGREES
VENTRICULAR RATE- MUSE: 84 BPM

## 2022-10-11 PROCEDURE — 99417 PROLNG OP E/M EACH 15 MIN: CPT | Performed by: INTERNAL MEDICINE

## 2022-10-11 PROCEDURE — 99215 OFFICE O/P EST HI 40 MIN: CPT | Mod: 25 | Performed by: INTERNAL MEDICINE

## 2022-10-11 ASSESSMENT — PATIENT HEALTH QUESTIONNAIRE - PHQ9
10. IF YOU CHECKED OFF ANY PROBLEMS, HOW DIFFICULT HAVE THESE PROBLEMS MADE IT FOR YOU TO DO YOUR WORK, TAKE CARE OF THINGS AT HOME, OR GET ALONG WITH OTHER PEOPLE: SOMEWHAT DIFFICULT
SUM OF ALL RESPONSES TO PHQ QUESTIONS 1-9: 16
SUM OF ALL RESPONSES TO PHQ QUESTIONS 1-9: 16

## 2022-10-11 ASSESSMENT — PAIN SCALES - GENERAL: PAINLEVEL: EXTREME PAIN (8)

## 2022-10-11 NOTE — PROGRESS NOTES
Clinic Care Coordination Contact  [facility]/Voicemail       Clinical Data: Care Coordinator Outreach  Outreach attempted x 2.  Left message on patient's voicemail with call back information and requested return call.  Plan: Care Coordinator will make no further outreaches at this time.    PATRICIA Morales   Social Work Clinic Care Coordinator   [facility]  PH: [phone]  [email or web address]

## 2022-10-11 NOTE — PROGRESS NOTES
"Answers for HPI/ROS submitted by the patient on 10/11/2022  If you checked off any problems, how difficult have these problems made it for you to do your work, take care of things at home, or get along with other people?: Somewhat difficult  PHQ9 TOTAL SCORE: 16      {PROVIDER CHARTING PREFERENCE:468282}    Trenton Smith is a 57 year old, presenting for the following health issues: Hospital Follow up      HPI       Hospital Follow-up Visit:    Hospital/Nursing Home/IP Rehab Facility: [facility]  Date of Admission: 9/24/2022  Date of Discharge: 9/29/2022  Reason(s) for Admission: Had problems walking and narrowing of blood vessel in back neck    Was your hospitalization related to COVID-19? No   Problems taking medications regularly:  None  Medication changes since discharge: None  Problems adhering to non-medication therapy:  None    Summary of hospitalization:  [facility] discharge summary reviewed  Diagnostic Tests/Treatments reviewed.  Follow up needed: {NONE DEFAULTED:201352::\"none\"}  Other Healthcare Providers Involved in Patient s Care:         {those currently involved after discharge:227822::\"None\"}  Update since discharge: {IMPROVED DEFAULT:690695::\"improved.\"} {TIP  Include information from family/caregivers, SNF, Care Coordination :093579}      {TIP  Click the link below to document, then refresh to pull in response :371609}  Post Medication Reconciliation Status:        Plan of care communicated with {Communicate Plan to:314868::\"patient\"}     {Reference  Coding guidelines- Moderate Complexity F2F/Video within 7 - 14 days of discharge [id number], High Complexity F2F/Video within 7 days [id number] or [id number] days [id number] :597519}      {additonal problems for provider to add (Optional):371872}    Review of Systems   {ROS COMP (Optional):604444}      Objective    LMP  (LMP Unknown)   There is no height or weight on file to calculate BMI.  Physical Exam "   {Exam List (Optional):634256}    {Diagnostic Test Results (Optional):604358}    {AMBULATORY ATTESTATION (Optional):810401}

## 2022-10-13 ENCOUNTER — TELEPHONE (OUTPATIENT)
Dept: INTERNAL MEDICINE | Facility: CLINIC | Age: 57
End: 2022-10-13

## 2022-10-13 PROBLEM — I65.22 CAROTID STENOSIS, LEFT: Chronic | Status: ACTIVE | Noted: 2021-10-03

## 2022-10-13 PROBLEM — M79.89 LEG SWELLING: Status: RESOLVED | Noted: 2019-10-11 | Resolved: 2022-10-13

## 2022-10-13 PROBLEM — E78.2 MIXED HYPERLIPIDEMIA: Status: ACTIVE | Noted: 2021-05-02

## 2022-10-13 PROBLEM — M51.26 LUMBAR DISC HERNIATION: Status: RESOLVED | Noted: 2019-06-14 | Resolved: 2022-10-13

## 2022-10-13 PROBLEM — R53.1 WEAKNESS: Status: RESOLVED | Noted: 2020-01-29 | Resolved: 2022-10-13

## 2022-10-13 PROBLEM — J45.909 ASTHMA IN ADULT, UNSPECIFIED ASTHMA SEVERITY, UNCOMPLICATED: Status: RESOLVED | Noted: 2022-07-17 | Resolved: 2022-10-13

## 2022-10-13 PROBLEM — F25.9 CHRONIC SCHIZOAFFECTIVE DISORDER (H): Status: RESOLVED | Noted: 2022-06-10 | Resolved: 2022-10-13

## 2022-10-13 PROBLEM — F43.0 ANXIETY AS ACUTE REACTION TO GROSS STRESS: Status: RESOLVED | Noted: 2022-07-17 | Resolved: 2022-10-13

## 2022-10-13 PROBLEM — M79.10 MYALGIA: Status: RESOLVED | Noted: 2018-02-20 | Resolved: 2022-10-13

## 2022-10-13 PROBLEM — M53.3 SACROILIAC JOINT DISEASE: Status: RESOLVED | Noted: 2018-10-09 | Resolved: 2022-10-13

## 2022-10-13 PROBLEM — G89.4 CHRONIC PAIN SYNDROME: Chronic | Status: ACTIVE | Noted: 2022-07-17

## 2022-10-13 PROBLEM — F41.1 ANXIETY AS ACUTE REACTION TO GROSS STRESS: Status: RESOLVED | Noted: 2022-07-17 | Resolved: 2022-10-13

## 2022-10-13 PROBLEM — F06.30 MOOD DISORDER DUE TO MEDICAL CONDITION: Status: RESOLVED | Noted: 2022-07-17 | Resolved: 2022-10-13

## 2022-10-13 PROBLEM — G47.9 SLEEP DIFFICULTIES: Status: RESOLVED | Noted: 2022-07-17 | Resolved: 2022-10-13

## 2022-10-13 PROBLEM — R53.1 RIGHT SIDED WEAKNESS: Status: RESOLVED | Noted: 2022-07-17 | Resolved: 2022-10-13

## 2022-10-13 PROBLEM — G45.9 TIA (TRANSIENT ISCHEMIC ATTACK): Status: RESOLVED | Noted: 2022-10-07 | Resolved: 2022-10-13

## 2022-10-13 PROBLEM — G58.8 INTERCOSTAL NEURITIS: Status: RESOLVED | Noted: 2018-02-06 | Resolved: 2022-10-13

## 2022-10-13 PROBLEM — R55 SYNCOPE AND COLLAPSE: Status: RESOLVED | Noted: 2018-10-21 | Resolved: 2022-10-13

## 2022-10-13 PROBLEM — J01.01 ACUTE RECURRENT MAXILLARY SINUSITIS: Status: RESOLVED | Noted: 2021-10-17 | Resolved: 2022-10-13

## 2022-10-13 PROBLEM — E11.65 UNCONTROLLED TYPE 2 DIABETES MELLITUS WITH HYPERGLYCEMIA (H): Chronic | Status: ACTIVE | Noted: 2021-12-13

## 2022-10-13 PROBLEM — Z91.199 NONCOMPLIANCE: Status: RESOLVED | Noted: 2021-10-08 | Resolved: 2022-10-13

## 2022-10-13 PROBLEM — I63.239: Chronic | Status: ACTIVE | Noted: 2021-10-17

## 2022-10-13 RX ORDER — FLASH GLUCOSE SENSOR
KIT MISCELLANEOUS
Qty: 2 EACH | Refills: 11 | Status: SHIPPED | OUTPATIENT
Start: 2022-10-13 | End: 2023-05-05

## 2022-10-13 RX ORDER — FLASH GLUCOSE SCANNING READER
EACH MISCELLANEOUS
Qty: 1 EACH | Refills: 0 | Status: SHIPPED | OUTPATIENT
Start: 2022-10-13 | End: 2023-01-10

## 2022-10-13 NOTE — TELEPHONE ENCOUNTER
Please schedule this patient for an appointment with me on:    ______________________________________________________________________     Monday, November 7th  Time of appointment:  10 am    Reason for appointment:  Follow up multiple issues.  FACE TO FACE visit     ______________________________________________________________________     Patient already notified.  No need to notify the patient.    Thank you.    Kike David MD  General Internal Medicine  [facility]   10/13/2022 8:39 AM

## 2022-10-13 NOTE — PATIENT INSTRUCTIONS
Future Appointments   Date Time Provider Department Center   1/10/2023  3:00 PM Rosario Byrne NP [facility]

## 2022-11-07 ENCOUNTER — MEDICAL CORRESPONDENCE (OUTPATIENT)
Dept: HEALTH INFORMATION MANAGEMENT | Facility: CLINIC | Age: 57
End: 2022-11-07

## 2022-11-10 ENCOUNTER — TELEPHONE (OUTPATIENT)
Dept: INTERNAL MEDICINE | Facility: CLINIC | Age: 57
End: 2022-11-10

## 2022-11-10 NOTE — TELEPHONE ENCOUNTER
"Salome calling to request call back for clarification on patient meal voucher form that was faxed to her on 11/7/22 from Dr David.  On the form, 5 different diet plans were selected.  Are these 5 plans supposed to overlap, or should all 5 plans be considered \"stand alone\"?  Example:  High protein diet and controlled protein diet were both selected.  Should patient be purchasing food from each category specifically, or single item that meets both plans?  Salome needs clarification as this could significantly impact funding released to patient.      Please call Salome at [phone].  Secure VM if needed.  "

## 2022-11-11 NOTE — TELEPHONE ENCOUNTER
Left message for Salome that patient has upcoming appt with PCP & with endocrine in Jan 2023    Unable to find that patient needs controlled protein (40-60 grams) so would go with original order of the high protein diet

## 2022-11-11 NOTE — TELEPHONE ENCOUNTER
Chloe,     Can you please follow up on this?    Kike David MD  General Internal Medicine  [facility]  11/11/2022, 8:37 AM

## 2022-11-17 DIAGNOSIS — G45.9 TIA (TRANSIENT ISCHEMIC ATTACK): ICD-10-CM

## 2022-11-17 DIAGNOSIS — I10 ESSENTIAL HYPERTENSION, BENIGN: ICD-10-CM

## 2022-11-17 RX ORDER — CLOPIDOGREL BISULFATE 75 MG/1
75 TABLET ORAL DAILY
Qty: 90 TABLET | Refills: 3 | Status: SHIPPED | OUTPATIENT
Start: 2022-11-17 | End: 2023-01-17

## 2022-11-17 RX ORDER — VALSARTAN 40 MG/1
40 TABLET ORAL DAILY
Qty: 90 TABLET | Refills: 3 | Status: SHIPPED | OUTPATIENT
Start: 2022-11-17 | End: 2024-01-14

## 2022-12-11 DIAGNOSIS — I25.10 CORONARY ARTERY DISEASE INVOLVING NATIVE CORONARY ARTERY OF NATIVE HEART WITHOUT ANGINA PECTORIS: Primary | Chronic | ICD-10-CM

## 2022-12-12 RX ORDER — NITROGLYCERIN 0.4 MG/1
0.4 TABLET SUBLINGUAL EVERY 5 MIN PRN
Qty: 25 TABLET | Refills: 3 | Status: SHIPPED | OUTPATIENT
Start: 2022-12-12 | End: 2024-02-14

## 2023-01-10 ENCOUNTER — OFFICE VISIT (OUTPATIENT)
Dept: ENDOCRINOLOGY | Facility: CLINIC | Age: 58
End: 2023-01-10
Attending: HOSPITALIST
Payer: COMMERCIAL

## 2023-01-10 ENCOUNTER — LAB (OUTPATIENT)
Dept: LAB | Facility: CLINIC | Age: 58
End: 2023-01-10
Payer: COMMERCIAL

## 2023-01-10 VITALS
DIASTOLIC BLOOD PRESSURE: 76 MMHG | BODY MASS INDEX: 30.38 KG/M2 | WEIGHT: 194 LBS | SYSTOLIC BLOOD PRESSURE: 132 MMHG | HEART RATE: 88 BPM

## 2023-01-10 DIAGNOSIS — E11.42 TYPE 2 DIABETES MELLITUS WITH DIABETIC POLYNEUROPATHY, WITH LONG-TERM CURRENT USE OF INSULIN (H): ICD-10-CM

## 2023-01-10 DIAGNOSIS — Z79.4 TYPE 2 DIABETES MELLITUS WITH DIABETIC POLYNEUROPATHY, WITH LONG-TERM CURRENT USE OF INSULIN (H): ICD-10-CM

## 2023-01-10 LAB
ANION GAP SERPL CALCULATED.3IONS-SCNC: 15 MMOL/L (ref 7–15)
BUN SERPL-MCNC: 18.6 MG/DL (ref 6–20)
CALCIUM SERPL-MCNC: 9.6 MG/DL (ref 8.6–10)
CHLORIDE SERPL-SCNC: 106 MMOL/L (ref 98–107)
CREAT SERPL-MCNC: 0.73 MG/DL (ref 0.51–0.95)
DEPRECATED HCO3 PLAS-SCNC: 20 MMOL/L (ref 22–29)
GFR SERPL CREATININE-BSD FRML MDRD: >90 ML/MIN/1.73M2
GLUCOSE SERPL-MCNC: 339 MG/DL (ref 70–99)
HBA1C MFR BLD: 7.9 % (ref 0–5.6)
POTASSIUM SERPL-SCNC: 4.2 MMOL/L (ref 3.4–5.3)
SODIUM SERPL-SCNC: 141 MMOL/L (ref 136–145)

## 2023-01-10 PROCEDURE — 80048 BASIC METABOLIC PNL TOTAL CA: CPT

## 2023-01-10 PROCEDURE — 36415 COLL VENOUS BLD VENIPUNCTURE: CPT

## 2023-01-10 PROCEDURE — 99214 OFFICE O/P EST MOD 30 MIN: CPT | Performed by: NURSE PRACTITIONER

## 2023-01-10 PROCEDURE — 83036 HEMOGLOBIN GLYCOSYLATED A1C: CPT

## 2023-01-10 RX ORDER — FLASH GLUCOSE SCANNING READER
EACH MISCELLANEOUS
Qty: 1 EACH | Refills: 0 | Status: SHIPPED | OUTPATIENT
Start: 2023-01-10 | End: 2023-05-05

## 2023-01-10 RX ORDER — DULAGLUTIDE 0.75 MG/.5ML
0.75 INJECTION, SOLUTION SUBCUTANEOUS
Qty: 6 ML | Refills: 3 | Status: SHIPPED | OUTPATIENT
Start: 2023-01-10 | End: 2024-01-14

## 2023-01-10 NOTE — LETTER
"    1/10/2023         RE: Sarah Rahman  [address]  [place] [address]        Dear Colleague,    Thank you for referring your patient, Sarah Rahman, to the [facility]. Please see a copy of my visit note below.    [facility] ENDOCRINOLOGY    Diabetes Note 1/11/2023    Sarah Rahman, 1965, [id number]          Reason for visit      1. Type 2 diabetes mellitus with diabetic polyneuropathy, with long-term current use of insulin (H)    2. Uncontrolled type 2 diabetes mellitus with hyperglycemia (H)        HPI     Sarah Rahman is a very pleasant 57 year old old female who presents for follow up.  SUMMARY:    Pt here today to establish care for poorly controlled DM 2. Pt report that \"there is tons of Diabetes in my family\".  She was dx in 2009. Her most recent A1c, done in October of last year, was 11.3. Pt notes today that after she saw this, she made \"significant changes in her diet\". Defining this, she says \"no more fried food\", and more fruit and veggies.  Her weight is down by about 8 lbs.     Pt uses Lantus, 25 units BID, Glipizide 10 mg daily, and Trulicity, 0.75 mg weekly. She has a Shivani system, but has not been able to get it to work. She states that \"it wouldn't read\". She did send in the [place] for repair, but has not yet received it back. She notes that she has been without it for \"6-7 months\".  During this time, she has not been testing with the exception of New Years Day, in which she had an FBS of 160.  She has used Metformin in the past but stopped taking it because \"it made me pee all night\".     Pt reports significant pedal neuropathy that \"wakes me up at night\".   She has tried Gabapentin and Lyrica, without good success.  She has also tried \"dozens of creams and lotions, including Voltaren and Lidocaine\" and nothing has helped.      Pt has not been experiencing hypoglycemia.         Blood glucose data:      Past Medical " History     Patient Active Problem List   Diagnosis     CAD (coronary artery disease)     Schizoaffective disorder (H)     Bilateral low back pain with right-sided sciatica, unspecified chronicity     Diabetic polyneuropathy associated with type 2 diabetes mellitus (H)     Nasal polyp     Cerebrovascular accident (CVA) due to stenosis of carotid artery (H)     Chronic obstructive pulmonary disease (H)     Anemia     Carotid stenosis, left     Depression with anxiety     Mixed hyperlipidemia     GERD (gastroesophageal reflux disease)     History of drug abuse (H)     Hx of syphilis     HTN (hypertension)     Menopausal flushing     Moderate persistent asthma     Myelomalacia of cervical cord (H)     Nephrolithiasis     Obesity     Post-COVID syndrome     Pulmonary nodule     Seasonal allergies     Sensorineural hearing loss (SNHL) of right ear     Smoker     Uncontrolled type 2 diabetes mellitus with hyperglycemia (H)     Chronic pain syndrome        Family History       family history includes Diabetes in her brother; Heart Disease in her father, mother, sister, and sister.    Social History      reports that she has been smoking cigarettes. She has been smoking an average of .5 packs per day. She has never used smokeless tobacco. She reports that she does not currently use alcohol. She reports that she does not use drugs.      Review of Systems     Patient has no polyuria or polydipsia, no chest pain, dyspnea or TIA's, no numbness, tingling or pain in extremities  Remainder negative except as noted in HPI.      Vital Signs     /76 (BP Location: Right arm, Patient Position: Sitting, Cuff Size: Adult Regular)   Pulse 88   Wt 88 kg (194 lb)   LMP  (LMP Unknown)   BMI 30.38 kg/m    Wt Readings from Last 3 Encounters:   01/10/23 88 kg (194 lb)   10/08/22 91.6 kg (202 lb)   09/27/22 91.4 kg (201 lb 8 oz)       Physical Exam     Constitutional:  Well developed, Well nourished  HENT:  Normocephalic,   Neck:  normal in appearance  Eyes:  PERRL, Conjunctiva pink  Respiratory:  No respiratory distress  Skin: No acanthosis nigricans, lipoatrophy or lipodystrophy  Neurologic:  Alert & oriented x 3, nonfocal  Psychiatric:  Affect, Mood, Insight appropriate          Assessment     1. Type 2 diabetes mellitus with diabetic polyneuropathy, with long-term current use of insulin (H)    2. Uncontrolled type 2 diabetes mellitus with hyperglycemia (H)        Plan     Kristin was insistent that we not make any medication changes prior to her getting lab work done (which was accomplished prior to her departure). Her A1c came back incredibly improved at 7.9.  Renal function is normal. We will then, leave her medication regimen as is and I will see her back in 3ish months.           Rosario Byrne NP  [facility]  1/11/2023  6:21 AM      Lab Results     No results found for: HGBA1C, CREATININE, MICROALBUR    Cholesterol   Date Value Ref Range Status   10/07/2022 144 <200 mg/dL Final   04/22/2011 133.0 <200.0 mg/dL Final     HDL Cholesterol   Date Value Ref Range Status   04/22/2011 34.0 (L) >50.0 mg/dL Final     Direct Measure HDL   Date Value Ref Range Status   10/07/2022 38 (L) >=50 mg/dL Final     Triglycerides   Date Value Ref Range Status   10/07/2022 113 <150 mg/dL Final   04/22/2011 90.0 <150.0 mg/dL Final       [unfilled]      Current Medications     Outpatient Medications Prior to Visit   Medication Sig Dispense Refill     glipiZIDE (GLUCOTROL XL) 5 MG 24 hr tablet Take 2 tablets (10 mg) by mouth daily 30 tablet 0     acetaminophen (TYLENOL) 325 MG tablet Take 650 mg by mouth every 8 hours as needed for mild pain       albuterol (PROAIR HFA) 108 (90 BASE) MCG/ACT inhaler Inhale 1-2 puffs into the lungs every 4 hours as needed       alcohol swab prep pads Use to swab area of injection/zac as directed. 100 each 6     Alcohol Swabs PADS Use to swab the area of the injection or zac as directed Per insurance coverage 100  each 0     amitriptyline (ELAVIL) 25 MG tablet Take 1 tablet (25 mg) by mouth At Bedtime 90 tablet 3     aspirin (ASA) 81 MG EC tablet Take 1 tablet (81 mg) by mouth daily 90 tablet 0     blood glucose (ACCU-CHEK DARRIN PLUS) test strip Use to test blood sugar 3-4 times daily or as directed. 100 strip 0     blood glucose (ACCU-CHEK SOFTCLIX) lancing device Lancing device to be used with lancets. 1 each 0     blood glucose (NO BRAND SPECIFIED) lancets standard To use to test glucose level in the blood Use to test blood sugar  4  times daily as directed. To accompany glucose monitor brands per insurance coverage. 100 each 0     blood glucose (NO BRAND SPECIFIED) test strip To use to test glucose level in the blood Use to test blood sugar  4 times daily as directed. To accompany glucose monitor brands per insurance coverage. 100 strip 0     blood glucose calibration (NO BRAND SPECIFIED) solution Used to calibrate the blood glucose monitor as needed and as directed.  To accompany  blood glucose brands per insurance coverage 2 each 0     blood glucose monitoring (NO BRAND SPECIFIED) meter device kit Use as directed Per insurance coverage 1 kit 0     blood glucose monitoring (SOFTCLIX) lancets Use to test blood sugar 3-4 times daily. 100 each 1     budesonide-formoterol (SYMBICORT) 160-4.5 MCG/ACT Inhaler Inhale 2 puffs into the lungs 2 times daily       carvedilol (COREG) 12.5 MG tablet Take 1 tablet (12.5 mg) by mouth 2 times daily (with meals) 60 tablet 0     cetirizine (ZYRTEC) 10 MG tablet Take 10 mg by mouth daily       clopidogrel (PLAVIX) 75 MG tablet Take 1 tablet (75 mg) by mouth daily 90 tablet 3     Continuous Blood Gluc Sensor (FREESTYLE SHEBA 14 DAY SENSOR) MISC Change every 14 days. 2 each 11     Continuous Blood Gluc Sensor (FREESTYLE SHEBA 14 DAY SENSOR) MISC 1 each every 14 days Use 1 Sensor every 14 days. Use to read blood sugars per 's instructions. 2 each 5     [employer] GENUINE ASPIRIN 325 MG  tablet TAKE 1 TABLET BY MOUTH EVERY DAY FOR DISEASE INVOLVING LIPID DEPOSITS IN THE ARTERIES       diclofenac (VOLTAREN) 1 % topical gel Apply 2 g topically 3 times daily as needed for moderate pain (feet)       DULoxetine (CYMBALTA) 30 MG capsule Take 30 mg by mouth 2 times daily       exenatide ER (BYDUREON BCISE) 2 MG/0.85ML auto-injector Inject 2 mg Subcutaneous every 7 days On Mondays       fluticasone (FLONASE) 50 MCG/ACT nasal spray Spray 1 spray into both nostrils daily       furosemide (LASIX) 20 MG tablet Take 20 mg by mouth daily as needed (swelling)       insulin pen needle (32G X 4 MM) 32G X 4 MM miscellaneous Use 1 pen needles daily or as directed. 100 each 0     ipratropium - albuterol 0.5 mg/2.5 mg/3 mL (DUONEB) 0.5-2.5 (3) MG/3ML neb solution Take 1 vial by nebulization every 6 hours as needed for shortness of breath / dyspnea or wheezing       isosorbide mononitrate (IMDUR) 30 MG 24 hr tablet Take 1 tablet (30 mg) by mouth daily 30 tablet 0     lidocaine (LIDODERM) 5 % patch Place 1 patch onto the skin every 24 hours To prevent lidocaine toxicity, patient should be patch free for 12 hrs daily. BACK       methocarbamol (ROBAXIN) 500 MG tablet Take 500 mg by mouth 4 times daily as needed for muscle spasms       montelukast (SINGULAIR) 10 MG tablet Take 10 mg by mouth At Bedtime       naloxone (NARCAN) 4 MG/0.1ML nasal spray Spray 4 mg into one nostril alternating nostrils as needed for opioid reversal every 2-3 minutes until assistance arrives       nitroGLYcerin (NITROSTAT) 0.4 MG sublingual tablet Place 1 tablet (0.4 mg) under the tongue every 5 minutes as needed for chest pain For chest pain place 1 tablet under the tongue every 5 minutes for 3 doses. If symptoms persist 5 minutes after 1st dose call 911. 25 tablet 3     nortriptyline (PAMELOR) 10 MG capsule Take 10 mg by mouth At Bedtime       omeprazole 20 MG tablet Take 20 mg by mouth daily       ondansetron (ZOFRAN) 8 MG tablet Take 8 mg by  mouth every 8 hours as needed       oxyCODONE-acetaminophen (PERCOCET)  MG per tablet Take 1 tablet by mouth every 6 hours as needed for severe pain       polyethylene glycol (MIRALAX) 17 GM/Dose powder Take 17 g by mouth daily 510 g 0     rosuvastatin (CRESTOR) 20 MG tablet Take 2 tablets (40 mg) by mouth daily 30 tablet 0     sennosides (SENOKOT) 8.6 MG tablet Take 2 tablets by mouth 2 times daily 60 tablet 0     urea (DERMAL THERAPY FINGER CARE) 20 % external lotion Externally apply topically 2 times daily as needed Feet       valsartan (DIOVAN) 40 MG tablet Take 1 tablet (40 mg) by mouth daily 90 tablet 3     Continuous Blood Gluc  (FREESTYLE SHEBA 14 DAY READER) ROSE Use to read blood sugars as per 's instructions. 1 each 0     insulin glargine (LANTUS PEN) 100 UNIT/ML pen Inject 25 Units Subcutaneous 2 times daily 30 mL 0     TRULICITY 0.75 MG/0.5ML pen INJECT CONTENTS OF 1 SYRINGE (0.75 MG) SUBCUTANEOUSLY EVERY 7 DAYS 2 mL 3     No facility-administered medications prior to visit.               Again, thank you for allowing me to participate in the care of your patient.        Sincerely,        Rosario Byrne NP

## 2023-01-10 NOTE — PROGRESS NOTES
"[facility] ENDOCRINOLOGY    Diabetes Note 1/11/2023    Sarah Rahman, 1965, [id number]          Reason for visit      1. Type 2 diabetes mellitus with diabetic polyneuropathy, with long-term current use of insulin (H)    2. Uncontrolled type 2 diabetes mellitus with hyperglycemia (H)        HPI     Sarah Rahman is a very pleasant 57 year old old female who presents for follow up.  SUMMARY:    Pt here today to establish care for poorly controlled DM 2. Pt report that \"there is tons of Diabetes in my family\".  She was dx in 2009. Her most recent A1c, done in October of last year, was 11.3. Pt notes today that after she saw this, she made \"significant changes in her diet\". Defining this, she says \"no more fried food\", and more fruit and veggies.  Her weight is down by about 8 lbs.     Pt uses Lantus, 25 units BID, Glipizide 10 mg daily, and Trulicity, 0.75 mg weekly. She has a Shivani system, but has not been able to get it to work. She states that \"it wouldn't read\". She did send in the [place] for repair, but has not yet received it back. She notes that she has been without it for \"6-7 months\".  During this time, she has not been testing with the exception of New Years Day, in which she had an FBS of 160.  She has used Metformin in the past but stopped taking it because \"it made me pee all night\".     Pt reports significant pedal neuropathy that \"wakes me up at night\".   She has tried Gabapentin and Lyrica, without good success.  She has also tried \"dozens of creams and lotions, including Voltaren and Lidocaine\" and nothing has helped.      Pt has not been experiencing hypoglycemia.         Blood glucose data:      Past Medical History     Patient Active Problem List   Diagnosis     CAD (coronary artery disease)     Schizoaffective disorder (H)     Bilateral low back pain with right-sided sciatica, unspecified chronicity     Diabetic polyneuropathy associated with type 2 diabetes mellitus (H) "     Nasal polyp     Cerebrovascular accident (CVA) due to stenosis of carotid artery (H)     Chronic obstructive pulmonary disease (H)     Anemia     Carotid stenosis, left     Depression with anxiety     Mixed hyperlipidemia     GERD (gastroesophageal reflux disease)     History of drug abuse (H)     Hx of syphilis     HTN (hypertension)     Menopausal flushing     Moderate persistent asthma     Myelomalacia of cervical cord (H)     Nephrolithiasis     Obesity     Post-COVID syndrome     Pulmonary nodule     Seasonal allergies     Sensorineural hearing loss (SNHL) of right ear     Smoker     Uncontrolled type 2 diabetes mellitus with hyperglycemia (H)     Chronic pain syndrome        Family History       family history includes Diabetes in her brother; Heart Disease in her father, mother, sister, and sister.    Social History      reports that she has been smoking cigarettes. She has been smoking an average of .5 packs per day. She has never used smokeless tobacco. She reports that she does not currently use alcohol. She reports that she does not use drugs.      Review of Systems     Patient has no polyuria or polydipsia, no chest pain, dyspnea or TIA's, no numbness, tingling or pain in extremities  Remainder negative except as noted in HPI.      Vital Signs     /76 (BP Location: Right arm, Patient Position: Sitting, Cuff Size: Adult Regular)   Pulse 88   Wt 88 kg (194 lb)   LMP  (LMP Unknown)   BMI 30.38 kg/m    Wt Readings from Last 3 Encounters:   01/10/23 88 kg (194 lb)   10/08/22 91.6 kg (202 lb)   09/27/22 91.4 kg (201 lb 8 oz)       Physical Exam     Constitutional:  Well developed, Well nourished  HENT:  Normocephalic,   Neck: normal in appearance  Eyes:  PERRL, Conjunctiva pink  Respiratory:  No respiratory distress  Skin: No acanthosis nigricans, lipoatrophy or lipodystrophy  Neurologic:  Alert & oriented x 3, nonfocal  Psychiatric:  Affect, Mood, Insight appropriate          Assessment     1.  Type 2 diabetes mellitus with diabetic polyneuropathy, with long-term current use of insulin (H)    2. Uncontrolled type 2 diabetes mellitus with hyperglycemia (H)        Plan     Kristin was insistent that we not make any medication changes prior to her getting lab work done (which was accomplished prior to her departure). Her A1c came back incredibly improved at 7.9.  Renal function is normal. We will then, leave her medication regimen as is and I will see her back in 3ish months.           Rosario Byrne NP  [facility]  1/11/2023  6:21 AM      Lab Results     No results found for: HGBA1C, CREATININE, MICROALBUR    Cholesterol   Date Value Ref Range Status   10/07/2022 144 <200 mg/dL Final   04/22/2011 133.0 <200.0 mg/dL Final     HDL Cholesterol   Date Value Ref Range Status   04/22/2011 34.0 (L) >50.0 mg/dL Final     Direct Measure HDL   Date Value Ref Range Status   10/07/2022 38 (L) >=50 mg/dL Final     Triglycerides   Date Value Ref Range Status   10/07/2022 113 <150 mg/dL Final   04/22/2011 90.0 <150.0 mg/dL Final       [unfilled]      Current Medications     Outpatient Medications Prior to Visit   Medication Sig Dispense Refill     glipiZIDE (GLUCOTROL XL) 5 MG 24 hr tablet Take 2 tablets (10 mg) by mouth daily 30 tablet 0     acetaminophen (TYLENOL) 325 MG tablet Take 650 mg by mouth every 8 hours as needed for mild pain       albuterol (PROAIR HFA) 108 (90 BASE) MCG/ACT inhaler Inhale 1-2 puffs into the lungs every 4 hours as needed       alcohol swab prep pads Use to swab area of injection/zac as directed. 100 each 6     Alcohol Swabs PADS Use to swab the area of the injection or zac as directed Per insurance coverage 100 each 0     amitriptyline (ELAVIL) 25 MG tablet Take 1 tablet (25 mg) by mouth At Bedtime 90 tablet 3     aspirin (ASA) 81 MG EC tablet Take 1 tablet (81 mg) by mouth daily 90 tablet 0     blood glucose (ACCU-CHEK DARRIN PLUS) test strip Use to test blood sugar 3-4 times daily  or as directed. 100 strip 0     blood glucose (ACCU-CHEK SOFTCLIX) lancing device Lancing device to be used with lancets. 1 each 0     blood glucose (NO BRAND SPECIFIED) lancets standard To use to test glucose level in the blood Use to test blood sugar  4  times daily as directed. To accompany glucose monitor brands per insurance coverage. 100 each 0     blood glucose (NO BRAND SPECIFIED) test strip To use to test glucose level in the blood Use to test blood sugar  4 times daily as directed. To accompany glucose monitor brands per insurance coverage. 100 strip 0     blood glucose calibration (NO BRAND SPECIFIED) solution Used to calibrate the blood glucose monitor as needed and as directed.  To accompany  blood glucose brands per insurance coverage 2 each 0     blood glucose monitoring (NO BRAND SPECIFIED) meter device kit Use as directed Per insurance coverage 1 kit 0     blood glucose monitoring (SOFTCLIX) lancets Use to test blood sugar 3-4 times daily. 100 each 1     budesonide-formoterol (SYMBICORT) 160-4.5 MCG/ACT Inhaler Inhale 2 puffs into the lungs 2 times daily       carvedilol (COREG) 12.5 MG tablet Take 1 tablet (12.5 mg) by mouth 2 times daily (with meals) 60 tablet 0     cetirizine (ZYRTEC) 10 MG tablet Take 10 mg by mouth daily       clopidogrel (PLAVIX) 75 MG tablet Take 1 tablet (75 mg) by mouth daily 90 tablet 3     Continuous Blood Gluc Sensor (FREESTYLE SHEBA 14 DAY SENSOR) MISC Change every 14 days. 2 each 11     Continuous Blood Gluc Sensor (FREESTYLE SHEBA 14 DAY SENSOR) MISC 1 each every 14 days Use 1 Sensor every 14 days. Use to read blood sugars per 's instructions. 2 each 5     [facility] GENUINE ASPIRIN 325 MG tablet TAKE 1 TABLET BY MOUTH EVERY DAY FOR DISEASE INVOLVING LIPID DEPOSITS IN THE ARTERIES       diclofenac (VOLTAREN) 1 % topical gel Apply 2 g topically 3 times daily as needed for moderate pain (feet)       DULoxetine (CYMBALTA) 30 MG capsule Take 30 mg by mouth 2 times  daily       exenatide ER (BYDUREON BCISE) 2 MG/0.85ML auto-injector Inject 2 mg Subcutaneous every 7 days On Mondays       fluticasone (FLONASE) 50 MCG/ACT nasal spray Spray 1 spray into both nostrils daily       furosemide (LASIX) 20 MG tablet Take 20 mg by mouth daily as needed (swelling)       insulin pen needle (32G X 4 MM) 32G X 4 MM miscellaneous Use 1 pen needles daily or as directed. 100 each 0     ipratropium - albuterol 0.5 mg/2.5 mg/3 mL (DUONEB) 0.5-2.5 (3) MG/3ML neb solution Take 1 vial by nebulization every 6 hours as needed for shortness of breath / dyspnea or wheezing       isosorbide mononitrate (IMDUR) 30 MG 24 hr tablet Take 1 tablet (30 mg) by mouth daily 30 tablet 0     lidocaine (LIDODERM) 5 % patch Place 1 patch onto the skin every 24 hours To prevent lidocaine toxicity, patient should be patch free for 12 hrs daily. BACK       methocarbamol (ROBAXIN) 500 MG tablet Take 500 mg by mouth 4 times daily as needed for muscle spasms       montelukast (SINGULAIR) 10 MG tablet Take 10 mg by mouth At Bedtime       naloxone (NARCAN) 4 MG/0.1ML nasal spray Spray 4 mg into one nostril alternating nostrils as needed for opioid reversal every 2-3 minutes until assistance arrives       nitroGLYcerin (NITROSTAT) 0.4 MG sublingual tablet Place 1 tablet (0.4 mg) under the tongue every 5 minutes as needed for chest pain For chest pain place 1 tablet under the tongue every 5 minutes for 3 doses. If symptoms persist 5 minutes after 1st dose call 911. 25 tablet 3     nortriptyline (PAMELOR) 10 MG capsule Take 10 mg by mouth At Bedtime       omeprazole 20 MG tablet Take 20 mg by mouth daily       ondansetron (ZOFRAN) 8 MG tablet Take 8 mg by mouth every 8 hours as needed       oxyCODONE-acetaminophen (PERCOCET)  MG per tablet Take 1 tablet by mouth every 6 hours as needed for severe pain       polyethylene glycol (MIRALAX) 17 GM/Dose powder Take 17 g by mouth daily 510 g 0     rosuvastatin (CRESTOR) 20 MG  tablet Take 2 tablets (40 mg) by mouth daily 30 tablet 0     sennosides (SENOKOT) 8.6 MG tablet Take 2 tablets by mouth 2 times daily 60 tablet 0     urea (DERMAL THERAPY FINGER CARE) 20 % external lotion Externally apply topically 2 times daily as needed Feet       valsartan (DIOVAN) 40 MG tablet Take 1 tablet (40 mg) by mouth daily 90 tablet 3     Continuous Blood Gluc  (FREESTYLE SHEBA 14 DAY READER) RSOE Use to read blood sugars as per 's instructions. 1 each 0     insulin glargine (LANTUS PEN) 100 UNIT/ML pen Inject 25 Units Subcutaneous 2 times daily 30 mL 0     TRULICITY 0.75 MG/0.5ML pen INJECT CONTENTS OF 1 SYRINGE (0.75 MG) SUBCUTANEOUSLY EVERY 7 DAYS 2 mL 3     No facility-administered medications prior to visit.

## 2023-01-17 ENCOUNTER — LAB (OUTPATIENT)
Dept: FAMILY MEDICINE | Facility: CLINIC | Age: 58
End: 2023-01-17
Payer: COMMERCIAL

## 2023-01-17 ENCOUNTER — OFFICE VISIT (OUTPATIENT)
Dept: INTERNAL MEDICINE | Facility: CLINIC | Age: 58
End: 2023-01-17
Payer: COMMERCIAL

## 2023-01-17 ENCOUNTER — HOSPITAL ENCOUNTER (OUTPATIENT)
Dept: GENERAL RADIOLOGY | Facility: HOSPITAL | Age: 58
Discharge: HOME | End: 2023-01-17
Attending: INTERNAL MEDICINE | Admitting: INTERNAL MEDICINE
Payer: COMMERCIAL

## 2023-01-17 VITALS
OXYGEN SATURATION: 100 % | SYSTOLIC BLOOD PRESSURE: 133 MMHG | DIASTOLIC BLOOD PRESSURE: 94 MMHG | TEMPERATURE: 98.5 F | BODY MASS INDEX: 30.38 KG/M2 | HEART RATE: 80 BPM | HEIGHT: 67 IN

## 2023-01-17 DIAGNOSIS — Z20.822 CLOSE EXPOSURE TO 2019 NOVEL CORONAVIRUS: ICD-10-CM

## 2023-01-17 DIAGNOSIS — R20.0 NUMBNESS AND TINGLING OF BOTH FEET: ICD-10-CM

## 2023-01-17 DIAGNOSIS — M79.671 RIGHT FOOT PAIN: ICD-10-CM

## 2023-01-17 DIAGNOSIS — G89.4 CHRONIC PAIN SYNDROME: Chronic | ICD-10-CM

## 2023-01-17 DIAGNOSIS — E11.42 DIABETIC POLYNEUROPATHY ASSOCIATED WITH TYPE 2 DIABETES MELLITUS (H): ICD-10-CM

## 2023-01-17 DIAGNOSIS — E11.65 UNCONTROLLED TYPE 2 DIABETES MELLITUS WITH HYPERGLYCEMIA (H): ICD-10-CM

## 2023-01-17 DIAGNOSIS — R20.2 NUMBNESS AND TINGLING OF BOTH FEET: ICD-10-CM

## 2023-01-17 DIAGNOSIS — J44.9 CHRONIC OBSTRUCTIVE PULMONARY DISEASE, UNSPECIFIED COPD TYPE (H): ICD-10-CM

## 2023-01-17 DIAGNOSIS — M79.671 RIGHT FOOT PAIN: Primary | ICD-10-CM

## 2023-01-17 DIAGNOSIS — J30.89 NON-SEASONAL ALLERGIC RHINITIS, UNSPECIFIED TRIGGER: ICD-10-CM

## 2023-01-17 DIAGNOSIS — K09.8 ORAL CYST: ICD-10-CM

## 2023-01-17 DIAGNOSIS — M54.41 BILATERAL LOW BACK PAIN WITH RIGHT-SIDED SCIATICA, UNSPECIFIED CHRONICITY: ICD-10-CM

## 2023-01-17 LAB — SARS-COV-2 RNA RESP QL NAA+PROBE: NEGATIVE

## 2023-01-17 PROCEDURE — 99215 OFFICE O/P EST HI 40 MIN: CPT | Performed by: INTERNAL MEDICINE

## 2023-01-17 PROCEDURE — 73630 X-RAY EXAM OF FOOT: CPT | Mod: RT

## 2023-01-17 PROCEDURE — U0003 INFECTIOUS AGENT DETECTION BY NUCLEIC ACID (DNA OR RNA); SEVERE ACUTE RESPIRATORY SYNDROME CORONAVIRUS 2 (SARS-COV-2) (CORONAVIRUS DISEASE [COVID-19]), AMPLIFIED PROBE TECHNIQUE, MAKING USE OF HIGH THROUGHPUT TECHNOLOGIES AS DESCRIBED BY CMS-2020-01-R: HCPCS

## 2023-01-17 PROCEDURE — 99207 PR NO CHARGE LOS: CPT

## 2023-01-17 PROCEDURE — U0005 INFEC AGEN DETEC AMPLI PROBE: HCPCS

## 2023-01-17 RX ORDER — METAXALONE 800 MG/1
400-800 TABLET ORAL 3 TIMES DAILY PRN
Qty: 60 TABLET | Refills: 3 | Status: SHIPPED | OUTPATIENT
Start: 2023-01-17 | End: 2023-05-05

## 2023-01-17 RX ORDER — CETIRIZINE HYDROCHLORIDE 10 MG/1
10 TABLET ORAL DAILY
Qty: 90 TABLET | Refills: 3 | Status: SHIPPED | OUTPATIENT
Start: 2023-01-17 | End: 2024-01-14

## 2023-01-17 ASSESSMENT — PAIN SCALES - GENERAL: PAINLEVEL: WORST PAIN (10)

## 2023-01-17 ASSESSMENT — PATIENT HEALTH QUESTIONNAIRE - PHQ9: SUM OF ALL RESPONSES TO PHQ QUESTIONS 1-9: 13

## 2023-01-17 NOTE — PROGRESS NOTES
[facility]    Comprehensive and complex medical care - Chronic disease management - Shared decision making - Care coordination - Compassionate care    Patient advocacy - Rational deprescribing - Minimally disruptive medicine - Ethical focus - Customized care         Date of Service: 1/17/2023  Primary Provider: Kike David    Patient Care Team:  Kike David MD as PCP - General (Internal Medicine)  Rosario Byrne NP as Nurse Practitioner  Kike David MD as Assigned PCP          Patient's Pharmacy:    [facility]/pharmacy #[id number] [place], [place] - [address] [facility]  [address] [facility] [address]  Phone: [phone] Fax: [phone]    [facility] [address] [facility]  [address] [address]  Phone: [phone] Fax: [phone]    [facility]/pharmacy #[id number] [place], [place] - [id number] [facility] [address]  Phone: [phone] Fax: [phone]     Patient's Contacts:  Name Home Phone Work Phone Mobile Phone Relationship Lgl Jayce   CARMENSALONI [phone]   Friend    JOSE SANTANA   [phone] Spouse      Patient's Insurance:    Payor: UCARE / Plan: ARE PMAP / Product Type: HMO /            Active Problem List:  Problem List as of 1/17/2023 Reviewed: 1/11/2023  6:33 AM by Rosario Byrne NP       High    CAD (coronary artery disease)    Cerebrovascular accident (CVA) due to stenosis of carotid artery (H)    Smoker    Uncontrolled type 2 diabetes mellitus with hyperglycemia (H)       Medium    Carotid stenosis, left    HTN (hypertension)    Moderate persistent asthma    Chronic pain syndrome    Schizoaffective disorder (H)    Pulmonary nodule       Low    Bilateral low back pain with right-sided sciatica, unspecified chronicity    Last Assessment & Plan 6/7/2022 Office Visit Written 6/10/2022  8:01 AM by Mara Scott NP     Because of her  uncontrolled diabetes, she is not a good candidate to use steroid medication to treat lumbar radiculopathy.  She is familiar with the effects of a steroid medication on her blood sugar and understands this conundrum.  Fortunately, she does experience some relief when she is given Toradol in the emergency department.  We discussed trial of a nonsteroidal anti-inflammatory medication, diclofenac 3 times daily.  She is open to this idea so this is sent to her pharmacy.  She is advised that she can continue with acetaminophen as well.         Diabetic polyneuropathy associated with type 2 diabetes mellitus (H)    Last Assessment & Plan 6/7/2022 Office Visit Edited 6/10/2022  8:02 AM by Mara Scott NP     We reviewed that the paresthesias in the legs is likely due to a combination of diabetic neuropathy in addition to lumbar radiculopathy.  She is already taking pregabalin which is not providing her much relief.  She is also taking duloxetine.  I suggested a trial of lidocaine gel that she could apply to the feet when she goes to bed that would provide temporary anesthesia.  She is open to trying this.  An alternative option could be to wean off of the duloxetine and try nortriptyline or amitriptyline in the future.         Nasal polyp    Last Assessment & Plan 6/7/2022 Office Visit Written 6/10/2022  8:03 AM by Mara Scott NP     Advised fluticasone nasal spray 1 spray each nostril daily.  She declines a referral to ENT, she has had a nasal polyp in the past which responded to fluticasone.         Mixed hyperlipidemia    GERD (gastroesophageal reflux disease)    History of drug abuse (H)    Hx of syphilis    Menopausal flushing    Nephrolithiasis    Obesity    Seasonal allergies    Sensorineural hearing loss (SNHL) of right ear       Other    Chronic obstructive pulmonary disease (H)    Anemia    Depression with anxiety    Myelomalacia of cervical cord (H)    Post-COVID syndrome        Current Outpatient Medications    Medication Instructions     acetaminophen (TYLENOL) 650 mg, Oral, EVERY 8 HOURS PRN     albuterol (PROAIR HFA) 108 (90 BASE) MCG/ACT inhaler 1-2 puffs, Inhalation, EVERY 4 HOURS PRN     alcohol swab prep pads Use to swab area of injection/zac as directed.     Alcohol Swabs PADS Use to swab the area of the injection or zac as directed Per insurance coverage     amitriptyline (ELAVIL) 25-50 mg, Oral, AT BEDTIME, For nerve pain.     aspirin (ASA) 81 mg, Oral, DAILY     blood glucose (ACCU-CHEK DARRIN PLUS) test strip Use to test blood sugar 3-4 times daily or as directed.     blood glucose (ACCU-CHEK SOFTCLIX) lancing device Lancing device to be used with lancets.     blood glucose (NO BRAND SPECIFIED) lancets standard To use to test glucose level in the blood Use to test blood sugar  4  times daily as directed. To accompany glucose monitor brands per insurance coverage.     blood glucose (NO BRAND SPECIFIED) test strip To use to test glucose level in the blood Use to test blood sugar  4 times daily as directed. To accompany glucose monitor brands per insurance coverage.     blood glucose calibration (NO BRAND SPECIFIED) solution Used to calibrate the blood glucose monitor as needed and as directed.  To accompany  blood glucose brands per insurance coverage     blood glucose monitoring (NO BRAND SPECIFIED) meter device kit Use as directed Per insurance coverage     blood glucose monitoring (SOFTCLIX) lancets Use to test blood sugar 3-4 times daily.     budesonide-formoterol (SYMBICORT) 160-4.5 MCG/ACT Inhaler 2 puffs, Inhalation, 2 TIMES DAILY     carvedilol (COREG) 12.5 mg, Oral, 2 TIMES DAILY WITH MEALS     cetirizine (ZYRTEC) 10 mg, Oral, DAILY     Continuous Blood Gluc  (FREESTYLE SHEBA 14 DAY READER) ROSE Use to read blood sugars as per 's instructions.     Continuous Blood Gluc Sensor (FREESTYLE SHEBA 14 DAY SENSOR) MIS 1 each, Does not apply, EVERY 14 DAYS, Use 1 Sensor every 14 days.  Use to read blood sugars per 's instructions.     Continuous Blood Gluc Sensor (FREESTYLE SHEBA 14 DAY SENSOR) MISC Change every 14 days.     [employer] GENUINE ASPIRIN 325 MG tablet TAKE 1 TABLET BY MOUTH EVERY DAY FOR DISEASE INVOLVING LIPID DEPOSITS IN THE ARTERIES     diclofenac (VOLTAREN) 2 g, Topical, 3 TIMES DAILY PRN     DULoxetine (CYMBALTA) 30 mg, Oral, 2 TIMES DAILY     fluticasone (FLONASE) 50 MCG/ACT nasal spray 1 spray, Both Nostrils, DAILY     furosemide (LASIX) 20 mg, Oral, DAILY PRN     glipiZIDE (GLUCOTROL XL) 10 mg, Oral, DAILY     insulin glargine (LANTUS PEN) 25 Units, Subcutaneous, 2 TIMES DAILY     insulin pen needle (32G X 4 MM) 32G X 4 MM miscellaneous Use 1 pen needles daily or as directed.     ipratropium - albuterol 0.5 mg/2.5 mg/3 mL (DUONEB) 0.5-2.5 (3) MG/3ML neb solution 1 vial, Nebulization, EVERY 6 HOURS PRN     isosorbide mononitrate (IMDUR) 30 mg, Oral, DAILY     lidocaine (LIDODERM) 5 % patch 1 patch, Transdermal, EVERY 24 HOURS, To prevent lidocaine toxicity, patient should be patch free for 12 hrs daily. BACK      metaxalone (SKELAXIN) 400-800 mg, Oral, 3 TIMES DAILY PRN     montelukast (SINGULAIR) 10 mg, Oral, AT BEDTIME     naloxone (NARCAN) 4 mg, Alternating Nostrils, PRN, every 2-3 minutes until assistance arrives     nitroGLYcerin (NITROSTAT) 0.4 mg, Sublingual, EVERY 5 MIN PRN, For chest pain place 1 tablet under the tongue every 5 minutes for 3 doses. If symptoms persist 5 minutes after 1st dose call 911.      nortriptyline (PAMELOR) 10 mg, Oral, AT BEDTIME     omeprazole 20 mg, Oral, DAILY     ondansetron (ZOFRAN) 8 mg, Oral, EVERY 8 HOURS PRN     oxyCODONE-acetaminophen (PERCOCET)  MG per tablet 1 tablet, Oral, EVERY 6 HOURS PRN     polyethylene glycol (MIRALAX) 17 g, Oral, DAILY     rosuvastatin (CRESTOR) 40 mg, Oral, DAILY     sennosides (SENOKOT) 8.6 MG tablet 2 tablets, Oral, 2 TIMES DAILY     Trulicity 0.75 mg, Subcutaneous, EVERY 7 DAYS     urea  (DERMAL THERAPY FINGER CARE) 20 % external lotion Apply externally, 2 TIMES DAILY PRN, Feet     valsartan (DIOVAN) 40 mg, Oral, DAILY        Social History     Social History Narrative    Lives alone in a condo.          On disability.  Three living children.         Subjective:     Sarah Rahman is a 57 year old female who comes in today for:    Chief Complaint   Patient presents with     Foot Swelling     Right foot     Mass     Small bump in mouth       Patient comes in today for follow-up of a number of issues.    She mainly comes in today for right foot pain.  She has had problems with this foot for a while.  She has documented neuropathy of both feet.  She does not appear to have had an EMG in the past.  She has noted some swelling in the foot.  She describes the pain as a burning shooting and throbbing pain.  She has not noticed redness.  She has difficulty walking on it but does not necessarily limp with this.  She has been seen in an outside emergency room about 2 weeks ago for some pain related to this.  They gave her Toradol without any help.    She has been on a number of medications through this and sees a pain clinic as well.  She has had limited additional work-up.  She is never had x-ray according to her and has not had vascular studies.  She has had significant vascular disease.    We reviewed her diabetes and her recent A1c was very good at 7.9.  She follows with endocrinology.    She has a lump on the corner of her right inner buccal surface near her lip.  This gets caught easily in her chewing and otherwise.  She would like it removed if possible.    She has been on Lyrica and gabapentin and has not tolerated it.    We reviewed her other issues noted in the assessment but not specifically addressed in the HPI above.     Objective:     Wt Readings from Last 3 Encounters:   01/10/23 88 kg (194 lb)   10/08/22 91.6 kg (202 lb)   09/27/22 91.4 kg (201 lb 8 oz)     BP Readings from Last 3  "Encounters:   01/17/23 (!) 133/94   01/10/23 132/76   10/11/22 124/74     BP (!) 133/94 (BP Location: Right arm, Patient Position: Sitting, Cuff Size: Adult Regular)   Pulse 80   Temp 98.5  F (36.9  C) (Oral)   Ht 1.702 m (5' 7\")   LMP  (LMP Unknown)   SpO2 100%   BMI 30.38 kg/m     The patient is comfortable, no acute distress.  Mood good.  Insight good.  Eyes are nonicteric.  Heart regular rate and rhythm.  Lungs clear to auscultation bilaterally.  Respiratory effort is good.  Extremities no edema.  Her right foot shows pes planus deformity.  No other major deformity noted.  No swelling or warmth of the joint.  Distal pulses especially in the right foot are decreased.      Diagnostics:     Lab on 01/10/2023   Component Date Value Ref Range Status     Hemoglobin A1C 01/10/2023 7.9 (H)  0.0 - 5.6 % Final    Normal <5.7%   Prediabetes 5.7-6.4%    Diabetes 6.5% or higher     Note: Adopted from ADA consensus guidelines.     Sodium 01/10/2023 141  136 - 145 mmol/L Final     Potassium 01/10/2023 4.2  3.4 - 5.3 mmol/L Final     Chloride 01/10/2023 106  98 - 107 mmol/L Final     Carbon Dioxide (CO2) 01/10/2023 20 (L)  22 - 29 mmol/L Final     Anion Gap 01/10/2023 15  7 - 15 mmol/L Final     Urea Nitrogen 01/10/2023 18.6  6.0 - 20.0 mg/dL Final     Creatinine 01/10/2023 0.73  0.51 - 0.95 mg/dL Final     Calcium 01/10/2023 9.6  8.6 - 10.0 mg/dL Final     Glucose 01/10/2023 339 (H)  70 - 99 mg/dL Final     GFR Estimate 01/10/2023 >90  >60 mL/min/1.73m2 Final    Effective December 21, 2021 eGFRcr in adults is calculated using the 2021 CKD-EPI creatinine equation which includes age and gender (Cary et al., NEJM, DOI: 10.1056/CHIZkm0768307)       Results for orders placed or performed during the hospital encounter of 01/17/23   XR Foot Right G/E 3 Views     Status: None    Narrative    EXAM: XR FOOT RIGHT G/E 3 VIEWS  LOCATION: [facility]  DATE/TIME: 1/17/2023 2:53 PM    INDICATION:  Right foot " pain.  COMPARISON: None.      Impression    IMPRESSION: Moderate osteoarthrosis of the first MTP joint and of the interphalangeal joint of the great toe. Mild osteoarthrosis in several of the other interphalangeal joints. Diffuse bone demineralization. Pes planus. Calcaneal enthesophytes. No   evidence of fracture.   Results for orders placed or performed in visit on 01/17/23   Asymptomatic COVID-19 Virus (Coronavirus) by PCR Nose     Status: Normal    Specimen: Nose; Swab   Result Value Ref Range    SARS CoV2 PCR Negative Negative    Narrative    Testing was performed using the [employer] SARS-CoV-2 Assay on the  [email or web address] Instrument System. Additional information about this  Emergency Use Authorization (EUA) assay can be found via the Lab  Guide. This test should be ordered for the detection of SARS-CoV-2 in  individuals who meet SARS-CoV-2 clinical and/or epidemiological  criteria. Test performance is unknown in asymptomatic patients. This  test is for in vitro diagnostic use under the FDA EUA for  laboratories certified under CLIA to perform high complexity testing.  This test has not been FDA cleared or approved. A negative result  does not rule out the presence of PCR inhibitors in the specimen or  target RNA in concentration below the limit of detection for the  assay. The possibility of a false negative should be considered if  the patient's recent exposure or clinical presentation suggests  COVID-19. This test was validated by the [facility]. This laboratory is certified under  the Clinical Laboratory Improvement Amendments of 1988 (CLIA-88) as  qualified to perform high complexity laboratory testing.        Assessment and Plan:     1. Right foot pain  We will try using amitriptyline for this.  She does not think she is using this.  A increased dose was given and she can take up to 50 mg at nighttime.  It does bother her more at night.  We will do an ultrasound to  look at blood flow.  Other tests may need to be considered.  Refilled Voltaren.  X-ray of the foot done today as well.    - XR Foot Right G/E 3 Views; Future  - diclofenac (VOLTAREN) 1 % topical gel; Apply 2 g topically 3 times daily as needed for moderate pain (4-6) (feet)  Dispense: 300 g; Refill: 3  - US Lower Extremity Arterial Duplex Bilateral; Future    2. Uncontrolled type 2 diabetes mellitus with hyperglycemia (H)  Doing better at this time on current management plan.  Still a lot of other comorbidities making her at risk including her smoking.  Recent blood work reviewed.    3. Chronic obstructive pulmonary disease, unspecified COPD type (H)  Continue current management plan and continue to monitor.    4. Diabetic polyneuropathy associated with type 2 diabetes mellitus (H)  Amitriptyline as noted above.  Continue to work on managing the diabetes.    - metaxalone (SKELAXIN) 800 MG tablet; Take 0.5-1 tablets (400-800 mg) by mouth 3 times daily as needed for muscle spasms or moderate pain (4-6)  Dispense: 60 tablet; Refill: 3  - amitriptyline (ELAVIL) 25 MG tablet; Take 1-2 tablets (25-50 mg) by mouth At Bedtime For nerve pain.  Dispense: 90 tablet; Refill: 3    5. Oral cyst  Referral to ENT for consideration of resection.    - Adult ENT  Referral; Future    6. Non-seasonal allergic rhinitis, unspecified trigger    - cetirizine (ZYRTEC) 10 MG tablet; Take 1 tablet (10 mg) by mouth daily  Dispense: 90 tablet; Refill: 3    7. Numbness and tingling of both feet  Consider EMG in the future if needed.    - US Lower Extremity Arterial Duplex Bilateral; Future    8. Chronic pain syndrome  Follow-up with pain clinic.    9. Bilateral low back pain with right-sided sciatica, unspecified chronicity       Continue current medications otherwise.  Follow up sooner if issues.          Kike David MD  General Internal Medicine  [facility]      Return in about 4 months (around  5/17/2023) for follow up visit.     No future appointments.      HCC issues resolved at this visit.    40 minutes or greater was spent today on the patient's care on the day of service.      This includes time for chart preparation, reviewing medical tests done before or during the visit, talking with the patient, review of quality indicators, required documentation, and other elements of care.       Depression Screening Follow Up    Follow Up Actions Taken  Patient to follow up with PCP.  Clinic staff to schedule appointment if able.        ______________________________________________________________________     PHQ-9 score:    PHQ 1/17/2023   PHQ-9 Total Score 13   Q9: Thoughts of better off dead/self-harm past 2 weeks Not at all     No flowsheet data found.  ______________________________________________________________________

## 2023-02-07 ENCOUNTER — HOSPITAL ENCOUNTER (OUTPATIENT)
Dept: ULTRASOUND IMAGING | Facility: HOSPITAL | Age: 58
Discharge: HOME | End: 2023-02-07
Attending: INTERNAL MEDICINE
Payer: COMMERCIAL

## 2023-02-07 ENCOUNTER — ANCILLARY ORDERS (OUTPATIENT)
Dept: INTERNAL MEDICINE | Facility: CLINIC | Age: 58
End: 2023-02-07

## 2023-02-07 DIAGNOSIS — R20.2 NUMBNESS AND TINGLING OF BOTH FEET: ICD-10-CM

## 2023-02-07 DIAGNOSIS — M79.671 RIGHT FOOT PAIN: ICD-10-CM

## 2023-02-07 DIAGNOSIS — R20.0 NUMBNESS AND TINGLING OF BOTH FEET: ICD-10-CM

## 2023-02-07 PROCEDURE — 93925 LOWER EXTREMITY STUDY: CPT

## 2023-02-07 PROCEDURE — 93922 UPR/L XTREMITY ART 2 LEVELS: CPT

## 2023-02-16 ENCOUNTER — TELEPHONE (OUTPATIENT)
Dept: INTERNAL MEDICINE | Facility: CLINIC | Age: 58
End: 2023-02-16
Payer: MEDICAID

## 2023-02-16 NOTE — TELEPHONE ENCOUNTER
Please call patient -    ______________________________________________________________________     Home phone:  [phone] (home)     Cell phone:   Telephone Information:   Mobile [phone]       Other contacts:  Name Home Phone Work Phone Mobile Phone Relationship Lgl SALONI Sigaal [phone]   Friend    JOSE SANTANA   [phone] Spouse       ______________________________________________________________________     Her ultrasound of her legs looks good overall.  There is some mild decrease in blood flow but overall her blood flow to her feet are good.      Please see how she is doing related to her use of the amitriptyline and see if this is helping at all for the pain.  I am not sure whether the pain clinic she is going to might be also able to do some other things to see if they can help for this pain.    We could see her back in follow-up anytime in the next 4 to 6 weeks.  It can be sooner if needed and may use a reserved slot if needed.    Thank you,    Kike David MD  [facility]  2/16/2023, 8:10 AM   ______________________________________________________________________     Pertinent radiology for this visit includes the following:    US Lower Extremity Arterial Duplex Bilateral  Narrative: EXAM: US LOWER EXTREMITY ARTERIAL DUPLEX BILATERAL  LOCATION: [facility]  DATE/TIME: 2/7/2023 10:36 AM    INDICATION:  Right foot pain, numbness and tingling of both feet.  COMPARISON: None.  TECHNIQUE: Duplex utilizing 2D gray-scale imaging, Doppler interrogation with color-flow and spectral waveform analysis.    FINDINGS:    RIGHT LOWER EXTREMITY ARTERIAL ASSESSMENT:  External iliac artery 145 cm/s  Common femoral artery: 110 cm/s  Profunda femoris artery: 67 cm/s  SFA (proximal): 114 cm/s  SFA (mid): 148 cm/s  SFA (distal): 113 cm/s  Popliteal artery: 94 cm/s  Posterior tibial artery: 28 cm/s  Anterior tibial artery: 263  Dorsalis pedis  artery: 25 cm/s    Brisk biphasic waveforms through the popliteal. At the tibial level, there are elevated anterior tibial velocities with some dampening of waveform at the dorsal pedal level, the posterior tibial waveform is brisk and biphasic.    LEFT LOWER EXTREMITY ARTERIAL ASSESSMENT:  External iliac artery 138 cm/s  Common femoral artery: 146 cm/s  Profunda femoris artery: 150 cm/s  SFA (proximal): 89 cm/s  SFA (mid): 107 cm/s  SFA (distal): 63 cm/s  Popliteal artery: 56 cm/s  Posterior tibial artery: 28 cm/s  Anterior tibial artery: 54  Dorsalis pedis artery: 16 cm/s    Brisk biphasic waveforms through the popliteal. Distal anterior tibial and dorsal pedal waveforms are monophasic. Brisk biphasic flow in the distal posterior tibial.  Impression: IMPRESSION:  1.  No sonographic evidence of inflow disease or significant femoral-popliteal stenosis in either lower extremity.    2.  Anterior tibial runoff disease noted bilaterally with brisk biphasic distal posterior tibial waveforms in both the right and the left.    3.  ABIs obtained and reported separately.  US CALEB with PPG wo Exercise  Narrative: EXAM: RESTING ANKLE-BRACHIAL INDICES (ABIs)  LOCATION: [facility]  DATE/TIME: 2/7/2023 10:32 AM    INDICATION:  Right foot pain, Numbness and tingling of both feet, Numbness and tingling of both feet, decreased lower extremity pulses.  COMPARISON: None.    CALEB FINDINGS:  RIGHT  Brachial: 169  Ankle (PT): 170 Index: 1.01  Ankle (DP): 151 Index: 0.89  Digit: 147 Index: 0.87    LEFT  Brachial: 167  Ankle (PT): 151 Index: 0.89  Ankle (DP): 149 Index: 0.88  Digit: 132 Index: 0.78    The right CALEB at rest is 1.01. The left CALEB at rest is 0.89.      WAVEFORMS: The dorsalis pedis and posterior tibial arteries are multiphasic bilaterally.  Impression: IMPRESSION:  1.  RIGHT LOWER EXTREMITY: CALEB at rest is normal.  2.  LEFT LOWER EXTREMITY: Ankle-brachial index of 0.89 reflecting mild peripheral arterial  disease.              ______________________________________________________________________

## 2023-02-17 NOTE — TELEPHONE ENCOUNTER
Voicemail left for patient.     Please relay PCP's message below and assist in scheduling follow up appt.

## 2023-02-20 DIAGNOSIS — M54.41 BILATERAL LOW BACK PAIN WITH RIGHT-SIDED SCIATICA, UNSPECIFIED CHRONICITY: ICD-10-CM

## 2023-02-21 RX ORDER — METHOCARBAMOL 500 MG/1
500 TABLET, FILM COATED ORAL 4 TIMES DAILY
Qty: 120 TABLET | Refills: 3 | OUTPATIENT
Start: 2023-02-21

## 2023-02-22 ENCOUNTER — HOSPITAL ENCOUNTER (EMERGENCY)
Facility: HOSPITAL | Age: 58
Discharge: HOME | End: 2023-02-22
Attending: EMERGENCY MEDICINE | Admitting: EMERGENCY MEDICINE
Payer: COMMERCIAL

## 2023-02-22 ENCOUNTER — APPOINTMENT (OUTPATIENT)
Dept: MRI IMAGING | Facility: HOSPITAL | Age: 58
End: 2023-02-22
Payer: COMMERCIAL

## 2023-02-22 VITALS
DIASTOLIC BLOOD PRESSURE: 71 MMHG | WEIGHT: 197 LBS | OXYGEN SATURATION: 96 % | BODY MASS INDEX: 30.92 KG/M2 | HEART RATE: 85 BPM | HEIGHT: 67 IN | SYSTOLIC BLOOD PRESSURE: 133 MMHG | RESPIRATION RATE: 18 BRPM | TEMPERATURE: 98.1 F

## 2023-02-22 DIAGNOSIS — G89.29 CHRONIC LOW BACK PAIN WITH SCIATICA, SCIATICA LATERALITY UNSPECIFIED, UNSPECIFIED BACK PAIN LATERALITY: ICD-10-CM

## 2023-02-22 DIAGNOSIS — M54.40 CHRONIC LOW BACK PAIN WITH SCIATICA, SCIATICA LATERALITY UNSPECIFIED, UNSPECIFIED BACK PAIN LATERALITY: ICD-10-CM

## 2023-02-22 DIAGNOSIS — R53.1 RIGHT SIDED WEAKNESS: ICD-10-CM

## 2023-02-22 LAB
ALBUMIN SERPL BCG-MCNC: 4 G/DL (ref 3.5–5.2)
ALP SERPL-CCNC: 83 U/L (ref 35–104)
ALT SERPL W P-5'-P-CCNC: 19 U/L (ref 10–35)
ANION GAP SERPL CALCULATED.3IONS-SCNC: 7 MMOL/L (ref 7–15)
AST SERPL W P-5'-P-CCNC: 17 U/L (ref 10–35)
BASOPHILS # BLD AUTO: 0.1 10E3/UL (ref 0–0.2)
BASOPHILS NFR BLD AUTO: 1 %
BILIRUB SERPL-MCNC: <0.2 MG/DL
BUN SERPL-MCNC: 21 MG/DL (ref 6–20)
CALCIUM SERPL-MCNC: 9 MG/DL (ref 8.6–10)
CHLORIDE SERPL-SCNC: 107 MMOL/L (ref 98–107)
CK SERPL-CCNC: 43 U/L (ref 26–192)
CREAT SERPL-MCNC: 0.93 MG/DL (ref 0.51–0.95)
DEPRECATED HCO3 PLAS-SCNC: 26 MMOL/L (ref 22–29)
EOSINOPHIL # BLD AUTO: 0.5 10E3/UL (ref 0–0.7)
EOSINOPHIL NFR BLD AUTO: 8 %
ERYTHROCYTE [DISTWIDTH] IN BLOOD BY AUTOMATED COUNT: 13.6 % (ref 10–15)
GFR SERPL CREATININE-BSD FRML MDRD: 71 ML/MIN/1.73M2
GLUCOSE SERPL-MCNC: 126 MG/DL (ref 70–99)
HCT VFR BLD AUTO: 37.2 % (ref 35–47)
HGB BLD-MCNC: 11.7 G/DL (ref 11.7–15.7)
IMM GRANULOCYTES # BLD: 0 10E3/UL
IMM GRANULOCYTES NFR BLD: 0 %
LYMPHOCYTES # BLD AUTO: 2.3 10E3/UL (ref 0.8–5.3)
LYMPHOCYTES NFR BLD AUTO: 42 %
MCH RBC QN AUTO: 27.5 PG (ref 26.5–33)
MCHC RBC AUTO-ENTMCNC: 31.5 G/DL (ref 31.5–36.5)
MCV RBC AUTO: 87 FL (ref 78–100)
MONOCYTES # BLD AUTO: 0.3 10E3/UL (ref 0–1.3)
MONOCYTES NFR BLD AUTO: 5 %
NEUTROPHILS # BLD AUTO: 2.5 10E3/UL (ref 1.6–8.3)
NEUTROPHILS NFR BLD AUTO: 44 %
NRBC # BLD AUTO: 0 10E3/UL
NRBC BLD AUTO-RTO: 0 /100
PLATELET # BLD AUTO: 254 10E3/UL (ref 150–450)
POTASSIUM SERPL-SCNC: 4.7 MMOL/L (ref 3.4–5.3)
PROT SERPL-MCNC: 6.9 G/DL (ref 6.4–8.3)
RBC # BLD AUTO: 4.26 10E6/UL (ref 3.8–5.2)
SODIUM SERPL-SCNC: 140 MMOL/L (ref 136–145)
WBC # BLD AUTO: 5.6 10E3/UL (ref 4–11)

## 2023-02-22 PROCEDURE — 99285 EMERGENCY DEPT VISIT HI MDM: CPT | Mod: 25

## 2023-02-22 PROCEDURE — 80053 COMPREHEN METABOLIC PANEL: CPT

## 2023-02-22 PROCEDURE — 70553 MRI BRAIN STEM W/O & W/DYE: CPT

## 2023-02-22 PROCEDURE — 85025 COMPLETE CBC W/AUTO DIFF WBC: CPT

## 2023-02-22 PROCEDURE — 96375 TX/PRO/DX INJ NEW DRUG ADDON: CPT

## 2023-02-22 PROCEDURE — 96374 THER/PROPH/DIAG INJ IV PUSH: CPT | Mod: 59

## 2023-02-22 PROCEDURE — 82550 ASSAY OF CK (CPK): CPT

## 2023-02-22 PROCEDURE — 250N000011 HC RX IP 250 OP 636: Performed by: EMERGENCY MEDICINE

## 2023-02-22 PROCEDURE — 36415 COLL VENOUS BLD VENIPUNCTURE: CPT

## 2023-02-22 PROCEDURE — 255N000002 HC RX 255 OP 636: Performed by: EMERGENCY MEDICINE

## 2023-02-22 PROCEDURE — A9585 GADOBUTROL INJECTION: HCPCS | Performed by: EMERGENCY MEDICINE

## 2023-02-22 PROCEDURE — 72158 MRI LUMBAR SPINE W/O & W/DYE: CPT

## 2023-02-22 PROCEDURE — 250N000013 HC RX MED GY IP 250 OP 250 PS 637

## 2023-02-22 RX ORDER — OXYCODONE AND ACETAMINOPHEN 5; 325 MG/1; MG/1
1 TABLET ORAL ONCE
Status: COMPLETED | OUTPATIENT
Start: 2023-02-22 | End: 2023-02-22

## 2023-02-22 RX ORDER — GADOBUTROL 604.72 MG/ML
9 INJECTION INTRAVENOUS ONCE
Status: COMPLETED | OUTPATIENT
Start: 2023-02-22 | End: 2023-02-22

## 2023-02-22 RX ORDER — DIAZEPAM 10 MG/2ML
5 INJECTION, SOLUTION INTRAMUSCULAR; INTRAVENOUS
Status: COMPLETED | OUTPATIENT
Start: 2023-02-22 | End: 2023-02-22

## 2023-02-22 RX ORDER — HYDROMORPHONE HYDROCHLORIDE 1 MG/ML
0.5 INJECTION, SOLUTION INTRAMUSCULAR; INTRAVENOUS; SUBCUTANEOUS ONCE
Status: COMPLETED | OUTPATIENT
Start: 2023-02-22 | End: 2023-02-22

## 2023-02-22 RX ADMIN — OXYCODONE HYDROCHLORIDE AND ACETAMINOPHEN 1 TABLET: 5; 325 TABLET ORAL at 16:36

## 2023-02-22 RX ADMIN — HYDROMORPHONE HYDROCHLORIDE 0.5 MG: 1 INJECTION, SOLUTION INTRAMUSCULAR; INTRAVENOUS; SUBCUTANEOUS at 18:22

## 2023-02-22 RX ADMIN — DIAZEPAM 5 MG: 5 INJECTION, SOLUTION INTRAMUSCULAR; INTRAVENOUS at 17:15

## 2023-02-22 RX ADMIN — GADOBUTROL 9 ML: 604.72 INJECTION INTRAVENOUS at 17:40

## 2023-02-22 ASSESSMENT — ENCOUNTER SYMPTOMS
CHILLS: 0
SHORTNESS OF BREATH: 0
FEVER: 0
NUMBNESS: 1
WEAKNESS: 1
CONSTIPATION: 0
DIZZINESS: 0
VOMITING: 0
HEADACHES: 0
DIARRHEA: 0
FACIAL ASYMMETRY: 1
NAUSEA: 0
LIGHT-HEADEDNESS: 0
BACK PAIN: 1
ABDOMINAL PAIN: 0

## 2023-02-22 ASSESSMENT — ACTIVITIES OF DAILY LIVING (ADL)
ADLS_ACUITY_SCORE: 37

## 2023-02-22 NOTE — TELEPHONE ENCOUNTER
Outpatient Medication Detail     Disp Refills Start End YEYO   methocarbamol (ROBAXIN) 500 MG tablet (Discontinued) 120 tablet 3 7/29/2022 9/24/2022 --   Sig - Route: Take 1 tablet (500 mg) by mouth 4 times daily - Oral   Sent to pharmacy as: Methocarbamol 500 MG Oral Tablet (ROBAXIN)   Class: E-Prescribe   Reason for Discontinue: Med Rec(No AVS / No eCancel)
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
## 2023-02-22 NOTE — ED NOTES
This author was asked to speak with the patient, as she was requesting the charge nurse. The pt expressed frustration and anger over her care thus far, and stated she wanted a new nurse and wanted to speak with her doctor. ED MD updated.

## 2023-02-22 NOTE — ED NOTES
Pt expressed concern for pain meds and desire to go home. Asked what she usually takes that works. She had no answer.    Per pt request a tech assisted her in filling out MRI checklist and in using the bathroom.    Pt given oxycodone per orders after requested meds from provider. After pt put meds in mouth she then expressed she was going to call a friend to pick her up and started using strong profanity at nurse. Explained to pt just trying to help and expedite things and would appreciate her understanding that it takes a little while to get everything done.  Further explained that the provider was aware of claustrophobia and that a medication for this would be discussed prior to MRI.    Pt continued to be aggressive in tone and manner and demanded to speak with the charge nurse.   Charge nurse was able to talk with pt.
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
## 2023-02-22 NOTE — ED PROVIDER NOTES
Emergency Department Midlevel Supervisory Note     I personally saw the patient and performed a substantive portion of the visit including all aspects of the medical decision making.    ED Course:  3:27 PM Brittney Quinn PA-C staffed patient with me. I agree with their assessment and plan of management, and I will see the patient.  3:34 PM I met with the patient to introduce myself, gather additional history, perform my initial exam, and discuss the plan.    5:04 PM Labs overall unremarkable.  CK only 43.  Per nursing, pt has been rude, verbally aggressive towards her, at times requesting to leave and then changing mind.  Pt requesting valium to help with MRI, so this was ordered.   6:20 PM Dilaudid ordered to help with pain so pt can complete MRI.  8:02 PM I updated the patient regarding her lab and MRI results. I discussed discharge with the patient.  She is agreeable with the plan.  Pt able to ambulate at discharge.    Brief HPI:     Sarah Rahman is a 57 year old female who presents for evaluation of back pain. The patient woke up laying on the floor this morning. She does not know the cause of her fall. Her PCA caretaker found her this morning at 08:00 and assisted her off the ground. The last thing she can recall is watching television and the urge to head to the bathroom last night. Today, she endorsed burning pain to her back and difficulty walking. She is very concerned about her new onset of weakness and pins and needles sensation to her left leg. She notes her right leg is usually showing these symptoms. She endorsed a history of right side sciatica. She is schedule for an lumbar MRI on 02/23/2023. Otherwise, she denied any other associated symptoms at this moment.     I, Nimesh De La Paz, am serving as a scribe to document services personally performed by Mina Mota, based on my observations and the provider's statements to me.   I, Mina Mota attest that Nimesh De La Paz was acting in a scribe  "capacity, has observed my performance of the services and has documented them in accordance with my direction.    Brief Physical Exam: /71   Pulse 85   Temp 98.1  F (36.7  C) (Oral)   Resp 18   Ht 1.702 m (5' 7\")   Wt 89.4 kg (197 lb)   LMP  (LMP Unknown)   SpO2 96%   BMI 30.85 kg/m       Physical Exam:  Vitals and nursing note reviewed.   Constitutional:       General: He is not in acute distress.     Appearance: Normal appearance.   HENT:      Head: Normocephalic and atraumatic.      Nose: Nose normal.      Mouth/Throat:      Mouth: Mucous membranes are moist.   Eyes:      Pupils: Pupils are equal, round, and reactive to light.   Cardiovascular:      Rate and Rhythm: Normal rate and regular rhythm.      Pulses: Normal pulses.           Radial pulses are 2+ on the right side and 2+ on the left side.        Dorsalis pedis pulses are 2+ on the right side and 2+ on the left side.   Pulmonary:      Effort: Pulmonary effort is normal. No respiratory distress.      Breath sounds: Normal breath sounds.   Abdominal:      Palpations: Abdomen is soft.      Tenderness: There is no abdominal tenderness.   Musculoskeletal:      Cervical back: Full passive range of motion without pain and neck supple.      Comments: No calf tenderness or swelling b/l.  No spinal tenderness  Skin:     General: Skin is warm.      Findings: No rash.   Neurological:      General: No focal deficit present.      Mental Status: He is alert. Mental status is at baseline.      Comments: Fluent speech, no facial asymmetry, no acute lateralizing deficits with limited exam as pt not very cooperative.  Possible right sided weakness compared to left, but pt not compliant and exam inconsistent as she's frequently moving right side of body without any difficulty during interview.   Psychiatric:         Mood and Affect: Anxious     Behavior: Easily agitated, anxious     MDM:  Pt seen in conjunction with TODD Brittney Barth and here with recurrent " weakness and pain to b/l LE, right greater than left.  Pt reports she had some sort of unwitnessed fall last night going to the bathroom, but unable to provide any real details to this, but reports she was found this morning around 0900 by her PCA.  Pt incontinence of stool/urine at that time.  Pt reports chronic sciatica pain and was scheduled for MRI L spine tomorrow. Reports pain is worse than baseline.  Pt with a very limited, difficult exam as she doesn't seem to provide actual effort on neuro testing.  Review of EMR shows she's been seen numerous times for this in the past including in the ED and hospital with previous MRI studies, workups.  No acute stroke findings to cause it and Lspine stable.  Given just how limited our exams are due to poor ability of pt to comply, will get labs, MRI brain and Lspine.      ED Course as of 02/22/23 2008 Wed Feb 22, 2023 2001 MR brain and L spine unchanged from most recent with no acute pathology to explain her complaints.  I do suspect there is some degree of malingering and/or psychogenic component.  Either way, pt appropriate for discharge, no indication for hospitalization.   2005 Pt re-evaluated once again, updated and encouraged outpatient primary and pain clinic follow up.       1. Right sided weakness    2. Chronic low back pain with sciatica, sciatica laterality unspecified, unspecified back pain laterality        Labs and Imaging:  Results for orders placed or performed during the hospital encounter of 02/22/23   MR Lumbar Spine w/o & w Contrast    Impression    IMPRESSION:  1.  No significant change from prior MRI of 09/24/2022.  2.  Significant bone marrow edema in L5 and Modic type I degenerative reactive changes at L5-S1. There continues to be a prominent disc bulge at this level resulting in severe right foraminal and moderate left foraminal stenosis.  3.  At L3-L4 there is moderate central stenosis secondary to disc bulge and facet disease, unchanged  from prior.       MR Brain w/o & w Contrast    Impression    IMPRESSION:  1.  No acute intracranial process or significant change since 10/06/2022.   Comprehensive metabolic panel   Result Value Ref Range    Sodium 140 136 - 145 mmol/L    Potassium 4.7 3.4 - 5.3 mmol/L    Chloride 107 98 - 107 mmol/L    Carbon Dioxide (CO2) 26 22 - 29 mmol/L    Anion Gap 7 7 - 15 mmol/L    Urea Nitrogen 21.0 (H) 6.0 - 20.0 mg/dL    Creatinine 0.93 0.51 - 0.95 mg/dL    Calcium 9.0 8.6 - 10.0 mg/dL    Glucose 126 (H) 70 - 99 mg/dL    Alkaline Phosphatase 83 35 - 104 U/L    AST 17 10 - 35 U/L    ALT 19 10 - 35 U/L    Protein Total 6.9 6.4 - 8.3 g/dL    Albumin 4.0 3.5 - 5.2 g/dL    Bilirubin Total <0.2 <=1.2 mg/dL    GFR Estimate 71 >60 mL/min/1.73m2   Result Value Ref Range    CK 43 26 - 192 U/L   CBC with platelets and differential   Result Value Ref Range    WBC Count 5.6 4.0 - 11.0 10e3/uL    RBC Count 4.26 3.80 - 5.20 10e6/uL    Hemoglobin 11.7 11.7 - 15.7 g/dL    Hematocrit 37.2 35.0 - 47.0 %    MCV 87 78 - 100 fL    MCH 27.5 26.5 - 33.0 pg    MCHC 31.5 31.5 - 36.5 g/dL    RDW 13.6 10.0 - 15.0 %    Platelet Count 254 150 - 450 10e3/uL    % Neutrophils 44 %    % Lymphocytes 42 %    % Monocytes 5 %    % Eosinophils 8 %    % Basophils 1 %    % Immature Granulocytes 0 %    NRBCs per 100 WBC 0 <1 /100    Absolute Neutrophils 2.5 1.6 - 8.3 10e3/uL    Absolute Lymphocytes 2.3 0.8 - 5.3 10e3/uL    Absolute Monocytes 0.3 0.0 - 1.3 10e3/uL    Absolute Eosinophils 0.5 0.0 - 0.7 10e3/uL    Absolute Basophils 0.1 0.0 - 0.2 10e3/uL    Absolute Immature Granulocytes 0.0 <=0.4 10e3/uL    Absolute NRBCs 0.0 10e3/uL     I have reviewed the relevant laboratory and radiology studies    Procedures:  I was present for the key portions of this procedure: none    I, Nimesh De La Paz, am serving as a scribe to document services personally performed by Dr. Mina Mota, based on my observations and the provider's statements to me. I, Mina Mota, DO attest  that Nimesh De La Paz is acting in a scribe capacity, has observed my performance of the services and has documented them in accordance with my direction.        Mina Mota,   Emergency Medicine  [facility] EMERGENCY DEPARTMENT  2/22/2023       Mina Mota MD  02/22/23 2008

## 2023-02-22 NOTE — ED NOTES
"Pt expressed concern for IV start and that she need \"IV RESOURCE\" I explained we have a picc team that would come down if we are unable to obtain line. Reassured pt that nurse would use ultrasound. This was successful and pt happy at this time.   "

## 2023-02-23 NOTE — ED NOTES
Pt states yesterday 2/21/23 pt got up at 2330 last night to go to the bathroom, she got up and fainted. Pt woke up on the floor covered in her feces and void, Pt's PCA woke her up and it was morning time, not sure exact time. Pt is unable to walk, reports new numbness to bilateral legs. Toes wiggles, decreased sensation, able to bend knees, weak bilateral leg strength.

## 2023-02-23 NOTE — ED NOTES
Pt left without discharge instructions. Pt left without informing this writer and did not get to take out her IV.

## 2023-02-23 NOTE — ED NOTES
"Tried to do a bladder scan on patient and she refused and stated \"she did not care about her bladder\".  "

## 2023-02-23 NOTE — DISCHARGE INSTRUCTIONS
Continue outpatient follow up with your pain clinic and primary for ongoing evaluation and care.

## 2023-02-23 NOTE — TELEPHONE ENCOUNTER
LDVM and relayed PCP's result note. Clinic number given for further questions, if needed, or to schedule that follow up.      aMynor Coffey Jr., CMA on 2/23/2023 at 2:07 PM

## 2023-03-10 DIAGNOSIS — E11.42 TYPE 2 DIABETES MELLITUS WITH DIABETIC POLYNEUROPATHY, WITH LONG-TERM CURRENT USE OF INSULIN (H): ICD-10-CM

## 2023-03-10 DIAGNOSIS — Z79.4 TYPE 2 DIABETES MELLITUS WITH DIABETIC POLYNEUROPATHY, WITH LONG-TERM CURRENT USE OF INSULIN (H): ICD-10-CM

## 2023-03-13 RX ORDER — GLIPIZIDE 5 MG/1
10 TABLET, FILM COATED, EXTENDED RELEASE ORAL DAILY
Qty: 30 TABLET | Refills: 0 | Status: SHIPPED | OUTPATIENT
Start: 2023-03-13 | End: 2023-03-13

## 2023-03-13 RX ORDER — GLIPIZIDE 5 MG/1
10 TABLET, FILM COATED, EXTENDED RELEASE ORAL DAILY
Qty: 180 TABLET | Refills: 1 | Status: ON HOLD | OUTPATIENT
Start: 2023-03-13 | End: 2023-05-10

## 2023-03-24 ENCOUNTER — TELEPHONE (OUTPATIENT)
Dept: INTERNAL MEDICINE | Facility: CLINIC | Age: 58
End: 2023-03-24
Payer: MEDICAID

## 2023-03-24 NOTE — TELEPHONE ENCOUNTER
Called patient and left a generic message for her to return phone call. Her blood pressure was high the last time that she was in for an office visit so we are just reaching out to see if she has taken a recent BP at home? If so, what was the reading? Please record this reading in the Patient Reported Vitals section.     If not, please assist in scheduling a nurse only visit for a BP check.    Thank you.

## 2023-04-06 DIAGNOSIS — Z79.4 TYPE 2 DIABETES MELLITUS WITH DIABETIC POLYNEUROPATHY, WITH LONG-TERM CURRENT USE OF INSULIN (H): Primary | ICD-10-CM

## 2023-04-06 DIAGNOSIS — E11.42 TYPE 2 DIABETES MELLITUS WITH DIABETIC POLYNEUROPATHY, WITH LONG-TERM CURRENT USE OF INSULIN (H): Primary | ICD-10-CM

## 2023-04-19 DIAGNOSIS — E11.42 TYPE 2 DIABETES MELLITUS WITH DIABETIC POLYNEUROPATHY, WITH LONG-TERM CURRENT USE OF INSULIN (H): ICD-10-CM

## 2023-04-19 DIAGNOSIS — Z79.4 TYPE 2 DIABETES MELLITUS WITH DIABETIC POLYNEUROPATHY, WITH LONG-TERM CURRENT USE OF INSULIN (H): ICD-10-CM

## 2023-04-26 ENCOUNTER — VIRTUAL VISIT (OUTPATIENT)
Dept: INTERNAL MEDICINE | Facility: CLINIC | Age: 58
End: 2023-04-26
Payer: COMMERCIAL

## 2023-04-26 ENCOUNTER — TELEPHONE (OUTPATIENT)
Dept: INTERNAL MEDICINE | Facility: CLINIC | Age: 58
End: 2023-04-26

## 2023-04-26 DIAGNOSIS — R35.0 URINARY FREQUENCY: Primary | ICD-10-CM

## 2023-04-26 DIAGNOSIS — Z79.4 TYPE 2 DIABETES MELLITUS WITH DIABETIC POLYNEUROPATHY, WITH LONG-TERM CURRENT USE OF INSULIN (H): ICD-10-CM

## 2023-04-26 DIAGNOSIS — N30.00 ACUTE CYSTITIS WITHOUT HEMATURIA: ICD-10-CM

## 2023-04-26 DIAGNOSIS — E11.42 TYPE 2 DIABETES MELLITUS WITH DIABETIC POLYNEUROPATHY, WITH LONG-TERM CURRENT USE OF INSULIN (H): ICD-10-CM

## 2023-04-26 LAB
ALBUMIN UR-MCNC: 30 MG/DL
APPEARANCE UR: ABNORMAL
BACTERIA #/AREA URNS HPF: ABNORMAL /HPF
BILIRUB UR QL STRIP: NEGATIVE
COLOR UR AUTO: YELLOW
CREAT UR-MCNC: 142 MG/DL
GLUCOSE UR STRIP-MCNC: 100 MG/DL
HGB UR QL STRIP: ABNORMAL
KETONES UR STRIP-MCNC: NEGATIVE MG/DL
LEUKOCYTE ESTERASE UR QL STRIP: ABNORMAL
MICROALBUMIN UR-MCNC: 29.7 MG/L
MICROALBUMIN/CREAT UR: 20.92 MG/G CR (ref 0–25)
NITRATE UR QL: POSITIVE
PH UR STRIP: 5.5 [PH] (ref 5–8)
RBC #/AREA URNS AUTO: ABNORMAL /HPF
SP GR UR STRIP: 1.02 (ref 1–1.03)
SQUAMOUS #/AREA URNS AUTO: ABNORMAL /LPF
UROBILINOGEN UR STRIP-ACNC: 0.2 E.U./DL
WBC #/AREA URNS AUTO: ABNORMAL /HPF

## 2023-04-26 PROCEDURE — 87086 URINE CULTURE/COLONY COUNT: CPT | Mod: 93 | Performed by: NURSE PRACTITIONER

## 2023-04-26 PROCEDURE — 81001 URINALYSIS AUTO W/SCOPE: CPT | Performed by: NURSE PRACTITIONER

## 2023-04-26 PROCEDURE — 82570 ASSAY OF URINE CREATININE: CPT | Mod: 93 | Performed by: NURSE PRACTITIONER

## 2023-04-26 PROCEDURE — 99213 OFFICE O/P EST LOW 20 MIN: CPT | Mod: 93 | Performed by: NURSE PRACTITIONER

## 2023-04-26 PROCEDURE — 87186 SC STD MICRODIL/AGAR DIL: CPT | Mod: 93 | Performed by: NURSE PRACTITIONER

## 2023-04-26 PROCEDURE — 82043 UR ALBUMIN QUANTITATIVE: CPT | Mod: 93 | Performed by: NURSE PRACTITIONER

## 2023-04-26 RX ORDER — SULFAMETHOXAZOLE/TRIMETHOPRIM 800-160 MG
1 TABLET ORAL 2 TIMES DAILY
Qty: 10 TABLET | Refills: 0 | Status: SHIPPED | OUTPATIENT
Start: 2023-04-26 | End: 2023-05-05

## 2023-04-26 NOTE — TELEPHONE ENCOUNTER
Pt was rescheduled as a VV. Came to clinic for labs .       Maynor Coffey Jr., CMA on 4/26/2023 at 2:09 PM

## 2023-04-26 NOTE — PROGRESS NOTES
"Sarah is a 57 year old who is being evaluated via a billable telephone visit.      What phone number would you like to be contacted at? [phone]  How would you like to obtain your AVS? Quentin    Distant Location (provider location):  Off-site    Assessment & Plan   Problem List Items Addressed This Visit    None  Visit Diagnoses     Urinary frequency    -  Primary    Relevant Orders    UA Macro with Reflex to Micro and Culture - lab collect (Completed)    Urine Microscopic Exam (Completed)    Urine Culture    Type 2 diabetes mellitus with diabetic polyneuropathy, with long-term current use of insulin (H)        Acute cystitis without hematuria        Relevant Medications    sulfamethoxazole-trimethoprim (BACTRIM DS) 800-160 MG tablet       Offered STI testing, patient declines.  Patient will be started on bactrim DS bid x 5 days. Encouraged to push fluids, wipe from front to back, urinate after intercourse. Patient advised if symptoms persist, worsen or new symptoms arise they are to seek medical care.  All patients questions addressed. Patient verbalized understanding and agreement with plan.                BMI:   Estimated body mass index is 30.85 kg/m  as calculated from the following:    Height as of 2/22/23: 1.702 m (5' 7\").    Weight as of 2/22/23: 89.4 kg (197 lb).           Aspen Mancuso NP  [facility]    Trenton Smith is a 57 year old, presenting for the following health issues:  No chief complaint on file.        1/17/2023     1:39 PM   Additional Questions   Roomed by Deborah ROJAS     Genitourinary - Female  Onset/Duration: couple days   Description:   Painful urination (Dysuria): No           Frequency: No  Blood in urine (Hematuria): No  Delay in urine (Hesitency): No  Intensity: mild  Progression of Symptoms:  worsening  Accompanying Signs & Symptoms:  Fever/chills: No  Flank pain: No  Nausea and vomiting: No  Vaginal symptoms: none  Abdominal/Pelvic " Pain: No  History:   History of frequent UTI s: No  History of kidney stones: No  Sexually Active: YES  Possibility of pregnancy: No  Precipitating or alleviating factors: None  Therapies tried and outcome: none     Patient indicates pressure with urinating and odor without dysuria, urgency, frequency.  She indicates her significant other did outside of the relationship though indicates she is not concerned for STIs.     Review of Systems   Unremarkable other than listed above and below         Objective           Vitals:  No vitals were obtained today due to virtual visit.    Physical Exam   healthy, alert and no distress  PSYCH: Alert and oriented times 3; coherent speech, normal   rate and volume, able to articulate logical thoughts, able   to abstract reason, no tangential thoughts, no hallucinations   or delusions  Her affect is normal  RESP: No cough, no audible wheezing, able to talk in full sentences  Remainder of exam unable to be completed due to telephone visits    Results for orders placed or performed in visit on 04/26/23   UA Macro with Reflex to Micro and Culture - lab collect     Status: Abnormal    Specimen: Urine, Clean Catch   Result Value Ref Range    Color Urine Yellow Colorless, Straw, Light Yellow, Yellow    Appearance Urine Slightly Cloudy (A) Clear    Glucose Urine 100 (A) Negative mg/dL    Bilirubin Urine Negative Negative    Ketones Urine Negative Negative mg/dL    Specific Gravity Urine 1.020 1.005 - 1.030    Blood Urine Trace (A) Negative    pH Urine 5.5 5.0 - 8.0    Protein Albumin Urine 30 (A) Negative mg/dL    Urobilinogen Urine 0.2 0.2, 1.0 E.U./dL    Nitrite Urine Positive (A) Negative    Leukocyte Esterase Urine Small (A) Negative   Urine Microscopic Exam     Status: Abnormal   Result Value Ref Range    Bacteria Urine Many (A) None Seen /HPF    RBC Urine None Seen 0-2 /HPF /HPF    WBC Urine  (A) 0-5 /HPF /HPF    Squamous Epithelials Urine Moderate (A) None Seen /LPF      Results for orders placed or performed in visit on 04/26/23 (from the past 24 hour(s))   UA Macro with Reflex to Micro and Culture - lab collect    Specimen: Urine, Clean Catch   Result Value Ref Range    Color Urine Yellow Colorless, Straw, Light Yellow, Yellow    Appearance Urine Slightly Cloudy (A) Clear    Glucose Urine 100 (A) Negative mg/dL    Bilirubin Urine Negative Negative    Ketones Urine Negative Negative mg/dL    Specific Gravity Urine 1.020 1.005 - 1.030    Blood Urine Trace (A) Negative    pH Urine 5.5 5.0 - 8.0    Protein Albumin Urine 30 (A) Negative mg/dL    Urobilinogen Urine 0.2 0.2, 1.0 E.U./dL    Nitrite Urine Positive (A) Negative    Leukocyte Esterase Urine Small (A) Negative   Urine Microscopic Exam   Result Value Ref Range    Bacteria Urine Many (A) None Seen /HPF    RBC Urine None Seen 0-2 /HPF /HPF    WBC Urine  (A) 0-5 /HPF /HPF    Squamous Epithelials Urine Moderate (A) None Seen /LPF         Current Outpatient Medications:      sulfamethoxazole-trimethoprim (BACTRIM DS) 800-160 MG tablet, Take 1 tablet by mouth 2 times daily, Disp: 10 tablet, Rfl: 0     acetaminophen (TYLENOL) 325 MG tablet, Take 650 mg by mouth every 8 hours as needed for mild pain, Disp: , Rfl:      albuterol (PROAIR HFA) 108 (90 BASE) MCG/ACT inhaler, Inhale 1-2 puffs into the lungs every 4 hours as needed, Disp: , Rfl:      alcohol swab prep pads, Use to swab area of injection/zac as directed., Disp: 100 each, Rfl: 6     Alcohol Swabs PADS, Use to swab the area of the injection or zac as directed Per insurance coverage, Disp: 100 each, Rfl: 0     amitriptyline (ELAVIL) 25 MG tablet, Take 1-2 tablets (25-50 mg) by mouth At Bedtime For nerve pain., Disp: 90 tablet, Rfl: 3     aspirin (ASA) 81 MG EC tablet, Take 1 tablet (81 mg) by mouth daily, Disp: 90 tablet, Rfl: 0     blood glucose (ACCU-CHEK DARRIN PLUS) test strip, Use to test blood sugar 3-4 times daily or as directed., Disp: 100 strip, Rfl: 0      blood glucose (ACCU-CHEK SOFTCLIX) lancing device, Lancing device to be used with lancets., Disp: 1 each, Rfl: 0     blood glucose (NO BRAND SPECIFIED) lancets standard, To use to test glucose level in the blood Use to test blood sugar  4  times daily as directed. To accompany glucose monitor brands per insurance coverage., Disp: 100 each, Rfl: 0     blood glucose (NO BRAND SPECIFIED) test strip, To use to test glucose level in the blood Use to test blood sugar  4 times daily as directed. To accompany glucose monitor brands per insurance coverage., Disp: 100 strip, Rfl: 0     blood glucose calibration (NO BRAND SPECIFIED) solution, Used to calibrate the blood glucose monitor as needed and as directed.  To accompany  blood glucose brands per insurance coverage, Disp: 2 each, Rfl: 0     blood glucose monitoring (NO BRAND SPECIFIED) meter device kit, Use as directed Per insurance coverage, Disp: 1 kit, Rfl: 0     blood glucose monitoring (SOFTCLIX) lancets, Use to test blood sugar 3-4 times daily., Disp: 100 each, Rfl: 1     budesonide-formoterol (SYMBICORT) 160-4.5 MCG/ACT Inhaler, Inhale 2 puffs into the lungs 2 times daily, Disp: , Rfl:      carvedilol (COREG) 12.5 MG tablet, Take 1 tablet (12.5 mg) by mouth 2 times daily (with meals), Disp: 60 tablet, Rfl: 0     cetirizine (ZYRTEC) 10 MG tablet, Take 1 tablet (10 mg) by mouth daily, Disp: 90 tablet, Rfl: 3     Continuous Blood Gluc  (FREESTYLE SHEBA 14 DAY READER) ROSE, Use to read blood sugars as per 's instructions., Disp: 1 each, Rfl: 0     Continuous Blood Gluc Sensor (FREESTYLE SHEBA 14 DAY SENSOR) MIS, Change every 14 days., Disp: 2 each, Rfl: 11     Continuous Blood Gluc Sensor (FREESTYLE SHEBA 14 DAY SENSOR) MIS, 1 each every 14 days Use 1 Sensor every 14 days. Use to read blood sugars per 's instructions., Disp: 2 each, Rfl: 5     [employer] GENUINE ASPIRIN 325 MG tablet, TAKE 1 TABLET BY MOUTH EVERY DAY FOR DISEASE INVOLVING  LIPID DEPOSITS IN THE ARTERIES, Disp: , Rfl:      diclofenac (VOLTAREN) 1 % topical gel, Apply 2 g topically 3 times daily as needed for moderate pain (4-6) (feet), Disp: 300 g, Rfl: 3     dulaglutide (TRULICITY) 0.75 MG/0.5ML pen, Inject 0.75 mg Subcutaneous every 7 days, Disp: 6 mL, Rfl: 3     DULoxetine (CYMBALTA) 30 MG capsule, Take 30 mg by mouth 2 times daily, Disp: , Rfl:      fluticasone (FLONASE) 50 MCG/ACT nasal spray, Spray 1 spray into both nostrils daily, Disp: , Rfl:      furosemide (LASIX) 20 MG tablet, Take 20 mg by mouth daily as needed (swelling), Disp: , Rfl:      glipiZIDE (GLUCOTROL XL) 5 MG 24 hr tablet, Take 2 tablets (10 mg) by mouth daily for 180 days, Disp: 180 tablet, Rfl: 1     insulin glargine (LANTUS PEN) 100 UNIT/ML pen, Inject 25 Units Subcutaneous 2 times daily, Disp: 30 mL, Rfl: 3     insulin pen needle (32G X 4 MM) 32G X 4 MM miscellaneous, Use 1 pen needles daily or as directed., Disp: 200 each, Rfl: 5     ipratropium - albuterol 0.5 mg/2.5 mg/3 mL (DUONEB) 0.5-2.5 (3) MG/3ML neb solution, Take 1 vial by nebulization every 6 hours as needed for shortness of breath / dyspnea or wheezing, Disp: , Rfl:      isosorbide mononitrate (IMDUR) 30 MG 24 hr tablet, Take 1 tablet (30 mg) by mouth daily, Disp: 30 tablet, Rfl: 0     lidocaine (LIDODERM) 5 % patch, Place 1 patch onto the skin every 24 hours To prevent lidocaine toxicity, patient should be patch free for 12 hrs daily. BACK, Disp: , Rfl:      metaxalone (SKELAXIN) 800 MG tablet, Take 0.5-1 tablets (400-800 mg) by mouth 3 times daily as needed for muscle spasms or moderate pain (4-6), Disp: 60 tablet, Rfl: 3     montelukast (SINGULAIR) 10 MG tablet, Take 10 mg by mouth At Bedtime, Disp: , Rfl:      naloxone (NARCAN) 4 MG/0.1ML nasal spray, Spray 4 mg into one nostril alternating nostrils as needed for opioid reversal every 2-3 minutes until assistance arrives, Disp: , Rfl:      nitroGLYcerin (NITROSTAT) 0.4 MG sublingual tablet,  Place 1 tablet (0.4 mg) under the tongue every 5 minutes as needed for chest pain For chest pain place 1 tablet under the tongue every 5 minutes for 3 doses. If symptoms persist 5 minutes after 1st dose call 911., Disp: 25 tablet, Rfl: 3     nortriptyline (PAMELOR) 10 MG capsule, Take 10 mg by mouth At Bedtime, Disp: , Rfl:      omeprazole 20 MG tablet, Take 20 mg by mouth daily, Disp: , Rfl:      ondansetron (ZOFRAN) 8 MG tablet, Take 8 mg by mouth every 8 hours as needed, Disp: , Rfl:      oxyCODONE-acetaminophen (PERCOCET)  MG per tablet, Take 1 tablet by mouth every 6 hours as needed for severe pain, Disp: , Rfl:      polyethylene glycol (MIRALAX) 17 GM/Dose powder, Take 17 g by mouth daily, Disp: 510 g, Rfl: 0     rosuvastatin (CRESTOR) 20 MG tablet, Take 2 tablets (40 mg) by mouth daily, Disp: 30 tablet, Rfl: 0     sennosides (SENOKOT) 8.6 MG tablet, Take 2 tablets by mouth 2 times daily, Disp: 60 tablet, Rfl: 0     urea (DERMAL THERAPY FINGER CARE) 20 % external lotion, Externally apply topically 2 times daily as needed Feet, Disp: , Rfl:      valsartan (DIOVAN) 40 MG tablet, Take 1 tablet (40 mg) by mouth daily, Disp: 90 tablet, Rfl: 3          Phone call duration: 7 minutes

## 2023-04-26 NOTE — TELEPHONE ENCOUNTER
LMTCB      Pt seeing Aspen today urinary sxs. Due to unforeseen circumstances, provider will be late. Called pt to see if she is okay with making this a VV and to get lab orders completed. When pt calls back, please assist as needed.       Maynor Coffey Jr., CMA on 4/26/2023 at 7:15 AM

## 2023-04-28 LAB — BACTERIA UR CULT: ABNORMAL

## 2023-05-04 ENCOUNTER — NURSE TRIAGE (OUTPATIENT)
Dept: NURSING | Facility: CLINIC | Age: 58
End: 2023-05-04
Payer: MEDICAID

## 2023-05-04 NOTE — TELEPHONE ENCOUNTER
Nurse Triage SBAR    Is this a 2nd Level Triage? NO    Situation: Vomiting blood    Background: patient states that she ate [employer] last night. States that about an hour after that she started vomiting and then vomited a bunch of blood. She says today she has continued to vomit, states 9 times today, with blood. Also states that she feels very clammy and to weak to stand up.     Assessment: Vomiting blood    Protocol Recommended Disposition:   Call  Now    Recommendation:     patient stated that she will go to the ED now.      N/A     TAMICA REYNA RN      Does the patient meet one of the following criteria for ADS visit consideration? 16+ years old, with an [facility] PCP     TIP  Providers, please consider if this condition is appropriate for management at one of our Acute and Diagnostic Services sites.     If patient is a good candidate, please use dotphrase <dot>triageresponse and select Refer to ADS to document.    Reason for Disposition    Shock suspected (e.g., cold/pale/clammy skin, too weak to stand, low BP, rapid pulse)    Protocols used: VOMITING BLOOD-A-OH

## 2023-05-05 ENCOUNTER — APPOINTMENT (OUTPATIENT)
Dept: RADIOLOGY | Facility: HOSPITAL | Age: 58
End: 2023-05-05
Attending: EMERGENCY MEDICINE
Payer: COMMERCIAL

## 2023-05-05 ENCOUNTER — APPOINTMENT (OUTPATIENT)
Dept: CT IMAGING | Facility: HOSPITAL | Age: 58
End: 2023-05-05
Attending: INTERNAL MEDICINE
Payer: COMMERCIAL

## 2023-05-05 ENCOUNTER — APPOINTMENT (OUTPATIENT)
Dept: CT IMAGING | Facility: HOSPITAL | Age: 58
End: 2023-05-05
Attending: EMERGENCY MEDICINE
Payer: COMMERCIAL

## 2023-05-05 ENCOUNTER — HOSPITAL ENCOUNTER (OUTPATIENT)
Facility: HOSPITAL | Age: 58
Setting detail: OBSERVATION
Discharge: HOME | End: 2023-05-10
Attending: EMERGENCY MEDICINE | Admitting: INTERNAL MEDICINE
Payer: COMMERCIAL

## 2023-05-05 DIAGNOSIS — E11.42 TYPE 2 DIABETES MELLITUS WITH DIABETIC POLYNEUROPATHY, WITH LONG-TERM CURRENT USE OF INSULIN (H): ICD-10-CM

## 2023-05-05 DIAGNOSIS — Z79.4 TYPE 2 DIABETES MELLITUS WITH HYPERGLYCEMIA, WITH LONG-TERM CURRENT USE OF INSULIN (H): ICD-10-CM

## 2023-05-05 DIAGNOSIS — I25.10 CORONARY ARTERY DISEASE INVOLVING NATIVE CORONARY ARTERY OF NATIVE HEART WITHOUT ANGINA PECTORIS: Primary | Chronic | ICD-10-CM

## 2023-05-05 DIAGNOSIS — R52 PAIN: ICD-10-CM

## 2023-05-05 DIAGNOSIS — R07.89 ATYPICAL CHEST PAIN: ICD-10-CM

## 2023-05-05 DIAGNOSIS — R45.851 SUICIDAL IDEATION: ICD-10-CM

## 2023-05-05 DIAGNOSIS — K92.0 HEMATEMESIS, UNSPECIFIED WHETHER NAUSEA PRESENT: ICD-10-CM

## 2023-05-05 DIAGNOSIS — R55 SYNCOPE, UNSPECIFIED SYNCOPE TYPE: ICD-10-CM

## 2023-05-05 DIAGNOSIS — Z79.4 TYPE 2 DIABETES MELLITUS WITH DIABETIC POLYNEUROPATHY, WITH LONG-TERM CURRENT USE OF INSULIN (H): ICD-10-CM

## 2023-05-05 DIAGNOSIS — E11.65 TYPE 2 DIABETES MELLITUS WITH HYPERGLYCEMIA, WITH LONG-TERM CURRENT USE OF INSULIN (H): ICD-10-CM

## 2023-05-05 LAB
ALBUMIN SERPL BCG-MCNC: 4.2 G/DL (ref 3.5–5.2)
ALP SERPL-CCNC: 107 U/L (ref 35–104)
ALT SERPL W P-5'-P-CCNC: 22 U/L (ref 10–35)
ANION GAP SERPL CALCULATED.3IONS-SCNC: 14 MMOL/L (ref 7–15)
AST SERPL W P-5'-P-CCNC: 16 U/L (ref 10–35)
BILIRUB SERPL-MCNC: 0.4 MG/DL
BUN SERPL-MCNC: 21.7 MG/DL (ref 6–20)
CALCIUM SERPL-MCNC: 9.3 MG/DL (ref 8.6–10)
CHLORIDE SERPL-SCNC: 102 MMOL/L (ref 98–107)
CREAT SERPL-MCNC: 1.23 MG/DL (ref 0.51–0.95)
DEPRECATED HCO3 PLAS-SCNC: 22 MMOL/L (ref 22–29)
ERYTHROCYTE [DISTWIDTH] IN BLOOD BY AUTOMATED COUNT: 13.3 % (ref 10–15)
GFR SERPL CREATININE-BSD FRML MDRD: 51 ML/MIN/1.73M2
GLUCOSE BLDC GLUCOMTR-MCNC: 190 MG/DL (ref 70–99)
GLUCOSE BLDC GLUCOMTR-MCNC: 231 MG/DL (ref 70–99)
GLUCOSE SERPL-MCNC: 279 MG/DL (ref 70–99)
HBA1C MFR BLD: 8.3 %
HCT VFR BLD AUTO: 37.8 % (ref 35–47)
HGB BLD-MCNC: 11.7 G/DL (ref 11.7–15.7)
LACTATE SERPL-SCNC: 1.8 MMOL/L (ref 0.7–2)
LIPASE SERPL-CCNC: 13 U/L (ref 13–60)
MAGNESIUM SERPL-MCNC: 2.1 MG/DL (ref 1.7–2.3)
MCH RBC QN AUTO: 27 PG (ref 26.5–33)
MCHC RBC AUTO-ENTMCNC: 31 G/DL (ref 31.5–36.5)
MCV RBC AUTO: 87 FL (ref 78–100)
NT-PROBNP SERPL-MCNC: 285 PG/ML (ref 0–900)
PLATELET # BLD AUTO: 319 10E3/UL (ref 150–450)
POTASSIUM SERPL-SCNC: 4.2 MMOL/L (ref 3.4–5.3)
PROT SERPL-MCNC: 7.7 G/DL (ref 6.4–8.3)
RBC # BLD AUTO: 4.33 10E6/UL (ref 3.8–5.2)
SODIUM SERPL-SCNC: 138 MMOL/L (ref 136–145)
TROPONIN T SERPL HS-MCNC: 15 NG/L
TROPONIN T SERPL HS-MCNC: 17 NG/L
WBC # BLD AUTO: 7.4 10E3/UL (ref 4–11)

## 2023-05-05 PROCEDURE — 258N000003 HC RX IP 258 OP 636: Performed by: INTERNAL MEDICINE

## 2023-05-05 PROCEDURE — 83036 HEMOGLOBIN GLYCOSYLATED A1C: CPT | Performed by: INTERNAL MEDICINE

## 2023-05-05 PROCEDURE — 93005 ELECTROCARDIOGRAM TRACING: CPT | Performed by: EMERGENCY MEDICINE

## 2023-05-05 PROCEDURE — 250N000011 HC RX IP 250 OP 636: Performed by: PHYSICIAN ASSISTANT

## 2023-05-05 PROCEDURE — 96375 TX/PRO/DX INJ NEW DRUG ADDON: CPT

## 2023-05-05 PROCEDURE — 96376 TX/PRO/DX INJ SAME DRUG ADON: CPT | Mod: XU

## 2023-05-05 PROCEDURE — 71045 X-RAY EXAM CHEST 1 VIEW: CPT

## 2023-05-05 PROCEDURE — 36415 COLL VENOUS BLD VENIPUNCTURE: CPT | Performed by: EMERGENCY MEDICINE

## 2023-05-05 PROCEDURE — 83605 ASSAY OF LACTIC ACID: CPT | Performed by: EMERGENCY MEDICINE

## 2023-05-05 PROCEDURE — 70450 CT HEAD/BRAIN W/O DYE: CPT

## 2023-05-05 PROCEDURE — 84484 ASSAY OF TROPONIN QUANT: CPT | Performed by: EMERGENCY MEDICINE

## 2023-05-05 PROCEDURE — 250N000009 HC RX 250: Performed by: INTERNAL MEDICINE

## 2023-05-05 PROCEDURE — G0378 HOSPITAL OBSERVATION PER HR: HCPCS

## 2023-05-05 PROCEDURE — 82310 ASSAY OF CALCIUM: CPT | Performed by: EMERGENCY MEDICINE

## 2023-05-05 PROCEDURE — 93010 ELECTROCARDIOGRAM REPORT: CPT | Performed by: INTERNAL MEDICINE

## 2023-05-05 PROCEDURE — 250N000011 HC RX IP 250 OP 636: Performed by: INTERNAL MEDICINE

## 2023-05-05 PROCEDURE — 250N000013 HC RX MED GY IP 250 OP 250 PS 637: Performed by: INTERNAL MEDICINE

## 2023-05-05 PROCEDURE — 250N000011 HC RX IP 250 OP 636: Performed by: EMERGENCY MEDICINE

## 2023-05-05 PROCEDURE — 83880 ASSAY OF NATRIURETIC PEPTIDE: CPT | Performed by: EMERGENCY MEDICINE

## 2023-05-05 PROCEDURE — C9113 INJ PANTOPRAZOLE SODIUM, VIA: HCPCS | Performed by: PHYSICIAN ASSISTANT

## 2023-05-05 PROCEDURE — 94640 AIRWAY INHALATION TREATMENT: CPT

## 2023-05-05 PROCEDURE — 83690 ASSAY OF LIPASE: CPT | Performed by: EMERGENCY MEDICINE

## 2023-05-05 PROCEDURE — 250N000012 HC RX MED GY IP 250 OP 636 PS 637: Performed by: INTERNAL MEDICINE

## 2023-05-05 PROCEDURE — 250N000009 HC RX 250: Performed by: EMERGENCY MEDICINE

## 2023-05-05 PROCEDURE — 96361 HYDRATE IV INFUSION ADD-ON: CPT

## 2023-05-05 PROCEDURE — 83735 ASSAY OF MAGNESIUM: CPT | Performed by: EMERGENCY MEDICINE

## 2023-05-05 PROCEDURE — 96374 THER/PROPH/DIAG INJ IV PUSH: CPT | Mod: XU

## 2023-05-05 PROCEDURE — 93005 ELECTROCARDIOGRAM TRACING: CPT

## 2023-05-05 PROCEDURE — 99222 1ST HOSP IP/OBS MODERATE 55: CPT | Performed by: INTERNAL MEDICINE

## 2023-05-05 PROCEDURE — 80053 COMPREHEN METABOLIC PANEL: CPT | Performed by: EMERGENCY MEDICINE

## 2023-05-05 PROCEDURE — 99285 EMERGENCY DEPT VISIT HI MDM: CPT | Mod: 25

## 2023-05-05 PROCEDURE — 82962 GLUCOSE BLOOD TEST: CPT

## 2023-05-05 PROCEDURE — 99255 IP/OBS CONSLTJ NEW/EST HI 80: CPT | Performed by: INTERNAL MEDICINE

## 2023-05-05 PROCEDURE — 85027 COMPLETE CBC AUTOMATED: CPT | Performed by: EMERGENCY MEDICINE

## 2023-05-05 PROCEDURE — 74174 CTA ABD&PLVS W/CONTRAST: CPT

## 2023-05-05 RX ORDER — DULOXETIN HYDROCHLORIDE 60 MG/1
60 CAPSULE, DELAYED RELEASE ORAL 2 TIMES DAILY
Status: DISCONTINUED | OUTPATIENT
Start: 2023-05-05 | End: 2023-05-10 | Stop reason: HOSPADM

## 2023-05-05 RX ORDER — DEXTROSE MONOHYDRATE 25 G/50ML
25-50 INJECTION, SOLUTION INTRAVENOUS
Status: DISCONTINUED | OUTPATIENT
Start: 2023-05-05 | End: 2023-05-10 | Stop reason: HOSPADM

## 2023-05-05 RX ORDER — ONDANSETRON 2 MG/ML
4 INJECTION INTRAMUSCULAR; INTRAVENOUS EVERY 6 HOURS PRN
Status: DISCONTINUED | OUTPATIENT
Start: 2023-05-05 | End: 2023-05-10 | Stop reason: HOSPADM

## 2023-05-05 RX ORDER — LEVALBUTEROL INHALATION SOLUTION 1.25 MG/3ML
1.25 SOLUTION RESPIRATORY (INHALATION) EVERY 4 HOURS PRN
Status: DISCONTINUED | OUTPATIENT
Start: 2023-05-05 | End: 2023-05-10 | Stop reason: HOSPADM

## 2023-05-05 RX ORDER — TOPIRAMATE 100 MG/1
100 TABLET, FILM COATED ORAL 2 TIMES DAILY
Status: DISCONTINUED | OUTPATIENT
Start: 2023-05-05 | End: 2023-05-10 | Stop reason: HOSPADM

## 2023-05-05 RX ORDER — BENZOCAINE/MENTHOL 6 MG-10 MG
LOZENGE MUCOUS MEMBRANE 2 TIMES DAILY
COMMUNITY
End: 2024-01-14

## 2023-05-05 RX ORDER — AMOXICILLIN 250 MG
1 CAPSULE ORAL 2 TIMES DAILY PRN
Status: DISCONTINUED | OUTPATIENT
Start: 2023-05-05 | End: 2023-05-10 | Stop reason: HOSPADM

## 2023-05-05 RX ORDER — CARVEDILOL 12.5 MG/1
12.5 TABLET ORAL 2 TIMES DAILY WITH MEALS
Status: DISCONTINUED | OUTPATIENT
Start: 2023-05-05 | End: 2023-05-10 | Stop reason: HOSPADM

## 2023-05-05 RX ORDER — NICOTINE POLACRILEX 4 MG
15-30 LOZENGE BUCCAL
Status: DISCONTINUED | OUTPATIENT
Start: 2023-05-05 | End: 2023-05-10 | Stop reason: HOSPADM

## 2023-05-05 RX ORDER — BUPRENORPHINE HYDROCHLORIDE AND NALOXONE HYDROCHLORIDE DIHYDRATE 2; .5 MG/1; MG/1
1 TABLET SUBLINGUAL 3 TIMES DAILY
Status: DISCONTINUED | OUTPATIENT
Start: 2023-05-05 | End: 2023-05-05

## 2023-05-05 RX ORDER — BUPRENORPHINE HYDROCHLORIDE AND NALOXONE HYDROCHLORIDE DIHYDRATE 2; .5 MG/1; MG/1
1 TABLET SUBLINGUAL 3 TIMES DAILY
COMMUNITY
End: 2024-01-14

## 2023-05-05 RX ORDER — FLUTICASONE PROPIONATE 50 MCG
1 SPRAY, SUSPENSION (ML) NASAL DAILY PRN
Status: DISCONTINUED | OUTPATIENT
Start: 2023-05-05 | End: 2023-05-10 | Stop reason: HOSPADM

## 2023-05-05 RX ORDER — NORTRIPTYLINE HCL 10 MG
10 CAPSULE ORAL AT BEDTIME
Status: DISCONTINUED | OUTPATIENT
Start: 2023-05-05 | End: 2023-05-10 | Stop reason: HOSPADM

## 2023-05-05 RX ORDER — NALOXONE HYDROCHLORIDE 0.4 MG/ML
0.2 INJECTION, SOLUTION INTRAMUSCULAR; INTRAVENOUS; SUBCUTANEOUS
Status: DISCONTINUED | OUTPATIENT
Start: 2023-05-05 | End: 2023-05-10 | Stop reason: HOSPADM

## 2023-05-05 RX ORDER — LIDOCAINE 4 G/G
1 PATCH TOPICAL
Status: DISCONTINUED | OUTPATIENT
Start: 2023-05-06 | End: 2023-05-10 | Stop reason: HOSPADM

## 2023-05-05 RX ORDER — BUPRENORPHINE AND NALOXONE 2; .5 MG/1; MG/1
1 FILM, SOLUBLE BUCCAL; SUBLINGUAL 3 TIMES DAILY
Status: DISCONTINUED | OUTPATIENT
Start: 2023-05-05 | End: 2023-05-10 | Stop reason: HOSPADM

## 2023-05-05 RX ORDER — LEVALBUTEROL INHALATION SOLUTION 1.25 MG/3ML
1.25 SOLUTION RESPIRATORY (INHALATION) EVERY 6 HOURS PRN
Status: DISCONTINUED | OUTPATIENT
Start: 2023-05-05 | End: 2023-05-05

## 2023-05-05 RX ORDER — ASPIRIN 81 MG/1
81 TABLET ORAL DAILY
Status: DISCONTINUED | OUTPATIENT
Start: 2023-05-05 | End: 2023-05-10 | Stop reason: HOSPADM

## 2023-05-05 RX ORDER — BENZOCAINE/MENTHOL 6 MG-10 MG
LOZENGE MUCOUS MEMBRANE 2 TIMES DAILY
Status: DISCONTINUED | OUTPATIENT
Start: 2023-05-05 | End: 2023-05-10 | Stop reason: HOSPADM

## 2023-05-05 RX ORDER — SODIUM CHLORIDE 9 MG/ML
INJECTION, SOLUTION INTRAVENOUS CONTINUOUS
Status: DISCONTINUED | OUTPATIENT
Start: 2023-05-05 | End: 2023-05-06

## 2023-05-05 RX ORDER — MORPHINE SULFATE 4 MG/ML
4 INJECTION, SOLUTION INTRAMUSCULAR; INTRAVENOUS ONCE
Status: COMPLETED | OUTPATIENT
Start: 2023-05-05 | End: 2023-05-05

## 2023-05-05 RX ORDER — LIDOCAINE 40 MG/G
CREAM TOPICAL
Status: DISCONTINUED | OUTPATIENT
Start: 2023-05-05 | End: 2023-05-10 | Stop reason: HOSPADM

## 2023-05-05 RX ORDER — ALBUTEROL SULFATE 90 UG/1
1-2 AEROSOL, METERED RESPIRATORY (INHALATION) EVERY 4 HOURS PRN
Status: DISCONTINUED | OUTPATIENT
Start: 2023-05-05 | End: 2023-05-10 | Stop reason: HOSPADM

## 2023-05-05 RX ORDER — IOPAMIDOL 755 MG/ML
90 INJECTION, SOLUTION INTRAVASCULAR ONCE
Status: COMPLETED | OUTPATIENT
Start: 2023-05-05 | End: 2023-05-05

## 2023-05-05 RX ORDER — IPRATROPIUM BROMIDE AND ALBUTEROL SULFATE 2.5; .5 MG/3ML; MG/3ML
1 SOLUTION RESPIRATORY (INHALATION) EVERY 6 HOURS PRN
Status: DISCONTINUED | OUTPATIENT
Start: 2023-05-05 | End: 2023-05-05

## 2023-05-05 RX ORDER — IPRATROPIUM BROMIDE AND ALBUTEROL SULFATE 2.5; .5 MG/3ML; MG/3ML
3 SOLUTION RESPIRATORY (INHALATION) ONCE
Status: COMPLETED | OUTPATIENT
Start: 2023-05-05 | End: 2023-05-05

## 2023-05-05 RX ORDER — METHOCARBAMOL 750 MG/1
750 TABLET, FILM COATED ORAL EVERY 6 HOURS
Status: DISCONTINUED | OUTPATIENT
Start: 2023-05-05 | End: 2023-05-09

## 2023-05-05 RX ORDER — ACETAMINOPHEN 325 MG/1
650 TABLET ORAL EVERY 8 HOURS PRN
Status: DISCONTINUED | OUTPATIENT
Start: 2023-05-05 | End: 2023-05-10 | Stop reason: HOSPADM

## 2023-05-05 RX ORDER — CALCIUM CARBONATE 500 MG/1
1000 TABLET, CHEWABLE ORAL 4 TIMES DAILY PRN
Status: DISCONTINUED | OUTPATIENT
Start: 2023-05-05 | End: 2023-05-10 | Stop reason: HOSPADM

## 2023-05-05 RX ORDER — ONDANSETRON 4 MG/1
4 TABLET, ORALLY DISINTEGRATING ORAL EVERY 6 HOURS PRN
Status: DISCONTINUED | OUTPATIENT
Start: 2023-05-05 | End: 2023-05-10 | Stop reason: HOSPADM

## 2023-05-05 RX ORDER — VALSARTAN 40 MG/1
40 TABLET ORAL DAILY
Status: DISCONTINUED | OUTPATIENT
Start: 2023-05-05 | End: 2023-05-10 | Stop reason: HOSPADM

## 2023-05-05 RX ORDER — CETIRIZINE HYDROCHLORIDE 10 MG/1
10 TABLET ORAL DAILY
Status: DISCONTINUED | OUTPATIENT
Start: 2023-05-05 | End: 2023-05-10 | Stop reason: HOSPADM

## 2023-05-05 RX ORDER — TOPIRAMATE 50 MG/1
100 TABLET, FILM COATED ORAL 2 TIMES DAILY
COMMUNITY
End: 2024-03-05

## 2023-05-05 RX ORDER — AMOXICILLIN 250 MG
2 CAPSULE ORAL 2 TIMES DAILY PRN
Status: DISCONTINUED | OUTPATIENT
Start: 2023-05-05 | End: 2023-05-10 | Stop reason: HOSPADM

## 2023-05-05 RX ORDER — NALOXONE HYDROCHLORIDE 0.4 MG/ML
0.4 INJECTION, SOLUTION INTRAMUSCULAR; INTRAVENOUS; SUBCUTANEOUS
Status: DISCONTINUED | OUTPATIENT
Start: 2023-05-05 | End: 2023-05-10 | Stop reason: HOSPADM

## 2023-05-05 RX ORDER — OXYCODONE HYDROCHLORIDE 5 MG/1
5 TABLET ORAL EVERY 6 HOURS PRN
Status: DISCONTINUED | OUTPATIENT
Start: 2023-05-05 | End: 2023-05-06

## 2023-05-05 RX ORDER — METHOCARBAMOL 750 MG/1
750 TABLET, FILM COATED ORAL EVERY 6 HOURS
Status: ON HOLD | COMMUNITY
End: 2023-05-10

## 2023-05-05 RX ORDER — MORPHINE SULFATE 2 MG/ML
2 INJECTION, SOLUTION INTRAMUSCULAR; INTRAVENOUS ONCE
Status: COMPLETED | OUTPATIENT
Start: 2023-05-05 | End: 2023-05-05

## 2023-05-05 RX ORDER — NITROGLYCERIN 0.4 MG/1
0.4 TABLET SUBLINGUAL EVERY 5 MIN PRN
Status: DISCONTINUED | OUTPATIENT
Start: 2023-05-05 | End: 2023-05-10 | Stop reason: HOSPADM

## 2023-05-05 RX ORDER — IPRATROPIUM BROMIDE AND ALBUTEROL SULFATE 2.5; .5 MG/3ML; MG/3ML
3 SOLUTION RESPIRATORY (INHALATION) EVERY 4 HOURS PRN
Status: DISCONTINUED | OUTPATIENT
Start: 2023-05-05 | End: 2023-05-05

## 2023-05-05 RX ADMIN — SODIUM CHLORIDE: 9 INJECTION, SOLUTION INTRAVENOUS at 18:10

## 2023-05-05 RX ADMIN — MORPHINE SULFATE 4 MG: 4 INJECTION, SOLUTION INTRAMUSCULAR; INTRAVENOUS at 11:28

## 2023-05-05 RX ADMIN — FAMOTIDINE 20 MG: 10 INJECTION, SOLUTION INTRAVENOUS at 10:04

## 2023-05-05 RX ADMIN — IOPAMIDOL 90 ML: 755 INJECTION, SOLUTION INTRAVENOUS at 12:19

## 2023-05-05 RX ADMIN — METHOCARBAMOL 750 MG: 750 TABLET ORAL at 23:29

## 2023-05-05 RX ADMIN — ACETAMINOPHEN 650 MG: 325 TABLET ORAL at 20:30

## 2023-05-05 RX ADMIN — LIDOCAINE HYDROCHLORIDE 15 ML: 20 SOLUTION ORAL; TOPICAL at 20:27

## 2023-05-05 RX ADMIN — HYDROCORTISONE: 1 CREAM TOPICAL at 18:11

## 2023-05-05 RX ADMIN — CETIRIZINE HYDROCHLORIDE 10 MG: 10 TABLET, FILM COATED ORAL at 18:10

## 2023-05-05 RX ADMIN — HYDROCORTISONE: 1 CREAM TOPICAL at 20:31

## 2023-05-05 RX ADMIN — ONDANSETRON 4 MG: 2 INJECTION INTRAMUSCULAR; INTRAVENOUS at 15:45

## 2023-05-05 RX ADMIN — PANTOPRAZOLE SODIUM 40 MG: 40 INJECTION, POWDER, FOR SOLUTION INTRAVENOUS at 20:04

## 2023-05-05 RX ADMIN — Medication 1 MG: at 20:31

## 2023-05-05 RX ADMIN — ONDANSETRON 4 MG: 2 INJECTION INTRAMUSCULAR; INTRAVENOUS at 22:30

## 2023-05-05 RX ADMIN — METHOCARBAMOL 750 MG: 750 TABLET ORAL at 18:10

## 2023-05-05 RX ADMIN — ASPIRIN 81 MG: 81 TABLET, COATED ORAL at 18:10

## 2023-05-05 RX ADMIN — NITROGLYCERIN 0.4 MG: 0.4 TABLET, ORALLY DISINTEGRATING SUBLINGUAL at 16:53

## 2023-05-05 RX ADMIN — DULOXETINE HYDROCHLORIDE 60 MG: 60 CAPSULE, DELAYED RELEASE PELLETS ORAL at 20:30

## 2023-05-05 RX ADMIN — TOPIRAMATE 100 MG: 100 TABLET, FILM COATED ORAL at 20:31

## 2023-05-05 RX ADMIN — INSULIN GLARGINE 25 UNITS: 100 INJECTION, SOLUTION SUBCUTANEOUS at 21:30

## 2023-05-05 RX ADMIN — CARVEDILOL 12.5 MG: 12.5 TABLET, FILM COATED ORAL at 18:12

## 2023-05-05 RX ADMIN — IPRATROPIUM BROMIDE AND ALBUTEROL SULFATE 3 ML: .5; 3 SOLUTION RESPIRATORY (INHALATION) at 09:53

## 2023-05-05 RX ADMIN — VALSARTAN 40 MG: 40 TABLET, FILM COATED ORAL at 18:10

## 2023-05-05 RX ADMIN — NORTRIPTYLINE HYDROCHLORIDE 10 MG: 10 CAPSULE ORAL at 20:31

## 2023-05-05 RX ADMIN — MORPHINE SULFATE 2 MG: 2 INJECTION, SOLUTION INTRAMUSCULAR; INTRAVENOUS at 17:14

## 2023-05-05 RX ADMIN — MORPHINE SULFATE 4 MG: 4 INJECTION, SOLUTION INTRAMUSCULAR; INTRAVENOUS at 10:24

## 2023-05-05 RX ADMIN — INSULIN ASPART 1 UNITS: 100 INJECTION, SOLUTION INTRAVENOUS; SUBCUTANEOUS at 18:09

## 2023-05-05 ASSESSMENT — COLUMBIA-SUICIDE SEVERITY RATING SCALE - C-SSRS
4. HAVE YOU HAD THESE THOUGHTS AND HAD SOME INTENTION OF ACTING ON THEM?: NO
6. HAVE YOU EVER DONE ANYTHING, STARTED TO DO ANYTHING, OR PREPARED TO DO ANYTHING TO END YOUR LIFE?: NO
2. HAVE YOU ACTUALLY HAD ANY THOUGHTS OF KILLING YOURSELF IN THE PAST MONTH?: NO
5. HAVE YOU STARTED TO WORK OUT OR WORKED OUT THE DETAILS OF HOW TO KILL YOURSELF? DO YOU INTEND TO CARRY OUT THIS PLAN?: NO
1. IN THE PAST MONTH, HAVE YOU WISHED YOU WERE DEAD OR WISHED YOU COULD GO TO SLEEP AND NOT WAKE UP?: NO
3. HAVE YOU BEEN THINKING ABOUT HOW YOU MIGHT KILL YOURSELF?: NO

## 2023-05-05 ASSESSMENT — ACTIVITIES OF DAILY LIVING (ADL)
ADLS_ACUITY_SCORE: 37
ADLS_ACUITY_SCORE: 37
ADLS_ACUITY_SCORE: 35
ADLS_ACUITY_SCORE: 35
ADLS_ACUITY_SCORE: 37
ADLS_ACUITY_SCORE: 33
ADLS_ACUITY_SCORE: 35

## 2023-05-05 NOTE — H&P
[facility]    History and Physical - Hospitalist Service       Date of Admission:  5/5/2023    Assessment & Plan   57 year old female with history of CAD status post CABG, schizoaffective disorder, type 2 diabetes, CVA, COPD, depression, hyperlipidemia, GERD, hypertension, and tobacco use disorder with admitted on 5/5/2023 due to hematemesis, syncope and left-sided chest pain.      Chest radiograph showed previous median sternotomy with neck surgical clips and no evidence of acute cardiopulmonary process. CT angiogram of the chest abdomen and pelvis showed moderate amount of plaque throughout the thoracic and abdominal aorta, high-grade stenosis right renal artery, right kidney measuring 9.0 cm compared to left kidney measuring 10.0 cm.    Per GI team, suspicion for active bleed is low, therefore no plans for EGD at this time.  GI team recommended twice daily proton pump inhibitor, avoidance of NSAIDs and monitoring for GI bleed.    Cardiologist recommended serial troponin with plan to consider ischemic evaluation on Monday.    Hematemesis  Pantoprazole 40 mg IV twice daily  Avoid NSAIDs  Monitor for hematemesis and melena  Monitor hemoglobin  GI [id number] assistance    Syncope  Not preceded by any warning sign; unclear whether this is secondary to cardiac syncope vs orthostatic hypotension from volume depletion  Check orthostatic vital signs  Continue telemetry  Continue IV fluid for now  Follow-up echocardiogram  Follow-up cardiology consult    Chest pain  CAD status post CABG  Thoracic and abdominal aortic plaques  High-grade stenosis right renal artery  Initial high-sensitivity troponin was 17 with a repeat of 15.  Echocardiogram 10/6/2022 revealed normal left ventricular systolic function with moderate mitral insufficiency, moderate tricuspid insufficiency.  Cardiologist recommended serial troponin with plan to consider ischemic evaluation on Monday.    Aspirin 81 mg  daily  Serial troponin  Continue PTA carvedilol 12.5 mg twice daily  Follow-up echocardiogram  Possible ischemic evaluation on Monday per cardiologist  Cardiology [id number] assistance    High-grade stenosis right renal artery  Aspirin 81 mg daily  Will consider statin if there is no contraindication  Vascular surgery recommended outpatient follow-up with renal artery duplex ultrasound in 6 months.    JB  Creatinine is elevated at 1.23 compared to 0.93 on 2/22/2023  Possibly prerenal due to poor oral intake and vomiting  Start normal saline  Intake and output monitoring  Monitor BMP  Avoid nephrotoxin  Consider nephrology evaluation if creatinine continues to worsen    Type 2 diabetes  Hold PTA glipizide and Trulicity  Resume PTA Lantus 25 units twice daily   Insulin sliding scale for now  Insulin carb coverage 1: 10  Monitor for hypoglycemia as per protocol    Depression  Schizoaffective disorder  Resume PTA venlafaxine and nortriptyline  Continue PTA topiramate    COPD  Not in acute exacerbation  DuoNebs as needed  Supplemental oxygen as needed    Tobacco use disorder  She states she quit smoking 1 week ago  Nicotine gum as needed      Diet: Combination Diet Clear Liquid  DVT Prophylaxis: Pneumatic Compression Devices  Diehl Catheter: Not present  Lines: None     Cardiac Monitoring: ACTIVE order. Indication: Chest pain/ ACS rule out (24 hours)  Code Status: Full Code    Clinically Significant Risk Factors Present on Admission                  # Hypertension: home medication list includes antihypertensive(s)     # DMII: A1C = N/A within past 6 months            Disposition Plan      Expected Discharge Date: 05/06/2023                  Lillie Saravia MD  Hospitalist Service  [facility]  Securely message with [employer] (more info)  Text page via [employer] Paging/Directory     ______________________________________________________________________    Chief Complaint   Syncope,  "left-sided chest pain, hematemesis    History is obtained from the patient    History of Present Illness   Sarah Rahman is a 57 year old female with history of CAD, schizoaffective disorder, type 2 diabetes, CVA, COPD, depression, hyperlipidemia, GERD, hypertension, and tobacco use disorder who presents to the ER due to hematemesis, syncope and left-sided chest pain    She was in her usual state of health until yesterday when she developed hematemesis described as \"vomiting with toilet full of blood associated with dizziness.  Today, she developed left-sided chest pain radiating to the back and left shoulder.  She states pain is similar to pain she experienced when she had heart attack.  She states she passed out while heading to her car this morning; reportedly witnessed by maintenance workers in her neighborhood who called EMS. During evaluation, patient endorsed right lower extremity weakness and numbness.  She states she quit smoking 1 week prior to admission.    Initial blood pressure was 136/82, with a repeat of 161/80, pulse 93, respiratory rate 42, temperature 98.2  F and oxygen saturation of 100% on room air.  Notable laboratory findings include creatinine 1.23, , glucose 279, high-sensitivity troponin 17 and unremarkable CBC.  Chest radiograph showed previous median sternotomy with neck surgical clips and no evidence of acute cardiopulmonary process. CT angiogram of the chest abdomen and pelvis showed moderate amount of plaque throughout the thoracic and abdominal aorta, high-grade stenosis right renal artery, right kidney measuring 9.0 cm compared to left kidney measuring 10.0 cm.     She received famotidine, DuoNeb, and morphine in the ER.    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 Hamm at [facility]     Schizoaffective disorder (H)      Sleep difficulties 7/17/2022       Past Surgical History   Past Surgical History:   Procedure Laterality Date     BYPASS GRAFT ARTERY CORONARY  01/01/2009    x2     CAROTID ENDARTERECTOMY Left 12/2021     CORONARY STENT PLACEMENT       CV CORONARY ANGIOGRAM N/A 06/02/2021    Procedure: Coronary Angiogram;  Surgeon: Juventino Rivera MD;  Location: [facility] Card[facility];  Service: Cardiology     HYSTERECTOMY TOTAL ABDOMINAL       HYSTERECTOMY TOTAL ABDOMINAL, BILATERAL SALPINGO-OOPHORECTOMY, COMBINED       IR TRANSLAMINAR EPIDURAL LUMBAR INJ INCL IMAGING  09/27/2022     LUMBAR DISCECTOMY Right 06/13/2019    L5-S1 - Dr. Hamm     NASAL FRACTURE SURGERY       ORIF ULNAR / RADIAL SHAFT FRACTURE Right        Prior to Admission Medications   Prior to Admission Medications   Prescriptions Last Dose Informant Patient Reported? Taking?   DULoxetine HCl 60 MG CSDR 5/4/2023 at pm  Yes Yes   Sig: Take 60 mg by mouth 2 times daily   acetaminophen (TYLENOL) 325 MG tablet Unknown at prn  Yes Yes   Sig: Take 650 mg by mouth every 8 hours as needed for mild pain   albuterol (PROAIR HFA) 108 (90 BASE) MCG/ACT inhaler Unknown at prn  Yes Yes   Sig: Inhale 1-2 puffs into the lungs every 4 hours as needed   aspirin (ASA) 81 MG EC tablet 5/4/2023 at am  No Yes   Sig: Take 1 tablet (81 mg) by mouth daily   buprenorphine-naloxone (SUBOXONE) 2-0.5 MG SUBL sublingual tablet 5/4/2023 at pm  Yes Yes   Sig: Place 1 tablet under the tongue 3 times daily   carvedilol (COREG) 12.5 MG tablet Unknown at pt says taking, last filled in September  Yes Yes   Sig: Take 1 tablet (12.5 mg) by mouth 2  times daily (with meals)   cetirizine (ZYRTEC) 10 MG tablet 5/4/2023 at am  No Yes   Sig: Take 1 tablet (10 mg) by mouth daily   diclofenac (VOLTAREN) 1 % topical gel Unknown at prn  No Yes   Sig: Apply 2 g topically 3 times daily as needed for moderate pain (4-6) (feet)   dulaglutide (TRULICITY) 0.75 MG/0.5ML pen   No Yes   Sig: Inject 0.75 mg Subcutaneous every 7 days   fluticasone (FLONASE) 50 MCG/ACT nasal spray Unknown at prn  Yes Yes   Sig: Spray 1 spray into both nostrils daily as needed for allergies   glipiZIDE (GLUCOTROL XL) 5 MG 24 hr tablet 5/4/2023 at am  No Yes   Sig: Take 2 tablets (10 mg) by mouth daily for 180 days   hydrocortisone (CORTAID) 1 % external cream 5/4/2023 at pm  Yes Yes   Sig: Apply topically 2 times daily Apply to foot   insulin glargine (LANTUS PEN) 100 UNIT/ML pen 5/4/2023 at pm  No Yes   Sig: Inject 25 Units Subcutaneous 2 times daily   ipratropium - albuterol 0.5 mg/2.5 mg/3 mL (DUONEB) 0.5-2.5 (3) MG/3ML neb solution Unknown at prn  Yes Yes   Sig: Take 1 vial by nebulization every 6 hours as needed for shortness of breath / dyspnea or wheezing   lidocaine (LIDODERM) 5 % patch 5/4/2023 at am  Yes Yes   Sig: Place 1 patch onto the skin every 24 hours To prevent lidocaine toxicity, patient should be patch free for 12 hrs daily. BACK   methocarbamol (ROBAXIN) 750 MG tablet 5/4/2023 at am  Yes Yes   Sig: Take 750 mg by mouth every 6 hours   naloxone (NARCAN) 4 MG/0.1ML nasal spray Unknown at prn  Yes Yes   Sig: Spray 4 mg into one nostril alternating nostrils as needed for opioid reversal every 2-3 minutes until assistance arrives   nitroGLYcerin (NITROSTAT) 0.4 MG sublingual tablet Unknown at prn  No Yes   Sig: Place 1 tablet (0.4 mg) under the tongue every 5 minutes as needed for chest pain For chest pain place 1 tablet under the tongue every 5 minutes for 3 doses. If symptoms persist 5 minutes after 1st dose call 911.   nortriptyline (PAMELOR) 10 MG capsule 5/4/2023 at pm  Yes Yes    Sig: Take 10 mg by mouth At Bedtime   omeprazole 20 MG tablet 5/4/2023 at am  Yes Yes   Sig: Take 20 mg by mouth daily   ondansetron (ZOFRAN) 8 MG tablet Unknown at prn  Yes Yes   Sig: Take 8 mg by mouth every 8 hours as needed   oxyCODONE-acetaminophen (PERCOCET)  MG per tablet 5/4/2023 at am  Yes Yes   Sig: Take 1 tablet by mouth every 6 hours as needed for pain   topiramate (TOPAMAX) 50 MG tablet 5/4/2023 at am  Yes Yes   Sig: Take 100 mg by mouth 2 times daily   valsartan (DIOVAN) 40 MG tablet 5/4/2023 at am  No Yes   Sig: Take 1 tablet (40 mg) by mouth daily      Facility-Administered Medications: None        Review of Systems    The 10 point Review of Systems is negative other than noted in the HPI or here.   Physical Exam   Vital Signs: Temp: 97.9  F (36.6  C) Temp src: Oral BP: (!) 150/107 Pulse: 82   Resp: 28 SpO2: 98 % O2 Device: None (Room air)    Weight: 197 lbs 8.51 oz    Physical Exam  General appearance: Obese, awake, Alert, Cooperative, not in any obvious distress and appears stated age   HEENT: Normocephalic, atraumatic, conjunctiva clear without icterus and ears without discharge  Lungs: Clear to auscultation bilaterally, no wheezing, good air exchange, normal work of breathing  Cardiovascular: Regular Rate and Rythm, normal apical impulse, normal S1 and S2, no lower extremity edema bilaterally  Abdomen: Soft, non-tender and Non-distended, active bowel sounds  Skin: Skin color, texture normal and bruising or bleeding. No rashes or lesions over face, neck, arms and legs, turgor normal.  Musculoskeletal: No bony deformities or joint tenderness. Normal ROM upon flexion & extension.   Neurologic: Alert & Oriented X 3, Facial symmetry preserved.  Normal speech.  Power 2-3/5 in the right lower extremity 5/5 in the right upper extremity, 5/5 in the left upper and left lower extremities.  Psychiatric: Calm, normal eye contact and normal affect      Medical Decision Making       60 MINUTES SPENT BY  ME on the date of service doing chart review, history, exam, documentation & further activities per the note.      Data   Imaging results reviewed over the past 24 hrs:   Recent Results (from the past 24 hour(s))   XR Chest Port 1 View    Narrative    EXAM: XR CHEST PORT 1 VIEW  LOCATION: [facility]  DATE/TIME: 5/5/2023 10:35 AM CDT    INDICATION: Chest pain, dyspnea  COMPARISON:  9/24/2022 and 7/17/2022.      Impression    IMPRESSION: No pleural fluid or pneumothorax. No airspace disease or edema. Normal size of the heart. Previous median sternotomy. Neck surgical clips noted.   CTA Chest Abdomen Pelvis w Contrast    Narrative    EXAM: CTA CHEST ABDOMEN PELVIS W CONTRAST  LOCATION: [facility]  DATE/TIME: 5/5/2023 12:19 PM CDT    INDICATION: Chest pain with radiation in the back  COMPARISON: None.  TECHNIQUE: CT angiogram chest abdomen pelvis during arterial phase of injection of IV contrast. 2D and 3D MIP reconstructions were performed by the CT technologist. Dose reduction techniques were used.     Without and with imaging through the chest.  CONTRAST: IsoVue 370 90ml    FINDINGS:   CT ANGIOGRAM CHEST, ABDOMEN, AND PELVIS: Moderate atheromatous plaque throughout the thoracic and abdominal aorta but no aneurysms and no dissections. Widely patent great vessels off the aortic arch. Patent great vessels off the abdominal aorta with mild   narrowing at the origin of the SM a widely patent DORIAN and celiac trunk. Significant right renal artery stenosis. 2 patent left renal arteries    Widely patent iliac arteries.    Pulmonary arteries are well seen and are negative for pulmonary emboli.    LUNGS AND PLEURA: Normal.    MEDIASTINUM/AXILLAE: Normal.    CORONARY ARTERY CALCIFICATION: Previous intervention (stents or CABG).    HEPATOBILIARY: Normal.    PANCREAS: Normal.    SPLEEN: Normal.    ADRENAL GLANDS: Normal.    KIDNEYS/BLADDER: Normal.    BOWEL: Scattered diverticula in  the colon but nothing for diverticulitis. Moderate amount of stool.    LYMPH NODES: Normal.    PELVIC ORGANS: Normal.    MUSCULOSKELETAL: Normal.      Impression    IMPRESSION:  1.  No dissections, aneurysms, or pulmonary emboli.    2.  Moderate amount of plaque throughout the thoracic and abdominal aorta.    3.  High-grade stenosis right renal artery. Right kidney measures 9.0 cm left 10.0 cm.    4.  No other significant findings in the chest, abdomen, and pelvis.     CT Head w/o Contrast    Narrative    EXAM: CT HEAD W/O CONTRAST  LOCATION: [facility]  DATE/TIME: 5/5/2023 2:32 PM CDT    INDICATION: Right lower extremity weakness, numbness.  COMPARISON: Head CT 10/06/2022, brain MRI 02/22/2023.  TECHNIQUE: Routine CT Head without IV contrast. Multiplanar reformats. Dose reduction techniques were used.    FINDINGS:  INTRACRANIAL CONTENTS: No intracranial hemorrhage, extraaxial collection, or mass effect.  No CT evidence of acute infarct. Mild presumed chronic small vessel ischemic changes. Benign ossification/mineralization of the anterior falx. Mild generalized   volume loss. No hydrocephalus. Corpus callosum is satisfactory. Position of the cerebellar tonsils is normal. Sella is normal. Vascular calcification.     VISUALIZED ORBITS/SINUSES/MASTOIDS: No intraorbital abnormality. No paranasal sinus air-fluid levels are noted. Scattered fluid/membrane thickening in the left mastoid air cells. No apparent mass in the posterior nasopharynx or skull base.    BONES/SOFT TISSUES: Redemonstration of chronic nasal bone fractures. Nasal septum is intact with S-shaped deviation. No acute displaced facial bone or calvarial/skull base fractures are evident. EACs are clear. No significant swelling of the facial or   scalp soft tissues.      Impression    IMPRESSION:  1.  No CT evidence for acute intracranial process.    2.  Brain atrophy and presumed chronic microvascular ischemic changes as above.    3.   No intracranial mass, mass effect or hyperdense hemorrhage.    4.  Stable chronic nasal bone fractures with no acute fractures noted.    5.  Overall stable appearance from 10/06/2022.

## 2023-05-05 NOTE — ED NOTES
Rating pain 7/10 still in left chest/shoulder. Pt calmly laying in bed visiting with family at the bedside.

## 2023-05-05 NOTE — CONSULTS
Routing refill request to provider for review/approval because:  Drug interaction warning    Maryana Garcia RN BSN  [facility]  [phone]             Vascular Surgery Consultation    Sarah Rahman MRN# [id number]   Age: 57 year old YOB: 1965     Date of Admission:  5/5/2023    Date of Consult:   05/05/23    Reason for consult:  Right renal artery stenosis       Requesting service:  Hospital medicine; requesting provider: Dr. Saravia                   Assessment and Plan:   57-year-old female smoker with CAD status post CABG, COPD, poorly controlled diabetes, hypertension, schizoaffective disorder, admitted with chest/back pain that is undergoing work-up thus far negative for MI.  Right renal artery stenosis was incidentally found on CT scan today.     Her kidney function is normal at baseline with JB today and creatinine of 1.23.  Right kidney is atrophic measuring 9 cm.  Her hypertension appears controlled with 2 oral agents.    Recommend outpatient follow-up with vascular medicine in 6 months with renal artery duplex ultrasound.  Continue optimal medical management, recommend starting high-dose statin.    Vascular surgery will sign off.  Please call with questions.      Discussed with staff, Dr. Chirinos.    Nikko Alcaraz MD           History of Present Illness:   Ms. Kristin Rahman is a 57-year-old female with a history of coronary artery disease status post CABG, COPD, CVA, poorly controlled insulin-dependent type 2 diabetes, hypertension, schizoaffective disorder, admitted with chest pain radiating to her back for which she is undergoing work-up by cardiology that has thus far been negative for myocardial infarction.  She also has a reported episode of hematemesis and melena for which GI is consulted.  She underwent CT scan today which found high-grade right renal artery stenosis.  She reports that her chest pain rating to her left upper back remains unchanged.  Denies any fevers chills.  Denies any low back pain or abdominal pain.  She reports a strong family history of chronic kidney disease with multiple family members on  dialysis.  She is a current smoker.  She takes an aspirin but is not on a statin.  Most recent LDL was 83 in 2022.  Recent hemoglobin A1c 8.13% with blood glucoses above 200 while hospitalized here.  She has no history of CKD with normal kidney function at baseline and has a new mild JB today with creatinine of 1.23.  Her CT scan of the chest abdomen pelvis today identified right renal artery stenosis and atrophic right kidney measuring 9 cm.  There are 2 left renal arteries supplying the left kidney which measures 10 cm.          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 Hamm at [facility]     Schizoaffective disorder (H)      Sleep difficulties 7/17/2022             Past Surgical History:     Past Surgical History:   Procedure Laterality Date     BYPASS GRAFT ARTERY CORONARY  01/01/2009    x2     CAROTID ENDARTERECTOMY Left 12/2021     CORONARY STENT PLACEMENT       CV CORONARY ANGIOGRAM N/A 06/02/2021    Procedure: Coronary Angiogram;  Surgeon: Juventino Rivera MD;  Location: [facility];  Service: Cardiology     HYSTERECTOMY TOTAL ABDOMINAL       HYSTERECTOMY TOTAL ABDOMINAL, BILATERAL SALPINGO-OOPHORECTOMY, COMBINED       IR TRANSLAMINAR EPIDURAL LUMBAR INJ INCL IMAGING  09/27/2022     LUMBAR DISCECTOMY Right 06/13/2019    L5-S1 - Dr. Hamm     NASAL FRACTURE SURGERY       ORIF ULNAR / RADIAL SHAFT FRACTURE Right              Social History:     Social History     Tobacco Use     Smoking status:  "Every Day     Packs/day: 0.50     Types: Cigarettes     Smokeless tobacco: Never   Vaping Use     Vaping status: Never Used     Passive vaping exposure: Yes   Substance Use Topics     Alcohol use: Not Currently     Comment: Alcoholic Drinks/day: occ             Family History:     Family History   Problem Relation Age of Onset     Heart Disease Mother      Heart Disease Father      Heart Disease Sister      Diabetes Brother      Heart Disease Sister                 Allergies:     Allergies   Allergen Reactions     Haloperidol Unknown     Patient states it stops her heart       Meperidine And Related Angioedema, Difficulty breathing, Other (See Comments), Rash and Shortness Of Breath     Throat closes  Other reaction(s): Breathing Difficulty  Other reaction(s): Breathing Difficulty       Phenothiazines Other (See Comments)     Other reaction(s): CARDIAC DISTURBANCES  Patient states it stops her heart  \"I swell up\"  \"stopped by heart\"  \"I swell up\"  \"I swell up\"       Tramadol Other (See Comments)     Other reaction(s): Angioedema  Throat itch       Haloperidol And Related Angioedema     Januvia [Sitagliptin] Other (See Comments)     Swelling in the neck      Latex Itching     Lisinopril Other (See Comments)     ACE cough  Itchy throat  Throat itches  Itchy throat       Penicillins Swelling     Hydroxyzine Other (See Comments) and Rash     Tongue swelling  Tongue swelling  Tongue swelling               Medications:     Current Facility-Administered Medications   Medication     acetaminophen (TYLENOL) tablet 650 mg     albuterol (PROVENTIL HFA/VENTOLIN HFA) inhaler     aspirin EC tablet 81 mg     buprenorphine-naloxone (SUBOXONE) 2-0.5 MG sublingual tablet 1 tablet     calcium carbonate (TUMS) chewable tablet 1,000 mg     carvedilol (COREG) tablet 12.5 mg     cetirizine (zyrTEC) tablet 10 mg     glucose gel 15-30 g    Or     dextrose 50 % injection 25-50 mL    Or     glucagon injection 1 mg     diclofenac (VOLTAREN) 1 " % topical gel 2 g     DULoxetine (CYMBALTA) DR capsule 60 mg     fluticasone (FLONASE) 50 MCG/ACT spray 1 spray     hydrocortisone (CORTAID) 1 % cream     insulin aspart (NovoLOG) injection (RAPID ACTING)     insulin aspart (NovoLOG) injection (RAPID ACTING)     insulin aspart (NovoLOG) injection (RAPID ACTING)     insulin glargine (LANTUS PEN) injection 25 Units     ipratropium (ATROVENT) 0.02 % neb solution 0.5 mg    And     levalbuterol (XOPENEX) neb solution 1.25 mg     [START ON 5/6/2023] Lidocaine (LIDOCARE) 4 % Patch 1 patch    And     [START ON 5/6/2023] lidocaine patch in PLACE     lidocaine (LMX4) cream     lidocaine 1 % 0.1-1 mL     melatonin tablet 1 mg     methocarbamol (ROBAXIN) tablet 750 mg     naloxone (NARCAN) injection 0.2 mg    Or     naloxone (NARCAN) injection 0.4 mg    Or     naloxone (NARCAN) injection 0.2 mg    Or     naloxone (NARCAN) injection 0.4 mg     nicotine (NICORETTE) gum 2 mg     nitroGLYcerin (NITROSTAT) sublingual tablet 0.4 mg     nortriptyline (PAMELOR) capsule 10 mg     ondansetron (ZOFRAN ODT) ODT tab 4 mg    Or     ondansetron (ZOFRAN) injection 4 mg     [Held by provider] oxyCODONE (ROXICODONE) tablet 5 mg     pantoprazole (PROTONIX) IV push injection 40 mg     senna-docusate (SENOKOT-S/PERICOLACE) 8.6-50 MG per tablet 1 tablet    Or     senna-docusate (SENOKOT-S/PERICOLACE) 8.6-50 MG per tablet 2 tablet     sodium chloride (PF) 0.9% PF flush 3 mL     sodium chloride (PF) 0.9% PF flush 3 mL     sodium chloride 0.9% infusion     topiramate (TOPAMAX) tablet 100 mg     valsartan (DIOVAN) tablet 40 mg               Review of Systems:   A 12 point review of systems was completed and found to be negative unless otherwise stated in the above HPI            Physical Exam:   /69 (BP Location: Left arm)   Pulse 90   Temp 97.9  F (36.6  C) (Oral)   Resp 28   Wt 89.6 kg (197 lb 8.5 oz)   LMP  (LMP Unknown)   SpO2 98%   BMI 30.94 kg/m      No intake or output data in the 24  hours ending 05/05/23 1847    GEN: A&Ox3, no acute distress  NEURO: CN II-XII grossly intact. No gross neurologic deficits  NECK: trachea midline, no JVD  HEENT: No scleral icterus.  RESP: Nonlabored breathing on room air, no cough  CV: RRR by radial pulse, noncyanotic   ABD: soft, non-tender, nondistended. No guarding or rebound tenderness  MSK: Moves all extremities independently. Normal active range of motion. No costovertebral angle tenderness  PSYCH: cooperative   PULSES: Palpable radial and dorsalis pedis pulses bilaterally          Data:   All laboratory data reviewed    Results:  BMP  Recent Labs   Lab 05/05/23  1808 05/05/23  1003   NA  --  138   POTASSIUM  --  4.2   CHLORIDE  --  102   CO2  --  22   BUN  --  21.7*   CR  --  1.23*   * 279*     CBC  Recent Labs   Lab 05/05/23  1003   WBC 7.4   HGB 11.7        LFT  Recent Labs   Lab 05/05/23  1003   AST 16   ALT 22   ALKPHOS 107*   BILITOTAL 0.4   ALBUMIN 4.2     Recent Labs   Lab 05/05/23  1808 05/05/23  1003   * 279*       Imaging:  CT Head w/o Contrast    Result Date: 5/5/2023  EXAM: CT HEAD W/O CONTRAST LOCATION: [facility] DATE/TIME: 5/5/2023 2:32 PM CDT INDICATION: Right lower extremity weakness, numbness. COMPARISON: Head CT 10/06/2022, brain MRI 02/22/2023. TECHNIQUE: Routine CT Head without IV contrast. Multiplanar reformats. Dose reduction techniques were used. FINDINGS: INTRACRANIAL CONTENTS: No intracranial hemorrhage, extraaxial collection, or mass effect.  No CT evidence of acute infarct. Mild presumed chronic small vessel ischemic changes. Benign ossification/mineralization of the anterior falx. Mild generalized volume loss. No hydrocephalus. Corpus callosum is satisfactory. Position of the cerebellar tonsils is normal. Sella is normal. Vascular calcification. VISUALIZED ORBITS/SINUSES/MASTOIDS: No intraorbital abnormality. No paranasal sinus air-fluid levels are noted. Scattered fluid/membrane  thickening in the left mastoid air cells. No apparent mass in the posterior nasopharynx or skull base. BONES/SOFT TISSUES: Redemonstration of chronic nasal bone fractures. Nasal septum is intact with S-shaped deviation. No acute displaced facial bone or calvarial/skull base fractures are evident. EACs are clear. No significant swelling of the facial or scalp soft tissues.     IMPRESSION: 1.  No CT evidence for acute intracranial process. 2.  Brain atrophy and presumed chronic microvascular ischemic changes as above. 3.  No intracranial mass, mass effect or hyperdense hemorrhage. 4.  Stable chronic nasal bone fractures with no acute fractures noted. 5.  Overall stable appearance from 10/06/2022.    CTA Chest Abdomen Pelvis w Contrast    Result Date: 5/5/2023  EXAM: CTA CHEST ABDOMEN PELVIS W CONTRAST LOCATION: [facility] DATE/TIME: 5/5/2023 12:19 PM CDT INDICATION: Chest pain with radiation in the back COMPARISON: None. TECHNIQUE: CT angiogram chest abdomen pelvis during arterial phase of injection of IV contrast. 2D and 3D MIP reconstructions were performed by the CT technologist. Dose reduction techniques were used. Without and with imaging through the chest. CONTRAST: IsoVue 370 90ml FINDINGS: CT ANGIOGRAM CHEST, ABDOMEN, AND PELVIS: Moderate atheromatous plaque throughout the thoracic and abdominal aorta but no aneurysms and no dissections. Widely patent great vessels off the aortic arch. Patent great vessels off the abdominal aorta with mild  narrowing at the origin of the SM a widely patent DORIAN and celiac trunk. Significant right renal artery stenosis. 2 patent left renal arteries Widely patent iliac arteries. Pulmonary arteries are well seen and are negative for pulmonary emboli. LUNGS AND PLEURA: Normal. MEDIASTINUM/AXILLAE: Normal. CORONARY ARTERY CALCIFICATION: Previous intervention (stents or CABG). HEPATOBILIARY: Normal. PANCREAS: Normal. SPLEEN: Normal. ADRENAL GLANDS: Normal.  KIDNEYS/BLADDER: Normal. BOWEL: Scattered diverticula in the colon but nothing for diverticulitis. Moderate amount of stool. LYMPH NODES: Normal. PELVIC ORGANS: Normal. MUSCULOSKELETAL: Normal.     IMPRESSION: 1.  No dissections, aneurysms, or pulmonary emboli. 2.  Moderate amount of plaque throughout the thoracic and abdominal aorta. 3.  High-grade stenosis right renal artery. Right kidney measures 9.0 cm left 10.0 cm. 4.  No other significant findings in the chest, abdomen, and pelvis.     XR Chest Port 1 View    Result Date: 5/5/2023  EXAM: XR CHEST PORT 1 VIEW LOCATION: [facility] DATE/TIME: 5/5/2023 10:35 AM CDT INDICATION: Chest pain, dyspnea COMPARISON:  9/24/2022 and 7/17/2022.     IMPRESSION: No pleural fluid or pneumothorax. No airspace disease or edema. Normal size of the heart. Previous median sternotomy. Neck surgical clips noted.

## 2023-05-05 NOTE — PROGRESS NOTES
CM attempt to assess for needs, pt is sucking on a popsicle and on phone, told CM if not able to get her off [employer] title 18, then she doesn't want to talk to CM. NO CM needs at this time.

## 2023-05-05 NOTE — ED NOTES
Talking on the phone with her family, crying stating she can't breathe.  Pain medication given, pt requesting for more to help with the pain.

## 2023-05-05 NOTE — CONSULTS
[facility] Consultation     Sarah Rahman  [address]   [place] [place] [facility] [address]  57 year old female     Admission Date/Time: 5/5/2023  Primary Care Provider: Kike David     We were asked to see the patient in consultation by Dr. Sraavia for evaluation of hematemesis.    ASSESSMENT:    Sarah Rahman is a 57 year old female with PMH of HTN, DM type 2, COPD, HLD, schizoaffective disorder, CHF, s/p CABG who presented to the ED for chest pains and SOB. GI consulted for reported history of hematemesis yesterday.     1. Hematemesis  2. Melena  - reported large volume hematemesis yesterday, very mild streaking today. She also reports 2-3 day history of black stools. None since being in the ED.    - Differential: Kathrine-davidson tear, esophagitis, gastritis/duodenitis, peptic ulcer, AVM  - Hgb is at baseline (11.7). BUN is not significantly elevated, doubtful of a significant bleed.  - No NSAIDs at home besides daily baby aspirin      RECOMMENDATIONS:    - No plans for EGD at this time given suspicion for active bleed is low.   - GI will continue to follow    - Monitor for signs and symptoms of further bleeding (melena or hematemesis)    - Start on PPI BID    - No NSAIDs (baby aspirin ok).       Case discussed with Dr. Dumont, please review MD addendum below.    Approximately 40 minutes of total time was spent providing patient care, including patient evaluation, reviewing documentation/test results, and     Torsten Green PA-C  [facility]  [phone]  ________________________________________________________________________        CC: chest pain, SOB, and weakness     HPI:  Sarah Rahman is a 57 year old female with PMH of HTN, DM type 2, COPD, HLD, schizoaffective disorder, CHF, s/p CABG who presented to the ED for chest pains and SOB. GI consulted for reported history of hematemesis yesterday.      Pt explains she was in good health until yesterday when she  "developed symptoms of chest pains, SOB, nausea and vomiting. She was eating a taco and developed frequent episodes of vomiting. She reports 1 episode of hematemesis with \"throwing up a whole toilet full of blood\". Following this she had substernal discomfort and burning. She also reports 2-3 day history of black appearing stools. No hematochezia. Today, she denies significant hematemesis but believes she may have saw a streak of blood in her vomit.     She takes a baby aspirin but denies any other NSAIDs. No alcohol use since August 2022. She recently resumed tobacco use again but no other drugs.  She has a history of GERD and takes omeprazole intermittently.     In the ED:  Vitals: afebrile, BP up to 161/80.  Labs: Hgb 11.7, WBC 7.4. BMP with BUN 21, Cr 1.23. LFT's normal. Glucose 279. Troponin mildly elevated (17, 15).  Imaging: CTA chest/abdomen/pelvis - noting high grade stenosis of the right renal artery, moderate plaque throughout thoracic and abdominal aorta but otherwise normal. She has diverticulosis without diverticulitis. Stool burden is moderate.     ROS: A comprehensive ten point review of systems was negative aside from those in mentioned in the HPI.      PAST MEDICAL HISTORY:  Patient Active Problem List    Diagnosis Date Noted     Suicidal ideation 05/05/2023     Priority: Medium     Atypical chest pain 05/05/2023     Priority: Medium     Syncope, unspecified syncope type 05/05/2023     Priority: Medium     Hematemesis, unspecified whether nausea present 05/05/2023     Priority: Medium     Anemia 07/17/2022     Priority: Medium     History of drug abuse (H) 07/17/2022     Priority: Medium     Obesity 07/17/2022     Priority: Medium     Chronic pain syndrome 07/17/2022     Priority: Medium     Goes to [facility] pain clinic - [facility]        Diabetic polyneuropathy associated with type 2 diabetes mellitus (H) 06/10/2022     Priority: Medium     Nasal polyp 06/10/2022     Priority: Medium     " Bilateral low back pain with right-sided sciatica, unspecified chronicity 04/03/2022     Priority: Medium     Chronic obstructive pulmonary disease (H) 01/15/2022     Priority: Medium     Uncontrolled type 2 diabetes mellitus with hyperglycemia (H) 12/13/2021     Priority: Medium     Cerebrovascular accident (CVA) due to stenosis of carotid artery (H) 10/17/2021     Priority: Medium     Myelomalacia of cervical cord (H) 10/08/2021     Priority: Medium     Sensorineural hearing loss (SNHL) of right ear 10/08/2021     Priority: Medium     Carotid stenosis, left 10/03/2021     Priority: Medium     SP carotid endarterectomy.       Depression with anxiety 08/01/2021     Priority: Medium     Post-COVID syndrome 07/11/2021     Priority: Medium     Mixed hyperlipidemia 05/02/2021     Priority: Medium     Hx of syphilis 09/16/2020     Priority: Medium     09/08/20  TPPA positive, Treponema screen positive  09/14/20  Treatment: Doxycycline 100mg po bid x 30 days  10/27/20  TPPA positive       Seasonal allergies 07/24/2015     Priority: Medium     Pulmonary nodule 09/11/2014     Priority: Medium     Menopausal flushing 04/30/2014     Priority: Medium     Nephrolithiasis 08/29/2013     Priority: Medium     CAD (coronary artery disease) 03/21/2013     Priority: Medium     History of coronary artery bypass grafting (CABG) and multiple angiograms       Schizoaffective disorder (H) 03/21/2013     Priority: Medium     GERD (gastroesophageal reflux disease) 10/15/2012     Priority: Medium     Moderate persistent asthma 09/12/2011     Priority: Medium     Smoker 02/03/2008     Priority: Medium     HTN (hypertension) 02/15/2006     Priority: Medium     SOCIAL HISTORY:  Social History     Tobacco Use     Smoking status: Every Day     Packs/day: 0.50     Types: Cigarettes     Smokeless tobacco: Never   Vaping Use     Vaping status: Never Used     Passive vaping exposure: Yes   Substance Use Topics     Alcohol use: Not Currently      "Comment: Alcoholic Drinks/day: occ     Drug use: No     FAMILY HISTORY:  Family History   Problem Relation Age of Onset     Heart Disease Mother      Heart Disease Father      Heart Disease Sister      Diabetes Brother      Heart Disease Sister      ALLERGIES:   Allergies   Allergen Reactions     Haloperidol Unknown     Patient states it stops her heart       Meperidine And Related Angioedema, Difficulty breathing, Other (See Comments), Rash and Shortness Of Breath     Throat closes  Other reaction(s): Breathing Difficulty  Other reaction(s): Breathing Difficulty       Phenothiazines Other (See Comments)     Other reaction(s): CARDIAC DISTURBANCES  Patient states it stops her heart  \"I swell up\"  \"stopped by heart\"  \"I swell up\"  \"I swell up\"       Tramadol Other (See Comments)     Other reaction(s): Angioedema  Throat itch       Haloperidol And Related Angioedema     Januvia [Sitagliptin] Other (See Comments)     Swelling in the neck      Latex Itching     Lisinopril Other (See Comments)     ACE cough  Itchy throat  Throat itches  Itchy throat       Penicillins Swelling     Hydroxyzine Other (See Comments) and Rash     Tongue swelling  Tongue swelling  Tongue swelling       MEDICATIONS:   Current Facility-Administered Medications   Medication     calcium carbonate (TUMS) chewable tablet 1,000 mg     lidocaine (LMX4) cream     lidocaine 1 % 0.1-1 mL     melatonin tablet 1 mg     nicotine (NICORETTE) gum 2 mg     ondansetron (ZOFRAN ODT) ODT tab 4 mg    Or     ondansetron (ZOFRAN) injection 4 mg     senna-docusate (SENOKOT-S/PERICOLACE) 8.6-50 MG per tablet 1 tablet    Or     senna-docusate (SENOKOT-S/PERICOLACE) 8.6-50 MG per tablet 2 tablet     sodium chloride (PF) 0.9% PF flush 3 mL     sodium chloride (PF) 0.9% PF flush 3 mL     Current Outpatient Medications   Medication     acetaminophen (TYLENOL) 325 MG tablet     albuterol (PROAIR HFA) 108 (90 BASE) MCG/ACT inhaler     alcohol swab prep pads     Alcohol Swabs " PADS     amitriptyline (ELAVIL) 25 MG tablet     aspirin (ASA) 81 MG EC tablet     blood glucose (ACCU-CHEK DARRIN PLUS) test strip     blood glucose (ACCU-CHEK SOFTCLIX) lancing device     blood glucose (NO BRAND SPECIFIED) lancets standard     blood glucose (NO BRAND SPECIFIED) test strip     blood glucose calibration (NO BRAND SPECIFIED) solution     blood glucose monitoring (NO BRAND SPECIFIED) meter device kit     blood glucose monitoring (SOFTCLIX) lancets     budesonide-formoterol (SYMBICORT) 160-4.5 MCG/ACT Inhaler     carvedilol (COREG) 12.5 MG tablet     cetirizine (ZYRTEC) 10 MG tablet     Continuous Blood Gluc  (FREESTYLE SHEBA 14 DAY READER) ROSE     Continuous Blood Gluc Sensor (FREESTYLE SHEBA 14 DAY SENSOR) MISC     Continuous Blood Gluc Sensor (FREESTYLE SHEBA 14 DAY SENSOR) MISC     [employer] GENUINE ASPIRIN 325 MG tablet     diclofenac (VOLTAREN) 1 % topical gel     dulaglutide (TRULICITY) 0.75 MG/0.5ML pen     DULoxetine (CYMBALTA) 30 MG capsule     fluticasone (FLONASE) 50 MCG/ACT nasal spray     furosemide (LASIX) 20 MG tablet     glipiZIDE (GLUCOTROL XL) 5 MG 24 hr tablet     insulin glargine (LANTUS PEN) 100 UNIT/ML pen     insulin pen needle (32G X 4 MM) 32G X 4 MM miscellaneous     ipratropium - albuterol 0.5 mg/2.5 mg/3 mL (DUONEB) 0.5-2.5 (3) MG/3ML neb solution     isosorbide mononitrate (IMDUR) 30 MG 24 hr tablet     lidocaine (LIDODERM) 5 % patch     metaxalone (SKELAXIN) 800 MG tablet     montelukast (SINGULAIR) 10 MG tablet     naloxone (NARCAN) 4 MG/0.1ML nasal spray     nitroGLYcerin (NITROSTAT) 0.4 MG sublingual tablet     nortriptyline (PAMELOR) 10 MG capsule     omeprazole 20 MG tablet     ondansetron (ZOFRAN) 8 MG tablet     oxyCODONE-acetaminophen (PERCOCET)  MG per tablet     polyethylene glycol (MIRALAX) 17 GM/Dose powder     rosuvastatin (CRESTOR) 20 MG tablet     sennosides (SENOKOT) 8.6 MG tablet     sulfamethoxazole-trimethoprim (BACTRIM DS) 800-160 MG tablet      urea (DERMAL THERAPY FINGER CARE) 20 % external lotion     valsartan (DIOVAN) 40 MG tablet       PHYSICAL EXAM:   BP (!) 161/80   Pulse 79   Temp 98.2  F (36.8  C) (Oral)   Resp 30   LMP  (LMP Unknown)   SpO2 100%    GEN: Alert, oriented x3, communicative and in NAD.  MARIAH: AT, anicteric, OP without erythema, exudate, or ulcers.    NECK: Supple.    LYMPH: No LAD noted.  HRT: RRR  LUNGS: CTA  ABD:  ND, +BS, no guarding or pain to palpation, no rebound, no HSM.  SKIN: No rash, jaundice or spider angiomata  MSKL: LE free of edema, strength 5/5 all 4 extrems  NEURO: CN grossly intact, sensation intact to light touch, toes downgoing.     ADDITIONAL DATA:   I reviewed the patient's new clinical lab test results.   Recent Labs   Lab Test 05/05/23  1003 02/22/23  1608 10/07/22  0612 10/06/22  0918 07/17/22  1520 05/24/22  1022 04/04/22  0621 04/03/22  1436   WBC 7.4 5.6 7.3 7.5   < > 6.9   < > 5.8   HGB 11.7 11.7 10.5* 11.0*   < > 12.4   < > 12.1   MCV 87 87 87 87   < > 85   < > 84    254 251 287   < >  --    < > 237   INR  --   --   --  1.04  --  0.95  --  0.95    < > = values in this interval not displayed.     Recent Labs   Lab Test 05/05/23  1003 02/22/23  1608 01/10/23  1533   POTASSIUM 4.2 4.7 4.2   CHLORIDE 102 107 106   CO2 22 26 20*   BUN 21.7* 21.0* 18.6   CR 1.23* 0.93 0.73   ANIONGAP 14 7 15     Recent Labs   Lab Test 05/05/23  1003 04/26/23  0912 02/22/23  1608 09/26/22  0511 05/24/22  0955 05/18/22  1302   ALBUMIN 4.2  --  4.0 3.8  --   --    BILITOTAL 0.4  --  <0.2 <0.2  --   --    ALT 22  --  19 22  --   --    AST 16  --  17 18  --   --    PROTEIN  --  30*  --   --  30* 20*   LIPASE 13  --   --   --   --   --         IMAGING:  I reviewed the patient's new imaging results.

## 2023-05-05 NOTE — ED PROVIDER NOTES
EMERGENCY DEPARTMENT ENCOUNTER      NAME: Sarah Rahman  AGE: 57 year old female  YOB: 1965  MRN: [id number]  EVALUATION DATE & TIME: 5/5/2023  9:37 AM    PCP: Kike David    ED PROVIDER: Diomedes Mann M.D.      Chief Complaint   Patient presents with     Shortness of Breath     Chest Pain         FINAL IMPRESSION:  Atypical chest pain  Syncope  Abdominal pain  Hematemesis  Passive suicidal ideation    ED COURSE & MEDICAL DECISION MAKING:    Pertinent Labs & Imaging studies reviewed. (See chart for details)  57 year old female presents to the Emergency Department for evaluation of chest pain, shortness of breath, weakness.  EMS reports they were called to scene for patient respiratory distress.  They report they found the patient on her knees somewhat tripoding.  Patient given a aspirin and nitroglycerin in route as patient reports prior bypass history and pain feeling similar to her heart attack.  EKG in route unremarkable.  Patient reports a tightness in her chest with radiates into her back.  Paramedics also report patient with use of inhalers.  On exam patient is quite anxious and uncomfortable.  Vital signs significant for tachycardia otherwise normal.  Lungs clear.  Cardiac exam unremarkable.  Mild epigastric tenderness.  Given presentation could be asthma exacerbation versus ACS.  Will obtain EKG and baseline blood work.  Chest x-ray obtained to assess for infiltrative disease.  Patient has received nitroglycerin and aspirin in route without obvious changes.  Pepcid be given for symptomatically for given epigastric discomfort radiation into back in 6 concerns of gastritis/ulcer.  Patient denies any alcohol use or cigarette smoking.  Pancreatitis much less likely.  Patient appears non toxic with stable vitals signs.   9:38 AM  I met with the patient for the initial interview and physical examination. Discussed plan for treatment and workup in the ED.    10:28 AM Rechecked and updated the  "patent.  EKG unremarkable.  Patient with considerable continued anxiety and discomfort.  Intravenous morphine ordered for symptomatic relief.  Patient at this point reports that she \"fainted\".  States she was going out to take the dog to the groomer's and the next thing she knew she was on the ground and people were helping her to the nearby bench.    10:35 AM.  Laboratory evaluation with minimal renal insufficiency creatinine 1.23.  Mild hyperglycemia glucose of 279.  Bicarb normal.  No indications of DKA.  Liver function test normal.  No evidence of biliary disease.  CBC unremarkable.  Lactic acid normal.  Awaiting troponin and BNP.  11:19 AM.  Troponin minimally elevated at 17.  BNP normal.  Chest x-ray unremarkable.  No findings to explain patient's symptomatology.  Given discomfort we will proceed with CTA of the abdomen and chest.  12:25 AM.  Patient informed plan for CTA.  Patient extremely anxious.  Now reporting she had a separate fainting episode yesterday when she was found unresponsive in the hallway at the apartment.  EMS was called and responded but patient declined transport.  Now reporting excessive tingling and weakness in her lower extremities.  Has surgery scheduled for fusion in the near future.  Patient extremely worried that \"something dangerous is going on\".  Patient reports if she does not get answers \"I feel like killing myself\".  1:19 PM.  CTA unremarkable.  No findings to account for patient's symptomatology.  Findings discussed with patient.  Given her reports of hematemesis, syncope, chest pain we will proceed with hospitalization.  Call placed to admitting physician.  Patient now resting comfortably but reporting continued severe pain.  1:25 PM spoke with Dr. Saravia, hospitalist.  The need for a psychiatric evaluation after medical clearance stressed due to patient comment about \"I feel like killing myself\".    At the conclusion of the encounter I discussed the results of all of the " tests and the disposition. The questions were answered and return precautions provided. The patient or family acknowledged understanding and was agreeable with the care plan.   Medical Decision Making    History:    Supplemental history from: Documented in chart, if applicable and EMS    External Record(s) reviewed: Documented in chart, if applicable.    Work Up:    Chart documentation includes differential considered and any EKGs or imaging independently interpreted by provider, where specified.    In additional to work up documented, I considered the following work up: Documented in chart, if applicable.    External consultation:    Discussion of management with another provider: Hospitalist    Complicating factors:    Care impacted by chronic illness: Coronary artery disease.  Polysubstance abuse, schizoaffective disorder    Care affected by social determinants of health: N/A    Disposition considerations: Admit.  Patient represents a critical care situation.  Approximately 40 minutes spent directly involved in patient's care independent of any procedures      MEDICATIONS GIVEN IN THE EMERGENCY:  Medications   ipratropium - albuterol 0.5 mg/2.5 mg/3 mL (DUONEB) neb solution 3 mL (3 mLs Nebulization $Given 5/5/23 0914)   famotidine (PEPCID) injection 20 mg (20 mg Intravenous $Given 5/5/23 1004)   morphine (PF) injection 4 mg (4 mg Intravenous $Given 5/5/23 1024)   morphine (PF) injection 4 mg (4 mg Intravenous $Given 5/5/23 1128)   iopamidol (ISOVUE-370) solution 90 mL (90 mLs Intravenous $Given 5/5/23 1219)       NEW PRESCRIPTIONS STARTED AT TODAY'S ER VISIT  New Prescriptions    No medications on file          =================================================================    HPI    Patient information was obtained from: Patient    Use of Intrepreter: N/A       Sarah E Murphy is a 57 year old female with a pertient medical history of HTN, DM type II, COPD,  hyperlipidemia, schizoaffective disorder, CHF,  tobacco abuse, and S/P CABG X2   who presents to the ED for evaluation of chest pain and shortness of breath.    Patient was walking outside of her house when she says she started having chest pain, shortness of breath, and leg weakness.  Patient says that pain feels similar to her previous heart attack. Patient also reports that yesterday she felt nausea and had a couple episodes of emesis. She still has nausea and vomiting currently. Patient says that she takes baby Asprin. Patient denies drug and alcohol use.       REVIEW OF SYSTEMS   Constitutional:  Denies fever, chills  Respiratory:  Denies productive cough. Positive for shortness of breath.  Cardiovascular:  Positive for chest pain.  GI:  Denies abdominal pain, nausea, vomiting, or change in bowel or bladder habits   Musculoskeletal:  Denies any new muscle/joint swelling  Skin:  Denies rash   Neurologic:  Positive for leg weakness.   All systems negative except as marked.     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 Hamm at [facility]     Schizoaffective disorder (H)      Sleep difficulties 7/17/2022       PAST SURGICAL HISTORY:  Past Surgical History:   Procedure Laterality Date     BYPASS GRAFT ARTERY CORONARY  01/01/2009    x2     CAROTID ENDARTERECTOMY Left 12/2021     CORONARY STENT PLACEMENT       CV CORONARY ANGIOGRAM N/A 06/02/2021    Procedure: Coronary Angiogram;  Surgeon: Juventino Rivera MD;  Location: [facility] Cardiac Cath Lab;  Service: Cardiology      HYSTERECTOMY TOTAL ABDOMINAL       HYSTERECTOMY TOTAL ABDOMINAL, BILATERAL SALPINGO-OOPHORECTOMY, COMBINED       IR TRANSLAMINAR EPIDURAL LUMBAR INJ INCL IMAGING  09/27/2022     LUMBAR DISCECTOMY Right 06/13/2019    L5-S1 - Dr. Hamm     NASAL FRACTURE SURGERY       ORIF ULNAR / RADIAL SHAFT FRACTURE Right          CURRENT MEDICATIONS:    No current facility-administered medications for this encounter.    Current Outpatient Medications:      acetaminophen (TYLENOL) 325 MG tablet, Take 650 mg by mouth every 8 hours as needed for mild pain, Disp: , Rfl:      albuterol (PROAIR HFA) 108 (90 BASE) MCG/ACT inhaler, Inhale 1-2 puffs into the lungs every 4 hours as needed, Disp: , Rfl:      alcohol swab prep pads, Use to swab area of injection/zac as directed., Disp: 100 each, Rfl: 6     Alcohol Swabs PADS, Use to swab the area of the injection or zac as directed Per insurance coverage, Disp: 100 each, Rfl: 0     amitriptyline (ELAVIL) 25 MG tablet, Take 1-2 tablets (25-50 mg) by mouth At Bedtime For nerve pain., Disp: 90 tablet, Rfl: 3     aspirin (ASA) 81 MG EC tablet, Take 1 tablet (81 mg) by mouth daily, Disp: 90 tablet, Rfl: 0     blood glucose (ACCU-CHEK DARRIN PLUS) test strip, Use to test blood sugar 3-4 times daily or as directed., Disp: 100 strip, Rfl: 0     blood glucose (ACCU-CHEK SOFTCLIX) lancing device, Lancing device to be used with lancets., Disp: 1 each, Rfl: 0     blood glucose (NO BRAND SPECIFIED) lancets standard, To use to test glucose level in the blood Use to test blood sugar  4  times daily as directed. To accompany glucose monitor brands per insurance coverage., Disp: 100 each, Rfl: 0     blood glucose (NO BRAND SPECIFIED) test strip, To use to test glucose level in the blood Use to test blood sugar  4 times daily as directed. To accompany glucose monitor brands per insurance coverage., Disp: 100 strip, Rfl: 0     blood glucose calibration (NO BRAND SPECIFIED) solution, Used to calibrate the  blood glucose monitor as needed and as directed.  To accompany  blood glucose brands per insurance coverage, Disp: 2 each, Rfl: 0     blood glucose monitoring (NO BRAND SPECIFIED) meter device kit, Use as directed Per insurance coverage, Disp: 1 kit, Rfl: 0     blood glucose monitoring (SOFTCLIX) lancets, Use to test blood sugar 3-4 times daily., Disp: 100 each, Rfl: 1     budesonide-formoterol (SYMBICORT) 160-4.5 MCG/ACT Inhaler, Inhale 2 puffs into the lungs 2 times daily, Disp: , Rfl:      carvedilol (COREG) 12.5 MG tablet, Take 1 tablet (12.5 mg) by mouth 2 times daily (with meals), Disp: 60 tablet, Rfl: 0     cetirizine (ZYRTEC) 10 MG tablet, Take 1 tablet (10 mg) by mouth daily, Disp: 90 tablet, Rfl: 3     Continuous Blood Gluc  (FREESTYLE SHEBA 14 DAY READER) ROSE, Use to read blood sugars as per 's instructions., Disp: 1 each, Rfl: 0     Continuous Blood Gluc Sensor (FREESTYLE SHEBA 14 DAY SENSOR) MISC, Change every 14 days., Disp: 2 each, Rfl: 11     Continuous Blood Gluc Sensor (FREESTYLE SHEBA 14 DAY SENSOR) MISC, 1 each every 14 days Use 1 Sensor every 14 days. Use to read blood sugars per 's instructions., Disp: 2 each, Rfl: 5     [employer] GENUINE ASPIRIN 325 MG tablet, TAKE 1 TABLET BY MOUTH EVERY DAY FOR DISEASE INVOLVING LIPID DEPOSITS IN THE ARTERIES, Disp: , Rfl:      diclofenac (VOLTAREN) 1 % topical gel, Apply 2 g topically 3 times daily as needed for moderate pain (4-6) (feet), Disp: 300 g, Rfl: 3     dulaglutide (TRULICITY) 0.75 MG/0.5ML pen, Inject 0.75 mg Subcutaneous every 7 days, Disp: 6 mL, Rfl: 3     DULoxetine (CYMBALTA) 30 MG capsule, Take 30 mg by mouth 2 times daily, Disp: , Rfl:      fluticasone (FLONASE) 50 MCG/ACT nasal spray, Spray 1 spray into both nostrils daily, Disp: , Rfl:      furosemide (LASIX) 20 MG tablet, Take 20 mg by mouth daily as needed (swelling), Disp: , Rfl:      glipiZIDE (GLUCOTROL XL) 5 MG 24 hr tablet, Take 2 tablets (10 mg) by mouth  daily for 180 days, Disp: 180 tablet, Rfl: 1     insulin glargine (LANTUS PEN) 100 UNIT/ML pen, Inject 25 Units Subcutaneous 2 times daily, Disp: 30 mL, Rfl: 3     insulin pen needle (32G X 4 MM) 32G X 4 MM miscellaneous, Use 1 pen needles daily or as directed., Disp: 200 each, Rfl: 5     ipratropium - albuterol 0.5 mg/2.5 mg/3 mL (DUONEB) 0.5-2.5 (3) MG/3ML neb solution, Take 1 vial by nebulization every 6 hours as needed for shortness of breath / dyspnea or wheezing, Disp: , Rfl:      isosorbide mononitrate (IMDUR) 30 MG 24 hr tablet, Take 1 tablet (30 mg) by mouth daily, Disp: 30 tablet, Rfl: 0     lidocaine (LIDODERM) 5 % patch, Place 1 patch onto the skin every 24 hours To prevent lidocaine toxicity, patient should be patch free for 12 hrs daily. BACK, Disp: , Rfl:      metaxalone (SKELAXIN) 800 MG tablet, Take 0.5-1 tablets (400-800 mg) by mouth 3 times daily as needed for muscle spasms or moderate pain (4-6), Disp: 60 tablet, Rfl: 3     montelukast (SINGULAIR) 10 MG tablet, Take 10 mg by mouth At Bedtime, Disp: , Rfl:      naloxone (NARCAN) 4 MG/0.1ML nasal spray, Spray 4 mg into one nostril alternating nostrils as needed for opioid reversal every 2-3 minutes until assistance arrives, Disp: , Rfl:      nitroGLYcerin (NITROSTAT) 0.4 MG sublingual tablet, Place 1 tablet (0.4 mg) under the tongue every 5 minutes as needed for chest pain For chest pain place 1 tablet under the tongue every 5 minutes for 3 doses. If symptoms persist 5 minutes after 1st dose call 911., Disp: 25 tablet, Rfl: 3     nortriptyline (PAMELOR) 10 MG capsule, Take 10 mg by mouth At Bedtime, Disp: , Rfl:      omeprazole 20 MG tablet, Take 20 mg by mouth daily, Disp: , Rfl:      ondansetron (ZOFRAN) 8 MG tablet, Take 8 mg by mouth every 8 hours as needed, Disp: , Rfl:      oxyCODONE-acetaminophen (PERCOCET)  MG per tablet, Take 1 tablet by mouth every 6 hours as needed for severe pain, Disp: , Rfl:      polyethylene glycol (MIRALAX) 17  "GM/Dose powder, Take 17 g by mouth daily, Disp: 510 g, Rfl: 0     rosuvastatin (CRESTOR) 20 MG tablet, Take 2 tablets (40 mg) by mouth daily, Disp: 30 tablet, Rfl: 0     sennosides (SENOKOT) 8.6 MG tablet, Take 2 tablets by mouth 2 times daily, Disp: 60 tablet, Rfl: 0     sulfamethoxazole-trimethoprim (BACTRIM DS) 800-160 MG tablet, Take 1 tablet by mouth 2 times daily, Disp: 10 tablet, Rfl: 0     urea (DERMAL THERAPY FINGER CARE) 20 % external lotion, Externally apply topically 2 times daily as needed Feet, Disp: , Rfl:      valsartan (DIOVAN) 40 MG tablet, Take 1 tablet (40 mg) by mouth daily, Disp: 90 tablet, Rfl: 3    ALLERGIES:  Allergies   Allergen Reactions     Haloperidol Unknown     Patient states it stops her heart       Meperidine And Related Angioedema, Difficulty breathing, Other (See Comments), Rash and Shortness Of Breath     Throat closes  Other reaction(s): Breathing Difficulty  Other reaction(s): Breathing Difficulty       Phenothiazines Other (See Comments)     Other reaction(s): CARDIAC DISTURBANCES  Patient states it stops her heart  \"I swell up\"  \"stopped by heart\"  \"I swell up\"  \"I swell up\"       Tramadol Other (See Comments)     Other reaction(s): Angioedema  Throat itch       Haloperidol And Related Angioedema     Januvia [Sitagliptin] Other (See Comments)     Swelling in the neck      Latex Itching     Lisinopril Other (See Comments)     ACE cough  Itchy throat  Throat itches  Itchy throat       Penicillins Swelling     Hydroxyzine Other (See Comments) and Rash     Tongue swelling  Tongue swelling  Tongue swelling         FAMILY HISTORY:  Family History   Problem Relation Age of Onset     Heart Disease Mother      Heart Disease Father      Heart Disease Sister      Diabetes Brother      Heart Disease Sister        SOCIAL HISTORY:   Social History     Socioeconomic History     Marital status: Single   Tobacco Use     Smoking status: Every Day     Packs/day: 0.50     Types: Cigarettes     " Smokeless tobacco: Never   Vaping Use     Vaping status: Never Used     Passive vaping exposure: Yes   Substance and Sexual Activity     Alcohol use: Not Currently     Comment: Alcoholic Drinks/day: occ     Drug use: No     Sexual activity: Not Currently   Social History Narrative    Lives alone in a condo.          On disability.  Three living children.         VITALS:  Patient Vitals for the past 24 hrs:   BP Temp Temp src Pulse Resp SpO2   05/05/23 1236 (!) 161/80 -- -- 79 30 100 %   05/05/23 1114 136/82 -- -- 93 (!) 42 100 %   05/05/23 1057 (!) 150/77 -- -- 89 23 100 %   05/05/23 1047 133/75 -- -- 86 18 100 %   05/05/23 1018 127/75 -- -- 102 (!) 41 98 %   05/05/23 1003 129/75 -- -- 93 (!) 42 100 %   05/05/23 0949 137/76 -- -- 106 (!) 47 --   05/05/23 0939 -- 98.2  F (36.8  C) Oral 112 (!) 39 100 %        PHYSICAL EXAM    Constitutional:  Awake, alert, anxious  HENT:  Normocephalic, Atraumatic. Bilateral external ears normal. Oropharynx moist. Nose normal. Neck- Normal range of motion with no guarding, No midline cervical tenderness, Supple, No stridor.   Eyes:  PERRL, EOMI with no signs of entrapment, Conjunctiva normal, No discharge.   Respiratory:  Shallow diminished breath sounds.   Cardiovascular:  Normal heart rate, Normal rhythm, No appreciable rubs or gallops.   GI:  Moderate epigastric tenderness.   Musculoskeletal:  Intact distal pulses, No edema. Good range of motion in all major joints. No tenderness to palpation or major deformities noted.  Integument:  Warm, Dry, No erythema, No rash.   Neurologic:  Alert & oriented, Normal motor function, Normal sensory function, No focal deficits noted.   Psychiatric:  Affect normal, Judgment normal, Mood normal.     LAB:  All pertinent labs reviewed and interpreted.  Results for orders placed or performed during the hospital encounter of 05/05/23   XR Chest Port 1 View     Status: None    Narrative    EXAM: XR CHEST PORT 1 VIEW  LOCATION: [facility]  DATE/TIME: 5/5/2023 10:35 AM CDT    INDICATION: Chest pain, dyspnea  COMPARISON:  9/24/2022 and 7/17/2022.      Impression    IMPRESSION: No pleural fluid or pneumothorax. No airspace disease or edema. Normal size of the heart. Previous median sternotomy. Neck surgical clips noted.   CTA Chest Abdomen Pelvis w Contrast     Status: None    Narrative    EXAM: CTA CHEST ABDOMEN PELVIS W CONTRAST  LOCATION: [facility]  DATE/TIME: 5/5/2023 12:19 PM CDT    INDICATION: Chest pain with radiation in the back  COMPARISON: None.  TECHNIQUE: CT angiogram chest abdomen pelvis during arterial phase of injection of IV contrast. 2D and 3D MIP reconstructions were performed by the CT technologist. Dose reduction techniques were used.     Without and with imaging through the chest.  CONTRAST: IsoVue 370 90ml    FINDINGS:   CT ANGIOGRAM CHEST, ABDOMEN, AND PELVIS: Moderate atheromatous plaque throughout the thoracic and abdominal aorta but no aneurysms and no dissections. Widely patent great vessels off the aortic arch. Patent great vessels off the abdominal aorta with mild   narrowing at the origin of the SM a widely patent DORIAN and celiac trunk. Significant right renal artery stenosis. 2 patent left renal arteries    Widely patent iliac arteries.    Pulmonary arteries are well seen and are negative for pulmonary emboli.    LUNGS AND PLEURA: Normal.    MEDIASTINUM/AXILLAE: Normal.    CORONARY ARTERY CALCIFICATION: Previous intervention (stents or CABG).    HEPATOBILIARY: Normal.    PANCREAS: Normal.    SPLEEN: Normal.    ADRENAL GLANDS: Normal.    KIDNEYS/BLADDER: Normal.    BOWEL: Scattered diverticula in the colon but nothing for diverticulitis. Moderate amount of stool.    LYMPH NODES: Normal.    PELVIC ORGANS: Normal.    MUSCULOSKELETAL: Normal.      Impression    IMPRESSION:  1.  No dissections, aneurysms, or pulmonary emboli.    2.  Moderate amount of plaque throughout the thoracic and  abdominal aorta.    3.  High-grade stenosis right renal artery. Right kidney measures 9.0 cm left 10.0 cm.    4.  No other significant findings in the chest, abdomen, and pelvis.     Comprehensive metabolic panel     Status: Abnormal   Result Value Ref Range    Sodium 138 136 - 145 mmol/L    Potassium 4.2 3.4 - 5.3 mmol/L    Chloride 102 98 - 107 mmol/L    Carbon Dioxide (CO2) 22 22 - 29 mmol/L    Anion Gap 14 7 - 15 mmol/L    Urea Nitrogen 21.7 (H) 6.0 - 20.0 mg/dL    Creatinine 1.23 (H) 0.51 - 0.95 mg/dL    Calcium 9.3 8.6 - 10.0 mg/dL    Glucose 279 (H) 70 - 99 mg/dL    Alkaline Phosphatase 107 (H) 35 - 104 U/L    AST 16 10 - 35 U/L    ALT 22 10 - 35 U/L    Protein Total 7.7 6.4 - 8.3 g/dL    Albumin 4.2 3.5 - 5.2 g/dL    Bilirubin Total 0.4 <=1.2 mg/dL    GFR Estimate 51 (L) >60 mL/min/1.73m2   Lipase     Status: Normal   Result Value Ref Range    Lipase 13 13 - 60 U/L   Lactic acid whole blood     Status: Normal   Result Value Ref Range    Lactic Acid 1.8 0.7 - 2.0 mmol/L   Troponin T, High Sensitivity     Status: Abnormal   Result Value Ref Range    Troponin T, High Sensitivity 17 (H) <=14 ng/L   Magnesium     Status: Normal   Result Value Ref Range    Magnesium 2.1 1.7 - 2.3 mg/dL   Nt probnp inpatient (BNP)     Status: Normal   Result Value Ref Range    N terminal Pro BNP Inpatient 285 0 - 900 pg/mL   CBC (+ platelets, no diff)     Status: Abnormal   Result Value Ref Range    WBC Count 7.4 4.0 - 11.0 10e3/uL    RBC Count 4.33 3.80 - 5.20 10e6/uL    Hemoglobin 11.7 11.7 - 15.7 g/dL    Hematocrit 37.8 35.0 - 47.0 %    MCV 87 78 - 100 fL    MCH 27.0 26.5 - 33.0 pg    MCHC 31.0 (L) 31.5 - 36.5 g/dL    RDW 13.3 10.0 - 15.0 %    Platelet Count 319 150 - 450 10e3/uL       RADIOLOGY:  Reviewed all pertinent imaging. Please see official radiology report.  CTA Chest Abdomen Pelvis w Contrast   Final Result   IMPRESSION:   1.  No dissections, aneurysms, or pulmonary emboli.      2.  Moderate amount of plaque throughout  the thoracic and abdominal aorta.      3.  High-grade stenosis right renal artery. Right kidney measures 9.0 cm left 10.0 cm.      4.  No other significant findings in the chest, abdomen, and pelvis.         XR Chest Port 1 View   Final Result   IMPRESSION: No pleural fluid or pneumothorax. No airspace disease or edema. Normal size of the heart. Previous median sternotomy. Neck surgical clips noted.          EKG:    Normal sinus rhythm.  Rate of 95.  Considerable baseline artifact.  Normal QRS.  Normal ST segments.  Slightly prolonged QT at 370 ms.  Essentially unchanged from October 6, 2022  I have independently reviewed and interpreted the EKG(s) documented above.          I, Rom Rubio, am serving as a scribe to document services personally performed by Diomedes Mann MD, based on my observation and the provider's statements to me. I, Diomedes Mann MD attest that Rom Rubio is acting in a scribe capacity, has observed my performance of the services and has documented them in accordance with my direction.    Diomedes Mann M.D.  Emergency Medicine  [facility] EMERGENCY DEPARTMENT       Diomedes Mann MD  05/05/23 132       Diomedes Mann MD  05/05/23 7002

## 2023-05-05 NOTE — PHARMACY-ADMISSION MEDICATION HISTORY
Pharmacist Admission Medication History    Admission medication history is complete. The information provided in this note is only as accurate as the sources available at the time of the update.    Medication reconciliation/reorder completed by provider prior to medication history? No    Information Source(s): Patient, Clinic records and CareEverywhere/SureScripts via in-person    Pertinent Information: Pt requesting hydrocortisone cream for foot    Changes made to PTA medication list:    Added: methocarbamol, suboxone, topiramate, hc cream    Deleted: stool softeners, bactrim, urea cream, crestor, metaxalone, montelukast, amitriptyline, imdur, furosemide, aspirin 325 mg. None of these were filled anytime recently, weren't on Care Everywhere, Pt didn't think taking.     Changed: incr duloxetine to 60 mg BID    Medication Affordability:       Allergies reviewed with patient and updates made in EHR: yes    Medication History Completed By: Allison Fong [facility] 5/5/2023 2:51 PM    Prior to Admission medications    Medication Sig Last Dose Taking? Auth Provider Long Term End Date   acetaminophen (TYLENOL) 325 MG tablet Take 650 mg by mouth every 8 hours as needed for mild pain Unknown at prn Yes Unknown, Entered By History     albuterol (PROAIR HFA) 108 (90 BASE) MCG/ACT inhaler Inhale 1-2 puffs into the lungs every 4 hours as needed Unknown at prn Yes Reported, Patient     aspirin (ASA) 81 MG EC tablet Take 1 tablet (81 mg) by mouth daily 5/4/2023 at am Yes Sunshine Fowler MD     buprenorphine-naloxone (SUBOXONE) 2-0.5 MG SUBL sublingual tablet Place 1 tablet under the tongue 3 times daily 5/4/2023 at pm Yes Unknown, Entered By History     carvedilol (COREG) 12.5 MG tablet Take 1 tablet (12.5 mg) by mouth 2 times daily (with meals) Unknown at pt says taking, last filled in September Yes Sunshine Fowler MD Yes    cetirizine (ZYRTEC) 10 MG tablet Take 1 tablet (10 mg) by mouth daily 5/4/2023 at am Yes Kike David,  MD  1/17/24   diclofenac (VOLTAREN) 1 % topical gel Apply 2 g topically 3 times daily as needed for moderate pain (4-6) (feet) Unknown at prn Yes Kike David MD     dulaglutide (TRULICITY) 0.75 MG/0.5ML pen Inject 0.75 mg Subcutaneous every 7 days  Yes Rosario Byrne NP     DULoxetine HCl 60 MG CSDR Take 60 mg by mouth 2 times daily 5/4/2023 at pm Yes Unknown, Entered By History Yes    fluticasone (FLONASE) 50 MCG/ACT nasal spray Spray 1 spray into both nostrils daily as needed for allergies Unknown at prn Yes Unknown, Entered By History     glipiZIDE (GLUCOTROL XL) 5 MG 24 hr tablet Take 2 tablets (10 mg) by mouth daily for 180 days 5/4/2023 at am Yes Kike David MD Yes 9/9/23   hydrocortisone (CORTAID) 1 % external cream Apply topically 2 times daily Apply to foot 5/4/2023 at pm Yes Unknown, Entered By History     insulin glargine (LANTUS PEN) 100 UNIT/ML pen Inject 25 Units Subcutaneous 2 times daily 5/4/2023 at pm Yes Rosario Byrne NP Yes    ipratropium - albuterol 0.5 mg/2.5 mg/3 mL (DUONEB) 0.5-2.5 (3) MG/3ML neb solution Take 1 vial by nebulization every 6 hours as needed for shortness of breath / dyspnea or wheezing Unknown at prn Yes Unknown, Entered By History Yes    lidocaine (LIDODERM) 5 % patch Place 1 patch onto the skin every 24 hours To prevent lidocaine toxicity, patient should be patch free for 12 hrs daily. BACK 5/4/2023 at am Yes Unknown, Entered By History     methocarbamol (ROBAXIN) 750 MG tablet Take 750 mg by mouth every 6 hours 5/4/2023 at am Yes Unknown, Entered By History     naloxone (NARCAN) 4 MG/0.1ML nasal spray Spray 4 mg into one nostril alternating nostrils as needed for opioid reversal every 2-3 minutes until assistance arrives Unknown at prn Yes Unknown, Entered By History     nitroGLYcerin (NITROSTAT) 0.4 MG sublingual tablet Place 1 tablet (0.4 mg) under the tongue every 5 minutes as needed for chest pain For chest pain place 1 tablet under the tongue every 5 minutes  for 3 doses. If symptoms persist 5 minutes after 1st dose call 911. Unknown at prn Yes Kike David MD Yes    nortriptyline (PAMELOR) 10 MG capsule Take 10 mg by mouth At Bedtime 5/4/2023 at pm Yes Unknown, Entered By History Yes    omeprazole 20 MG tablet Take 20 mg by mouth daily 5/4/2023 at am Yes Unknown, Entered By History     ondansetron (ZOFRAN) 8 MG tablet Take 8 mg by mouth every 8 hours as needed Unknown at prn Yes Reported, Patient     oxyCODONE-acetaminophen (PERCOCET)  MG per tablet Take 1 tablet by mouth every 6 hours as needed for pain 5/4/2023 at am Yes Unknown, Entered By History No    topiramate (TOPAMAX) 50 MG tablet Take 100 mg by mouth 2 times daily 5/4/2023 at am Yes Unknown, Entered By History Yes    valsartan (DIOVAN) 40 MG tablet Take 1 tablet (40 mg) by mouth daily 5/4/2023 at am Yes Kike David MD Yes    amLODIPine (NORVASC) 5 MG tablet Take 1 tablet (5 mg) by mouth 2 times daily   Montse Nieto MD Yes 10/8/22   diclofenac (VOLTAREN) 50 MG EC tablet Take 1 tablet (50 mg) by mouth 3 times daily for 10 days   Mara Scott NP Yes 7/20/22   pregabalin (LYRICA) 150 MG capsule Take 150 mg by mouth 3 times daily   Unknown, Entered By History Yes 9/29/22

## 2023-05-05 NOTE — CONSULTS
Thank you, Dr. Saravia, for asking the [facility] team to see Ms. Sarah Rahman for evaluation of chest pain.      Assessment/Recommendations   Assessment/Plan:  1.  Coronary artery disease s/p 2V CABG LIMA to LAD, SVG to D1 occluded, chest pain: The patient complains of intermittent chest pain from 930 to now, severe pain.  Her ECG has no ischemic ST-T change.  Her high-sensitivity troponin is at borderline.  The patient states that she had episode of hematemesis this morning.  No anticoagulation heparin infusion is indicated at this time.  Serial troponin, consider ischemic evaluation on Monday.  Continue her home medication aspirin, carvedilol.  Nitro patch as needed.  The patient is given 1 dose of morphine 2 mg IM.    2.  Syncope: The etiology is not clear.  Her ECG is normal.  Telemetry monitor.  Ischemic evaluation as mentioned above.    3.  Benign essential hypertension: Continue carvedilol 12.5 mg twice a day, Diovan 40 mg daily.    4.  Dyslipidemia: Check lipid profile, start the statin.    5.  Type 2 diabetes, history of for stroke, schizoaffective disorder, possible GI bleeding: Please see primary and other team management for details.       History of Present Illness/Subjective    It is my pleasure to see Sarah Rahman at [facility] for evaluation of chest pain.  Sarah Rahman is a 57 year old female with a medical history of coronary artery disease status post 2 vessel CABG in 2009 LIMA to LAD, SVG to D1 occluded per coronary angiogram in 2019, benign essential hypertension, dyslipidemia, type 2 diabetes, CVA, schizoaffective disorder, GERD.    The patient states that she passed out 1 time yesterday.  She went off the side and the past after again today.  She had some vomiting and threw up the blood this morning.  She complains of chest pain since 930 this morning.  She described her chest pain as located left anterior chest,  sharp pain, 10/10 in intensity, radiated to left arm, similar to chest pain prior to CABG.  She also complains of shortness of breath, nausea, vomiting.  She has no fever or chills.  She has no palpitations, orthopnea, PND or leg edema.    When I saw her in her room, she complains of severe chest pain 10/10 intensity.  1 nitro slightly improved her chest pain.  ECG when she had chest pain showed normal sinus rhythm, normal ECG, no ischemic ST-T change.  Her high-sensitivity troponin is 15, 17.  Her proBNP is normal.       Physical Examination Review of Systems   /69 (BP Location: Left arm)   Pulse 90   Temp 97.9  F (36.6  C) (Oral)   Resp 28   Wt 89.6 kg (197 lb 8.5 oz)   LMP  (LMP Unknown)   SpO2 98%   BMI 30.94 kg/m    Body mass index is 30.94 kg/m .  Wt Readings from Last 3 Encounters:   05/05/23 89.6 kg (197 lb 8.5 oz)   02/22/23 89.4 kg (197 lb)   01/10/23 88 kg (194 lb)     No intake or output data in the 24 hours ending 05/05/23 1710    General Appearance:   Awake, Alert, No acute distress.   HEENT:  No scleral icterus; the mucous membranes were moist.   Neck: No cervical bruits. No JVD. No thyromegaly.    Chest: The spine was straight. The chest was symmetric.   Lungs:   Respirations unlabored; Lungs are clear to auscultation. No crackles.  No wheezing.   Cardiovascular:   Regular rhythm and rate, normal first and second heart sounds with no murmurs. No rubs or gallops.    Abdomen:  Soft. No tenderness. Bowels sounds are present   Extremities: Equal tibial pulses. No leg edema.   Skin: No rashes. Warm, Dry.   Musculoskeletal: No tenderness.   Neurologic: Oriented x 3. Mood and affect are appropriate.     A 12 point comprehensive review of systems was negative except as noted.        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 Hamm at [facility]     Schizoaffective disorder (H)      Sleep difficulties 7/17/2022    Past Surgical History:   Procedure Laterality Date     BYPASS GRAFT ARTERY CORONARY  01/01/2009    x2     CAROTID ENDARTERECTOMY Left 12/2021     CORONARY STENT PLACEMENT       CV CORONARY ANGIOGRAM N/A 06/02/2021    Procedure: Coronary Angiogram;  Surgeon: Juventino Rivera MD;  Location: [facility];  Service: Cardiology     HYSTERECTOMY TOTAL ABDOMINAL       HYSTERECTOMY TOTAL ABDOMINAL, BILATERAL SALPINGO-OOPHORECTOMY, COMBINED       IR TRANSLAMINAR EPIDURAL LUMBAR INJ INCL IMAGING  09/27/2022     LUMBAR DISCECTOMY Right 06/13/2019    L5-S1 - Dr. Hamm     NASAL FRACTURE SURGERY       ORIF ULNAR / RADIAL SHAFT FRACTURE Right     Family History   Problem Relation Age of Onset     Heart Disease Mother      Heart Disease Father      Heart Disease Sister      Diabetes Brother      Heart Disease Sister     Social History     Socioeconomic History     Marital status: Single     Spouse name: Not on file     Number of children: Not on file     Years of education: Not on file     Highest education level: Not on file   Occupational History     Not on file   Tobacco Use     Smoking status: Every Day     Packs/day: 0.50     Types: Cigarettes     Smokeless tobacco: Never   Vaping Use     Vaping status: Never Used     Passive vaping exposure: Yes   Substance and Sexual Activity     Alcohol use: Not Currently     Comment: Alcoholic Drinks/day: occ     Drug use: No     Sexual activity: Not Currently   Other Topics Concern     Not on file   Social History  Narrative    Lives alone in a condo.          On disability.  Three living children.       Social Determinants of Health     Financial Resource Strain: Not on file   Food Insecurity: Not on file   Transportation Needs: Not on file   Physical Activity: Not on file   Stress: Not on file   Social Connections: Not on file   Intimate Partner Violence: Not on file   Housing Stability: Not on file          Medications  Allergies   Scheduled Meds:    aspirin  81 mg Oral Daily     buprenorphine-naloxone  1 tablet Sublingual TID     carvedilol  12.5 mg Oral BID w/meals     cetirizine  10 mg Oral Daily     DULoxetine  60 mg Oral BID     insulin aspart  1-3 Units Subcutaneous TID AC     insulin aspart  1-3 Units Subcutaneous At Bedtime     insulin aspart   Subcutaneous TID AC     insulin glargine  25 Units Subcutaneous BID     [START ON 5/6/2023] lidocaine  1 patch Transdermal Q24H    And     [START ON 5/6/2023] lidocaine   Transdermal Q8H TREVOR     methocarbamol  750 mg Oral Q6H     morphine  2 mg Intravenous Once     nortriptyline  10 mg Oral At Bedtime     pantoprazole  40 mg Intravenous BID     sodium chloride (PF)  3 mL Intracatheter Q8H     topiramate  100 mg Oral BID     valsartan  40 mg Oral Daily     Continuous Infusions:    sodium chloride       PRN Meds:.acetaminophen, albuterol, calcium carbonate, glucose **OR** dextrose **OR** glucagon, diclofenac, fluticasone, ipratropium **AND** levalbuterol, lidocaine 4%, lidocaine (buffered or not buffered), melatonin, naloxone **OR** naloxone **OR** naloxone **OR** naloxone, nicotine, nitroGLYcerin, ondansetron **OR** ondansetron, oxyCODONE, senna-docusate **OR** senna-docusate, sodium chloride (PF) Allergies   Allergen Reactions     Haloperidol Unknown     Patient states it stops her heart       Meperidine And Related Angioedema, Difficulty breathing, Other (See Comments), Rash and Shortness Of Breath     Throat closes  Other reaction(s): Breathing Difficulty  Other  "reaction(s): Breathing Difficulty       Phenothiazines Other (See Comments)     Other reaction(s): CARDIAC DISTURBANCES  Patient states it stops her heart  \"I swell up\"  \"stopped by heart\"  \"I swell up\"  \"I swell up\"       Tramadol Other (See Comments)     Other reaction(s): Angioedema  Throat itch       Haloperidol And Related Angioedema     Januvia [Sitagliptin] Other (See Comments)     Swelling in the neck      Latex Itching     Lisinopril Other (See Comments)     ACE cough  Itchy throat  Throat itches  Itchy throat       Penicillins Swelling     Hydroxyzine Other (See Comments) and Rash     Tongue swelling  Tongue swelling  Tongue swelling           Lab Results    Chemistry/lipid CBC Cardiac Enzymes/BNP/TSH/INR   Lab Results   Component Value Date    CHOL 144 10/07/2022    HDL 38 (L) 10/07/2022    TRIG 113 10/07/2022    BUN 21.7 (H) 05/05/2023     05/05/2023    CO2 22 05/05/2023    Lab Results   Component Value Date    WBC 7.4 05/05/2023    HGB 11.7 05/05/2023    HCT 37.8 05/05/2023    MCV 87 05/05/2023     05/05/2023    Lab Results   Component Value Date    TROPONINI <0.01 07/17/2022     (H) 04/03/2022    TSH 1.38 10/06/2022    INR 1.04 10/06/2022          "

## 2023-05-05 NOTE — ED TRIAGE NOTES
Pt arrives via [facility] for chest pain and shortness of breath. Found in tripod position outside her apartment building. Respiratory rate in the 40, oxygen in the mid 80s. Placed on 10L, oxygen in the high 90s. 0.4 of nitroglycerin and 324 ASA given in route.      Triage Assessment     Row Name 05/05/23 0941       Triage Assessment (Adult)    Additional Documentation Breath Sounds (Group)       Respiratory WDL    Respiratory WDL X;rhythm/pattern    Rhythm/Pattern, Respiratory shortness of breath;tachypneic       Breath Sounds    Breath Sounds All Fields    All Lung Fields Breath Sounds diminished       Cardiac WDL    Cardiac WDL X;chest pain       Chest Pain Assessment    Chest Pain Location midsternal    Chest Pain Radiation shoulder    Character sharp;stabbing    Alleviating Factors nothing    Associated Signs/Symptoms anxiety       Cognitive/Neuro/Behavioral WDL    Cognitive/Neuro/Behavioral WDL X;mood/behavior    Mood/Behavior anxious

## 2023-05-06 ENCOUNTER — APPOINTMENT (OUTPATIENT)
Dept: CARDIOLOGY | Facility: HOSPITAL | Age: 58
End: 2023-05-06
Attending: INTERNAL MEDICINE
Payer: COMMERCIAL

## 2023-05-06 LAB
ANION GAP SERPL CALCULATED.3IONS-SCNC: 12 MMOL/L (ref 7–15)
ATRIAL RATE - MUSE: 95 BPM
BUN SERPL-MCNC: 17.2 MG/DL (ref 6–20)
CALCIUM SERPL-MCNC: 8.9 MG/DL (ref 8.6–10)
CHLORIDE SERPL-SCNC: 106 MMOL/L (ref 98–107)
CREAT SERPL-MCNC: 0.96 MG/DL (ref 0.51–0.95)
DEPRECATED HCO3 PLAS-SCNC: 19 MMOL/L (ref 22–29)
DIASTOLIC BLOOD PRESSURE - MUSE: NORMAL MMHG
ERYTHROCYTE [DISTWIDTH] IN BLOOD BY AUTOMATED COUNT: 13.4 % (ref 10–15)
GFR SERPL CREATININE-BSD FRML MDRD: 68 ML/MIN/1.73M2
GLUCOSE BLDC GLUCOMTR-MCNC: 131 MG/DL (ref 70–99)
GLUCOSE BLDC GLUCOMTR-MCNC: 170 MG/DL (ref 70–99)
GLUCOSE BLDC GLUCOMTR-MCNC: 180 MG/DL (ref 70–99)
GLUCOSE BLDC GLUCOMTR-MCNC: 237 MG/DL (ref 70–99)
GLUCOSE SERPL-MCNC: 127 MG/DL (ref 70–99)
HCT VFR BLD AUTO: 39.2 % (ref 35–47)
HGB BLD-MCNC: 12.4 G/DL (ref 11.7–15.7)
INTERPRETATION ECG - MUSE: NORMAL
LVEF ECHO: NORMAL
MCH RBC QN AUTO: 27.4 PG (ref 26.5–33)
MCHC RBC AUTO-ENTMCNC: 31.6 G/DL (ref 31.5–36.5)
MCV RBC AUTO: 87 FL (ref 78–100)
P AXIS - MUSE: -19 DEGREES
PLATELET # BLD AUTO: 257 10E3/UL (ref 150–450)
POTASSIUM SERPL-SCNC: 4.3 MMOL/L (ref 3.4–5.3)
PR INTERVAL - MUSE: 172 MS
QRS DURATION - MUSE: 86 MS
QT - MUSE: 370 MS
QTC - MUSE: 464 MS
R AXIS - MUSE: 34 DEGREES
RBC # BLD AUTO: 4.53 10E6/UL (ref 3.8–5.2)
SODIUM SERPL-SCNC: 137 MMOL/L (ref 136–145)
SYSTOLIC BLOOD PRESSURE - MUSE: NORMAL MMHG
T AXIS - MUSE: 86 DEGREES
VENTRICULAR RATE- MUSE: 95 BPM
WBC # BLD AUTO: 6.4 10E3/UL (ref 4–11)

## 2023-05-06 PROCEDURE — C9113 INJ PANTOPRAZOLE SODIUM, VIA: HCPCS | Performed by: PHYSICIAN ASSISTANT

## 2023-05-06 PROCEDURE — 93306 TTE W/DOPPLER COMPLETE: CPT

## 2023-05-06 PROCEDURE — 99232 SBSQ HOSP IP/OBS MODERATE 35: CPT | Performed by: INTERNAL MEDICINE

## 2023-05-06 PROCEDURE — G0378 HOSPITAL OBSERVATION PER HR: HCPCS

## 2023-05-06 PROCEDURE — 96361 HYDRATE IV INFUSION ADD-ON: CPT

## 2023-05-06 PROCEDURE — 250N000011 HC RX IP 250 OP 636: Performed by: PHYSICIAN ASSISTANT

## 2023-05-06 PROCEDURE — 36415 COLL VENOUS BLD VENIPUNCTURE: CPT | Performed by: INTERNAL MEDICINE

## 2023-05-06 PROCEDURE — 85027 COMPLETE CBC AUTOMATED: CPT | Performed by: INTERNAL MEDICINE

## 2023-05-06 PROCEDURE — 250N000013 HC RX MED GY IP 250 OP 250 PS 637: Performed by: INTERNAL MEDICINE

## 2023-05-06 PROCEDURE — 250N000011 HC RX IP 250 OP 636: Performed by: INTERNAL MEDICINE

## 2023-05-06 PROCEDURE — 250N000013 HC RX MED GY IP 250 OP 250 PS 637: Performed by: PHYSICIAN ASSISTANT

## 2023-05-06 PROCEDURE — 258N000003 HC RX IP 258 OP 636: Performed by: INTERNAL MEDICINE

## 2023-05-06 PROCEDURE — 96376 TX/PRO/DX INJ SAME DRUG ADON: CPT

## 2023-05-06 PROCEDURE — 82310 ASSAY OF CALCIUM: CPT | Performed by: INTERNAL MEDICINE

## 2023-05-06 PROCEDURE — 82962 GLUCOSE BLOOD TEST: CPT

## 2023-05-06 PROCEDURE — 250N000011 HC RX IP 250 OP 636: Performed by: NURSE PRACTITIONER

## 2023-05-06 PROCEDURE — 93306 TTE W/DOPPLER COMPLETE: CPT | Mod: 26 | Performed by: INTERNAL MEDICINE

## 2023-05-06 PROCEDURE — 99233 SBSQ HOSP IP/OBS HIGH 50: CPT | Performed by: INTERNAL MEDICINE

## 2023-05-06 RX ORDER — MORPHINE SULFATE 2 MG/ML
2 INJECTION, SOLUTION INTRAMUSCULAR; INTRAVENOUS ONCE
Status: COMPLETED | OUTPATIENT
Start: 2023-05-06 | End: 2023-05-06

## 2023-05-06 RX ORDER — MORPHINE SULFATE 2 MG/ML
2 INJECTION, SOLUTION INTRAMUSCULAR; INTRAVENOUS ONCE
Status: DISCONTINUED | OUTPATIENT
Start: 2023-05-06 | End: 2023-05-06

## 2023-05-06 RX ORDER — OXYCODONE HYDROCHLORIDE 5 MG/1
5 TABLET ORAL EVERY 6 HOURS PRN
Status: DISCONTINUED | OUTPATIENT
Start: 2023-05-06 | End: 2023-05-10 | Stop reason: HOSPADM

## 2023-05-06 RX ORDER — LORAZEPAM 0.5 MG/1
0.5 TABLET ORAL EVERY 4 HOURS PRN
Status: DISCONTINUED | OUTPATIENT
Start: 2023-05-06 | End: 2023-05-08

## 2023-05-06 RX ORDER — POLYETHYLENE GLYCOL 3350 17 G/17G
17 POWDER, FOR SOLUTION ORAL DAILY
Status: DISCONTINUED | OUTPATIENT
Start: 2023-05-06 | End: 2023-05-07

## 2023-05-06 RX ORDER — ATORVASTATIN CALCIUM 40 MG/1
40 TABLET, FILM COATED ORAL EVERY EVENING
Status: DISCONTINUED | OUTPATIENT
Start: 2023-05-06 | End: 2023-05-10 | Stop reason: HOSPADM

## 2023-05-06 RX ORDER — OXYCODONE AND ACETAMINOPHEN 5; 325 MG/1; MG/1
1 TABLET ORAL EVERY 6 HOURS PRN
Status: DISCONTINUED | OUTPATIENT
Start: 2023-05-06 | End: 2023-05-10 | Stop reason: HOSPADM

## 2023-05-06 RX ORDER — OXYCODONE AND ACETAMINOPHEN 10; 325 MG/1; MG/1
1 TABLET ORAL EVERY 6 HOURS PRN
Status: DISCONTINUED | OUTPATIENT
Start: 2023-05-06 | End: 2023-05-06 | Stop reason: DRUGHIGH

## 2023-05-06 RX ADMIN — INSULIN GLARGINE 25 UNITS: 100 INJECTION, SOLUTION SUBCUTANEOUS at 21:29

## 2023-05-06 RX ADMIN — TOPIRAMATE 100 MG: 100 TABLET, FILM COATED ORAL at 21:22

## 2023-05-06 RX ADMIN — DULOXETINE HYDROCHLORIDE 60 MG: 60 CAPSULE, DELAYED RELEASE PELLETS ORAL at 10:25

## 2023-05-06 RX ADMIN — DICLOFENAC SODIUM 2 G: 10 GEL TOPICAL at 04:47

## 2023-05-06 RX ADMIN — MORPHINE SULFATE 2 MG: 2 INJECTION, SOLUTION INTRAMUSCULAR; INTRAVENOUS at 12:57

## 2023-05-06 RX ADMIN — BUPRENORPHINE HYDROCHLORIDE, NALOXONE HYDROCHLORIDE 1 FILM: 2; .5 FILM, SOLUBLE BUCCAL; SUBLINGUAL at 10:24

## 2023-05-06 RX ADMIN — ACETAMINOPHEN 650 MG: 325 TABLET ORAL at 04:47

## 2023-05-06 RX ADMIN — ATORVASTATIN CALCIUM 40 MG: 40 TABLET, FILM COATED ORAL at 21:20

## 2023-05-06 RX ADMIN — DULOXETINE HYDROCHLORIDE 60 MG: 60 CAPSULE, DELAYED RELEASE PELLETS ORAL at 21:20

## 2023-05-06 RX ADMIN — SODIUM CHLORIDE: 9 INJECTION, SOLUTION INTRAVENOUS at 04:48

## 2023-05-06 RX ADMIN — METHOCARBAMOL 750 MG: 750 TABLET ORAL at 05:21

## 2023-05-06 RX ADMIN — ASPIRIN 81 MG: 81 TABLET, COATED ORAL at 10:25

## 2023-05-06 RX ADMIN — OXYCODONE HYDROCHLORIDE AND ACETAMINOPHEN 1 TABLET: 5; 325 TABLET ORAL at 21:26

## 2023-05-06 RX ADMIN — BUPRENORPHINE HYDROCHLORIDE, NALOXONE HYDROCHLORIDE 1 FILM: 2; .5 FILM, SOLUBLE BUCCAL; SUBLINGUAL at 13:56

## 2023-05-06 RX ADMIN — CARVEDILOL 12.5 MG: 12.5 TABLET, FILM COATED ORAL at 10:25

## 2023-05-06 RX ADMIN — ONDANSETRON 4 MG: 2 INJECTION INTRAMUSCULAR; INTRAVENOUS at 04:09

## 2023-05-06 RX ADMIN — PANTOPRAZOLE SODIUM 40 MG: 40 INJECTION, POWDER, FOR SOLUTION INTRAVENOUS at 10:24

## 2023-05-06 RX ADMIN — INSULIN GLARGINE 25 UNITS: 100 INJECTION, SOLUTION SUBCUTANEOUS at 10:29

## 2023-05-06 RX ADMIN — CARVEDILOL 12.5 MG: 12.5 TABLET, FILM COATED ORAL at 17:06

## 2023-05-06 RX ADMIN — METHOCARBAMOL 750 MG: 750 TABLET ORAL at 17:55

## 2023-05-06 RX ADMIN — METHOCARBAMOL 750 MG: 750 TABLET ORAL at 11:53

## 2023-05-06 RX ADMIN — TOPIRAMATE 100 MG: 100 TABLET, FILM COATED ORAL at 10:26

## 2023-05-06 RX ADMIN — INSULIN ASPART 1 UNITS: 100 INJECTION, SOLUTION INTRAVENOUS; SUBCUTANEOUS at 17:05

## 2023-05-06 RX ADMIN — NORTRIPTYLINE HYDROCHLORIDE 10 MG: 10 CAPSULE ORAL at 21:22

## 2023-05-06 RX ADMIN — MORPHINE SULFATE 2 MG: 2 INJECTION, SOLUTION INTRAMUSCULAR; INTRAVENOUS at 02:08

## 2023-05-06 RX ADMIN — HYDROCORTISONE: 1 CREAM TOPICAL at 10:27

## 2023-05-06 RX ADMIN — POLYETHYLENE GLYCOL 3350 17 G: 17 POWDER, FOR SOLUTION ORAL at 13:56

## 2023-05-06 RX ADMIN — CETIRIZINE HYDROCHLORIDE 10 MG: 10 TABLET, FILM COATED ORAL at 10:25

## 2023-05-06 RX ADMIN — VALSARTAN 40 MG: 40 TABLET, FILM COATED ORAL at 10:25

## 2023-05-06 RX ADMIN — OXYCODONE HYDROCHLORIDE 5 MG: 5 TABLET ORAL at 10:49

## 2023-05-06 RX ADMIN — LIDOCAINE 1 PATCH: 4 PATCH TOPICAL at 10:24

## 2023-05-06 RX ADMIN — PANTOPRAZOLE SODIUM 40 MG: 40 INJECTION, POWDER, FOR SOLUTION INTRAVENOUS at 21:19

## 2023-05-06 RX ADMIN — ACETAMINOPHEN 650 MG: 325 TABLET ORAL at 17:10

## 2023-05-06 RX ADMIN — HYDROCORTISONE: 1 CREAM TOPICAL at 21:31

## 2023-05-06 ASSESSMENT — ACTIVITIES OF DAILY LIVING (ADL)
ADLS_ACUITY_SCORE: 33

## 2023-05-06 NOTE — PROGRESS NOTES
[facility] Progress Note     IMPRESSION:  Sarah Rahman is a 57 year old female with PMH of HTN, DM type 2, COPD, HLD, schizoaffective disorder, CHF, s/p CABG who presented to the ED for chest pains and SOB. GI consulted for reported history of hematemesis yesterday.     1. Hematemesis  2. Melena  - reported large volume hematemesis on 5/4. She had another bout of possible hematemesis on 5/5 but just finished eating red jello. Vomit on 5/6 AM without blood.     - Differential: Kathrine-davidson tear, esophagitis, gastritis/duodenitis, peptic ulcer, AVM  - PPI BID started on 5/5.     - Hgb is at baseline (11.7). BUN is not significantly elevated, doubtful of a significant bleed.  - No NSAIDs at home besides daily baby aspirin    RECOMMENDATIONS:  - Await morning labs to assess blood counts.     - Discussed with patient, if hemoglobin drops significantly, we can consider an EGD more urgently. However if blood counts remain stable, we likely will continue with medication management with PPI therapy.     My overall suspicion for a significant upper GI bleed is low, especially without significant BM output (blood often acts as a laxative).     - Continue PPI BID    - Add MIRALAX every day for constipation. Ok with SENCHANDRA GRANGERN.          Approximately 20 minutes of total time was spent providing patient care, including patient evaluation, reviewing documentation/test results, and     Torsten Green PA-C  [facility]  [phone]  ________________________________________________________________________      SUBJECTIVE:    Patient reports another bout of hematemesis with a lot of red blood yesterday, although this occurred after eating red jello. She had nausea/vomiting this AM with possible very minimal bleeding.     Nausea and vomiting is new for her as of 3-4 days ago.     No significant abdominal pains but she feels full. No BM in 3 days,      OBJECTIVE:  /68 (BP Location: Left  arm)   Pulse 78   Temp 97.6  F (36.4  C) (Oral)   Resp 20   Wt 89.6 kg (197 lb 8.5 oz)   LMP  (LMP Unknown)   SpO2 96%   BMI 30.94 kg/m    Temp (24hrs), Av.7  F (36.5  C), Min:97.4  F (36.3  C), Max:98  F (36.7  C)    Patient Vitals for the past 72 hrs:   Weight   23 1535 89.6 kg (197 lb 8.5 oz)       Intake/Output Summary (Last 24 hours) at 2023 1246  Last data filed at 2023 0000  Gross per 24 hour   Intake 1018.33 ml   Output 50 ml   Net 968.33 ml        PHYSICAL EXAM  GEN: Alert, oriented x3, communicative and in NAD.    LYMPH: No LAD noted.  HRT: RRR  LUNGS: CTA  ABD:  ND, +BS, no guarding or pain to palpation, no rebound, no HSM.  SKIN: No rash, jaundice or spider angiomata      LABS:  I have reviewed the patient's new clinical lab results:     Recent Labs   Lab Test 23  1003 23  1608 10/07/22  0612 10/06/22  0918 22  1520 22  1022 22  0621 22  1436   WBC 7.4 5.6 7.3 7.5   < > 6.9   < > 5.8   HGB 11.7 11.7 10.5* 11.0*   < > 12.4   < > 12.1   MCV 87 87 87 87   < > 85   < > 84    254 251 287   < >  --    < > 237   INR  --   --   --  1.04  --  0.95  --  0.95    < > = values in this interval not displayed.     Recent Labs   Lab Test 23  1141 23  1003 23  1608   POTASSIUM 4.3 4.2 4.7   CHLORIDE 106 102 107   CO2 19* 22 26   BUN 17.2 21.7* 21.0*   CR 0.96* 1.23* 0.93   ANIONGAP 12 14 7     Recent Labs   Lab Test 23  1003 23  0912 23  1608 22  0511 22  0955 22  1302   ALBUMIN 4.2  --  4.0 3.8  --   --    BILITOTAL 0.4  --  <0.2 <0.2  --   --    ALT 22  --  19 22  --   --    AST 16  --  17 18  --   --    PROTEIN  --  30*  --   --  30* 20*   LIPASE 13  --   --   --   --   --          IMAGING:  reviewed

## 2023-05-06 NOTE — PROGRESS NOTES
Thank you, Dr. Saravia, for asking the [facility] Heart Care team to see Ms. Sarah Rahman for evaluation of chest pain.      Assessment/Recommendations   Assessment/Plan:  1.  Coronary artery disease s/p 2V CABG LIMA to LAD, SVG to D1 occluded, chest pain: Her ECG has no ischemic ST-T change.  Her high-sensitivity troponin is at borderline.  The patient states that she had episode of hematemesis yesterday morning.  No anticoagulation heparin infusion is indicated at this time.  Stop trending troponin,   Consider ischemic evaluation on Monday.  Continue her home medication aspirin, carvedilol.  Nitro patch as needed.      2.  Syncope: The etiology is not clear.  Her ECG is normal.  Telemetry monitor.  Ischemic evaluation as mentioned above.    3.  Benign essential hypertension: Continue carvedilol 12.5 mg twice a day, Diovan 40 mg daily.    4.  Dyslipidemia: Check lipid profile, start the statin.    5.  Type 2 diabetes, history of for stroke, schizoaffective disorder, possible GI bleeding: Please see primary and other team management for details.       History of Present Illness/Subjective    It is my pleasure to see Sarah Rahman at [facility] for evaluation of chest pain.  Sarah Rahman is a 57 year old female with a medical history of coronary artery disease status post 2 vessel CABG in 2009 LIMA to LAD, SVG to D1 occluded per coronary angiogram in 2019, benign essential hypertension, dyslipidemia, type 2 diabetes, CVA, schizoaffective disorder, GERD.    Per H&P  The patient states that she passed out 1 time yesterday.  She went off the side and the past after again today.  She had some vomiting and threw up the blood this morning.  She complains of chest pain since 930 this morning.  She described her chest pain as located left anterior chest, sharp pain, 10/10 in intensity, radiated to left arm, similar to chest pain prior to CABG.  She also  complains of shortness of breath, nausea, vomiting.  She has no fever or chills.  She has no palpitations, orthopnea, PND or leg edema.    When I saw her in her room, she complains of severe chest pain 10/10 intensity.  1 nitro slightly improved her chest pain.  ECG when she had chest pain showed normal sinus rhythm, normal ECG, no ischemic ST-T change.  Her high-sensitivity troponin is 15, 17.  Her proBNP is normal.       Physical Examination Review of Systems   /68 (BP Location: Left arm)   Pulse 78   Temp 97.6  F (36.4  C) (Oral)   Resp 20   Wt 89.6 kg (197 lb 8.5 oz)   LMP  (LMP Unknown)   SpO2 96%   BMI 30.94 kg/m    Body mass index is 30.94 kg/m .  Wt Readings from Last 3 Encounters:   05/05/23 89.6 kg (197 lb 8.5 oz)   02/22/23 89.4 kg (197 lb)   01/10/23 88 kg (194 lb)     No intake or output data in the 24 hours ending 05/05/23 1710    General Appearance:   Awake, Alert, No acute distress.   HEENT:  No scleral icterus; the mucous membranes were moist.   Neck: No cervical bruits. No JVD. No thyromegaly.    Chest: The spine was straight. The chest was symmetric.   Lungs:   Respirations unlabored; Lungs are clear to auscultation. No crackles.  No wheezing.   Cardiovascular:   Regular rhythm and rate, normal first and second heart sounds with no murmurs. No rubs or gallops.    Abdomen:  Soft. No tenderness. Bowels sounds are present   Extremities: Equal tibial pulses. No leg edema.   Skin: No rashes. Warm, Dry.   Musculoskeletal: No tenderness.   Neurologic: Oriented x 3. Mood and affect are appropriate.     A 12 point comprehensive review of systems was negative except as noted.        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 Hamm at [facility]     Schizoaffective disorder (H)      Sleep difficulties 7/17/2022    Past Surgical History:   Procedure Laterality Date     BYPASS GRAFT ARTERY CORONARY  01/01/2009    x2     CAROTID ENDARTERECTOMY Left 12/2021     CORONARY STENT PLACEMENT       CV CORONARY ANGIOGRAM N/A 06/02/2021    Procedure: Coronary Angiogram;  Surgeon: Juventino Rivera MD;  Location: [facility] Cardiac Cath Lab;  Service: Cardiology     HYSTERECTOMY TOTAL ABDOMINAL       HYSTERECTOMY TOTAL ABDOMINAL, BILATERAL SALPINGO-OOPHORECTOMY, COMBINED       IR TRANSLAMINAR EPIDURAL LUMBAR INJ INCL IMAGING  09/27/2022     LUMBAR DISCECTOMY Right 06/13/2019    L5-S1 - Dr. Hamm     NASAL FRACTURE SURGERY       ORIF ULNAR / RADIAL SHAFT FRACTURE Right     Family History   Problem Relation Age of Onset     Heart Disease Mother      Heart Disease Father      Heart Disease Sister      Diabetes Brother      Heart Disease Sister     Social History     Socioeconomic History     Marital status: Single     Spouse name: Not on file     Number of children: Not on file     Years of education: Not on file     Highest education level: Not on file   Occupational History     Not on file   Tobacco Use     Smoking status: Every Day     Packs/day: 0.50     Types: Cigarettes     Smokeless tobacco: Never   Vaping Use     Vaping status: Never Used     Passive vaping exposure: Yes   Substance and Sexual Activity     Alcohol use: Not Currently     Comment: Alcoholic Drinks/day: occ     Drug use: No     Sexual activity: Not Currently   Other Topics Concern     Not on file   Social History Narrative    Lives alone in a condo.          On disability.  Three living children.        Social Determinants of Health     Financial Resource Strain: Not on file   Food Insecurity: Not on file   Transportation Needs: Not on file   Physical Activity: Not on file   Stress: Not on file   Social Connections: Not on file   Intimate Partner Violence: Not on file   Housing Stability: Not on file          Medications  Allergies   Scheduled Meds:    aspirin  81 mg Oral Daily     buprenorphine HCl-naloxone HCl  1 Film Sublingual TID     carvedilol  12.5 mg Oral BID w/meals     cetirizine  10 mg Oral Daily     DULoxetine  60 mg Oral BID     hydrocortisone   Topical BID     insulin aspart  1-3 Units Subcutaneous TID AC     insulin aspart  1-3 Units Subcutaneous At Bedtime     insulin aspart   Subcutaneous TID AC     insulin glargine  25 Units Subcutaneous BID     lidocaine  1 patch Transdermal Q24H    And     lidocaine   Transdermal Q8H TREVOR     methocarbamol  750 mg Oral Q6H     nortriptyline  10 mg Oral At Bedtime     pantoprazole  40 mg Intravenous BID     sodium chloride (PF)  3 mL Intracatheter Q8H     topiramate  100 mg Oral BID     valsartan  40 mg Oral Daily     Continuous Infusions:    sodium chloride 100 mL/hr at 05/06/23 0448     PRN Meds:.acetaminophen, albuterol, calcium carbonate, glucose **OR** dextrose **OR** glucagon, diclofenac, fluticasone, ipratropium **AND** levalbuterol, lidocaine 4%, lidocaine (buffered or not buffered), melatonin, naloxone **OR** naloxone **OR** naloxone **OR** naloxone, nicotine, nitroGLYcerin, ondansetron **OR** ondansetron, oxyCODONE-acetaminophen **AND** oxyCODONE, senna-docusate **OR** senna-docusate, sodium chloride (PF) Allergies   Allergen Reactions     Haloperidol Unknown     Patient states it stops her heart       Meperidine And Related Angioedema, Difficulty breathing, Other (See Comments), Rash and Shortness Of Breath     Throat closes  Other reaction(s): Breathing Difficulty  Other reaction(s): Breathing Difficulty       Phenothiazines Other (See Comments)     " Other reaction(s): CARDIAC DISTURBANCES  Patient states it stops her heart  \"I swell up\"  \"stopped by heart\"  \"I swell up\"  \"I swell up\"       Tramadol Other (See Comments)     Other reaction(s): Angioedema  Throat itch       Haloperidol And Related Angioedema     Januvia [Sitagliptin] Other (See Comments)     Swelling in the neck      Latex Itching     Lisinopril Other (See Comments)     ACE cough  Itchy throat  Throat itches  Itchy throat       Penicillins Swelling     Hydroxyzine Other (See Comments) and Rash     Tongue swelling  Tongue swelling  Tongue swelling           Lab Results    Chemistry/lipid CBC Cardiac Enzymes/BNP/TSH/INR   Lab Results   Component Value Date    CHOL 144 10/07/2022    HDL 38 (L) 10/07/2022    TRIG 113 10/07/2022    BUN 21.7 (H) 05/05/2023     05/05/2023    CO2 22 05/05/2023    Lab Results   Component Value Date    WBC 7.4 05/05/2023    HGB 11.7 05/05/2023    HCT 37.8 05/05/2023    MCV 87 05/05/2023     05/05/2023    Lab Results   Component Value Date    TROPONINI <0.01 07/17/2022     (H) 04/03/2022    TSH 1.38 10/06/2022    INR 1.04 10/06/2022        Will Oliveros MD  Clinical Cardiac Electrophysiology    "

## 2023-05-06 NOTE — PROGRESS NOTES
"[facility] ACUTE PAIN SERVICE     Date of Admission:  5/5/2023  Date of Consult: 05/06/23  Physician requesting consult: Dr. Saravia   Reason for consult: chest pain  Primary Care Physician: Dr. David     Assessment/Plan:     Sarah Rahman is a 57 year old female who was admitted on 5/5/2023.  Pain team was asked to see the patient for chest pain. Admitted for chest pain, shortness of breath, leg weakness. Patient reporting symptoms started morning of admission, she also had N/V, patient feeling symptoms similar to previous heart attack.  Patient reported she has \"passed out\".  Patient reporting hematemesis, and the last 2-3 days history of back sttols, none since admitted.   History including DM2, HTN, CAD, s/p CABG, asthma, chronic low back pain, schizoaffective disorder, opioid dependence, lumbar disc herniation, Hx CVA, .  Describes pain as  9/10 sharp chest radiating to left shoulder. pains at times.  Currently patient reporting severe pain in right foot, feels there is swelling, and pain moves up to knee, patient has had edema with Lyrica previously so cannot use this. Left sided chest pain rating at 9/10, pain is sharp, home Percocet has been ordered.  The patient feels the morphine helped her chest pain, she is really concerned that her symptoms are due to something serious.  Will recommend IV Morphine for chest pain not controlled with home Percocet 10/325 for one time dose. Per Cardiology note, ECG with no ischemic ST-T change, tropin is at borderline, further testing on Monday.    Diet is combination diet clear liquid.  The patient reports quitting smoking one week prior to admission.,      Noted intolerances: haloperidol, phenothiazines, tramadol,     Opioid Induced Respiratory Depression Risk Assessment:?  Moderate to high: COPD, asthma, concomitant CNS depressants    Discussed plan with Nathaly Mcdonnell CNP, Acute Pain Management Team, Discussed one time morphine 2 mg IV now for chest " pain also with Dr. Riley.     PLAN:   1) Pain is consistent with acute chest pain, patient undergoing cardiac testing, and GI consulted for hematemesis. . Hospital Medicine Service for medical management.. Patient educated regarding multimodal pain approach.,Patient is understanding of the plan.  Patient has many questions as to why she if having chest pain and throwing up blood. We discussed that the testing she is undergoing may provide some answers.  2)Multimodal Medication Therapy  Topical: Lidocaine patch daily, diclofenac gel 1% tid prn to feet, minimal absorption for topical diclofenac.   NSAID'S: Avoid due to recent black stools, and hematoemisis, also cardiac history.   Steroids:  none  Muscle Relaxants: methocarbamol 750 mg q 6 h  Adjuvants: Tylenol is PRN, NTG 0.4 mg s/l.   Antidepressants/anxiolytics: nortriptyline 10 mg po q hs, Cymbalta 60 mg po bid.   Opioids: Continue Suboxone 2/0.5 tid,Percocet 10/325 increased to q 4 h prn (home dosing is 4 per day)  IV Pain medication: One time dose only morphine 2 mg.  3)Non-medication interventions: heat vs ice  Acupuncture consult - offered   Integrative consult - offered   4)Constipation Prophylaxis:  Senna/docusate 1-2 po bid prn  5) Care Teams: [facility], Cardiology, Vascular Surgery has signed off, Pain Team  -Opioid prescriber has been Dr. Birch    -MN  pulled from system on 05/06/23 This indicates opioid use.  Most recent MN  entries:  05/03/23 Suboxone 2/0.5 #90(30 days supply)  04/06/23 Percocet 10/325 #120 #30      Discharge Recommendations - We recommend prescribing the following at the time of discharge: no opioids..      History of Present Illness (HPI):       Sarah Rahman is a 57 year old female who presented for chest pain, shortness of breath, abdominal pain, hematemesis..  Past medical history as above. The pain is reported to be acute chest pain, feels the foot pain radiating to knee is new as well.    Per MN  review, the patient  does  have an opioid tolerance.     Past pain treatments have included: [facility]       Home pain medications/psych medications/anticoagulation medications include: Suboxone, Percocet 10/325, has used ketamine, Lyrica in the past and PLO topical.    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 Hamm at [facility]     Schizoaffective disorder (H)      Sleep difficulties 7/17/2022       PAST SURGICAL HISTORY:   Past Surgical History:   Procedure Laterality Date     BYPASS GRAFT ARTERY CORONARY  01/01/2009    x2     CAROTID ENDARTERECTOMY Left 12/2021     CORONARY STENT PLACEMENT       CV CORONARY ANGIOGRAM N/A 06/02/2021    Procedure: Coronary Angiogram;  Surgeon: Juventino Rivera MD;  Location: [facility] Cardiac Cath Lab;  Service: Cardiology     HYSTERECTOMY TOTAL ABDOMINAL       HYSTERECTOMY TOTAL ABDOMINAL, BILATERAL SALPINGO-OOPHORECTOMY, COMBINED       IR TRANSLAMINAR EPIDURAL LUMBAR INJ INCL IMAGING  09/27/2022     LUMBAR DISCECTOMY Right 06/13/2019    L5-S1 - Dr. Hamm     NASAL FRACTURE SURGERY       ORIF ULNAR / RADIAL SHAFT FRACTURE Right        FAMILY HISTORY:   Family History   Problem Relation Age of Onset     Heart Disease Mother      Heart Disease Father      Heart Disease Sister      Diabetes Brother      Heart Disease Sister        SOCIAL HISTORY:   Social History     Tobacco Use     Smoking status: Every Day     Packs/day: 0.50     Types: Cigarettes     Smokeless tobacco: Never    Vaping Use     Vaping status: Never Used     Passive vaping exposure: Yes   Substance Use Topics     Alcohol use: Not Currently     Comment: Alcoholic Drinks/day: [facility]        [facility]  Tobacco:  reports that she has been smoking cigarettes. She has been smoking an average of .5 packs per day. She has never used smokeless tobacco.  Alcohol:  reports that she does not currently use alcohol.  Illicit drugs:  reports no history of drug use.             Margarita Bryant [facility]  Acute Care Pain Management Program  [facility] [place], [place]  Monday-Friday 8a-4p   Page via online paging system or Gendel
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
## 2023-05-07 LAB
ANION GAP SERPL CALCULATED.3IONS-SCNC: 8 MMOL/L (ref 7–15)
BUN SERPL-MCNC: 20.3 MG/DL (ref 6–20)
CALCIUM SERPL-MCNC: 8.8 MG/DL (ref 8.6–10)
CHLORIDE SERPL-SCNC: 107 MMOL/L (ref 98–107)
CHOLEST SERPL-MCNC: 243 MG/DL
CREAT SERPL-MCNC: 0.91 MG/DL (ref 0.51–0.95)
DEPRECATED HCO3 PLAS-SCNC: 21 MMOL/L (ref 22–29)
ERYTHROCYTE [DISTWIDTH] IN BLOOD BY AUTOMATED COUNT: 13.2 % (ref 10–15)
GFR SERPL CREATININE-BSD FRML MDRD: 73 ML/MIN/1.73M2
GLUCOSE BLDC GLUCOMTR-MCNC: 120 MG/DL (ref 70–99)
GLUCOSE BLDC GLUCOMTR-MCNC: 147 MG/DL (ref 70–99)
GLUCOSE BLDC GLUCOMTR-MCNC: 152 MG/DL (ref 70–99)
GLUCOSE BLDC GLUCOMTR-MCNC: 187 MG/DL (ref 70–99)
GLUCOSE SERPL-MCNC: 178 MG/DL (ref 70–99)
HCT VFR BLD AUTO: 34.7 % (ref 35–47)
HDLC SERPL-MCNC: 40 MG/DL
HGB BLD-MCNC: 10.6 G/DL (ref 11.7–15.7)
LDLC SERPL CALC-MCNC: 171 MG/DL
MCH RBC QN AUTO: 26.7 PG (ref 26.5–33)
MCHC RBC AUTO-ENTMCNC: 30.5 G/DL (ref 31.5–36.5)
MCV RBC AUTO: 87 FL (ref 78–100)
NONHDLC SERPL-MCNC: 203 MG/DL
PLATELET # BLD AUTO: 271 10E3/UL (ref 150–450)
POTASSIUM SERPL-SCNC: 4.5 MMOL/L (ref 3.4–5.3)
RBC # BLD AUTO: 3.97 10E6/UL (ref 3.8–5.2)
SODIUM SERPL-SCNC: 136 MMOL/L (ref 136–145)
TRIGL SERPL-MCNC: 158 MG/DL
WBC # BLD AUTO: 6 10E3/UL (ref 4–11)

## 2023-05-07 PROCEDURE — 80061 LIPID PANEL: CPT | Performed by: INTERNAL MEDICINE

## 2023-05-07 PROCEDURE — 99233 SBSQ HOSP IP/OBS HIGH 50: CPT | Performed by: INTERNAL MEDICINE

## 2023-05-07 PROCEDURE — 250N000013 HC RX MED GY IP 250 OP 250 PS 637: Performed by: INTERNAL MEDICINE

## 2023-05-07 PROCEDURE — 96376 TX/PRO/DX INJ SAME DRUG ADON: CPT

## 2023-05-07 PROCEDURE — 250N000011 HC RX IP 250 OP 636: Performed by: INTERNAL MEDICINE

## 2023-05-07 PROCEDURE — 80048 BASIC METABOLIC PNL TOTAL CA: CPT | Performed by: INTERNAL MEDICINE

## 2023-05-07 PROCEDURE — 250N000011 HC RX IP 250 OP 636: Performed by: PHYSICIAN ASSISTANT

## 2023-05-07 PROCEDURE — 99207 PR CDG-CUT & PASTE-POTENTIAL IMPACT ON LEVEL: CPT | Performed by: INTERNAL MEDICINE

## 2023-05-07 PROCEDURE — 99254 IP/OBS CNSLTJ NEW/EST MOD 60: CPT | Performed by: NURSE PRACTITIONER

## 2023-05-07 PROCEDURE — 85014 HEMATOCRIT: CPT | Performed by: INTERNAL MEDICINE

## 2023-05-07 PROCEDURE — G0378 HOSPITAL OBSERVATION PER HR: HCPCS

## 2023-05-07 PROCEDURE — 82962 GLUCOSE BLOOD TEST: CPT

## 2023-05-07 PROCEDURE — 250N000013 HC RX MED GY IP 250 OP 250 PS 637: Performed by: PHYSICIAN ASSISTANT

## 2023-05-07 PROCEDURE — C9113 INJ PANTOPRAZOLE SODIUM, VIA: HCPCS | Performed by: PHYSICIAN ASSISTANT

## 2023-05-07 PROCEDURE — 36415 COLL VENOUS BLD VENIPUNCTURE: CPT | Performed by: INTERNAL MEDICINE

## 2023-05-07 PROCEDURE — 99232 SBSQ HOSP IP/OBS MODERATE 35: CPT | Performed by: INTERNAL MEDICINE

## 2023-05-07 RX ORDER — POLYETHYLENE GLYCOL 3350 17 G/17G
17 POWDER, FOR SOLUTION ORAL 2 TIMES DAILY
Status: DISCONTINUED | OUTPATIENT
Start: 2023-05-07 | End: 2023-05-10 | Stop reason: HOSPADM

## 2023-05-07 RX ADMIN — HYDROCORTISONE: 1 CREAM TOPICAL at 08:02

## 2023-05-07 RX ADMIN — INSULIN ASPART 1 UNITS: 100 INJECTION, SOLUTION INTRAVENOUS; SUBCUTANEOUS at 07:56

## 2023-05-07 RX ADMIN — BUPRENORPHINE HYDROCHLORIDE, NALOXONE HYDROCHLORIDE 1 FILM: 2; .5 FILM, SOLUBLE BUCCAL; SUBLINGUAL at 08:01

## 2023-05-07 RX ADMIN — METHOCARBAMOL 750 MG: 750 TABLET ORAL at 01:20

## 2023-05-07 RX ADMIN — OXYCODONE HYDROCHLORIDE 5 MG: 5 TABLET ORAL at 12:01

## 2023-05-07 RX ADMIN — LIDOCAINE 1 PATCH: 4 PATCH TOPICAL at 08:01

## 2023-05-07 RX ADMIN — METHOCARBAMOL 750 MG: 750 TABLET ORAL at 23:54

## 2023-05-07 RX ADMIN — ONDANSETRON 4 MG: 2 INJECTION INTRAMUSCULAR; INTRAVENOUS at 07:50

## 2023-05-07 RX ADMIN — BUPRENORPHINE HYDROCHLORIDE, NALOXONE HYDROCHLORIDE 1 FILM: 2; .5 FILM, SOLUBLE BUCCAL; SUBLINGUAL at 21:05

## 2023-05-07 RX ADMIN — LORAZEPAM 0.5 MG: 0.5 TABLET ORAL at 06:56

## 2023-05-07 RX ADMIN — METHOCARBAMOL 750 MG: 750 TABLET ORAL at 06:00

## 2023-05-07 RX ADMIN — INSULIN GLARGINE 25 UNITS: 100 INJECTION, SOLUTION SUBCUTANEOUS at 21:16

## 2023-05-07 RX ADMIN — CARVEDILOL 12.5 MG: 12.5 TABLET, FILM COATED ORAL at 17:11

## 2023-05-07 RX ADMIN — ATORVASTATIN CALCIUM 40 MG: 40 TABLET, FILM COATED ORAL at 21:10

## 2023-05-07 RX ADMIN — INSULIN ASPART 1 UNITS: 100 INJECTION, SOLUTION INTRAVENOUS; SUBCUTANEOUS at 11:58

## 2023-05-07 RX ADMIN — METHOCARBAMOL 750 MG: 750 TABLET ORAL at 11:58

## 2023-05-07 RX ADMIN — METHOCARBAMOL 750 MG: 750 TABLET ORAL at 17:11

## 2023-05-07 RX ADMIN — OXYCODONE HYDROCHLORIDE AND ACETAMINOPHEN 1 TABLET: 5; 325 TABLET ORAL at 06:00

## 2023-05-07 RX ADMIN — ONDANSETRON 4 MG: 2 INJECTION INTRAMUSCULAR; INTRAVENOUS at 01:50

## 2023-05-07 RX ADMIN — DULOXETINE HYDROCHLORIDE 60 MG: 60 CAPSULE, DELAYED RELEASE PELLETS ORAL at 08:02

## 2023-05-07 RX ADMIN — VALSARTAN 40 MG: 40 TABLET, FILM COATED ORAL at 08:01

## 2023-05-07 RX ADMIN — PANTOPRAZOLE SODIUM 40 MG: 40 INJECTION, POWDER, FOR SOLUTION INTRAVENOUS at 07:50

## 2023-05-07 RX ADMIN — POLYETHYLENE GLYCOL 3350 17 G: 17 POWDER, FOR SOLUTION ORAL at 08:01

## 2023-05-07 RX ADMIN — INSULIN GLARGINE 25 UNITS: 100 INJECTION, SOLUTION SUBCUTANEOUS at 08:03

## 2023-05-07 RX ADMIN — DULOXETINE HYDROCHLORIDE 60 MG: 60 CAPSULE, DELAYED RELEASE PELLETS ORAL at 21:10

## 2023-05-07 RX ADMIN — PANTOPRAZOLE SODIUM 40 MG: 40 INJECTION, POWDER, FOR SOLUTION INTRAVENOUS at 21:07

## 2023-05-07 RX ADMIN — TOPIRAMATE 100 MG: 100 TABLET, FILM COATED ORAL at 21:10

## 2023-05-07 RX ADMIN — DICLOFENAC SODIUM 2 G: 10 GEL TOPICAL at 12:57

## 2023-05-07 RX ADMIN — TOPIRAMATE 100 MG: 100 TABLET, FILM COATED ORAL at 08:01

## 2023-05-07 RX ADMIN — ASPIRIN 81 MG: 81 TABLET, COATED ORAL at 08:02

## 2023-05-07 RX ADMIN — HYDROCORTISONE: 1 CREAM TOPICAL at 21:17

## 2023-05-07 RX ADMIN — NORTRIPTYLINE HYDROCHLORIDE 10 MG: 10 CAPSULE ORAL at 21:10

## 2023-05-07 RX ADMIN — CETIRIZINE HYDROCHLORIDE 10 MG: 10 TABLET, FILM COATED ORAL at 08:02

## 2023-05-07 RX ADMIN — ONDANSETRON 4 MG: 2 INJECTION INTRAMUSCULAR; INTRAVENOUS at 17:11

## 2023-05-07 RX ADMIN — CARVEDILOL 12.5 MG: 12.5 TABLET, FILM COATED ORAL at 07:56

## 2023-05-07 RX ADMIN — POLYETHYLENE GLYCOL 3350 17 G: 17 POWDER, FOR SOLUTION ORAL at 21:14

## 2023-05-07 ASSESSMENT — ACTIVITIES OF DAILY LIVING (ADL)
ADLS_ACUITY_SCORE: 33

## 2023-05-07 NOTE — PROGRESS NOTES
VSS. Patient denies chest pain. Denies nausea. Patient reports severe lower extremity pain, numbness and tingling. Given PRN percocet. Patient calm and cooperative.     at bedtime. NSR on tele. Continuing to monitor.
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
## 2023-05-07 NOTE — UTILIZATION REVIEW
Concurrent stay review; Secondary Review Determination     Under the authority of the Utilization Management Committee, the utilization review process indicated a secondary review on Sarah Rahman.  The review outcome is based on review of the medical records, discussions with staff, and applying clinical experience noted on the date of the review.        (x) Observation Status Appropriate - Concurrent stay review    RATIONALE FOR DETERMINATION   Sarah Rahman is a 57 yr old female with CAD s/p CABG, schizoaffective disorder, DM2, CVA, COPD, depression, HTN, GERD, tobacco use disorder presented with hematemesis, syncope and left sided chest pain.  No acute GI bleeding found, intermittent ongoing chest amilcar but also pain elsewhere and concern anxiety may be driving some symptomatology.  Cardiology has noted normal troponin and no other signs ongoing ischemia but prefer to keep in hospital until more formal ischemic eval can be performed.  Patient has not needed ongoing nitroglycerin or heparin (was held due to possible GI bleeding).    Patient is clinically improving and there is no clear indication to change patient's status to inpatient. The severity of illness, intensity of service provided, expected LOS and risk for adverse outcome make the care appropriate for observation.    The information on this document is developed by the utilization review team in order for the business office to ensure compliance.  This only denotes the appropriateness of proper admission status and does not reflect the quality of care rendered.         The definitions of Inpatient Status and Observation Status used in making the determination above are those provided in the CMS Coverage Manual, Chapter 1 and Chapter 6, section 70.4.      Sincerely,   Aspen Mendes MD  Utilization Review  Physician Advisor  [facility]     Unknown

## 2023-05-07 NOTE — PROGRESS NOTES
"[facility] Progress Note     IMPRESSION:  Sarah Rahman is a 57 year old female with PMH of HTN, DM type 2, COPD, HLD, schizoaffective disorder, CHF, s/p CABG who presented to the ED for chest pains and SOB. GI consulted for reported history of hematemesis yesterday.     1. Hematemesis  2. Melena  - reported large volume hematemesis on 5/4. She had another bout of possible hematemesis on 5/5 but just finished eating red jello. Vomit on 5/6 AM without blood. Hgb remains stable (11.7 ->12.4)    - Differential: Kathrine-davidson tear, esophagitis, gastritis/duodenitis, peptic ulcer, AVM  - PPI BID started on 5/5.     - Hgb is at baseline (11.7). BUN is not significantly elevated, doubtful of a significant bleed.  - No NSAIDs at home besides daily baby aspirin    RECOMMENDATIONS:  - Hgb stable, no further hematemesis. No plans for EGD.    - Continue PPI BID for 1 month, then once daily for 1 month, then off.     - Miralax and Senna for constipation    - Addendum: Discussed with GI MD, recommending for outpatient EGD. GI will help coordinate. (Explained this to patient, and she admits she does not plan to follow through with this; \"if anything happens, I plan to go to [facility] instead\")    GI will sign off at this time. Thank you for consulting us on this pleasant patient. Please call if questions arise or the patient's status changes.         Approximately 20 minutes of total time was spent providing patient care, including patient evaluation, reviewing documentation/test results, and     Torsten Green PA-C  [facility]  [phone]  ________________________________________________________________________      SUBJECTIVE:    Still no BMs. No other complaints. No further hematemesis.      OBJECTIVE:  /60 (BP Location: Left arm)   Pulse 70   Temp 97.7  F (36.5  C) (Oral)   Resp 18   Wt 89.6 kg (197 lb 8.5 oz)   LMP  (LMP Unknown)   SpO2 98%   BMI 30.94 kg/m  "   Temp (24hrs), Av.7  F (36.5  C), Min:97.4  F (36.3  C), Max:98  F (36.7  C)    Patient Vitals for the past 72 hrs:   Weight   23 1535 89.6 kg (197 lb 8.5 oz)       Intake/Output Summary (Last 24 hours) at 2023 1246  Last data filed at 2023 0000  Gross per 24 hour   Intake 1018.33 ml   Output 50 ml   Net 968.33 ml        PHYSICAL EXAM  GEN: Alert, oriented x3, communicative and in NAD.    LYMPH: No LAD noted.  HRT: RRR  LUNGS: CTA  ABD:  ND, +BS, no guarding or pain to palpation, no rebound, no HSM.  SKIN: No rash, jaundice or spider angiomata      LABS:  I have reviewed the patient's new clinical lab results:     Recent Labs   Lab Test 23  1141 23  1003 23  1608 10/07/22  0612 10/06/22  0918 22  1520 22  1022 22  0621 22  1436   WBC 6.4 7.4 5.6   < > 7.5   < > 6.9   < > 5.8   HGB 12.4 11.7 11.7   < > 11.0*   < > 12.4   < > 12.1   MCV 87 87 87   < > 87   < > 85   < > 84    319 254   < > 287   < >  --    < > 237   INR  --   --   --   --  1.04  --  0.95  --  0.95    < > = values in this interval not displayed.     Recent Labs   Lab Test 23  1141 23  1003 23  1608   POTASSIUM 4.3 4.2 4.7   CHLORIDE 106 102 107   CO2 19* 22 26   BUN 17.2 21.7* 21.0*   CR 0.96* 1.23* 0.93   ANIONGAP 12 14 7     Recent Labs   Lab Test 23  1003 23  0912 23  1608 22  0511 22  0955 22  1302   ALBUMIN 4.2  --  4.0 3.8  --   --    BILITOTAL 0.4  --  <0.2 <0.2  --   --    ALT 22  --  19 22  --   --    AST 16  --  17 18  --   --    PROTEIN  --  30*  --   --  30* 20*   LIPASE 13  --   --   --   --   --          IMAGING:  reviewed
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
## 2023-05-07 NOTE — PROGRESS NOTES
Thank you, Dr. Saravia, for asking the [facility] team to see Ms. Sarah Rahman for evaluation of chest pain.      Assessment/Recommendations   Assessment/Plan:  1.  Coronary artery disease s/p 2V CABG LIMA to LAD, SVG to D1 occluded, chest pain: Her ECG has no ischemic ST-T change.  Her high-sensitivity troponin is at borderline.  The patient states that she had episode of hematemesis yesterday morning.  No anticoagulation heparin infusion is indicated at this time.  Stop trending troponin,   TTE WNL  Consider ischemic evaluation on Monday given she continues to have chest pains and has a significant CAD history.  Continue her home medication aspirin, carvedilol.  Nitro patch as needed.      2.  Syncope: The etiology is not clear.  Her ECG is normal. Continue telemetry monitor.  Ischemic evaluation as mentioned above.    3.  Benign essential hypertension: Continue carvedilol 12.5 mg twice a day, Diovan 40 mg daily.    4.  Dyslipidemia: Check lipid profile, start the statin.    5.  Type 2 diabetes, history of for stroke, schizoaffective disorder, possible GI bleeding: Please see primary and other team management for details.       History of Present Illness/Subjective    It is my pleasure to see Sarah Rahman at [facility] for evaluation of chest pain.  Sarah Rahman is a 57 year old female with a medical history of coronary artery disease status post 2 vessel CABG in 2009 LIMA to LAD, SVG to D1 occluded per coronary angiogram in 2019, benign essential hypertension, dyslipidemia, type 2 diabetes, CVA, schizoaffective disorder, GERD.    Per H&P  The patient states that she passed out 1 time yesterday.  She went off the side and the past after again today.  She had some vomiting and threw up the blood this morning.  She complains of chest pain since 930 this morning.  She described her chest pain as located left anterior chest, sharp pain,  10/10 in intensity, radiated to left arm, similar to chest pain prior to CABG.  She also complains of shortness of breath, nausea, vomiting.  She has no fever or chills.  She has no palpitations, orthopnea, PND or leg edema.    When I saw her in her room, she complains of severe chest pain 10/10 intensity.  1 nitro slightly improved her chest pain.  ECG when she had chest pain showed normal sinus rhythm, normal ECG, no ischemic ST-T change.  Her high-sensitivity troponin is 15, 17.  Her proBNP is normal.       Physical Examination Review of Systems   /60 (BP Location: Left arm)   Pulse 70   Temp 97.7  F (36.5  C) (Oral)   Resp 18   Wt 89.6 kg (197 lb 8.5 oz)   LMP  (LMP Unknown)   SpO2 98%   BMI 30.94 kg/m    Body mass index is 30.94 kg/m .  Wt Readings from Last 3 Encounters:   05/05/23 89.6 kg (197 lb 8.5 oz)   02/22/23 89.4 kg (197 lb)   01/10/23 88 kg (194 lb)     No intake or output data in the 24 hours ending 05/05/23 1710    General Appearance:   Awake, Alert, No acute distress.   HEENT:  No scleral icterus; the mucous membranes were moist.   Neck: No cervical bruits. No JVD. No thyromegaly.    Chest: The spine was straight. The chest was symmetric.   Lungs:   Respirations unlabored; Lungs are clear to auscultation. No crackles.  No wheezing.   Cardiovascular:   Regular rhythm and rate, normal first and second heart sounds with no murmurs. No rubs or gallops.    Abdomen:  Soft. No tenderness. Bowels sounds are present   Extremities: Equal tibial pulses. No leg edema.   Skin: No rashes. Warm, Dry.   Musculoskeletal: No tenderness.   Neurologic: Oriented x 3. Mood and affect are appropriate.     A 12 point comprehensive review of systems was negative except as noted.        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 Hamm at [facility]     Schizoaffective disorder (H)      Sleep difficulties 7/17/2022    Past Surgical History:   Procedure Laterality Date     BYPASS GRAFT ARTERY CORONARY  01/01/2009    x2     CAROTID ENDARTERECTOMY Left 12/2021     CORONARY STENT PLACEMENT       CV CORONARY ANGIOGRAM N/A 06/02/2021    Procedure: Coronary Angiogram;  Surgeon: Juventino Rivera MD;  Location: [facility] Cardiac Cath Lab;  Service: Cardiology     HYSTERECTOMY TOTAL ABDOMINAL       HYSTERECTOMY TOTAL ABDOMINAL, BILATERAL SALPINGO-OOPHORECTOMY, COMBINED       IR TRANSLAMINAR EPIDURAL LUMBAR INJ INCL IMAGING  09/27/2022     LUMBAR DISCECTOMY Right 06/13/2019    L5-S1 - Dr. Hamm     NASAL FRACTURE SURGERY       ORIF ULNAR / RADIAL SHAFT FRACTURE Right     Family History   Problem Relation Age of Onset     Heart Disease Mother      Heart Disease Father      Heart Disease Sister      Diabetes Brother      Heart Disease Sister     Social History     Socioeconomic History     Marital status: Single     Spouse name: Not on file     Number of children: Not on file     Years of education: Not on file     Highest education level: Not on file   Occupational History     Not on file   Tobacco Use     Smoking status: Every Day     Packs/day: 0.50     Types: Cigarettes     Smokeless tobacco: Never   Vaping Use     Vaping status: Never Used     Passive vaping exposure: Yes   Substance and Sexual Activity     Alcohol use: Not Currently     Comment: Alcoholic Drinks/day: occ     Drug use: No     Sexual activity: Not Currently   Other Topics Concern     Not on file   Social History Narrative     Lives alone in a condo.          On disability.  Three living children.       Social Determinants of Health     Financial Resource Strain: Not on file   Food Insecurity: Not on file   Transportation Needs: Not on file   Physical Activity: Not on file   Stress: Not on file   Social Connections: Not on file   Intimate Partner Violence: Not on file   Housing Stability: Not on file          Medications  Allergies   Scheduled Meds:    aspirin  81 mg Oral Daily     atorvastatin  40 mg Oral QPM     buprenorphine HCl-naloxone HCl  1 Film Sublingual TID     carvedilol  12.5 mg Oral BID w/meals     cetirizine  10 mg Oral Daily     dulaglutide  0.75 mg Subcutaneous Q7 Days     DULoxetine  60 mg Oral BID     hydrocortisone   Topical BID     insulin aspart  1-3 Units Subcutaneous TID AC     insulin aspart  1-3 Units Subcutaneous At Bedtime     insulin aspart   Subcutaneous TID AC     insulin glargine  25 Units Subcutaneous BID     lidocaine  1 patch Transdermal Q24H    And     lidocaine   Transdermal Q8H TREVOR     methocarbamol  750 mg Oral Q6H     nortriptyline  10 mg Oral At Bedtime     pantoprazole  40 mg Intravenous BID     polyethylene glycol  17 g Oral BID     sodium chloride (PF)  3 mL Intracatheter Q8H     topiramate  100 mg Oral BID     valsartan  40 mg Oral Daily     Continuous Infusions:    PRN Meds:.acetaminophen, albuterol, calcium carbonate, glucose **OR** dextrose **OR** glucagon, diclofenac, fluticasone, ipratropium **AND** levalbuterol, lidocaine 4%, lidocaine (buffered or not buffered), LORazepam, melatonin, naloxone **OR** naloxone **OR** naloxone **OR** naloxone, nicotine, nitroGLYcerin, ondansetron **OR** ondansetron, oxyCODONE-acetaminophen **AND** oxyCODONE, senna-docusate **OR** senna-docusate, sodium chloride (PF) Allergies   Allergen Reactions     Haloperidol Unknown     Patient states it stops her heart       Meperidine And Related Angioedema, Difficulty breathing, Other (See Comments), Rash and  "Shortness Of Breath     Throat closes  Other reaction(s): Breathing Difficulty  Other reaction(s): Breathing Difficulty       Phenothiazines Other (See Comments)     Other reaction(s): CARDIAC DISTURBANCES  Patient states it stops her heart  \"I swell up\"  \"stopped by heart\"  \"I swell up\"  \"I swell up\"       Tramadol Other (See Comments)     Other reaction(s): Angioedema  Throat itch       Haloperidol And Related Angioedema     Januvia [Sitagliptin] Other (See Comments)     Swelling in the neck      Latex Itching     Lisinopril Other (See Comments)     ACE cough  Itchy throat  Throat itches  Itchy throat       Penicillins Swelling     Hydroxyzine Other (See Comments) and Rash     Tongue swelling  Tongue swelling  Tongue swelling           Lab Results    Chemistry/lipid CBC Cardiac Enzymes/BNP/TSH/INR   Lab Results   Component Value Date    CHOL 144 10/07/2022    HDL 38 (L) 10/07/2022    TRIG 113 10/07/2022    BUN 17.2 05/06/2023     05/06/2023    CO2 19 (L) 05/06/2023    Lab Results   Component Value Date    WBC 6.4 05/06/2023    HGB 12.4 05/06/2023    HCT 39.2 05/06/2023    MCV 87 05/06/2023     05/06/2023    Lab Results   Component Value Date    TROPONINI <0.01 07/17/2022     (H) 04/03/2022    TSH 1.38 10/06/2022    INR 1.04 10/06/2022        Will Oliveros MD  Clinical Cardiac Electrophysiology    "

## 2023-05-08 ENCOUNTER — APPOINTMENT (OUTPATIENT)
Dept: NUCLEAR MEDICINE | Facility: HOSPITAL | Age: 58
End: 2023-05-08
Attending: INTERNAL MEDICINE
Payer: COMMERCIAL

## 2023-05-08 ENCOUNTER — APPOINTMENT (OUTPATIENT)
Dept: PHYSICAL THERAPY | Facility: HOSPITAL | Age: 58
End: 2023-05-08
Attending: INTERNAL MEDICINE
Payer: COMMERCIAL

## 2023-05-08 ENCOUNTER — APPOINTMENT (OUTPATIENT)
Dept: CARDIOLOGY | Facility: HOSPITAL | Age: 58
End: 2023-05-08
Attending: INTERNAL MEDICINE
Payer: COMMERCIAL

## 2023-05-08 LAB
ANION GAP SERPL CALCULATED.3IONS-SCNC: 9 MMOL/L (ref 7–15)
ATRIAL RATE - MUSE: 95 BPM
BUN SERPL-MCNC: 24.1 MG/DL (ref 6–20)
CALCIUM SERPL-MCNC: 8.8 MG/DL (ref 8.6–10)
CHLORIDE SERPL-SCNC: 108 MMOL/L (ref 98–107)
CREAT SERPL-MCNC: 0.96 MG/DL (ref 0.51–0.95)
CV STRESS CURRENT BP HE: NORMAL
CV STRESS CURRENT HR HE: 73
CV STRESS CURRENT HR HE: 75
CV STRESS CURRENT HR HE: 75
CV STRESS CURRENT HR HE: 78
CV STRESS CURRENT HR HE: 78
CV STRESS CURRENT HR HE: 79
CV STRESS CURRENT HR HE: 83
CV STRESS CURRENT HR HE: 84
CV STRESS CURRENT HR HE: 85
CV STRESS CURRENT HR HE: 86
CV STRESS DEVIATION TIME HE: NORMAL
CV STRESS ECHO PERCENT HR HE: NORMAL
CV STRESS EXERCISE STAGE HE: NORMAL
CV STRESS FINAL RESTING BP HE: NORMAL
CV STRESS FINAL RESTING HR HE: 78
CV STRESS MAX HR HE: 87
CV STRESS MAX TREADMILL GRADE HE: 0
CV STRESS MAX TREADMILL SPEED HE: 0
CV STRESS PEAK DIA BP HE: NORMAL
CV STRESS PEAK SYS BP HE: NORMAL
CV STRESS PHASE HE: NORMAL
CV STRESS PROTOCOL HE: NORMAL
CV STRESS RESTING PT POSITION HE: NORMAL
CV STRESS RESTING PT POSITION HE: NORMAL
CV STRESS ST DEVIATION AMOUNT HE: NORMAL
CV STRESS ST DEVIATION ELEVATION HE: NORMAL
CV STRESS ST EVELATION AMOUNT HE: NORMAL
CV STRESS TEST TYPE HE: NORMAL
CV STRESS TOTAL STAGE TIME MIN 1 HE: NORMAL
DEPRECATED HCO3 PLAS-SCNC: 20 MMOL/L (ref 22–29)
DIASTOLIC BLOOD PRESSURE - MUSE: NORMAL MMHG
ERYTHROCYTE [DISTWIDTH] IN BLOOD BY AUTOMATED COUNT: 13.2 % (ref 10–15)
GFR SERPL CREATININE-BSD FRML MDRD: 68 ML/MIN/1.73M2
GLUCOSE BLDC GLUCOMTR-MCNC: 104 MG/DL (ref 70–99)
GLUCOSE BLDC GLUCOMTR-MCNC: 135 MG/DL (ref 70–99)
GLUCOSE BLDC GLUCOMTR-MCNC: 83 MG/DL (ref 70–99)
GLUCOSE BLDC GLUCOMTR-MCNC: 83 MG/DL (ref 70–99)
GLUCOSE SERPL-MCNC: 123 MG/DL (ref 70–99)
HCT VFR BLD AUTO: 32.7 % (ref 35–47)
HGB BLD-MCNC: 9.9 G/DL (ref 11.7–15.7)
INTERPRETATION ECG - MUSE: NORMAL
MCH RBC QN AUTO: 26.8 PG (ref 26.5–33)
MCHC RBC AUTO-ENTMCNC: 30.3 G/DL (ref 31.5–36.5)
MCV RBC AUTO: 89 FL (ref 78–100)
NUC STRESS EJECTION FRACTION: 70 %
P AXIS - MUSE: 2 DEGREES
PLATELET # BLD AUTO: 250 10E3/UL (ref 150–450)
POTASSIUM SERPL-SCNC: 4.4 MMOL/L (ref 3.4–5.3)
PR INTERVAL - MUSE: 160 MS
QRS DURATION - MUSE: 76 MS
QT - MUSE: 370 MS
QTC - MUSE: 464 MS
R AXIS - MUSE: 24 DEGREES
RATE PRESSURE PRODUCT: NORMAL
RBC # BLD AUTO: 3.69 10E6/UL (ref 3.8–5.2)
SODIUM SERPL-SCNC: 137 MMOL/L (ref 136–145)
STRESS ECHO BASELINE DIASTOLIC HE: 76
STRESS ECHO BASELINE HR: 75
STRESS ECHO BASELINE SYSTOLIC BP: 133
STRESS ECHO CALCULATED PERCENT HR: 54 %
STRESS ECHO LAST STRESS DIASTOLIC BP: 71
STRESS ECHO LAST STRESS HR: 85
STRESS ECHO LAST STRESS SYSTOLIC BP: 144
STRESS ECHO TARGET HR: 162
SYSTOLIC BLOOD PRESSURE - MUSE: NORMAL MMHG
T AXIS - MUSE: 99 DEGREES
VENTRICULAR RATE- MUSE: 95 BPM
WBC # BLD AUTO: 6.2 10E3/UL (ref 4–11)

## 2023-05-08 PROCEDURE — 99232 SBSQ HOSP IP/OBS MODERATE 35: CPT | Performed by: INTERNAL MEDICINE

## 2023-05-08 PROCEDURE — G0378 HOSPITAL OBSERVATION PER HR: HCPCS

## 2023-05-08 PROCEDURE — 96376 TX/PRO/DX INJ SAME DRUG ADON: CPT

## 2023-05-08 PROCEDURE — 93018 CV STRESS TEST I&R ONLY: CPT | Performed by: INTERNAL MEDICINE

## 2023-05-08 PROCEDURE — 85027 COMPLETE CBC AUTOMATED: CPT | Performed by: INTERNAL MEDICINE

## 2023-05-08 PROCEDURE — 78452 HT MUSCLE IMAGE SPECT MULT: CPT

## 2023-05-08 PROCEDURE — 97162 PT EVAL MOD COMPLEX 30 MIN: CPT | Mod: GP

## 2023-05-08 PROCEDURE — 343N000001 HC RX 343: Performed by: INTERNAL MEDICINE

## 2023-05-08 PROCEDURE — 999N000248 HC STATISTIC IV INSERT WITH US BY RN

## 2023-05-08 PROCEDURE — 250N000013 HC RX MED GY IP 250 OP 250 PS 637: Performed by: PHYSICIAN ASSISTANT

## 2023-05-08 PROCEDURE — 250N000013 HC RX MED GY IP 250 OP 250 PS 637: Performed by: INTERNAL MEDICINE

## 2023-05-08 PROCEDURE — 78452 HT MUSCLE IMAGE SPECT MULT: CPT | Mod: 26 | Performed by: INTERNAL MEDICINE

## 2023-05-08 PROCEDURE — 82962 GLUCOSE BLOOD TEST: CPT

## 2023-05-08 PROCEDURE — 97530 THERAPEUTIC ACTIVITIES: CPT | Mod: GP

## 2023-05-08 PROCEDURE — 250N000011 HC RX IP 250 OP 636: Performed by: PHYSICIAN ASSISTANT

## 2023-05-08 PROCEDURE — 93017 CV STRESS TEST TRACING ONLY: CPT

## 2023-05-08 PROCEDURE — 99207 PR CDG-CUT & PASTE-POTENTIAL IMPACT ON LEVEL: CPT | Performed by: INTERNAL MEDICINE

## 2023-05-08 PROCEDURE — 93016 CV STRESS TEST SUPVJ ONLY: CPT | Performed by: INTERNAL MEDICINE

## 2023-05-08 PROCEDURE — 36415 COLL VENOUS BLD VENIPUNCTURE: CPT | Performed by: INTERNAL MEDICINE

## 2023-05-08 PROCEDURE — 250N000012 HC RX MED GY IP 250 OP 636 PS 637: Performed by: INTERNAL MEDICINE

## 2023-05-08 PROCEDURE — C9113 INJ PANTOPRAZOLE SODIUM, VIA: HCPCS | Performed by: PHYSICIAN ASSISTANT

## 2023-05-08 PROCEDURE — A9500 TC99M SESTAMIBI: HCPCS | Performed by: INTERNAL MEDICINE

## 2023-05-08 PROCEDURE — 250N000011 HC RX IP 250 OP 636: Performed by: INTERNAL MEDICINE

## 2023-05-08 PROCEDURE — 82310 ASSAY OF CALCIUM: CPT | Performed by: INTERNAL MEDICINE

## 2023-05-08 PROCEDURE — 99231 SBSQ HOSP IP/OBS SF/LOW 25: CPT | Performed by: INTERNAL MEDICINE

## 2023-05-08 RX ORDER — REGADENOSON 0.08 MG/ML
0.4 INJECTION, SOLUTION INTRAVENOUS ONCE
Status: COMPLETED | OUTPATIENT
Start: 2023-05-08 | End: 2023-05-08

## 2023-05-08 RX ORDER — AMINOPHYLLINE 25 MG/ML
50 INJECTION, SOLUTION INTRAVENOUS
Status: ACTIVE | OUTPATIENT
Start: 2023-05-08 | End: 2023-05-08

## 2023-05-08 RX ADMIN — LIDOCAINE 1 PATCH: 4 PATCH TOPICAL at 09:04

## 2023-05-08 RX ADMIN — INSULIN GLARGINE 25 UNITS: 100 INJECTION, SOLUTION SUBCUTANEOUS at 20:27

## 2023-05-08 RX ADMIN — POLYETHYLENE GLYCOL 3350 17 G: 17 POWDER, FOR SOLUTION ORAL at 20:22

## 2023-05-08 RX ADMIN — DULOXETINE HYDROCHLORIDE 60 MG: 60 CAPSULE, DELAYED RELEASE PELLETS ORAL at 20:19

## 2023-05-08 RX ADMIN — CARVEDILOL 12.5 MG: 12.5 TABLET, FILM COATED ORAL at 10:53

## 2023-05-08 RX ADMIN — INSULIN GLARGINE 25 UNITS: 100 INJECTION, SOLUTION SUBCUTANEOUS at 09:04

## 2023-05-08 RX ADMIN — OXYCODONE HYDROCHLORIDE AND ACETAMINOPHEN 1 TABLET: 5; 325 TABLET ORAL at 16:03

## 2023-05-08 RX ADMIN — VALSARTAN 40 MG: 40 TABLET, FILM COATED ORAL at 08:57

## 2023-05-08 RX ADMIN — DULOXETINE HYDROCHLORIDE 60 MG: 60 CAPSULE, DELAYED RELEASE PELLETS ORAL at 08:56

## 2023-05-08 RX ADMIN — HYDROCORTISONE: 1 CREAM TOPICAL at 20:19

## 2023-05-08 RX ADMIN — TOPIRAMATE 100 MG: 100 TABLET, FILM COATED ORAL at 20:17

## 2023-05-08 RX ADMIN — DICLOFENAC SODIUM 2 G: 10 GEL TOPICAL at 16:03

## 2023-05-08 RX ADMIN — CETIRIZINE HYDROCHLORIDE 10 MG: 10 TABLET, FILM COATED ORAL at 08:56

## 2023-05-08 RX ADMIN — TOPIRAMATE 100 MG: 100 TABLET, FILM COATED ORAL at 08:57

## 2023-05-08 RX ADMIN — BUPRENORPHINE HYDROCHLORIDE, NALOXONE HYDROCHLORIDE 1 FILM: 2; .5 FILM, SOLUBLE BUCCAL; SUBLINGUAL at 20:20

## 2023-05-08 RX ADMIN — AMINOPHYLLINE 50 MG: 25 INJECTION, SOLUTION INTRAVENOUS at 13:06

## 2023-05-08 RX ADMIN — CARVEDILOL 12.5 MG: 12.5 TABLET, FILM COATED ORAL at 16:03

## 2023-05-08 RX ADMIN — BUPRENORPHINE HYDROCHLORIDE, NALOXONE HYDROCHLORIDE 1 FILM: 2; .5 FILM, SOLUBLE BUCCAL; SUBLINGUAL at 14:29

## 2023-05-08 RX ADMIN — Medication 9.07 MILLICURIE: at 11:51

## 2023-05-08 RX ADMIN — ASPIRIN 81 MG: 81 TABLET, COATED ORAL at 08:56

## 2023-05-08 RX ADMIN — METHOCARBAMOL 750 MG: 750 TABLET ORAL at 05:36

## 2023-05-08 RX ADMIN — ATORVASTATIN CALCIUM 40 MG: 40 TABLET, FILM COATED ORAL at 20:19

## 2023-05-08 RX ADMIN — REGADENOSON 0.4 MG: 0.08 INJECTION, SOLUTION INTRAVENOUS at 13:02

## 2023-05-08 RX ADMIN — NORTRIPTYLINE HYDROCHLORIDE 10 MG: 10 CAPSULE ORAL at 20:18

## 2023-05-08 RX ADMIN — PANTOPRAZOLE SODIUM 40 MG: 40 INJECTION, POWDER, FOR SOLUTION INTRAVENOUS at 08:54

## 2023-05-08 RX ADMIN — Medication 31.8 MILLICURIE: at 13:22

## 2023-05-08 RX ADMIN — PANTOPRAZOLE SODIUM 40 MG: 40 INJECTION, POWDER, FOR SOLUTION INTRAVENOUS at 20:17

## 2023-05-08 RX ADMIN — ACETAMINOPHEN 650 MG: 325 TABLET ORAL at 14:29

## 2023-05-08 RX ADMIN — METHOCARBAMOL 750 MG: 750 TABLET ORAL at 14:29

## 2023-05-08 RX ADMIN — BUPRENORPHINE HYDROCHLORIDE, NALOXONE HYDROCHLORIDE 1 FILM: 2; .5 FILM, SOLUBLE BUCCAL; SUBLINGUAL at 08:56

## 2023-05-08 RX ADMIN — HYDROCORTISONE: 1 CREAM TOPICAL at 09:04

## 2023-05-08 RX ADMIN — METHOCARBAMOL 750 MG: 750 TABLET ORAL at 20:19

## 2023-05-08 ASSESSMENT — ACTIVITIES OF DAILY LIVING (ADL)
ADLS_ACUITY_SCORE: 33

## 2023-05-08 ASSESSMENT — COLUMBIA-SUICIDE SEVERITY RATING SCALE - C-SSRS
ATTEMPT LIFETIME: NO
1. HAVE YOU WISHED YOU WERE DEAD OR WISHED YOU COULD GO TO SLEEP AND NOT WAKE UP?: NO

## 2023-05-08 NOTE — PROGRESS NOTES
05/08/23 1000   Appointment Info   Signing Clinician's Name / Credentials (PT) Sherly Rosario DPT   Quick Adds   Quick Adds Certification;Vestibular Eval   Living Environment   People in Home alone   Current Living Arrangements apartment   Home Accessibility stairs to enter home   Number of Stairs, Main Entrance 7   Stair Railings, Main Entrance railings safe and in good condition   Self-Care   Usual Activity Tolerance moderate   Current Activity Tolerance fair   Equipment Currently Used at Home none   Activity/Exercise/Self-Care Comment ind w/ toileting and amb, no AD. Has PCA 6hrs/day that assists w/ IADLs and dressing/bathing   General Information   Onset of Illness/Injury or Date of Surgery 05/05/23   Referring Physician Lillie Saravia MD   Patient/Family Therapy Goals Statement (PT) none stated   Pertinent History of Current Problem (include personal factors and/or comorbidities that impact the POC) 57 year old female with history of CAD status post CABG, schizoaffective disorder, type 2 diabetes, CVA, COPD, depression, hyperlipidemia, GERD, hypertension, and tobacco use disorder with admitted on 5/5/2023 due to hematemesis, syncope and left-sided chest pain.   Existing Precautions/Restrictions fall   Pain Assessment   Patient Currently in Pain No   Range of Motion (ROM)   Range of Motion ROM is WFL   Strength (Manual Muscle Testing)   Strength (Manual Muscle Testing) Deficits observed during functional mobility   Bed Mobility   Bed Mobility supine-sit   Supine-Sit [place] (Bed Mobility) supervision   Assistive Device (Bed Mobility) bed rails   Comment, (Bed Mobility) reports dizziness   Transfers   Transfers sit-stand transfer   Sit-Stand Transfer   Sit-Stand [place] (Transfers) contact guard   Assistive Device (Sit-Stand Transfers)   (none)   Comment, (Sit-Stand Transfer) c/o dizziness   Gait/Stairs (Locomotion)   Comment, (Gait/Stairs) declines amb d/t dizziness   Oculomotor Exam    Smooth Pursuit Normal   Smooth Pursuit Comment does not c/o change in dizziness. but does report room/world spinning dizziness.   Saccades Normal   Saccades Comments does not c/o change in dizziness. gets frustrated with PT when attempting to assist in diagnosing dizziness.   Clinical Impression   Criteria for Skilled Therapeutic Intervention Yes, treatment indicated   PT Diagnosis (PT) impaired functional mobility, gait abnoramlity   Influenced by the following impairments decreased strength, decreased endurance, dizziness   Functional limitations due to impairments gait, transfers, bed mob   Clinical Presentation (PT Evaluation Complexity) Evolving/Changing   Clinical Presentation Rationale pt presents as medically diagnosed   Clinical Decision Making (Complexity) moderate complexity   Planned Therapy Interventions (PT) balance training;bed mobility training;gait training;home exercise program;neuromuscular re-education;patient/family education;strengthening   Anticipated Equipment Needs at Discharge (PT) walker, rolling   Risk & Benefits of therapy have been explained evaluation/treatment results reviewed;patient   PT Total Evaluation Time   PT Eval, Moderate Complexity Minutes (98015) 10   Therapy Certification   Start of care date 05/08/23   Certification date from 05/08/23   Certification date to 05/15/23   Medical Diagnosis suicidal ideation   Physical Therapy Goals   PT Frequency 5x/week   PT Predicted Duration/Target Date for Goal Attainment 05/15/23   PT Goals Bed Mobility;Transfers;Gait;Stairs   PT: Bed Mobility Supervision/stand-by assist;Supine to/from sit   PT: Transfers Supervision/stand-by assist;Sit to/from stand;Bed to/from chair   PT: Gait Supervision/stand-by assist;Assistive device;Rolling walker;50 feet   PT: Stairs Minimal assist;7 stairs;Rail on both sides   Interventions   Interventions Quick Adds Therapeutic Activity   Therapeutic Activity   Therapeutic Activities: dynamic activities to  improve functional performance Minutes (32816) 8   Symptoms Noted During/After Treatment Dizziness   Treatment Detail/Skilled Intervention sitting EOB SBA x 5 minutes with no LOB, reports dizziness. Static standing x 30 seconds with SBA-CGA, reports no change in dizziness. EOB > chair CGA-Raiza x 1, very unsteady, reporting dizziness.   PT Discharge Planning   PT Plan amb w/ FWW vs no AD w/ chair follow, sit <> stand, LE ex as able   PT Discharge Recommendation (DC Rec) Transitional Care Facility   PT Rationale for DC Rec currently pt unable to amb further than EOB > chair d/t dizziness. If she doesn't have 24 hour assist and do stairs, pt will need TCU   PT Brief overview of current status [id number] for mobility   Total Session Time   Timed Code Treatment Minutes 8   Total Session Time (sum of timed and untimed services) 18     [facility]  OUTPATIENT PHYSICAL THERAPY EVALUATION  PLAN OF TREATMENT FOR OUTPATIENT REHABILITATION  (COMPLETE FOR INITIAL CLAIMS ONLY)  Patient's Last Name, First Name, M.I.  YOB: 1965  Sarah Rahman                        Provider's Name  [facility] Medical Record No.  [id number]                             Onset Date:  05/05/23   Start of Care Date:  05/08/23   Type:     _X_PT   ___OT   ___SLP Medical Diagnosis:  suicidal ideation              PT Diagnosis:  impaired functional mobility, gait abnoramlity Visits from SOC:  1     See note for plan of treatment, functional goals and certification details    I CERTIFY THE NEED FOR THESE SERVICES FURNISHED UNDER        THIS PLAN OF TREATMENT AND WHILE UNDER MY CARE     (Physician co-signature of this document indicates review and certification of the therapy plan).

## 2023-05-08 NOTE — PROGRESS NOTES
Will not see today:  PAIN MANAGEMENT SERVICE CHART CHECK  This patient's chart has been reviewed by the Pain Service. It has been determined that no change is necessary to the pain management regimen at this time. The Pain team will sign off. Patient is on her home regimen.     Thank you!      Avril Miner APRN, CNS-BC, CNP, ACHPN  Acute Care Pain Management Program   Hours of pain coverage 7a-1700- after 1700 please call the house officer   [facility] ([place], [place], [place], [place])   Page via Canv[employer]- Click HERE to page Avril or Veronica text web console
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
## 2023-05-08 NOTE — CONSULTS
"Triage and Transition - Consult and Liaison     Sarah Rahman  May 8, 2023    Session start: 11:10 am  Session end: 11:32 am  Session duration in minutes: 22  CPT utilized: 30213 - Brief diagnostic assessment (modifier 52)  Patient was seen virtually (CMOSIS nv cart or other teleconferencing device).    Diagnosis:   300.09 (F41.8) Other Specified Anxiety Disorder , present and by hx.;  (F259.0)  Schizoaffective Disorder, by hx.     Plan/Recommendations:     Pt appears confused at times, unsure of what meds she is on.    This writers role is to address sx, hx, dx, before psychiatry provider sees pt.    Will ask psychiatry provider to follow-up, left message.    Maintain current transition plan. Next steps include: Psychiatry provider follow-up.      Reason for consult: Psychiatry consult was requested due to \"schizoaffective disorder\"-per consult reason. Patient was seen by Triage and Transition Consult & Liaison team.     Identifying information: Sarah is 57 year old Black or   female   followed related to \"schizoaffective disorder\"-per consult reason.    Summary of Patient Situation  Pt laying in bed upon TH arrival, at first not answering, then answering later at times.  Pt appears confused at times, not knowing what meds she is on, states \"I do not know\" a lot.  Pt states she is on the hospital for \"I was outside and then I blacked out\".  Pt states her current mood as \"I feel weak, lightheaded, my legs are stinging and burning\".   Per chart review, pt with a hx of other specified anxiety disorder, and schizoaffective disorder, with information available in epic ehr at the time of this assessment.    Pt today presents a bit lethargic, appears confused at times, with anxiety sx including excessive worry, difficulty controlling worry.  Pt denies any depression sx.  Chart noting indicates on \"5/6 pt with irrational fears\", scared anxious and fearful\".  Pt denies any SI,HI,AH/VH, and denies hx " "of attempts.  Upon inquiry if the pt is hopeful ,she appears confused, she states \"I am excited my friend found her home, I want them to figure out what is going on and to fix it, I do not know what is going on\".      Pt reports she lacks supports, upon inquiry she states \"I support myself\".  Upon further inquiry she states \"not a lot of supports\".  Pt does not expand, inquiry around family and adult kids, pt stares and does not answer.  Upon inquiry regarding coping skills, pt states \"I stay in the house, I don't know\".  Clinician discussed mindfulness, distraction, meditation.    Pt reports she does not see a therapist currently.  She appears to be on psych meds per chart review, however pt appears to be surprised she is on psych meds and unaware at this point in time \"What psych meds am I on?  \"Effexor?\", \"Since when?\",  \"What is that?\" \"I did not know that\".  Per chart review pt appears to be on effexor, cymbalta, nortriptyline, topamax, suboxone, and PRN ativan.  Given care team request for psychiatry provider to address psych meds per chart noting, will ask psychiatry provider to follow up, left message.               Collateral information:   Reviewed chart, coordinated with care team, and coordinated with psychiatry provider for pt follow-up.      Mental Status Exam   Affect: Other: appears confused at times.  Appearance: Appropriate   Attention Span/Concentration: Inattentive    Eye Contact: Variable  Fund of Knowledge: Other: appears confused at times \"I do not know\".   Language /Speech Content: Fluent  Language /Speech Volume: Normal   Language /Speech Rate/Productions: Normal   Recent Memory: Variable  Remote Memory: Variable  Mood: Anxious   Orientation:   Person: Yes   Place: Yes  Time of Day: Answer: .   Date: Answer: .   Situation (Do they understand why they are here?): Answer: \"I blacked out, chest pain\".   Psychomotor Behavior: Normal   Thought Content: Clear denies SI, HI, AH/VH  Thought Form: " Intact    Current medications: Per EHR at the time of this note  Current Facility-Administered Medications   Medication     acetaminophen (TYLENOL) tablet 650 mg     albuterol (PROVENTIL HFA/VENTOLIN HFA) inhaler     aspirin EC tablet 81 mg     atorvastatin (LIPITOR) tablet 40 mg     buprenorphine HCl-naloxone HCl (SUBOXONE) 2-0.5 MG per film 1 Film     calcium carbonate (TUMS) chewable tablet 1,000 mg     carvedilol (COREG) tablet 12.5 mg     cetirizine (zyrTEC) tablet 10 mg     glucose gel 15-30 g    Or     dextrose 50 % injection 25-50 mL    Or     glucagon injection 1 mg     diclofenac (VOLTAREN) 1 % topical gel 2 g     dulaglutide (TRULICITY) pen for injection 0.75 mg     DULoxetine (CYMBALTA) DR capsule 60 mg     fluticasone (FLONASE) 50 MCG/ACT spray 1 spray     hydrocortisone (CORTAID) 1 % cream     insulin aspart (NovoLOG) injection (RAPID ACTING)     insulin aspart (NovoLOG) injection (RAPID ACTING)     insulin aspart (NovoLOG) injection (RAPID ACTING)     insulin glargine (LANTUS PEN) injection 25 Units     ipratropium (ATROVENT) 0.02 % neb solution 0.5 mg    And     levalbuterol (XOPENEX) neb solution 1.25 mg     Lidocaine (LIDOCARE) 4 % Patch 1 patch    And     lidocaine patch in PLACE     lidocaine (LMX4) cream     lidocaine 1 % 0.1-1 mL     melatonin tablet 1 mg     methocarbamol (ROBAXIN) tablet 750 mg     naloxone (NARCAN) injection 0.2 mg    Or     naloxone (NARCAN) injection 0.4 mg    Or     naloxone (NARCAN) injection 0.2 mg    Or     naloxone (NARCAN) injection 0.4 mg     nicotine (NICORETTE) gum 2 mg     nitroGLYcerin (NITROSTAT) sublingual tablet 0.4 mg     nortriptyline (PAMELOR) capsule 10 mg     ondansetron (ZOFRAN ODT) ODT tab 4 mg    Or     ondansetron (ZOFRAN) injection 4 mg     oxyCODONE-acetaminophen (PERCOCET) 5-325 MG per tablet 1 tablet    And     oxyCODONE (ROXICODONE) tablet 5 mg     pantoprazole (PROTONIX) IV push injection 40 mg     polyethylene glycol (MIRALAX) Packet 17 g      senna-docusate (SENOKOT-S/PERICOLACE) 8.6-50 MG per tablet 1 tablet    Or     senna-docusate (SENOKOT-S/PERICOLACE) 8.6-50 MG per tablet 2 tablet     sodium chloride (PF) 0.9% PF flush 3 mL     sodium chloride (PF) 0.9% PF flush 3 mL     topiramate (TOPAMAX) tablet 100 mg     valsartan (DIOVAN) tablet 40 mg        Therapeutic intervention and progress:  Therapeutic intervention consisted of building therapeutic rapport, active listening, validation, normalizing and CBT concepts.       Maribel WILLIAMSON Psychotherapist Trainee  Triage and Transition - Consult and Liaison   [phone]

## 2023-05-08 NOTE — PROGRESS NOTES
Cardiology Progress Note    Assessment/Plan:  1.  Chest pain, etiology unclear as nuclear stress test today negative for ischemia or infarction.  Okay from a cardiac standpoint to discharge home.  2.  Coronary artery disease, status post two-vessel CABG with LIMA to the LAD and saphenous vein graft to the first diagonal branch which is occluded.  As above, nuclear stress test negative.  3.  Syncope, etiology unclear.  No evidence of arrhythmia since her admission.  4.  Essential hypertension, controlled with carvedilol and Diovan.    Primary cardiologist: None    Subjective:  Patient reporting recurring episodes of chest discomfort although ECG and troponin levels here are unrevealing.  Nuclear stress test completed today shows no evidence of ischemia or infarction.      aspirin  81 mg Oral Daily     atorvastatin  40 mg Oral QPM     buprenorphine HCl-naloxone HCl  1 Film Sublingual TID     carvedilol  12.5 mg Oral BID w/meals     cetirizine  10 mg Oral Daily     dulaglutide  0.75 mg Subcutaneous Q7 Days     DULoxetine  60 mg Oral BID     hydrocortisone   Topical BID     insulin aspart  1-3 Units Subcutaneous TID AC     insulin aspart  1-3 Units Subcutaneous At Bedtime     insulin aspart   Subcutaneous TID AC     insulin glargine  25 Units Subcutaneous BID     lidocaine  1 patch Transdermal Q24H    And     lidocaine   Transdermal Q8H TREVOR     methocarbamol  750 mg Oral Q6H     nortriptyline  10 mg Oral At Bedtime     pantoprazole  40 mg Intravenous BID     polyethylene glycol  17 g Oral BID     sodium chloride (PF)  3 mL Intracatheter Q8H     topiramate  100 mg Oral BID     valsartan  40 mg Oral Daily         Objective:   Vital signs in last 24 hours:  Temp:  [97.7  F (36.5  C)-98  F (36.7  C)] 97.8  F (36.6  C)  Pulse:  [70-76] 73  Resp:  [16-20] 18  BP: (105-146)/(58-89) 131/69  SpO2:  [94 %-99 %] 95 %  Weight:        Review of Systems:  Negative    Physical Exam:  General appearance: alert, cooperative, no  distress   Head: Normocephalic, without obvious abnormality, atraumatic  Neck: no JVD   Lungs: clear to auscultation bilaterally   Heart: Regular rate and rhythm.  S1, S2 normal  Extremities: No peripheral edema    Cardiographics (personally reviewed):   Telemetry demonstrates sinus rhythm.  No significant arrhythmias.    Imaging (personally reviewed):     Lexiscan nuclear stress test  Result Text     A prior study was conducted on 6/1/2021.  Prior images were unavailable for comparison review.     Pharmacological regadenoson stress ECG is negative for inducible myocardial ischemia.     Pharmacological regadenoson nuclear stress test is negative for inducible myocardial ischemia or infarction.     Normal left ventricular size, wall motion and systolic function.  Calculated left ventricle ejection fraction is 70%.     The patient is at a low risk of future cardiac ischemic events.    Lab Results (personally reviewed):     Recent Labs   Lab Test 05/07/23  1019   CHOL 243*   HDL 40*   *   TRIG 158*     Recent Labs   Lab Test 05/07/23  1019 10/07/22  0612 10/24/18  0523   * 83 133*     Recent Labs   Lab Test 05/08/23  1413 05/08/23  0837 05/08/23  0424   NA  --   --  137   POTASSIUM  --   --  4.4   CHLORIDE  --   --  108*   CO2  --   --  20*   GLC 83   < > 123*   BUN  --   --  24.1*   CR  --   --  0.96*   GFRESTIMATED  --   --  68   MAXIMO  --   --  8.8    < > = values in this interval not displayed.     Recent Labs   Lab Test 05/08/23  0424 05/07/23  1019 05/06/23  1141   CR 0.96* 0.91 0.96*     Recent Labs   Lab Test 05/05/23  1003 01/10/23  1533 10/07/22  0612   A1C 8.3* 7.9* 11.3*          Recent Labs   Lab Test 05/08/23  0424   WBC 6.2   HGB 9.9*   HCT 32.7*   MCV 89        Recent Labs   Lab Test 05/08/23  0424 05/07/23  1019 05/06/23  1141   HGB 9.9* 10.6* 12.4    Recent Labs   Lab Test 07/17/22  1520 05/24/22  1022 04/03/22  1436   TROPONINI <0.01 0.01 <0.01     Recent Labs   Lab Test  05/05/23  1003 04/03/22  1436 05/30/21  1101 10/21/18  1335   BNP  --  139* 103* 140*   NTBNPI 285  --   --   --      Recent Labs   Lab Test 10/06/22  1858   TSH 1.38     Recent Labs   Lab Test 10/06/22  0918 05/24/22  1022 04/03/22  1436   INR 1.04 0.95 0.95          Pretty Trevino MD

## 2023-05-08 NOTE — PROGRESS NOTES
Nuclear Pharmacological Stress Test:  Laying Protocol. Lexiscan 0.4mg IV administered over 15sec.   Baseline VS: HR= 75 (SR)  BP= 133/76.  Peak VS: HR= 86 BP= 121/62  No symptoms of CP produced, however patient felt typical vasodilator side effects from Lexiscan including nausea and shortness of breath. Pt given aminophylline 50mg IV to help reserve Lexiscan symptoms.      Results pending cardiology interpretation.    Ramandeep Madera RN

## 2023-05-09 PROBLEM — R55 SYNCOPE, UNSPECIFIED SYNCOPE TYPE: Status: RESOLVED | Noted: 2023-05-05 | Resolved: 2023-05-09

## 2023-05-09 PROBLEM — F41.8 DEPRESSION WITH ANXIETY: Status: ACTIVE | Noted: 2021-08-01

## 2023-05-09 PROBLEM — E11.65 UNCONTROLLED TYPE 2 DIABETES MELLITUS WITH HYPERGLYCEMIA (H): Chronic | Status: RESOLVED | Noted: 2021-12-13 | Resolved: 2023-05-09

## 2023-05-09 PROBLEM — R45.851 SUICIDAL IDEATION: Status: RESOLVED | Noted: 2023-05-05 | Resolved: 2023-05-09

## 2023-05-09 PROBLEM — R55 SYNCOPE: Status: ACTIVE | Noted: 2023-05-09

## 2023-05-09 PROBLEM — D64.9 ANEMIA: Status: ACTIVE | Noted: 2022-07-17

## 2023-05-09 PROBLEM — E11.42 DIABETIC POLYNEUROPATHY ASSOCIATED WITH TYPE 2 DIABETES MELLITUS (H): Status: RESOLVED | Noted: 2022-06-10 | Resolved: 2023-05-09

## 2023-05-09 LAB
ANION GAP SERPL CALCULATED.3IONS-SCNC: 8 MMOL/L (ref 7–15)
BUN SERPL-MCNC: 24.3 MG/DL (ref 6–20)
CALCIUM SERPL-MCNC: 8.7 MG/DL (ref 8.6–10)
CHLORIDE SERPL-SCNC: 109 MMOL/L (ref 98–107)
CREAT SERPL-MCNC: 0.93 MG/DL (ref 0.51–0.95)
DEPRECATED HCO3 PLAS-SCNC: 22 MMOL/L (ref 22–29)
ERYTHROCYTE [DISTWIDTH] IN BLOOD BY AUTOMATED COUNT: 13.2 % (ref 10–15)
GFR SERPL CREATININE-BSD FRML MDRD: 71 ML/MIN/1.73M2
GLUCOSE BLDC GLUCOMTR-MCNC: 100 MG/DL (ref 70–99)
GLUCOSE BLDC GLUCOMTR-MCNC: 101 MG/DL (ref 70–99)
GLUCOSE BLDC GLUCOMTR-MCNC: 185 MG/DL (ref 70–99)
GLUCOSE BLDC GLUCOMTR-MCNC: 33 MG/DL (ref 70–99)
GLUCOSE BLDC GLUCOMTR-MCNC: 61 MG/DL (ref 70–99)
GLUCOSE BLDC GLUCOMTR-MCNC: 76 MG/DL (ref 70–99)
GLUCOSE SERPL-MCNC: 94 MG/DL (ref 70–99)
HCT VFR BLD AUTO: 33.4 % (ref 35–47)
HGB BLD-MCNC: 10.1 G/DL (ref 11.7–15.7)
MCH RBC QN AUTO: 27.1 PG (ref 26.5–33)
MCHC RBC AUTO-ENTMCNC: 30.2 G/DL (ref 31.5–36.5)
MCV RBC AUTO: 90 FL (ref 78–100)
PLATELET # BLD AUTO: 253 10E3/UL (ref 150–450)
POTASSIUM SERPL-SCNC: 4.1 MMOL/L (ref 3.4–5.3)
RBC # BLD AUTO: 3.73 10E6/UL (ref 3.8–5.2)
SODIUM SERPL-SCNC: 139 MMOL/L (ref 136–145)
WBC # BLD AUTO: 6.5 10E3/UL (ref 4–11)

## 2023-05-09 PROCEDURE — 99232 SBSQ HOSP IP/OBS MODERATE 35: CPT | Performed by: INTERNAL MEDICINE

## 2023-05-09 PROCEDURE — 250N000013 HC RX MED GY IP 250 OP 250 PS 637: Performed by: INTERNAL MEDICINE

## 2023-05-09 PROCEDURE — 36415 COLL VENOUS BLD VENIPUNCTURE: CPT | Performed by: INTERNAL MEDICINE

## 2023-05-09 PROCEDURE — 250N000011 HC RX IP 250 OP 636: Performed by: PHYSICIAN ASSISTANT

## 2023-05-09 PROCEDURE — 80048 BASIC METABOLIC PNL TOTAL CA: CPT | Performed by: INTERNAL MEDICINE

## 2023-05-09 PROCEDURE — 82962 GLUCOSE BLOOD TEST: CPT

## 2023-05-09 PROCEDURE — C9113 INJ PANTOPRAZOLE SODIUM, VIA: HCPCS | Performed by: PHYSICIAN ASSISTANT

## 2023-05-09 PROCEDURE — 85027 COMPLETE CBC AUTOMATED: CPT | Performed by: INTERNAL MEDICINE

## 2023-05-09 PROCEDURE — 99232 SBSQ HOSP IP/OBS MODERATE 35: CPT | Performed by: REGISTERED NURSE

## 2023-05-09 PROCEDURE — 99207 PR CDG-CUT & PASTE-POTENTIAL IMPACT ON LEVEL: CPT | Performed by: INTERNAL MEDICINE

## 2023-05-09 PROCEDURE — 250N000013 HC RX MED GY IP 250 OP 250 PS 637: Performed by: PHYSICIAN ASSISTANT

## 2023-05-09 PROCEDURE — G0378 HOSPITAL OBSERVATION PER HR: HCPCS

## 2023-05-09 PROCEDURE — 96376 TX/PRO/DX INJ SAME DRUG ADON: CPT

## 2023-05-09 RX ORDER — PANTOPRAZOLE SODIUM 40 MG/1
40 TABLET, DELAYED RELEASE ORAL
Status: DISCONTINUED | OUTPATIENT
Start: 2023-05-09 | End: 2023-05-10 | Stop reason: HOSPADM

## 2023-05-09 RX ORDER — METHOCARBAMOL 750 MG/1
750 TABLET, FILM COATED ORAL 4 TIMES DAILY PRN
Status: DISCONTINUED | OUTPATIENT
Start: 2023-05-09 | End: 2023-05-10 | Stop reason: HOSPADM

## 2023-05-09 RX ORDER — ATORVASTATIN CALCIUM 40 MG/1
40 TABLET, FILM COATED ORAL EVERY EVENING
Qty: 30 TABLET | Refills: 3 | Status: SHIPPED | OUTPATIENT
Start: 2023-05-09 | End: 2024-01-14

## 2023-05-09 RX ADMIN — TOPIRAMATE 100 MG: 100 TABLET, FILM COATED ORAL at 20:12

## 2023-05-09 RX ADMIN — BUPRENORPHINE HYDROCHLORIDE, NALOXONE HYDROCHLORIDE 1 FILM: 2; .5 FILM, SOLUBLE BUCCAL; SUBLINGUAL at 09:17

## 2023-05-09 RX ADMIN — ASPIRIN 81 MG: 81 TABLET, COATED ORAL at 09:17

## 2023-05-09 RX ADMIN — METHOCARBAMOL 750 MG: 750 TABLET ORAL at 06:12

## 2023-05-09 RX ADMIN — CETIRIZINE HYDROCHLORIDE 10 MG: 10 TABLET, FILM COATED ORAL at 09:17

## 2023-05-09 RX ADMIN — CARVEDILOL 12.5 MG: 12.5 TABLET, FILM COATED ORAL at 09:17

## 2023-05-09 RX ADMIN — VALSARTAN 40 MG: 40 TABLET, FILM COATED ORAL at 09:19

## 2023-05-09 RX ADMIN — PANTOPRAZOLE SODIUM 40 MG: 40 INJECTION, POWDER, FOR SOLUTION INTRAVENOUS at 09:34

## 2023-05-09 RX ADMIN — TOPIRAMATE 100 MG: 100 TABLET, FILM COATED ORAL at 09:17

## 2023-05-09 RX ADMIN — INSULIN GLARGINE 25 UNITS: 100 INJECTION, SOLUTION SUBCUTANEOUS at 09:33

## 2023-05-09 RX ADMIN — ATORVASTATIN CALCIUM 40 MG: 40 TABLET, FILM COATED ORAL at 20:12

## 2023-05-09 RX ADMIN — NORTRIPTYLINE HYDROCHLORIDE 10 MG: 10 CAPSULE ORAL at 20:11

## 2023-05-09 RX ADMIN — DICLOFENAC SODIUM 2 G: 10 GEL TOPICAL at 09:20

## 2023-05-09 RX ADMIN — PANTOPRAZOLE SODIUM 40 MG: 40 TABLET, DELAYED RELEASE ORAL at 16:48

## 2023-05-09 RX ADMIN — HYDROCORTISONE: 1 CREAM TOPICAL at 09:33

## 2023-05-09 RX ADMIN — BUPRENORPHINE HYDROCHLORIDE, NALOXONE HYDROCHLORIDE 1 FILM: 2; .5 FILM, SOLUBLE BUCCAL; SUBLINGUAL at 20:15

## 2023-05-09 RX ADMIN — DULOXETINE HYDROCHLORIDE 60 MG: 60 CAPSULE, DELAYED RELEASE PELLETS ORAL at 20:12

## 2023-05-09 RX ADMIN — POLYETHYLENE GLYCOL 3350 17 G: 17 POWDER, FOR SOLUTION ORAL at 09:17

## 2023-05-09 RX ADMIN — DULOXETINE HYDROCHLORIDE 60 MG: 60 CAPSULE, DELAYED RELEASE PELLETS ORAL at 09:17

## 2023-05-09 RX ADMIN — LIDOCAINE 1 PATCH: 4 PATCH TOPICAL at 09:17

## 2023-05-09 RX ADMIN — POLYETHYLENE GLYCOL 3350 17 G: 17 POWDER, FOR SOLUTION ORAL at 20:12

## 2023-05-09 RX ADMIN — OXYCODONE HYDROCHLORIDE 5 MG: 5 TABLET ORAL at 04:18

## 2023-05-09 RX ADMIN — METHOCARBAMOL 750 MG: 750 TABLET ORAL at 00:23

## 2023-05-09 RX ADMIN — CARVEDILOL 12.5 MG: 12.5 TABLET, FILM COATED ORAL at 16:48

## 2023-05-09 RX ADMIN — HYDROCORTISONE: 1 CREAM TOPICAL at 20:12

## 2023-05-09 ASSESSMENT — ACTIVITIES OF DAILY LIVING (ADL)
ADLS_ACUITY_SCORE: 33

## 2023-05-09 NOTE — CONSULTS
"      Psychiatry Consultation; Follow up              Reason for Consult, requesting source:    Schizoaffective disorder    Requesting source: Kike Smallwood    Labs and imaging reviewed. Notes reviewed.    Total time spent in chart review, patient interview and coordination of care: 45 minutes               Interim history:    Psychiatry seeing patient today regarding schizoaffective disorder diagnosis. Patient initially seen on 5/8/2023 by Ashley Weeks.    Per ED provider note from 5/5/2023: \"Sarah Rahman is a 57 year old female with a pertient medical history of HTN, DM type II, COPD,  hyperlipidemia, schizoaffective disorder, CHF, tobacco abuse, and S/P CABG X2 who presents to the ED for evaluation of chest pain and shortness of breath.\"    Per psychotherapy assessment from 5/8/2023: \"Pt today presents a bit lethargic, appears confused at times, with anxiety sx including excessive worry, difficulty controlling worry.  Pt denies any depression sx.  Chart noting indicates on '5/6 pt with irrational fears', 'scared anxious and fearful'.  Pt denies any SI,HI,AH/VH, and denies hx of attempts.  Upon inquiry if the pt is hopeful ,she appears confused, she states 'I am excited my friend found her home, I want them to figure out what is going on and to fix it, I do not know what is going on'.\"    Today, patient woke only briefly when I was asking her questions. She nodded her head \"yes\" when I asked if she was sleeping ok and \"no\" when I asked if she was feeling suicidal. Several attempts were made to verify patient's orientation and any presence of delirium or hallucinations, however, patient did not respond to further questions. Patient was in no acute distress.         Current Medications:       aspirin  81 mg Oral Daily     atorvastatin  40 mg Oral QPM     buprenorphine HCl-naloxone HCl  1 Film Sublingual TID     carvedilol  12.5 mg Oral BID w/meals     cetirizine  10 mg Oral Daily     dulaglutide  " "0.75 mg Subcutaneous Q7 Days     DULoxetine  60 mg Oral BID     hydrocortisone   Topical BID     insulin aspart  1-3 Units Subcutaneous TID AC     insulin aspart  1-3 Units Subcutaneous At Bedtime     insulin aspart   Subcutaneous TID AC     insulin glargine  25 Units Subcutaneous BID     lidocaine  1 patch Transdermal Q24H    And     lidocaine   Transdermal Q8H TREVOR     methocarbamol  750 mg Oral Q6H     nortriptyline  10 mg Oral At Bedtime     pantoprazole  40 mg Intravenous BID     polyethylene glycol  17 g Oral BID     sodium chloride (PF)  3 mL Intracatheter Q8H     topiramate  100 mg Oral BID     valsartan  40 mg Oral Daily     Per psychotherapy assessment: \"Pt reports she does not see a therapist currently.  She appears to be on psych meds per chart review, however pt appears to be surprised she is on psych meds and unaware at this point in time \"What psych meds am I on?  \"Effexor?\", \"Since when?\",  \"What is that?\" \"I did not know that\".  Per chart review pt appears to be on effexor, cymbalta, nortriptyline, topamax, suboxone, and PRN ativan.\"         MSE:   Patient was drowsy and quite somnolent. She responded minimally to questions. However, no reported SI/SIB or other safety concerns.      Vital signs:  Temp: 97.9  F (36.6  C) Temp src: Oral BP: 113/57 Pulse: 71   Resp: 18 SpO2: 98 % O2 Device: None (Room air)     Weight: 95.3 kg (210 lb 1.6 oz)  Estimated body mass index is 32.91 kg/m  as calculated from the following:    Height as of 2/22/23: 1.702 m (5' 7\").    Weight as of this encounter: 95.3 kg (210 lb 1.6 oz).    Qtc: 464 on 5/5/2023         DSM-5 Diagnosis:   300.09 (F41.8) Other Specified Anxiety Disorder , present and by hx.;  (F259.0)  Schizoaffective Disorder, by hx.           Assessment:   Given patient's historical diagnosis of schizoaffective disorder, I'm not sure that Cymbalta alone is the most efficacious medication for her. Patient was unable to verify if she was prescribed Topamax for " mood stabilization, but there is no indication patient has a history of seizures. Patient should follow up with a psychiatric provider to address medication management and find medications that are most beneficial to patient. Patient has been denying suicidal ideation and can discharge when medically stable.          Summary of Recommendations:   Referral to outpatient psychiatry for medication management    Can continue PTA medications         Page me or re-consult psychiatry as needed (psychiatry is signing off).       Margarita Mack, ELIAN, APRN  Consult/Liaison Psychiatry  [facility]   Contact information available via [facility] Paging/Directory.  If I am not available, please call [facility] intake ([phone])

## 2023-05-09 NOTE — PROVIDER NOTIFICATION
Dr. Wright here, pt agreeable to stay one more night here in the hospital to monitor BG. No reason for IV to be placed and pt does not need telemetry. Pt very upset and not allowing VS right now will re approach.

## 2023-05-09 NOTE — PROGRESS NOTES
Discharge canceled to monitor BG overnight. Insulin changes made. Pt agreeable but upset. Pt continues to be drowsy and asleep in between interactions. Easily arousable by voice. Will continue to monitor. Robaxin changed to prn.

## 2023-05-09 NOTE — PROGRESS NOTES
Pt found to have bottle of percocet in bag and denies taking while here. Pt reports she will have family take them home since she is staying another night.  notified and coming to take meds home. If pt refuses will have security get involved.

## 2023-05-09 NOTE — PROGRESS NOTES
Care Management Discharge Note    Discharge Date: 05/09/2023     Discharge Disposition: Home    Discharge Services:  OP f/u    Discharge DME:  None     Discharge Transportation:  Family or friend    Private pay costs discussed: Not applicable    Does the patient's insurance plan have a 3 day qualifying hospital stay waiver?  No    PAS Confirmation Code:  Not applicable  Patient/family educated on Medicare website which has current facility and service quality ratings:      Education Provided on the Discharge Plan:    Persons Notified of Discharge Plans: pt  Patient/Family in Agreement with the Plan: yes    Handoff Referral Completed: Yes    Additional Information:  Chart reviewed and plan of care discussed with hospitalist.  Plan to discharge home today with OP f/u. Per hospitalist, pt is medically ready for discharge.  Pt was evaluated by Psychiatry this morning and they feel pt is appropriate for discharge.   partner, Joleen, is working on coordinating OP medication management with Psychiatry.  Also, plan OP f/u with PCP.    Emy Saldivar RN

## 2023-05-09 NOTE — PROGRESS NOTES
9:19 AM  YANIV called the Psychiatry Consult services line [phone] and spoke to Pam regarding Pt's potential discharge today and the need for psychiatry's follow-up visit with Pt. Pam informed YANIV that Margarita Mack will be seeing Pt this morning. YANIV reached out to Margarita who reported that she had seen Pt this morning already and thinks Pt would be appropriate to discharge once medically stable but would want to make sure she has psychiatry follow-up for medication management.     YANIV called the [facility] Centralized Scheduling 2 [phone] and was informed they would need a psychiatry referral from the MD to follow up with Pt. YANIV reached out to the Hospitalist and requested the psych referral. Once Pt has the referral ordered, the Centralized Scheduling Team will automatically be notified to reach out to Pt.     No further SW involvement indicated for Psychiatry follow-up needs.       PATRICIA Chew
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
## 2023-05-09 NOTE — PROVIDER NOTIFICATION
BG 61, pt drowsy. Treated with OJ and lunch on way.     Recheck BG 33, pt refusing to allow nursing to recheck BG, not letting after two different nurses attempting to check BG. Message sent to MD.  2611: BG now 76, lunch in front of pt.

## 2023-05-10 VITALS
OXYGEN SATURATION: 94 % | RESPIRATION RATE: 19 BRPM | DIASTOLIC BLOOD PRESSURE: 92 MMHG | TEMPERATURE: 98.1 F | SYSTOLIC BLOOD PRESSURE: 191 MMHG | WEIGHT: 207.1 LBS | BODY MASS INDEX: 32.44 KG/M2 | HEART RATE: 84 BPM

## 2023-05-10 LAB — GLUCOSE BLDC GLUCOMTR-MCNC: 87 MG/DL (ref 70–99)

## 2023-05-10 PROCEDURE — 99239 HOSP IP/OBS DSCHRG MGMT >30: CPT | Performed by: INTERNAL MEDICINE

## 2023-05-10 PROCEDURE — 250N000013 HC RX MED GY IP 250 OP 250 PS 637: Performed by: INTERNAL MEDICINE

## 2023-05-10 PROCEDURE — G0378 HOSPITAL OBSERVATION PER HR: HCPCS

## 2023-05-10 PROCEDURE — 82962 GLUCOSE BLOOD TEST: CPT

## 2023-05-10 RX ORDER — METHOCARBAMOL 750 MG/1
750 TABLET, FILM COATED ORAL 4 TIMES DAILY PRN
Refills: 0
Start: 2023-05-10 | End: 2024-03-05

## 2023-05-10 RX ORDER — INSULIN ASPART 100 [IU]/ML
INJECTION, SOLUTION INTRAVENOUS; SUBCUTANEOUS
Refills: 0
Start: 2023-05-10 | End: 2024-01-14

## 2023-05-10 RX ADMIN — CARVEDILOL 12.5 MG: 12.5 TABLET, FILM COATED ORAL at 07:47

## 2023-05-10 RX ADMIN — LIDOCAINE 1 PATCH: 4 PATCH TOPICAL at 07:47

## 2023-05-10 RX ADMIN — OXYCODONE HYDROCHLORIDE 5 MG: 5 TABLET ORAL at 04:43

## 2023-05-10 RX ADMIN — PANTOPRAZOLE SODIUM 40 MG: 40 TABLET, DELAYED RELEASE ORAL at 07:47

## 2023-05-10 ASSESSMENT — ACTIVITIES OF DAILY LIVING (ADL)
ADLS_ACUITY_SCORE: 33

## 2023-05-10 NOTE — DISCHARGE SUMMARY
[facility]    Discharge Summary  Hospitalist    Date of Admission:  5/5/2023  Date of Discharge:  5/10/2023  Discharging Provider: Kike Smallwood MD, MD    Discharge Diagnoses   Principal Problem:    Atypical chest pain -- Normal NM stress 5/8/23      CAD -- S/P CABG      DM type 2, Hgb A1C 8.3 on 5/5/23      Schizoaffective disorder (H)      Anemia      Depression with anxiety      HTN (hypertension)      Smoker      History of Present Illness   57 year old female with history of CAD, schizoaffective disorder, type 2 diabetes, CVA, COPD, depression, hyperlipidemia, GERD, hypertension, and tobacco use disorder who presents to the ER due to hematemesis, syncope and left-sided chest pain and reported vomiting with toilet full of blood associated with dizziness. She states she passed out while heading to her car this morning; reportedly witnessed by maintenance workers in her neighborhood who called EMS. During evaluation, patient endorsed right lower extremity weakness and numbness.  She states she quit smoking 1 week prior to admission.     Initial blood pressure was 136/82, with a repeat of 161/80, pulse 93, respiratory rate 42, temperature 98.2  F and oxygen saturation of 100% on room air.  Notable laboratory findings include creatinine 1.23, , glucose 279, high-sensitivity troponin 17 and unremarkable CBC.  Chest radiograph showed previous median sternotomy with neck surgical clips and no evidence of acute cardiopulmonary process. CT angiogram of the chest abdomen and pelvis showed moderate amount of plaque throughout the thoracic and abdominal aorta, high-grade stenosis right renal artery, right kidney measuring 9.0 cm compared to left kidney measuring 10.0 cm.     Hospital Course   Admitted to cardiac telemetry and seen by Cardiology.  No arrhythmias noted, serial trops normal, and nuclear stress test normal. She had left chest wall tenderness which reproduced pain.       Was seen by GI, no EGD performed as suspicion for GI bleed was low, Hgb remained stable.     Was screened by psychiatry and felt Cymbalta alone was not the most efficacious medication for her. Patient was unable to verify if she was prescribed Topamax for mood stabilization, and there is no indication patient has a history of seizures. Patient should follow up with a psychiatric provider to address medication management and find medications that are most beneficial to patient. Patient has been denying suicidal ideation and can discharge when medically stable    She did have AM blood sugars in the 30's, and her Lantus was reduced from 25 mg bid to 25 mg AM and her Novolog was reduced to just a corrective dose with meals, and the Glucotrol was discontinue.  Her Blood sugar the morning of discharge was 87, and she will follow-up with her primary in 1 week review her glucose control, and consider addressing renal artery stenosis in future if unable to control BP.      Discussed smoking cessation.     Kike Smallwood MD  Pager: [phone]  Cell Phone:  [phone]       Significant Results and Procedures   As above    Pending Results   These results will be followed up by Dr. Smallwood  Unresulted Labs Ordered in the Past 30 Days of this Admission     No orders found from 4/5/2023 to 5/6/2023.          Code Status   Full Code       Primary Care Physician   Kike David    Physical Exam   Temp: 98.1  F (36.7  C) Temp src: Oral BP: (!) 191/92 Pulse: 84   Resp: 19 SpO2: 94 % O2 Device: None (Room air)    Vitals:    05/05/23 1535 05/08/23 0521 05/10/23 0335   Weight: 89.6 kg (197 lb 8.5 oz) 95.3 kg (210 lb 1.6 oz) 93.9 kg (207 lb 1.6 oz)     Vital Signs with Ranges  Temp:  [98  F (36.7  C)-98.1  F (36.7  C)] 98.1  F (36.7  C)  Pulse:  [69-84] 84  Resp:  [19-20] 19  BP: (125-191)/(69-92) 191/92  SpO2:  [91 %-96 %] 94 %  I/O last 3 completed shifts:  In: 1300 [P.O.:1300]  Out: -     Exam on discharge:   Lungs  clear  CV with reg S1S2    Discharge Disposition   Discharged to home  Condition at discharge: Stable    Consultations This Hospital Stay   GASTROENTEROLOGY IP CONSULT  PHYSICAL THERAPY ADULT IP CONSULT  OCCUPATIONAL THERAPY ADULT IP CONSULT  CARDIOLOGY IP CONSULT  VASCULAR SURGERY IP CONSULT  PAIN MANAGEMENT ADULT IP CONSULT  PSYCHIATRY IP CONSULT    Time Spent on this Encounter   I spent a total of 35 minutes discharging this patient.     Discharge Orders      Primary Care - Care Coordination Referral      Reason for your hospital stay    Chest wall pain     Activity    Your activity upon discharge: activity as tolerated     Discharge Instructions    If interested in quitting smoking, there is free assistance available through the [place] [employer].  Please call them at [phone] ([phone]).  They will provide free meds, tools, and one to one coaching to help you quit smoking, and comes with free 24/7 support.     Follow-up and recommended labs and tests     Follow up with primary care provider, Kike David, in 1 week to assure compliance with medication, and how patient is doing with smoking cessation.  And review blood sugar control then.      Call Dr. Wright if any medical questions at Cell Phone [phone].     Monitor and record    blood glucose 3 times a day, before meals -- write down values and bring to clinic appointment.     Diet    Follow this diet upon discharge: Orders Placed This Encounter        Diabetic diet     Discharge Medications   Current Discharge Medication List      START taking these medications    Details   atorvastatin (LIPITOR) 40 MG tablet Take 1 tablet (40 mg) by mouth every evening  Qty: 30 tablet, Refills: 3    Associated Diagnoses: Coronary artery disease involving native coronary artery of native heart without angina pectoris      insulin aspart (NOVOLOG VIAL) 100 UNITS/ML vial 3 times a day with meals, for glucometer 150-200 give 2 units SQ, 201-250  give 4 units, >250 give 6 units  Refills: 0    Associated Diagnoses: Type 2 diabetes mellitus with hyperglycemia, with long-term current use of insulin (H)         CONTINUE these medications which have CHANGED    Details   insulin glargine (LANTUS PEN) 100 UNIT/ML pen Inject 25 Units Subcutaneous every morning  Qty: 30 mL, Refills: 3    Comments: If Lantus is not covered by insurance, may substitute Basaglar or Semglee or other insulin glargine product per insurance preference at same dose and frequency.    Associated Diagnoses: Type 2 diabetes mellitus with diabetic polyneuropathy, with long-term current use of insulin (H)      methocarbamol (ROBAXIN) 750 MG tablet Take 1 tablet (750 mg) by mouth 4 times daily as needed for muscle spasms  Refills: 0    Associated Diagnoses: Pain         CONTINUE these medications which have NOT CHANGED    Details   acetaminophen (TYLENOL) 325 MG tablet Take 650 mg by mouth every 8 hours as needed for mild pain      albuterol (PROAIR HFA) 108 (90 BASE) MCG/ACT inhaler Inhale 1-2 puffs into the lungs every 4 hours as needed      aspirin (ASA) 81 MG EC tablet Take 1 tablet (81 mg) by mouth daily  Qty: 90 tablet, Refills: 0    Associated Diagnoses: TIA (transient ischemic attack)      buprenorphine-naloxone (SUBOXONE) 2-0.5 MG SUBL sublingual tablet Place 1 tablet under the tongue 3 times daily      carvedilol (COREG) 12.5 MG tablet Take 1 tablet (12.5 mg) by mouth 2 times daily (with meals)  Qty: 60 tablet, Refills: 0    Associated Diagnoses: Essential hypertension, benign      cetirizine (ZYRTEC) 10 MG tablet Take 1 tablet (10 mg) by mouth daily  Qty: 90 tablet, Refills: 3    Associated Diagnoses: Non-seasonal allergic rhinitis, unspecified trigger      diclofenac (VOLTAREN) 1 % topical gel Apply 2 g topically 3 times daily as needed for moderate pain (4-6) (feet)  Qty: 300 g, Refills: 3    Associated Diagnoses: Right foot pain      dulaglutide (TRULICITY) 0.75 MG/0.5ML pen Inject  0.75 mg Subcutaneous every 7 days  Qty: 6 mL, Refills: 3      DULoxetine HCl 60 MG CSDR Take 60 mg by mouth 2 times daily      fluticasone (FLONASE) 50 MCG/ACT nasal spray Spray 1 spray into both nostrils daily as needed for allergies      hydrocortisone (CORTAID) 1 % external cream Apply topically 2 times daily Apply to foot      ipratropium - albuterol 0.5 mg/2.5 mg/3 mL (DUONEB) 0.5-2.5 (3) MG/3ML neb solution Take 1 vial by nebulization every 6 hours as needed for shortness of breath / dyspnea or wheezing      lidocaine (LIDODERM) 5 % patch Place 1 patch onto the skin every 24 hours To prevent lidocaine toxicity, patient should be patch free for 12 hrs daily. BACK      naloxone (NARCAN) 4 MG/0.1ML nasal spray Spray 4 mg into one nostril alternating nostrils as needed for opioid reversal every 2-3 minutes until assistance arrives      nitroGLYcerin (NITROSTAT) 0.4 MG sublingual tablet Place 1 tablet (0.4 mg) under the tongue every 5 minutes as needed for chest pain For chest pain place 1 tablet under the tongue every 5 minutes for 3 doses. If symptoms persist 5 minutes after 1st dose call 911.  Qty: 25 tablet, Refills: 3    Associated Diagnoses: Coronary artery disease involving native coronary artery of native heart without angina pectoris      nortriptyline (PAMELOR) 10 MG capsule Take 10 mg by mouth At Bedtime      omeprazole 20 MG tablet Take 20 mg by mouth daily      ondansetron (ZOFRAN) 8 MG tablet Take 8 mg by mouth every 8 hours as needed      oxyCODONE-acetaminophen (PERCOCET)  MG per tablet Take 1 tablet by mouth every 6 hours as needed for pain      topiramate (TOPAMAX) 50 MG tablet Take 100 mg by mouth 2 times daily      valsartan (DIOVAN) 40 MG tablet Take 1 tablet (40 mg) by mouth daily  Qty: 90 tablet, Refills: 3    Associated Diagnoses: Essential hypertension, benign         STOP taking these medications       glipiZIDE (GLUCOTROL XL) 5 MG 24 hr tablet Comments:   Reason for Stopping:       "       Allergies   Allergies   Allergen Reactions     Haloperidol Unknown     Patient states it stops her heart       Meperidine And Related Angioedema, Difficulty breathing, Other (See Comments), Rash and Shortness Of Breath     Throat closes  Other reaction(s): Breathing Difficulty  Other reaction(s): Breathing Difficulty       Phenothiazines Other (See Comments)     Other reaction(s): CARDIAC DISTURBANCES  Patient states it stops her heart  \"I swell up\"  \"stopped by heart\"  \"I swell up\"  \"I swell up\"       Tramadol Other (See Comments)     Other reaction(s): Angioedema  Throat itch       Haloperidol And Related Angioedema     Januvia [Sitagliptin] Other (See Comments)     Swelling in the neck      Latex Itching     Lisinopril Other (See Comments)     ACE cough  Itchy throat  Throat itches  Itchy throat       Penicillins Swelling     Hydroxyzine Other (See Comments) and Rash     Tongue swelling  Tongue swelling  Tongue swelling       Data   Most Recent 3 CBC's:Recent Labs   Lab Test 05/09/23  0501 05/08/23  0424 05/07/23  1019   WBC 6.5 6.2 6.0   HGB 10.1* 9.9* 10.6*   MCV 90 89 87    250 271      Most Recent 3 BMP's:  Recent Labs   Lab Test 05/10/23  0713 05/09/23  2022 05/09/23  1715 05/09/23  0728 05/09/23  0501 05/08/23  0837 05/08/23  0424 05/07/23  1125 05/07/23  1019   NA  --   --   --   --  139  --  137  --  136   POTASSIUM  --   --   --   --  4.1  --  4.4  --  4.5   CHLORIDE  --   --   --   --  109*  --  108*  --  107   CO2  --   --   --   --  22  --  20*  --  21*   BUN  --   --   --   --  24.3*  --  24.1*  --  20.3*   CR  --   --   --   --  0.93  --  0.96*  --  0.91   ANIONGAP  --   --   --   --  8  --  9  --  8   MAXIMO  --   --   --   --  8.7  --  8.8  --  8.8   GLC 87 185* 100*   < > 94   < > 123*   < > 178*    < > = values in this interval not displayed.     Most Recent 2 LFT's:  Recent Labs   Lab Test 05/05/23  1003 02/22/23  1608   AST 16 17   ALT 22 19   ALKPHOS 107* 83   BILITOTAL 0.4 <0.2 "     Most Recent INR's and Anticoagulation Dosing History:  Anticoagulation Dose History         Latest Ref Rng & Units 10/21/2018 11/7/2018 6/22/2019 7/9/2021   Recent Dosing and Labs   INR 0.85 - 1.15 0.93   0.95   0.97   1.01         4/3/2022 5/24/2022 10/6/2022   Recent Dosing and Labs   INR 0.95   0.95   1.04                Most Recent 3 Troponin's:No lab results found.  Most Recent Cholesterol Panel:  Recent Labs   Lab Test 05/07/23  1019   CHOL 243*   *   HDL 40*   TRIG 158*     Most Recent 6 Bacteria Isolates From Any Culture (See EPIC Reports for Culture Details):No lab results found.  Most Recent TSH, T4 and A1c Labs:  Recent Labs   Lab Test 05/05/23  1003 10/07/22  0612 10/06/22  1858   TSH  --   --  1.38   A1C 8.3*   < >  --     < > = values in this interval not displayed.

## 2023-05-10 NOTE — PROGRESS NOTES
Care Management Discharge Note    Discharge Date: 05/10/2023       Discharge Disposition: Outpatient Mental Health, Home    Discharge Services: None    Discharge DME: None    Discharge Transportation:      Private pay costs discussed: Not applicable    Does the patient's insurance plan have a 3 day qualifying hospital stay waiver?  No    PAS Confirmation Code:    Patient/family educated on Medicare website which has current facility and service quality ratings: no    Education Provided on the Discharge Plan: Yes  Persons Notified of Discharge Plans: pt, family  Patient/Family in Agreement with the Plan: yes    Handoff Referral Completed: Yes    Additional Information:  Pt to be discharged to home.  Pt has been set up with OP mental health services per [facility].  No other CM needs identified.         Lachelle Cantu RN

## 2023-05-10 NOTE — PROGRESS NOTES
Pt explained to staff that she was eager to discharge home. Paged MD, orders put in. Discharge teaching complete. Pt discharged with family as transport.

## 2023-05-12 ENCOUNTER — PATIENT OUTREACH (OUTPATIENT)
Dept: CARE COORDINATION | Facility: CLINIC | Age: 58
End: 2023-05-12
Payer: MEDICAID

## 2023-05-12 DIAGNOSIS — Z79.4 TYPE 2 DIABETES MELLITUS WITH HYPERGLYCEMIA, WITH LONG-TERM CURRENT USE OF INSULIN (H): Primary | ICD-10-CM

## 2023-05-12 DIAGNOSIS — E11.65 TYPE 2 DIABETES MELLITUS WITH HYPERGLYCEMIA, WITH LONG-TERM CURRENT USE OF INSULIN (H): Primary | ICD-10-CM

## 2023-05-12 RX ORDER — GLUCOSAMINE HCL/CHONDROITIN SU 500-400 MG
CAPSULE ORAL
Qty: 100 EACH | Refills: 1 | Status: SHIPPED | OUTPATIENT
Start: 2023-05-12 | End: 2024-01-14

## 2023-05-12 RX ORDER — BLOOD-GLUCOSE METER
1 EACH MISCELLANEOUS ONCE
Qty: 1 KIT | Refills: 0 | Status: SHIPPED | OUTPATIENT
Start: 2023-05-12 | End: 2023-05-12

## 2023-05-12 RX ORDER — LANCETS
100 EACH MISCELLANEOUS 4 TIMES DAILY
Qty: 100 EACH | Refills: 1 | Status: SHIPPED | OUTPATIENT
Start: 2023-05-12 | End: 2024-01-14

## 2023-05-12 NOTE — PROGRESS NOTES
Clinic Care Coordination Contact  [facility] [place]: Post-Discharge Note  SITUATION                                                      Admission:    Admission Date: 05/05/23   Reason for Admission: chest pain  Discharge:   Discharge Date: 05/10/23  Discharge Diagnosis: no indication of concern    BACKGROUND                                                      Per hospital discharge summary and inpatient provider notes:  [facility]     Discharge Summary  Hospitalist     Date of Admission:  5/5/2023  Date of Discharge:  5/10/2023  Discharging Provider: Kike Smallwood MD, MD     Discharge Diagnoses   Principal Problem:    Atypical chest pain -- Normal NM stress 5/8/23       CAD -- S/P CABG       DM type 2, Hgb A1C 8.3 on 5/5/23       Schizoaffective disorder (H)       Anemia       Depression with anxiety       HTN (hypertension)       Smoker       ASSESSMENT           Discharge Assessment  How are you doing now that you are home?: good  How are your symptoms? (Red Flag symptoms escalate to triage hotline per guidelines): Improved  Do you feel your condition is stable enough to be safe at home until your provider visit?: Yes  Does the patient have their discharge instructions? : Yes  Does the patient have questions regarding their discharge instructions? : No  Were you started on any new medications or were there changes to any of your previous medications? : Yes  Does the patient have all of their medications?: No (see comment) (will check with pharmacy as she didn't know she had a new med)  Do you have questions regarding any of your medications? : No  Do you have all of your needed medical supplies or equipment (DME)?  (i.e. oxygen tank, CPAP, cane, etc.): No - What equipment or supplies are needed? (glucose monitor)  Discharge follow-up appointment scheduled within 14 calendar days? : Yes  Discharge Follow Up Appointment Date: 05/17/23  Discharge Follow Up Appointment Scheduled  with?: Primary Care Provider         Post-op (Clinicians Only)  Fever: No  Chills: No  Eating & Drinking: eating and drinking without complaints/concerns  PO Intake: regular diet  Bowel Function: normal  Urinary Status: voiding without complaint/concerns    Patient doing well, but doesn't have new med that was prescribed at discharge- Atorvastatin.  She hasn't checked her pharmacy to see if it is there. She will do that. She states that she does not have a glucose monitor. Will route to Pcp.  She is using insulin as prescribed.  Does not have any concerns about DME or mental health support. She has PCA and is doing well at home.    Current Outpatient Medications   Medication Instructions     acetaminophen (TYLENOL) 650 mg, Oral, EVERY 8 HOURS PRN     albuterol (PROAIR HFA) 108 (90 BASE) MCG/ACT inhaler 1-2 puffs, Inhalation, EVERY 4 HOURS PRN     aspirin (ASA) 81 mg, Oral, DAILY     atorvastatin (LIPITOR) 40 mg, Oral, EVERY EVENING     buprenorphine-naloxone (SUBOXONE) 2-0.5 MG SUBL sublingual tablet 1 tablet, Sublingual, 3 TIMES DAILY     carvedilol (COREG) 12.5 mg, Oral, 2 TIMES DAILY WITH MEALS     cetirizine (ZYRTEC) 10 mg, Oral, DAILY     diclofenac (VOLTAREN) 2 g, Topical, 3 TIMES DAILY PRN     DULoxetine HCl 60 mg, Oral, 2 TIMES DAILY     fluticasone (FLONASE) 50 MCG/ACT nasal spray 1 spray, Both Nostrils, DAILY PRN     hydrocortisone (CORTAID) 1 % external cream Topical, 2 TIMES DAILY, Apply to foot     insulin aspart (NOVOLOG VIAL) 100 UNITS/ML vial 3 times a day with meals, for glucometer 150-200 give 2 units SQ, 201-250 give 4 units, >250 give 6 units     insulin glargine (LANTUS PEN) 25 Units, Subcutaneous, EVERY MORNING     ipratropium - albuterol 0.5 mg/2.5 mg/3 mL (DUONEB) 0.5-2.5 (3) MG/3ML neb solution 1 vial, Nebulization, EVERY 6 HOURS PRN     lidocaine (LIDODERM) 5 % patch 1 patch, Transdermal, EVERY 24 HOURS, To prevent lidocaine toxicity, patient should be patch free for 12 hrs daily. BACK       methocarbamol (ROBAXIN) 750 mg, Oral, 4 TIMES DAILY PRN     naloxone (NARCAN) 4 mg, Alternating Nostrils, PRN, every 2-3 minutes until assistance arrives     nitroGLYcerin (NITROSTAT) 0.4 mg, Sublingual, EVERY 5 MIN PRN, For chest pain place 1 tablet under the tongue every 5 minutes for 3 doses. If symptoms persist 5 minutes after 1st dose call 911.      nortriptyline (PAMELOR) 10 mg, Oral, AT BEDTIME     omeprazole 20 mg, Oral, DAILY     ondansetron (ZOFRAN) 8 mg, Oral, EVERY 8 HOURS PRN     oxyCODONE-acetaminophen (PERCOCET)  MG per tablet 1 tablet, Oral, EVERY 6 HOURS PRN     topiramate (TOPAMAX) 100 mg, Oral, 2 TIMES DAILY     Trulicity 0.75 mg, Subcutaneous, EVERY 7 DAYS     valsartan (DIOVAN) 40 mg, Oral, DAILY         PLAN                                                      Outpatient Plan:  Will route to PCP as patient does not have blood glucose monitor.  Will send letter. Patient to call if she needs support from care coordination team.      Future Appointments   Date Time Provider Department Center   5/17/2023 12:40 PM Aspen Mancuso NP [facility]         For any urgent concerns, please contact our 24 hour nurse triage line: [phone] ([phone])         PATRICIA Glass

## 2023-05-12 NOTE — LETTER
[facility] CARE COORDINATION  Critical access hospital  May 12, 2023    Sarah Rahman  [address]   [place] [address]      Dear Sarah,        I am a  clinic care coordinator who works with Kike David MD with the [facility]. I wanted to thank you for spending the time to talk with me.  Below is a description of clinic care coordination and how I can further assist you.       The clinic care coordination team is made up of a registered nurse, , financial resource worker and community health worker who understand the health care system. The goal of clinic care coordination is to help you manage your health and improve access to the health care system. Our team works alongside your provider to assist you in determining your health and social needs. We can help you obtain health care and community resources, providing you with necessary information and education. We can work with you through any barriers and develop a care plan that helps coordinate and strengthen the communication between you and your care team.  Our services are voluntary and are offered without charge to you personally.    Please feel free to contact me with any questions or concerns regarding care coordination and what we can offer.      We are focused on providing you with the highest-quality healthcare experience possible.    Sincerely,     Ivone Wahl,   [facility]  [phone]

## 2023-05-18 ENCOUNTER — TELEPHONE (OUTPATIENT)
Dept: INTERNAL MEDICINE | Facility: CLINIC | Age: 58
End: 2023-05-18
Payer: MEDICAID

## 2023-05-18 DIAGNOSIS — R11.0 NAUSEA: Primary | ICD-10-CM

## 2023-05-18 RX ORDER — ONDANSETRON 8 MG/1
8 TABLET, FILM COATED ORAL EVERY 8 HOURS PRN
Qty: 20 TABLET | Refills: 3 | Status: SHIPPED | OUTPATIENT
Start: 2023-05-18

## 2023-05-25 ENCOUNTER — TELEPHONE (OUTPATIENT)
Dept: INTERNAL MEDICINE | Facility: CLINIC | Age: 58
End: 2023-05-25

## 2023-05-25 ENCOUNTER — OFFICE VISIT (OUTPATIENT)
Dept: INTERNAL MEDICINE | Facility: CLINIC | Age: 58
End: 2023-05-25
Payer: COMMERCIAL

## 2023-05-25 VITALS
RESPIRATION RATE: 16 BRPM | HEIGHT: 67 IN | OXYGEN SATURATION: 86 % | HEART RATE: 79 BPM | TEMPERATURE: 97.4 F | WEIGHT: 207 LBS | BODY MASS INDEX: 32.49 KG/M2 | DIASTOLIC BLOOD PRESSURE: 83 MMHG | SYSTOLIC BLOOD PRESSURE: 144 MMHG

## 2023-05-25 DIAGNOSIS — I70.1 RIGHT RENAL ARTERY STENOSIS (H): ICD-10-CM

## 2023-05-25 DIAGNOSIS — R55 SYNCOPE, UNSPECIFIED SYNCOPE TYPE: ICD-10-CM

## 2023-05-25 DIAGNOSIS — E16.2 HYPOGLYCEMIA: ICD-10-CM

## 2023-05-25 DIAGNOSIS — G89.4 CHRONIC PAIN SYNDROME: ICD-10-CM

## 2023-05-25 DIAGNOSIS — E11.65 TYPE 2 DIABETES MELLITUS WITH HYPERGLYCEMIA, WITH LONG-TERM CURRENT USE OF INSULIN (H): ICD-10-CM

## 2023-05-25 DIAGNOSIS — G89.4 CHRONIC PAIN SYNDROME: Chronic | ICD-10-CM

## 2023-05-25 DIAGNOSIS — Z79.4 TYPE 2 DIABETES MELLITUS WITH DIABETIC POLYNEUROPATHY, WITH LONG-TERM CURRENT USE OF INSULIN (H): Primary | ICD-10-CM

## 2023-05-25 DIAGNOSIS — E11.42 TYPE 2 DIABETES MELLITUS WITH DIABETIC POLYNEUROPATHY, WITH LONG-TERM CURRENT USE OF INSULIN (H): Primary | ICD-10-CM

## 2023-05-25 DIAGNOSIS — F25.9 SCHIZOAFFECTIVE DISORDER, UNSPECIFIED TYPE (H): ICD-10-CM

## 2023-05-25 DIAGNOSIS — G89.29 CHRONIC FOOT PAIN, RIGHT: ICD-10-CM

## 2023-05-25 DIAGNOSIS — M79.671 CHRONIC FOOT PAIN, RIGHT: ICD-10-CM

## 2023-05-25 DIAGNOSIS — R73.9 HYPERGLYCEMIA: ICD-10-CM

## 2023-05-25 DIAGNOSIS — Z79.4 TYPE 2 DIABETES MELLITUS WITH HYPERGLYCEMIA, WITH LONG-TERM CURRENT USE OF INSULIN (H): ICD-10-CM

## 2023-05-25 DIAGNOSIS — M19.071 OSTEOARTHRITIS OF JOINT OF TOE OF RIGHT FOOT: Primary | ICD-10-CM

## 2023-05-25 DIAGNOSIS — Z09 HOSPITAL DISCHARGE FOLLOW-UP: ICD-10-CM

## 2023-05-25 PROCEDURE — 99215 OFFICE O/P EST HI 40 MIN: CPT | Performed by: INTERNAL MEDICINE

## 2023-05-25 RX ORDER — KETOROLAC TROMETHAMINE 10 MG/1
10 TABLET, FILM COATED ORAL EVERY 6 HOURS PRN
Qty: 20 TABLET | Refills: 1 | Status: SHIPPED | OUTPATIENT
Start: 2023-05-25 | End: 2023-10-25

## 2023-05-25 ASSESSMENT — PATIENT HEALTH QUESTIONNAIRE - PHQ9
SUM OF ALL RESPONSES TO PHQ QUESTIONS 1-9: 15
10. IF YOU CHECKED OFF ANY PROBLEMS, HOW DIFFICULT HAVE THESE PROBLEMS MADE IT FOR YOU TO DO YOUR WORK, TAKE CARE OF THINGS AT HOME, OR GET ALONG WITH OTHER PEOPLE: SOMEWHAT DIFFICULT
SUM OF ALL RESPONSES TO PHQ QUESTIONS 1-9: 15

## 2023-05-25 ASSESSMENT — PAIN SCALES - GENERAL: PAINLEVEL: WORST PAIN (10)

## 2023-05-25 NOTE — PROGRESS NOTES
[facility] - Primary Care Specialists    Comprehensive and complex medical care - Chronic disease management - Shared decision making - Care coordination - Compassionate care    Patient advocacy - Rational deprescribing - Minimally disruptive medicine - Ethical focus - Customized care         Date of Service: 5/25/2023  Primary Provider: Kike David    Patient Care Team:  Kike David MD as PCP - General (Internal Medicine)  Rosario Byrne NP as Nurse Practitioner  Kike David MD as Assigned PCP          Patient's Pharmacy:    [facility]/pharmacy #[id number] [place], [place] - [address] [facility]  [address] [facility] [address] [address]  Phone: [phone] Fax: [phone]    [place], [place] - [address]  [address]  [address] [place] [address]  Phone: [phone] Fax: [phone]    [facility]/pharmacy #[id number] - [place], [place] - [address]  [address] [facility] [address]  Phone: [phone] Fax: [phone]    [facility] [id number] [facility] [employer] - [place], [place] - [address]  [address]  [facility] [address]  Phone: [phone] Fax: [phone]     Patient's Contacts:  Name Home Phone Work Phone Mobile Phone Relationship Lgl Jayce SANTANA   [phone] Spouse    SALONI MORALES [phone]   Friend      Patient's Insurance:    Payor: ARE / Plan: [facility] PMAP / Product Type: HMO /            Active Problem List:  Problem List as of 5/25/2023 Reviewed: 2/22/2023  3:27 PM by Salena Santos    Cerebrovascular accident (CVA) due to stenosis of carotid artery (H)    CAD -- S/P CABG    Smoker       Medium    Carotid stenosis, left    Moderate persistent asthma    Chronic pain syndrome    Pulmonary nodule    HTN (hypertension)    DM type 2, Hgb A1C 8.3 on 5/5/23    Last Assessment & Plan 6/7/2022 Office Visit Edited 6/10/2022  8:06 AM by Mara Scott NP     Type 2 diabetes is not well controlled but she  is using the Lantus injection regularly.  She ran out of the freestyle hosea sensors so she does not have any recent glucose readings to base medication adjustments on at this time.  She was prescribed Trulicity in the past but was nervous about taking this medication so she never started it.  We reviewed possible side effects associated with the medication and what she may expect in the first 2 weeks of taking this medication.  She would be comfortable trying this medication again, Trulicity 0.75 mg weekly sent to her pharmacy.  She is advised that this is a small dose and will need to be gradually increased in order for her to obtain maximal benefit from the medication.  She will be scheduled for a follow-up appointment and establish care visit with another provider in the next 4 weeks.  Of note, she would likely benefit from increasing her dose of metformin back to maximal dose but was concerned that this caused her to have increased urinary frequency.  We did not address this today.         Schizoaffective disorder (H)       Low    Bilateral low back pain with right-sided sciatica, unspecified chronicity    Last Assessment & Plan 6/7/2022 Office Visit Written 6/10/2022  8:01 AM by Mara Scott NP     Because of her uncontrolled diabetes, she is not a good candidate to use steroid medication to treat lumbar radiculopathy.  She is familiar with the effects of a steroid medication on her blood sugar and understands this conundrum.  Fortunately, she does experience some relief when she is given Toradol in the emergency department.  We discussed trial of a nonsteroidal anti-inflammatory medication, diclofenac 3 times daily.  She is open to this idea so this is sent to her pharmacy.  She is advised that she can continue with acetaminophen as well.         Nasal polyp    Last Assessment & Plan 6/7/2022 Office Visit Written 6/10/2022  8:03 AM by Mara Scott NP     Advised fluticasone nasal spray 1 spray each nostril  daily.  She declines a referral to ENT, she has had a nasal polyp in the past which responded to fluticasone.         Mixed hyperlipidemia    GERD (gastroesophageal reflux disease)    History of drug abuse (H)    Hx of syphilis    Menopausal flushing    Nephrolithiasis    Obesity    Seasonal allergies    Sensorineural hearing loss (SNHL) of right ear       Other    Chronic obstructive pulmonary disease (H)    Myelomalacia of cervical cord (H)    Post-COVID syndrome    Anemia    Depression with anxiety    Atypical chest pain -- Normal NM stress 5/8/23    Hematemesis    Syncope        Current Outpatient Medications   Medication Instructions     acetaminophen (TYLENOL) 650 mg, Oral, EVERY 8 HOURS PRN     albuterol (PROAIR HFA) 108 (90 BASE) MCG/ACT inhaler 1-2 puffs, Inhalation, EVERY 4 HOURS PRN     alcohol swab prep pads Use to swab area of injection/zac as directed.     aspirin (ASA) 81 mg, Oral, DAILY     atorvastatin (LIPITOR) 40 mg, Oral, EVERY EVENING     buprenorphine-naloxone (SUBOXONE) 2-0.5 MG SUBL sublingual tablet 1 tablet, Sublingual, 3 TIMES DAILY     carvedilol (COREG) 12.5 mg, Oral, 2 TIMES DAILY WITH MEALS     cetirizine (ZYRTEC) 10 mg, Oral, DAILY     Continuous Blood Gluc  (FREESTYLE SHEBA 2 READER) ROSE Use to read blood sugars as per 's instructions.     Continuous Blood Gluc Sensor (FREESTYLE SHEBA 2 SENSOR) MISC Change every 14 days.     CONTOUR NEXT TEST test strip Use to test blood sugar 4 times daily.     diclofenac (VOLTAREN) 2 g, Topical, 3 TIMES DAILY PRN     DULoxetine HCl 60 mg, Oral, 2 TIMES DAILY     fluticasone (FLONASE) 50 MCG/ACT nasal spray 1 spray, Both Nostrils, DAILY PRN     hydrocortisone (CORTAID) 1 % external cream Topical, 2 TIMES DAILY, Apply to foot     insulin aspart (NOVOLOG VIAL) 100 UNITS/ML vial 3 times a day with meals, for glucometer 150-200 give 2 units SQ, 201-250 give 4 units, >250 give 6 units     insulin glargine (LANTUS PEN) 25 Units,  Subcutaneous, EVERY MORNING     ipratropium - albuterol 0.5 mg/2.5 mg/3 mL (DUONEB) 0.5-2.5 (3) MG/3ML neb solution 1 vial, Nebulization, EVERY 6 HOURS PRN     ketorolac (TORADOL) 10 mg, Oral, EVERY 6 HOURS PRN     lidocaine (LIDODERM) 5 % patch 1 patch, Transdermal, EVERY 24 HOURS, To prevent lidocaine toxicity, patient should be patch free for 12 hrs daily. BACK      methocarbamol (ROBAXIN) 750 mg, Oral, 4 TIMES DAILY PRN     Microlet Lancets MISC 100 each, Does not apply, 4 TIMES DAILY, Use to test blood sugar 4 daily.     naloxone (NARCAN) 4 mg, Alternating Nostrils, PRN, every 2-3 minutes until assistance arrives     nitroGLYcerin (NITROSTAT) 0.4 mg, Sublingual, EVERY 5 MIN PRN, For chest pain place 1 tablet under the tongue every 5 minutes for 3 doses. If symptoms persist 5 minutes after 1st dose call 911.      nortriptyline (PAMELOR) 10 mg, Oral, AT BEDTIME     omeprazole 20 mg, Oral, DAILY     ondansetron (ZOFRAN) 8 mg, Oral, EVERY 8 HOURS PRN     oxyCODONE-acetaminophen (PERCOCET)  MG per tablet 1 tablet, Oral, EVERY 6 HOURS PRN     topiramate (TOPAMAX) 100 mg, Oral, 2 TIMES DAILY     Trulicity 0.75 mg, Subcutaneous, EVERY 7 DAYS     valsartan (DIOVAN) 40 mg, Oral, DAILY      Social History     Social History Narrative    Lives alone in a condo.          On disability.  Three living children.         Subjective:     Sarah Rahman is a 57 year old female who comes in today for:    Chief Complaint   Patient presents with     Hospital F/U     Foot Pain     Sleep Problem          5/25/2023    10:56 AM   Additional Questions   Roomed by Avinash ZIMMER   Accompanied by N/A     Patient comes in today for follow-up of a number of issues.  She was also recently hospitalized.    The details of the hospital stay were reviewed..  She feels much better than when she was in the hospital.  She had work-up for ischemia which was negative.  She was found to have significant right renal artery stenosis with hypoplasia  "of the kidney.    She is been having still a lot of pain related to her foot and great toe.  She does not have much more options with pain clinic at this time.  She is on a number of medications but still suffers from chronic pain related to this.  She does have arthritis of the toe but this is not likely the whole issue.  Toradol can help with the pain.    We reviewed her stomach and is unclear whether she is taking omeprazole or not.  We will follow-up on this.    Her pain is deep and throbbing in her foot.  Diclofenac does not help.  Previous x-rays reviewed.    She has not had any further lightheaded spells.    We reviewed her other issues noted in the assessment but not specifically addressed in the HPI above.     Objective:     Wt Readings from Last 3 Encounters:   05/25/23 93.9 kg (207 lb)   05/10/23 93.9 kg (207 lb 1.6 oz)   02/22/23 89.4 kg (197 lb)     BP Readings from Last 3 Encounters:   05/25/23 (!) 144/83   05/10/23 (!) 191/92   02/22/23 133/71     BP (!) 144/83 (BP Location: Right arm, Patient Position: Sitting, Cuff Size: Adult Large)   Pulse 79   Temp 97.4  F (36.3  C) (Oral)   Resp 16   Ht 1.702 m (5' 7\")   Wt 93.9 kg (207 lb)   LMP  (LMP Unknown)   SpO2 (!) 86%   BMI 32.42 kg/m     The patient is comfortable, no acute distress.  Mood good.  Insight good.  Eyes are nonicteric.  Neck is supple without mass.  No cervical adenopathy.  No thyromegaly. Heart regular rate and rhythm.  Lungs clear to auscultation bilaterally.  Respiratory effort is good..  Extremities no edema.      Diagnostics:     Recent Labs   Lab Test 05/10/23  0713 05/09/23  2022 05/09/23  0728 05/09/23  0501 05/08/23  0837 05/08/23  0424   NA  --   --   --  139  --  137   POTASSIUM  --   --   --  4.1  --  4.4   CHLORIDE  --   --   --  109*  --  108*   CO2  --   --   --  22  --  20*   ANIONGAP  --   --   --  8  --  9   GLC 87 185*   < > 94   < > 123*   BUN  --   --   --  24.3*  --  24.1*   CR  --   --   --  0.93  --  0.96* "   MAXIMO  --   --   --  8.7  --  8.8    < > = values in this interval not displayed.      CBC RESULTS: Recent Labs   Lab Test 05/09/23  0501   WBC 6.5   RBC 3.73*   HGB 10.1*   HCT 33.4*   MCV 90   MCH 27.1   MCHC 30.2*   RDW 13.2        Assessment:     1. 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 Chronic foot pain, right    3. Chronic pain syndrome    4. Hospital discharge follow-up    5. Right renal artery stenosis (H)    6. Syncope, unspecified syncope type    7. Type 2 diabetes mellitus with hyperglycemia, with long-term current use of insulin (H)    8. Schizoaffective disorder, unspecified type (H)        Plan:     1. Could consider other options for pain.  2. Close follow-up and blood work at next visit including CBC and Chem-8.  3. Continue to address blood pressure as needed.  4. Difficult situation with patient having multiple problems which intersect.  5. Follow-up with pain clinic.  6. Patient might require more regular follow-up to continue to address issues.  7. Continue current medications otherwise.  8. Follow up sooner if issues.    No orders of the defined types were placed in this encounter.       40 minutes or greater was spent today on the patient's care on the day of service.      This includes time for chart preparation, reviewing medical tests done before or during the visit, talking with the patient, review of quality indicators, required documentation, and other elements of care.        Kike David MD  General Internal Medicine  [facility]    Return in about 1 month (around 6/27/2023) for follow up visit.     Future Appointments   Date Time Provider Department Center   6/27/2023  1:30 PM Kike David MD MDINTM [facility] [place]

## 2023-05-25 NOTE — TELEPHONE ENCOUNTER
[facility]X [facility] - Product Back Ordered/Unavailable    Freestyle Shivani 14 Day [place]    Message: Product is backordered/unavailable: 14 day reader is no longer available.  Please send new RX for Shivani 2 reader and sensors.

## 2023-06-01 ENCOUNTER — TELEPHONE (OUTPATIENT)
Dept: INTERNAL MEDICINE | Facility: CLINIC | Age: 58
End: 2023-06-01
Payer: MEDICAID

## 2023-06-01 DIAGNOSIS — R10.13 DYSPEPSIA: Primary | ICD-10-CM

## 2023-06-01 PROBLEM — K92.0 HEMATEMESIS, UNSPECIFIED WHETHER NAUSEA PRESENT: Status: RESOLVED | Noted: 2023-05-05 | Resolved: 2023-06-01

## 2023-06-01 PROBLEM — I70.1 RIGHT RENAL ARTERY STENOSIS (H): Status: ACTIVE | Noted: 2023-06-01

## 2023-06-01 PROBLEM — U09.9 POST-COVID SYNDROME: Status: RESOLVED | Noted: 2021-07-11 | Resolved: 2023-06-01

## 2023-06-01 PROBLEM — I70.1 RIGHT RENAL ARTERY STENOSIS (H): Chronic | Status: ACTIVE | Noted: 2023-06-01

## 2023-06-01 RX ORDER — OMEPRAZOLE 40 MG/1
40 CAPSULE, DELAYED RELEASE ORAL DAILY
Qty: 90 CAPSULE | Refills: 3 | Status: SHIPPED | OUTPATIENT
Start: 2023-06-01 | End: 2024-01-14

## 2023-06-01 NOTE — PATIENT INSTRUCTIONS
Future Appointments   Date Time Provider Department Center   6/27/2023  1:30 PM [facility]

## 2023-06-01 NOTE — TELEPHONE ENCOUNTER
Please call patient -    ______________________________________________________________________     Home phone:  [phone] (home)     Cell phone:   Telephone Information:   Mobile [phone]       Other contacts:  Name Home Phone Work Phone Mobile Phone Relationship Lgl Grd   JOSE SANTANA   [phone] Spouse    SALONI MORALES [phone]   Friend      ______________________________________________________________________     Please verify whether she is taking omeprazole (Prilosec) or not.      She needs this to help protect her stomach after her recent hospitalization.  She should use the ketorolac (Toradol) only absolutely if needed.    We can send in a prescription for omeprazole (Prilosec) if needed.    Follow up as scheduled and we will likely repeat blood work at that time.    Kike David MD  [facility]  6/1/2023, 7:55 AM

## 2023-06-01 NOTE — TELEPHONE ENCOUNTER
"Called pt back. Pt then states she does take Omeprazole but not daily, \"only sometimes.\" Writer informed her again the importance of taking omeprazole.     Pt is agreeable to start taking omeprazole regularly. Requesting Rx sent in to pharmacy.   "

## 2023-06-01 NOTE — PROGRESS NOTES
Depression Screening Follow Up    Follow Up Actions Taken  Patient to follow up with PCP.  Clinic staff to schedule appointment if able.

## 2023-06-01 NOTE — TELEPHONE ENCOUNTER
The reason for considering this is that she did have some vomiting of blood at the last hospital visit.    I would strongly suggest using this omeprazole or pantoprazole regularly to help protect her stomach from the anti-inflammatories.    Kike David MD  General Internal Medicine  [facility]  6/1/2023, 12:57 PM

## 2023-06-27 ENCOUNTER — TELEPHONE (OUTPATIENT)
Dept: ENDOCRINOLOGY | Facility: CLINIC | Age: 58
End: 2023-06-27

## 2023-06-27 NOTE — TELEPHONE ENCOUNTER
Prior Authorization Retail Medication Request    Medication/Dose: Trulicity   ICD code (if different than what is on RX):    Previously Tried and Failed:    Rationale:              Pharmacy Information (if different than what is on RX)  Name:  [facility] [place]  Phone:  [phone]

## 2023-06-29 NOTE — TELEPHONE ENCOUNTER
Prior Authorization Approval    Medication: TRULICITY 0.75 MG/0.5ML SC SOPN  Authorization Effective Date: 5/30/2023  Authorization Expiration Date: 6/28/2024  Approved Dose/Quantity: 6/84  Reference #: [id number]   Insurance Company: [employer] - Phone [phone] Fax [phone]  Expected CoPay:       CoPay Card Available:      Financial Assistance Needed:   Which Pharmacy is filling the prescription: [facility] [address] [facility]  Pharmacy Notified: Yes  Patient Notified: Yes

## 2023-06-29 NOTE — TELEPHONE ENCOUNTER
PA Initiation    Medication: TRULICITY 0.75 MG/0.5ML SC SOPN  Insurance Company: [employer] - Phone [phone] Fax [phone]  Pharmacy Filling the Rx: [place], [place] - [facility]  Filling Pharmacy Phone: [phone]  Filling Pharmacy Fax:    Start Date: 6/29/2023

## 2023-07-27 ENCOUNTER — APPOINTMENT (OUTPATIENT)
Dept: CT IMAGING | Facility: HOSPITAL | Age: 58
End: 2023-07-27
Attending: STUDENT IN AN ORGANIZED HEALTH CARE EDUCATION/TRAINING PROGRAM
Payer: COMMERCIAL

## 2023-07-27 ENCOUNTER — HOSPITAL ENCOUNTER (INPATIENT)
Facility: HOSPITAL | Age: 58
LOS: 2 days | Discharge: LEFT BEFORE DISCHARGE | End: 2023-07-29
Attending: EMERGENCY MEDICINE | Admitting: HOSPITALIST
Payer: COMMERCIAL

## 2023-07-27 ENCOUNTER — APPOINTMENT (OUTPATIENT)
Dept: CT IMAGING | Facility: HOSPITAL | Age: 58
End: 2023-07-27
Attending: EMERGENCY MEDICINE
Payer: COMMERCIAL

## 2023-07-27 DIAGNOSIS — I63.9 ACUTE CVA (CEREBROVASCULAR ACCIDENT) (H): ICD-10-CM

## 2023-07-27 DIAGNOSIS — I63.9 CEREBROVASCULAR ACCIDENT (CVA), UNSPECIFIED MECHANISM (H): Primary | ICD-10-CM

## 2023-07-27 LAB
ANION GAP SERPL CALCULATED.3IONS-SCNC: 11 MMOL/L (ref 7–15)
BASOPHILS # BLD AUTO: 0.1 10E3/UL (ref 0–0.2)
BASOPHILS NFR BLD AUTO: 1 %
BUN SERPL-MCNC: 16.7 MG/DL (ref 6–20)
CALCIUM SERPL-MCNC: 9.6 MG/DL (ref 8.6–10)
CHLORIDE SERPL-SCNC: 106 MMOL/L (ref 98–107)
CHOLEST SERPL-MCNC: 191 MG/DL
CREAT SERPL-MCNC: 0.94 MG/DL (ref 0.51–0.95)
DEPRECATED HCO3 PLAS-SCNC: 22 MMOL/L (ref 22–29)
EOSINOPHIL # BLD AUTO: 0.2 10E3/UL (ref 0–0.7)
EOSINOPHIL NFR BLD AUTO: 3 %
ERYTHROCYTE [DISTWIDTH] IN BLOOD BY AUTOMATED COUNT: 12.4 % (ref 10–15)
GFR SERPL CREATININE-BSD FRML MDRD: 70 ML/MIN/1.73M2
GLUCOSE BLDC GLUCOMTR-MCNC: 162 MG/DL (ref 70–99)
GLUCOSE BLDC GLUCOMTR-MCNC: 181 MG/DL (ref 70–99)
GLUCOSE BLDC GLUCOMTR-MCNC: 268 MG/DL (ref 70–99)
GLUCOSE SERPL-MCNC: 255 MG/DL (ref 70–99)
HBA1C MFR BLD: 8.3 %
HCT VFR BLD AUTO: 34.3 % (ref 35–47)
HDLC SERPL-MCNC: 35 MG/DL
HGB BLD-MCNC: 10.7 G/DL (ref 11.7–15.7)
IMM GRANULOCYTES # BLD: 0 10E3/UL
IMM GRANULOCYTES NFR BLD: 0 %
LDLC SERPL CALC-MCNC: 116 MG/DL
LYMPHOCYTES # BLD AUTO: 2.3 10E3/UL (ref 0.8–5.3)
LYMPHOCYTES NFR BLD AUTO: 30 %
MCH RBC QN AUTO: 27.1 PG (ref 26.5–33)
MCHC RBC AUTO-ENTMCNC: 31.2 G/DL (ref 31.5–36.5)
MCV RBC AUTO: 87 FL (ref 78–100)
MONOCYTES # BLD AUTO: 0.4 10E3/UL (ref 0–1.3)
MONOCYTES NFR BLD AUTO: 5 %
NEUTROPHILS # BLD AUTO: 4.7 10E3/UL (ref 1.6–8.3)
NEUTROPHILS NFR BLD AUTO: 61 %
NONHDLC SERPL-MCNC: 156 MG/DL
NRBC # BLD AUTO: 0 10E3/UL
NRBC BLD AUTO-RTO: 0 /100
PLATELET # BLD AUTO: 139 10E3/UL (ref 150–450)
POTASSIUM SERPL-SCNC: 3.8 MMOL/L (ref 3.4–5.3)
RBC # BLD AUTO: 3.95 10E6/UL (ref 3.8–5.2)
SODIUM SERPL-SCNC: 139 MMOL/L (ref 136–145)
TRIGL SERPL-MCNC: 199 MG/DL
TROPONIN T SERPL HS-MCNC: 12 NG/L
WBC # BLD AUTO: 7.6 10E3/UL (ref 4–11)

## 2023-07-27 PROCEDURE — 99223 1ST HOSP IP/OBS HIGH 75: CPT | Performed by: PSYCHIATRY & NEUROLOGY

## 2023-07-27 PROCEDURE — 99291 CRITICAL CARE FIRST HOUR: CPT | Mod: FS | Performed by: PSYCHIATRY & NEUROLOGY

## 2023-07-27 PROCEDURE — 70496 CT ANGIOGRAPHY HEAD: CPT

## 2023-07-27 PROCEDURE — 36415 COLL VENOUS BLD VENIPUNCTURE: CPT | Performed by: EMERGENCY MEDICINE

## 2023-07-27 PROCEDURE — 250N000013 HC RX MED GY IP 250 OP 250 PS 637: Performed by: HOSPITALIST

## 2023-07-27 PROCEDURE — 70450 CT HEAD/BRAIN W/O DYE: CPT

## 2023-07-27 PROCEDURE — 83036 HEMOGLOBIN GLYCOSYLATED A1C: CPT | Performed by: HOSPITALIST

## 2023-07-27 PROCEDURE — 99223 1ST HOSP IP/OBS HIGH 75: CPT | Performed by: HOSPITALIST

## 2023-07-27 PROCEDURE — 93005 ELECTROCARDIOGRAM TRACING: CPT | Performed by: EMERGENCY MEDICINE

## 2023-07-27 PROCEDURE — 96374 THER/PROPH/DIAG INJ IV PUSH: CPT | Mod: 59

## 2023-07-27 PROCEDURE — 82435 ASSAY OF BLOOD CHLORIDE: CPT | Performed by: EMERGENCY MEDICINE

## 2023-07-27 PROCEDURE — 85025 COMPLETE CBC W/AUTO DIFF WBC: CPT | Performed by: EMERGENCY MEDICINE

## 2023-07-27 PROCEDURE — 999N000248 HC STATISTIC IV INSERT WITH US BY RN

## 2023-07-27 PROCEDURE — 3E03317 INTRODUCTION OF OTHER THROMBOLYTIC INTO PERIPHERAL VEIN, PERCUTANEOUS APPROACH: ICD-10-PCS | Performed by: PSYCHIATRY & NEUROLOGY

## 2023-07-27 PROCEDURE — 250N000011 HC RX IP 250 OP 636: Performed by: EMERGENCY MEDICINE

## 2023-07-27 PROCEDURE — 99285 EMERGENCY DEPT VISIT HI MDM: CPT | Mod: 25

## 2023-07-27 PROCEDURE — 70498 CT ANGIOGRAPHY NECK: CPT

## 2023-07-27 PROCEDURE — 80061 LIPID PANEL: CPT | Performed by: HOSPITALIST

## 2023-07-27 PROCEDURE — 250N000013 HC RX MED GY IP 250 OP 250 PS 637: Performed by: PSYCHIATRY & NEUROLOGY

## 2023-07-27 PROCEDURE — 250N000011 HC RX IP 250 OP 636: Performed by: PHYSICIAN ASSISTANT

## 2023-07-27 PROCEDURE — 200N000001 HC R&B ICU

## 2023-07-27 PROCEDURE — 82962 GLUCOSE BLOOD TEST: CPT

## 2023-07-27 PROCEDURE — 84484 ASSAY OF TROPONIN QUANT: CPT | Performed by: EMERGENCY MEDICINE

## 2023-07-27 RX ORDER — GLIPIZIDE 5 MG/1
10 TABLET, FILM COATED, EXTENDED RELEASE ORAL DAILY
COMMUNITY
End: 2024-01-19

## 2023-07-27 RX ORDER — NALOXONE HYDROCHLORIDE 0.4 MG/ML
0.4 INJECTION, SOLUTION INTRAMUSCULAR; INTRAVENOUS; SUBCUTANEOUS
Status: DISCONTINUED | OUTPATIENT
Start: 2023-07-27 | End: 2023-07-29 | Stop reason: HOSPADM

## 2023-07-27 RX ORDER — FLUTICASONE PROPIONATE 50 MCG
1 SPRAY, SUSPENSION (ML) NASAL DAILY PRN
Status: DISCONTINUED | OUTPATIENT
Start: 2023-07-27 | End: 2023-07-29 | Stop reason: HOSPADM

## 2023-07-27 RX ORDER — DEXTROSE MONOHYDRATE 25 G/50ML
25-50 INJECTION, SOLUTION INTRAVENOUS
Status: DISCONTINUED | OUTPATIENT
Start: 2023-07-27 | End: 2023-07-29 | Stop reason: HOSPADM

## 2023-07-27 RX ORDER — LABETALOL HYDROCHLORIDE 5 MG/ML
10 INJECTION, SOLUTION INTRAVENOUS EVERY 10 MIN PRN
Status: DISCONTINUED | OUTPATIENT
Start: 2023-07-27 | End: 2023-07-29 | Stop reason: HOSPADM

## 2023-07-27 RX ORDER — LIDOCAINE 40 MG/G
CREAM TOPICAL
Status: DISCONTINUED | OUTPATIENT
Start: 2023-07-27 | End: 2023-07-29 | Stop reason: HOSPADM

## 2023-07-27 RX ORDER — NALOXONE HYDROCHLORIDE 0.4 MG/ML
0.2 INJECTION, SOLUTION INTRAMUSCULAR; INTRAVENOUS; SUBCUTANEOUS
Status: DISCONTINUED | OUTPATIENT
Start: 2023-07-27 | End: 2023-07-29 | Stop reason: HOSPADM

## 2023-07-27 RX ORDER — VALSARTAN 40 MG/1
40 TABLET ORAL DAILY
COMMUNITY
End: 2023-10-10

## 2023-07-27 RX ORDER — ACETAMINOPHEN 650 MG/20.3ML
650 LIQUID ORAL EVERY 4 HOURS PRN
Status: DISCONTINUED | OUTPATIENT
Start: 2023-07-27 | End: 2023-07-27

## 2023-07-27 RX ORDER — SODIUM CHLORIDE 9 MG/ML
INJECTION, SOLUTION INTRAVENOUS CONTINUOUS PRN
Status: DISCONTINUED | OUTPATIENT
Start: 2023-07-27 | End: 2023-07-29 | Stop reason: HOSPADM

## 2023-07-27 RX ORDER — GLIPIZIDE 10 MG/1
10 TABLET, FILM COATED, EXTENDED RELEASE ORAL DAILY
Status: DISCONTINUED | OUTPATIENT
Start: 2023-07-28 | End: 2023-07-29 | Stop reason: HOSPADM

## 2023-07-27 RX ORDER — DULOXETIN HYDROCHLORIDE 60 MG/1
60 CAPSULE, DELAYED RELEASE ORAL 2 TIMES DAILY
Status: DISCONTINUED | OUTPATIENT
Start: 2023-07-28 | End: 2023-07-29 | Stop reason: HOSPADM

## 2023-07-27 RX ORDER — HYDROCORTISONE 10 MG/G
CREAM TOPICAL 2 TIMES DAILY PRN
COMMUNITY
End: 2024-01-14

## 2023-07-27 RX ORDER — NICOTINE POLACRILEX 4 MG
15-30 LOZENGE BUCCAL
Status: DISCONTINUED | OUTPATIENT
Start: 2023-07-27 | End: 2023-07-29 | Stop reason: HOSPADM

## 2023-07-27 RX ORDER — HYDRALAZINE HYDROCHLORIDE 20 MG/ML
10 INJECTION INTRAMUSCULAR; INTRAVENOUS EVERY 10 MIN PRN
Status: DISCONTINUED | OUTPATIENT
Start: 2023-07-27 | End: 2023-07-29 | Stop reason: HOSPADM

## 2023-07-27 RX ORDER — ACETAMINOPHEN 325 MG/1
650 TABLET ORAL EVERY 8 HOURS PRN
COMMUNITY
End: 2024-01-14

## 2023-07-27 RX ORDER — FLUTICASONE PROPIONATE 50 MCG
1 SPRAY, SUSPENSION (ML) NASAL DAILY PRN
COMMUNITY
End: 2024-01-14

## 2023-07-27 RX ORDER — CETIRIZINE HYDROCHLORIDE 10 MG/1
10 TABLET ORAL DAILY
COMMUNITY
End: 2024-01-14

## 2023-07-27 RX ORDER — DULOXETIN HYDROCHLORIDE 60 MG/1
60 CAPSULE, DELAYED RELEASE ORAL 2 TIMES DAILY
COMMUNITY
End: 2024-01-19

## 2023-07-27 RX ORDER — ACETAMINOPHEN 650 MG/1
650 SUPPOSITORY RECTAL
Status: COMPLETED | OUTPATIENT
Start: 2023-07-27 | End: 2023-07-27

## 2023-07-27 RX ORDER — OXYCODONE AND ACETAMINOPHEN 5; 325 MG/1; MG/1
1 TABLET ORAL EVERY 6 HOURS PRN
Status: DISCONTINUED | OUTPATIENT
Start: 2023-07-27 | End: 2023-07-29 | Stop reason: HOSPADM

## 2023-07-27 RX ORDER — LIDOCAINE 50 MG/G
PATCH TOPICAL EVERY 24 HOURS
COMMUNITY

## 2023-07-27 RX ORDER — ALBUTEROL SULFATE 90 UG/1
1-2 AEROSOL, METERED RESPIRATORY (INHALATION) EVERY 4 HOURS PRN
Status: DISCONTINUED | OUTPATIENT
Start: 2023-07-27 | End: 2023-07-29 | Stop reason: HOSPADM

## 2023-07-27 RX ORDER — LIDOCAINE 50 MG/G
1 PATCH TOPICAL EVERY 24 HOURS
Status: DISCONTINUED | OUTPATIENT
Start: 2023-07-27 | End: 2023-07-27

## 2023-07-27 RX ORDER — ACETAMINOPHEN 325 MG/1
975 TABLET ORAL ONCE
Status: COMPLETED | OUTPATIENT
Start: 2023-07-27 | End: 2023-07-27

## 2023-07-27 RX ORDER — LIDOCAINE 4 G/G
1 PATCH TOPICAL EVERY 24 HOURS
Status: DISCONTINUED | OUTPATIENT
Start: 2023-07-28 | End: 2023-07-29 | Stop reason: HOSPADM

## 2023-07-27 RX ORDER — ACETAMINOPHEN 325 MG/1
975 TABLET ORAL
Status: COMPLETED | OUTPATIENT
Start: 2023-07-27 | End: 2023-07-27

## 2023-07-27 RX ORDER — OXYCODONE AND ACETAMINOPHEN 10; 325 MG/1; MG/1
1 TABLET ORAL EVERY 6 HOURS PRN
COMMUNITY
End: 2023-10-10

## 2023-07-27 RX ORDER — CETIRIZINE HYDROCHLORIDE 10 MG/1
10 TABLET ORAL DAILY
Status: DISCONTINUED | OUTPATIENT
Start: 2023-07-28 | End: 2023-07-29 | Stop reason: HOSPADM

## 2023-07-27 RX ORDER — OXYCODONE HYDROCHLORIDE 5 MG/1
5 TABLET ORAL EVERY 6 HOURS PRN
Status: DISCONTINUED | OUTPATIENT
Start: 2023-07-27 | End: 2023-07-29 | Stop reason: HOSPADM

## 2023-07-27 RX ORDER — OXYCODONE AND ACETAMINOPHEN 10; 325 MG/1; MG/1
1 TABLET ORAL EVERY 6 HOURS PRN
Status: DISCONTINUED | OUTPATIENT
Start: 2023-07-27 | End: 2023-07-27

## 2023-07-27 RX ORDER — IOPAMIDOL 755 MG/ML
75 INJECTION, SOLUTION INTRAVASCULAR ONCE
Status: COMPLETED | OUTPATIENT
Start: 2023-07-27 | End: 2023-07-27

## 2023-07-27 RX ORDER — ASPIRIN 81 MG/1
81 TABLET ORAL DAILY
COMMUNITY
End: 2023-09-08

## 2023-07-27 RX ORDER — ALBUTEROL SULFATE 90 UG/1
1-2 AEROSOL, METERED RESPIRATORY (INHALATION) EVERY 4 HOURS PRN
COMMUNITY
End: 2023-10-17

## 2023-07-27 RX ADMIN — IOPAMIDOL 75 ML: 755 INJECTION, SOLUTION INTRAVENOUS at 13:16

## 2023-07-27 RX ADMIN — Medication 22.5 MG: at 14:05

## 2023-07-27 RX ADMIN — DULOXETINE HYDROCHLORIDE 60 MG: 60 CAPSULE, DELAYED RELEASE PELLETS ORAL at 23:31

## 2023-07-27 RX ADMIN — LABETALOL HYDROCHLORIDE 10 MG: 5 INJECTION, SOLUTION INTRAVENOUS at 13:57

## 2023-07-27 RX ADMIN — OXYCODONE HYDROCHLORIDE AND ACETAMINOPHEN 1 TABLET: 5; 325 TABLET ORAL at 23:29

## 2023-07-27 RX ADMIN — ACETAMINOPHEN 975 MG: 325 TABLET ORAL at 16:56

## 2023-07-27 RX ADMIN — LIDOCAINE 1 PATCH: 4 PATCH TOPICAL at 23:29

## 2023-07-27 RX ADMIN — NICARDIPINE HYDROCHLORIDE 5 MG/HR: 0.2 INJECTION, SOLUTION INTRAVENOUS at 14:44

## 2023-07-27 ASSESSMENT — ACTIVITIES OF DAILY LIVING (ADL)
ADLS_ACUITY_SCORE: 35
DRESSING/BATHING_DIFFICULTY: NO
DIFFICULTY_EATING/SWALLOWING: NO
ADLS_ACUITY_SCORE: 35
ADLS_ACUITY_SCORE: 25
WALKING_OR_CLIMBING_STAIRS_DIFFICULTY: NO
VISION_MANAGEMENT: READING GLASSES
CHANGE_IN_FUNCTIONAL_STATUS_SINCE_ONSET_OF_CURRENT_ILLNESS/INJURY: YES
ADLS_ACUITY_SCORE: 25
FALL_HISTORY_WITHIN_LAST_SIX_MONTHS: NO
ADLS_ACUITY_SCORE: 35
ADLS_ACUITY_SCORE: 35
DOING_ERRANDS_INDEPENDENTLY_DIFFICULTY: NO
WEAR_GLASSES_OR_BLIND: YES
TOILETING_ISSUES: NO
CONCENTRATING,_REMEMBERING_OR_MAKING_DECISIONS_DIFFICULTY: NO

## 2023-07-27 NOTE — ED NOTES
Pt reporting 10/10 headache. She was having a headache since prior to arrival but the intensity has increased. Provider notified.

## 2023-07-27 NOTE — PHARMACY-ADMISSION MEDICATION HISTORY
Pharmacist Admission Medication History    Admission medication history is complete. The information provided in this note is only as accurate as the sources available at the time of the update.    Medication reconciliation/reorder completed by provider prior to medication history? No    Information Source(s): Patient via in-person, [employer] (fill hx including [place] pharmacy), duplicate chart (marked for merge)    Pertinent Information: none    Changes made to PTA medication list:  Added: all below medication entries  Deleted: None  Changed: None    Allergies reviewed with patient and updates made in EHR: yes    Medication History Completed By: Nicollette McMann, [facility] 7/27/2023 2:50 PM    Prior to Admission medications    Medication Sig Last Dose Taking? Auth Provider Long Term End Date   acetaminophen (TYLENOL) 325 MG tablet Take 650 mg by mouth every 8 hours as needed for mild pain  at PRN Yes Unknown, Entered By History     albuterol (PROAIR HFA/PROVENTIL HFA/VENTOLIN HFA) 108 (90 Base) MCG/ACT inhaler Inhale 1-2 puffs into the lungs every 4 hours as needed for shortness of breath, wheezing or cough  at PRN Yes Unknown, Entered By History Yes    aspirin 81 MG EC tablet Take 81 mg by mouth daily 7/26/2023 Yes Unknown, Entered By History     cetirizine (ZYRTEC) 10 MG tablet Take 10 mg by mouth daily 7/26/2023 Yes Unknown, Entered By History     dulaglutide (TRULICITY) 0.75 MG/0.5ML pen Inject 0.75 mg Subcutaneous every 7 days 7/25/2023 Yes Unknown, Entered By History     DULoxetine (CYMBALTA) 60 MG capsule Take 60 mg by mouth 2 times daily 7/26/2023 Yes Unknown, Entered By History Yes    fluticasone (FLONASE) 50 MCG/ACT nasal spray Spray 1 spray into both nostrils daily as needed for rhinitis or allergies  Yes Unknown, Entered By History     glipiZIDE (GLUCOTROL XL) 5 MG 24 hr tablet Take 10 mg by mouth daily 7/26/2023 Yes Unknown, Entered By History Yes    hydrocortisone 1 % CREA cream Place  rectally 2 times daily as needed for itching (apply to foot)  at PRN Yes Unknown, Entered By History     insulin glargine (LANTUS PEN) 100 UNIT/ML pen Inject 24 Units Subcutaneous At Bedtime 7/26/2023 Yes Unknown, Entered By History Yes    lidocaine (LIDODERM) 5 % patch Place onto the skin every 24 hours To prevent lidocaine toxicity, patient should be patch free for 12 hrs daily. 7/26/2023 Yes Unknown, Entered By History     naloxone (NARCAN) 4 MG/0.1ML nasal spray Spray 4 mg into one nostril alternating nostrils as needed for opioid reversal every 2-3 minutes until assistance arrives  Yes Unknown, Entered By History Yes    oxyCODONE-acetaminophen (PERCOCET)  MG per tablet Take 1 tablet by mouth every 6 hours as needed for pain 7/26/2023 Yes Unknown, Entered By History     valsartan (DIOVAN) 40 MG tablet Take 40 mg by mouth daily 7/26/2023 Yes Unknown, Entered By History Yes

## 2023-07-27 NOTE — CONSULTS
NEUROLOGY CONSULTATION NOTE     Sarah Rahman,  1965, MRN [id number] Date: 2023     [facility]   Code status:  No Order   PCP: DE[facility], [phone]      ASSESSMENT & PLAN   Diagnosis code: Right-sided weakness with aphasia-suspect stroke    Right-sided weakness with aphasia-suspect stroke  Ongoing headache  Intracranial vascular stenosis    Head CT negative for acute stroke  CT angiogram shows atherosclerosis with severe stenosis of the left paraclinoid internal carotid artery and posterior division M2 on the left.  IV tenecteplase used  ICU admission  Every 1 hour neurochecks  Repeat MRI tomorrow  Echocardiogram/cardiac monitoring  Decide on antiplatelet therapy depending on MRI results tomorrow  Lipid panel and statin  A1c  Permissive hypertension with systolic pressures less than 180  PT/OT/speech therapy when possible  Tylenol for headache    Dr. Son to follow patient tomorrow.       Chief Complaint   Patient presents with    Stroke Symptoms        HISTORY OF PRESENT ILLNESS     We have been requested by Dr. Reyes to evaluate Sarah Rahman who is a 57 year old  female for right-sided weakness and possible stroke.  Patient is unable to provide history and her daughter does not know much about her.  History is mainly obtained from chart review.  This is a 57-year-old female who presented the emergency room with acute onset of right-sided weakness and difficulty speaking.  Symptoms started 45 minutes before arrival.  She has no previous history of residual deficits from her previous strokes.  Exact etiology and details of the stroke are unknown.  She was found down on the ground by her apartment staff.  Head CT was negative for hemorrhage.  She does complain of severe ongoing headache.  She was given IV tenecteplase with significant improvement in her symptoms.  She continues to remain hypertensive.  Headache is still persistent.     PAST MEDICAL &  SURGICAL HISTORY     Medical History  History reviewed. No pertinent past medical history.  Surgical History  History reviewed. No pertinent surgical history.     SOCIAL HISTORY     Does not smoke per the daughter.     FAMILY HISTORY     Reviewed, and noncontributory.     ALLERGIES     Allergies   Allergen Reactions    Haloperidol Angioedema    Hydroxyzine Angioedema    Phenothiazines Angioedema    Tramadol Angioedema    Januvia [Sitagliptin] Swelling    Latex Itching    Lisinopril Cough        REVIEW OF SYSTEMS     No other complaints per the patient though she is aphasic and formal review of systems cannot be done.     HOME & HOSPITAL MEDICATIONS     Prior to Admission Medications  (Not in a hospital admission)      Hospital Medications       PHYSICAL EXAM     Vital signs  Pulse:  [76-97] 87  Resp:  [12-30] 29  BP: (118-197)/() 128/81  SpO2:  [96 %-99 %] 97 %    PHYSICAL EXAMINATION  VITALS: BP (!) 143/81   Pulse 86   Resp 29   Wt 89.4 kg (197 lb 3.2 oz)   SpO2 97%   GENERAL -Health appearing, No apparent distress.  Patient crying.  EYES- No scleral icterus, no eyelid droop, Pupils - see Neuro section  HEENT - Normocephalic, atraumatic, Hearing grossly intact; Oral mucosa moist and pink in color. External Ears and nose intact.   Neck - supple  PULM - Good spontaneous respiratory effort;  CV-extremities warm and well-perfused.  MSK- Gait - see Neuro section; Strength and tone- see Neuro section; Range of motion grossly intact.  PSYCH -cooperative    Neurological  Mental status - Patient is awake and oriented to self, place and time. Attention span is normal. Language is fluent and follows simple commands appropriately.  Can repeat.  Cranial nerves - Pupils are reactive and symmetric; EOMI, VFIT, right facial droop  Motor - Motor exam shows 5/5 strength in all extremities on the left.  Right arm and leg is 2-3/5.  Tone - Tone is symmetric bilaterally in upper and lower extremities.  Reflexes -reflexes are  absent.  Sensation - Sensory exam is grossly intact to light touch, pin prick on the left.  Absent on right.  Coordination - Finger to nose without dysmetria on the left.  Unable to do on the right side due to weakness.  Gait and station --unable to ambulate due to weakness.  Formal gait testing cannot be done due to safety concerns from ongoing medical issues.       DIAGNOSTIC STUDIES     Pertinent Radiology   HEAD CT:  1.  No CT evidence for acute intracranial process.  2.  Brain atrophy and presumed chronic microvascular ischemic changes as above.     HEAD CTA:   1.  Atherosclerosis causes severe stenosis of the left paraclinoid internal carotid artery and an A4 branch of the right anterior cerebral artery.  2.  Moderate stenosis of the proximal posterior division M2 left middle cerebral artery branch, likely due to atherosclerosis.     NECK CTA:  1.  No hemodynamically significant stenosis in the neck vessels.   2.  No evidence for dissection.    CT head  IMPRESSION:  1.  No CT evidence for acute intracranial process.  2.  Brain atrophy and presumed chronic microvascular ischemic changes as above.    LABS  Component      Latest Ref Rng 7/27/2023  1:52 PM 7/27/2023  2:30 PM   WBC      4.0 - 11.0 10e3/uL  7.6    RBC Count      3.80 - 5.20 10e6/uL  3.95    Hemoglobin      11.7 - 15.7 g/dL  10.7 (L)    Hematocrit      35.0 - 47.0 %  34.3 (L)    MCV      78 - 100 fL  87    MCH      26.5 - 33.0 pg  27.1    MCHC      31.5 - 36.5 g/dL  31.2 (L)    RDW      10.0 - 15.0 %  12.4    Platelet Count      150 - 450 10e3/uL  139 (L)    % Neutrophils      %  61    % Lymphocytes      %  30    % Monocytes      %  5    % Eosinophils      %  3    % Basophils      %  1    % Immature Granulocytes      %  0    NRBCs per 100 WBC      <1 /100  0    Absolute Neutrophils      1.6 - 8.3 10e3/uL  4.7    Absolute Lymphocytes      0.8 - 5.3 10e3/uL  2.3    Absolute Monocytes      0.0 - 1.3 10e3/uL  0.4    Absolute Eosinophils      0.0 - 0.7  10e3/uL  0.2    Absolute Basophils      0.0 - 0.2 10e3/uL  0.1    Absolute Immature Granulocytes      <=0.4 10e3/uL  0.0    Absolute NRBCs      10e3/uL  0.0    Sodium      136 - 145 mmol/L  139    Potassium      3.4 - 5.3 mmol/L  3.8    Chloride      98 - 107 mmol/L  106    Carbon Dioxide (CO2)      22 - 29 mmol/L  22    Anion Gap      7 - 15 mmol/L  11    Urea Nitrogen      6.0 - 20.0 mg/dL  16.7    Creatinine      0.51 - 0.95 mg/dL  0.94    Calcium      8.6 - 10.0 mg/dL  9.6    Glucose      70 - 99 mg/dL  255 (H)    GFR Estimate      >60 mL/min/1.73m2  70    Troponin T, High Sensitivity      <=14 ng/L  12    GLUCOSE BY METER POCT      70 - 99 mg/dL 268 (H)        Legend:  (H) High  (L) Low    Recent Results (from the past 24 hour(s))   Glucose by meter    Collection Time: 07/27/23  1:52 PM   Result Value Ref Range    GLUCOSE BY METER POCT 268 (H) 70 - 99 mg/dL   Basic metabolic panel    Collection Time: 07/27/23  2:30 PM   Result Value Ref Range    Sodium 139 136 - 145 mmol/L    Potassium 3.8 3.4 - 5.3 mmol/L    Chloride 106 98 - 107 mmol/L    Carbon Dioxide (CO2) 22 22 - 29 mmol/L    Anion Gap 11 7 - 15 mmol/L    Urea Nitrogen 16.7 6.0 - 20.0 mg/dL    Creatinine 0.94 0.51 - 0.95 mg/dL    Calcium 9.6 8.6 - 10.0 mg/dL    Glucose 255 (H) 70 - 99 mg/dL    GFR Estimate 70 >60 mL/min/1.73m2   Troponin T, High Sensitivity    Collection Time: 07/27/23  2:30 PM   Result Value Ref Range    Troponin T, High Sensitivity 12 <=14 ng/L   CBC with platelets and differential    Collection Time: 07/27/23  2:30 PM   Result Value Ref Range    WBC Count 7.6 4.0 - 11.0 10e3/uL    RBC Count 3.95 3.80 - 5.20 10e6/uL    Hemoglobin 10.7 (L) 11.7 - 15.7 g/dL    Hematocrit 34.3 (L) 35.0 - 47.0 %    MCV 87 78 - 100 fL    MCH 27.1 26.5 - 33.0 pg    MCHC 31.2 (L) 31.5 - 36.5 g/dL    RDW 12.4 10.0 - 15.0 %    Platelet Count 139 (L) 150 - 450 10e3/uL    % Neutrophils 61 %    % Lymphocytes 30 %    % Monocytes 5 %    % Eosinophils 3 %    %  Basophils 1 %    % Immature Granulocytes 0 %    NRBCs per 100 WBC 0 <1 /100    Absolute Neutrophils 4.7 1.6 - 8.3 10e3/uL    Absolute Lymphocytes 2.3 0.8 - 5.3 10e3/uL    Absolute Monocytes 0.4 0.0 - 1.3 10e3/uL    Absolute Eosinophils 0.2 0.0 - 0.7 10e3/uL    Absolute Basophils 0.1 0.0 - 0.2 10e3/uL    Absolute Immature Granulocytes 0.0 <=0.4 10e3/uL    Absolute NRBCs 0.0 10e3/uL       Total time spent for face to face visit, reviewing labs/imaging studies, counseling and coordination of care was: Over 80 min More than 50% of this time was spent on counseling and coordination of care.    Counseling patient and family.  Coordination of care with the nursing staff.  Reviewing chart.  Patient new to me.    Lefty Wadsworth MD  Neurologist  [facility]  Tel:- [phone]

## 2023-07-27 NOTE — CONSULTS
[facility]    Stroke Consult Note    Reason for Consult: Stroke Code     Chief Complaint: Stroke Symptoms      HPI  Sarah Rahman is a 57 year old female with pertinent past medical history of prior stroke (R sided deficits that had improved and walks at baseline, no PTA meds in chart.    She presented to [facility] ED today after was at work and found at 1215p on ground with R hemiplegia, aphasic saying only a few words on initial report. She was seen following CT via telemedicine. She reports being at home and feeling completely normal at 1130 this morning when she took a nap due to being tired. She was found in the elevator of her building about 1215p this afternoon with R sided weakness and not speaking. Patient is able to whisper and mouth all answers during exam.  She is not sure if she hit her head but there is no apparent head trauma.  She is able to lift her R arm antigravity but with significant weakness compared to the L. R leg only some effort against gravity. She reports significant sensory loss to the R side. /89.     Discussed with patient that symptoms may or may not be due to stroke. Discussed the risks/benefits of TNK if it is or is not due to a stroke. Reviewed any potential contraindications with vascular neurology attending Dr. Catherine, she wanted to receive the medication. There was some delay to obtain bed weight prior to being able to order the TNK. Bed weight obtained and TNK ordered at 1340. . TNK arrived at 1354. But SBP increased to 197.Repeated and 183/91. 10 mg IV labetalol given. Repeat /78. TNK pushed at 1407.    Imaging Findings  HEAD CT:  1.  No CT evidence for acute intracranial process.  2.  Brain atrophy and presumed chronic microvascular ischemic changes as above.     HEAD CTA:   1.  Atherosclerosis causes severe stenosis of the left paraclinoid internal carotid artery and an A4 branch of the right anterior cerebral  "artery.  2.  Moderate stenosis of the proximal posterior division M2 left middle cerebral artery branch, likely due to atherosclerosis.     NECK CTA:  1.  No hemodynamically significant stenosis in the neck vessels.   2.  No evidence for dissection.    Intravenous Thrombolysis  Risks (including potential for bleeding and death), benefits, and alternatives to thrombolytic therapy were discussed with Patient. Prior to tenecteplase (TNK) administration, the following issues were addressed:   -need to clarify LKW  -appeared to have brief delay with CTA images  -switch beds to obtain bed weight since can't stand on a scale  -need to lower BP with IV medications prior to TNK    Endovascular Treatment  Not initiated due to absence of proximal vessel occlusion    Impression   Acute R sided weakness, and difficulty speaking, does have moderate L M2 stenosis, possibly due to ischemic stroke versus alternative etiology    Recommendations  - Use orderset: \"Ischemic Stroke Post-Thrombolytics/Thrombectomy ICU Admission\"  -Discussed with vascular neurology attending, Dr. Catherine  -Admit to ICU for close monitoring at [facility], [facility], or [facility]   -neuro checks and vital signs per post-TNK orderset protocol  -Hold all antiplatelets/anticoagulants/NSAIDs, and pharmacologic VTE prophylaxis for 24 hrs post-thrombolytic   -BP <180/105 x 24 hrs s/p TNK  -repeat CTH in 24 hours post-TNK   -MRI brain w/wo contrast     If MRI shows stroke then needs further stroke work-up: (if no stroke then would consider alternative etiology)  -TTE (with bubble study if 60 yrs or less)   -Smoking screen, depression screen, sleep apnea screen  -PT/OT/SPT, NPO until Dysphagia screen  -ECG/troponins x 3, telemetry  -blood glucose monitoring, check Hgb A1c (goal <7%)  -PTA statin, check Lipid panel (titrate to goal LDL 40-70), <40 increases risk of ICH  -Euthermia, euglycemia, eunatremia   -Stroke Education  -Stroke Class per [facility] " "([facility])    Patient Follow-up     Defer to accepting team.    Thank you for this consult.      Yari Paulino PA-C  Vascular Neurology    To page me or covering stroke neurology team member, click here: [facility]COM  Choose \"On Call\" tab at top, then select \"NEUROLOGY/ALL SITES\" from middle drop-down box, press Enter, then look for \"stroke\" or \"telestroke\" for your site.  ______________________________________________________    Clinically Significant Risk Factors Present on Admission          Past Medical History   History reviewed. No pertinent past medical history.  Past Surgical History   History reviewed. No pertinent surgical history.  Medications   Home Meds  Prior to Admission medications    Not on File       Scheduled Meds        Infusion Meds   niCARdipine      sodium chloride         PRN Meds      Allergies   Not on File  Family History   History reviewed. No pertinent family history.  Social History        Review of Systems   Difficult to obtain given lack of auditory verbal responses.       PHYSICAL EXAMINATION  Pulse:  [86-90] 87  Resp:  [17-25] 17  BP: (163-197)/() 163/78  SpO2:  [98 %-99 %] 98 %     General Exam  General: Patient lying in bed, appears distressed crying  HEENT:  normocephalic/atraumatic  Pulmonary:  no respiratory distress      Neuro Exam  Mental Status: A&O x3, able to answer questions correctly/name objects/provide history by mouthing the words with a barely audible whisper  Cranial Nerves: No apparent visual deficits, EOMI with normal smooth pursuit, reports significant sensory deficit to right side of face compared to left, no facial droop, no dysarthria, tongue protrusion midline   Motor: No drift left arm/leg, when requested that she lift up her right arm she was unable to do so, later on she sneezed and was able to hold up her right arm briefly, able to wiggle her right toes but no effort against gravity   Reflexes:  unable to test (telestroke)  Sensory: Reports total sensory " loss to right arm/leg, does not respond with noxious stimuli   Coordination: No dysmetria out of proportion to weakness   Station/Gait:  unable to test due to telestroke    Dysphagia Screen  Recommend SPT consult due to speech difficulty    Stroke Scales    NIHSS  1a. Level of Consciousness 0-->Alert, keenly responsive   1b. LOC Questions 0-->Answers both questions correctly   1c. LOC Commands (S) 0-->Performs both tasks correctly (mouths answers correctly, no auditory response)   2.   Best Gaze 0-->Normal   3.   Visual 0-->No visual loss   4.   Facial Palsy 0-->Normal symmetrical movements   5a. Motor Arm, Left 0-->No drift, limb holds 90 (or 45) degrees for full 10 secs   5b. Motor Arm, Right 1-->Drift, limb holds 90 (or 45) degrees, but drifts down before full 10 secs, does not hit bed or other support   6a. Motor Leg, Left 0-->No drift, leg holds 30 degree position for full 5 secs   6b. Motor Leg, right 2-->Some effort against gravity, leg falls to bed by 5 secs, but has some effort against gravity   7.   Limb Ataxia (S) 0-->Absent (not out of proportion to weakness)   8.   Sensory 2-->Severe to total sensory loss, patient is not aware of being touched in the face, arm, and leg   9.   Best Language 0-->No aphasia, normal   10. Dysarthria 0-->Normal   11. Extinction and Inattention  (S) 0-->No abnormality (unable to test tactile given says loss of sensation to R side)   Total 5 (07/27/23 1307)       Modified Ciales Score (Pre-morbid)  (S) 3 (walks independently, has some help at home, able to usually write with her R hand) - (S) Moderate disability.  Requires some help, but able to walk unassisted. (walks independently, has some help at home, able to usually write with her R hand)    Imaging  I personally reviewed all imaging; relevant findings per HPI.     Lab Results Data   CBC  No results for input(s): WBC, RBC, HGB, HCT, PLT in the last 168 hours.  Basic Metabolic Panel    Recent Labs   Lab 07/27/23  2049    *     Liver Panel  No results for input(s): PROTTOTAL, ALBUMIN, BILITOTAL, ALKPHOS, AST, ALT, BILIDIRECT in the last 168 hours.  INR  No lab results found.   Lipid Profile  No lab results found.  A1C  No lab results found.  Troponin  No results for input(s): CTROPT, TROPONINIS, TROPONINI, GHTROP in the last 168 hours.       Stroke Code Data Data   Stroke Code Data  (for stroke code with tele)  Stroke code activated 07/27/23   1243   First stroke provider response 07/27/23   1244   Video start time 07/27/23   1304   Video end time 07/27/23   1409   Last known normal 07/27/23   1130   Time of discovery  (or onset of symptoms)  07/27/23   1215   Head CT read by Stroke Neuro Dr/Provider 07/27/23   1251   Was stroke code de-escalated? (S) No (TNK)               Telestroke Service Details  Type of service telemedicine diagnostic assessment of acute neurological changes   Reason telemedicine is appropriate patient requires assessment with a specialist for diagnosis and treatment of neurological symptoms   Mode of transmission secure interactive audio and video communication per Saundra   Originating site (patient location) [facility]    Distant site (provider location) [facility]       I personally examined and evaluated the patient today. At the time of my evaluation and management the patient was in critical condition today due to stroke code. I personally managed review of chart, medical record, meds, imaging, history, exam, and discussion with attending regarding plan and documentation. I spent a total of 90 minutes providing critical care services, evaluating the patient, directing care and reviewing laboratory values and radiologic reports.

## 2023-07-27 NOTE — ED NOTES
"Pt able to speak clearly in CT during IV start. Initially refused to let RN start IV or obtain blood pressure until her 's license was returned to her. \"You will not do anything until I have my ID back.\" Pt was able to move right arm without difficulty while in CT. Dr. Reyes updated.     Difficult IV start combined with patient's refusal led to delay in blood draw and CT scan.   "

## 2023-07-27 NOTE — ED PROVIDER NOTES
EMERGENCY DEPARTMENT ENCOUNTER      NAME: Sarah Rahman  AGE: 57 year old female  YOB: 1965  MRN: [id number]  EVALUATION DATE & TIME: 2023 12:45 PM    PCP: No primary care provider on file.    ED PROVIDER: Praful Reyes D.O.      Chief Complaint   Patient presents with    Stroke Symptoms       FINAL IMPRESSION:  1. Acute CVA (cerebrovascular accident) (H)        ED COURSE & MEDICAL DECISION MAKIN:37 PM I met with the patient and EMS to gather history and to perform my initial exam. I discussed the plan for care while in the Emergency Department.  12:39 PM Tier 1 stroke code called  12:44 PM I spoke with stroke team  12:50 PM Last known well at 12:00 PM  2:01 PM I discussed the case with intensivist, Dr Gutierrez, who accepts the patient           Pertinent Labs & Imaging studies reviewed. (See chart for details)  57 year old female presents to the Emergency Department for evaluation of cute onset of right-sided weakness with difficulty speaking.  Onset was approximately 45 minutes prior to arrival to the emergency department.  Patient known history of previous CVA, with similar symptoms on that side, but no significant residual deficits per history given.  Was reported at approximately noon by her work colleagues that her symptoms appear to be of onset with progressive worsening of difficulty with speaking.  When she arrived to the emergency department, she was unable to move her right arm and leg, and was able to answer yes/no questions by nodding and shaking her head.  At times she was able to speak a little bit, but of greater concern was the paralysis of the right side of her body.  She was taken emergently to CT scan for CT and CTA both of which did not show evidence of acute process.  She was evaluated by stroke neurology in the room, and started on TNK based on their recommendations.  She will be admitted to the ICU for further management.    Medical Decision  Making    History:  Supplemental history from: Documented in chart, if applicable and EMS  External Record(s) reviewed: Documented in chart, if applicable.    Work Up:  Chart documentation includes differential considered and any EKGs or imaging independently interpreted by provider, where specified.  In additional to work up documented, I considered the following work up: Documented in chart, if applicable.    External consultation:  Discussion of management with another provider: Documented in chart, if applicable and Intensivist    Complicating factors:  Care impacted by chronic illness: N/A  Care affected by social determinants of health: N/A    Disposition considerations: Admit.        At the conclusion of the encounter I discussed the results of all of the tests and the disposition. The questions were answered. The patient or family acknowledged understanding and was agreeable with the care plan.      CRITICAL CARE:  Critical Care  Performed by: YULI LEWIS  Authorized by: YULI LEWIS  Total critical care time: 30 minutes  Critical care time was exclusive of separately billable procedures and treating other patients.  Critical care was necessary to treat or prevent imminent or life-threatening deterioration of the following conditions: CVA  Critical care was time spent personally by me on the following activities: development of treatment plan with patient or surrogate, discussions with consultants, examination of patient, evaluation of patient's response to treatment, obtaining history from patient or surrogate, ordering and performing treatments and interventions, ordering and review of laboratory studies, ordering and review of radiographic studies and re-evaluation of patient's condition, this excludes any separately billable procedures.        HPI    Patient information was obtained from: Patient     Use of : N/A        Sarah Rahman is a 57 year old female with a  pertinent medical history of CVA who presents to this ED by ambulance for evaluation of stroke.     Per EMS, patient was found laying on the ground 20 minutes prior to arrival but unknown when last well. Patient was found to possess right sided weakness and numbness as well as difficulty speaking. She has known history of strokes.     Patient states that she had woken up feeling normal and had gone to work. Patient denies nauesa or additional symptoms or complaints at this time.     REVIEW OF SYSTEMS  HPI limited due to patient's difficulty speaking    PAST MEDICAL HISTORY:  History reviewed. No pertinent past medical history.    PAST SURGICAL HISTORY:  History reviewed. No pertinent surgical history.      CURRENT MEDICATIONS:    Current Facility-Administered Medications   Medication    labetalol (NORMODYNE/TRANDATE) injection 10 mg    Or    hydrALAZINE (APRESOLINE) injection 10 mg    niCARdipine 40 mg in 200 mL NS (CARDENE) infusion    sodium chloride 0.9% infusion     No current outpatient medications on file.         ALLERGIES:  Not on File    FAMILY HISTORY:  History reviewed. No pertinent family history.    SOCIAL HISTORY:       VITALS:  Patient Vitals for the past 24 hrs:   BP Pulse Resp SpO2 Weight   07/27/23 1405 (!) 163/78 -- -- -- --   07/27/23 1400 (!) 184/86 87 17 98 % --   07/27/23 1355 (!) 183/91 87 25 98 % --   07/27/23 1351 (!) 197/101 90 22 99 % --   07/27/23 1339 -- -- -- -- 89.4 kg (197 lb 3.2 oz)   07/27/23 1330 (!) 170/89 86 22 98 % --       PHYSICAL EXAM    VITAL SIGNS: BP (!) 163/78   Pulse 87   Resp 17   Wt 89.4 kg (197 lb 3.2 oz)   SpO2 98%     General Appearance: Anxious-appearing, well-nourished, no acute distress   Head:  Normocephalic, without obvious abnormality, atraumatic  Eyes:  PERRL, conjunctiva/corneas clear, EOM's intact,  ENT:  Lips, mucosa, and tongue normal, membranes are moist without pallor  Neck:  Normal ROM, symmetrical, trachea midline    Cardio:  Regular rate and  rhythm, no murmur, rub or gallop, 2+ pulses symmetric in all extremities  Pulm:  Clear to auscultation bilaterally, respirations unlabored,  Musculoskeletal: Full ROM, no edema, no cyanosis, good ROM of major joints  Integument:  Warm, Dry, No erythema, No rash.    Neurologic:  Patient is alert and oriented ×3.  Face is symmetric.  Speech is normal.  Visual fields are full.  Cranial nerves II through XII are grossly intact. Motor tone is 5/5 left upper and lower extremity, 1 out of 5 right upper and lower extremity.  Bilateral symmetric sensation grossly intact upper and lower.    Psychiatric:  Affect normal, Judgment normal, Mood normal.      [employer] Stroke Scale  Exam Interval: Baseline   Score    Level of consciousness: (0)   Alert, keenly responsive    LOC questions: (0)   Answers both questions correctly    LOC commands: (0)   Performs both tasks correctly    Best gaze: (0)   Normal    Visual: (0)   No visual loss    Facial palsy: (0)   Normal symmetrical movements    Motor arm (left): (0)   No drift    Motor arm (right): (4)   No movement    Motor leg (left): (0)   No drift    Motor leg (right): (3)   No effort against gravity    Limb ataxia: (1)   Present in one limb    Sensory: (1)   Mild to moderate sensory loss    Best language: (1)   Mild to moderate aphasia    Dysarthria: (1)   Mild to moderate dysarthria    Extinction and inattention: (0)   No abnormality        Total Score:  11        LABS  Results for orders placed or performed during the hospital encounter of 07/27/23 (from the past 24 hour(s))   CBC with Platelets & Differential    Narrative    The following orders were created for panel order CBC with Platelets & Differential.  Procedure                               Abnormality         Status                     ---------                               -----------         ------                     CBC with platelets and d...[[id number]]                                                    Please view results for these tests on the individual orders.   CTA Head Neck with Contrast    Narrative    EXAM: CTA HEAD NECK W CONTRAST  LOCATION: [facility]  DATE: 7/27/2023    INDICATION: Code Stroke to evaluate for potential thrombolysis and thrombectomy. PLEASE READ IMMEDIATELY.  COMPARISON: None.  CONTRAST:  TECHNIQUE: Head and neck CT angiogram with IV contrast. Noncontrast head CT followed by axial helical CT images of the head and neck vessels obtained during the arterial phase of intravenous contrast administration. Axial 2D reconstructed images and   multiplanar 3D MIP reconstructed images of the head and neck vessels were performed by the technologist. Dose reduction techniques were used. All stenosis measurements made according to NASCET criteria unless otherwise specified.    FINDINGS:   NONCONTRAST HEAD CT:   INTRACRANIAL CONTENTS: No intracranial hemorrhage, extraaxial collection, or mass effect.  No CT evidence of acute infarct. Mild presumed chronic small vessel ischemic changes. Mild generalized volume loss. No hydrocephalus.     VISUALIZED ORBITS/SINUSES/MASTOIDS: No intraorbital abnormality. No paranasal sinus mucosal disease. No middle ear or mastoid effusion.    BONES/SOFT TISSUES: No acute abnormality.    HEAD CTA:  ANTERIOR CIRCULATION: Atherosclerosis of the intracranial internal carotid arteries bilaterally. Severe stenosis of the left paraclinoid internal carotid artery. Mild narrowing of the bilateral cavernous internal carotid arteries. Moderate narrowing at   the origin of the posterior division M1 left middle cerebral artery. Severe narrowing of the A4 branch of the right anterior cerebral artery. No additional high-grade stenosis. No aneurysm or high flow vascular malformation. Anterior communicating artery   is present. Posterior communicating arteries are not seen.    POSTERIOR CIRCULATION: Mild narrowing of the proximal basilar artery, likely  due to atherosclerosis. No additional high-grade stenosis or occlusion. No aneurysm or high flow vascular malformation. Codominant vertebral arteries.     DURAL VENOUS SINUSES: Not well evaluated on a technical basis.    NECK CTA:  RIGHT CAROTID: No measurable stenosis or dissection.    LEFT CAROTID: No measurable stenosis or dissection.    VERTEBRAL ARTERIES: No focal stenosis or dissection. Balanced vertebral arteries.    AORTIC ARCH: Classic aortic arch anatomy with no significant stenosis at the origin of the great vessels.    NONVASCULAR STRUCTURES: No acute or aggressive abnormality.      Impression    IMPRESSION:   HEAD CT:  1.  No CT evidence for acute intracranial process.  2.  Brain atrophy and presumed chronic microvascular ischemic changes as above.    HEAD CTA:   1.  Atherosclerosis causes severe stenosis of the left paraclinoid internal carotid artery and an A4 branch of the right anterior cerebral artery.  2.  Moderate stenosis of the proximal posterior division M2 left middle cerebral artery branch, likely due to atherosclerosis.    NECK CTA:  1.  No hemodynamically significant stenosis in the neck vessels.   2.  No evidence for dissection.    Findings discussed with Dr. Kenny Reyes at 12:55 PM and 1:20 PM on 07/27/2023.   Glucose by meter   Result Value Ref Range    GLUCOSE BY METER POCT 268 (H) 70 - 99 mg/dL         RADIOLOGY  CTA Head Neck with Contrast   Final Result   IMPRESSION:    HEAD CT:   1.  No CT evidence for acute intracranial process.   2.  Brain atrophy and presumed chronic microvascular ischemic changes as above.      HEAD CTA:    1.  Atherosclerosis causes severe stenosis of the left paraclinoid internal carotid artery and an A4 branch of the right anterior cerebral artery.   2.  Moderate stenosis of the proximal posterior division M2 left middle cerebral artery branch, likely due to atherosclerosis.      NECK CTA:   1.  No hemodynamically significant stenosis in the neck vessels.    2.   No evidence for dissection.      Findings discussed with Dr. Kenny Reyes at 12:55 PM and 1:20 PM on 07/27/2023.           EKG:    Rate: 89 bpm  Rhythm: Normal Sinus Rhythm  Axis: Normal  Interval: Normal  Conduction: Normal  QRS: Normal  ST: Normal  T-wave: Normal  QT: Not prolonged  Comparison EKG: no previous available for comparison  Impression:  No acute ischemic change   I have independently reviewed and interpreted today's EKG, pending Cardiologist read      MEDICATIONS GIVEN IN THE EMERGENCY:  Medications   sodium chloride 0.9% infusion (has no administration in time range)   labetalol (NORMODYNE/TRANDATE) injection 10 mg (10 mg Intravenous $Given 7/27/23 1357)     Or   hydrALAZINE (APRESOLINE) injection 10 mg ( Intravenous See Alternative 7/27/23 5107)   niCARdipine 40 mg in 200 mL NS (CARDENE) infusion (has no administration in time range)   iopamidol (ISOVUE-370) solution 75 mL (75 mLs Intravenous $Given 7/27/23 1316)   tenecteplase (TNKase) injection 22.5 mg (22.5 mg Intravenous $Given 7/27/23 1405)       NEW PRESCRIPTIONS STARTED AT TODAY'S ER VISIT  New Prescriptions    No medications on file        I, Shree Olsen, am serving as a scribe to document services personally performed by Praful Reyes D.O., based on my observations and the provider's statements to me.  I, Praful Reyes D.O., attest that Shree Olsen is acting in a scribe capacity, has observed my performance of the services and has documented them in accordance with my direction.     Praful Reyes D.O.  Emergency Medicine  [facility] EMERGENCY DEPARTMENT  [address] [place] [id number] [phone]  Dept: [phone]       Praful Reyes,   07/27/23 4837

## 2023-07-27 NOTE — ED TRIAGE NOTES
Pt arrived via EMS from home. She was found by apartment staff on the ground in the elevator. She was able to communicate with staff at that time. Staff reports that she said she had laid down for a nap and then woke up feeling off and was on the ground. She was attempting to call a possible PCA (Giovanny?). Staff reports she was getting weaker and lost ability to speak while they were with her.    Per EMS, pt has a history of strokes, DM, and HTN. Tier one stroke code called upon arrival by provider. LKW of approximately 1200.

## 2023-07-28 ENCOUNTER — APPOINTMENT (OUTPATIENT)
Dept: PHYSICAL THERAPY | Facility: HOSPITAL | Age: 58
End: 2023-07-28
Attending: HOSPITALIST
Payer: COMMERCIAL

## 2023-07-28 ENCOUNTER — APPOINTMENT (OUTPATIENT)
Dept: CARDIOLOGY | Facility: HOSPITAL | Age: 58
End: 2023-07-28
Attending: HOSPITALIST
Payer: COMMERCIAL

## 2023-07-28 ENCOUNTER — APPOINTMENT (OUTPATIENT)
Dept: MRI IMAGING | Facility: HOSPITAL | Age: 58
End: 2023-07-28
Attending: HOSPITALIST
Payer: COMMERCIAL

## 2023-07-28 LAB
ANION GAP SERPL CALCULATED.3IONS-SCNC: 13 MMOL/L (ref 7–15)
APTT PPP: 26 SECONDS (ref 22–38)
BUN SERPL-MCNC: 15.2 MG/DL (ref 6–20)
CALCIUM SERPL-MCNC: 8.5 MG/DL (ref 8.6–10)
CHLORIDE SERPL-SCNC: 106 MMOL/L (ref 98–107)
CREAT SERPL-MCNC: 0.73 MG/DL (ref 0.51–0.95)
DEPRECATED HCO3 PLAS-SCNC: 21 MMOL/L (ref 22–29)
ERYTHROCYTE [DISTWIDTH] IN BLOOD BY AUTOMATED COUNT: 12.6 % (ref 10–15)
GFR SERPL CREATININE-BSD FRML MDRD: >90 ML/MIN/1.73M2
GLUCOSE BLDC GLUCOMTR-MCNC: 186 MG/DL (ref 70–99)
GLUCOSE BLDC GLUCOMTR-MCNC: 202 MG/DL (ref 70–99)
GLUCOSE BLDC GLUCOMTR-MCNC: 225 MG/DL (ref 70–99)
GLUCOSE BLDC GLUCOMTR-MCNC: 282 MG/DL (ref 70–99)
GLUCOSE SERPL-MCNC: 241 MG/DL (ref 70–99)
HCT VFR BLD AUTO: 34.6 % (ref 35–47)
HGB BLD-MCNC: 10.7 G/DL (ref 11.7–15.7)
INR PPP: 1.05 (ref 0.85–1.15)
LVEF ECHO: NORMAL
MCH RBC QN AUTO: 26.8 PG (ref 26.5–33)
MCHC RBC AUTO-ENTMCNC: 30.9 G/DL (ref 31.5–36.5)
MCV RBC AUTO: 87 FL (ref 78–100)
PLATELET # BLD AUTO: 218 10E3/UL (ref 150–450)
POTASSIUM SERPL-SCNC: 3.6 MMOL/L (ref 3.4–5.3)
RBC # BLD AUTO: 3.99 10E6/UL (ref 3.8–5.2)
SODIUM SERPL-SCNC: 140 MMOL/L (ref 136–145)
WBC # BLD AUTO: 7 10E3/UL (ref 4–11)

## 2023-07-28 PROCEDURE — 85610 PROTHROMBIN TIME: CPT | Performed by: HOSPITALIST

## 2023-07-28 PROCEDURE — 93306 TTE W/DOPPLER COMPLETE: CPT | Mod: 26 | Performed by: INTERNAL MEDICINE

## 2023-07-28 PROCEDURE — 70551 MRI BRAIN STEM W/O DYE: CPT

## 2023-07-28 PROCEDURE — 99233 SBSQ HOSP IP/OBS HIGH 50: CPT | Performed by: PSYCHIATRY & NEUROLOGY

## 2023-07-28 PROCEDURE — 999N000208 ECHOCARDIOGRAM COMPLETE

## 2023-07-28 PROCEDURE — 250N000011 HC RX IP 250 OP 636: Performed by: PHYSICIAN ASSISTANT

## 2023-07-28 PROCEDURE — 93306 TTE W/DOPPLER COMPLETE: CPT

## 2023-07-28 PROCEDURE — 120N000001 HC R&B MED SURG/OB

## 2023-07-28 PROCEDURE — 97530 THERAPEUTIC ACTIVITIES: CPT | Mod: GP

## 2023-07-28 PROCEDURE — 99233 SBSQ HOSP IP/OBS HIGH 50: CPT | Performed by: STUDENT IN AN ORGANIZED HEALTH CARE EDUCATION/TRAINING PROGRAM

## 2023-07-28 PROCEDURE — 85027 COMPLETE CBC AUTOMATED: CPT | Performed by: HOSPITALIST

## 2023-07-28 PROCEDURE — 85730 THROMBOPLASTIN TIME PARTIAL: CPT | Performed by: HOSPITALIST

## 2023-07-28 PROCEDURE — 999N000226 HC STATISTIC SLP IP EVAL DEFER

## 2023-07-28 PROCEDURE — 36415 COLL VENOUS BLD VENIPUNCTURE: CPT | Performed by: HOSPITALIST

## 2023-07-28 PROCEDURE — 97162 PT EVAL MOD COMPLEX 30 MIN: CPT | Mod: GP

## 2023-07-28 PROCEDURE — 250N000013 HC RX MED GY IP 250 OP 250 PS 637: Performed by: PSYCHIATRY & NEUROLOGY

## 2023-07-28 PROCEDURE — 250N000012 HC RX MED GY IP 250 OP 636 PS 637: Performed by: HOSPITALIST

## 2023-07-28 PROCEDURE — 250N000013 HC RX MED GY IP 250 OP 250 PS 637: Performed by: HOSPITALIST

## 2023-07-28 PROCEDURE — 82310 ASSAY OF CALCIUM: CPT | Performed by: HOSPITALIST

## 2023-07-28 PROCEDURE — 999N000033 HC STATISTIC CHRONIC PULMONARY DISEASE SPECIALIST

## 2023-07-28 RX ORDER — TRAZODONE HYDROCHLORIDE 50 MG/1
50 TABLET, FILM COATED ORAL
Status: DISCONTINUED | OUTPATIENT
Start: 2023-07-28 | End: 2023-07-29 | Stop reason: HOSPADM

## 2023-07-28 RX ORDER — DIAZEPAM 5 MG
5 TABLET ORAL
Status: COMPLETED | OUTPATIENT
Start: 2023-07-28 | End: 2023-07-28

## 2023-07-28 RX ORDER — ASPIRIN 81 MG/1
81 TABLET ORAL DAILY
Status: DISCONTINUED | OUTPATIENT
Start: 2023-07-28 | End: 2023-07-29 | Stop reason: HOSPADM

## 2023-07-28 RX ORDER — LIDOCAINE 40 MG/G
CREAM TOPICAL
Status: COMPLETED | OUTPATIENT
Start: 2023-07-28 | End: 2023-07-29

## 2023-07-28 RX ORDER — ATORVASTATIN CALCIUM 40 MG/1
80 TABLET, FILM COATED ORAL EVERY EVENING
Status: DISCONTINUED | OUTPATIENT
Start: 2023-07-28 | End: 2023-07-29 | Stop reason: HOSPADM

## 2023-07-28 RX ADMIN — OXYCODONE HYDROCHLORIDE AND ACETAMINOPHEN 1 TABLET: 5; 325 TABLET ORAL at 20:32

## 2023-07-28 RX ADMIN — LABETALOL HYDROCHLORIDE 10 MG: 5 INJECTION, SOLUTION INTRAVENOUS at 04:16

## 2023-07-28 RX ADMIN — OXYCODONE HYDROCHLORIDE AND ACETAMINOPHEN 1 TABLET: 5; 325 TABLET ORAL at 13:38

## 2023-07-28 RX ADMIN — ATORVASTATIN CALCIUM 80 MG: 40 TABLET, FILM COATED ORAL at 20:32

## 2023-07-28 RX ADMIN — GLIPIZIDE 10 MG: 10 TABLET, FILM COATED, EXTENDED RELEASE ORAL at 08:15

## 2023-07-28 RX ADMIN — OXYCODONE HYDROCHLORIDE AND ACETAMINOPHEN 1 TABLET: 5; 325 TABLET ORAL at 05:32

## 2023-07-28 RX ADMIN — Medication 81 MG: at 13:59

## 2023-07-28 RX ADMIN — INSULIN ASPART 2 UNITS: 100 INJECTION, SOLUTION INTRAVENOUS; SUBCUTANEOUS at 08:15

## 2023-07-28 RX ADMIN — INSULIN ASPART 3 UNITS: 100 INJECTION, SOLUTION INTRAVENOUS; SUBCUTANEOUS at 12:16

## 2023-07-28 RX ADMIN — DULOXETINE HYDROCHLORIDE 60 MG: 60 CAPSULE, DELAYED RELEASE PELLETS ORAL at 20:31

## 2023-07-28 RX ADMIN — CETIRIZINE HYDROCHLORIDE 10 MG: 10 TABLET, FILM COATED ORAL at 08:15

## 2023-07-28 RX ADMIN — LIDOCAINE 1 PATCH: 4 PATCH TOPICAL at 21:10

## 2023-07-28 RX ADMIN — DULOXETINE HYDROCHLORIDE 60 MG: 60 CAPSULE, DELAYED RELEASE PELLETS ORAL at 08:15

## 2023-07-28 RX ADMIN — INSULIN GLARGINE 24 UNITS: 100 INJECTION, SOLUTION SUBCUTANEOUS at 21:25

## 2023-07-28 RX ADMIN — DIAZEPAM 5 MG: 5 TABLET ORAL at 09:50

## 2023-07-28 ASSESSMENT — ACTIVITIES OF DAILY LIVING (ADL)
ADLS_ACUITY_SCORE: 30
ADLS_ACUITY_SCORE: 25
ADLS_ACUITY_SCORE: 30
ADLS_ACUITY_SCORE: 25
ADLS_ACUITY_SCORE: 30
ADLS_ACUITY_SCORE: 30
ADLS_ACUITY_SCORE: 25
ADLS_ACUITY_SCORE: 25
ADLS_ACUITY_SCORE: 30
ADLS_ACUITY_SCORE: 25

## 2023-07-28 NOTE — CONSULTS
"Unable to complete full assessment, per pts refusal.    Chart reviewed. Writer tried to speak with pt and tried to complete assessment  pt stated, \"I appreciate your time, but I do not trust SW and I have a  through the brain clinic, and they never help, so why would you?\" Writer explained the role of a hospital CM vs Community CM. Pt stated back, \"I appreciate you coming by, but I will not give you any information.\" Writer gave pt writer's contact info just in case pt changes mind and needs help with discharge resources, she was agreeable to accepting writer's contact info.         Per the chart pt lives in an apartment, and attends [facility] no PCP listed. Per EMS notes, pt found at her apartment building by staff, and pt was laying on the floor,she was attempting to call her PCA, \"Giovanny.\" At this time pt did not want to give writer contact info of her CM, PCA, or any support systems.         Therapy recommending ARU. Pt is unable to decide at this time.         CM to follow and assist if pt agreeable.      Laura Wasserman RN on 7/28/2023 at 12:20 PM  "

## 2023-07-28 NOTE — PROGRESS NOTES
"Chart reviewed. Writer tried to speak with pt and tried to complete assessment  pt stated, \"I appreciate your time, but I do not trust SW and I have a  through the brain clinic, and they never help, so why would you?\" Writer explained the role of a hospital CM vs Community CM. Pt stated back, \"I appreciate you coming by, but I will not give you any information.\" Writer gave pt writer's contact info just in case pt changes mind and needs help with discharge resources, she was agreeable to accepting writer's contact info.       Per the chart pt lives in an apartment, and attends [facility] no PCP listed. Per EMS notes, pt found at her apartment building by staff, and pt was laying on the floor,she was attempting to call her PCA, \"Giovanny.\" At this time pt did not want to give writer contact info of her CM, PCA, or any support systems.       Therapy recommending ARU. Pt is unable to decide at this time.       CM to follow and assist if pt agreeable.     Laura Wasserman RN on 7/28/2023 at 12:20 PM    "

## 2023-07-28 NOTE — PLAN OF CARE
Patient sent to [facility] via bed at 15:45, skin prep completed prior to transfer.

## 2023-07-28 NOTE — PROGRESS NOTES
pt was whispering, aphasia and difficult finding words. She was observed that she was using R arm and R hand typing and flipping the cell phone screen smoothly from outside patient's room. Suddently she was talking to her phone with perfect speech around 1900, flipping R arm when pt was frustrated in conversation of phone. She refused to get neuro check at 1930, stated she wanted to talk to neurologist. She still stated headache 10/10, asking something for pain.

## 2023-07-28 NOTE — PROGRESS NOTES
[facility]    Medicine Progress Note - Hospitalist Service    Date of Admission:  7/27/2023    Assessment & Plan   Sarah Rahman is a 57 year old female admitted on 7/27/2023. She was brought to the ED by ambulance for evaluation of aphasia and right-sided hemiparesis     Stroke like symptoms  Cerebral atherosclerosis  -Differential diagnosis includes CVA versus complex migraine versus hypertensive encephalopathy versus conversion disorder  -CT head showed no evidence of acute intracranial process; CTA head and neck showed atherosclerosis causing severe stenosis of the left paraclinoid internal carotid artery and an A4 branch of the right anterior cerebral artery, moderate stenosis of the proximal posterior division M2 left MCA branch likely due to atherosclerosis  -Status post TNK  -Telemetry monitoring  -Neurochecks Q 4hrs  -MRI brain: No acute/subacute infarct, intracranial hemorrhage or extra-axial fluid collection.  Small chronic bilateral occipital lobe infarcts.  -Echocardiogram with bubble study: Residual ejection fraction 60 to 65%, negative for interatrial shunt  -Aspirin 81 mg p.o. daily started  -PT recommending acute rehab upon discharge     Uncontrolled essential hypertension  -Permissive hypertension  -Hold PTA valsartan  -IV labetalol/hydralazine as needed, systolic blood pressure should be less than 180     Type 2 diabetes mellitus with long-term insulin use  - Insulin sliding scale and mealtime coverage  - Lantus 10 units tonight then PTA 24 units at bedtime when taking by mouth well  - Hold PTA glipizide and dulaglutide  - Accu-Cheks  - Hypoglycemia protocol     Chronic low back pain  - Resume PTA acetaminophen-oxycodone as needed and lidocaine patch     Schizoaffective disorder, depression, anxiety  - Continue PTA duloxetine     Mixed hyperlipidemia  -Check lipid panel     Moderate persistent asthma  COPD  -No bronchospasms or signs of acute exacerbation  -Continue  "PTA as needed albuterol     Tobacco abuse  -Cessation education     Chronic anemia  -Stable hemoglobin     Mild thrombocytopenia  Drug-induced platelet defect  -On PTA aspirin, holding x24 hours post TNK  -Monitor for bleeding     CAD  -History of CABG  -Denies angina symptoms     Obesity  -BMI 30.71      Diet: Low Fat Diet    DVT Prophylaxis: Pneumatic Compression Devices  Diehl Catheter: Not present  Lines: None     Cardiac Monitoring: ACTIVE order. Indication: ICU  Code Status: Full Code      Clinically Significant Risk Factors                        # DMII: A1C = 8.3 % (Ref range: <5.7 %) within past 6 months, PRESENT ON ADMISSION  # Obesity: Estimated body mass index is 30.17 kg/m  as calculated from the following:    Height as of this encounter: 1.702 m (5' 7\").    Weight as of this encounter: 87.4 kg (192 lb 9.6 oz)., PRESENT ON ADMISSION          Disposition Plan     Expected Discharge Date: 07/29/2023                  Georges Yuan MD  Hospitalist Service  [facility]  Securely message with navabi (more info)  Text page via [employer] Paging/Directory   ______________________________________________________________________    Interval History   Patient in ICU for observation s/p tPA administration.  Patient states right upper extremity weakness is showing slight improvement.  Her speech is also showing improvement.  Management plan discussed with the patient and she expressed understanding.    Physical Exam   Vital Signs: Temp: 98.4  F (36.9  C) Temp src: Oral BP: (!) 146/68 Pulse: 85   Resp: 30 SpO2: 90 % O2 Device: None (Room air)    Weight: 192 lbs 9.6 oz    General Appearance: No distress noted.  Respiratory: Good air entry bilaterally  Cardiovascular: S1 and S2 regular, no murmur or gallop  GI: Soft abdomen, no tenderness, normoactive bowel sounds  Skin: Intact and warm  Other: Right upper extremity power is 3 out of 5, right lower extremity to 4 out of 5       Medical " Decision Making       50 MINUTES SPENT BY ME on the date of service doing chart review, history, exam, documentation & further activities per the note.      Data

## 2023-07-28 NOTE — PROGRESS NOTES
07/28/23 0963   Appointment Info   Signing Clinician's Name / Credentials (PT) Corazon Winters DPT   Living Environment   People in Home alone   Current Living Arrangements apartment   Home Accessibility no concerns  (elevator)   Transportation Anticipated health plan transportation   Self-Care   Usual Activity Tolerance moderate   Current Activity Tolerance poor   Equipment Currently Used at Home cane, quad  (owns quad caner but has not used in a while)   Activity/Exercise/Self-Care Comment Pt has PCA 8 hrs/day.   General Information   Onset of Illness/Injury or Date of Surgery 07/27/23   Referring Physician Donovan Thompson MD   Patient/Family Therapy Goals Statement (PT) none   Pertinent History of Current Problem (include personal factors and/or comorbidities that impact the POC) 57 year old female admitted on 7/27/2023. She was brought to the ED by ambulance for evaluation of aphasia and right-sided hemiparesis   Existing Precautions/Restrictions fall   Strength (Manual Muscle Testing)   Strength (Manual Muscle Testing) MMT: Hip;MMT: Knee;MMT: Ankle   Strength Comments R LE: grossly 3/5.   Bed Mobility   Bed Mobility sit-supine   Sit-Supine [place] (Bed Mobility) maximum assist (25% patient effort)   Transfers   Transfers sit-stand transfer   Sit-Stand Transfer   Sit-Stand [place] (Transfers) maximum assist (25% patient effort)   Assistive Device (Sit-Stand Transfers) walker, front-wheeled   Comment, (Sit-Stand Transfer) Trialed use of FWW, pt appeared unable to  walker with R UE.   Gait/Stairs (Locomotion)   Comment, (Gait/Stairs) n/a- pt unable to ambulate.   Clinical Impression   Criteria for Skilled Therapeutic Intervention Yes, treatment indicated   PT Diagnosis (PT) Impaired functional mobility   Influenced by the following impairments Weakness   Functional limitations due to impairments Impaired strength, trasnfers, gait   Clinical Presentation (PT Evaluation Complexity)  Evolving/Changing   Clinical Presentation Rationale Presents as diagnosed   Clinical Decision Making (Complexity) moderate complexity   Planned Therapy Interventions (PT) balance training;bed mobility training;gait training;home exercise program;strengthening;transfer training   Anticipated Equipment Needs at Discharge (PT) cane, quad;walker, isak   Risk & Benefits of therapy have been explained patient   PT Total Evaluation Time   PT Eval, Moderate Complexity Minutes (25996) 10   Physical Therapy Goals   PT Frequency Daily   PT Predicted Duration/Target Date for Goal Attainment 08/03/23   PT Goals Bed Mobility;Transfers;Gait   PT: Bed Mobility Supervision/stand-by assist;Supine to/from sit   PT: Transfers Minimal assist;Sit to/from stand;Bed to/from chair;Assistive device   PT: Gait Minimal assist;Isak walker;Quad cane;25 feet   Interventions   Interventions Quick Adds Therapeutic Activity   Therapeutic Activity   Therapeutic Activities: dynamic activities to improve functional performance Minutes (05574) 10   Symptoms Noted During/After Treatment Fatigue   Treatment Detail/Skilled Intervention Trialed initial stand with use of FWW. Cues for hand placement and safety. Pt unable to maintain midline, heavy lean to the R. Took seated rest break. Repositioned chair near bed. Trailed pivot transfer from recliner to the bed, towards the L side. Cont cues for safety. Mod A required due to R LE weakness. Pt retuned to supine at end of session. Handoff to RN, pt going down for MRI.   PT Discharge Planning   PT Plan progress bed mobilty, transfers, amb with quad cane/isak walker   PT Discharge Recommendation (DC Rec) [facility] patient will benefit from intensive, interdisciplinary therapy.  Anticipate will be able to tolerate 3 hours of therapy per day   PT Rationale for DC Rec Ax1 with transfers. Independent with mobiltiy at baseline.   PT Brief overview of current status Pivot from recliner > bed, mod  Ax1.   Total Session Time   Timed Code Treatment Minutes 10   Total Session Time (sum of timed and untimed services) 20         Corazon Winters, PT, DPT  7/28/2023

## 2023-07-28 NOTE — H&P
[facility]    History and Physical - Hospitalist Service       Date of Admission:  7/27/2023    Assessment & Plan      Sarah Rahman is a 57 year old female admitted on 7/27/2023. She was brought to the ED by ambulance for evaluation of aphasia and right-sided hemiparesis    #Strokelike symptoms  #Cerebral atherosclerosis  -Differential diagnosis includes CVA versus complex migraine versus hypertensive encephalopathy versus conversion disorder  -CT head showed no evidence of acute intracranial process; CTA head and neck showed atherosclerosis causing severe stenosis of the left paraclinoid internal carotid artery and an A4 branch of the right anterior cerebral artery, moderate stenosis of the proximal posterior division M2 left MCA branch likely due to atherosclerosis  -Status post TNK  -No antiplatelets or anticoagulation x24-hour post TNK  -Telemetry monitoring  -Neurochecks  -MRI brain  -Echocardiogram with bubble study    #Uncontrolled essential hypertension  -Permissive hypertension  -Hold PTA valsartan  -IV labetalol/hydralazine as needed    #Type 2 diabetes mellitus with long-term insulin use  -Insulin sliding scale  -Lantus 10 units tonight then PTA 24 units at bedtime when taking by mouth well  -Hold PTA glipizide and dulaglutide    #Chronic low back pain  -Resume PTA acetaminophen-oxycodone as needed and lidocaine patch    #Schizoaffective disorder, depression, anxiety  -Continue PTA duloxetine    #Mixed hyperlipidemia  -Check lipid panel    #Moderate persistent asthma  #COPD  -No bronchospasms or signs of acute exacerbation  -Continue PTA as needed albuterol    #Tobacco abuse  -Cessation education    #Chronic anemia  -Stable hemoglobin    #Mild thrombocytopenia  #Drug-induced platelet defect  -On PTA aspirin, holding x24 hours post TNK  -Monitor for bleeding    #CAD  -History of CABG  -Denies angina symptoms    #Obesity  -BMI 30.71     Diet: Combination Diet Moderate  Consistent Carb (60 g CHO per Meal) Diet; Low Saturated Fat Na <2400mg Diet    DVT Prophylaxis: Pneumatic Compression Devices  Diehl Catheter: Not present  Lines: None     Cardiac Monitoring: ACTIVE order. Indication: ICU  Code Status: Full Code             Disposition Plan      Expected Discharge Date: 07/29/2023                  Donovan Thompson MD  Hospitalist Service  [facility]  Securely message with [employer] (more info)  Text page via [employer] Paging/Directory     ______________________________________________________________________    Chief Complaint   Aphasia, right-sided hemiparesis    History is obtained from the patient and electronic health record    History of Present Illness   Sarah Rahman is a 57 year old female who was brought to the ED by ambulance for evaluation of above chief complaint.  Past medical history of CVA, left carotid stenosis status post endarte[facility] on 12/[facility], severe proximal to mid basilar artery stenosis, essential hypertension, mixed hyperlipidemia, type 2 diabetes mellitus, chronic anemia, chronic low back pain, L5-S1 discectomy, cervical cord myelomalacia, sensorineural hearing loss, moderate persistent asthma, COPD, tobacco abuse, seasonal allergies, obesity, GERD, CAD status post CABG, right renal artery stenosis, schizoaffective disorder, depression, anxiety, substance abuse, syphilis.  Patient reports that she lives by herself and has a PCA for 9 hours daily.  The last thing she recalls is getting out of her apartment to walk her dog.  Reportedly, she was found on the floor of the elevator by the apartment  and EMS was called.  Reportedly, she was initially able to communicate, however lost the ability to speak and was noted weaker.  Tier 1 stroke code was done in the ED where she was noted with right hemiparesis.  Patient was given TNK and admitted to the ICU.  She is able to speak in full sentences and move her right  arm, however she reports chronic sciatica pain on her right side and still unable to move her right leg.  She reports a lot of stress and started smoking again about a month ago most recently up to a pack a day.  She denies alcohol or drugs.  RN reported patient using her cell phone to text and talk.      Past Medical History    History reviewed. No pertinent past medical history.    Past Surgical History   History reviewed. No pertinent surgical history.    Prior to Admission Medications   Prior to Admission Medications   Prescriptions Last Dose Informant Patient Reported? Taking?   DULoxetine (CYMBALTA) 60 MG capsule 7/26/2023 Self Yes Yes   Sig: Take 60 mg by mouth 2 times daily   acetaminophen (TYLENOL) 325 MG tablet  at PRN Self Yes Yes   Sig: Take 650 mg by mouth every 8 hours as needed for mild pain   albuterol (PROAIR HFA/PROVENTIL HFA/VENTOLIN HFA) 108 (90 Base) MCG/ACT inhaler  at PRN Self Yes Yes   Sig: Inhale 1-2 puffs into the lungs every 4 hours as needed for shortness of breath, wheezing or cough   aspirin 81 MG EC tablet 7/26/2023 Self Yes Yes   Sig: Take 81 mg by mouth daily   cetirizine (ZYRTEC) 10 MG tablet 7/26/2023 Self Yes Yes   Sig: Take 10 mg by mouth daily   dulaglutide (TRULICITY) 0.75 MG/0.5ML pen 7/25/2023 Self Yes Yes   Sig: Inject 0.75 mg Subcutaneous every 7 days   fluticasone (FLONASE) 50 MCG/ACT nasal spray  at PRN Self Yes Yes   Sig: Spray 1 spray into both nostrils daily as needed for rhinitis or allergies   glipiZIDE (GLUCOTROL XL) 5 MG 24 hr tablet 7/26/2023 Self Yes Yes   Sig: Take 10 mg by mouth daily   hydrocortisone 1 % CREA cream  at PRN Self Yes Yes   Sig: Place rectally 2 times daily as needed for itching (apply to foot)   insulin glargine (LANTUS PEN) 100 UNIT/ML pen 7/26/2023 Self Yes Yes   Sig: Inject 24 Units Subcutaneous At Bedtime   lidocaine (LIDODERM) 5 % patch 7/26/2023 Self Yes Yes   Sig: Place onto the skin every 24 hours To prevent lidocaine toxicity, patient  should be patch free for 12 hrs daily.   naloxone (NARCAN) 4 MG/0.1ML nasal spray  Self Yes Yes   Sig: Spray 4 mg into one nostril alternating nostrils as needed for opioid reversal every 2-3 minutes until assistance arrives   oxyCODONE-acetaminophen (PERCOCET)  MG per tablet 7/26/2023 Self Yes Yes   Sig: Take 1 tablet by mouth every 6 hours as needed for pain   valsartan (DIOVAN) 40 MG tablet 7/26/2023 Self Yes Yes   Sig: Take 40 mg by mouth daily      Facility-Administered Medications: None           Physical Exam   Vital Signs: Temp: 98.5  F (36.9  C) Temp src: Oral BP: 127/63 Pulse: 84   Resp: (!) 35 SpO2: 98 %      Weight: 196 lbs 1.6 oz    Constitutional: awake, alert, and cooperative  Respiratory: no increased work of breathing, good air exchange, and clear to auscultation  Cardiovascular: regular rate and rhythm and normal S1 and S2  GI: normal bowel sounds, soft, and non-tender  Skin: no bruising or bleeding  Musculoskeletal: no lower extremity pitting edema present  Neurologic: Mental Status Exam:  Level of Alertness:   awake  Orientation:   person, place, time  Cranial Nerves:  cranial nerves II-XII are grossly intact  Motor Exam: Right hand weakness, right lower extremity 3/5 strength    Medical Decision Making       MANAGEMENT DISCUSSED with the following over the past 24 hours: Patient       Data     I have personally reviewed the following data over the past 24 hrs:    7.6  \   10.7 (L)   / 139 (L)     139 106 16.7 /  162 (H)   3.8 22 0.94 \     Trop: 12 BNP: N/A       Imaging results reviewed over the past 24 hrs:   Recent Results (from the past 24 hour(s))   CTA Head Neck with Contrast    Narrative    EXAM: CTA HEAD NECK W CONTRAST  LOCATION: [facility]  DATE: 7/27/2023    INDICATION: Code Stroke to evaluate for potential thrombolysis and thrombectomy. PLEASE READ IMMEDIATELY.  COMPARISON: None.  CONTRAST:  TECHNIQUE: Head and neck CT angiogram with IV contrast.  Noncontrast head CT followed by axial helical CT images of the head and neck vessels obtained during the arterial phase of intravenous contrast administration. Axial 2D reconstructed images and   multiplanar 3D MIP reconstructed images of the head and neck vessels were performed by the technologist. Dose reduction techniques were used. All stenosis measurements made according to NASCET criteria unless otherwise specified.    FINDINGS:   NONCONTRAST HEAD CT:   INTRACRANIAL CONTENTS: No intracranial hemorrhage, extraaxial collection, or mass effect.  No CT evidence of acute infarct. Mild presumed chronic small vessel ischemic changes. Mild generalized volume loss. No hydrocephalus.     VISUALIZED ORBITS/SINUSES/MASTOIDS: No intraorbital abnormality. No paranasal sinus mucosal disease. No middle ear or mastoid effusion.    BONES/SOFT TISSUES: No acute abnormality.    HEAD CTA:  ANTERIOR CIRCULATION: Atherosclerosis of the intracranial internal carotid arteries bilaterally. Severe stenosis of the left paraclinoid internal carotid artery. Mild narrowing of the bilateral cavernous internal carotid arteries. Moderate narrowing at   the origin of the posterior division M1 left middle cerebral artery. Severe narrowing of the A4 branch of the right anterior cerebral artery. No additional high-grade stenosis. No aneurysm or high flow vascular malformation. Anterior communicating artery   is present. Posterior communicating arteries are not seen.    POSTERIOR CIRCULATION: Mild narrowing of the proximal basilar artery, likely due to atherosclerosis. No additional high-grade stenosis or occlusion. No aneurysm or high flow vascular malformation. Codominant vertebral arteries.     DURAL VENOUS SINUSES: Not well evaluated on a technical basis.    NECK CTA:  RIGHT CAROTID: No measurable stenosis or dissection.    LEFT CAROTID: No measurable stenosis or dissection.    VERTEBRAL ARTERIES: No focal stenosis or dissection. Balanced  vertebral arteries.    AORTIC ARCH: Classic aortic arch anatomy with no significant stenosis at the origin of the great vessels.    NONVASCULAR STRUCTURES: No acute or aggressive abnormality.      Impression    IMPRESSION:   HEAD CT:  1.  No CT evidence for acute intracranial process.  2.  Brain atrophy and presumed chronic microvascular ischemic changes as above.    HEAD CTA:   1.  Atherosclerosis causes severe stenosis of the left paraclinoid internal carotid artery and an A4 branch of the right anterior cerebral artery.  2.  Moderate stenosis of the proximal posterior division M2 left middle cerebral artery branch, likely due to atherosclerosis.    NECK CTA:  1.  No hemodynamically significant stenosis in the neck vessels.   2.  No evidence for dissection.    Findings discussed with Dr. Kenny Reyes at 12:55 PM and 1:20 PM on 07/27/2023.   Head CT w/o contrast    Narrative    EXAM: CT HEAD W/O CONTRAST  LOCATION: [facility]  DATE: 7/27/2023    INDICATION: Headache with recent TNK administration  COMPARISON: CT angiogram head and neck 07/27/2023.  TECHNIQUE: Routine CT Head without IV contrast. Multiplanar reformats. Dose reduction techniques were used.    FINDINGS:  INTRACRANIAL CONTENTS: No intracranial hemorrhage, extraaxial collection, or mass effect.  No CT evidence of acute infarct. Mild presumed chronic small vessel ischemic changes. Mild generalized volume loss. No hydrocephalus.     VISUALIZED ORBITS/SINUSES/MASTOIDS: No intraorbital abnormality. Chronic bilateral nasal bone fractures. Air-fluid levels in the bilateral maxillary sinuses and scattered paranasal sinus mucosal thickening. No middle ear or mastoid effusion.    BONES/SOFT TISSUES: No acute abnormality.      Impression    IMPRESSION:  1.  No CT evidence for acute intracranial process.  2.  Brain atrophy and presumed chronic microvascular ischemic changes as above.

## 2023-07-28 NOTE — PROGRESS NOTES
SLP orders received. Chart reviewed and discussed with care team.? Speech-Language Pathology Evaluations not indicated due to no reported or resolved deficits related to speech, language, or cognition. Patient passed nursing dysphagia screen and is on a regular texture and thin liquid diet with no s/s aspiration per RN. Patient reports no issues with speech and return to baseline. No acute SLP needs identified.? Defer discharge recommendations to treatment team.? Will complete orders.

## 2023-07-28 NOTE — PLAN OF CARE
[facility] - ICU    RN Progress Note:            Pertinent Assessments:      Please refer to flowsheet rows for full assessment     Alert and oriented. Vitals stable with Nicardipine drip. NIH score has improving with significantly improving from whispering & mumbling to clear speech, RUE from barely move to no drip at 2200. Pt still stated ongoing headache. Updated hospitalist Dr Thompson.              Key Events - This Shift:     Stated ongoing headache. Just started complaint of low back pain when hospitalist assessed patient.               Plan:     Continue stroke watch.      Magdalena Gregg RN

## 2023-07-28 NOTE — CONSULTS
Tobacco Treatment Consult  7/28/2023, 2:06 PM    Patient admitted on: 7/27/2023   Patient admitted for: Acute CVA (cerebrovascular accident) (H) [I63.9]   Patient seen on: 7/28/2023    Sarah declined tobacco treatment counseling and resource information at this time, stating that she can quit on her own.    Total 1 minutes spent in smoking cessation, and 19 minutes spent in chart review, care coordination, and documentation.    Carmita Song, RT, Chronic Pulmonary Disease Specialist & Certified Tobacco Treatment Specialist  Phone [phone]

## 2023-07-28 NOTE — PHARMACY-CONSULT NOTE
Pharmacy Consult to evaluate for medication related stroke core measures    Sarahcarolee Rahman, 57 year old female admitted for CVA on 7/27/2023.    Thrombolytic was given on 7/27/23 at 1405.    VTE Prophylaxis SCDs /PCDs placed on 7/28, as appropriate prior to end of hospital day 2.    Antithrombotic: aspirin started on 7/28/23, 24 hours post TNK, as appropriate by end of hospital day 2. Continue antithrombotic therapy on discharge to meet quality measures, unless contraindicated.    Anticoagulation if history of A-fib/flutter: Patient does not have history of A-fib/flutter - anticoagulation not required for medication related stroke core measures.     LDL Cholesterol Calculated   Date Value Ref Range Status   07/27/2023 116 (H) <=100 mg/dL Final       Patient currently receiving Lipitor (atorvastatin) continue statin on discharge to meet quality measures, unless contraindicated.    Recommendations:  Consider pharmacological VTE prophylaxis if no contra-indications    Thank you for the consult.    Nikki Lopez RPH 7/28/2023 1:54 PM

## 2023-07-28 NOTE — PROGRESS NOTES
NEUROLOGY PROGRESS NOTE     ASSESSMENT & PLAN     Diagnosis: Right-sided weakness with aphasia-suspect stroke     Right-sided weakness with aphasia-suspect stroke  Ongoing headache  Intracranial vascular stenosis     Head CT negative for acute stroke  CT angiogram shows atherosclerosis with severe stenosis of the left paraclinoid internal carotid artery and posterior division M2 on the left.  IV tenecteplase used  MRI shows some chronic strokes, nothing acute. The strokes are, however, new since imaging in February  Can transition out of ICU now  Can switch to every 4 hours neurochecks  Start daily baby aspirin  Echocardiogram is negative for PFO. Continue cardiac monitoring  Decide on antiplatelet therapy depending on MRI results tomorrow  Lipid panel: . Start Lipitor: 80mg daily.  A1c 8.3%. Glucose management per primary team  Permissive hypertension with systolic pressures less than 180  PT/OT/speech therapy when possible  Tylenol for headache  Neurology will follow along     Neurology Discharge Planning: TBD    Patient Active Problem List    Diagnosis Date Noted    Acute CVA (cerebrovascular accident) (H) 07/27/2023     Priority: Medium     Medical History  History reviewed. No pertinent past medical history.     SUBJECTIVE     56 yo woman presenting with Right sided weakness and speech difficulties. Some improvement of strength on exam last night. Persistent headache, repeat head CT unremarkable.    Kristin says that the right arm has gotten stronger.  She cannot lift it up where she cannot move it at all yesterday.  The leg is still very weak.  She does endorse a little bit of a headache, not worsened since yesterday.  No visual changes.     OBJECTIVE     Vital signs in last 24 hours  Temp:  [98.5  F (36.9  C)-99.2  F (37.3  C)] 98.6  F (37  C)  Pulse:  [] 75  Resp:  [12-39] 17  BP: (117-199)/() 160/70  SpO2:  [94 %-100 %] 98 %    Weight:   [unfilled]    Review of Systems   Pertinent  items are noted in HPI.    General Physical Exam: Vital signs were reviewed and are documented in EMR.    Neurological Exam:  Mental status - Patient is awake and oriented to self, place and time. Attention span is normal. Language is fluent and follows simple commands appropriately.  Can repeat.  Cranial nerves - Pupils are reactive and symmetric; EOMI, VFIT, slight right facial droop  Motor - Motor exam shows 5/5 strength in all extremities on the left.  Right arm strength is 4/5, Little movement of Right leg (patient only partially participates in exam)  Tone - Tone is symmetric bilaterally in upper and lower extremities.  Sensation - Sensory exam is grossly intact to light touch, pin prick on the left.  Decreased on right.  Coordination - Finger to nose without dysmetria on the left.  Slight dysmetria on the Right.   Gait and station - unable to ambulate due to weakness.  Formal gait testing cannot be done due to safety concerns from ongoing medical issues.     DIAGNOSTIC STUDIES     Pertinent Radiology   Radiology Results: Personally reviewed image/s    CT/CTA:  IMPRESSION:   HEAD CT:  1.  No CT evidence for acute intracranial process.  2.  Brain atrophy and presumed chronic microvascular ischemic changes as above.     HEAD CTA:   1.  Atherosclerosis causes severe stenosis of the left paraclinoid internal carotid artery and an A4 branch of the right anterior cerebral artery.  2.  Moderate stenosis of the proximal posterior division M2 left middle cerebral artery branch, likely due to atherosclerosis.     NECK CTA:  1.  No hemodynamically significant stenosis in the neck vessels.   2.  No evidence for dissection.    CT (rpt 2 hrs s/p TNK):  IMPRESSION:  1.  No CT evidence for acute intracranial process.  2.  Brain atrophy and presumed chronic microvascular ischemic changes as above.    MRI:  FINDINGS:  INTRACRANIAL CONTENTS: No acute/subacute infarct, acute hemorrhage, extra-axial fluid collection, or mass.  Small regions of cortical/subcortical encephalomalacia involving the bilateral occipital poles, new since 02/22/2023. Redemonstration of chronic   lacunar infarcts within the bilateral periventricular cerebral white matter, left caudate nucleus, and left thalamus with a punctate chronic left cerebellar hemisphere infarct. Grossly similar additional patchy zones and scattered foci of T2 prolongation   within the bilateral cerebral white matter as well as the amelia, nonspecific although most likely the sequela of moderate chronic microvascular ischemia. Suspect right temporal lobe developmental venous anomaly with associated FLAIR hyperintensity A few   unchanged foci of signal loss on the susceptibility weighted images within the bilateral cerebral hemispheres and medial right thalamus, most consistent with chronic microhemorrhages. Mild generalized parenchymal volume loss. No interval   ventriculomegaly. Normal position of the cerebellar tonsils.      SELLA: Within technique limitations, no gross abnormality.     OSSEOUS STRUCTURES/SOFT TISSUES: No suspicious bone marrow signal intensity. Hyperostosis frontalis interna. The major intracranial vascular flow voids are maintained.      ORBITS: Within technique limitations, no significant abnormality.      SINUSES/MASTOIDS: Mildly worsened mild to moderate pansinus mucosal thickening without air-fluid levels. No mastoid effusion.                                                                        IMPRESSION:  1.  No acute/subacute infarct, intracranial hemorrhage, or extra-axial fluid collection.  2.  Small chronic bilateral occipital lobe infarcts, new since 02/22/2023.   3.  Similar additional chronic intracranial changes, as detailed.  4.  Mildly worsened pansinus mucosal disease without air-fluid levels.     Echocardiogram  Interpretation Summary     1.Left ventricular size, wall motion and function are normal. The ejection  fraction is 60-65%.  2.There is mild to  moderate concentric left ventricular hypertrophy.  3.Normal right ventricle size and systolic function.  4.The left atrium is moderately dilated.  5.A contrast injection (Bubble Study) was performed that was negative for flow  across the interatrial septum.  6.Thickened mitral valve anterior leaflet There is moderately severe (3+)  mitral regurgitation.ERO is 0.36 cm2 with volume of 44 cc.  There is no comparison study available.    Pertinent Labs   Lab Results: personally reviewed.   Recent Results (from the past 24 hour(s))   Glucose by meter    Collection Time: 07/27/23  1:52 PM   Result Value Ref Range    GLUCOSE BY METER POCT 268 (H) 70 - 99 mg/dL   Basic metabolic panel    Collection Time: 07/27/23  2:30 PM   Result Value Ref Range    Sodium 139 136 - 145 mmol/L    Potassium 3.8 3.4 - 5.3 mmol/L    Chloride 106 98 - 107 mmol/L    Carbon Dioxide (CO2) 22 22 - 29 mmol/L    Anion Gap 11 7 - 15 mmol/L    Urea Nitrogen 16.7 6.0 - 20.0 mg/dL    Creatinine 0.94 0.51 - 0.95 mg/dL    Calcium 9.6 8.6 - 10.0 mg/dL    Glucose 255 (H) 70 - 99 mg/dL    GFR Estimate 70 >60 mL/min/1.73m2   Troponin T, High Sensitivity    Collection Time: 07/27/23  2:30 PM   Result Value Ref Range    Troponin T, High Sensitivity 12 <=14 ng/L   CBC with platelets and differential    Collection Time: 07/27/23  2:30 PM   Result Value Ref Range    WBC Count 7.6 4.0 - 11.0 10e3/uL    RBC Count 3.95 3.80 - 5.20 10e6/uL    Hemoglobin 10.7 (L) 11.7 - 15.7 g/dL    Hematocrit 34.3 (L) 35.0 - 47.0 %    MCV 87 78 - 100 fL    MCH 27.1 26.5 - 33.0 pg    MCHC 31.2 (L) 31.5 - 36.5 g/dL    RDW 12.4 10.0 - 15.0 %    Platelet Count 139 (L) 150 - 450 10e3/uL    % Neutrophils 61 %    % Lymphocytes 30 %    % Monocytes 5 %    % Eosinophils 3 %    % Basophils 1 %    % Immature Granulocytes 0 %    NRBCs per 100 WBC 0 <1 /100    Absolute Neutrophils 4.7 1.6 - 8.3 10e3/uL    Absolute Lymphocytes 2.3 0.8 - 5.3 10e3/uL    Absolute Monocytes 0.4 0.0 - 1.3 10e3/uL     Absolute Eosinophils 0.2 0.0 - 0.7 10e3/uL    Absolute Basophils 0.1 0.0 - 0.2 10e3/uL    Absolute Immature Granulocytes 0.0 <=0.4 10e3/uL    Absolute NRBCs 0.0 10e3/uL   Hemoglobin A1c    Collection Time: 07/27/23  2:30 PM   Result Value Ref Range    Hemoglobin A1C 8.3 (H) <5.7 %   Lipid panel reflex to direct LDL    Collection Time: 07/27/23  2:30 PM   Result Value Ref Range    Cholesterol 191 <200 mg/dL    Triglycerides 199 (H) <150 mg/dL    Direct Measure HDL 35 (L) >=50 mg/dL    LDL Cholesterol Calculated 116 (H) <=100 mg/dL    Non HDL Cholesterol 156 (H) <130 mg/dL   Glucose by meter    Collection Time: 07/27/23  7:23 PM   Result Value Ref Range    GLUCOSE BY METER POCT 181 (H) 70 - 99 mg/dL   Glucose by meter    Collection Time: 07/27/23 10:30 PM   Result Value Ref Range    GLUCOSE BY METER POCT 162 (H) 70 - 99 mg/dL   CBC with platelets    Collection Time: 07/28/23  4:26 AM   Result Value Ref Range    WBC Count 7.0 4.0 - 11.0 10e3/uL    RBC Count 3.99 3.80 - 5.20 10e6/uL    Hemoglobin 10.7 (L) 11.7 - 15.7 g/dL    Hematocrit 34.6 (L) 35.0 - 47.0 %    MCV 87 78 - 100 fL    MCH 26.8 26.5 - 33.0 pg    MCHC 30.9 (L) 31.5 - 36.5 g/dL    RDW 12.6 10.0 - 15.0 %    Platelet Count 218 150 - 450 10e3/uL   Basic metabolic panel    Collection Time: 07/28/23  4:26 AM   Result Value Ref Range    Sodium 140 136 - 145 mmol/L    Potassium 3.6 3.4 - 5.3 mmol/L    Chloride 106 98 - 107 mmol/L    Carbon Dioxide (CO2) 21 (L) 22 - 29 mmol/L    Anion Gap 13 7 - 15 mmol/L    Urea Nitrogen 15.2 6.0 - 20.0 mg/dL    Creatinine 0.73 0.51 - 0.95 mg/dL    Calcium 8.5 (L) 8.6 - 10.0 mg/dL    Glucose 241 (H) 70 - 99 mg/dL    GFR Estimate >90 >60 mL/min/1.73m2   INR    Collection Time: 07/28/23  4:26 AM   Result Value Ref Range    INR 1.05 0.85 - 1.15   Partial thromboplastin time    Collection Time: 07/28/23  4:26 AM   Result Value Ref Range    aPTT 26 22 - 38 Seconds         HOSPITAL MEDICATIONS      cetirizine  10 mg Oral Daily     DULoxetine  60 mg Oral BID    glipiZIDE  10 mg Oral Daily    insulin aspart  1-7 Units Subcutaneous TID AC    insulin aspart  1-5 Units Subcutaneous At Bedtime    insulin glargine  24 Units Subcutaneous At Bedtime    lidocaine  1 patch Transdermal Q24H    sodium chloride (PF)  3 mL Intracatheter Q8H        Total time spent for face to face visit, reviewing labs/imaging studies, counseling and coordination of care was: 1 Hour. More than 50% of this time was spent on counseling and coordination of care.    Dragon software used in the dictation on this note.    Avril Son MD  [facility] - [address], [address]  [place] [address]

## 2023-07-29 ENCOUNTER — APPOINTMENT (OUTPATIENT)
Dept: OCCUPATIONAL THERAPY | Facility: HOSPITAL | Age: 58
End: 2023-07-29
Attending: HOSPITALIST
Payer: COMMERCIAL

## 2023-07-29 VITALS
TEMPERATURE: 97.8 F | OXYGEN SATURATION: 95 % | HEIGHT: 67 IN | WEIGHT: 192.6 LBS | HEART RATE: 84 BPM | RESPIRATION RATE: 18 BRPM | BODY MASS INDEX: 30.23 KG/M2 | SYSTOLIC BLOOD PRESSURE: 143 MMHG | DIASTOLIC BLOOD PRESSURE: 78 MMHG

## 2023-07-29 LAB — GLUCOSE BLDC GLUCOMTR-MCNC: 170 MG/DL (ref 70–99)

## 2023-07-29 PROCEDURE — 250N000009 HC RX 250

## 2023-07-29 PROCEDURE — 99207 PR NO BILLABLE SERVICE THIS VISIT: CPT | Performed by: STUDENT IN AN ORGANIZED HEALTH CARE EDUCATION/TRAINING PROGRAM

## 2023-07-29 PROCEDURE — 250N000013 HC RX MED GY IP 250 OP 250 PS 637: Performed by: PSYCHIATRY & NEUROLOGY

## 2023-07-29 PROCEDURE — 99232 SBSQ HOSP IP/OBS MODERATE 35: CPT | Performed by: STUDENT IN AN ORGANIZED HEALTH CARE EDUCATION/TRAINING PROGRAM

## 2023-07-29 PROCEDURE — 250N000013 HC RX MED GY IP 250 OP 250 PS 637: Performed by: HOSPITALIST

## 2023-07-29 PROCEDURE — 97166 OT EVAL MOD COMPLEX 45 MIN: CPT | Mod: GO

## 2023-07-29 PROCEDURE — 97535 SELF CARE MNGMENT TRAINING: CPT | Mod: GO

## 2023-07-29 PROCEDURE — 99232 SBSQ HOSP IP/OBS MODERATE 35: CPT | Performed by: PSYCHIATRY & NEUROLOGY

## 2023-07-29 RX ORDER — ATORVASTATIN CALCIUM 80 MG/1
80 TABLET, FILM COATED ORAL EVERY EVENING
Qty: 30 TABLET | Refills: 3 | Status: SHIPPED | OUTPATIENT
Start: 2023-07-29 | End: 2024-01-14

## 2023-07-29 RX ORDER — VALSARTAN 40 MG/1
40 TABLET ORAL DAILY
Status: DISCONTINUED | OUTPATIENT
Start: 2023-07-30 | End: 2023-07-29 | Stop reason: HOSPADM

## 2023-07-29 RX ORDER — CLOPIDOGREL BISULFATE 75 MG/1
75 TABLET ORAL DAILY
Qty: 30 TABLET | Refills: 3 | Status: SHIPPED | OUTPATIENT
Start: 2023-07-29 | End: 2024-08-26

## 2023-07-29 RX ORDER — ASPIRIN 81 MG/1
81 TABLET ORAL DAILY
COMMUNITY
Start: 2023-07-29 | End: 2023-09-08

## 2023-07-29 RX ADMIN — OXYCODONE HYDROCHLORIDE AND ACETAMINOPHEN 1 TABLET: 5; 325 TABLET ORAL at 03:29

## 2023-07-29 RX ADMIN — CETIRIZINE HYDROCHLORIDE 10 MG: 10 TABLET, FILM COATED ORAL at 09:08

## 2023-07-29 RX ADMIN — OXYCODONE HYDROCHLORIDE 5 MG: 5 TABLET ORAL at 03:13

## 2023-07-29 RX ADMIN — Medication 81 MG: at 09:08

## 2023-07-29 RX ADMIN — DULOXETINE HYDROCHLORIDE 60 MG: 60 CAPSULE, DELAYED RELEASE PELLETS ORAL at 09:08

## 2023-07-29 RX ADMIN — GLIPIZIDE 10 MG: 10 TABLET, FILM COATED, EXTENDED RELEASE ORAL at 09:09

## 2023-07-29 RX ADMIN — LIDOCAINE 5 G: 40 CREAM TOPICAL at 03:29

## 2023-07-29 ASSESSMENT — ACTIVITIES OF DAILY LIVING (ADL)
ADLS_ACUITY_SCORE: 30
PREVIOUS_RESPONSIBILITIES: MEAL PREP;HOUSEKEEPING;LAUNDRY;MEDICATION MANAGEMENT;FINANCES
ADLS_ACUITY_SCORE: 30

## 2023-07-29 NOTE — PLAN OF CARE
Goal Outcome Evaluation:         Patient took her med, ate breakfast, declined lunch. Patient took off her tele, dressed up. Stated she wants to go home. Went out a couple of time to smoke after all effort to redirect her was unsuccessful. She stated she is going to go home, that she will call for a ride. Stated she is not going to get any insulin because she is not ordering any food.

## 2023-07-29 NOTE — PLAN OF CARE
Physical Therapy Discharge Summary    Reason for therapy discharge:    Patient left AMA    Progress towards therapy goal(s). See goals on Care Plan in [facility] electronic health record for goal details.  Goals partially met.  Barriers to achieving goals:   Patient left AMA.    Therapy recommendation(s):    Recommendation was ARC, patient left AMA.

## 2023-07-29 NOTE — PROGRESS NOTES
"[facility]    Medicine Progress Note - Hospitalist Service    Date of Admission:  7/27/2023    Assessment & Plan   Sarah Rahman is a 57 year old female admitted on 7/27/2023. She was brought to the ED by ambulance for evaluation of aphasia and right-sided hemiparesis     Stroke like symptoms  Cerebral atherosclerosis  -Differential diagnosis includes CVA versus complex migraine versus hypertensive encephalopathy versus conversion disorder  -CT head showed no evidence of acute intracranial process; CTA head and neck showed atherosclerosis causing severe stenosis of the left paraclinoid internal carotid artery and an A4 branch of the right anterior cerebral artery, moderate stenosis of the proximal posterior division M2 left MCA branch likely due to atherosclerosis  -Status post TNK  -Telemetry monitoring  -Neurochecks Q 4hrs  -MRI brain: No acute/subacute infarct, intracranial hemorrhage or extra-axial fluid collection.  Small chronic bilateral occipital lobe infarcts.  -Echocardiogram with bubble study: Residual ejection fraction 60 to 65%, negative for interatrial shunt  -Aspirin 81 mg p.o. daily started  -Per Neurology: Dual antiplatelet therapy with aspirin and Plavix indefinitely given severe intracranial atherosclerotic disease. Outpatient 30-day event monitor to rule out paroxysmal A-fib  -PT recommending acute rehab upon discharge    07/29: Patient wanted to leave hospital this morning. Met with patient at length, discussed with patient and sister on phone about recommendations for ARU vs TCU. Per sister, patient lives alone and would not be safe for home discharge. Patient seems irritable and stated that \"we all dont really care, just here to do some shit and get pay check\"  Was later notified by nurse that patient left the hospital and signed AMA forms.    Uncontrolled essential hypertension  -Permissive hypertension  -Hold PTA valsartan  -IV labetalol/hydralazine as needed, " "systolic blood pressure should be less than 180     Type 2 diabetes mellitus with long-term insulin use  - Insulin sliding scale and mealtime coverage  - Lantus 10 units tonight then PTA 24 units at bedtime when taking by mouth well  - Hold PTA glipizide and dulaglutide  - Accu-Cheks  - Hypoglycemia protocol     Chronic low back pain  - Resume PTA acetaminophen-oxycodone as needed and lidocaine patch     Schizoaffective disorder, depression, anxiety  - Continue PTA duloxetine     Mixed hyperlipidemia  -Check lipid panel     Moderate persistent asthma  COPD  -No bronchospasms or signs of acute exacerbation  -Continue PTA as needed albuterol     Tobacco abuse  -Cessation education     Chronic anemia  -Stable hemoglobin     Mild thrombocytopenia  Drug-induced platelet defect  -On PTA aspirin, holding x24 hours post TNK  -Monitor for bleeding     CAD  -History of CABG  -Denies angina symptoms     Obesity  -BMI 30.71      Diet: Low Fat Diet    DVT Prophylaxis: Pneumatic Compression Devices  Diehl Catheter: Not present  Lines: None     Cardiac Monitoring: ACTIVE order. Indication: ICU  Code Status: Full Code      Clinically Significant Risk Factors                        # DMII: A1C = 8.3 % (Ref range: <5.7 %) within past 6 months, PRESENT ON ADMISSION  # Obesity: Estimated body mass index is 30.17 kg/m  as calculated from the following:    Height as of this encounter: 1.702 m (5' 7\").    Weight as of this encounter: 87.4 kg (192 lb 9.6 oz)., PRESENT ON ADMISSION          Disposition Plan     Expected Discharge Date: 07/29/2023      Destination: home            Tasia Escobedo MD  Hospitalist Service  [facility]  Securely message with [employer] (more info)  Text page via [employer] Paging/Directory   ______________________________________________________________________    Interval History   Patient new to me. Chart reviewed. No acute events overnight  As soon as I entered, fond her seated at " "bedside , dressed up,once I introduced myself, she immediately stated that I am just like the other, you are not here to help me    Patient wanted to leave hospital this morning.Discussed with patient and sister over the phone about recommendations for ARU vs TCU. Per sister, patient lives alone and would not be safe for home discharge. Patient seems irritable and stated that \"we all dont really care, just here to do some shit and get pay check\"    Was later notified by nurse that patient left the hospital and signed AMA forms.    Physical Exam   Vital Signs: Temp: 98.2  F (36.8  C) Temp src: Oral BP: (!) 172/87 Pulse: 81   Resp: 19 SpO2: 100 % O2 Device: None (Room air)    Weight: 192 lbs 9.6 oz    General Appearance: No distress noted.  Respiratory: Good air entry bilaterally  Cardiovascular: S1 and S2 regular, no murmur or gallop  GI: Soft abdomen, no tenderness, normoactive bowel sounds  Skin: Intact and warm  Other: Right upper extremity power is 3 out of 5, right lower extremity to 4 out of 5       Medical Decision Making       50 MINUTES SPENT BY ME on the date of service doing chart review, history, exam, documentation & further activities per the note.      Data   "

## 2023-07-29 NOTE — PLAN OF CARE
Prepped___  Instructions: Given by CT staff over there Telephone.    Medication:  Treat on Arrival.  HR 55-66.    Test Date: Thursday, 3/16/23  Test Time: 1:00 PM    Patient: Zay Patel   MRN: [id number]  Age: 28 year old  Sex: male   Ordering Physician: Dr. Tavera  Diagnosis/Reason for study: Chest pain    Blood Pressure 3/8/23  100/80 2/25/23  101/59 1/24/23  116/76        Heart Rate   64   66   80         BMI Readings from Last 1 Encounters:   03/08/23 18.72 kg/m²      No Known Allergies     Previous CT chest imaging with evidence of CAD: No    Glomerular Filtration Rate (no units)   Date Value   02/25/2023 >90       Creatinine (mg/dL)   Date Value   02/25/2023 0.66 (L)      On Beta Blocker: None    Medication(s) to Hold: Hydroxyzine    Last Echocardiogram: NONE   Last ECG: (3/8/23) NSR - HR 65    History of cardiac arrhythmias: No    LOV with Dr. Tavera on 3/8/23: unspecified Chest pain,  Palpitations, chronic smoker.  Current Medications    HYDROXYZINE (ATARAX) 10 MG TABLET    Take 1 tablet by mouth every 8 hours as needed for Anxiety.           Pt transferred to room 118 via wheelchair.  Belongings and hospital medications sent with her.  Report given to accepting P1 RN.

## 2023-07-29 NOTE — PLAN OF CARE
Problem: Plan of Care - These are the overarching goals to be used throughout the patient stay.    Goal: Plan of Care Review  Description: The Plan of Care Review/Shift note should be completed every shift.  The Outcome Evaluation is a brief statement about your assessment that the patient is improving, declining, or no change.  This information will be displayed automatically on your shift note.  Outcome: Progressing     Problem: Risk for Delirium  Goal: Optimal Coping  Outcome: Progressing     Problem: Violence Risk or Actual  Goal: Anger and Impulse Control  Outcome: Progressing  Intervention: Minimize Safety Risk  Recent Flowsheet Documentation  Taken 7/29/2023 0019 by Daysi Yo, RN  Enhanced Safety Measures: room near unit station     Problem: Violence Risk or Actual  Goal: Anger and Impulse Control  Intervention: Minimize Safety Risk  Recent Flowsheet Documentation  Taken 7/29/2023 0019 by Daysi Yo, RN  Enhanced Safety Measures: room near unit station     Problem: Diabetes Comorbidity  Goal: Blood Glucose Level Within Targeted Range  Outcome: Progressing   Goal Outcome Evaluation:       Patient here for acute CVA, NIHSS=2 mild aphasia noted with intermittent garbled speech, some right leg weakness noted, needed help with bennie care, uses commode at bedside. Bp has been elevated 166/78 & 172/87. Percocet 1 tab given at 0329 for back pain 7/10 and lidocaine cream applied to bilateral feet for burning. Patient has been frustrated with being here and wishes she was at [facility]. 02 sat's 100% on room air.

## 2023-07-29 NOTE — DISCHARGE SUMMARY
"[facility]  Hospitalist Discharge Summary      Date of Admission:  7/27/2023  Date of Discharge:  7/29/2023  Discharging Provider: Tasia Escobedo MD  Discharge Service: Hospitalist Service      LEFT AGAINST MEDICAL ADVISE    Discharge Diagnoses   Strokelike symptoms  Intracranial atherosclerosis  Uncontrolled essential hypertension  Type 2 diabetes with long-term insulin use  Chronic back pain  Schizoaffective disorder  Depression  Anxiety  Mixed hyperlipidemia  Moderate persistent asthma  Tobacco use  Chronic anemia    Clinically Significant Risk Factors     # DMII: A1C = 8.3 % (Ref range: <5.7 %) within past 6 months  # Obesity: Estimated body mass index is 30.17 kg/m  as calculated from the following:    Height as of this encounter: 1.702 m (5' 7\").    Weight as of this encounter: 87.4 kg (192 lb 9.6 oz).       Follow-ups Needed After Discharge   Patient left AMA  Discharge Disposition   Discharged to home    Condition at discharge: Stable    Hospital Course   Sarah Rahman is a 57 year old female who was brought to the ED by ambulance for evaluation of above chief complaint.  Past medical history of CVA, left carotid stenosis status post [facility], severe proximal to mid basilar artery stenosis, essential hypertension, mixed hyperlipidemia, type 2 diabetes mellitus, chronic anemia, chronic low back pain, L5-S1 discectomy, cervical cord myelomalacia, sensorineural hearing loss, moderate persistent asthma, COPD, tobacco abuse, seasonal allergies, obesity, GERD, CAD status post CABG, right renal artery stenosis, schizoaffective disorder, depression, anxiety, substance abuse, syphilis. Admitted on 7/27/2023. She was brought to the ED by ambulance for evaluation of aphasia and right-sided hemiparesis.    She was found on the floor of the elevator by the apartment  and EMS was called. Reportedly, she was initially able to communicate, " however lost the ability to speak and was noted weaker. Tier 1 stroke code was done in the ED where she was noted with right hemiparesis. Patient was given TNK and admitted to the ICU.      Stroke like symptoms  Cerebral atherosclerosis  -Differential diagnosis includes CVA versus complex migraine versus hypertensive encephalopathy versus conversion disorder. CT head showed no evidence of acute intracranial process; CTA head and neck showed atherosclerosis causing severe stenosis of the left paraclinoid internal carotid artery and an A4 branch of the right anterior cerebral artery, moderate stenosis of the proximal posterior division M2 left MCA branch likely due to atherosclerosis.  -S/p TNK.  -Echocardiogram with bubble study: Residual ejection fraction 60 to 65%, negative for interatrial shunt  -Evaluated by neurology who recommended Dual antiplatelet therapy with aspirin and Plavix indefinitely given severe intracranial atherosclerotic disease. As well as Outpatient 30-day event monitor to rule out paroxysmal A-fib.  Patient left AMA.  I called patient's cell phone as well as  Who is listed in the chart ( no answer) and left a message for them to  DAPT medication sent to pharmacy listed in chart.  -Evaluated by rehab who recommended acute rehab upon discharge.  On the day the patient left, social worked had been in the process of finding an acute rehab place.    Uncontrolled essential hypertension  -Hold PTA valsartan     Type 2 diabetes mellitus with long-term insulin use   PTA glipizide and dulaglutide    Chronic low back pain  - PTA acetaminophen-oxycodone as needed and lidocaine patch     Schizoaffective disorder, depression, anxiety  - PTA duloxetine     Mixed hyperlipidemia       Moderate persistent asthma  PTA as needed albuterol     Tobacco abuse  -Cessation education     Chronic anemia  -Stable      Mild thrombocytopenia  Drug-induced platelet defect  -On PTA aspirin,    CAD  -History of  CABG     Obesity  -BMI 30.71     Consultations This Hospital Stay   HOSPITALIST IP CONSULT  SPEECH LANGUAGE PATH ADULT IP CONSULT  PHARMACY IP CONSULT  PHARMACY IP CONSULT  PHARMACY IP CONSULT  PHYSICAL THERAPY ADULT IP CONSULT  OCCUPATIONAL THERAPY ADULT IP CONSULT  REHAB ADMISSIONS LIAISON IP CONSULT  CARE MANAGEMENT / SOCIAL WORK IP CONSULT  SMOKING CESSATION PROGRAM IP CONSULT    Code Status   Full Code    Time Spent on this Encounter   I, Tasia Escobedo MD, discharged this patient today but I did not personally see the patient today and will not be billing for the patient's discharge.       Tasia Escobedo MD  [address] [address]  Phone: [phone]  Fax: [phone]  ______________________________________________________________________           Primary Care Physician   [facility]    Discharge Orders   No discharge procedures on file.        Discharge Medications   Current Discharge Medication List        CONTINUE these medications which have NOT CHANGED    Details   acetaminophen (TYLENOL) 325 MG tablet Take 650 mg by mouth every 8 hours as needed for mild pain      albuterol (PROAIR HFA/PROVENTIL HFA/VENTOLIN HFA) 108 (90 Base) MCG/ACT inhaler Inhale 1-2 puffs into the lungs every 4 hours as needed for shortness of breath, wheezing or cough      aspirin 81 MG EC tablet Take 81 mg by mouth daily      cetirizine (ZYRTEC) 10 MG tablet Take 10 mg by mouth daily      dulaglutide (TRULICITY) 0.75 MG/0.5ML pen Inject 0.75 mg Subcutaneous every 7 days      DULoxetine (CYMBALTA) 60 MG capsule Take 60 mg by mouth 2 times daily      fluticasone (FLONASE) 50 MCG/ACT nasal spray Spray 1 spray into both nostrils daily as needed for rhinitis or allergies      glipiZIDE (GLUCOTROL XL) 5 MG 24 hr tablet Take 10 mg by mouth daily      hydrocortisone 1 % CREA cream Place rectally 2 times daily as needed for itching  (apply to foot)      insulin glargine (LANTUS PEN) 100 UNIT/ML pen Inject 24 Units Subcutaneous At Bedtime      lidocaine (LIDODERM) 5 % patch Place onto the skin every 24 hours To prevent lidocaine toxicity, patient should be patch free for 12 hrs daily.      naloxone (NARCAN) 4 MG/0.1ML nasal spray Spray 4 mg into one nostril alternating nostrils as needed for opioid reversal every 2-3 minutes until assistance arrives      oxyCODONE-acetaminophen (PERCOCET)  MG per tablet Take 1 tablet by mouth every 6 hours as needed for pain      valsartan (DIOVAN) 40 MG tablet Take 40 mg by mouth daily           Allergies   Allergies   Allergen Reactions    Haloperidol Angioedema    Hydroxyzine Angioedema    Phenothiazines Angioedema    Tramadol Angioedema    Januvia [Sitagliptin] Swelling    Latex Itching    Lisinopril Cough

## 2023-07-29 NOTE — PLAN OF CARE
Goal Outcome Evaluation:       Patient refused lab collection. Took her belongings and started heading out. She signed the paper for living against the hospital wish and left.

## 2023-07-29 NOTE — PROGRESS NOTES
Care Management Follow Up    Length of Stay (days): 2    Expected Discharge Date: 07/29/2023     Concerns to be Addressed:       Patient plan of care discussed at interdisciplinary rounds: Yes    Anticipated Discharge Disposition:  ARU     Anticipated Discharge Services:  Therapy Services  Anticipated Discharge DME:  None    Patient/family educated on Medicare website which has current facility and service quality ratings:  NA  Education Provided on the Discharge Plan:    Patient/Family in Agreement with the Plan:      Referrals Placed by CM/SW:    Private pay costs discussed: Not applicable    Additional Information:  Therapy rec is Acute Rehab vs TCU. Patient states she will not go to TCU but would be agreeable to Acute Rehab. She states she will only go to [facility], referral sent.     12:11 PM  Patient wanting to leave AMA. MD and LEONIDAS spoke with patient and her sister about discharge options. Patient is considering if she would like to go to ARU vs discharging home.     1:53 PM  Patient is leaving AMA.    ROVERTO PhanW

## 2023-07-29 NOTE — PLAN OF CARE
"  Problem: Violence Risk or Actual  Goal: Anger and Impulse Control  Outcome: Not Progressing     Problem: Risk for Delirium  Goal: Improved Attention and Thought Clarity  Outcome: Progressing     Problem: Diabetes Comorbidity  Goal: Blood Glucose Level Within Targeted Range  Outcome: Progressing   Goal Outcome Evaluation:      Patient alert and oriented x 4. Writer discussed with patient that patient is getting cymbalta, lipitor (which patient originally refused, but when writer wrote atorvastatin on the board, patient agreed to take medication), and percocet. Writer was around when another RN scanned and gave medications to patient. Writer took over patient care after another RN reported that patient stated, \"You are trying to harm me since you are [place] and Religion and I know your kind\" while writer went out of the patient room. Writer went out of the room to get hypo allergenic patches and SCD pump for patient. When writer asked what happened, patient stated that she did not trust the other RN and that patient let her family know that medications were given to her. Patient was assured that no medications can be given unless it is being scanned and given as ordered.     Time for scheduled lidocaine patch was adjusted since patient stated patch was removed early for the MRI. House officer notified about patient's request for lidocaine cream and medication to help patient sleep. Orders in place.    Patient refused vitals signs to be taken and refused full NIH assessment. Patient also refused writer to count cash with patient for the purpose patient belongings documentation.                "

## 2023-07-29 NOTE — PLAN OF CARE
Occupational Therapy Discharge Summary    Reason for therapy discharge:    Pt went home AMA    Progress towards therapy goal(s). See goals on Care Plan in [facility] electronic health record for goal details.  Went home AMA    Therapy recommendation(s):    Pt go to  TCU for further therapy,

## 2023-07-31 LAB
ATRIAL RATE - MUSE: 89 BPM
DIASTOLIC BLOOD PRESSURE - MUSE: NORMAL MMHG
INTERPRETATION ECG - MUSE: NORMAL
P AXIS - MUSE: 58 DEGREES
PR INTERVAL - MUSE: 178 MS
QRS DURATION - MUSE: 78 MS
QT - MUSE: 376 MS
QTC - MUSE: 457 MS
R AXIS - MUSE: 25 DEGREES
SYSTOLIC BLOOD PRESSURE - MUSE: NORMAL MMHG
T AXIS - MUSE: 126 DEGREES
VENTRICULAR RATE- MUSE: 89 BPM

## 2023-08-08 ENCOUNTER — TELEPHONE (OUTPATIENT)
Dept: VASCULAR SURGERY | Facility: CLINIC | Age: 58
End: 2023-08-08
Payer: MEDICAID

## 2023-08-08 DIAGNOSIS — I70.1 RIGHT RENAL ARTERY STENOSIS (H): Primary | Chronic | ICD-10-CM

## 2023-08-08 NOTE — TELEPHONE ENCOUNTER
----- Message from Jonathon Green RN sent at 8/8/2023 11:56 AM CDT -----  Order in. Yes with Elizabeth.     ----- Message -----  From: Veena Castellanos  Sent: 8/8/2023  10:05 AM CDT  To: [facility] Support Pool    I'm not seeing any orders for this patient, also wanting to verify that the pt should see Dr. Johnson per message below.  Thank you, Josephine  ----- Message -----  From: Nikko Alcaraz MD  Sent: 5/5/2023   7:02 PM CDT  To: #    Please schedule for outpatient follow-up with vascular medicine in 6 months with renal artery duplex (ordered).    Thank you,  Nikko Alcaraz

## 2023-08-17 ENCOUNTER — PATIENT OUTREACH (OUTPATIENT)
Dept: CARE COORDINATION | Facility: CLINIC | Age: 58
End: 2023-08-17
Payer: MEDICAID

## 2023-08-17 NOTE — PROGRESS NOTES
Stroke RN Care Coordination - Initial Outreach Note     SITUATION     Sarah Rahman is a 57 year old female who is receiving support for:  Clinic Care Coordination - Initial (Stroke RNCC Outreach)    BACKGROUND     Sarah Rahman is a 57 year old female who was brought to the ED by ambulance for evaluation of above chief complaint.  Past medical history of CVA, left carotid stenosis status post endarte[facility] on 12/[facility], severe proximal to mid basilar artery stenosis, essential hypertension, mixed hyperlipidemia, type 2 diabetes mellitus, chronic anemia, chronic low back pain, L5-S1 discectomy, cervical cord myelomalacia, sensorineural hearing loss, moderate persistent asthma, COPD, tobacco abuse, seasonal allergies, obesity, GERD, CAD status post CABG, right renal artery stenosis, schizoaffective disorder, depression, anxiety, substance abuse, syphilis. Admitted on 7/27/2023. She was brought to the ED by ambulance for evaluation of aphasia and right-sided hemiparesis.     She was found on the floor of the elevator by the apartment  and EMS was called. Reportedly, she was initially able to communicate, however lost the ability to speak and was noted weaker. Tier 1 stroke code was done in the ED where she was noted with right hemiparesis. Patient was given TNK and admitted to the ICU.    ASSESSMENT     Initial outreach task unfortunately did not fire on pt's discharge from [facility], so RNCC outreach was delayed.    Spoke with pt this afternoon and explained that I was following up with her after she had discharged a couple weeks ago. Pt states that she needs follow up asap as she has residual R sided weakness with accompanying numbness and tingling as well as some ongoing WFD. Pt reports her R leg keeps giving out on her and she is concerned for falling. I discussed with her that I can assist in getting her outpatient follow up with her PCP and neurology as well as referrals  for therapy services. Pt was appreciative of this help.     I also confirmed with the pt that she did  the DAPT rx that was sent for her and she is taking them as recommended. Discussed that I will get follow up appts scheduled for her then contact her with that information. RNCC will plan to do further stroke CC assessment in follow up with the pt.    PLAN     Follow-up plan:  Scheduled pt for hospital follow up at [facility] on Thursday, August 24th at 4:00pm. Since that appointment was not able to be with her already established PCP I scheduled a subsequent follow up with her PCP on Friday, September 22nd at 10:00am. Also scheduled stroke hospital follow up next available with [facility] on Friday, November 10th at 2:30pm.     Staff message sent to Dr. Castillo who saw her inpatient at [facility] requesting referrals for PT/OT/SLP so that the pt is not needing to wait until PCP follow up.    Avril Lux BS, RN, SCRN  RN Stroke Neurology Care Coordinator  [facility] Neuroscience Service Line

## 2023-08-18 NOTE — PROGRESS NOTES
Attempted to reach pt this afternoon to discuss upcoming PCP appt. Unable to leave VM as her mailbox was full. Will try to reach pt again on Monday.      Avril Lux BS, RN, SCRN  RN Stroke Neurology Care Coordinator  [facility] Neuroscience Service Line

## 2023-08-21 NOTE — PROGRESS NOTES
Tried to reach pt, but call went directly to voicemail and her mailbox is full. Will try again tomorrow to reach her and confirm PCP appts.      Avril Lux BS, RN, SCRN  RN Stroke Neurology Care Coordinator  [facility] Neuroscience Service Line

## 2023-08-23 NOTE — PROGRESS NOTES
Attempted to reach pt at both numbers listed. First attempt I got a message that the call could not be completed and on the second attempt to her home number it went to voicemail and her mailbox was full.     LVM for pt's daughter requesting to connect with the pt. Direct callback number provided.      Avril LAYNE, RN, SCRN  RN Stroke Neurology Care Coordinator  [facility] Neuroscience Service Line

## 2023-08-24 NOTE — PROGRESS NOTES
Spoke with pt this afternoon and discussed follow up needs. Rescheduled PCP appt for 9/8 with Dr. David as she is unable to get to appt this afternoon. Pt c/o burning sensation in BLE in her feet and coming up her R leg. She states she just took medication for it and applied some cream so she is hopeful the symptoms will improve soon. Pt noted h/o DM2 and sciatic nerve pain, so concern is not related to her recent CVA. While on the phone with the pt I did also explain to her that I had reached out to Dr. Castillo requesting orders for PT/OT and have not yet heard back from him. I stated that I will try reaching out again so that we can get her started with therapies as well. Pt voiced understanding and did not have any other questions. She asked that I email her the appointment information as well as my contact information.     RNCC will contact PCP instead of Dr. Castillo and request therapy orders for pt, then follow up with her again once those evaluations have been scheduled.       Avril Lux BS, RN, SCRN  RN Stroke Neurology Care Coordinator  [facility] Neuroscience Service Line

## 2023-08-25 NOTE — PROGRESS NOTES
Email to patient at [email or web address]:    Antonio Smith,    I am the stroke nurse who spoke with you yesterday afternoon regarding your follow up care and how you're doing since your stroke. Here are your two scheduled upcoming appointments:  Hospital follow up with Dr. David (PCP)  Friday, September 8th at 12:00pm  [address] [facility] [address] [place] [address]    Stroke Neurology follow up with Viviana DORAN  Friday, November 10th at 2:30pm  [address] [facility] [address] [place] [address]    Also, I am still working on getting referral orders for you to be evaluated by PT and OT for your right sided weakness. I know you said you have done PT in the past for your low back pain and were told that there was no additional progress you could make, BUT now that you have had a stroke your mobility has changed and they should re-evaluate you to see if they can assist you in gaining some more strength on that right side.     I will call you once I get PT and OT orders and get those appointments scheduled. If you have any questions or concerns in the meantime you can email me back or call my direct line below.    Avril LAYNE, RN, SCRN  RN Stroke Neurology Care Coordinator      [facility]  Neuroscience Service Line    [email or web address]  [email or web address]   Office: [phone]

## 2023-08-27 DIAGNOSIS — R53.1 RIGHT SIDED WEAKNESS: ICD-10-CM

## 2023-08-27 DIAGNOSIS — I65.22 INTRACRANIAL CAROTID STENOSIS, LEFT: ICD-10-CM

## 2023-08-27 DIAGNOSIS — I63.232 CEREBROVASCULAR ACCIDENT (CVA) DUE TO STENOSIS OF LEFT CAROTID ARTERY (H): Primary | Chronic | ICD-10-CM

## 2023-08-28 NOTE — PROGRESS NOTES
Received notification from pt's PCP that he has placed orders for PT and OT.    Contacted rehab scheduling and got PT evaluation scheduled for Wednesday, September 6th with two additional subsequent follow-ups. Scheduled OT evaluation for Friday, September 29th with one additional subsequent follow-up.     Spoke with pt to confirm receipt of email last week and update her on therapy appts. Pt became upset and stated that she was told by PT previously there was nothing they can do for her and that she refuses to go. I tried offering explanation that given the new deficits of RUE and RLE weakness, it's recommended she be re-evaluated to see if they can help with the new concerns. Pt states that she can do PT exercises at home that the hospital staff provided and that she will not go to OP evaluation appts. I acknowledged her wishes and cancelled therapy appts. I also asked the pt if she received my email from last week with the appt information for PCP follow up and neurology. Pt states she never received the email as she was having phone problems. Verbally confirmed appts with Dr. Garcia on 9/8 and Viviana DORAN on 11/10. Pt did not have any additional questions or concerns at this time. She would like subsequent CC outreach from me in about 1 month. I provided her with my direct contact information if she has questions or concerns in the meantime.      Avril Lux BS, RN, SCRN  RN Stroke Neurology Care Coordinator  [facility] Neuroscience Service Line

## 2023-09-05 ENCOUNTER — MEDICAL CORRESPONDENCE (OUTPATIENT)
Dept: HEALTH INFORMATION MANAGEMENT | Facility: CLINIC | Age: 58
End: 2023-09-05
Payer: MEDICAID

## 2023-09-08 ENCOUNTER — OFFICE VISIT (OUTPATIENT)
Dept: INTERNAL MEDICINE | Facility: CLINIC | Age: 58
End: 2023-09-08
Payer: COMMERCIAL

## 2023-09-08 VITALS
HEART RATE: 91 BPM | HEIGHT: 67 IN | RESPIRATION RATE: 20 BRPM | BODY MASS INDEX: 30.78 KG/M2 | OXYGEN SATURATION: 100 % | WEIGHT: 196.1 LBS | TEMPERATURE: 98 F | SYSTOLIC BLOOD PRESSURE: 123 MMHG | DIASTOLIC BLOOD PRESSURE: 85 MMHG

## 2023-09-08 DIAGNOSIS — R47.01 APHASIA: ICD-10-CM

## 2023-09-08 DIAGNOSIS — Z79.4 TYPE 2 DIABETES MELLITUS WITH DIABETIC POLYNEUROPATHY, WITH LONG-TERM CURRENT USE OF INSULIN (H): ICD-10-CM

## 2023-09-08 DIAGNOSIS — I63.9 ACUTE CVA (CEREBROVASCULAR ACCIDENT) (H): ICD-10-CM

## 2023-09-08 DIAGNOSIS — E11.65 TYPE 2 DIABETES MELLITUS WITH HYPERGLYCEMIA, WITH LONG-TERM CURRENT USE OF INSULIN (H): Chronic | ICD-10-CM

## 2023-09-08 DIAGNOSIS — Z79.4 TYPE 2 DIABETES MELLITUS WITH HYPERGLYCEMIA, WITH LONG-TERM CURRENT USE OF INSULIN (H): Chronic | ICD-10-CM

## 2023-09-08 DIAGNOSIS — Z12.31 VISIT FOR SCREENING MAMMOGRAM: ICD-10-CM

## 2023-09-08 DIAGNOSIS — R53.1 RIGHT SIDED WEAKNESS: Primary | ICD-10-CM

## 2023-09-08 DIAGNOSIS — I67.2 INTRACRANIAL ATHEROSCLEROSIS: ICD-10-CM

## 2023-09-08 DIAGNOSIS — G95.89 MYELOMALACIA OF CERVICAL CORD (H): ICD-10-CM

## 2023-09-08 DIAGNOSIS — Z09 HOSPITAL DISCHARGE FOLLOW-UP: ICD-10-CM

## 2023-09-08 DIAGNOSIS — E11.42 TYPE 2 DIABETES MELLITUS WITH DIABETIC POLYNEUROPATHY, WITH LONG-TERM CURRENT USE OF INSULIN (H): ICD-10-CM

## 2023-09-08 LAB
ANION GAP SERPL CALCULATED.3IONS-SCNC: 12 MMOL/L (ref 7–15)
BUN SERPL-MCNC: 20.9 MG/DL (ref 8–23)
CALCIUM SERPL-MCNC: 9.3 MG/DL (ref 8.6–10)
CHLORIDE SERPL-SCNC: 106 MMOL/L (ref 98–107)
CREAT SERPL-MCNC: 0.86 MG/DL (ref 0.51–0.95)
DEPRECATED HCO3 PLAS-SCNC: 20 MMOL/L (ref 22–29)
EGFRCR SERPLBLD CKD-EPI 2021: 77 ML/MIN/1.73M2
GLUCOSE BLD-MCNC: 201 MG/DL (ref 60–99)
GLUCOSE SERPL-MCNC: 218 MG/DL (ref 70–99)
HBA1C MFR BLD: 9.7 % (ref 0–5.6)
POTASSIUM SERPL-SCNC: 4.1 MMOL/L (ref 3.4–5.3)
SODIUM SERPL-SCNC: 138 MMOL/L (ref 136–145)

## 2023-09-08 PROCEDURE — 36415 COLL VENOUS BLD VENIPUNCTURE: CPT | Performed by: INTERNAL MEDICINE

## 2023-09-08 PROCEDURE — 83036 HEMOGLOBIN GLYCOSYLATED A1C: CPT | Performed by: INTERNAL MEDICINE

## 2023-09-08 PROCEDURE — 99215 OFFICE O/P EST HI 40 MIN: CPT | Performed by: INTERNAL MEDICINE

## 2023-09-08 PROCEDURE — 84681 ASSAY OF C-PEPTIDE: CPT | Performed by: INTERNAL MEDICINE

## 2023-09-08 PROCEDURE — 80048 BASIC METABOLIC PNL TOTAL CA: CPT | Performed by: INTERNAL MEDICINE

## 2023-09-08 PROCEDURE — 82947 ASSAY GLUCOSE BLOOD QUANT: CPT | Mod: 59 | Performed by: INTERNAL MEDICINE

## 2023-09-08 RX ORDER — PROCHLORPERAZINE 25 MG/1
SUPPOSITORY RECTAL
Qty: 1 EACH | Refills: 0 | Status: SHIPPED | OUTPATIENT
Start: 2023-09-08 | End: 2024-01-14

## 2023-09-08 RX ORDER — PROCHLORPERAZINE 25 MG/1
SUPPOSITORY RECTAL
Qty: 1 EACH | Refills: 1 | Status: SHIPPED | OUTPATIENT
Start: 2023-09-08 | End: 2024-01-14

## 2023-09-08 RX ORDER — PROCHLORPERAZINE 25 MG/1
SUPPOSITORY RECTAL
Qty: 3 EACH | Refills: 5 | Status: SHIPPED | OUTPATIENT
Start: 2023-09-08 | End: 2024-01-14

## 2023-09-08 ASSESSMENT — PAIN SCALES - GENERAL: PAINLEVEL: WORST PAIN (10)

## 2023-09-08 NOTE — PROGRESS NOTES
"  {PROVIDER CHARTING PREFERENCE:275302}    Trenton Smith is a 58 year old, presenting for the following health issues:  ER F/U, Recheck Medication, and Blood Sugar (Pt would like to check her blood sugar )        9/8/2023    12:05 PM   Additional Questions   Roomed by Avinash ZIMMER   Accompanied by N/A       HPI     ED/UC Followup:    Facility:  [facility]   Date of visit: 08/05/2023  Reason for visit: Hyperglycemia   Current Status: Pt states that she is still doing \"about the same;\" pt states that her right leg still feels weaker.   {additonal problems for provider to add (Optional):282247}      Review of Systems   {ROS COMP (Optional):624210}      Objective    /85 (BP Location: Right arm, Patient Position: Sitting, Cuff Size: Adult Regular)   Pulse 91   Temp 98  F (36.7  C) (Oral)   Resp 20   Ht 1.702 m (5' 7\")   Wt 89 kg (196 lb 1.6 oz)   LMP  (LMP Unknown)   SpO2 100%   BMI 30.71 kg/m    Body mass index is 30.71 kg/m .  Physical Exam   {Exam List (Optional):029177}    {Diagnostic Test Results (Optional):932865}    {AMBULATORY ATTESTATION (Optional):783126}              "

## 2023-09-11 ENCOUNTER — TELEPHONE (OUTPATIENT)
Dept: INTERNAL MEDICINE | Facility: CLINIC | Age: 58
End: 2023-09-11
Payer: MEDICAID

## 2023-09-11 LAB — C PEPTIDE SERPL-MCNC: 4.1 NG/ML (ref 0.9–6.9)

## 2023-09-11 NOTE — TELEPHONE ENCOUNTER
Patient calling to get a call back to  go over A1C and how to add her dexom to her arm.   -Doesn't know how to use     Patient not to happy with [place]     Call Back ASA today     Contact Information  [phone] (Home Phone)   [phone] (Mobile)

## 2023-09-11 NOTE — TELEPHONE ENCOUNTER
We did review this at the time of her visit and that she might need to meet with diabetes education to decide the best meter.    She could be scheduled with diabetic education (?expedite) to go through this more.    Her lab results were as follows:    Component      Latest Ref Rng 9/8/2023  1:04 PM 9/8/2023  1:26 PM   Sodium      136 - 145 mmol/L 138     Potassium      3.4 - 5.3 mmol/L 4.1     Chloride      98 - 107 mmol/L 106     Carbon Dioxide (CO2)      22 - 29 mmol/L 20 (L)     Anion Gap      7 - 15 mmol/L 12     Urea Nitrogen      8.0 - 23.0 mg/dL 20.9     Creatinine      0.51 - 0.95 mg/dL 0.86     Calcium      8.6 - 10.0 mg/dL 9.3     Glucose      70 - 99 mg/dL 218 (H)     GFR Estimate      >60 mL/min/1.73m2 77     Hemoglobin A1C      0.0 - 5.6 % 9.7 (H)     C-Peptide      0.9 - 6.9 ng/mL 4.1     Glucose      60 - 99 mg/dL  201 (H)       A1C still high.    Pancreas still makes insulin which is helpful.    Your electrolytes were normal.  Your kidney tests were normal.     I understand she is frustrated with our system and it is her prerogative to change if she is feeling she is getting inadequate care.    Otherwise, follow up with me a scheduled.    Kike David MD  General Internal Medicine  [facility]  9/11/2023, 4:30 PM

## 2023-09-12 NOTE — TELEPHONE ENCOUNTER
Contacted patient and reviewed PCP message below.  Patient really wants help with Dexcome education and placement prior to her going to [place] next week for her brothers .  Offered to schedule with RN and patient would like this, scheduled for 23 and patient will bring all her supplies.

## 2023-09-13 ENCOUNTER — ALLIED HEALTH/NURSE VISIT (OUTPATIENT)
Dept: FAMILY MEDICINE | Facility: CLINIC | Age: 58
End: 2023-09-13
Payer: COMMERCIAL

## 2023-09-13 DIAGNOSIS — Z79.4 TYPE 2 DIABETES MELLITUS WITH HYPERGLYCEMIA, WITH LONG-TERM CURRENT USE OF INSULIN (H): Primary | Chronic | ICD-10-CM

## 2023-09-13 DIAGNOSIS — E11.65 TYPE 2 DIABETES MELLITUS WITH HYPERGLYCEMIA, WITH LONG-TERM CURRENT USE OF INSULIN (H): Primary | Chronic | ICD-10-CM

## 2023-09-13 PROCEDURE — 99207 PR NO CHARGE NURSE ONLY: CPT

## 2023-09-13 RX ORDER — PEN NEEDLE, DIABETIC 31 GX5/16"
4 NEEDLE, DISPOSABLE MISCELLANEOUS DAILY
Qty: 400 EACH | Refills: 3 | Status: SHIPPED | OUTPATIENT
Start: 2023-09-13 | End: 2024-01-14

## 2023-09-13 NOTE — PROGRESS NOTES
Patient presents to clinic with all Dexcom 6 supplies for education and application of first sensor.  Patient and writer watched official Dexcom 6 instructional video from Dexcom website.  While reviewing video, writer paused to emphasize importing of both transmitter and sensor serial numbers.  After completing video writer walked through meter set up including sensor and transmitter.  Dexcom applied on upper right abd.  System paired and in warm up mode for 2 hours.  Patient was able to repeat back steps correctly and left confident she can manage Dexcom.      Also reviewed transmitter good for 3 months, sensors for 10 days.  Alcohol swabs were also sent to pharmacy for insulin and Dexcom use.

## 2023-09-19 PROBLEM — I67.2 INTRACRANIAL ATHEROSCLEROSIS: Status: ACTIVE | Noted: 2023-09-19

## 2023-09-19 NOTE — PATIENT INSTRUCTIONS
Future Appointments   Date Time Provider Department Center   10/12/2023 12:30 PM [facility]   11/10/2023  2:30 PM Viviana Rodriguez APRN CNP [facility]

## 2023-09-29 ENCOUNTER — PATIENT OUTREACH (OUTPATIENT)
Dept: CARE COORDINATION | Facility: CLINIC | Age: 58
End: 2023-09-29

## 2023-09-29 ASSESSMENT — ACTIVITIES OF DAILY LIVING (ADL): DEPENDENT_IADLS:: CLEANING;LAUNDRY;TRANSPORTATION

## 2023-09-29 NOTE — PROGRESS NOTES
Stroke RN Care Coordination - Follow-Up Outreach Note     SITUATION     Sarah Rahman is a 58 year old female who is receiving support for:  Clinic Care Coordination - Follow-up (Stroke RNCC Follow-Up)    BACKGROUND     Sarah Rahman is a 57 year old female who was brought to the ED by ambulance for evaluation of above chief complaint.  Past medical history of CVA, left carotid stenosis status post endarterectomy on 12/2022 [facility], severe proximal to mid basilar artery stenosis, essential hypertension, mixed hyperlipidemia, type 2 diabetes mellitus, chronic anemia, chronic low back pain, L5-S1 discectomy, cervical cord myelomalacia, sensorineural hearing loss, moderate persistent asthma, COPD, tobacco abuse, seasonal allergies, obesity, GERD, CAD status post CABG, right renal artery stenosis, schizoaffective disorder, depression, anxiety, substance abuse, syphilis. Admitted on 7/27/2023. She was brought to the ED by ambulance for evaluation of aphasia and right-sided hemiparesis.     She was found on the floor of the elevator by the apartment  and EMS was called. Reportedly, she was initially able to communicate, however lost the ability to speak and was noted weaker. Tier 1 stroke code was done in the ED where she was noted with right hemiparesis. Patient was given TNK and admitted to the ICU.     ASSESSMENT     Spoke with pt this afternoon and she reports that she is doing well since I last spoke with her. She does still have R sided weakness, but she states that she is trying to continue exercises at home to rebuild strength. She continues to decline scheduling new PT and OT evaluations. She reports compliance on her medications without abnormal side effects and has recommended follow up already scheduled with PCP on 10/12 and general neurology on 11/10. She reports that she is managing her new diabetes monitor without difficulty and does not have any stroke related questions or  concerns at this time.     I did note to her that she never wore a cardiac event monitor to rule our afib as part of her stroke workup. I explained to her that the general neurologist may recommend she complete this at her follow up appt.      09/29/23 4740   Functional Status   Do you have any residual effects from the stroke Yes, I do   Weakness Yes   Weakness location RUE and RLE   Numbness Yes  (diabetic neuropathy?)   Numbness location BLE   Tingling Yes  (diabetic neuropathy?)   Tingling location BLE   Fatigue Sometimes   Dizziness or Light-Headedness No   Memory Concerns no memory concerns   Other concerns: None   Dependent ADLs: Independent   Please indicate your level of independence in the following activities: Walking Independent   Please indicate your level of independence in the following activities: Eating (including cutting food) Independent   Please indicate your level of independence in the following activities: Dressing and undressing (including shoes) Independent   Please indicate your level of independence in the following activities: Bathing/showering including grooming Independent   Please indicate your level of independence in the following activities: Using the toilet Independent   Fallen 2 or more times in the past year? No   Any fall with injury in the past year? No   Resources and Support   Dependent IADLs: Cleaning;Laundry;Transportation   Are you currently doing any physical therapy? No  (Patient declined)   Are you currently doing any occupational therapy? No  (Patient declined)   Are you currently doing any speech therapy? No   Are you currently doing any other therapy? No   Do you get any home health services? No, not needed   Community Resources None   Informal Support system: Family   Medications and Follow-up   Do you sometimes miss or forget to take your medications? How often? Rarely   Knowledgeable about how to use meds: Yes   Medication side effects suspected: No   Difficulty  keeping appointments: No   Compliance Concerns No   No-Show Concerns No   No PCP office visit in Past Year No   Do you have the recommended follow-up scheduled? Yes   Risk Factor Management   Do you check your blood sugar at home? Yes   Stroke Care Coordination Outreach Frequency   Stroke Care Coordination Outreach Frequency 6 Weeks       Stroke warning signs and symptoms - CALL 911 right away for:  - Sudden numbness or weakness in the face, arm or leg (often on one side of the body).  - Sudden confusion or trouble understanding what is going on.  - Sudden blurred or decreased vision in one or both eyes.  - Sudden trouble speaking, loss of balance, dizziness or problems with coordination.  - Sudden, severe headache for no reason.  - Fainting or seizures.  - Symptoms may go away then come back suddenly.    PLAN     Care Plan: Stroke       Problem: Stroke Risk Factors       Goal: Blood Pressure Management       Note:     Goal: <140/90    Patient reports compliance with BP medications and readings from clinic visits appear generally in range.                Goal: Hyperlipidemia Management       Note:     Goal: LDL 40-70    Most recent 116. Lipitor increased to 80mg daily. Patient reports compliance and PCP plans to monitor cholesterol trend.                Goal: Diabetes Management       Note:     Goal: A1C < 7.0    Patients most recent A1C 9.7. She just started using a new glucose monitor and has been initiated on Trulicity. PCP managing.                Goal: Stroke Medication Management       Note:     Patient recommended to be on DAPT indefinitely given extensive intracranial atherosclerosis. She reports compliance.                         Problem: Stroke Patient Support       Goal: Improve social support system       Note:     Not directly discussed during outreach. Patient did not express any specific social support needs at this time.                Goal: Improve Mental Health Support       Note:     Patient  stable on current treatments.                 Goal: Improve Functional Status       Note:     Patient declined OP PT and OT evaluations. She is doing independent strength training exercises at home. She notes some improvement in R sided function, but states it is slow.                         Problem: Stroke Care Follow-Up       Goal: PCP Follow-Up       Note:     Completed follow up on 9/8. Has subsequent appt with PCP for 10/12.                Goal: Stroke Neurology Follow-Up       Note:     Patient is scheduled for general neurology follow up on 11/10.                              Follow-up plan:  RNCC will follow up with pt again in about 5-6 weeks after her neurology follow up appt. She has my contact information should any questions or concerns arise sooner.    Avril Lux BS, RN, SCRN  RN Stroke Neurology Care Coordinator  [facility] Line

## 2023-10-03 ENCOUNTER — TRANSCRIBE ORDERS (OUTPATIENT)
Dept: OTHER | Age: 58
End: 2023-10-03

## 2023-10-03 DIAGNOSIS — Z79.899 CONTROLLED SUBSTANCE AGREEMENT SIGNED: ICD-10-CM

## 2023-10-03 DIAGNOSIS — G57.71 COMPLEX REGIONAL PAIN SYNDROME TYPE 2 OF RIGHT LOWER EXTREMITY: ICD-10-CM

## 2023-10-03 DIAGNOSIS — Z51.81 ENCOUNTER FOR THERAPEUTIC DRUG MONITORING: ICD-10-CM

## 2023-10-03 DIAGNOSIS — F11.20 OPIOID DEPENDENCE, CONTINUOUS (H): ICD-10-CM

## 2023-10-03 DIAGNOSIS — G89.4 CHRONIC PAIN SYNDROME: ICD-10-CM

## 2023-10-03 DIAGNOSIS — M54.16 LUMBAR RADICULAR PAIN: ICD-10-CM

## 2023-10-03 DIAGNOSIS — M96.1 FAILED BACK SURGICAL SYNDROME: Primary | ICD-10-CM

## 2023-10-03 DIAGNOSIS — M62.838 MUSCLE SPASM: ICD-10-CM

## 2023-10-12 ENCOUNTER — TELEPHONE (OUTPATIENT)
Dept: INTERNAL MEDICINE | Facility: CLINIC | Age: 58
End: 2023-10-12

## 2023-10-12 ENCOUNTER — HOSPITAL ENCOUNTER (OUTPATIENT)
Dept: ULTRASOUND IMAGING | Facility: HOSPITAL | Age: 58
Discharge: HOME | End: 2023-10-12
Attending: INTERNAL MEDICINE
Payer: COMMERCIAL

## 2023-10-12 ENCOUNTER — HOSPITAL ENCOUNTER (OUTPATIENT)
Dept: GENERAL RADIOLOGY | Facility: HOSPITAL | Age: 58
Discharge: HOME | End: 2023-10-12
Attending: INTERNAL MEDICINE
Payer: COMMERCIAL

## 2023-10-12 ENCOUNTER — OFFICE VISIT (OUTPATIENT)
Dept: INTERNAL MEDICINE | Facility: CLINIC | Age: 58
End: 2023-10-12
Payer: COMMERCIAL

## 2023-10-12 VITALS
TEMPERATURE: 97.6 F | RESPIRATION RATE: 20 BRPM | WEIGHT: 198.5 LBS | HEIGHT: 67 IN | SYSTOLIC BLOOD PRESSURE: 137 MMHG | HEART RATE: 92 BPM | BODY MASS INDEX: 31.16 KG/M2 | DIASTOLIC BLOOD PRESSURE: 84 MMHG | OXYGEN SATURATION: 100 %

## 2023-10-12 DIAGNOSIS — G45.9 TIA (TRANSIENT ISCHEMIC ATTACK): ICD-10-CM

## 2023-10-12 DIAGNOSIS — Z79.4 TYPE 2 DIABETES MELLITUS WITH HYPERGLYCEMIA, WITH LONG-TERM CURRENT USE OF INSULIN (H): Chronic | ICD-10-CM

## 2023-10-12 DIAGNOSIS — M79.661 PAIN OF RIGHT LOWER LEG: ICD-10-CM

## 2023-10-12 DIAGNOSIS — M79.89 RIGHT LEG SWELLING: ICD-10-CM

## 2023-10-12 DIAGNOSIS — J44.9 CHRONIC OBSTRUCTIVE PULMONARY DISEASE, UNSPECIFIED COPD TYPE (H): Primary | ICD-10-CM

## 2023-10-12 DIAGNOSIS — I25.10 CORONARY ARTERY DISEASE INVOLVING NATIVE CORONARY ARTERY OF NATIVE HEART WITHOUT ANGINA PECTORIS: Chronic | ICD-10-CM

## 2023-10-12 DIAGNOSIS — G89.4 CHRONIC PAIN SYNDROME: ICD-10-CM

## 2023-10-12 DIAGNOSIS — R06.00 DYSPNEA, UNSPECIFIED TYPE: ICD-10-CM

## 2023-10-12 DIAGNOSIS — R55 SYNCOPE, UNSPECIFIED SYNCOPE TYPE: ICD-10-CM

## 2023-10-12 DIAGNOSIS — R06.02 SHORTNESS OF BREATH: Primary | ICD-10-CM

## 2023-10-12 DIAGNOSIS — R06.02 SHORTNESS OF BREATH: ICD-10-CM

## 2023-10-12 DIAGNOSIS — E11.65 TYPE 2 DIABETES MELLITUS WITH HYPERGLYCEMIA, WITH LONG-TERM CURRENT USE OF INSULIN (H): Chronic | ICD-10-CM

## 2023-10-12 PROCEDURE — 99215 OFFICE O/P EST HI 40 MIN: CPT | Performed by: INTERNAL MEDICINE

## 2023-10-12 PROCEDURE — 71046 X-RAY EXAM CHEST 2 VIEWS: CPT

## 2023-10-12 PROCEDURE — 93971 EXTREMITY STUDY: CPT | Mod: RT

## 2023-10-12 RX ORDER — ASPIRIN 81 MG/1
81 TABLET ORAL DAILY
Qty: 90 TABLET | Refills: 3 | Status: ON HOLD | OUTPATIENT
Start: 2023-10-12 | End: 2024-08-30

## 2023-10-12 RX ORDER — KETOROLAC TROMETHAMINE 10 MG/1
10 TABLET, FILM COATED ORAL EVERY 6 HOURS PRN
Qty: 20 TABLET | Refills: 0 | Status: SHIPPED | OUTPATIENT
Start: 2023-10-12 | End: 2023-10-25

## 2023-10-12 ASSESSMENT — PAIN SCALES - GENERAL: PAINLEVEL: WORST PAIN (10)

## 2023-10-12 NOTE — PROGRESS NOTES
"  {PROVIDER CHARTING PREFERENCE:735967}    Trenton Smith is a 58 year old, presenting for the following health issues:  Follow Up (Pt is unhappy with the way lab results are released-she is not getting calls. She is unhappy that her [employer] education class is not until November. She is really considering leaving this clinic. ), Leg Swelling (Pain and burning starting in her feet and moving up her legs. ), Urinary Problem (Frequency), and Sleep Problem (Depressed and in pain is causing her to not sleep.)        10/12/2023    12:20 PM   Additional Questions   Roomed by HERMAN Cabrera   Accompanied by CHANDRA       HPI     {MA/LPN/RN Pre-Provider Visit Orders- hCG/UA/Strep (Optional):622708}  {SUPERLIST (Optional):543349}  {additonal problems for provider to add (Optional):296736}      Review of Systems   {ROS COMP (Optional):400952}      Objective    /84 (BP Location: Right arm, Patient Position: Sitting, Cuff Size: Adult Large)   Pulse 92   Temp 97.6  F (36.4  C) (Oral)   Resp 20   Ht 1.702 m (5' 7\")   Wt 90 kg (198 lb 8 oz)   LMP  (LMP Unknown)   SpO2 100%   BMI 31.09 kg/m    Body mass index is 31.09 kg/m .  Physical Exam   {Exam List (Optional):120515}    {Diagnostic Test Results (Optional):582223}    {AMBULATORY ATTESTATION (Optional):850787}              "

## 2023-10-13 ENCOUNTER — TELEPHONE (OUTPATIENT)
Dept: INTERNAL MEDICINE | Facility: CLINIC | Age: 58
End: 2023-10-13
Payer: MEDICAID

## 2023-10-13 DIAGNOSIS — J44.9 CHRONIC OBSTRUCTIVE PULMONARY DISEASE, UNSPECIFIED COPD TYPE (H): Primary | ICD-10-CM

## 2023-10-13 RX ORDER — FLUTICASONE PROPIONATE AND SALMETEROL 250; 50 UG/1; UG/1
1 POWDER RESPIRATORY (INHALATION) EVERY 12 HOURS
Qty: 60 EACH | Refills: 3 | Status: SHIPPED | OUTPATIENT
Start: 2023-10-13 | End: 2024-08-05

## 2023-10-13 RX ORDER — FLUTICASONE PROPIONATE AND SALMETEROL 232; 14 UG/1; UG/1
1 POWDER, METERED RESPIRATORY (INHALATION) 2 TIMES DAILY
Qty: 1 EACH | Refills: 3 | Status: SHIPPED | OUTPATIENT
Start: 2023-10-13 | End: 2023-10-13

## 2023-10-13 NOTE — TELEPHONE ENCOUNTER
PRIOR AUTHORIZATION DENIED    Medication: FLUTICASONE-SALMETEROL 232-14 MCG/ACT IN AEPB  Insurance Company: [employer] - Phone [phone] Fax [phone]  Denial Date: 10/13/2023  Denial Rational:     Appeal Information:     Patient Notified: NO

## 2023-10-13 NOTE — TELEPHONE ENCOUNTER
LMTCB. Please relay and review PCP's message below with pt when she returns call.     Please assist in scheduling a follow up in 3-4 weeks as needed.     Please route any updates or what pt would like to do to PCP.

## 2023-10-13 NOTE — TELEPHONE ENCOUNTER
Please call patient -    ______________________________________________________________________     Home Phone:  [phone] (home)     Cell phone:   Telephone Information:   Mobile [phone]     ______________________________________________________________________     Her ultrasound of her right lower extremity shows no signs of blood clots.    Her chest x-ray (CXR)  shows no signs of heart failure or pneumonia or anything more serious.    We could try an additional inhaler to see if it helps with her breathing.  She would take this regularly to see if this helps.    We could do some breathing tests through [facility] to check her lungs out further.    We can schedule her for a follow up visit in 3-4 weeks in a reserved slot for follow up.     Kike David MD  [facility]  10/12/2023, 11:01 PM   ______________________________________________________________________    Pertinent radiology for this visit includes the following:    XR Chest 2 Views  Narrative: EXAM: XR CHEST 2 VIEWS  LOCATION: [facility]  DATE: 10/12/2023    INDICATION:  Shortness of breath  COMPARISON: 05/05/2023  Impression: IMPRESSION: Lungs are clear. No pleural effusion or pneumothorax. Stable borderline heart size. No pulmonary edema. Sternotomy and CABG.  US Lower Extremity Venous Duplex Right  Narrative: EXAM: US LOWER EXTREMITY VENOUS DUPLEX RIGHT  LOCATION: [facility]  DATE: 10/12/2023    INDICATION:  Right leg swelling.  COMPARISON: None.  TECHNIQUE: Venous Duplex ultrasound of the right lower extremity with and without compression, augmentation and duplex. Color flow and spectral Doppler with waveform analysis performed.    FINDINGS: Exam includes the common femoral, femoral, popliteal, and contralateral common femoral veins as well as segmentally visualized deep calf veins and greater saphenous vein.     RIGHT: No deep vein thrombosis. No  superficial thrombophlebitis. No popliteal cyst.  Impression: IMPRESSION:  1.  No deep venous thrombosis in the right lower extremity.      ______________________________________________________________________

## 2023-10-13 NOTE — TELEPHONE ENCOUNTER
Patient calling back     Patient was scheduled for 11/2 for a follow up     Patient would like to go forward with the inhaler   -[phone]      We could do some breathing tests through [facility] to check her lungs out further.     -YES

## 2023-10-14 ENCOUNTER — HOSPITAL ENCOUNTER (EMERGENCY)
Facility: HOSPITAL | Age: 58
Discharge: HOME | End: 2023-10-15
Attending: EMERGENCY MEDICINE | Admitting: EMERGENCY MEDICINE
Payer: COMMERCIAL

## 2023-10-14 DIAGNOSIS — R07.9 CHEST PAIN, UNSPECIFIED TYPE: ICD-10-CM

## 2023-10-14 DIAGNOSIS — R55 SYNCOPE, UNSPECIFIED SYNCOPE TYPE: ICD-10-CM

## 2023-10-14 DIAGNOSIS — M79.604 PAIN OF RIGHT LOWER EXTREMITY: ICD-10-CM

## 2023-10-14 LAB
ALBUMIN SERPL BCG-MCNC: 4 G/DL (ref 3.5–5.2)
ALP SERPL-CCNC: 90 U/L (ref 35–104)
ALT SERPL W P-5'-P-CCNC: 19 U/L (ref 0–50)
ANION GAP SERPL CALCULATED.3IONS-SCNC: 9 MMOL/L (ref 7–15)
AST SERPL W P-5'-P-CCNC: 17 U/L (ref 0–45)
BASO+EOS+MONOS # BLD AUTO: ABNORMAL 10*3/UL
BASO+EOS+MONOS NFR BLD AUTO: ABNORMAL %
BASOPHILS # BLD AUTO: 0.1 10E3/UL (ref 0–0.2)
BASOPHILS NFR BLD AUTO: 1 %
BILIRUB DIRECT SERPL-MCNC: <0.2 MG/DL (ref 0–0.3)
BILIRUB SERPL-MCNC: <0.2 MG/DL
BUN SERPL-MCNC: 27.7 MG/DL (ref 8–23)
CALCIUM SERPL-MCNC: 8.8 MG/DL (ref 8.6–10)
CHLORIDE SERPL-SCNC: 108 MMOL/L (ref 98–107)
CREAT SERPL-MCNC: 0.95 MG/DL (ref 0.51–0.95)
D DIMER PPP FEU-MCNC: 0.67 UG/ML FEU (ref 0–0.5)
DEPRECATED HCO3 PLAS-SCNC: 21 MMOL/L (ref 22–29)
EGFRCR SERPLBLD CKD-EPI 2021: 69 ML/MIN/1.73M2
EOSINOPHIL # BLD AUTO: 0.5 10E3/UL (ref 0–0.7)
EOSINOPHIL NFR BLD AUTO: 7 %
ERYTHROCYTE [DISTWIDTH] IN BLOOD BY AUTOMATED COUNT: 13.2 % (ref 10–15)
GLUCOSE SERPL-MCNC: 371 MG/DL (ref 70–99)
HCT VFR BLD AUTO: 32.8 % (ref 35–47)
HGB BLD-MCNC: 10.2 G/DL (ref 11.7–15.7)
HOLD SPECIMEN: NORMAL
HOLD SPECIMEN: NORMAL
IMM GRANULOCYTES # BLD: 0 10E3/UL
IMM GRANULOCYTES NFR BLD: 0 %
LYMPHOCYTES # BLD AUTO: 3.3 10E3/UL (ref 0.8–5.3)
LYMPHOCYTES NFR BLD AUTO: 44 %
MCH RBC QN AUTO: 27.2 PG (ref 26.5–33)
MCHC RBC AUTO-ENTMCNC: 31.1 G/DL (ref 31.5–36.5)
MCV RBC AUTO: 88 FL (ref 78–100)
MONOCYTES # BLD AUTO: 0.4 10E3/UL (ref 0–1.3)
MONOCYTES NFR BLD AUTO: 5 %
NEUTROPHILS # BLD AUTO: 3.2 10E3/UL (ref 1.6–8.3)
NEUTROPHILS NFR BLD AUTO: 43 %
NRBC # BLD AUTO: 0 10E3/UL
NRBC BLD AUTO-RTO: 0 /100
PLATELET # BLD AUTO: 212 10E3/UL (ref 150–450)
POTASSIUM SERPL-SCNC: 4.2 MMOL/L (ref 3.4–5.3)
PROT SERPL-MCNC: 6.5 G/DL (ref 6.4–8.3)
RBC # BLD AUTO: 3.75 10E6/UL (ref 3.8–5.2)
SODIUM SERPL-SCNC: 138 MMOL/L (ref 135–145)
TROPONIN T SERPL HS-MCNC: 13 NG/L
WBC # BLD AUTO: 7.4 10E3/UL (ref 4–11)

## 2023-10-14 PROCEDURE — 84484 ASSAY OF TROPONIN QUANT: CPT | Performed by: EMERGENCY MEDICINE

## 2023-10-14 PROCEDURE — 82248 BILIRUBIN DIRECT: CPT | Performed by: EMERGENCY MEDICINE

## 2023-10-14 PROCEDURE — 85379 FIBRIN DEGRADATION QUANT: CPT | Performed by: EMERGENCY MEDICINE

## 2023-10-14 PROCEDURE — 250N000011 HC RX IP 250 OP 636: Performed by: EMERGENCY MEDICINE

## 2023-10-14 PROCEDURE — 36415 COLL VENOUS BLD VENIPUNCTURE: CPT | Performed by: EMERGENCY MEDICINE

## 2023-10-14 PROCEDURE — 85025 COMPLETE CBC W/AUTO DIFF WBC: CPT | Performed by: EMERGENCY MEDICINE

## 2023-10-14 PROCEDURE — 96374 THER/PROPH/DIAG INJ IV PUSH: CPT | Mod: 59

## 2023-10-14 PROCEDURE — 93005 ELECTROCARDIOGRAM TRACING: CPT | Performed by: EMERGENCY MEDICINE

## 2023-10-14 PROCEDURE — 80053 COMPREHEN METABOLIC PANEL: CPT | Performed by: EMERGENCY MEDICINE

## 2023-10-14 PROCEDURE — 99285 EMERGENCY DEPT VISIT HI MDM: CPT | Mod: 25

## 2023-10-14 PROCEDURE — 96376 TX/PRO/DX INJ SAME DRUG ADON: CPT

## 2023-10-14 RX ORDER — MORPHINE SULFATE 4 MG/ML
4 INJECTION, SOLUTION INTRAMUSCULAR; INTRAVENOUS ONCE
Status: COMPLETED | OUTPATIENT
Start: 2023-10-15 | End: 2023-10-14

## 2023-10-14 RX ORDER — MORPHINE SULFATE 4 MG/ML
4 INJECTION, SOLUTION INTRAMUSCULAR; INTRAVENOUS ONCE
Status: COMPLETED | OUTPATIENT
Start: 2023-10-14 | End: 2023-10-14

## 2023-10-14 RX ADMIN — MORPHINE SULFATE 4 MG: 4 INJECTION, SOLUTION INTRAMUSCULAR; INTRAVENOUS at 22:28

## 2023-10-14 RX ADMIN — MORPHINE SULFATE 4 MG: 4 INJECTION, SOLUTION INTRAMUSCULAR; INTRAVENOUS at 23:40

## 2023-10-14 ASSESSMENT — ACTIVITIES OF DAILY LIVING (ADL): ADLS_ACUITY_SCORE: 35

## 2023-10-14 NOTE — TELEPHONE ENCOUNTER
Alternate sent in.    Kike David MD  General Internal Medicine  [facility]  10/13/2023, 9:45 PM

## 2023-10-15 ENCOUNTER — APPOINTMENT (OUTPATIENT)
Dept: CT IMAGING | Facility: HOSPITAL | Age: 58
End: 2023-10-15
Attending: EMERGENCY MEDICINE
Payer: COMMERCIAL

## 2023-10-15 VITALS
OXYGEN SATURATION: 100 % | BODY MASS INDEX: 29.98 KG/M2 | TEMPERATURE: 97.5 F | HEIGHT: 67 IN | DIASTOLIC BLOOD PRESSURE: 77 MMHG | WEIGHT: 191 LBS | SYSTOLIC BLOOD PRESSURE: 156 MMHG | RESPIRATION RATE: 20 BRPM | HEART RATE: 79 BPM

## 2023-10-15 LAB
ALBUMIN UR-MCNC: 10 MG/DL
APPEARANCE UR: CLEAR
BACTERIA #/AREA URNS HPF: ABNORMAL /HPF
BILIRUB UR QL STRIP: NEGATIVE
COLOR UR AUTO: ABNORMAL
GLUCOSE UR STRIP-MCNC: >1000 MG/DL
HGB UR QL STRIP: NEGATIVE
KETONES UR STRIP-MCNC: NEGATIVE MG/DL
LEUKOCYTE ESTERASE UR QL STRIP: NEGATIVE
NITRATE UR QL: NEGATIVE
PH UR STRIP: 7.5 [PH] (ref 5–7)
RBC URINE: 1 /HPF
SP GR UR STRIP: 1.01 (ref 1–1.03)
SQUAMOUS EPITHELIAL: 5 /HPF
UROBILINOGEN UR STRIP-MCNC: <2 MG/DL
WBC URINE: 1 /HPF

## 2023-10-15 PROCEDURE — 96376 TX/PRO/DX INJ SAME DRUG ADON: CPT | Mod: 59

## 2023-10-15 PROCEDURE — 70498 CT ANGIOGRAPHY NECK: CPT

## 2023-10-15 PROCEDURE — 72125 CT NECK SPINE W/O DYE: CPT

## 2023-10-15 PROCEDURE — 250N000011 HC RX IP 250 OP 636: Performed by: EMERGENCY MEDICINE

## 2023-10-15 PROCEDURE — 81001 URINALYSIS AUTO W/SCOPE: CPT | Performed by: EMERGENCY MEDICINE

## 2023-10-15 PROCEDURE — 70496 CT ANGIOGRAPHY HEAD: CPT

## 2023-10-15 PROCEDURE — 72131 CT LUMBAR SPINE W/O DYE: CPT

## 2023-10-15 PROCEDURE — 71275 CT ANGIOGRAPHY CHEST: CPT

## 2023-10-15 PROCEDURE — 250N000011 HC RX IP 250 OP 636: Mod: JZ | Performed by: EMERGENCY MEDICINE

## 2023-10-15 RX ORDER — MORPHINE SULFATE 2 MG/ML
2 INJECTION, SOLUTION INTRAMUSCULAR; INTRAVENOUS ONCE
Status: COMPLETED | OUTPATIENT
Start: 2023-10-15 | End: 2023-10-15

## 2023-10-15 RX ORDER — IOPAMIDOL 755 MG/ML
80 INJECTION, SOLUTION INTRAVASCULAR ONCE
Status: COMPLETED | OUTPATIENT
Start: 2023-10-15 | End: 2023-10-15

## 2023-10-15 RX ORDER — IOPAMIDOL 755 MG/ML
100 INJECTION, SOLUTION INTRAVASCULAR ONCE
Status: COMPLETED | OUTPATIENT
Start: 2023-10-15 | End: 2023-10-15

## 2023-10-15 RX ADMIN — MORPHINE SULFATE 2 MG: 2 INJECTION, SOLUTION INTRAMUSCULAR; INTRAVENOUS at 01:35

## 2023-10-15 RX ADMIN — IOPAMIDOL 75 ML: 755 INJECTION, SOLUTION INTRAVENOUS at 00:23

## 2023-10-15 RX ADMIN — IOPAMIDOL 90 ML: 755 INJECTION, SOLUTION INTRAVENOUS at 01:43

## 2023-10-15 ASSESSMENT — ACTIVITIES OF DAILY LIVING (ADL)
ADLS_ACUITY_SCORE: 35
ADLS_ACUITY_SCORE: 35

## 2023-10-15 NOTE — ED NOTES
Patient went to CT and when she was on the table told the CT tech she could not lay flat and needed another dose of pain medication before she could complete the CT scans. Provider updated and a second dose of Morphine ordered.

## 2023-10-15 NOTE — ED TRIAGE NOTES
Patient arrives from home with .   States she used the bathroom at 0800 this morning. Then she woke up and was on the floor of bathroom. Does not remember losing consciousness. Does not remember what time it was when she woke up.   Has felt lethargic throughout the day today.     States she has a hard time breathing while lying on her side and left side hurts.       Multiple other complaints in triage.   States bilateral lower extremity swelling. No edema noted in triage.

## 2023-10-15 NOTE — ED NOTES
"Patient told that rooms are full but we have some patients discharging and she should get a room soon but that a provider may even see her out in waiting room to get her work up going.    Patient states to writer, \"Yeah, I know the games you people play around here you don't have to lie to me. You aren't gonna give me room so just say so.\"    Writer informed patient there are no games here and that writer is being very straightforward with patient and will continue to be so.     Patient rolled eyes at writer and states, \"Yeah, whatever. You're all the same. I go through this all the time.\"  "

## 2023-10-15 NOTE — DISCHARGE INSTRUCTIONS
Thankfully no abnormalities were noted on multiple imaging studies including CT of the head, cervical spine, CTA of your chest, CT lumbar spine no abnormalities were noted.  The cause of your fainting episodes is not clear.  You should follow-up with your primary care physician as scheduled and contact him by phone to review your ED visit and determine if you need to be seen sooner.  Also follow-up with the pain clinic for management of your chronic pain.  If you have anterior chest pain, recurrent fainting, develop a fever, black or tar-like stools or shortness of breath return to the emergency department.

## 2023-10-15 NOTE — ED NOTES
Ask pt if could give a urine sample for lab work, pt states she do not have to use the bathroom at this time. Will call when she rather.

## 2023-10-15 NOTE — ED PROVIDER NOTES
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    Bilateral low back pain with right-sided sciatica, unspecified chronicity    Nasal polyp    Cerebrovascular accident (CVA) due to stenosis of carotid artery (H)    Chronic obstructive pulmonary disease (H)    Anemia    Carotid stenosis, left    Depression with anxiety    Mixed hyperlipidemia    GERD (gastroesophageal reflux disease)    History of drug abuse (H)    Hx of syphilis    Primary hypertension    Menopausal flushing    Moderate persistent asthma    Myelomalacia of cervical cord (H)    Nephrolithiasis    Obesity    Seasonal allergies    Sensorineural hearing loss (SNHL) of right ear    Smoker    Chronic pain syndrome    Atypical chest pain -- Normal NM stress 5/8/23    Syncope    Right renal artery stenosis (H24)    Acute CVA (cerebrovascular accident) (H)    Intracranial atherosclerosis     Past Surgical History:   Procedure Laterality Date    BYPASS GRAFT ARTERY CORONARY  01/01/2009    x2    CAROTID ENDARTERECTOMY Left 12/2021    CORONARY STENT PLACEMENT      CV CORONARY ANGIOGRAM N/A 06/02/2021    Procedure: Coronary Angiogram;  Surgeon: Juventino Rivera MD;  Location: [facility] Cardiac [facility] Lab;  Service: Cardiology    HYSTERECTOMY TOTAL ABDOMINAL      HYSTERECTOMY TOTAL ABDOMINAL, BILATERAL SALPINGO-OOPHORECTOMY, COMBINED      IR  "TRANSLAMINAR EPIDURAL LUMBAR INJ INCL IMAGING  09/27/2022    LUMBAR DISCECTOMY Right 06/13/2019    L5-S1 - Dr. Hamm    NASAL FRACTURE SURGERY      ORIF ULNAR / RADIAL SHAFT FRACTURE Right        Allergies   Allergen Reactions    Haloperidol Unknown     Patient states it stops her heart      Haloperidol Angioedema    Hydroxyzine Angioedema    Meperidine And Related Angioedema, Difficulty breathing, Other (See Comments), Rash and Shortness Of Breath     Throat closes  Other reaction(s): Breathing Difficulty  Other reaction(s): Breathing Difficulty      Phenothiazines Other (See Comments)     Other reaction(s): CARDIAC DISTURBANCES  Patient states it stops her heart  \"I swell up\"  \"stopped by heart\"  \"I swell up\"  \"I swell up\"      Phenothiazines Angioedema    Tramadol Other (See Comments)     Other reaction(s): Angioedema  Throat itch      Tramadol Angioedema    Januvia [Sitagliptin] Swelling    Latex Itching    Haloperidol And Related Angioedema    Januvia [Sitagliptin] Other (See Comments)     Swelling in the neck     Latex Itching    Lisinopril Other (See Comments)     ACE cough  Itchy throat  Throat itches  Itchy throat      Penicillins Swelling    Hydroxyzine Other (See Comments) and Rash     Tongue swelling  Tongue swelling  Tongue swelling      Lisinopril Cough       OUTPATIENT MEDICATIONS     Discharge Medication List as of 10/15/2023  3:07 AM         Vitals:    10/15/23 0154 10/15/23 0306 10/15/23 0307 10/15/23 0309   BP: (!) 171/83   (!) 156/77   Pulse: 78 76 79    Resp:       Temp:       TempSrc:       SpO2: 100% 100% 100%    Weight:       Height:           Physical Exam   Constitutional: Oriented to person, place, and time. Appears well-developed and well-nourished.   HEENT:   Head: Atraumatic. No sign of maxillary tenderness.   Neck: Normal range of motion. Neck supple.   Cardiovascular: Normal rate, regular rhythm and normal heart sounds.    Pulmonary/Chest: Normal effort  and breath sounds normal. "   Abdominal: Soft. Bowel sounds are normal.   Musculoskeletal: Normal range of motion. Tenderness to cervical spine. Tenderness to right knee but no definitive swelling.   Neurological: Alert and oriented to person, place, and time. Normal strength. No sensory deficit. No cranial nerve deficit.  Skin: Skin is warm and dry.   Psychiatric: Normal mood and affect. Behavior is normal. Thought content normal.     DIAGNOSTICS    LABORATORY FINDINGS (REVIEWED AND INTERPRETED):  Labs Ordered and Resulted from Time of ED Arrival to Time of ED Departure   D DIMER QUANTITATIVE - Abnormal       Result Value    D-Dimer Quantitative 0.67 (*)    BASIC METABOLIC PANEL - Abnormal    Sodium 138      Potassium 4.2      Chloride 108 (*)     Carbon Dioxide (CO2) 21 (*)     Anion Gap 9      Urea Nitrogen 27.7 (*)     Creatinine 0.95      GFR Estimate 69      Calcium 8.8      Glucose 371 (*)    ROUTINE UA WITH MICROSCOPIC REFLEX TO CULTURE - Abnormal    Color Urine Light Yellow      Appearance Urine Clear      Glucose Urine >1000 (*)     Bilirubin Urine Negative      Ketones Urine Negative      Specific Gravity Urine 1.015      Blood Urine Negative      pH Urine 7.5 (*)     Protein Albumin Urine 10 (*)     Urobilinogen Urine <2.0      Nitrite Urine Negative      Leukocyte Esterase Urine Negative      Bacteria Urine Few (*)     RBC Urine 1      WBC Urine 1      Squamous Epithelials Urine 5 (*)    CBC WITH PLATELETS AND DIFFERENTIAL - Abnormal    WBC Count 7.4      RBC Count 3.75 (*)     Hemoglobin 10.2 (*)     Hematocrit 32.8 (*)     MCV 88      MCH 27.2      MCHC 31.1 (*)     RDW 13.2      Platelet Count 212      % Neutrophils 43      % Lymphocytes 44      % Monocytes 5      Mids % (Monos, Eos, Basos)        % Eosinophils 7      % Basophils 1      % Immature Granulocytes 0      NRBCs per 100 WBC 0      Absolute Neutrophils 3.2      Absolute Lymphocytes 3.3      Absolute Monocytes 0.4      Mids Abs (Monos, Eos, Basos)        Absolute  Eosinophils 0.5      Absolute Basophils 0.1      Absolute Immature Granulocytes 0.0      Absolute NRBCs 0.0     HEPATIC FUNCTION PANEL - Normal    Protein Total 6.5      Albumin 4.0      Bilirubin Total <0.2      Alkaline Phosphatase 90      AST 17      ALT 19      Bilirubin Direct <0.20     TROPONIN T, HIGH SENSITIVITY - Normal    Troponin T, High Sensitivity 13           IMAGING (REVIEWED AND INTERPRETED):  CT Chest Pulmonary Embolism w Contrast   Final Result   IMPRESSION:   1.  No pulmonary emboli on either side. Minor nodular scarring in the apices. A few tiny uncalcified nodules in the lungs, unchanged. Slight diffuse thickening of the bronchi. No adenopathy or effusion.      2.  Mildly atherosclerotic thoracic aorta, ectatic ascending segment, without aneurysm or dissection. Bovine origin of the innominate and the left common carotid arteries, normal vascular variant anatomy.      3.  Sternotomy and CABG. Normal cardiac size. No pericardial effusion.      REFERENCE:   Guidelines for Management of Incidental Pulmonary Nodules Detected on CT Images: From the Fleischner Society 2017.    Guidelines apply to incidental nodules in patients who are 35 years or older.   Guidelines do not apply to lung cancer screening, patients with immunosuppression, or patients with known primary cancer.      MULTIPLE NODULES   Nodule size <6 mm   Low-risk patients: No follow-up needed.   High-risk patients: Optional follow-up at 12 months.      Nodule size 6 mm or larger   Low-risk patients: Follow-up CT at 3-6 months, then consider CT at 18-24 months.   High-risk patients: Follow-up CT at 3-6 months, then at 18-24 months if no change.   -Use most suspicious nodule as guide to management.         Lumbar spine CT w/o contrast   Final Result   IMPRESSION:   1.  No fracture or posttraumatic subluxation.   2.  Degenerative changes, as above.      Cervical spine CT w/o contrast   Final Result   IMPRESSION:   1.  No fracture or  posttraumatic subluxation.   2.  No high-grade spinal canal or neural foraminal stenosis.      CTA Head Neck with Contrast   Final Result   IMPRESSION:    HEAD CT:   1.  No CT evidence of acute intracranial hemorrhage, mass or recent transcortical infarct.   2.  Small chronic infarcts in the occipital lobes.   3.  Mild presumed chronic small vessel ischemic changes.      HEAD CTA:    1.  No large vessel occlusion or flow-limiting stenosis.   2.  Evidence of intracranial atherosclerosis including moderate to severe stenoses in the paraclinoid segments of the distal internal carotid arteries, moderate focal stenosis within the V4 segment of the right vertebral artery and mild to moderate    segmental narrowing of the proximal basilar artery.      NECK CTA:   1.  No hemodynamically significant stenosis or dissection.            ECG (REVIEWED AND INTERPRETED):   ECG:   Performed at: 22:41  HR:  78 bpm  Rhythm: Sinus  Axis: 24  QRS duration: 72 ms  QTC: 449 ms  ST changes: No ST segment elevation or depression, no T wave inversion,No Q wave  Interpretation: Normal sinus rhythm  Compared to most recent ECG from: July 2023 no significant change    I have reviewed the patient's ECG, with comments made as listed above. Please see scanned image for full interpretation.         I, Alyssa Pal, am serving as a scribe to document services personally performed by Diomedes Waller D.O., based on my observation and the provider s statements to me.    I, Diomedes Waller D.O., attest that Alyssa Pal is acting in a scribe capacity, has observed my performance of the services and has documented them in accordance with my direction.    Diomedes Waller D.O.  EMERGENCY MEDICINE   10/14/23  [facility] EMERGENCY DEPARTMENT  [address] [place] [id number]  [phone]  Dept: [phone]     Diomedes Waller DO  10/15/23 3147

## 2023-10-16 ENCOUNTER — TELEPHONE (OUTPATIENT)
Dept: INTERNAL MEDICINE | Facility: CLINIC | Age: 58
End: 2023-10-16

## 2023-10-16 LAB
ATRIAL RATE - MUSE: 78 BPM
DIASTOLIC BLOOD PRESSURE - MUSE: NORMAL MMHG
INTERPRETATION ECG - MUSE: NORMAL
P AXIS - MUSE: 66 DEGREES
PR INTERVAL - MUSE: 186 MS
QRS DURATION - MUSE: 72 MS
QT - MUSE: 394 MS
QTC - MUSE: 449 MS
R AXIS - MUSE: 24 DEGREES
SYSTOLIC BLOOD PRESSURE - MUSE: NORMAL MMHG
T AXIS - MUSE: 83 DEGREES
VENTRICULAR RATE- MUSE: 78 BPM

## 2023-10-16 NOTE — TELEPHONE ENCOUNTER
Pt looking for a Call back from someone on Frankie's team, she declined to talk to a Triage Nurse. [phone]

## 2023-10-17 ENCOUNTER — APPOINTMENT (OUTPATIENT)
Dept: RADIOLOGY | Facility: HOSPITAL | Age: 58
End: 2023-10-17
Attending: STUDENT IN AN ORGANIZED HEALTH CARE EDUCATION/TRAINING PROGRAM
Payer: COMMERCIAL

## 2023-10-17 ENCOUNTER — APPOINTMENT (OUTPATIENT)
Dept: CT IMAGING | Facility: HOSPITAL | Age: 58
End: 2023-10-17
Attending: STUDENT IN AN ORGANIZED HEALTH CARE EDUCATION/TRAINING PROGRAM
Payer: COMMERCIAL

## 2023-10-17 ENCOUNTER — HOSPITAL ENCOUNTER (EMERGENCY)
Facility: HOSPITAL | Age: 58
Discharge: HOME | End: 2023-10-17
Attending: STUDENT IN AN ORGANIZED HEALTH CARE EDUCATION/TRAINING PROGRAM | Admitting: STUDENT IN AN ORGANIZED HEALTH CARE EDUCATION/TRAINING PROGRAM
Payer: COMMERCIAL

## 2023-10-17 ENCOUNTER — OFFICE VISIT (OUTPATIENT)
Dept: FAMILY MEDICINE | Facility: CLINIC | Age: 58
End: 2023-10-17
Payer: COMMERCIAL

## 2023-10-17 VITALS
HEART RATE: 95 BPM | OXYGEN SATURATION: 100 % | RESPIRATION RATE: 28 BRPM | WEIGHT: 191 LBS | TEMPERATURE: 98.7 F | BODY MASS INDEX: 29.91 KG/M2 | SYSTOLIC BLOOD PRESSURE: 174 MMHG | DIASTOLIC BLOOD PRESSURE: 84 MMHG

## 2023-10-17 VITALS
TEMPERATURE: 98.2 F | SYSTOLIC BLOOD PRESSURE: 170 MMHG | RESPIRATION RATE: 18 BRPM | DIASTOLIC BLOOD PRESSURE: 74 MMHG | HEART RATE: 82 BPM | OXYGEN SATURATION: 100 %

## 2023-10-17 DIAGNOSIS — R06.02 SHORTNESS OF BREATH: Primary | ICD-10-CM

## 2023-10-17 DIAGNOSIS — R06.02 SHORTNESS OF BREATH: ICD-10-CM

## 2023-10-17 LAB
ALBUMIN SERPL BCG-MCNC: 4.3 G/DL (ref 3.5–5.2)
ALP SERPL-CCNC: 96 U/L (ref 35–104)
ALT SERPL W P-5'-P-CCNC: 26 U/L (ref 0–50)
ANION GAP SERPL CALCULATED.3IONS-SCNC: 12 MMOL/L (ref 7–15)
AST SERPL W P-5'-P-CCNC: 32 U/L (ref 0–45)
BASO+EOS+MONOS # BLD AUTO: ABNORMAL 10*3/UL
BASO+EOS+MONOS NFR BLD AUTO: ABNORMAL %
BASOPHILS # BLD AUTO: 0 10E3/UL (ref 0–0.2)
BASOPHILS NFR BLD AUTO: 1 %
BILIRUB SERPL-MCNC: <0.2 MG/DL
BUN SERPL-MCNC: 20.8 MG/DL (ref 8–23)
CALCIUM SERPL-MCNC: 9.5 MG/DL (ref 8.6–10)
CHLORIDE SERPL-SCNC: 107 MMOL/L (ref 98–107)
CREAT SERPL-MCNC: 1.08 MG/DL (ref 0.51–0.95)
DEPRECATED HCO3 PLAS-SCNC: 20 MMOL/L (ref 22–29)
EGFRCR SERPLBLD CKD-EPI 2021: 59 ML/MIN/1.73M2
EOSINOPHIL # BLD AUTO: 0.4 10E3/UL (ref 0–0.7)
EOSINOPHIL NFR BLD AUTO: 5 %
ERYTHROCYTE [DISTWIDTH] IN BLOOD BY AUTOMATED COUNT: 13.2 % (ref 10–15)
GLUCOSE SERPL-MCNC: 220 MG/DL (ref 70–99)
HCT VFR BLD AUTO: 34.7 % (ref 35–47)
HGB BLD-MCNC: 11 G/DL (ref 11.7–15.7)
IMM GRANULOCYTES # BLD: 0 10E3/UL
IMM GRANULOCYTES NFR BLD: 0 %
LYMPHOCYTES # BLD AUTO: 2.6 10E3/UL (ref 0.8–5.3)
LYMPHOCYTES NFR BLD AUTO: 36 %
MCH RBC QN AUTO: 27.3 PG (ref 26.5–33)
MCHC RBC AUTO-ENTMCNC: 31.7 G/DL (ref 31.5–36.5)
MCV RBC AUTO: 86 FL (ref 78–100)
MONOCYTES # BLD AUTO: 0.3 10E3/UL (ref 0–1.3)
MONOCYTES NFR BLD AUTO: 5 %
NEUTROPHILS # BLD AUTO: 3.9 10E3/UL (ref 1.6–8.3)
NEUTROPHILS NFR BLD AUTO: 53 %
NRBC # BLD AUTO: 0 10E3/UL
NRBC BLD AUTO-RTO: 0 /100
NT-PROBNP SERPL-MCNC: 193 PG/ML (ref 0–900)
NT-PROBNP SERPL-MCNC: 222 PG/ML (ref 0–900)
PLATELET # BLD AUTO: 215 10E3/UL (ref 150–450)
POTASSIUM SERPL-SCNC: 3.8 MMOL/L (ref 3.4–5.3)
PROT SERPL-MCNC: 7.1 G/DL (ref 6.4–8.3)
RBC # BLD AUTO: 4.03 10E6/UL (ref 3.8–5.2)
SODIUM SERPL-SCNC: 139 MMOL/L (ref 135–145)
TROPONIN T SERPL HS-MCNC: 12 NG/L
TROPONIN T SERPL HS-MCNC: 13 NG/L
TSH SERPL DL<=0.005 MIU/L-ACNC: 1.25 UIU/ML (ref 0.3–4.2)
WBC # BLD AUTO: 7.3 10E3/UL (ref 4–11)

## 2023-10-17 PROCEDURE — 84443 ASSAY THYROID STIM HORMONE: CPT

## 2023-10-17 PROCEDURE — 250N000013 HC RX MED GY IP 250 OP 250 PS 637: Performed by: STUDENT IN AN ORGANIZED HEALTH CARE EDUCATION/TRAINING PROGRAM

## 2023-10-17 PROCEDURE — 250N000011 HC RX IP 250 OP 636: Mod: JZ | Performed by: STUDENT IN AN ORGANIZED HEALTH CARE EDUCATION/TRAINING PROGRAM

## 2023-10-17 PROCEDURE — 84484 ASSAY OF TROPONIN QUANT: CPT | Mod: 91 | Performed by: STUDENT IN AN ORGANIZED HEALTH CARE EDUCATION/TRAINING PROGRAM

## 2023-10-17 PROCEDURE — 96374 THER/PROPH/DIAG INJ IV PUSH: CPT | Mod: 59

## 2023-10-17 PROCEDURE — 71045 X-RAY EXAM CHEST 1 VIEW: CPT

## 2023-10-17 PROCEDURE — 96375 TX/PRO/DX INJ NEW DRUG ADDON: CPT

## 2023-10-17 PROCEDURE — 99285 EMERGENCY DEPT VISIT HI MDM: CPT | Mod: 25

## 2023-10-17 PROCEDURE — 36415 COLL VENOUS BLD VENIPUNCTURE: CPT | Performed by: STUDENT IN AN ORGANIZED HEALTH CARE EDUCATION/TRAINING PROGRAM

## 2023-10-17 PROCEDURE — 70491 CT SOFT TISSUE NECK W/DYE: CPT

## 2023-10-17 PROCEDURE — 83880 ASSAY OF NATRIURETIC PEPTIDE: CPT | Performed by: STUDENT IN AN ORGANIZED HEALTH CARE EDUCATION/TRAINING PROGRAM

## 2023-10-17 PROCEDURE — 83880 ASSAY OF NATRIURETIC PEPTIDE: CPT

## 2023-10-17 PROCEDURE — 250N000011 HC RX IP 250 OP 636: Performed by: STUDENT IN AN ORGANIZED HEALTH CARE EDUCATION/TRAINING PROGRAM

## 2023-10-17 PROCEDURE — 250N000009 HC RX 250: Performed by: STUDENT IN AN ORGANIZED HEALTH CARE EDUCATION/TRAINING PROGRAM

## 2023-10-17 PROCEDURE — 94640 AIRWAY INHALATION TREATMENT: CPT

## 2023-10-17 PROCEDURE — 85025 COMPLETE CBC W/AUTO DIFF WBC: CPT | Performed by: STUDENT IN AN ORGANIZED HEALTH CARE EDUCATION/TRAINING PROGRAM

## 2023-10-17 PROCEDURE — 80053 COMPREHEN METABOLIC PANEL: CPT | Performed by: STUDENT IN AN ORGANIZED HEALTH CARE EDUCATION/TRAINING PROGRAM

## 2023-10-17 PROCEDURE — 99215 OFFICE O/P EST HI 40 MIN: CPT | Performed by: PHYSICIAN ASSISTANT

## 2023-10-17 RX ORDER — LORAZEPAM 2 MG/ML
0.5 INJECTION INTRAMUSCULAR ONCE
Status: COMPLETED | OUTPATIENT
Start: 2023-10-17 | End: 2023-10-17

## 2023-10-17 RX ORDER — ACETAMINOPHEN 325 MG/1
650 TABLET ORAL ONCE
Status: COMPLETED | OUTPATIENT
Start: 2023-10-17 | End: 2023-10-17

## 2023-10-17 RX ORDER — IPRATROPIUM BROMIDE AND ALBUTEROL SULFATE 2.5; .5 MG/3ML; MG/3ML
3 SOLUTION RESPIRATORY (INHALATION) ONCE
Status: COMPLETED | OUTPATIENT
Start: 2023-10-17 | End: 2023-10-17

## 2023-10-17 RX ORDER — LORAZEPAM 0.5 MG/1
0.5 TABLET ORAL EVERY 6 HOURS PRN
Qty: 6 TABLET | Refills: 0 | Status: SHIPPED | OUTPATIENT
Start: 2023-10-17 | End: 2023-10-17

## 2023-10-17 RX ORDER — METHYLPREDNISOLONE SODIUM SUCCINATE 125 MG/2ML
125 INJECTION, POWDER, LYOPHILIZED, FOR SOLUTION INTRAMUSCULAR; INTRAVENOUS ONCE
Status: COMPLETED | OUTPATIENT
Start: 2023-10-17 | End: 2023-10-17

## 2023-10-17 RX ORDER — LORAZEPAM 0.5 MG/1
0.5 TABLET ORAL EVERY 6 HOURS PRN
Qty: 4 TABLET | Refills: 0 | Status: SHIPPED | OUTPATIENT
Start: 2023-10-17 | End: 2024-01-14

## 2023-10-17 RX ORDER — ALBUTEROL SULFATE 90 UG/1
1-2 AEROSOL, METERED RESPIRATORY (INHALATION) EVERY 4 HOURS PRN
Qty: 18 G | Refills: 0 | Status: SHIPPED | OUTPATIENT
Start: 2023-10-17 | End: 2024-01-19

## 2023-10-17 RX ORDER — IPRATROPIUM BROMIDE AND ALBUTEROL SULFATE 2.5; .5 MG/3ML; MG/3ML
1 SOLUTION RESPIRATORY (INHALATION) EVERY 6 HOURS PRN
Qty: 90 ML | Refills: 0 | Status: SHIPPED | OUTPATIENT
Start: 2023-10-17 | End: 2024-08-05

## 2023-10-17 RX ORDER — IOPAMIDOL 755 MG/ML
90 INJECTION, SOLUTION INTRAVASCULAR ONCE
Status: COMPLETED | OUTPATIENT
Start: 2023-10-17 | End: 2023-10-17

## 2023-10-17 RX ORDER — ACETAMINOPHEN 500 MG
1000 TABLET ORAL EVERY 6 HOURS PRN
Qty: 50 TABLET | Refills: 0 | Status: SHIPPED | OUTPATIENT
Start: 2023-10-17 | End: 2023-12-01

## 2023-10-17 RX ORDER — DULOXETIN HYDROCHLORIDE 30 MG/1
CAPSULE, DELAYED RELEASE ORAL
Qty: 47 CAPSULE | Refills: 0 | Status: SHIPPED | OUTPATIENT
Start: 2023-10-17 | End: 2024-01-14

## 2023-10-17 RX ADMIN — IPRATROPIUM BROMIDE AND ALBUTEROL SULFATE 3 ML: .5; 3 SOLUTION RESPIRATORY (INHALATION) at 03:01

## 2023-10-17 RX ADMIN — METHYLPREDNISOLONE SODIUM SUCCINATE 125 MG: 125 INJECTION, POWDER, FOR SOLUTION INTRAMUSCULAR; INTRAVENOUS at 03:35

## 2023-10-17 RX ADMIN — ACETAMINOPHEN 650 MG: 325 TABLET ORAL at 03:13

## 2023-10-17 RX ADMIN — LORAZEPAM 0.5 MG: 2 INJECTION INTRAMUSCULAR; INTRAVENOUS at 03:35

## 2023-10-17 RX ADMIN — IOPAMIDOL 90 ML: 755 INJECTION, SOLUTION INTRAVENOUS at 04:49

## 2023-10-17 ASSESSMENT — ACTIVITIES OF DAILY LIVING (ADL)
ADLS_ACUITY_SCORE: 35
ADLS_ACUITY_SCORE: 35

## 2023-10-17 NOTE — ED TRIAGE NOTES
Pt c/o shortness of breath, denies chest pain, per pt she just want breathing treatment. Denies chest pain. Took her inhaler with no relief.      Triage Assessment (Adult)       Row Name 10/17/23 0222          Triage Assessment    Airway WDL WDL        Respiratory WDL    Respiratory WDL WDL        Skin Circulation/Temperature WDL    Skin Circulation/Temperature WDL WDL        Cardiac WDL    Cardiac WDL WDL        Peripheral/Neurovascular WDL    Peripheral Neurovascular WDL WDL        Cognitive/Neuro/Behavioral WDL    Cognitive/Neuro/Behavioral WDL WDL

## 2023-10-17 NOTE — PATIENT INSTRUCTIONS
1. I recommend discussing Cymbalta with your primary care provider. .5-1 tablet of Alprazolam as needed for break through symptoms.   2. You will be notified of your thyroid function results.   3. Follow up with your primary care provider for further workup and discussion of your anxiety symptoms.   4. In the mean time try relaxation exercises such as grounding: when you are feeling a panicked, place your feet on the ground and close your eye. Take three deep breath and think about the connection your feet are making with the ground. Take three deep breaths and think then concentrate on something that you can hear. Take three deep breaths then concentrate on what you can smell. Take three deep breaths and then concentrate on the way your clothing feels on your skin and in your hands.  5. Guided meditation is also a helpful stress reliever, there are multiple guided meditations available over your smart phone.   6. If you have any Church affiliation, you may find benefit from talking to your spiritual leader or support.     Follow sleep hygiene guidelines.

## 2023-10-17 NOTE — ED PROVIDER NOTES
NAME: Sarah Rahman  AGE: 58 year old female  YOB: 1965  MRN: [id number]  EVALUATION DATE & TIME: 10/17/2023  2:24 AM    PCP: Kike David  ED PROVIDER: Laura Pennington MD.    Chief Complaint   Patient presents with    Shortness of Breath     FINAL IMPRESSION:  1. Shortness of breath      MEDICAL DECISION MAKIN:47 AM I met with the patient, obtained history, performed an initial exam, and discussed options and plan for diagnostics and treatment here in the ED.   5:00 AM I checked on the patient.   5:15 AM The patient had complaints of her leg, thus I went to check on her.  5:58 AM I checked on the patient.     57 y/o F with h/o CVA, chronic pain, low back pain with right sided sciatica, atypical chest pain, GERD, T2DM, HLP, CAD, COPD, schizoaffective disorder among others who presents with shortness of breath. She is very anxious and tearful on initial exam, pacing around the room and asking for a breathing treatment and it was initially very difficulty to get a history. She was given steroids and a duoneb and she was continually requesting a neb, however she does not have significant wheezing on exam so lower suspicion for concerning exacerbation. CXR without pneumothorax, infiltrate, effusion or other acute findings. BNP is normal and does not appear volume up. She just had a very detailed workup for chest pain/syncope a few days ago including CTA head/neck, CT C and L spine, CT PE scan, EKG and labs. I considered PE but with recent negative PE scan and no tachycardia,  hypoxia or pleuritic chest pain, I have low suspicion for this. Her troponin is reassuring x 2 and she denies any chest pain. EKG had somehow not been done at this visit but overall low suspicion for ACS, recent EKG few days ago was reassuring. She also feels like her neck is swelling. Presentation is not suggestive of anaphylaxis. No neck swelling noted on exam, CT neck soft tissue without emergent findings, incidental  findings noted. Recent CTA neck without dissection or other acute findings. CBC and CMP are generally reassuring, hyperglycemia noted, normal AG, do not suspect DKA. She reports right leg swelling and ongoing pain that she has had for a long time and attributes to chronic back pain. She had a recent DVT US of her leg on 10/12/23 which was negative. She is neurovascularly intact on exam, nothing to suggest ischemic leg. She was hypertensive on arrival to 227/95, but was tearful and very anxious at this time, it improved to 174/84. Presentation not suggestive of hypertensive emergency.    I do suspect anxiety is also contributing to her symptoms. She got 0.5 mg of ativan here and during ED course became much more calm with normal work of breathing and asking to discharge. Declined DEC assessment , does not meet 72 hour old criteria. I was going to prescribe some hydroxyzine but she has h/o of anaphylaxis to this. Will send with 4 tabs of 0.5 mg of ativan for severe episodes of anxiety. Albuterol inhaler refill sent per her request. Recommended close outpatient follow up. Strict return precautions discussed and patient is in agreement with plan, endorses understanding and their questions were answered.    Addendum: 10/17/2023 7:38 PM Called patient. She is overall feeling improved. She went to urgent care and was started on flonase as well which is helping. I informed her that the EKG was not done but that overall low risk for ACS given reassuring troponin, recent EKG and no chest pain and patient endorses understanding her questions were answered. Strict return precautions discussed again    Medical Decision Making    History:  Supplemental history from: Documented in chart, if applicable  External Record(s) reviewed: Documented in chart, if applicable. and Inpatient Record: 10/14/2023    Work Up:  Chart documentation includes differential considered and any EKGs or imaging interpreted by provider.  In additional to  work up documented, I considered the following work up: Documented in chart, if applicable.    External consultation:  Discussion of management with another provider: Documented in chart, if applicable    Complicating factors:  Care impacted by chronic illness: Chronic Lung Disease, Chronic Pain, Diabetes, Heart Disease, and Hypertension  Care affected by social determinants of health: Access to Medical Care    Disposition considerations: Discharge. I prescribed additional prescription strength medication(s) as charted. See documentation for any additional details.    MEDICATIONS GIVEN IN THE EMERGENCY:  Medications   ipratropium - albuterol 0.5 mg/2.5 mg/3 mL (DUONEB) neb solution 3 mL (3 mLs Nebulization $Given 10/17/23 0301)   LORazepam (ATIVAN) injection 0.5 mg (0.5 mg Intravenous $Given 10/17/23 0335)   methylPREDNISolone sodium succinate (solu-MEDROL) injection 125 mg (125 mg Intravenous $Given 10/17/23 0335)   acetaminophen (TYLENOL) tablet 650 mg (650 mg Oral $Given 10/17/23 0313)   iopamidol (ISOVUE-370) solution 90 mL (90 mLs Intravenous $Given 10/17/23 0449)       NEW PRESCRIPTIONS STARTED AT TODAY'S ER VISIT:  Discharge Medication List as of 10/17/2023  6:10 AM           =================================================================  HPI    Patient information was obtained from: The patient  Use of : N/A       59 y/o F with h/o CVA, chronic pain, low back pain with right sided sciatica, atypical chest pain, GERD, T2DM, HLP, CAD, COPD, schizoaffective disorder among others who presents with shortness of breath.    The patient was seen in the ER on 10/14/2023 for shortness of breath and she picked up a different inhaler today. The patient had neck tightness and swelling with associated shortness of breath around 11 PM last night after taking her night medications and using the inhaler. She states the swelling to her neck caused her to want to take of the necklace she had on. She reports her  breathing is cut off when she turns her torso to one side. She reports no chest pain. She has chronic pain to her right leg. Reports leg swelling worse on the right side.    Otherwise, the patient denied having fevers, cough, abdominal pain, neurologic changes, and any other medical complaints or concerns at this time.l    Per chart review, the patient was seen at [facility] ED on 10/14/2023 for an evaluation of syncope, breathing difficulties, and right leg pain. She had a syncopal episode in the bathroom early morning. Her  attempted to call her around the afternoon but was unable to get a hold of her. He found her passed out on the floor. Head CT, Head and Neck CTA were all reassuring. ECG was normal. She received IV morphine and was discharged in stable condition.    The patient saw her primary care provider on 10/12 for right leg pain and swelling. She received Toradol for symptomatic relief and her right leg ultrasound was unremarkable for any blood clots.     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 Hamm at [facility]    Schizoaffective disorder (H)     Sleep difficulties 7/17/2022       PAST SURGICAL HISTORY:  Past Surgical History:   Procedure Laterality Date    BYPASS GRAFT ARTERY CORONARY  01/01/2009    x2    CAROTID ENDARTERECTOMY Left 12/2021    CORONARY STENT PLACEMENT      CV CORONARY ANGIOGRAM N/A 06/02/2021    Procedure: Coronary Angiogram;  Surgeon: Juventino Rivera MD;  Location: [facility];   Service: Cardiology    HYSTERECTOMY TOTAL ABDOMINAL      HYSTERECTOMY TOTAL ABDOMINAL, BILATERAL SALPINGO-OOPHORECTOMY, COMBINED      IR TRANSLAMINAR EPIDURAL LUMBAR INJ INCL IMAGING  09/27/2022    LUMBAR DISCECTOMY Right 06/13/2019    L5-S1 - Dr. Hamm    NASAL FRACTURE SURGERY      ORIF ULNAR / RADIAL SHAFT FRACTURE Right        CURRENT MEDICATIONS:    No current facility-administered medications for this encounter.    Current Outpatient Medications:     albuterol (PROAIR HFA) 108 (90 Base) MCG/ACT inhaler, Inhale 1-2 puffs into the lungs every 4 hours as needed for shortness of breath or wheezing, Disp: 18 g, Rfl: 0    LORazepam (ATIVAN) 0.5 MG tablet, Take 1 tablet (0.5 mg) by mouth every 6 hours as needed for anxiety, Disp: 4 tablet, Rfl: 0    acetaminophen (TYLENOL) 325 MG tablet, Take 650 mg by mouth every 8 hours as needed for mild pain, Disp: , Rfl:     acetaminophen (TYLENOL) 325 MG tablet, Take 650 mg by mouth every 8 hours as needed for mild pain, Disp: , Rfl:     acetaminophen (TYLENOL) 500 MG tablet, Take 2 tablets (1,000 mg) by mouth every 6 hours as needed for mild pain, Disp: 50 tablet, Rfl: 0    alcohol swab prep pads, Use to swab area of injection/zac as directed., Disp: 100 each, Rfl: 1    Alcohol Swabs (ALCOHOL PREP) PADS, 4 Pads daily For use with insulin injections, Disp: 400 each, Rfl: 3    aspirin 81 MG EC tablet, Take 1 tablet (81 mg) by mouth daily, Disp: 90 tablet, Rfl: 3    atorvastatin (LIPITOR) 40 MG tablet, Take 1 tablet (40 mg) by mouth every evening, Disp: 30 tablet, Rfl: 3    atorvastatin (LIPITOR) 80 MG tablet, Take 1 tablet (80 mg) by mouth every evening, Disp: 30 tablet, Rfl: 3    blood glucose (NO BRAND SPECIFIED) lancets standard, by In Vitro route 4 times daily, Disp: 400 each, Rfl: 3    blood glucose (NO BRAND SPECIFIED) test strip, 1 strip by In Vitro route 4 times daily, Disp: 400 strip, Rfl: 3    blood glucose monitoring (NO BRAND SPECIFIED) meter device kit, Use to  test blood sugar 4 times daily, Disp: 1 kit, Rfl: 1    buprenorphine-naloxone (SUBOXONE) 2-0.5 MG SUBL sublingual tablet, Place 1 tablet under the tongue 3 times daily, Disp: , Rfl:     carvedilol (COREG) 12.5 MG tablet, Take 1 tablet (12.5 mg) by mouth 2 times daily (with meals), Disp: 60 tablet, Rfl: 0    cetirizine (ZYRTEC) 10 MG tablet, Take 10 mg by mouth daily, Disp: , Rfl:     cetirizine (ZYRTEC) 10 MG tablet, Take 1 tablet (10 mg) by mouth daily, Disp: 90 tablet, Rfl: 3    clopidogrel (PLAVIX) 75 MG tablet, Take 1 tablet (75 mg) by mouth daily, Disp: 30 tablet, Rfl: 3    Continuous Blood Gluc  (DEXCOM G6 ) ROSE, Use to read blood sugars as per 's instructions., Disp: 1 each, Rfl: 0    Continuous Blood Gluc Sensor (DEXCOM G6 SENSOR) MISC, Change every 10 days., Disp: 3 each, Rfl: 5    Continuous Blood Gluc Transmit (DEXCOM G6 TRANSMITTER) MISC, Change every 3 months., Disp: 1 each, Rfl: 1    diclofenac (VOLTAREN) 1 % topical gel, Apply 2 g topically 3 times daily as needed for moderate pain (4-6) (feet), Disp: 300 g, Rfl: 3    dulaglutide (TRULICITY) 0.75 MG/0.5ML pen, Inject 0.75 mg Subcutaneous every 7 days, Disp: , Rfl:     dulaglutide (TRULICITY) 0.75 MG/0.5ML pen, Inject 0.75 mg Subcutaneous every 7 days, Disp: 6 mL, Rfl: 3    DULoxetine (CYMBALTA) 30 MG capsule, Take 1 capsule (30 mg) by mouth daily for 7 days, THEN 2 capsules (60 mg) daily for 20 days., Disp: 47 capsule, Rfl: 0    DULoxetine (CYMBALTA) 60 MG capsule, Take 60 mg by mouth 2 times daily, Disp: , Rfl:     DULoxetine HCl 60 MG CSDR, Take 60 mg by mouth 2 times daily, Disp: , Rfl:     fluticasone (FLONASE) 50 MCG/ACT nasal spray, Spray 1 spray into both nostrils daily as needed for rhinitis or allergies, Disp: , Rfl:     fluticasone (FLONASE) 50 MCG/ACT nasal spray, Spray 1 spray into both nostrils daily as needed for allergies, Disp: , Rfl:     fluticasone-salmeterol (ADVAIR) 250-50 MCG/ACT inhaler, Inhale 1 puff  into the lungs every 12 hours, Disp: 60 each, Rfl: 3    glipiZIDE (GLUCOTROL XL) 5 MG 24 hr tablet, Take 10 mg by mouth daily, Disp: , Rfl:     hydrocortisone (CORTAID) 1 % external cream, Apply topically 2 times daily Apply to foot, Disp: , Rfl:     hydrocortisone 1 % CREA cream, Place rectally 2 times daily as needed for itching (apply to foot), Disp: , Rfl:     insulin aspart (NOVOLOG VIAL) 100 UNITS/ML vial, 3 times a day with meals, for glucometer 150-200 give 2 units SQ, 201-250 give 4 units, >250 give 6 units, Disp: , Rfl: 0    insulin glargine (LANTUS PEN) 100 UNIT/ML pen, Inject 24 Units Subcutaneous At Bedtime, Disp: , Rfl:     insulin glargine (LANTUS PEN) 100 UNIT/ML pen, Inject 25 Units Subcutaneous every morning, Disp: 30 mL, Rfl: 3    ipratropium - albuterol 0.5 mg/2.5 mg/3 mL (DUONEB) 0.5-2.5 (3) MG/3ML neb solution, Take 1 vial (3 mLs) by nebulization every 6 hours as needed for shortness of breath, wheezing or cough, Disp: 90 mL, Rfl: 0    ipratropium - albuterol 0.5 mg/2.5 mg/3 mL (DUONEB) 0.5-2.5 (3) MG/3ML neb solution, Take 1 vial by nebulization every 6 hours as needed for shortness of breath / dyspnea or wheezing, Disp: , Rfl:     ketorolac (TORADOL) 10 MG tablet, Take 1 tablet (10 mg) by mouth every 6 hours as needed for moderate pain, Disp: 20 tablet, Rfl: 0    ketorolac (TORADOL) 10 MG tablet, Take 1 tablet (10 mg) by mouth every 6 hours as needed for moderate pain, Disp: 20 tablet, Rfl: 1    lidocaine (LIDODERM) 5 % patch, Place onto the skin every 24 hours To prevent lidocaine toxicity, patient should be patch free for 12 hrs daily., Disp: , Rfl:     lidocaine (LIDODERM) 5 % patch, Place 1 patch onto the skin every 24 hours To prevent lidocaine toxicity, patient should be patch free for 12 hrs daily. BACK, Disp: , Rfl:     methocarbamol (ROBAXIN) 750 MG tablet, Take 1 tablet (750 mg) by mouth 4 times daily as needed for muscle spasms, Disp: , Rfl: 0    Microlet Lancets MISC, 100 each 4  "times daily Use to test blood sugar 4 daily., Disp: 100 each, Rfl: 1    naloxone (NARCAN) 4 MG/0.1ML nasal spray, Spray 4 mg into one nostril alternating nostrils as needed for opioid reversal every 2-3 minutes until assistance arrives, Disp: , Rfl:     naloxone (NARCAN) 4 MG/0.1ML nasal spray, Spray 4 mg into one nostril alternating nostrils as needed for opioid reversal every 2-3 minutes until assistance arrives, Disp: , Rfl:     nitroGLYcerin (NITROSTAT) 0.4 MG sublingual tablet, Place 1 tablet (0.4 mg) under the tongue every 5 minutes as needed for chest pain For chest pain place 1 tablet under the tongue every 5 minutes for 3 doses. If symptoms persist 5 minutes after 1st dose call 911., Disp: 25 tablet, Rfl: 3    nortriptyline (PAMELOR) 10 MG capsule, Take 10 mg by mouth At Bedtime, Disp: , Rfl:     omeprazole (PRILOSEC) 40 MG DR capsule, Take 1 capsule (40 mg) by mouth daily To protect your stomach., Disp: 90 capsule, Rfl: 3    ondansetron (ZOFRAN) 8 MG tablet, Take 1 tablet (8 mg) by mouth every 8 hours as needed for nausea, Disp: 20 tablet, Rfl: 3    oxyCODONE-acetaminophen (PERCOCET)  MG per tablet, Take 1 tablet by mouth every 6 hours as needed for pain, Disp: , Rfl:     topiramate (TOPAMAX) 50 MG tablet, Take 100 mg by mouth 2 times daily, Disp: , Rfl:     valsartan (DIOVAN) 40 MG tablet, Take 1 tablet (40 mg) by mouth daily, Disp: 90 tablet, Rfl: 3    ALLERGIES:  Allergies   Allergen Reactions    Haloperidol Unknown     Patient states it stops her heart      Haloperidol Angioedema    Hydroxyzine Angioedema    Meperidine And Related Angioedema, Difficulty breathing, Other (See Comments), Rash and Shortness Of Breath     Throat closes  Other reaction(s): Breathing Difficulty  Other reaction(s): Breathing Difficulty      Phenothiazines Other (See Comments)     Other reaction(s): CARDIAC DISTURBANCES  Patient states it stops her heart  \"I swell up\"  \"stopped by heart\"  \"I swell up\"  \"I swell up\"      " Phenothiazines Angioedema    Tramadol Other (See Comments)     Other reaction(s): Angioedema  Throat itch      Tramadol Angioedema    Januvia [Sitagliptin] Swelling    Latex Itching    Haloperidol And Related Angioedema    Januvia [Sitagliptin] Other (See Comments)     Swelling in the neck     Latex Itching    Lisinopril Other (See Comments)     ACE cough  Itchy throat  Throat itches  Itchy throat      Penicillins Swelling    Hydroxyzine Other (See Comments) and Rash     Tongue swelling  Tongue swelling  Tongue swelling      Lisinopril Cough       FAMILY HISTORY:  Family History   Problem Relation Age of Onset    Heart Disease Mother     Heart Disease Father     Heart Disease Sister     Heart Disease Sister     Diabetes Brother     Coronary Artery Disease Brother         MI       SOCIAL HISTORY:   Social History     Socioeconomic History    Marital status: Single   Tobacco Use    Smoking status: Every Day     Packs/day: .5     Types: Cigarettes    Smokeless tobacco: Never    Tobacco comments:     Seen IP by [facility] on 7/28/23 and declined counseling services and resource packet   Vaping Use    Vaping Use: Never used   Substance and Sexual Activity    Alcohol use: Not Currently     Comment: Alcoholic Drinks/day: occ    Drug use: No    Sexual activity: Not Currently   Social History Narrative    ** Merged History Encounter **         Lives alone in a condo.      On disability.  Three living children.       Social Determinants of Health     Interpersonal Safety: Low Risk  (10/12/2023)    Interpersonal Safety     Do you feel physically and emotionally safe where you currently live?: Yes     Within the past 12 months, have you been hit, slapped, kicked or otherwise physically hurt by someone?: No     Within the past 12 months, have you been humiliated or emotionally abused in other ways by your partner or ex-partner?: No       PHYSICAL EXAM:    Vitals: BP (!) 174/84   Pulse 95   Temp 98.7  F (37.1  C) (Tympanic)   Resp  28   Wt 86.6 kg (191 lb)   LMP  (LMP Unknown)   SpO2 100%   BMI 29.91 kg/m     Constitutional: Well developed. Extremity anxious, walking around the room, tearful, hyperventilating  HENT: Normocephalic, atraumatic. Neck-gross ROM intact, I do not appreciate any swelling or masses.   Mouth: no tongue or lip swelling, posterior oropharynx without erythema or edema, uvula is midline  Eyes: Pupils mid-range, sclera white  Respiratory: CTAB, taking rapid shallow breaths while crying  Cardiovascular: Normal heart rate, regular rhythm. No significant LE edema or erythema noted. 2+ DP and PT pulses.  GI: Soft, not distended, not tender to palpation  Musculoskeletal: Moving all 4 extremities intentionally and without pain. No obvious deformity.  Skin: Warm, dry, no rash seen.  Neurologic: Alert & oriented, speech clear. Cns intact, ambulates with steady gait, no focal deficits noted    LAB:  All pertinent labs reviewed and interpreted.  Labs Ordered and Resulted from Time of ED Arrival to Time of ED Departure   COMPREHENSIVE METABOLIC PANEL - Abnormal       Result Value    Sodium 139      Potassium 3.8      Carbon Dioxide (CO2) 20 (*)     Anion Gap 12      Urea Nitrogen 20.8      Creatinine 1.08 (*)     GFR Estimate 59 (*)     Calcium 9.5      Chloride 107      Glucose 220 (*)     Alkaline Phosphatase 96      AST 32      ALT 26      Protein Total 7.1      Albumin 4.3      Bilirubin Total <0.2     CBC WITH PLATELETS AND DIFFERENTIAL - Abnormal    WBC Count 7.3      RBC Count 4.03      Hemoglobin 11.0 (*)     Hematocrit 34.7 (*)     MCV 86      MCH 27.3      MCHC 31.7      RDW 13.2      Platelet Count 215      % Neutrophils 53      % Lymphocytes 36      % Monocytes 5      Mids % (Monos, Eos, Basos)        % Eosinophils 5      % Basophils 1      % Immature Granulocytes 0      NRBCs per 100 WBC 0      Absolute Neutrophils 3.9      Absolute Lymphocytes 2.6      Absolute Monocytes 0.3      Mids Abs (Monos, Eos, Basos)         Absolute Eosinophils 0.4      Absolute Basophils 0.0      Absolute Immature Granulocytes 0.0      Absolute NRBCs 0.0     TROPONIN T, HIGH SENSITIVITY - Normal    Troponin T, High Sensitivity 13     NT PROBNP INPATIENT - Normal    N terminal Pro BNP Inpatient 193     TROPONIN T, HIGH SENSITIVITY - Normal    Troponin T, High Sensitivity 12         RADIOLOGY:  Soft tissue neck CT w contrast   Final Result   IMPRESSION:    1.  Mild nonspecific prominence of the adenoid soft tissues. Otherwise, no findings to correlate with patient's neck swelling.   2.  Scattered endodontal and periodontal disease. Follow-up dental consultation is recommended.   3.  Paranasal sinus disease, as above. Correlation for acute versus chronic sinusitis is recommended.                        XR Chest Port 1 View   Final Result   IMPRESSION:    1. No significant interval change since the recent study.   2. No evidence of active cardiopulmonary disease.                          EKG:   N/A    PROCEDURES:   Procedures     I, Nimesh De La Paz, am serving as a scribe to document services personally performed by Dr. Laura Pennington based on my observation and the provider's statements to me. ILaura MD attest that Nimesh De La Paz is acting in a scribe capacity, has observed my performance of the services and has documented them in accordance with my direction.    Laura Pennington M.D.  Emergency Medicine  [facility] EMERGENCY DEPARTMENT  [address] [facility] [id number] [phone]  Dept: [phone]     Lauar Pennington MD  10/17/23 2047

## 2023-10-17 NOTE — DISCHARGE INSTRUCTIONS
Can use the albuterol inhaler as needed  Can use hydroxyzine as needed for anxiety  Follow up closely with your primary care doctor  Return to the Emergency Room with chest pain, increased difficulty breathing, lightheadedness or other worsening symptoms or concerns

## 2023-10-17 NOTE — ED NOTES
"Pt arrived from triage and saying \"I can't breathe\" multiple times, pt became restless and was crying ,O2 sat checked,100%,.Pt redirected and asked to relaxed. Pt now calm and does not want to sit inside the room and said \" I know this room is for suicidal\"explained to the pt that this is a fasttrack room and is the only available room. Pt opted to sit outside the room.Chair and table offered.  "

## 2023-10-18 NOTE — PROGRESS NOTES
Patient presents with:  Other: Pt was seen in ED this morning, pt states SOB has gotten worse since she went home from ED, pt also states she went to the bathroom and next thing she remember was she laying on the bathroom floor, pt is afraid and scared      Clinical Decision Making:  Patient reporting shortness of breath and is tearful affect.  She brought her dog to the clinic today and the dog was barking at the MA, so vitals were delayed.  Patient was additionally very tearful and when I stated that her concerns may be outside of the scope of our walk-in clinic she accused me of being racist and only saying that because of the color of her skin.  I assured her that that is not the case.  Per chart review this patient may not need to be seen in the emergency department again, as it is very likely that her shortness of breath is secondary to anxiety/panic attack.    When I I discussed the patient's symptoms with her she becomes more anxious, hyperventilates, and becomes tearful.  I did attempt some meditation and grounding exercises with her here in the clinic including box breaths and grounding.  These methods were immediately very effective and she was able to control her breath.  She was reassured that lungs are sounding clear and work-up is looking very reassuring.  I did get her a follow-up appointment with her primary care provider.    Due to her history of anxiety, insomnia, depression and previously being on Cymbalta I did restart this today.  She states that she has previously tolerated without any issue, but is no longer on it because its been run out.  Patient is also very nervous about the need for a breathing machine.  She moved and lost the nebulizer machine.  This was also refilled today.  Patient also requesting Tylenol.  This was granted.  TSH was added to this work-up due to patient's increased anxiety.  It is normal today.  I suspect that her increased anxiety, panic attacks, and depressive  symptoms are directly correlated with the loss of her brother this month.      ICD-10-CM    1. Shortness of breath  R06.02 Nebulizer and Supplies Order for DME - ONLY FOR DME     acetaminophen (TYLENOL) 500 MG tablet     TSH with free T4 reflex     BNP-N terminal pro     ipratropium - albuterol 0.5 mg/2.5 mg/3 mL (DUONEB) 0.5-2.5 (3) MG/3ML neb solution     DULoxetine (CYMBALTA) 30 MG capsule          Patient Instructions   1. I recommend discussing Cymbalta with your primary care provider. .5-1 tablet of Alprazolam as needed for break through symptoms.   2. You will be notified of your thyroid function results.   3. Follow up with your primary care provider for further workup and discussion of your anxiety symptoms.   4. In the mean time try relaxation exercises such as grounding: when you are feeling a panicked, place your feet on the ground and close your eye. Take three deep breath and think about the connection your feet are making with the ground. Take three deep breaths and think then concentrate on something that you can hear. Take three deep breaths then concentrate on what you can smell. Take three deep breaths and then concentrate on the way your clothing feels on your skin and in your hands.  5. Guided meditation is also a helpful stress reliever, there are multiple guided meditations available over your smart phone.   6. If you have any Taoist affiliation, you may find benefit from talking to your spiritual leader or support.     Follow sleep hygiene guidelines.      HPI:  Sarah Rahman is a 58 year old female who presents today complaining of worsening shortness of breath after leaving the emergency department this morning.  Patient was also previously seen in the emergency department on 10/14/2023.  She states that her shortness of breath is much worse when she tries to lay down.  She recently lost her brother, and is now very concerned about the fact that she may have not been fully cared  for.  She was also previously seen in the emergency department on 10/14/2023 for the same concern.  Between the 2 days patient has had CTA of head and neck, CT cervical spine, CT lumbar spine, CT chest pulmonary embolism rule out, chest x-ray, and CT soft tissue of neck.  All of these were normal.  Today patient also had relatively normal CBC and CMP and troponins.  Patient also states that she has had blackout moments where she was in the bathroom today and then found herself on the floor.  When she was in the emergency department today she was given an injection of methylprednisolone, Ativan, breathing treatment of DuoNeb.  She was discharged with a prescription of Ativan.  She states that over the past few weeks she has been having a lot of difficulty falling asleep due to her shortness of breath.  She feels like everything is swollen as well including her legs and neck.      History obtained from the patient.    Problem List:  2023-09: Intracranial atherosclerosis  2023-07: Acute CVA (cerebrovascular accident) (H)  2023-06: Right renal artery stenosis (H24)  2023-05: Syncope  2023-05: Suicidal ideation  2023-05: Atypical chest pain -- Normal NM stress 5/8/23 2023-05: Syncope, unspecified syncope type  2023-05: Hematemesis  2022-10: TIA (transient ischemic attack)  2022-07: Anemia  2022-07: Anxiety as acute reaction to gross stress  2022-07: History of drug abuse (H)  2022-07: Mood disorder due to medical condition  2022-07: Obesity  2022-07: Sleep difficulties  2022-07: Right sided weakness  2022-07: Chronic pain syndrome  2022-07: Asthma in adult, unspecified asthma severity, uncomplicated  2022-06: Diabetic polyneuropathy associated with type 2 diabetes   mellitus (H)  2022-06: Nasal polyp  2022-06: Chronic schizoaffective disorder (H)  2022-05: Acute cholecystitis  2022-04: Bilateral low back pain with right-sided sciatica,   unspecified chronicity  2022-01: Chronic obstructive pulmonary disease  (H)  2021-12: Uncontrolled type 2 diabetes mellitus with hyperglycemia (H)  2021-10: Cerebrovascular accident (CVA) due to stenosis of carotid   artery (H)  2021-10: Acute recurrent maxillary sinusitis  2021-10: Myelomalacia of cervical cord (H)  2021-10: Noncompliance  2021-10: Sensorineural hearing loss (SNHL) of right ear  2021-10: Carotid stenosis, left  2021-08: Depression with anxiety  2021-07: Post-COVID syndrome  2021-05: Mixed hyperlipidemia  2020-09: Hx of syphilis  2020-01: Weakness  2019-10: Leg swelling  2019-06: Lumbar disc herniation  2018-10: Syncope and collapse  2018-10: Sacroiliac joint disease  2018-02: Myalgia  2018-02: Intercostal neuritis  2016-10: Dyshidrosis (pompholyx)  2015-10: Chest pain  2015-07: Seasonal allergies  2014-09: Systolic and diastolic CHF, chronic (H)  2014-09: Pulmonary nodule  2014-04: Menopausal flushing  2014-02: ACP (advance care planning)  2013-08: Nephrolithiasis  2013-03: Superficial injury of cornea  2013-03: CAD -- S/P CABG  2013-03: Type 2 diabetes mellitus with hyperglycemia, with long-term   current use of insulin (H)  2013-03: Schizoaffective disorder (H)  2013-03: Health Care Home  2012-10: GERD (gastroesophageal reflux disease)  2012-04: History of CVA (cerebrovascular accident)  2012-01: Hyperlipidemia with target low density lipoprotein (LDL)   cholesterol less than 70 mg/dL  2011-09: Moderate persistent asthma  2011-07: Diabetes mellitus (H)  2008-02: Smoker  2006-02: Drug dependence (H)  2006-02: Primary hypertension      Past Medical History:   Diagnosis Date    Asthma     CAD (coronary artery disease)     COPD (chronic obstructive pulmonary disease) (H)     CVA (cerebral infarction)     Diabetes (H)     Dyshidrosis (pompholyx) 10/21/2016    GERD (gastroesophageal reflux disease)     Hypertension     Intercostal neuritis 2/6/2018    Lumbar disc herniation 6/14/2019    Noncompliance 10/8/2021    Polysubstance abuse (H)     S/P CABG (coronary artery bypass  graft)     S/P lumbar discectomy 06/13/2019    L5/S1 by  Dr. Hamm at [facility]    Schizoaffective disorder (H)     Sleep difficulties 7/17/2022       Social History     Tobacco Use    Smoking status: Every Day     Packs/day: .5     Types: Cigarettes    Smokeless tobacco: Never    Tobacco comments:     Seen IP by [facility] on 7/28/23 and declined counseling services and resource packet   Substance Use Topics    Alcohol use: Not Currently     Comment: Alcoholic Drinks/day: occ       Review of Systems    Vitals:    10/17/23 1349   BP: (!) 170/74   Pulse: 82   Resp: 18   Temp: 98.2  F (36.8  C)   TempSrc: Oral   SpO2: 100%       Physical Exam  Vitals and nursing note reviewed.   Constitutional:       General: She is not in acute distress.     Appearance: She is not toxic-appearing or diaphoretic.   HENT:      Head: Normocephalic and atraumatic.      Right Ear: External ear normal.      Left Ear: External ear normal.   Eyes:      Conjunctiva/sclera: Conjunctivae normal.   Cardiovascular:      Rate and Rhythm: Normal rate and regular rhythm.      Heart sounds: No murmur heard.  Pulmonary:      Effort: Pulmonary effort is normal. No respiratory distress.      Breath sounds: No stridor. No wheezing, rhonchi or rales.   Musculoskeletal:      Right lower leg: No edema.      Left lower leg: No edema.   Neurological:      Mental Status: She is alert.   Psychiatric:         Mood and Affect: Mood normal.         Behavior: Behavior normal.         Thought Content: Thought content normal.         Judgment: Judgment normal.         Results:  Results for orders placed or performed during the hospital encounter of 10/17/23   XR Chest Port 1 View     Status: None    Narrative    EXAM: CHEST SINGLE VIEW PORTABLE  LOCATION: [facility]  DATE/TIME: 10/17/2023 4:05 AM CDT    INDICATION: Shortness of breath.  COMPARISON: 10/12/2023 - CT chest.      Impression    IMPRESSION:   1. No significant interval change since  the recent study.  2. No evidence of active cardiopulmonary disease.              Soft tissue neck CT w contrast     Status: None    Narrative    EXAM: CT SOFT TISSUE NECK W CONTRAST  LOCATION: [facility]  DATE: 10/17/2023    INDICATION: Neck swelling.  COMPARISON: None.  CONTRAST: ISOVUE 370 90 ML.  TECHNIQUE: Routine CT Soft Tissue Neck with IV contrast. Multiplanar reformats. Dose reduction techniques were used.    FINDINGS:     MUCOSAL SPACES/SOFT TISSUES: Mild nonspecific prominence of the adenoid soft tissues. Otherwise no significant inflammatory change or mucosal lesion involving the oropharynx or hypopharynx. Small amount of aerated debris noted along the ventral aspect of   the epiglottis. Otherwise unremarkable epiglottis. Normal aryepiglottic folds. Normal vocal cords and infraglottic trachea. Normal parapharyngeal space and subcutaneous soft tissues. Scattered dental caries are noted involving the dentition of the   mandible and maxilla with scattered periapical lucencies. Otherwise no oral cavity or oropharyngeal mass lesion or inflammatory change. Normal  spaces.    LYMPH NODES: No pathologic lymph nodes by size or morphology criteria.     SALIVARY GLANDS: Normal parotid and submandibular glands.    THYROID: 3 mm low-attenuation nodule involving the right lobe of thyroid. Otherwise, normal thyroid.     VESSELS: Vascular structures of the neck are patent. There is scattered atheromatous disease, better assessed on CTA dated 10/15/2023.    VISUALIZED INTRACRANIAL/ORBITS/SINUSES: No acute intracranial process. Atheromatous disease noted involving the intracranial vasculature. Mild scattered paranasal sinus mucosal thickening. Small air-fluid levels noted involving the left frontal sinus and   left sphenoid sinus.    OTHER: Mild multi-degenerative changes. The included lung apices are clear. Status post median sternotomy.      Impression    IMPRESSION:   1.  Mild  nonspecific prominence of the adenoid soft tissues. Otherwise, no findings to correlate with patient's neck swelling.  2.  Scattered endodontal and periodontal disease. Follow-up dental consultation is recommended.  3.  Paranasal sinus disease, as above. Correlation for acute versus chronic sinusitis is recommended.                Comprehensive metabolic panel     Status: Abnormal   Result Value Ref Range    Sodium 139 135 - 145 mmol/L    Potassium 3.8 3.4 - 5.3 mmol/L    Carbon Dioxide (CO2) 20 (L) 22 - 29 mmol/L    Anion Gap 12 7 - 15 mmol/L    Urea Nitrogen 20.8 8.0 - 23.0 mg/dL    Creatinine 1.08 (H) 0.51 - 0.95 mg/dL    GFR Estimate 59 (L) >60 mL/min/1.73m2    Calcium 9.5 8.6 - 10.0 mg/dL    Chloride 107 98 - 107 mmol/L    Glucose 220 (H) 70 - 99 mg/dL    Alkaline Phosphatase 96 35 - 104 U/L    AST 32 0 - 45 U/L    ALT 26 0 - 50 U/L    Protein Total 7.1 6.4 - 8.3 g/dL    Albumin 4.3 3.5 - 5.2 g/dL    Bilirubin Total <0.2 <=1.2 mg/dL   Troponin T, High Sensitivity (now)     Status: Normal   Result Value Ref Range    Troponin T, High Sensitivity 13 <=14 ng/L   Nt probnp inpatient     Status: Normal   Result Value Ref Range    N terminal Pro BNP Inpatient 193 0 - 900 pg/mL   CBC with platelets and differential     Status: Abnormal   Result Value Ref Range    WBC Count 7.3 4.0 - 11.0 10e3/uL    RBC Count 4.03 3.80 - 5.20 10e6/uL    Hemoglobin 11.0 (L) 11.7 - 15.7 g/dL    Hematocrit 34.7 (L) 35.0 - 47.0 %    MCV 86 78 - 100 fL    MCH 27.3 26.5 - 33.0 pg    MCHC 31.7 31.5 - 36.5 g/dL    RDW 13.2 10.0 - 15.0 %    Platelet Count 215 150 - 450 10e3/uL    % Neutrophils 53 %    % Lymphocytes 36 %    % Monocytes 5 %    Mids % (Monos, Eos, Basos)      % Eosinophils 5 %    % Basophils 1 %    % Immature Granulocytes 0 %    NRBCs per 100 WBC 0 <1 /100    Absolute Neutrophils 3.9 1.6 - 8.3 10e3/uL    Absolute Lymphocytes 2.6 0.8 - 5.3 10e3/uL    Absolute Monocytes 0.3 0.0 - 1.3 10e3/uL    Mids Abs (Monos, Eos, Basos)       Absolute Eosinophils 0.4 0.0 - 0.7 10e3/uL    Absolute Basophils 0.0 0.0 - 0.2 10e3/uL    Absolute Immature Granulocytes 0.0 <=0.4 10e3/uL    Absolute NRBCs 0.0 10e3/uL   Troponin T, High Sensitivity (now)     Status: Normal   Result Value Ref Range    Troponin T, High Sensitivity 12 <=14 ng/L   TSH with free T4 reflex     Status: Normal   Result Value Ref Range    TSH 1.25 0.30 - 4.20 uIU/mL   BNP-N terminal pro     Status: Normal   Result Value Ref Range    N Terminal Pro BNP Outpatient 222 0 - 900 pg/mL   CBC with platelets + differential     Status: Abnormal    Narrative    The following orders were created for panel order CBC with platelets + differential.  Procedure                               Abnormality         Status                     ---------                               -----------         ------                     CBC with platelets and d...[[id number]]  Abnormal            Final result                 Please view results for these tests on the individual orders.         At the end of the encounter, I discussed results, diagnosis, medications. Discussed red flags for immediate return to clinic/ER, as well as indications for follow up if no improvement. Patient understood and agreed to plan. Patient was stable for discharge.    50 minutes spent on the date of the encounter doing chart review, history and examination, documentation, and further activities as noted.

## 2023-10-19 NOTE — PROGRESS NOTES
[facility] - [facility] Specialists    Comprehensive and complex medical care - Chronic disease management - Shared decision making - Care coordination - Compassionate care    Patient advocacy - Rational deprescribing - Minimally disruptive medicine - Ethical focus - Customized care         Date of Service: 10/12/2023  Primary Provider: Kike David    Patient Care Team:  Kike David MD as PCP - General (Internal Medicine)  Rosario Byrne NP as Nurse Practitioner  Kike David MD as Assigned PCP  Kike David MD (Internal Medicine)  Avril Lux RN as Specialty Care Coordinator (Neurology)          Patient's Pharmacy:    [place] [id number]  [address] [address]  Phone: [phone] Fax: [phone]     Patient's Contacts:  Name Home Phone Work Phone Mobile Phone Relationship Lgl Jayce SANTANA   [phone] Spouse    SALONI MORALES [phone]   Friend      Patient's Insurance:    Payor: [facility] / Plan: [facility] PMAP / Product Type: HMO /            Active Problem List:  Problem List as of 10/12/2023 Reviewed: 7/27/2023 12:48 PM by Shree Olsen    CAD -- S/P CABG    Type 2 diabetes mellitus with hyperglycemia, with long-term current use of insulin (H)    Last Assessment & Plan 6/7/2022 Office Visit Edited 6/10/2022  8:06 AM by Mara Scott NP     Type 2 diabetes is not well controlled but she is using the Lantus injection regularly.  She ran out of the freestyle hosea sensors so she does not have any recent glucose readings to base medication adjustments on at this time.  She was prescribed Trulicity in the past but was nervous about taking this medication so she never started it.  We reviewed possible side effects associated with the medication and what she may expect in the first 2 weeks of taking this medication.  She would be comfortable trying this medication  again, Trulicity 0.75 mg weekly sent to her pharmacy.  She is advised that this is a small dose and will need to be gradually increased in order for her to obtain maximal benefit from the medication.  She will be scheduled for a follow-up appointment and establish care visit with another provider in the next 4 weeks.  Of note, she would likely benefit from increasing her dose of metformin back to maximal dose but was concerned that this caused her to have increased urinary frequency.  We did not address this today.         Cerebrovascular accident (CVA) due to stenosis of carotid artery (H)    Smoker       Medium    Carotid stenosis, left    Primary hypertension    Moderate persistent asthma    Chronic pain syndrome    Right renal artery stenosis (H24)    Schizoaffective disorder (H)    Chronic obstructive pulmonary disease (H)    Anemia    Depression with anxiety    Myelomalacia of cervical cord (H)    Atypical chest pain -- Normal NM stress 5/8/23    Syncope    Acute CVA (cerebrovascular accident) (H)    Intracranial atherosclerosis       Low    Bilateral low back pain with right-sided sciatica, unspecified chronicity    Last Assessment & Plan 6/7/2022 Office Visit Written 6/10/2022  8:01 AM by Mara Scott NP     Because of her uncontrolled diabetes, she is not a good candidate to use steroid medication to treat lumbar radiculopathy.  She is familiar with the effects of a steroid medication on her blood sugar and understands this conundrum.  Fortunately, she does experience some relief when she is given Toradol in the emergency department.  We discussed trial of a nonsteroidal anti-inflammatory medication, diclofenac 3 times daily.  She is open to this idea so this is sent to her pharmacy.  She is advised that she can continue with acetaminophen as well.         Nasal polyp    Last Assessment & Plan 6/7/2022 Office Visit Written 6/10/2022  8:03 AM by Mara Scott NP     Advised fluticasone nasal spray 1 spray  each nostril daily.  She declines a referral to ENT, she has had a nasal polyp in the past which responded to fluticasone.         Mixed hyperlipidemia    GERD (gastroesophageal reflux disease)    History of drug abuse (H)    Hx of syphilis    Menopausal flushing    Nephrolithiasis    Obesity    Seasonal allergies    Sensorineural hearing loss (SNHL) of right ear        Current Outpatient Medications   Medication Instructions    acetaminophen (TYLENOL) 650 mg, Oral, EVERY 8 HOURS PRN    acetaminophen (TYLENOL) 650 mg, Oral, EVERY 8 HOURS PRN    acetaminophen (TYLENOL) 1,000 mg, Oral, EVERY 6 HOURS PRN    albuterol (PROAIR HFA) 108 (90 Base) MCG/ACT inhaler 1-2 puffs, Inhalation, EVERY 4 HOURS PRN    alcohol swab prep pads Use to swab area of injection/zac as directed.    Alcohol Swabs (ALCOHOL PREP) PADS 4 Pads, Does not apply, DAILY, For use with insulin injections    aspirin 81 mg, Oral, DAILY    atorvastatin (LIPITOR) 40 mg, Oral, EVERY EVENING    atorvastatin (LIPITOR) 80 mg, Oral, EVERY EVENING    blood glucose (NO BRAND SPECIFIED) lancets standard In Vitro, 4 TIMES DAILY    blood glucose (NO BRAND SPECIFIED) test strip 1 strip, In Vitro, 4 TIMES DAILY    blood glucose monitoring (NO BRAND SPECIFIED) meter device kit Use to test blood sugar 4 times daily    buprenorphine-naloxone (SUBOXONE) 2-0.5 MG SUBL sublingual tablet 1 tablet, Sublingual, 3 TIMES DAILY    carvedilol (COREG) 12.5 mg, Oral, 2 TIMES DAILY WITH MEALS    cetirizine (ZYRTEC) 10 mg, Oral, DAILY    cetirizine (ZYRTEC) 10 mg, Oral, DAILY    clopidogrel (PLAVIX) 75 mg, Oral, DAILY    Continuous Blood Gluc  (DEXCOM G6 ) ROSE Use to read blood sugars as per 's instructions.    Continuous Blood Gluc Sensor (DEXCOM G6 SENSOR) MISC Change every 10 days.    Continuous Blood Gluc Transmit (DEXCOM G6 TRANSMITTER) MISC Change every 3 months.    diclofenac (VOLTAREN) 2 g, Topical, 3 TIMES DAILY PRN    dulaglutide (TRULICITY) 0.75  mg, Subcutaneous, EVERY 7 DAYS    DULoxetine (CYMBALTA) 30 MG capsule Take 1 capsule (30 mg) by mouth daily for 7 days, THEN 2 capsules (60 mg) daily for 20 days.    DULoxetine (CYMBALTA) 60 mg, Oral, 2 TIMES DAILY    DULoxetine HCl 60 mg, Oral, 2 TIMES DAILY    fluticasone (FLONASE) 50 MCG/ACT nasal spray 1 spray, Both Nostrils, DAILY PRN    fluticasone (FLONASE) 50 MCG/ACT nasal spray 1 spray, Both Nostrils, DAILY PRN    fluticasone-salmeterol (ADVAIR) 250-50 MCG/ACT inhaler 1 puff, Inhalation, EVERY 12 HOURS    glipiZIDE (GLUCOTROL XL) 10 mg, Oral, DAILY    hydrocortisone (CORTAID) 1 % external cream Topical, 2 TIMES DAILY, Apply to foot    hydrocortisone 1 % CREA cream Rectal, 2 TIMES DAILY PRN    insulin aspart (NOVOLOG VIAL) 100 UNITS/ML vial 3 times a day with meals, for glucometer 150-200 give 2 units SQ, 201-250 give 4 units, >250 give 6 units    insulin glargine (LANTUS PEN) 25 Units, Subcutaneous, EVERY MORNING    insulin glargine (LANTUS PEN) 24 Units, Subcutaneous, AT BEDTIME    ipratropium - albuterol 0.5 mg/2.5 mg/3 mL (DUONEB) 0.5-2.5 (3) MG/3ML neb solution 1 vial, Nebulization, EVERY 6 HOURS PRN    ipratropium - albuterol 0.5 mg/2.5 mg/3 mL (DUONEB) 0.5-2.5 (3) MG/3ML neb solution 3 mLs, Nebulization, EVERY 6 HOURS PRN    ketorolac (TORADOL) 10 mg, Oral, EVERY 6 HOURS PRN    ketorolac (TORADOL) 10 mg, Oral, EVERY 6 HOURS PRN    lidocaine (LIDODERM) 5 % patch 1 patch, Transdermal, EVERY 24 HOURS, To prevent lidocaine toxicity, patient should be patch free for 12 hrs daily. BACK     lidocaine (LIDODERM) 5 % patch Transdermal, EVERY 24 HOURS, To prevent lidocaine toxicity, patient should be patch free for 12 hrs daily.    LORazepam (ATIVAN) 0.5 mg, Oral, EVERY 6 HOURS PRN    methocarbamol (ROBAXIN) 750 mg, Oral, 4 TIMES DAILY PRN    Microlet Lancets MISC 100 each, Does not apply, 4 TIMES DAILY, Use to test blood sugar 4 daily.    naloxone (NARCAN) 4 mg, Alternating Nostrils, PRN, every 2-3 minutes  until assistance arrives    naloxone (NARCAN) 4 mg, Alternating Nostrils, PRN, every 2-3 minutes until assistance arrives    nitroGLYcerin (NITROSTAT) 0.4 mg, Sublingual, EVERY 5 MIN PRN, For chest pain place 1 tablet under the tongue every 5 minutes for 3 doses. If symptoms persist 5 minutes after 1st dose call 911.     nortriptyline (PAMELOR) 10 mg, Oral, AT BEDTIME    omeprazole (PRILOSEC) 40 mg, Oral, DAILY, To protect your stomach.    ondansetron (ZOFRAN) 8 mg, Oral, EVERY 8 HOURS PRN    oxyCODONE-acetaminophen (PERCOCET)  MG per tablet 1 tablet, Oral, EVERY 6 HOURS PRN    topiramate (TOPAMAX) 100 mg, Oral, 2 TIMES DAILY    Trulicity 0.75 mg, Subcutaneous, EVERY 7 DAYS    valsartan (DIOVAN) 40 mg, Oral, DAILY      Social History     Social History Narrative    ** Merged History Encounter **         Lives alone in a condo.      On disability.  Three living children.         Subjective:     Sarah Rahman is a 58 year old female who comes in today for:    Chief Complaint   Patient presents with    Follow Up     Pt is unhappy with the way lab results are released-she is not getting calls. She is unhappy that her [employer] education class is not until November. She is really considering leaving this clinic.     Leg Swelling     Pain and burning starting in her feet and moving up her legs.     Urinary Problem     Frequency    Sleep Problem     Depressed and in pain is causing her to not sleep.          10/12/2023    12:20 PM   Additional Questions   Roomed by HERMAN Cabrera   Accompanied by CHANDRA     Complex historian who goes in many different directions in the visit.    Complains of continuing issues with right lower extremity pain which she sees [facility] pain clinic for.  Difficult area to manage.    Has had issues with recurrent syncope.  Not sure if sugars low or otherwise.  She has been having issues after going to the bathroom twice suggesting low blood pressure.    Has issues with dry  "mouth.    Occasionally having headaches.    Most concerned today about difficulty breathing.  Notes when she lays on her left side that it seems to cut her breathing off.  Wakes her up.    Has neurology appointment in November in part to follow up her cerebrovascular accident (stroke).  No new cerebrovascular accident (stroke) symptoms.  Right arm feels stronger.    No coughing issues.    No recent spirometry in relationship to her breathing.    Brother recently .    Also noting more swelling in the right leg along with the pain.    We reviewed her other issues noted in the assessment but not specifically addressed in the HPI above.     Objective:     Wt Readings from Last 3 Encounters:   10/17/23 86.6 kg (191 lb)   10/14/23 86.6 kg (191 lb)   10/12/23 90 kg (198 lb 8 oz)     BP Readings from Last 3 Encounters:   10/17/23 (!) 170/74   10/17/23 (!) 174/84   10/15/23 (!) 156/77     /84 (BP Location: Right arm, Patient Position: Sitting, Cuff Size: Adult Large)   Pulse 92   Temp 97.6  F (36.4  C) (Oral)   Resp 20   Ht 1.702 m (5' 7\")   Wt 90 kg (198 lb 8 oz)   LMP  (LMP Unknown)   SpO2 100%   BMI 31.09 kg/m     The patient is comfortable, no acute distress.  Mood down.  Insight fair.  Eyes are nonicteric.  Neck is supple without mass.  No cervical adenopathy.  No thyromegaly. Heart regular rate and rhythm.  Lungs clear to auscultation bilaterally.  Respiratory effort is good.  Abdomen soft and nontender.  No hepatosplenomegaly.  Extremities trace-1+ edema in the right leg.      Diagnostics:     Office Visit on 2023   Component Date Value Ref Range Status    Sodium 2023 138  136 - 145 mmol/L Final    Potassium 2023 4.1  3.4 - 5.3 mmol/L Final    Chloride 2023 106  98 - 107 mmol/L Final    Carbon Dioxide (CO2) 2023 20 (L)  22 - 29 mmol/L Final    Anion Gap 2023 12  7 - 15 mmol/L Final    Urea Nitrogen 2023 20.9  8.0 - 23.0 mg/dL Final    Creatinine 2023 " 0.86  0.51 - 0.95 mg/dL Final    Calcium 09/08/2023 9.3  8.6 - 10.0 mg/dL Final    Glucose 09/08/2023 218 (H)  70 - 99 mg/dL Final    GFR Estimate 09/08/2023 77  >60 mL/min/1.73m2 Final    Hemoglobin A1C 09/08/2023 9.7 (H)  0.0 - 5.6 % Final    C Peptide 09/08/2023 4.1  0.9 - 6.9 ng/mL Final    Glucose Whole Blood 09/08/2023 201 (H)  60 - 99 mg/dL Final       Results for orders placed or performed during the hospital encounter of 10/12/23   US Lower Extremity Venous Duplex Right     Status: None    Narrative    EXAM: US LOWER EXTREMITY VENOUS DUPLEX RIGHT  LOCATION: [facility]  DATE: 10/12/2023    INDICATION:  Right leg swelling.  COMPARISON: None.  TECHNIQUE: Venous Duplex ultrasound of the right lower extremity with and without compression, augmentation and duplex. Color flow and spectral Doppler with waveform analysis performed.    FINDINGS: Exam includes the common femoral, femoral, popliteal, and contralateral common femoral veins as well as segmentally visualized deep calf veins and greater saphenous vein.     RIGHT: No deep vein thrombosis. No superficial thrombophlebitis. No popliteal cyst.      Impression    IMPRESSION:  1.  No deep venous thrombosis in the right lower extremity.   Results for orders placed or performed during the hospital encounter of 10/12/23   XR Chest 2 Views     Status: None    Narrative    EXAM: XR CHEST 2 VIEWS  LOCATION: [facility]  DATE: 10/12/2023    INDICATION:  Shortness of breath  COMPARISON: 05/05/2023      Impression    IMPRESSION: Lungs are clear. No pleural effusion or pneumothorax. Stable borderline heart size. No pulmonary edema. Sternotomy and CABG.          Assessment:     1. Shortness of breath    2. Right leg swelling    3. Chronic pain syndrome    4. TIA (transient ischemic attack)    5. Pain of right lower leg    6. Syncope, unspecified syncope type    7. Type 2 diabetes mellitus with hyperglycemia, with long-term  current use of insulin (H)    8. Coronary artery disease involving native coronary artery of native heart without angina pectoris        Plan:     Chest x-ray (CXR) done today and ultrasound of the right leg.  Continue to monitor closely.  Follow up with pain clinic related to pain.  May need to adjust medications further.  Patient wishes to be contacted at [phone] with results.  Continue current medications otherwise.  Follow up sooner if issues.    Orders Placed This Encounter   Procedures    XR Chest 2 Views    US Lower Extremity Venous Duplex Right        40 minutes or greater was spent today on the patient's care on the day of service.      This includes time for chart preparation, reviewing medical tests done before or during the visit, talking with the patient, review of quality indicators, required documentation, and other elements of care.        Kike David MD  General Internal Medicine  [facility]    Return in about 3 weeks (around 11/2/2023) for follow up visit.     Future Appointments   Date Time Provider Department Center   11/2/2023 11:00 AM [facility] PFT RM 1 [facility]   11/2/2023  4:00 PM [facility]   11/10/2023  2:30 PM Viviana Rodriguez APRN CNP [facility]

## 2023-10-19 NOTE — PATIENT INSTRUCTIONS
Future Appointments   Date Time Provider Department Center   11/2/2023 11:00 AM [facility] PFT RM 1 [facility]   11/2/2023  4:00 PM [facility]   11/10/2023  2:30 PM Viviana Rodriguez APRN CNP [facility]

## 2023-10-25 DIAGNOSIS — G89.4 CHRONIC PAIN SYNDROME: ICD-10-CM

## 2023-10-25 RX ORDER — KETOROLAC TROMETHAMINE 10 MG/1
1 TABLET, FILM COATED ORAL EVERY 6 HOURS PRN
Qty: 20 TABLET | Refills: 0 | Status: SHIPPED | OUTPATIENT
Start: 2023-10-25 | End: 2024-02-14

## 2023-11-02 ENCOUNTER — OFFICE VISIT (OUTPATIENT)
Dept: INTERNAL MEDICINE | Facility: CLINIC | Age: 58
End: 2023-11-02
Payer: MEDICAID

## 2023-11-02 VITALS
HEIGHT: 67 IN | DIASTOLIC BLOOD PRESSURE: 80 MMHG | WEIGHT: 191 LBS | OXYGEN SATURATION: 100 % | RESPIRATION RATE: 20 BRPM | SYSTOLIC BLOOD PRESSURE: 122 MMHG | TEMPERATURE: 97.5 F | BODY MASS INDEX: 29.98 KG/M2 | HEART RATE: 89 BPM

## 2023-11-02 DIAGNOSIS — I10 ESSENTIAL HYPERTENSION, BENIGN: ICD-10-CM

## 2023-11-02 DIAGNOSIS — G89.4 CHRONIC PAIN SYNDROME: Chronic | ICD-10-CM

## 2023-11-02 DIAGNOSIS — R06.02 SHORTNESS OF BREATH: ICD-10-CM

## 2023-11-02 DIAGNOSIS — I10 PRIMARY HYPERTENSION: Chronic | ICD-10-CM

## 2023-11-02 DIAGNOSIS — R55 SYNCOPE, UNSPECIFIED SYNCOPE TYPE: ICD-10-CM

## 2023-11-02 DIAGNOSIS — R52 PAIN: ICD-10-CM

## 2023-11-02 DIAGNOSIS — F41.0 PANIC ATTACK: Primary | ICD-10-CM

## 2023-11-02 DIAGNOSIS — H53.9 VISION CHANGES: ICD-10-CM

## 2023-11-02 DIAGNOSIS — L98.9 SKIN PROBLEM: ICD-10-CM

## 2023-11-02 PROCEDURE — 99215 OFFICE O/P EST HI 40 MIN: CPT | Performed by: INTERNAL MEDICINE

## 2023-11-02 RX ORDER — DULOXETIN HYDROCHLORIDE 30 MG/1
CAPSULE, DELAYED RELEASE ORAL
Qty: 47 CAPSULE | Refills: 0 | Status: CANCELLED | OUTPATIENT
Start: 2023-11-02 | End: 2023-11-28

## 2023-11-02 RX ORDER — CARVEDILOL 12.5 MG/1
12.5 TABLET ORAL 2 TIMES DAILY WITH MEALS
Qty: 60 TABLET | Refills: 0 | Status: CANCELLED | OUTPATIENT
Start: 2023-11-02

## 2023-11-02 RX ORDER — LORAZEPAM 0.5 MG/1
0.5 TABLET ORAL EVERY 6 HOURS PRN
Qty: 30 TABLET | Refills: 0 | Status: SHIPPED | OUTPATIENT
Start: 2023-11-02 | End: 2024-01-14

## 2023-11-02 RX ORDER — LORAZEPAM 0.5 MG/1
0.5 TABLET ORAL EVERY 6 HOURS PRN
Qty: 4 TABLET | Refills: 0 | Status: CANCELLED | OUTPATIENT
Start: 2023-11-02

## 2023-11-02 RX ORDER — GLIPIZIDE 5 MG/1
10 TABLET, FILM COATED, EXTENDED RELEASE ORAL DAILY
Status: CANCELLED | OUTPATIENT
Start: 2023-11-02

## 2023-11-02 RX ORDER — FLUTICASONE PROPIONATE 50 MCG
1 SPRAY, SUSPENSION (ML) NASAL DAILY PRN
Status: CANCELLED | OUTPATIENT
Start: 2023-11-02

## 2023-11-02 RX ORDER — VALSARTAN 40 MG/1
40 TABLET ORAL DAILY
Qty: 90 TABLET | Refills: 3 | Status: CANCELLED | OUTPATIENT
Start: 2023-11-02

## 2023-11-02 RX ORDER — METHOCARBAMOL 750 MG/1
750 TABLET, FILM COATED ORAL 4 TIMES DAILY PRN
Refills: 0 | Status: CANCELLED | OUTPATIENT
Start: 2023-11-02

## 2023-11-02 ASSESSMENT — PAIN SCALES - GENERAL: PAINLEVEL: WORST PAIN (10)

## 2023-11-02 NOTE — PATIENT INSTRUCTIONS
To schedule your appointment with [facility], call [phone].    They have 2 offices locally:    [place] [facility]  [address]  [place] [address]     [place] [facility]   [address], [address]  [place] [address]      To schedule an appointment with [facility], please call [phone].  Future Appointments   Date Time Provider Department Center   11/10/2023  2:30 PM Viviana Rodriguez APRN CNP [facility]   11/16/2023 12:30 PM [facility] PFT RM 2 [facility]   11/16/2023  1:45 PM [facility] PFT RM 2 [facility]

## 2023-11-02 NOTE — PROGRESS NOTES
"  {PROVIDER CHARTING PREFERENCE:964348}    Trenton Smith is a 58 year old, presenting for the following health issues:  ER F/U (Shortness of Breath)        11/2/2023     3:54 PM   Additional Questions   Roomed by HERMAN Cabrera   Accompanied by Friend       HPI     {MA/LPN/RN Pre-Provider Visit Orders- hCG/UA/Strep (Optional):587627}  ED/UC Followup:    Facility:  [facility]  Date of visit: 10/17/2023  Reason for visit: Shortness of Breath  Current Status: Same. Still shortness of breath, no change. Still having sharp pains.   {additonal problems for provider to add (Optional):677055}      Review of Systems   {ROS COMP (Optional):086383}      Objective    /80 (BP Location: Right arm, Patient Position: Sitting, Cuff Size: Adult Large)   Pulse 89   Temp 97.5  F (36.4  C) (Oral)   Resp 20   Ht 1.702 m (5' 7\")   Wt 86.6 kg (191 lb)   LMP  (LMP Unknown)   SpO2 100%   BMI 29.91 kg/m    Body mass index is 29.91 kg/m .  Physical Exam   {Exam List (Optional):851432}    {Diagnostic Test Results (Optional):370656}    {AMBULATORY ATTESTATION (Optional):578008}              "

## 2023-11-09 ENCOUNTER — TELEPHONE (OUTPATIENT)
Dept: INTERNAL MEDICINE | Facility: CLINIC | Age: 58
End: 2023-11-09
Payer: MEDICAID

## 2023-11-09 NOTE — TELEPHONE ENCOUNTER
VM full could not leave a message. If patient calls back please see PCP's message and assist with scheduling.    Will have to try again another time.

## 2023-11-09 NOTE — TELEPHONE ENCOUNTER
Please call the patient back again.    This was my mistake (Dr. David's) as I did not enter in the appointment during the visit.    I would have more time to review her health issues on November 17th at 2:30 pm (my error in typing)  This would be the Friday.    Thank you for your understanding of my mistake.  We had a lot of things to review at the last visit.    Kike David MD  General Internal Medicine  [facility]  11/9/2023, 3:13 PM

## 2023-11-09 NOTE — TELEPHONE ENCOUNTER
"Spoke with patient. Writer did not see any booked appointments. Writer relayed to follow up on December 17th at 2:30PM.    Patient states \"see this is why I don't mess with that clinic. I was supposed to come back on December 16th at 3PM but I will be there December 17th\"    Patient hung up the phone after saying that and writer did not get a chance to schedule the appointment yet.    Writer looked at date to schedule appointment but the date is on a Sunday...    Will route to PCP for correct date.  "

## 2023-11-09 NOTE — TELEPHONE ENCOUNTER
Please call patient -    ______________________________________________________________________     Home phone:  [phone] (home)     Cell phone:   Telephone Information:   Mobile [phone]       Other contacts:  Name Home Phone Work Phone Mobile Phone Relationship Lgl Grd   JOSE SANTANA   [phone] Spouse    SALONI MORALES [phone]   Friend      ______________________________________________________________________     At the last visit, I think we gave her an appointment to come back in.  Please see what time this is and whether this works out for my schedule.    If not, then see if she can follow up on December 17th at 2:30 pm.    Thank you for your help!    Kike David MD  [facility]  11/9/2023, 8:30 AM

## 2023-11-09 NOTE — PROGRESS NOTES
[facility] - Primary [facility] Specialists    Comprehensive and complex medical care - Chronic disease management - Shared decision making - Care coordination - Compassionate care    Patient advocacy - Rational deprescribing - Minimally disruptive medicine - Ethical focus - Customized care         Date of Service: 11/2/2023  Primary Provider: Kike David    Patient Care Team:  Kike David MD as PCP - General (Internal Medicine)  Rosario Byrne NP as Nurse Practitioner  Kike David MD as Assigned PCP  Kike David MD (Internal Medicine)  Avril Lux RN as Specialty Care Coordinator (Neurology)  Kike David MD as Assigned Pain Medication Provider          Patient's Pharmacy:    [address] [address]  Phone: [phone] Fax: [phone]     Patient's Contacts:  Name Home Phone Work Phone Mobile Phone Relationship Lgl Grd   JOSE LALATA   [phone] Spouse    SALONI MORALES [phone]   Friend      Patient's Insurance:    Payor: UC[employer] / Plan: [employer] PMAP / Product Type: HMO /            Active Problem List:  Problem List as of 11/2/2023 Reviewed: 10/17/2023  2:48 AM by Nimesh De La Paz    CAD -- S/P CABG    Type 2 diabetes mellitus with hyperglycemia, with long-term current use of insulin (H)    Last Assessment & Plan 6/7/2022 Office Visit Edited 6/10/2022  8:06 AM by Mara Scott NP     Type 2 diabetes is not well controlled but she is using the Lantus injection regularly.  She ran out of the freestyle hosea sensors so she does not have any recent glucose readings to base medication adjustments on at this time.  She was prescribed Trulicity in the past but was nervous about taking this medication so she never started it.  We reviewed possible side effects associated with the medication and what she may expect in the first 2 weeks of taking this  medication.  She would be comfortable trying this medication again, Trulicity 0.75 mg weekly sent to her pharmacy.  She is advised that this is a small dose and will need to be gradually increased in order for her to obtain maximal benefit from the medication.  She will be scheduled for a follow-up appointment and establish care visit with another provider in the next 4 weeks.  Of note, she would likely benefit from increasing her dose of metformin back to maximal dose but was concerned that this caused her to have increased urinary frequency.  We did not address this today.         Cerebrovascular accident (CVA) due to stenosis of carotid artery (H)    Smoker       Medium    Carotid stenosis, left    Primary hypertension    Moderate persistent asthma    Chronic pain syndrome    Right renal artery stenosis (H24)    Schizoaffective disorder (H)    Chronic obstructive pulmonary disease (H)    Anemia    Depression with anxiety    Myelomalacia of cervical cord (H)    Atypical chest pain -- Normal NM stress 5/8/23    Syncope    Acute CVA (cerebrovascular accident) (H)    Intracranial atherosclerosis       Low    Bilateral low back pain with right-sided sciatica, unspecified chronicity    Last Assessment & Plan 6/7/2022 Office Visit Written 6/10/2022  8:01 AM by Mara Scott, NP     Because of her uncontrolled diabetes, she is not a good candidate to use steroid medication to treat lumbar radiculopathy.  She is familiar with the effects of a steroid medication on her blood sugar and understands this conundrum.  Fortunately, she does experience some relief when she is given Toradol in the emergency department.  We discussed trial of a nonsteroidal anti-inflammatory medication, diclofenac 3 times daily.  She is open to this idea so this is sent to her pharmacy.  She is advised that she can continue with acetaminophen as well.         Nasal polyp    Last Assessment & Plan 6/7/2022 Office Visit Written 6/10/2022  8:03 AM by  Mara Scott, NP     Advised fluticasone nasal spray 1 spray each nostril daily.  She declines a referral to ENT, she has had a nasal polyp in the past which responded to fluticasone.         Mixed hyperlipidemia    GERD (gastroesophageal reflux disease)    History of drug abuse (H)    Hx of syphilis    Menopausal flushing    Nephrolithiasis    Obesity    Seasonal allergies    Sensorineural hearing loss (SNHL) of right ear        Current Outpatient Medications   Medication Instructions    acetaminophen (TYLENOL) 650 mg, Oral, EVERY 8 HOURS PRN    acetaminophen (TYLENOL) 650 mg, Oral, EVERY 8 HOURS PRN    acetaminophen (TYLENOL) 1,000 mg, Oral, EVERY 6 HOURS PRN    albuterol (PROAIR HFA) 108 (90 Base) MCG/ACT inhaler 1-2 puffs, Inhalation, EVERY 4 HOURS PRN    alcohol swab prep pads Use to swab area of injection/zac as directed.    Alcohol Swabs (ALCOHOL PREP) PADS 4 Pads, Does not apply, DAILY, For use with insulin injections    aspirin 81 mg, Oral, DAILY    atorvastatin (LIPITOR) 40 mg, Oral, EVERY EVENING    atorvastatin (LIPITOR) 80 mg, Oral, EVERY EVENING    blood glucose (NO BRAND SPECIFIED) lancets standard In Vitro, 4 TIMES DAILY    blood glucose (NO BRAND SPECIFIED) test strip 1 strip, In Vitro, 4 TIMES DAILY    blood glucose monitoring (NO BRAND SPECIFIED) meter device kit Use to test blood sugar 4 times daily    buprenorphine-naloxone (SUBOXONE) 2-0.5 MG SUBL sublingual tablet 1 tablet, Sublingual, 3 TIMES DAILY    carvedilol (COREG) 12.5 mg, Oral, 2 TIMES DAILY WITH MEALS    cetirizine (ZYRTEC) 10 mg, Oral, DAILY    cetirizine (ZYRTEC) 10 mg, Oral, DAILY    clopidogrel (PLAVIX) 75 mg, Oral, DAILY    Continuous Blood Gluc  (DEXCOM G6 ) ROSE Use to read blood sugars as per 's instructions.    Continuous Blood Gluc Sensor (DEXCOM G6 SENSOR) MISC Change every 10 days.    Continuous Blood Gluc Transmit (DEXCOM G6 TRANSMITTER) MISC Change every 3 months.    diclofenac (VOLTAREN) 2  g, Topical, 3 TIMES DAILY PRN    dulaglutide (TRULICITY) 0.75 mg, Subcutaneous, EVERY 7 DAYS    DULoxetine (CYMBALTA) 30 MG capsule Take 1 capsule (30 mg) by mouth daily for 7 days, THEN 2 capsules (60 mg) daily for 20 days.    DULoxetine (CYMBALTA) 60 mg, Oral, 2 TIMES DAILY    DULoxetine HCl 60 mg, Oral, 2 TIMES DAILY    fluticasone (FLONASE) 50 MCG/ACT nasal spray 1 spray, Both Nostrils, DAILY PRN    fluticasone (FLONASE) 50 MCG/ACT nasal spray 1 spray, Both Nostrils, DAILY PRN    fluticasone-salmeterol (ADVAIR) 250-50 MCG/ACT inhaler 1 puff, Inhalation, EVERY 12 HOURS    glipiZIDE (GLUCOTROL XL) 10 mg, Oral, DAILY    hydrocortisone (CORTAID) 1 % external cream Topical, 2 TIMES DAILY, Apply to foot    hydrocortisone 1 % CREA cream Rectal, 2 TIMES DAILY PRN    insulin aspart (NOVOLOG VIAL) 100 UNITS/ML vial 3 times a day with meals, for glucometer 150-200 give 2 units SQ, 201-250 give 4 units, >250 give 6 units    insulin glargine (LANTUS PEN) 25 Units, Subcutaneous, EVERY MORNING    insulin glargine (LANTUS PEN) 24 Units, Subcutaneous, AT BEDTIME    ipratropium - albuterol 0.5 mg/2.5 mg/3 mL (DUONEB) 0.5-2.5 (3) MG/3ML neb solution 1 vial, Nebulization, EVERY 6 HOURS PRN    ipratropium - albuterol 0.5 mg/2.5 mg/3 mL (DUONEB) 0.5-2.5 (3) MG/3ML neb solution 3 mLs, Nebulization, EVERY 6 HOURS PRN    ketorolac (TORADOL) 10 mg, Oral, EVERY 6 HOURS PRN    lidocaine (LIDODERM) 5 % patch 1 patch, Transdermal, EVERY 24 HOURS, To prevent lidocaine toxicity, patient should be patch free for 12 hrs daily. BACK     lidocaine (LIDODERM) 5 % patch Transdermal, EVERY 24 HOURS, To prevent lidocaine toxicity, patient should be patch free for 12 hrs daily.    LORazepam (ATIVAN) 0.5 mg, Oral, EVERY 6 HOURS PRN    LORazepam (ATIVAN) 0.5 mg, Oral, EVERY 6 HOURS PRN    methocarbamol (ROBAXIN) 750 mg, Oral, 4 TIMES DAILY PRN    Microlet Lancets MISC 100 each, Does not apply, 4 TIMES DAILY, Use to test blood sugar 4 daily.    naloxone  (NARCAN) 4 mg, Alternating Nostrils, PRN, every 2-3 minutes until assistance arrives    naloxone (NARCAN) 4 mg, Alternating Nostrils, PRN, every 2-3 minutes until assistance arrives    nitroGLYcerin (NITROSTAT) 0.4 mg, Sublingual, EVERY 5 MIN PRN, For chest pain place 1 tablet under the tongue every 5 minutes for 3 doses. If symptoms persist 5 minutes after 1st dose call 911.     nortriptyline (PAMELOR) 10 mg, Oral, AT BEDTIME    omeprazole (PRILOSEC) 40 mg, Oral, DAILY, To protect your stomach.    ondansetron (ZOFRAN) 8 mg, Oral, EVERY 8 HOURS PRN    oxyCODONE-acetaminophen (PERCOCET)  MG per tablet 1 tablet, Oral, EVERY 6 HOURS PRN    topiramate (TOPAMAX) 100 mg, Oral, 2 TIMES DAILY    Trulicity 0.75 mg, Subcutaneous, EVERY 7 DAYS    valsartan (DIOVAN) 40 mg, Oral, DAILY      Social History     Social History Narrative    ** Merged History Encounter **         Lives alone in a condo.      On disability.  Three living children.         Subjective:     Sarah Rahman is a 58 year old female who comes in today for:    Chief Complaint   Patient presents with    ER F/U     Shortness of Breath          11/2/2023     3:54 PM   Additional Questions   Roomed by HERMAN Cabrera   Accompanied by Friend     In with Giovanny today.    Lots of issues to discuss and switches topics frequently.    Having ongoing issues with her vision.  Did not see [facility] yet but given phone number to set up an appointment with them.    Also having skin issues and we discussed seeing [facility] dermatology at this time in relationship to this.    Having ongoing back issues and she continues to have a lot of right leg pain with this.  Options including spinal surgery and SCS have been reviewed with her.  We also discussed pain pump option for some patients.    Her breathing still has been a challenge for her.  Recent emergency room (ER) visits noted.    Having panic attacks as well.  Anxiety has been difficult.  Lorazepam  "(Ativan) in the emergency room (ER) helped with this and the breathing.    Still having times of passing out.  Did not check her sugars.  Does get lightheaded and we reviewed her medications in relationship to this.    Does have a sensation of swelling in her legs.  Does get tightness in her right leg with walking.    Understands there is a lot going on with a lot of intersecting problems.    We reviewed her other issues noted in the assessment but not specifically addressed in the HPI above.     Objective:     Wt Readings from Last 3 Encounters:   11/02/23 86.6 kg (191 lb)   10/17/23 86.6 kg (191 lb)   10/14/23 86.6 kg (191 lb)     BP Readings from Last 3 Encounters:   11/02/23 122/80   10/17/23 (!) 170/74   10/17/23 (!) 174/84     /80 (BP Location: Right arm, Patient Position: Sitting, Cuff Size: Adult Large)   Pulse 89   Temp 97.5  F (36.4  C) (Oral)   Resp 20   Ht 1.702 m (5' 7\")   Wt 86.6 kg (191 lb)   LMP  (LMP Unknown)   SpO2 100%   BMI 29.91 kg/m     The patient is comfortable, no acute distress.  Mood good.  Insight good.  Eyes are nonicteric.  Neck is supple without mass.  No cervical adenopathy.  No thyromegaly. Heart regular rate and rhythm.  Lungs clear to auscultation bilaterally.  Respiratory effort is good.   Extremities no edema.      Diagnostics:     Admission on 10/17/2023, Discharged on 10/17/2023   Component Date Value Ref Range Status    Sodium 10/17/2023 139  135 - 145 mmol/L Final    Reference intervals for this test were updated on 09/26/2023 to more accurately reflect our healthy population. There may be differences in the flagging of prior results with similar values performed with this method. Interpretation of those prior results can be made in the context of the updated reference intervals.     Potassium 10/17/2023 3.8  3.4 - 5.3 mmol/L Final    Carbon Dioxide (CO2) 10/17/2023 20 (L)  22 - 29 mmol/L Final    Anion Gap 10/17/2023 12  7 - 15 mmol/L Final    Urea Nitrogen " 10/17/2023 20.8  8.0 - 23.0 mg/dL Final    Creatinine 10/17/2023 1.08 (H)  0.51 - 0.95 mg/dL Final    GFR Estimate 10/17/2023 59 (L)  >60 mL/min/1.73m2 Final    Calcium 10/17/2023 9.5  8.6 - 10.0 mg/dL Final    Chloride 10/17/2023 107  98 - 107 mmol/L Final    Glucose 10/17/2023 220 (H)  70 - 99 mg/dL Final    Alkaline Phosphatase 10/17/2023 96  35 - 104 U/L Final    AST 10/17/2023 32  0 - 45 U/L Final    Reference intervals for this test were updated on 6/12/2023 to more accurately reflect our healthy population. There may be differences in the flagging of prior results with similar values performed with this method. Interpretation of those prior results can be made in the context of the updated reference intervals.    ALT 10/17/2023 26  0 - 50 U/L Final    Reference intervals for this test were updated on 6/12/2023 to more accurately reflect our healthy population. There may be differences in the flagging of prior results with similar values performed with this method. Interpretation of those prior results can be made in the context of the updated reference intervals.      Protein Total 10/17/2023 7.1  6.4 - 8.3 g/dL Final    Albumin 10/17/2023 4.3  3.5 - 5.2 g/dL Final    Bilirubin Total 10/17/2023 <0.2  <=1.2 mg/dL Final    Troponin T, High Sensitivity 10/17/2023 13  <=14 ng/L Final    Either a High Sensitivity Troponin T baseline (0 hours) value = 100 ng/L, or an increase in High Sensitivity Troponin T = 7 ng/L at 2 hours compared to 0 hours (2-0 hours), suggests myocardial injury, and urgent clinical attention is required.    If the 2-0 hours increase is <7 ng/L, a High Sensitivity Troponin T result above gender-specific reference ranges warrants further evaluation.   Recommendations for further evaluation include correlation with clinical decision-making tool (e.g., HEART), a 3rd High Sensitivity Troponin T test 2 hours after the 2nd (a 20% change from baseline would represent concern), admission for  observation, close PCC/cardiology follow-up, or urgent outpatient provocative testing.    N terminal Pro BNP Inpatient 10/17/2023 193  0 - 900 pg/mL Final    Reference range shown and results flagged as abnormal are suggested inpatient cut points for confirming diagnosis if CHF in an acute setting. Establishing a baseline value for each individual patient is useful for follow-up. An inpatient or emergency department NT-proPBNP <300 pg/mL effectively rules out acute CHF, with 99% negative predictive value.    The outpatient non-acute reference range for ruling out CHF is:  0-125 pg/mL (age 18 to less than 75)  0-450 pg/mL (age 75 yrs and older)     WBC Count 10/17/2023 7.3  4.0 - 11.0 10e3/uL Final    RBC Count 10/17/2023 4.03  3.80 - 5.20 10e6/uL Final    Hemoglobin 10/17/2023 11.0 (L)  11.7 - 15.7 g/dL Final    Hematocrit 10/17/2023 34.7 (L)  35.0 - 47.0 % Final    MCV 10/17/2023 86  78 - 100 fL Final    MCH 10/17/2023 27.3  26.5 - 33.0 pg Final    MCHC 10/17/2023 31.7  31.5 - 36.5 g/dL Final    RDW 10/17/2023 13.2  10.0 - 15.0 % Final    Platelet Count 10/17/2023 215  150 - 450 10e3/uL Final    % Neutrophils 10/17/2023 53  % Final    % Lymphocytes 10/17/2023 36  % Final    % Monocytes 10/17/2023 5  % Final    % Eosinophils 10/17/2023 5  % Final    % Basophils 10/17/2023 1  % Final    % Immature Granulocytes 10/17/2023 0  % Final    NRBCs per 100 WBC 10/17/2023 0  <1 /100 Final    Absolute Neutrophils 10/17/2023 3.9  1.6 - 8.3 10e3/uL Final    Absolute Lymphocytes 10/17/2023 2.6  0.8 - 5.3 10e3/uL Final    Absolute Monocytes 10/17/2023 0.3  0.0 - 1.3 10e3/uL Final    Absolute Eosinophils 10/17/2023 0.4  0.0 - 0.7 10e3/uL Final    Absolute Basophils 10/17/2023 0.0  0.0 - 0.2 10e3/uL Final    Absolute Immature Granulocytes 10/17/2023 0.0  <=0.4 10e3/uL Final    Absolute NRBCs 10/17/2023 0.0  10e3/uL Final    Troponin T, High Sensitivity 10/17/2023 12  <=14 ng/L Final    Either a High Sensitivity Troponin T baseline  (0 hours) value = 100 ng/L, or an increase in High Sensitivity Troponin T = 7 ng/L at 2 hours compared to 0 hours (2-0 hours), suggests myocardial injury, and urgent clinical attention is required.    If the 2-0 hours increase is <7 ng/L, a High Sensitivity Troponin T result above gender-specific reference ranges warrants further evaluation.   Recommendations for further evaluation include correlation with clinical decision-making tool (e.g., HEART), a 3rd High Sensitivity Troponin T test 2 hours after the 2nd (a 20% change from baseline would represent concern), admission for observation, close PCC/cardiology follow-up, or urgent outpatient provocative testing.    TSH 10/17/2023 1.25  0.30 - 4.20 uIU/mL Final    N Terminal Pro BNP Outpatient 10/17/2023 222  0 - 900 pg/mL Final    Reference range shown and results flagged as abnormal are for the outpatient, non acute settings. Establishing a baseline value for each individual patient is useful for follow-up.    Suggested inpatient cut points for confirming diagnosis of CHF in an acute setting are:  >450 pg/mL (age 18 to less than 50)  >900 pg/mL (age 50 to less than 75)  >1800 pg/mL (75 yrs and older)    An inpatient or emergency department NT-proPBNP <300 pg/mL effectively rules out acute CHF, with 99% negative predictive value.           No results found for any visits on 11/02/23.     Assessment:     1. Panic attack    2. Essential hypertension, benign    3. Shortness of breath    4. Pain    5. Syncope, unspecified syncope type    6. Chronic pain syndrome    7. Primary hypertension    8. Skin problem    9. Vision changes        Plan:     Prescription for lorazepam (Ativan) for anxiety   Has appointment with pulmonary forthcoming.  Close follow up.  Stop valsartan (Diovan) for now with passing out.  May need to see other specialists as well.  Follow up pain clinic.  Monitor diabetes closer.  Continue current medications otherwise.  Follow up sooner if  issues.    No orders of the defined types were placed in this encounter.       40 minutes or greater was spent today on the patient's care on the day of service.      This includes time for chart preparation, reviewing medical tests done before or during the visit, talking with the patient, review of quality indicators, required documentation, and other elements of care.        Kike David MD  General Internal Medicine  [facility]    Return in about 2 weeks (around 11/16/2023) for follow up visit.     Future Appointments   Date Time Provider Department Center   11/10/2023  2:30 PM Viviana Rodriguez APRN CNP [facility]   11/16/2023 12:30 PM [facility] RM 2 [facility]   11/16/2023  1:45 PM [facility] PFT RM 2 [facility]

## 2023-11-10 NOTE — TELEPHONE ENCOUNTER
Please try again on cell phone.    Kike David MD  General Internal Medicine  [facility]  11/10/2023, 3:13 PM

## 2023-11-13 ENCOUNTER — PATIENT OUTREACH (OUTPATIENT)
Dept: CARE COORDINATION | Facility: CLINIC | Age: 58
End: 2023-11-13
Payer: MEDICAID

## 2023-11-13 NOTE — PROGRESS NOTES
Stroke RN Care Coordination - Chart Review Note    SITUATION     Sarah Rahman is a 58 year old female who is receiving support for:  Chart Review Please (Stroke RNCC Review)    BACKGROUND     Stroke RNCC following up to outreach to pt after stroke hospital follow up appt.     ASSESSMENT     On review, pt did not see general neurology as originally scheduled on 11/10. She is rescheduled for 1/22/24 which was likely next available. Noted recent ER visits for syncope/SOB/anxiety issues. She is scheduled for PCP follow up this coming Friday.     PLAN     In an effort to limit confusion to the pt on reasoning for outreach, will defer next stroke outreach to next week after pt has seen PCP.    Avril Lux BS, RN, SCRN  RN Stroke Neurology Care Coordinator  [facility]

## 2023-11-13 NOTE — TELEPHONE ENCOUNTER
LMTCB on home phone if patient calls back please see PCP's message and assist with scheduling    Tried mobile number and there was an automated message saying the call cannot be completed

## 2023-11-13 NOTE — TELEPHONE ENCOUNTER
Patient calling back from a missed call    Writer relayed message     Patient was set up for 11/17/2023 2:30 PM Appt

## 2023-11-29 ENCOUNTER — HOSPITAL ENCOUNTER (EMERGENCY)
Facility: HOSPITAL | Age: 58
Discharge: HOME | End: 2023-11-29
Attending: EMERGENCY MEDICINE | Admitting: EMERGENCY MEDICINE
Payer: MEDICAID

## 2023-11-29 ENCOUNTER — APPOINTMENT (OUTPATIENT)
Dept: RADIOLOGY | Facility: HOSPITAL | Age: 58
End: 2023-11-29
Attending: EMERGENCY MEDICINE
Payer: MEDICAID

## 2023-11-29 VITALS
HEIGHT: 67 IN | HEART RATE: 88 BPM | WEIGHT: 197 LBS | DIASTOLIC BLOOD PRESSURE: 78 MMHG | SYSTOLIC BLOOD PRESSURE: 134 MMHG | BODY MASS INDEX: 30.92 KG/M2 | TEMPERATURE: 98.4 F | RESPIRATION RATE: 16 BRPM | OXYGEN SATURATION: 100 %

## 2023-11-29 DIAGNOSIS — R07.9 CHEST PAIN, UNSPECIFIED TYPE: ICD-10-CM

## 2023-11-29 LAB
ANION GAP SERPL CALCULATED.3IONS-SCNC: 9 MMOL/L (ref 7–15)
BUN SERPL-MCNC: 27.8 MG/DL (ref 8–23)
CALCIUM SERPL-MCNC: 9.5 MG/DL (ref 8.6–10)
CHLORIDE SERPL-SCNC: 107 MMOL/L (ref 98–107)
CREAT SERPL-MCNC: 0.97 MG/DL (ref 0.51–0.95)
DEPRECATED HCO3 PLAS-SCNC: 23 MMOL/L (ref 22–29)
EGFRCR SERPLBLD CKD-EPI 2021: 67 ML/MIN/1.73M2
ERYTHROCYTE [DISTWIDTH] IN BLOOD BY AUTOMATED COUNT: 13.3 % (ref 10–15)
GLUCOSE SERPL-MCNC: 266 MG/DL (ref 70–99)
HCT VFR BLD AUTO: 33.9 % (ref 35–47)
HGB BLD-MCNC: 10.6 G/DL (ref 11.7–15.7)
HOLD SPECIMEN: NORMAL
HOLD SPECIMEN: NORMAL
MCH RBC QN AUTO: 27.1 PG (ref 26.5–33)
MCHC RBC AUTO-ENTMCNC: 31.3 G/DL (ref 31.5–36.5)
MCV RBC AUTO: 87 FL (ref 78–100)
NT-PROBNP SERPL-MCNC: 204 PG/ML (ref 0–900)
PLATELET # BLD AUTO: 253 10E3/UL (ref 150–450)
POTASSIUM SERPL-SCNC: 4.1 MMOL/L (ref 3.4–5.3)
RBC # BLD AUTO: 3.91 10E6/UL (ref 3.8–5.2)
SODIUM SERPL-SCNC: 139 MMOL/L (ref 135–145)
TROPONIN T SERPL HS-MCNC: 12 NG/L
TROPONIN T SERPL HS-MCNC: 12 NG/L
WBC # BLD AUTO: 8.2 10E3/UL (ref 4–11)

## 2023-11-29 PROCEDURE — 96376 TX/PRO/DX INJ SAME DRUG ADON: CPT

## 2023-11-29 PROCEDURE — 96375 TX/PRO/DX INJ NEW DRUG ADDON: CPT

## 2023-11-29 PROCEDURE — 83880 ASSAY OF NATRIURETIC PEPTIDE: CPT | Performed by: EMERGENCY MEDICINE

## 2023-11-29 PROCEDURE — 93005 ELECTROCARDIOGRAM TRACING: CPT | Performed by: EMERGENCY MEDICINE

## 2023-11-29 PROCEDURE — 36415 COLL VENOUS BLD VENIPUNCTURE: CPT | Performed by: EMERGENCY MEDICINE

## 2023-11-29 PROCEDURE — 80048 BASIC METABOLIC PNL TOTAL CA: CPT | Performed by: EMERGENCY MEDICINE

## 2023-11-29 PROCEDURE — 99285 EMERGENCY DEPT VISIT HI MDM: CPT | Mod: 25

## 2023-11-29 PROCEDURE — 250N000011 HC RX IP 250 OP 636: Performed by: EMERGENCY MEDICINE

## 2023-11-29 PROCEDURE — 250N000012 HC RX MED GY IP 250 OP 636 PS 637: Performed by: EMERGENCY MEDICINE

## 2023-11-29 PROCEDURE — 96374 THER/PROPH/DIAG INJ IV PUSH: CPT

## 2023-11-29 PROCEDURE — 85027 COMPLETE CBC AUTOMATED: CPT | Performed by: EMERGENCY MEDICINE

## 2023-11-29 PROCEDURE — 999N000127 HC STATISTIC PERIPHERAL IV START W US GUIDANCE

## 2023-11-29 PROCEDURE — 93005 ELECTROCARDIOGRAM TRACING: CPT | Performed by: STUDENT IN AN ORGANIZED HEALTH CARE EDUCATION/TRAINING PROGRAM

## 2023-11-29 PROCEDURE — 84484 ASSAY OF TROPONIN QUANT: CPT | Performed by: EMERGENCY MEDICINE

## 2023-11-29 PROCEDURE — 71046 X-RAY EXAM CHEST 2 VIEWS: CPT

## 2023-11-29 RX ORDER — ONDANSETRON 2 MG/ML
4 INJECTION INTRAMUSCULAR; INTRAVENOUS ONCE
Status: COMPLETED | OUTPATIENT
Start: 2023-11-29 | End: 2023-11-29

## 2023-11-29 RX ORDER — LORAZEPAM 2 MG/ML
0.5 INJECTION INTRAMUSCULAR ONCE
Status: COMPLETED | OUTPATIENT
Start: 2023-11-29 | End: 2023-11-29

## 2023-11-29 RX ORDER — PREDNISONE 20 MG/1
40 TABLET ORAL DAILY
Qty: 8 TABLET | Refills: 0 | Status: SHIPPED | OUTPATIENT
Start: 2023-11-29 | End: 2023-12-03

## 2023-11-29 RX ORDER — MORPHINE SULFATE 4 MG/ML
4 INJECTION, SOLUTION INTRAMUSCULAR; INTRAVENOUS ONCE
Status: COMPLETED | OUTPATIENT
Start: 2023-11-29 | End: 2023-11-29

## 2023-11-29 RX ORDER — FLUTICASONE PROPIONATE 50 MCG
1 SPRAY, SUSPENSION (ML) NASAL DAILY
Qty: 11.1 ML | Refills: 0 | Status: SHIPPED | OUTPATIENT
Start: 2023-11-29 | End: 2024-01-14

## 2023-11-29 RX ORDER — PREDNISONE 20 MG/1
40 TABLET ORAL ONCE
Status: COMPLETED | OUTPATIENT
Start: 2023-11-29 | End: 2023-11-29

## 2023-11-29 RX ADMIN — MORPHINE SULFATE 4 MG: 4 INJECTION, SOLUTION INTRAMUSCULAR; INTRAVENOUS at 20:00

## 2023-11-29 RX ADMIN — PREDNISONE 40 MG: 20 TABLET ORAL at 22:45

## 2023-11-29 RX ADMIN — LORAZEPAM 0.5 MG: 2 INJECTION INTRAMUSCULAR; INTRAVENOUS at 21:59

## 2023-11-29 RX ADMIN — ONDANSETRON 4 MG: 2 INJECTION INTRAMUSCULAR; INTRAVENOUS at 19:56

## 2023-11-29 RX ADMIN — MORPHINE SULFATE 4 MG: 4 INJECTION, SOLUTION INTRAMUSCULAR; INTRAVENOUS at 21:34

## 2023-11-29 RX ADMIN — ONDANSETRON 4 MG: 2 INJECTION INTRAMUSCULAR; INTRAVENOUS at 21:30

## 2023-11-29 ASSESSMENT — ACTIVITIES OF DAILY LIVING (ADL)
ADLS_ACUITY_SCORE: 35

## 2023-11-30 LAB
ATRIAL RATE - MUSE: 87 BPM
DIASTOLIC BLOOD PRESSURE - MUSE: NORMAL MMHG
INTERPRETATION ECG - MUSE: NORMAL
P AXIS - MUSE: 60 DEGREES
PR INTERVAL - MUSE: 170 MS
QRS DURATION - MUSE: 78 MS
QT - MUSE: 374 MS
QTC - MUSE: 450 MS
R AXIS - MUSE: 30 DEGREES
SYSTOLIC BLOOD PRESSURE - MUSE: NORMAL MMHG
T AXIS - MUSE: 86 DEGREES
VENTRICULAR RATE- MUSE: 87 BPM

## 2023-11-30 NOTE — ED NOTES
"Pt crying and c/o head pain (top of head) and bilateral leg pain. Pt says her bilateral legs, especially right are \"very swollen\" but do not appreciate any swelling. Pt on her phone now with no c/o, no crying. Pt able to stand at bedside and get to bed from wheelchair, but states she \"cannot walk\". Pt states that she went out walking today and fell but then states that she fell in her house. Not sure if she passed out.  "

## 2023-11-30 NOTE — ED NOTES
"Report from Yoana GRIDER, assumed care. Pt having multiple c/o pain in several places and nausea--MD aware. Pt states \"getting agitated\"  "

## 2023-11-30 NOTE — ED NOTES
"Pt states that she has been having \"black-outs\" for a few months. She says her neighbor found her \"unconscious\" on the floor today. Pt says her legs are swollen and have been feeling weak with mid back pain and often has sciatic pain. She says she needs a fusion. She ate and threw up some food today. She feels like \"a truck ran over me\" today  "

## 2023-11-30 NOTE — ED TRIAGE NOTES
"Pt presents with c/o stabbing chest pain and SOB that started 1 hour ago.  Pt reports calling neighbor to come over due to complaints.  Pt stated was down on the floor, but did not fall.  Pt does not \"remember\".  Pt hoarse with voice and reports legs \"don't work because they are so swollen\".        "

## 2023-11-30 NOTE — ED NOTES
Pt given ativan IV and had vomited up prior to ativan. Pt encourage to deep breathe, using all comforting measures as needed. Pt now talking better and not crying. Given pt a few ice chips per pt's comfort.

## 2023-11-30 NOTE — DISCHARGE INSTRUCTIONS
Fortunately all of your testing today has been normal.  Take the prescribed medications as directed and follow-up closely with your primary care doctor.  Return to the ER for any worsening symptoms or other concerns.

## 2023-11-30 NOTE — ED NOTES
"Pt crying and flailing around states \"can't breathe\"--ER MD at bedside.Pt's  oxygen sats %. Pt's RR=22. RN and EDT at bedside.  "

## 2023-11-30 NOTE — ED PROVIDER NOTES
EMERGENCY DEPARTMENT ENCOUnter      NAME: Sarah Rahman  AGE: 58 year old female  YOB: 1965  MRN: [id number]  EVALUATION DATE & TIME: 2023  6:43 PM    PCP: Kike David    ED PROVIDER: Mina Bucio DO      Chief Complaint   Patient presents with    Shortness of Breath    Chest Pain         FINAL IMPRESSION:  1. Chest pain, unspecified type          ED COURSE & MEDICAL DECISION MAKIN:29 PM I met with the patient in [facility], obtained history, performed an initial exam, and discussed options and plan for diagnostics and treatment here in the ED.       The patient presented to the emergency department today complaining of lower extremity edema and chest discomfort.  She has a history of coronary artery disease.  No concerning findings on physical exam.  Laboratory testing today has been unremarkable including troponin.  I encouraged the patient to stay for a repeat delta troponin but she was not willing to stay for this test.  Of note, the patient had a brief episode of difficulty breathing while she was here which seemed consistent with anxiety.  This was resolved with deep breathing and a small dose of Ativan.  I do not see any need for hospitalization at this time and have encouraged close outpatient follow-up.  She is comfortable with this plan.          Medical Decision Making    History:  Supplemental history from: Documented in chart, if applicable  External Record(s) reviewed: Documented in chart, if applicable.    Work Up:  Chart documentation includes differential considered and any EKGs or imaging independently interpreted by provider, where specified.  In additional to work up documented, I considered the following work up: Documented in chart, if applicable.    External consultation:  Discussion of management with another provider: Documented in chart, if applicable    Complicating factors:  Care impacted by chronic illness: N/A  Care affected by social determinants  of health: N/A    Disposition considerations: Discharge. I prescribed additional prescription strength medication(s) as charted. I considered admission, but discharged the patient after share decision making conversation.        At the conclusion of the encounter I discussed the results of all of the tests and the disposition. The questions were answered. The patient or family acknowledged understanding and was agreeable with the care plan.         MEDICATIONS GIVEN IN THE EMERGENCY:  Medications   morphine (PF) injection 4 mg (4 mg Intravenous $Given 11/29/23 2000)   ondansetron (ZOFRAN) injection 4 mg (4 mg Intravenous $Given 11/29/23 1956)   morphine (PF) injection 4 mg (4 mg Intravenous $Given 11/29/23 2134)   ondansetron (ZOFRAN) injection 4 mg (4 mg Intravenous $Given 11/29/23 2130)   LORazepam (ATIVAN) injection 0.5 mg (0.5 mg Intravenous $Given 11/29/23 2159)   predniSONE (DELTASONE) tablet 40 mg (40 mg Oral $Given 11/29/23 2245)       NEW PRESCRIPTIONS STARTED AT TODAY'S ER VISIT  Discharge Medication List as of 11/29/2023 11:26 PM        START taking these medications    Details   !! fluticasone (FLONASE) 50 MCG/ACT nasal spray Spray 1 spray into both nostrils daily, Disp-11.1 mL, R-0, E-Prescribe      predniSONE (DELTASONE) 20 MG tablet Take 2 tablets (40 mg) by mouth daily for 4 days Take two tablets (= 40mg) each day for 4 (four) days, Disp-8 tablet, R-0, E-Prescribe       !! - Potential duplicate medications found. Please discuss with provider.             =================================================================    HPI        Sarah E Murphy is a 58 year old female with a pertinent history of coronary artery disease, asthma, COPD, CVA, GERD, diabetes, hypertension, polysubstance abuse, schizophrenia, chronic pain syndrome, and right-sided sciatica who presents to this ED by wheelchair for evaluation of chest pain and leg pain.    Patient reports stabbing chest pain and shortness of breath  since an hour prior to arrival. She called her neighbor to take her to the ED but was down on the floor and unconscious when they arrived. She says she's been having syncopal episodes like this for a couple months. She says she's had a headache since this episode. Patient also notes swelling and a burning sensation in her legs. She has felt this in her right leg before from her sciatica but this is new for her left leg. She has difficulty ambulating because her legs feel weak. Her chest pain radiates to her left shoulder and feels similar to her prior heart attack. Patient also vomited twice today and still feels nauseous. She has used witch oil to try and treat the swelling.    Chart Review: 10/17/23 ED visit at [facility] for shortness of breath. She was given steroids and a duoneb and she was continually requesting a neb. She got 0.5 mg of ativan here and during ED course became much more calm with normal work of breathing and asked to discharge. Discharged with ativan and an albuterol refill.  10/14/23-10/15/23 ED visit at [facility] for chest pain and syncope. Tests inconclusive. Patient has small chronic infarcts in the occipital lobes. Discharged with PCP follow-up.      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 Hamm at [facility]    Schizoaffective disorder (H)     Sleep difficulties 7/17/2022       PAST SURGICAL HISTORY:  Past Surgical History:   Procedure Laterality Date    BYPASS GRAFT ARTERY CORONARY  01/01/2009    x2     CAROTID ENDARTERECTOMY Left 12/2021    CORONARY STENT PLACEMENT      CV CORONARY ANGIOGRAM N/A 06/02/2021    Procedure: Coronary Angiogram;  Surgeon: Juventino Rivera MD;  Location: [facility] Cardiac Cath Lab;  Service: Cardiology    HYSTERECTOMY TOTAL ABDOMINAL      HYSTERECTOMY TOTAL ABDOMINAL, BILATERAL SALPINGO-OOPHORECTOMY, COMBINED      IR TRANSLAMINAR EPIDURAL LUMBAR INJ INCL IMAGING  09/27/2022    LUMBAR DISCECTOMY Right 06/13/2019    L5-S1 - Dr. Hamm    NASAL FRACTURE SURGERY      ORIF ULNAR / RADIAL SHAFT FRACTURE Right            CURRENT MEDICATIONS:    fluticasone (FLONASE) 50 MCG/ACT nasal spray  predniSONE (DELTASONE) 20 MG tablet  acetaminophen (TYLENOL) 325 MG tablet  acetaminophen (TYLENOL) 325 MG tablet  acetaminophen (TYLENOL) 500 MG tablet  albuterol (PROAIR HFA) 108 (90 Base) MCG/ACT inhaler  alcohol swab prep pads  Alcohol Swabs (ALCOHOL PREP) PADS  aspirin 81 MG EC tablet  atorvastatin (LIPITOR) 40 MG tablet  atorvastatin (LIPITOR) 80 MG tablet  blood glucose (NO BRAND SPECIFIED) lancets standard  blood glucose (NO BRAND SPECIFIED) test strip  blood glucose monitoring (NO BRAND SPECIFIED) meter device kit  buprenorphine-naloxone (SUBOXONE) 2-0.5 MG SUBL sublingual tablet  carvedilol (COREG) 12.5 MG tablet  cetirizine (ZYRTEC) 10 MG tablet  cetirizine (ZYRTEC) 10 MG tablet  clopidogrel (PLAVIX) 75 MG tablet  Continuous Blood Gluc  (DEXCOM G6 ) ROSE  Continuous Blood Gluc Sensor (DEXCOM G6 SENSOR) MISC  Continuous Blood Gluc Transmit (DEXCOM G6 TRANSMITTER) MISC  diclofenac (VOLTAREN) 1 % topical gel  dulaglutide (TRULICITY) 0.75 MG/0.5ML pen  dulaglutide (TRULICITY) 0.75 MG/0.5ML pen  DULoxetine (CYMBALTA) 30 MG capsule  DULoxetine (CYMBALTA) 60 MG capsule  DULoxetine HCl 60 MG CSDR  fluticasone (FLONASE) 50 MCG/ACT nasal spray  fluticasone (FLONASE) 50 MCG/ACT nasal spray  fluticasone-salmeterol (ADVAIR) 250-50 MCG/ACT inhaler  glipiZIDE (GLUCOTROL XL) 5 MG 24 hr  "tablet  hydrocortisone (CORTAID) 1 % external cream  hydrocortisone 1 % CREA cream  insulin aspart (NOVOLOG VIAL) 100 UNITS/ML vial  insulin glargine (LANTUS PEN) 100 UNIT/ML pen  insulin glargine (LANTUS PEN) 100 UNIT/ML pen  ipratropium - albuterol 0.5 mg/2.5 mg/3 mL (DUONEB) 0.5-2.5 (3) MG/3ML neb solution  ipratropium - albuterol 0.5 mg/2.5 mg/3 mL (DUONEB) 0.5-2.5 (3) MG/3ML neb solution  ketorolac (TORADOL) 10 MG tablet  lidocaine (LIDODERM) 5 % patch  lidocaine (LIDODERM) 5 % patch  LORazepam (ATIVAN) 0.5 MG tablet  LORazepam (ATIVAN) 0.5 MG tablet  methocarbamol (ROBAXIN) 750 MG tablet  Microlet Lancets MISC  naloxone (NARCAN) 4 MG/0.1ML nasal spray  naloxone (NARCAN) 4 MG/0.1ML nasal spray  nitroGLYcerin (NITROSTAT) 0.4 MG sublingual tablet  nortriptyline (PAMELOR) 10 MG capsule  omeprazole (PRILOSEC) 40 MG DR capsule  ondansetron (ZOFRAN) 8 MG tablet  oxyCODONE-acetaminophen (PERCOCET)  MG per tablet  topiramate (TOPAMAX) 50 MG tablet  valsartan (DIOVAN) 40 MG tablet        ALLERGIES:  Allergies   Allergen Reactions    Haloperidol Unknown     Patient states it stops her heart      Haloperidol Angioedema    Hydroxyzine Angioedema    Meperidine And Related Angioedema, Difficulty breathing, Other (See Comments), Rash and Shortness Of Breath     Throat closes  Other reaction(s): Breathing Difficulty  Other reaction(s): Breathing Difficulty      Phenothiazines Other (See Comments)     Other reaction(s): CARDIAC DISTURBANCES  Patient states it stops her heart  \"I swell up\"  \"stopped by heart\"  \"I swell up\"  \"I swell up\"      Phenothiazines Angioedema    Tramadol Other (See Comments)     Other reaction(s): Angioedema  Throat itch      Tramadol Angioedema    Januvia [Sitagliptin] Swelling    Latex Itching    Haloperidol And Related Angioedema    Januvia [Sitagliptin] Other (See Comments)     Swelling in the neck     Latex Itching    Lisinopril Other (See Comments)     ACE cough  Itchy throat  Throat " itches  Itchy throat      Penicillins Swelling    Hydroxyzine Other (See Comments) and Rash     Tongue swelling  Tongue swelling  Tongue swelling      Lisinopril Cough       FAMILY HISTORY:  Family History   Problem Relation Age of Onset    Heart Disease Mother     Heart Disease Father     Heart Disease Sister     Heart Disease Sister     Diabetes Brother     Coronary Artery Disease Brother         MI       SOCIAL HISTORY:   Social History     Socioeconomic History    Marital status: Single   Tobacco Use    Smoking status: Every Day     Packs/day: .5     Types: Cigarettes    Smokeless tobacco: Never    Tobacco comments:     Seen IP by [facility] on 7/28/23 and declined counseling services and resource packet   Vaping Use    Vaping Use: Never used   Substance and Sexual Activity    Alcohol use: Not Currently     Comment: Alcoholic Drinks/day: occ    Drug use: No    Sexual activity: Not Currently   Social History Narrative    ** Merged History Encounter **         Lives alone in a condo.      On disability.  Three living children.       Social Determinants of Health     Interpersonal Safety: Low Risk  (10/12/2023)    Interpersonal Safety     Do you feel physically and emotionally safe where you currently live?: Yes     Within the past 12 months, have you been hit, slapped, kicked or otherwise physically hurt by someone?: No     Within the past 12 months, have you been humiliated or emotionally abused in other ways by your partner or ex-partner?: No       VITALS:  Patient Vitals for the past 24 hrs:   BP Temp Temp src Pulse Resp SpO2 Height Weight   11/29/23 2328 134/78 -- -- 88 16 100 % -- --   11/29/23 2235 128/85 -- -- 93 -- 100 % -- --   11/29/23 2220 131/79 -- -- 101 -- 99 % -- --   11/29/23 2216 (!) 163/124 -- -- -- -- -- -- --   11/29/23 2154 (!) 211/96 -- -- (!) 129 -- 100 % -- --   11/29/23 2124 (!) 183/103 -- -- 83 14 100 % -- --   11/29/23 2109 (!) 186/93 -- -- 86 -- -- -- --   11/29/23 2054 (!) 179/91 -- -- 80 --  "100 % -- --   11/29/23 2045 (!) 191/107 -- -- 75 20 99 % -- --   11/29/23 2024 (!) 185/115 -- -- 81 26 -- -- --   11/29/23 1939 (!) 184/103 -- -- 82 -- 95 % -- --   11/29/23 1928 (!) 185/140 -- -- 84 -- 98 % -- --   11/29/23 1926 -- -- -- -- -- -- 1.702 m (5' 7\") 89.4 kg (197 lb)   11/29/23 1913 (!) 192/107 -- -- 82 -- 100 % -- --   11/29/23 1907 -- -- -- 82 -- 100 % -- --   11/29/23 1900 (!) 185/105 -- -- -- -- -- -- --   11/29/23 1849 -- -- -- 93 -- 100 % -- --   11/29/23 1848 (!) 183/104 -- -- -- -- -- -- --   11/29/23 1833 (!) 241/101 98.4  F (36.9  C) Temporal 98 18 100 % -- 86.2 kg (190 lb)       PHYSICAL EXAM    Constitutional:  Well developed, Well nourished,  HENT:  Normocephalic, Atraumatic, Oropharynx moist, Nose normal.   Eyes:  EOMI, Conjunctiva normal, No discharge.   Respiratory:  Normal breath sounds, No respiratory distress, No wheezing, No chest tenderness.  Poststernotomy scar  Cardiovascular:  Normal heart rate, Normal rhythm, No murmurs  GI:  Soft, No tenderness, No guarding, No CVA tenderness.   Musculoskeletal:  No tenderness to palpation or major deformities noted.   Extremities: No lower extremity edema.  Neurologic:  Alert & oriented x 3, No focal deficits noted.   Psychiatric:  Anxious       LAB:  All pertinent labs reviewed and interpreted.  Results for orders placed or performed during the hospital encounter of 11/29/23              Extra Blue Top Tube   Result Value Ref Range    Hold Specimen JIC    Extra Red Top Tube   Result Value Ref Range    Hold Specimen JIC    CBC with platelets   Result Value Ref Range    WBC Count 8.2 4.0 - 11.0 10e3/uL    RBC Count 3.91 3.80 - 5.20 10e6/uL    Hemoglobin 10.6 (L) 11.7 - 15.7 g/dL    Hematocrit 33.9 (L) 35.0 - 47.0 %    MCV 87 78 - 100 fL    MCH 27.1 26.5 - 33.0 pg    MCHC 31.3 (L) 31.5 - 36.5 g/dL    RDW 13.3 10.0 - 15.0 %    Platelet Count 253 150 - 450 10e3/uL   Basic metabolic panel   Result Value Ref Range    Sodium 139 135 - 145 mmol/L    " Potassium 4.1 3.4 - 5.3 mmol/L    Chloride 107 98 - 107 mmol/L    Carbon Dioxide (CO2) 23 22 - 29 mmol/L    Anion Gap 9 7 - 15 mmol/L    Urea Nitrogen 27.8 (H) 8.0 - 23.0 mg/dL    Creatinine 0.97 (H) 0.51 - 0.95 mg/dL    GFR Estimate 67 >60 mL/min/1.73m2    Calcium 9.5 8.6 - 10.0 mg/dL    Glucose 266 (H) 70 - 99 mg/dL   Result Value Ref Range    Troponin T, High Sensitivity 12 <=14 ng/L   Nt probnp inpatient   Result Value Ref Range    N terminal Pro BNP Inpatient 204 0 - 900 pg/mL       RADIOLOGY:  I have independently reviewed and interpreted the above imaging, pending the final radiology read.  XR Chest 2 Views   Final Result   IMPRESSION: Sternotomy. Heart mildly enlarged, unchanged. Lungs are clear.          EKG:    Normal sinus rhythm at 87 bpm.  Normal axis.  No signs of acute ischemia.  QRS 78 ms, QTc 450 ms.    I have independently reviewed and interpreted this EKG          I, Kelile Wasserman, am serving as a scribe to document services personally performed by Dr. Bucio based on my observation and the provider's statements to me. I, Mina Bucio, DO attest that Kellie Wasserman is acting in a scribe capacity, has observed my performance of the services and has documented them in accordance with my direction.    Mina Bucio DO  Emergency Medicine  [facility] EMERGENCY DEPARTMENT  [address] [place] [id number]  [phone]  Dept: [phone]       Mina Bucio MD  11/29/23 4500

## 2023-12-05 ENCOUNTER — TELEPHONE (OUTPATIENT)
Dept: ENDOCRINOLOGY | Facility: CLINIC | Age: 58
End: 2023-12-05
Payer: MEDICAID

## 2023-12-07 NOTE — TELEPHONE ENCOUNTER
Central Prior Authorization Team   Phone: [phone]      PA Initiation    Medication: TRULICITY 0.75 MG/0.5ML SC SOPN  Insurance Company: [place] Medicaid ([facility]) - Phone [phone] Fax [phone]  Pharmacy Filling the Rx:    Filling Pharmacy Phone:    Filling Pharmacy Fax:    Start Date: 12/7/2023

## 2023-12-08 NOTE — TELEPHONE ENCOUNTER
Central Prior Authorization Team   Phone: [phone]        Prior Authorization Approval    Medication: TRULICITY 0.75 MG/0.5ML SC SOPN  Authorization Effective Date: 12/4/2023  Authorization Expiration Date: 11/3/2024  Approved Dose/Quantity: 2ML PER 28 DAYS, FOR 12 FILLS  Reference #: [facility] # [id number]   Insurance Company: [place] Medicaid ([facility]) - Phone [phone] Fax [phone]  Expected CoPay: $    CoPay Card Available: No    Financial Assistance Needed: N/A  Which Pharmacy is filling the prescription: [facility] [place], [place] - [address] [place] [address]  Pharmacy Notified: YES  Patient Notified: NO **Instructed pharmacy to notify patient when script is ready to /ship.**

## 2023-12-17 DIAGNOSIS — I10 ESSENTIAL HYPERTENSION, BENIGN: ICD-10-CM

## 2023-12-17 RX ORDER — CARVEDILOL 12.5 MG/1
12.5 TABLET ORAL 2 TIMES DAILY WITH MEALS
Qty: 180 TABLET | Refills: 3 | Status: SHIPPED | OUTPATIENT
Start: 2023-12-17 | End: 2024-01-14

## 2023-12-23 ENCOUNTER — PATIENT OUTREACH (OUTPATIENT)
Dept: CARE COORDINATION | Facility: CLINIC | Age: 58
End: 2023-12-23
Payer: MEDICAID

## 2023-12-23 NOTE — PROGRESS NOTES
Stroke RN Care Coordination - Unable to Reach / Voicemail Note     Stroke RN Care Coordinator Outreach:  Clinic Care Coordination - Follow-up       Outreach attempted x 1.      Left message on patient's voicemail with call back information and requested return call.    Stroke RN Care Coordinator will try to reach patient again in 3-5 business days.    Ashley Hernández RN, BSN, CPHN, CM  [facility] Ambulatory Care Management  [facility]  Phone: [phone]  Email: [email or web address]  (On behalf of Avril Lux, Stroke RN CC)

## 2024-01-06 ENCOUNTER — PATIENT OUTREACH (OUTPATIENT)
Dept: CARE COORDINATION | Facility: CLINIC | Age: 59
End: 2024-01-06
Payer: MEDICAID

## 2024-01-06 NOTE — PROGRESS NOTES
Stroke RN Care Coordination - Unable to Reach / Voicemail Note     Stroke RN Care Coordinator Outreach:  Clinic Care Coordination - Follow-up     Outreach attempted x 2.      RN CC attempted to reach patient on 12/23/23 without success. RN Left message on patient's voicemail with call back information and requested return call. RN CC attempted to reach patient again on 1/6/24 but mailbox full. Unable to leave message.     Stroke RN Care Coordinator will do no further outreaches at this time as patient has been prescribed ASA 81, Atorvastatin, Carvedilol, Clopidogrel and both Aspart & Glargine for blood glucose control.  Stroke Care Coordination program will be closed.     Ashley Hernández RN, BSN, CPHN, CM  [facility] Ambulatory Care Management  [facility]  Phone: [phone]  Email: [email or web address]  (On behalf of Avril Lux, Stroke RN CC)

## 2024-01-14 ENCOUNTER — HOSPITAL ENCOUNTER (INPATIENT)
Facility: HOSPITAL | Age: 59
LOS: 3 days | Discharge: HOME HEALTH | End: 2024-01-17
Attending: EMERGENCY MEDICINE | Admitting: STUDENT IN AN ORGANIZED HEALTH CARE EDUCATION/TRAINING PROGRAM
Payer: MEDICAID

## 2024-01-14 ENCOUNTER — APPOINTMENT (OUTPATIENT)
Dept: MRI IMAGING | Facility: HOSPITAL | Age: 59
End: 2024-01-14
Attending: EMERGENCY MEDICINE
Payer: MEDICAID

## 2024-01-14 ENCOUNTER — APPOINTMENT (OUTPATIENT)
Dept: CT IMAGING | Facility: HOSPITAL | Age: 59
End: 2024-01-14
Attending: EMERGENCY MEDICINE
Payer: MEDICAID

## 2024-01-14 DIAGNOSIS — E78.2 MIXED HYPERLIPIDEMIA: ICD-10-CM

## 2024-01-14 DIAGNOSIS — I25.118 CORONARY ARTERY DISEASE INVOLVING NATIVE CORONARY ARTERY OF NATIVE HEART WITH OTHER FORM OF ANGINA PECTORIS (H): Chronic | ICD-10-CM

## 2024-01-14 DIAGNOSIS — R55 SYNCOPE AND COLLAPSE: ICD-10-CM

## 2024-01-14 DIAGNOSIS — R07.9 CHEST PAIN, UNSPECIFIED TYPE: ICD-10-CM

## 2024-01-14 DIAGNOSIS — I20.0 UNSTABLE ANGINA (H): Primary | ICD-10-CM

## 2024-01-14 DIAGNOSIS — G89.4 CHRONIC PAIN SYNDROME: Chronic | ICD-10-CM

## 2024-01-14 DIAGNOSIS — R29.898 RIGHT LEG WEAKNESS: ICD-10-CM

## 2024-01-14 DIAGNOSIS — R55 SYNCOPE, UNSPECIFIED SYNCOPE TYPE: ICD-10-CM

## 2024-01-14 DIAGNOSIS — I67.2 INTRACRANIAL ATHEROSCLEROSIS: ICD-10-CM

## 2024-01-14 DIAGNOSIS — R06.02 SHORTNESS OF BREATH: ICD-10-CM

## 2024-01-14 DIAGNOSIS — I10 ESSENTIAL HYPERTENSION: ICD-10-CM

## 2024-01-14 LAB
ALBUMIN SERPL BCG-MCNC: 4.1 G/DL (ref 3.5–5.2)
ALBUMIN UR-MCNC: 30 MG/DL
ALP SERPL-CCNC: 99 U/L (ref 40–150)
ALT SERPL W P-5'-P-CCNC: 23 U/L (ref 0–50)
AMORPH CRY #/AREA URNS HPF: ABNORMAL /HPF
ANION GAP SERPL CALCULATED.3IONS-SCNC: 14 MMOL/L (ref 7–15)
APPEARANCE UR: CLEAR
AST SERPL W P-5'-P-CCNC: 13 U/L (ref 0–45)
B-OH-BUTYR SERPL-SCNC: 0.3 MMOL/L
BACTERIA #/AREA URNS HPF: ABNORMAL /HPF
BASOPHILS # BLD AUTO: 0.1 10E3/UL (ref 0–0.2)
BASOPHILS NFR BLD AUTO: 1 %
BILIRUB SERPL-MCNC: 0.2 MG/DL
BILIRUB UR QL STRIP: NEGATIVE
BUN SERPL-MCNC: 23 MG/DL (ref 8–23)
CALCIUM SERPL-MCNC: 9.7 MG/DL (ref 8.6–10)
CHLORIDE SERPL-SCNC: 108 MMOL/L (ref 98–107)
CHOLEST SERPL-MCNC: 231 MG/DL
CHOLEST SERPL-MCNC: 262 MG/DL
COLOR UR AUTO: YELLOW
CREAT SERPL-MCNC: 1.21 MG/DL (ref 0.51–0.95)
DEPRECATED HCO3 PLAS-SCNC: 19 MMOL/L (ref 22–29)
EGFRCR SERPLBLD CKD-EPI 2021: 51 ML/MIN/1.73M2
EOSINOPHIL # BLD AUTO: 0.3 10E3/UL (ref 0–0.7)
EOSINOPHIL NFR BLD AUTO: 4 %
ERYTHROCYTE [DISTWIDTH] IN BLOOD BY AUTOMATED COUNT: 13.3 % (ref 10–15)
FERRITIN SERPL-MCNC: 80 NG/ML (ref 11–328)
FLUAV RNA SPEC QL NAA+PROBE: NEGATIVE
FLUBV RNA RESP QL NAA+PROBE: NEGATIVE
GLUCOSE BLDC GLUCOMTR-MCNC: 151 MG/DL (ref 70–99)
GLUCOSE BLDC GLUCOMTR-MCNC: 173 MG/DL (ref 70–99)
GLUCOSE SERPL-MCNC: 161 MG/DL (ref 70–99)
GLUCOSE UR STRIP-MCNC: NEGATIVE MG/DL
HBA1C MFR BLD: 8.8 %
HCT VFR BLD AUTO: 36.4 % (ref 35–47)
HDLC SERPL-MCNC: 37 MG/DL
HDLC SERPL-MCNC: 41 MG/DL
HGB BLD-MCNC: 11.3 G/DL (ref 11.7–15.7)
HGB UR QL STRIP: NEGATIVE
HOLD SPECIMEN: NORMAL
HOLD SPECIMEN: NORMAL
HYALINE CASTS: 19 /LPF
IMM GRANULOCYTES # BLD: 0 10E3/UL
IMM GRANULOCYTES NFR BLD: 0 %
IRON BINDING CAPACITY (ROCHE): 239 UG/DL (ref 240–430)
IRON SATN MFR SERPL: 23 % (ref 15–46)
IRON SERPL-MCNC: 54 UG/DL (ref 37–145)
KETONES UR STRIP-MCNC: NEGATIVE MG/DL
LDLC SERPL CALC-MCNC: 166 MG/DL
LDLC SERPL CALC-MCNC: 190 MG/DL
LEUKOCYTE ESTERASE UR QL STRIP: NEGATIVE
LIPASE SERPL-CCNC: 11 U/L (ref 13–60)
LYMPHOCYTES # BLD AUTO: 2.8 10E3/UL (ref 0.8–5.3)
LYMPHOCYTES NFR BLD AUTO: 36 %
MCH RBC QN AUTO: 26.9 PG (ref 26.5–33)
MCHC RBC AUTO-ENTMCNC: 31 G/DL (ref 31.5–36.5)
MCV RBC AUTO: 87 FL (ref 78–100)
MONOCYTES # BLD AUTO: 0.4 10E3/UL (ref 0–1.3)
MONOCYTES NFR BLD AUTO: 5 %
MUCOUS THREADS #/AREA URNS LPF: PRESENT /LPF
NEUTROPHILS # BLD AUTO: 4.3 10E3/UL (ref 1.6–8.3)
NEUTROPHILS NFR BLD AUTO: 54 %
NITRATE UR QL: NEGATIVE
NONHDLC SERPL-MCNC: 194 MG/DL
NONHDLC SERPL-MCNC: 221 MG/DL
NRBC # BLD AUTO: 0 10E3/UL
NRBC BLD AUTO-RTO: 0 /100
NT-PROBNP SERPL-MCNC: 200 PG/ML (ref 0–900)
PH UR STRIP: 6.5 [PH] (ref 5–7)
PLATELET # BLD AUTO: 255 10E3/UL (ref 150–450)
POTASSIUM SERPL-SCNC: 4 MMOL/L (ref 3.4–5.3)
PROT SERPL-MCNC: 7.3 G/DL (ref 6.4–8.3)
RBC # BLD AUTO: 4.2 10E6/UL (ref 3.8–5.2)
RBC URINE: 2 /HPF
RETICS # AUTO: 0.07 10E6/UL (ref 0.01–0.11)
RETICS/RBC NFR AUTO: 1.8 % (ref 0.8–2.7)
RSV RNA SPEC NAA+PROBE: NEGATIVE
SARS-COV-2 RNA RESP QL NAA+PROBE: NEGATIVE
SODIUM SERPL-SCNC: 141 MMOL/L (ref 135–145)
SP GR UR STRIP: 1.01 (ref 1–1.03)
SQUAMOUS EPITHELIAL: 11 /HPF
TRIGL SERPL-MCNC: 141 MG/DL
TRIGL SERPL-MCNC: 157 MG/DL
TROPONIN T SERPL HS-MCNC: 18 NG/L
TROPONIN T SERPL HS-MCNC: 19 NG/L
UROBILINOGEN UR STRIP-MCNC: <2 MG/DL
VIT B12 SERPL-MCNC: 288 PG/ML (ref 232–1245)
WBC # BLD AUTO: 7.9 10E3/UL (ref 4–11)
WBC URINE: 1 /HPF

## 2024-01-14 PROCEDURE — 82010 KETONE BODYS QUAN: CPT | Performed by: EMERGENCY MEDICINE

## 2024-01-14 PROCEDURE — 82962 GLUCOSE BLOOD TEST: CPT

## 2024-01-14 PROCEDURE — 255N000002 HC RX 255 OP 636: Performed by: STUDENT IN AN ORGANIZED HEALTH CARE EDUCATION/TRAINING PROGRAM

## 2024-01-14 PROCEDURE — 87637 SARSCOV2&INF A&B&RSV AMP PRB: CPT | Performed by: EMERGENCY MEDICINE

## 2024-01-14 PROCEDURE — 96375 TX/PRO/DX INJ NEW DRUG ADDON: CPT

## 2024-01-14 PROCEDURE — 83550 IRON BINDING TEST: CPT | Performed by: STUDENT IN AN ORGANIZED HEALTH CARE EDUCATION/TRAINING PROGRAM

## 2024-01-14 PROCEDURE — 80061 LIPID PANEL: CPT | Performed by: STUDENT IN AN ORGANIZED HEALTH CARE EDUCATION/TRAINING PROGRAM

## 2024-01-14 PROCEDURE — A9585 GADOBUTROL INJECTION: HCPCS | Performed by: STUDENT IN AN ORGANIZED HEALTH CARE EDUCATION/TRAINING PROGRAM

## 2024-01-14 PROCEDURE — 250N000012 HC RX MED GY IP 250 OP 636 PS 637: Performed by: STUDENT IN AN ORGANIZED HEALTH CARE EDUCATION/TRAINING PROGRAM

## 2024-01-14 PROCEDURE — 83036 HEMOGLOBIN GLYCOSYLATED A1C: CPT | Performed by: STUDENT IN AN ORGANIZED HEALTH CARE EDUCATION/TRAINING PROGRAM

## 2024-01-14 PROCEDURE — 250N000011 HC RX IP 250 OP 636: Performed by: EMERGENCY MEDICINE

## 2024-01-14 PROCEDURE — 99223 1ST HOSP IP/OBS HIGH 75: CPT | Performed by: STUDENT IN AN ORGANIZED HEALTH CARE EDUCATION/TRAINING PROGRAM

## 2024-01-14 PROCEDURE — 70496 CT ANGIOGRAPHY HEAD: CPT

## 2024-01-14 PROCEDURE — 85025 COMPLETE CBC W/AUTO DIFF WBC: CPT | Performed by: EMERGENCY MEDICINE

## 2024-01-14 PROCEDURE — 258N000003 HC RX IP 258 OP 636: Performed by: STUDENT IN AN ORGANIZED HEALTH CARE EDUCATION/TRAINING PROGRAM

## 2024-01-14 PROCEDURE — 250N000013 HC RX MED GY IP 250 OP 250 PS 637: Performed by: EMERGENCY MEDICINE

## 2024-01-14 PROCEDURE — 250N000013 HC RX MED GY IP 250 OP 250 PS 637: Performed by: STUDENT IN AN ORGANIZED HEALTH CARE EDUCATION/TRAINING PROGRAM

## 2024-01-14 PROCEDURE — 83690 ASSAY OF LIPASE: CPT | Performed by: EMERGENCY MEDICINE

## 2024-01-14 PROCEDURE — 96374 THER/PROPH/DIAG INJ IV PUSH: CPT | Mod: 59

## 2024-01-14 PROCEDURE — 83540 ASSAY OF IRON: CPT | Performed by: STUDENT IN AN ORGANIZED HEALTH CARE EDUCATION/TRAINING PROGRAM

## 2024-01-14 PROCEDURE — 74174 CTA ABD&PLVS W/CONTRAST: CPT

## 2024-01-14 PROCEDURE — 93005 ELECTROCARDIOGRAM TRACING: CPT | Performed by: EMERGENCY MEDICINE

## 2024-01-14 PROCEDURE — 84484 ASSAY OF TROPONIN QUANT: CPT | Performed by: EMERGENCY MEDICINE

## 2024-01-14 PROCEDURE — 250N000011 HC RX IP 250 OP 636: Performed by: STUDENT IN AN ORGANIZED HEALTH CARE EDUCATION/TRAINING PROGRAM

## 2024-01-14 PROCEDURE — 258N000003 HC RX IP 258 OP 636: Performed by: EMERGENCY MEDICINE

## 2024-01-14 PROCEDURE — 85045 AUTOMATED RETICULOCYTE COUNT: CPT | Performed by: STUDENT IN AN ORGANIZED HEALTH CARE EDUCATION/TRAINING PROGRAM

## 2024-01-14 PROCEDURE — 80053 COMPREHEN METABOLIC PANEL: CPT | Performed by: EMERGENCY MEDICINE

## 2024-01-14 PROCEDURE — 250N000009 HC RX 250: Performed by: EMERGENCY MEDICINE

## 2024-01-14 PROCEDURE — C9113 INJ PANTOPRAZOLE SODIUM, VIA: HCPCS | Performed by: STUDENT IN AN ORGANIZED HEALTH CARE EDUCATION/TRAINING PROGRAM

## 2024-01-14 PROCEDURE — 96376 TX/PRO/DX INJ SAME DRUG ADON: CPT

## 2024-01-14 PROCEDURE — 83880 ASSAY OF NATRIURETIC PEPTIDE: CPT | Performed by: EMERGENCY MEDICINE

## 2024-01-14 PROCEDURE — 70553 MRI BRAIN STEM W/O & W/DYE: CPT

## 2024-01-14 PROCEDURE — 250N000009 HC RX 250: Performed by: STUDENT IN AN ORGANIZED HEALTH CARE EDUCATION/TRAINING PROGRAM

## 2024-01-14 PROCEDURE — 81003 URINALYSIS AUTO W/O SCOPE: CPT | Performed by: EMERGENCY MEDICINE

## 2024-01-14 PROCEDURE — 82607 VITAMIN B-12: CPT | Performed by: STUDENT IN AN ORGANIZED HEALTH CARE EDUCATION/TRAINING PROGRAM

## 2024-01-14 PROCEDURE — 99285 EMERGENCY DEPT VISIT HI MDM: CPT | Mod: 25

## 2024-01-14 PROCEDURE — 36415 COLL VENOUS BLD VENIPUNCTURE: CPT | Performed by: EMERGENCY MEDICINE

## 2024-01-14 PROCEDURE — 82728 ASSAY OF FERRITIN: CPT | Performed by: STUDENT IN AN ORGANIZED HEALTH CARE EDUCATION/TRAINING PROGRAM

## 2024-01-14 PROCEDURE — 120N000001 HC R&B MED SURG/OB

## 2024-01-14 RX ORDER — MAGNESIUM HYDROXIDE/ALUMINUM HYDROXICE/SIMETHICONE 120; 1200; 1200 MG/30ML; MG/30ML; MG/30ML
30 SUSPENSION ORAL EVERY 4 HOURS PRN
Status: DISCONTINUED | OUTPATIENT
Start: 2024-01-14 | End: 2024-01-17 | Stop reason: HOSPADM

## 2024-01-14 RX ORDER — NALOXONE HYDROCHLORIDE 0.4 MG/ML
0.4 INJECTION, SOLUTION INTRAMUSCULAR; INTRAVENOUS; SUBCUTANEOUS
Status: DISCONTINUED | OUTPATIENT
Start: 2024-01-14 | End: 2024-01-17 | Stop reason: HOSPADM

## 2024-01-14 RX ORDER — ASPIRIN 81 MG/1
81 TABLET ORAL DAILY
Status: DISCONTINUED | OUTPATIENT
Start: 2024-01-14 | End: 2024-01-17 | Stop reason: HOSPADM

## 2024-01-14 RX ORDER — SIMETHICONE 80 MG
80 TABLET,CHEWABLE ORAL EVERY 6 HOURS PRN
Status: DISCONTINUED | OUTPATIENT
Start: 2024-01-14 | End: 2024-01-17 | Stop reason: HOSPADM

## 2024-01-14 RX ORDER — LIDOCAINE 40 MG/G
CREAM TOPICAL DAILY
COMMUNITY
End: 2024-08-26

## 2024-01-14 RX ORDER — CLOPIDOGREL BISULFATE 75 MG/1
75 TABLET ORAL DAILY
Status: DISCONTINUED | OUTPATIENT
Start: 2024-01-14 | End: 2024-01-17 | Stop reason: HOSPADM

## 2024-01-14 RX ORDER — FLUTICASONE PROPIONATE 50 MCG
1 SPRAY, SUSPENSION (ML) NASAL DAILY
Status: DISCONTINUED | OUTPATIENT
Start: 2024-01-14 | End: 2024-01-14

## 2024-01-14 RX ORDER — IOPAMIDOL 755 MG/ML
75 INJECTION, SOLUTION INTRAVASCULAR ONCE
Status: COMPLETED | OUTPATIENT
Start: 2024-01-14 | End: 2024-01-14

## 2024-01-14 RX ORDER — DEXTROSE MONOHYDRATE 25 G/50ML
25-50 INJECTION, SOLUTION INTRAVENOUS
Status: DISCONTINUED | OUTPATIENT
Start: 2024-01-14 | End: 2024-01-17 | Stop reason: HOSPADM

## 2024-01-14 RX ORDER — GADOBUTROL 604.72 MG/ML
9 INJECTION INTRAVENOUS ONCE
Status: COMPLETED | OUTPATIENT
Start: 2024-01-14 | End: 2024-01-14

## 2024-01-14 RX ORDER — NORTRIPTYLINE HCL 10 MG
10 CAPSULE ORAL AT BEDTIME
Status: DISCONTINUED | OUTPATIENT
Start: 2024-01-14 | End: 2024-01-17 | Stop reason: HOSPADM

## 2024-01-14 RX ORDER — NITROGLYCERIN 0.4 MG/1
0.4 TABLET SUBLINGUAL EVERY 5 MIN PRN
Status: DISCONTINUED | OUTPATIENT
Start: 2024-01-14 | End: 2024-01-17 | Stop reason: HOSPADM

## 2024-01-14 RX ORDER — LIDOCAINE 40 MG/G
CREAM TOPICAL DAILY PRN
Status: COMPLETED | OUTPATIENT
Start: 2024-01-14 | End: 2024-01-14

## 2024-01-14 RX ORDER — AMOXICILLIN 250 MG
1 CAPSULE ORAL 2 TIMES DAILY PRN
Status: DISCONTINUED | OUTPATIENT
Start: 2024-01-14 | End: 2024-01-17 | Stop reason: HOSPADM

## 2024-01-14 RX ORDER — HYDRALAZINE HYDROCHLORIDE 20 MG/ML
10 INJECTION INTRAMUSCULAR; INTRAVENOUS EVERY 6 HOURS PRN
Status: DISCONTINUED | OUTPATIENT
Start: 2024-01-14 | End: 2024-01-17 | Stop reason: HOSPADM

## 2024-01-14 RX ORDER — ASPIRIN 325 MG
325 TABLET ORAL ONCE
Status: COMPLETED | OUTPATIENT
Start: 2024-01-14 | End: 2024-01-14

## 2024-01-14 RX ORDER — AMOXICILLIN 250 MG
2 CAPSULE ORAL 2 TIMES DAILY PRN
Status: DISCONTINUED | OUTPATIENT
Start: 2024-01-14 | End: 2024-01-17 | Stop reason: HOSPADM

## 2024-01-14 RX ORDER — MORPHINE SULFATE 2 MG/ML
2 INJECTION, SOLUTION INTRAMUSCULAR; INTRAVENOUS EVERY 4 HOURS PRN
Status: DISCONTINUED | OUTPATIENT
Start: 2024-01-14 | End: 2024-01-15

## 2024-01-14 RX ORDER — ONDANSETRON 4 MG/1
4 TABLET, ORALLY DISINTEGRATING ORAL EVERY 6 HOURS PRN
Status: DISCONTINUED | OUTPATIENT
Start: 2024-01-14 | End: 2024-01-17 | Stop reason: HOSPADM

## 2024-01-14 RX ORDER — MAGNESIUM HYDROXIDE/ALUMINUM HYDROXICE/SIMETHICONE 120; 1200; 1200 MG/30ML; MG/30ML; MG/30ML
15 SUSPENSION ORAL ONCE
Status: COMPLETED | OUTPATIENT
Start: 2024-01-14 | End: 2024-01-14

## 2024-01-14 RX ORDER — LIDOCAINE/PRILOCAINE 2.5 %-2.5%
1 CREAM (GRAM) TOPICAL ONCE
Status: COMPLETED | OUTPATIENT
Start: 2024-01-14 | End: 2024-01-14

## 2024-01-14 RX ORDER — METHOCARBAMOL 750 MG/1
750 TABLET, FILM COATED ORAL 4 TIMES DAILY PRN
Status: DISCONTINUED | OUTPATIENT
Start: 2024-01-14 | End: 2024-01-17 | Stop reason: HOSPADM

## 2024-01-14 RX ORDER — ONDANSETRON 2 MG/ML
4 INJECTION INTRAMUSCULAR; INTRAVENOUS ONCE
Status: COMPLETED | OUTPATIENT
Start: 2024-01-14 | End: 2024-01-14

## 2024-01-14 RX ORDER — ONDANSETRON 2 MG/ML
4 INJECTION INTRAMUSCULAR; INTRAVENOUS EVERY 6 HOURS PRN
Status: DISCONTINUED | OUTPATIENT
Start: 2024-01-14 | End: 2024-01-17 | Stop reason: HOSPADM

## 2024-01-14 RX ORDER — SODIUM CHLORIDE 9 MG/ML
INJECTION, SOLUTION INTRAVENOUS CONTINUOUS
Status: ACTIVE | OUTPATIENT
Start: 2024-01-14 | End: 2024-01-15

## 2024-01-14 RX ORDER — MORPHINE SULFATE 4 MG/ML
4 INJECTION, SOLUTION INTRAMUSCULAR; INTRAVENOUS ONCE
Status: COMPLETED | OUTPATIENT
Start: 2024-01-14 | End: 2024-01-14

## 2024-01-14 RX ORDER — CALCIUM CARBONATE 500 MG/1
1000 TABLET, CHEWABLE ORAL 4 TIMES DAILY PRN
Status: DISCONTINUED | OUTPATIENT
Start: 2024-01-14 | End: 2024-01-17 | Stop reason: HOSPADM

## 2024-01-14 RX ORDER — NALOXONE HYDROCHLORIDE 0.4 MG/ML
0.2 INJECTION, SOLUTION INTRAMUSCULAR; INTRAVENOUS; SUBCUTANEOUS
Status: DISCONTINUED | OUTPATIENT
Start: 2024-01-14 | End: 2024-01-17 | Stop reason: HOSPADM

## 2024-01-14 RX ORDER — DULOXETIN HYDROCHLORIDE 30 MG/1
60 CAPSULE, DELAYED RELEASE ORAL 2 TIMES DAILY
Status: DISCONTINUED | OUTPATIENT
Start: 2024-01-14 | End: 2024-01-17 | Stop reason: HOSPADM

## 2024-01-14 RX ORDER — ATORVASTATIN CALCIUM 40 MG/1
40 TABLET, FILM COATED ORAL EVERY EVENING
Status: DISCONTINUED | OUTPATIENT
Start: 2024-01-14 | End: 2024-01-17 | Stop reason: HOSPADM

## 2024-01-14 RX ORDER — LORAZEPAM 2 MG/ML
0.5 INJECTION INTRAMUSCULAR ONCE
Status: COMPLETED | OUTPATIENT
Start: 2024-01-14 | End: 2024-01-14

## 2024-01-14 RX ORDER — BENZOCAINE/MENTHOL 6 MG-10 MG
LOZENGE MUCOUS MEMBRANE 2 TIMES DAILY
Status: DISCONTINUED | OUTPATIENT
Start: 2024-01-14 | End: 2024-01-14

## 2024-01-14 RX ORDER — TOPIRAMATE 100 MG/1
100 TABLET, FILM COATED ORAL 2 TIMES DAILY
Status: DISCONTINUED | OUTPATIENT
Start: 2024-01-14 | End: 2024-01-17 | Stop reason: HOSPADM

## 2024-01-14 RX ORDER — LIDOCAINE 40 MG/G
CREAM TOPICAL
Status: DISCONTINUED | OUTPATIENT
Start: 2024-01-14 | End: 2024-01-17 | Stop reason: HOSPADM

## 2024-01-14 RX ORDER — NICOTINE POLACRILEX 4 MG
15-30 LOZENGE BUCCAL
Status: DISCONTINUED | OUTPATIENT
Start: 2024-01-14 | End: 2024-01-17 | Stop reason: HOSPADM

## 2024-01-14 RX ADMIN — ALUMINUM HYDROXIDE, MAGNESIUM HYDROXIDE, AND SIMETHICONE 15 ML: 200; 200; 20 SUSPENSION ORAL at 11:53

## 2024-01-14 RX ADMIN — MORPHINE SULFATE 4 MG: 4 INJECTION, SOLUTION INTRAMUSCULAR; INTRAVENOUS at 13:07

## 2024-01-14 RX ADMIN — IOPAMIDOL 75 ML: 755 INJECTION, SOLUTION INTRAVENOUS at 12:52

## 2024-01-14 RX ADMIN — INSULIN GLARGINE 24 UNITS: 100 INJECTION, SOLUTION SUBCUTANEOUS at 22:44

## 2024-01-14 RX ADMIN — LIDOCAINE: 40 CREAM TOPICAL at 22:46

## 2024-01-14 RX ADMIN — ASPIRIN 81 MG CHEWABLE TABLET 81 MG: 81 TABLET CHEWABLE at 17:38

## 2024-01-14 RX ADMIN — INSULIN ASPART 1 UNITS: 100 INJECTION, SOLUTION INTRAVENOUS; SUBCUTANEOUS at 17:17

## 2024-01-14 RX ADMIN — ASPIRIN 325 MG ORAL TABLET 325 MG: 325 PILL ORAL at 14:51

## 2024-01-14 RX ADMIN — FAMOTIDINE 20 MG: 10 INJECTION, SOLUTION INTRAVENOUS at 11:54

## 2024-01-14 RX ADMIN — CLOPIDOGREL BISULFATE 75 MG: 75 TABLET ORAL at 17:39

## 2024-01-14 RX ADMIN — PANTOPRAZOLE SODIUM 40 MG: 40 INJECTION, POWDER, FOR SOLUTION INTRAVENOUS at 16:14

## 2024-01-14 RX ADMIN — LIDOCAINE AND PRILOCAINE 1 G: 25; 25 CREAM TOPICAL at 10:56

## 2024-01-14 RX ADMIN — ONDANSETRON 4 MG: 2 INJECTION INTRAMUSCULAR; INTRAVENOUS at 11:56

## 2024-01-14 RX ADMIN — MORPHINE SULFATE 2 MG: 2 INJECTION, SOLUTION INTRAMUSCULAR; INTRAVENOUS at 21:55

## 2024-01-14 RX ADMIN — MORPHINE SULFATE 4 MG: 4 INJECTION, SOLUTION INTRAMUSCULAR; INTRAVENOUS at 12:04

## 2024-01-14 RX ADMIN — LORAZEPAM 0.5 MG: 2 INJECTION INTRAMUSCULAR; INTRAVENOUS at 18:42

## 2024-01-14 RX ADMIN — SODIUM CHLORIDE: 9 INJECTION, SOLUTION INTRAVENOUS at 21:54

## 2024-01-14 RX ADMIN — ONDANSETRON 4 MG: 2 INJECTION INTRAMUSCULAR; INTRAVENOUS at 18:36

## 2024-01-14 RX ADMIN — GADOBUTROL 9 ML: 604.72 INJECTION INTRAVENOUS at 19:28

## 2024-01-14 RX ADMIN — MORPHINE SULFATE 2 MG: 2 INJECTION, SOLUTION INTRAMUSCULAR; INTRAVENOUS at 17:40

## 2024-01-14 RX ADMIN — SODIUM CHLORIDE 1000 ML: 9 INJECTION, SOLUTION INTRAVENOUS at 14:52

## 2024-01-14 ASSESSMENT — ACTIVITIES OF DAILY LIVING (ADL)
ADLS_ACUITY_SCORE: 35
ADLS_ACUITY_SCORE: 37
ADLS_ACUITY_SCORE: 35

## 2024-01-14 NOTE — ED NOTES
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 Hamm at [facility]    Schizoaffective disorder (H)     Sleep difficulties 7/17/2022        Code Status:  Prior     Falls Risk: Yes Band: Applied    Current Living Situation/Residence: lives in an apartment     Elimination Status: Continent: Yes     Activity Level: SBA    Patients Preferred Language:  English     Needed: No    Vital Signs:  /63   Pulse 86   Temp 98.5  F (36.9  C) (Oral)   Resp 14   LMP  (LMP Unknown)   SpO2 99%      Cardiac Rhythm: NSR      Pain Score: 7/10    Is the Patient Confused:  No    Last Food or Drink: 01/14/24 at     Focused Assessment:  Vomiting since 1/12. Developed chest pain yesterday. Described as stabbing. Nitroglycerin x1 yesterday and today at 0330 and 0630. Mild relief. Pain 10/10. H/o cardiac bypass. Is diabetic, blood sugar is labile. Highest was in the high 300s.     Tests Performed: Done: Labs and Imaging    Treatments Provided:      Family Dynamics/Concerns: No    Family Updated On Visitor Policy: Yes    Plan of Care Communicated to Family: Yes    Who Was Updated about Plan of Care: per pt    Belongings Checklist Done and Signed by Patient: Yes    Medications sent with patient:     Covid: asymptomatic ,  negative    Additional Information: pt needs MRI, pt just placed new dexcom  monitor yesterday,does not have a new one not sure she will do MRI if it needs to be removed.    Alison Winters RN   1/14/2024 3:01 PM

## 2024-01-14 NOTE — LETTER
[facility]  [address] [facility] [address]  Phone: [phone]  Fax: [phone]    January 17, 2024        Sarah CLARK Josiah  [address] [place] [address] [address]    [facility] [address]          To whom it may concern:    RE: Sarah Rahman    Sarah was a patient at [facility] from 1/14/2024 through 1/17/2024.    Please contact me for questions or concerns.      Sincerely,      Veda Hwang RN

## 2024-01-14 NOTE — ED PROVIDER NOTES
EMERGENCY DEPARTMENT ENCOUNTER      NAME: Sarah Rahman  AGE: 58 year old female  YOB: 1965  MRN: [id number]  EVALUATION DATE & TIME: 1/14/2024 10:02 AM    PCP: Kike David    ED PROVIDER: Mary Delvalle MD      Chief Complaint   Patient presents with    Chest Pain         FINAL IMPRESSION:  1. Chest pain, unspecified type    2. Syncope and collapse    3. Right leg weakness          ED COURSE & MEDICAL DECISION MAKING:    Pertinent Labs & Imaging studies reviewed. (See chart for details)    10:23 AM I introduced myself to the patient, obtained patient history, performed a physical exam, and discussed plan for ED workup including potential diagnostic laboratory/imaging studies and interventions.  2:10 PM I discussed admission with the patient. Patient is agreeable. All questions answered fully.  2:20 PM I spoke with the hospitalist, Dr. Gondal. We discussed the patient's case and they agree to admit the patient.    58 year old female with a pertinent history of diabetes mellitus type II, coronary artery disease s/p CABG, hypertension, asthma, COPD, hyperlipidemia, anxiety, CVA, and schizoaffective disorder who presents to this ED for evaluation of chest pain.  Patient states that she has ongoing intermittent syncopal episodes where she just passes out that have not yet been diagnosed she states.  She states that this happened yesterday.  She states she has been vomiting for the last few days and was feeling dizzy/lightheaded.  She states she woke up on the floor.  She states then she started develop chest pain the left side of her chest rating down her arm and somewhat into her back.  She states this is similar to pain she had when she had her stent and CABG previously.  States she does not follow with a consistent cardiologist that she had issues with her insurance and her primary care doctor.  Overall does seem somewhat distrusting of the medical system and frustrated with her prior care.   Initially stated that she wanted to see a male doctor but I did discuss with her that this was not an option here in the emergency department.  She was in agreement with proceeding with my care.  With her presentation and history I do have significant concern for cardiac etiology.  Also considered aortic dissection with her chest pain into the back and will obtain CTA of the chest abdomen and pelvis with IV contrast.  EKG was obtained which revealed some slightly more prominent T wave inversions in the lateral leads.  She appears to be in sinus rhythm.  Laboratory studies obtained.  Also is reporting this right leg weakness that on exam is asymmetric to the left.  Difficult as she states that she has chronic weakness in this leg since her stroke but that this may be somewhat worse.  At this point she would be outside of any sort of treatment window with tenecteplase especially with her fall and trauma.  Overall have low suspicion for new CVA with her main complaint being chest pain.  Otherwise no focal neurologic deficits.        CTA of the head and neck was also obtained.  She may require further imaging with brain MRI after this.  We also considered GERD.  She was given 20 mg of IV Pepcid as well as GI cocktail and IV Zofran.  Also given IV morphine.  She is a difficult IV stick and Emla cream was ordered for this.  CBC reveals white blood cell count of 7.9 with a hemoglobin of 11.3 which is near her baseline.  CMP largely unrevealing.  Glucose 161.  Lipase 11.  She also reported that her blood sugars were 300 at home however here is 161.  No obvious sign of DKA.  Ketones are normal.  BNP is 200.  Initial troponin slightly elevated at 19.  On repeat is downtrending to 18.  COVID-19 influenza and RSV PCR is negative.  Vital signs here are within normal limits and she is afebrile.  CTA of the chest and pelvis does not reveal any evidence of acute aortic syndrome.  No acute findings in the chest abdomen or pelvis.   CTA of the head and neck does not reveal any evidence of acute intracranial hemorrhage or CT evidence of acute large vascular territory ischemic infarct.  Similar small chronic infarcts in both occipital lobes with changes suggestive of mild chronic microvascular ischemic disease.  CT of the head did not reveal any large vessel occlusion or new flow-limiting stenosis.  She has redemonstration of scattered intracranial atherosclerotic disease.  No high-grade stenosis or occlusion involving the major arteries of the neck.  No signs of dissection.  She has required additional doses of morphine for the chest discomfort.  She does report it is improved on reevaluation.  She is tearful and concerned about her symptoms on reevaluation.  She does express frustration with the medical system in general.  Discussed that I would recommend she be admitted for further cardiac evaluation with her presentation.  She was in agreement with this plan.  Hospitalist also asked that I order the MRI brain with and without contrast which was ordered.  He accepted the patient for admission.  Patient was in agreement with this plan.  She was admitted in stable condition.    At the conclusion of the encounter I discussed the results of all of the tests and the disposition. The questions were answered. The patient or family acknowledged understanding and was agreeable with the care plan.         Medical Decision Making  Obtained supplemental history:Supplemental history obtained?: Documented in chart  Reviewed external records: External records reviewed?: Documented in chart  Care impacted by chronic illness:Chronic Lung Disease, Diabetes, Heart Disease, Hyperlipidemia, and Hypertension  Care significantly affected by social determinants of health:Access to Affordable Health Care  Did you consider but not order tests?: Work up considered but not performed and documented in chart, if applicable  Did you interpret images independently?:  Independent interpretation of ECG and images noted in documentation, when applicable.  Consultation discussion with other provider:Did you involve another provider (consultant, , pharmacy, etc.)?: I discussed the care with another health care provider, see documentation for details.  Admit.      MEDICATIONS GIVEN IN THE EMERGENCY:  Medications   aspirin (ASA) tablet 325 mg (has no administration in time range)   sodium chloride 0.9% BOLUS 1,000 mL (has no administration in time range)   lidocaine 1 % 0.1-1 mL (has no administration in time range)   lidocaine (LMX4) cream (has no administration in time range)   sodium chloride (PF) 0.9% PF flush 3 mL (has no administration in time range)   sodium chloride (PF) 0.9% PF flush 3 mL (has no administration in time range)   senna-docusate (SENOKOT-S/PERICOLACE) 8.6-50 MG per tablet 1 tablet (has no administration in time range)     Or   senna-docusate (SENOKOT-S/PERICOLACE) 8.6-50 MG per tablet 2 tablet (has no administration in time range)   calcium carbonate (TUMS) chewable tablet 1,000 mg (has no administration in time range)   ondansetron (ZOFRAN ODT) ODT tab 4 mg (has no administration in time range)     Or   ondansetron (ZOFRAN) injection 4 mg (has no administration in time range)   alum & mag hydroxide-simethicone (MAALOX) suspension 30 mL (has no administration in time range)   simethicone (MYLICON) chewable tablet 80 mg (has no administration in time range)   pantoprazole (PROTONIX) IV push injection 40 mg (has no administration in time range)   famotidine (PEPCID) injection 20 mg (has no administration in time range)   glucose gel 15-30 g (has no administration in time range)     Or   dextrose 50 % injection 25-50 mL (has no administration in time range)     Or   glucagon injection 1 mg (has no administration in time range)   insulin aspart (NovoLOG) injection (RAPID ACTING) (has no administration in time range)   insulin aspart (NovoLOG) injection (RAPID  ACTING) (has no administration in time range)   ondansetron (ZOFRAN) injection 4 mg (4 mg Intravenous $Given 1/14/24 1156)   alum & mag hydroxide-simethicone (MAALOX) suspension 15 mL (15 mLs Oral $Given 1/14/24 1153)   famotidine (PEPCID) injection 20 mg (20 mg Intravenous $Given 1/14/24 1154)   lidocaine-prilocaine (EMLA) cream 1 g (1 g Topical $Given 1/14/24 1056)   morphine (PF) injection 4 mg (4 mg Intravenous $Given 1/14/24 1204)   iopamidol (ISOVUE-370) solution 75 mL (75 mLs Intravenous $Given 1/14/24 1252)   morphine (PF) injection 4 mg (4 mg Intravenous $Given 1/14/24 1307)       NEW PRESCRIPTIONS STARTED AT TODAY'S ER VISIT  New Prescriptions    No medications on file          =================================================================    HPI    Patient information was obtained from: the patient and her neighbor    Use of : N/A      Sarah Rahman is a 58 year old female with a pertinent history of diabetes mellitus type II, coronary artery disease s/p CABG, hypertension, asthma, COPD, hyperlipidemia, anxiety, CVA, and schizoaffective disorder who presents to this ED for evaluation of chest pain.    Per Chart Review,  11/29/23: The patient presented to [facility] ED for further evaluation of shortness of breath and chest pain. Her ECG, high sensitivity troponin, and basic metabolic panel were in reference ranges. The patient requested discharged and left before her second high sensitivity troponin lab.    The patient presents with worsening shortness of breath and sharp stabbing chest pain that radiates to her back and down her left arm that began last night around 5740-9924 (1/13). This chest pain began after a syncopal fall preceded by tunnel vision, dizziness, and vomiting. The patient notes that the chest pain is similar to how she felt before her stent was placed. She also endorses vomiting and nausea since Friday after she ate fish from [employer]. She notes that laying down  worsens the vomiting and visual disturbance and sitting up helps. She reports that she has burning sensation in her right leg that appears when she has chest pain. The patient reports that she has right leg weakness and swelling at baseline because of her history of 3 strokes, but that it was worse after her fall last night. She does not take a diuretic. The patient reports taking nitroglycerin last night, at 0300 this morning, and at 0630 with some relief. Since then she has had a headache. She also notes mild lower abdominal pain which began this morning. She notes that her blood glucose level is normally well controlled but spiked to around 300 today. The patient endorses being around sick contacts around New Years [place] after travelling to [place] for her niece's . She denies exercise preceding her symptoms today, hematuria, dysuria, diarrhea, fever, cough, loss of taste/smell, and any other symptoms or complaints at this time.    The patient notes that she last visited a cardiology 1 year ago but then states she does not have an established cardiologist.  She reports she has had ongoing issues where she will pass out suddenly and no doctor has been able to figure out why previously..  She had a stress test back in May of this year that was unrevealing at that time.    REVIEW OF SYSTEMS   Review of Systems   Pertinent positives and negatives are documented in the HPI. All other systems reviewed and are negative.      PAST MEDICAL HISTORY:  Past Medical History:   Diagnosis Date    Asthma     CAD (coronary artery disease)     COPD (chronic obstructive pulmonary disease) (H)     CVA (cerebral infarction)     Diabetes (H)     Dyshidrosis (pompholyx) 10/21/2016    GERD (gastroesophageal reflux disease)     Hypertension     Intercostal neuritis 2018    Lumbar disc herniation 2019    Noncompliance 10/8/2021    Polysubstance abuse (H)     S/P CABG (coronary artery bypass graft)     S/P lumbar discectomy  06/13/2019    L5/S1 by  Dr. Hamm at [facility]    Schizoaffective disorder (H)     Sleep difficulties 7/17/2022       PAST SURGICAL HISTORY:  Past Surgical History:   Procedure Laterality Date    BYPASS GRAFT ARTERY CORONARY  01/01/2009    x2    CAROTID ENDARTERECTOMY Left 12/2021    CORONARY STENT PLACEMENT      CV CORONARY ANGIOGRAM N/A 06/02/2021    Procedure: Coronary Angiogram;  Surgeon: Juventino Rivera MD;  Location: [facility] Cardiac Cath Lab;  Service: Cardiology    HYSTERECTOMY TOTAL ABDOMINAL      HYSTERECTOMY TOTAL ABDOMINAL, BILATERAL SALPINGO-OOPHORECTOMY, COMBINED      IR TRANSLAMINAR EPIDURAL LUMBAR INJ INCL IMAGING  09/27/2022    LUMBAR DISCECTOMY Right 06/13/2019    L5-S1 - Dr. Hamm    NASAL FRACTURE SURGERY      ORIF ULNAR / RADIAL SHAFT FRACTURE Right            CURRENT MEDICATIONS:    acetaminophen (TYLENOL) 325 MG tablet  acetaminophen (TYLENOL) 325 MG tablet  acetaminophen (TYLENOL) 500 MG tablet  albuterol (PROAIR HFA) 108 (90 Base) MCG/ACT inhaler  alcohol swab prep pads  Alcohol Swabs (ALCOHOL PREP) PADS  aspirin 81 MG EC tablet  atorvastatin (LIPITOR) 40 MG tablet  atorvastatin (LIPITOR) 80 MG tablet  blood glucose (NO BRAND SPECIFIED) lancets standard  blood glucose (NO BRAND SPECIFIED) test strip  blood glucose monitoring (NO BRAND SPECIFIED) meter device kit  buprenorphine-naloxone (SUBOXONE) 2-0.5 MG SUBL sublingual tablet  carvedilol (COREG) 12.5 MG tablet  cetirizine (ZYRTEC) 10 MG tablet  cetirizine (ZYRTEC) 10 MG tablet  clopidogrel (PLAVIX) 75 MG tablet  Continuous Blood Gluc  (DEXCOM G6 ) ROSE  Continuous Blood Gluc Sensor (DEXCOM G6 SENSOR) MISC  Continuous Blood Gluc Transmit (DEXCOM G6 TRANSMITTER) MISC  diclofenac (VOLTAREN) 1 % topical gel  dulaglutide (TRULICITY) 0.75 MG/0.5ML pen  dulaglutide (TRULICITY) 0.75 MG/0.5ML pen  DULoxetine (CYMBALTA) 30 MG capsule  DULoxetine (CYMBALTA) 60 MG capsule  DULoxetine HCl 60 MG CSDR  fluticasone (FLONASE) 50  "MCG/ACT nasal spray  fluticasone (FLONASE) 50 MCG/ACT nasal spray  fluticasone (FLONASE) 50 MCG/ACT nasal spray  fluticasone-salmeterol (ADVAIR) 250-50 MCG/ACT inhaler  glipiZIDE (GLUCOTROL XL) 5 MG 24 hr tablet  hydrocortisone (CORTAID) 1 % external cream  hydrocortisone 1 % CREA cream  insulin aspart (NOVOLOG VIAL) 100 UNITS/ML vial  insulin glargine (LANTUS PEN) 100 UNIT/ML pen  insulin glargine (LANTUS PEN) 100 UNIT/ML pen  ipratropium - albuterol 0.5 mg/2.5 mg/3 mL (DUONEB) 0.5-2.5 (3) MG/3ML neb solution  ipratropium - albuterol 0.5 mg/2.5 mg/3 mL (DUONEB) 0.5-2.5 (3) MG/3ML neb solution  ketorolac (TORADOL) 10 MG tablet  lidocaine (LIDODERM) 5 % patch  lidocaine (LIDODERM) 5 % patch  LORazepam (ATIVAN) 0.5 MG tablet  LORazepam (ATIVAN) 0.5 MG tablet  methocarbamol (ROBAXIN) 750 MG tablet  Microlet Lancets MISC  naloxone (NARCAN) 4 MG/0.1ML nasal spray  naloxone (NARCAN) 4 MG/0.1ML nasal spray  nitroGLYcerin (NITROSTAT) 0.4 MG sublingual tablet  nortriptyline (PAMELOR) 10 MG capsule  omeprazole (PRILOSEC) 40 MG DR capsule  ondansetron (ZOFRAN) 8 MG tablet  oxyCODONE-acetaminophen (PERCOCET)  MG per tablet  topiramate (TOPAMAX) 50 MG tablet  valsartan (DIOVAN) 40 MG tablet        ALLERGIES:  Allergies   Allergen Reactions    Haloperidol Unknown     Patient states it stops her heart      Haloperidol Angioedema    Hydroxyzine Angioedema    Meperidine And Related Angioedema, Difficulty breathing, Other (See Comments), Rash and Shortness Of Breath     Throat closes  Other reaction(s): Breathing Difficulty  Other reaction(s): Breathing Difficulty      Phenothiazines Other (See Comments)     Other reaction(s): CARDIAC DISTURBANCES  Patient states it stops her heart  \"I swell up\"  \"stopped by heart\"  \"I swell up\"  \"I swell up\"      Phenothiazines Angioedema    Tramadol Other (See Comments)     Other reaction(s): Angioedema  Throat itch      Tramadol Angioedema    Januvia [Sitagliptin] Swelling    Latex Itching "    Haloperidol And Related Angioedema    Januvia [Sitagliptin] Other (See Comments)     Swelling in the neck     Latex Itching    Lisinopril Other (See Comments)     ACE cough  Itchy throat  Throat itches  Itchy throat      Penicillins Swelling    Hydroxyzine Other (See Comments) and Rash     Tongue swelling  Tongue swelling  Tongue swelling      Lisinopril Cough       FAMILY HISTORY:  Family History   Problem Relation Age of Onset    Heart Disease Mother     Heart Disease Father     Heart Disease Sister     Heart Disease Sister     Diabetes Brother     Coronary Artery Disease Brother         MI       SOCIAL HISTORY:   Social History     Socioeconomic History    Marital status: Single   Tobacco Use    Smoking status: Every Day     Packs/day: .5     Types: Cigarettes    Smokeless tobacco: Never    Tobacco comments:     Seen IP by [facility] on 7/28/23 and declined counseling services and resource packet   Vaping Use    Vaping Use: Never used   Substance and Sexual Activity    Alcohol use: Not Currently     Comment: Alcoholic Drinks/day: occ    Drug use: No    Sexual activity: Not Currently   Social History Narrative    ** Merged History Encounter **         Lives alone in a condo.      On disability.  Three living children.       Social Determinants of Health     Interpersonal Safety: Low Risk  (10/12/2023)    Interpersonal Safety     Do you feel physically and emotionally safe where you currently live?: Yes     Within the past 12 months, have you been hit, slapped, kicked or otherwise physically hurt by someone?: No     Within the past 12 months, have you been humiliated or emotionally abused in other ways by your partner or ex-partner?: No       VITALS:  /63   Pulse 86   Temp 98.5  F (36.9  C) (Oral)   Resp 14   LMP  (LMP Unknown)   SpO2 99%     PHYSICAL EXAM    Physical Exam  Constitutional: Anxious, NAD, GCS 15  HENT: Normocephalic, Atraumatic, Bilateral external ears normal, Oropharynx normal, mucous  membranes moist, Nose normal. Neck- Normal range of motion, No midline cervical spine tenderness, Supple, No stridor.    Eyes: PERRL, EOMI, Conjunctiva normal, No discharge.   Respiratory: Normal breath sounds, No respiratory distress, No wheezing or crackles, Speaks in full sentences easily.    Cardiovascular: Normal heart rate, Regular rhythm, No murmurs, No rubs, No gallops. 2+ radial pulses bilaterally. No reproducible chest wall tenderness.   GI: Bowel sounds normal, Soft, No tenderness, No masses, No rebound or guarding. No CVA tenderness bilaterally    Musculoskeletal: 2+ DP pulses. No notable lower extremity edema. No cyanosis, No clubbing. Good range of motion in all major joints. No tenderness to palpation or major deformities noted. No tenderness of the CTLS spine.    Integument: Warm, Dry, No erythema, No rash. No petechiae.    Neurologic: Alert & oriented x 3, 4/5 strength right dorsiflexion/plantarflexion and hip flexion/extension compared to 5 out of 5 on the left. Sensation intact to light touch in all 4 extremities and the face bilaterally. No focal deficits noted. Normal gait.  No visual field deficit.  Speech is normal.  States she has some baseline weakness from her stroke on the right side  Psychiatric: Anxious.     LAB:  All pertinent labs reviewed and interpreted.  Results for orders placed or performed during the hospital encounter of 01/14/24   CTA Head Neck with Contrast    Impression    IMPRESSION:   HEAD CT:  1.  No acute intracranial hemorrhage or CT evidence of acute large vascular territory ischemic infarct.  2.  Similar small chronic infarcts in both occipital lobes with changes suggestive of mild chronic microvascular ischemic disease.    HEAD CTA:   1.  No large vessel occlusion or new flow-limiting stenosis.  2.  Redemonstration of scattered intracranial atherosclerotic disease as further detailed above. Of note, there is mixed atherosclerotic plaque throughout the carotid siphons  with areas of moderate to advanced luminal narrowing involving the paraclinoid   segments bilaterally. This is similar on the right and perhaps slightly progressed on the left compared to October 2023.    NECK CTA:  1.  No high-grade stenosis or occlusion involving the major arteries of the neck.  2.  Atherosclerotic changes about the carotid bifurcations without substantial (50% or greater) luminal narrowing.  3.  No evidence of dissection.   CTA Chest Abdomen Pelvis w Contrast    Impression    IMPRESSION:  1.  No evidence of acute aortic syndrome. No acute findings in the chest, abdomen, or pelvis.     Extra Blue Top Tube   Result Value Ref Range    Hold Specimen JIC    Extra Red Top Tube   Result Value Ref Range    Hold Specimen JIC    Comprehensive metabolic panel   Result Value Ref Range    Sodium 141 135 - 145 mmol/L    Potassium 4.0 3.4 - 5.3 mmol/L    Carbon Dioxide (CO2) 19 (L) 22 - 29 mmol/L    Anion Gap 14 7 - 15 mmol/L    Urea Nitrogen 23.0 8.0 - 23.0 mg/dL    Creatinine 1.21 (H) 0.51 - 0.95 mg/dL    GFR Estimate 51 (L) >60 mL/min/1.73m2    Calcium 9.7 8.6 - 10.0 mg/dL    Chloride 108 (H) 98 - 107 mmol/L    Glucose 161 (H) 70 - 99 mg/dL    Alkaline Phosphatase 99 40 - 150 U/L    AST 13 0 - 45 U/L    ALT 23 0 - 50 U/L    Protein Total 7.3 6.4 - 8.3 g/dL    Albumin 4.1 3.5 - 5.2 g/dL    Bilirubin Total 0.2 <=1.2 mg/dL   Result Value Ref Range    Lipase 11 (L) 13 - 60 U/L   Result Value Ref Range    Troponin T, High Sensitivity 19 (H) <=14 ng/L   Ketone Beta-Hydroxybutyrate Quantitative   Result Value Ref Range    Ketone (Beta-Hydroxybutyrate) Quantitative 0.30 <=0.30 mmol/L   UA with Microscopic reflex to Culture    Specimen: Urine, Clean Catch   Result Value Ref Range    Color Urine Yellow Colorless, Straw, Light Yellow, Yellow    Appearance Urine Clear Clear    Glucose Urine Negative Negative mg/dL    Bilirubin Urine Negative Negative    Ketones Urine Negative Negative mg/dL    Specific Gravity Urine  1.010 1.001 - 1.030    Blood Urine Negative Negative    pH Urine 6.5 5.0 - 7.0    Protein Albumin Urine 30 (A) Negative mg/dL    Urobilinogen Urine <2.0 <2.0 mg/dL    Nitrite Urine Negative Negative    Leukocyte Esterase Urine Negative Negative    Bacteria Urine Few (A) None Seen /HPF    Mucus Urine Present (A) None Seen /LPF    Amorphous Crystals Urine Few (A) None Seen /HPF    RBC Urine 2 <=2 /HPF    WBC Urine 1 <=5 /HPF    Squamous Epithelials Urine 11 (H) <=1 /HPF    Hyaline Casts Urine 19 (H) <=2 /LPF   CBC with platelets and differential   Result Value Ref Range    WBC Count 7.9 4.0 - 11.0 10e3/uL    RBC Count 4.20 3.80 - 5.20 10e6/uL    Hemoglobin 11.3 (L) 11.7 - 15.7 g/dL    Hematocrit 36.4 35.0 - 47.0 %    MCV 87 78 - 100 fL    MCH 26.9 26.5 - 33.0 pg    MCHC 31.0 (L) 31.5 - 36.5 g/dL    RDW 13.3 10.0 - 15.0 %    Platelet Count 255 150 - 450 10e3/uL    % Neutrophils 54 %    % Lymphocytes 36 %    % Monocytes 5 %    % Eosinophils 4 %    % Basophils 1 %    % Immature Granulocytes 0 %    NRBCs per 100 WBC 0 <1 /100    Absolute Neutrophils 4.3 1.6 - 8.3 10e3/uL    Absolute Lymphocytes 2.8 0.8 - 5.3 10e3/uL    Absolute Monocytes 0.4 0.0 - 1.3 10e3/uL    Absolute Eosinophils 0.3 0.0 - 0.7 10e3/uL    Absolute Basophils 0.1 0.0 - 0.2 10e3/uL    Absolute Immature Granulocytes 0.0 <=0.4 10e3/uL    Absolute NRBCs 0.0 10e3/uL   Symptomatic Influenza A/B, RSV, & SARS-CoV2 PCR (COVID-19) Nasopharyngeal    Specimen: Nasopharyngeal; Swab   Result Value Ref Range    Influenza A PCR Negative Negative    Influenza B PCR Negative Negative    RSV PCR Negative Negative    SARS CoV2 PCR Negative Negative   Result Value Ref Range    Troponin T, High Sensitivity 18 (H) <=14 ng/L   N terminal pro BNP outpatient   Result Value Ref Range    N Terminal Pro BNP Outpatient 200 0 - 900 pg/mL       RADIOLOGY:  Reviewed all pertinent imaging. Please see official radiology report.  CTA Chest Abdomen Pelvis w Contrast   Final Result    IMPRESSION:   1.  No evidence of acute aortic syndrome. No acute findings in the chest, abdomen, or pelvis.         CTA Head Neck with Contrast   Final Result   IMPRESSION:    HEAD CT:   1.  No acute intracranial hemorrhage or CT evidence of acute large vascular territory ischemic infarct.   2.  Similar small chronic infarcts in both occipital lobes with changes suggestive of mild chronic microvascular ischemic disease.      HEAD CTA:    1.  No large vessel occlusion or new flow-limiting stenosis.   2.  Redemonstration of scattered intracranial atherosclerotic disease as further detailed above. Of note, there is mixed atherosclerotic plaque throughout the carotid siphons with areas of moderate to advanced luminal narrowing involving the paraclinoid    segments bilaterally. This is similar on the right and perhaps slightly progressed on the left compared to October 2023.      NECK CTA:   1.  No high-grade stenosis or occlusion involving the major arteries of the neck.   2.  Atherosclerotic changes about the carotid bifurcations without substantial (50% or greater) luminal narrowing.   3.  No evidence of dissection.      MR Brain w/o & w Contrast    (Results Pending)   Echocardiogram Complete    (Results Pending)       EKG:    Performed at: 10:17 AM    Impression: Sinus rhythm. Inverted T waves in lateral leads. Prolonged QT.     Rate: 87  Rhythm: Sinus  Axis: 32  RI Interval: 162  QRS Interval: 74  QTc Interval: 466  ST Changes: ST wave abnormality  Comparison: When compared with ECG of 29-NOV-2023 18:37 PM, Inverted T waves have replaced nonspecific T wave abnormality in Lateral leads.    I have independently reviewed and interpreted the EKG(s) documented above.    PROCEDURES:   None      Variable System Documentation:   CMS Diagnoses:           Vane ARIAS, am serving as a scribe to document services personally performed by Mary Delvalle MD based on my observation and the provider's statements  to me. I, Mary Delvalle MD, attest that Vane Mcfarlane is acting in a scribe capacity, has observed my performance of the services and has documented them in accordance with my direction.    Mary Delvalle MD  [facility] EMERGENCY DEPARTMENT  [address] [place] [id number]  [phone]       Mary Delvalle MD  01/15/24 5740

## 2024-01-14 NOTE — ED TRIAGE NOTES
Vomiting since 1/12.  Developed chest pain yesterday.  Described as stabbing.  Nitroglycerin x1 yesterday and today at 0330 and 0630.  Mild relief.  Pain 10/10.  H/o cardiac bypass.  Is diabetic, blood sugar is labile.  Highest was in the high 300s.

## 2024-01-14 NOTE — ED NOTES
Writer went into pt room to obtain belongings list for admission. Patient states she doesn't want her belongings charted as her  will be here soon and she requested to send everything home with him

## 2024-01-14 NOTE — PLAN OF CARE
"Goal Outcome Evaluation:      Plan of Care Reviewed With: patient    Overall Patient Progress: no change    Patient alert and oriented x4. Patient here for chest pain. Patient was informed that she would need to remove her glucometer for an upcoming MRI. This author did not instruct her to remove the cover immediately but to wait until it was MRI time to remove it. Now she is complaining about it. Patient has made racial comments to Dr Stated she wanted to be fully resuscitated  - said to provider \" Maybe your race doesn't do that.\" Report given to Donna on P1 - will be transporting to Room 127. Patient is scheduled to have the MRI at 1830.            "

## 2024-01-14 NOTE — MEDICATION SCRIBE - ADMISSION MEDICATION HISTORY
"Medication Scribe Admission Medication History    Admission medication history is complete. The information provided in this note is only as accurate as the sources available at the time of the update.    Information Source(s): Patient, [facility]'s pharmacy, and [facility]    Pertinent Information: Patient reports self management of medications.     Patient reports receiving medication ONLY from [facility]. Asked about [facility], patient reports: \"No.\"     Patient reports not knowing the names of the medications she takes. Patient reports: \"I take everything on your list.\" Patient reports taking all of her medications each and every day. When asked about recent fill history data from [facility] and apparent discrepancyes between amount filled and days in between, patient became agitated. Patient reports: \"It's on your papers - just don't ask me any more questions.\"     Using [facility] fill history as basis, most recently filled medications were kept on PTA list. Medications filled over 6 moths ago and that patient didn't mention at all, were removed.     Changes made to PTA medication list:  Added: None  Deleted: Tylenol 325, Lipitor, Suboxone, Coreg, Zyrtec, Flonase, Ativan, Omeprazole, Valsartan   Changed: None    Medication Affordability:  Not including over the counter (OTC) medications, was there a time in the past 3 months when you did not take your medications as prescribed because of cost?: No    Allergies reviewed with patient and updates made in EHR: yes    Medication History Completed By: Santi Ochoa 1/14/2024 3:48 PM    PTA Med List   Medication Sig Last Dose    acetaminophen (TYLENOL) 500 MG tablet Take 2 tablets (1,000 mg) by mouth every 6 hours as needed for mild pain Unknown at prn    albuterol (PROAIR HFA) 108 (90 Base) MCG/ACT inhaler Inhale 1-2 puffs into the lungs every 4 hours as needed for shortness of breath or wheezing Unknown at prn    aspirin 81 MG EC " tablet Take 1 tablet (81 mg) by mouth daily Past Month at unknown    clopidogrel (PLAVIX) 75 MG tablet Take 1 tablet (75 mg) by mouth daily Past Month at unknown    dulaglutide (TRULICITY) 0.75 MG/0.5ML pen Inject 0.75 mg Subcutaneous every 7 days Past Month at unknown    DULoxetine (CYMBALTA) 60 MG capsule Take 60 mg by mouth 2 times daily Past Month at unknown    fluticasone (FLONASE) 50 MCG/ACT nasal spray Spray 1 spray into both nostrils daily Unknown at prn    fluticasone-salmeterol (ADVAIR) 250-50 MCG/ACT inhaler Inhale 1 puff into the lungs every 12 hours Unknown at unknown    glipiZIDE (GLUCOTROL XL) 5 MG 24 hr tablet Take 10 mg by mouth daily Unknown at unknown    hydrocortisone (CORTAID) 1 % external cream Apply topically 2 times daily Apply to foot Unknown at unknown    insulin glargine (LANTUS PEN) 100 UNIT/ML pen Inject 24 Units Subcutaneous At Bedtime Past Month at unknown    ipratropium - albuterol 0.5 mg/2.5 mg/3 mL (DUONEB) 0.5-2.5 (3) MG/3ML neb solution Take 1 vial (3 mLs) by nebulization every 6 hours as needed for shortness of breath, wheezing or cough Past Month at unknown    ketorolac (TORADOL) 10 MG tablet TAKE ONE TABLET BY MOUTH EVERY 6 HOURS AS NEEDED FOR MODERATE PAIN Past Month at unknown    lidocaine (LIDODERM) 5 % patch Place onto the skin every 24 hours To prevent lidocaine toxicity, patient should be patch free for 12 hrs daily. Past Month at unknown    methocarbamol (ROBAXIN) 750 MG tablet Take 1 tablet (750 mg) by mouth 4 times daily as needed for muscle spasms Past Month at unknown    naloxone (NARCAN) 4 MG/0.1ML nasal spray Spray 4 mg into one nostril alternating nostrils as needed for opioid reversal every 2-3 minutes until assistance arrives Past Month at unknown    nitroGLYcerin (NITROSTAT) 0.4 MG sublingual tablet Place 1 tablet (0.4 mg) under the tongue every 5 minutes as needed for chest pain For chest pain place 1 tablet under the tongue every 5 minutes for 3 doses. If  symptoms persist 5 minutes after 1st dose call 911. Unknown at unknown    nortriptyline (PAMELOR) 10 MG capsule Take 10 mg by mouth At Bedtime Past Month at unknown    ondansetron (ZOFRAN) 8 MG tablet Take 1 tablet (8 mg) by mouth every 8 hours as needed for nausea Past Month at unknown    oxyCODONE-acetaminophen (PERCOCET)  MG per tablet Take 1 tablet by mouth every 6 hours as needed for pain Past Month at unknown    topiramate (TOPAMAX) 50 MG tablet Take 100 mg by mouth 2 times daily Past Month at unknown

## 2024-01-14 NOTE — ED NOTES
"Ultrasound IV attempt x2. First attempt able to retrieve labs but vein blew. 2nd attempt, per patient preference, Emlon placed at insert site. As I began to insert needle patient began to verbally assault me stating \" This is why I said to get the picc team! You have no idea what you are doing! This is why I asked for Lidocaine! You never listen to your patients! This is why I brought someone with me because you kill people and cover it up!\" I was close to completing IV but due to patient's verbal abuse, I removed catheter. I stated to patient that she does not have the right to belittle me or speak to me in that fashion. Patient continued with more insults also stating \" I should have never came here, you people are trying to kill me!\" Notified charge nurse and patient's nurse about incident.  "

## 2024-01-15 ENCOUNTER — APPOINTMENT (OUTPATIENT)
Dept: PHYSICAL THERAPY | Facility: HOSPITAL | Age: 59
End: 2024-01-15
Attending: STUDENT IN AN ORGANIZED HEALTH CARE EDUCATION/TRAINING PROGRAM
Payer: MEDICAID

## 2024-01-15 ENCOUNTER — APPOINTMENT (OUTPATIENT)
Dept: CARDIOLOGY | Facility: HOSPITAL | Age: 59
End: 2024-01-15
Attending: STUDENT IN AN ORGANIZED HEALTH CARE EDUCATION/TRAINING PROGRAM
Payer: MEDICAID

## 2024-01-15 LAB
ABO/RH(D): NORMAL
ANION GAP SERPL CALCULATED.3IONS-SCNC: 12 MMOL/L (ref 7–15)
ANTIBODY SCREEN: NEGATIVE
BUN SERPL-MCNC: 17.7 MG/DL (ref 8–23)
CALCIUM SERPL-MCNC: 9.2 MG/DL (ref 8.6–10)
CHLORIDE SERPL-SCNC: 111 MMOL/L (ref 98–107)
CREAT SERPL-MCNC: 0.89 MG/DL (ref 0.51–0.95)
DEPRECATED HCO3 PLAS-SCNC: 20 MMOL/L (ref 22–29)
EGFRCR SERPLBLD CKD-EPI 2021: 74 ML/MIN/1.73M2
ERYTHROCYTE [DISTWIDTH] IN BLOOD BY AUTOMATED COUNT: 13.4 % (ref 10–15)
GLUCOSE BLDC GLUCOMTR-MCNC: 154 MG/DL (ref 70–99)
GLUCOSE BLDC GLUCOMTR-MCNC: 172 MG/DL (ref 70–99)
GLUCOSE BLDC GLUCOMTR-MCNC: 230 MG/DL (ref 70–99)
GLUCOSE BLDC GLUCOMTR-MCNC: 234 MG/DL (ref 70–99)
GLUCOSE BLDC GLUCOMTR-MCNC: 242 MG/DL (ref 70–99)
GLUCOSE SERPL-MCNC: 163 MG/DL (ref 70–99)
HCT VFR BLD AUTO: 38.6 % (ref 35–47)
HGB BLD-MCNC: 11.8 G/DL (ref 11.7–15.7)
MAGNESIUM SERPL-MCNC: 2.1 MG/DL (ref 1.7–2.3)
MCH RBC QN AUTO: 26.6 PG (ref 26.5–33)
MCHC RBC AUTO-ENTMCNC: 30.6 G/DL (ref 31.5–36.5)
MCV RBC AUTO: 87 FL (ref 78–100)
PHOSPHATE SERPL-MCNC: 3.1 MG/DL (ref 2.5–4.5)
PLATELET # BLD AUTO: 223 10E3/UL (ref 150–450)
POTASSIUM SERPL-SCNC: 3.8 MMOL/L (ref 3.4–5.3)
RBC # BLD AUTO: 4.44 10E6/UL (ref 3.8–5.2)
SODIUM SERPL-SCNC: 143 MMOL/L (ref 135–145)
SPECIMEN EXPIRATION DATE: NORMAL
TROPONIN T SERPL HS-MCNC: 17 NG/L
WBC # BLD AUTO: 7.2 10E3/UL (ref 4–11)

## 2024-01-15 PROCEDURE — 258N000003 HC RX IP 258 OP 636: Performed by: STUDENT IN AN ORGANIZED HEALTH CARE EDUCATION/TRAINING PROGRAM

## 2024-01-15 PROCEDURE — 85027 COMPLETE CBC AUTOMATED: CPT | Performed by: STUDENT IN AN ORGANIZED HEALTH CARE EDUCATION/TRAINING PROGRAM

## 2024-01-15 PROCEDURE — 84484 ASSAY OF TROPONIN QUANT: CPT | Performed by: INTERNAL MEDICINE

## 2024-01-15 PROCEDURE — 250N000009 HC RX 250

## 2024-01-15 PROCEDURE — 86900 BLOOD TYPING SEROLOGIC ABO: CPT | Performed by: INTERNAL MEDICINE

## 2024-01-15 PROCEDURE — 99233 SBSQ HOSP IP/OBS HIGH 50: CPT | Performed by: INTERNAL MEDICINE

## 2024-01-15 PROCEDURE — 250N000011 HC RX IP 250 OP 636: Performed by: STUDENT IN AN ORGANIZED HEALTH CARE EDUCATION/TRAINING PROGRAM

## 2024-01-15 PROCEDURE — 36415 COLL VENOUS BLD VENIPUNCTURE: CPT | Performed by: STUDENT IN AN ORGANIZED HEALTH CARE EDUCATION/TRAINING PROGRAM

## 2024-01-15 PROCEDURE — 250N000009 HC RX 250: Performed by: INTERNAL MEDICINE

## 2024-01-15 PROCEDURE — 83735 ASSAY OF MAGNESIUM: CPT | Performed by: STUDENT IN AN ORGANIZED HEALTH CARE EDUCATION/TRAINING PROGRAM

## 2024-01-15 PROCEDURE — 84100 ASSAY OF PHOSPHORUS: CPT | Performed by: STUDENT IN AN ORGANIZED HEALTH CARE EDUCATION/TRAINING PROGRAM

## 2024-01-15 PROCEDURE — 250N000013 HC RX MED GY IP 250 OP 250 PS 637: Performed by: STUDENT IN AN ORGANIZED HEALTH CARE EDUCATION/TRAINING PROGRAM

## 2024-01-15 PROCEDURE — 80048 BASIC METABOLIC PNL TOTAL CA: CPT | Performed by: STUDENT IN AN ORGANIZED HEALTH CARE EDUCATION/TRAINING PROGRAM

## 2024-01-15 PROCEDURE — 99207 PR NO CHARGE LOS: CPT | Performed by: STUDENT IN AN ORGANIZED HEALTH CARE EDUCATION/TRAINING PROGRAM

## 2024-01-15 PROCEDURE — 99255 IP/OBS CONSLTJ NEW/EST HI 80: CPT | Performed by: INTERNAL MEDICINE

## 2024-01-15 PROCEDURE — C9113 INJ PANTOPRAZOLE SODIUM, VIA: HCPCS | Performed by: STUDENT IN AN ORGANIZED HEALTH CARE EDUCATION/TRAINING PROGRAM

## 2024-01-15 PROCEDURE — 120N000001 HC R&B MED SURG/OB

## 2024-01-15 PROCEDURE — 97162 PT EVAL MOD COMPLEX 30 MIN: CPT | Mod: GP

## 2024-01-15 PROCEDURE — 250N000013 HC RX MED GY IP 250 OP 250 PS 637: Performed by: INTERNAL MEDICINE

## 2024-01-15 PROCEDURE — 999N000226 HC STATISTIC SLP IP EVAL DEFER

## 2024-01-15 PROCEDURE — 250N000013 HC RX MED GY IP 250 OP 250 PS 637

## 2024-01-15 PROCEDURE — 93306 TTE W/DOPPLER COMPLETE: CPT | Mod: 26 | Performed by: INTERNAL MEDICINE

## 2024-01-15 PROCEDURE — 250N000012 HC RX MED GY IP 250 OP 636 PS 637: Performed by: INTERNAL MEDICINE

## 2024-01-15 PROCEDURE — 93306 TTE W/DOPPLER COMPLETE: CPT

## 2024-01-15 RX ORDER — KETOROLAC TROMETHAMINE 10 MG/1
10 TABLET, FILM COATED ORAL EVERY 6 HOURS PRN
Status: DISCONTINUED | OUTPATIENT
Start: 2024-01-15 | End: 2024-01-15 | Stop reason: ALTCHOICE

## 2024-01-15 RX ORDER — OXYCODONE AND ACETAMINOPHEN 5; 325 MG/1; MG/1
1-2 TABLET ORAL EVERY 6 HOURS PRN
Status: DISCONTINUED | OUTPATIENT
Start: 2024-01-15 | End: 2024-01-16

## 2024-01-15 RX ORDER — FLUTICASONE PROPIONATE AND SALMETEROL 250; 50 UG/1; UG/1
1 POWDER RESPIRATORY (INHALATION) EVERY 12 HOURS
Status: DISCONTINUED | OUTPATIENT
Start: 2024-01-15 | End: 2024-01-15 | Stop reason: ALTCHOICE

## 2024-01-15 RX ORDER — LORAZEPAM 0.5 MG/1
0.5 TABLET ORAL EVERY 6 HOURS PRN
Status: DISCONTINUED | OUTPATIENT
Start: 2024-01-15 | End: 2024-01-17 | Stop reason: HOSPADM

## 2024-01-15 RX ORDER — IBUPROFEN 600 MG/1
600 TABLET, FILM COATED ORAL 4 TIMES DAILY PRN
Status: DISCONTINUED | OUTPATIENT
Start: 2024-01-15 | End: 2024-01-17 | Stop reason: HOSPADM

## 2024-01-15 RX ORDER — LIDOCAINE 40 MG/G
CREAM TOPICAL DAILY
Status: DISCONTINUED | OUTPATIENT
Start: 2024-01-15 | End: 2024-01-15 | Stop reason: ALTCHOICE

## 2024-01-15 RX ORDER — LIDOCAINE 50 MG/G
OINTMENT TOPICAL DAILY
Status: DISCONTINUED | OUTPATIENT
Start: 2024-01-15 | End: 2024-01-15

## 2024-01-15 RX ORDER — ALBUTEROL SULFATE 90 UG/1
1-2 AEROSOL, METERED RESPIRATORY (INHALATION) EVERY 4 HOURS PRN
Status: DISCONTINUED | OUTPATIENT
Start: 2024-01-15 | End: 2024-01-17 | Stop reason: HOSPADM

## 2024-01-15 RX ORDER — OXYCODONE AND ACETAMINOPHEN 5; 325 MG/1; MG/1
1 TABLET ORAL EVERY 6 HOURS PRN
Status: DISCONTINUED | OUTPATIENT
Start: 2024-01-15 | End: 2024-01-15

## 2024-01-15 RX ORDER — LIDOCAINE 40 MG/G
CREAM TOPICAL
Status: COMPLETED | OUTPATIENT
Start: 2024-01-15 | End: 2024-01-15

## 2024-01-15 RX ORDER — LIDOCAINE 50 MG/G
OINTMENT TOPICAL 4 TIMES DAILY PRN
Status: DISCONTINUED | OUTPATIENT
Start: 2024-01-15 | End: 2024-01-17 | Stop reason: HOSPADM

## 2024-01-15 RX ORDER — IPRATROPIUM BROMIDE AND ALBUTEROL SULFATE 2.5; .5 MG/3ML; MG/3ML
1 SOLUTION RESPIRATORY (INHALATION) EVERY 6 HOURS PRN
Status: DISCONTINUED | OUTPATIENT
Start: 2024-01-15 | End: 2024-01-17 | Stop reason: HOSPADM

## 2024-01-15 RX ORDER — DIPHENHYDRAMINE HCL 25 MG
25 CAPSULE ORAL EVERY 6 HOURS PRN
Status: DISPENSED | OUTPATIENT
Start: 2024-01-15 | End: 2024-01-15

## 2024-01-15 RX ORDER — LORAZEPAM 0.5 MG/1
0.5 TABLET ORAL
Status: DISCONTINUED | OUTPATIENT
Start: 2024-01-15 | End: 2024-01-15 | Stop reason: DRUGHIGH

## 2024-01-15 RX ORDER — FLUTICASONE PROPIONATE 50 MCG
1 SPRAY, SUSPENSION (ML) NASAL 2 TIMES DAILY
Status: DISCONTINUED | OUTPATIENT
Start: 2024-01-15 | End: 2024-01-17 | Stop reason: HOSPADM

## 2024-01-15 RX ORDER — DIPHENHYDRAMINE HYDROCHLORIDE 50 MG/ML
25 INJECTION INTRAMUSCULAR; INTRAVENOUS EVERY 6 HOURS PRN
Status: ACTIVE | OUTPATIENT
Start: 2024-01-15 | End: 2024-01-15

## 2024-01-15 RX ORDER — FLUTICASONE FUROATE AND VILANTEROL 100; 25 UG/1; UG/1
1 POWDER RESPIRATORY (INHALATION) DAILY
Status: DISCONTINUED | OUTPATIENT
Start: 2024-01-15 | End: 2024-01-17 | Stop reason: HOSPADM

## 2024-01-15 RX ORDER — LIDOCAINE 4 G/G
1 PATCH TOPICAL
Status: DISCONTINUED | OUTPATIENT
Start: 2024-01-15 | End: 2024-01-17 | Stop reason: HOSPADM

## 2024-01-15 RX ORDER — LIDOCAINE 50 MG/G
1 PATCH TOPICAL EVERY 24 HOURS
Status: DISCONTINUED | OUTPATIENT
Start: 2024-01-15 | End: 2024-01-15

## 2024-01-15 RX ORDER — FLUTICASONE PROPIONATE 50 MCG
1 SPRAY, SUSPENSION (ML) NASAL 2 TIMES DAILY
COMMUNITY
End: 2024-01-19

## 2024-01-15 RX ADMIN — OXYCODONE HYDROCHLORIDE AND ACETAMINOPHEN 2 TABLET: 5; 325 TABLET ORAL at 21:35

## 2024-01-15 RX ADMIN — INSULIN ASPART 2 UNITS: 100 INJECTION, SOLUTION INTRAVENOUS; SUBCUTANEOUS at 11:27

## 2024-01-15 RX ADMIN — LORAZEPAM 0.5 MG: 0.5 TABLET ORAL at 05:51

## 2024-01-15 RX ADMIN — HYDRALAZINE HYDROCHLORIDE 10 MG: 20 INJECTION INTRAMUSCULAR; INTRAVENOUS at 08:27

## 2024-01-15 RX ADMIN — OXYCODONE HYDROCHLORIDE AND ACETAMINOPHEN 2 TABLET: 5; 325 TABLET ORAL at 15:26

## 2024-01-15 RX ADMIN — LIDOCAINE 1 PATCH: 4 PATCH TOPICAL at 10:54

## 2024-01-15 RX ADMIN — IBUPROFEN 600 MG: 600 TABLET, FILM COATED ORAL at 18:15

## 2024-01-15 RX ADMIN — MORPHINE SULFATE 2 MG: 2 INJECTION, SOLUTION INTRAMUSCULAR; INTRAVENOUS at 04:16

## 2024-01-15 RX ADMIN — TOPIRAMATE 100 MG: 100 TABLET, FILM COATED ORAL at 08:18

## 2024-01-15 RX ADMIN — LIDOCAINE: 40 CREAM TOPICAL at 10:56

## 2024-01-15 RX ADMIN — DULOXETINE HYDROCHLORIDE 60 MG: 30 CAPSULE, DELAYED RELEASE ORAL at 21:21

## 2024-01-15 RX ADMIN — SODIUM CHLORIDE: 9 INJECTION, SOLUTION INTRAVENOUS at 09:35

## 2024-01-15 RX ADMIN — ONDANSETRON 4 MG: 2 INJECTION INTRAMUSCULAR; INTRAVENOUS at 00:53

## 2024-01-15 RX ADMIN — INSULIN GLARGINE 30 UNITS: 100 INJECTION, SOLUTION SUBCUTANEOUS at 21:24

## 2024-01-15 RX ADMIN — MORPHINE SULFATE 2 MG: 2 INJECTION, SOLUTION INTRAMUSCULAR; INTRAVENOUS at 08:29

## 2024-01-15 RX ADMIN — FLUTICASONE PROPIONATE 1 SPRAY: 50 SPRAY, METERED NASAL at 21:21

## 2024-01-15 RX ADMIN — ATORVASTATIN CALCIUM 40 MG: 40 TABLET, FILM COATED ORAL at 21:21

## 2024-01-15 RX ADMIN — INSULIN ASPART 3 UNITS: 100 INJECTION, SOLUTION INTRAVENOUS; SUBCUTANEOUS at 18:08

## 2024-01-15 RX ADMIN — ONDANSETRON 4 MG: 2 INJECTION INTRAMUSCULAR; INTRAVENOUS at 08:27

## 2024-01-15 RX ADMIN — PANTOPRAZOLE SODIUM 40 MG: 40 INJECTION, POWDER, FOR SOLUTION INTRAVENOUS at 08:18

## 2024-01-15 RX ADMIN — ASPIRIN 81 MG CHEWABLE TABLET 81 MG: 81 TABLET CHEWABLE at 08:18

## 2024-01-15 RX ADMIN — TOPIRAMATE 100 MG: 100 TABLET, FILM COATED ORAL at 21:21

## 2024-01-15 RX ADMIN — LIDOCAINE: 50 OINTMENT TOPICAL at 17:40

## 2024-01-15 RX ADMIN — NORTRIPTYLINE HYDROCHLORIDE 10 MG: 10 CAPSULE ORAL at 21:21

## 2024-01-15 RX ADMIN — DIPHENHYDRAMINE HYDROCHLORIDE 25 MG: 25 CAPSULE ORAL at 01:30

## 2024-01-15 RX ADMIN — DULOXETINE HYDROCHLORIDE 60 MG: 30 CAPSULE, DELAYED RELEASE ORAL at 08:18

## 2024-01-15 RX ADMIN — LIDOCAINE: 50 OINTMENT TOPICAL at 12:18

## 2024-01-15 RX ADMIN — CLOPIDOGREL BISULFATE 75 MG: 75 TABLET ORAL at 08:18

## 2024-01-15 RX ADMIN — HYDRALAZINE HYDROCHLORIDE 10 MG: 20 INJECTION INTRAMUSCULAR; INTRAVENOUS at 21:35

## 2024-01-15 RX ADMIN — LORAZEPAM 0.5 MG: 0.5 TABLET ORAL at 13:47

## 2024-01-15 RX ADMIN — METHOCARBAMOL 750 MG: 750 TABLET, FILM COATED ORAL at 16:30

## 2024-01-15 RX ADMIN — INSULIN ASPART 1 UNITS: 100 INJECTION, SOLUTION INTRAVENOUS; SUBCUTANEOUS at 08:17

## 2024-01-15 RX ADMIN — FLUTICASONE PROPIONATE 1 SPRAY: 50 SPRAY, METERED NASAL at 12:18

## 2024-01-15 RX ADMIN — LIDOCAINE: 40 CREAM TOPICAL at 04:43

## 2024-01-15 RX ADMIN — FLUTICASONE FUROATE AND VILANTEROL TRIFENATATE 1 PUFF: 100; 25 POWDER RESPIRATORY (INHALATION) at 10:55

## 2024-01-15 RX ADMIN — METHOCARBAMOL 750 MG: 750 TABLET, FILM COATED ORAL at 11:32

## 2024-01-15 ASSESSMENT — ACTIVITIES OF DAILY LIVING (ADL)
ADLS_ACUITY_SCORE: 22

## 2024-01-15 NOTE — CONSULTS
"[facility]   [address], [place] [address]   [email or web address]   OFFICE: [phone]     CARDIOLOGY INPATIENT CONSULT NOTE     Impression and Plan     Assessment:  Unstable angina - with typical anginal pain for about a week's duration relieved with NTG.   Coronary artery disease with prior CABG.  DMII, on chronic insulin - uncontrolled  Hypertension - with labile hypertension  dyslipidemia    Plan:  Discussed with the patient the risks and benefits of left heart catheterization with coronary angiogram including possible PCI. The risks include but are not limited to death, myocardial infarction, stroke, kidney dysfunction, vessel trauma, hemorrhage, need for emergency corrective surgery, allergy, and dysrhythmia. Ms. Rahman is very anxious about the procedure and insists that this be delayed until tomorrow.  Would start heparin gtt if chest pain returns  NPO p MN    Note: pt was very anxious and tearful during the encounter. At one point she stated that a nurse in the ER told her that it is not default or standard practice to resuscitate people if they \"flat line\". She then strongly suggested that this was told to her because of her race. Throughout the encounter she made it very clear that she did not trust the staff at [facility] and did not trust the medical system as a whole.    Primary Cardiologist: not established.    History of Present Illness      Ms. Sarah Rahman is a 58 year old female with a history of coronary artery disease (CABG), uncontrolled DMII, CVA, HTH, HLD, obesity who presented with chest pain.    Ms. Rahman reports having intermittent chest pain that is described as sharp for the past 6 days. It is located on the left side of her chest and below her left breast and radiates into her left shoulder and arm. It is associated with shortness of breath and a clammy sensation. She has taken NTG x4 over the past week with " relief of the pain. She describes the pain as similar to her prior anginal pain before her bypass surgery many years ago.     Other than noted above, Ms. Rahman denies any chest pain/pressure/tightness, shortness of breath at rest or with exertion, light headedness/dizziness, pre-syncope, syncope, lower extremity swelling, palpitations, paroxysmal nocturnal dyspnea (PND), or orthopnea.      Review of Systems:  Further review of systems is otherwise negative/noncontributory (based on review of medical record (admission H&P) and 13 point review of systems reviewed. Pertinent positives noted).    Cardiac Diagnostics     ECG: Personally reviewed and interpreted: sinus rhythm with subtle lateral ST/T wave abnormality.    Telemetry (personally reviewed): sinus rhythm. One alert for VT is most consistent with artifact    Most recent:  Echocardiogram (results reviewed):   TTE  7/28/23  1.Left ventricular size, wall motion and function are normal. The ejection fraction is 60-65%.  2.There is mild to moderate concentric left ventricular hypertrophy.  3.Normal right ventricle size and systolic function.  4.The left atrium is moderately dilated.  5.A contrast injection (Bubble Study) was performed that was negative for flow across the interatrial septum.  6.Thickened mitral valve anterior leaflet There is moderately severe (3+) mitral regurgitation.ERO is 0.36 cm2 with volume of 44 cc.  There is no comparison study available.    Cardiac Cath (results reviewed):   Coronary angiogram 6/2/21    Ramus lesion is 50% stenosed.    Ost LM to Dist LM lesion is 30% stenosed.    Origin to Insertion lesion is 100% stenosed.    Dist LM to Mid LAD lesion is 100% stenosed.    LIMA to LAD is patent without obstruction    SVG to small 1st diagonal is occluded and fills from LAD collaterals       Cardiac Stress Testing (results reviewed):   Lexiscan NM stress test 5/8/23    A prior study was conducted on 6/1/2021.  Prior images were unavailable  for comparison review.    Pharmacological regadenoson stress ECG is negative for inducible myocardial ischemia.    Pharmacological regadenoson nuclear stress test is negative for inducible myocardial ischemia or infarction.    Normal left ventricular size, wall motion and systolic function.  Calculated left ventricle ejection fraction is 70%.    The patient is at a low risk of future cardiac ischemic events.        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 Hamm at [facility]    Schizoaffective disorder (H)     Sleep difficulties 7/17/2022     Past Surgical History:   Procedure Laterality Date    BYPASS GRAFT ARTERY CORONARY  01/01/2009    x2    CAROTID ENDARTERECTOMY Left 12/2021    CORONARY STENT PLACEMENT      CV CORONARY ANGIOGRAM N/A 06/02/2021    Procedure: Coronary Angiogram;  Surgeon: Juventino Rivera MD;  Location: [facility] Cardiac Cath Lab;  Service: Cardiology    HYSTERECTOMY TOTAL ABDOMINAL      HYSTERECTOMY TOTAL ABDOMINAL, BILATERAL SALPINGO-OOPHORECTOMY, COMBINED      IR TRANSLAMINAR EPIDURAL LUMBAR INJ INCL IMAGING  09/27/2022    LUMBAR DISCECTOMY Right 06/13/2019    L5-S1 - Dr. Hamm    NASAL FRACTURE SURGERY      ORIF ULNAR / RADIAL SHAFT FRACTURE Right      Family History   Problem Relation Age of Onset    Heart Disease Mother     Heart Disease Father     Heart Disease Sister     Heart Disease Sister     Diabetes Brother     Coronary Artery Disease Brother         MI            Social History     Socioeconomic History    Marital status: Single     Spouse name: Not on file    Number of children: Not on file    Years of education: Not on file    Highest education level: Not on file   Occupational History    Not on file   Tobacco Use    Smoking status: Every Day     Packs/day: .5     Types: Cigarettes    Smokeless tobacco: Never    Tobacco comments:     Seen IP by [facility] on 7/28/23 and declined counseling services and resource packet   Vaping Use    Vaping Use: Never used   Substance and Sexual Activity    Alcohol use: Not Currently     Comment: Alcoholic Drinks/day: occ    Drug use: No    Sexual activity: Not Currently   Other Topics Concern    Not on file   Social History Narrative    ** Merged History Encounter **         Lives alone in a condo.      On disability.  Three living children.       Social Determinants of Health     Financial Resource Strain: Not on file   Food Insecurity: Not on file   Transportation Needs: Not on file   Physical Activity: Not on file   Stress: Not on file   Social Connections: Not on file   Interpersonal Safety: Low Risk  (10/12/2023)    Interpersonal Safety     Do you feel physically and emotionally safe where you currently live?: Yes     Within the past 12 months, have you been hit, slapped, kicked or otherwise physically hurt by someone?: No     Within the past 12 months, have you been humiliated or emotionally abused in other ways by your partner or ex-partner?: No   Housing Stability: Not on file             Physical Examination   VITALS: BP (!) 177/90 (BP Location: Right arm)   Pulse 90   Temp 97.4  F (36.3  C) (Oral)   Resp 19   LMP  (LMP Unknown)   SpO2 96%   BMI: There is no height or weight on file to calculate BMI.  Wt Readings from Last 3 Encounters:   11/29/23 89.4 kg (197 lb)   11/02/23 86.6 kg (191 lb)   10/17/23 86.6 kg (191 lb)       Intake/Output Summary (Last 24 hours) at 1/15/2024 0818  Last data filed at 1/15/2024 0430  Gross  per 24 hour   Intake 240 ml   Output --   Net 240 ml       General: pleasant female. Very anxious and tearful at times.  HENT: external ears normal. Nares patent. Mucous membranes moist.  Eyes: perrla, extraocular muscles intact. No scleral icterus.   Neck: No JVD  Lungs: clear to auscultation  COR: regular rate and rhythm, No murmurs, rubs, or gallops  Abd: nondistended, BS present  Extrem: No edema         Non-cardiac Imaging Studies Reviewed      MR Brain 1/14/24  1.  Senescent intracranial changes and sequelae of mild chronic microangiopathy without acute intracranial abnormality.     CTA head/neck  IMPRESSION:   HEAD CT:   1.  No acute intracranial hemorrhage or CT evidence of acute large vascular territory ischemic infarct.   2.  Similar small chronic infarcts in both occipital lobes with changes suggestive of mild chronic microvascular ischemic disease.     HEAD CTA:   1.  No large vessel occlusion or new flow-limiting stenosis.   2.  Redemonstration of scattered intracranial atherosclerotic disease as further detailed above. Of note, there is mixed atherosclerotic plaque throughout the carotid siphons with areas of moderate to advanced luminal narrowing involving the paraclinoid   segments bilaterally. This is similar on the right and perhaps slightly progressed on the left compared to October 2023.     NECK CTA:   1.  No high-grade stenosis or occlusion involving the major arteries of the neck.   2.  Atherosclerotic changes about the carotid bifurcations without substantial (50% or greater) luminal narrowing.   3.  No evidence of dissection.     CTA c/a/p3  IMPRESSION:   1. No evidence of acute aortic syndrome. No acute findings in the chest, abdomen, or pelvis.        Lab Results Reviewed    Chemistry/lipid CBC Cardiac Enzymes/BNP/TSH/INR   Recent Labs   Lab Test 01/14/24  1307   CHOL 231*   HDL 37*   *   TRIG 141     Recent Labs   Lab Test 01/14/24  1307 01/14/24  1056 07/27/23  1430   *  190* 116*     Recent Labs   Lab Test 01/15/24  0759 01/14/24  1715 01/14/24  1056   NA  --   --  141   POTASSIUM  --   --  4.0   CHLORIDE  --   --  108*   CO2  --   --  19*   *   < > 161*   BUN  --   --  23.0   CR  --   --  1.21*   GFRESTIMATED  --   --  51*   MAXIMO  --   --  9.7    < > = values in this interval not displayed.     Recent Labs   Lab Test 01/14/24  1056 11/29/23  1951 10/17/23  0333   CR 1.21* 0.97* 1.08*     Recent Labs   Lab Test 01/14/24  1056 09/08/23  1304 07/27/23  1430   A1C 8.8* 9.7* 8.3*          Recent Labs   Lab Test 01/15/24  0734   WBC 7.2   HGB 11.8   HCT 38.6   MCV 87        Recent Labs   Lab Test 01/15/24  0734 01/14/24  1056 11/29/23 1951   HGB 11.8 11.3* 10.6*    Recent Labs   Lab Test 07/17/22  1520 05/24/22  1022 04/03/22  1436   TROPONINI <0.01 0.01 <0.01     Recent Labs   Lab Test 01/14/24  1056 11/29/23  1951 10/17/23  0530 10/17/23  0333 05/05/23  1003 04/03/22  1436 05/30/21  1101 10/21/18  1335   BNP  --   --   --   --   --  139* 103* 140*   NTBNPI  --  204  --  193 285  --   --   --    NTBNP 200  --  222  --   --   --   --   --      Recent Labs   Lab Test 10/17/23  0530   TSH 1.25     Recent Labs   Lab Test 07/28/23  0426 10/06/22  0918 05/24/22  1022   INR 1.05 1.04 0.95           Current Inpatient Scheduled Medications   Scheduled Meds:   aspirin  81 mg Oral Daily    atorvastatin  40 mg Oral QPM    clopidogrel  75 mg Oral Daily    DULoxetine  60 mg Oral BID    insulin aspart  1-7 Units Subcutaneous TID AC    insulin aspart  1-5 Units Subcutaneous At Bedtime    insulin glargine  24 Units Subcutaneous At Bedtime    nortriptyline  10 mg Oral At Bedtime    pantoprazole  40 mg Intravenous Daily with breakfast    sodium chloride (PF)  3 mL Intracatheter Q8H    topiramate  100 mg Oral BID     Continuous Infusions:   sodium chloride 75 mL/hr at 01/14/24 4663       No current outpatient medications on file.          Medications Prior to Admission   Prior to Admission  medications    Medication Sig Start Date End Date Taking? Authorizing Provider   acetaminophen (TYLENOL) 500 MG tablet Take 2 tablets (1,000 mg) by mouth every 6 hours as needed for mild pain 12/1/23  Yes Kike David MD   albuterol (PROAIR HFA) 108 (90 Base) MCG/ACT inhaler Inhale 1-2 puffs into the lungs every 4 hours as needed for shortness of breath or wheezing 10/17/23  Yes Laura Pennington MD   aspirin 81 MG EC tablet Take 1 tablet (81 mg) by mouth daily 10/12/23 10/11/24 Yes Kike David MD   clopidogrel (PLAVIX) 75 MG tablet Take 1 tablet (75 mg) by mouth daily 7/29/23  Yes Tasia Escobedo MD   Dexpanthenol 2 % CREA Externally apply topically daily   Yes Unknown, Entered By History   dulaglutide (TRULICITY) 0.75 MG/0.5ML pen Inject 0.75 mg Subcutaneous every 7 days   Yes Unknown, Entered By History   DULoxetine (CYMBALTA) 60 MG capsule Take 60 mg by mouth 2 times daily   Yes Unknown, Entered By History   fluticasone-salmeterol (ADVAIR) 250-50 MCG/ACT inhaler Inhale 1 puff into the lungs every 12 hours 10/13/23  Yes Kike David MD   glipiZIDE (GLUCOTROL XL) 5 MG 24 hr tablet Take 10 mg by mouth daily   Yes Unknown, Entered By History   insulin glargine (LANTUS PEN) 100 UNIT/ML pen Inject 24 Units Subcutaneous At Bedtime   Yes Unknown, Entered By History   ipratropium - albuterol 0.5 mg/2.5 mg/3 mL (DUONEB) 0.5-2.5 (3) MG/3ML neb solution Take 1 vial (3 mLs) by nebulization every 6 hours as needed for shortness of breath, wheezing or cough 10/17/23  Yes Rosario Moe PA-C   ketorolac (TORADOL) 10 MG tablet TAKE ONE TABLET BY MOUTH EVERY 6 HOURS AS NEEDED FOR MODERATE PAIN 10/25/23  Yes Kike David MD   lidocaine (LIDODERM) 5 % patch Place onto the skin every 24 hours To prevent lidocaine toxicity, patient should be patch free for 12 hrs daily.   Yes Unknown, Entered By History   lidocaine (LMX4) 4 % external cream Apply topically daily Apply to foot   Yes Unknown, Entered By History    methocarbamol (ROBAXIN) 750 MG tablet Take 1 tablet (750 mg) by mouth 4 times daily as needed for muscle spasms 5/10/23  Yes Kike Wright MD   naloxone (NARCAN) 4 MG/0.1ML nasal spray Spray 4 mg into one nostril alternating nostrils as needed for opioid reversal every 2-3 minutes until assistance arrives   Yes Unknown, Entered By History   nitroGLYcerin (NITROSTAT) 0.4 MG sublingual tablet Place 1 tablet (0.4 mg) under the tongue every 5 minutes as needed for chest pain For chest pain place 1 tablet under the tongue every 5 minutes for 3 doses. If symptoms persist 5 minutes after 1st dose call 911. 12/12/22  Yes Kike David MD   nortriptyline (PAMELOR) 10 MG capsule Take 10 mg by mouth At Bedtime   Yes Unknown, Entered By History   ondansetron (ZOFRAN) 8 MG tablet Take 1 tablet (8 mg) by mouth every 8 hours as needed for nausea 5/18/23  Yes Kike David MD   oxyCODONE-acetaminophen (PERCOCET)  MG per tablet Take 1 tablet by mouth every 6 hours as needed for pain   Yes Unknown, Entered By History   topiramate (TOPAMAX) 50 MG tablet Take 100 mg by mouth 2 times daily   Yes Unknown, Entered By History   amLODIPine (NORVASC) 5 MG tablet Take 1 tablet (5 mg) by mouth 2 times daily 9/29/22 10/8/22  Montse Nieto MD   diclofenac (VOLTAREN) 50 MG EC tablet Take 1 tablet (50 mg) by mouth 3 times daily for 10 days 6/7/22 7/20/22  Mara Scott NP   pregabalin (LYRICA) 150 MG capsule Take 150 mg by mouth 3 times daily  9/29/22  Unknown, Entered By History              Clinically Significant Risk Factors Present on Admission                # Drug Induced Platelet Defect: home medication list includes an antiplatelet medication   # Hypertension: Noted on problem list     # DMII: A1C = 8.8 % (Ref range: <5.7 %) within past 6 months        # Asthma: noted on problem list  # History of CABG: noted on surgical history

## 2024-01-15 NOTE — PLAN OF CARE
"Pt admitted from ED/MRI to room 127 via W/C around 2000. Pt is alert oriented x4, and has been settled. Assisted of one to use the BR due to unsteady gait.  VSS.  Pt reports right sided weakness from previous stroke. Current NIHS score of 4.  Pt C/O of chest pain of 7/10 on the pain scale earlier but decline pain med.  Morphine 2mg IV given at 2155 per her request for chest pain with relief. Refused HS po med. Pt states she has been \"throwing up in past few days\" and wants to take her po meds starting tomorrow. Currently in bed resting. Plan of care ongoing.  Problem: Pain Acute  Goal: Optimal Pain Control and Function  Outcome: Progressing  Intervention: Develop Pain Management Plan  Recent Flowsheet Documentation  Taken 1/14/2024 2000 by Mabel Beard RN  Pain Management Interventions: (pt states tolerate pain of 7/10) declines  Intervention: Prevent or Manage Pain  Recent Flowsheet Documentation  Taken 1/14/2024 2030 by Mabel Beard, RN  Medication Review/Management: medications reviewed     Problem: Stroke, Ischemic (Includes Transient Ischemic Attack)  Goal: Effective Bowel Elimination  Outcome: Progressing     Problem: Stroke, Ischemic (Includes Transient Ischemic Attack)  Goal: Optimal Coping  Outcome: Progressing   Goal Outcome Evaluation:                        "

## 2024-01-15 NOTE — PLAN OF CARE
Problem: Adult Inpatient Plan of Care  Goal: Plan of Care Review  Description: The Plan of Care Review/Shift note should be completed every shift.  The Outcome Evaluation is a brief statement about your assessment that the patient is improving, declining, or no change.  This information will be displayed automatically on your shift  note.  Outcome: Progressing     Problem: Pain Acute  Goal: Optimal Pain Control and Function  Outcome: Progressing  Intervention: Develop Pain Management Plan  Recent Flowsheet Documentation  Taken 1/15/2024 0416 by Jhonatan Argueta, RN  Pain Management Interventions: medication (see MAR)  Taken 1/15/2024 0001 by Jhonatan Argueta, RN  Pain Management Interventions: declines  Intervention: Prevent or Manage Pain  Recent Flowsheet Documentation  Taken 1/15/2024 0416 by Jhonatan Argueta, RN  Medication Review/Management: medications reviewed  Taken 1/15/2024 0001 by Jhonatan Argueta, RN  Medication Review/Management: medications reviewed   Goal Outcome Evaluation:       Pt alert and oriented x4. Assist of 1 to the bathroom. Pt refused gait belt and walker. Scoring 4 on NIHHS for right sided weakness. Complained of nausea PRN Zofran given with good effect. Lidocaine cream applied for foot pain. Pt complain of chest pain offered Nitroglycerin but pt refused. PRN morphine was given with good effect. BP was elevated at 190s systolic offered PRN Hydralazine but pt refused and said that she doesn't want to take any medicine anymore and pt started to become anxious, tearful and crying and complaining that she cannot breath and saying that she's having panic attack and asking for meds to calm her down. House resident informed and Lorazepam was ordered and given.Attempted to recheck BP but pt refused. Appears to be sleeping at this time.

## 2024-01-15 NOTE — PROGRESS NOTES
01/15/24 1300   Appointment Info   Signing Clinician's Name / Credentials (OT) Aspen PHELPS OTR/L CLT   Appointment Canceled Reason (OT) Patient declined   Appointment Cancel Comments (OT) Pt. declining OT services

## 2024-01-15 NOTE — UTILIZATION REVIEW
Admission Status; Secondary Review Determination       Under the authority of the Utilization Management Committee, the utilization review process indicated a secondary review on the above patient. The review outcome is based on review of the medical records, discussions with staff, and applying clinical experience noted on the date of the review.     (x) Inpatient Status Appropriate - This patient's medical care is consistent with medical management for inpatient care and reasonable inpatient medical practice.     RATIONALE FOR DETERMINATION     Ms. Rahman is a 57 yo female with a PMH of CAD, DM, and schizoaffective d/o who presents to the ED with ongoing chest pain relieved with NTG for the last week PTA.  Cardiology consulted; diagnosed with unstable angina and recommended to have cardiac angiogram.  She is nervous about this procedure and declined this recommendation today.  Plan to continue with prn NTG and start heparin gtt if chest pain returns at any time today.  At this time she is not medically cleared for discharge without further medical evaluation and treatment. She is requiring continued close clinical monitoring today.      At the time of admission with the information available to the attending physician more than 2 nights Hospital complex care was anticipated, based on patient risk of adverse outcome if treated as outpatient and complex care required. Inpatient admission is appropriate based on the Medicare guidelines.       The information on this document is developed by the utilization review team in order for the business office to ensure compliance. This only denotes the appropriateness of proper admission status and does not reflect the quality of care rendered.   The definitions of Inpatient Status and Observation Status used in making the determination above are those provided in the CMS Coverage Manual, Chapter 1 and Chapter 6, section 70.4.         Sincerely,     Ginger Gonzalez,  DO  Utilization Review  Physician Advisor  [facility].

## 2024-01-15 NOTE — PROGRESS NOTES
[facility]    PROGRESS NOTE - Hospitalist Service    Assessment and Plan  58 year old female with a past medical history of diabetes mellitus type II, coronary artery disease, hypertension, asthma, COPD, hyperlipidemia, anxiety, s/p coronary bypass, and schizoaffective disorder who presents to this ED for evaluation of chest pain.  Admitted with unstable angina      Unstable angina  - Patient presented with typical chest pain that is relieved with nitroglycerin  - Significant history of coronary artery disease s/p CABG  - Mild elevated serial troponin  - Echo shows EF of 65% with mild mitral stenosis  - Cardiology consult, appreciate input  - Plan for coronary angiogram, patient declined for today but agreed for tomorrow-   - Continue to monitor on telemetry    History of CVA  - Patient complaining of leg weakness  - Negative CTA head and neck for significant acute changes  - MRI brain is negative for acute changes and shows mild chronic microangiopathy.  - PT/OT evaluation    Acute kidney injury  -Probably prerenal secondary to dehydration from nausea vomiting  -Improving with IV hydration  -Decrease IV fluids to 50 cc an hour and continue with it as patient is going for angiogram tomorrow  -Continue to monitor renal function    Diabetes mellitus type 2  - Poorly controlled  - Elevated hemoglobin A1c at 8.8  - Continue home Lantus, increase dose to 30 units  - Hold home glipizide and Trulicity for now  - Cover with insulin sliding scale  - Add carb counting insulin coverage.    Hyperlipidemia  - Lipid panel shows elevation of LDL and total cholesterol  - Started on Lipitor    Labile hypertension  - Patient blood pressure is fluctuating, probably secondary to anxiety   - Add IV hydralazine as needed  - Defer restarting hypertension medications to cardiology     Significant anxiety  - Restart home medications   - Add Ativan as needed    Chronic pain syndrome  - Resume home medications  -Avoid  IV narcotics      50 MINUTES SPENT BY ME on the date of service doing chart review, history, exam, documentation & further activities per the note    Principal Problem:    Right leg weakness  Active Problems:    Syncope and collapse    Chest pain, unspecified type      VTE prophylaxis:  Pneumatic Compression Devices  DIET: Orders Placed This Encounter      Combination Diet Regular Diet Adult      Disposition/Barriers to discharge: Angiogram tomorrow  Code Status: Full Code    Subjective:  Kristin is feeling very anxious today, crying during interview.  Scared of having heart attack.    PHYSICAL EXAM  There were no vitals filed for this visit.  B/P:139/64 T:97.5 P:85 R:20     Intake/Output Summary (Last 24 hours) at 1/15/2024 1616  Last data filed at 1/15/2024 0430  Gross per 24 hour   Intake 240 ml   Output --   Net 240 ml      There is no height or weight on file to calculate BMI.    Constitutional: awake, alert, cooperative, no apparent distress, and appears stated age  Respiratory: No increased work of breathing, good air exchange, clear to auscultation bilaterally, no crackles or wheezing  Cardiovascular: Normal apical impulse, regular rate and rhythm, normal S1 and S2, no S3 or S4, and no murmur noted  GI: No scars, normal bowel sounds, soft, non-distended, non-tender, no masses palpated, no hepatosplenomegally  Skin: no bruising or bleeding and normal skin color, texture, turgor  Musculoskeletal: There is no redness, warmth, or swelling of the joints.  Full range of motion noted.  no lower extremity pitting edema present  Neurologic: Awake, alert, oriented to name, place and time.  Cranial nerves II-XII are grossly intact.  Motor is 5 out of 5 bilaterally.   Sensory is intact.    Neuropsychiatric: Significant anxiety      PERTINENT LABS/IMAGING:    I have personally reviewed the following data over the past 24 hrs:    7.2  \   11.8   / 223     143 111 (H) 17.7 /  234 (H)   3.8 20 (L) 0.89 \     Trop: 17 (H)  BNP: N/A     Ferritin:  N/A % Retic:  N/A LDH:  N/A       Imaging results reviewed over the past 24 hrs:   Recent Results (from the past 24 hour(s))   MR Brain w/o & w Contrast    Narrative    EXAM: MR BRAIN W/O and W CONTRAST  LOCATION: [facility]  DATE: 2024    INDICATION: Worsening right leg weakness. Stroke evaluation.  COMPARISON: CTA of the head and neck dated 2024.  CONTRAST: Gadavist 9 mL  TECHNIQUE: Routine multiplanar multisequence head MRI without and with intravenous contrast.    FINDINGS:  INTRACRANIAL CONTENTS: Chronic right cerebellar hemisphere and the left thalamic lacunar infarcts without evidence of acute/subacute ischemia. No mass, acute hemorrhage, or extra-axial fluid collections. Patchy nonspecific T2/FLAIR hyperintensities   within the cerebral white matter most consistent with mild to moderate chronic microvascular ischemic change. No pathologic enhancement. Mild generalized cerebral volume loss. No hydrocephalus. Normal position of the cerebellar tonsils. No pathologic   contrast enhancement.    SELLA: No abnormality accounting for technique.    OSSEOUS STRUCTURES/SOFT TISSUES: Normal marrow signal. The major intracranial vascular flow voids are maintained.     ORBITS: No abnormality accounting for technique.     SINUSES/MASTOIDS: No paranasal sinus mucosal disease. No middle ear or mastoid effusion.       Impression    IMPRESSION:    1.  Senescent intracranial changes and sequelae of mild chronic microangiopathy without acute intracranial abnormality.   Echocardiogram Complete    Narrative    [id number]  [id number]  [id number]  [id number]^GONDAL^GRISELDA^[place] [address]     Name: LAUREN HAIRSTON  MRN: [id number]  : 1965  Study Date: 01/15/2024 09:53 AM  Age: 58 yrs  Gender: Female  Patient Location: [facility]  Reason For Study: Syncope and Collapse  Ordering Physician: GONDAL, SAAD J  Referring Physician:  GALLO HERRERA  Performed By: WR     BSA: 2.0 m2  Height: 67 in  Weight: 197 lb  HR: 86  BP: 177/90 mmHg  ______________________________________________________________________________  Procedure  Complete Portable Echo Adult.  ______________________________________________________________________________  Interpretation Summary     1. Normal left ventricular size and systolic performance with a visually  estimated ejection fraction of 60-65%.  2. There is mild mitral stenosis.  3. There is mild-moderate, to perhaps moderate, mitral insufficiency.  4. Normal right ventricular size and systolic performance.  5. There is mild to moderate left atrial enlargement.     When compared to the prior real-time echocardiogram dated 28 July 2023, the  degree of mitral insufficiency appears somewhat less on the current study.  ______________________________________________________________________________  Left ventricle:  Normal left ventricular size and systolic performance with a visually  estimated ejection fraction of 60-65%. There is normal regional wall motion.  There is mild concentric increase in left ventricular wall thickness.     Assessment of LV Diastolic Function: The cumulative findings are indeterminate  in the evaluation of diastolic function.     Right ventricle:  Normal right ventricular size and systolic performance.     Left atrium:  There is mild to moderate left atrial enlargement.     Right atrium:  The right atrium is of normal size.     IVC:  The IVC is not well-visualized.     Aortic valve:  The aortic valve is comprised of three cusps. No significant aortic stenosis  or aortic insufficiency is detected on this study.     Mitral valve:  There is mild nonspecific mitral valve leaflet thickening. There is mild  mitral stenosis. There is mild-moderate, to perhaps moderate, mitral  insufficiency.     Tricuspid valve:  The tricuspid valve is grossly morphologically normal. There is mild  tricuspid  insufficiency.     Pulmonic valve:  The pulmonic valve is grossly morphologically normal. There is mild pulmonic  insufficiency.     Thoracic aorta:  There is borderline enlargement of the proximal ascending aorta.     Pericardium:  There is no significant pericardial effusion.  ______________________________________________________________________________  ______________________________________________________________________________  MMode/2D Measurements & Calculations  RVDd: 2.8 cm  IVSd: 1.4 cm  LVIDd: 4.1 cm  LVIDs: 2.7 cm  LVPWd: 1.4 cm  FS: 34.9 %  LV mass(C)d: 213.0 grams  LV mass(C)dI: 106.0 grams/m2  Ao root diam: 2.9 cm  LA dimension: 4.1 cm  asc Aorta Diam: 3.9 cm  LA/Ao: 1.4  LVOT diam: 1.9 cm  LVOT area: 2.8 cm2  Ao root diam index Ht(cm/m): 1.7  Ao root diam index BSA (cm/m2): 1.4  Asc Ao diam index BSA (cm/m2): 1.9  Asc Ao diam index Ht(cm/m): 2.3  LA Volume (BP): 65.1 ml     LA Volume Index (BP): 32.4 ml/m2  LA Volume Indexed (AL/bp): 34.1 ml/m2  RWT: 0.66  TAPSE: 1.4 cm     Time Measurements  Aortic HR: 92.0 BPM  MM HR: 86.0 BPM     Doppler Measurements & Calculations  MV E max jairo: 129.0 cm/sec  MV A max jairo: 158.0 cm/sec  MV E/A: 0.82  MV dec slope: 606.0 cm/sec2  MV dec time: 0.21 sec  Ao V2 max: 169.0 cm/sec  Ao max P.0 mmHg  Ao V2 mean: 112.0 cm/sec  Ao mean P.0 mmHg  Ao V2 VTI: 31.4 cm  HERNANDEZ(I,D): 2.2 cm2  HERNANDEZ(V,D): 2.0 cm2  LV V1 max P.0 mmHg  LV V1 max: 122.0 cm/sec  LV V1 VTI: 24.6 cm  CO(LVOT): 6.4 l/min  CI(LVOT): 3.2 l/min/m2  SV(LVOT): 69.7 ml  SI(LVOT): 34.7 ml/m2  PA acc time: 0.18 sec  PI end-d jairo: 127.0 cm/sec  TR max jairo: 258.0 cm/sec  TR max P.6 mmHg  AV Jairo Ratio (DI): 0.72  HERNANDEZ Index (cm2/m2): 1.1  E/E': 15.9  E/E' av.1     Lateral E/e': 16.0  Medial E/e': 18.2  Peak E' Jairo: 8.1 cm/sec  RV S Jairo: 10.3 cm/sec     ______________________________________________________________________________  Report approved by: Kyrie Walker 01/15/2024  11:03 AM             Discussed with patient, family, cardiology, nursing staff and discharge planner    Gaetano Wright MD  [facility] Medicine Service  [phone]

## 2024-01-15 NOTE — PLAN OF CARE
"Pt scored a 4 on the first NIH. Assuming she would have also on the following, she refused to look at the pictures or say the words/objects, \"already done that.\" Pt is anxious, tearful, upset with care in ED. She is uncooperative with assessments at times. On tele, NSR. Cardiology is following along with Neuro.   Up with SBA. IV is running.   Problem: Stroke, Ischemic (Includes Transient Ischemic Attack)  Goal: Optimal Coping  Outcome: Progressing  Intervention: Support Psychosocial Response to Stroke  Recent Flowsheet Documentation  Taken 1/15/2024 1200 by Mitzi Mckeon, RN  Supportive Measures:   active listening utilized   positive reinforcement provided   self-care encouraged   verbalization of feelings encouraged  Taken 1/15/2024 0900 by Mitzi Mckeon RN  Supportive Measures:   active listening utilized   positive reinforcement provided   self-care encouraged   verbalization of feelings encouraged   Goal Outcome Evaluation:                        "

## 2024-01-15 NOTE — H&P
[facility]    History and Physical - Hospitalist Service       Date of Admission:  1/14/2024    Assessment & Plan    Assessment:  Sarah Rahman is a 58 year old female admitted on 1/14/2024. She presented to the ER with complaint of Chest discomfort, right leg worsening weakness on baseline weakness, nausea and vomiting, syncope.  Patient stated that she already gave all her history to the ED physician and is not going to repeat herself so most of the history was obtained from chart review and discussion with the ER physician, as per discussion the patient presented with worsening shortness of breath and sharp stabbing chest pain that radiated to her back and down her left arm that began last night and the pain began after syncopal fall preceded by tunnel vision, dizziness and vomiting, the patient noted that the chest pain is similar to how she felt before when she had her stent's placed, also endorsed nausea and vomiting since Friday after she ate fish, she noticed that lying down worsen the vomiting the patient also reported leg weakness at baseline with a previous history of stroke but had worsening weakness after her fall last night, patient reported taking nitroglycerin at 3 this morning and then again at 630 with some relief but had a headache s/p, also had some minimal abdominal discomfort since this morning, patient also witnessed a spike in blood sugar levels, last visit to cardiology was a year ago, in the ER vitals were significant for elevated blood pressure that has since improved, labs were significant for anion gap metabolic acidosis, JB with a creatinine of 1.21 and a baseline creatinine of around less than 1, glucose was up and hemoglobin A1c was performed which was 8.8, troponins trended and were flat, patient also had normocytic anemia, viral panel was negative for COVID, flu, respiratory syncytial virus, CTA chest abdomen pelvis showed No evidence of acute aortic  syndrome. No acute findings in the chest, abdomen, or pelvis.  CTA head and neck and MRI brain will be discussed in the plan section, patient received aspirin, Maalox, morphine, Zofran and fluids in the ED and is going to be admitted for further evaluation and management of chest discomfort possibly in the setting of GERD and acid reflux, worsening right lower extremity weakness possibly in setting of metabolic upset and dehydration, nausea and vomiting leading to dehydration and syncope.    Problem list and plan:  Chest discomfort possibly in the setting of GERD and acid reflux  Extensive cardiac history and CAD s/p CABG  Elevated troponin most likely in setting of type II NSTEMI/demand ischemia  Was complaining of chest discomfort since yesterday   CT scan of the chest abdominal pelvis showed No evidence of acute aortic syndrome. No acute findings in the chest, abdomen, or pelvis.   Took nitroglycerin without significant relief  Received morphine with some relief  Also placed on Protonix, Maalox and simethicone as patient was seen to be burping quite a lot and suspecting most likely gas and acid reflux causing discomfort in the chest  Follow-up echocardiogram  Consulted cardiology as patient has extensive cardiac history and currently complaining of chest pain  Troponins trended and are flat  Repeat EKG for symptoms, on current EKG could not appreciate any significant acute ischemic changes    Continue with cardiac telemetry monitoring    History of previous stroke  Worsening right lower extremity weakness from baseline weakness  CTA head and neck   HEAD CT: No acute intracranial hemorrhage or CT evidence of acute large vascular territory ischemic infarct. Similar small chronic infarcts in both occipital lobes with changes suggestive of mild chronic microvascular ischemic disease.  HEAD CTA:   No large vessel occlusion or new flow-limiting stenosis. Redemonstration of scattered intracranial atherosclerotic  disease as further detailed above. Of note, there is mixed atherosclerotic plaque throughout the carotid siphons with areas of moderate to advanced luminal narrowing involving the paraclinoid segments bilaterally. This is similar on the right and perhaps slightly progressed on the left compared to October 2023.  NECK CTA: No high-grade stenosis or occlusion involving the major arteries of the neck. Atherosclerotic changes about the carotid bifurcations without substantial (50% or greater) luminal narrowing. No evidence of dissection.  MRI of the brain ordered currently pending results  Will consult neurology for further recommendations  Continue with aspirin and Plavix  Follow-up PT and OT, speech recommendations    Nausea and vomiting leading to poor oral intake and dehydration  Syncope and collapse most likely in the setting of above  Continue with gentle IV hydration  Continue with antiemetics as appropriate  Follow-up MRI brain  Follow-up echocardiogram  Follow-up cardiology for further recommendations  Fall precautions  PT and OT evaluation    JB most likely prerenal in the setting of dehydration secondary to nausea and vomiting  Anion gap metabolic acidosis  Continue gentle IV hydration  Baseline creatinine below 1  Current creatinine 1.21  Monitor renal function  Monitor for worsening acidosis    Hypercholesterolemia  Repeated lipid panel which showed total cholesterol of 231, HDL of 37, LDL of 166  Does not appear to be on any cholesterol-lowering medication so we will start atorvastatin 40 mg daily    History of insulin-dependent type 2 diabetes mellitus  Neuropathy  As per patient sugars started to trend up, currently appear to be running in the appropriate range  Hemoglobin A1c 8.8  Continue with Lantus, sliding scale  Would be holding home oral hypoglycemic medication for now  Continue to monitor blood sugar levels and hypoglycemia protocol  Continue with duloxetine, nortriptyline, Topamax    Elevated  blood pressure  Currently not on any blood pressure medication  Monitor vital signs as per protocol  May start on blood pressure medication as appropriate  IV hydralazine for elevated blood pressure    Normocytic anemia  Monitor CBC, follow-up iron panel, replete iron if appropriate and needed    Noncompliance   As per discussion with the patient it appears that she does not trust hospitals or medical professionals and stated that she would have never come to the hospital if not brought in by her neighbor.  Stated that she does not believe 1 professional and always takes a second opinion.    DVT PPx  Intermittent pneumatic compression    CODE STATUS  Full code as per discussion with the patient          Diet: Combination Diet Regular Diet Adult    DVT Prophylaxis: Pneumatic Compression Devices  Diehl Catheter: Not present  Lines: None     Cardiac Monitoring: ACTIVE order. Indication: Stroke, acute (48 hours)  Code Status: Full Code    Mental status: Patient was alert awake and oriented x 3 but patient did not want to give any history as she stated that all the history is already been given to the ED physician, most of the history was obtained from chart review and discussion with the ER physician.  Clinically Significant Risk Factors Present on Admission                # Drug Induced Platelet Defect: home medication list includes an antiplatelet medication   # Hypertension: Noted on problem list     # DMII: A1C = 8.8 % (Ref range: <5.7 %) within past 6 months        # Asthma: noted on problem list  # History of CABG: noted on surgical history       Disposition Plan      Expected Discharge Date: 01/16/2024                  Saad J. Gondal, MD  Hospitalist Service  [facility]  Securely message with [employer] (more info)  Text page via [employer] Paging/Directory     ______________________________________________________________________    Chief Complaint   Chest discomfort, right leg worsening weakness  on baseline weakness, nausea and vomiting, syncope    History is obtained from the patient    History of Present Illness   Sarah Rahman is a 58 year old female with a past medical history documented below presented to the ER with complaint of Chest discomfort, right leg worsening weakness on baseline weakness, nausea and vomiting, syncope.  Patient stated that she already gave all her history to the ED physician and is not going to repeat herself so most of the history was obtained from chart review and discussion with the ER physician, as per discussion the patient presented with worsening shortness of breath and sharp stabbing chest pain that radiated to her back and down her left arm that began last night and the pain began after syncopal fall preceded by tunnel vision, dizziness and vomiting, the patient noted that the chest pain is similar to how she felt before when she had her stent's placed, also endorsed nausea and vomiting since Friday after she ate fish, she noticed that lying down worsen the vomiting the patient also reported leg weakness at baseline with a previous history of stroke but had worsening weakness after her fall last night, patient reported taking nitroglycerin at 3 this morning and then again at 630 with some relief but had a headache s/p, also had some minimal abdominal discomfort since this morning, patient also witnessed a spike in blood sugar levels, last visit to cardiology was a year ago, in the ER vitals were significant for elevated blood pressure that has since improved, labs were significant for anion gap metabolic acidosis, JB with a creatinine of 1.21 and a baseline creatinine of around less than 1, glucose was up and hemoglobin A1c was performed which was 8.8, troponins trended and were flat, patient also had normocytic anemia, viral panel was negative for COVID, flu, respiratory syncytial virus, CTA chest abdomen pelvis showed No evidence of acute aortic syndrome. No  acute findings in the chest, abdomen, or pelvis.  CTA head and neck and MRI brain will be discussed in the plan section, patient received aspirin, Maalox, morphine, Zofran and fluids in the ED and is going to be admitted for further evaluation and management of chest discomfort possibly in the setting of GERD and acid reflux, worsening right lower extremity weakness possibly in setting of metabolic upset and dehydration, nausea and vomiting leading to dehydration and syncope.      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 Hamm at [facility]    Schizoaffective disorder (H)     Sleep difficulties 7/17/2022       Past Surgical History   Past Surgical History:   Procedure Laterality Date    BYPASS GRAFT ARTERY CORONARY  01/01/2009    x2    CAROTID ENDARTERECTOMY Left 12/2021    CORONARY STENT PLACEMENT      CV CORONARY ANGIOGRAM N/A 06/02/2021    Procedure: Coronary Angiogram;  Surgeon: Juventino Rivera MD;  Location: [facility] Cardiac Cath Lab;  Service: Cardiology    HYSTERECTOMY TOTAL ABDOMINAL      HYSTERECTOMY TOTAL ABDOMINAL, BILATERAL SALPINGO-OOPHORECTOMY, COMBINED      IR TRANSLAMINAR EPIDURAL LUMBAR INJ INCL IMAGING  09/27/2022    LUMBAR DISCECTOMY Right 06/13/2019    L5-S1 - Dr. Hamm    NASAL FRACTURE SURGERY      ORIF ULNAR / RADIAL SHAFT FRACTURE Right        Prior to Admission Medications   Prior to Admission Medications   Prescriptions Last Dose Informant Patient Reported? Taking?   DULoxetine (CYMBALTA) 60 MG capsule  Past Month at unknown Self Yes Yes   Sig: Take 60 mg by mouth 2 times daily   Dexpanthenol 2 % CREA 1/13/2024 at HS  Yes Yes   Sig: Externally apply topically daily   acetaminophen (TYLENOL) 500 MG tablet Unknown at prn  No Yes   Sig: Take 2 tablets (1,000 mg) by mouth every 6 hours as needed for mild pain   albuterol (PROAIR HFA) 108 (90 Base) MCG/ACT inhaler Unknown at prn  No Yes   Sig: Inhale 1-2 puffs into the lungs every 4 hours as needed for shortness of breath or wheezing   aspirin 81 MG EC tablet Past Month at unknown  No Yes   Sig: Take 1 tablet (81 mg) by mouth daily   clopidogrel (PLAVIX) 75 MG tablet Past Month at unknown  No Yes   Sig: Take 1 tablet (75 mg) by mouth daily   dulaglutide (TRULICITY) 0.75 MG/0.5ML pen Past Month at unknown Self Yes Yes   Sig: Inject 0.75 mg Subcutaneous every 7 days   fluticasone-salmeterol (ADVAIR) 250-50 MCG/ACT inhaler Unknown at unknown  No Yes   Sig: Inhale 1 puff into the lungs every 12 hours   glipiZIDE (GLUCOTROL XL) 5 MG 24 hr tablet Unknown at unknown Self Yes Yes   Sig: Take 10 mg by mouth daily   insulin glargine (LANTUS PEN) 100 UNIT/ML pen Past Month at unknown Self Yes Yes   Sig: Inject 24 Units Subcutaneous At Bedtime   ipratropium - albuterol 0.5 mg/2.5 mg/3 mL (DUONEB) 0.5-2.5 (3) MG/3ML neb solution Past Month at unknown  No Yes   Sig: Take 1 vial (3 mLs) by nebulization every 6 hours as needed for shortness of breath, wheezing or cough   ketorolac (TORADOL) 10 MG tablet Past Month at unknown  No Yes   Sig: TAKE ONE TABLET BY MOUTH EVERY 6 HOURS AS NEEDED FOR MODERATE PAIN   lidocaine (LIDODERM) 5 % patch Past Month at unknown Self Yes Yes   Sig: Place onto the skin every 24 hours To prevent lidocaine toxicity, patient should be patch free for 12 hrs daily.   lidocaine (LMX4) 4 % external cream 1/13/2024 at HS  Yes Yes   Sig: Apply topically daily Apply to foot   methocarbamol (ROBAXIN) 750 MG tablet Past Month at unknown  No Yes   Sig: Take 1 tablet  (750 mg) by mouth 4 times daily as needed for muscle spasms   naloxone (NARCAN) 4 MG/0.1ML nasal spray Past Month at unknown Self Yes Yes   Sig: Spray 4 mg into one nostril alternating nostrils as needed for opioid reversal every 2-3 minutes until assistance arrives   nitroGLYcerin (NITROSTAT) 0.4 MG sublingual tablet Unknown at unknown  No Yes   Sig: Place 1 tablet (0.4 mg) under the tongue every 5 minutes as needed for chest pain For chest pain place 1 tablet under the tongue every 5 minutes for 3 doses. If symptoms persist 5 minutes after 1st dose call 911.   nortriptyline (PAMELOR) 10 MG capsule Past Month at unknown  Yes Yes   Sig: Take 10 mg by mouth At Bedtime   ondansetron (ZOFRAN) 8 MG tablet Past Month at unknown  No Yes   Sig: Take 1 tablet (8 mg) by mouth every 8 hours as needed for nausea   oxyCODONE-acetaminophen (PERCOCET)  MG per tablet Past Month at unknown  Yes Yes   Sig: Take 1 tablet by mouth every 6 hours as needed for pain   topiramate (TOPAMAX) 50 MG tablet Past Month at unknown  Yes Yes   Sig: Take 100 mg by mouth 2 times daily      Facility-Administered Medications: None        Review of Systems    The 10 point Review of Systems is negative other than noted in the HPI or here. Chest discomfort, right leg worsening weakness on baseline weakness, nausea and vomiting, syncope    Physical Exam   Vital Signs: Temp: 98.4  F (36.9  C) Temp src: Oral BP: 131/83 Pulse: 87   Resp: (!) 31 SpO2: 97 % O2 Device: None (Room air)    Weight: 0 lbs 0 oz    GENERAL: Patient was seen and examined at bedside with no acute distress  HENT:  Head is normocephalic, atraumatic.   EYES:  Eyes have anicteric sclerae without conjunctival injection   LUNGS: Bilateral air entry, clear to auscultation bilaterally, decreased breath sounds in the bilateral bases  CARDIOVASCULAR:  Regular rate and rhythm with no murmurs, gallops or rubs.  ABDOMEN:  Normal bowel sounds. Soft; nontender   EXT: no edema    SKIN:  No acute  rashes.   NEUROLOGIC:  Grossly nonfocal.      Medical Decision Making       80 MINUTES SPENT BY ME on the date of service doing chart review, history, exam, documentation & further activities per the note.      Data     I have personally reviewed the following data over the past 24 hrs:    7.9  \   11.3 (L)   / 255     141 108 (H) 23.0 /  173 (H)   4.0 19 (L) 1.21 (H) \     ALT: 23 AST: 13 AP: 99 TBILI: 0.2   ALB: 4.1 TOT PROTEIN: 7.3 LIPASE: 11 (L)     Trop: 18 (H) BNP: 200     TSH: N/A T4: N/A A1C: 8.8 (H)     Ferritin:  N/A % Retic:  1.8 LDH:  N/A       Imaging results reviewed over the past 24 hrs:   Recent Results (from the past 24 hour(s))   CTA Head Neck with Contrast    Narrative    EXAM: CTA HEAD NECK W CONTRAST  LOCATION: [facility]  DATE: 1/14/2024    INDICATION: syncopal episodes, fall, worsening right leg weakness from baseline  COMPARISON: Head and neck CTA: 10/14/2023.  CONTRAST: Isovue 370 75ml   TECHNIQUE: Head and neck CT angiogram with IV contrast. Noncontrast head CT followed by axial helical CT images of the head and neck vessels obtained during the arterial phase of intravenous contrast administration. Axial 2D reconstructed images and   multiplanar 3D MIP reconstructed images of the head and neck vessels were performed by the technologist. Dose reduction techniques were used. All stenosis measurements made according to NASCET criteria unless otherwise specified.    FINDINGS:     NONCONTRAST HEAD CT:   INTRACRANIAL CONTENTS: No intracranial hemorrhage, extraaxial collection, or mass effect.  No CT evidence of acute large vascular territory infarct. Similar small chronic infarcts in both occipital lobes. Mild presumed chronic small vessel ischemic   changes. Normal ventricles and sulci.     VISUALIZED ORBITS/SINUSES/MASTOIDS: No intraorbital abnormality. No paranasal sinus mucosal disease. No middle ear or mastoid effusion.    BONES/SOFT TISSUES: No acute  abnormality.    HEAD CTA:  ANTERIOR CIRCULATION: No occlusion involving the proximal major arteries of the anterior circulation. Mixed atherosclerotic plaque involving the carotid siphons bilaterally. Again, there are multiple areas of moderate to severe narrowing involving the   paraclinoid ICA segments bilaterally. These appear relatively similar on the right and perhaps slightly progressed on the left compared to study from October 2023 (series 9/image 292). Scattered areas of lesser luminal narrowing/irregularity throughout   the anterior circulation are similar to prior. No evidence of aneurysm or high flow vascular malformation. Similar hypoplastic left A1 anterior cerebral artery segment, which may be developmental, though some degree of atherosclerotic narrowing is   difficult to exclude.    POSTERIOR CIRCULATION: No high-grade stenosis/occlusion, aneurysm, or high flow vascular malformation. Similar multifocal mild luminal narrowings involving the bilateral PCA P1 and P2 segments as well as the proximal basilar artery. Balanced vertebral   arteries supply a patent basilar artery.     DURAL VENOUS SINUSES: Expected enhancement of the major dural venous sinuses.    NECK CTA:  RIGHT CAROTID: Mixed atherosclerotic plaque about the carotid bifurcation without substantial (50% or greater) luminal narrowing, similar to prior. No evidence of dissection.    LEFT CAROTID: Mixed atherosclerotic plaque about the carotid bifurcation without substantial (50% or greater) luminal narrowing, similar to prior. No evidence of dissection.    VERTEBRAL ARTERIES: No focal high-grade stenosis or dissection. Calcified atherosclerosis of the bilateral V4 segments with moderate luminal narrowing on the right and mild luminal narrowing on the left, not significantly changed. Areas of mild luminal   narrowing involving the distal bilateral V4 segments related to soft plaque are similar to prior as well. Balanced vertebral  arteries.    AORTIC ARCH: Classic aortic arch anatomy with no significant stenosis at the origin of the great vessels.    NONVASCULAR STRUCTURES: Multilevel degenerative changes of the imaged spine without high-grade spinal canal stenosis. Surgical clips throughout the left neck.      Impression    IMPRESSION:   HEAD CT:  1.  No acute intracranial hemorrhage or CT evidence of acute large vascular territory ischemic infarct.  2.  Similar small chronic infarcts in both occipital lobes with changes suggestive of mild chronic microvascular ischemic disease.    HEAD CTA:   1.  No large vessel occlusion or new flow-limiting stenosis.  2.  Redemonstration of scattered intracranial atherosclerotic disease as further detailed above. Of note, there is mixed atherosclerotic plaque throughout the carotid siphons with areas of moderate to advanced luminal narrowing involving the paraclinoid   segments bilaterally. This is similar on the right and perhaps slightly progressed on the left compared to October 2023.    NECK CTA:  1.  No high-grade stenosis or occlusion involving the major arteries of the neck.  2.  Atherosclerotic changes about the carotid bifurcations without substantial (50% or greater) luminal narrowing.  3.  No evidence of dissection.   CTA Chest Abdomen Pelvis w Contrast    Narrative    EXAM: CTA CHEST ABDOMEN PELVIS W CONTRAST  LOCATION: [facility]  DATE: 1/14/2024    INDICATION: left chest pain into back, worsening right leg weakness, eval for dissection, etc  COMPARISON: CT chest 10/15/2023  TECHNIQUE: CT angiogram chest abdomen pelvis during arterial phase of injection of IV contrast. 2D and 3D MIP reconstructions were performed by the CT technologist. Dose reduction techniques were used.   CONTRAST: [id number] 75ml     FINDINGS:   CT ANGIOGRAM CHEST, ABDOMEN, AND PELVIS: No evidence of aortic aneurysm or dissection. The great vessels are patent. The celiac, SMA, DORIAN, and bilateral  iliofemoral arteries are patent. The left renal artery is patent. There is moderate to severe   stenosis of the proximal radial artery due to atherosclerotic plaque.    LUNGS AND PLEURA: No new or growing pulmonary nodules. No interstitial or airspace opacities. No pleural effusions or pneumothorax.    MEDIASTINUM/AXILLAE: No pericardial effusion. Small hiatal hernia. No lymphadenopathy.    CORONARY ARTERY CALCIFICATION: Previous intervention (stents or CABG).    HEPATOBILIARY: No significant mass or bile duct dilatation. No calcified gallstones.     PANCREAS: Normal.    SPLEEN: Normal.    ADRENAL GLANDS: Normal.    KIDNEYS/BLADDER: No significant mass, stone, or hydronephrosis.    BOWEL: Diverticulosis of the colon. No acute inflammatory change. No obstruction.     LYMPH NODES: Normal.    PELVIC ORGANS: Hysterectomy    MUSCULOSKELETAL: Unremarkable      Impression    IMPRESSION:  1.  No evidence of acute aortic syndrome. No acute findings in the chest, abdomen, or pelvis.

## 2024-01-15 NOTE — PLAN OF CARE
Speech Language Pathology: Orders received. Chart reviewed and discussed with patient.? Speech Language Pathology not indicated due to no new speech/language or swallow concerns.? Defer discharge recommendations to care team.? Will complete orders.

## 2024-01-15 NOTE — PROGRESS NOTES
Patient is currently throwing up - given IV Zofran - Patient is scheduled to have MRI soon.     Patient has received Ativan and 1 liter of o2 NC - and is feeling much better. Patient left with [facility]arn[facility] from MRI and will be going to [id number] after.

## 2024-01-15 NOTE — CONSULTS
[facility]  Neurology Consultation    Patient Name:  Sarah Rahman  MRN:  [id number]    :  1965  Date of Service:  January 15, 2024  Primary care provider:  Kike David      Neurology consultation service was asked to see Sarah Rahman by Dr. Wright to evaluate right lower extremity weakness.    Chief Complaint: Chest pain, nausea, vomiting, syncope.    History of Present Illness:   Sarah Rahman is a 58 year old female with history of schizoaffective disorder, syncope, bilateral occipital lobe infarcts who presented to the ED  chest pain, acute on chronic right leg weakness, nausea, vomiting, and syncope.  Neurology was consulted for further evaluation of the acute on chronic right-sided weakness.    Chart review reveals prior neurology consultation 2023 for right-sided weakness with speech arrest.  There were no acute infarcts identified at that time on MRI brain, though this did show old, bilateral occipital lobe infarcts that had developed since 2023.  Another neurology consultation from 10/6/2022 also for right arm and leg numbness and weakness.  EEG last performed 2018 was read as normal and symmetrical-the indication for EEG at that time was syncope.    I attempted to interview the patient and she did explain that she has had burning/tingling sensation in her bilateral feet which is working its way up her legs to below her knees over the past several years.  She denies any weakness in her hands, or any tingling in her hands.  She also described falling/blacking out without any recollection or prodrome and this happened 9 times within the past month.  Though she would not further elaborate on her symptoms    ROS  A comprehensive ROS was performed and pertinent findings were included in HPI.     PMH  Past Medical History:   Diagnosis Date    Asthma     CAD (coronary artery disease)     COPD (chronic obstructive pulmonary  disease) (H)     CVA (cerebral infarction)     Diabetes (H)     Dyshidrosis (pompholyx) 10/21/2016    GERD (gastroesophageal reflux disease)     Hypertension     Intercostal neuritis 2/6/2018    Lumbar disc herniation 6/14/2019    Noncompliance 10/8/2021    Polysubstance abuse (H)     S/P CABG (coronary artery bypass graft)     S/P lumbar discectomy 06/13/2019    L5/S1 by  Dr. Hamm at [facility]    Schizoaffective disorder (H)     Sleep difficulties 7/17/2022     Past Surgical History:   Procedure Laterality Date    BYPASS GRAFT ARTERY CORONARY  01/01/2009    x2    CAROTID ENDARTERECTOMY Left 12/2021    CORONARY STENT PLACEMENT      CV CORONARY ANGIOGRAM N/A 06/02/2021    Procedure: Coronary Angiogram;  Surgeon: Juventino Rivera MD;  Location: [facility] Cardiac Cath Lab;  Service: Cardiology    HYSTERECTOMY TOTAL ABDOMINAL      HYSTERECTOMY TOTAL ABDOMINAL, BILATERAL SALPINGO-OOPHORECTOMY, COMBINED      IR TRANSLAMINAR EPIDURAL LUMBAR INJ INCL IMAGING  09/27/2022    LUMBAR DISCECTOMY Right 06/13/2019    L5-S1 - Dr. Hamm    NASAL FRACTURE SURGERY      ORIF ULNAR / RADIAL SHAFT FRACTURE Right        Medications   I have personally reviewed the patient's medication list.     Allergies  I have personally reviewed the patient's allergy list.           Physical Examination   Vitals: BP (!) 178/94 (BP Location: Right arm)   Pulse 89   Temp 98.7  F (37.1  C) (Oral)   Resp 20   LMP  (LMP Unknown)   SpO2 95%     I attempted to examine the patient from neurologic perspective though she became extremely frustrated and verbally abusive.  I could not perform/complete neurologic exam.      Investigations   I have personally reviewed pertinent labs, tests, and radiological imaging. Discussion of notable findings is included under Impression.     Was patient transferred from outside hospital?   No    Impression    I am unable to complete this consultation since patient became verbally abusive/aggressive and refused to  comply with neurologic examination.  From what I could observe, she could extend her right leg upward against gravity, but I could not assess whether this was full strength.    By history, she describes burning/tingling sensation in the bilateral feet which is consistent with a neuropathy.  Given her A1c in the range of 8.3-9.7, suspicion is highest for a diabetic neuropathy.    Recommendations  -Patient declined to complete this consultation, would not comply with neurologic examination  - Patient was not billed for this encounter    Thank you for involving Neurology in the care of Sarah Rahman.  Please do not hesitate to call with questions.     Darell Whitney MD

## 2024-01-16 PROBLEM — I20.0 UNSTABLE ANGINA (H): Status: ACTIVE | Noted: 2024-01-16

## 2024-01-16 LAB
ALBUMIN SERPL BCG-MCNC: 3.5 G/DL (ref 3.5–5.2)
ALP SERPL-CCNC: 82 U/L (ref 40–150)
ALT SERPL W P-5'-P-CCNC: 14 U/L (ref 0–50)
ANION GAP SERPL CALCULATED.3IONS-SCNC: 9 MMOL/L (ref 7–15)
AST SERPL W P-5'-P-CCNC: 8 U/L (ref 0–45)
BILIRUB SERPL-MCNC: 0.2 MG/DL
BUN SERPL-MCNC: 20.1 MG/DL (ref 8–23)
CALCIUM SERPL-MCNC: 8.5 MG/DL (ref 8.6–10)
CHLORIDE SERPL-SCNC: 109 MMOL/L (ref 98–107)
CREAT SERPL-MCNC: 0.84 MG/DL (ref 0.51–0.95)
DEPRECATED HCO3 PLAS-SCNC: 21 MMOL/L (ref 22–29)
EGFRCR SERPLBLD CKD-EPI 2021: 80 ML/MIN/1.73M2
ERYTHROCYTE [DISTWIDTH] IN BLOOD BY AUTOMATED COUNT: 13 % (ref 10–15)
GLUCOSE BLDC GLUCOMTR-MCNC: 102 MG/DL (ref 70–99)
GLUCOSE BLDC GLUCOMTR-MCNC: 115 MG/DL (ref 70–99)
GLUCOSE BLDC GLUCOMTR-MCNC: 196 MG/DL (ref 70–99)
GLUCOSE SERPL-MCNC: 165 MG/DL (ref 70–99)
HCT VFR BLD AUTO: 32.8 % (ref 35–47)
HGB BLD-MCNC: 10 G/DL (ref 11.7–15.7)
MCH RBC QN AUTO: 26.5 PG (ref 26.5–33)
MCHC RBC AUTO-ENTMCNC: 30.5 G/DL (ref 31.5–36.5)
MCV RBC AUTO: 87 FL (ref 78–100)
PLATELET # BLD AUTO: 204 10E3/UL (ref 150–450)
POTASSIUM SERPL-SCNC: 3.6 MMOL/L (ref 3.4–5.3)
PROT SERPL-MCNC: 6.1 G/DL (ref 6.4–8.3)
RBC # BLD AUTO: 3.78 10E6/UL (ref 3.8–5.2)
SODIUM SERPL-SCNC: 139 MMOL/L (ref 135–145)
WBC # BLD AUTO: 5.5 10E3/UL (ref 4–11)

## 2024-01-16 PROCEDURE — 250N000013 HC RX MED GY IP 250 OP 250 PS 637: Performed by: NURSE PRACTITIONER

## 2024-01-16 PROCEDURE — B2181ZZ FLUOROSCOPY OF LEFT INTERNAL MAMMARY BYPASS GRAFT USING LOW OSMOLAR CONTRAST: ICD-10-PCS | Performed by: INTERNAL MEDICINE

## 2024-01-16 PROCEDURE — 99232 SBSQ HOSP IP/OBS MODERATE 35: CPT | Performed by: INTERNAL MEDICINE

## 2024-01-16 PROCEDURE — 258N000003 HC RX IP 258 OP 636: Performed by: INTERNAL MEDICINE

## 2024-01-16 PROCEDURE — 99152 MOD SED SAME PHYS/QHP 5/>YRS: CPT | Performed by: INTERNAL MEDICINE

## 2024-01-16 PROCEDURE — 272N000001 HC OR GENERAL SUPPLY STERILE: Performed by: INTERNAL MEDICINE

## 2024-01-16 PROCEDURE — 250N000013 HC RX MED GY IP 250 OP 250 PS 637: Performed by: INTERNAL MEDICINE

## 2024-01-16 PROCEDURE — 250N000011 HC RX IP 250 OP 636: Performed by: STUDENT IN AN ORGANIZED HEALTH CARE EDUCATION/TRAINING PROGRAM

## 2024-01-16 PROCEDURE — 80053 COMPREHEN METABOLIC PANEL: CPT | Performed by: INTERNAL MEDICINE

## 2024-01-16 PROCEDURE — 120N000001 HC R&B MED SURG/OB

## 2024-01-16 PROCEDURE — 250N000011 HC RX IP 250 OP 636: Performed by: INTERNAL MEDICINE

## 2024-01-16 PROCEDURE — 250N000013 HC RX MED GY IP 250 OP 250 PS 637: Performed by: STUDENT IN AN ORGANIZED HEALTH CARE EDUCATION/TRAINING PROGRAM

## 2024-01-16 PROCEDURE — C1894 INTRO/SHEATH, NON-LASER: HCPCS | Performed by: INTERNAL MEDICINE

## 2024-01-16 PROCEDURE — 93455 CORONARY ART/GRFT ANGIO S&I: CPT | Performed by: INTERNAL MEDICINE

## 2024-01-16 PROCEDURE — 36415 COLL VENOUS BLD VENIPUNCTURE: CPT | Performed by: INTERNAL MEDICINE

## 2024-01-16 PROCEDURE — B2111ZZ FLUOROSCOPY OF MULTIPLE CORONARY ARTERIES USING LOW OSMOLAR CONTRAST: ICD-10-PCS | Performed by: INTERNAL MEDICINE

## 2024-01-16 PROCEDURE — 258N000003 HC RX IP 258 OP 636: Performed by: NURSE PRACTITIONER

## 2024-01-16 PROCEDURE — 250N000009 HC RX 250: Performed by: INTERNAL MEDICINE

## 2024-01-16 PROCEDURE — 85027 COMPLETE CBC AUTOMATED: CPT | Performed by: INTERNAL MEDICINE

## 2024-01-16 PROCEDURE — 255N000002 HC RX 255 OP 636: Performed by: INTERNAL MEDICINE

## 2024-01-16 PROCEDURE — C9113 INJ PANTOPRAZOLE SODIUM, VIA: HCPCS | Performed by: STUDENT IN AN ORGANIZED HEALTH CARE EDUCATION/TRAINING PROGRAM

## 2024-01-16 RX ORDER — HYDRALAZINE HYDROCHLORIDE 20 MG/ML
10 INJECTION INTRAMUSCULAR; INTRAVENOUS
Status: DISCONTINUED | OUTPATIENT
Start: 2024-01-16 | End: 2024-01-17 | Stop reason: HOSPADM

## 2024-01-16 RX ORDER — ASPIRIN 81 MG/1
243 TABLET, CHEWABLE ORAL ONCE
Status: COMPLETED | OUTPATIENT
Start: 2024-01-16 | End: 2024-01-16

## 2024-01-16 RX ORDER — OXYCODONE HYDROCHLORIDE 5 MG/1
10 TABLET ORAL EVERY 4 HOURS PRN
Status: DISCONTINUED | OUTPATIENT
Start: 2024-01-16 | End: 2024-01-17 | Stop reason: HOSPADM

## 2024-01-16 RX ORDER — LIDOCAINE 40 MG/G
CREAM TOPICAL
Status: DISCONTINUED | OUTPATIENT
Start: 2024-01-16 | End: 2024-01-16 | Stop reason: HOSPADM

## 2024-01-16 RX ORDER — HEPARIN SODIUM 1000 [USP'U]/ML
INJECTION, SOLUTION INTRAVENOUS; SUBCUTANEOUS
Status: DISCONTINUED | OUTPATIENT
Start: 2024-01-16 | End: 2024-01-16 | Stop reason: HOSPADM

## 2024-01-16 RX ORDER — FENTANYL CITRATE 50 UG/ML
25 INJECTION, SOLUTION INTRAMUSCULAR; INTRAVENOUS
Status: DISCONTINUED | OUTPATIENT
Start: 2024-01-16 | End: 2024-01-17 | Stop reason: HOSPADM

## 2024-01-16 RX ORDER — OXYCODONE HYDROCHLORIDE 5 MG/1
5 TABLET ORAL EVERY 4 HOURS PRN
Status: DISCONTINUED | OUTPATIENT
Start: 2024-01-16 | End: 2024-01-17 | Stop reason: HOSPADM

## 2024-01-16 RX ORDER — IODIXANOL 320 MG/ML
INJECTION, SOLUTION INTRAVASCULAR
Status: DISCONTINUED | OUTPATIENT
Start: 2024-01-16 | End: 2024-01-16 | Stop reason: HOSPADM

## 2024-01-16 RX ORDER — ATROPINE SULFATE 0.1 MG/ML
0.5 INJECTION INTRAVENOUS
Status: ACTIVE | OUTPATIENT
Start: 2024-01-16 | End: 2024-01-16

## 2024-01-16 RX ORDER — AMLODIPINE BESYLATE 2.5 MG/1
2.5 TABLET ORAL DAILY
Status: DISCONTINUED | OUTPATIENT
Start: 2024-01-16 | End: 2024-01-17 | Stop reason: HOSPADM

## 2024-01-16 RX ORDER — FLUMAZENIL 0.1 MG/ML
0.2 INJECTION, SOLUTION INTRAVENOUS
Status: ACTIVE | OUTPATIENT
Start: 2024-01-16 | End: 2024-01-16

## 2024-01-16 RX ORDER — SODIUM CHLORIDE 9 MG/ML
75 INJECTION, SOLUTION INTRAVENOUS CONTINUOUS
Status: ACTIVE | OUTPATIENT
Start: 2024-01-16 | End: 2024-01-16

## 2024-01-16 RX ORDER — ASPIRIN 325 MG
325 TABLET ORAL ONCE
Status: COMPLETED | OUTPATIENT
Start: 2024-01-16 | End: 2024-01-16

## 2024-01-16 RX ORDER — FENTANYL CITRATE 50 UG/ML
INJECTION, SOLUTION INTRAMUSCULAR; INTRAVENOUS
Status: DISCONTINUED | OUTPATIENT
Start: 2024-01-16 | End: 2024-01-16 | Stop reason: HOSPADM

## 2024-01-16 RX ORDER — ACETAMINOPHEN 325 MG/1
650 TABLET ORAL EVERY 4 HOURS PRN
Status: DISCONTINUED | OUTPATIENT
Start: 2024-01-16 | End: 2024-01-17 | Stop reason: HOSPADM

## 2024-01-16 RX ORDER — DIAZEPAM 10 MG
10 TABLET ORAL ONCE
Status: COMPLETED | OUTPATIENT
Start: 2024-01-16 | End: 2024-01-16

## 2024-01-16 RX ORDER — NALOXONE HYDROCHLORIDE 0.4 MG/ML
0.4 INJECTION, SOLUTION INTRAMUSCULAR; INTRAVENOUS; SUBCUTANEOUS
Status: ACTIVE | OUTPATIENT
Start: 2024-01-16 | End: 2024-01-16

## 2024-01-16 RX ORDER — SODIUM CHLORIDE 9 MG/ML
INJECTION, SOLUTION INTRAVENOUS CONTINUOUS
Status: DISCONTINUED | OUTPATIENT
Start: 2024-01-16 | End: 2024-01-16 | Stop reason: HOSPADM

## 2024-01-16 RX ORDER — NALOXONE HYDROCHLORIDE 0.4 MG/ML
0.2 INJECTION, SOLUTION INTRAMUSCULAR; INTRAVENOUS; SUBCUTANEOUS
Status: ACTIVE | OUTPATIENT
Start: 2024-01-16 | End: 2024-01-16

## 2024-01-16 RX ADMIN — DULOXETINE HYDROCHLORIDE 60 MG: 30 CAPSULE, DELAYED RELEASE ORAL at 14:15

## 2024-01-16 RX ADMIN — DULOXETINE HYDROCHLORIDE 60 MG: 30 CAPSULE, DELAYED RELEASE ORAL at 21:18

## 2024-01-16 RX ADMIN — LIDOCAINE 1 PATCH: 4 PATCH TOPICAL at 14:27

## 2024-01-16 RX ADMIN — NORTRIPTYLINE HYDROCHLORIDE 10 MG: 10 CAPSULE ORAL at 21:03

## 2024-01-16 RX ADMIN — METHOCARBAMOL 750 MG: 750 TABLET, FILM COATED ORAL at 17:05

## 2024-01-16 RX ADMIN — OXYCODONE HYDROCHLORIDE 10 MG: 5 TABLET ORAL at 21:03

## 2024-01-16 RX ADMIN — CLOPIDOGREL BISULFATE 75 MG: 75 TABLET ORAL at 07:45

## 2024-01-16 RX ADMIN — AMLODIPINE BESYLATE 2.5 MG: 2.5 TABLET ORAL at 16:10

## 2024-01-16 RX ADMIN — LIDOCAINE: 50 OINTMENT TOPICAL at 14:31

## 2024-01-16 RX ADMIN — ASPIRIN 81 MG CHEWABLE TABLET 243 MG: 81 TABLET CHEWABLE at 07:44

## 2024-01-16 RX ADMIN — SODIUM CHLORIDE 150 ML/HR: 9 INJECTION, SOLUTION INTRAVENOUS at 08:03

## 2024-01-16 RX ADMIN — TOPIRAMATE 100 MG: 100 TABLET, FILM COATED ORAL at 14:16

## 2024-01-16 RX ADMIN — DIAZEPAM 10 MG: 10 TABLET ORAL at 09:17

## 2024-01-16 RX ADMIN — ATORVASTATIN CALCIUM 40 MG: 40 TABLET, FILM COATED ORAL at 21:05

## 2024-01-16 RX ADMIN — TOPIRAMATE 100 MG: 100 TABLET, FILM COATED ORAL at 21:03

## 2024-01-16 RX ADMIN — ASPIRIN 81 MG CHEWABLE TABLET 81 MG: 81 TABLET CHEWABLE at 09:17

## 2024-01-16 RX ADMIN — INSULIN GLARGINE 30 UNITS: 100 INJECTION, SOLUTION SUBCUTANEOUS at 21:03

## 2024-01-16 RX ADMIN — METHOCARBAMOL 750 MG: 750 TABLET, FILM COATED ORAL at 00:28

## 2024-01-16 RX ADMIN — IBUPROFEN 600 MG: 600 TABLET, FILM COATED ORAL at 16:15

## 2024-01-16 RX ADMIN — OXYCODONE HYDROCHLORIDE 10 MG: 5 TABLET ORAL at 14:36

## 2024-01-16 RX ADMIN — SODIUM CHLORIDE 75 ML/HR: 9 INJECTION, SOLUTION INTRAVENOUS at 11:57

## 2024-01-16 RX ADMIN — PANTOPRAZOLE SODIUM 40 MG: 40 INJECTION, POWDER, FOR SOLUTION INTRAVENOUS at 14:21

## 2024-01-16 RX ADMIN — INSULIN ASPART 2 UNITS: 100 INJECTION, SOLUTION INTRAVENOUS; SUBCUTANEOUS at 17:07

## 2024-01-16 ASSESSMENT — ACTIVITIES OF DAILY LIVING (ADL)
ADLS_ACUITY_SCORE: 22

## 2024-01-16 NOTE — PRE-PROCEDURE
GENERAL PRE-PROCEDURE:   Procedure:  Coronary angiogram with possible PCI  Date/Time:  1/16/2024 8:51 AM    Written consent obtained?: Yes    Risks and benefits: Risks, benefits and alternatives were discussed    Consent given by:  Patient  Patient states understanding of procedure being performed: Yes    Patient's understanding of procedure matches consent: Yes    Procedure consent matches procedure scheduled: Yes    Expected level of sedation:  Moderate  Appropriately NPO:  Yes  ASA Class:  3 (unstable angina, CAD with prior CABG, HTN, DM Type II, Class I obesity; BMI 30.87kg/m2)  Mallampati  :  Grade 3- soft palate visible, posterior pharyngeal wall not visible  Lungs:  Lungs clear with good breath sounds bilaterally  Heart:  Normal heart sounds and rate  History & Physical reviewed:  History and physical reviewed and updates made (see comment)  H&P Comments:  Clinically Significant Risk Factors Present on Admission    Cardiovascular : unstable angina, CAD with prior CABG, HTN, DM Type II    Fluid & Electrolyte Disorders : Not present on admission    Gastroenterology : Not present on admission    Hematology/Oncology : Not present on admission    Nephrology : Not present on admission    Neurology : Not present on admission    Pulmonology : Not present on admission    Systemic : Class I obesity; BMI 30.87kg/m2    Statement of review:  I have reviewed the lab findings, diagnostic data, medications, and the plan for sedation    Sedation and Procedure Attestation:    I attest that Sarah Rahman underwent a pre-sedation assessment and is an appropriate candidate to undergo the planned procedure and planned sedation. Risks, benefits, and alternatives were discussed. The patient/representative was given an opportunity to ask questions and seems to understand. Informed consent was obtained for the procedure, including sedation.    The patient was re-evaluated immediately prior to sedation administration.    Spencer  MD Rg  Interventional Cardiology

## 2024-01-16 NOTE — PLAN OF CARE
Goal Outcome Evaluation:    Slept well. Allowed midnight vitals to be taken- they were stable. Declined all NIHSS assessments and 0400 vitals. Pt reports hating how the rooms are set up here and it causes her anxiety, she wishes she could go to another hospital. Up to bathroom with SBA. NPO since midnight for 1030 angio today. No reports of chest pain noc shift.     Temp: 98  F (36.7  C) Temp src: Oral BP: (!) 159/82 Pulse: 87   Resp: 20 SpO2: 98 % O2 Device: None (Room air)

## 2024-01-16 NOTE — PROGRESS NOTES
[facility]    PROGRESS NOTE - Hospitalist Service    Assessment and Plan  58 year old female with a past medical history of diabetes mellitus type II, coronary artery disease, hypertension, asthma, COPD, hyperlipidemia, anxiety, s/p coronary bypass, and schizoaffective disorder who presents to this ED for evaluation of chest pain.  Admitted with unstable angina        Unstable angina  - Patient presented with typical chest pain that is relieved with nitroglycerin  - Significant history of coronary artery disease s/p CABG  - Mild elevated serial troponin  - Echo shows EF of 65% with mild mitral stenosis  - Cardiology consult, appreciate input  - Plan for coronary angiogram, patient declined for 1/15/2024 but agreed for today  -Post angiogram orders as per cardiology  - Continue to monitor on telemetry     History of CVA  - Patient complaining of leg weakness  - Negative CTA head and neck for significant acute changes  - MRI brain is negative for acute changes and shows mild chronic microangiopathy.  - PT/OT evaluation  -Plan for home care on discharge     Acute kidney injury  -Probably prerenal secondary to dehydration from nausea vomiting  -Improving with IV hydration  -Decrease IV fluids to 50 cc an hour and continue with it as patient is going for angiogram tomorrow  -Continue to monitor renal function     Diabetes mellitus type 2  - Poorly controlled  - Elevated hemoglobin A1c at 8.8  - Continue home Lantus, increase dose to 30 units  - Hold home glipizide and Trulicity for now  - Cover with insulin sliding scale  - Add carb counting insulin coverage.     Hyperlipidemia  - Lipid panel shows elevation of LDL and total cholesterol  - Started on Lipitor     Labile hypertension  - Patient blood pressure is fluctuating, probably secondary to anxiety   - Add IV hydralazine as needed  - Defer restarting hypertension medications to cardiology      Significant anxiety  - Restart home medications    - Add Ativan as needed     Chronic pain syndrome  - Resume home medications  - Avoid IV narcotics        35 MINUTES SPENT BY ME on the date of service doing chart review, history, exam, documentation & further activities per the note    Principal Problem:    Right leg weakness  Active Problems:    Syncope and collapse    Chest pain, unspecified type    Unstable angina (H)      VTE prophylaxis:  Pneumatic Compression Devices  DIET: Orders Placed This Encounter      Regular Diet Adult      Disposition/Barriers to discharge: Coronary angiogram today, cardiology clearance  Code Status: Full Code    Subjective:  Kristin is feeling about the same today, very anxious for angiogram results.  Denies any shortness of breath but has some chest pain on and off.    PHYSICAL EXAM  Vitals:    01/16/24 0744   Weight: 89.4 kg (197 lb 1.5 oz)     B/P:141/64 T:97.6 P:93 R:20     Intake/Output Summary (Last 24 hours) at 1/16/2024 1443  Last data filed at 1/16/2024 1327  Gross per 24 hour   Intake 672.5 ml   Output --   Net 672.5 ml      Body mass index is 30.87 kg/m .    Constitutional: awake, alert, cooperative, no apparent distress, and appears stated age  Respiratory: No increased work of breathing, good air exchange, clear to auscultation bilaterally, no crackles or wheezing  Cardiovascular: Normal apical impulse, regular rate and rhythm, normal S1 and S2, no S3 or S4, and no murmur noted  GI: No scars, normal bowel sounds, soft, non-distended, non-tender, no masses palpated, no hepatosplenomegally  Skin: no bruising or bleeding and normal skin color, texture, turgor  Musculoskeletal: There is no redness, warmth, or swelling of the joints.  Full range of motion noted.  no lower extremity pitting edema present  Neurologic: Awake, alert, oriented to name, place and time.  Cranial nerves II-XII are grossly intact.  Motor is 5 out of 5 bilaterally.   Sensory is intact.    Neuropsychiatric: Very anxious      PERTINENT  LABS/IMAGING:    I have personally reviewed the following data over the past 24 hrs:    5.5  \   10.0 (L)   / 204     139 109 (H) 20.1 /  115 (H)   3.6 21 (L) 0.84 \     ALT: 14 AST: 8 AP: 82 TBILI: 0.2   ALB: 3.5 TOT PROTEIN: 6.1 (L) LIPASE: N/A         Discussed with patient, family, nursing staff and discharge planner    Gaetano Wright MD  [facility] Medicine Service  [phone]

## 2024-01-16 NOTE — PROGRESS NOTES
Pt post coronary angiogram via left wrist, 15 ml air and TR band removed, no sign symptom of hematoma. Angio is normal No PCI. Pt returning back to room 127, report has been given to the receiving nurse.

## 2024-01-16 NOTE — PROGRESS NOTES
"Care Management Follow Up    Length of Stay (days): 2    Expected Discharge Date: 01/17/2024     Concerns to be Addressed:     Care Progression  Patient plan of care discussed at interdisciplinary rounds: Yes    Anticipated Discharge Disposition:  Anticipate Home     Anticipated Discharge Services:  TBD  Anticipated Discharge DME:  CHANDRA    Patient/family educated on Medicare website which has current facility and service quality ratings:  NA  Education Provided on the Discharge Plan:  NA  Patient/Family in Agreement with the Plan:  NA    Referrals Placed by CM/SW:  CHANDRA  Private pay costs discussed: Not applicable    Additional Information:  RNCM met with patient at bedside, Role explained, discharge planning discussed.     Patient requested for RNCM to follow up at a later time, stated that \"feeling confused\" from the medication given during her procedure today.     Social HX: Patient lives in apartment alone. Has PCA.     CM will continue to follow care progression and aide in discharge planning as needed.     Rani Livingston RN      "

## 2024-01-16 NOTE — PLAN OF CARE
Problem: Pain Acute  Goal: Optimal Pain Control and Function  Outcome: Progressing  Intervention: Develop Pain Management Plan  Recent Flowsheet Documentation  Taken 1/16/2024 1436 by Catarina Isaac RN  Pain Management Interventions:   medication (see MAR)   emotional support   rest  Taken 1/16/2024 0744 by Catarina Isaac, RN  Pain Management Interventions: emotional support  Intervention: Prevent or Manage Pain  Recent Flowsheet Documentation  Taken 1/16/2024 1402 by Catarina Isaac, RN  Medication Review/Management: medications reviewed     Problem: Stroke, Ischemic (Includes Transient Ischemic Attack)  Goal: Effective Oxygenation and Ventilation  Outcome: Progressing     Problem: Comorbidity Management  Goal: Blood Glucose Levels Within Targeted Range  Outcome: Progressing  Intervention: Monitor and Manage Glycemia  Recent Flowsheet Documentation  Taken 1/16/2024 1402 by Catarina Isaac RN  Medication Review/Management: medications reviewed   Goal Outcome Evaluation:       Patient was off the unit for angiogram before 0800 and came back at around 1355. CMS intact. Arm board in place. No complication noted on angio site (left wrist). Band aid clean, dry, and intact. Oxycodone given for reported right leg pain. Will monitor.

## 2024-01-16 NOTE — PLAN OF CARE
Goal Outcome Evaluation:         Problem: Adult Inpatient Plan of Care  Goal: Plan of Care Review    Outcome: Progressing     Problem: Adult Inpatient Plan of Care  Goal: Patient-Specific Goal (Individualized)    Outcome: Progressing     Problem: Adult Inpatient Plan of Care  Goal: Optimal Comfort and Wellbeing  Outcome: Progressing  Intervention: Monitor Pain and Promote Comfort  Recent Flowsheet Documentation  Taken 1/15/2024 1815 by Dulce Henderson RN  Pain Management Interventions:   medication (see MAR)   emotional support  Taken 1/15/2024 1630 by Dulce Henderson RN  Pain Management Interventions: emotional support     Problem: Pain Acute  Goal: Optimal Pain Control and Function  Outcome: Progressing  Intervention: Develop Pain Management Plan  Recent Flowsheet Documentation  Taken 1/15/2024 1815 by Dulce Henderson RN  Pain Management Interventions:   medication (see MAR)   emotional support  Taken 1/15/2024 1630 by Dulce Henderson RN  Pain Management Interventions: emotional support  Intervention: Prevent or Manage Pain  Recent Flowsheet Documentation  Taken 1/15/2024 1630 by Dulce Henderson RN  Medication Review/Management: medications reviewed  Intervention: Optimize Psychosocial Wellbeing  Recent Flowsheet Documentation  Taken 1/15/2024 1630 by Dulce Henderson RN  Supportive Measures: active listening utilized     Pt is alert and oriented X4, anxious and uncooperative at times.  Pt refuses NIHSS assessment, MD notified. Pt rating right leg pain #7, Robaxin and Ibuprofen given with some relief.  Pt states numbness and tingling present in bilateral lower extremities, states not new.  Pt ambulating in room with stand by assist.  Possible coronary angiogram tomorrow.  NPO after midnight.    Dulce Henderson RN

## 2024-01-16 NOTE — PROGRESS NOTES
[facility]   [address]  [place] [address]   [email or web address]   OFFICE: [phone]     CARDIOLOGY FOLLOW-UP NOTE      Impression and Plan     Impression:   Acute chest pain with stable coronary artery disease on coronary angiogram this morning.  Syncope - unclear etiology. Arrhythmia possible.   DMII, on chronic insulin  Hypertension - uncontrolled.  dyslipidemia    Plan:  Start amlodipine 2.5 mg daily for blood pressure and antianginal benefits.  Okay for discharge from cardiac standpoint this afternoon.  Will order 2 week event monitor to evaluate for arrhythmogenic cause of syncope.    Primary Cardiologist: not established.    Subjective     Feeling better. No syncope. Did have some light headedness yesterday.    Cardiac Diagnostics   Telemetry (personally reviewed): sinus rhythm.    Echocardiogram (results reviewed):   TTE  7/28/23  1.Left ventricular size, wall motion and function are normal. The ejection fraction is 60-65%.  2.There is mild to moderate concentric left ventricular hypertrophy.  3.Normal right ventricle size and systolic function.  4.The left atrium is moderately dilated.  5.A contrast injection (Bubble Study) was performed that was negative for flow across the interatrial septum.  6.Thickened mitral valve anterior leaflet There is moderately severe (3+) mitral regurgitation.ERO is 0.36 cm2 with volume of 44 cc.  There is no comparison study available.     Cardiac Cath (results reviewed):   Coronary angiogram 1/16/2024  LEFT MAIN: Normal caliber vessel that trifurcates into the left anterior descending, ramus, and left circumflex arteries.  The left main has diffuse 40-50% calcific stenosis (unchanged from prior).  LEFT ANTERIOR DESCENDING: Occluded in the proximal portion and fills distally via LIMA graft.  LEFT CIRCUMFLEX: Nondominant vessel that gives rise to a large bifurcating OM branch and terminates into a small vessel distally.   The left circumflex and its branches have mild to moderate diffuse disease.  RIGHT CORONARY ARTERY: Dominant vessel that gives rise to the right posterior descending and posterolateral system.  The right coronary and branches have mild to moderate diffuse disease.  RAMUS INTERMEDIUS: Small to medium sized vessel with moderate diffuse disease.     Coronary Artery Bypass Angiography:   LIMA to the LAD: Widely patent with mild diffuse disease of the native vessel beyond the anastomosis.  There is retrograde flow into the mid LAD and left to left collaterals that filled the diagonal branch.    Coronary angiogram 6/2/21    Ramus lesion is 50% stenosed.    Ost LM to Dist LM lesion is 30% stenosed.    Origin to Insertion lesion is 100% stenosed.    Dist LM to Mid LAD lesion is 100% stenosed.    LIMA to LAD is patent without obstruction    SVG to small 1st diagonal is occluded and fills from LAD collaterals        Cardiac Stress Testing (results reviewed):   Lexiscan NM stress test 5/8/23    A prior study was conducted on 6/1/2021.  Prior images were unavailable for comparison review.    Pharmacological regadenoson stress ECG is negative for inducible myocardial ischemia.    Pharmacological regadenoson nuclear stress test is negative for inducible myocardial ischemia or infarction.    Normal left ventricular size, wall motion and systolic function.  Calculated left ventricle ejection fraction is 70%.    The patient is at a low risk of future cardiac ischemic events.      Physical Examination       BP (!) 158/73 (BP Location: Right arm, Patient Position: Semi-Farah's)   Pulse 69   Temp 97.6  F (36.4  C) (Oral)   Resp 20   Wt 89.4 kg (197 lb 1.5 oz)   LMP  (LMP Unknown)   SpO2 100%   BMI 30.87 kg/m        [unfilled]        Intake/Output Summary (Last 24 hours) at 1/16/2024 1437  Last data filed at 1/16/2024 1327  Gross per 24 hour   Intake 672.5 ml   Output --   Net 672.5 ml       General: pleasant female. No acute  distress.   HENT: external ears normal. Nares patent. Mucous membranes moist.  Eyes: perrla, extraocular muscles intact. No scleral icterus.   Neck: No JVD  Lungs: clear to auscultation  COR: regular rate and rhythm, No murmurs, rubs, or gallops  Abd: nondistended, BS present  Extrem: No edema         Imaging      No new non-cardiac imaging.    Lab Results   Lab Results   Component Value Date    CHOL 231 (H) 01/14/2024    HDL 37 (L) 01/14/2024    TRIG 141 01/14/2024    BUN 20.1 01/16/2024     01/16/2024    CO2 21 (L) 01/16/2024       Lab Results   Component Value Date    WBC 5.5 01/16/2024    HGB 10.0 (L) 01/16/2024    HCT 32.8 (L) 01/16/2024    MCV 87 01/16/2024     01/16/2024       Lab Results   Component Value Date    TROPONINI <0.01 07/17/2022     (H) 04/03/2022    TSH 1.25 10/17/2023    INR 1.05 07/28/2023               Current Inpatient Scheduled Medications   Scheduled Meds:   aspirin  81 mg Oral Daily    atorvastatin  40 mg Oral QPM    clopidogrel  75 mg Oral Daily    DULoxetine  60 mg Oral BID    fluticasone  1 spray Both Nostrils BID    fluticasone-vilanterol  1 puff Inhalation Daily    insulin aspart   Subcutaneous TID w/meals    insulin aspart  1-7 Units Subcutaneous TID AC    insulin aspart  1-5 Units Subcutaneous At Bedtime    insulin glargine  30 Units Subcutaneous At Bedtime    lidocaine  1 patch Transdermal Q24H    nortriptyline  10 mg Oral At Bedtime    pantoprazole  40 mg Intravenous Daily with breakfast    sodium chloride (PF)  3 mL Intracatheter Q8H    topiramate  100 mg Oral BID     Continuous Infusions:   sodium chloride 75 mL/hr (01/16/24 1157)            Medications Prior to Admission   Prior to Admission medications    Medication Sig Start Date End Date Taking? Authorizing Provider   acetaminophen (TYLENOL) 500 MG tablet Take 2 tablets (1,000 mg) by mouth every 6 hours as needed for mild pain 12/1/23  Yes Kike David MD   albuterol (PROAIR HFA) 108 (90 Base) MCG/ACT  inhaler Inhale 1-2 puffs into the lungs every 4 hours as needed for shortness of breath or wheezing 10/17/23  Yes Laura Pennington MD   aspirin 81 MG EC tablet Take 1 tablet (81 mg) by mouth daily 10/12/23 10/11/24 Yes Kike David MD   clopidogrel (PLAVIX) 75 MG tablet Take 1 tablet (75 mg) by mouth daily 7/29/23  Yes Tasia Escobedo MD   Dexpanthenol 2 % CREA Externally apply topically daily   Yes Unknown, Entered By History   dulaglutide (TRULICITY) 0.75 MG/0.5ML pen Inject 0.75 mg Subcutaneous every 7 days   Yes Unknown, Entered By History   DULoxetine (CYMBALTA) 60 MG capsule Take 60 mg by mouth 2 times daily   Yes Unknown, Entered By History   fluticasone (FLONASE) 50 MCG/ACT nasal spray Spray 1 spray into both nostrils 2 times daily   Yes Unknown, Entered By History   fluticasone-salmeterol (ADVAIR) 250-50 MCG/ACT inhaler Inhale 1 puff into the lungs every 12 hours 10/13/23  Yes Kike David MD   glipiZIDE (GLUCOTROL XL) 5 MG 24 hr tablet Take 10 mg by mouth daily   Yes Unknown, Entered By History   insulin glargine (LANTUS PEN) 100 UNIT/ML pen Inject 24 Units Subcutaneous At Bedtime   Yes Unknown, Entered By History   ipratropium - albuterol 0.5 mg/2.5 mg/3 mL (DUONEB) 0.5-2.5 (3) MG/3ML neb solution Take 1 vial (3 mLs) by nebulization every 6 hours as needed for shortness of breath, wheezing or cough 10/17/23  Yes Rosario Moe PA-C   ketorolac (TORADOL) 10 MG tablet TAKE ONE TABLET BY MOUTH EVERY 6 HOURS AS NEEDED FOR MODERATE PAIN 10/25/23  Yes Kike David MD   lidocaine (LIDODERM) 5 % patch Place onto the skin every 24 hours To prevent lidocaine toxicity, patient should be patch free for 12 hrs daily.   Yes Unknown, Entered By History   lidocaine (LMX4) 4 % external cream Apply topically daily Apply to foot   Yes Unknown, Entered By History   methocarbamol (ROBAXIN) 750 MG tablet Take 1 tablet (750 mg) by mouth 4 times daily as needed for muscle spasms 5/10/23  Yes Kike Wright  MD [place]   naloxone (NARCAN) 4 MG/0.1ML nasal spray Spray 4 mg into one nostril alternating nostrils as needed for opioid reversal every 2-3 minutes until assistance arrives   Yes Unknown, Entered By History   nitroGLYcerin (NITROSTAT) 0.4 MG sublingual tablet Place 1 tablet (0.4 mg) under the tongue every 5 minutes as needed for chest pain For chest pain place 1 tablet under the tongue every 5 minutes for 3 doses. If symptoms persist 5 minutes after 1st dose call 911. 12/12/22  Yes Kike David MD   nortriptyline (PAMELOR) 10 MG capsule Take 10 mg by mouth At Bedtime   Yes Unknown, Entered By History   ondansetron (ZOFRAN) 8 MG tablet Take 1 tablet (8 mg) by mouth every 8 hours as needed for nausea 5/18/23  Yes Kike David MD   oxyCODONE-acetaminophen (PERCOCET)  MG per tablet Take 1 tablet by mouth every 6 hours as needed for pain   Yes Unknown, Entered By History   topiramate (TOPAMAX) 50 MG tablet Take 100 mg by mouth 2 times daily   Yes Unknown, Entered By History   amLODIPine (NORVASC) 5 MG tablet Take 1 tablet (5 mg) by mouth 2 times daily 9/29/22 10/8/22  Montse Nieto MD   diclofenac (VOLTAREN) 50 MG EC tablet Take 1 tablet (50 mg) by mouth 3 times daily for 10 days 6/7/22 7/20/22  Mara Scott NP   pregabalin (LYRICA) 150 MG capsule Take 150 mg by mouth 3 times daily  9/29/22  Unknown, Entered By History

## 2024-01-17 ENCOUNTER — APPOINTMENT (OUTPATIENT)
Dept: PHYSICAL THERAPY | Facility: HOSPITAL | Age: 59
End: 2024-01-17
Payer: MEDICAID

## 2024-01-17 ENCOUNTER — APPOINTMENT (OUTPATIENT)
Dept: CARDIOLOGY | Facility: HOSPITAL | Age: 59
End: 2024-01-17
Attending: INTERNAL MEDICINE
Payer: MEDICAID

## 2024-01-17 VITALS
RESPIRATION RATE: 18 BRPM | BODY MASS INDEX: 30.87 KG/M2 | OXYGEN SATURATION: 99 % | DIASTOLIC BLOOD PRESSURE: 89 MMHG | TEMPERATURE: 98.2 F | HEART RATE: 77 BPM | SYSTOLIC BLOOD PRESSURE: 134 MMHG | WEIGHT: 197.09 LBS

## 2024-01-17 LAB
ANION GAP SERPL CALCULATED.3IONS-SCNC: 8 MMOL/L (ref 7–15)
BUN SERPL-MCNC: 19.6 MG/DL (ref 8–23)
CALCIUM SERPL-MCNC: 8.2 MG/DL (ref 8.6–10)
CHLORIDE SERPL-SCNC: 109 MMOL/L (ref 98–107)
CREAT SERPL-MCNC: 0.92 MG/DL (ref 0.51–0.95)
DEPRECATED HCO3 PLAS-SCNC: 20 MMOL/L (ref 22–29)
EGFRCR SERPLBLD CKD-EPI 2021: 71 ML/MIN/1.73M2
ERYTHROCYTE [DISTWIDTH] IN BLOOD BY AUTOMATED COUNT: 13.2 % (ref 10–15)
GLUCOSE BLDC GLUCOMTR-MCNC: 178 MG/DL (ref 70–99)
GLUCOSE BLDC GLUCOMTR-MCNC: 78 MG/DL (ref 70–99)
GLUCOSE SERPL-MCNC: 153 MG/DL (ref 70–99)
HCT VFR BLD AUTO: 30.9 % (ref 35–47)
HGB BLD-MCNC: 9.6 G/DL (ref 11.7–15.7)
MCH RBC QN AUTO: 27 PG (ref 26.5–33)
MCHC RBC AUTO-ENTMCNC: 31.1 G/DL (ref 31.5–36.5)
MCV RBC AUTO: 87 FL (ref 78–100)
PLATELET # BLD AUTO: 181 10E3/UL (ref 150–450)
POTASSIUM SERPL-SCNC: 3.9 MMOL/L (ref 3.4–5.3)
RBC # BLD AUTO: 3.55 10E6/UL (ref 3.8–5.2)
SODIUM SERPL-SCNC: 137 MMOL/L (ref 135–145)
WBC # BLD AUTO: 5.6 10E3/UL (ref 4–11)

## 2024-01-17 PROCEDURE — 97530 THERAPEUTIC ACTIVITIES: CPT | Mod: GP

## 2024-01-17 PROCEDURE — 99239 HOSP IP/OBS DSCHRG MGMT >30: CPT | Performed by: HOSPITALIST

## 2024-01-17 PROCEDURE — 80048 BASIC METABOLIC PNL TOTAL CA: CPT | Performed by: INTERNAL MEDICINE

## 2024-01-17 PROCEDURE — 250N000013 HC RX MED GY IP 250 OP 250 PS 637: Performed by: STUDENT IN AN ORGANIZED HEALTH CARE EDUCATION/TRAINING PROGRAM

## 2024-01-17 PROCEDURE — C9113 INJ PANTOPRAZOLE SODIUM, VIA: HCPCS | Performed by: STUDENT IN AN ORGANIZED HEALTH CARE EDUCATION/TRAINING PROGRAM

## 2024-01-17 PROCEDURE — 250N000013 HC RX MED GY IP 250 OP 250 PS 637: Performed by: INTERNAL MEDICINE

## 2024-01-17 PROCEDURE — 250N000011 HC RX IP 250 OP 636: Performed by: STUDENT IN AN ORGANIZED HEALTH CARE EDUCATION/TRAINING PROGRAM

## 2024-01-17 PROCEDURE — 85027 COMPLETE CBC AUTOMATED: CPT | Performed by: INTERNAL MEDICINE

## 2024-01-17 PROCEDURE — 250N000013 HC RX MED GY IP 250 OP 250 PS 637: Performed by: NURSE PRACTITIONER

## 2024-01-17 PROCEDURE — 93270 REMOTE 30 DAY ECG REV/REPORT: CPT

## 2024-01-17 PROCEDURE — 36415 COLL VENOUS BLD VENIPUNCTURE: CPT | Performed by: INTERNAL MEDICINE

## 2024-01-17 RX ORDER — ACETAMINOPHEN 500 MG
500-1000 TABLET ORAL EVERY 8 HOURS PRN
COMMUNITY
Start: 2024-01-17 | End: 2024-02-01

## 2024-01-17 RX ORDER — AMLODIPINE BESYLATE 2.5 MG/1
2.5 TABLET ORAL DAILY
Qty: 30 TABLET | Refills: 0 | Status: SHIPPED | OUTPATIENT
Start: 2024-01-17 | End: 2024-08-26

## 2024-01-17 RX ORDER — ATORVASTATIN CALCIUM 40 MG/1
40 TABLET, FILM COATED ORAL EVERY EVENING
Qty: 30 TABLET | Refills: 0 | Status: SHIPPED | OUTPATIENT
Start: 2024-01-17 | End: 2024-02-08

## 2024-01-17 RX ADMIN — OXYCODONE HYDROCHLORIDE 10 MG: 5 TABLET ORAL at 05:33

## 2024-01-17 RX ADMIN — INSULIN ASPART 1 UNITS: 100 INJECTION, SOLUTION INTRAVENOUS; SUBCUTANEOUS at 08:52

## 2024-01-17 RX ADMIN — PANTOPRAZOLE SODIUM 40 MG: 40 INJECTION, POWDER, FOR SOLUTION INTRAVENOUS at 08:54

## 2024-01-17 RX ADMIN — LIDOCAINE: 50 OINTMENT TOPICAL at 13:45

## 2024-01-17 RX ADMIN — TOPIRAMATE 100 MG: 100 TABLET, FILM COATED ORAL at 08:54

## 2024-01-17 RX ADMIN — CLOPIDOGREL BISULFATE 75 MG: 75 TABLET ORAL at 08:54

## 2024-01-17 RX ADMIN — DULOXETINE HYDROCHLORIDE 60 MG: 30 CAPSULE, DELAYED RELEASE ORAL at 09:00

## 2024-01-17 RX ADMIN — AMLODIPINE BESYLATE 2.5 MG: 2.5 TABLET ORAL at 08:54

## 2024-01-17 RX ADMIN — OXYCODONE HYDROCHLORIDE 10 MG: 5 TABLET ORAL at 01:07

## 2024-01-17 RX ADMIN — LIDOCAINE 1 PATCH: 4 PATCH TOPICAL at 08:57

## 2024-01-17 RX ADMIN — ASPIRIN 81 MG CHEWABLE TABLET 81 MG: 81 TABLET CHEWABLE at 08:54

## 2024-01-17 RX ADMIN — OXYCODONE HYDROCHLORIDE 10 MG: 5 TABLET ORAL at 13:42

## 2024-01-17 ASSESSMENT — ACTIVITIES OF DAILY LIVING (ADL)
ADLS_ACUITY_SCORE: 22

## 2024-01-17 NOTE — PLAN OF CARE
Problem: Adult Inpatient Plan of Care  Goal: Optimal Comfort and Wellbeing  Outcome: Progressing     Problem: Pain Acute  Goal: Optimal Pain Control and Function  Outcome: Progressing  Intervention: Prevent or Manage Pain  Recent Flowsheet Documentation  Taken 1/17/2024 0851 by Catarina Isaac, RN  Medication Review/Management: medications reviewed     Problem: Comorbidity Management  Goal: Blood Glucose Levels Within Targeted Range  Outcome: Progressing  Intervention: Monitor and Manage Glycemia  Recent Flowsheet Documentation  Taken 1/17/2024 0851 by Catarina Isaac RN  Medication Review/Management: medications reviewed   Goal Outcome Evaluation:       PRN oxycodone given for right leg pain rated 10/10; effective. Patient up independently. Cardiac monitor in place. Blood sugar not needing insulin coverage. Discharging in the evening.

## 2024-01-17 NOTE — PLAN OF CARE
Problem: Pain Acute  Goal: Optimal Pain Control and Function  Outcome: Progressing     Problem: Stroke, Ischemic (Includes Transient Ischemic Attack)  Goal: Optimal Coping  Outcome: Progressing   Goal Outcome Evaluation:       Pain well controlled with oxy 10mg. No chest pain, NIH 4, NSR on tele, call light within reach.

## 2024-01-17 NOTE — PROGRESS NOTES
Physical Therapy Discharge Summary    Reason for therapy discharge:    Discharged to home with home therapy.    Progress towards therapy goal(s). See goals on Care Plan in [facility] electronic health record for goal details.  Goals partially met.  Barriers to achieving goals:   discharge from facility.    Therapy recommendation(s):    Continued therapy is recommended.  Rationale/Recommendations:  Home PT to progress functional mobility and strength.

## 2024-01-17 NOTE — PLAN OF CARE
Problem: Pain Acute  Goal: Optimal Pain Control and Function  Outcome: Progressing  Intervention: Develop Pain Management Plan  Recent Flowsheet Documentation  Taken 1/16/2024 1615 by Veda Hwang RN  Pain Management Interventions: medication (see MAR)     Problem: Comorbidity Management  Goal: Blood Glucose Levels Within Targeted Range  Outcome: Progressing   Goal Outcome Evaluation:       A&ox4. HS glucose stable, NovoLog not given. Pt agitated towards end of shift, ambulated to hallway and was verbally abusive to staff. Pt has not been verbally abusive to writer. Tolerated PO meds, pain well controlled with oxy 10mg. VSS. Angio site WNL, covered with bandage. Frequent checks completed and discontinued.  Pulses 2+. NIH is 4 and pt is not fully cooperating with NIH assessments. No acute distress at this time.

## 2024-01-17 NOTE — PROGRESS NOTES
Care Management Discharge Note    Discharge Date: 01/17/2024       Discharge Disposition: Home, Home Care    Discharge Services: None    Discharge DME: None    Discharge Transportation:      Private pay costs discussed: Not applicable    Does the patient's insurance plan have a 3 day qualifying hospital stay waiver?  No    PAS Confirmation Code:    Patient/family educated on Medicare website which has current facility and service quality ratings: yes    Education Provided on the Discharge Plan: Yes  Persons Notified of Discharge Plans: Patient - Per Team   Patient/Family in Agreement with the Plan: yes    Handoff Referral Completed: Yes    Additional Information:  Patient to discharge home with [facility], [facility] working on finding agency.     RNCM met with patient at bedside, agreeable to only homecare PT. Referral submitted. Patient stated family/ PCA will transport.     Rani Livingston RN

## 2024-01-17 NOTE — PROGRESS NOTES
"[facility] brief update    Patient states doing ok today. Complains of bad pain in leg and worsening. Does tell me spine surgery has been recommended but she is not going to be pursuing this. Tells me her heart is her biggest priority. She is still having some chest pain, \"moderate,\" but better than when she came in. She is eager to know when her cardiology followup will be. She is agreeable to all discharge medication changes.    She is worried about recurrent syncope at home. No correlation with BG. She does use a Dexcom. She is going to have her neighbor stay with her, and her sister is en route to [place] and will be staying with her for 1 month after that. Planning evening discharge since that is when her neighbor becomes available.    Sunshine Fowler MD  [facility]  "

## 2024-01-17 NOTE — DISCHARGE SUMMARY
[facility] MEDICINE  DISCHARGE SUMMARY     Primary Care Physician: Kike David  Admission Date: 1/14/2024   Discharge Provider: Sunshine Fowler MD Discharge Date: 1/17/2024   Diet:   Active Diet and Nourishment Order   Procedures    Regular Diet Adult    Diet       Code Status: Full Code   Activity: DCACTIVITY: Activity as tolerated        Condition at Discharge: Stable     REASON FOR PRESENTATION(See Admission Note for Details)   Chest pain, syncope    PRINCIPAL & ACTIVE DISCHARGE DIAGNOSES     Principal Problem:    Right leg weakness  Active Problems:    Syncope and collapse    Chest pain, unspecified type    Unstable angina (H)      PENDING LABS     Unresulted Labs Ordered in the Past 30 Days of this Admission       No orders found from 12/15/2023 to 1/15/2024.            PROCEDURES ( this hospitalization only)      Procedure(s):  Coronary Angiogram Graft    RECOMMENDATIONS TO OUTPATIENT PROVIDER FOR F/U VISIT     Follow-up Appointments     Follow-up and recommended labs and tests       Follow up with primary care provider, Kike David, within 7 days for   hospital follow- up.  The following labs/tests are recommended: basic   metabolic profile.    Follow up with cardiology in 2 weeks.            DISPOSITION     Home with home care    SUMMARY OF HOSPITAL COURSE:      Sarah Rahman is a 58 year old female with a past medical history of diabetes mellitus type II, coronary artery disease, s/p coronary bypass, hypertension, asthma, COPD, hyperlipidemia, anxiety, and schizoaffective disorder who presented for evaluation of chest pain.  Admitted with unstable angina and syncope. Cardiology recommending medical management and Holter monitor.        Unstable angina  - Patient presented with typical chest pain that is relieved with nitroglycerin  - Significant history of coronary artery disease s/p CABG  - Only very mildly elevated serial troponin 19-->17  - Echo shows EF of  65% with mild mitral stenosis  - Cardiology saw, angiogram completed 1/16 showed stable multivessel disease. Cardiology recommended medical management.  -Continue home Aspirin and Plavix.  Cardiology added amlodipine  -Follow-up with cardiology as outpatient    Syncope  -Patient reporting multiple episodes of syncope at home, she is very worried about this  -She states no correlation with her blood sugar and she did wear a Dexcom at home  -Questioning if maybe uncontrolled blood pressure could have been contributing?  -Cardiology recommended 2-week Holter monitor to see if we can ascertain the cause of her syncope  -Otherwise it does preliminary seem to be better with the changes we have made in the hospital (no episodes in hospital)  -patient was going to have neighbor/sister come stay with her so she will not be alone while this is being worked up    History of CVA  Leg pain and weakness  - Patient complaining of leg weakness and pain  - Negative CTA head and neck for significant acute changes  - MRI brain is negative for acute changes and shows mild chronic microangiopathy.  - PT/OT evaluation  -Plan for home care on discharge  -Patient does tell me spine surgery has been recommended but that she does not want to proceed     Acute kidney injury  -Probably prerenal secondary to dehydration from nausea vomiting  -resolved with IV hydration     Diabetes mellitus type 2  - Poorly controlled  - Elevated hemoglobin A1c at 8.8  -Here, we held her glipizide and Trulicity but increased her Lantus.  She is having some low blood sugars today.  For discharge, I would like her to resume her glipizide tomorrow.  Resume her Lantus and Trulicity as before.  With A1c of 8.8 I do not think she is high risk for lows at home.     Hyperlipidemia  - Lipid panel shows elevation of LDL and total cholesterol  - Started on Lipitor.  Not entirely clear why she was not already on it but she tells me she does not have any objection to this  medication.     Labile hypertension  -Cardiology started her on amlodipine         Discharge Medications with Med changes:     Current Discharge Medication List        START taking these medications    Details   amLODIPine (NORVASC) 2.5 MG tablet Take 1 tablet (2.5 mg) by mouth daily  Qty: 30 tablet, Refills: 0    Associated Diagnoses: Essential hypertension      atorvastatin (LIPITOR) 40 MG tablet Take 1 tablet (40 mg) by mouth every evening  Qty: 30 tablet, Refills: 0    Associated Diagnoses: Intracranial atherosclerosis; Mixed hyperlipidemia           CONTINUE these medications which have CHANGED    Details   acetaminophen (TYLENOL) 500 MG tablet Take 1-2 tablets (500-1,000 mg) by mouth every 8 hours as needed for mild pain Max 5.5 tabs per day    Associated Diagnoses: Shortness of breath           CONTINUE these medications which have NOT CHANGED    Details   albuterol (PROAIR HFA) 108 (90 Base) MCG/ACT inhaler Inhale 1-2 puffs into the lungs every 4 hours as needed for shortness of breath or wheezing  Qty: 18 g, Refills: 0    Comments: Pharmacy may dispense brand covered by insurance (Proair, or proventil or ventolin or generic albuterol inhaler)      aspirin 81 MG EC tablet Take 1 tablet (81 mg) by mouth daily  Qty: 90 tablet, Refills: 3    Associated Diagnoses: TIA (transient ischemic attack)      clopidogrel (PLAVIX) 75 MG tablet Take 1 tablet (75 mg) by mouth daily  Qty: 30 tablet, Refills: 3    Associated Diagnoses: Acute CVA (cerebrovascular accident) (H); Cerebrovascular accident (CVA), unspecified mechanism (H)      Dexpanthenol 2 % CREA Externally apply topically daily      dulaglutide (TRULICITY) 0.75 MG/0.5ML pen Inject 0.75 mg Subcutaneous every 7 days      DULoxetine (CYMBALTA) 60 MG capsule Take 60 mg by mouth 2 times daily      fluticasone (FLONASE) 50 MCG/ACT nasal spray Spray 1 spray into both nostrils 2 times daily      fluticasone-salmeterol (ADVAIR) 250-50 MCG/ACT inhaler Inhale 1 puff into  the lungs every 12 hours  Qty: 60 each, Refills: 3    Associated Diagnoses: Chronic obstructive pulmonary disease, unspecified COPD type (H)      glipiZIDE (GLUCOTROL XL) 5 MG 24 hr tablet Take 10 mg by mouth daily      insulin glargine (LANTUS PEN) 100 UNIT/ML pen Inject 24 Units Subcutaneous At Bedtime      ipratropium - albuterol 0.5 mg/2.5 mg/3 mL (DUONEB) 0.5-2.5 (3) MG/3ML neb solution Take 1 vial (3 mLs) by nebulization every 6 hours as needed for shortness of breath, wheezing or cough  Qty: 90 mL, Refills: 0    Associated Diagnoses: Shortness of breath      ketorolac (TORADOL) 10 MG tablet TAKE ONE TABLET BY MOUTH EVERY 6 HOURS AS NEEDED FOR MODERATE PAIN  Qty: 20 tablet, Refills: 0    Associated Diagnoses: Chronic pain syndrome      lidocaine (LIDODERM) 5 % patch Place onto the skin every 24 hours To prevent lidocaine toxicity, patient should be patch free for 12 hrs daily.      lidocaine (LMX4) 4 % external cream Apply topically daily Apply to foot      methocarbamol (ROBAXIN) 750 MG tablet Take 1 tablet (750 mg) by mouth 4 times daily as needed for muscle spasms  Refills: 0    Associated Diagnoses: Pain      naloxone (NARCAN) 4 MG/0.1ML nasal spray Spray 4 mg into one nostril alternating nostrils as needed for opioid reversal every 2-3 minutes until assistance arrives      nitroGLYcerin (NITROSTAT) 0.4 MG sublingual tablet Place 1 tablet (0.4 mg) under the tongue every 5 minutes as needed for chest pain For chest pain place 1 tablet under the tongue every 5 minutes for 3 doses. If symptoms persist 5 minutes after 1st dose call 911.  Qty: 25 tablet, Refills: 3    Associated Diagnoses: Coronary artery disease involving native coronary artery of native heart without angina pectoris      nortriptyline (PAMELOR) 10 MG capsule Take 10 mg by mouth At Bedtime      ondansetron (ZOFRAN) 8 MG tablet Take 1 tablet (8 mg) by mouth every 8 hours as needed for nausea  Qty: 20 tablet, Refills: 3    Associated Diagnoses:  Nausea      oxyCODONE-acetaminophen (PERCOCET)  MG per tablet Take 1 tablet by mouth every 6 hours as needed for pain      topiramate (TOPAMAX) 50 MG tablet Take 100 mg by mouth 2 times daily               Consults     PHYSICAL THERAPY ADULT IP CONSULT  OCCUPATIONAL THERAPY ADULT IP CONSULT  CARDIOLOGY IP CONSULT  SMOKING CESSATION PROGRAM IP CONSULT  SPEECH LANGUAGE PATH ADULT IP CONSULT  NEUROLOGY IP CONSULT  SMOKING CESSATION PROGRAM IP CONSULT        SIGNIFICANT IMAGING FINDINGS     Results for orders placed or performed during the hospital encounter of 01/14/24   CTA Head Neck with Contrast    Impression    IMPRESSION:   HEAD CT:  1.  No acute intracranial hemorrhage or CT evidence of acute large vascular territory ischemic infarct.  2.  Similar small chronic infarcts in both occipital lobes with changes suggestive of mild chronic microvascular ischemic disease.    HEAD CTA:   1.  No large vessel occlusion or new flow-limiting stenosis.  2.  Redemonstration of scattered intracranial atherosclerotic disease as further detailed above. Of note, there is mixed atherosclerotic plaque throughout the carotid siphons with areas of moderate to advanced luminal narrowing involving the paraclinoid   segments bilaterally. This is similar on the right and perhaps slightly progressed on the left compared to October 2023.    NECK CTA:  1.  No high-grade stenosis or occlusion involving the major arteries of the neck.  2.  Atherosclerotic changes about the carotid bifurcations without substantial (50% or greater) luminal narrowing.  3.  No evidence of dissection.   CTA Chest Abdomen Pelvis w Contrast    Impression    IMPRESSION:  1.  No evidence of acute aortic syndrome. No acute findings in the chest, abdomen, or pelvis.     MR Brain w/o & w Contrast    Impression    IMPRESSION:    1.  Senescent intracranial changes and sequelae of mild chronic microangiopathy without acute intracranial abnormality.       SIGNIFICANT  LABORATORY FINDINGS     See emr    Discharge Orders        Follow-Up with Cardiology TODD      Reason for your hospital stay    Chest pain, syncope     Follow-up and recommended labs and tests     Follow up with primary care provider, Kike David, within 7 days for hospital follow- up.  The following labs/tests are recommended: basic metabolic profile.    Follow up with cardiology in 2 weeks.     Activity    Your activity upon discharge: activity as tolerated     When to contact your care team    Call your primary doctor if you have any of the following: worsening chest pain, shortness of breath, fever, chills, fainting, dizziness, vomiting, constipation, dehydration, bleeding, or trouble urinating, or any other symptoms that are new or concerning to you.     Discharge Instructions    Don't take your glipizide today. Ok to resume glipizide tomorrow.     Diet    Follow this diet upon discharge: resume your regular diet       Examination   Physical Exam   Temp:  [97.3  F (36.3  C)-98.6  F (37  C)] 98.6  F (37  C)  Pulse:  [69-93] 90  Resp:  [17-20] 18  BP: (136-186)/(63-91) 186/91  SpO2:  [94 %-100 %] 98 %  Wt Readings from Last 1 Encounters:   01/16/24 89.4 kg (197 lb 1.5 oz)       General: in no apparent distress, non-toxic, and alert female lying in hospital bed oriented x3  HEENT: Head normocephalic atraumatic, oral mucosa moist. Sclerae anicteric  CV: Regular rhythm, normal rate, no murmurs  Resp: No wheezes, no rales or rhonchi, no focal consolidations  GI: Belly soft, nondistended, nontender, bowel sounds present  Skin: No rashes or lesions  Extremities: No peripheral edema  Psych: Normal affect, mood euthymic  Neuro: Grossly normal    Please see EMR for more detailed significant labs, imaging, consultant notes etc.    I, Sunshine Fowler MD, personally saw the patient today and spent greater than 30 minutes discharging this patient.    Sunshine Fowler MD  [facility]    CC:Frankie  Kike

## 2024-01-18 ENCOUNTER — PATIENT OUTREACH (OUTPATIENT)
Dept: CARE COORDINATION | Facility: CLINIC | Age: 59
End: 2024-01-18
Payer: MEDICAID

## 2024-01-18 NOTE — LETTER
[facility] CARE COORDINATION  [facility]  January 18, 2024    Sarah Rahman  [address]    [place] [address]      Dear Sarah,    I am a clinic care coordinator who works with Kike David MD with the [facility]. I wanted to thank you for spending the time to talk with me.  Below is a description of clinic care coordination and how I can further assist you.       The clinic care coordination team is made up of a registered nurse, , financial resource worker and community health worker who understand the health care system. The goal of clinic care coordination is to help you manage your health and improve access to the health care system. Our team works alongside your provider to assist you in determining your health and social needs. We can help you obtain health care and community resources, providing you with necessary information and education. We can work with you through any barriers and develop a care plan that helps coordinate and strengthen the communication between you and your care team.  Our services are voluntary and are offered without charge to you personally.    Please feel free to contact me with any questions or concerns regarding care coordination and what we can offer.      We are focused on providing you with the highest-quality healthcare experience possible.    Sincerely,     Ivone Wahl,   [facility]  [phone]

## 2024-01-18 NOTE — PROGRESS NOTES
Clinic Care Coordination Contact  [facility]: Post-Discharge Note  SITUATION                                                      Admission:    Admission Date: 01/14/24   Reason for Admission: right leg weakness  Discharge:   Discharge Date: 01/17/24  Discharge Diagnosis: Active Problems:    Syncope and collapse    Chest pain, unspecified type    Unstable angina (H)    BACKGROUND                                                      Per hospital discharge summary and inpatient provider notes:  [facility] MEDICINE  DISCHARGE SUMMARY      Primary Care Physician: Kike David                                                                  Admission Date: 1/14/2024   Discharge Provider: Sunshine Fowler MD Discharge Date: 1/17/2024            PRINCIPAL & ACTIVE DISCHARGE DIAGNOSES      Principal Problem:    Right leg weakness  Active Problems:    Syncope and collapse    Chest pain, unspecified type    Unstable angina (H)       PROCEDURES ( this hospitalization only)       Procedure(s):  Coronary Angiogram Graft     RECOMMENDATIONS TO OUTPATIENT PROVIDER FOR F/U VISIT      Follow-up Appointments     Follow-up and recommended labs and tests       Follow up with primary care provider, Kike David, within 7 days for   hospital follow- up.  The following labs/tests are recommended: basic   metabolic profile.    Follow up with cardiology in 2 weeks.         ASSESSMENT           Discharge Assessment  How are you doing now that you are home?: good  How are your symptoms? (Red Flag symptoms escalate to triage hotline per guidelines): Unchanged  Do you feel your condition is stable enough to be safe at home until your provider visit?: Yes  Does the patient have their discharge instructions? : Yes  Does the patient have questions regarding their discharge instructions? : No  Were you started on any new medications or were there changes to any of your previous medications? : No  Do you  have questions regarding any of your medications? : No  Do you have all of your needed medical supplies or equipment (DME)?  (i.e. oxygen tank, CPAP, cane, etc.): Yes  Discharge follow-up appointment scheduled within 14 calendar days? : No  Is patient agreeable to assistance with scheduling? : Yes            Talked to patient who did not want to complete full discharge assessment. Is doing well, no questions or concerns. Is willing to have clinic call her to set up post hospital appt with PCP in next week, in person.  No other concerns. Does not want care coordination.     PLAN                                                      Outpatient Plan:  Will attend appts as scheduled.  Will send letter.     Future Appointments   Date Time Provider Department Center   1/22/2024 11:00 AM Viviana Rodriguez APRN CNP [facility]   3/5/2024  8:50 AM Owen Coffey MD [facility]         For any urgent concerns, please contact our 24 hour nurse triage line: [phone] ([phone])         PATRICIA Glass

## 2024-01-19 ENCOUNTER — TELEPHONE (OUTPATIENT)
Dept: ENDOCRINOLOGY | Facility: CLINIC | Age: 59
End: 2024-01-19
Payer: MEDICAID

## 2024-01-19 DIAGNOSIS — J31.0 CHRONIC RHINITIS: ICD-10-CM

## 2024-01-19 DIAGNOSIS — E11.65 TYPE 2 DIABETES MELLITUS WITH HYPERGLYCEMIA, WITH LONG-TERM CURRENT USE OF INSULIN (H): Primary | ICD-10-CM

## 2024-01-19 DIAGNOSIS — J44.9 CHRONIC OBSTRUCTIVE PULMONARY DISEASE, UNSPECIFIED COPD TYPE (H): ICD-10-CM

## 2024-01-19 DIAGNOSIS — G89.4 CHRONIC PAIN SYNDROME: Chronic | ICD-10-CM

## 2024-01-19 DIAGNOSIS — Z79.4 TYPE 2 DIABETES MELLITUS WITH HYPERGLYCEMIA, WITH LONG-TERM CURRENT USE OF INSULIN (H): Primary | ICD-10-CM

## 2024-01-19 LAB — GLUCOSE BLDC GLUCOMTR-MCNC: 124 MG/DL (ref 70–99)

## 2024-01-19 RX ORDER — DULOXETIN HYDROCHLORIDE 60 MG/1
60 CAPSULE, DELAYED RELEASE ORAL 2 TIMES DAILY
OUTPATIENT
Start: 2024-01-19

## 2024-01-19 RX ORDER — FLUTICASONE PROPIONATE 50 MCG
1 SPRAY, SUSPENSION (ML) NASAL 2 TIMES DAILY
Qty: 16 G | Refills: 4 | Status: SHIPPED | OUTPATIENT
Start: 2024-01-19 | End: 2024-02-26

## 2024-01-19 RX ORDER — DULAGLUTIDE 0.75 MG/.5ML
0.75 INJECTION, SOLUTION SUBCUTANEOUS
Qty: 6 ML | Refills: 3 | OUTPATIENT
Start: 2024-01-19

## 2024-01-19 RX ORDER — DULOXETIN HYDROCHLORIDE 60 MG/1
60 CAPSULE, DELAYED RELEASE ORAL 2 TIMES DAILY
Qty: 180 CAPSULE | Refills: 3 | Status: SHIPPED | OUTPATIENT
Start: 2024-01-19 | End: 2025-01-18

## 2024-01-19 RX ORDER — FLUTICASONE PROPIONATE 50 MCG
1 SPRAY, SUSPENSION (ML) NASAL 2 TIMES DAILY
OUTPATIENT
Start: 2024-01-19

## 2024-01-19 RX ORDER — ALBUTEROL SULFATE 90 UG/1
1-2 AEROSOL, METERED RESPIRATORY (INHALATION) EVERY 4 HOURS PRN
Qty: 18 G | Refills: 0 | OUTPATIENT
Start: 2024-01-19

## 2024-01-19 RX ORDER — GLIPIZIDE 5 MG/1
10 TABLET, FILM COATED, EXTENDED RELEASE ORAL DAILY
Qty: 30 TABLET | Refills: 0 | OUTPATIENT
Start: 2024-01-19

## 2024-01-19 RX ORDER — ALBUTEROL SULFATE 90 UG/1
1-2 AEROSOL, METERED RESPIRATORY (INHALATION) EVERY 4 HOURS PRN
Qty: 18 G | Refills: 11 | Status: SHIPPED | OUTPATIENT
Start: 2024-01-19

## 2024-01-19 RX ORDER — GLIPIZIDE 10 MG/1
10 TABLET, FILM COATED, EXTENDED RELEASE ORAL DAILY
Qty: 90 TABLET | Refills: 0 | Status: SHIPPED | OUTPATIENT
Start: 2024-01-19 | End: 2024-04-02

## 2024-01-19 NOTE — TELEPHONE ENCOUNTER
Pt has not been seen since 1/2023 Pt needs to be seen or have pcp manage.   Requested Prescriptions   Pending Prescriptions Disp Refills    TRULICITY 0.75 MG/0.5ML pen [Pharmacy Med Name: TRULICITY 0.75MG/0.5ML SOPN] 6 mL 3     Sig: INJECT 0.75 MG SUBCUTANEOUS EVERY 7 DAYS       GLP-1 Agonists Protocol Failed - 1/19/2024 10:02 AM        Failed - Has GFR on file in past 12 months and most recent value is normal        Failed - Recent (6 mo) or future (90 days) visit within the authorizing provider's specialty     The patient must have completed an in-person or virtual visit within the past 6 months or has a future visit scheduled within the next 90 days with the authorizing provider s specialty.  Urgent care and e-visits do not quality as an office visit for this protocol.          Failed - Medication indicated for associated diagnosis     Medication is associated with one or more of the following diagnoses:     Type 2 diabetes mellitus           Passed - HgbA1C in past 3 or 6 months     If HgbA1C is 8 or greater, it needs to be on file within the past 3 months.  If less than 8, must be on file within the past 6 months.     Recent Labs   Lab Test 01/14/24  1056   A1C 8.8*             Passed - Medication is active on med list        Passed - Patient is age 18 or older        Passed - No active pregnancy on record        Passed - Normal serum creatinine on file in past 12 months     Recent Labs   Lab Test 01/17/24  0714   CR 0.92       Ok to refill medication if creatinine is low          Passed - No positive pregnancy test in past 12 months

## 2024-01-19 NOTE — TELEPHONE ENCOUNTER
Per patient her PCP manages her Trulicity and she does not wish to schedule a follow up with Dulce.

## 2024-01-20 ENCOUNTER — MEDICAL CORRESPONDENCE (OUTPATIENT)
Dept: HEALTH INFORMATION MANAGEMENT | Facility: CLINIC | Age: 59
End: 2024-01-20

## 2024-01-20 PROBLEM — R54 FRAILTY: Status: ACTIVE | Noted: 2024-01-20

## 2024-01-20 PROBLEM — R26.2 DIFFICULTY WALKING: Status: ACTIVE | Noted: 2024-01-20

## 2024-01-22 ENCOUNTER — TELEPHONE (OUTPATIENT)
Dept: INTERNAL MEDICINE | Facility: CLINIC | Age: 59
End: 2024-01-22

## 2024-01-22 LAB
ATRIAL RATE - MUSE: 87 BPM
DIASTOLIC BLOOD PRESSURE - MUSE: 93 MMHG
INTERPRETATION ECG - MUSE: NORMAL
P AXIS - MUSE: 75 DEGREES
PR INTERVAL - MUSE: 162 MS
QRS DURATION - MUSE: 74 MS
QT - MUSE: 388 MS
QTC - MUSE: 466 MS
R AXIS - MUSE: 32 DEGREES
SYSTOLIC BLOOD PRESSURE - MUSE: 151 MMHG
T AXIS - MUSE: 122 DEGREES
VENTRICULAR RATE- MUSE: 87 BPM

## 2024-01-22 NOTE — TELEPHONE ENCOUNTER
Valerie for orders.    Kike David MD  General Internal Medicine  [facility]  1/22/2024, 10:52 AM

## 2024-01-22 NOTE — TELEPHONE ENCOUNTER
Order/Referral Request    Who is requesting: [facility]     Orders being requested: PT 2 times a wk for 2 wks  Skilled nuring    Reason service is needed/diagnosis: Need a RN to go out and help pt with their Medications     When are orders needed by: ASAP     Has this been discussed with Provider: No    Does patient have a preference on a Group/Provider/Facility? [facility]     Does patient have an appointment scheduled?: No    Where to send orders:  [facility]    Okay to leave a detailed message?: Yes at Other phone number: [phone]

## 2024-01-30 ENCOUNTER — OFFICE VISIT (OUTPATIENT)
Dept: INTERNAL MEDICINE | Facility: CLINIC | Age: 59
End: 2024-01-30
Payer: MEDICAID

## 2024-01-30 ENCOUNTER — ANCILLARY PROCEDURE (OUTPATIENT)
Dept: MAMMOGRAPHY | Facility: CLINIC | Age: 59
End: 2024-01-30
Attending: INTERNAL MEDICINE
Payer: MEDICAID

## 2024-01-30 VITALS
BODY MASS INDEX: 30.78 KG/M2 | TEMPERATURE: 98.3 F | SYSTOLIC BLOOD PRESSURE: 128 MMHG | RESPIRATION RATE: 16 BRPM | HEART RATE: 76 BPM | DIASTOLIC BLOOD PRESSURE: 62 MMHG | HEIGHT: 67 IN | WEIGHT: 196.1 LBS

## 2024-01-30 DIAGNOSIS — M79.672 PAIN IN BOTH FEET: ICD-10-CM

## 2024-01-30 DIAGNOSIS — Z79.4 TYPE 2 DIABETES MELLITUS WITH HYPERGLYCEMIA, WITH LONG-TERM CURRENT USE OF INSULIN (H): Primary | Chronic | ICD-10-CM

## 2024-01-30 DIAGNOSIS — Z12.31 VISIT FOR SCREENING MAMMOGRAM: ICD-10-CM

## 2024-01-30 DIAGNOSIS — I70.1 RIGHT RENAL ARTERY STENOSIS (H): ICD-10-CM

## 2024-01-30 DIAGNOSIS — L65.9 HAIR LOSS: ICD-10-CM

## 2024-01-30 DIAGNOSIS — J44.9 CHRONIC OBSTRUCTIVE PULMONARY DISEASE, UNSPECIFIED COPD TYPE (H): ICD-10-CM

## 2024-01-30 DIAGNOSIS — F19.11 HISTORY OF DRUG ABUSE (H): ICD-10-CM

## 2024-01-30 DIAGNOSIS — F25.9 SCHIZOAFFECTIVE DISORDER, UNSPECIFIED TYPE (H): ICD-10-CM

## 2024-01-30 DIAGNOSIS — E11.65 TYPE 2 DIABETES MELLITUS WITH HYPERGLYCEMIA, WITH LONG-TERM CURRENT USE OF INSULIN (H): Primary | Chronic | ICD-10-CM

## 2024-01-30 DIAGNOSIS — M79.671 PAIN IN BOTH FEET: ICD-10-CM

## 2024-01-30 DIAGNOSIS — G95.89 MYELOMALACIA OF CERVICAL CORD (H): ICD-10-CM

## 2024-01-30 DIAGNOSIS — H91.91 HEARING LOSS OF RIGHT EAR, UNSPECIFIED HEARING LOSS TYPE: ICD-10-CM

## 2024-01-30 DIAGNOSIS — E11.42 DIABETIC PERIPHERAL NEUROPATHY (H): ICD-10-CM

## 2024-01-30 DIAGNOSIS — R26.89 BALANCE PROBLEMS: ICD-10-CM

## 2024-01-30 DIAGNOSIS — I69.351 HEMIPLEGIA AND HEMIPARESIS FOLLOWING CEREBRAL INFARCTION AFFECTING RIGHT DOMINANT SIDE (H): ICD-10-CM

## 2024-01-30 PROCEDURE — 99215 OFFICE O/P EST HI 40 MIN: CPT | Performed by: INTERNAL MEDICINE

## 2024-01-30 PROCEDURE — 77067 SCR MAMMO BI INCL CAD: CPT

## 2024-01-30 NOTE — PROGRESS NOTES
[facility]    Comprehensive and complex medical care - Chronic disease management - Shared decision making - Care coordination - Compassionate care    Patient advocacy - Rational deprescribing - Minimally disruptive medicine - Ethical focus - Customized care         Date of Service: 1/30/2024  Primary Provider: Kike David    Patient Care Team:  Kike David MD as PCP - General (Internal Medicine)  Rosario Byrne NP as Nurse Practitioner  Kike David MD as Assigned PCP  Kike David MD (Internal Medicine)  Ivone Wahl LSW as Lead Care Coordinator (Primary Care - CC)  Rosario Byrne NP as Assigned Surgical Provider          Patient's Pharmacy:    [address] [address]  Phone: [phone] Fax: [phone]     Patient's Contacts:  Name Home Phone Work Phone Mobile Phone Relationship Lgl Grd   JOSE SANTANA   [phone] Spouse    SALONI MORALES [phone]   Friend      Patient's Insurance:    Payor[place] / Plan: [place] / Product Type: Medicaid /            Active Problem List:  Problem List as of 1/30/2024 Reviewed: 1/16/2024  7:17 AM by Ramandeep Sparrow RN         High    CAD -- S/P CABG    Type 2 diabetes mellitus with hyperglycemia, with long-term current use of insulin (H)    Last Assessment & Plan 6/7/2022 Office Visit Edited 6/10/2022  8:06 AM by Mara Scott NP     Type 2 diabetes is not well controlled but she is using the Lantus injection regularly.  She ran out of the freestyle hosea sensors so she does not have any recent glucose readings to base medication adjustments on at this time.  She was prescribed Trulicity in the past but was nervous about taking this medication so she never started it.  We reviewed possible side effects associated with the medication and what she may expect in the first 2 weeks of taking this  medication.  She would be comfortable trying this medication again, Trulicity 0.75 mg weekly sent to her pharmacy.  She is advised that this is a small dose and will need to be gradually increased in order for her to obtain maximal benefit from the medication.  She will be scheduled for a follow-up appointment and establish care visit with another provider in the next 4 weeks.  Of note, she would likely benefit from increasing her dose of metformin back to maximal dose but was concerned that this caused her to have increased urinary frequency.  We did not address this today.         Cerebrovascular accident (CVA) due to stenosis of carotid artery (H)    Smoker       Medium    Carotid stenosis, left    Primary hypertension    Moderate persistent asthma    Chronic pain syndrome    Right renal artery stenosis (H24)    Schizoaffective disorder (H)    Chronic obstructive pulmonary disease (H)    Anemia    Depression with anxiety    Myelomalacia of cervical cord (H)    Atypical chest pain -- Normal NM stress 5/8/23    Syncope    Acute CVA (cerebrovascular accident) (H)    Intracranial atherosclerosis    Syncope and collapse    Right leg weakness    Chest pain, unspecified type    Unstable angina (H)    Difficulty walking       Low    Bilateral low back pain with right-sided sciatica, unspecified chronicity    Last Assessment & Plan 6/7/2022 Office Visit Written 6/10/2022  8:01 AM by Mara Scott, ANDREEA     Because of her uncontrolled diabetes, she is not a good candidate to use steroid medication to treat lumbar radiculopathy.  She is familiar with the effects of a steroid medication on her blood sugar and understands this conundrum.  Fortunately, she does experience some relief when she is given Toradol in the emergency department.  We discussed trial of a nonsteroidal anti-inflammatory medication, diclofenac 3 times daily.  She is open to this idea so this is sent to her pharmacy.  She is advised that she can continue with  acetaminophen as well.         Nasal polyp    Last Assessment & Plan 6/7/2022 Office Visit Written 6/10/2022  8:03 AM by Mara Scott NP     Advised fluticasone nasal spray 1 spray each nostril daily.  She declines a referral to ENT, she has had a nasal polyp in the past which responded to fluticasone.         Mixed hyperlipidemia    GERD (gastroesophageal reflux disease)    History of drug abuse (H)    Hx of syphilis    Menopausal flushing    Nephrolithiasis    Obesity    Seasonal allergies    Sensorineural hearing loss (SNHL) of right ear    Frailty        Current Outpatient Medications   Medication Instructions    acetaminophen (TYLENOL) 500-1,000 mg, Oral, EVERY 8 HOURS PRN, Max 5.5 tabs per day    albuterol (PROAIR HFA) 108 (90 Base) MCG/ACT inhaler 1-2 puffs, Inhalation, EVERY 4 HOURS PRN    amLODIPine (NORVASC) 2.5 mg, Oral, DAILY    aspirin 81 mg, Oral, DAILY    atorvastatin (LIPITOR) 40 mg, Oral, EVERY EVENING    clopidogrel (PLAVIX) 75 mg, Oral, DAILY    Dexpanthenol 2 % CREA Apply externally, DAILY    diazepam (VALIUM) 5 mg, Oral, ONCE PRN, Take 30 minutes before flight.    dulaglutide (TRULICITY) 0.75 mg, Subcutaneous, EVERY 7 DAYS    DULoxetine (CYMBALTA) 60 mg, Oral, 2 TIMES DAILY    fluticasone (FLONASE) 50 MCG/ACT nasal spray 1 spray, Both Nostrils, 2 TIMES DAILY    fluticasone-salmeterol (ADVAIR) 250-50 MCG/ACT inhaler 1 puff, Inhalation, EVERY 12 HOURS    glipiZIDE (GLUCOTROL XL) 10 mg, Oral, DAILY, Please schedule an appointment with Dr. Kike David in the next 3 months.    insulin glargine (LANTUS PEN) 24 Units, Subcutaneous, AT BEDTIME    ipratropium - albuterol 0.5 mg/2.5 mg/3 mL (DUONEB) 0.5-2.5 (3) MG/3ML neb solution 3 mLs, Nebulization, EVERY 6 HOURS PRN    ketorolac (TORADOL) 10 mg, Oral, EVERY 6 HOURS PRN    lidocaine (LIDODERM) 5 % patch Transdermal, EVERY 24 HOURS, To prevent lidocaine toxicity, patient should be patch free for 12 hrs daily.    lidocaine (LMX4) 4 % external cream  Topical, DAILY, Apply to foot    methocarbamol (ROBAXIN) 750 mg, Oral, 4 TIMES DAILY PRN    naloxone (NARCAN) 4 mg, Alternating Nostrils, PRN, every 2-3 minutes until assistance arrives    nitroGLYcerin (NITROSTAT) 0.4 mg, Sublingual, EVERY 5 MIN PRN, For chest pain place 1 tablet under the tongue every 5 minutes for 3 doses. If symptoms persist 5 minutes after 1st dose call 911.     nortriptyline (PAMELOR) 10 mg, Oral, AT BEDTIME    ondansetron (ZOFRAN) 8 mg, Oral, EVERY 8 HOURS PRN    oxyCODONE-acetaminophen (PERCOCET)  MG per tablet 1 tablet, Oral, EVERY 6 HOURS PRN    topiramate (TOPAMAX) 100 mg, Oral, 2 TIMES DAILY        Social History     Social History Narrative    ** Merged History Encounter **         Lives alone in a condo.      On disability.  Three living children.         Subjective:     Sarah Rahman is a 58 year old female who comes in today for:    Chief Complaint   Patient presents with    Hospital F/U     [facility] 1/14/24-1/17/24 2/1/2024     3:01 PM   Additional Questions   Roomed by HERMAN Cabrera   Accompanied by NA     In for follow up multiple issues.  Follow up hospital stay.    Needs a letter for her dog as a therapy dog.  She does alert her as she has almost complete hearing loss in the right ear.    Recently had vomiting 1/12 through 1/14.  Could only get in ginger ale.    Did have some chest pain on the left as well and work-up at the hospital was reviewed.  Angiogram reviewed.    Having increased hair loss and would like to make sure she can use a BIOTIN supplement.    Working on stopping smoking.  Working on physical therapy through [email or web address].    Reviewed her peripheral neuropathy (PN) and this is worsening.  Symptoms are more on the right than the left.  Would like to see an MD neurologist for her peripheral neuropathy (PN) and options for testing and treatment.  Lots of burning and tingling.  Not sleeping due to this pain.  Would like to see if I can get her in  "sooner.    Using epsom salts for feet as well.    Blood pressure reviewed.    We reviewed her other issues noted in the assessment but not specifically addressed in the HPI above.     Objective:     Wt Readings from Last 3 Encounters:   02/01/24 88.9 kg (196 lb)   01/30/24 89 kg (196 lb 1.6 oz)   01/16/24 89.4 kg (197 lb 1.5 oz)     BP Readings from Last 3 Encounters:   02/01/24 129/80   01/30/24 128/62   01/17/24 134/89     /62   Pulse 76   Temp 98.3  F (36.8  C) (Oral)   Resp 16   Ht 1.702 m (5' 7\")   Wt 89 kg (196 lb 1.6 oz)   LMP  (LMP Unknown)   BMI 30.71 kg/m     The patient is comfortable, no acute distress.  Mood good.  Insight good.  Eyes are nonicteric.  Neck is supple without mass.  No cervical adenopathy.  No thyromegaly. Heart regular rate and rhythm.  Lungs clear to auscultation bilaterally.  Respiratory effort is good.  Extremities no edema.      Diagnostics:     Admission on 01/14/2024, Discharged on 01/17/2024   Component Date Value Ref Range Status    Systolic Blood Pressure 01/14/2024 151  mmHg Final    Diastolic Blood Pressure 01/14/2024 93  mmHg Final    Ventricular Rate 01/14/2024 87  BPM Final    Atrial Rate 01/14/2024 87  BPM Final    TN Interval 01/14/2024 162  ms Final    QRS Duration 01/14/2024 74  ms Final    QT 01/14/2024 388  ms Final    QTc 01/14/2024 466  ms Final    P Axis 01/14/2024 75  degrees Final    R AXIS 01/14/2024 32  degrees Final    T Axis 01/14/2024 122  degrees Final    Interpretation ECG 01/14/2024    Final                    Value:Sinus rhythm  ST & T wave abnormality, consider lateral ischemia  Prolonged QT  Abnormal ECG  When compared with ECG of 29-NOV-2023 18:37,  Inverted T waves have replaced nonspecific T wave abnormality in Lateral leads  Confirmed by SEE ED PROVIDER NOTE FOR, ECG INTERPRETATION ([id number]),  EVERETT MCCARTHY ([id number]) on 1/22/2024 4:49:45 AM      Hold Specimen 01/14/2024 JIC   Final    Hold Specimen 01/14/2024 JIC   Final    Sodium " 01/14/2024 141  135 - 145 mmol/L Final    Reference intervals for this test were updated on 09/26/2023 to more accurately reflect our healthy population. There may be differences in the flagging of prior results with similar values performed with this method. Interpretation of those prior results can be made in the context of the updated reference intervals.     Potassium 01/14/2024 4.0  3.4 - 5.3 mmol/L Final    Carbon Dioxide (CO2) 01/14/2024 19 (L)  22 - 29 mmol/L Final    Anion Gap 01/14/2024 14  7 - 15 mmol/L Final    Urea Nitrogen 01/14/2024 23.0  8.0 - 23.0 mg/dL Final    Creatinine 01/14/2024 1.21 (H)  0.51 - 0.95 mg/dL Final    GFR Estimate 01/14/2024 51 (L)  >60 mL/min/1.73m2 Final    Calcium 01/14/2024 9.7  8.6 - 10.0 mg/dL Final    Chloride 01/14/2024 108 (H)  98 - 107 mmol/L Final    Glucose 01/14/2024 161 (H)  70 - 99 mg/dL Final    Alkaline Phosphatase 01/14/2024 99  40 - 150 U/L Final    Reference intervals for this test were updated on 11/14/2023 to more accurately reflect our healthy population. There may be differences in the flagging of prior results with similar values performed with this method. Interpretation of those prior results can be made in the context of the updated reference intervals.    AST 01/14/2024 13  0 - 45 U/L Final    Reference intervals for this test were updated on 6/12/2023 to more accurately reflect our healthy population. There may be differences in the flagging of prior results with similar values performed with this method. Interpretation of those prior results can be made in the context of the updated reference intervals.    ALT 01/14/2024 23  0 - 50 U/L Final    Reference intervals for this test were updated on 6/12/2023 to more accurately reflect our healthy population. There may be differences in the flagging of prior results with similar values performed with this method. Interpretation of those prior results can be made in the context of the updated reference  intervals.      Protein Total 01/14/2024 7.3  6.4 - 8.3 g/dL Final    Albumin 01/14/2024 4.1  3.5 - 5.2 g/dL Final    Bilirubin Total 01/14/2024 0.2  <=1.2 mg/dL Final    Lipase 01/14/2024 11 (L)  13 - 60 U/L Final    Troponin T, High Sensitivity 01/14/2024 19 (H)  <=14 ng/L Final    Either a High Sensitivity Troponin T baseline (0 hours) value = 100 ng/L, or an increase in High Sensitivity Troponin T = 7 ng/L at 2 hours compared to 0 hours (2-0 hours), suggests myocardial injury, and urgent clinical attention is required.    If the 2-0 hours increase is <7 ng/L, a High Sensitivity Troponin T result above gender-specific reference ranges warrants further evaluation.   Recommendations for further evaluation include correlation with clinical decision-making tool (e.g., HEART), a 3rd High Sensitivity Troponin T test 2 hours after the 2nd (a 20% change from baseline would represent concern), admission for observation, close PCC/cardiology follow-up, or urgent outpatient provocative testing.    Ketone (Beta-Hydroxybutyrate) Juni* 01/14/2024 0.30  <=0.30 mmol/L Final    Color Urine 01/14/2024 Yellow  Colorless, Straw, Light Yellow, Yellow Final    Appearance Urine 01/14/2024 Clear  Clear Final    Glucose Urine 01/14/2024 Negative  Negative mg/dL Final    Bilirubin Urine 01/14/2024 Negative  Negative Final    Ketones Urine 01/14/2024 Negative  Negative mg/dL Final    Specific Gravity Urine 01/14/2024 1.010  1.001 - 1.030 Final    Blood Urine 01/14/2024 Negative  Negative Final    pH Urine 01/14/2024 6.5  5.0 - 7.0 Final    Protein Albumin Urine 01/14/2024 30 (A)  Negative mg/dL Final    Urobilinogen Urine 01/14/2024 <2.0  <2.0 mg/dL Final    Nitrite Urine 01/14/2024 Negative  Negative Final    Leukocyte Esterase Urine 01/14/2024 Negative  Negative Final    Bacteria Urine 01/14/2024 Few (A)  None Seen /HPF Final    Mucus Urine 01/14/2024 Present (A)  None Seen /LPF Final    Amorphous Crystals Urine 01/14/2024 Few (A)  None  Seen /HPF Final    RBC Urine 01/14/2024 2  <=2 /HPF Final    WBC Urine 01/14/2024 1  <=5 /HPF Final    Squamous Epithelials Urine 01/14/2024 11 (H)  <=1 /HPF Final    Hyaline Casts Urine 01/14/2024 19 (H)  <=2 /LPF Final    WBC Count 01/14/2024 7.9  4.0 - 11.0 10e3/uL Final    RBC Count 01/14/2024 4.20  3.80 - 5.20 10e6/uL Final    Hemoglobin 01/14/2024 11.3 (L)  11.7 - 15.7 g/dL Final    Hematocrit 01/14/2024 36.4  35.0 - 47.0 % Final    MCV 01/14/2024 87  78 - 100 fL Final    MCH 01/14/2024 26.9  26.5 - 33.0 pg Final    MCHC 01/14/2024 31.0 (L)  31.5 - 36.5 g/dL Final    RDW 01/14/2024 13.3  10.0 - 15.0 % Final    Platelet Count 01/14/2024 255  150 - 450 10e3/uL Final    % Neutrophils 01/14/2024 54  % Final    % Lymphocytes 01/14/2024 36  % Final    % Monocytes 01/14/2024 5  % Final    % Eosinophils 01/14/2024 4  % Final    % Basophils 01/14/2024 1  % Final    % Immature Granulocytes 01/14/2024 0  % Final    NRBCs per 100 WBC 01/14/2024 0  <1 /100 Final    Absolute Neutrophils 01/14/2024 4.3  1.6 - 8.3 10e3/uL Final    Absolute Lymphocytes 01/14/2024 2.8  0.8 - 5.3 10e3/uL Final    Absolute Monocytes 01/14/2024 0.4  0.0 - 1.3 10e3/uL Final    Absolute Eosinophils 01/14/2024 0.3  0.0 - 0.7 10e3/uL Final    Absolute Basophils 01/14/2024 0.1  0.0 - 0.2 10e3/uL Final    Absolute Immature Granulocytes 01/14/2024 0.0  <=0.4 10e3/uL Final    Absolute NRBCs 01/14/2024 0.0  10e3/uL Final    Influenza A PCR 01/14/2024 Negative  Negative Final    Influenza B PCR 01/14/2024 Negative  Negative Final    RSV PCR 01/14/2024 Negative  Negative Final    SARS CoV2 PCR 01/14/2024 Negative  Negative Final    NEGATIVE: SARS-CoV-2 (COVID-19) RNA not detected, presumed negative.    Troponin T, High Sensitivity 01/14/2024 18 (H)  <=14 ng/L Final    Either a High Sensitivity Troponin T baseline (0 hours) value = 100 ng/L, or an increase in High Sensitivity Troponin T = 7 ng/L at 2 hours compared to 0 hours (2-0 hours), suggests myocardial  injury, and urgent clinical attention is required.    If the 2-0 hours increase is <7 ng/L, a High Sensitivity Troponin T result above gender-specific reference ranges warrants further evaluation.   Recommendations for further evaluation include correlation with clinical decision-making tool (e.g., HEART), a 3rd High Sensitivity Troponin T test 2 hours after the 2nd (a 20% change from baseline would represent concern), admission for observation, close PCC/cardiology follow-up, or urgent outpatient provocative testing.    N Terminal Pro BNP Outpatient 01/14/2024 200  0 - 900 pg/mL Final    Reference range shown and results flagged as abnormal are for the outpatient, non acute settings. Establishing a baseline value for each individual patient is useful for follow-up.    Suggested inpatient cut points for confirming diagnosis of CHF in an acute setting are:  >450 pg/mL (age 18 to less than 50)  >900 pg/mL (age 50 to less than 75)  >1800 pg/mL (75 yrs and older)    An inpatient or emergency department NT-proPBNP <300 pg/mL effectively rules out acute CHF, with 99% negative predictive value.        Hemoglobin A1C 01/14/2024 8.8 (H)  <5.7 % Final    Normal <5.7%   Prediabetes 5.7-6.4%    Diabetes 6.5% or higher     Note: Adopted from ADA consensus guidelines.    Ferritin 01/14/2024 80  11 - 328 ng/mL Final    Vitamin B12 01/14/2024 288  232 - 1,245 pg/mL Final    Iron 01/14/2024 54  37 - 145 ug/dL Final    Iron Binding Capacity 01/14/2024 239 (L)  240 - 430 ug/dL Final    Iron Sat Index 01/14/2024 23  15 - 46 % Final    % Reticulocyte 01/14/2024 1.8  0.8 - 2.7 % Final    Absolute Reticulocyte 01/14/2024 0.075  0.010 - 0.110 10e6/uL Final    Cholesterol 01/14/2024 231 (H)  <200 mg/dL Final    Triglycerides 01/14/2024 141  <150 mg/dL Final    Direct Measure HDL 01/14/2024 37 (L)  >=50 mg/dL Final    LDL Cholesterol Calculated 01/14/2024 166 (H)  <=100 mg/dL Final    Non HDL Cholesterol 01/14/2024 194 (H)  <130 mg/dL Final     GLUCOSE BY METER POCT 01/14/2024 173 (H)  70 - 99 mg/dL Final    Cholesterol 01/14/2024 262 (H)  <200 mg/dL Final    Triglycerides 01/14/2024 157 (H)  <150 mg/dL Final    Direct Measure HDL 01/14/2024 41 (L)  >=50 mg/dL Final    LDL Cholesterol Calculated 01/14/2024 190 (H)  <=100 mg/dL Final    Non HDL Cholesterol 01/14/2024 221 (H)  <130 mg/dL Final    GLUCOSE BY METER POCT 01/14/2024 151 (H)  70 - 99 mg/dL Final    Sodium 01/15/2024 143  135 - 145 mmol/L Final    Reference intervals for this test were updated on 09/26/2023 to more accurately reflect our healthy population. There may be differences in the flagging of prior results with similar values performed with this method. Interpretation of those prior results can be made in the context of the updated reference intervals.     Potassium 01/15/2024 3.8  3.4 - 5.3 mmol/L Final    Chloride 01/15/2024 111 (H)  98 - 107 mmol/L Final    Carbon Dioxide (CO2) 01/15/2024 20 (L)  22 - 29 mmol/L Final    Anion Gap 01/15/2024 12  7 - 15 mmol/L Final    Urea Nitrogen 01/15/2024 17.7  8.0 - 23.0 mg/dL Final    Creatinine 01/15/2024 0.89  0.51 - 0.95 mg/dL Final    GFR Estimate 01/15/2024 74  >60 mL/min/1.73m2 Final    Calcium 01/15/2024 9.2  8.6 - 10.0 mg/dL Final    Glucose 01/15/2024 163 (H)  70 - 99 mg/dL Final    WBC Count 01/15/2024 7.2  4.0 - 11.0 10e3/uL Final    RBC Count 01/15/2024 4.44  3.80 - 5.20 10e6/uL Final    Hemoglobin 01/15/2024 11.8  11.7 - 15.7 g/dL Final    Hematocrit 01/15/2024 38.6  35.0 - 47.0 % Final    MCV 01/15/2024 87  78 - 100 fL Final    MCH 01/15/2024 26.6  26.5 - 33.0 pg Final    MCHC 01/15/2024 30.6 (L)  31.5 - 36.5 g/dL Final    RDW 01/15/2024 13.4  10.0 - 15.0 % Final    Platelet Count 01/15/2024 223  150 - 450 10e3/uL Final    Magnesium 01/15/2024 2.1  1.7 - 2.3 mg/dL Final    Phosphorus 01/15/2024 3.1  2.5 - 4.5 mg/dL Final    GLUCOSE BY METER POCT 01/15/2024 230 (H)  70 - 99 mg/dL Final    GLUCOSE BY METER POCT 01/15/2024 154 (H)  70  - 99 mg/dL Final    Troponin T, High Sensitivity 01/15/2024 17 (H)  <=14 ng/L Final    Either a High Sensitivity Troponin T baseline (0 hours) value = 100 ng/L, or an increase in High Sensitivity Troponin T = 7 ng/L at 2 hours compared to 0 hours (2-0 hours), suggests myocardial injury, and urgent clinical attention is required.    If the 2-0 hours increase is <7 ng/L, a High Sensitivity Troponin T result above gender-specific reference ranges warrants further evaluation.   Recommendations for further evaluation include correlation with clinical decision-making tool (e.g., HEART), a 3rd High Sensitivity Troponin T test 2 hours after the 2nd (a 20% change from baseline would represent concern), admission for observation, close PCC/cardiology follow-up, or urgent outpatient provocative testing.    GLUCOSE BY METER POCT 01/15/2024 234 (H)  70 - 99 mg/dL Final    ABO/RH(D) 01/15/2024 A POS   Final    Antibody Screen 01/15/2024 Negative  Negative Final    SPECIMEN EXPIRATION DATE 01/15/2024 [id number]   Final    GLUCOSE BY METER POCT 01/15/2024 242 (H)  70 - 99 mg/dL Final    GLUCOSE BY METER POCT 01/15/2024 172 (H)  70 - 99 mg/dL Final    WBC Count 01/16/2024 5.5  4.0 - 11.0 10e3/uL Final    RBC Count 01/16/2024 3.78 (L)  3.80 - 5.20 10e6/uL Final    Hemoglobin 01/16/2024 10.0 (L)  11.7 - 15.7 g/dL Final    Hematocrit 01/16/2024 32.8 (L)  35.0 - 47.0 % Final    MCV 01/16/2024 87  78 - 100 fL Final    MCH 01/16/2024 26.5  26.5 - 33.0 pg Final    MCHC 01/16/2024 30.5 (L)  31.5 - 36.5 g/dL Final    RDW 01/16/2024 13.0  10.0 - 15.0 % Final    Platelet Count 01/16/2024 204  150 - 450 10e3/uL Final    Sodium 01/16/2024 139  135 - 145 mmol/L Final    Reference intervals for this test were updated on 09/26/2023 to more accurately reflect our healthy population. There may be differences in the flagging of prior results with similar values performed with this method. Interpretation of those prior results can be made in the  context of the updated reference intervals.     Potassium 01/16/2024 3.6  3.4 - 5.3 mmol/L Final    Carbon Dioxide (CO2) 01/16/2024 21 (L)  22 - 29 mmol/L Final    Anion Gap 01/16/2024 9  7 - 15 mmol/L Final    Urea Nitrogen 01/16/2024 20.1  8.0 - 23.0 mg/dL Final    Creatinine 01/16/2024 0.84  0.51 - 0.95 mg/dL Final    GFR Estimate 01/16/2024 80  >60 mL/min/1.73m2 Final    Calcium 01/16/2024 8.5 (L)  8.6 - 10.0 mg/dL Final    Chloride 01/16/2024 109 (H)  98 - 107 mmol/L Final    Glucose 01/16/2024 165 (H)  70 - 99 mg/dL Final    Alkaline Phosphatase 01/16/2024 82  40 - 150 U/L Final    Reference intervals for this test were updated on 11/14/2023 to more accurately reflect our healthy population. There may be differences in the flagging of prior results with similar values performed with this method. Interpretation of those prior results can be made in the context of the updated reference intervals.    AST 01/16/2024 8  0 - 45 U/L Final    Reference intervals for this test were updated on 6/12/2023 to more accurately reflect our healthy population. There may be differences in the flagging of prior results with similar values performed with this method. Interpretation of those prior results can be made in the context of the updated reference intervals.    ALT 01/16/2024 14  0 - 50 U/L Final    Reference intervals for this test were updated on 6/12/2023 to more accurately reflect our healthy population. There may be differences in the flagging of prior results with similar values performed with this method. Interpretation of those prior results can be made in the context of the updated reference intervals.      Protein Total 01/16/2024 6.1 (L)  6.4 - 8.3 g/dL Final    Albumin 01/16/2024 3.5  3.5 - 5.2 g/dL Final    Bilirubin Total 01/16/2024 0.2  <=1.2 mg/dL Final    GLUCOSE BY METER POCT 01/16/2024 115 (H)  70 - 99 mg/dL Final    GLUCOSE BY METER POCT 01/16/2024 196 (H)  70 - 99 mg/dL Final    GLUCOSE BY METER POCT  01/16/2024 102 (H)  70 - 99 mg/dL Final    WBC Count 01/17/2024 5.6  4.0 - 11.0 10e3/uL Final    RBC Count 01/17/2024 3.55 (L)  3.80 - 5.20 10e6/uL Final    Hemoglobin 01/17/2024 9.6 (L)  11.7 - 15.7 g/dL Final    Hematocrit 01/17/2024 30.9 (L)  35.0 - 47.0 % Final    MCV 01/17/2024 87  78 - 100 fL Final    MCH 01/17/2024 27.0  26.5 - 33.0 pg Final    MCHC 01/17/2024 31.1 (L)  31.5 - 36.5 g/dL Final    RDW 01/17/2024 13.2  10.0 - 15.0 % Final    Platelet Count 01/17/2024 181  150 - 450 10e3/uL Final    Sodium 01/17/2024 137  135 - 145 mmol/L Final    Reference intervals for this test were updated on 09/26/2023 to more accurately reflect our healthy population. There may be differences in the flagging of prior results with similar values performed with this method. Interpretation of those prior results can be made in the context of the updated reference intervals.     Potassium 01/17/2024 3.9  3.4 - 5.3 mmol/L Final    Chloride 01/17/2024 109 (H)  98 - 107 mmol/L Final    Carbon Dioxide (CO2) 01/17/2024 20 (L)  22 - 29 mmol/L Final    Anion Gap 01/17/2024 8  7 - 15 mmol/L Final    Urea Nitrogen 01/17/2024 19.6  8.0 - 23.0 mg/dL Final    Creatinine 01/17/2024 0.92  0.51 - 0.95 mg/dL Final    GFR Estimate 01/17/2024 71  >60 mL/min/1.73m2 Final    Calcium 01/17/2024 8.2 (L)  8.6 - 10.0 mg/dL Final    Glucose 01/17/2024 153 (H)  70 - 99 mg/dL Final    GLUCOSE BY METER POCT 01/17/2024 178 (H)  70 - 99 mg/dL Final    GLUCOSE BY METER POCT 01/17/2024 78  70 - 99 mg/dL Final    GLUCOSE BY METER POCT 01/17/2024 124 (H)  70 - 99 mg/dL Final       Results for orders placed or performed in visit on 01/30/24   *MA Screening Digital Bilateral     Status: None    Narrative    BILATERAL FULL FIELD DIGITAL SCREENING MAMMOGRAM    Performed on: 1/30/24    Compared to: 07/21/2021 and 02/04/2019    Technique: This study was evaluated with the assistance of Computer-Aided   Detection.    Findings: The breasts are almost entirely fatty.  There are benign   appearing circumscribed masses in both breasts.   There is no radiographic evidence of malignancy.     Impression    IMPRESSION: ACR BI-RADS Category 2: Benign    BREAST CANCER SCREENING RECOMMENDATION: Routine yearly mammography   beginning at age 40 or as discussed with your provider.    The results and recommendations of this examination will be communicated   to the patient.        Hina Russo MD          Assessment and Plan:     1. Type 2 diabetes mellitus with hyperglycemia, with long-term current use of insulin (H)  Lab Results   Component Value Date    A1C 8.8 (H) 01/14/2024    A1C 9.7 (H) 09/08/2023    A1C 8.3 (H) 07/27/2023    A1C 8.3 (H) 05/05/2023    A1C 7.9 (H) 01/10/2023    A1C 11.3 (H) 10/07/2022    A1C 10.1 (H) 07/19/2022    A1C >14.0 (H) 04/03/2022    A1C 10.0 (H) 05/31/2021    A1C 12.1 (H) 11/08/2018       Doing better than in the past but still out of control.  Continue to monitor and adjust therapies as able.    2. Schizoaffective disorder, unspecified type (H)  No current concerns related to this.  Will be following up with mental health specialist.    3. Myelomalacia of cervical cord (H)  No current issues at this time.  Continue to monitor.    4. History of drug abuse (H)  No apparent issues with this at this time. Continue to monitor.    5. Hemiplegia and hemiparesis following cerebral infarction affecting right dominant side (H)  Better overall from previous cerebrovascular accident (stroke).  Continue to monitor.    6. Chronic obstructive pulmonary disease, unspecified COPD type (H)  No current issues but stop smoking and continue to monitor.    7. Right renal artery stenosis (H24)  No worsening blood pressure.  Continue to monitor.    8. Diabetic peripheral neuropathy (H)  Getting worse.  Might benefit from electromyography (EMG).  Continue to monitor.    - Adult Neurology  Referral; Future    9. Visit for screening mammogram    - *MA Screening  Digital Bilateral; Future    10. Pain in both feet    - Adult Neurology  Referral; Future    11. Balance problems    - Adult Neurology  Referral; Future    12. Hearing loss of right ear, unspecified hearing loss type  Letter for dog.    13. Hair loss  May use Biotin.     40 minutes or greater was spent today on the patient's care on the day of service.      This includes time for chart preparation, reviewing medical tests done before or during the visit, talking with the patient, review of quality indicators, required documentation, and other elements of care.     Continue current medications otherwise.  Follow up sooner if issues.          Kike David MD  General Internal Medicine  [facility]      Return in about 1 month (around 2/29/2024).     Future Appointments   Date Time Provider Department Center   2/29/2024  1:30 PM [facility]   3/5/2024  8:50 AM Owen Coffey MD [facility] [place]   4/1/2024  9:00 AM Viviana Rodriguez APRN CNP [facility]         HCC issues resolved at this visit.

## 2024-01-30 NOTE — LETTER
2024    Sarah Rahman   [address]  [address] [place] [id number]         To whom it may concern:    I am sending you this letter in relationship to a patient of mine, Sarah Rahman ( 1965).    Her dog Judy Rahman is a service animal for the and helps her in relationship to her hearing impairment.  She does need to have the dog with her all the time.     If you have any questions regarding the above, feel free to contact me at [phone].         Sincerely,      Kike David MD  General Internal Medicine  [facility]

## 2024-02-01 ENCOUNTER — OFFICE VISIT (OUTPATIENT)
Dept: INTERNAL MEDICINE | Facility: CLINIC | Age: 59
End: 2024-02-01
Payer: MEDICAID

## 2024-02-01 VITALS
WEIGHT: 196 LBS | DIASTOLIC BLOOD PRESSURE: 80 MMHG | HEART RATE: 76 BPM | HEIGHT: 67 IN | RESPIRATION RATE: 24 BRPM | OXYGEN SATURATION: 92 % | TEMPERATURE: 97.9 F | SYSTOLIC BLOOD PRESSURE: 129 MMHG | BODY MASS INDEX: 30.76 KG/M2

## 2024-02-01 DIAGNOSIS — F48.9 MENTAL HEALTH PROBLEM: ICD-10-CM

## 2024-02-01 DIAGNOSIS — G89.4 CHRONIC PAIN SYNDROME: ICD-10-CM

## 2024-02-01 DIAGNOSIS — E11.42 DIABETIC PERIPHERAL NEUROPATHY (H): ICD-10-CM

## 2024-02-01 DIAGNOSIS — Z79.4 TYPE 2 DIABETES MELLITUS WITH HYPERGLYCEMIA, WITH LONG-TERM CURRENT USE OF INSULIN (H): Primary | ICD-10-CM

## 2024-02-01 DIAGNOSIS — R29.6 RECURRENT FALLS: ICD-10-CM

## 2024-02-01 DIAGNOSIS — R55 RECURRENT SYNCOPE: ICD-10-CM

## 2024-02-01 DIAGNOSIS — F40.243 FEAR OF FLYING: ICD-10-CM

## 2024-02-01 DIAGNOSIS — E11.65 TYPE 2 DIABETES MELLITUS WITH HYPERGLYCEMIA, WITH LONG-TERM CURRENT USE OF INSULIN (H): Primary | ICD-10-CM

## 2024-02-01 PROCEDURE — 99214 OFFICE O/P EST MOD 30 MIN: CPT | Performed by: INTERNAL MEDICINE

## 2024-02-01 PROCEDURE — 93272 ECG/REVIEW INTERPRET ONLY: CPT | Performed by: INTERNAL MEDICINE

## 2024-02-01 RX ORDER — DIAZEPAM 5 MG
5 TABLET ORAL
Qty: 4 TABLET | Refills: 0 | Status: SHIPPED | OUTPATIENT
Start: 2024-02-01 | End: 2024-08-26

## 2024-02-01 RX ORDER — ACETAMINOPHEN 500 MG
500-1000 TABLET ORAL EVERY 8 HOURS PRN
Qty: 100 TABLET | Refills: 11 | Status: ON HOLD | OUTPATIENT
Start: 2024-02-01 | End: 2024-08-30

## 2024-02-01 NOTE — LETTER
2024    Sarah Rahman   [address]    [place] [id number]         To whom it may concern:    I am sending you this letter in relationship to a patient of mine, Sarahcarolee Rahman ( 1965).    The following patient has recurrent syncope and a number of other health concerns.  Her health is compromised without enough help at home..    It is my strong recommendation that she have her personal care attendant (PCA) services increased in the evening for 4 hours at night.    If you have any questions regarding the above, feel free to contact me at [phone].       Sincerely,      Kike David MD  General Internal Medicine  [facility]

## 2024-02-02 NOTE — PATIENT INSTRUCTIONS
Future Appointments   Date Time Provider Department Center   2/29/2024  1:30 PM [facility]   3/5/2024  8:50 AM Owen Coffey MD [facility]   4/1/2024  9:00 AM Viviana Rodriguez [facility]

## 2024-02-02 NOTE — PROGRESS NOTES
[facility]    Comprehensive and complex medical care - Chronic disease management - Shared decision making - Care coordination - Compassionate care    Patient advocacy - Rational deprescribing - Minimally disruptive medicine - Ethical focus - Customized care         Date of Service: 2/1/2024  Primary Provider: Kike David    Patient Care Team:  Kike David MD as PCP - General (Internal Medicine)  Kike David MD as Assigned PCP  Kike David MD (Internal Medicine)  Ivone Wahl LSW as Lead Care Coordinator (Primary Care - CC)  Rosario Byrne NP as Assigned Surgical Provider          Patient's Pharmacy:    [id number]  [address] [place] [id number]  Phone: [phone] Fax: [phone]     Patient's Contacts:  Name Home Phone Work Phone Mobile Phone Relationship Lgl Grd   JOSE LALATA   [phone] Spouse    SALONI MORALES [phone]   Friend      Patient's Insurance:    Pay[place] / Plan[place] / Product Type: Medicaid /           Active Problem List:  Problem List as of 2/1/2024 Reviewed: 2/1/2024 11:22 PM by Kike David MD         High    CAD -- S/P CABG    Type 2 diabetes mellitus with hyperglycemia, with long-term current use of insulin (H)    Last Assessment & Plan 6/7/2022 Office Visit Edited 6/10/2022  8:06 AM by Mara Scott NP     Type 2 diabetes is not well controlled but she is using the Lantus injection regularly.  She ran out of the freestyle hosea sensors so she does not have any recent glucose readings to base medication adjustments on at this time.  She was prescribed Trulicity in the past but was nervous about taking this medication so she never started it.  We reviewed possible side effects associated with the medication and what she may expect in the first 2 weeks of taking this medication.  She would be comfortable trying  this medication again, Trulicity 0.75 mg weekly sent to her pharmacy.  She is advised that this is a small dose and will need to be gradually increased in order for her to obtain maximal benefit from the medication.  She will be scheduled for a follow-up appointment and establish care visit with another provider in the next 4 weeks.  Of note, she would likely benefit from increasing her dose of metformin back to maximal dose but was concerned that this caused her to have increased urinary frequency.  We did not address this today.         Cerebrovascular accident (CVA) due to stenosis of carotid artery (H)    Smoker       Medium    Carotid stenosis, left    Primary hypertension    Moderate persistent asthma    Chronic pain syndrome    Right renal artery stenosis (H24)    Schizoaffective disorder (H)    Chronic obstructive pulmonary disease (H)    Anemia    Depression with anxiety    Myelomalacia of cervical cord (H)    Atypical chest pain -- Normal NM stress 5/8/23    Syncope    Acute CVA (cerebrovascular accident) (H)    Intracranial atherosclerosis    Syncope and collapse    Right leg weakness    Chest pain, unspecified type    Unstable angina (H)    Difficulty walking       Low    Bilateral low back pain with right-sided sciatica, unspecified chronicity    Last Assessment & Plan 6/7/2022 Office Visit Written 6/10/2022  8:01 AM by Mara Scott, ANDREEA     Because of her uncontrolled diabetes, she is not a good candidate to use steroid medication to treat lumbar radiculopathy.  She is familiar with the effects of a steroid medication on her blood sugar and understands this conundrum.  Fortunately, she does experience some relief when she is given Toradol in the emergency department.  We discussed trial of a nonsteroidal anti-inflammatory medication, diclofenac 3 times daily.  She is open to this idea so this is sent to her pharmacy.  She is advised that she can continue with acetaminophen as well.         Nasal polyp     Last Assessment & Plan 6/7/2022 Office Visit Written 6/10/2022  8:03 AM by Mara Scott, ANDREEA     Advised fluticasone nasal spray 1 spray each nostril daily.  She declines a referral to ENT, she has had a nasal polyp in the past which responded to fluticasone.         Mixed hyperlipidemia    GERD (gastroesophageal reflux disease)    History of drug abuse (H)    Hx of syphilis    Menopausal flushing    Nephrolithiasis    Obesity    Seasonal allergies    Sensorineural hearing loss (SNHL) of right ear    Frailty    Diabetic peripheral neuropathy (H)        Current Outpatient Medications   Medication Instructions    acetaminophen (TYLENOL) 500-1,000 mg, Oral, EVERY 8 HOURS PRN, Max 5.5 tabs per day    albuterol (PROAIR HFA) 108 (90 Base) MCG/ACT inhaler 1-2 puffs, Inhalation, EVERY 4 HOURS PRN    amLODIPine (NORVASC) 2.5 mg, Oral, DAILY    aspirin 81 mg, Oral, DAILY    atorvastatin (LIPITOR) 40 mg, Oral, EVERY EVENING    clopidogrel (PLAVIX) 75 mg, Oral, DAILY    Dexpanthenol 2 % CREA Apply externally, DAILY    diazepam (VALIUM) 5 mg, Oral, ONCE PRN, Take 30 minutes before flight.    dulaglutide (TRULICITY) 0.75 mg, Subcutaneous, EVERY 7 DAYS    DULoxetine (CYMBALTA) 60 mg, Oral, 2 TIMES DAILY    fluticasone (FLONASE) 50 MCG/ACT nasal spray 1 spray, Both Nostrils, 2 TIMES DAILY    fluticasone-salmeterol (ADVAIR) 250-50 MCG/ACT inhaler 1 puff, Inhalation, EVERY 12 HOURS    glipiZIDE (GLUCOTROL XL) 10 mg, Oral, DAILY, Please schedule an appointment with Dr. Kike David in the next 3 months.    insulin glargine (LANTUS PEN) 24 Units, Subcutaneous, AT BEDTIME    ipratropium - albuterol 0.5 mg/2.5 mg/3 mL (DUONEB) 0.5-2.5 (3) MG/3ML neb solution 3 mLs, Nebulization, EVERY 6 HOURS PRN    ketorolac (TORADOL) 10 mg, Oral, EVERY 6 HOURS PRN    lidocaine (LIDODERM) 5 % patch Transdermal, EVERY 24 HOURS, To prevent lidocaine toxicity, patient should be patch free for 12 hrs daily.    lidocaine (LMX4) 4 % external cream Topical,  DAILY, Apply to foot    methocarbamol (ROBAXIN) 750 mg, Oral, 4 TIMES DAILY PRN    naloxone (NARCAN) 4 mg, Alternating Nostrils, PRN, every 2-3 minutes until assistance arrives    nitroGLYcerin (NITROSTAT) 0.4 mg, Sublingual, EVERY 5 MIN PRN, For chest pain place 1 tablet under the tongue every 5 minutes for 3 doses. If symptoms persist 5 minutes after 1st dose call 911.     nortriptyline (PAMELOR) 10 mg, Oral, AT BEDTIME    ondansetron (ZOFRAN) 8 mg, Oral, EVERY 8 HOURS PRN    oxyCODONE-acetaminophen (PERCOCET)  MG per tablet 1 tablet, Oral, EVERY 6 HOURS PRN    topiramate (TOPAMAX) 100 mg, Oral, 2 TIMES DAILY     Social History     Social History Narrative    Lives alone in a condo.          On disability.  Three living children.          Has one small dog as her .        Has personal care attendant (PCA) services during the daytime and sometimes in the evening.       Subjective:     Sarah Rahman is a 58 year old female who comes in today for:    Chief Complaint   Patient presents with    Breast Pain    Fall     Leg gave out.     Foot Burn          2/1/2024     3:01 PM   Additional Questions   Roomed by HERMAN Cabrera   Accompanied by NA     In for follow up multiple issues.    Reviewed her mammogram recently and this was normal.    Trying to get personal care attendant (PCA) help more in the evening.  She has increased difficulty caring for herself and also has home care through [facility] at this time.  She does have recurrent syncope and a lot of other health issues.  She would like to see if she could get more evening help as she would benefit from taking her medications at nighttime rather than at 6 pm.      Her peripheral neuropathy (PN) continues to be an issue.  We are working to have her see a NM specialist at the [facility] to do an appropriate treatment course.    Has issues with recurrent falls and syncope as well.  This is another reason for personal care attendant (PCA)  "help for safety.    Is going on a flight in the near future to [place] and would like diazepam (Valium) for the flight.  She has used in the past and no side effects.    Has scheduled with a mental health therapist in relationship to her mental health issues.    We reviewed her other issues noted in the assessment but not specifically addressed in the HPI above.     Objective:     Wt Readings from Last 3 Encounters:   02/01/24 88.9 kg (196 lb)   01/30/24 89 kg (196 lb 1.6 oz)   01/16/24 89.4 kg (197 lb 1.5 oz)     BP Readings from Last 3 Encounters:   02/01/24 129/80   01/30/24 128/62   01/17/24 134/89     /80 (BP Location: Right arm, Patient Position: Sitting, Cuff Size: Adult Large)   Pulse 76   Temp 97.9  F (36.6  C) (Oral)   Resp 24   Ht 1.702 m (5' 7\")   Wt 88.9 kg (196 lb)   LMP  (LMP Unknown)   SpO2 92%   BMI 30.70 kg/m     The patient is comfortable, no acute distress.  Mood good.  Insight good.  Eyes are nonicteric. . Heart regular rate and rhythm.    Diagnostics:     Admission on 01/14/2024, Discharged on 01/17/2024   Component Date Value Ref Range Status    Systolic Blood Pressure 01/14/2024 151  mmHg Final    Diastolic Blood Pressure 01/14/2024 93  mmHg Final    Ventricular Rate 01/14/2024 87  BPM Final    Atrial Rate 01/14/2024 87  BPM Final    RI Interval 01/14/2024 162  ms Final    QRS Duration 01/14/2024 74  ms Final    QT 01/14/2024 388  ms Final    QTc 01/14/2024 466  ms Final    P Axis 01/14/2024 75  degrees Final    R AXIS 01/14/2024 32  degrees Final    T Axis 01/14/2024 122  degrees Final    Interpretation ECG 01/14/2024    Final                    Value:Sinus rhythm  ST & T wave abnormality, consider lateral ischemia  Prolonged QT  Abnormal ECG  When compared with ECG of 29-NOV-2023 18:37,  Inverted T waves have replaced nonspecific T wave abnormality in Lateral leads  Confirmed by SEE ED PROVIDER NOTE FOR, ECG INTERPRETATION ([id number]),  EVERETT MCCARTHY ([id number]) on 1/22/2024 " 4:49:45 AM      Hold Specimen 01/14/2024 JIC   Final    Hold Specimen 01/14/2024 JI   Final    Sodium 01/14/2024 141  135 - 145 mmol/L Final    Reference intervals for this test were updated on 09/26/2023 to more accurately reflect our healthy population. There may be differences in the flagging of prior results with similar values performed with this method. Interpretation of those prior results can be made in the context of the updated reference intervals.     Potassium 01/14/2024 4.0  3.4 - 5.3 mmol/L Final    Carbon Dioxide (CO2) 01/14/2024 19 (L)  22 - 29 mmol/L Final    Anion Gap 01/14/2024 14  7 - 15 mmol/L Final    Urea Nitrogen 01/14/2024 23.0  8.0 - 23.0 mg/dL Final    Creatinine 01/14/2024 1.21 (H)  0.51 - 0.95 mg/dL Final    GFR Estimate 01/14/2024 51 (L)  >60 mL/min/1.73m2 Final    Calcium 01/14/2024 9.7  8.6 - 10.0 mg/dL Final    Chloride 01/14/2024 108 (H)  98 - 107 mmol/L Final    Glucose 01/14/2024 161 (H)  70 - 99 mg/dL Final    Alkaline Phosphatase 01/14/2024 99  40 - 150 U/L Final    Reference intervals for this test were updated on 11/14/2023 to more accurately reflect our healthy population. There may be differences in the flagging of prior results with similar values performed with this method. Interpretation of those prior results can be made in the context of the updated reference intervals.    AST 01/14/2024 13  0 - 45 U/L Final    Reference intervals for this test were updated on 6/12/2023 to more accurately reflect our healthy population. There may be differences in the flagging of prior results with similar values performed with this method. Interpretation of those prior results can be made in the context of the updated reference intervals.    ALT 01/14/2024 23  0 - 50 U/L Final    Reference intervals for this test were updated on 6/12/2023 to more accurately reflect our healthy population. There may be differences in the flagging of prior results with similar values performed with this  method. Interpretation of those prior results can be made in the context of the updated reference intervals.      Protein Total 01/14/2024 7.3  6.4 - 8.3 g/dL Final    Albumin 01/14/2024 4.1  3.5 - 5.2 g/dL Final    Bilirubin Total 01/14/2024 0.2  <=1.2 mg/dL Final    Lipase 01/14/2024 11 (L)  13 - 60 U/L Final    Troponin T, High Sensitivity 01/14/2024 19 (H)  <=14 ng/L Final    Either a High Sensitivity Troponin T baseline (0 hours) value = 100 ng/L, or an increase in High Sensitivity Troponin T = 7 ng/L at 2 hours compared to 0 hours (2-0 hours), suggests myocardial injury, and urgent clinical attention is required.    If the 2-0 hours increase is <7 ng/L, a High Sensitivity Troponin T result above gender-specific reference ranges warrants further evaluation.   Recommendations for further evaluation include correlation with clinical decision-making tool (e.g., HEART), a 3rd High Sensitivity Troponin T test 2 hours after the 2nd (a 20% change from baseline would represent concern), admission for observation, close PCC/cardiology follow-up, or urgent outpatient provocative testing.    Ketone (Beta-Hydroxybutyrate) Juni* 01/14/2024 0.30  <=0.30 mmol/L Final    Color Urine 01/14/2024 Yellow  Colorless, Straw, Light Yellow, Yellow Final    Appearance Urine 01/14/2024 Clear  Clear Final    Glucose Urine 01/14/2024 Negative  Negative mg/dL Final    Bilirubin Urine 01/14/2024 Negative  Negative Final    Ketones Urine 01/14/2024 Negative  Negative mg/dL Final    Specific Gravity Urine 01/14/2024 1.010  1.001 - 1.030 Final    Blood Urine 01/14/2024 Negative  Negative Final    pH Urine 01/14/2024 6.5  5.0 - 7.0 Final    Protein Albumin Urine 01/14/2024 30 (A)  Negative mg/dL Final    Urobilinogen Urine 01/14/2024 <2.0  <2.0 mg/dL Final    Nitrite Urine 01/14/2024 Negative  Negative Final    Leukocyte Esterase Urine 01/14/2024 Negative  Negative Final    Bacteria Urine 01/14/2024 Few (A)  None Seen /HPF Final    Mucus Urine  01/14/2024 Present (A)  None Seen /LPF Final    Amorphous Crystals Urine 01/14/2024 Few (A)  None Seen /HPF Final    RBC Urine 01/14/2024 2  <=2 /HPF Final    WBC Urine 01/14/2024 1  <=5 /HPF Final    Squamous Epithelials Urine 01/14/2024 11 (H)  <=1 /HPF Final    Hyaline Casts Urine 01/14/2024 19 (H)  <=2 /LPF Final    WBC Count 01/14/2024 7.9  4.0 - 11.0 10e3/uL Final    RBC Count 01/14/2024 4.20  3.80 - 5.20 10e6/uL Final    Hemoglobin 01/14/2024 11.3 (L)  11.7 - 15.7 g/dL Final    Hematocrit 01/14/2024 36.4  35.0 - 47.0 % Final    MCV 01/14/2024 87  78 - 100 fL Final    MCH 01/14/2024 26.9  26.5 - 33.0 pg Final    MCHC 01/14/2024 31.0 (L)  31.5 - 36.5 g/dL Final    RDW 01/14/2024 13.3  10.0 - 15.0 % Final    Platelet Count 01/14/2024 255  150 - 450 10e3/uL Final    % Neutrophils 01/14/2024 54  % Final    % Lymphocytes 01/14/2024 36  % Final    % Monocytes 01/14/2024 5  % Final    % Eosinophils 01/14/2024 4  % Final    % Basophils 01/14/2024 1  % Final    % Immature Granulocytes 01/14/2024 0  % Final    NRBCs per 100 WBC 01/14/2024 0  <1 /100 Final    Absolute Neutrophils 01/14/2024 4.3  1.6 - 8.3 10e3/uL Final    Absolute Lymphocytes 01/14/2024 2.8  0.8 - 5.3 10e3/uL Final    Absolute Monocytes 01/14/2024 0.4  0.0 - 1.3 10e3/uL Final    Absolute Eosinophils 01/14/2024 0.3  0.0 - 0.7 10e3/uL Final    Absolute Basophils 01/14/2024 0.1  0.0 - 0.2 10e3/uL Final    Absolute Immature Granulocytes 01/14/2024 0.0  <=0.4 10e3/uL Final    Absolute NRBCs 01/14/2024 0.0  10e3/uL Final    Influenza A PCR 01/14/2024 Negative  Negative Final    Influenza B PCR 01/14/2024 Negative  Negative Final    RSV PCR 01/14/2024 Negative  Negative Final    SARS CoV2 PCR 01/14/2024 Negative  Negative Final    NEGATIVE: SARS-CoV-2 (COVID-19) RNA not detected, presumed negative.    Troponin T, High Sensitivity 01/14/2024 18 (H)  <=14 ng/L Final    Either a High Sensitivity Troponin T baseline (0 hours) value = 100 ng/L, or an increase in  High Sensitivity Troponin T = 7 ng/L at 2 hours compared to 0 hours (2-0 hours), suggests myocardial injury, and urgent clinical attention is required.    If the 2-0 hours increase is <7 ng/L, a High Sensitivity Troponin T result above gender-specific reference ranges warrants further evaluation.   Recommendations for further evaluation include correlation with clinical decision-making tool (e.g., HEART), a 3rd High Sensitivity Troponin T test 2 hours after the 2nd (a 20% change from baseline would represent concern), admission for observation, close PCC/cardiology follow-up, or urgent outpatient provocative testing.    N Terminal Pro BNP Outpatient 01/14/2024 200  0 - 900 pg/mL Final    Reference range shown and results flagged as abnormal are for the outpatient, non acute settings. Establishing a baseline value for each individual patient is useful for follow-up.    Suggested inpatient cut points for confirming diagnosis of CHF in an acute setting are:  >450 pg/mL (age 18 to less than 50)  >900 pg/mL (age 50 to less than 75)  >1800 pg/mL (75 yrs and older)    An inpatient or emergency department NT-proPBNP <300 pg/mL effectively rules out acute CHF, with 99% negative predictive value.        Hemoglobin A1C 01/14/2024 8.8 (H)  <5.7 % Final    Normal <5.7%   Prediabetes 5.7-6.4%    Diabetes 6.5% or higher     Note: Adopted from ADA consensus guidelines.    Ferritin 01/14/2024 80  11 - 328 ng/mL Final    Vitamin B12 01/14/2024 288  232 - 1,245 pg/mL Final    Iron 01/14/2024 54  37 - 145 ug/dL Final    Iron Binding Capacity 01/14/2024 239 (L)  240 - 430 ug/dL Final    Iron Sat Index 01/14/2024 23  15 - 46 % Final    % Reticulocyte 01/14/2024 1.8  0.8 - 2.7 % Final    Absolute Reticulocyte 01/14/2024 0.075  0.010 - 0.110 10e6/uL Final    Cholesterol 01/14/2024 231 (H)  <200 mg/dL Final    Triglycerides 01/14/2024 141  <150 mg/dL Final    Direct Measure HDL 01/14/2024 37 (L)  >=50 mg/dL Final    LDL Cholesterol  Calculated 01/14/2024 166 (H)  <=100 mg/dL Final    Non HDL Cholesterol 01/14/2024 194 (H)  <130 mg/dL Final    GLUCOSE BY METER POCT 01/14/2024 173 (H)  70 - 99 mg/dL Final    Cholesterol 01/14/2024 262 (H)  <200 mg/dL Final    Triglycerides 01/14/2024 157 (H)  <150 mg/dL Final    Direct Measure HDL 01/14/2024 41 (L)  >=50 mg/dL Final    LDL Cholesterol Calculated 01/14/2024 190 (H)  <=100 mg/dL Final    Non HDL Cholesterol 01/14/2024 221 (H)  <130 mg/dL Final    GLUCOSE BY METER POCT 01/14/2024 151 (H)  70 - 99 mg/dL Final    Sodium 01/15/2024 143  135 - 145 mmol/L Final    Reference intervals for this test were updated on 09/26/2023 to more accurately reflect our healthy population. There may be differences in the flagging of prior results with similar values performed with this method. Interpretation of those prior results can be made in the context of the updated reference intervals.     Potassium 01/15/2024 3.8  3.4 - 5.3 mmol/L Final    Chloride 01/15/2024 111 (H)  98 - 107 mmol/L Final    Carbon Dioxide (CO2) 01/15/2024 20 (L)  22 - 29 mmol/L Final    Anion Gap 01/15/2024 12  7 - 15 mmol/L Final    Urea Nitrogen 01/15/2024 17.7  8.0 - 23.0 mg/dL Final    Creatinine 01/15/2024 0.89  0.51 - 0.95 mg/dL Final    GFR Estimate 01/15/2024 74  >60 mL/min/1.73m2 Final    Calcium 01/15/2024 9.2  8.6 - 10.0 mg/dL Final    Glucose 01/15/2024 163 (H)  70 - 99 mg/dL Final    WBC Count 01/15/2024 7.2  4.0 - 11.0 10e3/uL Final    RBC Count 01/15/2024 4.44  3.80 - 5.20 10e6/uL Final    Hemoglobin 01/15/2024 11.8  11.7 - 15.7 g/dL Final    Hematocrit 01/15/2024 38.6  35.0 - 47.0 % Final    MCV 01/15/2024 87  78 - 100 fL Final    MCH 01/15/2024 26.6  26.5 - 33.0 pg Final    MCHC 01/15/2024 30.6 (L)  31.5 - 36.5 g/dL Final    RDW 01/15/2024 13.4  10.0 - 15.0 % Final    Platelet Count 01/15/2024 223  150 - 450 10e3/uL Final    Magnesium 01/15/2024 2.1  1.7 - 2.3 mg/dL Final    Phosphorus 01/15/2024 3.1  2.5 - 4.5 mg/dL Final     GLUCOSE BY METER POCT 01/15/2024 230 (H)  70 - 99 mg/dL Final    GLUCOSE BY METER POCT 01/15/2024 154 (H)  70 - 99 mg/dL Final    Troponin T, High Sensitivity 01/15/2024 17 (H)  <=14 ng/L Final    Either a High Sensitivity Troponin T baseline (0 hours) value = 100 ng/L, or an increase in High Sensitivity Troponin T = 7 ng/L at 2 hours compared to 0 hours (2-0 hours), suggests myocardial injury, and urgent clinical attention is required.    If the 2-0 hours increase is <7 ng/L, a High Sensitivity Troponin T result above gender-specific reference ranges warrants further evaluation.   Recommendations for further evaluation include correlation with clinical decision-making tool (e.g., HEART), a 3rd High Sensitivity Troponin T test 2 hours after the 2nd (a 20% change from baseline would represent concern), admission for observation, close PCC/cardiology follow-up, or urgent outpatient provocative testing.    GLUCOSE BY METER POCT 01/15/2024 234 (H)  70 - 99 mg/dL Final    ABO/RH(D) 01/15/2024 A POS   Final    Antibody Screen 01/15/2024 Negative  Negative Final    SPECIMEN EXPIRATION DATE 01/15/2024 [id number]   Final    GLUCOSE BY METER POCT 01/15/2024 242 (H)  70 - 99 mg/dL Final    GLUCOSE BY METER POCT 01/15/2024 172 (H)  70 - 99 mg/dL Final    WBC Count 01/16/2024 5.5  4.0 - 11.0 10e3/uL Final    RBC Count 01/16/2024 3.78 (L)  3.80 - 5.20 10e6/uL Final    Hemoglobin 01/16/2024 10.0 (L)  11.7 - 15.7 g/dL Final    Hematocrit 01/16/2024 32.8 (L)  35.0 - 47.0 % Final    MCV 01/16/2024 87  78 - 100 fL Final    MCH 01/16/2024 26.5  26.5 - 33.0 pg Final    MCHC 01/16/2024 30.5 (L)  31.5 - 36.5 g/dL Final    RDW 01/16/2024 13.0  10.0 - 15.0 % Final    Platelet Count 01/16/2024 204  150 - 450 10e3/uL Final    Sodium 01/16/2024 139  135 - 145 mmol/L Final    Reference intervals for this test were updated on 09/26/2023 to more accurately reflect our healthy population. There may be differences in the flagging of prior  results with similar values performed with this method. Interpretation of those prior results can be made in the context of the updated reference intervals.     Potassium 01/16/2024 3.6  3.4 - 5.3 mmol/L Final    Carbon Dioxide (CO2) 01/16/2024 21 (L)  22 - 29 mmol/L Final    Anion Gap 01/16/2024 9  7 - 15 mmol/L Final    Urea Nitrogen 01/16/2024 20.1  8.0 - 23.0 mg/dL Final    Creatinine 01/16/2024 0.84  0.51 - 0.95 mg/dL Final    GFR Estimate 01/16/2024 80  >60 mL/min/1.73m2 Final    Calcium 01/16/2024 8.5 (L)  8.6 - 10.0 mg/dL Final    Chloride 01/16/2024 109 (H)  98 - 107 mmol/L Final    Glucose 01/16/2024 165 (H)  70 - 99 mg/dL Final    Alkaline Phosphatase 01/16/2024 82  40 - 150 U/L Final    Reference intervals for this test were updated on 11/14/2023 to more accurately reflect our healthy population. There may be differences in the flagging of prior results with similar values performed with this method. Interpretation of those prior results can be made in the context of the updated reference intervals.    AST 01/16/2024 8  0 - 45 U/L Final    Reference intervals for this test were updated on 6/12/2023 to more accurately reflect our healthy population. There may be differences in the flagging of prior results with similar values performed with this method. Interpretation of those prior results can be made in the context of the updated reference intervals.    ALT 01/16/2024 14  0 - 50 U/L Final    Reference intervals for this test were updated on 6/12/2023 to more accurately reflect our healthy population. There may be differences in the flagging of prior results with similar values performed with this method. Interpretation of those prior results can be made in the context of the updated reference intervals.      Protein Total 01/16/2024 6.1 (L)  6.4 - 8.3 g/dL Final    Albumin 01/16/2024 3.5  3.5 - 5.2 g/dL Final    Bilirubin Total 01/16/2024 0.2  <=1.2 mg/dL Final    GLUCOSE BY METER POCT 01/16/2024 115  (H)  70 - 99 mg/dL Final    GLUCOSE BY METER POCT 01/16/2024 196 (H)  70 - 99 mg/dL Final    GLUCOSE BY METER POCT 01/16/2024 102 (H)  70 - 99 mg/dL Final    WBC Count 01/17/2024 5.6  4.0 - 11.0 10e3/uL Final    RBC Count 01/17/2024 3.55 (L)  3.80 - 5.20 10e6/uL Final    Hemoglobin 01/17/2024 9.6 (L)  11.7 - 15.7 g/dL Final    Hematocrit 01/17/2024 30.9 (L)  35.0 - 47.0 % Final    MCV 01/17/2024 87  78 - 100 fL Final    MCH 01/17/2024 27.0  26.5 - 33.0 pg Final    MCHC 01/17/2024 31.1 (L)  31.5 - 36.5 g/dL Final    RDW 01/17/2024 13.2  10.0 - 15.0 % Final    Platelet Count 01/17/2024 181  150 - 450 10e3/uL Final    Sodium 01/17/2024 137  135 - 145 mmol/L Final    Reference intervals for this test were updated on 09/26/2023 to more accurately reflect our healthy population. There may be differences in the flagging of prior results with similar values performed with this method. Interpretation of those prior results can be made in the context of the updated reference intervals.     Potassium 01/17/2024 3.9  3.4 - 5.3 mmol/L Final    Chloride 01/17/2024 109 (H)  98 - 107 mmol/L Final    Carbon Dioxide (CO2) 01/17/2024 20 (L)  22 - 29 mmol/L Final    Anion Gap 01/17/2024 8  7 - 15 mmol/L Final    Urea Nitrogen 01/17/2024 19.6  8.0 - 23.0 mg/dL Final    Creatinine 01/17/2024 0.92  0.51 - 0.95 mg/dL Final    GFR Estimate 01/17/2024 71  >60 mL/min/1.73m2 Final    Calcium 01/17/2024 8.2 (L)  8.6 - 10.0 mg/dL Final    Glucose 01/17/2024 153 (H)  70 - 99 mg/dL Final    GLUCOSE BY METER POCT 01/17/2024 178 (H)  70 - 99 mg/dL Final    GLUCOSE BY METER POCT 01/17/2024 78  70 - 99 mg/dL Final    GLUCOSE BY METER POCT 01/17/2024 124 (H)  70 - 99 mg/dL Final       No results found for any visits on 02/01/24.    Assessment:     1. Type 2 diabetes mellitus with hyperglycemia, with long-term current use of insulin (H)    2. Fear of flying    3. Chronic pain syndrome    4. Diabetic peripheral neuropathy (H)    5. Recurrent falls    6.  Mental health problem    7. Recurrent syncope        Plan:     Diazepam (Valium) for flying.  Letter for increased personal care attendant (PCA) services to help with appropriate timing of medication administration and also for patient safety.  Follow up pain clinic.  Referral to [facility] for neurology consultation and possibly electromyography (EMG).  Agree with mental health worker.  Continue current medications otherwise.  Follow up sooner if issues.    No orders of the defined types were placed in this encounter.          Kike David MD  General Internal Medicine  [facility]    Return in about 4 weeks (around 2/29/2024) for follow up visit.     Future Appointments   Date Time Provider Department Center   2/29/2024  1:30 PM Kike David MD MDINTM [facility]   3/5/2024  8:50 AM Owen Coffey MD [facility] [place]   4/1/2024  9:00 AM Viviana Rodriguez APRN CNP [facility]

## 2024-02-02 NOTE — PATIENT INSTRUCTIONS
Future Appointments   Date Time Provider Department Center   2/29/2024  1:30 PM [facility]   3/5/2024  8:50 AM Owen Coffey MD [facility]   4/1/2024  9:00 AM [facility]

## 2024-02-08 DIAGNOSIS — I67.2 INTRACRANIAL ATHEROSCLEROSIS: ICD-10-CM

## 2024-02-08 DIAGNOSIS — E78.2 MIXED HYPERLIPIDEMIA: ICD-10-CM

## 2024-02-08 RX ORDER — ATORVASTATIN CALCIUM 40 MG/1
40 TABLET, FILM COATED ORAL EVERY EVENING
Qty: 90 TABLET | Refills: 3 | Status: SHIPPED | OUTPATIENT
Start: 2024-02-08 | End: 2024-08-26

## 2024-02-14 DIAGNOSIS — I25.10 CORONARY ARTERY DISEASE INVOLVING NATIVE CORONARY ARTERY OF NATIVE HEART WITHOUT ANGINA PECTORIS: Chronic | ICD-10-CM

## 2024-02-14 DIAGNOSIS — G89.4 CHRONIC PAIN SYNDROME: ICD-10-CM

## 2024-02-14 RX ORDER — KETOROLAC TROMETHAMINE 10 MG/1
1 TABLET, FILM COATED ORAL EVERY 6 HOURS PRN
Qty: 20 TABLET | Refills: 0 | Status: SHIPPED | OUTPATIENT
Start: 2024-02-14 | End: 2024-03-05

## 2024-02-14 RX ORDER — NITROGLYCERIN 0.4 MG/1
TABLET SUBLINGUAL
Qty: 25 TABLET | Refills: 3 | Status: SHIPPED | OUTPATIENT
Start: 2024-02-14 | End: 2024-08-26

## 2024-02-19 ENCOUNTER — APPOINTMENT (OUTPATIENT)
Dept: ULTRASOUND IMAGING | Facility: HOSPITAL | Age: 59
End: 2024-02-19
Attending: EMERGENCY MEDICINE
Payer: MEDICAID

## 2024-02-19 ENCOUNTER — HOSPITAL ENCOUNTER (EMERGENCY)
Facility: HOSPITAL | Age: 59
Discharge: HOME | End: 2024-02-19
Attending: EMERGENCY MEDICINE | Admitting: EMERGENCY MEDICINE
Payer: MEDICAID

## 2024-02-19 VITALS
HEIGHT: 67 IN | TEMPERATURE: 97.3 F | BODY MASS INDEX: 31.08 KG/M2 | WEIGHT: 198 LBS | RESPIRATION RATE: 16 BRPM | HEART RATE: 72 BPM | DIASTOLIC BLOOD PRESSURE: 77 MMHG | SYSTOLIC BLOOD PRESSURE: 134 MMHG | OXYGEN SATURATION: 97 %

## 2024-02-19 DIAGNOSIS — M79.604 RIGHT LEG PAIN: ICD-10-CM

## 2024-02-19 DIAGNOSIS — G62.9 NEUROPATHY: ICD-10-CM

## 2024-02-19 LAB
ANION GAP SERPL CALCULATED.3IONS-SCNC: 10 MMOL/L (ref 7–15)
BASOPHILS # BLD AUTO: 0.1 10E3/UL (ref 0–0.2)
BASOPHILS NFR BLD AUTO: 1 %
BUN SERPL-MCNC: 18.9 MG/DL (ref 8–23)
CALCIUM SERPL-MCNC: 9.6 MG/DL (ref 8.6–10)
CHLORIDE SERPL-SCNC: 109 MMOL/L (ref 98–107)
CREAT SERPL-MCNC: 0.87 MG/DL (ref 0.51–0.95)
DEPRECATED HCO3 PLAS-SCNC: 22 MMOL/L (ref 22–29)
EGFRCR SERPLBLD CKD-EPI 2021: 76 ML/MIN/1.73M2
EOSINOPHIL # BLD AUTO: 0.4 10E3/UL (ref 0–0.7)
EOSINOPHIL NFR BLD AUTO: 5 %
ERYTHROCYTE [DISTWIDTH] IN BLOOD BY AUTOMATED COUNT: 13.4 % (ref 10–15)
GLUCOSE SERPL-MCNC: 218 MG/DL (ref 70–99)
HCT VFR BLD AUTO: 35.2 % (ref 35–47)
HGB BLD-MCNC: 11.2 G/DL (ref 11.7–15.7)
HOLD SPECIMEN: NORMAL
HOLD SPECIMEN: NORMAL
IMM GRANULOCYTES # BLD: 0 10E3/UL
IMM GRANULOCYTES NFR BLD: 0 %
LYMPHOCYTES # BLD AUTO: 3 10E3/UL (ref 0.8–5.3)
LYMPHOCYTES NFR BLD AUTO: 40 %
MAGNESIUM SERPL-MCNC: 1.8 MG/DL (ref 1.7–2.3)
MCH RBC QN AUTO: 27.1 PG (ref 26.5–33)
MCHC RBC AUTO-ENTMCNC: 31.8 G/DL (ref 31.5–36.5)
MCV RBC AUTO: 85 FL (ref 78–100)
MONOCYTES # BLD AUTO: 0.3 10E3/UL (ref 0–1.3)
MONOCYTES NFR BLD AUTO: 5 %
NEUTROPHILS # BLD AUTO: 3.7 10E3/UL (ref 1.6–8.3)
NEUTROPHILS NFR BLD AUTO: 49 %
NRBC # BLD AUTO: 0 10E3/UL
NRBC BLD AUTO-RTO: 0 /100
PLATELET # BLD AUTO: 230 10E3/UL (ref 150–450)
POTASSIUM SERPL-SCNC: 3.6 MMOL/L (ref 3.4–5.3)
RBC # BLD AUTO: 4.13 10E6/UL (ref 3.8–5.2)
SODIUM SERPL-SCNC: 141 MMOL/L (ref 135–145)
TROPONIN T SERPL HS-MCNC: 15 NG/L
TROPONIN T SERPL HS-MCNC: 17 NG/L
WBC # BLD AUTO: 7.4 10E3/UL (ref 4–11)

## 2024-02-19 PROCEDURE — 96374 THER/PROPH/DIAG INJ IV PUSH: CPT

## 2024-02-19 PROCEDURE — 36415 COLL VENOUS BLD VENIPUNCTURE: CPT | Performed by: EMERGENCY MEDICINE

## 2024-02-19 PROCEDURE — 83735 ASSAY OF MAGNESIUM: CPT | Performed by: EMERGENCY MEDICINE

## 2024-02-19 PROCEDURE — 250N000011 HC RX IP 250 OP 636: Performed by: EMERGENCY MEDICINE

## 2024-02-19 PROCEDURE — 99285 EMERGENCY DEPT VISIT HI MDM: CPT | Mod: 25

## 2024-02-19 PROCEDURE — 84484 ASSAY OF TROPONIN QUANT: CPT | Mod: 91 | Performed by: EMERGENCY MEDICINE

## 2024-02-19 PROCEDURE — 96375 TX/PRO/DX INJ NEW DRUG ADDON: CPT

## 2024-02-19 PROCEDURE — 93005 ELECTROCARDIOGRAM TRACING: CPT | Performed by: EMERGENCY MEDICINE

## 2024-02-19 PROCEDURE — 85025 COMPLETE CBC W/AUTO DIFF WBC: CPT | Performed by: EMERGENCY MEDICINE

## 2024-02-19 PROCEDURE — 80048 BASIC METABOLIC PNL TOTAL CA: CPT | Performed by: EMERGENCY MEDICINE

## 2024-02-19 PROCEDURE — 93971 EXTREMITY STUDY: CPT | Mod: RT

## 2024-02-19 RX ORDER — PREDNISONE 20 MG/1
20 TABLET ORAL DAILY
Qty: 5 TABLET | Refills: 0 | Status: SHIPPED | OUTPATIENT
Start: 2024-02-19 | End: 2024-02-26

## 2024-02-19 RX ORDER — KETOROLAC TROMETHAMINE 15 MG/ML
15 INJECTION, SOLUTION INTRAMUSCULAR; INTRAVENOUS ONCE
Status: COMPLETED | OUTPATIENT
Start: 2024-02-19 | End: 2024-02-19

## 2024-02-19 RX ORDER — DIPHENHYDRAMINE HYDROCHLORIDE 50 MG/ML
25 INJECTION INTRAMUSCULAR; INTRAVENOUS ONCE
Status: COMPLETED | OUTPATIENT
Start: 2024-02-19 | End: 2024-02-19

## 2024-02-19 RX ORDER — KETOROLAC TROMETHAMINE 10 MG/1
10 TABLET, FILM COATED ORAL EVERY 6 HOURS PRN
Qty: 20 TABLET | Refills: 0 | Status: SHIPPED | OUTPATIENT
Start: 2024-02-19 | End: 2024-03-05

## 2024-02-19 RX ORDER — MORPHINE SULFATE 4 MG/ML
4 INJECTION, SOLUTION INTRAMUSCULAR; INTRAVENOUS ONCE
Status: COMPLETED | OUTPATIENT
Start: 2024-02-19 | End: 2024-02-19

## 2024-02-19 RX ADMIN — KETOROLAC TROMETHAMINE 15 MG: 15 INJECTION, SOLUTION INTRAMUSCULAR; INTRAVENOUS at 21:56

## 2024-02-19 RX ADMIN — DIPHENHYDRAMINE HYDROCHLORIDE 25 MG: 50 INJECTION INTRAMUSCULAR; INTRAVENOUS at 20:44

## 2024-02-19 RX ADMIN — MORPHINE SULFATE 4 MG: 4 INJECTION, SOLUTION INTRAMUSCULAR; INTRAVENOUS at 20:37

## 2024-02-19 ASSESSMENT — ACTIVITIES OF DAILY LIVING (ADL)
ADLS_ACUITY_SCORE: 38
ADLS_ACUITY_SCORE: 38

## 2024-02-20 NOTE — ED NOTES
"Triage completed. Pt began to escalate at the end of triage. Writer attempted to explain EKG and labs would be completed. Pt told writer \"you don't need to fucking talk. I don't know why you grabbed me in front of others if you were going to put me out here anyways\". Explained to pt she was the next to be triaged and writer was trying to tell her about the orders that were placed. Pt continued to escalate. Pt wheeled out of triage threatening to leave. Pt declining to leave. Brought to lobby and instructed to stop being verbally aggressive towards staff. Security aware.   "

## 2024-02-20 NOTE — ED NOTES
Pt stating to admitting that she needs to come back to a room as she is having a stroke as her tongue is numb. EDT out to do EKG and pt refusing stating I dont need a fucking EKG. I'm having a stroke

## 2024-02-20 NOTE — ED PROVIDER NOTES
EMERGENCY DEPARTMENT ENCOUNTER      NAME: Sarah Rahman  AGE: 58 year old female  YOB: 1965  MRN: [id number]  EVALUATION DATE & TIME: 2/19/2024  7:48 PM    PCP: Kike David    ED PROVIDER: Holli To M.D.      Chief Complaint   Patient presents with    Review of Multiple Medical Conditions         FINAL IMPRESSION:  1. Neuropathy    2. Right leg pain        ED COURSE & MEDICAL DECISION MAKING:    Pertinent Labs & Imaging studies reviewed. (See chart for details)  ED Course as of 02/19/24 2332   Mon Feb 19, 2024 2020 Patient is a pleasant 58-year-old female who has a history of chronic pain and comes in today for evaluation of right leg pain that is been pretty severe.  She reports that she has had this for some time but it seems to be a lot worse.  She will have bad pain and then it will make her pass out.  She reports that she woke up on the floor today.  She did not hit her head and denies any injuries.  She also complained of a headache but felt like it was probably because she took nitroglycerin because of some chest pain earlier today.  She says her right leg is weak.  Mostly she complains that her right leg is burning and then the right leg swells up from time to time.  She follows with pain clinic and they have talked about doing surgeries and then also talked about not doing surgeries.  She sees a specialist at [facility] and they would have her follow-up with the surgeon.  She has had injections in the past but those were not helping.  She had at some point she thinks she was on Toradol and prednisone and that helped but she needs something right now.  She has had some relief with morphine before.  She keeps describing it as more of a stabbing and burning pain.  She has no significant swelling on my exam.  We will give her some morphine here and check some basic labs and get ultrasound of her leg.  At this point I think it is more symptom management than anything.  If  it comes back okay then I would start her on Toradol and prednisone.  She said she did not want to go home on oxycodone or narcotics.  I think that is a good plan.  She says she does not get much relief from muscle relaxers.  She was in agreement.   2117 Initial troponin was 17.  Patient's lab work otherwise looks really good.  Will get a repeat troponin at the 2-hour vidya and hopefully she can get her ultrasound done shortly.   2255 Troponin is 15 which is stable.  I will see what the patient's ultrasound looks like and then hopefully we can get her discharged home.   2318 I just discussed the ultrasound results with the patient.  She would like a prescription for Toradol and prednisone to go home with.  I think that is reasonable.  She will be discharged shortly.       8:26 PM I met the patient and performed my initial interview and exam.     Medical Decision Making    History:  Supplemental history from: N/A  External Record(s) reviewed: I reviewed the note from Dr. Gallegos on 2/1/2024.  Patient has poorly controlled diabetes but has been using the Lantus.  She had run out of her sensor.  Patient never started Trulicity at that time but was willing to try it after discussion with him.  She has had a lot of issues with low back pain and right-sided sciatica.  Looks like they had put her on diclofenac at 1 point and it helped with her.  She is not a great candidate for steroids because of her diabetes.  She has had some good relief with Toradol in the past.    Work Up:  Emergent/Severe conditions considered and evaluated for: DVT, electrolyte abnormality, ACS  I independently reviewed and interpreted EKG.   In additional to work up documented, I considered the following work up: None  Medications given that require intensive monitoring for toxicity: IV morphine    External consultation:  Discussion of management with another provider: None    Complicating factors:  Care impacted by chronic illness: T2DM, chronic  pain, HTN, HLD, peripheral neuropathy, CVA  Care affected by social determinants of health: Access to Medical Care    Disposition considerations: Discharge  Prescriptions considered/prescribed: Prednisone and Toradol    At the conclusion of the encounter I discussed  the results of all of the tests and the disposition with patient.   All questions were answered.  The patient acknowledged understanding and was involved in the decision making regarding the overall care plan.      I discussed with patient the utility, limitations and findings of the exam/interventions/studies done during this visit as well as the list of differential diagnosis and symptoms to monitor/return to ER for.  Additional verbal discharge instructions were provided.     MEDICATIONS GIVEN IN THE EMERGENCY:  Medications   morphine (PF) injection 4 mg (4 mg Intravenous $Given 2/19/24 2037)   diphenhydrAMINE (BENADRYL) injection 25 mg (25 mg Intravenous $Given 2/19/24 2044)   ketorolac (TORADOL) injection 15 mg (15 mg Intravenous $Given 2/19/24 2156)       NEW PRESCRIPTIONS STARTED AT TODAY'S ER VISIT  New Prescriptions    KETOROLAC (TORADOL) 10 MG TABLET    Take 1 tablet (10 mg) by mouth every 6 hours as needed for moderate pain    PREDNISONE (DELTASONE) 20 MG TABLET    Take 1 tablet (20 mg) by mouth daily for 5 days          =================================================================    HPI    Triage Note: Pt states she had cp at home and she states she took a nitroglycerin. About 2-3 hrs after, pt states she woke up on the floor. Pt states she has CABG hx and takes a lot of nitroglycerin. Pt states she also passes out a lot. Pt concerned for infection because her legs burn and she has pain to wear socks or shoes x 5 days. Made appt with PMD to evaluate her syncope.     HX CVA which causes her leg weakness.       Patient information was obtained from: Patient     Use of : N/A       Sarah CLARK Rahman is a 58 year old female who  "presents to the ED by private vehicle for evaluation of bilateral leg pain.    Today, the patient woke up on the floor and began to endorse chest pain. She states that she \"does not care\" about her chest pain and is more concerned about her \"blackouts\". She attributes these \"blackouts\" to her bilateral leg pain, stating that she will blackout 2-3 times a week.     She states that her leg pain began in her right leg with redness, itching and swelling. Patient has been told that this was due to her sciatic nerve. More recently, she began to note a 10/10 rated, burning pain in her bilateral legs, described as if \"someone is stabbing it with a knife\". She states that the pain worsens with touch such as wearing socks or wearing shoes. She keeps her legs propped up as much as she can and has taken muscle relaxers without relief. She has also had injections for the leg pain without relief. Patient has no history of blood clots to her knowledge and follows up with a pain clinic.     Patient endorses a headache but attributes this to the nitroglycerin that she took for chest pain earlier. No other complaints at this time.    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 Hmam at [facility]    Schizoaffective disorder (H)     Sleep difficulties 7/17/2022       PAST SURGICAL HISTORY:  Past Surgical History:   Procedure Laterality Date    BYPASS GRAFT ARTERY CORONARY  01/01/2009    x2    CAROTID ENDARTERECTOMY Left 12/2021    CORONARY STENT PLACEMENT   "    CV ANGIOGRAM CORONARY GRAFT N/A 1/16/2024    Procedure: Coronary Angiogram Graft;  Surgeon: Spencer Diez MD;  Location: [facility] CATH LAB CV    CV CORONARY ANGIOGRAM N/A 06/02/2021    Procedure: Coronary Angiogram;  Surgeon: Juventino Rivera MD;  Location: [facility] Cardiac Cath Lab;  Service: Cardiology    HYSTERECTOMY TOTAL ABDOMINAL      HYSTERECTOMY TOTAL ABDOMINAL, BILATERAL SALPINGO-OOPHORECTOMY, COMBINED      IR TRANSLAMINAR EPIDURAL LUMBAR INJ INCL IMAGING  09/27/2022    LUMBAR DISCECTOMY Right 06/13/2019    L5-S1 - Dr. Hamm    NASAL FRACTURE SURGERY      ORIF ULNAR / RADIAL SHAFT FRACTURE Right        CURRENT MEDICATIONS:    No current facility-administered medications for this encounter.    Current Outpatient Medications:     ketorolac (TORADOL) 10 MG tablet, Take 1 tablet (10 mg) by mouth every 6 hours as needed for moderate pain, Disp: 20 tablet, Rfl: 0    predniSONE (DELTASONE) 20 MG tablet, Take 1 tablet (20 mg) by mouth daily for 5 days, Disp: 5 tablet, Rfl: 0    acetaminophen (TYLENOL) 500 MG tablet, Take 1-2 tablets (500-1,000 mg) by mouth every 8 hours as needed for mild pain Max 5.5 tabs per day, Disp: 100 tablet, Rfl: 11    albuterol (PROAIR HFA) 108 (90 Base) MCG/ACT inhaler, Inhale 1-2 puffs into the lungs every 4 hours as needed for shortness of breath or wheezing, Disp: 18 g, Rfl: 11    amLODIPine (NORVASC) 2.5 MG tablet, Take 1 tablet (2.5 mg) by mouth daily, Disp: 30 tablet, Rfl: 0    aspirin 81 MG EC tablet, Take 1 tablet (81 mg) by mouth daily, Disp: 90 tablet, Rfl: 3    atorvastatin (LIPITOR) 40 MG tablet, Take 1 tablet (40 mg) by mouth every evening, Disp: 90 tablet, Rfl: 3    clopidogrel (PLAVIX) 75 MG tablet, Take 1 tablet (75 mg) by mouth daily, Disp: 30 tablet, Rfl: 3    Dexpanthenol 2 % CREA, Externally apply topically daily, Disp: , Rfl:     diazepam (VALIUM) 5 MG tablet, Take 1 tablet (5 mg) by mouth once as needed for anxiety (flight anxiety) Take 30 minutes before  flight., Disp: 4 tablet, Rfl: 0    dulaglutide (TRULICITY) 0.75 MG/0.5ML pen, Inject 0.75 mg Subcutaneous every 7 days, Disp: 6 mL, Rfl: 3    DULoxetine (CYMBALTA) 60 MG capsule, Take 1 capsule (60 mg) by mouth 2 times daily, Disp: 180 capsule, Rfl: 3    fluticasone (FLONASE) 50 MCG/ACT nasal spray, Spray 1 spray into both nostrils 2 times daily, Disp: 16 g, Rfl: 4    fluticasone-salmeterol (ADVAIR) 250-50 MCG/ACT inhaler, Inhale 1 puff into the lungs every 12 hours, Disp: 60 each, Rfl: 3    glipiZIDE (GLUCOTROL XL) 10 MG 24 hr tablet, Take 1 tablet (10 mg) by mouth daily Please schedule an appointment with Dr. Kike David in the next 3 months., Disp: 90 tablet, Rfl: 0    insulin glargine (LANTUS PEN) 100 UNIT/ML pen, Inject 24 Units Subcutaneous At Bedtime, Disp: , Rfl:     ipratropium - albuterol 0.5 mg/2.5 mg/3 mL (DUONEB) 0.5-2.5 (3) MG/3ML neb solution, Take 1 vial (3 mLs) by nebulization every 6 hours as needed for shortness of breath, wheezing or cough, Disp: 90 mL, Rfl: 0    ketorolac (TORADOL) 10 MG tablet, TAKE ONE TABLET BY MOUTH EVERY 6 HOURS AS NEEDED FOR MODERATE PAIN, Disp: 20 tablet, Rfl: 0    lidocaine (LIDODERM) 5 % patch, Place onto the skin every 24 hours To prevent lidocaine toxicity, patient should be patch free for 12 hrs daily., Disp: , Rfl:     lidocaine (LMX4) 4 % external cream, Apply topically daily Apply to foot, Disp: , Rfl:     methocarbamol (ROBAXIN) 750 MG tablet, Take 1 tablet (750 mg) by mouth 4 times daily as needed for muscle spasms, Disp: , Rfl: 0    naloxone (NARCAN) 4 MG/0.1ML nasal spray, Spray 4 mg into one nostril alternating nostrils as needed for opioid reversal every 2-3 minutes until assistance arrives, Disp: , Rfl:     nitroGLYcerin (NITROSTAT) 0.4 MG sublingual tablet, FOR CHEST PAIN PLACE 1 TABLET UNDER THE TONGUE EVERY 5 MINUTES FOR 3 DOSES IF NEEDED. IF SYMPTOMS PERSIST 5 MINUTES AFTER FIRST DOSE CALL 911., Disp: 25 tablet, Rfl: 3    nortriptyline (PAMELOR) 10 MG  "capsule, Take 10 mg by mouth At Bedtime, Disp: , Rfl:     ondansetron (ZOFRAN) 8 MG tablet, Take 1 tablet (8 mg) by mouth every 8 hours as needed for nausea, Disp: 20 tablet, Rfl: 3    oxyCODONE-acetaminophen (PERCOCET)  MG per tablet, Take 1 tablet by mouth every 6 hours as needed for pain, Disp: , Rfl:     topiramate (TOPAMAX) 50 MG tablet, Take 100 mg by mouth 2 times daily, Disp: , Rfl:     ALLERGIES:  Allergies   Allergen Reactions    Haloperidol Unknown     Patient states it stops her heart      Haloperidol Angioedema    Hydroxyzine Angioedema    Meperidine And Related Angioedema, Difficulty breathing, Other (See Comments), Rash and Shortness Of Breath     Throat closes  Other reaction(s): Breathing Difficulty  Other reaction(s): Breathing Difficulty      Phenothiazines Other (See Comments)     Other reaction(s): CARDIAC DISTURBANCES  Patient states it stops her heart  \"I swell up\"  \"stopped by heart\"  \"I swell up\"  \"I swell up\"      Phenothiazines Angioedema    Tramadol Other (See Comments)     Other reaction(s): Angioedema  Throat itch      Tramadol Angioedema    Januvia [Sitagliptin] Swelling    Latex Itching    Haloperidol And Related Angioedema    Januvia [Sitagliptin] Other (See Comments)     Swelling in the neck     Latex Itching    Lisinopril Other (See Comments)     ACE cough  Itchy throat  Throat itches  Itchy throat      Penicillins Swelling    Hydroxyzine Other (See Comments) and Rash     Tongue swelling  Tongue swelling  Tongue swelling      Lisinopril Cough       FAMILY HISTORY:  Family History   Problem Relation Age of Onset    Heart Disease Mother     Heart Disease Father     Heart Disease Sister     Heart Disease Sister     Diabetes Brother     Coronary Artery Disease Brother         MI       SOCIAL HISTORY:   Social History     Socioeconomic History    Marital status: Single   Tobacco Use    Smoking status: Every Day     Packs/day: .5     Types: Cigarettes    Smokeless tobacco: Never    " Tobacco comments:     Seen IP by [facility] on 7/28/23 and declined counseling services and resource packet   Vaping Use    Vaping Use: Never used   Substance and Sexual Activity    Alcohol use: Not Currently     Comment: Alcoholic Drinks/day: occ    Drug use: No    Sexual activity: Not Currently   Social History Narrative    Lives alone in a condo.          On disability.  Three living children.          Has one small dog as her .        Has personal care attendant (PCA) services during the daytime and sometimes in the evening.     Social Determinants of Health     Financial Resource Strain: Low Risk  (1/30/2024)    Financial Resource Strain     Within the past 12 months, have you or your family members you live with been unable to get utilities (heat, electricity) when it was really needed?: No   Food Insecurity: Low Risk  (1/30/2024)    Food Insecurity     Within the past 12 months, did you worry that your food would run out before you got money to buy more?: No     Within the past 12 months, did the food you bought just not last and you didn t have money to get more?: No   Transportation Needs: Low Risk  (1/30/2024)    Transportation Needs     Within the past 12 months, has lack of transportation kept you from medical appointments, getting your medicines, non-medical meetings or appointments, work, or from getting things that you need?: No   Interpersonal Safety: Low Risk  (10/12/2023)    Interpersonal Safety     Do you feel physically and emotionally safe where you currently live?: Yes     Within the past 12 months, have you been hit, slapped, kicked or otherwise physically hurt by someone?: No     Within the past 12 months, have you been humiliated or emotionally abused in other ways by your partner or ex-partner?: No   Housing Stability: Low Risk  (1/30/2024)    Housing Stability     Do you have housing? : Yes     Are you worried about losing your housing?: No       PHYSICAL EXAM    VITAL SIGNS: BP (!)  "169/84   Pulse 87   Temp 97.3  F (36.3  C) (Temporal)   Resp 18   Ht 1.702 m (5' 7\")   Wt 89.8 kg (198 lb)   LMP  (LMP Unknown)   SpO2 98%   BMI 31.01 kg/m     GENERAL: Awake, alert, answering questions appropriately, appears mildly uncomfortable  SPEECH:  Easy to understand speech, Normal volume and terry  PULMONARY: No respiratory distress, Lungs clear to auscultation bilaterally  CARDIOVASCULAR: Regular rate and rhythm, Distal pulses present and normal.  ABDOMINAL: Soft, Nondistended, Nontender, No rebound or guarding, No palpable masses  EXTREMITIES: No lower extremity edema.  No significant edema to her lower extremities but some discomfort with touching the legs.  No erythema.  PSYCH: Normal mood and affect     LAB:  All pertinent labs reviewed and interpreted.  Results for orders placed or performed during the hospital encounter of 02/19/24   US Lower Extremity Venous Duplex Right    Impression    IMPRESSION:  1.  No deep venous thrombosis in the right lower extremity.   Basic metabolic panel   Result Value Ref Range    Sodium 141 135 - 145 mmol/L    Potassium 3.6 3.4 - 5.3 mmol/L    Chloride 109 (H) 98 - 107 mmol/L    Carbon Dioxide (CO2) 22 22 - 29 mmol/L    Anion Gap 10 7 - 15 mmol/L    Urea Nitrogen 18.9 8.0 - 23.0 mg/dL    Creatinine 0.87 0.51 - 0.95 mg/dL    GFR Estimate 76 >60 mL/min/1.73m2    Calcium 9.6 8.6 - 10.0 mg/dL    Glucose 218 (H) 70 - 99 mg/dL   Result Value Ref Range    Troponin T, High Sensitivity 17 (H) <=14 ng/L   CBC with platelets and differential   Result Value Ref Range    WBC Count 7.4 4.0 - 11.0 10e3/uL    RBC Count 4.13 3.80 - 5.20 10e6/uL    Hemoglobin 11.2 (L) 11.7 - 15.7 g/dL    Hematocrit 35.2 35.0 - 47.0 %    MCV 85 78 - 100 fL    MCH 27.1 26.5 - 33.0 pg    MCHC 31.8 31.5 - 36.5 g/dL    RDW 13.4 10.0 - 15.0 %    Platelet Count 230 150 - 450 10e3/uL    % Neutrophils 49 %    % Lymphocytes 40 %    % Monocytes 5 %    % Eosinophils 5 %    % Basophils 1 %    % Immature " Granulocytes 0 %    NRBCs per 100 WBC 0 <1 /100    Absolute Neutrophils 3.7 1.6 - 8.3 10e3/uL    Absolute Lymphocytes 3.0 0.8 - 5.3 10e3/uL    Absolute Monocytes 0.3 0.0 - 1.3 10e3/uL    Absolute Eosinophils 0.4 0.0 - 0.7 10e3/uL    Absolute Basophils 0.1 0.0 - 0.2 10e3/uL    Absolute Immature Granulocytes 0.0 <=0.4 10e3/uL    Absolute NRBCs 0.0 10e3/uL   Extra Blue Top Tube   Result Value Ref Range    Hold Specimen JIC    Extra Red Top Tube   Result Value Ref Range    Hold Specimen JIC    Result Value Ref Range    Magnesium 1.8 1.7 - 2.3 mg/dL   Result Value Ref Range    Troponin T, High Sensitivity 15 (H) <=14 ng/L       RADIOLOGY:  US Lower Extremity Venous Duplex Right   Final Result   IMPRESSION:   1.  No deep venous thrombosis in the right lower extremity.            EKG:    Date and time: February 19, 2024 at 2036  Rate: 80 bpm  Rhythm: Sinus rhythm  MN interval: 164 ms  QRS interval: 76 ms  QT/QTc: 396/456 ms  ST changes or T wave changes: No acute ST or T wave abnormalities  Change from prior ECG: No significant change from prior  I have independently reviewed and interpreted this EKG.     I, Ruchi Santoro, am serving as a scribe to document services personally performed by Dr. To based on my observation and the provider's statements to me. I, Holli To MD attest that Ruchi Santoro is acting in a scribe capacity, has observed my performance of the services and has documented them in accordance with my direction.    Holli To M.D.  Emergency Medicine  [facility] EMERGENCY DEPARTMENT  [address] [id number] [phone]  Dept: [phone]       Holli To MD  02/19/24 4643

## 2024-02-20 NOTE — ED TRIAGE NOTES
Pt states she had cp at home and she states she took a nitroglycerin. About 2-3 hrs after, pt states she woke up on the floor. Pt states she has CABG hx and takes a lot of nitroglycerin. Pt states she also passes out a lot. Pt concerned for infection because her legs burn and she has pain to wear socks or shoes x 5 days. Made appt with PMD to evaluate her syncope.     HX CVA which causes her leg weakness.

## 2024-02-20 NOTE — DISCHARGE INSTRUCTIONS
You were seen in the Emergency Department today for evaluation of burning pain to your leg.  Your lab work showed no cause of your symptoms. Your imaging studies showed no significant cause of your symptoms. You were prescribed Toradol and prednisone to take as prescribed. You should take all medications as prescribed.  Follow up with your primary care physician to ensure resolution of symptoms. Return if you have new or worsening symptoms.

## 2024-02-21 ENCOUNTER — TELEPHONE (OUTPATIENT)
Dept: INTERNAL MEDICINE | Facility: CLINIC | Age: 59
End: 2024-02-21

## 2024-02-21 ENCOUNTER — OFFICE VISIT (OUTPATIENT)
Dept: NEUROLOGY | Facility: CLINIC | Age: 59
End: 2024-02-21
Attending: EMERGENCY MEDICINE
Payer: MEDICAID

## 2024-02-21 VITALS
BODY MASS INDEX: 31.08 KG/M2 | DIASTOLIC BLOOD PRESSURE: 86 MMHG | HEIGHT: 67 IN | SYSTOLIC BLOOD PRESSURE: 138 MMHG | HEART RATE: 86 BPM | WEIGHT: 198 LBS

## 2024-02-21 DIAGNOSIS — G62.9 NEUROPATHY: Primary | ICD-10-CM

## 2024-02-21 LAB
ATRIAL RATE - MUSE: 80 BPM
DIASTOLIC BLOOD PRESSURE - MUSE: NORMAL MMHG
INTERPRETATION ECG - MUSE: NORMAL
P AXIS - MUSE: 63 DEGREES
PR INTERVAL - MUSE: 164 MS
QRS DURATION - MUSE: 76 MS
QT - MUSE: 396 MS
QTC - MUSE: 456 MS
R AXIS - MUSE: 9 DEGREES
SYSTOLIC BLOOD PRESSURE - MUSE: NORMAL MMHG
T AXIS - MUSE: 94 DEGREES
VENTRICULAR RATE- MUSE: 80 BPM

## 2024-02-21 PROCEDURE — 99215 OFFICE O/P EST HI 40 MIN: CPT | Performed by: PSYCHIATRY & NEUROLOGY

## 2024-02-21 NOTE — TELEPHONE ENCOUNTER
Reason for Call:  Appointment Request    Patient requesting this type of appt:  Hospital/ED Follow-Up     Requested provider: Kike David    Reason patient unable to be scheduled: Not within requested timeframe    When does patient want to be seen/preferred time:  asap    Comments: Patient was seen at [facility] ED on 2/20/2024 and was instructed to schedule with her primary care provider for as soon as possible. Patient is currently scheduled for 2/29 but they would like her to be seen sooner.    Okay to leave a detailed message?: Yes at Home number on file [phone] (home)    Call taken on 2/21/2024 at 8:49 AM by Lo Taylor

## 2024-02-21 NOTE — PROGRESS NOTES
In person evaluation    HPI  1/15/2024, seen by Dr. Whitney in hospital  2/21/2024, outpatient consultation added on end of day.      58-year-old being evaluated neurologically for:  Right leg pain  History of chronic pain followed at the pain clinic  History of substance control agreement  Poorly controlled diabetes    Chief complaint 2/21/2024:  Numbness and tingling in the feet  Burning in bilateral lower extremities  Feels like someone is stabbing a knife in her feet    Does have history of diabetes  Hemoglobin A1c in the past of 12.1% (11/22/2021)    Patient has had symptoms going on for at least 2 years  They have been a lot worse over the last year  She has hypersensitivity of the right foot besides the numbness tingling and burning  Sometimes she cannot stand to have a sock on her foot on the right  Symptoms are much worse on the right than the left but are bilateral    She has some urinary urgency  She does not have any constipation            Chart review 2/21/2024  Patient recently hospitalized attempted neurology visit 1/15/2024 for chest pain/nausea/vomiting and syncope  Patient was evaluated for chronic right-sided weakness    Past neurologic consult 10/6/2022 right arm and leg numbness and weakness  Previous EEG in 2018 October normal with workup of syncope    Patient in July 27, 2023 had right-sided weakness and speech arrest.  No acute infarct seen.  Scan showed bilateral occipital infarcts chronic which have developed since February 2023  Per hospital records during 1/15/2024 visit by neurology patient became verbally abusive and aggressive refused to comply with neurologic exam.      Patient in the ER 2/19/2024 with right leg pain and neuropathy eval  Patient has chronic pain and presented to the ER.  Patient complaining that her right leg was burning sometimes the right leg swells up.  She does follow with the pain clinic.  Per chart notes she sees specialist at [place] [facility].  She has  had past injections but felt they were not helping.  They gave her some muscle relaxants in the ER    Patient has a history of poorly controlled diabetes    MRI brain 1/14/2024  1.  Senescent intracranial changes and sequelae of mild chronic microangiopathy without acute intracranial abnormality   HEAD CT: 1/14/2024  1.  No acute intracranial hemorrhage or CT evidence of acute large vascular territory ischemic infarct.  2.  Similar small chronic infarcts in both occipital lobes with changes suggestive of mild chronic microvascular ischemic disease.     HEAD CTA: 1/14/2024  1.  No large vessel occlusion or new flow-limiting stenosis.  2.  Redemonstration of scattered intracranial atherosclerotic disease as further detailed above. Of note, there is mixed atherosclerotic plaque throughout the carotid siphons with areas of moderate to advanced luminal narrowing involving the paraclinoid   segments bilaterally. This is similar on the right and perhaps slightly progressed on the left compared to October 2023.     NECK CTA: 1/14/2024  1.  No high-grade stenosis or occlusion involving the major arteries of the neck.  2.  Atherosclerotic changes about the carotid bifurcations without substantial (50% or greater) luminal narrowing.  3.  No evidence of dissection                      Past medical history  CVA  COPD  Diabetes (hemoglobin A1c 12.1%,   11/22/2021)  Hypertension  Hyperlipidemia  CABG  CHF  STEMI  Kidney stones  Polysubstance abuse  Status post lumbar discectomy L5/S1, (2019)  Schizoaffective disorder  Controlled substance agreement  Chronic neck pain  Low back pain       Habits  Smokes half a pack of cigarettes per day  Rare to occasional alcohol  Past history per chart polysubstance abuse  Patient per chart he is on disability/has personal care attendant at times/lives in a condo      Family history  Mother heart disease  Father heart disease  Sister heart disease  Brother heart disease  Sister heart disease      Past work-up reviewed  EEG essentially normal 9-10 Hz percent rhythm, (10/23/2018)  Echo 9/25/2022, 65-70% ejection fraction, severe left atrial enlargement  Urine tox screen (9/24/2022), positive benzodiazepine,  Hemoglobin A1c greater than 14% (4/3/2022)  Hemoglobin A1c 10.1% (7/19/2022)      MRI lumbar spine 9/24/2022  L3-L4, moderate bilateral foraminal narrowing, moderate-severe spinal canal narrowing  L4-L5, no significant foraminal narrowing or canal narrowing  L5-S1, sequelae right sided laminectomy, severe right foraminal narrowing, moderate to severe left foraminal narrowing, no spinal canal narrowing  1.  Marked degenerative changes in the lumbar spine at L5-S1 and to lesser extent L3-L4, detailed report official report  2.  Marked edema at the L5-S1 level also extending to the right facet joint. This could be a source of pain.  3.  At L5-S1, there is severe right and moderate to severe left neural foraminal narrowing.  4.  At L3-L4, there is moderate to severe spinal canal narrowing as well as moderate bilateral neural foraminal narrowing.  5.  Additional mild degenerative changes at the other levels in the lumbar spine see official report  6.  Overall, no significant change since prior MR 04/03/2022.                    Work-up  MRI scan head 10/6/2022  1.  Atrophy and chronic small vessel vascular/lacunar changes.         (Patchy confluent nonspecific T2/FLAIR hyperintensities cerebral and pontine areas consistent with moderate chronic small vessel changes)       Chronic lacunar infarcts deep left frontal white matter and left thalamus  2.  Mild generalized atrophy  3.  Negative for acute intracranial process.  HEAD CT: 10/6/2022  1.  No intracranial hemorrhage, mass, or definite CT evidence of recent ischemia.  HEAD CTA: 10/6/2022  1.  No interval change versus 4/3/2022.  2.  Severe narrowing of the proximal basilar artery is unchanged.  3.  Anterior circulation atherosclerosis predominantly  affecting the carotid siphons without high-grade narrowing.  4.  No proximal large vessel occlusion.  NECK CTA: 10/6/2022  1.  Mild carotid artery atherosclerosis without hemodynamically significant narrowing in either vessel by NASCET criteria.  2.  Unchanged moderate to severe proximal left vertebral artery narrowing.  TSH 1.38 (10/6/2022)  High-sensitivity troponin negative (13)  COVID-19 negative  B12 316, (10/7/2022)  TSH 1.38 (10/7/2022)  Cardiac event monitor 1/17/2024 - 1/30/2024 see official report no atrial fibrillation seen  Cardiac echo 1/15/2024, ejection fraction 60-65%, mild-moderate left atrial enlargement      MRI lumbar spine 4/23/2023 with and without contrast  1.  No significant interval change from MRI 02/22/2023.         No pathologic enhancement.   2.  Prior L5-S1 right-sided laminotomy defect and microdiscectomy.        Granulation tissue surrounds the right S1 nerve root as before.   3.  Chronic type I Modic endplate marrow change L5-S1.   4.  Unchanged severe right and at least moderate left foraminal stenosis at L5-S1.        There is impingement of the right L5 nerve root and at least abutment of the left L5 nerve root.   5.  Unchanged moderate lateral recess stenosis in the right L5-S1 with disc marginal osteophytes abutting the right S1 nerve root with mild mass effect.   6.  Unchanged mild L3-L4 spinal canal stenosis.        Laboratory data review                      1/2024  NA/K            143/3.8  BUN/Cr        17.7/0.89  GLU              163  WBC/HGB    7.2/11.8  PLTs             223,000  B12              288  HGBA1C      8.8  HDL/LDL      37/66        Exam    Review of system  Pertinent positives and negatives  Burning dysesthesias in her feet right greater than left  Sometimes it feels leg they are on fire sometimes there is a stabbing shooting pain in her feet going upwards  The right foot is much more hypersensitive than the left and she cannot wear a sock on it always  She has  some urinary urgency  No specific constipation    No diplopia dysarthria dysphagia  The right arm is fine and her hands are good they do not have similar symptoms    Ambulates without an assistive device but has a slightly wider base stance    In the past has had some back pain  Has a history of chronic back pain    General exam  Blood pressure 138/86, pulse 86  Alert and oriented  HEENT unremarkable  Lungs clear  Heart rate regular  Abdomen soft  No edema the feet  Pulses symmetrical  Straight leg raising sign negative    Neurologic exam  Alert oriented x3  Prosody of speech normal  Naming normal  Repetition normal  Comprehension normal  No aphasia  No neglect  Memory recall okay     Cranial nerves II through XII significant for  No ophthalmoplegia  No nystagmus  Visual fields intact  Tongue twisters good     Upper extremities reported right over left  Deltoid 5/5  Biceps 5/5  Triceps 5/5  Wrist/finger extensors 5/5  Wrist/finger flexors 5/5    Lower extremities reported right over left  Iliopsoas 5/5  Quadriceps 5/5  Hamstring 5/5  Anterior tibial 5/5  Gastrocnemius 5/5    With motor testing a little bit of giveaway weakness on the right side    Reflexes reported right over left  Biceps 2/2  Triceps 1/1  Knee 0/0  Ankle 0/0  Toes downgoing  Negative Tsang reflex    Sensory exam  Decreased pinprick to about the mid shin with a stocking distribution  Light touch decreased in the leg in a stocking distribution to mid shin  Patient a little bit difficult to do sensory testing but could feel vibration and temperature in her feet maybe a little less compared to the hand      Upper extremities  10/7/2022  Patient holding her phone in her right hand and talking handles phone with good dexterity moves arm well as I entered the room.  Normal movement of the right arm today  Major complaint is that she does not want to do any therapy or walk around because of her leg pain     Gait  Wide-based stance  Slightly positive  "Romberg  Able to ambulate without an assistive device            Assessment/plan     1.   History of intermittent right face and arm weakness        Right face arm and leg transient symptoms (MRI scan negative for acute stroke)        Vascular risk factors        Hypertension/hyperlipidemia/diabetes/CAD/CABG/CHF        Posterior circulation severe atherosclerosis of the basilar artery        2.  Posterior circulation severe atherosclerosis       Severe narrowing of the proximal basilar artery        Moderate to severe proximal left vertebral artery narrowing.           3.  Patient examination after hospitalized had some nonphysiologic components.       Marked variability and arm and leg movement on the right during the same exam depending on distraction.       Concern for some nonphysiologic weakness also.     5.  Patient with some history of \"chronic pain\" follows at the pain clinic       History of schizoaffective disorder            6.  Chronic low back pain with right leg pain       Lumbar surgery L5/S1 2019 per chart   MRI lumbar spine 9/24/2022  L3-L4, moderate bilateral foraminal narrowing, moderate-severe spinal canal narrowing  L4-L5, no significant foraminal narrowing or canal narrowing  L5-S1, sequelae right sided laminectomy, severe right foraminal narrowing, moderate to severe left foraminal narrowing, no spinal canal narrowing  1.  Marked degenerative changes in the lumbar spine at L5-S1 and to lesser extent L3-L4, detailed report official report  2.  Marked edema at the L5-S1 level also extending to the right facet joint. This could be a source of pain.  3.  At L5-S1, there is severe right and moderate to severe left neural foraminal narrowing.  4.  At L3-L4, there is moderate to severe spinal canal narrowing as well as moderate bilateral neural foraminal narrowing.  5.  Additional mild degenerative changes at the other levels in the lumbar spine see official report  6.  Overall, no significant change " "since prior MR 04/03/2022.             Follows with pain management per chart looks like through [facility] system.       Review of some meds tried and or being used in the past       Topical lidocaine       Acetaminophen       Flexeril         Neurontin       Toradol       Robaxin       Neuro  PLO gel with lidocaine (ketamine gabapentin teen)       Nortriptyline       Lyrica       Prednisone       Tramadol       Oxycodone       Cymbalta      MRI lumbar spine 4/23/2023 with and without contrast  1.  No significant interval change from MRI 02/22/2023.         No pathologic enhancement.   2.  Prior L5-S1 right-sided laminotomy defect and microdiscectomy.        Granulation tissue surrounds the right S1 nerve root as before.   3.  Chronic type I Modic endplate marrow change L5-S1.   4.  Unchanged severe right and at least moderate left foraminal stenosis at L5-S1.        There is impingement of the right L5 nerve root and at least abutment of the left L5 nerve root.   5.  Unchanged moderate lateral recess stenosis in the right L5-S1 with disc marginal osteophytes abutting the right S1 nerve root with mild mass effect.   6.  Unchanged mild L3-L4 spinal canal stenosis.       Chief complaint  Burning dysesthesias and hyperesthesias right foot greater than left  Spares her hands  Significant diabetes in the past    Rule out neuropathy    Diagnosis  Probable neuropathy  History of chronic pain disorder  History of schizoaffective disorder  History of diabetes    I recommended an EMG of her right arm and right leg to see the severity of her current neuropathy    She has followed with the pain clinic with notes may be November 8, 2023  Pain clinic was asked \"[facility]\"    She says she is not going there anymore    She says she did not want any more medications  She wants to know what is wrong  I discussed with her that to find out what is wrong the EMG would help shed light on what is going on    No new medications " prescribed  See current chart for med list  Zofran  Tylenol  Topamax  Valium  Robaxin  Prednisone  Lidoderm patch  Lidoderm cream  Toradol  Percocet  Cymbalta  Nortriptyline      I will see the patient in follow-up after the EMG  I recommended that she continue following with the pain specialist she was somewhat frustrated as someone had recommended a nerve stimulator but with her diabetes she would be at increased risk for infection and others thought it was not a good idea  From review of notes in the chart it appeared that the pain center would not represcribe medications until she was seen in person    During the visit she was holding her support animal  But was able to participate in most of the exam    Extensive EMR note review  Total care time today 55 minutes

## 2024-02-21 NOTE — TELEPHONE ENCOUNTER
Called pt. Pt scheduled for Monday, 2/26.     Pt states was at Neuro appt at time of call.     Pt thanked for the call.

## 2024-02-21 NOTE — NURSING NOTE
Chief Complaint   Patient presents with    NEUROPATHY     Pt states she is having numbness/tingling and burning in bilat lower extremities. Feels like she is being stabbed with a knife in her feet.      Komal Pozo LPN on 2/21/2024 at 2:13 PM

## 2024-02-21 NOTE — LETTER
2/21/2024         RE: Sarah Rahman  [address]  [address]  [place] [address]        Dear Colleague,    Thank you for referring your patient, Sarah Rahman, to the [facility] [place]. Please see a copy of my visit note below.    In person evaluation    HPI  1/15/2024, seen by Dr. Whitney in hospital  2/21/2024, outpatient consultation added on end of day.      58-year-old being evaluated neurologically for:  Right leg pain  History of chronic pain followed at the pain clinic  History of substance control agreement  Poorly controlled diabetes    Chief complaint 2/21/2024:  Numbness and tingling in the feet  Burning in bilateral lower extremities  Feels like someone is stabbing a knife in her feet    Does have history of diabetes  Hemoglobin A1c in the past of 12.1% (11/22/2021)    Patient has had symptoms going on for at least 2 years  They have been a lot worse over the last year  She has hypersensitivity of the right foot besides the numbness tingling and burning  Sometimes she cannot stand to have a sock on her foot on the right  Symptoms are much worse on the right than the left but are bilateral    She has some urinary urgency  She does not have any constipation            Chart review 2/21/2024  Patient recently hospitalized attempted neurology visit 1/15/2024 for chest pain/nausea/vomiting and syncope  Patient was evaluated for chronic right-sided weakness    Past neurologic consult 10/6/2022 right arm and leg numbness and weakness  Previous EEG in 2018 October normal with workup of syncope    Patient in July 27, 2023 had right-sided weakness and speech arrest.  No acute infarct seen.  Scan showed bilateral occipital infarcts chronic which have developed since February 2023  Per hospital records during 1/15/2024 visit by neurology patient became verbally abusive and aggressive refused to comply with neurologic exam.      Patient in the ER 2/19/2024 with right  leg pain and neuropathy eval  Patient has chronic pain and presented to the ER.  Patient complaining that her right leg was burning sometimes the right leg swells up.  She does follow with the pain clinic.  Per chart notes she sees specialist at [facility].  She has had past injections but felt they were not helping.  They gave her some muscle relaxants in the ER    Patient has a history of poorly controlled diabetes    MRI brain 1/14/2024  1.  Senescent intracranial changes and sequelae of mild chronic microangiopathy without acute intracranial abnormality   HEAD CT: 1/14/2024  1.  No acute intracranial hemorrhage or CT evidence of acute large vascular territory ischemic infarct.  2.  Similar small chronic infarcts in both occipital lobes with changes suggestive of mild chronic microvascular ischemic disease.     HEAD CTA: 1/14/2024  1.  No large vessel occlusion or new flow-limiting stenosis.  2.  Redemonstration of scattered intracranial atherosclerotic disease as further detailed above. Of note, there is mixed atherosclerotic plaque throughout the carotid siphons with areas of moderate to advanced luminal narrowing involving the paraclinoid   segments bilaterally. This is similar on the right and perhaps slightly progressed on the left compared to October 2023.     NECK CTA: 1/14/2024  1.  No high-grade stenosis or occlusion involving the major arteries of the neck.  2.  Atherosclerotic changes about the carotid bifurcations without substantial (50% or greater) luminal narrowing.  3.  No evidence of dissection                      Past medical history  CVA  COPD  Diabetes (hemoglobin A1c 12.1%,   11/22/2021)  Hypertension  Hyperlipidemia  CABG  CHF  STEMI  Kidney stones  Polysubstance abuse  Status post lumbar discectomy L5/S1, (2019)  Schizoaffective disorder  Controlled substance agreement  Chronic neck pain  Low back pain       Habits  Smokes half a pack of cigarettes per day  Rare to occasional  alcohol  Past history per chart polysubstance abuse  Patient per chart he is on disability/has personal care attendant at times/lives in a condo      Family history  Mother heart disease  Father heart disease  Sister heart disease  Brother heart disease  Sister heart disease     Past work-up reviewed  EEG essentially normal 9-10 Hz percent rhythm, (10/23/2018)  Echo 9/25/2022, 65-70% ejection fraction, severe left atrial enlargement  Urine tox screen (9/24/2022), positive benzodiazepine,  Hemoglobin A1c greater than 14% (4/3/2022)  Hemoglobin A1c 10.1% (7/19/2022)      MRI lumbar spine 9/24/2022  L3-L4, moderate bilateral foraminal narrowing, moderate-severe spinal canal narrowing  L4-L5, no significant foraminal narrowing or canal narrowing  L5-S1, sequelae right sided laminectomy, severe right foraminal narrowing, moderate to severe left foraminal narrowing, no spinal canal narrowing  1.  Marked degenerative changes in the lumbar spine at L5-S1 and to lesser extent L3-L4, detailed report official report  2.  Marked edema at the L5-S1 level also extending to the right facet joint. This could be a source of pain.  3.  At L5-S1, there is severe right and moderate to severe left neural foraminal narrowing.  4.  At L3-L4, there is moderate to severe spinal canal narrowing as well as moderate bilateral neural foraminal narrowing.  5.  Additional mild degenerative changes at the other levels in the lumbar spine see official report  6.  Overall, no significant change since prior MR 04/03/2022.                    Work-up  MRI scan head 10/6/2022  1.  Atrophy and chronic small vessel vascular/lacunar changes.         (Patchy confluent nonspecific T2/FLAIR hyperintensities cerebral and pontine areas consistent with moderate chronic small vessel changes)       Chronic lacunar infarcts deep left frontal white matter and left thalamus  2.  Mild generalized atrophy  3.  Negative for acute intracranial process.  HEAD CT:  10/6/2022  1.  No intracranial hemorrhage, mass, or definite CT evidence of recent ischemia.  HEAD CTA: 10/6/2022  1.  No interval change versus 4/3/2022.  2.  Severe narrowing of the proximal basilar artery is unchanged.  3.  Anterior circulation atherosclerosis predominantly affecting the carotid siphons without high-grade narrowing.  4.  No proximal large vessel occlusion.  NECK CTA: 10/6/2022  1.  Mild carotid artery atherosclerosis without hemodynamically significant narrowing in either vessel by NASCET criteria.  2.  Unchanged moderate to severe proximal left vertebral artery narrowing.  TSH 1.38 (10/6/2022)  High-sensitivity troponin negative (13)  COVID-19 negative  B12 316, (10/7/2022)  TSH 1.38 (10/7/2022)  Cardiac event monitor 1/17/2024 - 1/30/2024 see official report no atrial fibrillation seen  Cardiac echo 1/15/2024, ejection fraction 60-65%, mild-moderate left atrial enlargement      MRI lumbar spine 4/23/2023 with and without contrast  1.  No significant interval change from MRI 02/22/2023.         No pathologic enhancement.   2.  Prior L5-S1 right-sided laminotomy defect and microdiscectomy.        Granulation tissue surrounds the right S1 nerve root as before.   3.  Chronic type I Modic endplate marrow change L5-S1.   4.  Unchanged severe right and at least moderate left foraminal stenosis at L5-S1.        There is impingement of the right L5 nerve root and at least abutment of the left L5 nerve root.   5.  Unchanged moderate lateral recess stenosis in the right L5-S1 with disc marginal osteophytes abutting the right S1 nerve root with mild mass effect.   6.  Unchanged mild L3-L4 spinal canal stenosis.        Laboratory data review                      1/2024  NA/K            143/3.8  BUN/Cr        17.7/0.89  GLU              163  WBC/HGB    7.2/11.8  PLTs             223,000  B12              288  HGBA1C      8.8  HDL/LDL      37/66        Exam    Review of system  Pertinent positives and  negatives  Burning dysesthesias in her feet right greater than left  Sometimes it feels leg they are on fire sometimes there is a stabbing shooting pain in her feet going upwards  The right foot is much more hypersensitive than the left and she cannot wear a sock on it always  She has some urinary urgency  No specific constipation    No diplopia dysarthria dysphagia  The right arm is fine and her hands are good they do not have similar symptoms    Ambulates without an assistive device but has a slightly wider base stance    In the past has had some back pain  Has a history of chronic back pain    General exam  Blood pressure 138/86, pulse 86  Alert and oriented  HEENT unremarkable  Lungs clear  Heart rate regular  Abdomen soft  No edema the feet  Pulses symmetrical  Straight leg raising sign negative    Neurologic exam  Alert oriented x3  Prosody of speech normal  Naming normal  Repetition normal  Comprehension normal  No aphasia  No neglect  Memory recall okay     Cranial nerves II through XII significant for  No ophthalmoplegia  No nystagmus  Visual fields intact  Tongue twisters good     Upper extremities reported right over left  Deltoid 5/5  Biceps 5/5  Triceps 5/5  Wrist/finger extensors 5/5  Wrist/finger flexors 5/5    Lower extremities reported right over left  Iliopsoas 5/5  Quadriceps 5/5  Hamstring 5/5  Anterior tibial 5/5  Gastrocnemius 5/5    With motor testing a little bit of giveaway weakness on the right side    Reflexes reported right over left  Biceps 2/2  Triceps 1/1  Knee 0/0  Ankle 0/0  Toes downgoing  Negative Tsang reflex    Sensory exam  Decreased pinprick to about the mid shin with a stocking distribution  Light touch decreased in the leg in a stocking distribution to mid shin  Patient a little bit difficult to do sensory testing but could feel vibration and temperature in her feet maybe a little less compared to the hand      Upper extremities  10/7/2022  Patient holding her phone in her  "right hand and talking handles phone with good dexterity moves arm well as I entered the room.  Normal movement of the right arm today  Major complaint is that she does not want to do any therapy or walk around because of her leg pain     Gait  Wide-based stance  Slightly positive Romberg  Able to ambulate without an assistive device            Assessment/plan     1.   History of intermittent right face and arm weakness        Right face arm and leg transient symptoms (MRI scan negative for acute stroke)        Vascular risk factors        Hypertension/hyperlipidemia/diabetes/CAD/CABG/CHF        Posterior circulation severe atherosclerosis of the basilar artery        2.  Posterior circulation severe atherosclerosis       Severe narrowing of the proximal basilar artery        Moderate to severe proximal left vertebral artery narrowing.           3.  Patient examination after hospitalized had some nonphysiologic components.       Marked variability and arm and leg movement on the right during the same exam depending on distraction.       Concern for some nonphysiologic weakness also.     5.  Patient with some history of \"chronic pain\" follows at the pain clinic       History of schizoaffective disorder            6.  Chronic low back pain with right leg pain       Lumbar surgery L5/S1 2019 per chart   MRI lumbar spine 9/24/2022  L3-L4, moderate bilateral foraminal narrowing, moderate-severe spinal canal narrowing  L4-L5, no significant foraminal narrowing or canal narrowing  L5-S1, sequelae right sided laminectomy, severe right foraminal narrowing, moderate to severe left foraminal narrowing, no spinal canal narrowing  1.  Marked degenerative changes in the lumbar spine at L5-S1 and to lesser extent L3-L4, detailed report official report  2.  Marked edema at the L5-S1 level also extending to the right facet joint. This could be a source of pain.  3.  At L5-S1, there is severe right and moderate to severe left neural " foraminal narrowing.  4.  At L3-L4, there is moderate to severe spinal canal narrowing as well as moderate bilateral neural foraminal narrowing.  5.  Additional mild degenerative changes at the other levels in the lumbar spine see official report  6.  Overall, no significant change since prior MR 04/03/2022.             Follows with pain management per chart looks like through [facility] system.       Review of some meds tried and or being used in the past       Topical lidocaine       Acetaminophen       Flexeril         Neurontin       Toradol       Robaxin       Neuro  PLO gel with lidocaine (ketamine gabapentin teen)       Nortriptyline       Lyrica       Prednisone       Tramadol       Oxycodone       Cymbalta      MRI lumbar spine 4/23/2023 with and without contrast  1.  No significant interval change from MRI 02/22/2023.         No pathologic enhancement.   2.  Prior L5-S1 right-sided laminotomy defect and microdiscectomy.        Granulation tissue surrounds the right S1 nerve root as before.   3.  Chronic type I Modic endplate marrow change L5-S1.   4.  Unchanged severe right and at least moderate left foraminal stenosis at L5-S1.        There is impingement of the right L5 nerve root and at least abutment of the left L5 nerve root.   5.  Unchanged moderate lateral recess stenosis in the right L5-S1 with disc marginal osteophytes abutting the right S1 nerve root with mild mass effect.   6.  Unchanged mild L3-L4 spinal canal stenosis.       Chief complaint  Burning dysesthesias and hyperesthesias right foot greater than left  Spares her hands  Significant diabetes in the past    Rule out neuropathy    Diagnosis  Probable neuropathy  History of chronic pain disorder  History of schizoaffective disorder  History of diabetes    I recommended an EMG of her right arm and right leg to see the severity of her current neuropathy    She has followed with the pain clinic with notes may be November 8, 2023  Pain clinic was  "asked \"[facility]\"    She says she is not going there anymore    She says she did not want any more medications  She wants to know what is wrong  I discussed with her that to find out what is wrong the EMG would help shed light on what is going on    No new medications prescribed  See current chart for med list  Zofran  Tylenol  Topamax  Valium  Robaxin  Prednisone  Lidoderm patch  Lidoderm cream  Toradol  Percocet  Cymbalta  Nortriptyline      I will see the patient in follow-up after the EMG  I recommended that she continue following with the pain specialist she was somewhat frustrated as someone had recommended a nerve stimulator but with her diabetes she would be at increased risk for infection and others thought it was not a good idea  From review of notes in the chart it appeared that the pain center would not represcribe medications until she was seen in person    During the visit she was holding her support animal  But was able to participate in most of the exam    Extensive EMR note review  Total care time today 55 minutes         Again, thank you for allowing me to participate in the care of your patient.        Sincerely,        lynne Kendrick MD  "

## 2024-02-21 NOTE — TELEPHONE ENCOUNTER
Can move up appointment to earlier in the week if wishes but nothing available this week at this time.    Can use a reserved slot for this.    Kike David MD  General Internal Medicine  [facility]  2/21/2024, 8:58 AM

## 2024-02-24 DIAGNOSIS — E11.42 TYPE 2 DIABETES MELLITUS WITH DIABETIC POLYNEUROPATHY, WITH LONG-TERM CURRENT USE OF INSULIN (H): ICD-10-CM

## 2024-02-24 DIAGNOSIS — Z79.4 TYPE 2 DIABETES MELLITUS WITH DIABETIC POLYNEUROPATHY, WITH LONG-TERM CURRENT USE OF INSULIN (H): ICD-10-CM

## 2024-02-26 ENCOUNTER — PATIENT OUTREACH (OUTPATIENT)
Dept: CARE COORDINATION | Facility: CLINIC | Age: 59
End: 2024-02-26

## 2024-02-26 ENCOUNTER — OFFICE VISIT (OUTPATIENT)
Dept: INTERNAL MEDICINE | Facility: CLINIC | Age: 59
End: 2024-02-26
Payer: MEDICAID

## 2024-02-26 VITALS
DIASTOLIC BLOOD PRESSURE: 78 MMHG | SYSTOLIC BLOOD PRESSURE: 132 MMHG | HEIGHT: 67 IN | RESPIRATION RATE: 18 BRPM | TEMPERATURE: 97.8 F | BODY MASS INDEX: 31.01 KG/M2

## 2024-02-26 DIAGNOSIS — J32.9 RHINOSINUSITIS: Primary | ICD-10-CM

## 2024-02-26 DIAGNOSIS — Z79.4 TYPE 2 DIABETES MELLITUS WITH HYPERGLYCEMIA, WITH LONG-TERM CURRENT USE OF INSULIN (H): Chronic | ICD-10-CM

## 2024-02-26 DIAGNOSIS — E11.65 TYPE 2 DIABETES MELLITUS WITH HYPERGLYCEMIA, WITH LONG-TERM CURRENT USE OF INSULIN (H): Chronic | ICD-10-CM

## 2024-02-26 DIAGNOSIS — E11.42 DIABETIC PERIPHERAL NEUROPATHY (H): ICD-10-CM

## 2024-02-26 DIAGNOSIS — J31.0 CHRONIC RHINITIS: ICD-10-CM

## 2024-02-26 PROCEDURE — 99214 OFFICE O/P EST MOD 30 MIN: CPT | Performed by: INTERNAL MEDICINE

## 2024-02-26 RX ORDER — CODEINE PHOSPHATE AND GUAIFENESIN 10; 100 MG/5ML; MG/5ML
1-2 SOLUTION ORAL EVERY 4 HOURS PRN
Qty: 237 ML | Refills: 1 | Status: SHIPPED | OUTPATIENT
Start: 2024-02-26 | End: 2024-04-23

## 2024-02-26 RX ORDER — GUAIFENESIN 600 MG/1
600 TABLET, EXTENDED RELEASE ORAL 2 TIMES DAILY PRN
Qty: 60 TABLET | Refills: 1 | Status: SHIPPED | OUTPATIENT
Start: 2024-02-26 | End: 2024-08-26

## 2024-02-26 RX ORDER — PROCHLORPERAZINE 25 MG/1
SUPPOSITORY RECTAL
Refills: 0 | OUTPATIENT
Start: 2024-02-26

## 2024-02-26 RX ORDER — PROCHLORPERAZINE 25 MG/1
SUPPOSITORY RECTAL
Refills: 5 | OUTPATIENT
Start: 2024-02-26

## 2024-02-26 RX ORDER — FLUTICASONE PROPIONATE 50 MCG
1 SPRAY, SUSPENSION (ML) NASAL 2 TIMES DAILY
Qty: 16 G | Refills: 4 | Status: SHIPPED | OUTPATIENT
Start: 2024-02-26 | End: 2024-07-08

## 2024-02-26 RX ORDER — PROCHLORPERAZINE 25 MG/1
SUPPOSITORY RECTAL
Qty: 3 EACH | Refills: 5 | Status: SHIPPED | OUTPATIENT
Start: 2024-02-26 | End: 2024-04-23

## 2024-02-26 RX ORDER — PROCHLORPERAZINE 25 MG/1
SUPPOSITORY RECTAL
Qty: 1 EACH | Refills: 0 | Status: SHIPPED | OUTPATIENT
Start: 2024-02-26 | End: 2024-08-26

## 2024-02-26 RX ORDER — DOXYCYCLINE 100 MG/1
100 CAPSULE ORAL 2 TIMES DAILY
Qty: 14 CAPSULE | Refills: 0 | Status: SHIPPED | OUTPATIENT
Start: 2024-02-26 | End: 2024-03-05

## 2024-02-26 RX ORDER — PROCHLORPERAZINE 25 MG/1
SUPPOSITORY RECTAL
Qty: 1 EACH | Refills: 1 | OUTPATIENT
Start: 2024-02-26

## 2024-02-26 RX ORDER — PROCHLORPERAZINE 25 MG/1
SUPPOSITORY RECTAL
Qty: 1 EACH | Refills: 1 | Status: SHIPPED | OUTPATIENT
Start: 2024-02-26 | End: 2024-08-26

## 2024-02-26 RX ORDER — FEXOFENADINE HCL 180 MG/1
180 TABLET ORAL DAILY PRN
Qty: 30 TABLET | Refills: 2 | Status: SHIPPED | OUTPATIENT
Start: 2024-02-26 | End: 2024-08-26

## 2024-02-26 ASSESSMENT — PAIN SCALES - GENERAL: PAINLEVEL: WORST PAIN (10)

## 2024-02-26 NOTE — ED NOTES
"[facility] ED Handoff Report    ED Chief Complaint: Back pain    ED Diagnosis:  (R07.9) Chest pain, unspecified type  Comment: Patient denies pain at this time  Plan:     (M79.604,  M79.605) Bilateral leg pain  Comment: Back pain radiates down both legs  Plan:     (Z86.79) History of coronary artery disease  Comment:   Plan:     (F11.90) Chronic, continuous use of opioids  Comment: Patient reports last dose of Percocet was 1130 last night, UDS from this am was negative for opiates  Plan:        PMH:    Past Medical History:   Diagnosis Date     Asthma      CAD (coronary artery disease)      COPD (chronic obstructive pulmonary disease) (H)      CVA (cerebral infarction)      Diabetes (H)      GERD (gastroesophageal reflux disease)      Hypertension      Polysubstance abuse (H)      S/P CABG (coronary artery bypass graft)      S/P lumbar discectomy 06/13/2019    L5/S1 by  Dr. Hamm at [facility]     Schizoaffective disorder (H)         Code Status:  Prior     Falls Risk: No Band: Not applicable    Current Living Situation/Residence: lives with a significant other     Elimination Status: Continent: Yes and No     Activity Level: SBA    Patients Preferred Language:  English     Needed: No    Vital Signs:  /61   Pulse 80   Temp 98.6  F (37  C) (Oral)   Resp 14   Ht 1.702 m (5' 7\")   Wt 95.3 kg (210 lb)   LMP  (LMP Unknown)   SpO2 100%   BMI 32.89 kg/m       Cardiac Rhythm: NA    Pain Score: 8/10    Is the Patient Confused:  No    Last Food or Drink: 09/24/22 at tray ordered at 1430    Focused Assessment:  Reports back pain- chronic, worsening since last night.  Reports radiation down bilateral legs.  Patient able to move all extremities.    Tests Performed: Done: Labs and Imaging    Treatments Provided:  SEE MAR    Family Dynamics/Concerns: No    Family Updated On Visitor Policy: Yes    Plan of Care Communicated to Family: Yes    Who Was Updated about Plan of Care: Patient updating " Detail Level: Detailed Add 1585x Cpt? (Do Not Bill If You Billed For The Procedure Placing The Sutures. This Is An Add-On Code That Must Be Billed With An E/M Visit Code): No family     Belongings Checklist Done and Signed by Patient: Yes    Medications sent with patient: NA    Covid: asymptomatic , negative    Additional Information: Neurosurgery consulted with patient in ED.  Patient able to get in and out of bed to use commode unassisted.  Continues to rate pain 8/10.  Given 2 doses of Morphine, 1 dose of Toradol and Ativan.  Is A&O x 4, can verbalize needs.     RN: Ermelinda Zepeda RN   9/24/2022 2:53 PM

## 2024-02-26 NOTE — PROGRESS NOTES
[facility] Contact  [facility]/Voicemail     Clinical Data: Care Coordination ED-sourced Outreach-     Outreach attempted x 2.  Left message on patient's voicemail, providing [facility]'s 24/7 scheduling and nurse triage phone number [phone] ([phone]) for questions/concerns and/or to schedule an appt with an [facility] provider.      [facility] unable to send Care Coordination introduction letter as patient does not have an active MyChart account. Clinic Care Coordination services remain available via referral if needed.    Plan: [facility] will do no further outreaches at this time.       AIMEE [place]  [facility] Care Resource [place], [facility]    *Connected Care Resource Team does NOT follow patient ongoing. Referrals are identified based on internal discharge reports and the outreach is to ensure patient has an understanding of their discharge instructions.

## 2024-02-26 NOTE — PROGRESS NOTES
[facility] - Primary [facility] Specialists    Comprehensive and complex medical care - Chronic disease management - Shared decision making - Care coordination - Compassionate care    Patient advocacy - Rational deprescribing - Minimally disruptive medicine - Ethical focus - Customized care         Date of Service: 2/26/2024  Primary Provider: Kike David    Patient Care Team:  Kike David MD as PCP - General (Internal Medicine)  Kike David MD as Assigned PCP  Kike David MD (Internal Medicine)  Fortunato Kendrick MD as Assigned Neuroscience Provider          Patient's Pharmacy:    [place] [id number] [facility]  [address]  [address]  [facility] [place] [address]  Phone: [phone] Fax: [phone]     Patient's Contacts:  Name Home Phone Work Phone Mobile Phone Relationship Lgl Jayce SANTANA   [phone] Spouse    SALONI MORALES [phone]   Friend      Patient's Insurance:    Payor[place] / Plan[place] / Product Type: Medicaid /           Active Problem List:  Problem List as of 2/26/2024 Reviewed: 2/19/2024  8:37 PM by Ruchi Santoro    CAD -- S/P CABG    Type 2 diabetes mellitus with hyperglycemia, with long-term current use of insulin (H)    Last Assessment & Plan 6/7/2022 Office Visit Edited 6/10/2022  8:06 AM by Mara Scott NP     Type 2 diabetes is not well controlled but she is using the Lantus injection regularly.  She ran out of the freestyle hosea sensors so she does not have any recent glucose readings to base medication adjustments on at this time.  She was prescribed Trulicity in the past but was nervous about taking this medication so she never started it.  We reviewed possible side effects associated with the medication and what she may expect in the first 2 weeks of taking this medication.  She would be comfortable trying this medication again, Trulicity 0.75 mg weekly sent to  her pharmacy.  She is advised that this is a small dose and will need to be gradually increased in order for her to obtain maximal benefit from the medication.  She will be scheduled for a follow-up appointment and establish care visit with another provider in the next 4 weeks.  Of note, she would likely benefit from increasing her dose of metformin back to maximal dose but was concerned that this caused her to have increased urinary frequency.  We did not address this today.         Cerebrovascular accident (CVA) due to stenosis of carotid artery (H)    Smoker       Medium    Carotid stenosis, left    Primary hypertension    Moderate persistent asthma    Chronic pain syndrome    Right renal artery stenosis (H24)    Schizoaffective disorder (H)    Chronic obstructive pulmonary disease (H)    Anemia    Depression with anxiety    Myelomalacia of cervical cord (H)    Atypical chest pain -- Normal NM stress 5/8/23    Syncope    Acute CVA (cerebrovascular accident) (H)    Intracranial atherosclerosis    Syncope and collapse    Right leg weakness    Chest pain, unspecified type    Unstable angina (H)    Difficulty walking       Low    Bilateral low back pain with right-sided sciatica, unspecified chronicity    Last Assessment & Plan 6/7/2022 Office Visit Written 6/10/2022  8:01 AM by Mara Scott, ANDREEA     Because of her uncontrolled diabetes, she is not a good candidate to use steroid medication to treat lumbar radiculopathy.  She is familiar with the effects of a steroid medication on her blood sugar and understands this conundrum.  Fortunately, she does experience some relief when she is given Toradol in the emergency department.  We discussed trial of a nonsteroidal anti-inflammatory medication, diclofenac 3 times daily.  She is open to this idea so this is sent to her pharmacy.  She is advised that she can continue with acetaminophen as well.         Nasal polyp    Last Assessment & Plan 6/7/2022 Office Visit Written  6/10/2022  8:03 AM by Mara Scott NP     Advised fluticasone nasal spray 1 spray each nostril daily.  She declines a referral to ENT, she has had a nasal polyp in the past which responded to fluticasone.         Mixed hyperlipidemia    GERD (gastroesophageal reflux disease)    History of drug abuse (H)    Hx of syphilis    Menopausal flushing    Nephrolithiasis    Obesity    Seasonal allergies    Sensorineural hearing loss (SNHL) of right ear    Frailty    Diabetic peripheral neuropathy (H)        Current Outpatient Medications   Medication Instructions    acetaminophen (TYLENOL) 500-1,000 mg, Oral, EVERY 8 HOURS PRN, Max 5.5 tabs per day    albuterol (PROAIR HFA) 108 (90 Base) MCG/ACT inhaler 1-2 puffs, Inhalation, EVERY 4 HOURS PRN    amLODIPine (NORVASC) 2.5 mg, Oral, DAILY    aspirin 81 mg, Oral, DAILY    atorvastatin (LIPITOR) 40 mg, Oral, EVERY EVENING    clopidogrel (PLAVIX) 75 mg, Oral, DAILY    Continuous Blood Gluc  (DEXCOM G6 ) ROSE Use to read blood sugars as per 's instructions.    Continuous Blood Gluc Sensor (DEXCOM G6 SENSOR) MISC Change every 10 days.    Continuous Blood Gluc Transmit (DEXCOM G6 TRANSMITTER) MISC Change every 3 months.    Dexpanthenol 2 % CREA Apply externally, DAILY    diazepam (VALIUM) 5 mg, Oral, ONCE PRN, Take 30 minutes before flight.    doxycycline hyclate (VIBRAMYCIN) 100 mg, Oral, 2 TIMES DAILY, May use monohydrate if cheaper.  May use tablet or capsule for cost.    dulaglutide (TRULICITY) 0.75 mg, Subcutaneous, EVERY 7 DAYS    DULoxetine (CYMBALTA) 60 mg, Oral, 2 TIMES DAILY    fexofenadine (ALLEGRA) 180 mg, Oral, DAILY PRN    fluticasone (FLONASE) 50 MCG/ACT nasal spray 1 spray, Both Nostrils, 2 TIMES DAILY    fluticasone-salmeterol (ADVAIR) 250-50 MCG/ACT inhaler 1 puff, Inhalation, EVERY 12 HOURS    glipiZIDE (GLUCOTROL XL) 10 mg, Oral, DAILY, Please schedule an appointment with Dr. Kike David in the next 3 months.    guaiFENesin (MUCINEX)  600 mg, Oral, 2 TIMES DAILY PRN    guaiFENesin-codeine (ROBITUSSIN AC) 100-10 MG/5ML solution 5-10 mLs, Oral, EVERY 4 HOURS PRN    insulin glargine (LANTUS PEN) 24 Units, Subcutaneous, AT BEDTIME    ipratropium - albuterol 0.5 mg/2.5 mg/3 mL (DUONEB) 0.5-2.5 (3) MG/3ML neb solution 3 mLs, Nebulization, EVERY 6 HOURS PRN    ketorolac (TORADOL) 10 mg, Oral, EVERY 6 HOURS PRN    ketorolac (TORADOL) 10 mg, Oral, EVERY 6 HOURS PRN    lidocaine (LIDODERM) 5 % patch Transdermal, EVERY 24 HOURS, To prevent lidocaine toxicity, patient should be patch free for 12 hrs daily.    lidocaine (LMX4) 4 % external cream Topical, DAILY, Apply to foot    methocarbamol (ROBAXIN) 750 mg, Oral, 4 TIMES DAILY PRN    naloxone (NARCAN) 4 mg, Alternating Nostrils, PRN, every 2-3 minutes until assistance arrives    nitroGLYcerin (NITROSTAT) 0.4 MG sublingual tablet FOR CHEST PAIN PLACE 1 TABLET UNDER THE TONGUE EVERY 5 MINUTES FOR 3 DOSES IF NEEDED. IF SYMPTOMS PERSIST 5 MINUTES AFTER FIRST DOSE CALL 911.    nortriptyline (PAMELOR) 10 mg, Oral, AT BEDTIME    ondansetron (ZOFRAN) 8 mg, Oral, EVERY 8 HOURS PRN    oxyCODONE-acetaminophen (PERCOCET)  MG per tablet 1 tablet, Oral, EVERY 6 HOURS PRN    topiramate (TOPAMAX) 100 mg, Oral, 2 TIMES DAILY     Social History     Social History Narrative    Lives alone in a condo.          On disability.  Three living children.          Has one small dog as her .        Has personal care attendant (PCA) services during the daytime and sometimes in the evening.       Subjective:     Sarah Rahman is a 58 year old female who comes in today for:    Chief Complaint   Patient presents with    ER F/U     2/19/24 - Right leg pain     Recheck Medication     Dexcom     Cold Symptoms     Pt states I have time breathing at night because of the mucus, I need something to get rids of the mucus           2/26/2024     8:20 AM   Additional Questions   Roomed by Vivi Mckeon   Accompanied by N/A  "    Patient's history is limited today related to hostility towards the provider.  Her answers are short.    She has been having issues with her breathing and congestion.  She has been very sick over the last 5 to 7 days.  She has had difficulty breathing through her nose.  She has severe sinus pressure especially on the left.  She has been using Claritin without help.  She has difficulty laying flat at night without issues with her breathing.    We reviewed her diabetes.  She would like a Dexcom monitor.  She would like to get set up for this.  Her diabetes has been difficult to control.  She would benefit from seeing an endocrinologist or diabetes education to try to work on this.  She will consider this in the future.    We reviewed her peripheral neuropathy and her legs still bother her at this time.  We talked about the spinal cord stimulator option with her and she has not changed her mind on this yet at this point.    We reviewed her other issues noted in the assessment but not specifically addressed in the HPI above.     Objective:     Wt Readings from Last 3 Encounters:   02/21/24 89.8 kg (198 lb)   02/19/24 89.8 kg (198 lb)   02/01/24 88.9 kg (196 lb)     BP Readings from Last 3 Encounters:   02/26/24 132/78   02/21/24 138/86   02/19/24 134/77     /78 (BP Location: Right arm, Patient Position: Sitting, Cuff Size: Adult Large)   Temp 97.8  F (36.6  C) (Oral)   Resp 18   Ht 1.702 m (5' 7\")   LMP  (LMP Unknown)   BMI 31.01 kg/m     The patient is comfortable, no acute distress.  Mood short fused.  Insight good.  Eyes are nonicteric.  Nose reveals moderate congestion which is reddish in nature.  Throat is clear.  Neck is supple without mass.  No cervical adenopathy.  No thyromegaly. Heart regular rate and rhythm.  Lungs clear to auscultation bilaterally.  Respiratory effort is good.        Diagnostics:     Admission on 02/19/2024, Discharged on 02/19/2024   Component Date Value Ref Range Status    " Sodium 02/19/2024 141  135 - 145 mmol/L Final    Reference intervals for this test were updated on 09/26/2023 to more accurately reflect our healthy population. There may be differences in the flagging of prior results with similar values performed with this method. Interpretation of those prior results can be made in the context of the updated reference intervals.     Potassium 02/19/2024 3.6  3.4 - 5.3 mmol/L Final    Chloride 02/19/2024 109 (H)  98 - 107 mmol/L Final    Carbon Dioxide (CO2) 02/19/2024 22  22 - 29 mmol/L Final    Anion Gap 02/19/2024 10  7 - 15 mmol/L Final    Urea Nitrogen 02/19/2024 18.9  8.0 - 23.0 mg/dL Final    Creatinine 02/19/2024 0.87  0.51 - 0.95 mg/dL Final    GFR Estimate 02/19/2024 76  >60 mL/min/1.73m2 Final    Calcium 02/19/2024 9.6  8.6 - 10.0 mg/dL Final    Glucose 02/19/2024 218 (H)  70 - 99 mg/dL Final    Troponin T, High Sensitivity 02/19/2024 17 (H)  <=14 ng/L Final    Either a High Sensitivity Troponin T baseline (0 hours) value = 100 ng/L, or an increase in High Sensitivity Troponin T = 7 ng/L at 2 hours compared to 0 hours (2-0 hours), suggests myocardial injury, and urgent clinical attention is required.    If the 2-0 hours increase is <7 ng/L, a High Sensitivity Troponin T result above gender-specific reference ranges warrants further evaluation.   Recommendations for further evaluation include correlation with clinical decision-making tool (e.g., HEART), a 3rd High Sensitivity Troponin T test 2 hours after the 2nd (a 20% change from baseline would represent concern), admission for observation, close PCC/cardiology follow-up, or urgent outpatient provocative testing.    Ventricular Rate 02/19/2024 80  BPM Final    Atrial Rate 02/19/2024 80  BPM Final    CO Interval 02/19/2024 164  ms Final    QRS Duration 02/19/2024 76  ms Final    QT 02/19/2024 396  ms Final    QTc 02/19/2024 456  ms Final    P Axis 02/19/2024 63  degrees Final    R AXIS 02/19/2024 9  degrees Final    T  Axis 02/19/2024 94  degrees Final    Interpretation ECG 02/19/2024    Final                    Value:Sinus rhythm  Nonspecific ST and T wave abnormality  Abnormal ECG  When compared with ECG of 14-JAN-2024 10:17,  No significant change was found  Confirmed by SEE ED PROVIDER NOTE FOR, ECG INTERPRETATION ([id number]),  EVERETT MCCARTHY ([id number]) on 2/21/2024 4:34:11 AM      WBC Count 02/19/2024 7.4  4.0 - 11.0 10e3/uL Final    RBC Count 02/19/2024 4.13  3.80 - 5.20 10e6/uL Final    Hemoglobin 02/19/2024 11.2 (L)  11.7 - 15.7 g/dL Final    Hematocrit 02/19/2024 35.2  35.0 - 47.0 % Final    MCV 02/19/2024 85  78 - 100 fL Final    MCH 02/19/2024 27.1  26.5 - 33.0 pg Final    MCHC 02/19/2024 31.8  31.5 - 36.5 g/dL Final    RDW 02/19/2024 13.4  10.0 - 15.0 % Final    Platelet Count 02/19/2024 230  150 - 450 10e3/uL Final    % Neutrophils 02/19/2024 49  % Final    % Lymphocytes 02/19/2024 40  % Final    % Monocytes 02/19/2024 5  % Final    % Eosinophils 02/19/2024 5  % Final    % Basophils 02/19/2024 1  % Final    % Immature Granulocytes 02/19/2024 0  % Final    NRBCs per 100 WBC 02/19/2024 0  <1 /100 Final    Absolute Neutrophils 02/19/2024 3.7  1.6 - 8.3 10e3/uL Final    Absolute Lymphocytes 02/19/2024 3.0  0.8 - 5.3 10e3/uL Final    Absolute Monocytes 02/19/2024 0.3  0.0 - 1.3 10e3/uL Final    Absolute Eosinophils 02/19/2024 0.4  0.0 - 0.7 10e3/uL Final    Absolute Basophils 02/19/2024 0.1  0.0 - 0.2 10e3/uL Final    Absolute Immature Granulocytes 02/19/2024 0.0  <=0.4 10e3/uL Final    Absolute NRBCs 02/19/2024 0.0  10e3/uL Final    Hold Specimen 02/19/2024 JI   Final    Hold Specimen 02/19/2024 [facility]   Final    Magnesium 02/19/2024 1.8  1.7 - 2.3 mg/dL Final    Troponin T, High Sensitivity 02/19/2024 15 (H)  <=14 ng/L Final    Either a High Sensitivity Troponin T baseline (0 hours) value = 100 ng/L, or an increase in High Sensitivity Troponin T = 7 ng/L at 2 hours compared to 0 hours (2-0 hours), suggests myocardial  injury, and urgent clinical attention is required.    If the 2-0 hours increase is <7 ng/L, a High Sensitivity Troponin T result above gender-specific reference ranges warrants further evaluation.   Recommendations for further evaluation include correlation with clinical decision-making tool (e.g., HEART), a 3rd High Sensitivity Troponin T test 2 hours after the 2nd (a 20% change from baseline would represent concern), admission for observation, close PCC/cardiology follow-up, or urgent outpatient provocative testing.       No results found for any visits on 02/26/24.    Assessment:     1. Rhinosinusitis    2. Diabetic peripheral neuropathy (H)    3. Type 2 diabetes mellitus with hyperglycemia, with long-term current use of insulin (H)    4. Chronic rhinitis        Plan:     See meds given today for sinus symptoms.  Dexcom prescription done today.  Consider follow-up with diabetes education.  Follow-up with pain clinic related to her leg pain and options related to this.  Continue current medications otherwise.  Follow up sooner if issues.    Orders Placed This Encounter   Medications    Continuous Blood Gluc Sensor (DEXCOM G6 SENSOR) MISC     Sig: Change every 10 days.     Dispense:  3 each     Refill:  5    Continuous Blood Gluc Transmit (DEXCOM G6 TRANSMITTER) MISC     Sig: Change every 3 months.     Dispense:  1 each     Refill:  1    Continuous Blood Gluc  (DEXCOM G6 ) ROSE     Sig: Use to read blood sugars as per 's instructions.     Dispense:  1 each     Refill:  0    fluticasone (FLONASE) 50 MCG/ACT nasal spray     Sig: Spray 1 spray into both nostrils 2 times daily     Dispense:  16 g     Refill:  4    guaiFENesin (MUCINEX) 600 MG 12 hr tablet     Sig: Take 1 tablet (600 mg) by mouth 2 times daily as needed for congestion or cough     Dispense:  60 tablet     Refill:  1    fexofenadine (ALLEGRA) 180 MG tablet     Sig: Take 1 tablet (180 mg) by mouth daily as needed for allergies  (congestion)     Dispense:  30 tablet     Refill:  2    doxycycline hyclate (VIBRAMYCIN) 100 MG capsule     Sig: Take 1 capsule (100 mg) by mouth 2 times daily for 7 days May use monohydrate if cheaper.  May use tablet or capsule for cost.     Dispense:  14 capsule     Refill:  0    guaiFENesin-codeine (ROBITUSSIN AC) 100-10 MG/5ML solution     Sig: Take 5-10 mLs by mouth every 4 hours as needed for cough     Dispense:  237 mL     Refill:  1              Kike David MD  General Internal Medicine  [facility]    Return in about 3 days (around 2/29/2024) for follow up visit.     Future Appointments   Date Time Provider Department Center   3/5/2024  8:50 AM Owen Coffey MD [facility]   4/1/2024  9:00 AM [facility]   5/15/2024 10:30 AM [facility] EMG TECH [facility]   5/15/2024 12:00 PM [facility]ato [facility]

## 2024-02-27 ENCOUNTER — ALLIED HEALTH/NURSE VISIT (OUTPATIENT)
Dept: FAMILY MEDICINE | Facility: CLINIC | Age: 59
End: 2024-02-27
Payer: MEDICAID

## 2024-02-27 DIAGNOSIS — E11.65 TYPE 2 DIABETES MELLITUS WITH HYPERGLYCEMIA, WITH LONG-TERM CURRENT USE OF INSULIN (H): Primary | Chronic | ICD-10-CM

## 2024-02-27 DIAGNOSIS — Z79.4 TYPE 2 DIABETES MELLITUS WITH HYPERGLYCEMIA, WITH LONG-TERM CURRENT USE OF INSULIN (H): Primary | Chronic | ICD-10-CM

## 2024-02-27 PROCEDURE — 99207 PR NO CHARGE NURSE ONLY: CPT

## 2024-02-27 NOTE — PROGRESS NOTES
Patient's Dexcom keeps relaying message signal loss. Called customer service, they think the transmitter is faulty and they will expedite ship one to her home address, it should be there in 2 days. She needs to ship back her old transmitter. Patient verbalized understanding.    Patient expressed frustration with Dexcom not working, pharmacy, the clinic, and Dr. David. Patient took blood sugar in room, which was 486. Patient said she passed out last night. Spoke verbally with PCP who would like her to check her blood sugar regularly so he can see her blood sugar numbers on Thursday at her visit. She should call 911 or seek medical attention if her blood sugar is too high, she loses consciousness, or has other symptoms. Patient verbalized understanding. Patient said she does not want to write down blood sugars.

## 2024-03-01 NOTE — PATIENT INSTRUCTIONS
Future Appointments   Date Time Provider Department Center   3/5/2024  8:50 AM Owen Coffey MD [facility]   4/1/2024  9:00 AM Viviana Rodriguez APRN [facility]   5/15/2024 10:30 AM [facility] EMG TECH [facility]   5/15/2024 12:00 PM Fortunato Kendrick MD [facility]

## 2024-03-05 ENCOUNTER — TELEPHONE (OUTPATIENT)
Dept: INTERNAL MEDICINE | Facility: CLINIC | Age: 59
End: 2024-03-05

## 2024-03-05 NOTE — TELEPHONE ENCOUNTER
Trish, PT with [facility] home care calling to get order to resume home care as patient was recently discharged.  Writer did give ok for resumption of home care.        Trish also looking for clarification on med list and states the patient reports to taking the following-    Amlodipine 2.5 mg- daily  Atorvastatin 40 mg- daily  Baby aspirin 81 mg - daily  Trulicity 0.75 mg- weekly  Duloxetine 60 mg- BID  Flonase 1 spray each nostril- BID  Advair- 250-50 1 puff- BID  Lantus- 24 units at hs  Lidocaine patch- Q 12 hours  Tylenol- PRN (did specify max of 5.5 tabs daily)  Albuterol inhaler- 2 puffs q 6 hours as needed  Percocet -  Unsure how she is taking per Trish  Zofran 8 mg - PRN nausea    Writer did discontinue the following medications as complete, not taking or stopped at dischart-    Nortriptyline 10 mg (discontinue at discharge)  Ketorolac 10 mg- (therapy completed)  Robaxin 500 mg (no longer taking per patient)  Topamax 50 mg- (no longer taking per patient)  Doxycycline- therapy complete.      Trish will be faxing medication/PT orders to clinic for PCP signature.

## 2024-03-15 DIAGNOSIS — F41.0 PANIC ATTACK: ICD-10-CM

## 2024-03-17 RX ORDER — LORAZEPAM 0.5 MG/1
0.5 TABLET ORAL EVERY 6 HOURS PRN
Qty: 20 TABLET | Refills: 0 | Status: SHIPPED | OUTPATIENT
Start: 2024-03-17

## 2024-04-01 DIAGNOSIS — E11.65 TYPE 2 DIABETES MELLITUS WITH HYPERGLYCEMIA, WITH LONG-TERM CURRENT USE OF INSULIN (H): ICD-10-CM

## 2024-04-01 DIAGNOSIS — Z79.4 TYPE 2 DIABETES MELLITUS WITH HYPERGLYCEMIA, WITH LONG-TERM CURRENT USE OF INSULIN (H): ICD-10-CM

## 2024-04-02 RX ORDER — GLIPIZIDE 10 MG/1
10 TABLET, FILM COATED, EXTENDED RELEASE ORAL DAILY
Qty: 90 TABLET | Refills: 0 | Status: SHIPPED | OUTPATIENT
Start: 2024-04-02 | End: 2024-06-26
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
## 2024-04-03 DIAGNOSIS — E11.65 TYPE 2 DIABETES MELLITUS WITH HYPERGLYCEMIA, WITH LONG-TERM CURRENT USE OF INSULIN (H): Primary | ICD-10-CM

## 2024-04-03 DIAGNOSIS — Z79.4 TYPE 2 DIABETES MELLITUS WITH HYPERGLYCEMIA, WITH LONG-TERM CURRENT USE OF INSULIN (H): ICD-10-CM

## 2024-04-03 DIAGNOSIS — E11.65 TYPE 2 DIABETES MELLITUS WITH HYPERGLYCEMIA, WITH LONG-TERM CURRENT USE OF INSULIN (H): ICD-10-CM

## 2024-04-03 DIAGNOSIS — Z79.4 TYPE 2 DIABETES MELLITUS WITH HYPERGLYCEMIA, WITH LONG-TERM CURRENT USE OF INSULIN (H): Primary | ICD-10-CM

## 2024-04-10 ENCOUNTER — NURSE TRIAGE (OUTPATIENT)
Dept: NURSING | Facility: CLINIC | Age: 59
End: 2024-04-10
Payer: MEDICAID

## 2024-04-10 NOTE — TELEPHONE ENCOUNTER
"Triage Call:     S-(situation): Pt calling with a new neurological deficit and swelling for the last week    B-(background): Pt reports prior hx of CVA    A-(assessment):  Pt called today to make a follow up appt with scheduling and reported some red flag symptoms of \"severe swelling\" so she was transferred to [facility]    Pt gave the following description of her sx:   \"Tingling and pingling\" on her right side for the past week, but yesterday it worsened   Weakness of her right leg  \"Toes are not moving on the right side\"; Great toe is  swollen and hard to move  Right hand, leg, face, and neck are swollen on the right side    Pt reports a hs of blocked arteries in neck in 2000; 80-90% blocked on the left side where she needed to have surgery and she states \"that is what if feels like on the right side now\"     R-(recommendations): 911. Pt originally stated that she was not going to the ED or calling 911 and insisted that she be transferred to the clinic to make an appt for today and speak to the clinic. She than stated that she agreed with the plan.     Reason for Disposition   New neurologic deficit that is present NOW, sudden onset of ANY of the following: * Weakness of the face, arm, or leg on one side of the body* Numbness of the face, arm, or leg on one side of the body* Loss of speech or garbled speech   SEVERE weakness (i.e., unable to walk or barely able to walk, requires support) and new-onset or worsening    Additional Information   Negative: Difficult to awaken or acting confused (e.g., disoriented, slurred speech)    Protocols used: Neurologic Deficit-A-[place]  Carolyn Sales RN  [facility] Nurse Advisor 11:55 AM 4/10/2024  "

## 2024-04-23 ENCOUNTER — OFFICE VISIT (OUTPATIENT)
Dept: INTERNAL MEDICINE | Facility: CLINIC | Age: 59
End: 2024-04-23
Payer: MEDICAID

## 2024-04-23 VITALS
TEMPERATURE: 97.8 F | HEART RATE: 78 BPM | SYSTOLIC BLOOD PRESSURE: 134 MMHG | RESPIRATION RATE: 20 BRPM | OXYGEN SATURATION: 100 % | DIASTOLIC BLOOD PRESSURE: 76 MMHG

## 2024-04-23 DIAGNOSIS — Z79.4 TYPE 2 DIABETES MELLITUS WITH HYPERGLYCEMIA, WITH LONG-TERM CURRENT USE OF INSULIN (H): Chronic | ICD-10-CM

## 2024-04-23 DIAGNOSIS — I65.1 BASILAR ARTERY STENOSIS: ICD-10-CM

## 2024-04-23 DIAGNOSIS — E11.42 DIABETIC PERIPHERAL NEUROPATHY (H): ICD-10-CM

## 2024-04-23 DIAGNOSIS — B37.31 CANDIDIASIS OF VAGINA: ICD-10-CM

## 2024-04-23 DIAGNOSIS — J31.0 CHRONIC RHINITIS: ICD-10-CM

## 2024-04-23 DIAGNOSIS — R10.13 DYSPEPSIA: ICD-10-CM

## 2024-04-23 DIAGNOSIS — E11.65 TYPE 2 DIABETES MELLITUS WITH HYPERGLYCEMIA, WITH LONG-TERM CURRENT USE OF INSULIN (H): Chronic | ICD-10-CM

## 2024-04-23 DIAGNOSIS — L29.9 ITCHING: ICD-10-CM

## 2024-04-23 DIAGNOSIS — Z09 HOSPITAL DISCHARGE FOLLOW-UP: ICD-10-CM

## 2024-04-23 DIAGNOSIS — M19.079 ARTHRITIS OF FOOT: Primary | ICD-10-CM

## 2024-04-23 PROCEDURE — 99215 OFFICE O/P EST HI 40 MIN: CPT | Performed by: INTERNAL MEDICINE

## 2024-04-23 PROCEDURE — G2211 COMPLEX E/M VISIT ADD ON: HCPCS | Performed by: INTERNAL MEDICINE

## 2024-04-23 RX ORDER — FLUTICASONE PROPIONATE 50 MCG
1 SPRAY, SUSPENSION (ML) NASAL 2 TIMES DAILY
Qty: 16 G | Refills: 11 | Status: SHIPPED | OUTPATIENT
Start: 2024-04-23 | End: 2024-07-08

## 2024-04-23 RX ORDER — TRIAMCINOLONE ACETONIDE 1 MG/G
OINTMENT TOPICAL 2 TIMES DAILY
Qty: 80 G | Refills: 0 | Status: SHIPPED | OUTPATIENT
Start: 2024-04-23

## 2024-04-23 RX ORDER — DIPHENHYDRAMINE HCL 25 MG
25 TABLET ORAL EVERY 6 HOURS PRN
Qty: 100 TABLET | Refills: 11 | Status: SHIPPED | OUTPATIENT
Start: 2024-04-23 | End: 2024-08-26

## 2024-04-23 RX ORDER — NABUMETONE 500 MG/1
500 TABLET, FILM COATED ORAL 2 TIMES DAILY
Qty: 60 TABLET | Refills: 11 | Status: ON HOLD | OUTPATIENT
Start: 2024-04-23 | End: 2024-09-05

## 2024-04-23 RX ORDER — OMEPRAZOLE 40 MG/1
40 CAPSULE, DELAYED RELEASE ORAL DAILY
Qty: 90 CAPSULE | Refills: 3 | Status: SHIPPED | OUTPATIENT
Start: 2024-04-23

## 2024-04-23 RX ORDER — FLUCONAZOLE 150 MG/1
150 TABLET ORAL ONCE
Qty: 1 TABLET | Refills: 0 | Status: SHIPPED | OUTPATIENT
Start: 2024-04-23 | End: 2024-04-23

## 2024-04-23 RX ORDER — PROCHLORPERAZINE 25 MG/1
SUPPOSITORY RECTAL
Qty: 3 EACH | Refills: 5 | Status: SHIPPED | OUTPATIENT
Start: 2024-04-23 | End: 2024-08-26

## 2024-04-23 NOTE — PATIENT INSTRUCTIONS
To schedule an a eye appointment with [facility], please call [phone].       ______________________________________________________________________     Increase the insulin glargine (Lantus) to 35 units twice a day.  If blood sugars not below 200 in 1 week, then increase to 40 units twice a day.  Restart the dulaglutide (Trulicity)   Refilled the fluticasone nasal spray (Flonase), omeprazole (Prilosec), and the Benadryl (diphenhydramine)   Nabumetone (Relafen) 500 mg twice a day for foot arthritis.  For vaginal itching:  Fluconazole (Diflucan) 150 mg a single dose.  Triamcinolone ointment twice a day for itching until gone.  See a gynecologist or women's health nurse practitioner in the clinic if not improving.

## 2024-04-24 PROBLEM — I65.1 BASILAR ARTERY STENOSIS: Status: ACTIVE | Noted: 2024-04-24

## 2024-04-24 PROBLEM — I65.1 BASILAR ARTERY STENOSIS: Chronic | Status: ACTIVE | Noted: 2024-04-24

## 2024-04-24 NOTE — PROGRESS NOTES
[facility]    Comprehensive and complex medical care - Chronic disease management - Shared decision making - Care coordination - Compassionate care    Patient advocacy - Rational deprescribing - Minimally disruptive medicine - Ethical focus - Customized care         Date of Service: 4/23/2024  Primary Provider: Kike David    Patient Care Team:  Kike David MD as PCP - General (Internal Medicine)  Kike David MD as Assigned PCP  Kike David MD (Internal Medicine)  Fortunato Kendrick MD as Assigned Neuroscience Provider          Patient's Pharmacy:    [place] [id number] [facility]  [address] [facility] [address]  [address]  [facility] [address]  Phone: [phone] Fax: [phone]     Patient's Contacts:  Name Home Phone Work Phone Mobile Phone Relationship Lgl Jayce SANTANA   [phone] Spouse    SALONI MORALES [phone]   Friend      Patient's Insurance:    Payor[place] / Plan[place] / Product Type: Medicaid /            Active Problem List:  Problem List as of 4/23/2024 Reviewed: 3/1/2024  2:07 PM by Kike David MD         High    CAD -- S/P CABG    Type 2 diabetes mellitus with hyperglycemia, with long-term current use of insulin (H)    Last Assessment & Plan 6/7/2022 Office Visit Edited 6/10/2022  8:06 AM by Mara Scott NP     Type 2 diabetes is not well controlled but she is using the Lantus injection regularly.  She ran out of the freestyle hosea sensors so she does not have any recent glucose readings to base medication adjustments on at this time.  She was prescribed Trulicity in the past but was nervous about taking this medication so she never started it.  We reviewed possible side effects associated with the medication and what she may expect in the first 2 weeks of taking this medication.  She would be comfortable trying this medication again, Trulicity 0.75 mg weekly  sent to her pharmacy.  She is advised that this is a small dose and will need to be gradually increased in order for her to obtain maximal benefit from the medication.  She will be scheduled for a follow-up appointment and establish care visit with another provider in the next 4 weeks.  Of note, she would likely benefit from increasing her dose of metformin back to maximal dose but was concerned that this caused her to have increased urinary frequency.  We did not address this today.         Cerebrovascular accident (CVA) due to stenosis of carotid artery (H)    Smoker       Medium    Carotid stenosis, left    Primary hypertension    Moderate persistent asthma    Chronic pain syndrome    Right renal artery stenosis (H24)    Schizoaffective disorder (H)    Chronic obstructive pulmonary disease (H)    Anemia    Depression with anxiety    Myelomalacia of cervical cord (H)    Atypical chest pain -- Normal NM stress 5/8/23    Syncope    Acute CVA (cerebrovascular accident) (H)    Intracranial atherosclerosis    Syncope and collapse    Right leg weakness    Chest pain, unspecified type    Unstable angina (H)    Difficulty walking       Low    Bilateral low back pain with right-sided sciatica, unspecified chronicity    Last Assessment & Plan 6/7/2022 Office Visit Written 6/10/2022  8:01 AM by Mara Scott, ANDREEA     Because of her uncontrolled diabetes, she is not a good candidate to use steroid medication to treat lumbar radiculopathy.  She is familiar with the effects of a steroid medication on her blood sugar and understands this conundrum.  Fortunately, she does experience some relief when she is given Toradol in the emergency department.  We discussed trial of a nonsteroidal anti-inflammatory medication, diclofenac 3 times daily.  She is open to this idea so this is sent to her pharmacy.  She is advised that she can continue with acetaminophen as well.         Nasal polyp    Last Assessment & Plan 6/7/2022 Office Visit  Written 6/10/2022  8:03 AM by Mara Scott NP     Advised fluticasone nasal spray 1 spray each nostril daily.  She declines a referral to ENT, she has had a nasal polyp in the past which responded to fluticasone.         Mixed hyperlipidemia    GERD (gastroesophageal reflux disease)    History of drug abuse (H)    Hx of syphilis    Menopausal flushing    Nephrolithiasis    Obesity    Seasonal allergies    Sensorineural hearing loss (SNHL) of right ear    Frailty    Diabetic peripheral neuropathy (H)        Current Outpatient Medications   Medication Instructions    acetaminophen (TYLENOL) 500-1,000 mg, Oral, EVERY 8 HOURS PRN, Max 5.5 tabs per day    albuterol (PROAIR HFA) 108 (90 Base) MCG/ACT inhaler 1-2 puffs, Inhalation, EVERY 4 HOURS PRN    amLODIPine (NORVASC) 2.5 mg, Oral, DAILY    aspirin 81 mg, Oral, DAILY    atorvastatin (LIPITOR) 40 mg, Oral, EVERY EVENING    clopidogrel (PLAVIX) 75 mg, Oral, DAILY    Continuous Blood Gluc  (DEXCOM G6 ) ROSE Use to read blood sugars as per 's instructions.    Continuous Blood Gluc Transmit (DEXCOM G6 TRANSMITTER) MISC Change every 3 months.    Continuous Glucose Sensor (DEXCOM G6 SENSOR) MISC Change every 10 days.    Dexpanthenol 2 % CREA Apply externally, DAILY    diazepam (VALIUM) 5 mg, Oral, ONCE PRN, Take 30 minutes before flight.    diphenhydrAMINE (BENADRYL) 25 mg, Oral, EVERY 6 HOURS PRN    dulaglutide (TRULICITY) 0.75 mg, Subcutaneous, EVERY 7 DAYS    DULoxetine (CYMBALTA) 60 mg, Oral, 2 TIMES DAILY    fexofenadine (ALLEGRA) 180 mg, Oral, DAILY PRN    fluticasone (FLONASE) 50 MCG/ACT nasal spray 1 spray, Both Nostrils, 2 TIMES DAILY    fluticasone (FLONASE) 50 MCG/ACT nasal spray 1 spray, Both Nostrils, 2 TIMES DAILY    fluticasone-salmeterol (ADVAIR) 250-50 MCG/ACT inhaler 1 puff, Inhalation, EVERY 12 HOURS    glipiZIDE (GLUCOTROL XL) 10 mg, Oral, DAILY, Please schedule an appointment with Dr. Kike David in the next 3 months.     guaiFENesin (MUCINEX) 600 mg, Oral, 2 TIMES DAILY PRN    insulin glargine (LANTUS PEN) 35-40 Units, Subcutaneous, 2 TIMES DAILY    ipratropium - albuterol 0.5 mg/2.5 mg/3 mL (DUONEB) 0.5-2.5 (3) MG/3ML neb solution 3 mLs, Nebulization, EVERY 6 HOURS PRN    lidocaine (LIDODERM) 5 % patch Transdermal, EVERY 24 HOURS, To prevent lidocaine toxicity, patient should be patch free for 12 hrs daily.    lidocaine (LMX4) 4 % external cream Topical, DAILY, Apply to foot    LORazepam (ATIVAN) 0.5 mg, Oral, EVERY 6 HOURS PRN    nabumetone (RELAFEN) 500 mg, Oral, 2 TIMES DAILY    naloxone (NARCAN) 4 mg, Alternating Nostrils, PRN, every 2-3 minutes until assistance arrives    nitroGLYcerin (NITROSTAT) 0.4 MG sublingual tablet FOR CHEST PAIN PLACE 1 TABLET UNDER THE TONGUE EVERY 5 MINUTES FOR 3 DOSES IF NEEDED. IF SYMPTOMS PERSIST 5 MINUTES AFTER FIRST DOSE CALL 911.    omeprazole (PRILOSEC) 40 mg, Oral, DAILY, To protect your stomach.    ondansetron (ZOFRAN) 8 mg, Oral, EVERY 8 HOURS PRN    oxyCODONE-acetaminophen (PERCOCET)  MG per tablet 1 tablet, Oral, EVERY 6 HOURS PRN    triamcinolone (KENALOG) 0.1 % external ointment Topical, 2 TIMES DAILY, Apply twice a day to vaginal rash until resolved      Social History     Social History Narrative    Lives alone in a condo.          On disability.  Three living children.          Has one small dog as her .        Has personal care attendant (PCA) services during the daytime and sometimes in the evening.       Subjective:     Sarah Rahman is a 58 year old female who comes in today for:    Chief Complaint   Patient presents with    ER F/U     Patient states feeling terrible, really need benadryl I have been itching so bad all over.    Heartburn     Patient states anything I eat it's so bad    Vaginal Problem     Patient states when I was in the hospital it was itching and has a rash really bad    Foot Problems     Patient states right foot is shooting, aching,  burning and swelling pain. I have been up for the last 4 nights    Diabetes     Patient states that blood sugar have been high in the 300's to 400's, brought in readings          4/23/2024     8:42 AM   Additional Questions   Roomed by Suri MTZ CMA     In for follow up multiple issues.    Was recently in [facility] twice for different issues.  Records reviewed.    Of note, patient now has a severe basilar artery stenosis.    Has continued to have issues with the right foot.  Does not want a procedure for this.  Trying to manage with medications.  Does see [facility].    Has known arthritis here and would like to try an arthritis medication for this.  Ketorolac (Toradol) has helped for this in the past but long term use not good.    Has issues with peripheral neuropathy (PN) and feet stinging but the right is worse than the left.  There is swelling at times in the right foot.    Diabetes is not doing well.  Using DEXCOM and blood sugars 200-400.  Using insulin glargine (Lantus) 25 units twice a day at this time.  Reviewed options for this.    Has generalized pruritus which is a chronic issue without rash.  Would like a refill of Benadryl (diphenhydramine) which helps for this.    Using new toilet tissue in the last week and this has caused burning and itching of the vulvar area.  Would like to try something for this.  Might also have some mild discharge which is white with this.    Has been having increased gastroesophageal reflux disease (GERD) off of omeprazole (Prilosec) and would like this refilled.  Also would like a refill of fluticasone nasal spray (Flonase).    We reviewed her other issues noted in the assessment but not specifically addressed in the HPI above.     Objective:     Wt Readings from Last 3 Encounters:   02/21/24 89.8 kg (198 lb)   02/19/24 89.8 kg (198 lb)   02/01/24 88.9 kg (196 lb)     BP Readings from Last 3 Encounters:   04/23/24 134/76   02/26/24 132/78   02/21/24 138/86      /76 (BP Location: Right arm, Patient Position: Sitting, Cuff Size: Adult Regular)   Pulse 78   Temp 97.8  F (36.6  C) (Oral)   Resp 20   LMP  (LMP Unknown)   SpO2 100%    The patient is comfortable, no acute distress.  Mood good.  Insight good.  Eyes are nonicteric.  Neck is supple without mass.  No cervical adenopathy.  No thyromegaly. Heart regular rate and rhythm.  Lungs clear to auscultation bilaterally.  Respiratory effort is good.  Abdomen soft and nontender..  Extremities no edema.  Declined gynecology exam.      Diagnostics:     Admission on 02/19/2024, Discharged on 02/19/2024   Component Date Value Ref Range Status    Sodium 02/19/2024 141  135 - 145 mmol/L Final    Reference intervals for this test were updated on 09/26/2023 to more accurately reflect our healthy population. There may be differences in the flagging of prior results with similar values performed with this method. Interpretation of those prior results can be made in the context of the updated reference intervals.     Potassium 02/19/2024 3.6  3.4 - 5.3 mmol/L Final    Chloride 02/19/2024 109 (H)  98 - 107 mmol/L Final    Carbon Dioxide (CO2) 02/19/2024 22  22 - 29 mmol/L Final    Anion Gap 02/19/2024 10  7 - 15 mmol/L Final    Urea Nitrogen 02/19/2024 18.9  8.0 - 23.0 mg/dL Final    Creatinine 02/19/2024 0.87  0.51 - 0.95 mg/dL Final    GFR Estimate 02/19/2024 76  >60 mL/min/1.73m2 Final    Calcium 02/19/2024 9.6  8.6 - 10.0 mg/dL Final    Glucose 02/19/2024 218 (H)  70 - 99 mg/dL Final    Troponin T, High Sensitivity 02/19/2024 17 (H)  <=14 ng/L Final    Either a High Sensitivity Troponin T baseline (0 hours) value = 100 ng/L, or an increase in High Sensitivity Troponin T = 7 ng/L at 2 hours compared to 0 hours (2-0 hours), suggests myocardial injury, and urgent clinical attention is required.    If the 2-0 hours increase is <7 ng/L, a High Sensitivity Troponin T result above gender-specific reference ranges warrants further  evaluation.   Recommendations for further evaluation include correlation with clinical decision-making tool (e.g., HEART), a 3rd High Sensitivity Troponin T test 2 hours after the 2nd (a 20% change from baseline would represent concern), admission for observation, close PCC/cardiology follow-up, or urgent outpatient provocative testing.    Ventricular Rate 02/19/2024 80  BPM Final    Atrial Rate 02/19/2024 80  BPM Final    CT Interval 02/19/2024 164  ms Final    QRS Duration 02/19/2024 76  ms Final    QT 02/19/2024 396  ms Final    QTc 02/19/2024 456  ms Final    P Axis 02/19/2024 63  degrees Final    R AXIS 02/19/2024 9  degrees Final    T Axis 02/19/2024 94  degrees Final    Interpretation ECG 02/19/2024    Final                    Value:Sinus rhythm  Nonspecific ST and T wave abnormality  Abnormal ECG  When compared with ECG of 14-JAN-2024 10:17,  No significant change was found  Confirmed by SEE ED PROVIDER NOTE FOR, ECG INTERPRETATION ([id number]),  EVERETT MCCARTHY ([id number]) on 2/21/2024 4:34:11 AM      WBC Count 02/19/2024 7.4  4.0 - 11.0 10e3/uL Final    RBC Count 02/19/2024 4.13  3.80 - 5.20 10e6/uL Final    Hemoglobin 02/19/2024 11.2 (L)  11.7 - 15.7 g/dL Final    Hematocrit 02/19/2024 35.2  35.0 - 47.0 % Final    MCV 02/19/2024 85  78 - 100 fL Final    MCH 02/19/2024 27.1  26.5 - 33.0 pg Final    MCHC 02/19/2024 31.8  31.5 - 36.5 g/dL Final    RDW 02/19/2024 13.4  10.0 - 15.0 % Final    Platelet Count 02/19/2024 230  150 - 450 10e3/uL Final    % Neutrophils 02/19/2024 49  % Final    % Lymphocytes 02/19/2024 40  % Final    % Monocytes 02/19/2024 5  % Final    % Eosinophils 02/19/2024 5  % Final    % Basophils 02/19/2024 1  % Final    % Immature Granulocytes 02/19/2024 0  % Final    NRBCs per 100 WBC 02/19/2024 0  <1 /100 Final    Absolute Neutrophils 02/19/2024 3.7  1.6 - 8.3 10e3/uL Final    Absolute Lymphocytes 02/19/2024 3.0  0.8 - 5.3 10e3/uL Final    Absolute Monocytes 02/19/2024 0.3  0.0 - 1.3 10e3/uL  Final    Absolute Eosinophils 02/19/2024 0.4  0.0 - 0.7 10e3/uL Final    Absolute Basophils 02/19/2024 0.1  0.0 - 0.2 10e3/uL Final    Absolute Immature Granulocytes 02/19/2024 0.0  <=0.4 10e3/uL Final    Absolute NRBCs 02/19/2024 0.0  10e3/uL Final    Hold Specimen 02/19/2024 JIC   Final    Hold Specimen 02/19/2024 JIC   Final    Magnesium 02/19/2024 1.8  1.7 - 2.3 mg/dL Final    Troponin T, High Sensitivity 02/19/2024 15 (H)  <=14 ng/L Final    Either a High Sensitivity Troponin T baseline (0 hours) value = 100 ng/L, or an increase in High Sensitivity Troponin T = 7 ng/L at 2 hours compared to 0 hours (2-0 hours), suggests myocardial injury, and urgent clinical attention is required.    If the 2-0 hours increase is <7 ng/L, a High Sensitivity Troponin T result above gender-specific reference ranges warrants further evaluation.   Recommendations for further evaluation include correlation with clinical decision-making tool (e.g., HEART), a 3rd High Sensitivity Troponin T test 2 hours after the 2nd (a 20% change from baseline would represent concern), admission for observation, close PCC/cardiology follow-up, or urgent outpatient provocative testing.       No results found for any visits on 04/23/24.     Assessment:     1. Arthritis of foot    2. Type 2 diabetes mellitus with hyperglycemia, with long-term current use of insulin (H)    3. Dyspepsia    4. Chronic rhinitis    5. Itching    6. Candidiasis of vagina    7. Basilar artery stenosis    8. Hospital discharge follow-up    9. Diabetic peripheral neuropathy (H)        Plan:     See patient instructions for more information.  Close follow up in 2 weeks.   Continue current medications otherwise.  Follow up sooner if issues.    Patient Instructions   To schedule an a eye appointment with [facility], please call [phone].       ______________________________________________________________________     Increase the insulin glargine (Lantus) to 35 units  twice a day.  If blood sugars not below 200 in 1 week, then increase to 40 units twice a day.  Restart the dulaglutide (Trulicity)   Refilled the fluticasone nasal spray (Flonase), omeprazole (Prilosec), and the Benadryl (diphenhydramine)   Nabumetone (Relafen) 500 mg twice a day for foot arthritis.  For vaginal itching:  Fluconazole (Diflucan) 150 mg a single dose.  Triamcinolone ointment twice a day for itching until gone.  See a gynecologist or women's health nurse practitioner in the clinic if not improving.      40 minutes or greater was spent today on the patient's care on the day of service.      This includes time for chart preparation, reviewing medical tests done before or during the visit, talking with the patient, review of quality indicators, required documentation, and other elements of care.        Kike David MD  General Internal Medicine  [facility]    The longitudinal plan of care for the diagnoses and conditions as documented were addressed during this visit. Due to the added complexity in care, I will continue to support Sarah in the subsequent management and with ongoing continuity of care.     Return in about 2 weeks (around 5/7/2024) for follow up visit.     Future Appointments   Date Time Provider Department Center   5/10/2024  4:00 PM [facility]   5/15/2024 10:30 AM JUAN EMG TECH [facility]   5/15/2024 12:00 PM [facility]   5/24/2024 11:00 AM Viviana Rodriguez APRN CNP [facility]

## 2024-05-24 DIAGNOSIS — Z79.4 TYPE 2 DIABETES MELLITUS WITH HYPERGLYCEMIA, WITH LONG-TERM CURRENT USE OF INSULIN (H): ICD-10-CM

## 2024-05-24 DIAGNOSIS — E11.65 TYPE 2 DIABETES MELLITUS WITH HYPERGLYCEMIA, WITH LONG-TERM CURRENT USE OF INSULIN (H): ICD-10-CM

## 2024-05-24 RX ORDER — GLIPIZIDE 10 MG/1
10 TABLET, FILM COATED, EXTENDED RELEASE ORAL DAILY
Qty: 90 TABLET | Refills: 0 | OUTPATIENT
Start: 2024-05-24

## 2024-05-30 ENCOUNTER — APPOINTMENT (OUTPATIENT)
Dept: MRI IMAGING | Facility: HOSPITAL | Age: 59
End: 2024-05-30
Attending: NURSE PRACTITIONER
Payer: MEDICAID

## 2024-05-30 ENCOUNTER — APPOINTMENT (OUTPATIENT)
Dept: CT IMAGING | Facility: HOSPITAL | Age: 59
End: 2024-05-30
Attending: EMERGENCY MEDICINE
Payer: MEDICAID

## 2024-05-30 ENCOUNTER — HOSPITAL ENCOUNTER (EMERGENCY)
Facility: HOSPITAL | Age: 59
Discharge: HOME | End: 2024-05-30
Attending: EMERGENCY MEDICINE | Admitting: EMERGENCY MEDICINE
Payer: MEDICAID

## 2024-05-30 VITALS
TEMPERATURE: 99.3 F | HEART RATE: 80 BPM | OXYGEN SATURATION: 99 % | SYSTOLIC BLOOD PRESSURE: 180 MMHG | WEIGHT: 180 LBS | HEIGHT: 67 IN | DIASTOLIC BLOOD PRESSURE: 79 MMHG | RESPIRATION RATE: 20 BRPM | BODY MASS INDEX: 28.25 KG/M2

## 2024-05-30 DIAGNOSIS — M79.2 NEUROPATHIC PAIN: ICD-10-CM

## 2024-05-30 DIAGNOSIS — Z86.73 HISTORY OF STROKE: ICD-10-CM

## 2024-05-30 LAB
ANION GAP SERPL CALCULATED.3IONS-SCNC: 12 MMOL/L (ref 7–15)
BASOPHILS # BLD AUTO: 0 10E3/UL (ref 0–0.2)
BASOPHILS NFR BLD AUTO: 1 %
BUN SERPL-MCNC: 23.8 MG/DL (ref 8–23)
CALCIUM SERPL-MCNC: 9 MG/DL (ref 8.6–10)
CHLORIDE SERPL-SCNC: 106 MMOL/L (ref 98–107)
CREAT SERPL-MCNC: 0.94 MG/DL (ref 0.51–0.95)
DEPRECATED HCO3 PLAS-SCNC: 20 MMOL/L (ref 22–29)
EGFRCR SERPLBLD CKD-EPI 2021: 70 ML/MIN/1.73M2
EOSINOPHIL # BLD AUTO: 0.2 10E3/UL (ref 0–0.7)
EOSINOPHIL NFR BLD AUTO: 3 %
ERYTHROCYTE [DISTWIDTH] IN BLOOD BY AUTOMATED COUNT: 12.8 % (ref 10–15)
GLUCOSE BLDC GLUCOMTR-MCNC: 306 MG/DL (ref 70–99)
GLUCOSE SERPL-MCNC: 353 MG/DL (ref 70–99)
HCT VFR BLD AUTO: 33.1 % (ref 35–47)
HGB BLD-MCNC: 10.5 G/DL (ref 11.7–15.7)
IMM GRANULOCYTES # BLD: 0 10E3/UL
IMM GRANULOCYTES NFR BLD: 0 %
LYMPHOCYTES # BLD AUTO: 2.1 10E3/UL (ref 0.8–5.3)
LYMPHOCYTES NFR BLD AUTO: 35 %
MCH RBC QN AUTO: 27.3 PG (ref 26.5–33)
MCHC RBC AUTO-ENTMCNC: 31.7 G/DL (ref 31.5–36.5)
MCV RBC AUTO: 86 FL (ref 78–100)
MONOCYTES # BLD AUTO: 0.4 10E3/UL (ref 0–1.3)
MONOCYTES NFR BLD AUTO: 6 %
NEUTROPHILS # BLD AUTO: 3.4 10E3/UL (ref 1.6–8.3)
NEUTROPHILS NFR BLD AUTO: 55 %
NRBC # BLD AUTO: 0 10E3/UL
NRBC BLD AUTO-RTO: 0 /100
PLATELET # BLD AUTO: 196 10E3/UL (ref 150–450)
POTASSIUM SERPL-SCNC: 4 MMOL/L (ref 3.4–5.3)
RBC # BLD AUTO: 3.85 10E6/UL (ref 3.8–5.2)
SODIUM SERPL-SCNC: 138 MMOL/L (ref 135–145)
TROPONIN T SERPL HS-MCNC: 15 NG/L
TROPONIN T SERPL HS-MCNC: 16 NG/L
WBC # BLD AUTO: 6.1 10E3/UL (ref 4–11)

## 2024-05-30 PROCEDURE — G0508 CRIT CARE TELEHEA CONSULT 60: HCPCS | Performed by: NURSE PRACTITIONER

## 2024-05-30 PROCEDURE — 85025 COMPLETE CBC W/AUTO DIFF WBC: CPT | Performed by: EMERGENCY MEDICINE

## 2024-05-30 PROCEDURE — G0508 CRIT CARE TELEHEA CONSULT 60: HCPCS | Performed by: PSYCHIATRY & NEUROLOGY

## 2024-05-30 PROCEDURE — 250N000011 HC RX IP 250 OP 636: Performed by: EMERGENCY MEDICINE

## 2024-05-30 PROCEDURE — 84484 ASSAY OF TROPONIN QUANT: CPT | Performed by: EMERGENCY MEDICINE

## 2024-05-30 PROCEDURE — 93005 ELECTROCARDIOGRAM TRACING: CPT | Performed by: EMERGENCY MEDICINE

## 2024-05-30 PROCEDURE — 82962 GLUCOSE BLOOD TEST: CPT

## 2024-05-30 PROCEDURE — 70496 CT ANGIOGRAPHY HEAD: CPT

## 2024-05-30 PROCEDURE — 96376 TX/PRO/DX INJ SAME DRUG ADON: CPT

## 2024-05-30 PROCEDURE — 99291 CRITICAL CARE FIRST HOUR: CPT | Mod: 25

## 2024-05-30 PROCEDURE — 36415 COLL VENOUS BLD VENIPUNCTURE: CPT | Performed by: EMERGENCY MEDICINE

## 2024-05-30 PROCEDURE — 80048 BASIC METABOLIC PNL TOTAL CA: CPT | Performed by: EMERGENCY MEDICINE

## 2024-05-30 PROCEDURE — 96374 THER/PROPH/DIAG INJ IV PUSH: CPT | Mod: 59

## 2024-05-30 PROCEDURE — 84484 ASSAY OF TROPONIN QUANT: CPT | Mod: 91 | Performed by: EMERGENCY MEDICINE

## 2024-05-30 PROCEDURE — 70551 MRI BRAIN STEM W/O DYE: CPT

## 2024-05-30 RX ORDER — GABAPENTIN 300 MG/1
300 CAPSULE ORAL 3 TIMES DAILY
Qty: 30 CAPSULE | Refills: 0 | Status: SHIPPED | OUTPATIENT
Start: 2024-05-30 | End: 2024-08-26

## 2024-05-30 RX ORDER — LORAZEPAM 2 MG/ML
0.5 INJECTION INTRAMUSCULAR ONCE
Status: COMPLETED | OUTPATIENT
Start: 2024-05-30 | End: 2024-05-30

## 2024-05-30 RX ORDER — LORAZEPAM 2 MG/ML
1 INJECTION INTRAMUSCULAR ONCE
Status: COMPLETED | OUTPATIENT
Start: 2024-05-30 | End: 2024-05-30

## 2024-05-30 RX ORDER — IOPAMIDOL 755 MG/ML
67 INJECTION, SOLUTION INTRAVASCULAR ONCE
Status: COMPLETED | OUTPATIENT
Start: 2024-05-30 | End: 2024-05-30

## 2024-05-30 RX ADMIN — LORAZEPAM 1 MG: 2 INJECTION INTRAMUSCULAR; INTRAVENOUS at 15:20

## 2024-05-30 RX ADMIN — IOPAMIDOL 67 ML: 755 INJECTION, SOLUTION INTRAVENOUS at 13:41

## 2024-05-30 RX ADMIN — LORAZEPAM 0.5 MG: 2 INJECTION INTRAMUSCULAR; INTRAVENOUS at 14:36

## 2024-05-30 ASSESSMENT — ACTIVITIES OF DAILY LIVING (ADL)
ADLS_ACUITY_SCORE: 38

## 2024-05-30 NOTE — ED PROVIDER NOTES
EMERGENCY DEPARTMENT ENCOUNTER      NAME: Sarah Rahman  AGE: 58 year old female  YOB: 1965  MRN: [id number]  EVALUATION DATE & TIME: 5/30/2024  1:14 PM    PCP: Kike David    ED PROVIDER: Daysi Stack DO      Chief Complaint   Patient presents with    Stroke Symptoms         FINAL IMPRESSION:  1. Stroke-like symptoms          ED COURSE & MEDICAL DECISION MAKING:    Pertinent Labs & Imaging studies reviewed. (See chart for details)  1:08 PM Called to triage for neurological evaluation. I met the patient and performed my initial interview and exam.  1:10 PM Called tier 1 stroke code.  1:27 PM Talked to Stroke Neurology.  1:44 PM Assessed in Radiology. Patient drove herself here. She states that her arm weakness is worse than normal and her right leg is swollen.  1:52 PM Discussed patient with Stroke Radiology. Nothing acute on imaging.  1:54 PM Patient is talking to Stroke Neurology.  2:10 PM Discussed with stroke neurology.  Unclear time of onset.  Will plan on hyperacute MRI.  Stroke neurology has already called down. They are holding the table.      58 year old female presents to the Emergency Department for evaluation of symptoms.  Patient with a history of prior stroke with right-sided deficits.  Reports 3 hours prior to arrival noted slurred speech, worsening symptoms, she fell, worsening right-sided symptoms.  On chart review looks like patient has right-sided deficits at baseline.  Last seen a month ago after acute worsening of her symptoms that spontaneously resolved.  Nursing was called out to the parking lot to get a wheelchair for the patient.  Is unclear how patient got here after I talked to her she reports she drove herself.  Given acute onset of symptoms, tier 1 stroke code initiated.  CT of the head and CTA of the head and neck without any acute findings.      The patient has stroke symptoms:         ED Stroke specific documentation           NIHSS PDF     Patient last known  well time: 1030  ED Provider first to bedside at: 1308  CT Results received at: 1352    Thrombolytics:   Not given due to:   - unclear time of onset, pending hyperacute MRI    If treating with thrombolytics: Ensure SBP<180 and DBP<105 prior to treatment with thrombolytics.  Administering thrombolytics after treatment with IV labetalol, hydralazine, or nicardipine is reasonable once BP control is established.    Endovascular Retrieval:  Not initiated due to absence of proximal vessel occlusion    National Institutes of Health Stroke Scale (Baseline)  National Institutes of Health Stroke Scale  Exam Interval: Baseline   Score    Level of consciousness: (0)   Alert, keenly responsive    LOC questions: (0)   Answers both questions correctly    LOC commands: (0)   Performs both tasks correctly    Best gaze: (0)   Normal    Visual: (0)   No visual loss    Facial palsy: (1)   Minor paralysis (flat nasolabial fold, smile asymmetry)    Motor arm (left): (0)   No drift    Motor arm (right): (2)   Some effort against gravity    Motor leg (left): (0)   No drift    Motor leg (right): (3)   No effort against gravity    Limb ataxia: (0)   Absent    Sensory: (0)   Normal- no sensory loss    Best language: (0)   Normal- no aphasia    Dysarthria: (1)   Mild to moderate dysarthria    Extinction and inattention: (0)   No abnormality        Total Score:  7         Stroke Mimics were considered (including migraine headache, seizure disorder, hypoglycemia (or hyperglycemia), head or spinal trauma, CNS infection, Toxin ingestion and shock state (e.g. sepsis) .    At the conclusion of the encounter I discussed the results of all of the tests and the disposition. The questions were answered. The patient or family acknowledged understanding and was agreeable with the care plan.     Medical Decision Making  Obtained supplemental history:Supplemental history obtained?: No  Reviewed external records: External records reviewed?: Inpatient Record:  [facility] ED 04/11/2024  Care impacted by chronic illness:Chronic Lung Disease, Chronic Pain, Dementia, Heart Disease, Hypertension, Mental Health, Smoking / Nicotine Use, and Other: Peripheral neuropathy  Care significantly affected by social determinants of health:N/A  Did you consider but not order tests?: Work up considered but not performed and documented in chart, if applicable  Did you interpret images independently?: Independent interpretation of ECG and images noted in documentation, when applicable.  Consultation discussion with other provider:Did you involve another provider (consultant, , pharmacy, etc.)?: I discussed the care with another health care provider, see documentation for details.  Admission considered. Patient was signed out to the oncoming physician, disposition pending.      Critical Care     Performed by: Dr Radha Stack  Authorized by: Dr Radha Stack  Total critical care time: 31 minutes  Critical care was necessary to treat or prevent imminent or life-threatening deterioration of the following conditions: stroke like symptoms  Critical care was time spent personally by me on the following activities: development of treatment plan with patient or surrogate, discussions with consultants, examination of patient, evaluation of patient's response to treatment, obtaining history from patient or surrogate, ordering and performing treatments and interventions, ordering and review of laboratory studies, ordering and review of radiographic studies, re-evaluation of patient's condition and monitoring for potential decompensation.  Critical care time was exclusive of separately billable procedures and treating other patients.     MEDICATIONS GIVEN IN THE EMERGENCY:  Medications   iopamidol (ISOVUE-370) solution 67 mL (67 mLs Intravenous $Given 5/30/24 1342)     And   sodium chloride 0.9 % bag for CT scan flush use (has no administration in time range)       NEW PRESCRIPTIONS STARTED AT  TODAY'S ER VISIT  New Prescriptions    No medications on file          =================================================================    HPI    Patient information was obtained from: Patient     Use of : N/A         Sarah Rahman is a 58 year old female with a pertinent history of coronary artery disease s/p CABG, type 2 diabetes mellitus, CVA due to stenosis of carotid artery, right leg weakness, hypertension, COPD, hyperlipidemia, peripheral neuropathy, and chronic pain syndrome who presents to this ED by wheelchair for evaluation of stroke symptoms.    The patient was found in the parking lot of the ED by a nurse, and brought in by wheelchair. She reports onset of slurred speech and right sided arm and leg weakness 3 hours ago. The patient reports a fall today during which her right leg gave out.    Per chart review, the patient was seen at [facility] ED on 04/11/2024. Patient has a history of left MCA ischemic stroke in April 2022 with residual right-sided weakness. Last MRI 01/14/2024, no acute disease process. The patient presented to the ED due to worsening right-sided weakness. CT and CTA showed basilar artery stenosis worsening. She was not able to get MRI studies due to glued eyelashes, recommended MRI study once removed outpatient. Continued Plavix.     REVIEW OF SYSTEMS   Per HPI    PAST MEDICAL HISTORY:  Past Medical History:   Diagnosis Date    Asthma     CAD (coronary artery disease)     COPD (chronic obstructive pulmonary disease) (H)     CVA (cerebral infarction)     Dyshidrosis (pompholyx) 10/21/2016    GERD (gastroesophageal reflux disease)     History of CVA (cerebrovascular accident) 04/01/2012    Formatting of this note might be different from the original.  Overview:   Bilateral subacute cerebellar infarcts seen on MRI at [facility] 4/2012.  Pt was noted to have no residual deficits at [facility].  Formatting of this note might be different from the  original.  Bilateral subacute cerebellar infarcts seen on MRI at [facility] 4/2012.  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 Hamm at [facility]    Schizoaffective disorder (H)     Sleep difficulties 07/17/2022       PAST SURGICAL HISTORY:  Past Surgical History:   Procedure Laterality Date    BYPASS GRAFT ARTERY CORONARY  01/01/2009    x2    CAROTID ENDARTERECTOMY Left 12/2021    CORONARY STENT PLACEMENT      CV ANGIOGRAM CORONARY GRAFT N/A 1/16/2024    Procedure: Coronary Angiogram Graft;  Surgeon: Spencer Diez MD;  Location: [facility] CATH LAB CV    CV CORONARY ANGIOGRAM N/A 06/02/2021    Procedure: Coronary Angiogram;  Surgeon: Juventino Rivera MD;  Location: [facility] Cardiac Cath Lab;  Service: Cardiology    HYSTERECTOMY TOTAL ABDOMINAL      HYSTERECTOMY TOTAL ABDOMINAL, BILATERAL SALPINGO-OOPHORECTOMY, COMBINED      IR LUMBAR PUNCTURE  7/23/2019    IR TRANSLAMINAR EPIDURAL LUMBAR INJ INCL IMAGING  09/27/2022    LUMBAR DISCECTOMY Right 06/13/2019    L5-S1 - Dr. Hamm    NASAL FRACTURE SURGERY      ORIF ULNAR / RADIAL SHAFT FRACTURE Right            CURRENT MEDICATIONS:    acetaminophen (TYLENOL) 500 MG tablet  albuterol (PROAIR HFA) 108 (90 Base) MCG/ACT inhaler  amLODIPine (NORVASC) 2.5 MG tablet  aspirin 81 MG EC tablet  atorvastatin (LIPITOR) 40 MG tablet  clopidogrel (PLAVIX) 75 MG tablet  Continuous Blood Gluc  (DEXCOM G6 ) ROSE  Continuous Blood Gluc Transmit (DEXCOM G6 TRANSMITTER) MISC  Continuous Glucose Sensor (DEXCOM G6 SENSOR) MISC  Dexpanthenol 2 % CREA  diazepam (VALIUM) 5 MG tablet  diphenhydrAMINE (BENADRYL) 25 MG tablet  dulaglutide (TRULICITY) 0.75 MG/0.5ML pen  DULoxetine (CYMBALTA) 60 MG  "capsule  fexofenadine (ALLEGRA) 180 MG tablet  fluticasone (FLONASE) 50 MCG/ACT nasal spray  fluticasone (FLONASE) 50 MCG/ACT nasal spray  fluticasone-salmeterol (ADVAIR) 250-50 MCG/ACT inhaler  glipiZIDE (GLUCOTROL XL) 10 MG 24 hr tablet  guaiFENesin (MUCINEX) 600 MG 12 hr tablet  insulin glargine (LANTUS PEN) 100 UNIT/ML pen  ipratropium - albuterol 0.5 mg/2.5 mg/3 mL (DUONEB) 0.5-2.5 (3) MG/3ML neb solution  lidocaine (LIDODERM) 5 % patch  lidocaine (LMX4) 4 % external cream  LORazepam (ATIVAN) 0.5 MG tablet  nabumetone (RELAFEN) 500 MG tablet  naloxone (NARCAN) 4 MG/0.1ML nasal spray  nitroGLYcerin (NITROSTAT) 0.4 MG sublingual tablet  omeprazole (PRILOSEC) 40 MG DR capsule  ondansetron (ZOFRAN) 8 MG tablet  oxyCODONE-acetaminophen (PERCOCET)  MG per tablet  triamcinolone (KENALOG) 0.1 % external ointment         ALLERGIES:  Allergies   Allergen Reactions    Haloperidol Unknown     Patient states it stops her heart      Haloperidol Angioedema    Hydroxyzine Angioedema    Meperidine And Related Angioedema, Difficulty breathing, Other (See Comments), Rash and Shortness Of Breath     Throat closes  Other reaction(s): Breathing Difficulty  Other reaction(s): Breathing Difficulty      Phenothiazines Other (See Comments)     Other reaction(s): CARDIAC DISTURBANCES  Patient states it stops her heart  \"I swell up\"  \"stopped by heart\"  \"I swell up\"  \"I swell up\"      Phenothiazines Angioedema    Tramadol Other (See Comments)     Other reaction(s): Angioedema  Throat itch      Tramadol Angioedema    Januvia [Sitagliptin] Swelling    Latex Itching    Haloperidol And Related Angioedema    Januvia [Sitagliptin] Other (See Comments)     Swelling in the neck     Latex Itching    Lisinopril Other (See Comments)     ACE cough  Itchy throat  Throat itches  Itchy throat      Penicillins Swelling    Hydroxyzine Other (See Comments) and Rash     Tongue swelling  Tongue swelling  Tongue swelling      Lisinopril Cough "       FAMILY HISTORY:  Family History   Problem Relation Age of Onset    Heart Disease Mother     Heart Disease Father     Heart Disease Sister     Heart Disease Sister     Diabetes Brother     Coronary Artery Disease Brother         MI       SOCIAL HISTORY:   Social History     Socioeconomic History    Marital status: Single   Tobacco Use    Smoking status: Every Day     Types: Cigarettes    Smokeless tobacco: Never    Tobacco comments:     Seen IP by [facility] on 7/28/23 and declined counseling services and resource packet Smokes 1 cigarettes per day   Vaping Use    Vaping status: Never Used   Substance and Sexual Activity    Alcohol use: Not Currently     Comment: Alcoholic Drinks/day: occ    Drug use: No    Sexual activity: Not Currently   Social History Narrative    Lives alone in a condo.          On disability.  Three living children.          Has one small dog as her .        Has personal care attendant (PCA) services during the daytime and sometimes in the evening.     Social Determinants of Health     Financial Resource Strain: Low Risk  (1/30/2024)    Financial Resource Strain     Within the past 12 months, have you or your family members you live with been unable to get utilities (heat, electricity) when it was really needed?: No   Food Insecurity: Low Risk  (1/30/2024)    Food Insecurity     Within the past 12 months, did you worry that your food would run out before you got money to buy more?: No     Within the past 12 months, did the food you bought just not last and you didn t have money to get more?: No   Transportation Needs: Low Risk  (1/30/2024)    Transportation Needs     Within the past 12 months, has lack of transportation kept you from medical appointments, getting your medicines, non-medical meetings or appointments, work, or from getting things that you need?: No   Physical Activity: Not on File (12/4/2021)    Received from [facility]     Physical Activity     Physical Activity: 0    Stress: Not on File (12/4/2021)    Received from [facility]     Stress     Stress: 0    [facility] from [facility], [facility]    Social Connections   Interpersonal Safety: Low Risk  (10/12/2023)    Interpersonal Safety     Do you feel physically and emotionally safe where you currently live?: Yes     Within the past 12 months, have you been hit, slapped, kicked or otherwise physically hurt by someone?: No     Within the past 12 months, have you been humiliated or emotionally abused in other ways by your partner or ex-partner?: No   Housing Stability: Low Risk  (1/30/2024)    Housing Stability     Do you have housing? : Yes     Are you worried about losing your housing?: No       VITALS:  BP (!) 197/93   Temp 99.3  F (37.4  C) (Oral)   Resp 18   LMP  (LMP Unknown)     PHYSICAL EXAM    Physical Exam  Constitutional:       General: She is not in acute distress.  HENT:      Head: Normocephalic and atraumatic.      Mouth/Throat:      Pharynx: Oropharynx is clear.   Eyes:      Pupils: Pupils are equal, round, and reactive to light.   Cardiovascular:      Rate and Rhythm: Normal rate and regular rhythm.      Pulses: Normal pulses.      Heart sounds: Normal heart sounds.   Pulmonary:      Effort: Pulmonary effort is normal.      Breath sounds: Normal breath sounds.   Abdominal:      General: Abdomen is flat. Bowel sounds are normal.      Palpations: Abdomen is soft.      Tenderness: There is no abdominal tenderness.   Musculoskeletal:         General: Normal range of motion.   Skin:     General: Skin is warm and dry.      Capillary Refill: Capillary refill takes less than 2 seconds.   Neurological:      Mental Status: She is alert and oriented to person, place, and time.      Comments: See NIHSS            LAB:  All pertinent labs reviewed and interpreted.  Labs Ordered and Resulted from Time of ED Arrival to Time of ED Departure   GLUCOSE BY METER -  Abnormal       Result Value    GLUCOSE BY METER POCT 306 (*)    GLUCOSE MONITOR NURSING POCT   BASIC METABOLIC PANEL   INR   PARTIAL THROMBOPLASTIN TIME   TROPONIN T, HIGH SENSITIVITY   CBC WITH PLATELETS AND DIFFERENTIAL       RADIOLOGY:  Reviewed all pertinent imaging. Please see official radiology report.  CTA Head Neck with Contrast   Preliminary Result   IMPRESSION:    HEAD CT:   1.  No CT evidence for acute intracranial process.   2.  Brain atrophy and presumed chronic microvascular ischemic changes as above.      HEAD CTA:    1.  Moderate atherosclerotic narrowing of the intracranial distal left internal carotid artery.   2.  Mild atherosclerotic narrowing of the proximal basilar artery.   3.  Otherwise, normal head CTA.      NECK CTA:   1.  Plaque without stenosis of the right ICA origin.   2.  Moderate narrowing at the left vertebral origin.   3.  Left carotid endarterectomy changes.   4.  Otherwise, normal neck CTA.      STROKE ALERT FINDINGS: The preliminary results of this study were discussed with Dr. Daysi Stack on 5/30/2024 1:52 PM CDT.      MR Brain w/o Contrast    (Results Pending)     EKG obtained at 1410    Impression: Sinus rhythm, nonspecific ST abnormality.  No acute changes as compared to EKG obtained 2/19/2024    Rate: 75 bpm  Rhythm: Sinus  ME: 178 ms  QRS: 80 ms  QTc: 455 ms  Axis: Lady    EKG independently interpreted by me    I, Tyrell Fowler, am serving as a scribe to document services personally performed by Dr. Daysi Stack based on my observation and the provider's statements to me. Daysi ARIAS, DO attest that Tyrell Fowler is acting in a scribe capacity, has observed my performance of the services and has documented them in accordance with my direction.    Daysi Stack DO  Emergency Medicine  [facility] EMERGENCY DEPARTMENT  [address] [place] [id number]  [phone]  Dept: [phone]       Daysi Stack DO  05/30/24 1417       Sreekanth  Daysi RODAS DO  05/30/24 1433

## 2024-05-30 NOTE — ED PROVIDER NOTES
"ED SIGNOUT  Date/Time:5/30/2024 2:17 PM    Patient signed out to me by my colleague, Dr. Stack.  Please see their note for complete history and physical. Plan to follow up on hyperacute MRI.     The creation of this record is based on the scribe s observations of the work being performed by Mina Mota MD and the provider s statements to them. It was created on their behalf by Corazon Amaya a trained medical scribe. This document has been checked and approved by the attending provider.      REMAINING ED WORKUP:  MRI brain    Vitals:  BP (!) 180/79   Pulse 80   Temp 99.3  F (37.4  C) (Oral)   Resp 20   Ht 1.702 m (5' 7\")   Wt 81.6 kg (180 lb)   LMP  (LMP Unknown)   SpO2 99%   BMI 28.19 kg/m        Pertinent labs results reviewed   Results for orders placed or performed during the hospital encounter of 05/30/24   CTA Head Neck with Contrast    Impression    IMPRESSION:   HEAD CT:  1.  No CT evidence for acute intracranial process.  2.  Brain atrophy and presumed chronic microvascular ischemic changes as above.    HEAD CTA:   1.  Moderate atherosclerotic narrowing of the intracranial distal left internal carotid artery.  2.  Mild atherosclerotic narrowing of the proximal basilar artery.  3.  Otherwise, normal head CTA.    NECK CTA:  1.  Plaque without stenosis of the right ICA origin.  2.  Moderate narrowing at the left vertebral origin.  3.  Left carotid endarterectomy changes.  4.  Otherwise, normal neck CTA.    STROKE ALERT FINDINGS: The preliminary results of this study were discussed with Dr. Daysi Stack on 5/30/2024 1:52 PM CDT.   MR Brain w/o Contrast    Impression    IMPRESSION:  1.  Negative for acute intracranial process.    2.  Specifically, no evidence for acute or subacute ischemic change.    3.  Mild atrophy and chronic small vessel vascular change.   Basic metabolic panel   Result Value Ref Range    Sodium 138 135 - 145 mmol/L    Potassium 4.0 3.4 - 5.3 mmol/L    Chloride 106 98 - 107 mmol/L "    Carbon Dioxide (CO2) 20 (L) 22 - 29 mmol/L    Anion Gap 12 7 - 15 mmol/L    Urea Nitrogen 23.8 (H) 8.0 - 23.0 mg/dL    Creatinine 0.94 0.51 - 0.95 mg/dL    GFR Estimate 70 >60 mL/min/1.73m2    Calcium 9.0 8.6 - 10.0 mg/dL    Glucose 353 (H) 70 - 99 mg/dL   Result Value Ref Range    Troponin T, High Sensitivity 16 (H) <=14 ng/L   Glucose by meter   Result Value Ref Range    GLUCOSE BY METER POCT 306 (H) 70 - 99 mg/dL   CBC with platelets and differential   Result Value Ref Range    WBC Count 6.1 4.0 - 11.0 10e3/uL    RBC Count 3.85 3.80 - 5.20 10e6/uL    Hemoglobin 10.5 (L) 11.7 - 15.7 g/dL    Hematocrit 33.1 (L) 35.0 - 47.0 %    MCV 86 78 - 100 fL    MCH 27.3 26.5 - 33.0 pg    MCHC 31.7 31.5 - 36.5 g/dL    RDW 12.8 10.0 - 15.0 %    Platelet Count 196 150 - 450 10e3/uL    % Neutrophils 55 %    % Lymphocytes 35 %    % Monocytes 6 %    % Eosinophils 3 %    % Basophils 1 %    % Immature Granulocytes 0 %    NRBCs per 100 WBC 0 <1 /100    Absolute Neutrophils 3.4 1.6 - 8.3 10e3/uL    Absolute Lymphocytes 2.1 0.8 - 5.3 10e3/uL    Absolute Monocytes 0.4 0.0 - 1.3 10e3/uL    Absolute Eosinophils 0.2 0.0 - 0.7 10e3/uL    Absolute Basophils 0.0 0.0 - 0.2 10e3/uL    Absolute Immature Granulocytes 0.0 <=0.4 10e3/uL    Absolute NRBCs 0.0 10e3/uL   Result Value Ref Range    Troponin T, High Sensitivity 15 (H) <=14 ng/L       Pertinent imaging reviewed   Please see official radiology report.  MR Brain w/o Contrast   Final Result   IMPRESSION:   1.  Negative for acute intracranial process.      2.  Specifically, no evidence for acute or subacute ischemic change.      3.  Mild atrophy and chronic small vessel vascular change.      CTA Head Neck with Contrast   Final Result   IMPRESSION:    HEAD CT:   1.  No CT evidence for acute intracranial process.   2.  Brain atrophy and presumed chronic microvascular ischemic changes as above.      HEAD CTA:    1.  Moderate atherosclerotic narrowing of the intracranial distal left internal  carotid artery.   2.  Mild atherosclerotic narrowing of the proximal basilar artery.   3.  Otherwise, normal head CTA.      NECK CTA:   1.  Plaque without stenosis of the right ICA origin.   2.  Moderate narrowing at the left vertebral origin.   3.  Left carotid endarterectomy changes.   4.  Otherwise, normal neck CTA.      STROKE ALERT FINDINGS: The preliminary results of this study were discussed with Dr. Daysi Stack on 5/30/2024 1:52 PM CDT.           Interventions  Medications   iopamidol (ISOVUE-370) solution 67 mL (67 mLs Intravenous $Given 5/30/24 1341)     And   sodium chloride 0.9 % bag for CT scan flush use (has no administration in time range)   LORazepam (ATIVAN) injection 0.5 mg (0.5 mg Intravenous $Given 5/30/24 1436)   LORazepam (ATIVAN) injection 1 mg (1 mg Intravenous $Given 5/30/24 1520)      EKG:  NSR, rate 75, no acute ischemia    I have independently reviewed and interpreted the EKG(s) documented above     ED Course/MDM:  2:15 PM Signout accepted from Dr. Radha Stack.  Prior records were reviewed.  Diagnostics from this visit are reviewed. Previous stroke history with right side deficits.  Pt arrives with slurred speech, right side facial droop and right UE drift and right leg weakness with symptoms starting 3 hours ago. CTA neg.  Awaiting labs, EKG.  Pt getting a hyperacute MRI right now and will dispo after that.  No immediate TNK.  3:29 PM I spoke with stroke neuro, stroke code already de-escalated as hyperacute MRI negative.  Will update, discuss with pt/family.  No further stroke workup needed per stroke team.  3:35 PM Pt re-evaluated, updated.  Discussed reassuring MRI findings, no findings of stroke once again.  She's initially sleeping from ativan.  Main complaint of b/l burning pain in legs.    5:37 PM Awaiting delta trop still. Rest of labs overall reassuring. Glucose is 300s, but no DKA, renal function intact.  6:17 PM Repeat trop negative at 15.  Will update pt.  6:20 PM Pt  updated on all results, plan for gabapentin for likely neuropathic pain as she has burning pain to legs and is a diabetic. Pt agreeable to gabapentin, outpatient follow up, understands dose is a starting dose, likely needs titrated with primary clinic.  6:35 PM Pt left prior to receiving any paperwork, but understood plan. I still sent Rx to her preferred pharmacy.             1. History of stroke    2. Neuropathic pain          Mina Mota DO  [facility]       Mina Mota MD  05/30/24 [id number]

## 2024-05-30 NOTE — ED TRIAGE NOTES
Pt arrived via triage. Pt drove to hospital when her right leg gave out and she fell. Reports history of stoke. Noted to very very slurred speech, facial droop, lack of motor control to right arm and leg. Neuro exam per provider called in lobby of triage. Pt taken to ct as tier 1 stroke code as s/s started 1030 am.     Triage Assessment (Adult)       Row Name 05/30/24 1352 05/30/24 1330       Triage Assessment    Airway WDL WDL WDL       Respiratory WDL    Respiratory WDL WDL WDL       Skin Circulation/Temperature WDL    Skin Circulation/Temperature WDL WDL WDL       Cardiac WDL    Cardiac WDL WDL WDL       Peripheral/Neurovascular WDL    Peripheral Neurovascular WDL WDL WDL       Cognitive/Neuro/Behavioral WDL    Cognitive/Neuro/Behavioral WDL motor response;speech speech;motor response    Speech -- garbled       Motor Response    Motor Response -- RUE motor response;RLE motor response    General Motor Response -- hemiparesis    Left Motor Response -- facial droop    Right Motor Response -- pronator drift    RUE Motor Response -- pronator drift

## 2024-05-30 NOTE — CONSULTS
"[facility]     Stroke Code Note          History of Present Illness     Chief Complaint: Stroke Symptoms      Sarah Rahman is a 58 year old female who presents to the ED for evaluation of R weakness.     On tele examination, patient initially reported LKW 3 hours ago, then she states symptoms started around 12p and then ultimately admits she is unsure when symptoms started. She believes she felt normal when she awoke around 0730, but again cannot be fully certain. She does report that at some point today she fell due to new R leg weakness. She reports she \"dragged\" herself to hallway, down the elevator and into her car. She was then able to drive to the ED where she was found in the parking lot by ED staff.     On formal tele exam NIH=10; she incorrectly states the date, has mild R facial droop, RUE drift that hits bed, no antigravity movement in R leg, reduced sensation in the R face and leg (normal sensation in R arm), mild aphasia and mild dysarthria. On casual observation while patient is speaking on phone she is noted to use R arm/hand to manipulate phone without significant difficulty and speech clears to normal or near normal.    Of note, patient has had numerous prior evaluations for R sided deficits including 4/3/22, 5/24/22 7/17/22, 10/6/22, 2/22/23, 7/27/23 (given TNK) 1/15/24; each time subsequent MRI was negative for stroke or other acute abnormalities.    Given uncertainty regarding LKW in a patient with focal neurological deficits and multiple vascular risk factors, hyperacute MRI was pursued; this was negative for acute infarct.            Past Medical History     Stroke risk factors: prior infarcts, CAD, DM2, HTN    Preadmission antithrombotic regimen: ASA, ?plavix    Modified Larry Score (Pre-morbid)  2-Slight disability; unable to carry out all previous activities, but able to look after own affairs                   Assessment and Plan       1.  R sided " "weakness/numbness, dysarthria and aphasia. Etiology unclear. MRI negative for acute infarct. Recurrent episodes of R sided weakness with negative MRI images makes vascular etiology unlikely      Intravenous Thrombolysis  Not given due to:   - no infarct identified      Endovascular Treatment  Not initiated due to absence of proximal vessel occlusion     Plan:  -if patient remains symptomatic recommend admission for further evaluation      ___________________________________________________________________    NARCISA Pena CNP  Vascular Neurology    To page me or covering stroke neurology team member, click here: [facility]  Choose \"On Call\" tab at top, then select \"NEUROLOGY/ALL SITES\" from middle drop-down box, press Enter, then look for \"stroke\" or \"telestroke\" for your site.  ___________________________________________________________________        Imaging/Labs   (personally reviewed CT/CTA)    CT head: no hemorrhage or other acute findings  CTA head/neck: moderate L ICA stenosis, moderate to advanced proximal basilar stenosis, moderate L vert origin stenosis, s/p L CEA    Hyperacute MRI: no acute infarct         Physical Examination     BP: (!) 172/77   Pulse: 84   Resp: 22   Temp: 99.3  F (37.4  C)   Temp src: Oral   SpO2: 100 %   O2 Device: None (Room air)   Weight: 81.6 kg (180 lb)    Wt Readings from Last 2 Encounters:   05/30/24 81.6 kg (180 lb)   02/21/24 89.8 kg (198 lb)       General Exam  General:  patient lying in bed without any acute distress    HEENT:  normocephalic/atraumatic      Neuro Exam  Mental Status:  alert, oriented to person, place and situation but states date incorrectly (\"April 30\"), follows commands, mild loss of fluency  Cranial Nerves:  visual fields intact (tested by nurse), EOMI with normal smooth pursuit,reduced sensation to R face (tested by nurse), R facial asymmetry (per RN), hearing not formally tested but intact to conversation, mild to moderate dysarthria, tongue " protrusion midline  Motor:  no abnormal movements, RUE drift that hits bed, no drift in LUE, no antigravity movement in RLE, no drift in LLE  Reflexes:  unable to test (telestroke)  Sensory:  reduced sensation to light touch in RLE, normal sensation to light touch in LUE and BLE (assessed by nurse), no extinction on double simultaneous stimulation (assessed by nurse)  Coordination:  no ataxia identified  Station/Gait:  unable to test due to telestroke        Stroke Scales       NIHSS  1a. Level of Consciousness 0-->Alert, keenly responsive   1b. LOC Questions 1-->Answers one question correctly   1c. LOC Commands 0-->Performs both tasks correctly   2.   Best Gaze 0-->Normal   3.   Visual 0-->No visual loss   4.   Facial Palsy 1-->Minor paralysis (flattened nasolabial fold, asymmetry on smiling)   5a. Motor Arm, Left 0-->No drift, limb holds 90 (or 45) degrees for full 10 secs   5b. Motor Arm, Right 2-->Some effort against gravity, limb cannot get to or maintain (if cued) 90 (or 45) degrees, drifts down to bed, but has some effort against gravity   6a. Motor Leg, Left 0-->No drift, leg holds 30 degree position for full 5 secs   6b. Motor Leg, right 3-->No effort against gravity, leg falls to bed immediately   7.   Limb Ataxia 0-->Absent   8.   Sensory 1-->Mild-to-moderate sensory loss, patient feels pinprick is less sharp or is dull on the affected side, or there is a loss of superficial pain with pinprick, but patient is aware of being touched   9.   Best Language 1-->Mild-to-moderate aphasia, some obvious loss of fluency or facility of comprehension, without significant limitation on ideas expressed or form of expression. Reduction of speech and/or comprehension, however, makes conversation. . . (see row details)   10. Dysarthria 1-->Mild-to-moderate dysarthria, patient slurs at least some words and, at worst, can be understood with some difficulty   11. Extinction and Inattention  0-->No abnormality   Total 10  "(05/30/24 1359)            Labs     CBC  Lab Results   Component Value Date    HGB 10.5 (L) 05/30/2024    HCT 33.1 (L) 05/30/2024    WBC 6.1 05/30/2024     05/30/2024       BMP  Lab Results   Component Value Date     05/30/2024    POTASSIUM 4.0 05/30/2024    CHLORIDE 106 05/30/2024    CO2 20 (L) 05/30/2024    BUN 23.8 (H) 05/30/2024    CR 0.94 05/30/2024     (H) 05/30/2024    MAXIMO 9.0 05/30/2024       INR  INR   Date Value Ref Range Status   07/28/2023 1.05 0.85 - 1.15 Final   10/06/2022 1.04 0.85 - 1.15 Final   05/24/2022 0.95 0.85 - 1.15 Final       Data   Stroke Code Data  (for stroke code with tele)  Stroke code activated 05/30/24  1323   First stroke provider response 05/30/24  1326   Video start time 05/30/24  1352   Video end time 05/30/24  1434   Last known normal 05/30/24      Time of discovery (or onset of symptoms)  05/30/24  1313   Head CT read by Stroke Neuro Provider 05/30/24  1335   Was stroke code de-escalated? Yes  05/30/24  1519     Telestroke Service Details  Type of service telemedicine diagnostic assessment of acute neurological changes   Reason telemedicine is appropriate patient requires assessment with a specialist for diagnosis and treatment of neurological symptoms   Mode of transmission secure interactive audio and video communication per Jonathan   Originating site (patient location) [facility]    Distant site (provider location) [facility]        Clinically Significant Risk Factors Present on Admission                # Drug Induced Platelet Defect: home medication list includes an antiplatelet medication   # Hypertension: Noted on problem list     # DMII: A1C = N/A within past 6 months    # Overweight: Estimated body mass index is 28.19 kg/m  as calculated from the following:    Height as of this encounter: 1.702 m (5' 7\").    Weight as of this encounter: 81.6 kg (180 lb).       # Asthma: noted on problem " list  # History of CABG: noted on surgical history         Time Spent on this Encounter   Billing: I personally examined and evaluated the patient today. At the time of my evaluation and management the patient was critical condition today due to stroke code. I personally managed stroke code. Key decisions made today included need for advanced neuroimaging and/or infication for acute stroke treatments. I spent a total of 110 minutes providing critical care services, evaluating the patient, directing care and reviewing laboratory values and radiologic reports.    Solution: Beta-HD

## 2024-05-30 NOTE — DISCHARGE INSTRUCTIONS
I suspect burning pain in legs is likely neuropathy related. Start gabapentin and follow up with primary clinic as this medication typically needs to be adjusted for better management of symptoms.

## 2024-05-31 LAB
ATRIAL RATE - MUSE: 75 BPM
DIASTOLIC BLOOD PRESSURE - MUSE: NORMAL MMHG
INTERPRETATION ECG - MUSE: NORMAL
P AXIS - MUSE: 76 DEGREES
PR INTERVAL - MUSE: 178 MS
QRS DURATION - MUSE: 80 MS
QT - MUSE: 408 MS
QTC - MUSE: 455 MS
R AXIS - MUSE: 58 DEGREES
SYSTOLIC BLOOD PRESSURE - MUSE: NORMAL MMHG
T AXIS - MUSE: 94 DEGREES
VENTRICULAR RATE- MUSE: 75 BPM

## 2024-06-21 ENCOUNTER — TELEPHONE (OUTPATIENT)
Dept: INTERNAL MEDICINE | Facility: CLINIC | Age: 59
End: 2024-06-21
Payer: MEDICAID

## 2024-06-21 NOTE — TELEPHONE ENCOUNTER
Reason for Call:  Appointment Request    Patient requesting this type of appt:  Hospital/ED Follow-Up     Requested provider: Kike Davdi    Reason patient unable to be scheduled: Not within requested timeframe    When does patient want to be seen/preferred time: 3-7 days    Comments: Patient was discharged about a week and a half ago and needs a hospital follow up. She will discharge paper work with her.    Okay to leave a detailed message?: Yes at Other phone number:  [phone]    Call taken on 6/21/2024 at 2:33 PM by Angélica Garduno

## 2024-06-22 NOTE — TELEPHONE ENCOUNTER
I am going to be out of the office through July 4th.      She can see a colleague in the meantime if needed.  Otherwise, schedule her in a reserved slot July 5th or after.    Kike David MD  General Internal Medicine  [facility]  6/21/2024, 9:48 PM

## 2024-06-24 NOTE — TELEPHONE ENCOUNTER
Left message for patient to call back. If patient calls back, please relay message below.     Thank you   TRAVIS Rondon

## 2024-06-26 DIAGNOSIS — E11.65 TYPE 2 DIABETES MELLITUS WITH HYPERGLYCEMIA, WITH LONG-TERM CURRENT USE OF INSULIN (H): ICD-10-CM

## 2024-06-26 DIAGNOSIS — Z79.4 TYPE 2 DIABETES MELLITUS WITH HYPERGLYCEMIA, WITH LONG-TERM CURRENT USE OF INSULIN (H): ICD-10-CM

## 2024-06-26 RX ORDER — GLIPIZIDE 10 MG/1
10 TABLET, FILM COATED, EXTENDED RELEASE ORAL DAILY
Qty: 90 TABLET | Refills: 0 | Status: SHIPPED | OUTPATIENT
Start: 2024-06-26 | End: 2024-08-26

## 2024-07-08 ENCOUNTER — OFFICE VISIT (OUTPATIENT)
Dept: INTERNAL MEDICINE | Facility: CLINIC | Age: 59
End: 2024-07-08
Payer: MEDICAID

## 2024-07-08 VITALS
HEIGHT: 67 IN | DIASTOLIC BLOOD PRESSURE: 84 MMHG | WEIGHT: 186 LBS | HEART RATE: 81 BPM | OXYGEN SATURATION: 98 % | TEMPERATURE: 98.2 F | BODY MASS INDEX: 29.19 KG/M2 | SYSTOLIC BLOOD PRESSURE: 154 MMHG | RESPIRATION RATE: 18 BRPM

## 2024-07-08 DIAGNOSIS — Z79.4 TYPE 2 DIABETES MELLITUS WITH HYPERGLYCEMIA, WITH LONG-TERM CURRENT USE OF INSULIN (H): Chronic | ICD-10-CM

## 2024-07-08 DIAGNOSIS — R55 SYNCOPE, UNSPECIFIED SYNCOPE TYPE: ICD-10-CM

## 2024-07-08 DIAGNOSIS — M19.079 ARTHRITIS OF FOOT: ICD-10-CM

## 2024-07-08 DIAGNOSIS — E11.65 TYPE 2 DIABETES MELLITUS WITH HYPERGLYCEMIA, WITH LONG-TERM CURRENT USE OF INSULIN (H): Chronic | ICD-10-CM

## 2024-07-08 DIAGNOSIS — M79.604 CHRONIC PAIN OF RIGHT LOWER EXTREMITY: Primary | ICD-10-CM

## 2024-07-08 DIAGNOSIS — I10 PRIMARY HYPERTENSION: Chronic | ICD-10-CM

## 2024-07-08 DIAGNOSIS — G89.29 CHRONIC PAIN OF RIGHT LOWER EXTREMITY: Primary | ICD-10-CM

## 2024-07-08 DIAGNOSIS — R35.0 URINARY FREQUENCY: ICD-10-CM

## 2024-07-08 DIAGNOSIS — R60.0 BILATERAL LOWER EXTREMITY EDEMA: ICD-10-CM

## 2024-07-08 DIAGNOSIS — J31.0 CHRONIC RHINITIS: ICD-10-CM

## 2024-07-08 PROCEDURE — G2211 COMPLEX E/M VISIT ADD ON: HCPCS | Performed by: INTERNAL MEDICINE

## 2024-07-08 PROCEDURE — 99215 OFFICE O/P EST HI 40 MIN: CPT | Performed by: INTERNAL MEDICINE

## 2024-07-08 RX ORDER — TRIAMTERENE AND HYDROCHLOROTHIAZIDE 37.5; 25 MG/1; MG/1
1 CAPSULE ORAL EVERY MORNING
Qty: 90 CAPSULE | Refills: 3 | Status: SHIPPED | OUTPATIENT
Start: 2024-07-08 | End: 2024-08-26

## 2024-07-08 RX ORDER — FLUTICASONE PROPIONATE 50 MCG
1 SPRAY, SUSPENSION (ML) NASAL 2 TIMES DAILY
Qty: 16 G | Refills: 11 | Status: SHIPPED | OUTPATIENT
Start: 2024-07-08

## 2024-07-08 RX ORDER — KETOROLAC TROMETHAMINE 10 MG/1
10 TABLET, FILM COATED ORAL EVERY 6 HOURS PRN
Qty: 20 TABLET | Refills: 0 | Status: SHIPPED | OUTPATIENT
Start: 2024-07-08 | End: 2024-08-05

## 2024-07-08 RX ORDER — MONTELUKAST SODIUM 10 MG/1
10 TABLET ORAL DAILY
Qty: 90 TABLET | Refills: 3 | Status: SHIPPED | OUTPATIENT
Start: 2024-07-08 | End: 2024-08-26

## 2024-07-08 NOTE — PROGRESS NOTES
[facility] - [facility] Specialists    Comprehensive and complex medical care - Chronic disease management - Shared decision making - Care coordination - Compassionate care    Patient advocacy - Rational deprescribing - Minimally disruptive medicine - Ethical focus - Customized care         Date of Service: 7/8/2024  Primary Provider: Kike David    Patient Care Team:  Kike David MD as PCP - General (Internal Medicine)  Kike David MD as Assigned PCP  Kike David MD (Internal Medicine)  Fortunato Kendrick MD as Assigned Neuroscience Provider          Patient's Pharmacy:    [place] [id number] [facility]  [address]  [address]  [facility] [address]  Phone: [phone] Fax: [phone]     Patient's Contacts:  Name Home Phone Work Phone Mobile Phone Relationship Lgl Jayce SANTANA   [phone] Spouse    SALONI MORALES [phone]   Friend      Patient's Insurance:    Pay[place] / Plan[place] / Product Type: Medicaid /            Active Problem List:  Problem List as of 7/8/2024 Reviewed: 3/1/2024  2:07 PM by Kike David MD         High    CAD -- S/P CABG    Type 2 diabetes mellitus with hyperglycemia, with long-term current use of insulin (H)    Last Assessment & Plan 6/7/2022 Office Visit Edited 6/10/2022  8:06 AM by Mara Scott NP     Type 2 diabetes is not well controlled but she is using the Lantus injection regularly.  She ran out of the freestyle hosea sensors so she does not have any recent glucose readings to base medication adjustments on at this time.  She was prescribed Trulicity in the past but was nervous about taking this medication so she never started it.  We reviewed possible side effects associated with the medication and what she may expect in the first 2 weeks of taking this medication.  She would be comfortable trying this medication again, Trulicity 0.75 mg weekly sent  to her pharmacy.  She is advised that this is a small dose and will need to be gradually increased in order for her to obtain maximal benefit from the medication.  She will be scheduled for a follow-up appointment and establish care visit with another provider in the next 4 weeks.  Of note, she would likely benefit from increasing her dose of metformin back to maximal dose but was concerned that this caused her to have increased urinary frequency.  We did not address this today.         Cerebrovascular accident (CVA) due to stenosis of carotid artery (H)    Smoker       Medium    Carotid stenosis, left    Primary hypertension    Moderate persistent asthma    Chronic pain syndrome    Right renal artery stenosis (H24)    Basilar artery stenosis    Schizoaffective disorder (H)    Chronic obstructive pulmonary disease (H)    Anemia    Depression with anxiety    Myelomalacia of cervical cord (H)    Atypical chest pain -- Normal NM stress 5/8/23    Syncope    Acute CVA (cerebrovascular accident) (H)    Intracranial atherosclerosis    Syncope and collapse    Right leg weakness    Chest pain, unspecified type    Unstable angina (H)    Difficulty walking       Low    Bilateral low back pain with right-sided sciatica, unspecified chronicity    Last Assessment & Plan 6/7/2022 Office Visit Written 6/10/2022  8:01 AM by Mara Scott, ANDREEA     Because of her uncontrolled diabetes, she is not a good candidate to use steroid medication to treat lumbar radiculopathy.  She is familiar with the effects of a steroid medication on her blood sugar and understands this conundrum.  Fortunately, she does experience some relief when she is given Toradol in the emergency department.  We discussed trial of a nonsteroidal anti-inflammatory medication, diclofenac 3 times daily.  She is open to this idea so this is sent to her pharmacy.  She is advised that she can continue with acetaminophen as well.         Nasal polyp    Last Assessment & Plan  6/7/2022 Office Visit Written 6/10/2022  8:03 AM by Mara Scott NP     Advised fluticasone nasal spray 1 spray each nostril daily.  She declines a referral to ENT, she has had a nasal polyp in the past which responded to fluticasone.         Mixed hyperlipidemia    GERD (gastroesophageal reflux disease)    History of drug abuse (H)    Hx of syphilis    Menopausal flushing    Nephrolithiasis    Obesity    Seasonal allergies    Sensorineural hearing loss (SNHL) of right ear    Frailty    Diabetic peripheral neuropathy (H)        Current Outpatient Medications   Medication Instructions    acetaminophen (TYLENOL) 500-1,000 mg, Oral, EVERY 8 HOURS PRN, Max 5.5 tabs per day    albuterol (PROAIR HFA) 108 (90 Base) MCG/ACT inhaler 1-2 puffs, Inhalation, EVERY 4 HOURS PRN    amLODIPine (NORVASC) 2.5 mg, Oral, DAILY    aspirin 81 mg, Oral, DAILY    atorvastatin (LIPITOR) 40 mg, Oral, EVERY EVENING    clopidogrel (PLAVIX) 75 mg, Oral, DAILY    Continuous Blood Gluc  (DEXCOM G6 ) ROSE Use to read blood sugars as per 's instructions.    Continuous Blood Gluc Transmit (DEXCOM G6 TRANSMITTER) MISC Change every 3 months.    Continuous Glucose Sensor (DEXCOM G6 SENSOR) MISC Change every 10 days.    Dexpanthenol 2 % CREA Apply externally, DAILY    diazepam (VALIUM) 5 mg, Oral, ONCE PRN, Take 30 minutes before flight.    diphenhydrAMINE (BENADRYL) 25 mg, Oral, EVERY 6 HOURS PRN    dulaglutide (TRULICITY) 0.75 mg, Subcutaneous, EVERY 7 DAYS    DULoxetine (CYMBALTA) 60 mg, Oral, 2 TIMES DAILY    fexofenadine (ALLEGRA) 180 mg, Oral, DAILY PRN    fluticasone (FLONASE) 50 MCG/ACT nasal spray 1 spray, Both Nostrils, 2 TIMES DAILY    fluticasone-salmeterol (ADVAIR) 250-50 MCG/ACT inhaler 1 puff, Inhalation, EVERY 12 HOURS    gabapentin (NEURONTIN) 300 mg, Oral, 3 TIMES DAILY    glipiZIDE (GLUCOTROL XL) 10 mg, Oral, DAILY    guaiFENesin (MUCINEX) 600 mg, Oral, 2 TIMES DAILY PRN    insulin glargine (LANTUS PEN)  35-40 Units, Subcutaneous, 2 TIMES DAILY    ipratropium - albuterol 0.5 mg/2.5 mg/3 mL (DUONEB) 0.5-2.5 (3) MG/3ML neb solution 3 mLs, Nebulization, EVERY 6 HOURS PRN    ketorolac (TORADOL) 10 mg, Oral, EVERY 6 HOURS PRN    lidocaine (LIDODERM) 5 % patch Transdermal, EVERY 24 HOURS, To prevent lidocaine toxicity, patient should be patch free for 12 hrs daily.    lidocaine (LMX4) 4 % external cream Topical, DAILY, Apply to foot    LORazepam (ATIVAN) 0.5 mg, Oral, EVERY 6 HOURS PRN    montelukast (SINGULAIR) 10 mg, Oral, DAILY    nabumetone (RELAFEN) 500 mg, Oral, 2 TIMES DAILY    naloxone (NARCAN) 4 mg, Alternating Nostrils, PRN, every 2-3 minutes until assistance arrives    nitroGLYcerin (NITROSTAT) 0.4 MG sublingual tablet FOR CHEST PAIN PLACE 1 TABLET UNDER THE TONGUE EVERY 5 MINUTES FOR 3 DOSES IF NEEDED. IF SYMPTOMS PERSIST 5 MINUTES AFTER FIRST DOSE CALL 911.    omeprazole (PRILOSEC) 40 mg, Oral, DAILY, To protect your stomach.    ondansetron (ZOFRAN) 8 mg, Oral, EVERY 8 HOURS PRN    oxyCODONE-acetaminophen (PERCOCET)  MG per tablet 1 tablet, Oral, EVERY 6 HOURS PRN    triamcinolone (KENALOG) 0.1 % external ointment Topical, 2 TIMES DAILY, Apply twice a day to vaginal rash until resolved    triamterene-HCTZ (DYAZIDE) 37.5-25 MG capsule 1 capsule, Oral, EVERY MORNING      Social History     Social History Narrative    Lives alone in a condo.          On disability.  Three living children.          Has one small dog as her .        Has personal care attendant (PCA) services during the daytime and sometimes in the evening.       Subjective:     Sarah Rahman is a 58 year old female who comes in today for:    Chief Complaint   Patient presents with    Follow Up     Patient states I am pissed off. I am in pain, my whole body is burning. My breasts are hurting and toes are hurting. The side of my body is hurting. Today is the first day I can wear my clothes. My right foot and body were so swollen  "that I had to wear my 's shoes. I could not even fit my clothes. That is how swollen I was. I feel faint, I hate going to the hospital. Legs are hot and aching sometimes I cannot even walk. I hate this provider, he is old and needs to retire. I hate his nurses!          7/8/2024    12:01 PM   Additional Questions   Roomed by Suri MTZ CMA     Follow up in clinic today for multiple issues.    Has a number of pain issues as noted in her history above.  Does have neuropathy type symptoms in multiple areas.    However her right leg and right foot are the worst.  She is on suboxone and oxycodone through [facility] pain clinic.  She has some arthritis of the foot as well and has been helped by intermittent ketorolac (Toradol).    She has had episodes of loss of consciousness as well especially when not sleeping.  Difficult to sleep due to pain.    Back is hurting her as well.  Pain in the legs is sharp and burning.  Options as presented by pain clinic reviewed.    Blood sugars also running very high and this was reviewed.  Does not have personal care attendant (PCA) at this time to put on her DEXCOM today as the personal care attendant (PCA) is gone for the week.    We reviewed her other issues noted in the assessment but not specifically addressed in the HPI above.     Objective:     Wt Readings from Last 3 Encounters:   07/08/24 84.4 kg (186 lb)   05/30/24 81.6 kg (180 lb)   02/21/24 89.8 kg (198 lb)     BP Readings from Last 3 Encounters:   07/08/24 (!) 154/84   05/30/24 (!) 180/79   04/23/24 134/76     BP (!) 154/84 (BP Location: Right arm, Patient Position: Sitting, Cuff Size: Adult Regular)   Pulse 81   Temp 98.2  F (36.8  C) (Oral)   Resp 18   Ht 1.702 m (5' 7\")   Wt 84.4 kg (186 lb)   LMP  (LMP Unknown)   SpO2 98%   BMI 29.13 kg/m     The patient is stable, no acute distress.  Eyes are nonicteric.  Neck is supple without mass.  No cervical adenopathy.  No thyromegaly. Heart regular rate and " rhythm.  Lungs clear to auscultation bilaterally.  Respiratory effort is good.  .  Extremities no edema.      Diagnostics:     Admission on 05/30/2024, Discharged on 05/30/2024   Component Date Value Ref Range Status    Sodium 05/30/2024 138  135 - 145 mmol/L Final    Reference intervals for this test were updated on 09/26/2023 to more accurately reflect our healthy population. There may be differences in the flagging of prior results with similar values performed with this method. Interpretation of those prior results can be made in the context of the updated reference intervals.     Potassium 05/30/2024 4.0  3.4 - 5.3 mmol/L Final    Chloride 05/30/2024 106  98 - 107 mmol/L Final    Carbon Dioxide (CO2) 05/30/2024 20 (L)  22 - 29 mmol/L Final    Anion Gap 05/30/2024 12  7 - 15 mmol/L Final    Urea Nitrogen 05/30/2024 23.8 (H)  8.0 - 23.0 mg/dL Final    Creatinine 05/30/2024 0.94  0.51 - 0.95 mg/dL Final    GFR Estimate 05/30/2024 70  >60 mL/min/1.73m2 Final    Calcium 05/30/2024 9.0  8.6 - 10.0 mg/dL Final    Glucose 05/30/2024 353 (H)  70 - 99 mg/dL Final    Troponin T, High Sensitivity 05/30/2024 16 (H)  <=14 ng/L Final    Either a High Sensitivity Troponin T baseline (0 hours) value = 100 ng/L, or an increase in High Sensitivity Troponin T = 7 ng/L at 2 hours compared to 0 hours (2-0 hours), suggests myocardial injury, and urgent clinical attention is required.    If the 2-0 hours increase is <7 ng/L, a High Sensitivity Troponin T result above gender-specific reference ranges warrants further evaluation.   Recommendations for further evaluation include correlation with clinical decision-making tool (e.g., HEART), a 3rd High Sensitivity Troponin T test 2 hours after the 2nd (a 20% change from baseline would represent concern), admission for observation, close PCC/cardiology follow-up, or urgent outpatient provocative testing.    Ventricular Rate 05/30/2024 75  BPM Final    Atrial Rate 05/30/2024 75  BPM Final     TX Interval 05/30/2024 178  ms Final    QRS Duration 05/30/2024 80  ms Final    QT 05/30/2024 408  ms Final    QTc 05/30/2024 455  ms Final    P Axis 05/30/2024 76  degrees Final    R AXIS 05/30/2024 58  degrees Final    T Axis 05/30/2024 94  degrees Final    Interpretation ECG 05/30/2024    Final                    Value:Sinus rhythm  Nonspecific ST and T wave abnormality  Abnormal ECG  When compared with ECG of 19-FEB-2024 20:36,  No significant change was found  Confirmed by SEE ED PROVIDER NOTE FOR, ECG INTERPRETATION ([id number]),  EVERETT MCCARTHY ([id number]) on 5/31/2024 1:49:45 AM      GLUCOSE BY METER POCT 05/30/2024 306 (H)  70 - 99 mg/dL Final    WBC Count 05/30/2024 6.1  4.0 - 11.0 10e3/uL Final    RBC Count 05/30/2024 3.85  3.80 - 5.20 10e6/uL Final    Hemoglobin 05/30/2024 10.5 (L)  11.7 - 15.7 g/dL Final    Hematocrit 05/30/2024 33.1 (L)  35.0 - 47.0 % Final    MCV 05/30/2024 86  78 - 100 fL Final    MCH 05/30/2024 27.3  26.5 - 33.0 pg Final    MCHC 05/30/2024 31.7  31.5 - 36.5 g/dL Final    RDW 05/30/2024 12.8  10.0 - 15.0 % Final    Platelet Count 05/30/2024 196  150 - 450 10e3/uL Final    % Neutrophils 05/30/2024 55  % Final    % Lymphocytes 05/30/2024 35  % Final    % Monocytes 05/30/2024 6  % Final    % Eosinophils 05/30/2024 3  % Final    % Basophils 05/30/2024 1  % Final    % Immature Granulocytes 05/30/2024 0  % Final    NRBCs per 100 WBC 05/30/2024 0  <1 /100 Final    Absolute Neutrophils 05/30/2024 3.4  1.6 - 8.3 10e3/uL Final    Absolute Lymphocytes 05/30/2024 2.1  0.8 - 5.3 10e3/uL Final    Absolute Monocytes 05/30/2024 0.4  0.0 - 1.3 10e3/uL Final    Absolute Eosinophils 05/30/2024 0.2  0.0 - 0.7 10e3/uL Final    Absolute Basophils 05/30/2024 0.0  0.0 - 0.2 10e3/uL Final    Absolute Immature Granulocytes 05/30/2024 0.0  <=0.4 10e3/uL Final    Absolute NRBCs 05/30/2024 0.0  10e3/uL Final    Troponin T, High Sensitivity 05/30/2024 15 (H)  <=14 ng/L Final    Either a High Sensitivity Troponin T  baseline (0 hours) value = 100 ng/L, or an increase in High Sensitivity Troponin T = 7 ng/L at 2 hours compared to 0 hours (2-0 hours), suggests myocardial injury, and urgent clinical attention is required.    If the 2-0 hours increase is <7 ng/L, a High Sensitivity Troponin T result above gender-specific reference ranges warrants further evaluation.   Recommendations for further evaluation include correlation with clinical decision-making tool (e.g., HEART), a 3rd High Sensitivity Troponin T test 2 hours after the 2nd (a 20% change from baseline would represent concern), admission for observation, close PCC/cardiology follow-up, or urgent outpatient provocative testing.       No results found for any visits on 07/08/24.     Assessment:     1. Chronic pain of right lower extremity    2. Arthritis of foot    3. Primary hypertension    4. Chronic rhinitis    5. Syncope, unspecified syncope type    6. Type 2 diabetes mellitus with hyperglycemia, with long-term current use of insulin (H)    7. Urinary frequency        Plan:     Follow up pain clinic  Ketorolac (Toradol) prescribed.  Multiple medications through pain clinic make her situation definitely complex.  Consider endocrinology referral.  [facility]?  Patient would like care coordination referral for social work services.  Difficult situation.  Continue current medications otherwise.  Follow up sooner if issues.    Orders Placed This Encounter   Procedures    Primary Care - Care Coordination Referral        40 minutes or greater was spent today on the patient's care on the day of service.      This includes time for chart preparation, reviewing medical tests done before or during the visit, talking with the patient, review of quality indicators, required documentation, and other elements of care.        Kike David MD  General Internal Medicine  [facility]    The longitudinal plan of care for the diagnoses and conditions as  documented were addressed during this visit. Due to the added complexity in care, I will continue to support Sarah in the subsequent management and with ongoing continuity of care.     Return in about 4 weeks (around 8/5/2024) for follow up visit.     Future Appointments   Date Time Provider Department Center   8/5/2024 12:30 PM Kike David MD [facility]       Wt Readings from Last 20 Encounters:   07/08/24 84.4 kg (186 lb)   05/30/24 81.6 kg (180 lb)   02/21/24 89.8 kg (198 lb)   02/19/24 89.8 kg (198 lb)   02/01/24 88.9 kg (196 lb)   01/30/24 89 kg (196 lb 1.6 oz)   01/16/24 89.4 kg (197 lb 1.5 oz)   11/29/23 89.4 kg (197 lb)   11/02/23 86.6 kg (191 lb)   10/17/23 86.6 kg (191 lb)   10/14/23 86.6 kg (191 lb)   10/12/23 90 kg (198 lb 8 oz)   09/08/23 89 kg (196 lb 1.6 oz)   07/28/23 87.4 kg (192 lb 9.6 oz)   05/25/23 93.9 kg (207 lb)   05/10/23 93.9 kg (207 lb 1.6 oz)   02/22/23 89.4 kg (197 lb)   01/10/23 88 kg (194 lb)   10/08/22 91.6 kg (202 lb)   09/27/22 91.4 kg (201 lb 8 oz)     BP Readings from Last 20 Encounters:   07/08/24 (!) 154/84   05/30/24 (!) 180/79   04/23/24 134/76   02/26/24 132/78   02/21/24 138/86   02/19/24 134/77   02/01/24 129/80   01/30/24 128/62   01/17/24 134/89   11/29/23 134/78   11/02/23 122/80   10/17/23 (!) 170/74   10/17/23 (!) 174/84   10/15/23 (!) 156/77   10/12/23 137/84   09/08/23 123/85   07/29/23 (!) 143/78   05/25/23 (!) 144/83   05/10/23 (!) 191/92   02/22/23 133/71      Pulse Readings from Last 20 Encounters:   07/08/24 81   05/30/24 80   04/23/24 78   02/21/24 86   02/19/24 72   02/01/24 76   01/30/24 76   01/17/24 77   11/29/23 88   11/02/23 89   10/17/23 82   10/17/23 95   10/15/23 79   10/12/23 92   09/08/23 91   07/29/23 84   05/25/23 79   05/10/23 84   02/22/23 85   01/17/23 80     SpO2 Readings from Last 20 Encounters:   07/08/24 98%   05/30/24 99%   04/23/24 100%   02/19/24 97%   02/01/24 92%   01/17/24 99%   11/29/23 100%   11/02/23 100%   10/17/23  100%   10/17/23 100%   10/15/23 100%   10/12/23 100%   09/08/23 100%   07/29/23 95%   05/25/23 (!) 86%   05/10/23 94%   02/22/23 96%   01/17/23 100%   10/11/22 99%   10/08/22 98%

## 2024-07-12 ENCOUNTER — PATIENT OUTREACH (OUTPATIENT)
Dept: CARE COORDINATION | Facility: CLINIC | Age: 59
End: 2024-07-12
Payer: MEDICAID

## 2024-07-12 RX ORDER — TOPIRAMATE 50 MG/1
100 TABLET, FILM COATED ORAL 2 TIMES DAILY
COMMUNITY
Start: 2024-04-30

## 2024-07-12 RX ORDER — METHOCARBAMOL 750 MG/1
750 TABLET, FILM COATED ORAL
COMMUNITY
Start: 2024-04-30 | End: 2024-08-05

## 2024-07-12 RX ORDER — BUPRENORPHINE AND NALOXONE 2; .5 MG/1; MG/1
1 FILM, SOLUBLE BUCCAL; SUBLINGUAL 3 TIMES DAILY
COMMUNITY
Start: 2024-06-12 | End: 2024-08-26

## 2024-07-12 NOTE — PROGRESS NOTES
Clinic Care Coordination Contact  Community Health Worker Initial Outreach    CHW Initial Information Gathering:  Referral Source: PCP  Preferred Hospital: [facility]  [phone]  Current living arrangement:: I live alone  Type of residence:: Independent Senior Living  Community Resources: [place] Programs ([place]NAP)  Supplies Currently Used at Home: Diabetic Supplies  Equipment Currently Used at Home: walker, standard  Informal Support system:: Family, Friends  No PCP office visit in Past Year: No  Transportation means:: Other (PCA)  CHW Additional Questions  MyChart active?: No    Patient accepts CC: Yes. Patient scheduled for assessment with CCC YANIV Wiseman on 7/18/24 at 1PM. Patient noted desire to discuss financial resources.     CHW Note:  CHW contacted patient regarding a referral that was placed for CCC. CHW introduced self and role of CCC.  Patient shared she would like to explore financial resources at this time.  CHW scheduled patient with CCC YANIV Wiseman on 7/18/24 at 1PM for assistance exploring and navigating financial resources.     Order Questions    Question Answer   Reason for Referral: Complex Medical Concerns/Education    Patient/Caregiver Support   Complex Medical Concerns: Chronic Diagnosis - Use Comments   Patient/Caregiver Suport: Resources for Support   Clinical Staff have discussed the Care Coordination Referral with the patient and/or caregiver: Yes         Denise Winters  Community Health Worker   [facility] Care Coordination  [facility]   [email or web address]  Office: [phone]

## 2024-07-12 NOTE — PATIENT INSTRUCTIONS
Future Appointments   Date Time Provider Department Center   8/5/2024 12:30 PM [facility]

## 2024-07-18 ENCOUNTER — PATIENT OUTREACH (OUTPATIENT)
Dept: CARE COORDINATION | Facility: CLINIC | Age: 59
End: 2024-07-18

## 2024-07-18 NOTE — PROGRESS NOTES
[facility]/Voicemail    Clinical Data: Care Coordinator Outreach    Outreach Documentation Number of Outreach Attempt   7/18/2024   1:19 PM 1       Left message on patient's voicemail with call back information and requested return call.    Plan: Care Coordinator will try to reach patient again in 3-5 business days.    Ivone Wahl,   [facility]  [phone]

## 2024-07-23 ENCOUNTER — HOSPITAL ENCOUNTER (EMERGENCY)
Facility: HOSPITAL | Age: 59
Discharge: HOME | End: 2024-07-23
Attending: EMERGENCY MEDICINE | Admitting: EMERGENCY MEDICINE
Payer: MEDICAID

## 2024-07-23 ENCOUNTER — APPOINTMENT (OUTPATIENT)
Dept: CT IMAGING | Facility: HOSPITAL | Age: 59
End: 2024-07-23
Attending: EMERGENCY MEDICINE
Payer: MEDICAID

## 2024-07-23 VITALS
OXYGEN SATURATION: 81 % | RESPIRATION RATE: 66 BRPM | SYSTOLIC BLOOD PRESSURE: 144 MMHG | DIASTOLIC BLOOD PRESSURE: 77 MMHG | HEART RATE: 89 BPM | TEMPERATURE: 97.7 F

## 2024-07-23 DIAGNOSIS — R07.9 CHEST PAIN, UNSPECIFIED TYPE: ICD-10-CM

## 2024-07-23 DIAGNOSIS — R06.02 SHORTNESS OF BREATH: ICD-10-CM

## 2024-07-23 DIAGNOSIS — J44.1 COPD EXACERBATION (H): ICD-10-CM

## 2024-07-23 DIAGNOSIS — Z95.1 S/P CABG (CORONARY ARTERY BYPASS GRAFT): ICD-10-CM

## 2024-07-23 LAB
ALBUMIN SERPL BCG-MCNC: 4.5 G/DL (ref 3.5–5.2)
ALP SERPL-CCNC: 103 U/L (ref 40–150)
ALT SERPL W P-5'-P-CCNC: 19 U/L (ref 0–50)
ANION GAP SERPL CALCULATED.3IONS-SCNC: 13 MMOL/L (ref 7–15)
AST SERPL W P-5'-P-CCNC: 16 U/L (ref 0–45)
ATRIAL RATE - MUSE: 92 BPM
BASE EXCESS BLDV CALC-SCNC: -9.9 MMOL/L (ref -3–3)
BASOPHILS # BLD AUTO: 0.1 10E3/UL (ref 0–0.2)
BASOPHILS NFR BLD AUTO: 1 %
BILIRUB DIRECT SERPL-MCNC: <0.2 MG/DL (ref 0–0.3)
BILIRUB SERPL-MCNC: 0.2 MG/DL
BUN SERPL-MCNC: 29.8 MG/DL (ref 8–23)
CALCIUM SERPL-MCNC: 9.3 MG/DL (ref 8.8–10.4)
CHLORIDE SERPL-SCNC: 107 MMOL/L (ref 98–107)
CREAT SERPL-MCNC: 1.06 MG/DL (ref 0.51–0.95)
CRP SERPL-MCNC: 3.2 MG/L
DIASTOLIC BLOOD PRESSURE - MUSE: 70 MMHG
EGFRCR SERPLBLD CKD-EPI 2021: 60 ML/MIN/1.73M2
EOSINOPHIL # BLD AUTO: 0.3 10E3/UL (ref 0–0.7)
EOSINOPHIL NFR BLD AUTO: 5 %
ERYTHROCYTE [DISTWIDTH] IN BLOOD BY AUTOMATED COUNT: 12.7 % (ref 10–15)
FLUAV RNA SPEC QL NAA+PROBE: NEGATIVE
FLUBV RNA RESP QL NAA+PROBE: NEGATIVE
GLUCOSE BLDC GLUCOMTR-MCNC: 182 MG/DL (ref 70–99)
GLUCOSE SERPL-MCNC: 214 MG/DL (ref 70–99)
HCO3 BLDV-SCNC: 17 MMOL/L (ref 21–28)
HCO3 SERPL-SCNC: 20 MMOL/L (ref 22–29)
HCT VFR BLD AUTO: 38.1 % (ref 35–47)
HGB BLD-MCNC: 11.7 G/DL (ref 11.7–15.7)
HOLD SPECIMEN: NORMAL
IMM GRANULOCYTES # BLD: 0 10E3/UL
IMM GRANULOCYTES NFR BLD: 0 %
INR PPP: 0.99 (ref 0.85–1.15)
INTERPRETATION ECG - MUSE: NORMAL
LYMPHOCYTES # BLD AUTO: 2.2 10E3/UL (ref 0.8–5.3)
LYMPHOCYTES NFR BLD AUTO: 37 %
MAGNESIUM SERPL-MCNC: 2.3 MG/DL (ref 1.7–2.3)
MCH RBC QN AUTO: 27 PG (ref 26.5–33)
MCHC RBC AUTO-ENTMCNC: 30.7 G/DL (ref 31.5–36.5)
MCV RBC AUTO: 88 FL (ref 78–100)
MONOCYTES # BLD AUTO: 0.2 10E3/UL (ref 0–1.3)
MONOCYTES NFR BLD AUTO: 4 %
NEUTROPHILS # BLD AUTO: 3.1 10E3/UL (ref 1.6–8.3)
NEUTROPHILS NFR BLD AUTO: 53 %
NRBC # BLD AUTO: 0 10E3/UL
NRBC BLD AUTO-RTO: 0 /100
NT-PROBNP SERPL-MCNC: 184 PG/ML (ref 0–900)
O2/TOTAL GAS SETTING VFR VENT: 26 %
OXYHGB MFR BLDV: 71 % (ref 70–75)
P AXIS - MUSE: 76 DEGREES
PCO2 BLDV: 34 MM HG (ref 40–50)
PH BLDV: 7.3 [PH] (ref 7.32–7.43)
PLATELET # BLD AUTO: 248 10E3/UL (ref 150–450)
PO2 BLDV: 42 MM HG (ref 25–47)
POTASSIUM SERPL-SCNC: 4.2 MMOL/L (ref 3.4–5.3)
PR INTERVAL - MUSE: 170 MS
PROCALCITONIN SERPL IA-MCNC: 0.07 NG/ML
PROT SERPL-MCNC: 7.9 G/DL (ref 6.4–8.3)
QRS DURATION - MUSE: 80 MS
QT - MUSE: 384 MS
QTC - MUSE: 474 MS
R AXIS - MUSE: 56 DEGREES
RBC # BLD AUTO: 4.33 10E6/UL (ref 3.8–5.2)
RSV RNA SPEC NAA+PROBE: NEGATIVE
SAO2 % BLDV: 73.5 % (ref 70–75)
SARS-COV-2 RNA RESP QL NAA+PROBE: NEGATIVE
SODIUM SERPL-SCNC: 140 MMOL/L (ref 135–145)
SYSTOLIC BLOOD PRESSURE - MUSE: 143 MMHG
T AXIS - MUSE: 80 DEGREES
TROPONIN T SERPL HS-MCNC: 15 NG/L
VENTRICULAR RATE- MUSE: 92 BPM
WBC # BLD AUTO: 5.9 10E3/UL (ref 4–11)

## 2024-07-23 PROCEDURE — 80053 COMPREHEN METABOLIC PANEL: CPT | Performed by: EMERGENCY MEDICINE

## 2024-07-23 PROCEDURE — 250N000009 HC RX 250: Performed by: EMERGENCY MEDICINE

## 2024-07-23 PROCEDURE — 250N000011 HC RX IP 250 OP 636: Performed by: STUDENT IN AN ORGANIZED HEALTH CARE EDUCATION/TRAINING PROGRAM

## 2024-07-23 PROCEDURE — 250N000011 HC RX IP 250 OP 636: Performed by: EMERGENCY MEDICINE

## 2024-07-23 PROCEDURE — 85610 PROTHROMBIN TIME: CPT | Performed by: EMERGENCY MEDICINE

## 2024-07-23 PROCEDURE — 93005 ELECTROCARDIOGRAM TRACING: CPT | Performed by: EMERGENCY MEDICINE

## 2024-07-23 PROCEDURE — 250N000013 HC RX MED GY IP 250 OP 250 PS 637: Performed by: EMERGENCY MEDICINE

## 2024-07-23 PROCEDURE — 71275 CT ANGIOGRAPHY CHEST: CPT

## 2024-07-23 PROCEDURE — 999N000157 HC STATISTIC RCP TIME EA 10 MIN

## 2024-07-23 PROCEDURE — 84145 PROCALCITONIN (PCT): CPT | Performed by: EMERGENCY MEDICINE

## 2024-07-23 PROCEDURE — 84484 ASSAY OF TROPONIN QUANT: CPT | Performed by: EMERGENCY MEDICINE

## 2024-07-23 PROCEDURE — 36415 COLL VENOUS BLD VENIPUNCTURE: CPT | Performed by: EMERGENCY MEDICINE

## 2024-07-23 PROCEDURE — 99285 EMERGENCY DEPT VISIT HI MDM: CPT | Mod: 25

## 2024-07-23 PROCEDURE — 96375 TX/PRO/DX INJ NEW DRUG ADDON: CPT | Mod: 59

## 2024-07-23 PROCEDURE — 84460 ALANINE AMINO (ALT) (SGPT): CPT | Performed by: EMERGENCY MEDICINE

## 2024-07-23 PROCEDURE — 82962 GLUCOSE BLOOD TEST: CPT

## 2024-07-23 PROCEDURE — 87637 SARSCOV2&INF A&B&RSV AMP PRB: CPT | Performed by: EMERGENCY MEDICINE

## 2024-07-23 PROCEDURE — 83735 ASSAY OF MAGNESIUM: CPT | Performed by: EMERGENCY MEDICINE

## 2024-07-23 PROCEDURE — 94640 AIRWAY INHALATION TREATMENT: CPT

## 2024-07-23 PROCEDURE — 83880 ASSAY OF NATRIURETIC PEPTIDE: CPT | Performed by: EMERGENCY MEDICINE

## 2024-07-23 PROCEDURE — 82805 BLOOD GASES W/O2 SATURATION: CPT | Performed by: EMERGENCY MEDICINE

## 2024-07-23 PROCEDURE — 96374 THER/PROPH/DIAG INJ IV PUSH: CPT | Mod: 59

## 2024-07-23 PROCEDURE — 85004 AUTOMATED DIFF WBC COUNT: CPT | Performed by: EMERGENCY MEDICINE

## 2024-07-23 PROCEDURE — 86140 C-REACTIVE PROTEIN: CPT | Performed by: EMERGENCY MEDICINE

## 2024-07-23 RX ORDER — IOPAMIDOL 755 MG/ML
75 INJECTION, SOLUTION INTRAVASCULAR ONCE
Status: COMPLETED | OUTPATIENT
Start: 2024-07-23 | End: 2024-07-23

## 2024-07-23 RX ORDER — MORPHINE SULFATE 4 MG/ML
4 INJECTION, SOLUTION INTRAMUSCULAR; INTRAVENOUS ONCE
Status: COMPLETED | OUTPATIENT
Start: 2024-07-23 | End: 2024-07-23

## 2024-07-23 RX ORDER — HYDROMORPHONE HYDROCHLORIDE 1 MG/ML
0.5 INJECTION, SOLUTION INTRAMUSCULAR; INTRAVENOUS; SUBCUTANEOUS ONCE
Status: DISCONTINUED | OUTPATIENT
Start: 2024-07-23 | End: 2024-07-23

## 2024-07-23 RX ORDER — IPRATROPIUM BROMIDE AND ALBUTEROL SULFATE 2.5; .5 MG/3ML; MG/3ML
3 SOLUTION RESPIRATORY (INHALATION) ONCE
Status: COMPLETED | OUTPATIENT
Start: 2024-07-23 | End: 2024-07-23

## 2024-07-23 RX ORDER — PREDNISONE 20 MG/1
TABLET ORAL
Qty: 10 TABLET | Refills: 0 | Status: SHIPPED | OUTPATIENT
Start: 2024-07-24 | End: 2024-08-26

## 2024-07-23 RX ORDER — NITROGLYCERIN 0.4 MG/1
0.4 TABLET SUBLINGUAL EVERY 5 MIN PRN
Status: DISCONTINUED | OUTPATIENT
Start: 2024-07-23 | End: 2024-07-23 | Stop reason: HOSPADM

## 2024-07-23 RX ORDER — METHYLPREDNISOLONE SODIUM SUCCINATE 125 MG/2ML
125 INJECTION, POWDER, LYOPHILIZED, FOR SOLUTION INTRAMUSCULAR; INTRAVENOUS ONCE
Status: COMPLETED | OUTPATIENT
Start: 2024-07-23 | End: 2024-07-23

## 2024-07-23 RX ORDER — ASPIRIN 81 MG/1
324 TABLET, CHEWABLE ORAL ONCE
Status: COMPLETED | OUTPATIENT
Start: 2024-07-23 | End: 2024-07-23

## 2024-07-23 RX ADMIN — METHYLPREDNISOLONE SODIUM SUCCINATE 125 MG: 125 INJECTION, POWDER, FOR SOLUTION INTRAMUSCULAR; INTRAVENOUS at 14:30

## 2024-07-23 RX ADMIN — IPRATROPIUM BROMIDE AND ALBUTEROL SULFATE 3 ML: .5; 3 SOLUTION RESPIRATORY (INHALATION) at 13:25

## 2024-07-23 RX ADMIN — ASPIRIN 81 MG CHEWABLE TABLET 324 MG: 81 TABLET CHEWABLE at 13:49

## 2024-07-23 RX ADMIN — MORPHINE SULFATE 4 MG: 4 INJECTION, SOLUTION INTRAMUSCULAR; INTRAVENOUS at 15:24

## 2024-07-23 RX ADMIN — IOPAMIDOL 75 ML: 755 INJECTION, SOLUTION INTRAVENOUS at 14:58

## 2024-07-23 ASSESSMENT — COLUMBIA-SUICIDE SEVERITY RATING SCALE - C-SSRS
1. IN THE PAST MONTH, HAVE YOU WISHED YOU WERE DEAD OR WISHED YOU COULD GO TO SLEEP AND NOT WAKE UP?: NO
2. HAVE YOU ACTUALLY HAD ANY THOUGHTS OF KILLING YOURSELF IN THE PAST MONTH?: NO
6. HAVE YOU EVER DONE ANYTHING, STARTED TO DO ANYTHING, OR PREPARED TO DO ANYTHING TO END YOUR LIFE?: NO

## 2024-07-23 ASSESSMENT — ACTIVITIES OF DAILY LIVING (ADL)
ADLS_ACUITY_SCORE: 38

## 2024-07-23 NOTE — ED PROVIDER NOTES
EMERGENCY DEPARTMENT ENCOUNTER      NAME: Sarah Rahman  AGE: 58 year old female  YOB: 1965  MRN: [id number]  EVALUATION DATE & TIME: 7/23/2024 12:35 PM    PCP: Kike David    ED PROVIDER: Irineo Deleon M.D.      Chief Complaint   Patient presents with    Shortness of Breath         IMPRESSION  1. Shortness of breath    2. Chest pain, unspecified type    3. S/P CABG (coronary artery bypass graft)        PLAN  - follow up CT chest, blood tests; & reassess    ED COURSE & MEDICAL DECISION MAKING    ED Course as of 07/23/24 1411   Tue Jul 23, 2024   1236 58yoF with history of CAD (s/p CABG), stroke (residual right hemiparesis), COPD, HTN, HLD, T2DM, polysubstance abuse, schizoaffective disorder, GERD, chronic pain presenting for evaluation of shortness of breath & chest pain. Reports shortness of breath onset last night, worse trying to lie flat. No cough, fevers, sweats, chills. Chest pain onset afterward as well; worse with deep breaths. Radiates from right chest to left and down left arm. Notes she gets chronic pains on right side after her stroke but not usually on the left; states she had left chest pain with prior heart attack. Denies abdominal pain, nausea, vomiting.    SBP 190s on presentation with otherwise normal vitals. Anxious & tearful on exam with no michele respiratory distress, mild bibasilar crackles, no wheezing, no stridor with normal phonation, no chest wall tenderness, benign abdomen, no peripheral edema or calf tenderness, baseline mild right hemiparesis with no left-sided weakness.    Will obtain CT to rule out PE. ACS certainly on the differential as well. Will obtain EKG, blood, CT while giving Duoneb, nitroglycerin, empiric Aspirin. Patient comfortable with this plan; no further questions at this time.    1245 EKG with no arrhythmia or ischemic changes; unchanged from prior.   1345 Reevaluated after D[place]ebs. States she her shortness of breath is much better. Left chest  pain still ongoing. Has not yet received the ordered Aspirin & nitroglycerin. RN has these for her now.     2:11 PM - Patient signed out to Dr. Mcdaniel at routine shift change. Plan at this time is follow up CT chest, blood tests; and reassess.            --------------------------------------------------------------------------------   --------------------------------------------------------------------------------     12:38 PM I met with the patient for the initial interview and physical examination. Discussed plan for treatment and workup in the ED.      This patient involved a high degree of complexity in medical decision making, as significant risks were present and assessed. Recent encounters & results in medical record reviewed by me.    All workup (i.e. any EKG/labs/imaging as per charting below) reviewed and independently interpreted by me. See respective sections for details.    Broad differential considered for this patient, including but not limited to:  ACS, PE, acute aortic syndrome, pneumothorax, Boerhaave's, tamponade, COPD, pneumonia, sepsis, anxiety, chronic pain      See additional MDM below if interested.      MEDICATIONS GIVEN IN THE EMERGENCY DEPARTMENT  Medications   nitroGLYcerin (NITROSTAT) sublingual tablet 0.4 mg (has no administration in time range)   ipratropium - albuterol 0.5 mg/2.5 mg/3 mL (DUONEB) neb solution 3 mL (3 mLs Nebulization $Given 7/23/24 1325)   ipratropium - albuterol 0.5 mg/2.5 mg/3 mL (DUONEB) neb solution 3 mL (3 mLs Nebulization $Given 7/23/24 1325)   aspirin (ASA) chewable tablet 324 mg (324 mg Oral $Given 7/23/24 1349)               =================================================================      HPI  Use of : N/A       Sarah CLARK Rahman is a 58 year old female with a pertinent history of CVA with residual right-sided deficits, CAD S/P CABG, COPD, hypertension, chronic pain syndrome, and right leg weakness who presents to this ED by walking for  evaluation of shortness of breath.      Patient reports shortness of breath since yesterday and endorses worsened breathing when laying flat. Patient states she has sharp pain in her left-sided chest. Today, the patient reports fell off her couch onto carpet without injury. Chest pain is still present now. The patient states she had left-sided chest pain with her prior heart attack.    She reports stroke history with right-sided deficits. She reports chronic intermittent right arm pain and numbness in her fingers.     Patient denies using nebulizer treatments regularly.    Per chart review: patient seen for office visit on 7/8/2024. Started on 0.75 weekly dose of Trulicity. Prescribed Ketorolac for chronic low back pain. Additionally prescribed Montelukast and Triamterene-hydrochlorothiazide.        --------------- MEDICAL HISTORY ---------------  PAST MEDICAL HISTORY:  Reviewed independently by me.  Past Medical History:   Diagnosis Date    Asthma     CAD (coronary artery disease)     COPD (chronic obstructive pulmonary disease) (H)     CVA (cerebral infarction)     Dyshidrosis (pompholyx) 10/21/2016    GERD (gastroesophageal reflux disease)     History of CVA (cerebrovascular accident) 04/01/2012    Formatting of this note might be different from the original.  Overview:   Bilateral subacute cerebellar infarcts seen on MRI at [facility] 4/2012.  Pt was noted to have no residual deficits at [facility].  Formatting of this note might be different from the original.  Bilateral subacute cerebellar infarcts seen on MRI at [facility] 4/2012.  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 Hamm at [facility]    Schizoaffective  disorder (H)     Sleep difficulties 07/17/2022     Patient Active Problem List   Diagnosis    CAD -- S/P CABG    Type 2 diabetes mellitus with hyperglycemia, with long-term current use of insulin (H)    Schizoaffective disorder (H)    Bilateral low back pain with right-sided sciatica, unspecified chronicity    Nasal polyp    Cerebrovascular accident (CVA) due to stenosis of carotid artery (H)    Chronic obstructive pulmonary disease (H)    Anemia    Carotid stenosis, left    Depression with anxiety    Mixed hyperlipidemia    GERD (gastroesophageal reflux disease)    History of drug abuse (H)    Hx of syphilis    Primary hypertension    Menopausal flushing    Moderate persistent asthma    Myelomalacia of cervical cord (H)    Nephrolithiasis    Obesity    Seasonal allergies    Sensorineural hearing loss (SNHL) of right ear    Smoker    Chronic pain syndrome    Atypical chest pain -- Normal NM stress 5/8/23    Syncope    Right renal artery stenosis (H24)    Acute CVA (cerebrovascular accident) (H)    Intracranial atherosclerosis    Syncope and collapse    Right leg weakness    Chest pain, unspecified type    Unstable angina (H)    Frailty    Difficulty walking    Diabetic peripheral neuropathy (H)    Basilar artery stenosis       PAST SURGICAL HISTORY:  Reviewed independently by me.  Past Surgical History:   Procedure Laterality Date    BYPASS GRAFT ARTERY CORONARY  01/01/2009    x2    CAROTID ENDARTERECTOMY Left 12/2021    CORONARY STENT PLACEMENT      CV ANGIOGRAM CORONARY GRAFT N/A 1/16/2024    Procedure: Coronary Angiogram Graft;  Surgeon: Spencer Diez MD;  Location: [facility] CATH LAB CV    CV CORONARY ANGIOGRAM N/A 06/02/2021    Procedure: Coronary Angiogram;  Surgeon: Juventino Rivera MD;  Location: [facility] Cardiac Cath Lab;  Service: Cardiology    HYSTERECTOMY TOTAL ABDOMINAL      HYSTERECTOMY TOTAL ABDOMINAL, BILATERAL SALPINGO-OOPHORECTOMY, COMBINED      IR LUMBAR PUNCTURE  7/23/2019    IR  TRANSLAMINAR EPIDURAL LUMBAR INJ INCL IMAGING  09/27/2022    LUMBAR DISCECTOMY Right 06/13/2019    L5-S1 - Dr. Hamm    NASAL FRACTURE SURGERY      ORIF ULNAR / RADIAL SHAFT FRACTURE Right        CURRENT MEDICATIONS:    Reviewed independently by me.    Current Facility-Administered Medications:     nitroGLYcerin (NITROSTAT) sublingual tablet 0.4 mg, 0.4 mg, Sublingual, Q5 Min PRN, Irineo Deleon MD    Current Outpatient Medications:     acetaminophen (TYLENOL) 500 MG tablet, Take 1-2 tablets (500-1,000 mg) by mouth every 8 hours as needed for mild pain Max 5.5 tabs per day, Disp: 100 tablet, Rfl: 11    albuterol (PROAIR HFA) 108 (90 Base) MCG/ACT inhaler, Inhale 1-2 puffs into the lungs every 4 hours as needed for shortness of breath or wheezing, Disp: 18 g, Rfl: 11    amLODIPine (NORVASC) 2.5 MG tablet, Take 1 tablet (2.5 mg) by mouth daily, Disp: 30 tablet, Rfl: 0    aspirin 81 MG EC tablet, Take 1 tablet (81 mg) by mouth daily, Disp: 90 tablet, Rfl: 3    atorvastatin (LIPITOR) 40 MG tablet, Take 1 tablet (40 mg) by mouth every evening, Disp: 90 tablet, Rfl: 3    buprenorphine HCl-naloxone HCl (SUBOXONE) 2-0.5 MG per film, Place 1 Film under the tongue 3 times daily, Disp: , Rfl:     clopidogrel (PLAVIX) 75 MG tablet, Take 1 tablet (75 mg) by mouth daily, Disp: 30 tablet, Rfl: 3    Continuous Blood Gluc  (DEXCOM G6 ) ROSE, Use to read blood sugars as per 's instructions., Disp: 1 each, Rfl: 0    Continuous Blood Gluc Transmit (DEXCOM G6 TRANSMITTER) MISC, Change every 3 months., Disp: 1 each, Rfl: 1    Continuous Glucose Sensor (DEXCOM G6 SENSOR) MISC, Change every 10 days., Disp: 3 each, Rfl: 5    Dexpanthenol 2 % CREA, Externally apply topically daily, Disp: , Rfl:     diazepam (VALIUM) 5 MG tablet, Take 1 tablet (5 mg) by mouth once as needed for anxiety (flight anxiety) Take 30 minutes before flight., Disp: 4 tablet, Rfl: 0    diclofenac (VOLTAREN) 1 % topical gel, Apply 4 g  topically 4 times daily, Disp: , Rfl:     diphenhydrAMINE (BENADRYL) 25 MG tablet, Take 1 tablet (25 mg) by mouth every 6 hours as needed for itching or allergies, Disp: 100 tablet, Rfl: 11    dulaglutide (TRULICITY) 0.75 MG/0.5ML pen, Inject 0.75 mg Subcutaneous every 7 days, Disp: 6 mL, Rfl: 3    DULoxetine (CYMBALTA) 60 MG capsule, Take 1 capsule (60 mg) by mouth 2 times daily, Disp: 180 capsule, Rfl: 3    fexofenadine (ALLEGRA) 180 MG tablet, Take 1 tablet (180 mg) by mouth daily as needed for allergies (congestion), Disp: 30 tablet, Rfl: 2    fluticasone (FLONASE) 50 MCG/ACT nasal spray, Spray 1 spray into both nostrils 2 times daily, Disp: 16 g, Rfl: 11    fluticasone-salmeterol (ADVAIR) 250-50 MCG/ACT inhaler, Inhale 1 puff into the lungs every 12 hours, Disp: 60 each, Rfl: 3    gabapentin (NEURONTIN) 300 MG capsule, Take 1 capsule (300 mg) by mouth 3 times daily, Disp: 30 capsule, Rfl: 0    glipiZIDE (GLUCOTROL XL) 10 MG 24 hr tablet, TAKE ONE TABLET BY MOUTH ONCE DAILY (NEED TO BE SEEN IN CLINIC FOR FURTHER REFILLS), Disp: 90 tablet, Rfl: 0    guaiFENesin (MUCINEX) 600 MG 12 hr tablet, Take 1 tablet (600 mg) by mouth 2 times daily as needed for congestion or cough, Disp: 60 tablet, Rfl: 1    insulin glargine (LANTUS PEN) 100 UNIT/ML pen, Inject 35-40 Units Subcutaneous 2 times daily, Disp: 24 mL, Rfl: 11    ipratropium - albuterol 0.5 mg/2.5 mg/3 mL (DUONEB) 0.5-2.5 (3) MG/3ML neb solution, Take 1 vial (3 mLs) by nebulization every 6 hours as needed for shortness of breath, wheezing or cough, Disp: 90 mL, Rfl: 0    ketorolac (TORADOL) 10 MG tablet, Take 1 tablet (10 mg) by mouth every 6 hours as needed for moderate pain, Disp: 20 tablet, Rfl: 0    lidocaine (LIDODERM) 5 % patch, Place onto the skin every 24 hours To prevent lidocaine toxicity, patient should be patch free for 12 hrs daily., Disp: , Rfl:     lidocaine (LMX4) 4 % external cream, Apply topically daily Apply to foot, Disp: , Rfl:     LORazepam  (ATIVAN) 0.5 MG tablet, TAKE 1 TABLET (0.5 MG) BY MOUTH EVERY 6 HOURS AS NEEDED FOR ANXIETY, Disp: 20 tablet, Rfl: 0    methocarbamol (ROBAXIN) 750 MG tablet, Take 750 mg by mouth, Disp: , Rfl:     montelukast (SINGULAIR) 10 MG tablet, Take 1 tablet (10 mg) by mouth daily for 360 days, Disp: 90 tablet, Rfl: 3    nabumetone (RELAFEN) 500 MG tablet, Take 1 tablet (500 mg) by mouth 2 times daily, Disp: 60 tablet, Rfl: 11    naloxone (NARCAN) 4 MG/0.1ML nasal spray, Spray 4 mg into one nostril alternating nostrils as needed for opioid reversal every 2-3 minutes until assistance arrives, Disp: , Rfl:     nitroGLYcerin (NITROSTAT) 0.4 MG sublingual tablet, FOR CHEST PAIN PLACE 1 TABLET UNDER THE TONGUE EVERY 5 MINUTES FOR 3 DOSES IF NEEDED. IF SYMPTOMS PERSIST 5 MINUTES AFTER FIRST DOSE CALL 911., Disp: 25 tablet, Rfl: 3    omeprazole (PRILOSEC) 40 MG DR capsule, Take 1 capsule (40 mg) by mouth daily To protect your stomach., Disp: 90 capsule, Rfl: 3    ondansetron (ZOFRAN) 8 MG tablet, Take 1 tablet (8 mg) by mouth every 8 hours as needed for nausea, Disp: 20 tablet, Rfl: 3    oxyCODONE-acetaminophen (PERCOCET)  MG per tablet, Take 1 tablet by mouth every 6 hours as needed for pain, Disp: , Rfl:     topiramate (TOPAMAX) 50 MG tablet, Take 100 mg by mouth 2 times daily, Disp: , Rfl:     triamcinolone (KENALOG) 0.1 % external ointment, Apply topically 2 times daily Apply twice a day to vaginal rash until resolved, Disp: 80 g, Rfl: 0    triamterene-HCTZ (DYAZIDE) 37.5-25 MG capsule, Take 1 capsule by mouth every morning for 360 days, Disp: 90 capsule, Rfl: 3    ALLERGIES:  Reviewed independently by me.  Allergies   Allergen Reactions    Haloperidol Unknown     Patient states it stops her heart      Haloperidol Angioedema    Hydroxyzine Angioedema    Meperidine And Related Angioedema, Difficulty breathing, Other (See Comments), Rash and Shortness Of Breath     Throat closes  Other reaction(s): Breathing  "Difficulty  Other reaction(s): Breathing Difficulty      Phenothiazines Other (See Comments)     Other reaction(s): CARDIAC DISTURBANCES  Patient states it stops her heart  \"I swell up\"  \"stopped by heart\"  \"I swell up\"  \"I swell up\"      Phenothiazines Angioedema    Tramadol Other (See Comments)     Other reaction(s): Angioedema  Throat itch      Tramadol Angioedema    Januvia [Sitagliptin] Swelling    Latex Itching    Haloperidol And Related Angioedema    Januvia [Sitagliptin] Other (See Comments)     Swelling in the neck     Latex Itching    Lisinopril Other (See Comments)     ACE cough  Itchy throat  Throat itches  Itchy throat      Nortriptyline Unknown     Fainting, unclear if it was related    Penicillins Swelling    Hydroxyzine Other (See Comments) and Rash     Tongue swelling  Tongue swelling  Tongue swelling      Lisinopril Cough       FAMILY HISTORY:  Reviewed independently by me.  Family History   Problem Relation Age of Onset    Heart Disease Mother     Heart Disease Father     Heart Disease Sister     Heart Disease Sister     Diabetes Brother     Coronary Artery Disease Brother         MI       SOCIAL HISTORY:   Reviewed independently by me.  Social History     Socioeconomic History    Marital status:    Tobacco Use    Smoking status: Every Day     Types: Cigarettes    Smokeless tobacco: Never    Tobacco comments:     Seen IP by [facility] on 7/28/23 and declined counseling services and resource packet Smokes 1 cigarettes per day   Vaping Use    Vaping status: Never Used   Substance and Sexual Activity    Alcohol use: Not Currently     Comment: Alcoholic Drinks/day: occ    Drug use: No    Sexual activity: Not Currently   Social History Narrative    Lives alone in a condo.          On disability.  Three living children.          Has one small dog as her .        Has personal care attendant (PCA) services during the daytime and sometimes in the evening.     Social Determinants of Health "     Financial Resource Strain: Low Risk  (1/30/2024)    Financial Resource Strain     Within the past 12 months, have you or your family members you live with been unable to get utilities (heat, electricity) when it was really needed?: No   Food Insecurity: Patient Declined (6/1/2024)    Received from [facility]    Hunger Vital Sign     Worried About Running Out of Food in the Last Year: Patient declined     Ran Out of Food in the Last Year: Patient declined   Transportation Needs: Patient Declined (6/1/2024)    Received from [facility]    PRAPARE - Transportation     Lack of Transportation (Medical): Patient declined     Lack of Transportation (Non-Medical): Patient declined   Physical Activity: Not on File (12/4/2021)    Received from [facility]    Physical Activity     Physical Activity: 0   Stress: Not on File (12/4/2021)    Received from [facility]    Stress     Stress: 0    Received from [employer] [facility] [employer] [facility] [employer] [facility]    Social Connections   Interpersonal Safety: Unknown (6/1/2024)    Received from [facility]    Humiliation, Afraid, Rape, and Kick questionnaire     Physically Abused: Patient declined     Sexually Abused: Patient declined   Housing Stability: Patient Declined (6/1/2024)    Received from [facility]    Housing Stability Vital Sign     Unable to Pay for Housing in the Last Year: Patient declined     In the last 12 months, was there a time when you did not have a steady place to sleep or slept in a shelter (including now)?: Patient declined       --------------- PHYSICAL EXAM ---------------  Nursing notes and vitals independently reviewed by me.  VITALS:  Vitals:    07/23/24 1300 07/23/24 1325 07/23/24 1330 07/23/24 1342   BP: (!) 149/88  (!) 157/84    Pulse: 85 82 82 86   Resp: 18 18 16 (!) 44   Temp:       TempSrc:       SpO2: 100% 100% 100% (!) 86%       PHYSICAL EXAM:    General:  alert, interactive, tearful, and  anxious  Eyes:  conjunctivae clear, conjugate gaze  HENT:  atraumatic, nose with no rhinorrhea, oropharynx clear  Neck:  no meningismus  Cardiovascular:  HR 90's during exam, regular rhythm, no murmurs, brisk cap refill  Chest:  no chest wall tenderness  Pulmonary:  no stridor, normal phonation, normal work of breathing, mild bibasilar crackles, no wheezing   Abdomen:  soft, nondistended, nontender  :  no CVA tenderness  Back:  no midline spinal tenderness  Musculoskeletal:  no pretibial edema, no calf tenderness. Gross ROM intact to joints of extremities with no obvious deformities.  Skin:  warm, dry, no rash  Neuro:  awake, alert, answers questions appropriately, follows commands, moves all limbs, at baseline 4/5 strength to. RUE/RLE. 5/5 strength to LUE/LLE. No tremor  Psych:  calm, normal affect      --------------- RESULTS ---------------  EKG:    Reviewed and independently interpreted by me.  - NSR at 92bpm, no ST or T wave changes, normal intervals  - unchanged from prior on 5/30/24  My read.    LAB:  Reviewed and independently interpreted by me.  Results for orders placed or performed during the hospital encounter of 07/23/24   Result Value Ref Range    INR 0.99 0.85 - 1.15   CBC with platelets and differential   Result Value Ref Range    WBC Count 5.9 4.0 - 11.0 10e3/uL    RBC Count 4.33 3.80 - 5.20 10e6/uL    Hemoglobin 11.7 11.7 - 15.7 g/dL    Hematocrit 38.1 35.0 - 47.0 %    MCV 88 78 - 100 fL    MCH 27.0 26.5 - 33.0 pg    MCHC 30.7 (L) 31.5 - 36.5 g/dL    RDW 12.7 10.0 - 15.0 %    Platelet Count 248 150 - 450 10e3/uL    % Neutrophils 53 %    % Lymphocytes 37 %    % Monocytes 4 %    % Eosinophils 5 %    % Basophils 1 %    % Immature Granulocytes 0 %    NRBCs per 100 WBC 0 <1 /100    Absolute Neutrophils 3.1 1.6 - 8.3 10e3/uL    Absolute Lymphocytes 2.2 0.8 - 5.3 10e3/uL    Absolute Monocytes 0.2 0.0 - 1.3 10e3/uL    Absolute Eosinophils 0.3 0.0 - 0.7 10e3/uL    Absolute Basophils 0.1 0.0 - 0.2 10e3/uL     Absolute Immature Granulocytes 0.0 <=0.4 10e3/uL    Absolute NRBCs 0.0 10e3/uL         RADIOLOGY:  CT chest PE study:  pending at time of patient care handoff      PROCEDURES:   Procedures   --------------------------------------------------------------------------------   Cardiac telemetry monitoring ordered by me secondary to the patient's history of chest pain & shortness of breath; and to monitor the patient for dysrhythmia. Reviewed & independently interpreted by me. Revealed normal sinus rhythm.  --------------------------------------------------------------------------------               --------------- ADDITIONAL MDM ---------------  History:  - I considered systemic symptoms of the presenting illness.  - Supplemental history from:       -- patient  - External Record(s) reviewed:       -- Inpatient/outpatient record (clinic visit 7/8/24), prior labs (blood 6/1/24), prior imaging (MRI brain 5/30/24)       -- see above ED course & MDM for further details    Workup:  - Chart documentation above includes differential considered and any EKGs or imaging independently interpreted by provider.  - emergent/severe conditions considered and evaluated for: see above differential & MDM  - medications given that require intensive monitoring for toxicity: multiple Duonebs, nitroglycerin  - In additional to work up documented, I considered the following work up:       -- CTA chest/abdomen/pelvis       -- see above ED course & MDM for further details    External Consultation:  - Discussion of management with another provider:       -- ED pharmacist re: meds       -- see above charting for additional    Complicating Factors:  - Care impacted by chronic illness:       -- see above MDM, past medical history, & problem list  - Care affected by social determinants of health:       -- see above social history       -- Access to Affordable Healthcare       -- Access to Medical Care    Disposition Considerations:  - Admission  considered. Patient was signed out to the oncoming physician, disposition pending.           I, Elia Harrell, am serving as a scribe to document services personally performed by Dr. Irineo Deleon based on my observation and the provider's statements to me. I, Irineo Deleon MD attest that Elia Harrell is acting in a scribe capacity, has observed my performance of the services and has documented them in accordance with my direction.      Irineo Deleon MD  07/23/24  Emergency Medicine  [facility] EMERGENCY DEPARTMENT  [address] [place] [id number]  [phone]  Dept: [phone]     Irineo Deleon MD  07/23/24 1584

## 2024-07-23 NOTE — ED TRIAGE NOTES
"  Patient presents here for evaluation of shortness of breath, She is repeating \"I can't breath!\" And offers no other information.         "

## 2024-07-23 NOTE — PROGRESS NOTES
Room air SpO2 100 %. BS clear and equal bilat. Duoneb x 2 HHN given, tolerated well. Hx Asthma, no wheezing. BS after tx unchanged    BP (!) 149/88   Pulse 82   Temp 97.7  F (36.5  C) (Temporal)   Resp 18   LMP  (LMP Unknown)   SpO2 100%

## 2024-07-24 ENCOUNTER — TELEPHONE (OUTPATIENT)
Dept: INTERNAL MEDICINE | Facility: CLINIC | Age: 59
End: 2024-07-24
Payer: MEDICAID

## 2024-07-24 ENCOUNTER — PATIENT OUTREACH (OUTPATIENT)
Dept: CARE COORDINATION | Facility: CLINIC | Age: 59
End: 2024-07-24
Payer: MEDICAID

## 2024-07-24 NOTE — PROGRESS NOTES
Contact      Situation: Patient chart reviewed by .    Background: missed assessment with YANIV BARRERA.     Assessment: Talked to patient who was in ED yesterday with shortness of breath.  She asked what kind of insurance she has and was told looks like straight MA.  She does not like her current  who is disrespectful. Her old one was really good.  Her Diabetes was very high in ED yesterday and she is not sleeping well at night.  She asked about extra support from insurance companies like free food vouchers.  She knows others get it.  Suggested that she may get that if she uses a health care option like [employer]. She does not like [employer].      She was told about [employer] and application was completed and information mailed to her.      Also wanted resources for free clothing which was mailed as well.     She would like psychiatry and therapy and has trouble finding anyone using a list her PCA gave to her.  Called [facility] together and they don't accept straight MA. She is going to appt so will contact tomorrow.     7-  Called patient and VM is full.    Plan/Recommendations: Will contact tomorrow and discuss her supports and help find mental health appts.     Ivone Wahl,   [facility]  [phone]

## 2024-07-24 NOTE — TELEPHONE ENCOUNTER
Pt was seen in the ED yesterday 7/23 for shortness of breath. Pt came came into clinic today insisting to speak with PCP.     Writer spoke with pt for 20 minutes, pt was crying and upset because she can not breathe out of her nose. She requested a refill on  Montelukast medication. Pt was informed this medication is at her pharmacy.    Pt also states when she sleeps, she wakes up frequently in a panic because she is out of breathe gasping for air, and she would like a CPAP. Pt states she has not slept for 7 days.    Right foot pain/swelling has increased per pt. Pt reported her foot was so swollen yesterday she could not fit into her shoe and she believes it is related to her arthritis. Writer evaluated extremity and pt showed no signs of swelling.     Pt next OV with PCP is 8/5 and she can not wait that long to speak to PCP.    Will route to PCP to advise.     CHEO Goodrich.

## 2024-07-25 NOTE — TELEPHONE ENCOUNTER
Can move up appointment to a reserved slot if needed.    Please see about her desire for continuous positive airway pressure (CPAP).  As far as I know, she has not seen a sleep specialist.  This would take time to get done and cannot just order CPAP alone without the appropriate documentation of a problem.    I can order a consultation for sleep consultation if she would like.    She might trash me as a provider, so if she does, then please make her aware that there are other providers or clinics she can go to.     Kike David MD  General Internal Medicine  [facility]  7/25/2024, 10:36 AM

## 2024-08-02 ENCOUNTER — PATIENT OUTREACH (OUTPATIENT)
Dept: CARE COORDINATION | Facility: CLINIC | Age: 59
End: 2024-08-02
Payer: MEDICAID

## 2024-08-02 NOTE — LETTER
[facility] CARE COORDINATION  [facility]    August 2, 2024    Sarah Rahman  [address]   [place] [address]      Dear Sarah,    I am a clinic care coordinator who works with Kike David MD with the [facility]. I wanted to thank you for spending the time to talk with me.  Below is a description of clinic care coordination and how I can further assist you.       The clinic care coordination team is made up of a registered nurse, , financial resource worker and community health worker who understand the health care system. The goal of clinic care coordination is to help you manage your health and improve access to the health care system. Our team works alongside your provider to assist you in determining your health and social needs. We can help you obtain health care and community resources, providing you with necessary information and education. We can work with you through any barriers and develop a care plan that helps coordinate and strengthen the communication between you and your care team.  Our services are voluntary and are offered without charge to you personally.    Please feel free to contact me with any questions or concerns regarding care coordination and what we can offer.      We are focused on providing you with the highest-quality healthcare experience possible.    Sincerely,     Ivone Wahl,   [facility]  [phone]

## 2024-08-02 NOTE — PROGRESS NOTES
[facility]/Voicemail    Clinical Data: Care Coordinator Outreach    Outreach Documentation Number of Outreach Attempt   7/18/2024   1:19 PM 1   8/2/2024   2:31 PM 2       Left message on patient's voicemail with call back information and requested return call.    Plan: Care Coordinator will send care coordination introduction letter with care coordinator contact information and explanation of care coordination services via mail. Care Coordinator will do no further outreaches at this time.    Ivone Wahl,   [facility]  [phone]

## 2024-08-05 ENCOUNTER — HOSPITAL ENCOUNTER (OUTPATIENT)
Dept: GENERAL RADIOLOGY | Facility: HOSPITAL | Age: 59
Discharge: HOME | End: 2024-08-05
Attending: INTERNAL MEDICINE | Admitting: INTERNAL MEDICINE
Payer: MEDICAID

## 2024-08-05 ENCOUNTER — OFFICE VISIT (OUTPATIENT)
Dept: INTERNAL MEDICINE | Facility: CLINIC | Age: 59
End: 2024-08-05
Payer: MEDICAID

## 2024-08-05 VITALS
DIASTOLIC BLOOD PRESSURE: 70 MMHG | HEIGHT: 67 IN | TEMPERATURE: 98.3 F | SYSTOLIC BLOOD PRESSURE: 126 MMHG | BODY MASS INDEX: 29.51 KG/M2 | WEIGHT: 188 LBS | HEART RATE: 90 BPM | RESPIRATION RATE: 17 BRPM

## 2024-08-05 DIAGNOSIS — M19.079 ARTHRITIS OF FOOT: ICD-10-CM

## 2024-08-05 DIAGNOSIS — E11.42 DIABETIC PERIPHERAL NEUROPATHY (H): Primary | ICD-10-CM

## 2024-08-05 DIAGNOSIS — M79.661 PAIN OF RIGHT LOWER LEG: ICD-10-CM

## 2024-08-05 DIAGNOSIS — R06.83 SNORING: ICD-10-CM

## 2024-08-05 DIAGNOSIS — R06.00 NOCTURNAL DYSPNEA: ICD-10-CM

## 2024-08-05 DIAGNOSIS — J45.40 MODERATE PERSISTENT ASTHMA WITHOUT COMPLICATION: Chronic | ICD-10-CM

## 2024-08-05 DIAGNOSIS — R06.02 SHORTNESS OF BREATH: ICD-10-CM

## 2024-08-05 DIAGNOSIS — J44.9 CHRONIC OBSTRUCTIVE PULMONARY DISEASE, UNSPECIFIED COPD TYPE (H): ICD-10-CM

## 2024-08-05 DIAGNOSIS — R06.81 APNEA SPELL: ICD-10-CM

## 2024-08-05 PROCEDURE — G2211 COMPLEX E/M VISIT ADD ON: HCPCS | Performed by: INTERNAL MEDICINE

## 2024-08-05 PROCEDURE — 99214 OFFICE O/P EST MOD 30 MIN: CPT | Performed by: INTERNAL MEDICINE

## 2024-08-05 PROCEDURE — 73552 X-RAY EXAM OF FEMUR 2/>: CPT | Mod: RT

## 2024-08-05 PROCEDURE — 73590 X-RAY EXAM OF LOWER LEG: CPT | Mod: RT

## 2024-08-05 RX ORDER — KETOROLAC TROMETHAMINE 10 MG/1
10 TABLET, FILM COATED ORAL EVERY 6 HOURS PRN
Qty: 20 TABLET | Refills: 0 | Status: SHIPPED | OUTPATIENT
Start: 2024-08-05 | End: 2024-08-26

## 2024-08-05 RX ORDER — IPRATROPIUM BROMIDE AND ALBUTEROL SULFATE 2.5; .5 MG/3ML; MG/3ML
1 SOLUTION RESPIRATORY (INHALATION) EVERY 6 HOURS PRN
Qty: 90 ML | Refills: 3 | Status: SHIPPED | OUTPATIENT
Start: 2024-08-05 | End: 2024-08-26

## 2024-08-05 RX ORDER — METHOCARBAMOL 750 MG/1
750 TABLET, FILM COATED ORAL 2 TIMES DAILY
Qty: 60 TABLET | Refills: 11 | Status: SHIPPED | OUTPATIENT
Start: 2024-08-05

## 2024-08-05 RX ORDER — FLUTICASONE PROPIONATE AND SALMETEROL 250; 50 UG/1; UG/1
1 POWDER RESPIRATORY (INHALATION) EVERY 12 HOURS
Qty: 60 EACH | Refills: 11 | Status: SHIPPED | OUTPATIENT
Start: 2024-08-05 | End: 2024-08-26

## 2024-08-05 ASSESSMENT — PAIN SCALES - GENERAL: PAINLEVEL: WORST PAIN (10)

## 2024-08-06 ENCOUNTER — TELEPHONE (OUTPATIENT)
Dept: SLEEP MEDICINE | Facility: CLINIC | Age: 59
End: 2024-08-06
Payer: MEDICAID

## 2024-08-06 ENCOUNTER — TELEPHONE (OUTPATIENT)
Dept: INTERNAL MEDICINE | Facility: CLINIC | Age: 59
End: 2024-08-06
Payer: MEDICAID

## 2024-08-06 DIAGNOSIS — G89.29 CHRONIC PAIN OF RIGHT ANKLE: ICD-10-CM

## 2024-08-06 DIAGNOSIS — M79.671 RIGHT FOOT PAIN: Primary | ICD-10-CM

## 2024-08-06 DIAGNOSIS — M25.571 CHRONIC PAIN OF RIGHT ANKLE: ICD-10-CM

## 2024-08-06 DIAGNOSIS — R26.2 DIFFICULTY WALKING: ICD-10-CM

## 2024-08-06 NOTE — TELEPHONE ENCOUNTER
Please call patient -    ______________________________________________________________________     Home Phone:  [phone] (home)     Cell phone:   Telephone Information:   Mobile [phone]     ______________________________________________________________________     Her x-rays shows some arthritis in the right knee as well.  Her hip shows minimal arthritis.    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    I do think a lot of her pain still has to do with the nerve pain she has had in the past.    It still seems a lot of her pain is mainly in the foot and the ankle.    We could consider doing and MRI scan of the foot and the ankle to look at this more OR she could see a foot orthopedist or podiatrist to look at this further.    Let me know how she would like to proceed.    Kike David MD  [facility]  8/6/2024, 12:08 PM   ______________________________________________________________________    Pertinent radiology for this visit includes the following:    XR Femur Right 2 Views  Narrative: EXAM: XR FEMUR RIGHT 2 VIEWS, XR TIBIA AND FIBULA RIGHT 2 VIEWS  LOCATION: [facility]  DATE: 8/5/2024    INDICATION:  Pain of right lower leg  COMPARISON: None.  Impression: IMPRESSION: The right hip is negative for fracture. There is mild degenerative change. The right femur is negative for fracture. Degenerative change at the knee joint, primarily within the medial compartment. The tibia and fibula are negative for   fracture. Degenerative change within the patellofemoral compartment. No significant effusion. Minimal plantar calcaneus spurring.  XR Tibia and Fibula Right 2 Views  Narrative: EXAM: XR FEMUR RIGHT 2 VIEWS, XR TIBIA AND FIBULA RIGHT 2 VIEWS  LOCATION: [facility]  DATE: 8/5/2024    INDICATION:  Pain of right lower leg  COMPARISON: None.  Impression: IMPRESSION: The right hip is negative for fracture. There is mild  degenerative change. The right femur is negative for fracture. Degenerative change at the knee joint, primarily within the medial compartment. The tibia and fibula are negative for   fracture. Degenerative change within the patellofemoral compartment. No significant effusion. Minimal plantar calcaneus spurring.      ______________________________________________________________________

## 2024-08-06 NOTE — TELEPHONE ENCOUNTER
LMTCB for patient. If call is returned, please relay PCP's result message below and may route back to provider with patient response.     Thank you

## 2024-08-06 NOTE — LETTER
"August 7, 2024      Sarah CLARK Josiah  [address]   [place] [address]        Dear ,    We are writing to inform you of your test results.    \"Her x-rays shows some arthritis in the right knee as well.  Her hip shows minimal arthritis.     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     I do think a lot of her pain still has to do with the nerve pain she has had in the past.     It still seems a lot of her pain is mainly in the foot and the ankle.     We could consider doing and MRI scan of the foot and the ankle to look at this more OR she could see a foot orthopedist or podiatrist to look at this further.\"    Please call clinic at [phone] with your decision on how you want to proceed.     Resulted Orders   XR Femur Right 2 Views    Narrative    EXAM: XR FEMUR RIGHT 2 VIEWS, XR TIBIA AND FIBULA RIGHT 2 VIEWS  LOCATION: [facility]  DATE: 8/5/2024    INDICATION:  Pain of right lower leg  COMPARISON: None.      Impression    IMPRESSION: The right hip is negative for fracture. There is mild degenerative change. The right femur is negative for fracture. Degenerative change at the knee joint, primarily within the medial compartment. The tibia and fibula are negative for   fracture. Degenerative change within the patellofemoral compartment. No significant effusion. Minimal plantar calcaneus spurring.       If you have any questions or concerns, please call the clinic at the number listed above.       Sincerely,  Dr. David                "

## 2024-08-06 NOTE — TELEPHONE ENCOUNTER
Reason for Call:  Appointment Request    Patient requesting this type of appt:  Sleep consult    Requested provider:  n/a    Reason patient unable to be scheduled: Needs to be scheduled by clinic    When does patient want to be seen/preferred time:  High priority     Comments: Please call patient to scheduled. She was very upset that next appointment was in November.     Okay to leave a detailed message?: Yes at Cell number on file:    Telephone Information:   Mobile [phone]       Call taken on 8/6/2024 at 11:29 AM by Courtney Green

## 2024-08-07 NOTE — PROGRESS NOTES
[facility]    Comprehensive and complex medical care - Chronic disease management - Shared decision making - Care coordination - Compassionate care    Patient advocacy - Rational deprescribing - Minimally disruptive medicine - Ethical focus - Customized care         Date of Service: 8/5/2024  Primary Provider: Kike David    Patient Care Team:  Kike David MD as PCP - General (Internal Medicine)  Kike David MD as Assigned PCP  Kike David MD (Internal Medicine)  Fortunato Kendrick MD as Assigned Neuroscience Provider          Patient's Pharmacy:    [place] [id number] [facility]  [address]  [address]  [facility] [place] [address]  Phone: [phone] Fax: [phone]     Patient's Contacts:  Name Home Phone Work Phone Mobile Phone Relationship Lgl Jayce SANTANA   [phone] Spouse    SALONI MORALES [phone]   Friend      Patient's Insurance:    Pay[place] / Plan[place] / Product Type: Medicaid /           Active Problem List:  Problem List as of 8/5/2024 Reviewed: 3/1/2024  2:07 PM by Kike David MD         High    CAD -- S/P CABG    Type 2 diabetes mellitus with hyperglycemia, with long-term current use of insulin (H)    Last Assessment & Plan 6/7/2022 Office Visit Edited 6/10/2022  8:06 AM by Mara Scott NP     Type 2 diabetes is not well controlled but she is using the Lantus injection regularly.  She ran out of the freestyle hosea sensors so she does not have any recent glucose readings to base medication adjustments on at this time.  She was prescribed Trulicity in the past but was nervous about taking this medication so she never started it.  We reviewed possible side effects associated with the medication and what she may expect in the first 2 weeks of taking this medication.  She would be comfortable trying this medication again, Trulicity 0.75 mg weekly sent  to her pharmacy.  She is advised that this is a small dose and will need to be gradually increased in order for her to obtain maximal benefit from the medication.  She will be scheduled for a follow-up appointment and establish care visit with another provider in the next 4 weeks.  Of note, she would likely benefit from increasing her dose of metformin back to maximal dose but was concerned that this caused her to have increased urinary frequency.  We did not address this today.         Cerebrovascular accident (CVA) due to stenosis of carotid artery (H)    Smoker       Medium    Carotid stenosis, left    Primary hypertension    Moderate persistent asthma    Chronic pain syndrome    Right renal artery stenosis (H24)    Basilar artery stenosis    Schizoaffective disorder (H)    Chronic obstructive pulmonary disease (H)    Anemia    Depression with anxiety    Myelomalacia of cervical cord (H)    Atypical chest pain -- Normal NM stress 5/8/23    Syncope    Acute CVA (cerebrovascular accident) (H)    Intracranial atherosclerosis    Syncope and collapse    Right leg weakness    Chest pain, unspecified type    Unstable angina (H)    Difficulty walking       Low    Bilateral low back pain with right-sided sciatica, unspecified chronicity    Last Assessment & Plan 6/7/2022 Office Visit Written 6/10/2022  8:01 AM by Mara Scott, ANDREEA     Because of her uncontrolled diabetes, she is not a good candidate to use steroid medication to treat lumbar radiculopathy.  She is familiar with the effects of a steroid medication on her blood sugar and understands this conundrum.  Fortunately, she does experience some relief when she is given Toradol in the emergency department.  We discussed trial of a nonsteroidal anti-inflammatory medication, diclofenac 3 times daily.  She is open to this idea so this is sent to her pharmacy.  She is advised that she can continue with acetaminophen as well.         Nasal polyp    Last Assessment & Plan  6/7/2022 Office Visit Written 6/10/2022  8:03 AM by Mara Scott NP     Advised fluticasone nasal spray 1 spray each nostril daily.  She declines a referral to ENT, she has had a nasal polyp in the past which responded to fluticasone.         Mixed hyperlipidemia    GERD (gastroesophageal reflux disease)    History of drug abuse (H)    Hx of syphilis    Menopausal flushing    Nephrolithiasis    Obesity    Seasonal allergies    Sensorineural hearing loss (SNHL) of right ear    Frailty    Diabetic peripheral neuropathy (H)        Current Outpatient Medications   Medication Instructions    acetaminophen (TYLENOL) 500-1,000 mg, Oral, EVERY 8 HOURS PRN, Max 5.5 tabs per day    albuterol (PROAIR HFA) 108 (90 Base) MCG/ACT inhaler 1-2 puffs, Inhalation, EVERY 4 HOURS PRN    amLODIPine (NORVASC) 2.5 mg, Oral, DAILY    aspirin 81 mg, Oral, DAILY    atorvastatin (LIPITOR) 40 mg, Oral, EVERY EVENING    buprenorphine HCl-naloxone HCl (SUBOXONE) 2-0.5 MG per film 1 Film, Sublingual, 3 TIMES DAILY    clopidogrel (PLAVIX) 75 mg, Oral, DAILY    Continuous Blood Gluc  (DEXCOM G6 ) ROSE Use to read blood sugars as per 's instructions.    Continuous Blood Gluc Transmit (DEXCOM G6 TRANSMITTER) MISC Change every 3 months.    Continuous Glucose Sensor (DEXCOM G6 SENSOR) MISC Change every 10 days.    Dexpanthenol 2 % CREA Apply externally, DAILY    diazepam (VALIUM) 5 mg, Oral, ONCE PRN, Take 30 minutes before flight.    diclofenac (VOLTAREN) 4 g, Topical, 4 TIMES DAILY    diphenhydrAMINE (BENADRYL) 25 mg, Oral, EVERY 6 HOURS PRN    dulaglutide (TRULICITY) 0.75 mg, Subcutaneous, EVERY 7 DAYS    DULoxetine (CYMBALTA) 60 mg, Oral, 2 TIMES DAILY    fexofenadine (ALLEGRA) 180 mg, Oral, DAILY PRN    fluticasone (FLONASE) 50 MCG/ACT nasal spray 1 spray, Both Nostrils, 2 TIMES DAILY    fluticasone-salmeterol (ADVAIR) 250-50 MCG/ACT inhaler 1 puff, Inhalation, EVERY 12 HOURS    gabapentin (NEURONTIN) 300 mg, Oral, 3  TIMES DAILY    glipiZIDE (GLUCOTROL XL) 10 mg, Oral, DAILY    guaiFENesin (MUCINEX) 600 mg, Oral, 2 TIMES DAILY PRN    insulin glargine (LANTUS PEN) 35-40 Units, Subcutaneous, 2 TIMES DAILY    ipratropium - albuterol 0.5 mg/2.5 mg/3 mL (DUONEB) 0.5-2.5 (3) MG/3ML neb solution 3 mLs, Nebulization, EVERY 6 HOURS PRN    ketorolac (TORADOL) 10 mg, Oral, EVERY 6 HOURS PRN    lidocaine (LIDODERM) 5 % patch Transdermal, EVERY 24 HOURS, To prevent lidocaine toxicity, patient should be patch free for 12 hrs daily.    lidocaine (LMX4) 4 % external cream Topical, DAILY, Apply to foot    LORazepam (ATIVAN) 0.5 mg, Oral, EVERY 6 HOURS PRN    methocarbamol (ROBAXIN) 750 mg, Oral, 2 TIMES DAILY    montelukast (SINGULAIR) 10 mg, Oral, DAILY    nabumetone (RELAFEN) 500 mg, Oral, 2 TIMES DAILY    naloxone (NARCAN) 4 mg, Alternating Nostrils, PRN, every 2-3 minutes until assistance arrives    nitroGLYcerin (NITROSTAT) 0.4 MG sublingual tablet FOR CHEST PAIN PLACE 1 TABLET UNDER THE TONGUE EVERY 5 MINUTES FOR 3 DOSES IF NEEDED. IF SYMPTOMS PERSIST 5 MINUTES AFTER FIRST DOSE CALL 911.    omeprazole (PRILOSEC) 40 mg, Oral, DAILY, To protect your stomach.    ondansetron (ZOFRAN) 8 mg, Oral, EVERY 8 HOURS PRN    oxyCODONE-acetaminophen (PERCOCET)  MG per tablet 1 tablet, Oral, EVERY 6 HOURS PRN    predniSONE (DELTASONE) 20 MG tablet Take two tablets (= 40mg) each day for 5 (five) days    topiramate (TOPAMAX) 100 mg, Oral, 2 TIMES DAILY    triamcinolone (KENALOG) 0.1 % external ointment Topical, 2 TIMES DAILY, Apply twice a day to vaginal rash until resolved    triamterene-HCTZ (DYAZIDE) 37.5-25 MG capsule 1 capsule, Oral, EVERY MORNING     Social History     Social History Narrative    Lives alone in a condo.          On disability.  Three living children.          Has one small dog as her .        Has personal care attendant (PCA) services during the daytime and sometimes in the evening.       Subjective:     Sarah E  "Josiah is a 58 year old female who comes in today for:    Chief Complaint   Patient presents with    Follow Up     Went to the ER for breathing troubles. Was told she needed a breathing machine (neb), and is upset PCP did not order that. Would like sleep study ordered.     Breathing Problem     Breathing trouble when laying down.     Musculoskeletal Problem     Right leg pain and \"hot\" feeling in calf. Would like imaging done. 10/10 pain. Has been getting worse over two weeks.           8/5/2024    12:35 PM   Additional Questions   Roomed by Maynor MCRAE CMA     Accompanied by dog.    First reviewed her breathing.  She does need a new nebulizer machine as her old one does not work.    This was reviewed with her.  Recently in the emergency room (ER) with breathing issues.    Next reviewed her sleeping and issues here.  Does have apnea symptoms in the hospital and would like to follow up on this.  Concerned she might need continuous positive airway pressure (CPAP).    Reviewed her diabetes and she does need her CGM applied today.    She does have ongoing issues with right leg pain and in particular the right ankle and foot.  This has been severe.  Also goes to pain clinic for this.  Would like x-rays done today in relationship to this.  Has noted swelling in the leg and feels hot or burning to her.    Has difficulty walking with this.  This was noted on exam today.    She is off of carvedilol (Coreg) and has not had any syncopal episodes since being off it.    We reviewed her other issues noted in the assessment but not specifically addressed in the HPI above.     Objective:     Wt Readings from Last 3 Encounters:   08/05/24 85.3 kg (188 lb)   07/08/24 84.4 kg (186 lb)   05/30/24 81.6 kg (180 lb)     BP Readings from Last 3 Encounters:   08/05/24 126/70   07/23/24 (!) 144/77   07/08/24 (!) 154/84     /70 (BP Location: Right arm, Patient Position: Sitting, Cuff Size: Adult Regular)   Pulse 90   Temp 98.3  F (36.8 " " C) (Oral)   Resp 17   Ht 1.702 m (5' 7\")   Wt 85.3 kg (188 lb)   LMP  (LMP Unknown)   BMI 29.44 kg/m     The patient is comfortable, no acute distress.  Mood good.  Insight stable  Eyes are nonicteric. Gait is limping related to the right leg but mainly the right ankle and foot.  Some swelling mild noted.  No signs of cellulitis..      Diagnostics:     Admission on 07/23/2024, Discharged on 07/23/2024   Component Date Value Ref Range Status    INR 07/23/2024 0.99  0.85 - 1.15 Final    Sodium 07/23/2024 140  135 - 145 mmol/L Final    Potassium 07/23/2024 4.2  3.4 - 5.3 mmol/L Final    Chloride 07/23/2024 107  98 - 107 mmol/L Final    Carbon Dioxide (CO2) 07/23/2024 20 (L)  22 - 29 mmol/L Final    Anion Gap 07/23/2024 13  7 - 15 mmol/L Final    Urea Nitrogen 07/23/2024 29.8 (H)  8.0 - 23.0 mg/dL Final    Creatinine 07/23/2024 1.06 (H)  0.51 - 0.95 mg/dL Final    GFR Estimate 07/23/2024 60 (L)  >60 mL/min/1.73m2 Final    eGFR calculated using 2021 CKD-EPI equation.    Calcium 07/23/2024 9.3  8.8 - 10.4 mg/dL Final    Reference intervals for this test were updated on 7/16/2024 to reflect our healthy population more accurately. There may be differences in the flagging of prior results with similar values performed with this method. Those prior results can be interpreted in the context of the updated reference intervals.    Glucose 07/23/2024 214 (H)  70 - 99 mg/dL Final    Protein Total 07/23/2024 7.9  6.4 - 8.3 g/dL Final    Albumin 07/23/2024 4.5  3.5 - 5.2 g/dL Final    Bilirubin Total 07/23/2024 0.2  <=1.2 mg/dL Final    Alkaline Phosphatase 07/23/2024 103  40 - 150 U/L Final    AST 07/23/2024 16  0 - 45 U/L Final    ALT 07/23/2024 19  0 - 50 U/L Final    Bilirubin Direct 07/23/2024 <0.20  0.00 - 0.30 mg/dL Final    Magnesium 07/23/2024 2.3  1.7 - 2.3 mg/dL Final    Troponin T, High Sensitivity 07/23/2024 15 (H)  <=14 ng/L Final    Either a High Sensitivity Troponin T baseline (0 hours) value = 100 ng/L, or an " increase in High Sensitivity Troponin T = 7 ng/L at 2 hours compared to 0 hours (2-0 hours), suggests myocardial injury, and urgent clinical attention is required.    If the 2-0 hours increase is <7 ng/L, a High Sensitivity Troponin T result above gender-specific reference ranges warrants further evaluation.   Recommendations for further evaluation include correlation with clinical decision-making tool (e.g., HEART), a 3rd High Sensitivity Troponin T test 2 hours after the 2nd (a 20% change from baseline would represent concern), admission for observation, close PCC/cardiology follow-up, or urgent outpatient provocative testing.    pH Venous 07/23/2024 7.30 (L)  7.32 - 7.43 Final    pCO2 Venous 07/23/2024 34 (L)  40 - 50 mm Hg Final    pO2 Venous 07/23/2024 42  25 - 47 mm Hg Final    Bicarbonate Venous 07/23/2024 17 (L)  21 - 28 mmol/L Final    Base Excess/Deficit Venous 07/23/2024 -9.9 (L)  -3.0 - 3.0 mmol/L Final    FIO2 07/23/2024 26   Final    Oxyhemoglobin Venous 07/23/2024 71  70 - 75 % Final    O2 Sat, Venous 07/23/2024 73.5  70.0 - 75.0 % Final    N terminal Pro BNP Inpatient 07/23/2024 184  0 - 900 pg/mL Final    Reference range shown and results flagged as abnormal are suggested inpatient cut points for confirming diagnosis if CHF in an acute setting. Establishing a baseline value for each individual patient is useful for follow-up. An inpatient or emergency department NT-proPBNP <300 pg/mL effectively rules out acute CHF, with 99% negative predictive value.    The outpatient non-acute reference range for ruling out CHF is:  0-125 pg/mL (age 18 to less than 75)  0-450 pg/mL (age 75 yrs and older)     Influenza A PCR 07/23/2024 Negative  Negative Final    Influenza B PCR 07/23/2024 Negative  Negative Final    RSV PCR 07/23/2024 Negative  Negative Final    SARS CoV2 PCR 07/23/2024 Negative  Negative Final    NEGATIVE: SARS-CoV-2 (COVID-19) RNA not detected, presumed negative.    Systolic Blood Pressure  07/23/2024 143  mmHg Final    Diastolic Blood Pressure 07/23/2024 70  mmHg Final    Ventricular Rate 07/23/2024 92  BPM Final    Atrial Rate 07/23/2024 92  BPM Final    OK Interval 07/23/2024 170  ms Final    QRS Duration 07/23/2024 80  ms Final    QT 07/23/2024 384  ms Final    QTc 07/23/2024 474  ms Final    P Axis 07/23/2024 76  degrees Final    R AXIS 07/23/2024 56  degrees Final    T Axis 07/23/2024 80  degrees Final    Interpretation ECG 07/23/2024    Final                    Value:Sinus rhythm  Normal ECG  When compared with ECG of 30-MAY-2024 14:10,  No significant change was found  Confirmed by SEE ED PROVIDER NOTE FOR, ECG INTERPRETATION ([id number]),  EVERETT MCCARTHY ([id number]) on 7/23/2024 8:21:49 PM      CRP Inflammation 07/23/2024 3.20  <5.00 mg/L Final    Procalcitonin 07/23/2024 0.07  <0.50 ng/mL Final    Comment: Interpretation and Recommendations     <0.5 ng/mL:   Systemic bacterial infection unlikely. Local bacterial infection is possible.     0.5-1.99 ng/mL:   Systemic bacterial infection possible, but various other conditions are known to induce PCT as well.     >=2.00 ng/mL:   Systemic bacterial infection likely, unless other causes are known.     Decision to start antibiotics should not be based on procalcitonin level alone. See Procalcitonin Guidance document for more details. [email or web address]-guidance-[email or web address]    Factors that may affect PCT levels (not all-inclusive):     - Increased PCT level           Severe trauma/burns           Invasive surgery           Cooling therapy after cardiac arrest/surgery           Treatment with agents which stimulate cytokines           Acute kidney injury           Chronic kidney disease and end stage renal disease           Acute graft vs host disease           Non-specific shock                            causing decreased organ perfusion and/or infarction       - Normal or unchanged PCT level            Early in infections (if low and infection is suspected, repeating in 6-12 hours is recommended)           Chronic infections (endocarditis, osteomyelitis, prosthetic device/graft infections)           Localized infections (cellulitis, wound infections, intra-abdominal abscess)     Note: PCT has not been extensively studied in pregnancy/breastfeeding, pediatrics, severe immunosuppression, and cystic fibrosis.    WBC Count 07/23/2024 5.9  4.0 - 11.0 10e3/uL Final    RBC Count 07/23/2024 4.33  3.80 - 5.20 10e6/uL Final    Hemoglobin 07/23/2024 11.7  11.7 - 15.7 g/dL Final    Hematocrit 07/23/2024 38.1  35.0 - 47.0 % Final    MCV 07/23/2024 88  78 - 100 fL Final    MCH 07/23/2024 27.0  26.5 - 33.0 pg Final    MCHC 07/23/2024 30.7 (L)  31.5 - 36.5 g/dL Final    RDW 07/23/2024 12.7  10.0 - 15.0 % Final    Platelet Count 07/23/2024 248  150 - 450 10e3/uL Final    % Neutrophils 07/23/2024 53  % Final    % Lymphocytes 07/23/2024 37  % Final    % Monocytes 07/23/2024 4  % Final    % Eosinophils 07/23/2024 5  % Final    % Basophils 07/23/2024 1  % Final    % Immature Granulocytes 07/23/2024 0  % Final    NRBCs per 100 WBC 07/23/2024 0  <1 /100 Final    Absolute Neutrophils 07/23/2024 3.1  1.6 - 8.3 10e3/uL Final    Absolute Lymphocytes 07/23/2024 2.2  0.8 - 5.3 10e3/uL Final    Absolute Monocytes 07/23/2024 0.2  0.0 - 1.3 10e3/uL Final    Absolute Eosinophils 07/23/2024 0.3  0.0 - 0.7 10e3/uL Final    Absolute Basophils 07/23/2024 0.1  0.0 - 0.2 10e3/uL Final    Absolute Immature Granulocytes 07/23/2024 0.0  <=0.4 10e3/uL Final    Absolute NRBCs 07/23/2024 0.0  10e3/uL Final    Hold Specimen 07/23/2024 [facility]   Final    Hold Specimen 07/23/2024 [facility]   Final    Hold Specimen 07/23/2024 [facility]   Final    GLUCOSE BY METER POCT 07/23/2024 182 (H)  70 - 99 mg/dL Final       Results for orders placed or performed during the hospital encounter of 08/05/24   XR Tibia and Fibula Right 2 Views     Status: None    Narrative    EXAM: XR  FEMUR RIGHT 2 VIEWS, XR TIBIA AND FIBULA RIGHT 2 VIEWS  LOCATION: [facility]  DATE: 8/5/2024    INDICATION:  Pain of right lower leg  COMPARISON: None.      Impression    IMPRESSION: The right hip is negative for fracture. There is mild degenerative change. The right femur is negative for fracture. Degenerative change at the knee joint, primarily within the medial compartment. The tibia and fibula are negative for   fracture. Degenerative change within the patellofemoral compartment. No significant effusion. Minimal plantar calcaneus spurring.   XR Femur Right 2 Views     Status: None    Narrative    EXAM: XR FEMUR RIGHT 2 VIEWS, XR TIBIA AND FIBULA RIGHT 2 VIEWS  LOCATION: [facility]  DATE: 8/5/2024    INDICATION:  Pain of right lower leg  COMPARISON: None.      Impression    IMPRESSION: The right hip is negative for fracture. There is mild degenerative change. The right femur is negative for fracture. Degenerative change at the knee joint, primarily within the medial compartment. The tibia and fibula are negative for   fracture. Degenerative change within the patellofemoral compartment. No significant effusion. Minimal plantar calcaneus spurring.       Assessment:     1. Diabetic peripheral neuropathy (H)    2. Chronic obstructive pulmonary disease, unspecified COPD type (H)    3. Moderate persistent asthma without complication    4. Nocturnal dyspnea    5. Snoring    6. Apnea spell    7. Arthritis of foot    8. Pain of right lower leg    9. Shortness of breath        Plan:     Keep off carvedilol (Coreg) at this time.  X-ray of the right leg.  Suspect pain in the leg neuropathic but consider MRI scan of the ankle and the foot as likely there is a concomitant issue here contributing to the pain.  Nebulizer and supplies done today.  Sleep referral.  Continue current medications otherwise.  Follow up sooner if issues.    Orders Placed This Encounter   Procedures     XR Tibia and Fibula Right 2 Views    XR Femur Right 2 Views    Adult Sleep Eval & Management  Referral    Nebulizer and Supplies Order for DME - ONLY FOR DME      Orders Placed This Encounter   Medications    fluticasone-salmeterol (ADVAIR) 250-50 MCG/ACT inhaler     Sig: Inhale 1 puff into the lungs every 12 hours     Dispense:  60 each     Refill:  11    ketorolac (TORADOL) 10 MG tablet     Sig: Take 1 tablet (10 mg) by mouth every 6 hours as needed for moderate pain     Dispense:  20 tablet     Refill:  0    methocarbamol (ROBAXIN) 750 MG tablet     Sig: Take 1 tablet (750 mg) by mouth 2 times daily     Dispense:  60 tablet     Refill:  11    ipratropium - albuterol 0.5 mg/2.5 mg/3 mL (DUONEB) 0.5-2.5 (3) MG/3ML neb solution     Sig: Take 1 vial (3 mLs) by nebulization every 6 hours as needed for shortness of breath, wheezing or cough     Dispense:  90 mL     Refill:  3            Kike David MD  General Internal Medicine  [facility]    The longitudinal plan of care for the diagnoses and conditions as documented were addressed during this visit. Due to the added complexity in care, I will continue to support Sarah in the subsequent management and with ongoing continuity of care.     Return in about 2 weeks (around 8/19/2024) for follow up visit.     Future Appointments   Date Time Provider Department Center   8/19/2024 12:30 PM Kike David MD [facility]

## 2024-08-07 NOTE — PATIENT INSTRUCTIONS
Future Appointments   Date Time Provider Department Center   8/19/2024 12:30 PM [facility]

## 2024-08-08 NOTE — TELEPHONE ENCOUNTER
"Patient returning missed call. Writer reviewed result message below to patient.     Patient states she would like to go through with MRI.     Patient states she is requesting PCP to order her \"something for pain\" ASAP/today.     Writer relayed would get message sent back to PCP for review.     Patient requesting this message sent to PCP ASAP and wants a call back.   "

## 2024-08-08 NOTE — TELEPHONE ENCOUNTER
Pt called back, relayed PCPs message to her and she started freaking out. Stating she is in so much pain and needs these pain medications, pt would not let me talk as she stated she will walk over here and we will have to call the police on her if she can't get what she wants / needs. Did transfer her to imaging to schedule her MRI.

## 2024-08-08 NOTE — TELEPHONE ENCOUNTER
She needs to go through the pain clinic for additional pain medications, they are in charge of this beyond the things I have already prescribed.    MRI scan ordered.    Kike David MD  General Internal Medicine  [facility]  8/8/2024, 7:50 AM

## 2024-08-26 ENCOUNTER — APPOINTMENT (OUTPATIENT)
Dept: RADIOLOGY | Facility: HOSPITAL | Age: 59
End: 2024-08-26
Attending: STUDENT IN AN ORGANIZED HEALTH CARE EDUCATION/TRAINING PROGRAM
Payer: MEDICAID

## 2024-08-26 ENCOUNTER — APPOINTMENT (OUTPATIENT)
Dept: CT IMAGING | Facility: HOSPITAL | Age: 59
End: 2024-08-26
Attending: STUDENT IN AN ORGANIZED HEALTH CARE EDUCATION/TRAINING PROGRAM
Payer: MEDICAID

## 2024-08-26 ENCOUNTER — HOSPITAL ENCOUNTER (INPATIENT)
Facility: HOSPITAL | Age: 59
LOS: 4 days | Discharge: HOME HEALTH | End: 2024-08-30
Attending: STUDENT IN AN ORGANIZED HEALTH CARE EDUCATION/TRAINING PROGRAM | Admitting: INTERNAL MEDICINE
Payer: MEDICAID

## 2024-08-26 DIAGNOSIS — E11.42 DIABETIC PERIPHERAL NEUROPATHY (H): ICD-10-CM
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
## 2024-08-26 DIAGNOSIS — R07.9 CHEST PAIN, UNSPECIFIED TYPE: ICD-10-CM

## 2024-08-26 DIAGNOSIS — I63.9 ACUTE CVA (CEREBROVASCULAR ACCIDENT) (H): Primary | ICD-10-CM

## 2024-08-26 DIAGNOSIS — G89.4 CHRONIC PAIN SYNDROME: ICD-10-CM

## 2024-08-26 DIAGNOSIS — R53.1 RIGHT SIDED WEAKNESS: ICD-10-CM

## 2024-08-26 LAB
AMPHETAMINES UR QL SCN: NORMAL
ANION GAP SERPL CALCULATED.3IONS-SCNC: 12 MMOL/L (ref 7–15)
APTT PPP: 29 SECONDS (ref 22–38)
BARBITURATES UR QL SCN: NORMAL
BASOPHILS # BLD AUTO: 0 10E3/UL (ref 0–0.2)
BASOPHILS NFR BLD AUTO: 0 %
BENZODIAZ UR QL SCN: NORMAL
BUN SERPL-MCNC: 28.1 MG/DL (ref 8–23)
BZE UR QL SCN: NORMAL
CALCIUM SERPL-MCNC: 9 MG/DL (ref 8.8–10.4)
CANNABINOIDS UR QL SCN: NORMAL
CHLORIDE SERPL-SCNC: 103 MMOL/L (ref 98–107)
CHOLEST SERPL-MCNC: 218 MG/DL
CREAT SERPL-MCNC: 1.31 MG/DL (ref 0.51–0.95)
EGFRCR SERPLBLD CKD-EPI 2021: 46 ML/MIN/1.73M2
EOSINOPHIL # BLD AUTO: 0.1 10E3/UL (ref 0–0.7)
EOSINOPHIL NFR BLD AUTO: 1 %
ERYTHROCYTE [DISTWIDTH] IN BLOOD BY AUTOMATED COUNT: 13.1 % (ref 10–15)
FENTANYL UR QL: NORMAL
GLUCOSE BLDC GLUCOMTR-MCNC: 237 MG/DL (ref 70–99)
GLUCOSE BLDC GLUCOMTR-MCNC: 383 MG/DL (ref 70–99)
GLUCOSE SERPL-MCNC: 480 MG/DL (ref 70–99)
HBA1C MFR BLD: 13.1 %
HCO3 SERPL-SCNC: 21 MMOL/L (ref 22–29)
HCT VFR BLD AUTO: 30.2 % (ref 35–47)
HDLC SERPL-MCNC: 50 MG/DL
HGB BLD-MCNC: 9.5 G/DL (ref 11.7–15.7)
IMM GRANULOCYTES # BLD: 0 10E3/UL
IMM GRANULOCYTES NFR BLD: 0 %
INR PPP: 1.14 (ref 0.85–1.15)
LDLC SERPL CALC-MCNC: 131 MG/DL
LYMPHOCYTES # BLD AUTO: 1.9 10E3/UL (ref 0.8–5.3)
LYMPHOCYTES NFR BLD AUTO: 19 %
MCH RBC QN AUTO: 27.7 PG (ref 26.5–33)
MCHC RBC AUTO-ENTMCNC: 31.5 G/DL (ref 31.5–36.5)
MCV RBC AUTO: 88 FL (ref 78–100)
MONOCYTES # BLD AUTO: 0.7 10E3/UL (ref 0–1.3)
MONOCYTES NFR BLD AUTO: 7 %
NEUTROPHILS # BLD AUTO: 7.5 10E3/UL (ref 1.6–8.3)
NEUTROPHILS NFR BLD AUTO: 73 %
NONHDLC SERPL-MCNC: 168 MG/DL
NRBC # BLD AUTO: 0 10E3/UL
NRBC BLD AUTO-RTO: 0 /100
OPIATES UR QL SCN: NORMAL
PCP QUAL URINE (ROCHE): NORMAL
PLATELET # BLD AUTO: 231 10E3/UL (ref 150–450)
POTASSIUM SERPL-SCNC: 4.4 MMOL/L (ref 3.4–5.3)
RBC # BLD AUTO: 3.43 10E6/UL (ref 3.8–5.2)
SODIUM SERPL-SCNC: 136 MMOL/L (ref 135–145)
TRIGL SERPL-MCNC: 183 MG/DL
TROPONIN T SERPL HS-MCNC: 21 NG/L
TROPONIN T SERPL HS-MCNC: 23 NG/L
TROPONIN T SERPL HS-MCNC: 26 NG/L
WBC # BLD AUTO: 10.2 10E3/UL (ref 4–11)

## 2024-08-26 PROCEDURE — 120N000001 HC R&B MED SURG/OB

## 2024-08-26 PROCEDURE — 71046 X-RAY EXAM CHEST 2 VIEWS: CPT

## 2024-08-26 PROCEDURE — 36415 COLL VENOUS BLD VENIPUNCTURE: CPT | Performed by: STUDENT IN AN ORGANIZED HEALTH CARE EDUCATION/TRAINING PROGRAM

## 2024-08-26 PROCEDURE — 250N000013 HC RX MED GY IP 250 OP 250 PS 637: Performed by: STUDENT IN AN ORGANIZED HEALTH CARE EDUCATION/TRAINING PROGRAM

## 2024-08-26 PROCEDURE — 80061 LIPID PANEL: CPT | Performed by: INTERNAL MEDICINE

## 2024-08-26 PROCEDURE — 250N000011 HC RX IP 250 OP 636: Performed by: STUDENT IN AN ORGANIZED HEALTH CARE EDUCATION/TRAINING PROGRAM

## 2024-08-26 PROCEDURE — 36415 COLL VENOUS BLD VENIPUNCTURE: CPT | Performed by: INTERNAL MEDICINE

## 2024-08-26 PROCEDURE — 99285 EMERGENCY DEPT VISIT HI MDM: CPT | Mod: 25

## 2024-08-26 PROCEDURE — 85610 PROTHROMBIN TIME: CPT | Performed by: STUDENT IN AN ORGANIZED HEALTH CARE EDUCATION/TRAINING PROGRAM

## 2024-08-26 PROCEDURE — 85730 THROMBOPLASTIN TIME PARTIAL: CPT | Performed by: STUDENT IN AN ORGANIZED HEALTH CARE EDUCATION/TRAINING PROGRAM

## 2024-08-26 PROCEDURE — 93005 ELECTROCARDIOGRAM TRACING: CPT | Performed by: STUDENT IN AN ORGANIZED HEALTH CARE EDUCATION/TRAINING PROGRAM

## 2024-08-26 PROCEDURE — G0508 CRIT CARE TELEHEA CONSULT 60: HCPCS | Mod: GC | Performed by: STUDENT IN AN ORGANIZED HEALTH CARE EDUCATION/TRAINING PROGRAM

## 2024-08-26 PROCEDURE — 85025 COMPLETE CBC W/AUTO DIFF WBC: CPT | Performed by: STUDENT IN AN ORGANIZED HEALTH CARE EDUCATION/TRAINING PROGRAM

## 2024-08-26 PROCEDURE — 82374 ASSAY BLOOD CARBON DIOXIDE: CPT | Performed by: STUDENT IN AN ORGANIZED HEALTH CARE EDUCATION/TRAINING PROGRAM

## 2024-08-26 PROCEDURE — 84484 ASSAY OF TROPONIN QUANT: CPT | Performed by: STUDENT IN AN ORGANIZED HEALTH CARE EDUCATION/TRAINING PROGRAM

## 2024-08-26 PROCEDURE — 83036 HEMOGLOBIN GLYCOSYLATED A1C: CPT | Performed by: INTERNAL MEDICINE

## 2024-08-26 PROCEDURE — 84484 ASSAY OF TROPONIN QUANT: CPT | Performed by: INTERNAL MEDICINE

## 2024-08-26 PROCEDURE — 99222 1ST HOSP IP/OBS MODERATE 55: CPT | Performed by: INTERNAL MEDICINE

## 2024-08-26 PROCEDURE — 73590 X-RAY EXAM OF LOWER LEG: CPT | Mod: RT

## 2024-08-26 PROCEDURE — 250N000013 HC RX MED GY IP 250 OP 250 PS 637: Performed by: INTERNAL MEDICINE

## 2024-08-26 PROCEDURE — 80307 DRUG TEST PRSMV CHEM ANLYZR: CPT | Performed by: STUDENT IN AN ORGANIZED HEALTH CARE EDUCATION/TRAINING PROGRAM

## 2024-08-26 PROCEDURE — 82962 GLUCOSE BLOOD TEST: CPT

## 2024-08-26 PROCEDURE — 96374 THER/PROPH/DIAG INJ IV PUSH: CPT | Mod: 59

## 2024-08-26 PROCEDURE — 70496 CT ANGIOGRAPHY HEAD: CPT

## 2024-08-26 PROCEDURE — 250N000009 HC RX 250: Performed by: STUDENT IN AN ORGANIZED HEALTH CARE EDUCATION/TRAINING PROGRAM

## 2024-08-26 PROCEDURE — 250N000012 HC RX MED GY IP 250 OP 636 PS 637: Performed by: INTERNAL MEDICINE

## 2024-08-26 PROCEDURE — 250N000011 HC RX IP 250 OP 636: Performed by: INTERNAL MEDICINE

## 2024-08-26 PROCEDURE — 250N000012 HC RX MED GY IP 250 OP 636 PS 637: Performed by: STUDENT IN AN ORGANIZED HEALTH CARE EDUCATION/TRAINING PROGRAM

## 2024-08-26 RX ORDER — ACETAMINOPHEN 650 MG/1
650 SUPPOSITORY RECTAL EVERY 4 HOURS PRN
Status: DISCONTINUED | OUTPATIENT
Start: 2024-08-26 | End: 2024-08-30 | Stop reason: HOSPADM

## 2024-08-26 RX ORDER — NICOTINE POLACRILEX 4 MG
15-30 LOZENGE BUCCAL
Status: DISCONTINUED | OUTPATIENT
Start: 2024-08-26 | End: 2024-08-30 | Stop reason: HOSPADM

## 2024-08-26 RX ORDER — LIDOCAINE 40 MG/G
CREAM TOPICAL
Status: DISCONTINUED | OUTPATIENT
Start: 2024-08-26 | End: 2024-08-30 | Stop reason: HOSPADM

## 2024-08-26 RX ORDER — ACETAMINOPHEN 325 MG/1
975 TABLET ORAL ONCE
Status: COMPLETED | OUTPATIENT
Start: 2024-08-26 | End: 2024-08-26

## 2024-08-26 RX ORDER — LIDOCAINE 4 G/G
1 PATCH TOPICAL
Status: DISCONTINUED | OUTPATIENT
Start: 2024-08-26 | End: 2024-08-30 | Stop reason: HOSPADM

## 2024-08-26 RX ORDER — DULOXETIN HYDROCHLORIDE 60 MG/1
60 CAPSULE, DELAYED RELEASE ORAL 2 TIMES DAILY
Status: DISCONTINUED | OUTPATIENT
Start: 2024-08-26 | End: 2024-08-30 | Stop reason: HOSPADM

## 2024-08-26 RX ORDER — CLINDAMYCIN PHOSPHATE 600 MG/50ML
600 INJECTION, SOLUTION INTRAVENOUS EVERY 8 HOURS
Status: DISCONTINUED | OUTPATIENT
Start: 2024-08-26 | End: 2024-08-30

## 2024-08-26 RX ORDER — METHOCARBAMOL 750 MG/1
750 TABLET, FILM COATED ORAL 2 TIMES DAILY
Status: DISCONTINUED | OUTPATIENT
Start: 2024-08-26 | End: 2024-08-30 | Stop reason: HOSPADM

## 2024-08-26 RX ORDER — ONDANSETRON 4 MG/1
4 TABLET, ORALLY DISINTEGRATING ORAL EVERY 6 HOURS PRN
Status: DISCONTINUED | OUTPATIENT
Start: 2024-08-26 | End: 2024-08-30 | Stop reason: HOSPADM

## 2024-08-26 RX ORDER — IOPAMIDOL 755 MG/ML
67 INJECTION, SOLUTION INTRAVASCULAR ONCE
Status: COMPLETED | OUTPATIENT
Start: 2024-08-26 | End: 2024-08-26

## 2024-08-26 RX ORDER — ASPIRIN 81 MG/1
81 TABLET, CHEWABLE ORAL ONCE
Status: COMPLETED | OUTPATIENT
Start: 2024-08-26 | End: 2024-08-26

## 2024-08-26 RX ORDER — ATORVASTATIN CALCIUM 40 MG/1
40 TABLET, FILM COATED ORAL EVERY EVENING
Status: DISCONTINUED | OUTPATIENT
Start: 2024-08-26 | End: 2024-08-30 | Stop reason: HOSPADM

## 2024-08-26 RX ORDER — DEXTROSE MONOHYDRATE 25 G/50ML
25-50 INJECTION, SOLUTION INTRAVENOUS
Status: DISCONTINUED | OUTPATIENT
Start: 2024-08-26 | End: 2024-08-30 | Stop reason: HOSPADM

## 2024-08-26 RX ORDER — ACETAMINOPHEN 325 MG/10.15ML
650 LIQUID ORAL EVERY 4 HOURS PRN
Status: DISCONTINUED | OUTPATIENT
Start: 2024-08-26 | End: 2024-08-30 | Stop reason: HOSPADM

## 2024-08-26 RX ORDER — HYDROMORPHONE HYDROCHLORIDE 1 MG/ML
0.5 INJECTION, SOLUTION INTRAMUSCULAR; INTRAVENOUS; SUBCUTANEOUS ONCE
Status: COMPLETED | OUTPATIENT
Start: 2024-08-26 | End: 2024-08-26

## 2024-08-26 RX ORDER — DOXYCYCLINE 100 MG/1
100 CAPSULE ORAL ONCE
Status: COMPLETED | OUTPATIENT
Start: 2024-08-26 | End: 2024-08-26

## 2024-08-26 RX ORDER — FAMOTIDINE 20 MG/1
20 TABLET, FILM COATED ORAL 2 TIMES DAILY
Status: DISCONTINUED | OUTPATIENT
Start: 2024-08-26 | End: 2024-08-30 | Stop reason: HOSPADM

## 2024-08-26 RX ORDER — ASPIRIN 81 MG/1
81 TABLET ORAL DAILY
Status: DISCONTINUED | OUTPATIENT
Start: 2024-08-27 | End: 2024-08-30 | Stop reason: HOSPADM

## 2024-08-26 RX ORDER — LORAZEPAM 0.5 MG/1
0.5 TABLET ORAL EVERY 6 HOURS PRN
Status: DISCONTINUED | OUTPATIENT
Start: 2024-08-26 | End: 2024-08-30 | Stop reason: HOSPADM

## 2024-08-26 RX ORDER — ONDANSETRON 2 MG/ML
4 INJECTION INTRAMUSCULAR; INTRAVENOUS EVERY 6 HOURS PRN
Status: DISCONTINUED | OUTPATIENT
Start: 2024-08-26 | End: 2024-08-30 | Stop reason: HOSPADM

## 2024-08-26 RX ORDER — PANTOPRAZOLE SODIUM 40 MG/1
40 TABLET, DELAYED RELEASE ORAL
Status: DISCONTINUED | OUTPATIENT
Start: 2024-08-27 | End: 2024-08-30 | Stop reason: HOSPADM

## 2024-08-26 RX ORDER — LORAZEPAM 2 MG/ML
0.5 INJECTION INTRAMUSCULAR
Status: COMPLETED | OUTPATIENT
Start: 2024-08-26 | End: 2024-08-26

## 2024-08-26 RX ORDER — ACETAMINOPHEN 325 MG/1
650 TABLET ORAL EVERY 4 HOURS PRN
Status: DISCONTINUED | OUTPATIENT
Start: 2024-08-26 | End: 2024-08-30 | Stop reason: HOSPADM

## 2024-08-26 RX ORDER — FLUTICASONE PROPIONATE 50 MCG
1 SPRAY, SUSPENSION (ML) NASAL 2 TIMES DAILY
Status: DISCONTINUED | OUTPATIENT
Start: 2024-08-26 | End: 2024-08-30 | Stop reason: HOSPADM

## 2024-08-26 RX ORDER — ALBUTEROL SULFATE 90 UG/1
1-2 AEROSOL, METERED RESPIRATORY (INHALATION) EVERY 4 HOURS PRN
Status: DISCONTINUED | OUTPATIENT
Start: 2024-08-26 | End: 2024-08-30 | Stop reason: HOSPADM

## 2024-08-26 RX ORDER — TOPIRAMATE 100 MG/1
100 TABLET, FILM COATED ORAL 2 TIMES DAILY
Status: DISCONTINUED | OUTPATIENT
Start: 2024-08-26 | End: 2024-08-30 | Stop reason: HOSPADM

## 2024-08-26 RX ORDER — LIDOCAINE 50 MG/G
1 PATCH TOPICAL EVERY 24 HOURS
Status: DISCONTINUED | OUTPATIENT
Start: 2024-08-26 | End: 2024-08-26

## 2024-08-26 RX ORDER — METRONIDAZOLE 500 MG/1
500 TABLET ORAL ONCE
Status: COMPLETED | OUTPATIENT
Start: 2024-08-26 | End: 2024-08-26

## 2024-08-26 RX ADMIN — ATORVASTATIN CALCIUM 40 MG: 40 TABLET, FILM COATED ORAL at 23:20

## 2024-08-26 RX ADMIN — METHOCARBAMOL 750 MG: 750 TABLET ORAL at 23:15

## 2024-08-26 RX ADMIN — IOPAMIDOL 67 ML: 755 INJECTION, SOLUTION INTRAVENOUS at 18:11

## 2024-08-26 RX ADMIN — INSULIN ASPART 10 UNITS: 100 INJECTION, SOLUTION INTRAVENOUS; SUBCUTANEOUS at 20:14

## 2024-08-26 RX ADMIN — CLINDAMYCIN IN 5 PERCENT DEXTROSE 600 MG: 12 INJECTION, SOLUTION INTRAVENOUS at 23:12

## 2024-08-26 RX ADMIN — HYDROMORPHONE HYDROCHLORIDE 0.5 MG: 1 INJECTION, SOLUTION INTRAMUSCULAR; INTRAVENOUS; SUBCUTANEOUS at 19:50

## 2024-08-26 RX ADMIN — ASPIRIN 81 MG CHEWABLE TABLET 81 MG: 81 TABLET CHEWABLE at 19:46

## 2024-08-26 RX ADMIN — FLUTICASONE PROPIONATE 1 SPRAY: 50 SPRAY, METERED NASAL at 22:47

## 2024-08-26 RX ADMIN — METRONIDAZOLE 500 MG: 500 TABLET ORAL at 19:46

## 2024-08-26 RX ADMIN — LORAZEPAM 0.5 MG: 2 INJECTION INTRAMUSCULAR; INTRAVENOUS at 21:33

## 2024-08-26 RX ADMIN — INSULIN GLARGINE 35 UNITS: 100 INJECTION, SOLUTION SUBCUTANEOUS at 23:16

## 2024-08-26 RX ADMIN — ACETAMINOPHEN 975 MG: 325 TABLET ORAL at 19:45

## 2024-08-26 RX ADMIN — SODIUM CHLORIDE 100 ML: 9 INJECTION, SOLUTION INTRAVENOUS at 18:12

## 2024-08-26 RX ADMIN — DOXYCYCLINE HYCLATE 100 MG: 100 CAPSULE ORAL at 19:45

## 2024-08-26 RX ADMIN — DULOXETINE HYDROCHLORIDE 60 MG: 60 CAPSULE, DELAYED RELEASE PELLETS ORAL at 23:20

## 2024-08-26 ASSESSMENT — ACTIVITIES OF DAILY LIVING (ADL)
ADLS_ACUITY_SCORE: 38
EQUIPMENT_CURRENTLY_USED_AT_HOME: WALKER, STANDARD
ADLS_ACUITY_SCORE: 38
ADLS_ACUITY_SCORE: 23
ADLS_ACUITY_SCORE: 38

## 2024-08-26 NOTE — ED NOTES
Patient up and ambulated to the bathroom--able to bear weight--limps with her right leg, says it is due to the pain in her calf where she was bit by a dog yesterday--her speech is garbled at times but inconsistent--at times is clear--also her right arm is unable to life when asked but notes that she does lift it when needing something like her cell phone--stroke neuro evaluated patient via monitor---no TPA at this time.

## 2024-08-26 NOTE — ED TRIAGE NOTES
Patient comes in by EMS--right side weakness starting around 1630. Patient went to CT scan with medics.

## 2024-08-26 NOTE — ED PROVIDER NOTES
EMERGENCY DEPARTMENT ENCOUNTER      NAME: Sarah Rahman  AGE: 59 year old female  YOB: 1965  MRN: [id number]  EVALUATION DATE & TIME: No admission date for patient encounter.    PCP: Kike David    ED PROVIDER: Charbel Kraft MD      Chief Complaint   Patient presents with    Stroke Symptoms         FINAL IMPRESSION:  1. Right sided weakness    2. Chest pain, unspecified type    3. Dog bite, initial encounter          ED COURSE & MEDICAL DECISION MAKING:    Pertinent Labs & Imaging studies reviewed. (See chart for details)  59 year old female presents to the Emergency Department for evaluation of speech difficulty, right arm and leg weakness    Patient is a 59-year-old female with history of type 2 diabetes, CAD status post CABG, prior CVA with stenosis carotid artery, basilar artery stenosis, history of syphilis who presents emergency department with sudden onset right-sided arm and leg weakness approximately 45 minutes prior arrival.  Last time also had some upper chest pain radiating to her neck as well and left shoulder.  She is having symptoms significant aphasia and difficulty speaking and further history somewhat limited although she denies taking any blood thinners.  Any additional EMS notes that she was bitten in the right shin yesterday by dog.    Evaluated immediately on arrival given concern for possible level 1 stroke.  On my exam she seems to have some left-sided facial droop as well as significant weakness of the right arm and right leg.  Has some antigravity strength in the right arm right leg but is unable to keep them off the bed.  Has difficulty counting fingers in all 4 peripheral fields, is able to repeat watch and pen but has some significant difficulty with fluent speech.  Has decreased sensation of the right arm and the right leg.  Additionally she has got a puncture wound to the right shin with some dried blood or surrounding erythema.    Given onset of symptoms about 45  minutes to an hour prior to arrival she was made a tier 1 stroke code and symptom certainly could be concerning for possible large vessel occlusion        ED Course as of 08/26/24 2047   Mon Aug 26, 2024   1841 Labs reviewed and interpreted myself.  CBC shows normal white count and mild anemia at 9.5 which is down about 2 points compared to prior hemoglobin a month ago.  BMP shows slight elevation in creatinine 1.3 from a baseline around 1 and elevation of BUN to creatinine ratio.  No significant electrolyte derangement.  She is hyperglycemic at 480 without an anion gap low suspicion for DKA.  Initial troponin is slightly elevated at 23 will repeat to evaluate for any significant increase.   1851 EKG shows a sinus rhythm at 96 beats a minute, normal axis, normal KS, QRS and QTc durations, no ST elevations, T wave inversion in aVL which is similar compared to prior, overall EKG does not show any significant changes compared to prior.   1856 After stroke neurology evaluation her exam is not particularly consistent with a particular vascular distribution.  We discussed risk and benefits of potential lytic therapy and after shared decision making discussion patient declined thrombolytics.  Will proceed with MRI.  81 mg aspirin ordered   1915 CTA of the head and neck does not show any acute intracranial bleeds.  No acute vascular occlusions.  There is atheromatous disease at the origins of the left vertebral artery without high-grade stenosis and postprocedural changes of left carotid endarterectomy which appears stable   1925 Further the patient calf with some superficial abrasions and dried blood, minimal surrounding erythema but she is quite tender to palpation here.  She believes the dog is up-to-date on his vaccinations that since her sister's dog.  Patient is allergy to penicillin therefore will cover empirically with the doxycycline and metronidazole possible cellulitis related to dog bite.   1925 Additionally  patient's right-sided weakness seems to have resolved spontaneously she is now moving arm and legs about vigorously in no apparent ongoing aphasia dysarthria.  No appreciable facial droop at this point.     This point in time do believe patient requires further admission for ongoing stroke evaluation and MRI.  Additionally will require ongoing cardiac monitoring serial troponins.  Discussed with hospitalist who agrees admit the patient this point time for going management      Medical Decision Making  Obtained supplemental history:Supplemental history obtained?: Documented in chart and EMS  Reviewed external records: External records reviewed?: Documented in chart and Outpatient Record: Outside emergency department yesterday for dog bite  Care impacted by chronic illness:Cerebrovascular Disease and Heart Disease  Care significantly affected by social determinants of health:N/A  Did you consider but not order tests?: Work up considered but not performed and documented in chart, if applicable  Did you interpret images independently?: Independent interpretation of ECG and images noted in documentation, when applicable.  Consultation discussion with other provider:Did you involve another provider (consultant, MH, pharmacy, etc.)?: I discussed the care with another health care provider, see documentation for details.  Admit.          At the conclusion of the encounter I discussed the results of all of the tests and the disposition. The questions were answered. The patient or family acknowledged understanding and was agreeable with the care plan.     0 minutes of critical care time     MEDICATIONS GIVEN IN THE EMERGENCY:  Medications   LORazepam (ATIVAN) injection 0.5 mg (has no administration in time range)   iopamidol (ISOVUE-370) solution 67 mL (67 mLs Intravenous $Given 8/26/24 1811)     And   sodium chloride 0.9 % bag for CT scan flush use (100 mLs As instructed $Given 8/26/24 1812)   aspirin (ASA) chewable tablet 81  mg (81 mg Oral $Given 8/26/24 1946)   acetaminophen (TYLENOL) tablet 975 mg (975 mg Oral $Given 8/26/24 1945)   HYDROmorphone (PF) (DILAUDID) injection 0.5 mg (0.5 mg Intravenous $Given 8/26/24 1950)   insulin aspart (NovoLOG) injection (RAPID ACTING) (10 Units Subcutaneous $Given 8/26/24 2014)   doxycycline hyclate (VIBRAMYCIN) capsule 100 mg (100 mg Oral $Given 8/26/24 1945)   metroNIDAZOLE (FLAGYL) tablet 500 mg (500 mg Oral $Given 8/26/24 1946)       NEW PRESCRIPTIONS STARTED AT TODAY'S ER VISIT  New Prescriptions    No medications on file          =================================================================    [facility]    Patient information was obtained from: patient and ems    Patient is a 59-year-old female with history of type 2 diabetes, CAD status post CABG, prior CVA with stenosis carotid artery, basilar artery stenosis, history of syphilis who presents emergency department with sudden onset right-sided arm and leg weakness approximately 45 minutes prior arrival.  Last time also had some upper chest pain radiating to her neck as well and left shoulder.  She is having symptoms significant aphasia and difficulty speaking and further history somewhat limited although she denies taking any blood thinners.  Any additional EMS notes that she was bitten in the right shin yesterday by dog.      REVIEW OF SYSTEMS   Refer to the [facility]    PAST MEDICAL HISTORY:  Past Medical History:   Diagnosis Date    Asthma     CAD (coronary artery disease)     COPD (chronic obstructive pulmonary disease) (H)     CVA (cerebral infarction)     Dyshidrosis (pompholyx) 10/21/2016    GERD (gastroesophageal reflux disease)     History of CVA (cerebrovascular accident) 04/01/2012    Formatting of this note might be different from the original.  Overview:   Bilateral subacute cerebellar infarcts seen on MRI at [facility] 4/2012.  Pt was noted to have no residual deficits at [facility].  Formatting of this note might be  different from the original.  Bilateral subacute cerebellar infarcts seen on MRI at [facility] 4/2012.  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 Hamm at [facility]    Schizoaffective disorder (H)     Sleep difficulties 07/17/2022       PAST SURGICAL HISTORY:  Past Surgical History:   Procedure Laterality Date    BYPASS GRAFT ARTERY CORONARY  01/01/2009    x2    CAROTID ENDARTERECTOMY Left 12/2021    CORONARY STENT PLACEMENT      CV ANGIOGRAM CORONARY GRAFT N/A 1/16/2024    Procedure: Coronary Angiogram Graft;  Surgeon: Spencer Diez MD;  Location: [facility] CATH LAB CV    CV CORONARY ANGIOGRAM N/A 06/02/2021    Procedure: Coronary Angiogram;  Surgeon: Juventino Rivera MD;  Location: [facility] Cardiac Cath Lab;  Service: Cardiology    HYSTERECTOMY TOTAL ABDOMINAL      HYSTERECTOMY TOTAL ABDOMINAL, BILATERAL SALPINGO-OOPHORECTOMY, COMBINED      IR LUMBAR PUNCTURE  7/23/2019    IR TRANSLAMINAR EPIDURAL LUMBAR INJ INCL IMAGING  09/27/2022    LUMBAR DISCECTOMY Right 06/13/2019    L5-S1 - Dr. Hamm    NASAL FRACTURE SURGERY      ORIF ULNAR / RADIAL SHAFT FRACTURE Right            CURRENT MEDICATIONS:    acetaminophen (TYLENOL) 500 MG tablet  albuterol (PROAIR HFA) 108 (90 Base) MCG/ACT inhaler  amLODIPine (NORVASC) 2.5 MG tablet  aspirin 81 MG EC tablet  atorvastatin (LIPITOR) 40 MG tablet  buprenorphine HCl-naloxone HCl (SUBOXONE) 2-0.5 MG per film  clopidogrel (PLAVIX) 75 MG tablet  Continuous Blood Gluc  (DEXCOM G6 ) ROSE  Continuous Blood Gluc Transmit (DEXCOM G6 TRANSMITTER) MISC  Continuous Glucose Sensor (DEXCOM G6 SENSOR) MISC  Dexpanthenol 2 % CREA  diazepam (VALIUM) 5 MG tablet  diclofenac (VOLTAREN) 1 % topical  "gel  diphenhydrAMINE (BENADRYL) 25 MG tablet  dulaglutide (TRULICITY) 0.75 MG/0.5ML pen  DULoxetine (CYMBALTA) 60 MG capsule  fexofenadine (ALLEGRA) 180 MG tablet  fluticasone (FLONASE) 50 MCG/ACT nasal spray  fluticasone-salmeterol (ADVAIR) 250-50 MCG/ACT inhaler  gabapentin (NEURONTIN) 300 MG capsule  glipiZIDE (GLUCOTROL XL) 10 MG 24 hr tablet  guaiFENesin (MUCINEX) 600 MG 12 hr tablet  insulin glargine (LANTUS PEN) 100 UNIT/ML pen  ipratropium - albuterol 0.5 mg/2.5 mg/3 mL (DUONEB) 0.5-2.5 (3) MG/3ML neb solution  ketorolac (TORADOL) 10 MG tablet  lidocaine (LIDODERM) 5 % patch  lidocaine (LMX4) 4 % external cream  LORazepam (ATIVAN) 0.5 MG tablet  methocarbamol (ROBAXIN) 750 MG tablet  montelukast (SINGULAIR) 10 MG tablet  nabumetone (RELAFEN) 500 MG tablet  naloxone (NARCAN) 4 MG/0.1ML nasal spray  nitroGLYcerin (NITROSTAT) 0.4 MG sublingual tablet  omeprazole (PRILOSEC) 40 MG DR capsule  ondansetron (ZOFRAN) 8 MG tablet  oxyCODONE-acetaminophen (PERCOCET)  MG per tablet  predniSONE (DELTASONE) 20 MG tablet  topiramate (TOPAMAX) 50 MG tablet  triamcinolone (KENALOG) 0.1 % external ointment  triamterene-HCTZ (DYAZIDE) 37.5-25 MG capsule        ALLERGIES:  Allergies   Allergen Reactions    Haloperidol Unknown     Patient states it stops her heart      Haloperidol Angioedema    Hydroxyzine Angioedema    Meperidine And Related Angioedema, Difficulty breathing, Other (See Comments), Rash and Shortness Of Breath     Throat closes  Other reaction(s): Breathing Difficulty  Other reaction(s): Breathing Difficulty      Phenothiazines Other (See Comments)     Other reaction(s): CARDIAC DISTURBANCES  Patient states it stops her heart  \"I swell up\"  \"stopped by heart\"  \"I swell up\"  \"I swell up\"      Phenothiazines Angioedema    Tramadol Other (See Comments)     Other reaction(s): Angioedema  Throat itch      Tramadol Angioedema    Januvia [Sitagliptin] Swelling    Latex Itching    Haloperidol And Related Angioedema "    Januvia [Sitagliptin] Other (See Comments)     Swelling in the neck     Latex Itching    Lisinopril Other (See Comments)     ACE cough  Itchy throat  Throat itches  Itchy throat      Nortriptyline Unknown     Fainting, unclear if it was related    Penicillins Swelling    Hydroxyzine Other (See Comments) and Rash     Tongue swelling  Tongue swelling  Tongue swelling      Lisinopril Cough       FAMILY HISTORY:  Family History   Problem Relation Age of Onset    Heart Disease Mother     Heart Disease Father     Heart Disease Sister     Heart Disease Sister     Diabetes Brother     Coronary Artery Disease Brother         MI       SOCIAL HISTORY:   Social History     Socioeconomic History    Marital status:    Tobacco Use    Smoking status: Every Day     Types: Cigarettes    Smokeless tobacco: Never    Tobacco comments:     Seen IP by [facility] on 7/28/23 and declined counseling services and resource packet Smokes 1 cigarettes per day   Vaping Use    Vaping status: Never Used   Substance and Sexual Activity    Alcohol use: Not Currently     Comment: Alcoholic Drinks/day: occ    Drug use: No    Sexual activity: Not Currently   Social History Narrative    Lives alone in a condo.          On disability.  Three living children.          Has one small dog as her .        Has personal care attendant (PCA) services during the daytime and sometimes in the evening.     Social Determinants of Health     Financial Resource Strain: Low Risk  (1/30/2024)    Financial Resource Strain     Within the past 12 months, have you or your family members you live with been unable to get utilities (heat, electricity) when it was really needed?: No   Food Insecurity: Patient Declined (6/1/2024)    [facility]    Hunger Vital Sign     Worried About Running Out of Food in the Last Year: Patient declined     Ran Out of Food in the Last Year: Patient declined   Transportation Needs: Patient Declined (6/1/2024)     "Received from [employer] [facility]    PRAPARE - Transportation     Lack of Transportation (Medical): Patient declined     Lack of Transportation (Non-Medical): Patient declined   Physical Activity: Not on File (12/4/2021)    Received from [facility]    Physical Activity     Physical Activity: 0   Stress: Not on File (12/4/2021)    Received from [facility]    Stress     Stress: 0    [facility]    Social Connections   Interpersonal Safety: Low Risk  (8/5/2024)    Interpersonal Safety     Do you feel physically and emotionally safe where you currently live?: Yes     Within the past 12 months, have you been hit, slapped, kicked or otherwise physically hurt by someone?: No     Within the past 12 months, have you been humiliated or emotionally abused in other ways by your partner or ex-partner?: No   Housing Stability: Patient Declined (6/1/2024)    Received from [facility]    Housing Stability Vital Sign     Unable to Pay for Housing in the Last Year: Patient declined     Unstable Housing in the Last Year: Patient declined       VITALS:  /73   Pulse 91   Temp 97.9  F (36.6  C) (Oral)   Resp 23   Ht 1.702 m (5' 7\")   Wt 85.7 kg (189 lb)   LMP  (LMP Unknown)   SpO2 99%   BMI 29.60 kg/m      PHYSICAL EXAM    Constitutional: Well developed, Well nourished, NAD,  HENT: Normocephalic, Atraumatic,  mucous membranes moist,   Neck- trachea midline, No stridor.    Eyes:EOMI, Conjunctiva normal, No discharge.   Respiratory: Normal breath sounds, No respiratory distress, No wheezing.    Cardiovascular: Normal heart rate, Regular rhythm,  No murmurs,   Abdominal: Soft, No tenderness, No rebound or guarding.     Musculoskeletal: Puncture wound to the right anterior shin/calf with minimal surrounding erythema, no clear fluctuance although is significantly tender on palpation  Integument: Warm, Dry, No erythema,    Neurologic: " Alert & oriented x 3   National [employer] Stroke Scale  Exam Interval: Baseline   Score    Level of consciousness: (0)   Alert, keenly responsive    LOC questions: (0)   Answers both questions correctly    LOC commands: (0)   Performs both tasks correctly    Best gaze: (0)   Normal    Visual: (0)   No visual loss    Facial palsy: (2)   Partial paralysis (total/near total of lower face)    Motor arm (left): (0)   No drift    Motor arm (right): (2)   Some effort against gravity    Motor leg (left): (0)   No drift    Motor leg (right): (2)   Some effort against gravity    Limb ataxia: (0)   Absent    Sensory: (2)   Severe to total sensory loss    Best language: (1)   Mild to moderate aphasia    Dysarthria: (1)   Mild to moderate dysarthria    Extinction and inattention: (0)   No abnormality        Total Score:  10        Psychiatric: Affect normal, Cooperative.      LAB:  All pertinent labs reviewed and interpreted.  Results for orders placed or performed during the hospital encounter of 08/26/24   CTA Head Neck with Contrast    Impression    IMPRESSION:   HEAD CT:  1.  No acute intracranial hemorrhage.    HEAD CTA:   1.  No evidence of vascular occlusion.    NECK CTA:  1.  Atheromatous disease involves the origin of the left vertebral artery without high-grade stenosis. Postprocedural changes of left carotid endarterectomy, stable from the prior study.    2.  Case discussed with Dr. Kraft at approximately 1809 hours CST.   Basic metabolic panel   Result Value Ref Range    Sodium 136 135 - 145 mmol/L    Potassium 4.4 3.4 - 5.3 mmol/L    Chloride 103 98 - 107 mmol/L    Carbon Dioxide (CO2) 21 (L) 22 - 29 mmol/L    Anion Gap 12 7 - 15 mmol/L    Urea Nitrogen 28.1 (H) 8.0 - 23.0 mg/dL    Creatinine 1.31 (H) 0.51 - 0.95 mg/dL    GFR Estimate 46 (L) >60 mL/min/1.73m2    Calcium 9.0 8.8 - 10.4 mg/dL    Glucose 480 (H) 70 - 99 mg/dL   Result Value Ref Range    INR 1.14 0.85 - 1.15   Partial thromboplastin time    Result Value Ref Range    aPTT 29 22 - 38 Seconds   Result Value Ref Range    Troponin T, High Sensitivity 23 (H) <=14 ng/L   CBC with platelets and differential   Result Value Ref Range    WBC Count 10.2 4.0 - 11.0 10e3/uL    RBC Count 3.43 (L) 3.80 - 5.20 10e6/uL    Hemoglobin 9.5 (L) 11.7 - 15.7 g/dL    Hematocrit 30.2 (L) 35.0 - 47.0 %    MCV 88 78 - 100 fL    MCH 27.7 26.5 - 33.0 pg    MCHC 31.5 31.5 - 36.5 g/dL    RDW 13.1 10.0 - 15.0 %    Platelet Count 231 150 - 450 10e3/uL    % Neutrophils 73 %    % Lymphocytes 19 %    % Monocytes 7 %    % Eosinophils 1 %    % Basophils 0 %    % Immature Granulocytes 0 %    NRBCs per 100 WBC 0 <1 /100    Absolute Neutrophils 7.5 1.6 - 8.3 10e3/uL    Absolute Lymphocytes 1.9 0.8 - 5.3 10e3/uL    Absolute Monocytes 0.7 0.0 - 1.3 10e3/uL    Absolute Eosinophils 0.1 0.0 - 0.7 10e3/uL    Absolute Basophils 0.0 0.0 - 0.2 10e3/uL    Absolute Immature Granulocytes 0.0 <=0.4 10e3/uL    Absolute NRBCs 0.0 10e3/uL   Urine Drug Screen Panel   Result Value Ref Range    Amphetamines Urine Screen Negative Screen Negative    Barbituates Urine Screen Negative Screen Negative    Benzodiazepine Urine Screen Negative Screen Negative    Cannabinoids Urine Screen Negative Screen Negative    Cocaine Urine Screen Negative Screen Negative    Fentanyl Qual Urine Screen Negative Screen Negative    Opiates Urine Screen Negative Screen Negative    PCP Urine Screen Negative Screen Negative   Glucose by meter   Result Value Ref Range    GLUCOSE BY METER POCT 383 (H) 70 - 99 mg/dL       RADIOLOGY:  Reviewed all pertinent imaging. Please see official radiology report.  CTA Head Neck with Contrast   Final Result   IMPRESSION:    HEAD CT:   1.  No acute intracranial hemorrhage.      HEAD CTA:    1.  No evidence of vascular occlusion.      NECK CTA:   1.  Atheromatous disease involves the origin of the left vertebral artery without high-grade stenosis. Postprocedural changes of left carotid endarterectomy,  stable from the prior study.      2.  Case discussed with Dr. Kraft at approximately 1809 hours CST.      XR Tibia and Fibula Right 2 Views    (Results Pending)   MR Brain w/o & w Contrast    (Results Pending)   XR Chest 2 Views    (Results Pending)       EKG:        Impression: EKG shows a sinus rhythm at 96 beats a minute, normal axis, normal CO, QRS and QTc durations, no ST elevations, T wave inversion in aVL which is similar compared to prior, overall EKG does not show any significant changes compared to prior.        I have independently reviewed and interpreted the EKG(s) documented above.    PROCEDURES:         [employer] System Documentation:   CMS Diagnoses:                Charbel Kraft MD  [facility] EMERGENCY DEPARTMENT  [facility] [facility] [id number] [phone]      Charbel Kraft MD  08/26/24 0086

## 2024-08-27 ENCOUNTER — APPOINTMENT (OUTPATIENT)
Dept: ULTRASOUND IMAGING | Facility: HOSPITAL | Age: 59
End: 2024-08-27
Attending: INTERNAL MEDICINE
Payer: MEDICAID

## 2024-08-27 ENCOUNTER — APPOINTMENT (OUTPATIENT)
Dept: CARDIOLOGY | Facility: HOSPITAL | Age: 59
End: 2024-08-27
Attending: INTERNAL MEDICINE
Payer: MEDICAID

## 2024-08-27 ENCOUNTER — APPOINTMENT (OUTPATIENT)
Dept: CT IMAGING | Facility: HOSPITAL | Age: 59
End: 2024-08-27
Attending: PSYCHIATRY & NEUROLOGY
Payer: MEDICAID

## 2024-08-27 ENCOUNTER — APPOINTMENT (OUTPATIENT)
Dept: MRI IMAGING | Facility: HOSPITAL | Age: 59
End: 2024-08-27
Attending: STUDENT IN AN ORGANIZED HEALTH CARE EDUCATION/TRAINING PROGRAM
Payer: MEDICAID

## 2024-08-27 LAB
ALBUMIN SERPL BCG-MCNC: 3.6 G/DL (ref 3.5–5.2)
ALP SERPL-CCNC: 102 U/L (ref 40–150)
ALT SERPL W P-5'-P-CCNC: 23 U/L (ref 0–50)
ANION GAP SERPL CALCULATED.3IONS-SCNC: 11 MMOL/L (ref 7–15)
AST SERPL W P-5'-P-CCNC: 24 U/L (ref 0–45)
BILIRUB DIRECT SERPL-MCNC: <0.2 MG/DL (ref 0–0.3)
BILIRUB SERPL-MCNC: 0.2 MG/DL
BUN SERPL-MCNC: 26.1 MG/DL (ref 8–23)
CALCIUM SERPL-MCNC: 8.6 MG/DL (ref 8.8–10.4)
CHLORIDE SERPL-SCNC: 105 MMOL/L (ref 98–107)
CREAT SERPL-MCNC: 0.9 MG/DL (ref 0.51–0.95)
EGFRCR SERPLBLD CKD-EPI 2021: 73 ML/MIN/1.73M2
ERYTHROCYTE [DISTWIDTH] IN BLOOD BY AUTOMATED COUNT: 13.1 % (ref 10–15)
GLUCOSE BLDC GLUCOMTR-MCNC: 212 MG/DL (ref 70–99)
GLUCOSE BLDC GLUCOMTR-MCNC: 215 MG/DL (ref 70–99)
GLUCOSE BLDC GLUCOMTR-MCNC: 224 MG/DL (ref 70–99)
GLUCOSE BLDC GLUCOMTR-MCNC: 305 MG/DL (ref 70–99)
GLUCOSE BLDC GLUCOMTR-MCNC: 333 MG/DL (ref 70–99)
GLUCOSE BLDC GLUCOMTR-MCNC: 77 MG/DL (ref 70–99)
GLUCOSE SERPL-MCNC: 289 MG/DL (ref 70–99)
HCO3 SERPL-SCNC: 21 MMOL/L (ref 22–29)
HCT VFR BLD AUTO: 32.6 % (ref 35–47)
HGB BLD-MCNC: 10.1 G/DL (ref 11.7–15.7)
HOLD SPECIMEN: NORMAL
LVEF ECHO: NORMAL
MCH RBC QN AUTO: 26.8 PG (ref 26.5–33)
MCHC RBC AUTO-ENTMCNC: 31 G/DL (ref 31.5–36.5)
MCV RBC AUTO: 87 FL (ref 78–100)
PLATELET # BLD AUTO: 215 10E3/UL (ref 150–450)
POTASSIUM SERPL-SCNC: 3.7 MMOL/L (ref 3.4–5.3)
PROT SERPL-MCNC: 6.5 G/DL (ref 6.4–8.3)
RBC # BLD AUTO: 3.77 10E6/UL (ref 3.8–5.2)
SODIUM SERPL-SCNC: 137 MMOL/L (ref 135–145)
TROPONIN T SERPL HS-MCNC: 19 NG/L
WBC # BLD AUTO: 6.9 10E3/UL (ref 4–11)

## 2024-08-27 PROCEDURE — 250N000013 HC RX MED GY IP 250 OP 250 PS 637: Performed by: STUDENT IN AN ORGANIZED HEALTH CARE EDUCATION/TRAINING PROGRAM

## 2024-08-27 PROCEDURE — 85027 COMPLETE CBC AUTOMATED: CPT | Performed by: INTERNAL MEDICINE

## 2024-08-27 PROCEDURE — 36415 COLL VENOUS BLD VENIPUNCTURE: CPT | Performed by: INTERNAL MEDICINE

## 2024-08-27 PROCEDURE — 99255 IP/OBS CONSLTJ NEW/EST HI 80: CPT | Performed by: PSYCHIATRY & NEUROLOGY

## 2024-08-27 PROCEDURE — 250N000011 HC RX IP 250 OP 636: Performed by: STUDENT IN AN ORGANIZED HEALTH CARE EDUCATION/TRAINING PROGRAM

## 2024-08-27 PROCEDURE — 84484 ASSAY OF TROPONIN QUANT: CPT | Performed by: INTERNAL MEDICINE

## 2024-08-27 PROCEDURE — 93306 TTE W/DOPPLER COMPLETE: CPT

## 2024-08-27 PROCEDURE — 250N000012 HC RX MED GY IP 250 OP 636 PS 637: Performed by: INTERNAL MEDICINE

## 2024-08-27 PROCEDURE — 99233 SBSQ HOSP IP/OBS HIGH 50: CPT | Performed by: STUDENT IN AN ORGANIZED HEALTH CARE EDUCATION/TRAINING PROGRAM

## 2024-08-27 PROCEDURE — 82374 ASSAY BLOOD CARBON DIOXIDE: CPT | Performed by: INTERNAL MEDICINE

## 2024-08-27 PROCEDURE — 70551 MRI BRAIN STEM W/O DYE: CPT

## 2024-08-27 PROCEDURE — 80053 COMPREHEN METABOLIC PANEL: CPT | Performed by: INTERNAL MEDICINE

## 2024-08-27 PROCEDURE — 120N000001 HC R&B MED SURG/OB

## 2024-08-27 PROCEDURE — 93970 EXTREMITY STUDY: CPT

## 2024-08-27 PROCEDURE — 250N000013 HC RX MED GY IP 250 OP 250 PS 637: Performed by: INTERNAL MEDICINE

## 2024-08-27 PROCEDURE — 93306 TTE W/DOPPLER COMPLETE: CPT | Mod: 26 | Performed by: INTERNAL MEDICINE

## 2024-08-27 PROCEDURE — 250N000011 HC RX IP 250 OP 636: Mod: JZ | Performed by: INTERNAL MEDICINE

## 2024-08-27 PROCEDURE — 70450 CT HEAD/BRAIN W/O DYE: CPT

## 2024-08-27 RX ORDER — ENOXAPARIN SODIUM 100 MG/ML
40 INJECTION SUBCUTANEOUS EVERY 24 HOURS
Status: DISCONTINUED | OUTPATIENT
Start: 2024-08-27 | End: 2024-08-30 | Stop reason: HOSPADM

## 2024-08-27 RX ORDER — NALOXONE HYDROCHLORIDE 0.4 MG/ML
0.4 INJECTION, SOLUTION INTRAMUSCULAR; INTRAVENOUS; SUBCUTANEOUS
Status: DISCONTINUED | OUTPATIENT
Start: 2024-08-27 | End: 2024-08-30 | Stop reason: HOSPADM

## 2024-08-27 RX ORDER — NALOXONE HYDROCHLORIDE 0.4 MG/ML
0.2 INJECTION, SOLUTION INTRAMUSCULAR; INTRAVENOUS; SUBCUTANEOUS
Status: DISCONTINUED | OUTPATIENT
Start: 2024-08-27 | End: 2024-08-30 | Stop reason: HOSPADM

## 2024-08-27 RX ORDER — HYDROCODONE BITARTRATE AND ACETAMINOPHEN 5; 325 MG/1; MG/1
1 TABLET ORAL EVERY 6 HOURS PRN
Status: DISCONTINUED | OUTPATIENT
Start: 2024-08-27 | End: 2024-08-27

## 2024-08-27 RX ORDER — OXYCODONE HYDROCHLORIDE 5 MG/1
5 TABLET ORAL EVERY 6 HOURS PRN
Status: DISCONTINUED | OUTPATIENT
Start: 2024-08-27 | End: 2024-08-27

## 2024-08-27 RX ORDER — CLOPIDOGREL BISULFATE 75 MG/1
75 TABLET ORAL DAILY
Status: DISCONTINUED | OUTPATIENT
Start: 2024-08-27 | End: 2024-08-30 | Stop reason: HOSPADM

## 2024-08-27 RX ORDER — DIPHENHYDRAMINE HCL 25 MG
25 CAPSULE ORAL EVERY 6 HOURS PRN
Status: DISCONTINUED | OUTPATIENT
Start: 2024-08-27 | End: 2024-08-30 | Stop reason: HOSPADM

## 2024-08-27 RX ORDER — GABAPENTIN 100 MG/1
200 CAPSULE ORAL 3 TIMES DAILY
Status: DISCONTINUED | OUTPATIENT
Start: 2024-08-27 | End: 2024-08-30 | Stop reason: HOSPADM

## 2024-08-27 RX ADMIN — PANTOPRAZOLE SODIUM 40 MG: 40 TABLET, DELAYED RELEASE ORAL at 06:48

## 2024-08-27 RX ADMIN — GABAPENTIN 200 MG: 100 CAPSULE ORAL at 08:53

## 2024-08-27 RX ADMIN — FAMOTIDINE 20 MG: 20 TABLET ORAL at 08:53

## 2024-08-27 RX ADMIN — ATORVASTATIN CALCIUM 40 MG: 40 TABLET, FILM COATED ORAL at 20:47

## 2024-08-27 RX ADMIN — FLUTICASONE PROPIONATE 1 SPRAY: 50 SPRAY, METERED NASAL at 08:52

## 2024-08-27 RX ADMIN — INSULIN ASPART 4 UNITS: 100 INJECTION, SOLUTION INTRAVENOUS; SUBCUTANEOUS at 08:53

## 2024-08-27 RX ADMIN — CLOPIDOGREL BISULFATE 75 MG: 75 TABLET ORAL at 17:11

## 2024-08-27 RX ADMIN — ASPIRIN 81 MG: 81 TABLET, COATED ORAL at 08:54

## 2024-08-27 RX ADMIN — DIPHENHYDRAMINE HYDROCHLORIDE 25 MG: 25 CAPSULE ORAL at 00:47

## 2024-08-27 RX ADMIN — ENOXAPARIN SODIUM 40 MG: 40 INJECTION SUBCUTANEOUS at 17:11

## 2024-08-27 RX ADMIN — DULOXETINE HYDROCHLORIDE 60 MG: 60 CAPSULE, DELAYED RELEASE PELLETS ORAL at 08:54

## 2024-08-27 RX ADMIN — LIDOCAINE 1 PATCH: 4 PATCH TOPICAL at 20:32

## 2024-08-27 RX ADMIN — METHOCARBAMOL 750 MG: 750 TABLET ORAL at 08:53

## 2024-08-27 RX ADMIN — DIPHENHYDRAMINE HYDROCHLORIDE 25 MG: 25 CAPSULE ORAL at 06:48

## 2024-08-27 RX ADMIN — LORAZEPAM 0.5 MG: 0.5 TABLET ORAL at 14:02

## 2024-08-27 RX ADMIN — OXYCODONE HYDROCHLORIDE 2.5 MG: 5 TABLET ORAL at 21:02

## 2024-08-27 RX ADMIN — HYDROCODONE BITARTRATE AND ACETAMINOPHEN 1 TABLET: 5; 325 TABLET ORAL at 13:15

## 2024-08-27 RX ADMIN — TOPIRAMATE 100 MG: 100 TABLET, FILM COATED ORAL at 08:54

## 2024-08-27 RX ADMIN — FLUTICASONE PROPIONATE 1 SPRAY: 50 SPRAY, METERED NASAL at 20:45

## 2024-08-27 RX ADMIN — CLINDAMYCIN IN 5 PERCENT DEXTROSE 600 MG: 12 INJECTION, SOLUTION INTRAVENOUS at 17:11

## 2024-08-27 RX ADMIN — METHOCARBAMOL 750 MG: 750 TABLET ORAL at 20:45

## 2024-08-27 RX ADMIN — CLINDAMYCIN IN 5 PERCENT DEXTROSE 600 MG: 12 INJECTION, SOLUTION INTRAVENOUS at 08:55

## 2024-08-27 RX ADMIN — GABAPENTIN 200 MG: 100 CAPSULE ORAL at 13:15

## 2024-08-27 RX ADMIN — DULOXETINE HYDROCHLORIDE 60 MG: 60 CAPSULE, DELAYED RELEASE PELLETS ORAL at 20:46

## 2024-08-27 RX ADMIN — HYDROCODONE BITARTRATE AND ACETAMINOPHEN 1 TABLET: 5; 325 TABLET ORAL at 06:48

## 2024-08-27 RX ADMIN — FAMOTIDINE 20 MG: 10 INJECTION, SOLUTION INTRAVENOUS at 20:45

## 2024-08-27 RX ADMIN — INSULIN ASPART 2 UNITS: 100 INJECTION, SOLUTION INTRAVENOUS; SUBCUTANEOUS at 13:15

## 2024-08-27 RX ADMIN — HYDROCODONE BITARTRATE AND ACETAMINOPHEN 1 TABLET: 5; 325 TABLET ORAL at 00:47

## 2024-08-27 RX ADMIN — ACETAMINOPHEN 650 MG: 325 TABLET ORAL at 04:27

## 2024-08-27 ASSESSMENT — ACTIVITIES OF DAILY LIVING (ADL)
ADLS_ACUITY_SCORE: 26
DIFFICULTY_EATING/SWALLOWING: NO
HEARING_DIFFICULTY_OR_DEAF: NO
ADLS_ACUITY_SCORE: 23
ADLS_ACUITY_SCORE: 26
WALKING_OR_CLIMBING_STAIRS_DIFFICULTY: NO
CHANGE_IN_FUNCTIONAL_STATUS_SINCE_ONSET_OF_CURRENT_ILLNESS/INJURY: NO
TOILETING_ISSUES: NO
ADLS_ACUITY_SCORE: 23
ADLS_ACUITY_SCORE: 26
EQUIPMENT_CURRENTLY_USED_AT_HOME: WALKER, STANDARD
FALL_HISTORY_WITHIN_LAST_SIX_MONTHS: NO
ADLS_ACUITY_SCORE: 26
DRESSING/BATHING_DIFFICULTY: NO
ADLS_ACUITY_SCORE: 23
ADLS_ACUITY_SCORE: 26
DOING_ERRANDS_INDEPENDENTLY_DIFFICULTY: YES
WEAR_GLASSES_OR_BLIND: NO
ADLS_ACUITY_SCORE: 23
DIFFICULTY_COMMUNICATING: NO
ADLS_ACUITY_SCORE: 23
ADLS_ACUITY_SCORE: 26
CONCENTRATING,_REMEMBERING_OR_MAKING_DECISIONS_DIFFICULTY: NO

## 2024-08-27 NOTE — PLAN OF CARE
"  Problem: Adult Inpatient Plan of Care  Goal: Optimal Comfort and Wellbeing  Outcome: Not Progressing     Problem: Comorbidity Management  Goal: Maintenance of Behavioral Health Symptom Control  Outcome: Not Progressing   Goal Outcome Evaluation:    Patient alert and oriented with VSS on RA. Consistently reports severe RLE without relief from PRN medications. However, is able to fall asleep while having pain. Very frustrated stating \" yall bitches, you guys just make up your own med list.\" Did refuse several of her home meds. NIH and neuro's are difficult to assess at times relating to patients emotional state. Did walk with walker to bathroom and back to bed. Tele is on reading NSR. Denied chest pain, SOB or dizziness overnight.    MRI called and patient is willing to go to MRI today, will try for 2pm time. Patient is requesting ativan before.  "

## 2024-08-27 NOTE — MEDICATION SCRIBE - ADMISSION MEDICATION HISTORY
"Medication Scribe Admission Medication History    Admission medication history is complete. The information provided in this note is only as accurate as the sources available at the time of the update.    Information Source(s): Patient, Hospital records, and CareEverywhere/[facility] via in-person    Pertinent Information: Medication interview was not completed due to patient agitation. Patient displayed similar behavior as was exhibited on 1/14/24 1/14/24 Admission Medication History (Below)  Pertinent Information: Patient reports self management of medications.     Patient reports receiving medication ONLY from [facility]. Asked about [facility], patient reports: \"No.\"     Patient reports not knowing the names of the medications she takes. Patient reports: \"I take everything on your list.\" Patient reports taking all of her medications each and every day. When asked about recent fill history data from [facility] and apparent discrepancyes between amount filled and days in between, patient became agitated. Patient reports: \"It's on your papers - just don't ask me any more questions.\"     Using sure scripts fill history as basis, most recently filled medications were kept on PTA list. Medications filled over 6 moths ago and that patient didn't mention at all, were removed.     8/26/24 Admission Medication History (continues below)  Patient did not report any names of medication she takes. Patient reports \"if it is on your papers, then I take it.\" Scribe proceeded to read off medication list, and patient became even more agitated and refused to engage in conversation.     Using sure scripts fill history as basis, most recently filled medications were kept on PTA list. Medications filled over 6 moths ago and that patient didn't mention at all, were removed. Doses were marked as last taken as unknown/unknown    Was unable to ascertain if patient was able to start medications prescribed by [facility] " yesterday (Tylenol, Motrin, Augmentin)     Changes made to PTA medication list:  Added: None  Deleted: Amlodipine, Lipitor, Suboxone, Plavix, Dexpanthenol, Valium, Voltaren, Benadryl, Allegra, Gabapentin, Mucinex, Duoneb, Toradol, Lidocaine, Singulair, Nitrostat, Prednisone, Dyazide   Changed: None    Allergies reviewed with patient and updates made in EHR: yes    Medication History Completed By: Santi Ochoa 8/26/2024 9:08 PM    PTA Med List   Medication Sig Last Dose    acetaminophen (TYLENOL) 500 MG tablet Take 1-2 tablets (500-1,000 mg) by mouth every 8 hours as needed for mild pain Max 5.5 tabs per day Unknown at unknown    albuterol (PROAIR HFA) 108 (90 Base) MCG/ACT inhaler Inhale 1-2 puffs into the lungs every 4 hours as needed for shortness of breath or wheezing Unknown at unknown    aspirin 81 MG EC tablet Take 1 tablet (81 mg) by mouth daily Unknown at unknown    dulaglutide (TRULICITY) 0.75 MG/0.5ML pen Inject 0.75 mg Subcutaneous every 7 days Unknown at unknown    DULoxetine (CYMBALTA) 60 MG capsule Take 1 capsule (60 mg) by mouth 2 times daily Unknown at unknown    fluticasone (FLONASE) 50 MCG/ACT nasal spray Spray 1 spray into both nostrils 2 times daily Unknown at unknown    insulin glargine (LANTUS PEN) 100 UNIT/ML pen Inject 35-40 Units Subcutaneous 2 times daily Unknown at unknown    lidocaine (LIDODERM) 5 % patch Place onto the skin every 24 hours To prevent lidocaine toxicity, patient should be patch free for 12 hrs daily. Unknown at unknown    LORazepam (ATIVAN) 0.5 MG tablet TAKE 1 TABLET (0.5 MG) BY MOUTH EVERY 6 HOURS AS NEEDED FOR ANXIETY Unknown at unknown    methocarbamol (ROBAXIN) 750 MG tablet Take 1 tablet (750 mg) by mouth 2 times daily Unknown at unknown    nabumetone (RELAFEN) 500 MG tablet Take 1 tablet (500 mg) by mouth 2 times daily Unknown at unknown    naloxone (NARCAN) 4 MG/0.1ML nasal spray Spray 4 mg into one nostril alternating nostrils as needed for opioid reversal every  2-3 minutes until assistance arrives Unknown at unknown    omeprazole (PRILOSEC) 40 MG DR capsule Take 1 capsule (40 mg) by mouth daily To protect your stomach. Unknown at unknown    ondansetron (ZOFRAN) 8 MG tablet Take 1 tablet (8 mg) by mouth every 8 hours as needed for nausea Unknown at unknown    topiramate (TOPAMAX) 50 MG tablet Take 100 mg by mouth 2 times daily Unknown at unknown    triamcinolone (KENALOG) 0.1 % external ointment Apply topically 2 times daily Apply twice a day to vaginal rash until resolved Unknown at unknown

## 2024-08-27 NOTE — PROGRESS NOTES
[facility]    Medicine Progress Note - Hospitalist Service    Date of Admission:  8/26/2024    Assessment & Plan   59 year old female with a past medical history of type 2 diabetes, CAD status post CABG, prior CVA with stenosis carotid artery, basilar artery stenosis, history of syphilis who presents emergency department with sudden onset right-sided arm and leg weakness and strokelike symptoms, also has a right leg dog bite.  Patient declined tPA     Stroke like symptoms  - Presented with sudden onset right-sided arm and leg weakness  - Stroke code called in the ER.  Patient declined to tPA, seen by Stroke neurology  - CTA head and neck is negative for acute changes  -   - Neurology consult, appreciate input  - Echo shows EF 60 to 65%.  Mild LVH.  Mild pulmonary hypertension, moderate TR, mild MS, mild to moderate MR  - MRI unable to be done as patient refused to remove her wig which had metal in it  - Continue aspirin and plavix for 3 weeks, followed by Plavix only  - started High intensity statin  - Neurochecks  - Cardiac monitoring  - PT/OT     Right leg cellulitis  - S/P dog bite yesterday  - XR neg, US negative for DVT  - Start IV clindamycin as patient is allergic to penicillin  - Continue Tylenol, Oxycodone as needed for pain  - WOC consult    JB - resolved  Cr 1.31 -> 0.90  Monitor Cr     Diabetes mellitus insulin-dependent  - Poorly controlled with hyperglycemia  - HbA1c 13.1  - Home regimen; Lantus 35-40u bid, Trulicity weekly  - Inc Lantus 40u bid  - sliding scale insulin medium, I:C 1:15     Hypertension  -Permissive HTN on presentation  -BP controlled     Hyperlipidemia  - Started Atorvastatin 40mg     GERD  -Continue PPI          Diet: Moderate Consistent Carb (60 g CHO per Meal) Diet    DVT Prophylaxis: Lovenox SQ  Diehl Catheter: Not present  Lines: None     Cardiac Monitoring: ACTIVE order. Indication: Stroke, acute (48 hours)  Code Status: Full Code      Clinically  "Significant Risk Factors Present on Admission          # Hypocalcemia: Lowest Ca = 8.6 mg/dL in last 2 days, will monitor and replace as appropriate       # Drug Induced Platelet Defect: home medication list includes an antiplatelet medication   # Hypertension: Noted on problem list    # Anemia: based on hgb <11      # DMII: A1C = 13.1 % (Ref range: <5.7 %) within past 6 months    # Overweight: Estimated body mass index is 29.6 kg/m  as calculated from the following:    Height as of this encounter: 1.702 m (5' 7\").    Weight as of this encounter: 85.7 kg (189 lb).       # Asthma: noted on problem list  # History of CABG: noted on surgical history             Disposition Plan     Medically Ready for Discharge: Anticipated Tomorrow             Elisabet Lopez MD  Hospitalist Service  [facility]  Securely message with [employer] (more info)  Text page via [employer] Paging/Directory   ______________________________________________________________________    Interval History   Patient was examined at the bedside, states she has pain RLE, has pus, WOC consulted  Seen by Neuro, refused MRI  On Cardiac monitor    Physical Exam   Vital Signs: Temp: 97.9  F (36.6  C) Temp src: Oral BP: 135/70 Pulse: 73   Resp: 18 SpO2: 97 % O2 Device: None (Room air)    Weight: 189 lbs 0 oz    General:  Appears stated age, no acute distress. A&O x 3.  Skin:  Warm, dry  Chest: dec BS at lung bases b/l, no rhonchi, no wheeze  Cardiovascular:  RRR, no rub or murmur. No peripheral edema.  Abdomen:  Soft, non-tender, non-distended.  Musculoskeletal:  positive Tenderness and erythema right led, +1 edema  Neurological:  RUE and RLE 3/5, LUE and LLE 5/5, gait instability.     Medical Decision Making       60 MINUTES SPENT BY ME on the date of service doing chart review, history, exam, documentation & further activities per the note.      Data     I have personally reviewed the following data over the past 24 hrs:    6.9  \   10.1 " (L)   / 215     137 105 26.1 (H) /  212 (H)   3.7 21 (L) 0.90 \     ALT: 23 AST: 24 AP: 102 TBILI: 0.2   ALB: 3.6 TOT PROTEIN: 6.5 LIPASE: N/A     Trop: 19 (H) BNP: N/A     TSH: N/A T4: N/A A1C: 13.1 (H)     INR:  1.14 PTT:  29   D-dimer:  N/A Fibrinogen:  N/A       Imaging results reviewed over the past 24 hrs:   Recent Results (from the past 24 hour(s))   CTA Head Neck with Contrast    Narrative    EXAM: CTA HEAD NECK W CONTRAST  LOCATION: [facility]  DATE: 8/26/2024    INDICATION: A  COMPARISON: None.  CONTRAST: 67ml isovue 370  TECHNIQUE: Head and neck CT angiogram with IV contrast. Noncontrast head CT followed by axial helical CT images of the head and neck vessels obtained during the arterial phase of intravenous contrast administration. Axial 2D reconstructed images and   multiplanar 3D MIP reconstructed images of the head and neck vessels were performed by the technologist. Dose reduction techniques were used. All stenosis measurements made according to NASCET criteria unless otherwise specified.    FINDINGS:   NONCONTRAST HEAD CT:   INTRACRANIAL CONTENTS: No intracranial hemorrhage, extraaxial collection, or mass effect.  No CT evidence of acute infarct. Mild presumed chronic small vessel ischemic changes. Normal ventricles and sulci.     VISUALIZED ORBITS/SINUSES/MASTOIDS: No intraorbital abnormality. No paranasal sinus mucosal disease. No middle ear or mastoid effusion.    BONES/SOFT TISSUES: No acute abnormality.    HEAD CTA:  ANTERIOR CIRCULATION: No stenosis/occlusion, aneurysm, or high flow vascular malformation. Standard [place] anatomy.    POSTERIOR CIRCULATION: No stenosis/occlusion, aneurysm, or high flow vascular malformation. Balanced vertebral arteries supply a normal basilar artery.     DURAL VENOUS SINUSES: Expected enhancement of the major dural venous sinuses.    NECK CTA:  RIGHT CAROTID: Mild atheromatous changes without significant stenotic  change.    LEFT CAROTID: Postprocedural changes of left carotid endarterectomy. No acute vascular injury.    VERTEBRAL ARTERIES: Atheromatous disease involves the origin of the left vertebral artery. This is stable from the prior study.    AORTIC ARCH: Classic aortic arch anatomy with no significant stenosis at the origin of the great vessels.    NONVASCULAR STRUCTURES: Unremarkable.      Impression    IMPRESSION:   HEAD CT:  1.  No acute intracranial hemorrhage.    HEAD CTA:   1.  No evidence of vascular occlusion.    NECK CTA:  1.  Atheromatous disease involves the origin of the left vertebral artery without high-grade stenosis. Postprocedural changes of left carotid endarterectomy, stable from the prior study.    2.  Case discussed with Dr. Kraft at approximately 1809 hours CST.   XR Tibia and Fibula Right 2 Views    Narrative    EXAM: XR TIBIA AND FIBULA RIGHT 2 VIEWS  LOCATION: [facility]  DATE: 8/26/2024    INDICATION: Dog bite to right shin, evaluate for retained foreign body.  COMPARISON: 8/5/2024      Impression    IMPRESSION: Anatomic alignment right tibia and fibula. No acute displaced tibia or fibula fracture is identified. Degenerative change right knee is similar to prior and most advanced in the patellofemoral compartment. New mild right leg soft tissue   swelling with soft tissue gas in the medial proximal one-third shaft right tibia region. Findings are compatible with soft tissue infection or penetrating trauma. No radiopaque adjacent soft tissue foreign body. Arterial calcification.   XR Chest 2 Views    Narrative    EXAM: XR CHEST 2 VIEWS  LOCATION: [facility]  DATE: 8/26/2024    INDICATION: chest pain shortness of breath, eval for pna or ptx  COMPARISON: 11/29/2023      Impression    IMPRESSION: The lungs are hyperinflated but otherwise clear. No effusions or pneumothorax. Cardiomegaly without overt pulmonary edema. CABG changes and sternotomy  wires.   US Lower Extremity Venous Duplex Bilateral    Narrative    EXAM: US LOWER EXTREMITY VENOUS DUPLEX BILATERAL  LOCATION: [facility]  DATE: 2024    INDICATION: Swelling, rule out DVT  COMPARISON: None.  TECHNIQUE: Venous Duplex ultrasound of bilateral lower extremities with and without compression, augmentation and duplex. Color flow and spectral Doppler with waveform analysis performed.    FINDINGS: Exam includes the common femoral, femoral, popliteal veins as well as segmentally visualized deep calf veins and greater saphenous vein.     RIGHT: No deep vein thrombosis. No superficial thrombophlebitis. No popliteal cyst.    LEFT: No deep vein thrombosis. No superficial thrombophlebitis. No popliteal cyst.      Impression    IMPRESSION:  1.  No deep venous thrombosis in the bilateral lower extremities.   Echocardiogram Complete - For age > 60 yrs   Result Value    LVEF  60-65%    Narrative    [id number]  [id number]  [id number]  [id number]^GISSELLE^BHAVANA^[place] [address]     Name: LAUREN HAIRSTON  MRN: [id number]  : 1965  Study Date: 2024 10:08 AM  Age: 59 yrs  Gender: Female  Patient Location: [facility]  Reason For Study: Cerebrovascular Incident  Ordering Physician: BHAVANA PITTS  Performed By: YONG     BSA: 2.0 m2  Height: 67 in  Weight: 189 lb  HR: 79  ______________________________________________________________________________  Procedure  Complete Echo Adult.  ______________________________________________________________________________  Interpretation Summary     Left ventricular size, wall motion and function are normal. The ejection  fraction is 60-65%.  There is mild concentric left ventricular hypertrophy.  Normal right ventricle size and systolic function.  Right ventricular systolic pressure is elevated, consistent with mild  pulmonary hypertension.  There is moderate (2+) tricuspid regurgitation.  There is mild  mitral stenosis.  There is mild to moderate (1-2+) mitral regurgitation.  ______________________________________________________________________________  Left Ventricle  Left ventricular size, wall motion and function are normal. The ejection  fraction is 60-65%. There is mild concentric left ventricular hypertrophy.  Left ventricular diastolic function is abnormal. Diastolic Doppler findings  (E/E' ratio and/or other parameters) suggest left ventricular filling  pressures are increased. No regional wall motion abnormalities noted.     Right Ventricle  Normal right ventricle size and systolic function. TAPSE is normal, which is  consistent with normal right ventricular systolic function.     Atria  The left atrium is moderately dilated. Right atrial size is normal. There is  no color Doppler evidence of an atrial shunt.     Mitral Valve  Mitral valve leaflets appear normal. There is no evidence of mitral stenosis  or clinically significant mitral regurgitation. There is mild to moderate (1-  2+) mitral regurgitation. The mean mitral valve gradient is 6.9 mmHg. There is  mild mitral stenosis.     Tricuspid Valve  Tricuspid valve leaflets appear normal. There is moderate (2+) tricuspid  regurgitation. The right ventricular systolic pressure is approximated at 37.6  mmHg plus the right atrial pressure. Right ventricular systolic pressure is  elevated, consistent with mild pulmonary hypertension.     Aortic Valve  Aortic valve leaflets appear normal. There is no evidence of aortic stenosis  or clinically significant aortic regurgitation.     Pulmonic Valve  The pulmonic valve is not well seen, but is grossly normal.     Vessels  The aorta root is normal. Normal size ascending aorta. IVC diameter <2.1 cm  collapsing >50% with sniff suggests a normal RA pressure of 3 mmHg.     Pericardium  There is no pericardial effusion.     Rhythm  Sinus rhythm was  noted.  ______________________________________________________________________________  MMode/2D Measurements & Calculations  IVSd: 1.3 cm  LVIDd: 4.9 cm  LVIDs: 3.1 cm  LVPWd: 1.7 cm  FS: 36.7 %  LV mass(C)d: 299.2 grams  LV mass(C)dI: 151.6 grams/m2  Ao root diam: 2.8 cm  LA dimension: 4.6 cm  asc Aorta Diam: 3.7 cm  LA/Ao: 1.7  LVOT diam: 1.9 cm  LVOT area: 2.9 cm2  Ao root diam index Ht(cm/m): 1.6  Ao root diam index BSA (cm/m2): 1.4  Asc Ao diam index BSA (cm/m2): 1.9  Asc Ao diam index Ht(cm/m): 2.2  EF Biplane: 61.6 %     LA Volume Indexed (AL/bp): 38.6 ml/m2  RWT: 0.68  TAPSE: 1.8 cm     Time Measurements  MM HR: 79.0 BPM     Doppler Measurements & Calculations  MV E max jairo: 136.0 cm/sec  MV A max jairo: 149.6 cm/sec  MV E/A: 0.91  MV max P.5 mmHg  MV mean P.9 mmHg  MV V2 VTI: 45.6 cm  MVA(VTI): 1.7 cm2  MV dec slope: 564.3 cm/sec2  MV dec time: 0.24 sec  Ao V2 max: 150.4 cm/sec  Ao max P.0 mmHg  Ao V2 mean: 115.6 cm/sec  Ao mean P.7 mmHg  Ao V2 VTI: 33.4 cm  HERNANDEZ(I,D): 2.3 cm2  HERNANDEZ(V,D): 2.6 cm2  LV V1 max P.2 mmHg  LV V1 max: 134.0 cm/sec  LV V1 VTI: 26.9 cm     SV(LVOT): 77.9 ml  SI(LVOT): 39.5 ml/m2  TR max jairo: 306.8 cm/sec  TR max P.6 mmHg  AV Jairo Ratio (DI): 0.89  HERNANDEZ Index (cm2/m2): 1.2  E/E': 22.7  E/E' av.8  Lateral E/e': 24.5  Medial E/e': 23.2  Peak E' Jairo: 6.0 cm/sec  RV S Jairo: 11.4 cm/sec     ______________________________________________________________________________  Report approved by: Kyrie Winston 2024 11:54 AM         MR Brain w/o Contrast    Narrative    EXAM: MR BRAIN W/O CONTRAST  LOCATION: [facility]  DATE: 2024    INDICATION: right sided arm and leg weakness, eval for possible CVA  COMPARISON: 2024 head and neck CTA.  TECHNIQUE: Routine multiplanar multisequence head MRI without intravenous contrast was attempted but could not be completed given extensive susceptibility artifact relating to patient's  hair piece.    FINDINGS/    Impression    IMPRESSION: Significant artifact relating to the patient's hair piece, which could not be removed, such that the exam could not be completed. Only /localizer images were obtained.

## 2024-08-27 NOTE — H&P
Assessment and Plan  Active Problems:    Chest pain, unspecified type    Right sided weakness    Dog bite, initial encounter      59 year old female with a past medical history of type 2 diabetes, CAD status post CABG, prior CVA with stenosis carotid artery, basilar artery stenosis, history of syphilis who presents emergency department with sudden onset right-sided arm and leg weakness and strokelike symptoms, also has a right leg dog bite.  Patient declined tPA    Stroke like symptoms  - Presented with sudden onset right-sided arm and leg weakness  - Stroke code called in the ER.  Patient declined to be  - CTA head and neck is negative for acute changes  - Continue stroke protocol with neuro checks  - Neurology consult, appreciate input  - Check brain MRI and echo  - Continue aspirin    Right leg cellulitis  - S/P dog bite yesterday  - Start IV clindamycin as patient is allergic to penicillin  - Continue Tylenol as needed for pain  - Check venous ultrasound to rule out DVT    Diabetes mellitus insulin-dependent  - Poorly controlled with hyperglycemia  - Restart home insulin  - Check hemoglobin A1c  - Cover with insulin sliding scale  - Add carb counting insulin coverage    Hypertension  -Permissive blood pressure today till brain MRI results  -Restart home medications  -Check echo    Hyperlipidemia  - Start statin  - Check lipid profile    GERD  -Continue PPI    VTE prophylaxis:  Pneumatic Compression Devices  DIET: Orders Placed This Encounter      Moderate Consistent Carb (60 g CHO per Meal) Diet    Disposition/Barriers to discharge: Pending brain MRI, neurology consult and IV antibiotics  Code Status:Full Code    HPI: Sarah Rahman is a 59 year old female with a past medical history of type 2 diabetes, CAD status post CABG, prior CVA with stenosis carotid artery, basilar artery stenosis, history of syphilis who presents emergency department with sudden onset right-sided arm and leg weakness  approximately 45 minutes prior arrival. Last time also had some upper chest pain radiating to her neck as well and left shoulder. She is having symptoms significant aphasia and difficulty speaking and further history somewhat limited although she denies taking any blood thinners. Any additional EMS notes that she was bitten in the right shin yesterday by dog, patient complaining of right leg pain and swelling after the dog bite.  Stroke code who was called in the ER and patient had unremarkable CTA.  She denied tPA and her symptoms continue to improve.    Past Medical History:   Diagnosis Date    Asthma     CAD (coronary artery disease)     COPD (chronic obstructive pulmonary disease) (H)     CVA (cerebral infarction)     Dyshidrosis (pompholyx) 10/21/2016    GERD (gastroesophageal reflux disease)     History of CVA (cerebrovascular accident) 04/01/2012    Formatting of this note might be different from the original.  Overview:   Bilateral subacute cerebellar infarcts seen on MRI at [facility] 4/2012.  Pt was noted to have no residual deficits at [facility].  Formatting of this note might be different from the original.  Bilateral subacute cerebellar infarcts seen on MRI at [facility] 4/2012.  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 Hamm at [facility]    Schizoaffective disorder (H)     Sleep difficulties 07/17/2022     Past Surgical History:   Procedure Laterality Date    BYPASS GRAFT ARTERY CORONARY  01/01/2009    x2    CAROTID ENDARTERECTOMY Left 12/2021    CORONARY STENT PLACEMENT      CV ANGIOGRAM CORONARY GRAFT N/A 1/16/2024    Procedure: Coronary Angiogram Graft;  Surgeon: Spencer Diez MD;  Location: [facility]  CATH LAB CV    CV CORONARY ANGIOGRAM N/A 06/02/2021    Procedure: Coronary Angiogram;  Surgeon: Juventino Rivera MD;  Location: [facility] Cardiac Cath Lab;  Service: Cardiology    HYSTERECTOMY TOTAL ABDOMINAL      HYSTERECTOMY TOTAL ABDOMINAL, BILATERAL SALPINGO-OOPHORECTOMY, COMBINED      IR LUMBAR PUNCTURE  7/23/2019    IR TRANSLAMINAR EPIDURAL LUMBAR INJ INCL IMAGING  09/27/2022    LUMBAR DISCECTOMY Right 06/13/2019    L5-S1 - Dr. Hamm    NASAL FRACTURE SURGERY      ORIF ULNAR / RADIAL SHAFT FRACTURE Right      Family History   Problem Relation Age of Onset    Heart Disease Mother     Heart Disease Father     Heart Disease Sister     Heart Disease Sister     Diabetes Brother     Coronary Artery Disease Brother         MI     Social History     Socioeconomic History    Marital status:      Spouse name: Not on file    Number of children: Not on file    Years of education: Not on file    Highest education level: Not on file   Occupational History    Not on file   Tobacco Use    Smoking status: Every Day     Types: Cigarettes    Smokeless tobacco: Never    Tobacco comments:     Seen IP by [facility] on 7/28/23 and declined counseling services and resource packet Smokes 1 cigarettes per day   Vaping Use    Vaping status: Never Used   Substance and Sexual Activity    Alcohol use: Not Currently     Comment: Alcoholic Drinks/day: occ    Drug use: No    Sexual activity: Not Currently   Other Topics Concern    Not on file   Social History Narrative    Lives alone in a condo.          On disability.  Three living children.          Has one small dog as her .        Has personal care attendant (PCA) services during the daytime and sometimes in the evening.     Social Determinants of Health     Financial Resource Strain: Low Risk  (1/30/2024)    Financial Resource Strain     Within the past 12 months, have you or your family members you live with been unable to get utilities (heat, electricity) when it was really  needed?: No   Food Insecurity: Patient Declined (6/1/2024)    Received from [facility]    Hunger Vital Sign     Worried About Running Out of Food in the Last Year: Patient declined     Ran Out of Food in the Last Year: Patient declined   Transportation Needs: Patient Declined (6/1/2024)    Received from [facility]    PRAPARE - Transportation     Lack of Transportation (Medical): Patient declined     Lack of Transportation (Non-Medical): Patient declined   Physical Activity: Not on File (12/4/2021)    Received from [facility]    Physical Activity     Physical Activity: 0   Stress: Not on File (12/4/2021)    Received from [facility]    Stress     Stress: 0   Social Connections: Unknown (12/22/2021)    [facility]    Social Connections     Frequency of Communication with Friends and Family: Not on file   Interpersonal Safety: Low Risk  (8/5/2024)    Interpersonal Safety     Do you feel physically and emotionally safe where you currently live?: Yes     Within the past 12 months, have you been hit, slapped, kicked or otherwise physically hurt by someone?: No     Within the past 12 months, have you been humiliated or emotionally abused in other ways by your partner or ex-partner?: No   Housing Stability: Patient Declined (6/1/2024)    Received from [facility]    Housing Stability Vital Sign     Unable to Pay for Housing in the Last Year: Patient declined     Number of Places Lived in the Last Year: Not on file     Unstable Housing in the Last Year: Patient declined     Allergies   Allergen Reactions    Haloperidol Unknown     Patient states it stops her heart      Haloperidol Angioedema    Hydroxyzine Angioedema    Meperidine And Related Angioedema, Difficulty breathing, Other (See Comments), Rash and Shortness Of Breath     Throat closes  Other reaction(s): Breathing Difficulty  Other reaction(s): Breathing  "Difficulty      Phenothiazines Other (See Comments)     Other reaction(s): CARDIAC DISTURBANCES  Patient states it stops her heart  \"I swell up\"  \"stopped by heart\"  \"I swell up\"  \"I swell up\"      Phenothiazines Angioedema    Tramadol Other (See Comments)     Other reaction(s): Angioedema  Throat itch      Tramadol Angioedema    Januvia [Sitagliptin] Swelling    Latex Itching    Haloperidol And Related Angioedema    Januvia [Sitagliptin] Other (See Comments)     Swelling in the neck     Latex Itching    Lisinopril Other (See Comments)     ACE cough  Itchy throat  Throat itches  Itchy throat      Nortriptyline Unknown     Fainting, unclear if it was related    Penicillins Swelling    Hydroxyzine Other (See Comments) and Rash     Tongue swelling  Tongue swelling  Tongue swelling      Lisinopril Cough       PRIOR TO ADMISSION MEDICATIONS   Medications Prior to Admission   Medication Sig Dispense Refill Last Dose    acetaminophen (TYLENOL) 500 MG tablet Take 1-2 tablets (500-1,000 mg) by mouth every 8 hours as needed for mild pain Max 5.5 tabs per day 100 tablet 11 Unknown at unknown    albuterol (PROAIR HFA) 108 (90 Base) MCG/ACT inhaler Inhale 1-2 puffs into the lungs every 4 hours as needed for shortness of breath or wheezing 18 g 11 Unknown at unknown    aspirin 81 MG EC tablet Take 1 tablet (81 mg) by mouth daily 90 tablet 3 Unknown at unknown    dulaglutide (TRULICITY) 0.75 MG/0.5ML pen Inject 0.75 mg Subcutaneous every 7 days 6 mL 3 Unknown at unknown    DULoxetine (CYMBALTA) 60 MG capsule Take 1 capsule (60 mg) by mouth 2 times daily 180 capsule 3 Unknown at unknown    fluticasone (FLONASE) 50 MCG/ACT nasal spray Spray 1 spray into both nostrils 2 times daily 16 g 11 Unknown at unknown    insulin glargine (LANTUS PEN) 100 UNIT/ML pen Inject 35-40 Units Subcutaneous 2 times daily 24 mL 11 Unknown at unknown    lidocaine (LIDODERM) 5 % patch Place onto the skin every 24 hours To prevent lidocaine toxicity, " patient should be patch free for 12 hrs daily.   Unknown at unknown    LORazepam (ATIVAN) 0.5 MG tablet TAKE 1 TABLET (0.5 MG) BY MOUTH EVERY 6 HOURS AS NEEDED FOR ANXIETY 20 tablet 0 Unknown at unknown    methocarbamol (ROBAXIN) 750 MG tablet Take 1 tablet (750 mg) by mouth 2 times daily 60 tablet 11 Unknown at unknown    nabumetone (RELAFEN) 500 MG tablet Take 1 tablet (500 mg) by mouth 2 times daily 60 tablet 11 Unknown at unknown    naloxone (NARCAN) 4 MG/0.1ML nasal spray Spray 4 mg into one nostril alternating nostrils as needed for opioid reversal every 2-3 minutes until assistance arrives   Unknown at unknown    omeprazole (PRILOSEC) 40 MG DR capsule Take 1 capsule (40 mg) by mouth daily To protect your stomach. 90 capsule 3 Unknown at unknown    ondansetron (ZOFRAN) 8 MG tablet Take 1 tablet (8 mg) by mouth every 8 hours as needed for nausea 20 tablet 3 Unknown at unknown    topiramate (TOPAMAX) 50 MG tablet Take 100 mg by mouth 2 times daily   Unknown at unknown    triamcinolone (KENALOG) 0.1 % external ointment Apply topically 2 times daily Apply twice a day to vaginal rash until resolved 80 g 0 Unknown at unknown        REVIEW OF SYSTEMS:  12 point reviewed pertinent negatives and positives in HPI all others negative     PHYSICAL EXAM  B/P:131/61 T:98 P:84 R: 16     Head:    Normocephalic, without obvious abnormality, atraumatic   Eyes:    PERRL, conjunctiva/corneas clear, EOM's intact,both eyes    Ears:    Normal external ear canals no drainage or erythema bilat.   Nose:   Nares normal by gross inspection,  mucosa normal, no drainage or sinus tenderness   Throat:   Lips, mucosa, and tongue normal; teeth and gums normal   Neck:   Supple, symmetrical, trachea midline, no adenopathy;        thyroid:  No enlargement/tenderness/nodules   Back:     Symmetric, no curvature, ROM normal, no CVA tenderness   Lungs:   Decreased breath sounds at lung bases, no rhonchi, no wheeze.   Chest wall:    No tenderness or  deformity   Heart:    Regular rate and rhythm, S1 and S2 normal, I/VI systolic murmur, no rubs, no JVD, no edema   Abdomen:     Soft, non-tender, bowel sounds active all four quadrants,     no masses, no hepatosplenomegaly   Musculoskeletal: Positive tenderness and slight erythema of right leg with +1 edema.   Pulses:   2+ and symmetric all extremities   Skin:   Skin color, texture, turgor normal, no rashes or lesions on exposed areas, please see nursing assessment for full skin assessment   Neurologic: Grossly intact, no focal deficit         PERTINENT LABS and RADIOLOGY     I have personally reviewed the following data over the past 24 hrs:    10.2  \   9.5 (L)   / 231     136 103 28.1 (H) /  237 (H)   4.4 21 (L) 1.31 (H) \     Trop: 26 (H) BNP: N/A     TSH: N/A T4: N/A A1C: 13.1 (H)     INR:  1.14 PTT:  29   D-dimer:  N/A Fibrinogen:  N/A       Imaging results reviewed over the past 24 hrs:   Recent Results (from the past 24 hour(s))   CTA Head Neck with Contrast    Narrative    EXAM: CTA HEAD NECK W CONTRAST  LOCATION: [facility]  DATE: 8/26/2024    INDICATION: A  COMPARISON: None.  CONTRAST: 67ml isovue 370  TECHNIQUE: Head and neck CT angiogram with IV contrast. Noncontrast head CT followed by axial helical CT images of the head and neck vessels obtained during the arterial phase of intravenous contrast administration. Axial 2D reconstructed images and   multiplanar 3D MIP reconstructed images of the head and neck vessels were performed by the technologist. Dose reduction techniques were used. All stenosis measurements made according to NASCET criteria unless otherwise specified.    FINDINGS:   NONCONTRAST HEAD CT:   INTRACRANIAL CONTENTS: No intracranial hemorrhage, extraaxial collection, or mass effect.  No CT evidence of acute infarct. Mild presumed chronic small vessel ischemic changes. Normal ventricles and sulci.     VISUALIZED ORBITS/SINUSES/MASTOIDS: No intraorbital  abnormality. No paranasal sinus mucosal disease. No middle ear or mastoid effusion.    BONES/SOFT TISSUES: No acute abnormality.    HEAD CTA:  ANTERIOR CIRCULATION: No stenosis/occlusion, aneurysm, or high flow vascular malformation. Standard [place] anatomy.    POSTERIOR CIRCULATION: No stenosis/occlusion, aneurysm, or high flow vascular malformation. Balanced vertebral arteries supply a normal basilar artery.     DURAL VENOUS SINUSES: Expected enhancement of the major dural venous sinuses.    NECK CTA:  RIGHT CAROTID: Mild atheromatous changes without significant stenotic change.    LEFT CAROTID: Postprocedural changes of left carotid endarterectomy. No acute vascular injury.    VERTEBRAL ARTERIES: Atheromatous disease involves the origin of the left vertebral artery. This is stable from the prior study.    AORTIC ARCH: Classic aortic arch anatomy with no significant stenosis at the origin of the great vessels.    NONVASCULAR STRUCTURES: Unremarkable.      Impression    IMPRESSION:   HEAD CT:  1.  No acute intracranial hemorrhage.    HEAD CTA:   1.  No evidence of vascular occlusion.    NECK CTA:  1.  Atheromatous disease involves the origin of the left vertebral artery without high-grade stenosis. Postprocedural changes of left carotid endarterectomy, stable from the prior study.    2.  Case discussed with Dr. Kraft at approximately 1809 hours CST.   XR Tibia and Fibula Right 2 Views    Narrative    EXAM: XR TIBIA AND FIBULA RIGHT 2 VIEWS  LOCATION: [facility]  DATE: 8/26/2024    INDICATION: Dog bite to right shin, evaluate for retained foreign body.  COMPARISON: 8/5/2024      Impression    IMPRESSION: Anatomic alignment right tibia and fibula. No acute displaced tibia or fibula fracture is identified. Degenerative change right knee is similar to prior and most advanced in the patellofemoral compartment. New mild right leg soft tissue   swelling with soft tissue gas in the medial  proximal one-third shaft right tibia region. Findings are compatible with soft tissue infection or penetrating trauma. No radiopaque adjacent soft tissue foreign body. Arterial calcification.   XR Chest 2 Views    Narrative    EXAM: XR CHEST 2 VIEWS  LOCATION: [facility]  DATE: 8/26/2024    INDICATION: chest pain shortness of breath, eval for pna or ptx  COMPARISON: 11/29/2023      Impression    IMPRESSION: The lungs are hyperinflated but otherwise clear. No effusions or pneumothorax. Cardiomegaly without overt pulmonary edema. CABG changes and sternotomy wires.     EKG: Sinus rhythm, nonspecific T wave changes.    Discussed with patient, family, neurology and nursing staff     Gaetano Wright MD  [facility] Internal Medicine Hospitalist  [phone]

## 2024-08-27 NOTE — PHARMACY-CONSULT NOTE
Pharmacy Consult to evaluate for medication related stroke core measures    Sarah Rahman, 59 year old female admitted for right-sided weakness - rule out stroke on 8/26/2024.    Thrombolytic was not given because of Patient declined    VTE Prophylaxis  PCDs ordered 8/26/24 - patient refusing , documented    Antithrombotic: aspirin started on 8/27/24, as appropriate by end of hospital day 2. Continue antithrombotic therapy on discharge to meet quality measures, unless contraindicated.    Anticoagulation if history of A-fib/flutter: Patient does not have history of A-fib/flutter - anticoagulation not required for medication related stroke core measures.     LDL Cholesterol Calculated   Date Value Ref Range Status   08/26/2024 131 (H) <=100 mg/dL Final   05/16/2010 163 (H) 0 - 129 mg/dL Final     Comment:     LDL Cholesterol is the primary guide to therapy: LDL-cholesterol goal in high   risk patients is <100 mg/dL and in very high risk patients is <70 mg/dL.     LDL Cholesterol Direct   Date Value Ref Range Status   04/22/2011 81.0 0.0 - 129.0 mg/dL Final       Patient currently receiving Lipitor (atorvastatin) continue statin on discharge to meet quality measures, unless contraindicated.    Recommendations: None at this time    Thank you for the consult.    Corazon Olivo [facility] 8/27/2024 1:51 PM

## 2024-08-27 NOTE — PROGRESS NOTES
Neurology progress note    Patient could not do MRI. Will attempt repeat Head CT to see if diagnosis can be made.    Lefty Wadsworth MD  Neurologist  [facility] - [place]  Tel:- [phone]

## 2024-08-27 NOTE — CONSULTS
[facility] Nurse Inpatient Assessment     Consulted for: R shin    Summary: Went to see pt today, pt down at MRI at time of visit. Will plan to see pt tomorrow 8/28.      DOTTIE BRAMBILA RN CWOCN, CFCN  Pager no longer is use, please contact through [employer] group: [facility] Nurse [facility]

## 2024-08-27 NOTE — ED NOTES
"[facility] ED Handoff Report    ED Chief Complaint:  stroke symptoms, pain in the right calf    ED Diagnosis:  (R53.1) Right sided weakness  Comment:   Plan:     (R07.9) Chest pain, unspecified type  Comment:   Plan:     (W54.0XXA) Dog bite, initial encounter  Comment:   Plan:        PMH:    Past Medical History:   Diagnosis Date    Asthma     CAD (coronary artery disease)     COPD (chronic obstructive pulmonary disease) (H)     CVA (cerebral infarction)     Dyshidrosis (pompholyx) 10/21/2016    GERD (gastroesophageal reflux disease)     History of CVA (cerebrovascular accident) 04/01/2012    Formatting of this note might be different from the original.  Overview:   Bilateral subacute cerebellar infarcts seen on MRI at [facility] 4/2012.  Pt was noted to have no residual deficits at [facility].  Formatting of this note might be different from the original.  Bilateral subacute cerebellar infarcts seen on MRI at [facility] 4/2012.  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 Hamm at [facility]    Schizoaffective disorder (H)     Sleep difficulties 07/17/2022        Code Status:  Prior     Falls Risk: No Band: Not applicable    Current Living Situation/Residence: lives in a house     Elimination Status: Continent: Yes     Activity Level: Independent    Patients Preferred Language:  English     Needed: No    Vital Signs:  /73   Pulse 91   Temp 97.9  F (36.6  C) (Oral)   Resp 23   Ht 1.702 m (5' 7\")   Wt 85.7 kg (189 lb)   LMP  (LMP Unknown)   SpO2 99%   BMI 29.60 kg/m       Cardiac Rhythm:     Pain Score: 9/10    Is the Patient Confused:  No    Last Food or Drink: 08/26/24 at 2000    Focused " Assessment:  Pt is alert and oriented, complained of right sided weakness. Stroke code was initially called but was deescalated. Pt can move her arm, walks independently but in a slow manner. Had a dog bite on right calf and has been complaining of pain 10/10 on the area.     Imaging done'  HEAD CT:  1.  No acute intracranial hemorrhage.  HEAD CTA:   1.  No evidence of vascular occlusion.  NECK CTA:  1.  Atheromatous disease involves the origin of the left vertebral artery without high-grade stenosis. Postprocedural changes of left carotid endarterectomy, stable from the prior study.    Tests Performed: Done: Labs and Imaging    Treatments Provided:  antibiotics, pain medication    Family Dynamics/Concerns: No    Family Updated On Visitor Policy: No    Plan of Care Communicated to Family: No    Who Was Updated about Plan of Care: patient    Belongings Checklist Done and Signed by Patient: No    Belongings Sent with Patient: phone     Medications sent with patient: Insulin aspart    Covid: asymptomatic , n/a    Additional Information: MRI done; awaiting result    RN: Elieser Jones RN   8/26/2024 8:32 PM

## 2024-08-27 NOTE — PLAN OF CARE
Patient arrived to room P122 with belong at beside. Went for lower extremity ultrasound at midnight.

## 2024-08-27 NOTE — PLAN OF CARE
"  Problem: Adult Inpatient Plan of Care  Goal: Plan of Care Review  Description: The Plan of Care Review/Shift note should be completed every shift.  The Outcome Evaluation is a brief statement about your assessment that the patient is improving, declining, or no change.  This information will be displayed automatically on your shift  note.  Outcome: Progressing   Goal Outcome Evaluation:         Pt alert and oriented  Up and ambulating to bathroom with walker and SBA of 1 staff. Pt was inconsistent with wether or not she would allow the staff to use a gait belt. She is \"steady\" on her feet, but does have some right sided deficits and wobbly at times. She states,\" I won't fall on your watch, don't worry\". Stayed within reach and one hand on the pt at all times. She is impulsive and overestimates her abilities.    Pt stated her pain was 10/10 with every assessment, however she was very sleepy most of the shift. She was given Norco at 1315 x 1. Pt stated that it was her right lower leg that hurt \"real bad from that damn dog bite\". Oxycodone prn has been decreased by MD to 2.5 mg PO, pt was informed of this.    Pt was scheduled for an MRI this shift she went down after receiving her 0.5 mg of PO ativan at 1400 in . Pt got down there and would not take off her wig, which apparently had some kind of metal in it that distorts the view of the MRI per the technician. Pt ultimately returned to the unit and MRI was not done.    BS this evening was 77. Pt did not eat lunch, but did eat a full dinner. Carb count covered as per meals.    Pt has wound on her right shin that she states was from a dog bite. It is draining serosang drainage with purulent tan drainage. This writer cleansed the area with soap and water and applied a Mepilex foam dressing. Called MD and obtained an order for the CWOCN Rn to see. The [facility] nurse came to see the pt, however she was off the unit at the time of visit. She stated that she would return " tomorrow to view the pt's wound.    CT of head was dine this evening as per MD order.  Neurology saw pt this am.    Pt refusing to use her SCD's even with clear explanation of the need to help prevent DVT's. Pt was started on Lovenox.    NIH 7-9 throughout the shift.  Tele NSR    POC is ongoing.

## 2024-08-27 NOTE — PROGRESS NOTES
Pt went down to MRI to have scan. All her necklaces were removed and had confirmation that her eyelashes were plastic jewels. Pt got to MRI and they ask the pt to remove her wig as it had metal in it. Pt refused to remove her wig and therefor they were unable to do the MRI. MD Dr. Lopez was paged to inform of this by this writer. Pt was premedicated with 0.5 mg of PO dilaudid prior to leaving the floor.

## 2024-08-27 NOTE — CONSULTS
NEUROLOGY CONSULTATION NOTE     Sarah Rahman,  1965, MRN [id number] Date: 2024     [facility]   Code status:  Full Code   PCP: Kike David, [phone]      ASSESSMENT & PLAN   Diagnosis code: Right-sided weakness-rule out stroke    Right-sided weakness-rule out stroke  History of stroke status post left carotid endarterectomy    CT negative for acute structural lesions  CT angiogram negative for any significant large vessel occlusion/stenosis.  There are postprocedural changes from the left carotid endarterectomy  MRI brain pending  Echocardiogram/cardiac monitoring  Aspirin Plavix for 3 weeks followed by Plavix only as patient was on aspirin 81 mg PTA.  Lipid panel and statin  Blood pressure can slowly be normalized  PT/OT       Chief Complaint   Patient presents with    Stroke Symptoms        HISTORY OF PRESENT ILLNESS     We have been requested by Dr. Wright to evaluate Sarah Rahman who is a 59 year old  female for evaluation of right arm and leg weakness.  This is a 59-year-old female with history of type 2 diabetes, coronary artery disease, previous strokes related to carotid stenosis and basilar artery stenosis with history of syphilis who presented with right arm and leg weakness.  She also had some chest pain radiating to her neck and left shoulder.  There was also some difficulty with speech and aphasia.  She denied taking any blood thinners and denied thrombolytics.  She does have some pain going on in the leg due to a recent dog bite.  MRI could not be done on admission.  Continues to have ongoing symptoms.     PAST MEDICAL & SURGICAL HISTORY     Medical History  Past Medical History:   Diagnosis Date    Asthma     CAD (coronary artery disease)     COPD (chronic obstructive pulmonary disease) (H)     CVA (cerebral infarction)     Dyshidrosis (pompholyx) 10/21/2016    GERD (gastroesophageal reflux disease)     History of CVA (cerebrovascular accident) 2012     Formatting of this note might be different from the original.  Overview:   Bilateral subacute cerebellar infarcts seen on MRI at [facility] 4/2012.  Pt was noted to have no residual deficits at [facility].  Formatting of this note might be different from the original.  Bilateral subacute cerebellar infarcts seen on MRI at [facility] 4/2012.  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 Hamm at [facility]    Schizoaffective disorder (H)     Sleep difficulties 07/17/2022     Surgical History  Past Surgical History:   Procedure Laterality Date    BYPASS GRAFT ARTERY CORONARY  01/01/2009    x2    CAROTID ENDARTERECTOMY Left 12/2021    CORONARY STENT PLACEMENT      CV ANGIOGRAM CORONARY GRAFT N/A 1/16/2024    Procedure: Coronary Angiogram Graft;  Surgeon: Spencer Diez MD;  Location: [facility] CATH LAB CV    CV CORONARY ANGIOGRAM N/A 06/02/2021    Procedure: Coronary Angiogram;  Surgeon: Juventino Rivera MD;  Location: [facility] Cardiac Cath Lab;  Service: Cardiology    HYSTERECTOMY TOTAL ABDOMINAL      HYSTERECTOMY TOTAL ABDOMINAL, BILATERAL SALPINGO-OOPHORECTOMY, COMBINED      IR LUMBAR PUNCTURE  7/23/2019    IR TRANSLAMINAR EPIDURAL LUMBAR INJ INCL IMAGING  09/27/2022    LUMBAR DISCECTOMY Right 06/13/2019    L5-S1 - Dr. Hamm    NASAL FRACTURE SURGERY      ORIF ULNAR / RADIAL SHAFT FRACTURE Right         SOCIAL HISTORY     Social History     Tobacco Use    Smoking status: Every Day     Types: Cigarettes    Smokeless tobacco: Never    Tobacco comments:     Seen IP by [facility] on 7/28/23 and declined counseling services and resource packet Smokes 1 cigarettes per day   Vaping Use    Vaping status: Never Used   Substance Use Topics  "   Alcohol use: Not Currently     Comment: Alcoholic Drinks/day: occ    Drug use: No        FAMILY HISTORY     Reviewed, and family history includes Coronary Artery Disease in her brother; Diabetes in her brother; Heart Disease in her father, mother, sister, and sister.     ALLERGIES     Allergies   Allergen Reactions    Haloperidol Unknown     Patient states it stops her heart      Haloperidol Angioedema    Hydroxyzine Angioedema    Meperidine And Related Angioedema, Difficulty breathing, Other (See Comments), Rash and Shortness Of Breath     Throat closes  Other reaction(s): Breathing Difficulty  Other reaction(s): Breathing Difficulty      Phenothiazines Other (See Comments)     Other reaction(s): CARDIAC DISTURBANCES  Patient states it stops her heart  \"I swell up\"  \"stopped by heart\"  \"I swell up\"  \"I swell up\"      Phenothiazines Angioedema    Tramadol Other (See Comments)     Other reaction(s): Angioedema  Throat itch      Tramadol Angioedema    Januvia [Sitagliptin] Swelling    Latex Itching    Haloperidol And Related Angioedema    Januvia [Sitagliptin] Other (See Comments)     Swelling in the neck     Latex Itching    Lisinopril Other (See Comments)     ACE cough  Itchy throat  Throat itches  Itchy throat      Nortriptyline Unknown     Fainting, unclear if it was related    Penicillins Swelling    Hydroxyzine Other (See Comments) and Rash     Tongue swelling  Tongue swelling  Tongue swelling      Lisinopril Cough        REVIEW OF SYSTEMS     12 system ROS was done other than the HPI this was negative.  Pertinent positives noted in the HPI     HOME & HOSPITAL MEDICATIONS     Prior to Admission Medications  Medications Prior to Admission   Medication Sig Dispense Refill Last Dose    acetaminophen (TYLENOL) 500 MG tablet Take 1-2 tablets (500-1,000 mg) by mouth every 8 hours as needed for mild pain Max 5.5 tabs per day 100 tablet 11 Unknown at unknown    albuterol (PROAIR HFA) 108 (90 Base) MCG/ACT inhaler " Inhale 1-2 puffs into the lungs every 4 hours as needed for shortness of breath or wheezing 18 g 11 Unknown at unknown    aspirin 81 MG EC tablet Take 1 tablet (81 mg) by mouth daily 90 tablet 3 Unknown at unknown    dulaglutide (TRULICITY) 0.75 MG/0.5ML pen Inject 0.75 mg Subcutaneous every 7 days 6 mL 3 Unknown at unknown    DULoxetine (CYMBALTA) 60 MG capsule Take 1 capsule (60 mg) by mouth 2 times daily 180 capsule 3 Unknown at unknown    fluticasone (FLONASE) 50 MCG/ACT nasal spray Spray 1 spray into both nostrils 2 times daily 16 g 11 Unknown at unknown    insulin glargine (LANTUS PEN) 100 UNIT/ML pen Inject 35-40 Units Subcutaneous 2 times daily 24 mL 11 Unknown at unknown    lidocaine (LIDODERM) 5 % patch Place onto the skin every 24 hours To prevent lidocaine toxicity, patient should be patch free for 12 hrs daily.   Unknown at unknown    LORazepam (ATIVAN) 0.5 MG tablet TAKE 1 TABLET (0.5 MG) BY MOUTH EVERY 6 HOURS AS NEEDED FOR ANXIETY 20 tablet 0 Unknown at unknown    methocarbamol (ROBAXIN) 750 MG tablet Take 1 tablet (750 mg) by mouth 2 times daily 60 tablet 11 Unknown at unknown    nabumetone (RELAFEN) 500 MG tablet Take 1 tablet (500 mg) by mouth 2 times daily 60 tablet 11 Unknown at unknown    naloxone (NARCAN) 4 MG/0.1ML nasal spray Spray 4 mg into one nostril alternating nostrils as needed for opioid reversal every 2-3 minutes until assistance arrives   Unknown at unknown    omeprazole (PRILOSEC) 40 MG DR capsule Take 1 capsule (40 mg) by mouth daily To protect your stomach. 90 capsule 3 Unknown at unknown    ondansetron (ZOFRAN) 8 MG tablet Take 1 tablet (8 mg) by mouth every 8 hours as needed for nausea 20 tablet 3 Unknown at unknown    topiramate (TOPAMAX) 50 MG tablet Take 100 mg by mouth 2 times daily   Unknown at unknown    triamcinolone (KENALOG) 0.1 % external ointment Apply topically 2 times daily Apply twice a day to vaginal rash until resolved 80 g 0 Unknown at unknown       Hospital  Medications  Current Facility-Administered Medications   Medication Dose Route Frequency Provider Last Rate Last Admin    aspirin EC tablet 81 mg  81 mg Oral Daily Gaetano Wright MD   81 mg at 08/27/24 0854    atorvastatin (LIPITOR) tablet 40 mg  40 mg Oral QPM Gaetano Wright MD   40 mg at 08/26/24 2320    clindamycin (CLEOCIN) 600 mg in 50 mL D5W intermittent infusion  600 mg Intravenous Q8H Gaetano Wright  mL/hr at 08/27/24 0855 600 mg at 08/27/24 0855    DULoxetine (CYMBALTA) DR capsule 60 mg  60 mg Oral BID Gaetano Wright MD   60 mg at 08/27/24 0854    famotidine (PEPCID) injection 20 mg  20 mg Intravenous BID Gaetano Wright MD        Or    famotidine (PEPCID) tablet 20 mg  20 mg Oral or NG Tube BID Gaetano Wright MD   20 mg at 08/27/24 0853    fluticasone (FLONASE) 50 MCG/ACT spray 1 spray  1 spray Both Nostrils BID Gaetano Wright MD   1 spray at 08/27/24 0852    gabapentin (NEURONTIN) capsule 200 mg  200 mg Oral TID Gaetano Wright MD   200 mg at 08/27/24 0853    insulin aspart (NovoLOG) injection (RAPID ACTING)  1-7 Units Subcutaneous TID AC Gaetano Wright MD   4 Units at 08/27/24 0853    insulin aspart (NovoLOG) injection (RAPID ACTING)  1-5 Units Subcutaneous At Bedtime Gaetano Wright MD   1 Units at 08/26/24 2246    insulin aspart (NovoLOG) injection (RAPID ACTING)   Subcutaneous TID w/meals Gaetano Wright MD   5 Units at 08/27/24 0916    insulin glargine (LANTUS PEN) injection 40 Units  40 Units Subcutaneous BID Elisabet Lopez MD   40 Units at 08/27/24 0852    Lidocaine (LIDOCARE) 4 % Patch 1 patch  1 patch Transdermal Q24h Gaetano Wright MD        methocarbamol (ROBAXIN) tablet 750 mg  750 mg Oral BID Gaetano Wright MD   750 mg at 08/27/24 0853    pantoprazole (PROTONIX) EC tablet 40 mg  40 mg Oral QAM AC Gaetano Wright MD   40 mg at 08/27/24 0648    sodium chloride (PF) 0.9% PF flush 3 mL  3 mL Intracatheter Q8H Gaetano Wright MD   3 mL at 08/27/24 0854    topiramate (TOPAMAX)  "tablet 100 mg  100 mg Oral BID Gaetano Wright MD   100 mg at 08/27/24 0854        PHYSICAL EXAM     Vital signs  Temp:  [97.5  F (36.4  C)-98  F (36.7  C)] 97.5  F (36.4  C)  Pulse:  [73-92] 73  Resp:  [16-23] 18  BP: (124-157)/(61-73) 126/64  SpO2:  [97 %-100 %] 100 %    PHYSICAL EXAMINATION  VITALS: /64 (BP Location: Left arm)   Pulse 73   Temp 97.5  F (36.4  C) (Oral)   Resp 18   Ht 1.702 m (5' 7\")   Wt 80.7 kg (177 lb 14.6 oz)   LMP  (LMP Unknown)   SpO2 100%   BMI 27.86 kg/m    GENERAL -Health appearing, No apparent distress  EYES- No scleral icterus, no eyelid droop, Pupils - see Neuro section  HEENT - Normocephalic, atraumatic, Hearing grossly intact; Oral mucosa moist and pink in color. External Ears and nose intact.   Neck - supple   PULM - Good spontaneous respiratory effort; Normal palpation of chest.   CV- Pedal pulses present with no peripheral edema/ No significant varicosities.  MSK- Gait - see Neuro section; Strength and tone- see Neuro section; Range of motion grossly intact.  PSYCH -cooperative    Neurological  Mental status - Patient is awake and grossly oriented to self, place and time. Attention span is normal. Language is fluent and follows commands appropriately.   Cranial nerves - CN II-XII intact. Pupils are reactive and symmetric; EOMI, VFIT, NLF symmetric  Motor - Motor exam shows 5/5 strength on the left side.  Right side is 3+/5 in the arm and leg.  Tone - Tone is symmetric bilaterally in upper and lower extremities.  Reflexes - Reflexes are absent throughout.  Sensation - Sensory exam is grossly intact to light touch, pain.  Coordination - Finger to nose shows some dysmetria in the right upper extremity.  No dysmetria in the left upper extremity.  Unable to do heel-to-shin due to weakness.  Gait and station --unable to safely ambulate due to weakness.  Formal gait testing cannot be done due to safety concerns from ongoing medical issues     DIAGNOSTIC STUDIES "     Pertinent Radiology   HEAD CT:  1.  No acute intracranial hemorrhage.     HEAD CTA:   1.  No evidence of vascular occlusion.     NECK CTA:  1.  Atheromatous disease involves the origin of the left vertebral artery without high-grade stenosis. Postprocedural changes of left carotid endarterectomy, stable from the prior study.    XRAY leg  IMPRESSION: Anatomic alignment right tibia and fibula. No acute displaced tibia or fibula fracture is identified. Degenerative change right knee is similar to prior and most advanced in the patellofemoral compartment. New mild right leg soft tissue   swelling with soft tissue gas in the medial proximal one-third shaft right tibia region. Findings are compatible with soft tissue infection or penetrating trauma. No radiopaque adjacent soft tissue foreign body. Arterial calcification.    ECHO  Left ventricular size, wall motion and function are normal. The ejection  fraction is 60-65%.  There is mild concentric left ventricular hypertrophy.  Normal right ventricle size and systolic function.  Right ventricular systolic pressure is elevated, consistent with mild  pulmonary hypertension.  There is moderate (2+) tricuspid regurgitation.  There is mild mitral stenosis.  There is mild to moderate (1-2+) mitral regurgitation.      Recent Results (from the past 24 hour(s))   Basic metabolic panel    Collection Time: 08/26/24  6:07 PM   Result Value Ref Range    Sodium 136 135 - 145 mmol/L    Potassium 4.4 3.4 - 5.3 mmol/L    Chloride 103 98 - 107 mmol/L    Carbon Dioxide (CO2) 21 (L) 22 - 29 mmol/L    Anion Gap 12 7 - 15 mmol/L    Urea Nitrogen 28.1 (H) 8.0 - 23.0 mg/dL    Creatinine 1.31 (H) 0.51 - 0.95 mg/dL    GFR Estimate 46 (L) >60 mL/min/1.73m2    Calcium 9.0 8.8 - 10.4 mg/dL    Glucose 480 (H) 70 - 99 mg/dL   INR    Collection Time: 08/26/24  6:07 PM   Result Value Ref Range    INR 1.14 0.85 - 1.15   Partial thromboplastin time    Collection Time: 08/26/24  6:07 PM   Result Value  Ref Range    aPTT 29 22 - 38 Seconds   Troponin T, High Sensitivity    Collection Time: 08/26/24  6:07 PM   Result Value Ref Range    Troponin T, High Sensitivity 23 (H) <=14 ng/L   CBC with platelets and differential    Collection Time: 08/26/24  6:07 PM   Result Value Ref Range    WBC Count 10.2 4.0 - 11.0 10e3/uL    RBC Count 3.43 (L) 3.80 - 5.20 10e6/uL    Hemoglobin 9.5 (L) 11.7 - 15.7 g/dL    Hematocrit 30.2 (L) 35.0 - 47.0 %    MCV 88 78 - 100 fL    MCH 27.7 26.5 - 33.0 pg    MCHC 31.5 31.5 - 36.5 g/dL    RDW 13.1 10.0 - 15.0 %    Platelet Count 231 150 - 450 10e3/uL    % Neutrophils 73 %    % Lymphocytes 19 %    % Monocytes 7 %    % Eosinophils 1 %    % Basophils 0 %    % Immature Granulocytes 0 %    NRBCs per 100 WBC 0 <1 /100    Absolute Neutrophils 7.5 1.6 - 8.3 10e3/uL    Absolute Lymphocytes 1.9 0.8 - 5.3 10e3/uL    Absolute Monocytes 0.7 0.0 - 1.3 10e3/uL    Absolute Eosinophils 0.1 0.0 - 0.7 10e3/uL    Absolute Basophils 0.0 0.0 - 0.2 10e3/uL    Absolute Immature Granulocytes 0.0 <=0.4 10e3/uL    Absolute NRBCs 0.0 10e3/uL   Urine Drug Screen Panel    Collection Time: 08/26/24  8:06 PM   Result Value Ref Range    Amphetamines Urine Screen Negative Screen Negative    Barbituates Urine Screen Negative Screen Negative    Benzodiazepine Urine Screen Negative Screen Negative    Cannabinoids Urine Screen Negative Screen Negative    Cocaine Urine Screen Negative Screen Negative    Fentanyl Qual Urine Screen Negative Screen Negative    Opiates Urine Screen Negative Screen Negative    PCP Urine Screen Negative Screen Negative   Glucose by meter    Collection Time: 08/26/24  8:08 PM   Result Value Ref Range    GLUCOSE BY METER POCT 383 (H) 70 - 99 mg/dL   Troponin T, High Sensitivity    Collection Time: 08/26/24  8:35 PM   Result Value Ref Range    Troponin T, High Sensitivity 21 (H) <=14 ng/L   Glucose by meter    Collection Time: 08/26/24 10:45 PM   Result Value Ref Range    GLUCOSE BY METER POCT 237 (H) 70 -  99 mg/dL   Hemoglobin A1c    Collection Time: 08/26/24 11:11 PM   Result Value Ref Range    Hemoglobin A1C 13.1 (H) <5.7 %   Troponin T, High Sensitivity    Collection Time: 08/26/24 11:11 PM   Result Value Ref Range    Troponin T, High Sensitivity 26 (H) <=14 ng/L   Lipid panel reflex to direct LDL: Non-fasting    Collection Time: 08/26/24 11:11 PM   Result Value Ref Range    Cholesterol 218 (H) <200 mg/dL    Triglycerides 183 (H) <150 mg/dL    Direct Measure HDL 50 >=50 mg/dL    LDL Cholesterol Calculated 131 (H) <=100 mg/dL    Non HDL Cholesterol 168 (H) <130 mg/dL   Extra Blue Top Tube    Collection Time: 08/26/24 11:11 PM   Result Value Ref Range    Hold Specimen JIC    Glucose by meter    Collection Time: 08/27/24  2:22 AM   Result Value Ref Range    GLUCOSE BY METER POCT 333 (H) 70 - 99 mg/dL   CBC with platelets    Collection Time: 08/27/24  7:34 AM   Result Value Ref Range    WBC Count 6.9 4.0 - 11.0 10e3/uL    RBC Count 3.77 (L) 3.80 - 5.20 10e6/uL    Hemoglobin 10.1 (L) 11.7 - 15.7 g/dL    Hematocrit 32.6 (L) 35.0 - 47.0 %    MCV 87 78 - 100 fL    MCH 26.8 26.5 - 33.0 pg    MCHC 31.0 (L) 31.5 - 36.5 g/dL    RDW 13.1 10.0 - 15.0 %    Platelet Count 215 150 - 450 10e3/uL   Basic metabolic panel    Collection Time: 08/27/24  7:34 AM   Result Value Ref Range    Sodium 137 135 - 145 mmol/L    Potassium 3.7 3.4 - 5.3 mmol/L    Chloride 105 98 - 107 mmol/L    Carbon Dioxide (CO2) 21 (L) 22 - 29 mmol/L    Anion Gap 11 7 - 15 mmol/L    Urea Nitrogen 26.1 (H) 8.0 - 23.0 mg/dL    Creatinine 0.90 0.51 - 0.95 mg/dL    GFR Estimate 73 >60 mL/min/1.73m2    Calcium 8.6 (L) 8.8 - 10.4 mg/dL    Glucose 289 (H) 70 - 99 mg/dL   Troponin T, High Sensitivity    Collection Time: 08/27/24  7:34 AM   Result Value Ref Range    Troponin T, High Sensitivity 19 (H) <=14 ng/L   Hepatic panel    Collection Time: 08/27/24  7:34 AM   Result Value Ref Range    Protein Total 6.5 6.4 - 8.3 g/dL    Albumin 3.6 3.5 - 5.2 g/dL    Bilirubin  Total 0.2 <=1.2 mg/dL    Alkaline Phosphatase 102 40 - 150 U/L    AST 24 0 - 45 U/L    ALT 23 0 - 50 U/L    Bilirubin Direct <0.20 0.00 - 0.30 mg/dL   Glucose by meter    Collection Time: 08/27/24  8:28 AM   Result Value Ref Range    GLUCOSE BY METER POCT 305 (H) 70 - 99 mg/dL   Echocardiogram Complete - For age > 60 yrs    Collection Time: 08/27/24 10:40 AM   Result Value Ref Range    LVEF  60-65%    Glucose by meter    Collection Time: 08/27/24 12:16 PM   Result Value Ref Range    GLUCOSE BY METER POCT 212 (H) 70 - 99 mg/dL       Total time spent for face to face visit, reviewing labs/imaging studies, counseling and coordination of care was: Over 80 min More than 50% of this time was spent on counseling and coordination of care.    Counseling patient.  Reviewing chart.  Coordination of care with the primary team.  Getting MRI.    Lefty Wadsworth MD  Neurologist  [facility]  Tel:- [phone]

## 2024-08-27 NOTE — CONSULTS
"      [facility]     Stroke Code Note          History of Present Illness     Chief Complaint: Stroke Symptoms      Sarah Rahman is a 59 year old -handed female with past medical history significant for hypertension, hyperlipidemia, ischemic stroke and diabetes mellitus presents with right-sided weakness/numbness and dysarthria.     Sarah is a poor historian given inconsistency, apparently she was last seen normal somewhere between [id number] by her uncle, who was not with the patient today. She states that she experienced right-sided weakness/numbness and dysarthria.  She is not sure when her symptoms started.          Past Medical History     Stroke risk factors: Ischemic strokes, hypertension, hyperlipidemia and diabetes mellitus    Preadmission antithrombotic regimen: Aspirin 81 mg and Plavix 75 mg    Modified Grady Score (Pre-morbid)  0-No deficits                   Assessment and Plan       1.  Right hemiparesis/numbness  2  Dysarthria     Intravenous Thrombolysis  Not given due to:   - Discussed risk and benefits, given risks of ICH she declined     Endovascular Treatment  Not initiated due to absence of proximal vessel occlusion     Plan:  - Use orderset: \"Ischemic Stroke Routine Admission\" or \"Ischemic Stroke No Thrombolytics/No Thrombectomy ICU Admission\"  - Place Neurology IP Stroke Consult order   - Neurochecks and Vital Signs every 4 hrs   - Permissive HTN; goal SBP < 220 mmHg  - Daily aspirin 81 mg for secondary stroke prevention  - Statin: Atorvastatin 40 mg  - MRI Brain with and without contrast    - Telemetry, EKG  - Bedside Glucose Monitoring  - A1c, Lipid Panel, Troponin x 3  - PT/OT/SLP  - Stroke Education  - Euthermia, Euglycemia  -TTE  ___________________________________________________________________    The Stroke Staff is Dr. Worley.    Fidel Khan MD  Vascular Neurology Fellow    To page me or covering stroke neurology team member, " "click here: [facility]  Choose \"On Call\" tab at top, then select \"NEUROLOGY/ALL SITES\" from middle drop-down box, press Enter, then look for \"stroke\" or \"telestroke\" for your site.  ___________________________________________________________________        Imaging/Labs   (personally reviewed )    CT head: \No hemorrhage  CTA head/neck: No LVO           Physical Examination     BP: 139/73   Pulse: 91   Resp: 23   Temp: 97.9  F (36.6  C)   Temp src: Oral   SpO2: 99 %   O2 Device: None (Room air)   Weight: 85.7 kg (189 lb)    Wt Readings from Last 2 Encounters:   08/26/24 85.7 kg (189 lb)   08/05/24 85.3 kg (188 lb)       Neuro Exam: Embellished exam throughout encounter and volitional weakness and speech changes  Mental Status:  alert, oriented x 2, follows commands, speech dysarthric naming and repetition normal  Cranial Nerves:  visual fields intact (tested by nurse), EOMI with normal smooth pursuit, mild left facial droop, decreased light touch sensation in right V1 V2 V3, hearing not formally tested but intact to conversation,   Motor: Right hemiparesis  Reflexes:  unable to test (telestroke)  Sensory:   Decreased to light touch in right upper and lower extremities  Coordination:   No dysmetria on finger-to-nose and heel-to-shin on the left  Station/Gait:  unable to test due to telestroke        Stroke Scales       NIHSS  1a. Level of Consciousness 0-->Alert, keenly responsive   1b. LOC Questions 0-->Answers both questions correctly   1c. LOC Commands 0-->Performs both tasks correctly   2.   Best Gaze 0-->Normal   3.   Visual 0-->No visual loss   4.   Facial Palsy 1-->Minor paralysis (flattened nasolabial fold, asymmetry on smiling)   5a. Motor Arm, Left 0-->No drift, limb holds 90 (or 45) degrees for full 10 secs   5b. Motor Arm, Right 2-->Some effort against gravity, limb cannot get to or maintain (if cued) 90 (or 45) degrees, drifts down to bed, but has some effort against gravity   6a. Motor Leg, Left 0-->No drift, " leg holds 30 degree position for full 5 secs   6b. Motor Leg, right 3-->No effort against gravity, leg falls to bed immediately   7.   Limb Ataxia 1-->Present in one limb   8.   Sensory 1-->Mild-to-moderate sensory loss, patient feels pinprick is less sharp or is dull on the affected side, or there is a loss of superficial pain with pinprick, but patient is aware of being touched   9.   Best Language 0-->No aphasia, normal   10. Dysarthria 2-->Severe dysarthria, patients speech is so slurred as to be unintelligible in the absence of or out of proportion to any dysphasia, or is mute/anarthric   11. Extinction and Inattention  2-->Profound roney-inattention/extinction more than 1 modality   Total 12 (08/26/24 1905)            Labs     CBC  Lab Results   Component Value Date    HGB 9.5 (L) 08/26/2024    HCT 30.2 (L) 08/26/2024    WBC 10.2 08/26/2024     08/26/2024       BMP  Lab Results   Component Value Date     08/26/2024    POTASSIUM 4.4 08/26/2024    CHLORIDE 103 08/26/2024    CO2 21 (L) 08/26/2024    BUN 28.1 (H) 08/26/2024    CR 1.31 (H) 08/26/2024     (H) 08/26/2024    MAXIMO 9.0 08/26/2024       INR  INR   Date Value Ref Range Status   08/26/2024 1.14 0.85 - 1.15 Final   07/23/2024 0.99 0.85 - 1.15 Final   07/28/2023 1.05 0.85 - 1.15 Final       Data   Stroke Code Data  (for stroke code with tele)  Stroke code activated 08/26/24  1735   First stroke provider response 08/26/24  1743   Video start time 08/26/24  1800   Video end time 08/26/24  1830   Last known normal 08/26/24  1645   Time of discovery (or onset of symptoms)  08/26/24  1743   Head CT read by Stroke Neuro Provider 08/26/24  1800   Was stroke code de-escalated? Yes  08/26/24  1846     Telestroke Service Details  Type of service telemedicine diagnostic assessment of acute neurological changes   Reason telemedicine is appropriate patient requires assessment with a specialist for diagnosis and treatment of neurological symptoms   Mode  "of transmission secure interactive audio and video communication per Jonathan   Originating site (patient location) [facility]    Distant site (provider location) [facility]        Clinically Significant Risk Factors Present on Admission                # Drug Induced Platelet Defect: home medication list includes an antiplatelet medication   # Hypertension: Noted on problem list    # Anemia: based on hgb <11       # Overweight: Estimated body mass index is 29.6 kg/m  as calculated from the following:    Height as of this encounter: 1.702 m (5' 7\").    Weight as of this encounter: 85.7 kg (189 lb).       # Asthma: noted on problem list  # History of CABG: noted on surgical history              "

## 2024-08-28 LAB
ANION GAP SERPL CALCULATED.3IONS-SCNC: 10 MMOL/L (ref 7–15)
BUN SERPL-MCNC: 21.3 MG/DL (ref 8–23)
CALCIUM SERPL-MCNC: 8.5 MG/DL (ref 8.8–10.4)
CHLORIDE SERPL-SCNC: 105 MMOL/L (ref 98–107)
CREAT SERPL-MCNC: 0.89 MG/DL (ref 0.51–0.95)
EGFRCR SERPLBLD CKD-EPI 2021: 74 ML/MIN/1.73M2
GLUCOSE BLDC GLUCOMTR-MCNC: 116 MG/DL (ref 70–99)
GLUCOSE BLDC GLUCOMTR-MCNC: 117 MG/DL (ref 70–99)
GLUCOSE BLDC GLUCOMTR-MCNC: 122 MG/DL (ref 70–99)
GLUCOSE BLDC GLUCOMTR-MCNC: 168 MG/DL (ref 70–99)
GLUCOSE BLDC GLUCOMTR-MCNC: 66 MG/DL (ref 70–99)
GLUCOSE BLDC GLUCOMTR-MCNC: 85 MG/DL (ref 70–99)
GLUCOSE SERPL-MCNC: 98 MG/DL (ref 70–99)
HCO3 SERPL-SCNC: 21 MMOL/L (ref 22–29)
HOLD SPECIMEN: NORMAL
POTASSIUM SERPL-SCNC: 3.6 MMOL/L (ref 3.4–5.3)
SODIUM SERPL-SCNC: 136 MMOL/L (ref 135–145)

## 2024-08-28 PROCEDURE — 36415 COLL VENOUS BLD VENIPUNCTURE: CPT | Performed by: STUDENT IN AN ORGANIZED HEALTH CARE EDUCATION/TRAINING PROGRAM

## 2024-08-28 PROCEDURE — 250N000011 HC RX IP 250 OP 636: Performed by: STUDENT IN AN ORGANIZED HEALTH CARE EDUCATION/TRAINING PROGRAM

## 2024-08-28 PROCEDURE — 250N000009 HC RX 250

## 2024-08-28 PROCEDURE — 120N000001 HC R&B MED SURG/OB

## 2024-08-28 PROCEDURE — 99233 SBSQ HOSP IP/OBS HIGH 50: CPT | Performed by: PSYCHIATRY & NEUROLOGY

## 2024-08-28 PROCEDURE — 10060 I&D ABSCESS SIMPLE/SINGLE: CPT

## 2024-08-28 PROCEDURE — 99222 1ST HOSP IP/OBS MODERATE 55: CPT | Mod: 25

## 2024-08-28 PROCEDURE — 0J9N0ZZ DRAINAGE OF RIGHT LOWER LEG SUBCUTANEOUS TISSUE AND FASCIA, OPEN APPROACH: ICD-10-PCS

## 2024-08-28 PROCEDURE — 84295 ASSAY OF SERUM SODIUM: CPT | Performed by: STUDENT IN AN ORGANIZED HEALTH CARE EDUCATION/TRAINING PROGRAM

## 2024-08-28 PROCEDURE — 99233 SBSQ HOSP IP/OBS HIGH 50: CPT | Performed by: STUDENT IN AN ORGANIZED HEALTH CARE EDUCATION/TRAINING PROGRAM

## 2024-08-28 PROCEDURE — G0463 HOSPITAL OUTPT CLINIC VISIT: HCPCS

## 2024-08-28 PROCEDURE — 250N000013 HC RX MED GY IP 250 OP 250 PS 637: Performed by: INTERNAL MEDICINE

## 2024-08-28 PROCEDURE — 250N000013 HC RX MED GY IP 250 OP 250 PS 637: Performed by: STUDENT IN AN ORGANIZED HEALTH CARE EDUCATION/TRAINING PROGRAM

## 2024-08-28 PROCEDURE — 250N000011 HC RX IP 250 OP 636: Mod: JZ | Performed by: INTERNAL MEDICINE

## 2024-08-28 PROCEDURE — 99221 1ST HOSP IP/OBS SF/LOW 40: CPT | Mod: FS | Performed by: SURGERY

## 2024-08-28 PROCEDURE — 999N000226 HC STATISTIC SLP IP EVAL DEFER

## 2024-08-28 RX ORDER — CIPROFLOXACIN 500 MG/1
500 TABLET, FILM COATED ORAL EVERY 12 HOURS SCHEDULED
Status: DISCONTINUED | OUTPATIENT
Start: 2024-08-28 | End: 2024-08-30

## 2024-08-28 RX ORDER — LIDOCAINE HYDROCHLORIDE 10 MG/ML
10 INJECTION, SOLUTION EPIDURAL; INFILTRATION; INTRACAUDAL; PERINEURAL ONCE
Status: COMPLETED | OUTPATIENT
Start: 2024-08-28 | End: 2024-08-28

## 2024-08-28 RX ADMIN — TOPIRAMATE 100 MG: 100 TABLET, FILM COATED ORAL at 09:26

## 2024-08-28 RX ADMIN — OXYCODONE HYDROCHLORIDE 2.5 MG: 5 TABLET ORAL at 20:47

## 2024-08-28 RX ADMIN — GABAPENTIN 200 MG: 100 CAPSULE ORAL at 09:27

## 2024-08-28 RX ADMIN — CLINDAMYCIN IN 5 PERCENT DEXTROSE 600 MG: 12 INJECTION, SOLUTION INTRAVENOUS at 00:18

## 2024-08-28 RX ADMIN — CLINDAMYCIN IN 5 PERCENT DEXTROSE 600 MG: 12 INJECTION, SOLUTION INTRAVENOUS at 09:21

## 2024-08-28 RX ADMIN — FAMOTIDINE 20 MG: 20 TABLET ORAL at 20:47

## 2024-08-28 RX ADMIN — TOPIRAMATE 100 MG: 100 TABLET, FILM COATED ORAL at 20:46

## 2024-08-28 RX ADMIN — CLINDAMYCIN IN 5 PERCENT DEXTROSE 600 MG: 12 INJECTION, SOLUTION INTRAVENOUS at 17:45

## 2024-08-28 RX ADMIN — ACETAMINOPHEN 650 MG: 325 TABLET ORAL at 00:28

## 2024-08-28 RX ADMIN — ATORVASTATIN CALCIUM 40 MG: 40 TABLET, FILM COATED ORAL at 20:46

## 2024-08-28 RX ADMIN — METHOCARBAMOL 750 MG: 750 TABLET ORAL at 20:46

## 2024-08-28 RX ADMIN — PANTOPRAZOLE SODIUM 40 MG: 40 TABLET, DELAYED RELEASE ORAL at 06:31

## 2024-08-28 RX ADMIN — LIDOCAINE 1 PATCH: 4 PATCH TOPICAL at 20:53

## 2024-08-28 RX ADMIN — FAMOTIDINE 20 MG: 20 TABLET ORAL at 09:27

## 2024-08-28 RX ADMIN — ENOXAPARIN SODIUM 40 MG: 40 INJECTION SUBCUTANEOUS at 17:45

## 2024-08-28 RX ADMIN — FLUTICASONE PROPIONATE 1 SPRAY: 50 SPRAY, METERED NASAL at 20:49

## 2024-08-28 RX ADMIN — DULOXETINE HYDROCHLORIDE 60 MG: 60 CAPSULE, DELAYED RELEASE PELLETS ORAL at 09:28

## 2024-08-28 RX ADMIN — DULOXETINE HYDROCHLORIDE 60 MG: 60 CAPSULE, DELAYED RELEASE PELLETS ORAL at 20:46

## 2024-08-28 RX ADMIN — METHOCARBAMOL 750 MG: 750 TABLET ORAL at 09:27

## 2024-08-28 RX ADMIN — CLOPIDOGREL BISULFATE 75 MG: 75 TABLET ORAL at 09:28

## 2024-08-28 RX ADMIN — OXYCODONE HYDROCHLORIDE 2.5 MG: 5 TABLET ORAL at 11:46

## 2024-08-28 RX ADMIN — FLUTICASONE PROPIONATE 1 SPRAY: 50 SPRAY, METERED NASAL at 09:26

## 2024-08-28 RX ADMIN — CIPROFLOXACIN 500 MG: 500 TABLET ORAL at 20:46

## 2024-08-28 RX ADMIN — ACETAMINOPHEN 650 MG: 325 TABLET ORAL at 04:41

## 2024-08-28 RX ADMIN — GABAPENTIN 200 MG: 100 CAPSULE ORAL at 14:53

## 2024-08-28 RX ADMIN — OXYCODONE HYDROCHLORIDE 2.5 MG: 5 TABLET ORAL at 04:41

## 2024-08-28 RX ADMIN — ASPIRIN 81 MG: 81 TABLET, COATED ORAL at 09:27

## 2024-08-28 RX ADMIN — GABAPENTIN 200 MG: 100 CAPSULE ORAL at 20:46

## 2024-08-28 RX ADMIN — LIDOCAINE HYDROCHLORIDE 10 ML: 10 INJECTION, SOLUTION EPIDURAL; INFILTRATION; INTRACAUDAL; PERINEURAL at 17:48

## 2024-08-28 ASSESSMENT — ACTIVITIES OF DAILY LIVING (ADL)
ADLS_ACUITY_SCORE: 26

## 2024-08-28 NOTE — PLAN OF CARE
Speech Language Pathology: Orders received. Chart reviewed and met with patient.? Patient has been eating regular diet /thin liquids since admit, she reports her eating is back to normal and has no difficulty. She also states her speech is back to normal and isn't she's having any word finding issues. No observed dysarthria. She declines any assessment. Defer discharge recommendations to care team.? Will complete orders.

## 2024-08-28 NOTE — DISCHARGE INSTRUCTIONS
"WOUND CARES  Right lower leg, daily wound cares   1. Irrigate and cleanse with Vashe solution, wet 4x4\" gauze and cleanse periwound skin, pour vashe into wound. Soak for 5 min. Then pat dry  2. Cut a strip of aquacel ag advantage (silver alginate), apply DRY with a cotton tipped applicator, leave a tail for ease of removal  3. Cover with a mepilex border dressing, or like product   4. Apply EdemaWear to RLE, size medium or size small, use per manufacture instructions on card with garment  "

## 2024-08-28 NOTE — PROGRESS NOTES
[facility]    Medicine Progress Note - Hospitalist Service    Date of Admission:  8/26/2024    Assessment & Plan   59 year old female with a past medical history of type 2 diabetes, CAD status post CABG, prior CVA with stenosis carotid artery, basilar artery stenosis, history of syphilis who presents emergency department with sudden onset right-sided arm and leg weakness and strokelike symptoms, also has a right leg dog bite     Stroke like symptoms  - Presented with sudden onset right-sided arm and leg weakness  - Stroke code called in the ER.  Patient declined to tPA  - CTA head and neck is negative for acute changes  -   - Neurology consult, appreciate input  - Echo shows EF 60 to 65%.  Mild LVH.  Mild pulmonary hypertension, moderate TR, mild MS, mild to moderate MR  - MRI unable to be done as patient refused to remove her wig which had metal in it  - Repeat CT with no acute findings  - Continue aspirin and plavix for 3 weeks, followed by Plavix only  - started High intensity statin  - Neurochecks  - 30 day Cardiac monitoring  - PT/OT     Right leg cellulitis  Abscess RLE  - S/P dog bite yesterday, not rabid as per patient  - XR neg, US negative for DVT  - On IV clindamycin as patient is allergic to penicillin, add Cipro 500mg bid  - Continue Tylenol, Oxycodone as needed for pain  - WOC was consulted  - with erythema and fluctuance, consult Surgery for possible need for I&D    JB - resolved  Cr 1.31 -> 0.90  Monitor Cr     Diabetes mellitus insulin-dependent  - Poorly controlled with hyperglycemia  - HbA1c 13.1  - Home regimen; Lantus 35-40u bid, Trulicity weekly  - decreased Lantus 30u bid due to hypoglycemia today am  - sliding scale insulin medium, I:C 1:15     Hypertension  -BP controlled     Hyperlipidemia  - Started Atorvastatin 40mg     GERD  -Continue PPI          Diet: Moderate Consistent Carb (60 g CHO per Meal) Diet    DVT Prophylaxis: Enoxaparin (Lovenox) SQ  Diehl  "Catheter: Not present  Lines: None     Cardiac Monitoring: ACTIVE order. Indication: Stroke, acute (48 hours)  Code Status: Full Code      Clinically Significant Risk Factors          # Hypocalcemia: Lowest Ca = 8.5 mg/dL in last 2 days, will monitor and replace as appropriate         # Hypertension: Noted on problem list          # DMII: A1C = 13.1 % (Ref range: <5.7 %) within past 6 months, PRESENT ON ADMISSION  # Overweight: Estimated body mass index is 27.86 kg/m  as calculated from the following:    Height as of this encounter: 1.702 m (5' 7\").    Weight as of this encounter: 80.7 kg (177 lb 14.6 oz)., PRESENT ON ADMISSION     # Asthma: noted on problem list  # History of CABG: noted on surgical history             Disposition Plan     Medically Ready for Discharge: Anticipated in 2-4 Days             Elisabet Lopez MD  Hospitalist Service  [facility]  Securely message with [employer] (more info)  Text page via [facility] Paging/Directory   ______________________________________________________________________    Interval History   Patient was examined at the bedside, lying comfortably in the bed.  States that she continues to have pain in right lower extremity.  Increased swelling around the wound and fluctuance, consult surgery for possible I&D      Physical Exam   Vital Signs: Temp: 98  F (36.7  C) Temp src: Oral BP: 135/64 Pulse: 77   Resp: 16 SpO2: 98 % O2 Device: None (Room air)    Weight: 177 lbs 14.58 oz    General:  Appears stated age, no acute distress. A&O x 3.  Skin:  Warm, dry  Chest: dec BS at lung bases b/l, no rhonchi, no wheeze  Cardiovascular:  RRR, no rub or murmur. No peripheral edema.  Abdomen:  Soft, non-tender, non-distended.  Musculoskeletal:  positive Tenderness and erythema, with fluctuance and oozing wound right leg, +1 edema  Neurological:  RUE and RLE 3/5, LUE and LLE 5/5, gait instability    Medical Decision Making       55 MINUTES SPENT BY ME on the date of " service doing chart review, history, exam, documentation & further activities per the note.      Data     I have personally reviewed the following data over the past 24 hrs:    N/A  \   N/A   / N/A     136 105 21.3 /  116 (H)   3.6 21 (L) 0.89 \       Imaging results reviewed over the past 24 hrs:   Recent Results (from the past 24 hour(s))   MR Brain w/o Contrast    Narrative    EXAM: MR BRAIN W/O CONTRAST  LOCATION: [facility]  DATE: 8/27/2024    INDICATION: right sided arm and leg weakness, eval for possible CVA  COMPARISON: 08/26/2024 head and neck CTA.  TECHNIQUE: Routine multiplanar multisequence head MRI without intravenous contrast was attempted but could not be completed given extensive susceptibility artifact relating to patient's hair piece.    FINDINGS/    Impression    IMPRESSION: Significant artifact relating to the patient's hair piece, which could not be removed, such that the exam could not be completed. Only /localizer images were obtained.   CT Head w/o Contrast    Narrative    EXAM: CT HEAD W/O CONTRAST  LOCATION: [facility]  DATE: 8/27/2024    INDICATION: Eval for stroke  COMPARISON: MRI brain 8/26/2024  TECHNIQUE: Routine CT Head without IV contrast. Multiplanar reformats. Dose reduction techniques were used.    FINDINGS:  INTRACRANIAL CONTENTS: No intracranial hemorrhage, extraaxial collection, or mass effect.  No CT evidence of acute infarct. Mild presumed chronic small vessel ischemic changes. Normal ventricles and sulci.     VISUALIZED ORBITS/SINUSES/MASTOIDS: No intraorbital abnormality. No paranasal sinus mucosal disease. No middle ear or mastoid effusion.    BONES/SOFT TISSUES: No acute abnormality.      Impression    IMPRESSION:  1.  No acute intracranial process.

## 2024-08-28 NOTE — PLAN OF CARE
"  Goal Outcome Evaluation:    Plan of Care Reviewed With: patient    Overall Patient Progress: no changeOverall Patient Progress: no change    Problem: Adult Inpatient Plan of Care  Goal: Absence of Hospital-Acquired Illness or Injury  Intervention: Prevent Infection  Recent Flowsheet Documentation  Taken 8/28/2024 0443 by Es Mosley RN  Infection Prevention:   hand hygiene promoted   rest/sleep promoted  Taken 8/28/2024 0015 by Es Mosley RN  Infection Prevention:   hand hygiene promoted   rest/sleep promoted  Taken 8/27/2024 2027 by Es Mosley RN  Infection Prevention:   hand hygiene promoted   rest/sleep promoted     Problem: Wound  Goal: Absence of Infection Signs and Symptoms  Intervention: Prevent or Manage Infection  Recent Flowsheet Documentation  Taken 8/28/2024 0015 by Es Mosley RN  Infection Management: aseptic technique maintained  Taken 8/27/2024 2027 by Es Mosley RN  Infection Management: aseptic technique maintained     Problem: Wound  Goal: Optimal Pain Control and Function  Intervention: Prevent or Manage Pain  Recent Flowsheet Documentation  Taken 8/28/2024 0435 by Es Mosley RN  Pain Management Interventions: medication (see MAR)  Taken 8/28/2024 0011 by Es Mosley RN  Pain Management Interventions: medication (see MAR)  Taken 8/27/2024 2016 by Es Mosley RN  Pain Management Interventions: medication (see MAR)    Pt A&Ox4, expresses frustration discontent with writer's questions, uncooperative with cares. Declined NIHSS scale.  VSS on RA. Reports BLE \"stabbing pain\" managed with Oxycodone, Tylenol with temporary relief. Pt resting with eyes-closed between cares. Antibiotic given. Tele NS.  Wound erythemic, edematous. Dressing changed. Serosanguinous drainage, small to moderate.                "

## 2024-08-28 NOTE — PROGRESS NOTES
NEUROLOGY PROGRESS NOTE     Sarah Rahman,  1965, MRN [id number] Date: 2024     [facility]   Code status:  Full Code   PCP: Kike David, [phone]      ASSESSMENT & PLAN   Diagnosis code: Right-sided weakness-rule out stroke    Right-sided weakness-rule out stroke  History of stroke status post left carotid endarterectomy    CT negative for acute structural lesions  CT angiogram negative for any significant large vessel occlusion/stenosis.  There are postprocedural changes from the left carotid endarterectomy  MRI brain could not be done due to metal.  Repeat head CT negative for stroke.  Given clinical presentation and her extensive vascular risk factors suspect she had a stroke.  Will complete stroke workup/stroke care.  Echocardiogram shows 60 to 65% ejection fraction  Recommend 30-day cardiac monitoring  Aspirin Plavix for 3 weeks followed by Plavix only as patient was on aspirin 81 mg PTA.  Lipid panel and statin.  .  Recommend 40 mg Lipitor.  Blood pressure can slowly be normalized  PT/OT    Discharge:-Once cleared by physical therapy.       Chief Complaint   Patient presents with    Stroke Symptoms        HISTORY OF PRESENT ILLNESS     We have been requested by Dr. Wright to evaluate Sarah Rahman who is a 59 year old  female for evaluation of right arm and leg weakness.  This is a 59-year-old female with history of type 2 diabetes, coronary artery disease, previous strokes related to carotid stenosis and basilar artery stenosis with history of syphilis who presented with right arm and leg weakness.  She also had some chest pain radiating to her neck and left shoulder.  There was also some difficulty with speech and aphasia.  She denied taking any blood thinners and denied thrombolytics.  She does have some pain going on in the leg due to a recent dog bite.  MRI could not be done on admission.  Continues to have ongoing symptoms.       She continues to have the  right sided weakness.  Mild improvement.  No new symptoms. Could not do the MRI due to the hair piece.  Repeat head CT has not shown a stroke.  Discussed with her of unclear diagnosis.  Discussed given that her vascular risk factors we will treat her like a stroke since she is improving with physical therapy.     PAST MEDICAL & SURGICAL HISTORY     Medical History  Past Medical History:   Diagnosis Date    Asthma     CAD (coronary artery disease)     COPD (chronic obstructive pulmonary disease) (H)     CVA (cerebral infarction)     Dyshidrosis (pompholyx) 10/21/2016    GERD (gastroesophageal reflux disease)     History of CVA (cerebrovascular accident) 04/01/2012    Formatting of this note might be different from the original.  Overview:   Bilateral subacute cerebellar infarcts seen on MRI at [facility] 4/2012.  Pt was noted to have no residual deficits at [facility].  Formatting of this note might be different from the original.  Bilateral subacute cerebellar infarcts seen on MRI at [facility] 4/2012.  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 Hamm at [facility]    Schizoaffective disorder (H)     Sleep difficulties 07/17/2022     Surgical History  Past Surgical History:   Procedure Laterality Date    BYPASS GRAFT ARTERY CORONARY  01/01/2009    x2    CAROTID ENDARTERECTOMY Left 12/2021    CORONARY STENT PLACEMENT      CV ANGIOGRAM CORONARY GRAFT N/A 1/16/2024    Procedure: Coronary Angiogram Graft;  Surgeon: Spencer Diez MD;  Location: [facility] CATH LAB CV    CV CORONARY ANGIOGRAM N/A 06/02/2021    Procedure: Coronary Angiogram;  Surgeon: Juventino Rivera MD;  Location: [facility] Cardiac Cath Lab;  Service:  "Cardiology    HYSTERECTOMY TOTAL ABDOMINAL      HYSTERECTOMY TOTAL ABDOMINAL, BILATERAL SALPINGO-OOPHORECTOMY, COMBINED      IR LUMBAR PUNCTURE  7/23/2019    IR TRANSLAMINAR EPIDURAL LUMBAR INJ INCL IMAGING  09/27/2022    LUMBAR DISCECTOMY Right 06/13/2019    L5-S1 - Dr. Hamm    NASAL FRACTURE SURGERY      ORIF ULNAR / RADIAL SHAFT FRACTURE Right         SOCIAL HISTORY     Social History     Tobacco Use    Smoking status: Every Day     Types: Cigarettes    Smokeless tobacco: Never    Tobacco comments:     Seen IP by [facility] on 7/28/23 and declined counseling services and resource packet Smokes 1 cigarettes per day   Vaping Use    Vaping status: Never Used   Substance Use Topics    Alcohol use: Not Currently     Comment: Alcoholic Drinks/day: occ    Drug use: No        FAMILY HISTORY     Reviewed, and family history includes Coronary Artery Disease in her brother; Diabetes in her brother; Heart Disease in her father, mother, sister, and sister.     ALLERGIES     Allergies   Allergen Reactions    Haloperidol Unknown     Patient states it stops her heart      Haloperidol Angioedema    Hydroxyzine Angioedema    Meperidine And Related Angioedema, Difficulty breathing, Other (See Comments), Rash and Shortness Of Breath     Throat closes  Other reaction(s): Breathing Difficulty  Other reaction(s): Breathing Difficulty      Phenothiazines Other (See Comments)     Other reaction(s): CARDIAC DISTURBANCES  Patient states it stops her heart  \"I swell up\"  \"stopped by heart\"  \"I swell up\"  \"I swell up\"      Phenothiazines Angioedema    Tramadol Other (See Comments)     Other reaction(s): Angioedema  Throat itch      Tramadol Angioedema    Januvia [Sitagliptin] Swelling    Latex Itching    Haloperidol And Related Angioedema    Januvia [Sitagliptin] Other (See Comments)     Swelling in the neck     Latex Itching    Lisinopril Other (See Comments)     ACE cough  Itchy throat  Throat itches  Itchy throat      Nortriptyline Unknown     " Fainting, unclear if it was related    Penicillins Swelling    Hydroxyzine Other (See Comments) and Rash     Tongue swelling  Tongue swelling  Tongue swelling      Lisinopril Cough        REVIEW OF SYSTEMS     12 system ROS was done other than the HPI this was negative.  Pertinent positives noted in the HPI     HOME & HOSPITAL MEDICATIONS     Prior to Admission Medications  Medications Prior to Admission   Medication Sig Dispense Refill Last Dose    acetaminophen (TYLENOL) 500 MG tablet Take 1-2 tablets (500-1,000 mg) by mouth every 8 hours as needed for mild pain Max 5.5 tabs per day 100 tablet 11 Unknown at unknown    albuterol (PROAIR HFA) 108 (90 Base) MCG/ACT inhaler Inhale 1-2 puffs into the lungs every 4 hours as needed for shortness of breath or wheezing 18 g 11 Unknown at unknown    aspirin 81 MG EC tablet Take 1 tablet (81 mg) by mouth daily 90 tablet 3 Unknown at unknown    dulaglutide (TRULICITY) 0.75 MG/0.5ML pen Inject 0.75 mg Subcutaneous every 7 days 6 mL 3 Unknown at unknown    DULoxetine (CYMBALTA) 60 MG capsule Take 1 capsule (60 mg) by mouth 2 times daily 180 capsule 3 Unknown at unknown    fluticasone (FLONASE) 50 MCG/ACT nasal spray Spray 1 spray into both nostrils 2 times daily 16 g 11 Unknown at unknown    insulin glargine (LANTUS PEN) 100 UNIT/ML pen Inject 35-40 Units Subcutaneous 2 times daily 24 mL 11 Unknown at unknown    lidocaine (LIDODERM) 5 % patch Place onto the skin every 24 hours To prevent lidocaine toxicity, patient should be patch free for 12 hrs daily.   Unknown at unknown    LORazepam (ATIVAN) 0.5 MG tablet TAKE 1 TABLET (0.5 MG) BY MOUTH EVERY 6 HOURS AS NEEDED FOR ANXIETY 20 tablet 0 Unknown at unknown    methocarbamol (ROBAXIN) 750 MG tablet Take 1 tablet (750 mg) by mouth 2 times daily 60 tablet 11 Unknown at unknown    nabumetone (RELAFEN) 500 MG tablet Take 1 tablet (500 mg) by mouth 2 times daily 60 tablet 11 Unknown at unknown    naloxone (NARCAN) 4 MG/0.1ML nasal  spray Spray 4 mg into one nostril alternating nostrils as needed for opioid reversal every 2-3 minutes until assistance arrives   Unknown at unknown    omeprazole (PRILOSEC) 40 MG DR capsule Take 1 capsule (40 mg) by mouth daily To protect your stomach. 90 capsule 3 Unknown at unknown    ondansetron (ZOFRAN) 8 MG tablet Take 1 tablet (8 mg) by mouth every 8 hours as needed for nausea 20 tablet 3 Unknown at unknown    topiramate (TOPAMAX) 50 MG tablet Take 100 mg by mouth 2 times daily   Unknown at unknown    triamcinolone (KENALOG) 0.1 % external ointment Apply topically 2 times daily Apply twice a day to vaginal rash until resolved 80 g 0 Unknown at unknown       Hospital Medications  Current Facility-Administered Medications   Medication Dose Route Frequency Provider Last Rate Last Admin    aspirin EC tablet 81 mg  81 mg Oral Daily Gaetano Wright MD   81 mg at 08/28/24 0927    atorvastatin (LIPITOR) tablet 40 mg  40 mg Oral QPM Gaetano Wright MD   40 mg at 08/27/24 2047    ciprofloxacin (CIPRO) tablet 500 mg  500 mg Oral Q12H [facility] (08/20) Elisabet Lopez MD        clindamycin (CLEOCIN) 600 mg in 50 mL D5W intermittent infusion  600 mg Intravenous Q8H Gaetano Wright  mL/hr at 08/28/24 0921 600 mg at 08/28/24 0921    clopidogrel (PLAVIX) tablet 75 mg  75 mg Oral Daily Elisabet Lopez MD   75 mg at 08/28/24 0928    DULoxetine (CYMBALTA) DR capsule 60 mg  60 mg Oral BID Gaetano Wright MD   60 mg at 08/28/24 0928    enoxaparin ANTICOAGULANT (LOVENOX) injection 40 mg  40 mg Subcutaneous Q24H Elisabet Lopez MD   40 mg at 08/27/24 1711    famotidine (PEPCID) injection 20 mg  20 mg Intravenous BID Gaetano Wright MD   20 mg at 08/27/24 2045    Or    famotidine (PEPCID) tablet 20 mg  20 mg Oral or NG Tube BID Gaetano Wright MD   20 mg at 08/28/24 0927    fluticasone (FLONASE) 50 MCG/ACT spray 1 spray  1 spray Both Nostrils BID Gaetano Wright MD   1 spray at 08/28/24 0926    gabapentin (NEURONTIN) capsule  "200 mg  200 mg Oral TID Gaetano Wright MD   200 mg at 08/28/24 1453    insulin aspart (NovoLOG) injection (RAPID ACTING)  1-7 Units Subcutaneous TID AC Gaetano Wright MD   2 Units at 08/27/24 1315    insulin aspart (NovoLOG) injection (RAPID ACTING)  1-5 Units Subcutaneous At Bedtime Gaetano Wright MD   1 Units at 08/27/24 2255    insulin aspart (NovoLOG) injection (RAPID ACTING)   Subcutaneous TID w/meals Gaetano Wright MD   3 Units at 08/27/24 1823    insulin glargine (LANTUS PEN) injection 30 Units  30 Units Subcutaneous BID Elisabet Lopez MD   30 Units at 08/28/24 0941    Lidocaine (LIDOCARE) 4 % Patch 1 patch  1 patch Transdermal Q24h Gaetano Wright MD   1 patch at 08/27/24 2032    lidocaine (PF) (XYLOCAINE) 1 % injection 10 mL  10 mL Subcutaneous Once Rebecca Diamond PA-C        methocarbamol (ROBAXIN) tablet 750 mg  750 mg Oral BID Gaetano Wright MD   750 mg at 08/28/24 0927    pantoprazole (PROTONIX) EC tablet 40 mg  40 mg Oral QAM AC Gaetano Wright MD   40 mg at 08/28/24 0631    sodium chloride (PF) 0.9% PF flush 3 mL  3 mL Intracatheter Q8H Gaetano Wright MD   3 mL at 08/28/24 0921    topiramate (TOPAMAX) tablet 100 mg  100 mg Oral BID Gaetano Wright MD   100 mg at 08/28/24 0926        PHYSICAL EXAM     Vital signs  Temp:  [97.7  F (36.5  C)-98.4  F (36.9  C)] 97.8  F (36.6  C)  Pulse:  [76-83] 76  Resp:  [16-18] 18  BP: (122-143)/(64-78) 140/64  SpO2:  [92 %-100 %] 100 %    PHYSICAL EXAMINATION  VITALS: BP (!) 140/64 (BP Location: Right arm)   Pulse 76   Temp 97.8  F (36.6  C) (Oral)   Resp 18   Ht 1.702 m (5' 7\")   Wt 80.7 kg (177 lb 14.6 oz)   LMP  (LMP Unknown)   SpO2 100%   BMI 27.86 kg/m    GENERAL -Health appearing, No apparent distress  EYES- No scleral icterus, no eyelid droop, Pupils - see Neuro section  HEENT - Normocephalic, atraumatic, Hearing grossly intact; Oral mucosa moist and pink in color. External Ears and nose intact.   Neck - supple   PULM - Good spontaneous " respiratory effort; Normal palpation of chest.   CV- Pedal pulses present with no peripheral edema/ No significant varicosities.  MSK- Gait - see Neuro section; Strength and tone- see Neuro section; Range of motion grossly intact.  PSYCH -cooperative    Neurological  Mental status - Patient is awake and grossly oriented to self, place and time. Attention span is normal. Language is fluent and follows commands appropriately.   Cranial nerves - CN II-XII intact. Pupils are reactive and symmetric; EOMI, VFIT, NLF symmetric  Motor - Motor exam shows 5/5 strength on the left side.  Right side is 3+/5 in the arm and leg.  Tone - Tone is symmetric bilaterally in upper and lower extremities.  Reflexes - Reflexes are absent throughout.  Sensation - Sensory exam is grossly intact to light touch, pain.  Coordination - Finger to nose shows some dysmetria in the right upper extremity.  No dysmetria in the left upper extremity.  Unable to do heel-to-shin due to weakness.  Gait and station --unable to safely ambulate due to weakness.  Formal gait testing cannot be done due to safety concerns from ongoing medical issues  Exam stable.  Continues to have right-sided weakness.     DIAGNOSTIC STUDIES     Pertinent Radiology   HEAD CT:  1.  No acute intracranial hemorrhage.     HEAD CTA:   1.  No evidence of vascular occlusion.     NECK CTA:  1.  Atheromatous disease involves the origin of the left vertebral artery without high-grade stenosis. Postprocedural changes of left carotid endarterectomy, stable from the prior study.    XRAY leg  IMPRESSION: Anatomic alignment right tibia and fibula. No acute displaced tibia or fibula fracture is identified. Degenerative change right knee is similar to prior and most advanced in the patellofemoral compartment. New mild right leg soft tissue   swelling with soft tissue gas in the medial proximal one-third shaft right tibia region. Findings are compatible with soft tissue infection or penetrating  trauma. No radiopaque adjacent soft tissue foreign body. Arterial calcification.    ECHO  Left ventricular size, wall motion and function are normal. The ejection  fraction is 60-65%.  There is mild concentric left ventricular hypertrophy.  Normal right ventricle size and systolic function.  Right ventricular systolic pressure is elevated, consistent with mild  pulmonary hypertension.  There is moderate (2+) tricuspid regurgitation.  There is mild mitral stenosis.  There is mild to moderate (1-2+) mitral regurgitation.    Head CT  IMPRESSION:  1.  No acute intracranial process.    Recent Results (from the past 24 hour(s))   Glucose by meter    Collection Time: 08/27/24  5:14 PM   Result Value Ref Range    GLUCOSE BY METER POCT 77 70 - 99 mg/dL   Glucose by meter    Collection Time: 08/27/24  8:28 PM   Result Value Ref Range    GLUCOSE BY METER POCT 224 (H) 70 - 99 mg/dL   Glucose by meter    Collection Time: 08/27/24 10:51 PM   Result Value Ref Range    GLUCOSE BY METER POCT 215 (H) 70 - 99 mg/dL   Glucose by meter    Collection Time: 08/28/24  2:22 AM   Result Value Ref Range    GLUCOSE BY METER POCT 122 (H) 70 - 99 mg/dL   Glucose by meter    Collection Time: 08/28/24  8:54 AM   Result Value Ref Range    GLUCOSE BY METER POCT 66 (L) 70 - 99 mg/dL   Glucose by meter    Collection Time: 08/28/24  9:11 AM   Result Value Ref Range    GLUCOSE BY METER POCT 85 70 - 99 mg/dL   Basic metabolic panel    Collection Time: 08/28/24  9:16 AM   Result Value Ref Range    Sodium 136 135 - 145 mmol/L    Potassium 3.6 3.4 - 5.3 mmol/L    Chloride 105 98 - 107 mmol/L    Carbon Dioxide (CO2) 21 (L) 22 - 29 mmol/L    Anion Gap 10 7 - 15 mmol/L    Urea Nitrogen 21.3 8.0 - 23.0 mg/dL    Creatinine 0.89 0.51 - 0.95 mg/dL    GFR Estimate 74 >60 mL/min/1.73m2    Calcium 8.5 (L) 8.8 - 10.4 mg/dL    Glucose 98 70 - 99 mg/dL   Extra Purple Top EDTA (LAB USE ONLY)    Collection Time: 08/28/24  9:17 AM   Result Value Ref Range    Hold Specimen  [facility]    Glucose by meter    Collection Time: 08/28/24  1:08 PM   Result Value Ref Range    GLUCOSE BY METER POCT 116 (H) 70 - 99 mg/dL       Total time spent for face to face visit, reviewing labs/imaging studies, counseling and coordination of care was: Over 50 min More than 50% of this time was spent on counseling and coordination of care.    Counseling patient.  Getting MRI.  Reviewing chart.  Discussion of plan with the patient.    Lefty Wadsworth MD  Neurologist  [facility] Neurology [facility]  Tel:- [phone]

## 2024-08-28 NOTE — CONSULTS
"[facility]  WOC Nurse Inpatient Assessment     Consulted for: Wound Right shin Please assess pt's dog bite would on RLE (shin)    Summary: patient with POA RLE wound, post bedside I&D 8/28, initial woc assessment complete 8/28, initiated antimicrobial cleanser and antimicrobial dressing and compression 8/28    Patient History (according to provider note(s):      \"59 year old female with a past medical history of type 2 diabetes, CAD status post CABG, prior CVA with stenosis carotid artery, basilar artery stenosis, history of syphilis who presents emergency department with sudden onset right-sided arm and leg weakness and strokelike symptoms, also has a right leg dog bite \"    Assessment:      Areas visualized during today's visit: Focused: and Lower extremities     Wound location: right lower leg anterior     Last photo: 8/28  Wound due to:  dog bite trauma   Wound history/plan of care: found with nugauze and ABD in use  Wound base: (photo taken before packing removed) visible wound base No granulation buds noted and [place]     Palpation of the wound bed:  mild firm        Drainage: moderate     Description of drainage: serosanguinous     Measurements (length x width x depth, in cm): 0.8  x 0.5  x  1.5 cm      Tunneling: N/A     Undermining: N/A not able to palpate due to shape of wound and size of cotton tipped applicator   Periwound skin: Edematous      Color: normal and consistent with surrounding tissue      Temperature: warm  Odor: none  Pain: moderate, intermittent and tender  Pain interventions prior to dressing change: patient tolerated well, soaking, slow and gentle cares , and distraction  Treatment goal: Drainage control and Infection control/prevention  STATUS: initial assessment  Supplies ordered: gathered and at bedside and applied to patient        Treatment Plan:       Wound care  Start:  08/29/24 0600,   DAILY,   Routine        Comments: Location: RLE  Care: provided daily " "by primary RN  1. Irrigate and cleanse with vashe (711687) solution, wet 4x4\" gauze and cleanse periwound skin, pour vashe into wound. Soak for 5 min. Then pat dry  2. Cut a strip of aquacel ag advantage (776981), apply DRY with a cotton tipped applicator, leave a tail for ease of removal  3. Cover with a mepilex border  4. Apply EdemaWear to RLE size medium (555746) or small ([id number]), use per manufacture instructions on card with garment          Orders: Written    RECOMMEND PRIMARY TEAM ORDER: Surgical consult suggest repeating the I&D, if indicated    Education provided: plan of care, wound progress, Infection prevention , and Hygiene  Discussed plan of care with: Patient and Nurse  [facility] nurse follow-up plan: weekly  Notify WO if wound(s) deteriorate.  Nursing to notify the Provider(s) and re-consult the WO Nurse if new skin concern.    DATA:     Current support surface: Standard  Standard gel mattress (Isoflex)  Containment of urine/stool: Continent of bladder and Continent of bowel  BMI: Body mass index is 27.86 kg/m .   Active diet order: Orders Placed This Encounter      Moderate Consistent Carb (60 g CHO per Meal) Diet     Output: I/O last 3 completed shifts:  In: 1023 [P.O.:890; I.V.:133]  Out: -      Labs:   Recent Labs   Lab 08/27/24  0734 08/26/24  2311 08/26/24  1807   ALBUMIN 3.6  --   --    HGB 10.1*  --  9.5*   INR  --   --  1.14   WBC 6.9  --  10.2   A1C  --  13.1*  --      Pressure injury risk assessment:   Sensory Perception: 3-->slightly limited  Moisture: 4-->rarely moist  Activity: 3-->walks occasionally  Mobility: 3-->slightly limited  Nutrition: 3-->adequate  Friction and Shear: 3-->no apparent problem  Adrien Score: 19    Ida CWOCN   1st choice: Securely message with [employer] [facility] region group       "

## 2024-08-28 NOTE — PROCEDURES
Incision and Drainage Procedure    Indication: Puncture wound with likely abscess surrounding, initial insult was from a dog bite wound  Location: Right lower extremity just medial to the midline of the nichols  Completed By: Rebecca Diamond PA-C    Anesthesia: Lidocaine 1% local injection with 12 mLs injected    Note: The area was prepped using betadine solution swabs. Puncture wound was draining some of the lidocaine local injection - no michele purulence was expressed once analgesia working. Using a #15 blade some of the devitalized skin surrounding the puncture wound was removed, and a long Qtip was used to further open the site. Some bloody purulence when probing under the skin / into the wound with the Qtip. Unable to quantify due to mixing with the lidocaine. The area was flushed with normal saline and probed with a cotton swab to break up loculations and assess for tunneling. The wound was packed with 1/4 inch nugauze with a little sterile saline on it and was covered with ABD pad and tape.    Comments: The patient tolerated the procedure well. There were no immediate complications.     Informed Consent   Procedure: Incision and drainage of right lower extremity abscess  Note: The patient was determined to be alert and of sound decision-making capacity. Both the risks and benefits of the procedure were explained to the patient in great detail. The patient was allowed to ask questions, which were addressed thoroughly. The patient voiced understanding of both the risks and benefits of the procedure and felt that the benefits outweighed the risks. The patient freely gave verbal consent for procedural treatment.      Rebecca Diamond PA-C  [facility] Surgery  [address] [place], [place] [id number]

## 2024-08-28 NOTE — CONSULTS
General Surgery Consultation  Sarah Rahman MRN# [id number]   Age/Sex: 59 year old female YOB: 1965     Reason for consult: 1. Right sided weakness    2. Chest pain, unspecified type    3. Dog bite, initial encounter            Requesting physician: Dr. Lopez                  Assessment and Plan:   Assessment:  Sarah Rahman is a 59 year old PMH diabetes 13.1% on 8/26, presenting with right lower medial leg cellulitis and drainage s/p dog bite 8/27/24.patient remains afebrile with stable vitals, no leukocytosis on admission, has been evaluated for potential stroke /hyperglycemia symptoms and no DVT on US of BLE.  Upon evaluation does appear as though there was fluid collecting beneath and around the original puncture wound and because it is a dog bite would expect there to be contamination within the wound.  Performed at bedside incision and drainage without overt pockets of purulence expressed however packed the wound and will reassess tomorrow morning.    Plan:  -diet as tolerated per surgical perspective  -Activity as tolerated  -Planning bedside incision and drainage this afternoon, keep packing until tomorrow when general surgery re-evaluates wound. Change outer dressing as needed or as saturated  -no surgical OR intervention planned  -Medical management per primary team          Chief Complaint:     Chief Complaint   Patient presents with    Stroke Symptoms        History is obtained from the patient and electronic health record    HPI:   Sarah Rahman is a 59 year old female who presents after being bitten by her sisters dog yesterday.  Patient states the dog did hold on for a little bit as she hit it away.  Patient noticed that her leg was very sore, came in to have it evaluated.  Has not noticed much drainage from it, maybe a little bit.  No fever, chills, nausea, vomiting.  Is able to put pressure on the leg.  Is very tender to touch and especially to deep pressure.   Patient is upset with the dog and with her sister, states she wants everything in the health record.    Patient states she has diabetes, has never had a wound like this before or an overt for infection from a small scratch or anything.    Per chart review patient has type 2 diabetes last A1c 13.1%, prior CVA, s/p CABG, history of syphilis.          Past Medical History:     Past Medical History:   Diagnosis Date    Asthma     CAD (coronary artery disease)     COPD (chronic obstructive pulmonary disease) (H)     CVA (cerebral infarction)     Dyshidrosis (pompholyx) 10/21/2016    GERD (gastroesophageal reflux disease)     History of CVA (cerebrovascular accident) 04/01/2012    Formatting of this note might be different from the original.  Overview:   Bilateral subacute cerebellar infarcts seen on MRI at [facility] 4/2012.  Pt was noted to have no residual deficits at [facility].  Formatting of this note might be different from the original.  Bilateral subacute cerebellar infarcts seen on MRI at [facility] 4/2012.  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 Hamm at [facility]    Schizoaffective disorder (H)     Sleep difficulties 07/17/2022              Past Surgical History:     Past Surgical History:   Procedure Laterality Date    BYPASS GRAFT ARTERY CORONARY  01/01/2009    x2    CAROTID ENDARTERECTOMY Left 12/2021    CORONARY STENT PLACEMENT      CV ANGIOGRAM CORONARY GRAFT N/A 1/16/2024    Procedure: Coronary Angiogram Graft;  Surgeon: Spencer Diez MD;  Location: [facility] CATH LAB CV    CV CORONARY ANGIOGRAM N/A 06/02/2021    Procedure: Coronary Angiogram;  Surgeon: Juventino Rivera MD;  Location: [facility] Cardiac  "Cath Lab;  Service: Cardiology    HYSTERECTOMY TOTAL ABDOMINAL      HYSTERECTOMY TOTAL ABDOMINAL, BILATERAL SALPINGO-OOPHORECTOMY, COMBINED      IR LUMBAR PUNCTURE  7/23/2019    IR TRANSLAMINAR EPIDURAL LUMBAR INJ INCL IMAGING  09/27/2022    LUMBAR DISCECTOMY Right 06/13/2019    L5-S1 - Dr. Hamm    NASAL FRACTURE SURGERY      ORIF ULNAR / RADIAL SHAFT FRACTURE Right              Social History:    reports that she has been smoking cigarettes. She has never used smokeless tobacco. She reports that she does not currently use alcohol. She reports that she does not use drugs.           Family History:     Family History   Problem Relation Age of Onset    Heart Disease Mother     Heart Disease Father     Heart Disease Sister     Heart Disease Sister     Diabetes Brother     Coronary Artery Disease Brother         MI              Allergies:     Allergies   Allergen Reactions    Haloperidol Unknown     Patient states it stops her heart      Haloperidol Angioedema    Hydroxyzine Angioedema    Meperidine And Related Angioedema, Difficulty breathing, Other (See Comments), Rash and Shortness Of Breath     Throat closes  Other reaction(s): Breathing Difficulty  Other reaction(s): Breathing Difficulty      Phenothiazines Other (See Comments)     Other reaction(s): CARDIAC DISTURBANCES  Patient states it stops her heart  \"I swell up\"  \"stopped by heart\"  \"I swell up\"  \"I swell up\"      Phenothiazines Angioedema    Tramadol Other (See Comments)     Other reaction(s): Angioedema  Throat itch      Tramadol Angioedema    Januvia [Sitagliptin] Swelling    Latex Itching    Haloperidol And Related Angioedema    Januvia [Sitagliptin] Other (See Comments)     Swelling in the neck     Latex Itching    Lisinopril Other (See Comments)     ACE cough  Itchy throat  Throat itches  Itchy throat      Nortriptyline Unknown     Fainting, unclear if it was related    Penicillins Swelling    Hydroxyzine Other (See Comments) and Rash     Tongue " swelling  Tongue swelling  Tongue swelling      Lisinopril Cough              Medications:     Prior to Admission medications    Medication Sig Start Date End Date Taking? Authorizing Provider   acetaminophen (TYLENOL) 500 MG tablet Take 1-2 tablets (500-1,000 mg) by mouth every 8 hours as needed for mild pain Max 5.5 tabs per day 2/1/24  Yes Kike David MD   albuterol (PROAIR HFA) 108 (90 Base) MCG/ACT inhaler Inhale 1-2 puffs into the lungs every 4 hours as needed for shortness of breath or wheezing 1/19/24  Yes Kike David MD   aspirin 81 MG EC tablet Take 1 tablet (81 mg) by mouth daily 10/12/23 10/11/24 Yes Kike David MD   dulaglutide (TRULICITY) 0.75 MG/0.5ML pen Inject 0.75 mg Subcutaneous every 7 days 1/19/24  Yes Kike David MD   DULoxetine (CYMBALTA) 60 MG capsule Take 1 capsule (60 mg) by mouth 2 times daily 1/19/24 1/18/25 Yes Kike David MD   fluticasone (FLONASE) 50 MCG/ACT nasal spray Spray 1 spray into both nostrils 2 times daily 7/8/24  Yes Kike David MD   insulin glargine (LANTUS PEN) 100 UNIT/ML pen Inject 35-40 Units Subcutaneous 2 times daily 4/23/24 4/23/25 Yes Kike David MD   lidocaine (LIDODERM) 5 % patch Place onto the skin every 24 hours To prevent lidocaine toxicity, patient should be patch free for 12 hrs daily.   Yes Unknown, Entered By History   LORazepam (ATIVAN) 0.5 MG tablet TAKE 1 TABLET (0.5 MG) BY MOUTH EVERY 6 HOURS AS NEEDED FOR ANXIETY 3/17/24  Yes Kike David MD   methocarbamol (ROBAXIN) 750 MG tablet Take 1 tablet (750 mg) by mouth 2 times daily 8/5/24  Yes Kike David MD   nabumetone (RELAFEN) 500 MG tablet Take 1 tablet (500 mg) by mouth 2 times daily 4/23/24  Yes Kike David MD   naloxone (NARCAN) 4 MG/0.1ML nasal spray Spray 4 mg into one nostril alternating nostrils as needed for opioid reversal every 2-3 minutes until assistance arrives   Yes Unknown, Entered By History   omeprazole (PRILOSEC) 40 MG DR capsule Take 1 capsule  (40 mg) by mouth daily To protect your stomach. 4/23/24  Yes Kike David MD   ondansetron (ZOFRAN) 8 MG tablet Take 1 tablet (8 mg) by mouth every 8 hours as needed for nausea 5/18/23  Yes Kike David MD   topiramate (TOPAMAX) 50 MG tablet Take 100 mg by mouth 2 times daily 4/30/24  Yes Reported, Patient   triamcinolone (KENALOG) 0.1 % external ointment Apply topically 2 times daily Apply twice a day to vaginal rash until resolved 4/23/24  Yes Kike David MD   pregabalin (LYRICA) 150 MG capsule Take 150 mg by mouth 3 times daily  9/29/22  Unknown, Entered By History              Review of Systems:   The Review of Systems is negative other than noted in the HPI            Physical Exam:   Patient Vitals for the past 24 hrs:   BP Temp Temp src Pulse Resp SpO2   08/28/24 1519 (!) 140/64 97.8  F (36.6  C) Oral 76 18 100 %   08/28/24 1111 135/64 98  F (36.7  C) Oral 77 16 98 %   08/28/24 0726 122/68 97.7  F (36.5  C) Oral 78 18 100 %   08/28/24 0419 (!) 142/67 98.4  F (36.9  C) Oral -- 16 99 %   08/27/24 2326 (!) 143/74 98.3  F (36.8  C) Oral -- 16 92 %   08/27/24 2016 133/78 98.2  F (36.8  C) Oral 83 17 100 %   08/27/24 1751 138/74 98  F (36.7  C) Oral 83 17 99 %          Intake/Output Summary (Last 24 hours) at 8/28/2024 1539  Last data filed at 8/28/2024 1000  Gross per 24 hour   Intake 1023 ml   Output --   Net 1023 ml      Constitutional:   Awake, alert, cooperative, no apparent distress, and appears stated age       Eyes:   PERRL, conjunctiva/corneas clear, EOM's intact; no scleral edema or icterus noted        ENT:   Normocephalic, without obvious abnormality, atraumatic, Lips, mucosa, and tongue normal        Lungs:   Normal respiratory effort, no accessory muscle use       Abdomen:   Generally soft and nontender       Musculoskeletal:   No obvious swelling, bruising or deformity on exposed limbs/skin    Right lower extremity just medial to the shin has a entrance wound which is about 1 cm wide and  drains fluid with palpation although unable to deeply palpate due to patient pain.  Patient is very tender around this area and generally of circular motion.  Some potential areas of fluctuance just lateral to the entrance wound and a blister of sorts containing purulent fluid further lateral to this fluctuant area.  See procedure note for full details       Skin:   Skin color and texture normal for patient, no rashes or lesions on exposed skin             Data:         All imaging studies reviewed by me.    Results for orders placed or performed during the hospital encounter of 08/26/24 (from the past 24 hour(s))   Glucose by meter   Result Value Ref Range    GLUCOSE BY METER POCT 77 70 - 99 mg/dL   CT Head w/o Contrast    Narrative    EXAM: CT HEAD W/O CONTRAST  LOCATION: [facility]  DATE: 8/27/2024    INDICATION: Eval for stroke  COMPARISON: MRI brain 8/26/2024  TECHNIQUE: Routine CT Head without IV contrast. Multiplanar reformats. Dose reduction techniques were used.    FINDINGS:  INTRACRANIAL CONTENTS: No intracranial hemorrhage, extraaxial collection, or mass effect.  No CT evidence of acute infarct. Mild presumed chronic small vessel ischemic changes. Normal ventricles and sulci.     VISUALIZED ORBITS/SINUSES/MASTOIDS: No intraorbital abnormality. No paranasal sinus mucosal disease. No middle ear or mastoid effusion.    BONES/SOFT TISSUES: No acute abnormality.      Impression    IMPRESSION:  1.  No acute intracranial process.     Glucose by meter   Result Value Ref Range    GLUCOSE BY METER POCT 224 (H) 70 - 99 mg/dL   Glucose by meter   Result Value Ref Range    GLUCOSE BY METER POCT 215 (H) 70 - 99 mg/dL   Glucose by meter   Result Value Ref Range    GLUCOSE BY METER POCT 122 (H) 70 - 99 mg/dL   Glucose by meter   Result Value Ref Range    GLUCOSE BY METER POCT 66 (L) 70 - 99 mg/dL   Glucose by meter   Result Value Ref Range    GLUCOSE BY METER POCT 85 70 - 99 mg/dL   Basic  metabolic panel   Result Value Ref Range    Sodium 136 135 - 145 mmol/L    Potassium 3.6 3.4 - 5.3 mmol/L    Chloride 105 98 - 107 mmol/L    Carbon Dioxide (CO2) 21 (L) 22 - 29 mmol/L    Anion Gap 10 7 - 15 mmol/L    Urea Nitrogen 21.3 8.0 - 23.0 mg/dL    Creatinine 0.89 0.51 - 0.95 mg/dL    GFR Estimate 74 >60 mL/min/1.73m2    Calcium 8.5 (L) 8.8 - 10.4 mg/dL    Glucose 98 70 - 99 mg/dL   Extra Purple Top EDTA (LAB USE ONLY)   Result Value Ref Range    Hold Specimen JIC    Glucose by meter   Result Value Ref Range    GLUCOSE BY METER POCT 116 (H) 70 - 99 mg/dL           NAM Valenzuela [address] [address]

## 2024-08-28 NOTE — PLAN OF CARE
Problem: Adult Inpatient Plan of Care  Goal: Plan of Care Review  Description: The Plan of Care Review/Shift note should be completed every shift.  The Outcome Evaluation is a brief statement about your assessment that the patient is improving, declining, or no change.  This information will be displayed automatically on your shift  note.  8/27/2024 1909 by May Roldan, RN  Outcome: Progressing  8/27/2024 1753 by May Roldan, RN  Outcome: Progressing   Goal Outcome Evaluation:       See progress note

## 2024-08-29 ENCOUNTER — APPOINTMENT (OUTPATIENT)
Dept: CT IMAGING | Facility: HOSPITAL | Age: 59
End: 2024-08-29
Attending: PSYCHIATRY & NEUROLOGY
Payer: MEDICAID

## 2024-08-29 LAB
ANION GAP SERPL CALCULATED.3IONS-SCNC: 12 MMOL/L (ref 7–15)
BUN SERPL-MCNC: 21.4 MG/DL (ref 8–23)
CALCIUM SERPL-MCNC: 8.7 MG/DL (ref 8.8–10.4)
CHLORIDE SERPL-SCNC: 109 MMOL/L (ref 98–107)
CREAT SERPL-MCNC: 0.86 MG/DL (ref 0.51–0.95)
EGFRCR SERPLBLD CKD-EPI 2021: 77 ML/MIN/1.73M2
ERYTHROCYTE [DISTWIDTH] IN BLOOD BY AUTOMATED COUNT: 12.9 % (ref 10–15)
GLUCOSE BLDC GLUCOMTR-MCNC: 118 MG/DL (ref 70–99)
GLUCOSE BLDC GLUCOMTR-MCNC: 152 MG/DL (ref 70–99)
GLUCOSE BLDC GLUCOMTR-MCNC: 202 MG/DL (ref 70–99)
GLUCOSE BLDC GLUCOMTR-MCNC: 52 MG/DL (ref 70–99)
GLUCOSE BLDC GLUCOMTR-MCNC: 96 MG/DL (ref 70–99)
GLUCOSE SERPL-MCNC: 63 MG/DL (ref 70–99)
HCO3 SERPL-SCNC: 22 MMOL/L (ref 22–29)
HCT VFR BLD AUTO: 31.7 % (ref 35–47)
HGB BLD-MCNC: 9.9 G/DL (ref 11.7–15.7)
MCH RBC QN AUTO: 27.5 PG (ref 26.5–33)
MCHC RBC AUTO-ENTMCNC: 31.2 G/DL (ref 31.5–36.5)
MCV RBC AUTO: 88 FL (ref 78–100)
PLATELET # BLD AUTO: 252 10E3/UL (ref 150–450)
POTASSIUM SERPL-SCNC: 3.9 MMOL/L (ref 3.4–5.3)
RBC # BLD AUTO: 3.6 10E6/UL (ref 3.8–5.2)
SODIUM SERPL-SCNC: 143 MMOL/L (ref 135–145)
WBC # BLD AUTO: 6.3 10E3/UL (ref 4–11)

## 2024-08-29 PROCEDURE — 85027 COMPLETE CBC AUTOMATED: CPT | Performed by: STUDENT IN AN ORGANIZED HEALTH CARE EDUCATION/TRAINING PROGRAM

## 2024-08-29 PROCEDURE — 80048 BASIC METABOLIC PNL TOTAL CA: CPT | Performed by: STUDENT IN AN ORGANIZED HEALTH CARE EDUCATION/TRAINING PROGRAM

## 2024-08-29 PROCEDURE — 250N000013 HC RX MED GY IP 250 OP 250 PS 637: Performed by: INTERNAL MEDICINE

## 2024-08-29 PROCEDURE — 250N000011 HC RX IP 250 OP 636: Performed by: STUDENT IN AN ORGANIZED HEALTH CARE EDUCATION/TRAINING PROGRAM

## 2024-08-29 PROCEDURE — 120N000001 HC R&B MED SURG/OB

## 2024-08-29 PROCEDURE — 72125 CT NECK SPINE W/O DYE: CPT

## 2024-08-29 PROCEDURE — 99232 SBSQ HOSP IP/OBS MODERATE 35: CPT | Performed by: PSYCHIATRY & NEUROLOGY

## 2024-08-29 PROCEDURE — 999N000147 HC STATISTIC PT IP EVAL DEFER

## 2024-08-29 PROCEDURE — 99231 SBSQ HOSP IP/OBS SF/LOW 25: CPT | Mod: 24

## 2024-08-29 PROCEDURE — 250N000013 HC RX MED GY IP 250 OP 250 PS 637: Performed by: STUDENT IN AN ORGANIZED HEALTH CARE EDUCATION/TRAINING PROGRAM

## 2024-08-29 PROCEDURE — 36415 COLL VENOUS BLD VENIPUNCTURE: CPT | Performed by: STUDENT IN AN ORGANIZED HEALTH CARE EDUCATION/TRAINING PROGRAM

## 2024-08-29 PROCEDURE — 99232 SBSQ HOSP IP/OBS MODERATE 35: CPT | Performed by: INTERNAL MEDICINE

## 2024-08-29 PROCEDURE — 250N000011 HC RX IP 250 OP 636: Mod: JZ | Performed by: INTERNAL MEDICINE

## 2024-08-29 RX ORDER — HYDRALAZINE HYDROCHLORIDE 20 MG/ML
10 INJECTION INTRAMUSCULAR; INTRAVENOUS EVERY 4 HOURS PRN
Status: DISCONTINUED | OUTPATIENT
Start: 2024-08-29 | End: 2024-08-30 | Stop reason: HOSPADM

## 2024-08-29 RX ADMIN — FAMOTIDINE 20 MG: 20 TABLET ORAL at 20:41

## 2024-08-29 RX ADMIN — CIPROFLOXACIN 500 MG: 500 TABLET ORAL at 09:12

## 2024-08-29 RX ADMIN — FAMOTIDINE 20 MG: 20 TABLET ORAL at 09:11

## 2024-08-29 RX ADMIN — DULOXETINE HYDROCHLORIDE 60 MG: 60 CAPSULE, DELAYED RELEASE PELLETS ORAL at 20:41

## 2024-08-29 RX ADMIN — GABAPENTIN 200 MG: 100 CAPSULE ORAL at 20:38

## 2024-08-29 RX ADMIN — ENOXAPARIN SODIUM 40 MG: 40 INJECTION SUBCUTANEOUS at 16:59

## 2024-08-29 RX ADMIN — ATORVASTATIN CALCIUM 40 MG: 40 TABLET, FILM COATED ORAL at 20:38

## 2024-08-29 RX ADMIN — GABAPENTIN 200 MG: 100 CAPSULE ORAL at 09:12

## 2024-08-29 RX ADMIN — CLOPIDOGREL BISULFATE 75 MG: 75 TABLET ORAL at 09:12

## 2024-08-29 RX ADMIN — INSULIN ASPART 2 UNITS: 100 INJECTION, SOLUTION INTRAVENOUS; SUBCUTANEOUS at 17:18

## 2024-08-29 RX ADMIN — METHOCARBAMOL 750 MG: 750 TABLET ORAL at 09:11

## 2024-08-29 RX ADMIN — ASPIRIN 81 MG: 81 TABLET, COATED ORAL at 09:11

## 2024-08-29 RX ADMIN — CLINDAMYCIN IN 5 PERCENT DEXTROSE 600 MG: 12 INJECTION, SOLUTION INTRAVENOUS at 01:19

## 2024-08-29 RX ADMIN — LIDOCAINE 1 PATCH: 4 PATCH TOPICAL at 20:40

## 2024-08-29 RX ADMIN — OXYCODONE HYDROCHLORIDE 2.5 MG: 5 TABLET ORAL at 01:24

## 2024-08-29 RX ADMIN — OXYCODONE HYDROCHLORIDE 2.5 MG: 5 TABLET ORAL at 08:57

## 2024-08-29 RX ADMIN — OXYCODONE HYDROCHLORIDE 2.5 MG: 5 TABLET ORAL at 20:46

## 2024-08-29 RX ADMIN — FLUTICASONE PROPIONATE 1 SPRAY: 50 SPRAY, METERED NASAL at 09:15

## 2024-08-29 RX ADMIN — ACETAMINOPHEN 650 MG: 325 TABLET ORAL at 22:57

## 2024-08-29 RX ADMIN — CIPROFLOXACIN 500 MG: 500 TABLET ORAL at 20:40

## 2024-08-29 RX ADMIN — TOPIRAMATE 100 MG: 100 TABLET, FILM COATED ORAL at 20:39

## 2024-08-29 RX ADMIN — TOPIRAMATE 100 MG: 100 TABLET, FILM COATED ORAL at 09:11

## 2024-08-29 RX ADMIN — ACETAMINOPHEN 650 MG: 325 TABLET ORAL at 16:59

## 2024-08-29 RX ADMIN — OXYCODONE HYDROCHLORIDE 2.5 MG: 5 TABLET ORAL at 14:46

## 2024-08-29 RX ADMIN — GABAPENTIN 200 MG: 100 CAPSULE ORAL at 14:46

## 2024-08-29 RX ADMIN — PANTOPRAZOLE SODIUM 40 MG: 40 TABLET, DELAYED RELEASE ORAL at 06:44

## 2024-08-29 RX ADMIN — ACETAMINOPHEN 650 MG: 325 TABLET ORAL at 06:48

## 2024-08-29 RX ADMIN — METHOCARBAMOL 750 MG: 750 TABLET ORAL at 20:39

## 2024-08-29 RX ADMIN — CLINDAMYCIN IN 5 PERCENT DEXTROSE 600 MG: 12 INJECTION, SOLUTION INTRAVENOUS at 16:58

## 2024-08-29 RX ADMIN — CLINDAMYCIN IN 5 PERCENT DEXTROSE 600 MG: 12 INJECTION, SOLUTION INTRAVENOUS at 08:58

## 2024-08-29 RX ADMIN — DULOXETINE HYDROCHLORIDE 60 MG: 60 CAPSULE, DELAYED RELEASE PELLETS ORAL at 09:11

## 2024-08-29 ASSESSMENT — ACTIVITIES OF DAILY LIVING (ADL)
ADLS_ACUITY_SCORE: 25
ADLS_ACUITY_SCORE: 26
ADLS_ACUITY_SCORE: 25
ADLS_ACUITY_SCORE: 25
ADLS_ACUITY_SCORE: 26
ADLS_ACUITY_SCORE: 25
ADLS_ACUITY_SCORE: 26
ADLS_ACUITY_SCORE: 25
ADLS_ACUITY_SCORE: 25
ADLS_ACUITY_SCORE: 26
ADLS_ACUITY_SCORE: 25
ADLS_ACUITY_SCORE: 26
ADLS_ACUITY_SCORE: 25
ADLS_ACUITY_SCORE: 26

## 2024-08-29 NOTE — PLAN OF CARE
Stroke Education Note    The following information was reviewed with patient:    - Warning signs and symptoms of stroke:   B = Balance loss   E = Eyesight changes   F = Facial droop or numbness   A = Arm or leg weakness   S = Speech difficulty, slurred speech   T = Time to call 911 for help    Learner's response to education:     ability to change     Ezekiel Huang RN

## 2024-08-29 NOTE — PLAN OF CARE
Goal Outcome Evaluation:      Problem: Wound  Goal: Optimal Coping  Outcome: Progressing  Goal: Optimal Functional Ability  Outcome: Progressing  Goal: Absence of Infection Signs and Symptoms  Outcome: Progressing  Goal: Improved Oral Intake  Outcome: Progressing  Goal: Optimal Pain Control and Function  Outcome: Progressing  Goal: Skin Health and Integrity  Outcome: Progressing  Goal: Optimal Wound Healing  Outcome: Progressing       Problem: Pain Acute  Goal: Optimal Pain Control and Function  Outcome: Progressing     Pt is A/o. Vss. C/o generalize pain 10/10 prn oxycodone was given wit relief noted.   BG wnl, no sliding scale insulin given.

## 2024-08-29 NOTE — PROGRESS NOTES
Physical Therapy: Orders received. Chart reviewed and discussed with care team.? Physical Therapy not indicated due to discussed with patient at length, and patient declined the need for therapy services in the hospital.? Defer discharge recommendations to care team.? Will complete orders.      Idalia Soto, PT, DPT

## 2024-08-29 NOTE — PLAN OF CARE
Goal Outcome Evaluation:       Notified provider of BG 50, immediate recheck BG 52, in AM. Treated with 4oz apple juice, patient also ordered breakfast, recheck 96 after apple juice. Cardiac monitoring shows NSR. A&Ox4, consistently reports pain in RLE 10/10. Refuses fall interventions (bed alarm) and NIHSS assessment, provider informed. Ambulates to restroom without assist. Ambulates off unit frequently with visitors when IV not running. Receipt for cigarettes (mi[place]osa 100), lottery ticket, chewing gum found on floor in room early in afternoon.  visited at one point during day. After incident involving pet, patient reports that she can call  and otto if issues with her therapy animal. Writer explained that it would be helpful to have all this documentation before the dog is present in the future to avoid such misunderstandings.     Care plan to continue, plan for discharge tomorrow.

## 2024-08-29 NOTE — PROGRESS NOTES
[facility]    Medicine Progress Note - Hospitalist Service    Date of Admission:  8/26/2024    Assessment & Plan   59 year old female with history of DM2, CAD status post CABG, prior CVA with stenosis carotid artery, basilar artery stenosis, and history of syphilis who presents 8/26/24 withstrokelike symptoms and RLE cellulitis related to dog bite       Stroke like symptoms:  Given clinical presentation and her extensive vascular risk factors it is suspect the patient had a stroke despite negative imaging.  Presented with sudden onset right-sided arm and leg weakness.  Declined tPa  CT negative for acute structural lesions   CT angiogram negative for any significant large vessel occlusion/stenosis. There are postprocedural changes from the left carotid endarterectomy.  MRI brain could not be done due to metal   Repeat head CT negative for stroke     Echo shows EF 60 to 65%.  Mild LVH.  Mild pulmonary hypertension, moderate TR, mild MS, mild to moderate MR  - Continue aspirin and plavix for 3 weeks, followed by Plavix only  - started High intensity statin, will continue after discharge  - 30 day Cardiac monitoring after discharge       Right leg cellulitis  Abscess RLE  - S/P dog bite prior to admission, not rabid as per patient  - XR neg, US negative for DVT  - surgery performed bedside I&D 8/28 without culture collection, without overt pockets of purulence expressed   - On IV clindamycin and cipro as patient is allergic to penicillin,  - Continue Tylenol, Oxycodone as needed for pain  - WOC was consulted       JB - resolved       Diabetes mellitus insulin-dependent  - Poorly controlled with hyperglycemia  - HbA1c 13.1  - Home regimen; Lantus 35-40u bid, Trulicity weekly  - decreased Lantus 30u bid due to hypoglycemia 8/28  - had recurrent hypoglycemia 8/29 so lantus held  - continue current sliding scale insulin and carb counting with meals       Hyperlipidemia  - Started  "Atorvastatin 40mg       GERD  -Continue PPI          Diet: Moderate Consistent Carb (60 g CHO per Meal) Diet    DVT Prophylaxis: Pneumatic Compression Devices  Diehl Catheter: Not present  Lines: None     Cardiac Monitoring: ACTIVE order. Indication: Stroke, acute (48 hours)  Code Status: Full Code      Clinically Significant Risk Factors                  # Hypertension: Noted on problem list          # DMII: A1C = 13.1 % (Ref range: <5.7 %) within past 6 months, PRESENT ON ADMISSION  # Overweight: Estimated body mass index is 27.86 kg/m  as calculated from the following:    Height as of this encounter: 1.702 m (5' 7\").    Weight as of this encounter: 80.7 kg (177 lb 14.6 oz)., PRESENT ON ADMISSION     # Asthma: noted on problem list  # History of CABG: noted on surgical history             Disposition Plan   Medically Ready for Discharge: Anticipated Tomorrow      Alphonse Terry DO  Hospitalist Service  [facility]  Securely message with [employer] (more info)  Text page via [employer] Paging/Directory   ______________________________________________________________________    Interval History   NAD. Denies any current complaints besides leg discomfort    Physical Exam   Vital Signs: Temp: 98.2  F (36.8  C) Temp src: Oral BP: (!) 140/65 Pulse: 78   Resp: 18 SpO2: 98 % O2 Device: None (Room air)    Weight: 177 lbs 14.58 oz  General: NAD  RESPIRATORY: Breathing nonlabored  CARDIOVASCULAR: No le edema bilat.   MSK: resolving area of LLE erythema  NEUROLOGIC: Motor and sensory intact, speech clear        >45 MINUTES SPENT BY ME on the date of service doing chart review, history, exam, documentation & further activities per the note.  Data       "

## 2024-08-29 NOTE — PROGRESS NOTES
NEUROLOGY PROGRESS NOTE     Sarah Rahman,  1965, MRN [id number] Date: 2024     [facility]   Code status:  Full Code   PCP: Kike David, [phone]      ASSESSMENT & PLAN   Diagnosis code: Right-sided weakness-rule out stroke    Right-sided weakness-rule out stroke  History of stroke status post left carotid endarterectomy  Cervical spinal stenosis    CT negative for acute structural lesions  CT angiogram negative for any significant large vessel occlusion/stenosis.  There are postprocedural changes from the left carotid endarterectomy  MRI brain could not be done due to metal.  Repeat head CT negative for stroke.  Given clinical presentation and her extensive vascular risk factors suspect she had a stroke.  Will complete stroke workup/stroke care.  Echocardiogram shows 60 to 65% ejection fraction  Recommend 30-day cardiac monitoring  Aspirin Plavix for 3 weeks followed by Plavix only as patient was on aspirin 81 mg PTA.  Lipid panel and statin.  .  Recommend 40 mg Lipitor.  Blood pressure can slowly be normalized  PT/OT  CT C-spine shows moderate spinal stenosis.  Other than the stroke it is possible this could be causing the patient's symptoms.  Will hold off on neurosurgery consultation since symptoms are getting better with therapy.    Discharge:-Once cleared by physical therapy.       Chief Complaint   Patient presents with    Stroke Symptoms        HISTORY OF PRESENT ILLNESS     We have been requested by Dr. Wright to evaluate Sarah Rahman who is a 59 year old  female for evaluation of right arm and leg weakness.  This is a 59-year-old female with history of type 2 diabetes, coronary artery disease, previous strokes related to carotid stenosis and basilar artery stenosis with history of syphilis who presented with right arm and leg weakness.  She also had some chest pain radiating to her neck and left shoulder.  There was also some difficulty with speech and aphasia.   She denied taking any blood thinners and denied thrombolytics.  She does have some pain going on in the leg due to a recent dog bite.  MRI could not be done on admission.  Continues to have ongoing symptoms.    8/28   She continues to have the right sided weakness.  Mild improvement.  No new symptoms. Could not do the MRI due to the hair piece.  Repeat head CT has not shown a stroke.  Discussed with her of unclear diagnosis.  Discussed given that her vascular risk factors we will treat her like a stroke since she is improving with physical therapy.    8/29  Patient reports right arm and leg is slowly getting better.  Does complain of some neck pain.  No other new symptoms.     PAST MEDICAL & SURGICAL HISTORY     Medical History  Past Medical History:   Diagnosis Date    Asthma     CAD (coronary artery disease)     COPD (chronic obstructive pulmonary disease) (H)     CVA (cerebral infarction)     Dyshidrosis (pompholyx) 10/21/2016    GERD (gastroesophageal reflux disease)     History of CVA (cerebrovascular accident) 04/01/2012    Formatting of this note might be different from the original.  Overview:   Bilateral subacute cerebellar infarcts seen on MRI at [facility] 4/2012.  Pt was noted to have no residual deficits at [facility].  Formatting of this note might be different from the original.  Bilateral subacute cerebellar infarcts seen on MRI at [facility] 4/2012.  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 Hamm at [facility]    Schizoaffective disorder (H)     Sleep difficulties 07/17/2022     Surgical History  Past Surgical History:   Procedure Laterality Date    BYPASS GRAFT ARTERY CORONARY  01/01/2009    x2    CAROTID  "ENDARTERECTOMY Left 12/2021    CORONARY STENT PLACEMENT      CV ANGIOGRAM CORONARY GRAFT N/A 1/16/2024    Procedure: Coronary Angiogram Graft;  Surgeon: Spencer Diez MD;  Location: [facility] CATH LAB CV    CV CORONARY ANGIOGRAM N/A 06/02/2021    Procedure: Coronary Angiogram;  Surgeon: Juventino Rivera MD;  Location: [facility] Cardiac Cath Lab;  Service: Cardiology    HYSTERECTOMY TOTAL ABDOMINAL      HYSTERECTOMY TOTAL ABDOMINAL, BILATERAL SALPINGO-OOPHORECTOMY, COMBINED      IR LUMBAR PUNCTURE  7/23/2019    IR TRANSLAMINAR EPIDURAL LUMBAR INJ INCL IMAGING  09/27/2022    LUMBAR DISCECTOMY Right 06/13/2019    L5-S1 - Dr. Hamm    NASAL FRACTURE SURGERY      ORIF ULNAR / RADIAL SHAFT FRACTURE Right         SOCIAL HISTORY     Social History     Tobacco Use    Smoking status: Every Day     Types: Cigarettes    Smokeless tobacco: Never    Tobacco comments:     Seen IP by [facility] on 7/28/23 and declined counseling services and resource packet Smokes 1 cigarettes per day   Vaping Use    Vaping status: Never Used   Substance Use Topics    Alcohol use: Not Currently     Comment: Alcoholic Drinks/day: occ    Drug use: No        FAMILY HISTORY     Reviewed, and family history includes Coronary Artery Disease in her brother; Diabetes in her brother; Heart Disease in her father, mother, sister, and sister.     ALLERGIES     Allergies   Allergen Reactions    Haloperidol Unknown     Patient states it stops her heart      Haloperidol Angioedema    Hydroxyzine Angioedema    Meperidine And Related Angioedema, Difficulty breathing, Other (See Comments), Rash and Shortness Of Breath     Throat closes  Other reaction(s): Breathing Difficulty  Other reaction(s): Breathing Difficulty      Phenothiazines Other (See Comments)     Other reaction(s): CARDIAC DISTURBANCES  Patient states it stops her heart  \"I swell up\"  \"stopped by heart\"  \"I swell up\"  \"I swell up\"      Phenothiazines Angioedema    Tramadol Other (See Comments)     Other " reaction(s): Angioedema  Throat itch      Tramadol Angioedema    Januvia [Sitagliptin] Swelling    Latex Itching    Haloperidol And Related Angioedema    Januvia [Sitagliptin] Other (See Comments)     Swelling in the neck     Latex Itching    Lisinopril Other (See Comments)     ACE cough  Itchy throat  Throat itches  Itchy throat      Nortriptyline Unknown     Fainting, unclear if it was related    Penicillins Swelling    Hydroxyzine Other (See Comments) and Rash     Tongue swelling  Tongue swelling  Tongue swelling      Lisinopril Cough        REVIEW OF SYSTEMS     12 system ROS was done other than the HPI this was negative.  Pertinent positives noted in the HPI     HOME & HOSPITAL MEDICATIONS     Prior to Admission Medications  Medications Prior to Admission   Medication Sig Dispense Refill Last Dose    acetaminophen (TYLENOL) 500 MG tablet Take 1-2 tablets (500-1,000 mg) by mouth every 8 hours as needed for mild pain Max 5.5 tabs per day 100 tablet 11 Unknown at unknown    albuterol (PROAIR HFA) 108 (90 Base) MCG/ACT inhaler Inhale 1-2 puffs into the lungs every 4 hours as needed for shortness of breath or wheezing 18 g 11 Unknown at unknown    aspirin 81 MG EC tablet Take 1 tablet (81 mg) by mouth daily 90 tablet 3 Unknown at unknown    dulaglutide (TRULICITY) 0.75 MG/0.5ML pen Inject 0.75 mg Subcutaneous every 7 days 6 mL 3 Unknown at unknown    DULoxetine (CYMBALTA) 60 MG capsule Take 1 capsule (60 mg) by mouth 2 times daily 180 capsule 3 Unknown at unknown    fluticasone (FLONASE) 50 MCG/ACT nasal spray Spray 1 spray into both nostrils 2 times daily 16 g 11 Unknown at unknown    insulin glargine (LANTUS PEN) 100 UNIT/ML pen Inject 35-40 Units Subcutaneous 2 times daily 24 mL 11 Unknown at unknown    lidocaine (LIDODERM) 5 % patch Place onto the skin every 24 hours To prevent lidocaine toxicity, patient should be patch free for 12 hrs daily.   Unknown at unknown    LORazepam (ATIVAN) 0.5 MG tablet TAKE 1  TABLET (0.5 MG) BY MOUTH EVERY 6 HOURS AS NEEDED FOR ANXIETY 20 tablet 0 Unknown at unknown    methocarbamol (ROBAXIN) 750 MG tablet Take 1 tablet (750 mg) by mouth 2 times daily 60 tablet 11 Unknown at unknown    nabumetone (RELAFEN) 500 MG tablet Take 1 tablet (500 mg) by mouth 2 times daily 60 tablet 11 Unknown at unknown    naloxone (NARCAN) 4 MG/0.1ML nasal spray Spray 4 mg into one nostril alternating nostrils as needed for opioid reversal every 2-3 minutes until assistance arrives   Unknown at unknown    omeprazole (PRILOSEC) 40 MG DR capsule Take 1 capsule (40 mg) by mouth daily To protect your stomach. 90 capsule 3 Unknown at unknown    ondansetron (ZOFRAN) 8 MG tablet Take 1 tablet (8 mg) by mouth every 8 hours as needed for nausea 20 tablet 3 Unknown at unknown    topiramate (TOPAMAX) 50 MG tablet Take 100 mg by mouth 2 times daily   Unknown at unknown    triamcinolone (KENALOG) 0.1 % external ointment Apply topically 2 times daily Apply twice a day to vaginal rash until resolved 80 g 0 Unknown at unknown       Hospital Medications  Current Facility-Administered Medications   Medication Dose Route Frequency Provider Last Rate Last Admin    aspirin EC tablet 81 mg  81 mg Oral Daily Gaetano Wright MD   81 mg at 08/29/24 0911    atorvastatin (LIPITOR) tablet 40 mg  40 mg Oral QPM Gaetano Wright MD   40 mg at 08/28/24 2046    ciprofloxacin (CIPRO) tablet 500 mg  500 mg Oral Q12H TREVOR (08/20) Elisabet Lopez MD   500 mg at 08/29/24 0912    clindamycin (CLEOCIN) 600 mg in 50 mL D5W intermittent infusion  600 mg Intravenous Q8H Gaetano Wright  mL/hr at 08/29/24 0858 600 mg at 08/29/24 0858    clopidogrel (PLAVIX) tablet 75 mg  75 mg Oral Daily Elisabet Lopez MD   75 mg at 08/29/24 0912    DULoxetine (CYMBALTA) DR capsule 60 mg  60 mg Oral BID Gaetano Wright MD   60 mg at 08/29/24 0911    enoxaparin ANTICOAGULANT (LOVENOX) injection 40 mg  40 mg Subcutaneous Q24H Elisabet Lopez MD   40 mg at  "08/28/24 1745    famotidine (PEPCID) injection 20 mg  20 mg Intravenous BID Gaetano Wright MD   20 mg at 08/27/24 2045    Or    famotidine (PEPCID) tablet 20 mg  20 mg Oral or NG Tube BID Gaetano Wright MD   20 mg at 08/29/24 0911    fluticasone (FLONASE) 50 MCG/ACT spray 1 spray  1 spray Both Nostrils BID Gaetano Wright MD   1 spray at 08/29/24 0915    gabapentin (NEURONTIN) capsule 200 mg  200 mg Oral TID Gaetano Wright MD   200 mg at 08/29/24 1446    insulin aspart (NovoLOG) injection (RAPID ACTING)  1-7 Units Subcutaneous TID AC Gaetano Wright MD   2 Units at 08/27/24 1315    insulin aspart (NovoLOG) injection (RAPID ACTING)  1-5 Units Subcutaneous At Bedtime Gaetano Wright MD   1 Units at 08/27/24 2255    insulin aspart (NovoLOG) injection (RAPID ACTING)   Subcutaneous TID w/meals Gaetano Wright MD   2 Units at 08/29/24 0921    [Held by provider] insulin glargine (LANTUS PEN) injection 30 Units  30 Units Subcutaneous BID Elisabet Lopez MD   30 Units at 08/28/24 2050    Lidocaine (LIDOCARE) 4 % Patch 1 patch  1 patch Transdermal Q24h Gaetano Wright MD   1 patch at 08/28/24 2053    methocarbamol (ROBAXIN) tablet 750 mg  750 mg Oral BID Gaetano Wright MD   750 mg at 08/29/24 0911    pantoprazole (PROTONIX) EC tablet 40 mg  40 mg Oral QAM AC Gaetano Wright MD   40 mg at 08/29/24 0644    sodium chloride (PF) 0.9% PF flush 3 mL  3 mL Intracatheter Q8H Gaetano Wright MD   3 mL at 08/29/24 0916    topiramate (TOPAMAX) tablet 100 mg  100 mg Oral BID Gaetano Wright MD   100 mg at 08/29/24 0911        PHYSICAL EXAM     Vital signs  Temp:  [97.7  F (36.5  C)-98.5  F (36.9  C)] 98.2  F (36.8  C)  Pulse:  [76-84] 78  Resp:  [18] 18  BP: (124-140)/(58-77) 140/65  SpO2:  [97 %-100 %] 98 %    PHYSICAL EXAMINATION  VITALS: BP (!) 140/65 (BP Location: Right arm)   Pulse 78   Temp 98.2  F (36.8  C) (Oral)   Resp 18   Ht 1.702 m (5' 7\")   Wt 80.7 kg (177 lb 14.6 oz)   LMP  (LMP Unknown)   SpO2 98%   BMI 27.86 " kg/m    GENERAL -Health appearing, No apparent distress  EYES- No scleral icterus, no eyelid droop, Pupils - see Neuro section  HEENT - Normocephalic, atraumatic, Hearing grossly intact; Oral mucosa moist and pink in color. External Ears and nose intact.   Neck - supple   PULM - Good spontaneous respiratory effort; Normal palpation of chest.   CV- Pedal pulses present with no peripheral edema/ No significant varicosities.  MSK- Gait - see Neuro section; Strength and tone- see Neuro section; Range of motion grossly intact.  PSYCH -cooperative    Neurological  Mental status - Patient is awake and grossly oriented to self, place and time. Attention span is normal. Language is fluent and follows commands appropriately.   Cranial nerves - CN II-XII intact. Pupils are reactive and symmetric; EOMI, VFIT, NLF symmetric  Motor - Motor exam shows 5/5 strength on the left side.  Right side is 3+/5 in the arm and leg.  Tone - Tone is symmetric bilaterally in upper and lower extremities.  Reflexes - Reflexes are absent throughout.  Sensation - Sensory exam is grossly intact to light touch, pain.  Coordination - Finger to nose shows some dysmetria in the right upper extremity.  No dysmetria in the left upper extremity.  Unable to do heel-to-shin due to weakness.  Gait and station --unable to safely ambulate due to weakness.  Formal gait testing cannot be done due to safety concerns from ongoing medical issues  Exam similar to yesterday.  No new symptoms/concerns     DIAGNOSTIC STUDIES     Pertinent Radiology   HEAD CT:  1.  No acute intracranial hemorrhage.     HEAD CTA:   1.  No evidence of vascular occlusion.     NECK CTA:  1.  Atheromatous disease involves the origin of the left vertebral artery without high-grade stenosis. Postprocedural changes of left carotid endarterectomy, stable from the prior study.    XRAY leg  IMPRESSION: Anatomic alignment right tibia and fibula. No acute displaced tibia or fibula fracture is  identified. Degenerative change right knee is similar to prior and most advanced in the patellofemoral compartment. New mild right leg soft tissue   swelling with soft tissue gas in the medial proximal one-third shaft right tibia region. Findings are compatible with soft tissue infection or penetrating trauma. No radiopaque adjacent soft tissue foreign body. Arterial calcification.    ECHO  Left ventricular size, wall motion and function are normal. The ejection  fraction is 60-65%.  There is mild concentric left ventricular hypertrophy.  Normal right ventricle size and systolic function.  Right ventricular systolic pressure is elevated, consistent with mild  pulmonary hypertension.  There is moderate (2+) tricuspid regurgitation.  There is mild mitral stenosis.  There is mild to moderate (1-2+) mitral regurgitation.    Head CT  IMPRESSION:  1.  No acute intracranial process.    CT C spine  IMPRESSION:  1.  No evidence of acute fracture.  2.  At C3-C4, there is moderate spinal canal stenosis due to a prominent left paracentral disc protrusion.  3.  At C5-C6, there is mild-to-moderate stenosis of the spinal canal and right neural foramen.  4.  Additional degenerative changes as above.    Recent Results (from the past 24 hour(s))   Glucose by meter    Collection Time: 08/28/24  5:18 PM   Result Value Ref Range    GLUCOSE BY METER POCT 117 (H) 70 - 99 mg/dL   Glucose by meter    Collection Time: 08/28/24  8:27 PM   Result Value Ref Range    GLUCOSE BY METER POCT 168 (H) 70 - 99 mg/dL   Glucose by meter    Collection Time: 08/29/24  4:02 AM   Result Value Ref Range    GLUCOSE BY METER POCT 118 (H) 70 - 99 mg/dL   Basic metabolic panel    Collection Time: 08/29/24  6:57 AM   Result Value Ref Range    Sodium 143 135 - 145 mmol/L    Potassium 3.9 3.4 - 5.3 mmol/L    Chloride 109 (H) 98 - 107 mmol/L    Carbon Dioxide (CO2) 22 22 - 29 mmol/L    Anion Gap 12 7 - 15 mmol/L    Urea Nitrogen 21.4 8.0 - 23.0 mg/dL    Creatinine  0.86 0.51 - 0.95 mg/dL    GFR Estimate 77 >60 mL/min/1.73m2    Calcium 8.7 (L) 8.8 - 10.4 mg/dL    Glucose 63 (L) 70 - 99 mg/dL   CBC with platelets    Collection Time: 08/29/24  6:57 AM   Result Value Ref Range    WBC Count 6.3 4.0 - 11.0 10e3/uL    RBC Count 3.60 (L) 3.80 - 5.20 10e6/uL    Hemoglobin 9.9 (L) 11.7 - 15.7 g/dL    Hematocrit 31.7 (L) 35.0 - 47.0 %    MCV 88 78 - 100 fL    MCH 27.5 26.5 - 33.0 pg    MCHC 31.2 (L) 31.5 - 36.5 g/dL    RDW 12.9 10.0 - 15.0 %    Platelet Count 252 150 - 450 10e3/uL   Glucose by meter    Collection Time: 08/29/24  8:27 AM   Result Value Ref Range    GLUCOSE BY METER POCT 52 (L) 70 - 99 mg/dL   Glucose by meter    Collection Time: 08/29/24  9:01 AM   Result Value Ref Range    GLUCOSE BY METER POCT 96 70 - 99 mg/dL       Total time spent for face to face visit, reviewing labs/imaging studies, counseling and coordination of care was: Over 35 min More than 50% of this time was spent on counseling and coordination of care.    Counseling patient.  Getting CT C-spine.  Coordination of care with the primary team.    Lefty Wadsworth MD  Neurologist  [facility]  Tel:- [phone]

## 2024-08-29 NOTE — PROGRESS NOTES
General Surgery Progress Note:    Hospital Day # 3    ASSESSMENT:     1. Right sided weakness    2. Chest pain, unspecified type    3. Dog bite, initial encounter        Sarah Rahman is a 59 year old female PMH prior CVA, s/p CABG, type 2 diabetes 13.1% on 8/26, presenting with right lower medial leg cellulitis and drainage s/p dog bite 8/27.  Patient was also evaluated for a stroke/hyperglycemia.  Bedside I&D 8/28 without culture collection, without overt pockets of purulence expressed.  Wound cares following and providing instructions for packing, wound evaluated this morning and appears to be draining well and did not express any purulence.  No further incision and drainage planned at this time.    PLAN:   -Diet as tolerated per surgical perspective  -Aggressive blood glucose control recommended  -No further incision and drainage planned  -Appreciate WOC cares and instructions, continue with wound care as per instructions.  Did not have Vashe solution or correct packing this morning, please replace this mornings packing once supplies come to the floor  -Surgery will sign off at this time  -Medical management per primary team    SUBJECTIVE:   Sarah Rahman was seen on rounds.  Patient states there is a lot of soreness in the area but there is no severe pain.  She is able to bear weight and walk to the bathroom.  Denies fever, chills, nausea, vomiting.    Patient Vitals for the past 24 hrs:   BP Temp Temp src Pulse Resp SpO2   08/29/24 0400 132/62 98.2  F (36.8  C) Oral 76 18 98 %   08/28/24 2320 124/58 98  F (36.7  C) Oral 84 18 98 %   08/28/24 1919 (!) 140/75 98.5  F (36.9  C) Oral 84 18 100 %   08/28/24 1519 (!) 140/64 97.8  F (36.6  C) Oral 76 18 100 %   08/28/24 1111 135/64 98  F (36.7  C) Oral 77 16 98 %       Physical Exam:  General: patient seen resting in bed in no acute distress  Resp: no increased work of breathing, breathing comfortably on room air  Extremities: No edema, cyanosis, or  obvious deformities visualized on exam of the left leg  Right lower extremity medial shin area with stocking applied and Mepilex.  Some serosanguineous drainage on the Mepilex and the packing material when removed.  Otherwise when probed with Q-tip did not break up or see any further pockets of purulence or pockets on exam.  This was quite tender at some points for the patient.  Unfortunately did not have Vashe solution and correct packing so I washed the abscess cavity with saline flush allowing it to soak through for a minute.  Then reapplied quarter inch packing into the wound and covered with Mepilex and stocking.  This will be temporary until the supplies go to the floor.    Admission on 08/26/2024   Component Date Value    Sodium 08/26/2024 136     Potassium 08/26/2024 4.4     Chloride 08/26/2024 103     Carbon Dioxide (CO2) 08/26/2024 21 (L)     Anion Gap 08/26/2024 12     Urea Nitrogen 08/26/2024 28.1 (H)     Creatinine 08/26/2024 1.31 (H)     GFR Estimate 08/26/2024 46 (L)     Calcium 08/26/2024 9.0     Glucose 08/26/2024 480 (H)     INR 08/26/2024 1.14     aPTT 08/26/2024 29     Troponin T, High Sensiti* 08/26/2024 23 (H)     WBC Count 08/26/2024 10.2     RBC Count 08/26/2024 3.43 (L)     Hemoglobin 08/26/2024 9.5 (L)     Hematocrit 08/26/2024 30.2 (L)     MCV 08/26/2024 88     MCH 08/26/2024 27.7     MCHC 08/26/2024 31.5     RDW 08/26/2024 13.1     Platelet Count 08/26/2024 231     % Neutrophils 08/26/2024 73     % Lymphocytes 08/26/2024 19     % Monocytes 08/26/2024 7     % Eosinophils 08/26/2024 1     % Basophils 08/26/2024 0     % Immature Granulocytes 08/26/2024 0     NRBCs per 100 WBC 08/26/2024 0     Absolute Neutrophils 08/26/2024 7.5     Absolute Lymphocytes 08/26/2024 1.9     Absolute Monocytes 08/26/2024 0.7     Absolute Eosinophils 08/26/2024 0.1     Absolute Basophils 08/26/2024 0.0     Absolute Immature Granul* 08/26/2024 0.0     Absolute NRBCs 08/26/2024 0.0     Troponin T, High Sensiti*  08/26/2024 21 (H)     Amphetamines Urine 08/26/2024 Screen Negative     Barbituates Urine 08/26/2024 Screen Negative     Benzodiazepine Urine 08/26/2024 Screen Negative     Cannabinoids Urine 08/26/2024 Screen Negative     Cocaine Urine 08/26/2024 Screen Negative     Fentanyl Qual Urine 08/26/2024 Screen Negative     Opiates Urine 08/26/2024 Screen Negative     PCP Urine 08/26/2024 Screen Negative     GLUCOSE BY METER POCT 08/26/2024 383 (H)     Hemoglobin A1C 08/26/2024 13.1 (H)     Troponin T, High Sensiti* 08/26/2024 26 (H)     Cholesterol 08/26/2024 218 (H)     Triglycerides 08/26/2024 183 (H)     Direct Measure HDL 08/26/2024 50     LDL Cholesterol Calculat* 08/26/2024 131 (H)     Non HDL Cholesterol 08/26/2024 168 (H)     LVEF  08/27/2024 60-65%     GLUCOSE BY METER POCT 08/26/2024 237 (H)     Hold Specimen 08/26/2024 JIC     WBC Count 08/27/2024 6.9     RBC Count 08/27/2024 3.77 (L)     Hemoglobin 08/27/2024 10.1 (L)     Hematocrit 08/27/2024 32.6 (L)     MCV 08/27/2024 87     MCH 08/27/2024 26.8     MCHC 08/27/2024 31.0 (L)     RDW 08/27/2024 13.1     Platelet Count 08/27/2024 215     Sodium 08/27/2024 137     Potassium 08/27/2024 3.7     Chloride 08/27/2024 105     Carbon Dioxide (CO2) 08/27/2024 21 (L)     Anion Gap 08/27/2024 11     Urea Nitrogen 08/27/2024 26.1 (H)     Creatinine 08/27/2024 0.90     GFR Estimate 08/27/2024 73     Calcium 08/27/2024 8.6 (L)     Glucose 08/27/2024 289 (H)     Troponin T, High Sensiti* 08/27/2024 19 (H)     Protein Total 08/27/2024 6.5     Albumin 08/27/2024 3.6     Bilirubin Total 08/27/2024 0.2     Alkaline Phosphatase 08/27/2024 102     AST 08/27/2024 24     ALT 08/27/2024 23     Bilirubin Direct 08/27/2024 <0.20     GLUCOSE BY METER POCT 08/27/2024 333 (H)     GLUCOSE BY METER POCT 08/27/2024 305 (H)     GLUCOSE BY METER POCT 08/27/2024 212 (H)     GLUCOSE BY METER POCT 08/27/2024 77     GLUCOSE BY METER POCT 08/27/2024 224 (H)     GLUCOSE BY METER POCT 08/27/2024 215  (H)     Sodium 08/28/2024 136     Potassium 08/28/2024 3.6     Chloride 08/28/2024 105     Carbon Dioxide (CO2) 08/28/2024 21 (L)     Anion Gap 08/28/2024 10     Urea Nitrogen 08/28/2024 21.3     Creatinine 08/28/2024 0.89     GFR Estimate 08/28/2024 74     Calcium 08/28/2024 8.5 (L)     Glucose 08/28/2024 98     GLUCOSE BY METER POCT 08/28/2024 122 (H)     GLUCOSE BY METER POCT 08/28/2024 66 (L)     Hold Specimen 08/28/2024 JIC     GLUCOSE BY METER POCT 08/28/2024 85     GLUCOSE BY METER POCT 08/28/2024 116 (H)     GLUCOSE BY METER POCT 08/28/2024 117 (H)     GLUCOSE BY METER POCT 08/28/2024 168 (H)     Sodium 08/29/2024 143     Potassium 08/29/2024 3.9     Chloride 08/29/2024 109 (H)     Carbon Dioxide (CO2) 08/29/2024 22     Anion Gap 08/29/2024 12     Urea Nitrogen 08/29/2024 21.4     Creatinine 08/29/2024 0.86     GFR Estimate 08/29/2024 77     Calcium 08/29/2024 8.7 (L)     Glucose 08/29/2024 63 (L)     WBC Count 08/29/2024 6.3     RBC Count 08/29/2024 3.60 (L)     Hemoglobin 08/29/2024 9.9 (L)     Hematocrit 08/29/2024 31.7 (L)     MCV 08/29/2024 88     MCH 08/29/2024 27.5     MCHC 08/29/2024 31.2 (L)     RDW 08/29/2024 12.9     Platelet Count 08/29/2024 252     GLUCOSE BY METER POCT 08/29/2024 118 (H)     GLUCOSE BY METER POCT 08/29/2024 52 (L)     GLUCOSE BY METER POCT 08/29/2024 96         Rebecca Diamond PA-C  [facility] Surgery  [address]  [address]  [place] [id number]  [facility] [phone]

## 2024-08-29 NOTE — PROVIDER NOTIFICATION
Message to provider:     Have thus far been unable to complete wound care for patient as she has been quite active early in day and this afternoon, her dog is visiting/lying on bed with her.

## 2024-08-29 NOTE — PROGRESS NOTES
BG 50, 52 on immediate recheck. 4oz apple juice given. Plan to recheck ~15min. Patient ordering breakfast

## 2024-08-29 NOTE — PLAN OF CARE
Occupational Therapy: Orders received, chart reviewed, discussed with care team. Per PT, patient declining need for any therapy during hospital stay. Will complete orders and defer discharge recommendations to care team.    Corazon River, OTR/L 8/29/24

## 2024-08-29 NOTE — PLAN OF CARE
"  Problem: Adult Inpatient Plan of Care  Goal: Optimal Comfort and Wellbeing  Outcome: Progressing     Problem: Comorbidity Management  Goal: Maintenance of Behavioral Health Symptom Control  Outcome: Progressing  Intervention: Maintain Behavioral Health Symptom Control  Recent Flowsheet Documentation  Taken 8/29/2024 0130 by Maynor Gonzalez RN  Medication Review/Management:   medications reviewed   dosing adjusted   high-risk medications identified   provider consulted     Problem: Wound  Goal: Absence of Infection Signs and Symptoms  Outcome: Progressing  Intervention: Prevent or Manage Infection  Recent Flowsheet Documentation  Taken 8/29/2024 0130 by Maynor Gonzalez RN  Infection Management: aseptic technique maintained   Goal Outcome Evaluation:       A&O x 4. Ambulates standby assist. Admitted for right-sided weakness. PIV access left upper forearm. No acute changes this shift. Patient reported 10/10 \"stabbing\" pain which was controlled with PRN 2.5 mg oxycodone PO @0124. Patient slept through majority of the night afterwards and was cooperative with cares. Bed in low position, call light within reach. Non-slip footwear in use. Patient calls appropriately.   Visit Vitals  /62 (BP Location: Right arm, Cuff Size: Adult Large)   Pulse 76   Temp 98.2  F (36.8  C) (Oral)   Resp 18                     "

## 2024-08-30 VITALS
SYSTOLIC BLOOD PRESSURE: 133 MMHG | TEMPERATURE: 98.4 F | DIASTOLIC BLOOD PRESSURE: 63 MMHG | RESPIRATION RATE: 18 BRPM | OXYGEN SATURATION: 96 % | WEIGHT: 177.91 LBS | HEART RATE: 78 BPM | HEIGHT: 67 IN | BODY MASS INDEX: 27.92 KG/M2

## 2024-08-30 LAB
GLUCOSE BLDC GLUCOMTR-MCNC: 149 MG/DL (ref 70–99)
GLUCOSE BLDC GLUCOMTR-MCNC: 206 MG/DL (ref 70–99)
GLUCOSE BLDC GLUCOMTR-MCNC: 218 MG/DL (ref 70–99)
GLUCOSE BLDC GLUCOMTR-MCNC: 312 MG/DL (ref 70–99)

## 2024-08-30 PROCEDURE — 99239 HOSP IP/OBS DSCHRG MGMT >30: CPT | Performed by: INTERNAL MEDICINE

## 2024-08-30 PROCEDURE — 250N000013 HC RX MED GY IP 250 OP 250 PS 637: Performed by: INTERNAL MEDICINE

## 2024-08-30 PROCEDURE — 99232 SBSQ HOSP IP/OBS MODERATE 35: CPT | Performed by: PSYCHIATRY & NEUROLOGY

## 2024-08-30 PROCEDURE — 250N000011 HC RX IP 250 OP 636: Mod: JZ | Performed by: INTERNAL MEDICINE

## 2024-08-30 PROCEDURE — 250N000013 HC RX MED GY IP 250 OP 250 PS 637: Performed by: STUDENT IN AN ORGANIZED HEALTH CARE EDUCATION/TRAINING PROGRAM

## 2024-08-30 RX ORDER — ACETAMINOPHEN 500 MG
1000 TABLET ORAL EVERY 6 HOURS PRN
COMMUNITY
Start: 2024-08-30

## 2024-08-30 RX ORDER — GABAPENTIN 100 MG/1
200 CAPSULE ORAL 3 TIMES DAILY
Qty: 180 CAPSULE | Refills: 0 | Status: SHIPPED | OUTPATIENT
Start: 2024-08-30

## 2024-08-30 RX ORDER — CLINDAMYCIN HCL 150 MG
300 CAPSULE ORAL 3 TIMES DAILY
Status: DISCONTINUED | OUTPATIENT
Start: 2024-08-30 | End: 2024-08-30 | Stop reason: HOSPADM

## 2024-08-30 RX ORDER — DOXYCYCLINE 100 MG/1
100 CAPSULE ORAL EVERY 12 HOURS SCHEDULED
Status: DISCONTINUED | OUTPATIENT
Start: 2024-08-30 | End: 2024-08-30 | Stop reason: HOSPADM

## 2024-08-30 RX ORDER — DOXYCYCLINE 100 MG/1
100 CAPSULE ORAL EVERY 12 HOURS
Qty: 14 CAPSULE | Refills: 0 | Status: ON HOLD | OUTPATIENT
Start: 2024-08-30 | End: 2024-09-05

## 2024-08-30 RX ORDER — CLINDAMYCIN HCL 300 MG
300 CAPSULE ORAL 3 TIMES DAILY
Qty: 21 CAPSULE | Refills: 0 | Status: ON HOLD | OUTPATIENT
Start: 2024-08-30 | End: 2024-09-05

## 2024-08-30 RX ORDER — CLOPIDOGREL BISULFATE 75 MG/1
75 TABLET ORAL DAILY
Qty: 90 TABLET | Refills: 1 | Status: SHIPPED | OUTPATIENT
Start: 2024-08-30

## 2024-08-30 RX ORDER — ASPIRIN 81 MG/1
81 TABLET ORAL DAILY
Qty: 28 TABLET | Refills: 0 | Status: SHIPPED | OUTPATIENT
Start: 2024-08-30 | End: 2024-09-27

## 2024-08-30 RX ORDER — ATORVASTATIN CALCIUM 40 MG/1
40 TABLET, FILM COATED ORAL EVERY EVENING
Qty: 30 TABLET | Refills: 1 | Status: SHIPPED | OUTPATIENT
Start: 2024-08-30

## 2024-08-30 RX ADMIN — ASPIRIN 81 MG: 81 TABLET, COATED ORAL at 08:52

## 2024-08-30 RX ADMIN — DOXYCYCLINE 100 MG: 100 CAPSULE ORAL at 08:56

## 2024-08-30 RX ADMIN — INSULIN ASPART 2 UNITS: 100 INJECTION, SOLUTION INTRAVENOUS; SUBCUTANEOUS at 11:56

## 2024-08-30 RX ADMIN — CLINDAMYCIN IN 5 PERCENT DEXTROSE 600 MG: 12 INJECTION, SOLUTION INTRAVENOUS at 00:29

## 2024-08-30 RX ADMIN — OXYCODONE HYDROCHLORIDE 2.5 MG: 5 TABLET ORAL at 10:33

## 2024-08-30 RX ADMIN — FAMOTIDINE 20 MG: 20 TABLET ORAL at 08:52

## 2024-08-30 RX ADMIN — TOPIRAMATE 100 MG: 100 TABLET, FILM COATED ORAL at 08:53

## 2024-08-30 RX ADMIN — CLINDAMYCIN HYDROCHLORIDE 300 MG: 150 CAPSULE ORAL at 08:57

## 2024-08-30 RX ADMIN — DULOXETINE HYDROCHLORIDE 60 MG: 60 CAPSULE, DELAYED RELEASE PELLETS ORAL at 08:53

## 2024-08-30 RX ADMIN — ACETAMINOPHEN 650 MG: 325 TABLET ORAL at 06:58

## 2024-08-30 RX ADMIN — INSULIN ASPART 1 UNITS: 100 INJECTION, SOLUTION INTRAVENOUS; SUBCUTANEOUS at 08:46

## 2024-08-30 RX ADMIN — GABAPENTIN 200 MG: 100 CAPSULE ORAL at 08:53

## 2024-08-30 RX ADMIN — OXYCODONE HYDROCHLORIDE 2.5 MG: 5 TABLET ORAL at 04:36

## 2024-08-30 RX ADMIN — CLOPIDOGREL BISULFATE 75 MG: 75 TABLET ORAL at 08:52

## 2024-08-30 RX ADMIN — METHOCARBAMOL 750 MG: 750 TABLET ORAL at 08:54

## 2024-08-30 RX ADMIN — PANTOPRAZOLE SODIUM 40 MG: 40 TABLET, DELAYED RELEASE ORAL at 06:58

## 2024-08-30 RX ADMIN — FLUTICASONE PROPIONATE 1 SPRAY: 50 SPRAY, METERED NASAL at 08:52

## 2024-08-30 ASSESSMENT — ACTIVITIES OF DAILY LIVING (ADL)
ADLS_ACUITY_SCORE: 25
DEPENDENT_IADLS:: CLEANING;COOKING;LAUNDRY;SHOPPING;TRANSPORTATION;MEAL PREPARATION;MEDICATION MANAGEMENT
ADLS_ACUITY_SCORE: 25

## 2024-08-30 NOTE — TELEPHONE ENCOUNTER
Attempted to call patient but there was no answer so MA left a message. Informed patient that I have been watching for a new patient appointment sooner than November for several weeks. Clinics are now booking into December and January for New Patient appointments. Recommended on voice mail that patient call the sleep clinics mail line and schedule a New Patient appointment. Patient can then be placed on actual wait list where she can be called up sooner, but that he has something scheduled and coming up.  Lorena Jim, CMA

## 2024-08-30 NOTE — PLAN OF CARE
Problem: Adult Inpatient Plan of Care  Goal: Plan of Care Review  Description: The Plan of Care Review/Shift note should be completed every shift.  The Outcome Evaluation is a brief statement about your assessment that the patient is improving, declining, or no change.  This information will be displayed automatically on your shift  note.  Outcome: Progressing  Flowsheets (Taken 8/30/2024 0313)  Plan of Care Reviewed With: patient     Problem: Comorbidity Management  Goal: Blood Glucose Levels Within Targeted Range  Outcome: Progressing     Problem: Wound  Goal: Optimal Pain Control and Function  Outcome: Progressing  Intervention: Prevent or Manage Pain  Recent Flowsheet Documentation  Taken 8/29/2024 2340 by Paula Caballero RN  Pain Management Interventions:   rest   quiet environment facilitated  Taken 8/29/2024 2257 by Paula Caballero RN  Pain Management Interventions:   repositioned   rest   medication (see MAR)  Taken 8/29/2024 2127 by Paula Caballero RN  Pain Management Interventions:   repositioned   rest  Taken 8/29/2024 2046 by Paula Caballero RN  Pain Management Interventions: medication (see MAR)  Goal: Skin Health and Integrity  Outcome: Progressing  Intervention: Optimize Skin Protection  Recent Flowsheet Documentation  Taken 8/30/2024 0035 by Paula Caballero RN  Head of Bed (HOB) Positioning: HOB at 30 degrees  Taken 8/29/2024 2045 by Paula Caballero RN  Activity Management:   ambulated to bathroom   back to bed  Taken 8/29/2024 2040 by Paula Caballero RN  Head of Bed (HOB) Positioning: HOB at 30 degrees  Goal: Optimal Wound Healing  Outcome: Progressing     Problem: Stroke, Ischemic (Includes Transient Ischemic Attack)  Goal: Effective Oxygenation and Ventilation  Outcome: Progressing  Intervention: Optimize Oxygenation and Ventilation  Recent Flowsheet Documentation  Taken 8/30/2024 0035 by Paula Caballero RN  Head of Bed (HOB) Positioning: HOB at 30 degrees  Taken 8/29/2024 2040 by Paula Caballero  RN  Head of Bed (HOB) Positioning: HOB at 30 degrees  Goal: Improved Sensorimotor Function  Outcome: Progressing  Intervention: Optimize Range of Motion, Motor Control and Function  Recent Flowsheet Documentation  Taken 8/30/2024 0035 by Paula Caballero RN  Positioning/Transfer Devices:   in use   pillows  Taken 8/29/2024 2040 by Paula Caballero RN  Positioning/Transfer Devices:   in use   pillows     Problem: Pain Acute  Goal: Optimal Pain Control and Function  Outcome: Progressing  Intervention: Develop Pain Management Plan  Recent Flowsheet Documentation  Taken 8/29/2024 2340 by Paula Caballero RN  Pain Management Interventions:   rest   quiet environment facilitated  Taken 8/29/2024 2257 by Paula Caballero RN  Pain Management Interventions:   repositioned   rest   medication (see MAR)  Taken 8/29/2024 2127 by Paula Caballero RN  Pain Management Interventions:   repositioned   rest  Taken 8/29/2024 2046 by Paula Caballero RN  Pain Management Interventions: medication (see MAR)   Goal Outcome Evaluation:      Plan of Care Reviewed With: patient      Patient complained of Right leg pain. PRN oxycodone and tylenol was given and stated was helpful. Wound care completed. Dressing clean dry and intact, secured with edemawear. Glucose checked, 312. MD Notified. PIV over the left arm intact and patent. Tele shows NSR. Patient had been refusing NIHSS assessment and neurochecks overnight.    Stroke Education Note    The following information was reviewed with patient:    - Warning signs and symptoms of stroke:   B = Balance loss   E = Eyesight changes   F = Facial droop or numbness   A = Arm or leg weakness   S = Speech difficulty, slurred speech   T = Time to call 911 for help    Learner's response to education:     taking steps     Paula Caballero RN

## 2024-08-30 NOTE — PLAN OF CARE
" Goal Outcome Evaluation:  A&O, eager for discharge. Declines to participate in multiple assessments. Attempted to teach on wound care, but patient reported that she would be unable to do this for herself. See care management notes for home care details.  Discharged from unit with belongings and wound care supplies. Care plan completed.     Problem: Adult Inpatient Plan of Care  Goal: Plan of Care Review  Description: The Plan of Care Review/Shift note should be completed every shift.  The Outcome Evaluation is a brief statement about your assessment that the patient is improving, declining, or no change.  This information will be displayed automatically on your shift  note.  8/30/2024 1022 by Desirae Ayoub RN  Outcome: Adequate for Care Transition  8/30/2024 1022 by Desirae Ayoub RN  Outcome: Progressing  Goal: Patient-Specific Goal (Individualized)  Description: You can add care plan individualizations to a care plan. Examples of Individualization might be:  \"Parent requests to be called daily at 9am for status\", \"I have a hard time hearing out of my right ear\", or \"Do not touch me to wake me up as it startles  me\".  8/30/2024 1022 by Desirae Ayoub RN  Outcome: Adequate for Care Transition  8/30/2024 1022 by Desirae Ayoub RN  Outcome: Progressing  Goal: Absence of Hospital-Acquired Illness or Injury  8/30/2024 1022 by Desirae Ayoub RN  Outcome: Adequate for Care Transition  8/30/2024 1022 by Desirae Ayoub RN  Outcome: Progressing  Goal: Optimal Comfort and Wellbeing  8/30/2024 1022 by Desirae Ayoub RN  Outcome: Adequate for Care Transition  8/30/2024 1022 by Desirae Ayoub RN  Outcome: Progressing  Goal: Readiness for Transition of Care  8/30/2024 1022 by Desirae Ayoub RN  Outcome: Adequate for Care Transition  8/30/2024 1022 by Desirae Ayoub RN  Outcome: Progressing     Problem: Comorbidity Management  Goal: Maintenance of Behavioral Health Symptom Control  8/30/2024 1022 by Desirae Ayoub RN  Outcome: " Adequate for Care Transition  8/30/2024 1022 by Desirae Ayoub, RN  Outcome: Progressing  Goal: Blood Glucose Levels Within Targeted Range  8/30/2024 1022 by Desirae Ayoub, RN  Outcome: Adequate for Care Transition  8/30/2024 1022 by Desirae Ayoub, RN  Outcome: Progressing     Problem: Wound  Goal: Optimal Coping  8/30/2024 1022 by Desirae Ayoub, CHEO  Outcome: Adequate for Care Transition  8/30/2024 1022 by Desirae Ayoub, RN  Outcome: Progressing  Goal: Optimal Functional Ability  8/30/2024 1022 by Desirae Ayoub RN  Outcome: Adequate for Care Transition  8/30/2024 1022 by Desirae Ayoub RN  Outcome: Progressing  Goal: Absence of Infection Signs and Symptoms  8/30/2024 1022 by Desirae Ayoub, CHEO  Outcome: Adequate for Care Transition  8/30/2024 1022 by Desirae Ayoub, RN  Outcome: Progressing  Goal: Improved Oral Intake  8/30/2024 1022 by Desirae Ayoub, CHEO  Outcome: Adequate for Care Transition  8/30/2024 1022 by Desirae Ayoub, RN  Outcome: Progressing  Goal: Optimal Pain Control and Function  8/30/2024 1022 by Desirae Ayoub, CHEO  Outcome: Adequate for Care Transition  8/30/2024 1022 by Desirae Ayoub, RN  Outcome: Progressing  Goal: Skin Health and Integrity  8/30/2024 1022 by Desirae Ayoub, CHEO  Outcome: Adequate for Care Transition  8/30/2024 1022 by Desirae Ayoub, RN  Outcome: Progressing  Goal: Optimal Wound Healing  8/30/2024 1022 by Desirae Ayoub, RN  Outcome: Adequate for Care Transition  8/30/2024 1022 by Desirae Ayoub, RN  Outcome: Progressing     Problem: Stroke, Ischemic (Includes Transient Ischemic Attack)  Goal: Optimal Coping  8/30/2024 1022 by Desirae Ayoub, RN  Outcome: Adequate for Care Transition  8/30/2024 1022 by Desirae Ayoub, RN  Outcome: Progressing  Goal: Effective Bowel Elimination  8/30/2024 1022 by Desirae Ayoub, RN  Outcome: Adequate for Care Transition  8/30/2024 1022 by Desirae Ayoub, RN  Outcome: Progressing  Goal: Optimal Cerebral Tissue Perfusion  8/30/2024 1022 by Desirae Ayoub,  RN  Outcome: Adequate for Care Transition  8/30/2024 1022 by Desirae Ayoub RN  Outcome: Progressing  Goal: Optimal Cognitive Function  8/30/2024 1022 by Desirae Ayoub RN  Outcome: Adequate for Care Transition  8/30/2024 1022 by Desirae Ayobu, RN  Outcome: Progressing  Goal: Improved Communication Skills  8/30/2024 1022 by Desirae Ayoub, RN  Outcome: Adequate for Care Transition  8/30/2024 1022 by Desirae Ayuob RN  Outcome: Progressing  Goal: Optimal Functional Ability  8/30/2024 1022 by Desirae Ayoub RN  Outcome: Adequate for Care Transition  8/30/2024 1022 by Desirae Ayoub, RN  Outcome: Progressing  Goal: Optimal Nutrition Intake  8/30/2024 1022 by Desirae Ayoub, RN  Outcome: Adequate for Care Transition  8/30/2024 1022 by Desirae Ayoub, RN  Outcome: Progressing  Goal: Effective Oxygenation and Ventilation  8/30/2024 1022 by Desirae Ayoub, RN  Outcome: Adequate for Care Transition  8/30/2024 1022 by Desirae Ayoub, RN  Outcome: Progressing  Goal: Improved Sensorimotor Function  8/30/2024 1022 by Desirae Ayoub, RN  Outcome: Adequate for Care Transition  8/30/2024 1022 by Desirae Ayoub, RN  Outcome: Progressing  Goal: Safe and Effective Swallow  8/30/2024 1022 by Desirae Ayoub, RN  Outcome: Adequate for Care Transition  8/30/2024 1022 by Desirae Ayoub, RN  Outcome: Progressing  Goal: Effective Urinary Elimination  8/30/2024 1022 by Desirae Ayoub, RN  Outcome: Adequate for Care Transition  8/30/2024 1022 by Desirae Ayoub, RN  Outcome: Progressing     Problem: Pain Acute  Goal: Optimal Pain Control and Function  8/30/2024 1022 by Desirae Ayoub, RN  Outcome: Adequate for Care Transition  8/30/2024 1022 by Desirae Ayoub, RN  Outcome: Progressing

## 2024-08-30 NOTE — CONSULTS
Care Management Initial Consult    General Information  Assessment completed with: Patient, Sarah  Type of CM/SW Visit: Initial Assessment    Primary Care Provider verified and updated as needed: Yes   Readmission within the last 30 days: no previous admission in last 30 days         Advance Care Planning: Advance Care Planning Reviewed: other (see comments) (No ACP documents)          Communication Assessment  Patient's communication style: spoken language (English or Bilingual)    Hearing Difficulty or Deaf: no   Wear Glasses or Blind: no    Cognitive  Cognitive/Neuro/Behavioral: WDL  Level of Consciousness: alert  Arousal Level: opens eyes spontaneously  Orientation: oriented x 4  Mood/Behavior: calm  Best Language: 0 - No aphasia  Speech: spontaneous, logical    Living Environment:   People in home: alone     Current living Arrangements: apartment      Able to return to prior arrangements: yes       Family/Social Support:  Care provided by: self, other (see comments) (PCA)  Provides care for: no one  Marital Status:   Support system: PCA          Description of Support System: Supportive         Current Resources:   Patient receiving home care services: No (Pt requesting a home RN for wound care)        Community Resources:    Equipment currently used at home: walker, standard  Supplies currently used at home:      Employment/Financial:  Employment Status: disabled        Financial Concerns:             Does the patient's insurance plan have a 3 day qualifying hospital stay waiver?  No    Lifestyle & Psychosocial Needs:  Social Determinants of Health     Food Insecurity: Low Risk  (8/27/2024)    Food Insecurity     Within the past 12 months, did you worry that your food would run out before you got money to buy more?: No     Within the past 12 months, did the food you bought just not last and you didn t have money to get more?: No   Depression: Not at risk (1/30/2024)    PHQ-2     PHQ-2 Score: 0    Housing Stability: Low Risk  (8/27/2024)    Housing Stability     Do you have housing? : Yes     Are you worried about losing your housing?: No   Tobacco Use: High Risk (8/5/2024)    Patient History     Smoking Tobacco Use: Every Day     Smokeless Tobacco Use: Never     Passive Exposure: Not on file   Financial Resource Strain: Low Risk  (8/27/2024)    Financial Resource Strain     Within the past 12 months, have you or your family members you live with been unable to get utilities (heat, electricity) when it was really needed?: No   Alcohol Use: Not At Risk (3/30/2021)    [facility]    Alcohol Use     Total Audit-C Score: Not on file   Transportation Needs: Low Risk  (8/27/2024)    Transportation Needs     Within the past 12 months, has lack of transportation kept you from medical appointments, getting your medicines, non-medical meetings or appointments, work, or from getting things that you need?: No   Physical Activity: Not on File (12/4/2021)    Received from [facility]    Physical Activity     Physical Activity: 0   Interpersonal Safety: Low Risk  (8/27/2024)    Interpersonal Safety     Do you feel physically and emotionally safe where you currently live?: Yes     Within the past 12 months, have you been hit, slapped, kicked or otherwise physically hurt by someone?: No     Within the past 12 months, have you been humiliated or emotionally abused in other ways by your partner or ex-partner?: No   Stress: Not on File (12/4/2021)    Received from [facility]    Stress     Stress: 0   Social Connections: Unknown (12/22/2021)    Received from [facility], [facility]    Social Connections     Frequency of Communication with Friends and Family: Not on file   Health Literacy: Not on file       Functional Status:  Prior to admission patient needed assistance:   Dependent ADLs::  "Independent  Dependent IADLs:: Cleaning, Cooking, Laundry, Shopping, Transportation, Meal Preparation, Medication Management       Mental Health Status:  Mental Health Status: No Current Concerns       Chemical Dependency Status:  Chemical Dependency Status: No Current Concerns             Values/Beliefs:  Spiritual, Cultural Beliefs, Taoism Practices, Values that affect care:                 Discussed  Partnership in Safe Discharge Planning  document with patient/family: Yes: Pt     Additional Information:  Assessed. RNCM introduced self and CM role. Pt lives in an apartment alone. Pt has PCA help she is unsure how many hours, she stated, \"a lot that PCA is around.\" Pt Ind with ADLs. Needs mostly all IADLS, pt's PCA assists. Pt has an established PCP. On disability. Pt requesting to have a home RN for wound care for dog bite, RNCM explained to pt they couldn't come out daily, pt states she will be teachable party, or possibly PCA. PCA plans to pick pt up.         Laura Wasserman RN      Care Management Discharge Note    Discharge Date: 08/30/2024       Discharge Disposition: Home Care    Discharge Services:  Home Care     Discharge DME:  None     Discharge Transportation: family or friend will provide    Private pay costs discussed: Not applicable    Does the patient's insurance plan have a 3 day qualifying hospital stay waiver?  No    PAS Confirmation Code:  NA  Patient/family educated on Medicare website which has current facility and service quality ratings:  NA    Education Provided on the Discharge Plan:  Per Care Team   Persons Notified of Discharge Plans: Yes  Patient/Family in Agreement with the Plan: yes    Handoff Referral Completed: Yes, [facility] PCP: Internal handoff referral completed    Additional Information:    Final discharge plan is for pt to return home with Op follow up, and PCA care. Pt requested home RN for wound care, RNCM sent Home RN referral , unsure if pt will have a home RN established, " they will not be able to do daily care. Pt states that PCA may be able to learn. Home Care referral for RN (pending). If agency accepts they will set up a time to meet with pt. Home RN should not delay discharge, they can follow up if accepted. PCA picking pt up at 3:00pm today.       Laura Wasserman, RN

## 2024-08-30 NOTE — PROGRESS NOTES
NEUROLOGY PROGRESS NOTE     Sarah Rahman,  1965, MRN [id number] Date: 2024     [facility]   Code status:  Full Code   PCP: Kike David, [phone]      ASSESSMENT & PLAN   Diagnosis code: Right-sided weakness-rule out stroke    Right-sided weakness-rule out stroke-improved  History of stroke status post left carotid endarterectomy  Cervical spinal stenosis    CT negative for acute structural lesions  CT angiogram negative for any significant large vessel occlusion/stenosis.  There are postprocedural changes from the left carotid endarterectomy  MRI brain could not be done due to metal.  Repeat head CT negative for stroke.  Given clinical presentation and her extensive vascular risk factors suspect she had a stroke.  Will complete stroke workup/stroke care.  Echocardiogram shows 60 to 65% ejection fraction  Recommend 30-day cardiac monitoring  Aspirin Plavix for 3 weeks followed by Plavix only as patient was on aspirin 81 mg PTA.  Lipid panel and statin.  .  Recommend 40 mg Lipitor.  Blood pressure can slowly be normalized  PT/OT  CT C-spine shows moderate spinal stenosis.  Since her right arm weakness/numbness has resolved we will hold off on further workup.  Could follow-up in the spine center if there is any new neurological symptoms/neck pain.    Discharge:-Once cleared by physical therapy.  Will sign off.  Please call if any further questions.       Chief Complaint   Patient presents with    Stroke Symptoms        HISTORY OF PRESENT ILLNESS     We have been requested by Dr. Wright to evaluate Sarah Rahman who is a 59 year old  female for evaluation of right arm and leg weakness.  This is a 59-year-old female with history of type 2 diabetes, coronary artery disease, previous strokes related to carotid stenosis and basilar artery stenosis with history of syphilis who presented with right arm and leg weakness.  She also had some chest pain radiating to her neck and left  shoulder.  There was also some difficulty with speech and aphasia.  She denied taking any blood thinners and denied thrombolytics.  She does have some pain going on in the leg due to a recent dog bite.  MRI could not be done on admission.  Continues to have ongoing symptoms.    8/28   She continues to have the right sided weakness.  Mild improvement.  No new symptoms. Could not do the MRI due to the hair piece.  Repeat head CT has not shown a stroke.  Discussed with her of unclear diagnosis.  Discussed given that her vascular risk factors we will treat her like a stroke since she is improving with physical therapy.    8/29  Patient reports right arm and leg is slowly getting better.  Does complain of some neck pain.  No other new symptoms.    8/30  Significant improvement in her right arm and leg symptoms.  No other new neurological issues.  Denies any major neck pain at this point.     PAST MEDICAL & SURGICAL HISTORY     Medical History  Past Medical History:   Diagnosis Date    Asthma     CAD (coronary artery disease)     COPD (chronic obstructive pulmonary disease) (H)     CVA (cerebral infarction)     Dyshidrosis (pompholyx) 10/21/2016    GERD (gastroesophageal reflux disease)     History of CVA (cerebrovascular accident) 04/01/2012    Formatting of this note might be different from the original.  Overview:   Bilateral subacute cerebellar infarcts seen on MRI at [facility] 4/2012.  Pt was noted to have no residual deficits at [facility].  Formatting of this note might be different from the original.  Bilateral subacute cerebellar infarcts seen on MRI at [facility] 4/2012.  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 Hamm at  [facility]    Schizoaffective disorder (H)     Sleep difficulties 07/17/2022     Surgical History  Past Surgical History:   Procedure Laterality Date    BYPASS GRAFT ARTERY CORONARY  01/01/2009    x2    CAROTID ENDARTERECTOMY Left 12/2021    CORONARY STENT PLACEMENT      CV ANGIOGRAM CORONARY GRAFT N/A 1/16/2024    Procedure: Coronary Angiogram Graft;  Surgeon: Spencer Diez MD;  Location: [facility] CATH LAB CV    CV CORONARY ANGIOGRAM N/A 06/02/2021    Procedure: Coronary Angiogram;  Surgeon: Juventino Rivera MD;  Location: [facility] Cardiac Cath Lab;  Service: Cardiology    HYSTERECTOMY TOTAL ABDOMINAL      HYSTERECTOMY TOTAL ABDOMINAL, BILATERAL SALPINGO-OOPHORECTOMY, COMBINED      IR LUMBAR PUNCTURE  7/23/2019    IR TRANSLAMINAR EPIDURAL LUMBAR INJ INCL IMAGING  09/27/2022    LUMBAR DISCECTOMY Right 06/13/2019    L5-S1 - Dr. Hamm    NASAL FRACTURE SURGERY      ORIF ULNAR / RADIAL SHAFT FRACTURE Right         SOCIAL HISTORY     Social History     Tobacco Use    Smoking status: Every Day     Types: Cigarettes    Smokeless tobacco: Never    Tobacco comments:     Seen IP by [facility] on 7/28/23 and declined counseling services and resource packet Smokes 1 cigarettes per day   Vaping Use    Vaping status: Never Used   Substance Use Topics    Alcohol use: Not Currently     Comment: Alcoholic Drinks/day: occ    Drug use: No        FAMILY HISTORY     Reviewed, and family history includes Coronary Artery Disease in her brother; Diabetes in her brother; Heart Disease in her father, mother, sister, and sister.     ALLERGIES     Allergies   Allergen Reactions    Haloperidol Unknown     Patient states it stops her heart      Haloperidol Angioedema    Hydroxyzine Angioedema    Meperidine And Related Angioedema, Difficulty breathing, Other (See Comments), Rash and Shortness Of Breath     Throat closes  Other reaction(s): Breathing Difficulty  Other reaction(s): Breathing Difficulty      Phenothiazines Other (See  "Comments)     Other reaction(s): CARDIAC DISTURBANCES  Patient states it stops her heart  \"I swell up\"  \"stopped by heart\"  \"I swell up\"  \"I swell up\"      Phenothiazines Angioedema    Tramadol Other (See Comments)     Other reaction(s): Angioedema  Throat itch      Tramadol Angioedema    Januvia [Sitagliptin] Swelling    Latex Itching    Haloperidol And Related Angioedema    Januvia [Sitagliptin] Other (See Comments)     Swelling in the neck     Latex Itching    Lisinopril Other (See Comments)     ACE cough  Itchy throat  Throat itches  Itchy throat      Nortriptyline Unknown     Fainting, unclear if it was related    Penicillins Swelling    Hydroxyzine Other (See Comments) and Rash     Tongue swelling  Tongue swelling  Tongue swelling      Lisinopril Cough        REVIEW OF SYSTEMS     12 system ROS was done other than the HPI this was negative.  Pertinent positives noted in the HPI     HOME & HOSPITAL MEDICATIONS     Prior to Admission Medications  Medications Prior to Admission   Medication Sig Dispense Refill Last Dose    albuterol (PROAIR HFA) 108 (90 Base) MCG/ACT inhaler Inhale 1-2 puffs into the lungs every 4 hours as needed for shortness of breath or wheezing 18 g 11 Unknown at unknown    dulaglutide (TRULICITY) 0.75 MG/0.5ML pen Inject 0.75 mg Subcutaneous every 7 days 6 mL 3 Unknown at unknown    DULoxetine (CYMBALTA) 60 MG capsule Take 1 capsule (60 mg) by mouth 2 times daily 180 capsule 3 Unknown at unknown    fluticasone (FLONASE) 50 MCG/ACT nasal spray Spray 1 spray into both nostrils 2 times daily 16 g 11 Unknown at unknown    insulin glargine (LANTUS PEN) 100 UNIT/ML pen Inject 35-40 Units Subcutaneous 2 times daily 24 mL 11 Unknown at unknown    lidocaine (LIDODERM) 5 % patch Place onto the skin every 24 hours To prevent lidocaine toxicity, patient should be patch free for 12 hrs daily.   Unknown at unknown    LORazepam (ATIVAN) 0.5 MG tablet TAKE 1 TABLET (0.5 MG) BY MOUTH EVERY 6 HOURS AS NEEDED " FOR ANXIETY 20 tablet 0 Unknown at unknown    methocarbamol (ROBAXIN) 750 MG tablet Take 1 tablet (750 mg) by mouth 2 times daily 60 tablet 11 Unknown at unknown    nabumetone (RELAFEN) 500 MG tablet Take 1 tablet (500 mg) by mouth 2 times daily 60 tablet 11 Unknown at unknown    naloxone (NARCAN) 4 MG/0.1ML nasal spray Spray 4 mg into one nostril alternating nostrils as needed for opioid reversal every 2-3 minutes until assistance arrives   Unknown at unknown    omeprazole (PRILOSEC) 40 MG DR capsule Take 1 capsule (40 mg) by mouth daily To protect your stomach. 90 capsule 3 Unknown at unknown    ondansetron (ZOFRAN) 8 MG tablet Take 1 tablet (8 mg) by mouth every 8 hours as needed for nausea 20 tablet 3 Unknown at unknown    topiramate (TOPAMAX) 50 MG tablet Take 100 mg by mouth 2 times daily   Unknown at unknown    triamcinolone (KENALOG) 0.1 % external ointment Apply topically 2 times daily Apply twice a day to vaginal rash until resolved 80 g 0 Unknown at unknown    [DISCONTINUED] acetaminophen (TYLENOL) 500 MG tablet Take 1-2 tablets (500-1,000 mg) by mouth every 8 hours as needed for mild pain Max 5.5 tabs per day 100 tablet 11 Unknown at unknown    [DISCONTINUED] aspirin 81 MG EC tablet Take 1 tablet (81 mg) by mouth daily 90 tablet 3 Unknown at unknown       Hospital Medications  Current Facility-Administered Medications   Medication Dose Route Frequency Provider Last Rate Last Admin    aspirin EC tablet 81 mg  81 mg Oral Daily Gaetano Wright MD   81 mg at 08/30/24 0852    atorvastatin (LIPITOR) tablet 40 mg  40 mg Oral QPM Gaetano Wright MD   40 mg at 08/29/24 2038    clindamycin (CLEOCIN) capsule 300 mg  300 mg Oral TID Alphonse Terry DO   300 mg at 08/30/24 0857    clopidogrel (PLAVIX) tablet 75 mg  75 mg Oral Daily Elisabet Lopez MD   75 mg at 08/30/24 0852    doxycycline monohydrate (MONODOX) capsule 100 mg  100 mg Oral Q12H [facility] (08/20) Alphonse Terry DO   100 mg at 08/30/24 0856     DULoxetine (CYMBALTA) DR capsule 60 mg  60 mg Oral BID Gaetano Wright MD   60 mg at 08/30/24 0853    enoxaparin ANTICOAGULANT (LOVENOX) injection 40 mg  40 mg Subcutaneous Q24H Elisabet Lopez MD   40 mg at 08/29/24 1659    famotidine (PEPCID) injection 20 mg  20 mg Intravenous BID Gaetano Wright MD   20 mg at 08/27/24 2045    Or    famotidine (PEPCID) tablet 20 mg  20 mg Oral or NG Tube BID Gaetano Wright MD   20 mg at 08/30/24 0852    fluticasone (FLONASE) 50 MCG/ACT spray 1 spray  1 spray Both Nostrils BID Gaetano Wright MD   1 spray at 08/30/24 0852    gabapentin (NEURONTIN) capsule 200 mg  200 mg Oral TID Gaetano Wright MD   200 mg at 08/30/24 0853    insulin aspart (NovoLOG) injection (RAPID ACTING)  1-7 Units Subcutaneous TID AC Gaetano Wright MD   2 Units at 08/30/24 1156    insulin aspart (NovoLOG) injection (RAPID ACTING)  1-5 Units Subcutaneous At Bedtime Gaetano Wright MD   1 Units at 08/27/24 2255    insulin aspart (NovoLOG) injection (RAPID ACTING)   Subcutaneous TID w/meals Gaetano Wright MD   3 Units at 08/30/24 1157    insulin glargine (LANTUS PEN) injection 30 Units  30 Units Subcutaneous BID Alphonse Terry DO   30 Units at 08/30/24 0849    Lidocaine (LIDOCARE) 4 % Patch 1 patch  1 patch Transdermal Q24h Gaetano Wright MD   1 patch at 08/29/24 2040    methocarbamol (ROBAXIN) tablet 750 mg  750 mg Oral BID Gaetano Wright MD   750 mg at 08/30/24 0854    pantoprazole (PROTONIX) EC tablet 40 mg  40 mg Oral QAM AC Gaetano Wright MD   40 mg at 08/30/24 0658    sodium chloride (PF) 0.9% PF flush 3 mL  3 mL Intracatheter Q8H Gaetano Wright MD   3 mL at 08/30/24 0851    topiramate (TOPAMAX) tablet 100 mg  100 mg Oral BID Gaetano Wright MD   100 mg at 08/30/24 0853        PHYSICAL EXAM     Vital signs  Temp:  [98.1  F (36.7  C)-98.5  F (36.9  C)] 98.4  F (36.9  C)  Pulse:  [78] 78  Resp:  [16-18] 18  BP: (131-148)/(56-75) 133/63  SpO2:  [94 %-98 %] 96 %    PHYSICAL EXAMINATION  VITALS: BP  "133/63 (BP Location: Left arm)   Pulse 78   Temp 98.4  F (36.9  C) (Oral)   Resp 18   Ht 1.702 m (5' 7\")   Wt 80.7 kg (177 lb 14.6 oz)   LMP  (LMP Unknown)   SpO2 96%   BMI 27.86 kg/m    GENERAL -Health appearing, No apparent distress  EYES- No scleral icterus, no eyelid droop, Pupils - see Neuro section  HEENT - Normocephalic, atraumatic, Hearing grossly intact; Oral mucosa moist and pink in color. External Ears and nose intact.   Neck - supple   PULM - Good spontaneous respiratory effort; Normal palpation of chest.   CV- Pedal pulses present with no peripheral edema/ No significant varicosities.  MSK- Gait - see Neuro section; Strength and tone- see Neuro section; Range of motion grossly intact.  PSYCH -cooperative    Neurological  Mental status - Patient is awake and grossly oriented to self, place and time. Attention span is normal. Language is fluent and follows commands appropriately.   Cranial nerves - CN II-XII intact. Pupils are reactive and symmetric; EOMI, VFIT, NLF symmetric  Motor - Motor exam shows 5/5 strength on the left side.  Right side is 3+/5 in the arm and leg.  Tone - Tone is symmetric bilaterally in upper and lower extremities.  Reflexes - Reflexes are absent throughout.  Sensation - Sensory exam is grossly intact to light touch, pain.  Coordination - Finger to nose shows some dysmetria in the right upper extremity.  No dysmetria in the left upper extremity.  Unable to do heel-to-shin due to weakness.  Gait and station --unable to safely ambulate due to weakness.  Formal gait testing cannot be done due to safety concerns from ongoing medical issues  Exam significantly improved today.  Has no longer having the right arm and leg weakness.     DIAGNOSTIC STUDIES     Pertinent Radiology   HEAD CT:  1.  No acute intracranial hemorrhage.     HEAD CTA:   1.  No evidence of vascular occlusion.     NECK CTA:  1.  Atheromatous disease involves the origin of the left vertebral artery without " high-grade stenosis. Postprocedural changes of left carotid endarterectomy, stable from the prior study.    XRAY leg  IMPRESSION: Anatomic alignment right tibia and fibula. No acute displaced tibia or fibula fracture is identified. Degenerative change right knee is similar to prior and most advanced in the patellofemoral compartment. New mild right leg soft tissue   swelling with soft tissue gas in the medial proximal one-third shaft right tibia region. Findings are compatible with soft tissue infection or penetrating trauma. No radiopaque adjacent soft tissue foreign body. Arterial calcification.    ECHO  Left ventricular size, wall motion and function are normal. The ejection  fraction is 60-65%.  There is mild concentric left ventricular hypertrophy.  Normal right ventricle size and systolic function.  Right ventricular systolic pressure is elevated, consistent with mild  pulmonary hypertension.  There is moderate (2+) tricuspid regurgitation.  There is mild mitral stenosis.  There is mild to moderate (1-2+) mitral regurgitation.    Head CT  IMPRESSION:  1.  No acute intracranial process.    CT C spine  IMPRESSION:  1.  No evidence of acute fracture.  2.  At C3-C4, there is moderate spinal canal stenosis due to a prominent left paracentral disc protrusion.  3.  At C5-C6, there is mild-to-moderate stenosis of the spinal canal and right neural foramen.  4.  Additional degenerative changes as above.    Recent Results (from the past 24 hour(s))   Glucose by meter    Collection Time: 08/29/24  5:11 PM   Result Value Ref Range    GLUCOSE BY METER POCT 202 (H) 70 - 99 mg/dL   Glucose by meter    Collection Time: 08/29/24  8:51 PM   Result Value Ref Range    GLUCOSE BY METER POCT 152 (H) 70 - 99 mg/dL   Glucose by meter    Collection Time: 08/30/24  2:17 AM   Result Value Ref Range    GLUCOSE BY METER POCT 312 (H) 70 - 99 mg/dL   Glucose by meter    Collection Time: 08/30/24  7:38 AM   Result Value Ref Range    GLUCOSE  BY METER POCT 149 (H) 70 - 99 mg/dL   Glucose by meter    Collection Time: 08/30/24 11:19 AM   Result Value Ref Range    GLUCOSE BY METER POCT 206 (H) 70 - 99 mg/dL   Glucose by meter    Collection Time: 08/30/24 11:52 AM   Result Value Ref Range    GLUCOSE BY METER POCT 218 (H) 70 - 99 mg/dL       Total time spent for face to face visit, reviewing labs/imaging studies, counseling and coordination of care was: Over 35 min More than 50% of this time was spent on counseling and coordination of care.    Counseling patient.  Discussing C-spine results.  Coordination of care with the nursing staff.  Discharge planning.    Lefty Wadsworth MD  Neurologist  [facility] Neurology [facility]  Tel:- [phone]

## 2024-09-01 ENCOUNTER — HOSPITAL ENCOUNTER (INPATIENT)
Facility: HOSPITAL | Age: 59
LOS: 4 days | Discharge: HOME HEALTH | End: 2024-09-05
Attending: EMERGENCY MEDICINE | Admitting: STUDENT IN AN ORGANIZED HEALTH CARE EDUCATION/TRAINING PROGRAM
Payer: MEDICAID

## 2024-09-01 ENCOUNTER — APPOINTMENT (OUTPATIENT)
Dept: CT IMAGING | Facility: HOSPITAL | Age: 59
End: 2024-09-01
Attending: STUDENT IN AN ORGANIZED HEALTH CARE EDUCATION/TRAINING PROGRAM
Payer: MEDICAID

## 2024-09-01 ENCOUNTER — APPOINTMENT (OUTPATIENT)
Dept: MRI IMAGING | Facility: HOSPITAL | Age: 59
End: 2024-09-01
Attending: EMERGENCY MEDICINE
Payer: MEDICAID

## 2024-09-01 DIAGNOSIS — E11.42 DIABETIC PERIPHERAL NEUROPATHY (H): ICD-10-CM
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
## 2024-09-01 DIAGNOSIS — S81.851D DOG BITE OF LOWER LEG, RIGHT, SUBSEQUENT ENCOUNTER: Primary | ICD-10-CM
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
## 2024-09-01 DIAGNOSIS — E11.65 TYPE 2 DIABETES MELLITUS WITH HYPERGLYCEMIA, WITH LONG-TERM CURRENT USE OF INSULIN (H): Chronic | ICD-10-CM

## 2024-09-01 DIAGNOSIS — I63.9 ACUTE CVA (CEREBROVASCULAR ACCIDENT) (H): ICD-10-CM

## 2024-09-01 DIAGNOSIS — Z51.89 VISIT FOR WOUND CHECK: ICD-10-CM

## 2024-09-01 DIAGNOSIS — Z79.4 TYPE 2 DIABETES MELLITUS WITH HYPERGLYCEMIA, WITH LONG-TERM CURRENT USE OF INSULIN (H): Chronic | ICD-10-CM

## 2024-09-01 DIAGNOSIS — M54.41 BILATERAL LOW BACK PAIN WITH RIGHT-SIDED SCIATICA, UNSPECIFIED CHRONICITY: ICD-10-CM

## 2024-09-01 DIAGNOSIS — S81.851D DOG BITE OF LOWER LEG, RIGHT, SUBSEQUENT ENCOUNTER: ICD-10-CM

## 2024-09-01 DIAGNOSIS — R29.90 STROKE-LIKE SYMPTOMS: ICD-10-CM

## 2024-09-01 LAB
ANION GAP SERPL CALCULATED.3IONS-SCNC: 12 MMOL/L (ref 7–15)
APTT PPP: 28 SECONDS (ref 22–38)
ATRIAL RATE - MUSE: 96 BPM
BASOPHILS # BLD AUTO: 0.1 10E3/UL (ref 0–0.2)
BASOPHILS NFR BLD AUTO: 1 %
BUN SERPL-MCNC: 23.5 MG/DL (ref 8–23)
CALCIUM SERPL-MCNC: 8.8 MG/DL (ref 8.8–10.4)
CHLORIDE SERPL-SCNC: 106 MMOL/L (ref 98–107)
CREAT SERPL-MCNC: 0.8 MG/DL (ref 0.51–0.95)
CRP SERPL-MCNC: 8.7 MG/L
DIASTOLIC BLOOD PRESSURE - MUSE: NORMAL MMHG
EGFRCR SERPLBLD CKD-EPI 2021: 84 ML/MIN/1.73M2
EOSINOPHIL # BLD AUTO: 0.3 10E3/UL (ref 0–0.7)
EOSINOPHIL NFR BLD AUTO: 3 %
ERYTHROCYTE [DISTWIDTH] IN BLOOD BY AUTOMATED COUNT: 13.4 % (ref 10–15)
ERYTHROCYTE [SEDIMENTATION RATE] IN BLOOD BY WESTERGREN METHOD: 42 MM/HR (ref 0–30)
GLUCOSE BLDC GLUCOMTR-MCNC: 178 MG/DL (ref 70–99)
GLUCOSE BLDC GLUCOMTR-MCNC: 181 MG/DL (ref 70–99)
GLUCOSE BLDC GLUCOMTR-MCNC: 250 MG/DL (ref 70–99)
GLUCOSE SERPL-MCNC: 192 MG/DL (ref 70–99)
HCO3 SERPL-SCNC: 20 MMOL/L (ref 22–29)
HCT VFR BLD AUTO: 31.9 % (ref 35–47)
HGB BLD-MCNC: 9.8 G/DL (ref 11.7–15.7)
HOLD SPECIMEN: NORMAL
HOLD SPECIMEN: NORMAL
IMM GRANULOCYTES # BLD: 0 10E3/UL
IMM GRANULOCYTES NFR BLD: 1 %
INR PPP: 1 (ref 0.85–1.15)
INTERPRETATION ECG - MUSE: NORMAL
LYMPHOCYTES # BLD AUTO: 2.7 10E3/UL (ref 0.8–5.3)
LYMPHOCYTES NFR BLD AUTO: 34 %
MCH RBC QN AUTO: 27.6 PG (ref 26.5–33)
MCHC RBC AUTO-ENTMCNC: 30.7 G/DL (ref 31.5–36.5)
MCV RBC AUTO: 90 FL (ref 78–100)
MONOCYTES # BLD AUTO: 0.4 10E3/UL (ref 0–1.3)
MONOCYTES NFR BLD AUTO: 5 %
NEUTROPHILS # BLD AUTO: 4.4 10E3/UL (ref 1.6–8.3)
NEUTROPHILS NFR BLD AUTO: 56 %
NRBC # BLD AUTO: 0 10E3/UL
NRBC BLD AUTO-RTO: 0 /100
P AXIS - MUSE: 79 DEGREES
PLATELET # BLD AUTO: 280 10E3/UL (ref 150–450)
POTASSIUM SERPL-SCNC: 5.1 MMOL/L (ref 3.4–5.3)
PR INTERVAL - MUSE: 166 MS
QRS DURATION - MUSE: 78 MS
QT - MUSE: 366 MS
QTC - MUSE: 462 MS
R AXIS - MUSE: 20 DEGREES
RBC # BLD AUTO: 3.55 10E6/UL (ref 3.8–5.2)
SODIUM SERPL-SCNC: 138 MMOL/L (ref 135–145)
SYSTOLIC BLOOD PRESSURE - MUSE: NORMAL MMHG
T AXIS - MUSE: 83 DEGREES
VENTRICULAR RATE- MUSE: 96 BPM
WBC # BLD AUTO: 7.9 10E3/UL (ref 4–11)

## 2024-09-01 PROCEDURE — 250N000012 HC RX MED GY IP 250 OP 636 PS 637: Performed by: STUDENT IN AN ORGANIZED HEALTH CARE EDUCATION/TRAINING PROGRAM

## 2024-09-01 PROCEDURE — 80048 BASIC METABOLIC PNL TOTAL CA: CPT | Performed by: STUDENT IN AN ORGANIZED HEALTH CARE EDUCATION/TRAINING PROGRAM

## 2024-09-01 PROCEDURE — 85025 COMPLETE CBC W/AUTO DIFF WBC: CPT | Performed by: STUDENT IN AN ORGANIZED HEALTH CARE EDUCATION/TRAINING PROGRAM

## 2024-09-01 PROCEDURE — 99285 EMERGENCY DEPT VISIT HI MDM: CPT | Mod: 25

## 2024-09-01 PROCEDURE — 99223 1ST HOSP IP/OBS HIGH 75: CPT | Performed by: STUDENT IN AN ORGANIZED HEALTH CARE EDUCATION/TRAINING PROGRAM

## 2024-09-01 PROCEDURE — 70551 MRI BRAIN STEM W/O DYE: CPT

## 2024-09-01 PROCEDURE — 70496 CT ANGIOGRAPHY HEAD: CPT

## 2024-09-01 PROCEDURE — 250N000013 HC RX MED GY IP 250 OP 250 PS 637: Performed by: INTERNAL MEDICINE

## 2024-09-01 PROCEDURE — 120N000001 HC R&B MED SURG/OB

## 2024-09-01 PROCEDURE — 258N000003 HC RX IP 258 OP 636: Performed by: STUDENT IN AN ORGANIZED HEALTH CARE EDUCATION/TRAINING PROGRAM

## 2024-09-01 PROCEDURE — 82962 GLUCOSE BLOOD TEST: CPT

## 2024-09-01 PROCEDURE — 85730 THROMBOPLASTIN TIME PARTIAL: CPT | Performed by: STUDENT IN AN ORGANIZED HEALTH CARE EDUCATION/TRAINING PROGRAM

## 2024-09-01 PROCEDURE — G0508 CRIT CARE TELEHEA CONSULT 60: HCPCS | Mod: G0 | Performed by: NURSE PRACTITIONER

## 2024-09-01 PROCEDURE — 250N000011 HC RX IP 250 OP 636: Performed by: STUDENT IN AN ORGANIZED HEALTH CARE EDUCATION/TRAINING PROGRAM

## 2024-09-01 PROCEDURE — 250N000013 HC RX MED GY IP 250 OP 250 PS 637: Performed by: STUDENT IN AN ORGANIZED HEALTH CARE EDUCATION/TRAINING PROGRAM

## 2024-09-01 PROCEDURE — 85652 RBC SED RATE AUTOMATED: CPT | Performed by: EMERGENCY MEDICINE

## 2024-09-01 PROCEDURE — 250N000011 HC RX IP 250 OP 636: Performed by: EMERGENCY MEDICINE

## 2024-09-01 PROCEDURE — 36415 COLL VENOUS BLD VENIPUNCTURE: CPT | Performed by: STUDENT IN AN ORGANIZED HEALTH CARE EDUCATION/TRAINING PROGRAM

## 2024-09-01 PROCEDURE — 93005 ELECTROCARDIOGRAM TRACING: CPT | Performed by: STUDENT IN AN ORGANIZED HEALTH CARE EDUCATION/TRAINING PROGRAM

## 2024-09-01 PROCEDURE — 86140 C-REACTIVE PROTEIN: CPT | Performed by: EMERGENCY MEDICINE

## 2024-09-01 PROCEDURE — 85610 PROTHROMBIN TIME: CPT | Performed by: STUDENT IN AN ORGANIZED HEALTH CARE EDUCATION/TRAINING PROGRAM

## 2024-09-01 PROCEDURE — 96374 THER/PROPH/DIAG INJ IV PUSH: CPT | Mod: 59

## 2024-09-01 RX ORDER — OXYCODONE HYDROCHLORIDE 5 MG/1
5 TABLET ORAL ONCE
Status: COMPLETED | OUTPATIENT
Start: 2024-09-01 | End: 2024-09-01

## 2024-09-01 RX ORDER — ACETAMINOPHEN 500 MG
1000 TABLET ORAL EVERY 8 HOURS PRN
Status: DISCONTINUED | OUTPATIENT
Start: 2024-09-01 | End: 2024-09-05 | Stop reason: HOSPADM

## 2024-09-01 RX ORDER — TOPIRAMATE 100 MG/1
100 TABLET, FILM COATED ORAL 2 TIMES DAILY
Status: DISCONTINUED | OUTPATIENT
Start: 2024-09-01 | End: 2024-09-05 | Stop reason: HOSPADM

## 2024-09-01 RX ORDER — ASPIRIN 81 MG/1
81 TABLET ORAL DAILY
Status: DISCONTINUED | OUTPATIENT
Start: 2024-09-01 | End: 2024-09-05 | Stop reason: HOSPADM

## 2024-09-01 RX ORDER — DIPHENHYDRAMINE HCL 25 MG
25 CAPSULE ORAL EVERY 6 HOURS PRN
Status: DISCONTINUED | OUTPATIENT
Start: 2024-09-01 | End: 2024-09-05 | Stop reason: HOSPADM

## 2024-09-01 RX ORDER — SODIUM CHLORIDE 9 MG/ML
INJECTION, SOLUTION INTRAVENOUS CONTINUOUS
Status: DISCONTINUED | OUTPATIENT
Start: 2024-09-01 | End: 2024-09-02

## 2024-09-01 RX ORDER — CLINDAMYCIN HCL 150 MG
300 CAPSULE ORAL 3 TIMES DAILY
Status: DISCONTINUED | OUTPATIENT
Start: 2024-09-01 | End: 2024-09-05 | Stop reason: HOSPADM

## 2024-09-01 RX ORDER — NALOXONE HYDROCHLORIDE 0.4 MG/ML
0.2 INJECTION, SOLUTION INTRAMUSCULAR; INTRAVENOUS; SUBCUTANEOUS
Status: DISCONTINUED | OUTPATIENT
Start: 2024-09-01 | End: 2024-09-05 | Stop reason: HOSPADM

## 2024-09-01 RX ORDER — GABAPENTIN 100 MG/1
200 CAPSULE ORAL 3 TIMES DAILY
Status: DISCONTINUED | OUTPATIENT
Start: 2024-09-01 | End: 2024-09-05 | Stop reason: HOSPADM

## 2024-09-01 RX ORDER — IOPAMIDOL 755 MG/ML
67 INJECTION, SOLUTION INTRAVASCULAR ONCE
Status: COMPLETED | OUTPATIENT
Start: 2024-09-01 | End: 2024-09-01

## 2024-09-01 RX ORDER — DEXTROSE MONOHYDRATE 25 G/50ML
25-50 INJECTION, SOLUTION INTRAVENOUS
Status: DISCONTINUED | OUTPATIENT
Start: 2024-09-01 | End: 2024-09-05 | Stop reason: HOSPADM

## 2024-09-01 RX ORDER — DOXYCYCLINE 100 MG/1
100 CAPSULE ORAL EVERY 12 HOURS
Status: DISCONTINUED | OUTPATIENT
Start: 2024-09-01 | End: 2024-09-05 | Stop reason: HOSPADM

## 2024-09-01 RX ORDER — LIDOCAINE 40 MG/G
CREAM TOPICAL
Status: DISCONTINUED | OUTPATIENT
Start: 2024-09-01 | End: 2024-09-05 | Stop reason: HOSPADM

## 2024-09-01 RX ORDER — ALBUTEROL SULFATE 90 UG/1
1-2 AEROSOL, METERED RESPIRATORY (INHALATION) EVERY 4 HOURS PRN
Status: DISCONTINUED | OUTPATIENT
Start: 2024-09-01 | End: 2024-09-05 | Stop reason: HOSPADM

## 2024-09-01 RX ORDER — ONDANSETRON 2 MG/ML
4 INJECTION INTRAMUSCULAR; INTRAVENOUS EVERY 6 HOURS PRN
Status: DISCONTINUED | OUTPATIENT
Start: 2024-09-01 | End: 2024-09-05 | Stop reason: HOSPADM

## 2024-09-01 RX ORDER — DULOXETIN HYDROCHLORIDE 60 MG/1
60 CAPSULE, DELAYED RELEASE ORAL 2 TIMES DAILY
Status: DISCONTINUED | OUTPATIENT
Start: 2024-09-01 | End: 2024-09-05 | Stop reason: HOSPADM

## 2024-09-01 RX ORDER — ACETAMINOPHEN 500 MG
1000 TABLET ORAL EVERY 6 HOURS PRN
Status: DISCONTINUED | OUTPATIENT
Start: 2024-09-01 | End: 2024-09-01

## 2024-09-01 RX ORDER — NICOTINE POLACRILEX 4 MG
15-30 LOZENGE BUCCAL
Status: DISCONTINUED | OUTPATIENT
Start: 2024-09-01 | End: 2024-09-05 | Stop reason: HOSPADM

## 2024-09-01 RX ORDER — CLOPIDOGREL BISULFATE 75 MG/1
75 TABLET ORAL DAILY
Status: DISCONTINUED | OUTPATIENT
Start: 2024-09-01 | End: 2024-09-05 | Stop reason: HOSPADM

## 2024-09-01 RX ORDER — LORAZEPAM 0.5 MG/1
0.5 TABLET ORAL EVERY 6 HOURS PRN
Status: DISCONTINUED | OUTPATIENT
Start: 2024-09-01 | End: 2024-09-05 | Stop reason: HOSPADM

## 2024-09-01 RX ORDER — NALOXONE HYDROCHLORIDE 0.4 MG/ML
0.4 INJECTION, SOLUTION INTRAMUSCULAR; INTRAVENOUS; SUBCUTANEOUS
Status: DISCONTINUED | OUTPATIENT
Start: 2024-09-01 | End: 2024-09-05 | Stop reason: HOSPADM

## 2024-09-01 RX ORDER — METHOCARBAMOL 750 MG/1
750 TABLET, FILM COATED ORAL 2 TIMES DAILY
Status: DISCONTINUED | OUTPATIENT
Start: 2024-09-01 | End: 2024-09-05 | Stop reason: HOSPADM

## 2024-09-01 RX ORDER — PANTOPRAZOLE SODIUM 40 MG/1
40 TABLET, DELAYED RELEASE ORAL
Status: DISCONTINUED | OUTPATIENT
Start: 2024-09-01 | End: 2024-09-05 | Stop reason: HOSPADM

## 2024-09-01 RX ORDER — OXYCODONE HYDROCHLORIDE 5 MG/1
5 TABLET ORAL EVERY 4 HOURS PRN
Status: DISCONTINUED | OUTPATIENT
Start: 2024-09-01 | End: 2024-09-05 | Stop reason: HOSPADM

## 2024-09-01 RX ORDER — ATORVASTATIN CALCIUM 40 MG/1
40 TABLET, FILM COATED ORAL EVERY EVENING
Status: DISCONTINUED | OUTPATIENT
Start: 2024-09-01 | End: 2024-09-05 | Stop reason: HOSPADM

## 2024-09-01 RX ORDER — HYDROMORPHONE HYDROCHLORIDE 1 MG/ML
0.5 INJECTION, SOLUTION INTRAMUSCULAR; INTRAVENOUS; SUBCUTANEOUS ONCE
Status: COMPLETED | OUTPATIENT
Start: 2024-09-01 | End: 2024-09-01

## 2024-09-01 RX ORDER — NABUMETONE 500 MG/1
500 TABLET, FILM COATED ORAL 2 TIMES DAILY
Status: DISCONTINUED | OUTPATIENT
Start: 2024-09-01 | End: 2024-09-05 | Stop reason: HOSPADM

## 2024-09-01 RX ADMIN — SODIUM CHLORIDE: 9 INJECTION, SOLUTION INTRAVENOUS at 19:59

## 2024-09-01 RX ADMIN — GABAPENTIN 200 MG: 100 CAPSULE ORAL at 19:50

## 2024-09-01 RX ADMIN — INSULIN ASPART 1 UNITS: 100 INJECTION, SOLUTION INTRAVENOUS; SUBCUTANEOUS at 19:54

## 2024-09-01 RX ADMIN — LORAZEPAM 0.5 MG: 0.5 TABLET ORAL at 18:32

## 2024-09-01 RX ADMIN — OXYCODONE HYDROCHLORIDE 5 MG: 5 TABLET ORAL at 19:48

## 2024-09-01 RX ADMIN — TOPIRAMATE 100 MG: 100 TABLET, FILM COATED ORAL at 19:49

## 2024-09-01 RX ADMIN — OXYCODONE HYDROCHLORIDE 5 MG: 5 TABLET ORAL at 23:53

## 2024-09-01 RX ADMIN — CLINDAMYCIN HYDROCHLORIDE 300 MG: 150 CAPSULE ORAL at 19:49

## 2024-09-01 RX ADMIN — HYDROMORPHONE HYDROCHLORIDE 0.5 MG: 1 INJECTION, SOLUTION INTRAMUSCULAR; INTRAVENOUS; SUBCUTANEOUS at 16:32

## 2024-09-01 RX ADMIN — ASPIRIN 81 MG: 81 TABLET, COATED ORAL at 19:49

## 2024-09-01 RX ADMIN — CLOPIDOGREL BISULFATE 75 MG: 75 TABLET ORAL at 19:50

## 2024-09-01 RX ADMIN — IOPAMIDOL 67 ML: 755 INJECTION, SOLUTION INTRAVENOUS at 14:53

## 2024-09-01 RX ADMIN — NABUMETONE 500 MG: 500 TABLET ORAL at 19:49

## 2024-09-01 RX ADMIN — METHOCARBAMOL 750 MG: 750 TABLET ORAL at 19:49

## 2024-09-01 RX ADMIN — DULOXETINE HYDROCHLORIDE 60 MG: 60 CAPSULE, DELAYED RELEASE PELLETS ORAL at 19:49

## 2024-09-01 RX ADMIN — OXYCODONE HYDROCHLORIDE 5 MG: 5 TABLET ORAL at 23:50

## 2024-09-01 RX ADMIN — ATORVASTATIN CALCIUM 40 MG: 40 TABLET, FILM COATED ORAL at 19:50

## 2024-09-01 RX ADMIN — INSULIN GLARGINE 36 UNITS: 100 INJECTION, SOLUTION SUBCUTANEOUS at 20:01

## 2024-09-01 RX ADMIN — DIPHENHYDRAMINE HYDROCHLORIDE 25 MG: 25 CAPSULE ORAL at 21:23

## 2024-09-01 RX ADMIN — ACETAMINOPHEN 1000 MG: 500 TABLET ORAL at 22:12

## 2024-09-01 RX ADMIN — DOXYCYCLINE 100 MG: 100 CAPSULE ORAL at 21:23

## 2024-09-01 RX ADMIN — PANTOPRAZOLE SODIUM 40 MG: 40 TABLET, DELAYED RELEASE ORAL at 20:26

## 2024-09-01 ASSESSMENT — ACTIVITIES OF DAILY LIVING (ADL)
ADLS_ACUITY_SCORE: 40
ADLS_ACUITY_SCORE: 37
ADLS_ACUITY_SCORE: 38
ADLS_ACUITY_SCORE: 37
ADLS_ACUITY_SCORE: 37
ADLS_ACUITY_SCORE: 40
ADLS_ACUITY_SCORE: 37

## 2024-09-01 ASSESSMENT — ENCOUNTER SYMPTOMS
WOUND: 1
NUMBNESS: 1
WEAKNESS: 1

## 2024-09-01 NOTE — ED NOTES
Cleansed wound with Vashe and redressed with Mepilex dressing. Patient declining this RN to change the packing, requesting that the wound care RN do it. Replaced [place] stocking to RLE.

## 2024-09-01 NOTE — ED NOTES
"Writer assisted patient to the CT scanner for the stroke code. Patient was rude to staff and reporting that because writer and swat nurse was \"too young\" we were unable to placed an IV. Patient would not let anyone look at her arms for as assessment. Demanded Ultrasound IV and a nurse needed to be called over delaying scan. Patient reported, \" Send me home, let me die, I dont want you placing an IV on me\". Ultrasound nurse came and patient was willing to have her assess.   "

## 2024-09-01 NOTE — ED NOTES
Bed: [place] [address]  Expected date:   Expected time:   Means of arrival:   Comments:  [facility]AD[facility] PT ED 04 A.M.
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
## 2024-09-01 NOTE — ED NOTES
Bed: JNED-04  Expected date: 9/1/24  Expected time: 2:19 PM  Means of arrival:   Comments:  Dog bite site getting better with antibiotics/[facility]

## 2024-09-01 NOTE — H&P
[facility]    History and Physical - Hospitalist Service       Date of Admission:  9/1/2024    Assessment & Plan    Assessment:   Sarah Rahman is a 59 year old female admitted on 9/1/2024. She presented to the ER with complaint of worsening weakness in the right upper and lower extremity associated with numbness in the fingers and the foot, CTA head and neck negative for acute process, MRI of the brain ordered, ED provider discussed with stroke neuro who recommended admission, MRI brain and further stroke workup, continue aspirin Plavix, patient also had a dog bite for which she is receiving clindamycin and doxycycline and wanted wound nurse to do wound packing, patient received some pain control in the ED and is going to be admitted to rule out stroke.    Problem List and Plan:   Stroke symptoms ruled out stroke  Patient presented to the ER with complaint of worsening weakness in the right upper and lower extremity associated with numbness in the fingers and the foot  Was recently admitted for similar symptoms which were resolved by the time she was discharged   Now presenting with recurrence after a fall overnight  Patient stated that her leg gave out yesterday and had a fall and when she woke up this morning had above symptoms for which she got concerned and presented to the ER  Continue with permissive control of blood pressure for today  CTA head and neck was performed which showed No acute intracranial hemorrhage or CT evidence for acute transcortical infarct. Chronic changes as mentioned in ct head No high-grade stenosis/occlusion involving the major intracranial arteries. No significant change from study dated 8/26/2024. Similar short segment of moderate atherosclerotic luminal narrowing involving the basilar artery. Calcified atherosclerosis results in moderate luminal narrowing involving the paraclinoid ICA segments bilaterally, similar to prior. Additional areas of more  mild multifocal intracranial atherosclerotic disease as detailed above. No aneurysm or evidence of high flow vascular malformation. No flow-limiting stenosis/occlusion involving the major arteries of the neck. Similar appearance of prior left carotid endarterectomy without substantial residual stenosis. No evidence for dissection.    MRI ordered on previous admission but could not happen because of issue with hair and scalp and as per patient had a clip before which could not be removed and is not there anymore  Repeat MRI of the brain ordered in the ED  ED provider discussed with stroke neuro who recommended admission, MRI brain and further stroke workup, continue aspirin Plavix  Follow-up echocardiogram  Aspiration, fall precautions  Neurology consulted, follow-up recommendations  Monitor on telemetry monitoring  PT, OT, speech evaluation, case management consulted  Regular diet once patient passes swallow screen, this was discussed with nurse at bedside    Normal anion gap metabolic acidosis  Gentle IV hydration  Monitor BMP  Monitor for worsening acidosis    History of dog bite  CRP elevated at 8.70, trend  Recently underwent incision and drainage by surgery  Currently on clindamycin and doxycycline which needs to be continued  Wound care nurse consulted for wound care    Normocytic anemia  Monitor CBC, follow-up iron panel    Insulin-dependent type 2 diabetes mellitus with hyperglycemia  Neuropathy  Hemoglobin A1c around 13 8/26/2024  Suspect noncompliance to medication regimen  Continue with Lantus  Continue sliding scale  Monitor blood sugar level  Hypoglycemia protocol  Holding Trulicity for now  Continue with gabapentin, duloxetine    History of hyperlipidemia  History of CAD  History of CVA  Continue with aspirin, Plavix and atorvastatin    History of COPD not in exacerbation  May use bronchodilators as appropriate and needed    History of mood disorder  Continue with duloxetine, Ativan,  "Topamax    Miscellaneous meds  Continue with Robaxin, nabumetone    DVT PPx  Intermittent pneumatic compression    CODE STATUS  Full code as per discussion with the patient        Diet: NPO for Medical/Clinical Reasons Except for: No Exceptions  Regular Diet Adult    DVT Prophylaxis: Pneumatic Compression Devices  Diehl Catheter: Not present  Lines: None     Cardiac Monitoring: ACTIVE order. Indication: Stroke, acute (48 hours)  Code Status: Full Code    Mental status: Patient was alert awake and oriented x 3, patient was a okay historian, some history was obtained from the patient, some from chart review and rest of the history was obtained from discussion with the ER physician.  Clinically Significant Risk Factors Present on Admission                # Drug Induced Platelet Defect: home medication list includes an antiplatelet medication   # Hypertension: Noted on problem list    # Anemia: based on hgb <11      # DMII: A1C = 13.1 % (Ref range: <5.7 %) within past 6 months    # Overweight: Estimated body mass index is 27.86 kg/m  as calculated from the following:    Height as of 8/26/24: 1.702 m (5' 7\").    Weight as of 8/27/24: 80.7 kg (177 lb 14.6 oz).       # Asthma: noted on problem list  # History of CABG: noted on surgical history     Disposition Plan     Medically Ready for Discharge: Anticipated in 2-4 Days     Saad J. Gondal, MD  Hospitalist Service  [facility]  Securely message with [employer] (more info)  Text page via [facility] Paging/Directory     ______________________________________________________________________    Chief Complaint   Numbness in the fingers and the foot on the right upper and lower extremity associated with increased weakness    History is obtained from the patient    History of Present Illness   Sarah Rahman is a 59 year old female with a past medical history documented below presented to the ER with complaint of worsening weakness in the right upper and " lower extremity associated with numbness in the fingers and the foot, was recently admitted for similar symptoms which were resolved by the time she was discharged and now presenting with recurrence after a fall overnight, patient stated that her leg gave out yesterday and had a fall and when she woke up this morning had above symptoms for which she got concerned and presented to the ER, in the ER vitals were significant for elevated blood pressure otherwise vitally stable, labs were significant for normal anion gap metabolic acidosis, mildly elevated CRP at 8.70, mild hyperglycemia, normocytic anemia, CTA head and neck was performed which showed No acute intracranial hemorrhage or CT evidence for acute transcortical infarct. Chronic changes as mentioned in ct head No high-grade stenosis/occlusion involving the major intracranial arteries. No significant change from study dated 8/26/2024. Similar short segment of moderate atherosclerotic luminal narrowing involving the basilar artery. Calcified atherosclerosis results in moderate luminal narrowing involving the paraclinoid ICA segments bilaterally, similar to prior. Additional areas of more mild multifocal intracranial atherosclerotic disease as detailed above. No aneurysm or evidence of high flow vascular malformation. No flow-limiting stenosis/occlusion involving the major arteries of the neck. Similar appearance of prior left carotid endarterectomy without substantial residual stenosis. No evidence for dissection.  ED provider discussed with stroke neuro who recommended admission, MRI brain and further stroke workup, continue aspirin Plavix, patient also had a dog bite for which she is receiving clindamycin and doxycycline and wanted wound nurse to do wound packing, patient received some pain control in the ED and is going to be admitted to rule out stroke.      Past Medical History    Past Medical History:   Diagnosis Date    Asthma     CAD (coronary artery  disease)     COPD (chronic obstructive pulmonary disease) (H)     CVA (cerebral infarction)     Dyshidrosis (pompholyx) 10/21/2016    GERD (gastroesophageal reflux disease)     History of CVA (cerebrovascular accident) 04/01/2012    Formatting of this note might be different from the original.  Overview:   Bilateral subacute cerebellar infarcts seen on MRI at [facility] 4/2012.  Pt was noted to have no residual deficits at [facility].  Formatting of this note might be different from the original.  Bilateral subacute cerebellar infarcts seen on MRI at [facility] 4/2012.  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 Hamm at [facility]    Schizoaffective disorder (H)     Sleep difficulties 07/17/2022       Past Surgical History   Past Surgical History:   Procedure Laterality Date    BYPASS GRAFT ARTERY CORONARY  01/01/2009    x2    CAROTID ENDARTERECTOMY Left 12/2021    CORONARY STENT PLACEMENT      CV ANGIOGRAM CORONARY GRAFT N/A 1/16/2024    Procedure: Coronary Angiogram Graft;  Surgeon: Spencer Diez MD;  Location: [facility] CATH LAB CV    CV CORONARY ANGIOGRAM N/A 06/02/2021    Procedure: Coronary Angiogram;  Surgeon: Juventino Rivera MD;  Location: [facility] Cardiac Cath Lab;  Service: Cardiology    HYSTERECTOMY TOTAL ABDOMINAL      HYSTERECTOMY TOTAL ABDOMINAL, BILATERAL SALPINGO-OOPHORECTOMY, COMBINED      IR LUMBAR PUNCTURE  7/23/2019    IR TRANSLAMINAR EPIDURAL LUMBAR INJ INCL IMAGING  09/27/2022    LUMBAR DISCECTOMY Right 06/13/2019    L5-S1 - Dr. Hamm    NASAL FRACTURE SURGERY      ORIF ULNAR / RADIAL SHAFT FRACTURE Right        Prior to Admission Medications   Prior to Admission Medications   Prescriptions Last  Dose Informant Patient Reported? Taking?   DULoxetine (CYMBALTA) 60 MG capsule 8/31/2024 at pm  No Yes   Sig: Take 1 capsule (60 mg) by mouth 2 times daily   LORazepam (ATIVAN) 0.5 MG tablet   No Yes   Sig: TAKE 1 TABLET (0.5 MG) BY MOUTH EVERY 6 HOURS AS NEEDED FOR ANXIETY   acetaminophen (TYLENOL) 500 MG tablet   No Yes   Sig: Take 2 tablets (1,000 mg) by mouth every 6 hours as needed for mild pain. Max 5.5 tabs per day   albuterol (PROAIR HFA) 108 (90 Base) MCG/ACT inhaler   No Yes   Sig: Inhale 1-2 puffs into the lungs every 4 hours as needed for shortness of breath or wheezing   aspirin 81 MG EC tablet 8/31/2024 at am  No Yes   Sig: Take 1 tablet (81 mg) by mouth daily for 28 days.   atorvastatin (LIPITOR) 40 MG tablet 8/31/2024 at pm  No Yes   Sig: Take 1 tablet (40 mg) by mouth every evening.   clindamycin (CLEOCIN) 300 MG capsule 8/31/2024 at pm  No Yes   Sig: Take 1 capsule (300 mg) by mouth 3 times daily for 7 days.   clopidogrel (PLAVIX) 75 MG tablet 8/31/2024 at am  No Yes   Sig: Take 1 tablet (75 mg) by mouth daily.   doxycycline monohydrate (MONODOX) 100 MG capsule 8/31/2024 at pm  No Yes   Sig: Take 1 capsule (100 mg) by mouth every 12 hours for 7 days.   dulaglutide (TRULICITY) 0.75 MG/0.5ML pen More than a month  No Yes   Sig: Inject 0.75 mg Subcutaneous every 7 days   fluticasone (FLONASE) 50 MCG/ACT nasal spray 8/31/2024 at pm  No Yes   Sig: Spray 1 spray into both nostrils 2 times daily   gabapentin (NEURONTIN) 100 MG capsule 8/31/2024 at pm  No Yes   Sig: Take 2 capsules (200 mg) by mouth 3 times daily.   insulin glargine (LANTUS PEN) 100 UNIT/ML pen 8/31/2024 at pm  No Yes   Sig: Inject 35-40 Units Subcutaneous 2 times daily   lidocaine (LIDODERM) 5 % patch 8/31/2024 at pm Self Yes Yes   Sig: Place onto the skin every 24 hours. To prevent lidocaine toxicity, patient should be patch free for 12 hrs daily.  Lower Back   methocarbamol (ROBAXIN) 750 MG tablet 8/31/2024 at pm  No Yes   Sig: Take 1  tablet (750 mg) by mouth 2 times daily   nabumetone (RELAFEN) 500 MG tablet 8/31/2024 at pm  No Yes   Sig: Take 1 tablet (500 mg) by mouth 2 times daily   naloxone (NARCAN) 4 MG/0.1ML nasal spray  Self Yes Yes   Sig: Spray 4 mg into one nostril alternating nostrils as needed for opioid reversal every 2-3 minutes until assistance arrives   omeprazole (PRILOSEC) 40 MG DR capsule 8/31/2024 at am  No Yes   Sig: Take 1 capsule (40 mg) by mouth daily To protect your stomach.   ondansetron (ZOFRAN) 8 MG tablet   No Yes   Sig: Take 1 tablet (8 mg) by mouth every 8 hours as needed for nausea   topiramate (TOPAMAX) 50 MG tablet 8/31/2024 at pm  Yes Yes   Sig: Take 100 mg by mouth 2 times daily   triamcinolone (KENALOG) 0.1 % external ointment Past Week at pm  No Yes   Sig: Apply topically 2 times daily Apply twice a day to vaginal rash until resolved      Facility-Administered Medications: None        Review of Systems    The 10 point Review of Systems is negative other than noted in the HPI or here. Numbness in the fingers and the foot on the right upper and lower extremity associated with increased weakness    Physical Exam   Vital Signs: Temp: 98.2  F (36.8  C)   BP: (!) 165/82 Pulse: 78   Resp: 12 SpO2: 100 %      Weight: 0 lbs 0 oz    GENERAL: Patient was seen and examined at bedside with no acute distress  HENT:  Head is normocephalic, atraumatic.   EYES:  Eyes have anicteric sclerae without conjunctival injection   LUNGS: Bilateral air entry, clear to auscultation bilaterally  CARDIOVASCULAR:  Regular rate and rhythm with no murmurs, gallops or rubs.  ABDOMEN:  Normal bowel sounds. Soft; nontender   EXT: no edema    SKIN:  No acute rashes.   NEUROLOGIC:  Grossly nonfocal.      Medical Decision Making       80 MINUTES SPENT BY ME on the date of service doing chart review, history, exam, documentation & further activities per the note.      Data     I have personally reviewed the following data over the past 24  hrs:    7.9  \   9.8 (L)   / 280     138 106 23.5 (H) /  192 (H)   5.1 20 (L) 0.80 \     Procal: N/A CRP: 8.70 (H) Lactic Acid: N/A       INR:  1.00 PTT:  28   D-dimer:  N/A Fibrinogen:  N/A       Imaging results reviewed over the past 24 hrs:   Recent Results (from the past 24 hour(s))   CTA Head Neck with Contrast    Narrative    EXAM: CTA HEAD NECK W CONTRAST  LOCATION: [facility]  DATE: 9/1/2024    INDICATION: Code Stroke to evaluate for potential thrombolysis and thrombectomy. PLEASE READ IMMEDIATELY.  COMPARISON: Head and neck CTA: 8/26/2024. Head CT: 8/27/2024.  CONTRAST: 67 mL [id number].  TECHNIQUE: Head and neck CT angiogram with IV contrast. Noncontrast head CT followed by axial helical CT images of the head and neck vessels obtained during the arterial phase of intravenous contrast administration. Axial 2D reconstructed images and   multiplanar 3D MIP reconstructed images of the head and neck vessels were performed by the technologist. Dose reduction techniques were used. All stenosis measurements made according to NASCET criteria unless otherwise specified.    FINDINGS:   NONCONTRAST HEAD CT:   INTRACRANIAL CONTENTS: No intracranial hemorrhage, extraaxial collection, or mass effect.  No CT evidence of acute transcortical infarct. Mild presumed chronic small vessel ischemic changes. Mild to moderate generalized volume loss. No hydrocephalus.   Calcified intracranial atherosclerosis.    VISUALIZED ORBITS/SINUSES/MASTOIDS: No intraorbital abnormality. Mild mucosal thickening scattered about the paranasal sinuses. No middle ear or mastoid effusion.    BONES/SOFT TISSUES: No acute abnormality. Remote nasal arch fractures.    HEAD CTA:  ANTERIOR CIRCULATION: No high-grade stenosis/occlusion. Calcified atherosclerosis of the carotid siphons. Moderate luminal narrowing involving the bilateral paraclinoid ICA segments, left greater than right, similar to prior. Scattered areas of mild    luminal narrowing involving the bilateral MCA M1 segments, similar to prior. Additional scattered areas of mild to moderate luminal narrowing involving the bilateral MCA M2 segments, similar to prior. No substantial stenosis involving the anterior   cerebral arteries. Developmentally hypoplastic left A1 anterior cerebral artery segment. No evidence of aneurysm or high flow vascular malformation.    POSTERIOR CIRCULATION: No high-grade stenosis/occlusion. Balanced vertebral arteries supply a patent basilar artery. Mixed atherosclerotic disease associated with the bilateral vertebral artery V4 segments with areas of similar mild to moderate luminal   narrowing bilaterally. Atherosclerotic disease throughout the basilar artery with a short segment of moderate stenosis involving the midportion, similar to prior. Scattered areas of mild luminal narrowing involving the bilateral PCA P1 and P2 segments.   No evidence of aneurysm or high flow vascular malformation.    DURAL VENOUS SINUSES: Expected enhancement of the major dural venous sinuses.    NECK CTA:  RIGHT CAROTID: Mixed atherosclerotic plaque about the carotid bifurcation without substantial (50% or greater) luminal stenosis. No evidence for dissection.    LEFT CAROTID: Changes of prior left carotid endarterectomy without substantial residual luminal stenosis about the carotid bifurcation or ICA. No evidence for dissection.    VERTEBRAL ARTERIES: No focal high-grade stenosis or evidence for dissection. Balanced vertebral arteries.    AORTIC ARCH: Classic aortic arch anatomy with no high-grade stenosis at the origin of the great vessels. Incompletely evaluated postsurgical changes of CABG.    NONVASCULAR STRUCTURES: Multilevel cervical spondylosis without high-grade spinal canal stenosis.      Impression    IMPRESSION:     HEAD CT:  1.  No acute intracranial hemorrhage or CT evidence for acute transcortical infarct.  2.  Chronic changes as above.    HEAD CTA:   1.   No high-grade stenosis/occlusion involving the major intracranial arteries. No significant change from study dated 8/26/2024.  2.  Similar short segment of moderate atherosclerotic luminal narrowing involving the basilar artery. Calcified atherosclerosis results in moderate luminal narrowing involving the paraclinoid ICA segments bilaterally, similar to prior. Additional areas   of more mild multifocal intracranial atherosclerotic disease as detailed above.  3.  No aneurysm or evidence of high flow vascular malformation.    NECK CTA:  1.  No flow-limiting stenosis/occlusion involving the major arteries of the neck.  2.  Similar appearance of prior left carotid endarterectomy without substantial residual stenosis.  3.  No evidence for dissection.    Absence of acute intracranial hemorrhage was discussed via telephone with Dr. Dey by myself at 3:04 PM CDT on 9/1/2024. Absence of large vessel occlusion was discussed via telephone with Dr. Dey by myself at 3:37 PM CDT on 9/1/2024.

## 2024-09-01 NOTE — ED TRIAGE NOTES
Patient presents here via [facility] medics for evaluation of wound to her right left. She had incurred a dog bite on 8/24. She was seen in this ED and inpatient for 4 days. She notes swelling to her leg and increased pain. She also reports that she developed numbness to her right hand that occurred 90 minutes ago. Hx of CVA.      Triage Assessment (Adult)       Row Name 09/01/24 [id number]          Triage Assessment    Airway WDL WDL        Respiratory WDL    Respiratory WDL WDL        Skin Circulation/Temperature WDL    Skin Circulation/Temperature WDL WDL        Cardiac WDL    Cardiac WDL WDL        Peripheral/Neurovascular WDL    Peripheral Neurovascular WDL WDL        Cognitive/Neuro/Behavioral WDL    Cognitive/Neuro/Behavioral WDL WDL

## 2024-09-01 NOTE — CONSULTS
[facility]     Stroke Code Note          History of Present Illness     Chief Complaint: Wound Check      Sarah Rahman is a 59 year old female with PMH of DM2, CAD, prior stroke and carotid stenosis s/p L CEA. In addition she was recently admitted 8/26-8/30 for a RLE dog bite and R arm and leg weakness; ultimately it was suspected that R extremity weakness was due to stroke that was not identified on imaging (she was unable to have MRI) and she was discharged on DAPT x 21 days.    She presents to the ED today for evaluation of worsening of the bite wound in her RLE and recurrence of R sided weakness. In speaking to the patient she reports that she had some mild weakness in the R extremities when she discharged on 8/30. Then yesterday evening she had acute weakness in her RLE which caused her to fall. Around 1300 today she reports her right arm became weaker.          Past Medical History     Stroke risk factors: DM2, CAD, prior stroke and carotid stenosis s/p L CEA    Preadmission antithrombotic regimen: ASA, plavix                    Assessment and Plan       1.  Right arm and leg weakness, unclear etiology. Pattern of staggered weakness onset is unusual for stroke or stroke recrudesce. MRI would be most helpful to try and rule this out       Intravenous Thrombolysis  Not given due to:   - head trauma/stroke within the past 3 months  - unclear or unfavorable risk-benefit profile for extended window thrombolysis beyond the conventional 4.5 hour time window     Endovascular Treatment  Not initiated due to absence of proximal vessel occlusion     Plan:  -brain MRI with and without contrast; please page stroke neurology if infarct is identified for further recommendations  -continue ASA and Plavix     ___________________________________________________________________    NARCISA Pena CNP  Vascular Neurology    To page me or covering stroke neurology team member, click  "here: AMCOM  Choose \"On Call\" tab at top, then select \"NEUROLOGY/ALL SITES\" from middle drop-down box, press Enter, then look for \"stroke\" or \"telestroke\" for your site.  ___________________________________________________________________        Imaging/Labs   (personally reviewed )    CT head: no hemorrhage or other acute findings  CTA head/neck: no LVO; stable moderate basilar stenosis          Physical Examination     BP: (!) 147/80   Pulse: 80   Resp: 16   Temp: 98.2  F (36.8  C)       SpO2: 96 %            Wt Readings from Last 2 Encounters:   08/27/24 80.7 kg (177 lb 14.6 oz)   08/05/24 85.3 kg (188 lb)       Neuro Exam  Mental Status:  alert, oriented to person, place and situation, speech clear and fluent  Cranial Nerves:  gaze conjugate, no dysarthria       Labs     CBC  Lab Results   Component Value Date    HGB 9.9 (L) 08/29/2024    HCT 31.7 (L) 08/29/2024    WBC 6.3 08/29/2024     08/29/2024       BMP  Lab Results   Component Value Date     08/29/2024    POTASSIUM 3.9 08/29/2024    CHLORIDE 109 (H) 08/29/2024    CO2 22 08/29/2024    BUN 21.4 08/29/2024    CR 0.86 08/29/2024     (H) 09/01/2024    MAXIMO 8.7 (L) 08/29/2024       INR  INR   Date Value Ref Range Status   08/26/2024 1.14 0.85 - 1.15 Final   07/23/2024 0.99 0.85 - 1.15 Final   07/28/2023 1.05 0.85 - 1.15 Final       Data   Stroke Code Data  (for stroke code with tele)  Stroke code activated 09/01/24  1449   First stroke provider response 09/01/24  1452   Video start time 09/01/24  1504   Video end time 09/01/24  1530   Last known normal 09/01/24  1300   Time of discovery (or onset of symptoms)  09/01/24  1300   Head CT read by Stroke Neuro Provider 09/01/24  8867   Was stroke code de-escalated? Yes  09/01/24  1531     Telestroke Service Details  Type of service telemedicine diagnostic assessment of acute neurological changes   Reason telemedicine is appropriate patient requires assessment with a specialist for diagnosis and " "treatment of neurological symptoms   Mode of transmission secure interactive audio and video communication per Jonathan   Originating site (patient location) [facility]    Distant site (provider location) [facility]        Clinically Significant Risk Factors Present on Admission                # Drug Induced Platelet Defect: home medication list includes an antiplatelet medication   # Hypertension: Noted on problem list        # DMII: A1C = 13.1 % (Ref range: <5.7 %) within past 6 months    # Overweight: Estimated body mass index is 27.86 kg/m  as calculated from the following:    Height as of 8/26/24: 1.702 m (5' 7\").    Weight as of 8/27/24: 80.7 kg (177 lb 14.6 oz).       # Asthma: noted on problem list  # History of CABG: noted on surgical history          Time Spent on this Encounter   Billing: I personally examined and evaluated the patient today. At the time of my evaluation and management the patient was critical condition today due to stroke code. I personally managed stroke code. Key decisions made today included need for advanced neuroimaging and/or infication for acute stroke treatments. I spent a total of 50 minutes providing critical care services, evaluating the patient, directing care and reviewing laboratory values and radiologic reports.   "

## 2024-09-01 NOTE — PROGRESS NOTES
Pt left for MRI at 1845. Pt will be going to P1 Room 109 after MRI is completed. Pt's food just arrived. Will bring up to P1.

## 2024-09-01 NOTE — PHARMACY-ADMISSION MEDICATION HISTORY
Pharmacist Admission Medication History    Admission medication history is complete. The information provided in this note is only as accurate as the sources available at the time of the update.    Information Source(s): Patient, Hospital records, and CareEverywhere/SureScripts via in-person    Pertinent Information: medication regimen remains the same as discharge two days ago    Changes made to PTA medication list:  Added: None  Deleted: None  Changed: None    Allergies reviewed with patient and updates made in EHR: yes    Medication History Completed By: Dakotah Santana [facility] 9/1/2024 5:12 PM    PTA Med List   Medication Sig Note Last Dose    acetaminophen (TYLENOL) 500 MG tablet Take 2 tablets (1,000 mg) by mouth every 6 hours as needed for mild pain. Max 5.5 tabs per day      albuterol (PROAIR HFA) 108 (90 Base) MCG/ACT inhaler Inhale 1-2 puffs into the lungs every 4 hours as needed for shortness of breath or wheezing      aspirin 81 MG EC tablet Take 1 tablet (81 mg) by mouth daily for 28 days.  8/31/2024 at am    atorvastatin (LIPITOR) 40 MG tablet Take 1 tablet (40 mg) by mouth every evening.  8/31/2024 at pm    clindamycin (CLEOCIN) 300 MG capsule Take 1 capsule (300 mg) by mouth 3 times daily for 7 days.  8/31/2024 at pm    clopidogrel (PLAVIX) 75 MG tablet Take 1 tablet (75 mg) by mouth daily.  8/31/2024 at am    doxycycline monohydrate (MONODOX) 100 MG capsule Take 1 capsule (100 mg) by mouth every 12 hours for 7 days.  8/31/2024 at pm    dulaglutide (TRULICITY) 0.75 MG/0.5ML pen Inject 0.75 mg Subcutaneous every 7 days      DULoxetine (CYMBALTA) 60 MG capsule Take 1 capsule (60 mg) by mouth 2 times daily  8/31/2024 at pm    fluticasone (FLONASE) 50 MCG/ACT nasal spray Spray 1 spray into both nostrils 2 times daily  8/31/2024 at pm    gabapentin (NEURONTIN) 100 MG capsule Take 2 capsules (200 mg) by mouth 3 times daily.  8/31/2024 at pm    insulin glargine (LANTUS PEN) 100 UNIT/ML pen Inject 35-40  Units Subcutaneous 2 times daily  8/31/2024 at pm    lidocaine (LIDODERM) 5 % patch Place onto the skin every 24 hours. To prevent lidocaine toxicity, patient should be patch free for 12 hrs daily.  Lower Back 9/1/2024: Lower back  8/31/2024 at pm    LORazepam (ATIVAN) 0.5 MG tablet TAKE 1 TABLET (0.5 MG) BY MOUTH EVERY 6 HOURS AS NEEDED FOR ANXIETY      methocarbamol (ROBAXIN) 750 MG tablet Take 1 tablet (750 mg) by mouth 2 times daily  8/31/2024 at pm    nabumetone (RELAFEN) 500 MG tablet Take 1 tablet (500 mg) by mouth 2 times daily  8/31/2024 at pm    naloxone (NARCAN) 4 MG/0.1ML nasal spray Spray 4 mg into one nostril alternating nostrils as needed for opioid reversal every 2-3 minutes until assistance arrives      omeprazole (PRILOSEC) 40 MG DR capsule Take 1 capsule (40 mg) by mouth daily To protect your stomach.  8/31/2024 at am    ondansetron (ZOFRAN) 8 MG tablet Take 1 tablet (8 mg) by mouth every 8 hours as needed for nausea      topiramate (TOPAMAX) 50 MG tablet Take 100 mg by mouth 2 times daily  8/31/2024 at pm    triamcinolone (KENALOG) 0.1 % external ointment Apply topically 2 times daily Apply twice a day to vaginal rash until resolved  Past Week at pm

## 2024-09-02 ENCOUNTER — APPOINTMENT (OUTPATIENT)
Dept: CARDIOLOGY | Facility: HOSPITAL | Age: 59
End: 2024-09-02
Attending: STUDENT IN AN ORGANIZED HEALTH CARE EDUCATION/TRAINING PROGRAM
Payer: MEDICAID

## 2024-09-02 ENCOUNTER — APPOINTMENT (OUTPATIENT)
Dept: PHYSICAL THERAPY | Facility: HOSPITAL | Age: 59
End: 2024-09-02
Attending: STUDENT IN AN ORGANIZED HEALTH CARE EDUCATION/TRAINING PROGRAM
Payer: MEDICAID

## 2024-09-02 PROBLEM — S81.851D: Status: ACTIVE | Noted: 2024-08-26
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
## 2024-09-02 LAB
ANION GAP SERPL CALCULATED.3IONS-SCNC: 10 MMOL/L (ref 7–15)
BUN SERPL-MCNC: 20.3 MG/DL (ref 8–23)
CALCIUM SERPL-MCNC: 8.7 MG/DL (ref 8.8–10.4)
CHLORIDE SERPL-SCNC: 106 MMOL/L (ref 98–107)
CHOLEST SERPL-MCNC: 176 MG/DL
CREAT SERPL-MCNC: 0.92 MG/DL (ref 0.51–0.95)
CRP SERPL-MCNC: 7.8 MG/L
EGFRCR SERPLBLD CKD-EPI 2021: 71 ML/MIN/1.73M2
ERYTHROCYTE [DISTWIDTH] IN BLOOD BY AUTOMATED COUNT: 13.2 % (ref 10–15)
FERRITIN SERPL-MCNC: 111 NG/ML (ref 11–328)
GLUCOSE BLDC GLUCOMTR-MCNC: 133 MG/DL (ref 70–99)
GLUCOSE BLDC GLUCOMTR-MCNC: 148 MG/DL (ref 70–99)
GLUCOSE BLDC GLUCOMTR-MCNC: 235 MG/DL (ref 70–99)
GLUCOSE BLDC GLUCOMTR-MCNC: 254 MG/DL (ref 70–99)
GLUCOSE SERPL-MCNC: 185 MG/DL (ref 70–99)
HCO3 SERPL-SCNC: 21 MMOL/L (ref 22–29)
HCT VFR BLD AUTO: 31.8 % (ref 35–47)
HDLC SERPL-MCNC: 40 MG/DL
HGB BLD-MCNC: 9.8 G/DL (ref 11.7–15.7)
IRON BINDING CAPACITY (ROCHE): 216 UG/DL (ref 240–430)
IRON SATN MFR SERPL: 32 % (ref 15–46)
IRON SERPL-MCNC: 70 UG/DL (ref 37–145)
LDLC SERPL CALC-MCNC: 111 MG/DL
LVEF ECHO: NORMAL
MAGNESIUM SERPL-MCNC: 1.8 MG/DL (ref 1.7–2.3)
MCH RBC QN AUTO: 27.1 PG (ref 26.5–33)
MCHC RBC AUTO-ENTMCNC: 30.8 G/DL (ref 31.5–36.5)
MCV RBC AUTO: 88 FL (ref 78–100)
NONHDLC SERPL-MCNC: 136 MG/DL
PHOSPHATE SERPL-MCNC: 3.5 MG/DL (ref 2.5–4.5)
PLATELET # BLD AUTO: 282 10E3/UL (ref 150–450)
POTASSIUM SERPL-SCNC: 3.7 MMOL/L (ref 3.4–5.3)
RBC # BLD AUTO: 3.62 10E6/UL (ref 3.8–5.2)
RETICS # AUTO: 0.07 10E6/UL (ref 0.03–0.1)
RETICS/RBC NFR AUTO: 1.8 % (ref 0.5–2)
SODIUM SERPL-SCNC: 137 MMOL/L (ref 135–145)
TRIGL SERPL-MCNC: 123 MG/DL
TROPONIN T SERPL HS-MCNC: 27 NG/L
VIT B12 SERPL-MCNC: 357 PG/ML (ref 232–1245)
WBC # BLD AUTO: 7.3 10E3/UL (ref 4–11)

## 2024-09-02 PROCEDURE — 85027 COMPLETE CBC AUTOMATED: CPT | Performed by: STUDENT IN AN ORGANIZED HEALTH CARE EDUCATION/TRAINING PROGRAM

## 2024-09-02 PROCEDURE — 99232 SBSQ HOSP IP/OBS MODERATE 35: CPT | Performed by: HOSPITALIST

## 2024-09-02 PROCEDURE — 93325 DOPPLER ECHO COLOR FLOW MAPG: CPT | Mod: 26 | Performed by: INTERNAL MEDICINE

## 2024-09-02 PROCEDURE — 84100 ASSAY OF PHOSPHORUS: CPT | Performed by: STUDENT IN AN ORGANIZED HEALTH CARE EDUCATION/TRAINING PROGRAM

## 2024-09-02 PROCEDURE — 36415 COLL VENOUS BLD VENIPUNCTURE: CPT | Performed by: STUDENT IN AN ORGANIZED HEALTH CARE EDUCATION/TRAINING PROGRAM

## 2024-09-02 PROCEDURE — 83735 ASSAY OF MAGNESIUM: CPT | Performed by: STUDENT IN AN ORGANIZED HEALTH CARE EDUCATION/TRAINING PROGRAM

## 2024-09-02 PROCEDURE — 97162 PT EVAL MOD COMPLEX 30 MIN: CPT | Mod: GP

## 2024-09-02 PROCEDURE — 83550 IRON BINDING TEST: CPT | Performed by: STUDENT IN AN ORGANIZED HEALTH CARE EDUCATION/TRAINING PROGRAM

## 2024-09-02 PROCEDURE — 82607 VITAMIN B-12: CPT | Performed by: STUDENT IN AN ORGANIZED HEALTH CARE EDUCATION/TRAINING PROGRAM

## 2024-09-02 PROCEDURE — 80061 LIPID PANEL: CPT | Performed by: STUDENT IN AN ORGANIZED HEALTH CARE EDUCATION/TRAINING PROGRAM

## 2024-09-02 PROCEDURE — 85045 AUTOMATED RETICULOCYTE COUNT: CPT | Performed by: STUDENT IN AN ORGANIZED HEALTH CARE EDUCATION/TRAINING PROGRAM

## 2024-09-02 PROCEDURE — 93321 DOPPLER ECHO F-UP/LMTD STD: CPT | Mod: 26 | Performed by: INTERNAL MEDICINE

## 2024-09-02 PROCEDURE — 80048 BASIC METABOLIC PNL TOTAL CA: CPT | Performed by: STUDENT IN AN ORGANIZED HEALTH CARE EDUCATION/TRAINING PROGRAM

## 2024-09-02 PROCEDURE — 250N000011 HC RX IP 250 OP 636: Performed by: HOSPITALIST

## 2024-09-02 PROCEDURE — 999N000226 HC STATISTIC SLP IP EVAL DEFER

## 2024-09-02 PROCEDURE — 87070 CULTURE OTHR SPECIMN AEROBIC: CPT | Performed by: HOSPITALIST

## 2024-09-02 PROCEDURE — 86140 C-REACTIVE PROTEIN: CPT | Performed by: STUDENT IN AN ORGANIZED HEALTH CARE EDUCATION/TRAINING PROGRAM

## 2024-09-02 PROCEDURE — 258N000003 HC RX IP 258 OP 636: Performed by: STUDENT IN AN ORGANIZED HEALTH CARE EDUCATION/TRAINING PROGRAM

## 2024-09-02 PROCEDURE — 82728 ASSAY OF FERRITIN: CPT | Performed by: STUDENT IN AN ORGANIZED HEALTH CARE EDUCATION/TRAINING PROGRAM

## 2024-09-02 PROCEDURE — 93325 DOPPLER ECHO COLOR FLOW MAPG: CPT

## 2024-09-02 PROCEDURE — 84484 ASSAY OF TROPONIN QUANT: CPT | Performed by: STUDENT IN AN ORGANIZED HEALTH CARE EDUCATION/TRAINING PROGRAM

## 2024-09-02 PROCEDURE — 93308 TTE F-UP OR LMTD: CPT | Mod: 26 | Performed by: INTERNAL MEDICINE

## 2024-09-02 PROCEDURE — 250N000013 HC RX MED GY IP 250 OP 250 PS 637: Performed by: STUDENT IN AN ORGANIZED HEALTH CARE EDUCATION/TRAINING PROGRAM

## 2024-09-02 PROCEDURE — 120N000001 HC R&B MED SURG/OB

## 2024-09-02 RX ORDER — HYDRALAZINE HYDROCHLORIDE 20 MG/ML
5 INJECTION INTRAMUSCULAR; INTRAVENOUS EVERY 6 HOURS PRN
Status: DISCONTINUED | OUTPATIENT
Start: 2024-09-02 | End: 2024-09-05 | Stop reason: HOSPADM

## 2024-09-02 RX ORDER — HYDROMORPHONE HYDROCHLORIDE 1 MG/ML
0.5 INJECTION, SOLUTION INTRAMUSCULAR; INTRAVENOUS; SUBCUTANEOUS ONCE
Status: COMPLETED | OUTPATIENT
Start: 2024-09-02 | End: 2024-09-02

## 2024-09-02 RX ADMIN — DULOXETINE HYDROCHLORIDE 60 MG: 60 CAPSULE, DELAYED RELEASE PELLETS ORAL at 07:55

## 2024-09-02 RX ADMIN — OXYCODONE HYDROCHLORIDE 5 MG: 5 TABLET ORAL at 07:56

## 2024-09-02 RX ADMIN — DOXYCYCLINE 100 MG: 100 CAPSULE ORAL at 18:02

## 2024-09-02 RX ADMIN — ATORVASTATIN CALCIUM 40 MG: 40 TABLET, FILM COATED ORAL at 20:24

## 2024-09-02 RX ADMIN — TOPIRAMATE 100 MG: 100 TABLET, FILM COATED ORAL at 20:25

## 2024-09-02 RX ADMIN — HYDROMORPHONE HYDROCHLORIDE 0.5 MG: 1 INJECTION, SOLUTION INTRAMUSCULAR; INTRAVENOUS; SUBCUTANEOUS at 16:17

## 2024-09-02 RX ADMIN — DULOXETINE HYDROCHLORIDE 60 MG: 60 CAPSULE, DELAYED RELEASE PELLETS ORAL at 20:24

## 2024-09-02 RX ADMIN — PANTOPRAZOLE SODIUM 40 MG: 40 TABLET, DELAYED RELEASE ORAL at 06:33

## 2024-09-02 RX ADMIN — SODIUM CHLORIDE: 9 INJECTION, SOLUTION INTRAVENOUS at 09:38

## 2024-09-02 RX ADMIN — INSULIN GLARGINE 36 UNITS: 100 INJECTION, SOLUTION SUBCUTANEOUS at 20:15

## 2024-09-02 RX ADMIN — TOPIRAMATE 100 MG: 100 TABLET, FILM COATED ORAL at 07:56

## 2024-09-02 RX ADMIN — INSULIN GLARGINE 36 UNITS: 100 INJECTION, SOLUTION SUBCUTANEOUS at 07:56

## 2024-09-02 RX ADMIN — INSULIN ASPART 1 UNITS: 100 INJECTION, SOLUTION INTRAVENOUS; SUBCUTANEOUS at 16:58

## 2024-09-02 RX ADMIN — OXYCODONE HYDROCHLORIDE 5 MG: 5 TABLET ORAL at 03:48

## 2024-09-02 RX ADMIN — GABAPENTIN 200 MG: 100 CAPSULE ORAL at 07:54

## 2024-09-02 RX ADMIN — METHOCARBAMOL 750 MG: 750 TABLET ORAL at 07:55

## 2024-09-02 RX ADMIN — CLINDAMYCIN HYDROCHLORIDE 300 MG: 150 CAPSULE ORAL at 13:43

## 2024-09-02 RX ADMIN — OXYCODONE HYDROCHLORIDE 5 MG: 5 TABLET ORAL at 18:30

## 2024-09-02 RX ADMIN — GABAPENTIN 200 MG: 100 CAPSULE ORAL at 13:43

## 2024-09-02 RX ADMIN — INSULIN ASPART 3 UNITS: 100 INJECTION, SOLUTION INTRAVENOUS; SUBCUTANEOUS at 07:56

## 2024-09-02 RX ADMIN — DOXYCYCLINE 100 MG: 100 CAPSULE ORAL at 06:33

## 2024-09-02 RX ADMIN — OXYCODONE HYDROCHLORIDE 5 MG: 5 TABLET ORAL at 13:43

## 2024-09-02 RX ADMIN — CLINDAMYCIN HYDROCHLORIDE 300 MG: 150 CAPSULE ORAL at 07:55

## 2024-09-02 RX ADMIN — NABUMETONE 500 MG: 500 TABLET ORAL at 07:55

## 2024-09-02 RX ADMIN — ACETAMINOPHEN 1000 MG: 500 TABLET ORAL at 07:55

## 2024-09-02 RX ADMIN — CLOPIDOGREL BISULFATE 75 MG: 75 TABLET ORAL at 07:55

## 2024-09-02 RX ADMIN — GABAPENTIN 200 MG: 100 CAPSULE ORAL at 20:23

## 2024-09-02 RX ADMIN — METHOCARBAMOL 750 MG: 750 TABLET ORAL at 20:24

## 2024-09-02 RX ADMIN — CLINDAMYCIN HYDROCHLORIDE 300 MG: 150 CAPSULE ORAL at 20:24

## 2024-09-02 RX ADMIN — ASPIRIN 81 MG: 81 TABLET, COATED ORAL at 07:56

## 2024-09-02 ASSESSMENT — ACTIVITIES OF DAILY LIVING (ADL)
ADLS_ACUITY_SCORE: 38
ADLS_ACUITY_SCORE: 40
ADLS_ACUITY_SCORE: 40
ADLS_ACUITY_SCORE: 38
ADLS_ACUITY_SCORE: 38
ADLS_ACUITY_SCORE: 40
ADLS_ACUITY_SCORE: 40
ADLS_ACUITY_SCORE: 38
DEPENDENT_IADLS:: CLEANING;COOKING;LAUNDRY;SHOPPING;TRANSPORTATION;MEAL PREPARATION;MEDICATION MANAGEMENT
ADLS_ACUITY_SCORE: 38
ADLS_ACUITY_SCORE: 40
ADLS_ACUITY_SCORE: 38
ADLS_ACUITY_SCORE: 40
ADLS_ACUITY_SCORE: 40
ADLS_ACUITY_SCORE: 38
ADLS_ACUITY_SCORE: 40

## 2024-09-02 NOTE — PROGRESS NOTES
[facility]    Medicine Progress Note - Hospitalist Service    Date of Admission:  9/1/2024    Assessment & Plan                Sarah Rahman is a 59 year old female with diabetes mellitus type II, coronary artery disease s/p coronary bypass, hypertension, asthma, COPD, hyperlipidemia, anxiety, prior CVA, basilar artery stenosis, and schizoaffective disorder, frequent ED visits and hospitalizations for R sided weakness, hospitalized 8/26-8/30 for dog bite on R leg also had R sided weakness consistent with stroke, here after a fall. Hospital Day: 2    #Fall  #Recent ?R sided stroke  #Years of fluctuating R sided weakness  -has had years of fluctuating weakness on R side attributed to cerebrovascular disease. History of left carotid endarterectomy and has known basilar artery stenosis  -last admission had recurrent R sided weakness, seen by Neuro, CT and CTA negative, MRI was recommended but could not be completed, recommended treat empirically as stroke  -Started on DAPT x3 weeks then Plavix alone  -Patient is here now with a fall and complaint of R sided weakness. I think she likely needs TCU. She is dismissive of this and states has service dog and PCA at home. But obviously she has already failed this level of care and is now readmitted. I think she should go to TCU this time.  -MRI was attempted again but again could not be completed  -Care mgr consulted for placement  -continue home statin, ASA, Plavix  -Neurology consulted from ED  -supposed to be on 30 day cardiac monitor    #R lower leg dog bite  -Her sister's dog bit her  -had bedside I&D on 8/28 without overt purulence expressed  -Has been on clinda and doxy, continue  -Leg edematous and not entirely clear if new. Monitoring closely. No cultures ever obtained.  -resume wound cares, added instructions from prior hospitalization pending [facility] eval tomorrow  -obtain culture swab    #Diabetes mellitus insulin-dependent  - Poorly  "controlled with hyperglycemia  - HbA1c 13.1  - Home regimen; Lantus 35-40u bid, Trulicity weekly  - decreased Lantus 30u bid due to hypoglycemia 8/28  - had recurrent hypoglycemia 8/29 so lantus held  - continue current sliding scale insulin and carb counting with meals     #Hyperlipidemia  - Started Atorvastatin 40mg     #GERD  -Continue PPI    # Neuropathy, home Cymbalta and gabapentin.  Nabumetone is maybe not a great med for her with recent stroke. Will hold and she should discuss further with her PCP.  # Anxiety, home Ativan, Topamax  # Muscle spasms, home Robaxin          Diet: Regular Diet Adult    DVT Prophylaxis: Moderate risk.   Pneumatic Compression Devices  Diehl Catheter: Not present  Lines: None     Cardiac Monitoring: ACTIVE order. Indication: Stroke, acute (48 hours)  Code Status: Full Code      Clinically Significant Risk Factors Present on Admission                # Drug Induced Platelet Defect: home medication list includes an antiplatelet medication   # Hypertension: Noted on problem list    # Anemia: based on hgb <11      # DMII: A1C = 13.1 % (Ref range: <5.7 %) within past 6 months    # Overweight: Estimated body mass index is 27.86 kg/m  as calculated from the following:    Height as of 8/26/24: 1.702 m (5' 7\").    Weight as of 8/27/24: 80.7 kg (177 lb 14.6 oz).       # Asthma: noted on problem list  # History of CABG: noted on surgical history             Disposition Plan     Medically Ready for Discharge: Anticipated Tomorrow         Discharge barrier(s): placement, monitor wound and swelling  Care discussed with: patient, care mgr      Sunshine Fowler MD  Hospitalist Service  [facility]  Securely message with Veronica (more info)  Text page via [employer] Paging/Directory   ______________________________________________________________________      Physical Exam   Vital Signs: Temp: 98.3  F (36.8  C) Temp src: Oral BP: 138/65 Pulse: 77   Resp: 18 SpO2: 100 % O2 Device: None " (Room air)    Weight: 0 lbs 0 oz    General: in no apparent distress, non-toxic, and alert female lying in hospital bed oriented x3  HEENT: Head normocephalic atraumatic, oral mucosa moist. Sclerae anicteric  CV: Regular rhythm, normal rate, no murmurs  Resp: No wheezes, no rales or rhonchi, no focal consolidations  GI: Belly soft, nondistended, nontender, bowel sounds present  Skin: R lower leg wound with dressing in place with bloody drainage  Extremities:  1+ edema RLE  Psych: Normal affect, mood euthymic  Neuro: Grossly normal      Medical Decision Making               Data   Recent Results (from the past 16 hour(s))   Glucose by meter    Collection Time: 09/01/24  7:43 PM   Result Value Ref Range    GLUCOSE BY METER POCT 181 (H) 70 - 99 mg/dL   Glucose by meter    Collection Time: 09/01/24 10:44 PM   Result Value Ref Range    GLUCOSE BY METER POCT 250 (H) 70 - 99 mg/dL   Troponin T, High Sensitivity    Collection Time: 09/02/24  2:35 AM   Result Value Ref Range    Troponin T, High Sensitivity 27 (H) <=14 ng/L   Lipid panel reflex to direct LDL: Non-fasting    Collection Time: 09/02/24  2:35 AM   Result Value Ref Range    Cholesterol 176 <200 mg/dL    Triglycerides 123 <150 mg/dL    Direct Measure HDL 40 (L) >=50 mg/dL    LDL Cholesterol Calculated 111 (H) <=100 mg/dL    Non HDL Cholesterol 136 (H) <130 mg/dL   Ferritin    Collection Time: 09/02/24  2:35 AM   Result Value Ref Range    Ferritin 111 11 - 328 ng/mL   Reticulocyte count    Collection Time: 09/02/24  2:35 AM   Result Value Ref Range    % Reticulocyte 1.8 0.5 - 2.0 %    Absolute Reticulocyte 0.066 0.025 - 0.095 10e6/uL   Vitamin B12    Collection Time: 09/02/24  2:35 AM   Result Value Ref Range    Vitamin B12 357 232 - 1,245 pg/mL   Iron & Iron Binding Capacity    Collection Time: 09/02/24  2:35 AM   Result Value Ref Range    Iron 70 37 - 145 ug/dL    Iron Binding Capacity 216 (L) 240 - 430 ug/dL    Iron Sat Index 32 15 - 46 %   CBC with platelets     Collection Time: 09/02/24  2:35 AM   Result Value Ref Range    WBC Count 7.3 4.0 - 11.0 10e3/uL    RBC Count 3.62 (L) 3.80 - 5.20 10e6/uL    Hemoglobin 9.8 (L) 11.7 - 15.7 g/dL    Hematocrit 31.8 (L) 35.0 - 47.0 %    MCV 88 78 - 100 fL    MCH 27.1 26.5 - 33.0 pg    MCHC 30.8 (L) 31.5 - 36.5 g/dL    RDW 13.2 10.0 - 15.0 %    Platelet Count 282 150 - 450 10e3/uL   Basic metabolic panel    Collection Time: 09/02/24  2:35 AM   Result Value Ref Range    Sodium 137 135 - 145 mmol/L    Potassium 3.7 3.4 - 5.3 mmol/L    Chloride 106 98 - 107 mmol/L    Carbon Dioxide (CO2) 21 (L) 22 - 29 mmol/L    Anion Gap 10 7 - 15 mmol/L    Urea Nitrogen 20.3 8.0 - 23.0 mg/dL    Creatinine 0.92 0.51 - 0.95 mg/dL    GFR Estimate 71 >60 mL/min/1.73m2    Calcium 8.7 (L) 8.8 - 10.4 mg/dL    Glucose 185 (H) 70 - 99 mg/dL   Magnesium    Collection Time: 09/02/24  2:35 AM   Result Value Ref Range    Magnesium 1.8 1.7 - 2.3 mg/dL   Phosphorus    Collection Time: 09/02/24  2:35 AM   Result Value Ref Range    Phosphorus 3.5 2.5 - 4.5 mg/dL   CRP inflammation    Collection Time: 09/02/24  2:35 AM   Result Value Ref Range    CRP Inflammation 7.80 (H) <5.00 mg/L   Glucose by meter    Collection Time: 09/02/24  7:55 AM   Result Value Ref Range    GLUCOSE BY METER POCT 254 (H) 70 - 99 mg/dL       Interval History     Patient complains of right leg pain today.  She is in the hospital because she fell.  She states her right leg has been swollen.  Nurses have been noting some fluctuating weakness of patient's RUE.  Unable to get MRI last night, I am not sure this is useful regardless since last time she was in the hospital neurology did feel symptoms were consistent with stroke so this would not really change our management.  Did discuss with patient that I think given her rapid readmission, fluctuating symptoms she would benefit from a stay at rehab.  She is not sure about this but is open to starting the process of locating a facility.  Right leg is  swollen on exam, continue current antibiotics.  Not ready for discharge.

## 2024-09-02 NOTE — PROVIDER NOTIFICATION
Dr. Verdugo paged for breakthrough pain medication between PRN oxycodone per pt request.    Idalia Luo RN'

## 2024-09-02 NOTE — PROVIDER NOTIFICATION
"H.O. paged via [employer] messaging to update that pt was refusing to participate in full NIHSS due to \"just want to sleep.\"    Idalia Luo RN    "

## 2024-09-02 NOTE — PLAN OF CARE
Speech Language Pathology: Orders received. Chart reviewed and discussed with care team.? Speech Language Pathology not indicated due to MRI and CT negative, no reported changes or concerns. Patient denies any changes.? Defer discharge recommendations to medical team.? Will complete orders.

## 2024-09-02 NOTE — PROGRESS NOTES
"   09/02/24 1100   Appointment Info   Signing Clinician's Name / Credentials (PT) Idalia Soto, PT, DPT   Living Environment   People in Home alone   Current Living Arrangements apartment   Home Accessibility no concerns   Self-Care   Usual Activity Tolerance moderate   Current Activity Tolerance fair   Equipment Currently Used at Home none   Fall history within last six months yes   Number of times patient has fallen within last six months 4   Activity/Exercise/Self-Care Comment Patient reports 4 falls since discharge on 08/30.   General Information   Onset of Illness/Injury or Date of Surgery 09/01/24   Referring Physician Gondal, Saad J, MD   Patient/Family Therapy Goals Statement (PT) Patient wants home care services.   Pertinent History of Current Problem (include personal factors and/or comorbidities that impact the POC) Per chart, \"Sarah Rahman is a 59 year old female admitted on 9/1/2024. She presented to the ER with complaint of worsening weakness in the right upper and lower extremity associated with numbness in the fingers and the foot, CTA head and neck negative for acute process, MRI of the brain ordered, ED provider discussed with stroke neuro who recommended admission, MRI brain and further stroke workup, continue aspirin Plavix, patient also had a dog bite for which she is receiving clindamycin and doxycycline and wanted wound nurse to do wound packing, patient received some pain control in the ED and is going to be admitted to rule out stroke.\"   Existing Precautions/Restrictions fall   Range of Motion (ROM)   Range of Motion ROM is WFL  (Per observation)   Strength (Manual Muscle Testing)   Strength (Manual Muscle Testing) strength is WFL  (Per observation)   Bed Mobility   Bed Mobility supine-sit-supine   Supine-Sit-Supine [place] (Bed Mobility) supervision   Impairments Contributing to Impaired Bed Mobility pain   Assistive Device (Bed Mobility) bed rails   Transfers   Transfers " sit-stand transfer   Impairments Contributing to Impaired Transfers pain   Sit-Stand Transfer   Sit-Stand [place] (Transfers) supervision   Assistive Device (Sit-Stand Transfers) other (see comments)  (None)   Gait/Stairs (Locomotion)   [place] Level (Gait) unable to assess   Comment, (Gait/Stairs) Patient declined to ambulate due to pain.   Clinical Impression   Criteria for Skilled Therapeutic Intervention Yes, treatment indicated   PT Diagnosis (PT) Impaired functional mobility   Influenced by the following impairments Pain   Functional limitations due to impairments Transfers, gait   Clinical Presentation (PT Evaluation Complexity) evolving   Clinical Presentation Rationale Patient presents as medically diagnosed.   Clinical Decision Making (Complexity) moderate complexity   Planned Therapy Interventions (PT) balance training;gait training;home exercise program;patient/family education;transfer training;home program guidelines   Risk & Benefits of therapy have been explained evaluation/treatment results reviewed;care plan/treatment goals reviewed;patient   PT Total Evaluation Time   PT Rashida, Moderate Complexity Minutes (56148) 15   Physical Therapy Goals   PT Frequency 5x/week   PT Predicted Duration/Target Date for Goal Attainment 09/09/24   PT Goals Transfers;Gait   PT: Transfers Modified independent;Sit to/from stand;Assistive device  (AD if needed due to pain)   PT: Gait Modified independent;Assistive device;150 feet  (AD if needed due to pain)   PT Discharge Planning   PT Plan Trial 5x/week if patient participates (OT on hold): Transfers and gait with vs. without AD   PT Discharge Recommendation (DC Rec) Transitional Care Facility   PT Rationale for DC Rec Patient with frequent readmissions and multiple falls and would benefit from TCU for continued therapy and wound care.   PT Brief overview of current status Supervision for bed mobility and transfers. Patient said she wanted to participate in  "therapy (I really need it!\"), but then was self-limiting.   PT Equipment Needed at Discharge other (see comments)  (TBD)   Total Session Time   Total Session Time (sum of timed and untimed services) 15       "

## 2024-09-02 NOTE — PLAN OF CARE
Pt rating pain in RLE 10/10 while awake, or awakened.  Pt is sleeping soundly in between staff cares.    Pt reports no sensation in right foot, and numbness in right leg and all right digits on hand.    Pt unable to  writers hand with right hand or apply pressure with right foot but able to use hand when pt desires and ambulate to the bathroom.    NSR on telemetry  Refusing Q8H full NIHSS at 0000; MD updated and pt agreed to participate with assessment but continued to fall asleep, when attempted to keep pt awake she would state that her right leg was in extreme pain and then fall back asleep multiple times.  MD at bedside throughout assessment.    Pt removed edema net garment on RLE.  +1 edema noted.  Dressing on right lower leg stained with old drainage.  WOC consulted.    Idalia Luo RN

## 2024-09-02 NOTE — PHARMACY-CONSULT NOTE
Pharmacy Consult to evaluate for medication related stroke core measures    Sarah Rahman, 59 year old female admitted for stroke symptoms on 9/1/2024.    Thrombolytic was not given because of Clinical contraindications    VTE Prophylaxis  SCD's ordered, but patient currently refusing.    Antithrombotic: aspirin and clopidogrel started on 9/1/24, as appropriate by end of hospital day 2. Continue antithrombotic therapy on discharge to meet quality measures, unless contraindicated.    Anticoagulation if history of A-fib/flutter: Patient does not have history of A-fib/flutter - anticoagulation not required for medication related stroke core measures.     LDL Cholesterol Calculated   Date Value Ref Range Status   09/02/2024 111 (H) <=100 mg/dL Final   05/16/2010 163 (H) 0 - 129 mg/dL Final     Comment:     LDL Cholesterol is the primary guide to therapy: LDL-cholesterol goal in high   risk patients is <100 mg/dL and in very high risk patients is <70 mg/dL.     LDL Cholesterol Direct   Date Value Ref Range Status   04/22/2011 81.0 0.0 - 129.0 mg/dL Final       Patient's home statin, Lipitor (atorvastatin) restarted; continue statin on discharge to meet quality measures, unless contraindicated.     Recommendations:  As patient is refusing SCD's, consider addition of enoxaparin 40mg daily for VTE prophylaxis.    Thank you for the consult.    Viki Cano [facility] 9/2/2024 1:34 PM      Detail Level: Detailed

## 2024-09-02 NOTE — PROGRESS NOTES
At 2000 neuro assessment pt unable to squeeze with right hand or press down or lift up with right foot.  Upon getting pt up to toilet writer observed pt zipping up purse while holding zipper with right fingers and zipping purse close with left hand and when walking to the bathroom was able to stand and walk with SBA.    Idalia Luo RN

## 2024-09-02 NOTE — PROVIDER NOTIFICATION
Dr. Palacios at bedside while writer completed NIH assessment.  Pt falling asleep during assessment.    Idalia Luo RN

## 2024-09-02 NOTE — CONSULTS
Care Management Initial Consult    General Information  Assessment completed with: Patient,    Type of CM/SW Visit: Initial Assessment    Primary Care Provider verified and updated as needed: Yes   Readmission within the last 30 days:        Reason for Consult: discharge planning  Advance Care Planning:            Communication Assessment  Patient's communication style: spoken language (English or Bilingual)             Cognitive  Cognitive/Neuro/Behavioral: WDL                      Living Environment:   People in home: alone     Current living Arrangements: apartment      Able to return to prior arrangements:         Family/Social Support:  Care provided by: self, other (see comments) (PCA)  Provides care for: no one, pet(s)  Marital Status: Single  Support system: Children, PCA, Friend          Description of Support System: Supportive, Involved    Support Assessment: Lacks adequate physical care    Current Resources:   Patient receiving home care services: No        Community Resources: PCA  Equipment currently used at home: walker, standard (Simultaneous filing. User may not have seen previous data.)  Supplies currently used at home: Diabetic Supplies    Employment/Financial:  Employment Status: disabled        Financial Concerns: none   Referral to Financial Worker: No       Does the patient's insurance plan have a 3 day qualifying hospital stay waiver?  Yes     Which insurance plan 3 day waiver is available? Alternative insurance waiver    Will the waiver be used for post-acute placement? Undetermined at this time    Lifestyle & Psychosocial Needs:  Social Determinants of Health     Food Insecurity: Low Risk  (8/27/2024)    Food Insecurity     Within the past 12 months, did you worry that your food would run out before you got money to buy more?: No     Within the past 12 months, did the food you bought just not last and you didn t have money to get more?: No   Depression: Not at risk (1/30/2024)    PHQ-2      PHQ-2 Score: 0   Housing Stability: Low Risk  (8/27/2024)    Housing Stability     Do you have housing? : Yes     Are you worried about losing your housing?: No   Tobacco Use: High Risk (9/1/2024)    Patient History     Smoking Tobacco Use: Every Day     Smokeless Tobacco Use: Never     Passive Exposure: Not on file   Financial Resource Strain: Low Risk  (8/27/2024)    Financial Resource Strain     Within the past 12 months, have you or your family members you live with been unable to get utilities (heat, electricity) when it was really needed?: No   Alcohol Use: Not At Risk (3/30/2021)    [facility]    Alcohol Use     Total Audit-C Score: Not on file   Transportation Needs: Low Risk  (8/27/2024)    Transportation Needs     Within the past 12 months, has lack of transportation kept you from medical appointments, getting your medicines, non-medical meetings or appointments, work, or from getting things that you need?: No   Physical Activity: Not on File (12/4/2021)    Received from [facility]    Physical Activity     Physical Activity: 0   Interpersonal Safety: Low Risk  (8/27/2024)    Interpersonal Safety     Do you feel physically and emotionally safe where you currently live?: Yes     Within the past 12 months, have you been hit, slapped, kicked or otherwise physically hurt by someone?: No     Within the past 12 months, have you been humiliated or emotionally abused in other ways by your partner or ex-partner?: No   Stress: Not on File (12/4/2021)    Received from [facility]    Stress     Stress: 0   Social Connections: Unknown (12/22/2021)    Received from [facility], [facility]    Social Connections     Frequency of Communication with Friends and Family: Not on file   Health Literacy: Not on file       Functional Status:  Prior to admission patient needed assistance:   Dependent ADLs::  Independent  Dependent IADLs:: Cleaning, Cooking, Laundry, Shopping, Transportation, Meal Preparation, Medication Management       Mental Health Status:  Mental Health Status: Past Concern       Chemical Dependency Status:  Chemical Dependency Status: No Current Concerns             Values/Beliefs:  Spiritual, Cultural Beliefs, Hinduism Practices, Values that affect care: no               Discussed  Partnership in Safe Discharge Planning  document with patient/family: No    Additional Information:  CM consulted for Initial CM Assessment and assistance with discharge planning.     MD stopped by CM Office to discuss Pt's continued readmissions. MD informed CM that they anticipate Pt needing placement at discharge and discussed the need for placement with Pt. SW reported CM can meet with Pt to discuss TCU when completing the initial CM assessment.    10:20 AM  Initial CM Assessment completed with Pt. Pt lives alone in an apartment. Pt reports that she is independent with ADLs. Pt states she has a PCA who visits daily for 9 hours.day. Pt reports that she is on SSDI. Pt reports she has a services dog named Judy. Pt denies any DME or home care services that she uses. Transportation plan at discharge TBD.     YANIV discussed TCU options for discharge. SW dropped off a TCU list for Pt to review. Therapy is scheduled to complete evaluations this afternoon. SW reported CM will follow-up with Pt tomorrow to further discuss recommendations and get TCU preferences. Pt reported understanding.       Next Steps: CM to follow up on therapy recommendations for discharge planning. CM to follow up with Pt for TCU choices.       PATRICIA Chew

## 2024-09-02 NOTE — PLAN OF CARE
Problem: Stroke, Ischemic (Includes Transient Ischemic Attack)  Goal: Improved Sensorimotor Function  Outcome: Progressing     Problem: Stroke, Ischemic (Includes Transient Ischemic Attack)  Goal: Optimal Functional Ability  Intervention: Optimize Functional Ability  Recent Flowsheet Documentation  Taken 9/2/2024 1300 by Komal Calhoun RN  Activity Management: ambulated to bathroom  Taken 9/2/2024 0754 by Komal Calhoun RN  Activity Management: ambulated to bathroom     Problem: Adult Inpatient Plan of Care  Goal: Plan of Care Review  Description: The Plan of Care Review/Shift note should be completed every shift.  The Outcome Evaluation is a brief statement about your assessment that the patient is improving, declining, or no change.  This information will be displayed automatically on your shift  note.  9/2/2024 1457 by Komal Calhoun RN  Outcome: Progressing  Flowsheets (Taken 9/2/2024 1457)  Plan of Care Reviewed With: patient  Overall Patient Progress: improving  9/2/2024 1454 by Komal Calhoun RN  Outcome: Not Progressing  Flowsheets (Taken 9/2/2024 1454)  Plan of Care Reviewed With:   patient   spouse   sibling   child  Overall Patient Progress: improving   Goal Outcome Evaluation:      Plan of Care Reviewed With: patient    Overall Patient Progress: improvingOverall Patient Progress: improving     Alert and oriented. Pt continues to have numbness in right fingers, hands and lower forearm, foot and lower leg. Is able to move it off the bed. Sleeping most of shift. States her pain is 10/10 in her right leg. Is requesting IV Dilaudid with Percocet. Oxycodone and Tylenol given as ordered. Good appetite. Wound culture and dressing change done. Tele NSR. Up with assist of one.

## 2024-09-03 ENCOUNTER — PATIENT OUTREACH (OUTPATIENT)
Dept: CARE COORDINATION | Facility: CLINIC | Age: 59
End: 2024-09-03
Payer: MEDICAID

## 2024-09-03 LAB
ERYTHROCYTE [DISTWIDTH] IN BLOOD BY AUTOMATED COUNT: 13.2 % (ref 10–15)
GLUCOSE BLDC GLUCOMTR-MCNC: 119 MG/DL (ref 70–99)
GLUCOSE BLDC GLUCOMTR-MCNC: 149 MG/DL (ref 70–99)
GLUCOSE BLDC GLUCOMTR-MCNC: 217 MG/DL (ref 70–99)
GLUCOSE BLDC GLUCOMTR-MCNC: 56 MG/DL (ref 70–99)
GLUCOSE BLDC GLUCOMTR-MCNC: 63 MG/DL (ref 70–99)
GLUCOSE BLDC GLUCOMTR-MCNC: 93 MG/DL (ref 70–99)
HCT VFR BLD AUTO: 30.9 % (ref 35–47)
HGB BLD-MCNC: 9.4 G/DL (ref 11.7–15.7)
MCH RBC QN AUTO: 26.8 PG (ref 26.5–33)
MCHC RBC AUTO-ENTMCNC: 30.4 G/DL (ref 31.5–36.5)
MCV RBC AUTO: 88 FL (ref 78–100)
PLATELET # BLD AUTO: 278 10E3/UL (ref 150–450)
RBC # BLD AUTO: 3.51 10E6/UL (ref 3.8–5.2)
WBC # BLD AUTO: 7.5 10E3/UL (ref 4–11)

## 2024-09-03 PROCEDURE — 36415 COLL VENOUS BLD VENIPUNCTURE: CPT | Performed by: HOSPITALIST

## 2024-09-03 PROCEDURE — 250N000013 HC RX MED GY IP 250 OP 250 PS 637: Performed by: STUDENT IN AN ORGANIZED HEALTH CARE EDUCATION/TRAINING PROGRAM

## 2024-09-03 PROCEDURE — 999N000197 HC STATISTIC WOC PT EDUCATION, 0-15 MIN

## 2024-09-03 PROCEDURE — 120N000001 HC R&B MED SURG/OB

## 2024-09-03 PROCEDURE — 99207 PR NO CHARGE LOS: CPT | Performed by: PSYCHIATRY & NEUROLOGY

## 2024-09-03 PROCEDURE — 250N000011 HC RX IP 250 OP 636: Performed by: INTERNAL MEDICINE

## 2024-09-03 PROCEDURE — 99232 SBSQ HOSP IP/OBS MODERATE 35: CPT | Performed by: HOSPITALIST

## 2024-09-03 PROCEDURE — 85027 COMPLETE CBC AUTOMATED: CPT | Performed by: HOSPITALIST

## 2024-09-03 RX ORDER — HYDROMORPHONE HCL IN WATER/PF 6 MG/30 ML
0.2 PATIENT CONTROLLED ANALGESIA SYRINGE INTRAVENOUS EVERY 4 HOURS PRN
Status: DISCONTINUED | OUTPATIENT
Start: 2024-09-03 | End: 2024-09-05 | Stop reason: HOSPADM

## 2024-09-03 RX ADMIN — GABAPENTIN 200 MG: 100 CAPSULE ORAL at 14:01

## 2024-09-03 RX ADMIN — ACETAMINOPHEN 1000 MG: 500 TABLET ORAL at 21:47

## 2024-09-03 RX ADMIN — CLINDAMYCIN HYDROCHLORIDE 300 MG: 150 CAPSULE ORAL at 07:59

## 2024-09-03 RX ADMIN — CLOPIDOGREL BISULFATE 75 MG: 75 TABLET ORAL at 07:57

## 2024-09-03 RX ADMIN — GABAPENTIN 200 MG: 100 CAPSULE ORAL at 21:36

## 2024-09-03 RX ADMIN — ATORVASTATIN CALCIUM 40 MG: 40 TABLET, FILM COATED ORAL at 21:36

## 2024-09-03 RX ADMIN — GABAPENTIN 200 MG: 100 CAPSULE ORAL at 07:57

## 2024-09-03 RX ADMIN — INSULIN ASPART 1 UNITS: 100 INJECTION, SOLUTION INTRAVENOUS; SUBCUTANEOUS at 17:19

## 2024-09-03 RX ADMIN — DULOXETINE HYDROCHLORIDE 60 MG: 60 CAPSULE, DELAYED RELEASE PELLETS ORAL at 21:35

## 2024-09-03 RX ADMIN — TOPIRAMATE 100 MG: 100 TABLET, FILM COATED ORAL at 21:36

## 2024-09-03 RX ADMIN — OXYCODONE HYDROCHLORIDE 5 MG: 5 TABLET ORAL at 17:47

## 2024-09-03 RX ADMIN — PANTOPRAZOLE SODIUM 40 MG: 40 TABLET, DELAYED RELEASE ORAL at 07:17

## 2024-09-03 RX ADMIN — OXYCODONE HYDROCHLORIDE 5 MG: 5 TABLET ORAL at 07:58

## 2024-09-03 RX ADMIN — TOPIRAMATE 100 MG: 100 TABLET, FILM COATED ORAL at 07:58

## 2024-09-03 RX ADMIN — DOXYCYCLINE 100 MG: 100 CAPSULE ORAL at 19:11

## 2024-09-03 RX ADMIN — DULOXETINE HYDROCHLORIDE 60 MG: 60 CAPSULE, DELAYED RELEASE PELLETS ORAL at 07:57

## 2024-09-03 RX ADMIN — OXYCODONE HYDROCHLORIDE 5 MG: 5 TABLET ORAL at 14:01

## 2024-09-03 RX ADMIN — CLINDAMYCIN HYDROCHLORIDE 300 MG: 150 CAPSULE ORAL at 14:01

## 2024-09-03 RX ADMIN — ASPIRIN 81 MG: 81 TABLET, COATED ORAL at 07:57

## 2024-09-03 RX ADMIN — METHOCARBAMOL 750 MG: 750 TABLET ORAL at 21:36

## 2024-09-03 RX ADMIN — DIPHENHYDRAMINE HYDROCHLORIDE 25 MG: 25 CAPSULE ORAL at 00:39

## 2024-09-03 RX ADMIN — HYDROMORPHONE HYDROCHLORIDE 0.2 MG: 0.2 INJECTION, SOLUTION INTRAMUSCULAR; INTRAVENOUS; SUBCUTANEOUS at 22:01

## 2024-09-03 RX ADMIN — CLINDAMYCIN HYDROCHLORIDE 300 MG: 150 CAPSULE ORAL at 21:39

## 2024-09-03 RX ADMIN — DOXYCYCLINE 100 MG: 100 CAPSULE ORAL at 07:17

## 2024-09-03 RX ADMIN — OXYCODONE HYDROCHLORIDE 5 MG: 5 TABLET ORAL at 00:36

## 2024-09-03 RX ADMIN — METHOCARBAMOL 750 MG: 750 TABLET ORAL at 07:58

## 2024-09-03 ASSESSMENT — ACTIVITIES OF DAILY LIVING (ADL)
ADLS_ACUITY_SCORE: 38
TOILETING_ISSUES: NO
FALL_HISTORY_WITHIN_LAST_SIX_MONTHS: YES
DOING_ERRANDS_INDEPENDENTLY_DIFFICULTY: YES
CONCENTRATING,_REMEMBERING_OR_MAKING_DECISIONS_DIFFICULTY: NO
ADLS_ACUITY_SCORE: 38
ADLS_ACUITY_SCORE: 40
WALKING_OR_CLIMBING_STAIRS_DIFFICULTY: YES
ADLS_ACUITY_SCORE: 29
ADLS_ACUITY_SCORE: 38
ADLS_ACUITY_SCORE: 40
ADLS_ACUITY_SCORE: 29
ADLS_ACUITY_SCORE: 40
ADLS_ACUITY_SCORE: 38
ADLS_ACUITY_SCORE: 38
VISION_MANAGEMENT: EYEGLASSES
ADLS_ACUITY_SCORE: 38
ADLS_ACUITY_SCORE: 29
ADLS_ACUITY_SCORE: 38
WALKING_OR_CLIMBING_STAIRS: AMBULATION DIFFICULTY, ASSISTANCE 1 PERSON;AMBULATION DIFFICULTY, REQUIRES EQUIPMENT
ADLS_ACUITY_SCORE: 40
HEARING_DIFFICULTY_OR_DEAF: NO
ADLS_ACUITY_SCORE: 40
ADLS_ACUITY_SCORE: 38
DRESSING/BATHING_DIFFICULTY: NO
NUMBER_OF_TIMES_PATIENT_HAS_FALLEN_WITHIN_LAST_SIX_MONTHS: 2
DIFFICULTY_EATING/SWALLOWING: NO
ADLS_ACUITY_SCORE: 40
ADLS_ACUITY_SCORE: 40
ADLS_ACUITY_SCORE: 29
CHANGE_IN_FUNCTIONAL_STATUS_SINCE_ONSET_OF_CURRENT_ILLNESS/INJURY: NO
WEAR_GLASSES_OR_BLIND: YES
DIFFICULTY_COMMUNICATING: NO
ADLS_ACUITY_SCORE: 38
ADLS_ACUITY_SCORE: 38

## 2024-09-03 NOTE — PLAN OF CARE
Problem: Stroke, Ischemic (Includes Transient Ischemic Attack)  Goal: Optimal Coping  Outcome: Progressing       Problem: Stroke, Ischemic (Includes Transient Ischemic Attack)  Goal: Optimal Cognitive Function  Outcome: Progressing  Pt A/Ox4, able to communicate needs, speech clear. BP was a little elevated, O2 sats WNL. Pt complaint of pain 10/10i n her R Leg, itching in her body, PRN  oxycodone and benadryl was administered at 0030, no s/s of SOB noted. NIHS 5. NSR on Tele.

## 2024-09-03 NOTE — CONSULTS
Brief Neurology Note:    Chart reviewed. MRI attempted but not completed. I reviewed the notes by my colleague, Dr. Wadsworth (as recent as 8/30). I agree that there is not more to be done from a neurology standpoint in hospital. Patient should follow up with a spine specialist as outpatient.    In terms of stroke work-up, again MRI non-diagnostic. Echo is negative for PFO.    I think it would be reasonable to continue DAPT for 21 days as previously planned, followed by Plavix monotherapy and set up 30 day cardiac event monitor upon discharge.    Avril Son MD

## 2024-09-03 NOTE — PROGRESS NOTES
[facility]    Medicine Progress Note - Hospitalist Service    Date of Admission:  9/1/2024    Assessment & Plan                Sarah Rahman is a 59 year old female with diabetes mellitus type II, coronary artery disease s/p coronary bypass, hypertension, asthma, COPD, hyperlipidemia, anxiety, prior CVA, basilar artery stenosis, and schizoaffective disorder, frequent ED visits and hospitalizations for R sided weakness, hospitalized 8/26-8/30 for dog bite on R leg also had R sided weakness consistent with stroke, here after a fall. Hospital Day: 3    #Fall  #Recent ?R sided stroke  #Years of fluctuating R sided weakness  -has had years of fluctuating weakness on R side attributed to cerebrovascular disease. History of left carotid endarterectomy and has known basilar artery stenosis  -last admission had recurrent R sided weakness, seen by Neuro, CT and CTA negative, MRI was recommended but could not be completed, recommended treat empirically as stroke  -Started on DAPT x3 weeks then Plavix alone  -Patient is here now with a fall and complaint of R sided weakness. I think she likely needs TCU. She is dismissive of this and states has service dog and PCA at home. But obviously she has already failed this level of care and is now readmitted. I think she should go to TCU this time.  -MRI was attempted again but again could not be completed  -Care mgr consulted for placement  -continue home statin, ASA, Plavix  -Neurology consulted but agree nothing new going on. Neurology signed off  -supposed to be on 30 day cardiac monitor    #R lower leg dog bite  -Her sister's dog bit her  -had bedside I&D on 8/28 without overt purulence expressed  -Has been on clinda and doxy, continue, course through 9/6 planned. Would assess edema and pain at time of discharge and see if this seems long enough  -Leg edematous on admission. Improved today without much intervention. Was she really doing wound cares at  "home?  -wound culture sent  -continue wound cares. WOC consulted    #Diabetes mellitus insulin-dependent  - Poorly controlled with hyperglycemia  - HbA1c 13.1  - Home regimen; Lantus 35-40u bid, Trulicity weekly  - decreased Lantus 30u bid due to hypoglycemia 8/28  - had hypoglycemia in previous hospital stay. Had another low BG 9/3. Will change Lantus to 36 units once daily.  - continue current sliding scale insulin and sliding scale     #Hyperlipidemia  - continue Atorvastatin 40mg     #GERD  -Continue PPI    # Neuropathy, home Cymbalta and gabapentin.  Nabumetone is maybe not a great med for her with recent stroke. Will hold and she should discuss further with her PCP.  # Anxiety, home Ativan, Topamax  # Muscle spasms, home Robaxin          Diet: Regular Diet Adult    DVT Prophylaxis: Moderate risk.   Pneumatic Compression Devices  Diehl Catheter: Not present  Lines: None     Cardiac Monitoring: ACTIVE order. Indication: Stroke, acute (48 hours)  Code Status: Full Code      Clinically Significant Risk Factors                  # Hypertension: Noted on problem list          # DMII: A1C = 13.1 % (Ref range: <5.7 %) within past 6 months, PRESENT ON ADMISSION  # Overweight: Estimated body mass index is 27.86 kg/m  as calculated from the following:    Height as of 8/26/24: 1.702 m (5' 7\").    Weight as of 8/27/24: 80.7 kg (177 lb 14.6 oz)., PRESENT ON ADMISSION     # Financial/Environmental Concerns: none  # Asthma: noted on problem list    # History of CABG: noted on surgical history             Disposition Plan     Medically Ready for Discharge: Ready Now         Discharge barrier(s): placement  Care discussed with: patient      Sunshine Fowler MD  Hospitalist Service  [facility]  Securely message with Ver[employer] (more info)  Text page via [employer] Paging/Directory   ______________________________________________________________________      Physical Exam   Vital Signs: Temp: 98  F (36.7  C) Temp src: " Oral BP: 133/71 Pulse: 70   Resp: 18 SpO2: 100 % O2 Device: None (Room air)    Weight: 0 lbs 0 oz    General: in no apparent distress, non-toxic, and alert female lying in hospital bed oriented x3  HEENT: Head normocephalic atraumatic, oral mucosa moist. Sclerae anicteric  Skin: R lower leg wound with dressing in place with bloody drainage  Extremities:  trace edema RLE , much improved  Psych: Normal affect, mood euthymic  Neuro: Grossly normal      Medical Decision Making               Data   Recent Results (from the past 16 hour(s))   CBC with platelets    Collection Time: 09/03/24  7:47 AM   Result Value Ref Range    WBC Count 7.5 4.0 - 11.0 10e3/uL    RBC Count 3.51 (L) 3.80 - 5.20 10e6/uL    Hemoglobin 9.4 (L) 11.7 - 15.7 g/dL    Hematocrit 30.9 (L) 35.0 - 47.0 %    MCV 88 78 - 100 fL    MCH 26.8 26.5 - 33.0 pg    MCHC 30.4 (L) 31.5 - 36.5 g/dL    RDW 13.2 10.0 - 15.0 %    Platelet Count 278 150 - 450 10e3/uL   Glucose by meter    Collection Time: 09/03/24  7:47 AM   Result Value Ref Range    GLUCOSE BY METER POCT 56 (L) 70 - 99 mg/dL   Glucose by meter    Collection Time: 09/03/24  8:05 AM   Result Value Ref Range    GLUCOSE BY METER POCT 63 (L) 70 - 99 mg/dL   Glucose by meter    Collection Time: 09/03/24  8:44 AM   Result Value Ref Range    GLUCOSE BY METER POCT 93 70 - 99 mg/dL   Glucose by meter    Collection Time: 09/03/24 12:05 PM   Result Value Ref Range    GLUCOSE BY METER POCT 119 (H) 70 - 99 mg/dL       Interval History     Patient doing the same today.  She asked me why she is weak on her right side and we did discuss that we think she has had a stroke.  Rehabilitation is recommended, patient indicated she is open to this.  Right leg improved.  Wonder if she was doing adequate wound care at home.  Medically stable for discharge when placement can be found.

## 2024-09-03 NOTE — PROGRESS NOTES
"ND notified about patient refusing MRI stating, \"There's something in my hair but there's nothing in my hair, and my daughter cannot come early enough from work\" . Patient also added that patient will still refuse MRI even after daughter comes.   "

## 2024-09-03 NOTE — PLAN OF CARE
Problem: Comorbidity Management  Goal: Blood Pressure in Desired Range  Outcome: Progressing     Problem: Stroke, Ischemic (Includes Transient Ischemic Attack)  Goal: Optimal Functional Ability  Outcome: Progressing  Intervention: Optimize Functional Ability  Recent Flowsheet Documentation  Taken 9/3/2024 0925 by Catarina Isaac, RN  Activity Management: ambulated to bathroom  Taken 9/3/2024 0811 by Catarina Isaac, RN  Activity Management: ambulated to bathroom  Goal: Optimal Nutrition Intake  Outcome: Progressing   Goal Outcome Evaluation:       Patient alert and oriented x 4. No acute changes noted. Rating RUE and RLE pain 10/10. PRN and scheduled pain medications given. Pain came down to 7/10 upon reassessment. NSR on telemetry. SBA but independent at times.

## 2024-09-03 NOTE — PROGRESS NOTES
Clinic Care Coordination Contact  Patient referred to Care Coordination by in-patient care Coordinator. Patient currently in-patient [facility] with likely discharge to TCU. Writer will continue to discharge from [facility] and will follow patient through TCU stay. Writer will additionally contact patient when discharged back to her home to ensure needs and concerns are being addressed.

## 2024-09-03 NOTE — PROGRESS NOTES
Care Management Follow Up    Length of Stay (days): 2    Expected Discharge Date: 09/04/2024     Concerns to be Addressed: discharge planning     Patient plan of care discussed at interdisciplinary rounds: Yes    Anticipated Discharge Disposition: Transitional Care       Anticipated Discharge Services: None  Anticipated Discharge DME:      Patient/family educated on Medicare website which has current facility and service quality ratings: yes  Education Provided on the Discharge Plan: Yes  Patient/Family in Agreement with the Plan: yes    Referrals Placed by CM/SW: Post Acute Facilities  Private pay costs discussed: Not applicable    Discussed  Partnership in Safe Discharge Planning  document with patient/family: No     Handoff Completed: not indicated at this time    Additional Information:  Therapy is recommending TCU. CM dropped off a TCU list for Pt to review     12:48 PM  SW met and spoke with Pt to follow up on TCU preferences. Pt reported that she does not know what facility she would want to go to. SW offered to send TCU referrals to facilities near her home. Pt was agreeable with this.     TCU referrals sent.     Next Steps: CM to follow up pending TCU referrals for placement.      PATRICIA Chew

## 2024-09-04 LAB
ATRIAL RATE - MUSE: 73 BPM
DIASTOLIC BLOOD PRESSURE - MUSE: NORMAL MMHG
GLUCOSE BLDC GLUCOMTR-MCNC: 126 MG/DL (ref 70–99)
GLUCOSE BLDC GLUCOMTR-MCNC: 210 MG/DL (ref 70–99)
GLUCOSE BLDC GLUCOMTR-MCNC: 246 MG/DL (ref 70–99)
GLUCOSE BLDC GLUCOMTR-MCNC: 89 MG/DL (ref 70–99)
GLUCOSE BLDC GLUCOMTR-MCNC: 92 MG/DL (ref 70–99)
INTERPRETATION ECG - MUSE: NORMAL
P AXIS - MUSE: 54 DEGREES
PR INTERVAL - MUSE: 186 MS
QRS DURATION - MUSE: 84 MS
QT - MUSE: 420 MS
QTC - MUSE: 462 MS
R AXIS - MUSE: 2 DEGREES
SYSTOLIC BLOOD PRESSURE - MUSE: NORMAL MMHG
T AXIS - MUSE: 82 DEGREES
VENTRICULAR RATE- MUSE: 73 BPM

## 2024-09-04 PROCEDURE — 250N000011 HC RX IP 250 OP 636: Performed by: INTERNAL MEDICINE

## 2024-09-04 PROCEDURE — 120N000001 HC R&B MED SURG/OB

## 2024-09-04 PROCEDURE — 99232 SBSQ HOSP IP/OBS MODERATE 35: CPT | Performed by: INTERNAL MEDICINE

## 2024-09-04 PROCEDURE — 250N000013 HC RX MED GY IP 250 OP 250 PS 637: Performed by: STUDENT IN AN ORGANIZED HEALTH CARE EDUCATION/TRAINING PROGRAM

## 2024-09-04 PROCEDURE — G0463 HOSPITAL OUTPT CLINIC VISIT: HCPCS

## 2024-09-04 RX ADMIN — DOXYCYCLINE 100 MG: 100 CAPSULE ORAL at 18:12

## 2024-09-04 RX ADMIN — HYDROMORPHONE HYDROCHLORIDE 0.2 MG: 0.2 INJECTION, SOLUTION INTRAMUSCULAR; INTRAVENOUS; SUBCUTANEOUS at 21:06

## 2024-09-04 RX ADMIN — GABAPENTIN 200 MG: 100 CAPSULE ORAL at 08:03

## 2024-09-04 RX ADMIN — GABAPENTIN 200 MG: 100 CAPSULE ORAL at 20:52

## 2024-09-04 RX ADMIN — ATORVASTATIN CALCIUM 40 MG: 40 TABLET, FILM COATED ORAL at 20:52

## 2024-09-04 RX ADMIN — DOXYCYCLINE 100 MG: 100 CAPSULE ORAL at 06:36

## 2024-09-04 RX ADMIN — DULOXETINE HYDROCHLORIDE 60 MG: 60 CAPSULE, DELAYED RELEASE PELLETS ORAL at 08:17

## 2024-09-04 RX ADMIN — CLINDAMYCIN HYDROCHLORIDE 300 MG: 150 CAPSULE ORAL at 14:15

## 2024-09-04 RX ADMIN — OXYCODONE HYDROCHLORIDE 5 MG: 5 TABLET ORAL at 17:18

## 2024-09-04 RX ADMIN — CLINDAMYCIN HYDROCHLORIDE 300 MG: 150 CAPSULE ORAL at 20:53

## 2024-09-04 RX ADMIN — METHOCARBAMOL 750 MG: 750 TABLET ORAL at 20:53

## 2024-09-04 RX ADMIN — PANTOPRAZOLE SODIUM 40 MG: 40 TABLET, DELAYED RELEASE ORAL at 06:36

## 2024-09-04 RX ADMIN — TOPIRAMATE 100 MG: 100 TABLET, FILM COATED ORAL at 20:52

## 2024-09-04 RX ADMIN — OXYCODONE HYDROCHLORIDE 5 MG: 5 TABLET ORAL at 03:51

## 2024-09-04 RX ADMIN — DULOXETINE HYDROCHLORIDE 60 MG: 60 CAPSULE, DELAYED RELEASE PELLETS ORAL at 20:52

## 2024-09-04 RX ADMIN — INSULIN ASPART 3 UNITS: 100 INJECTION, SOLUTION INTRAVENOUS; SUBCUTANEOUS at 17:15

## 2024-09-04 RX ADMIN — ASPIRIN 81 MG: 81 TABLET, COATED ORAL at 08:05

## 2024-09-04 RX ADMIN — OXYCODONE HYDROCHLORIDE 5 MG: 5 TABLET ORAL at 08:04

## 2024-09-04 RX ADMIN — CLINDAMYCIN HYDROCHLORIDE 300 MG: 150 CAPSULE ORAL at 08:05

## 2024-09-04 RX ADMIN — METHOCARBAMOL 750 MG: 750 TABLET ORAL at 08:05

## 2024-09-04 RX ADMIN — TOPIRAMATE 100 MG: 100 TABLET, FILM COATED ORAL at 08:05

## 2024-09-04 RX ADMIN — GABAPENTIN 200 MG: 100 CAPSULE ORAL at 14:15

## 2024-09-04 RX ADMIN — CLOPIDOGREL BISULFATE 75 MG: 75 TABLET ORAL at 08:05

## 2024-09-04 ASSESSMENT — ACTIVITIES OF DAILY LIVING (ADL)
ADLS_ACUITY_SCORE: 29
ADLS_ACUITY_SCORE: 27
ADLS_ACUITY_SCORE: 29
ADLS_ACUITY_SCORE: 27
ADLS_ACUITY_SCORE: 27
ADLS_ACUITY_SCORE: 29
ADLS_ACUITY_SCORE: 28
ADLS_ACUITY_SCORE: 29
ADLS_ACUITY_SCORE: 28

## 2024-09-04 NOTE — PROGRESS NOTES
Care Management Follow Up    Length of Stay (days): 3    Expected Discharge Date: 09/04/2024     Concerns to be Addressed: discharge planning     Patient plan of care discussed at interdisciplinary rounds: Yes    Anticipated Discharge Disposition: Home, Home Care     Anticipated Discharge Services: Home Care  Anticipated Discharge DME:      Patient/family educated on Medicare website which has current facility and service quality ratings: yes  Education Provided on the Discharge Plan: Yes  Patient/Family in Agreement with the Plan: yes    Referrals Placed by CM/SW: Homecare  Private pay costs discussed: Not applicable    Discussed  Partnership in Safe Discharge Planning  document with patient/family: No     Handoff Completed: Not indicated at this time    Additional Information:  Therapy is recommending TCU placement. TCU referrals currently pending. Pt given IV dilaudid late last night.    10:06 AM  YANIV called and spoke to Abigail with [facility] about Pt's pending TCU referral. Abigail reported that she will review Pt's referral and follow-up through the Epic In-Basket.     YANIV called [facility] and spoke to May who reported that they do not have any TCU beds until tomorrow but she will work on reviewing Pt's referral.     10:38 AM  [facility] can accept Pt to TCU tomorrow.     11:46 AM  SW met with Pt and spoke with her about discharge planning. Pt is now declining TCU and wants to go home with home care. Home care referrals pending.     2:37 PM  SW called and spoke to [facility] and [facility] due to inability to staff. Additional home care referrals sent.    3:55 PM  [facility] as [facility] had [facility] due to payer capacity. MD updated regarding changes with discharge plans.     Next Steps: CM to follow up pending home care referrals for discharge.       PATRICIA Chew

## 2024-09-04 NOTE — PLAN OF CARE
Problem: Adult Inpatient Plan of Care  Goal: Readiness for Transition of Care  Outcome: Progressing  Intervention: Mutually Develop Transition Plan  Recent Flowsheet Documentation  Taken 9/3/2024 1700 by Catarina Isaac RN  Equipment Currently Used at Home:   grab bar, toilet   grab bar, tub/shower   glucometer     Problem: Comorbidity Management  Goal: Blood Pressure in Desired Range  9/3/2024 1931 by Catarina Isaac, RN  Outcome: Progressing  9/3/2024 1429 by Catarina Isaac RN  Outcome: Progressing     Problem: Stroke, Ischemic (Includes Transient Ischemic Attack)  Goal: Optimal Cognitive Function  Outcome: Progressing  Goal: Improved Communication Skills  Outcome: Progressing  Goal: Optimal Functional Ability  Outcome: Progressing  Intervention: Optimize Functional Ability  Recent Flowsheet Documentation  Taken 9/3/2024 0925 by Catarina Isaac RN  Activity Management: ambulated to bathroom  Taken 9/3/2024 0811 by Catarina Isaac RN  Activity Management: ambulated to bathroom  Goal: Optimal Nutrition Intake  Outcome: Progressing   Goal Outcome Evaluation:       No acute changes. Neuro intact. Scored 5 on NIHSS. Wound dressing changed per orders. Oxycodone given for right leg pain rated 10/10. Pain down to 8/10 upon reassessment.

## 2024-09-04 NOTE — PLAN OF CARE
Problem: Adult Inpatient Plan of Care  Goal: Optimal Comfort and Wellbeing  9/4/2024 1758 by Catarina Isaac RN  Outcome: Progressing  9/4/2024 1451 by Catarina Isaac RN  Outcome: Progressing  Intervention: Monitor Pain and Promote Comfort  Recent Flowsheet Documentation  Taken 9/4/2024 1611 by Catarina Isaac RN  Pain Management Interventions:   emotional support   medication offered but refused   rest  Taken 9/4/2024 0817 by Catarina Isaac RN  Pain Management Interventions:   medication (see MAR)   distraction   emotional support  Taken 9/4/2024 0804 by Catarina Isaac RN  Pain Management Interventions:   medication (see MAR)   distraction   emotional support     Problem: Comorbidity Management  Goal: Maintenance of Seizure Control  Outcome: Progressing  Intervention: Maintain Seizure Symptom Control  Recent Flowsheet Documentation  Taken 9/4/2024 1616 by Catarina Isaac RN  Medication Review/Management: medications reviewed  Taken 9/4/2024 1154 by Catarina Isaac RN  Medication Review/Management: medications reviewed  Taken 9/4/2024 0803 by Catarina Isaac RN  Medication Review/Management: medications reviewed  Goal: Blood Glucose Levels Within Targeted Range  Outcome: Progressing  Intervention: Monitor and Manage Glycemia  Recent Flowsheet Documentation  Taken 9/4/2024 1616 by Catarina Isaac RN  Medication Review/Management: medications reviewed     Problem: Stroke, Ischemic (Includes Transient Ischemic Attack)  Goal: Optimal Cognitive Function  9/4/2024 1757 by Catarina Isaac RN  Outcome: Progressing  9/4/2024 1451 by Catarina Isaac RN  Outcome: Progressing  Goal: Improved Communication Skills  Outcome: Progressing  Goal: Optimal Nutrition Intake  Outcome: Progressing  Goal: Improved Sensorimotor Function  Outcome: Progressing   Goal Outcome Evaluation:       NO acute changes. Blood sugar covered per sliding scale. Scored 3 on NIHSS. Oxycodone given for RLE pain. Will monitor.

## 2024-09-04 NOTE — PROVIDER NOTIFICATION
MD notified about patient's blood sugar of 56. Patient denied symptoms. Blood sugar was 63 after patient had pepsi (patient's reference). Blood sugar up to 93 after having some breakfast.

## 2024-09-04 NOTE — PLAN OF CARE
Problem: Comorbidity Management  Goal: Blood Pressure in Desired Range  Outcome: Adequate for Care Transition     Problem: Stroke, Ischemic (Includes Transient Ischemic Attack)  Goal: Optimal Functional Ability  Outcome: Adequate for Care Transition   Goal Outcome Evaluation:  Sarah A&Ox4. Room air. NSR on telemetry. Independent in room. At start of shift pt c/o 10/10 pain. Requested twice to notify the doctor. One time IV dilaudid ordered and given with good effect. Pt appeared to sleep well. Oxycodone given around 0400 for 7/10 pain. Pain is to RLE wound. Mild edema to RLE. No significant events this shift.

## 2024-09-04 NOTE — PROGRESS NOTES
[facility]    Medicine Progress Note - Hospitalist Service    Date of Admission:  9/1/2024    Assessment & Plan     Sarah Rahman is a 59 year old female with diabetes mellitus type II, coronary artery disease s/p coronary bypass, hypertension, asthma, COPD, hyperlipidemia, anxiety, prior CVA, basilar artery stenosis, and schizoaffective disorder, frequent ED visits and hospitalizations for R sided weakness, hospitalized 8/26-8/30 for dog bite on R leg also had R sided weakness consistent with stroke, here after a fall.      #Fall  #Recent ?R sided stroke  #R sided weakness  -has had years of fluctuating weakness on R side attributed to cerebrovascular disease. History of left carotid endarterectomy and has known basilar artery stenosis  -last admission had recurrent R sided weakness, seen by Neuro, CT and CTA negative, MRI was recommended but could not be completed, recommended treat empirically as stroke  -Started on DAPT x3 weeks then Plavix alone  -Patient is here now with a fall and complaint of R sided weakness.  -Care mgr consulted for placement  -continue home statin, ASA, Plavix  -Neurology consulted but agree nothing new going on. Neurology signed off  -supposed to be on 30 day cardiac monitor    #R lower leg dog bite  -Her sister's dog bit her  -had bedside I&D on 8/28 without overt purulence expressed  -Has been on clinda and doxy, continue, course through 9/6 planned. Would assess edema and pain at time of discharge and see if this seems long enough  -Leg edematous on admission-improving   -wound culture sent  -continue wound cares. WOC consulted    #Diabetes mellitus insulin-dependent  - Poorly controlled with hyperglycemia  - HbA1c 13.1  - Home regimen; Lantus 35-40u bid, Trulicity weekly  - decreased Lantus 30u bid due to hypoglycemia 8/28  - had hypoglycemia in previous hospital stay. Had another low BG 9/3. Will change Lantus to 36 units once daily.  - continue  "current sliding scale insulin and sliding scale  - Needs referral to diabetes clinic given her poorly controlled diabetes with elevated A1c     #Hyperlipidemia  - continue Atorvastatin 40mg     #GERD  -Continue PPI    # Neuropathy, home Cymbalta and gabapentin.  Continue to hold nabumetone  # Anxiety, home Ativan, Topamax  # Muscle spasms, home Robaxin          Diet: Regular Diet Adult    DVT Prophylaxis: Moderate risk.   Pneumatic Compression Devices  Diehl Catheter: Not present  Lines: None     Cardiac Monitoring: ACTIVE order. Indication: Stroke, acute (48 hours)  Code Status: Full Code      Clinically Significant Risk Factors                  # Hypertension: Noted on problem list          # DMII: A1C = 13.1 % (Ref range: <5.7 %) within past 6 months, PRESENT ON ADMISSION  # Overweight: Estimated body mass index is 27.86 kg/m  as calculated from the following:    Height as of 8/26/24: 1.702 m (5' 7\").    Weight as of 8/27/24: 80.7 kg (177 lb 14.6 oz)., PRESENT ON ADMISSION     # Financial/Environmental Concerns: none  # Asthma: noted on problem list    # History of CABG: noted on surgical history             Disposition Plan     Medically Ready for Discharge: Ready Now         Discharge barrier(s): placement  Care discussed with: patient      Lillie Saravia MD  Hospitalist Service  [facility]  Securely message with [employer] (more info)  Text page via [employer] Paging/Directory   ______________________________________________________________________  Interval history  No new complaints today no acute events overnight.  Pain is well-controlled.  Discussed risk factor modification.  Needs TCU placement.    Physical Exam   Vital Signs: Temp: 97.9  F (36.6  C) Temp src: Oral BP: 135/72 Pulse: 69   Resp: 18 SpO2: 93 % O2 Device: None (Room air)    Weight: 0 lbs 0 oz    General appearance: Awake, Alert, Cooperative, not in any obvious distress and appears stated age   HEENT: Normocephalic, " atraumatic, conjunctiva clear without icterus and ears without discharge  Lungs: Clear to auscultation bilaterally, no wheezing, good air exchange, normal work of breathing  Cardiovascular: Regular Rate and Rythm, normal apical impulse, normal S1 and S2, trace lower extremity edema bilaterally  Abdomen: Soft, non-tender and Non-distended, active bowel sounds  Skin: Wound dressing in the upper medial aspect of the right leg.   Musculoskeletal: No bony deformities or joint tenderness. Normal ROM upon flexion & extension.   Neurologic: Alert & Oriented X 3, Facial symmetry preserved and upper & lower extremities moving well with symmetry  Psychiatric: Calm, normal eye contact and normal affect    Medical Decision Making         Data   Recent Results (from the past 16 hour(s))   Glucose by meter    Collection Time: 09/03/24  8:59 PM   Result Value Ref Range    GLUCOSE BY METER POCT 217 (H) 70 - 99 mg/dL   Glucose by meter    Collection Time: 09/04/24  4:17 AM   Result Value Ref Range    GLUCOSE BY METER POCT 126 (H) 70 - 99 mg/dL   Glucose by meter    Collection Time: 09/04/24  7:46 AM   Result Value Ref Range    GLUCOSE BY METER POCT 92 70 - 99 mg/dL       Interval History

## 2024-09-04 NOTE — PLAN OF CARE
Problem: Adult Inpatient Plan of Care  Goal: Optimal Comfort and Wellbeing  Outcome: Progressing  Intervention: Monitor Pain and Promote Comfort  Recent Flowsheet Documentation  Taken 9/4/2024 0817 by Catarina Isaac, RN  Pain Management Interventions:   medication (see MAR)   distraction   emotional support  Taken 9/4/2024 0804 by Catarina Isaac, RN  Pain Management Interventions:   medication (see MAR)   distraction   emotional support     Problem: Stroke, Ischemic (Includes Transient Ischemic Attack)  Goal: Optimal Cognitive Function  Outcome: Progressing  Goal: Improved Communication Skills  Outcome: Progressing  Goal: Improved Sensorimotor Function  Outcome: Progressing   Goal Outcome Evaluation:       Patient alert and oriented x 4. Rated RLE 7/10. PRN oxycodone given once; helpful. Scored 4 and 3 on NIHSS. Wound dressing clean, dry, and intact. Up with SBA and independent in toilet. NSR on telemetry.

## 2024-09-04 NOTE — DISCHARGE INSTRUCTIONS
"WO DISCHARGE INSTRUCTIONS:  Location: RLE  1. Irrigate and cleanse with vashe (984360) solution, wet 4x4\" gauze and cleanse periwound skin, pour vashe into wound. Soak for 5 min. Then pat dry  2. Cut a strip of aquacel ag advantage (997948), apply DRY with a cotton tipped applicator, leave a tail for ease of removal, fold tail over shallow wound to the lateral aspect  3. Cover with a mepilex border  "

## 2024-09-04 NOTE — PROGRESS NOTES
Patient's FSGs are controlled on current medication regimen.  Last A1c reviewed-   Lab Results   Component Value Date    HGBA1C 5.4 07/10/2023     Most recent fingerstick glucose reviewed-   Recent Labs   Lab 09/28/23  0812   POCTGLUCOSE 185*     Current correctional scale  Medium  Maintain anti-hyperglycemic dose as follows-   Antihyperglycemics (From admission, onward)    Start     Stop Route Frequency Ordered    09/28/23 0900  insulin detemir U-100 (Levemir) pen 10 Units         -- SubQ 2 times daily 09/27/23 1944 09/27/23 2030  insulin aspart U-100 pen 12 Units         -- SubQ 3 times daily with meals 09/27/23 1944 09/27/23 1956  insulin aspart U-100 pen 0-10 Units         -- SubQ Before meals & nightly PRN 09/27/23 1858        Hold Oral hypoglycemics while patient is in the hospital.  Starting home insulin dose 50% while hospitalized, titrating as needed.    Detemir 10 units BID  Aspart 12 units TID   [facility] Nurse Inpatient Assessment     Consulted for: right lower extremity dog bite wound    Summary: Unable to see wound today, patient sleeping heavily after pain med   Patient was seen on prior encounter by the [facility] team - assessment is below, and wound orders entered based on this assessment.  Nurse updated. [facility] will attempt to see 9/4.    Patient History (according to provider note(s):      Per H+P:  59 year old female admitted on 9/1/2024. She presented to the ER with complaint of worsening weakness in the right upper and lower extremity associated with numbness in the fingers and the foot, CTA head and neck negative for acute process, MRI of the brain ordered, ED provider discussed with stroke neuro who recommended admission, MRI brain and further stroke workup, continue aspirin Plavix, patient also had a dog bite for which she is receiving clindamycin and doxycycline and wanted wound nurse to do wound packing, patient received some pain control in the ED and is going to be admitted to rule out stroke.     Assessment:      Wound location: right lower leg anterior       8/28 9/4    Last photo: 9/4  Wound due to:  dog bite trauma   Wound history/plan of care: aquacel in place  Wound base:  isible wound base Dermis, No granulation buds noted, and Moist     Palpation of the wound bed:  mild firm        Drainage: moderate     Description of drainage: serosanguinous     Measurements (length x width x depth, in cm): 1  x 0.5 (3cm for both wounds together)  x  1.5 (depth only on medial wound) cm      Tunneling: N/A     Undermining: N/A   Periwound skin: Hemosiderin staining      Color: normal and consistent with surrounding tissue      Temperature: warm  Odor: none  Pain: moderate, intermittent and tender  Pain interventions prior to dressing change: patient tolerated well, soaking, slow and gentle cares , and  "distraction  Treatment goal: Drainage control and Infection control/prevention  STATUS: initial assessment  Supplies ordered: gathered and at bedside and applied to patient        Treatment Plan:     Location: RLE  1. Irrigate and cleanse with vashe (525573) solution, wet 4x4\" gauze and cleanse periwound skin, pour vashe into wound. Soak for 5 min. Then pat dry  2. Cut a strip of aquacel ag advantage (277470), apply DRY with a cotton tipped applicator, leave a tail for ease of removal, fold tail over shallow wound to the lateral aspect  3. Cover with a mepilex border    Orders: Reviewed and Updated    RECOMMEND PRIMARY TEAM ORDER: None, at this time   Education provided: plan of care, wound progress, and Hygiene  Discussed plan of care with: Patient  WOC nurse follow-up plan: weekly  Notify WOC if wound(s) deteriorate.  Nursing to notify the Provider(s) and re-consult the WOC Nurse if new skin concern.    DATA:     Current support surface: Standard  Standard gel mattress (Isoflex)  Containment of urine/stool: Continent of bladder and Continent of bowel  BMI: There is no height or weight on file to calculate BMI.   Active diet order: Orders Placed This Encounter      Regular Diet Adult     Output: I/O last 3 completed shifts:  In: 1715 [P.O.:1715]  Out: -      Labs:   Recent Labs   Lab 09/03/24  0747 09/02/24  0235 09/01/24  1524   HGB 9.4*   < > 9.8*   INR  --   --  1.00   WBC 7.5   < > 7.9    < > = values in this interval not displayed.     Pressure injury risk assessment:   Sensory Perception: 3-->slightly limited  Moisture: 4-->rarely moist  Activity: 3-->walks occasionally  Mobility: 3-->slightly limited  Nutrition: 3-->adequate  Friction and Shear: 3-->no apparent problem  Adrien Score: 19    ROVERTO StallingsN RN CWOCN  Pager no longer in use, please contact through [facility]: [facility] [facility]        "

## 2024-09-05 ENCOUNTER — APPOINTMENT (OUTPATIENT)
Dept: PHYSICAL THERAPY | Facility: HOSPITAL | Age: 59
End: 2024-09-05
Payer: MEDICAID

## 2024-09-05 ENCOUNTER — APPOINTMENT (OUTPATIENT)
Dept: OCCUPATIONAL THERAPY | Facility: HOSPITAL | Age: 59
End: 2024-09-05
Attending: STUDENT IN AN ORGANIZED HEALTH CARE EDUCATION/TRAINING PROGRAM
Payer: MEDICAID

## 2024-09-05 VITALS
HEART RATE: 64 BPM | OXYGEN SATURATION: 100 % | TEMPERATURE: 98.2 F | RESPIRATION RATE: 18 BRPM | SYSTOLIC BLOOD PRESSURE: 134 MMHG | DIASTOLIC BLOOD PRESSURE: 65 MMHG

## 2024-09-05 LAB
GLUCOSE BLDC GLUCOMTR-MCNC: 157 MG/DL (ref 70–99)
GLUCOSE BLDC GLUCOMTR-MCNC: 178 MG/DL (ref 70–99)

## 2024-09-05 PROCEDURE — 97165 OT EVAL LOW COMPLEX 30 MIN: CPT | Mod: GO

## 2024-09-05 PROCEDURE — 250N000013 HC RX MED GY IP 250 OP 250 PS 637: Performed by: STUDENT IN AN ORGANIZED HEALTH CARE EDUCATION/TRAINING PROGRAM

## 2024-09-05 PROCEDURE — 97535 SELF CARE MNGMENT TRAINING: CPT | Mod: GO

## 2024-09-05 PROCEDURE — 250N000011 HC RX IP 250 OP 636: Performed by: INTERNAL MEDICINE

## 2024-09-05 PROCEDURE — 99239 HOSP IP/OBS DSCHRG MGMT >30: CPT | Performed by: INTERNAL MEDICINE

## 2024-09-05 PROCEDURE — 97530 THERAPEUTIC ACTIVITIES: CPT | Mod: GP

## 2024-09-05 PROCEDURE — 97116 GAIT TRAINING THERAPY: CPT | Mod: GP

## 2024-09-05 RX ORDER — DOXYCYCLINE 100 MG/1
100 CAPSULE ORAL EVERY 12 HOURS
Qty: 4 CAPSULE | Refills: 0 | Status: SHIPPED | OUTPATIENT
Start: 2024-09-05 | End: 2024-09-07

## 2024-09-05 RX ORDER — ASPIRIN 81 MG/1
81 TABLET ORAL DAILY
Qty: 16 TABLET | Refills: 0 | Status: SHIPPED | OUTPATIENT
Start: 2024-09-06 | End: 2024-09-22

## 2024-09-05 RX ORDER — CLINDAMYCIN HCL 300 MG
300 CAPSULE ORAL 3 TIMES DAILY
Qty: 6 CAPSULE | Refills: 0 | Status: SHIPPED | OUTPATIENT
Start: 2024-09-05 | End: 2024-09-07

## 2024-09-05 RX ORDER — OXYCODONE HYDROCHLORIDE 5 MG/1
5 TABLET ORAL EVERY 6 HOURS PRN
Qty: 12 TABLET | Refills: 0 | Status: SHIPPED | OUTPATIENT
Start: 2024-09-05 | End: 2024-09-08

## 2024-09-05 RX ADMIN — OXYCODONE HYDROCHLORIDE 5 MG: 5 TABLET ORAL at 02:16

## 2024-09-05 RX ADMIN — HYDROMORPHONE HYDROCHLORIDE 0.2 MG: 0.2 INJECTION, SOLUTION INTRAMUSCULAR; INTRAVENOUS; SUBCUTANEOUS at 10:19

## 2024-09-05 RX ADMIN — GABAPENTIN 200 MG: 100 CAPSULE ORAL at 08:14

## 2024-09-05 RX ADMIN — OXYCODONE HYDROCHLORIDE 5 MG: 5 TABLET ORAL at 08:24

## 2024-09-05 RX ADMIN — CLOPIDOGREL BISULFATE 75 MG: 75 TABLET ORAL at 08:14

## 2024-09-05 RX ADMIN — METHOCARBAMOL 750 MG: 750 TABLET ORAL at 08:15

## 2024-09-05 RX ADMIN — PANTOPRAZOLE SODIUM 40 MG: 40 TABLET, DELAYED RELEASE ORAL at 05:51

## 2024-09-05 RX ADMIN — GABAPENTIN 200 MG: 100 CAPSULE ORAL at 13:54

## 2024-09-05 RX ADMIN — CLINDAMYCIN HYDROCHLORIDE 300 MG: 150 CAPSULE ORAL at 08:14

## 2024-09-05 RX ADMIN — ASPIRIN 81 MG: 81 TABLET, COATED ORAL at 08:14

## 2024-09-05 RX ADMIN — CLINDAMYCIN HYDROCHLORIDE 300 MG: 150 CAPSULE ORAL at 13:54

## 2024-09-05 RX ADMIN — DULOXETINE HYDROCHLORIDE 60 MG: 60 CAPSULE, DELAYED RELEASE PELLETS ORAL at 08:14

## 2024-09-05 RX ADMIN — TOPIRAMATE 100 MG: 100 TABLET, FILM COATED ORAL at 08:15

## 2024-09-05 RX ADMIN — INSULIN ASPART 1 UNITS: 100 INJECTION, SOLUTION INTRAVENOUS; SUBCUTANEOUS at 11:54

## 2024-09-05 RX ADMIN — DOXYCYCLINE 100 MG: 100 CAPSULE ORAL at 05:51

## 2024-09-05 RX ADMIN — INSULIN ASPART 1 UNITS: 100 INJECTION, SOLUTION INTRAVENOUS; SUBCUTANEOUS at 08:12

## 2024-09-05 RX ADMIN — HYDROMORPHONE HYDROCHLORIDE 0.2 MG: 0.2 INJECTION, SOLUTION INTRAMUSCULAR; INTRAVENOUS; SUBCUTANEOUS at 05:49

## 2024-09-05 ASSESSMENT — ACTIVITIES OF DAILY LIVING (ADL)
ADLS_ACUITY_SCORE: 29

## 2024-09-05 NOTE — DISCHARGE SUMMARY
"[facility]  Hospitalist Discharge Summary      Date of Admission:  9/1/2024  Date of Discharge:  9/5/2024  Discharging Provider: Lillie Saravia MD  Discharge Service: Hospitalist Service    Discharge Diagnoses   Right leg weakness  Right leg dog bite   Basilar artery stenosis  Poorly controlled diabetes  History of stroke status post left carotid endarterectomy  Cervical spinal stenosis      Clinically Significant Risk Factors     # DMII: A1C = 13.1 % (Ref range: <5.7 %) within past 6 months    # Overweight: Estimated body mass index is 27.86 kg/m  as calculated from the following:    Height as of 8/26/24: 1.702 m (5' 7\").    Weight as of 8/27/24: 80.7 kg (177 lb 14.6 oz).       Follow-ups Needed After Discharge   Follow-up Appointments     Follow-up and recommended labs and tests       Follow up with primary care provider, Kike David, within 7 days for   hospital follow- up.    Continue clindamycin and doxycycline fluid for the next 2 days  Continue aspirin until 9/16/2023 to complete 3-week treatment.  Continue Plavix indefinitely  Outpatient follow-up with the diabetes clinic.          Unresulted Labs Ordered in the Past 30 Days of this Admission       Date and Time Order Name Status Description    9/2/2024 10:51 AM Wound Aerobic Bacterial Culture Routine With Gram Stain Preliminary         These results will be followed up by Kike David    Discharge Disposition   Discharged to home  Condition at discharge: Stable    Hospital Course   Sarah Rahman is a 59 year old female with history of diabetes mellitus type II, coronary artery disease s/p coronary bypass, hypertension, asthma, COPD, hyperlipidemia, anxiety, prior CVA, basilar artery stenosis, prior left carotid endarterectomy, and schizoaffective disorder hospitalized (8/26/24-8/30/24) for right leg dog bite admitted on 9/1/2024 with right-sided weakness concerning for CVA.     CT and CT angiogram of the head done " during last hospital stay was unremarkable.  MRI of the brain could not be performed due to presence of metal. Neurologist recommends 30-day event monitor at discharge and dual antiplatelet therapy with aspirin and Plavix for 3 weeks with plan to continue Plavix alone afterwards.  She was treated with clindamycin and doxycycline for dog bite-to be completed on 9/7/2024.    Transfer to transitional care unit was recommended by physical therapy and occupational therapy during her hospital stay, but she declined, expressing a desire to return home to care for her service dog. Bedside RN provided education on wound care, and the  arranged for home visiting nurse service to assist post-discharge. Patient insists on being discharged today.    Consultations This Hospital Stay   SPEECH LANGUAGE PATH ADULT IP CONSULT  PHARMACY IP CONSULT  PHARMACY IP CONSULT  PHARMACY IP CONSULT  PHYSICAL THERAPY ADULT IP CONSULT  OCCUPATIONAL THERAPY ADULT IP CONSULT  REHAB ADMISSIONS LIAISON IP CONSULT  CARE MANAGEMENT / SOCIAL WORK IP CONSULT  NEUROLOGY IP CONSULT  WOUND OSTOMY CONTINENCE NURSE  IP CONSULT  SMOKING CESSATION PROGRAM IP CONSULT    Code Status   Full Code    Time Spent on this Encounter   I, Lillie Saravia MD, personally saw the patient today and spent greater than 30 minutes discharging this patient.       Lillie Saravia MD  [place] [id number]  Phone: [phone]  Fax: [phone]  ______________________________________________________________________    Physical Exam   Vital Signs: Temp: 98.2  F (36.8  C) Temp src: Oral BP: 134/65 Pulse: 64   Resp: 18 SpO2: 100 % O2 Device: None (Room air)    Weight: 0 lbs 0 oz    General appearance: Awake, Alert, Cooperative, not in any obvious distress and appears stated age   HEENT: Normocephalic, atraumatic, conjunctiva clear without icterus and ears without discharge  Lungs: Clear to  auscultation bilaterally, no wheezing, good air exchange, normal work of breathing  Cardiovascular: Regular Rate and Rythm, normal apical impulse, normal S1 and S2, trace lower extremity edema bilaterally  Abdomen: Soft, non-tender and Non-distended, active bowel sounds  Skin: Wound dressing in the upper medial aspect of the right leg.   Musculoskeletal: No bony deformities or joint tenderness. Normal ROM upon flexion & extension.   Neurologic: Alert & Oriented X 3, Facial symmetry preserved and upper & lower extremities moving well with symmetry  Psychiatric: Calm, normal eye contact and normal affect       Primary Care Physician   Kike David    Discharge Orders      Primary Care - Care Coordination Referral      Adult Endocrinology  Referral      Home Care Referral      Spine  Referral      Reason for your hospital stay    Right leg weakness  Right leg dog bite   Basilar artery stenosis  Poorly controlled diabetes     Follow-up and recommended labs and tests     Follow up with primary care provider, Kike Davdi, within 7 days for hospital follow- up.    Continue clindamycin and doxycycline fluid for the next 2 days  Continue aspirin until 9/16/2023 to complete 3-week treatment.  Continue Plavix indefinitely  Outpatient follow-up with the diabetes clinic.     Activity    Your activity upon discharge: activity as tolerated     Adult Cardiac Event Monitor     Diet    Follow this diet upon discharge: Current Diet:Orders Placed This Encounter      Moderate Consistent Carb (60 g CHO per Meal) Diet       Significant Results and Procedures   Results for orders placed or performed during the hospital encounter of 09/01/24   CTA Head Neck with Contrast    Narrative    EXAM: CTA HEAD NECK W CONTRAST  LOCATION: [facility]  DATE: 9/1/2024    INDICATION: Code Stroke to evaluate for potential thrombolysis and thrombectomy. PLEASE READ IMMEDIATELY.  COMPARISON: Head and neck CTA:  8/26/2024. Head CT: 8/27/2024.  CONTRAST: 67 mL [id number].  TECHNIQUE: Head and neck CT angiogram with IV contrast. Noncontrast head CT followed by axial helical CT images of the head and neck vessels obtained during the arterial phase of intravenous contrast administration. Axial 2D reconstructed images and   multiplanar 3D MIP reconstructed images of the head and neck vessels were performed by the technologist. Dose reduction techniques were used. All stenosis measurements made according to NASCET criteria unless otherwise specified.    FINDINGS:   NONCONTRAST HEAD CT:   INTRACRANIAL CONTENTS: No intracranial hemorrhage, extraaxial collection, or mass effect.  No CT evidence of acute transcortical infarct. Mild presumed chronic small vessel ischemic changes. Mild to moderate generalized volume loss. No hydrocephalus.   Calcified intracranial atherosclerosis.    VISUALIZED ORBITS/SINUSES/MASTOIDS: No intraorbital abnormality. Mild mucosal thickening scattered about the paranasal sinuses. No middle ear or mastoid effusion.    BONES/SOFT TISSUES: No acute abnormality. Remote nasal arch fractures.    HEAD CTA:  ANTERIOR CIRCULATION: No high-grade stenosis/occlusion. Calcified atherosclerosis of the carotid siphons. Moderate luminal narrowing involving the bilateral paraclinoid ICA segments, left greater than right, similar to prior. Scattered areas of mild   luminal narrowing involving the bilateral MCA M1 segments, similar to prior. Additional scattered areas of mild to moderate luminal narrowing involving the bilateral MCA M2 segments, similar to prior. No substantial stenosis involving the anterior   cerebral arteries. Developmentally hypoplastic left A1 anterior cerebral artery segment. No evidence of aneurysm or high flow vascular malformation.    POSTERIOR CIRCULATION: No high-grade stenosis/occlusion. Balanced vertebral arteries supply a patent basilar artery. Mixed atherosclerotic disease associated with the  bilateral vertebral artery V4 segments with areas of similar mild to moderate luminal   narrowing bilaterally. Atherosclerotic disease throughout the basilar artery with a short segment of moderate stenosis involving the midportion, similar to prior. Scattered areas of mild luminal narrowing involving the bilateral PCA P1 and P2 segments.   No evidence of aneurysm or high flow vascular malformation.    DURAL VENOUS SINUSES: Expected enhancement of the major dural venous sinuses.    NECK CTA:  RIGHT CAROTID: Mixed atherosclerotic plaque about the carotid bifurcation without substantial (50% or greater) luminal stenosis. No evidence for dissection.    LEFT CAROTID: Changes of prior left carotid endarterectomy without substantial residual luminal stenosis about the carotid bifurcation or ICA. No evidence for dissection.    VERTEBRAL ARTERIES: No focal high-grade stenosis or evidence for dissection. Balanced vertebral arteries.    AORTIC ARCH: Classic aortic arch anatomy with no high-grade stenosis at the origin of the great vessels. Incompletely evaluated postsurgical changes of CABG.    NONVASCULAR STRUCTURES: Multilevel cervical spondylosis without high-grade spinal canal stenosis.      Impression    IMPRESSION:     HEAD CT:  1.  No acute intracranial hemorrhage or CT evidence for acute transcortical infarct.  2.  Chronic changes as above.    HEAD CTA:   1.  No high-grade stenosis/occlusion involving the major intracranial arteries. No significant change from study dated 8/26/2024.  2.  Similar short segment of moderate atherosclerotic luminal narrowing involving the basilar artery. Calcified atherosclerosis results in moderate luminal narrowing involving the paraclinoid ICA segments bilaterally, similar to prior. Additional areas   of more mild multifocal intracranial atherosclerotic disease as detailed above.  3.  No aneurysm or evidence of high flow vascular malformation.    NECK CTA:  1.  No flow-limiting  stenosis/occlusion involving the major arteries of the neck.  2.  Similar appearance of prior left carotid endarterectomy without substantial residual stenosis.  3.  No evidence for dissection.    Absence of acute intracranial hemorrhage was discussed via telephone with Dr. Dey by myself at 3:04 PM CDT on 2024. Absence of large vessel occlusion was discussed via telephone with Dr. Dey by myself at 3:37 PM CDT on 2024.   MR Brain w/o Contrast    Narrative    EXAM: MR BRAIN W/O CONTRAST  LOCATION: [facility]  DATE: 2024    INDICATION: right hand and right leg weakness  COMPARISON: None.  TECHNIQUE: Localizer images were obtained.    FINDINGS:  Nondiagnostic examination as patient was unable to complete the examination. Only localizer images were obtained.      Impression    IMPRESSION:    1.  Nondiagnostic examination as the patient was unable to complete the examination. Only localizer images were obtained.   Echo Limited with Bubble Study     Value    LVEF  60-65%    Narrative    [id number]  [id number]  [id number]  [id number]^GONDAL^GRISELDA^[place] [address]     Name: LAUREN HAIRSTON  MRN: [id number]  : 1965  Study Date: 2024 09:06 AM  Age: 59 yrs  Gender: Female  Patient Location: [facility]  Reason For Study: Cerebrovascular Incident  Ordering Physician: GONDAL, SAAD J  Performed By: TATE     BSA: 1.9 m2  Height: 67 in  Weight: 177 lb  HR: 70  BP: 138/65 mmHg  ______________________________________________________________________________  Procedure  Limited Bubble Echo Adult.  ______________________________________________________________________________  Interpretation Summary     Left ventricular size, wall motion and function are normal. The ejection  fraction is 60-65%.  Normal right ventricle size and systolic function.  The left atrium is moderately dilated.  A contrast injection (Bubble Study) was performed that was  negative for flow  across the interatrial septum.  There is mild to moderate concentric left ventricular hypertrophy.  ______________________________________________________________________________  Left Ventricle  Left ventricular size, wall motion and function are normal. The ejection  fraction is 60-65%. There is mild to moderate concentric left ventricular  hypertrophy. No regional wall motion abnormalities noted.     Right Ventricle  Normal right ventricle size and systolic function.     Atria  The left atrium is moderately dilated. Right atrial size is normal. There is  no color Doppler evidence of an atrial shunt. A contrast injection (Bubble  Study) was performed that was negative for flow across the interatrial septum.     Mitral Valve  There is moderate (2+) mitral regurgitation.     Tricuspid Valve  Tricuspid valve leaflets appear normal. The right ventricular systolic  pressure is approximated at 36.0 mmHg plus the right atrial pressure. There is  mild to moderate (1-2+) tricuspid regurgitation.     Aortic Valve  Aortic valve leaflets appear normal. There is no evidence of aortic stenosis  or clinically significant aortic regurgitation.     Pulmonic Valve  The pulmonic valve is not well seen, but is grossly normal. This degree of  valvular regurgitation is within normal limits.     Vessels  The aorta root is normal. Normal size ascending aorta. IVC diameter <2.1 cm  collapsing >50% with sniff suggests a normal RA pressure of 3 mmHg.     Pericardium  There is no pericardial effusion.     ______________________________________________________________________________  MMode/2D Measurements & Calculations  IVSd: 1.4 cm  LVIDd: 4.0 cm  LVIDs: 2.8 cm  LVPWd: 1.7 cm  FS: 30.3 %     LV mass(C)d: 242.7 grams  LV mass(C)dI: 126.4 grams/m2  EF Biplane: 63.8 %  RWT: 0.83  TAPSE: 1.9 cm     Time Measurements  MM HR: 77.0 BPM     Doppler Measurements & Calculations  TR max david: 300.0 cm/sec  TR max P.0 mmHg      ______________________________________________________________________________  Report approved by: Kyrie Elaine 09/02/2024 11:16 AM             Discharge Medications   Current Discharge Medication List        START taking these medications    Details   oxyCODONE (ROXICODONE) 5 MG tablet Take 1 tablet (5 mg) by mouth every 6 hours as needed for pain.  Qty: 12 tablet, Refills: 0    Associated Diagnoses: Dog bite of lower leg, right, subsequent encounter; Dog bite of lower leg, right, subsequent encounter           CONTINUE these medications which have CHANGED    Details   !! aspirin 81 MG EC tablet Take 1 tablet (81 mg) by mouth daily for 16 days.  Qty: 16 tablet, Refills: 0    Associated Diagnoses: Dog bite of lower leg, right, subsequent encounter; Acute CVA (cerebrovascular accident) (H)      clindamycin (CLEOCIN) 300 MG capsule Take 1 capsule (300 mg) by mouth 3 times daily for 2 days.  Qty: 6 capsule, Refills: 0    Associated Diagnoses: Dog bite, initial encounter      doxycycline monohydrate (MONODOX) 100 MG capsule Take 1 capsule (100 mg) by mouth every 12 hours for 2 days.  Qty: 4 capsule, Refills: 0    Associated Diagnoses: Dog bite, initial encounter       !! - Potential duplicate medications found. Please discuss with provider.        CONTINUE these medications which have NOT CHANGED    Details   acetaminophen (TYLENOL) 500 MG tablet Take 2 tablets (1,000 mg) by mouth every 6 hours as needed for mild pain. Max 5.5 tabs per day    Associated Diagnoses: Chronic pain syndrome      albuterol (PROAIR HFA) 108 (90 Base) MCG/ACT inhaler Inhale 1-2 puffs into the lungs every 4 hours as needed for shortness of breath or wheezing  Qty: 18 g, Refills: 11    Comments: Pharmacy may dispense brand covered by insurance (Proair, or proventil or ventolin or generic albuterol inhaler)  Associated Diagnoses: Chronic obstructive pulmonary disease, unspecified COPD type (H)      !! aspirin 81 MG EC tablet Take 1  tablet (81 mg) by mouth daily for 28 days.  Qty: 28 tablet, Refills: 0    Associated Diagnoses: Acute CVA (cerebrovascular accident) (H)      atorvastatin (LIPITOR) 40 MG tablet Take 1 tablet (40 mg) by mouth every evening.  Qty: 30 tablet, Refills: 1    Associated Diagnoses: Acute CVA (cerebrovascular accident) (H)      clopidogrel (PLAVIX) 75 MG tablet Take 1 tablet (75 mg) by mouth daily.  Qty: 90 tablet, Refills: 1    Associated Diagnoses: Acute CVA (cerebrovascular accident) (H)      dulaglutide (TRULICITY) 0.75 MG/0.5ML pen Inject 0.75 mg Subcutaneous every 7 days  Qty: 6 mL, Refills: 3    Associated Diagnoses: Type 2 diabetes mellitus with hyperglycemia, with long-term current use of insulin (H)      DULoxetine (CYMBALTA) 60 MG capsule Take 1 capsule (60 mg) by mouth 2 times daily  Qty: 180 capsule, Refills: 3    Associated Diagnoses: Chronic pain syndrome      fluticasone (FLONASE) 50 MCG/ACT nasal spray Spray 1 spray into both nostrils 2 times daily  Qty: 16 g, Refills: 11    Associated Diagnoses: Chronic rhinitis      gabapentin (NEURONTIN) 100 MG capsule Take 2 capsules (200 mg) by mouth 3 times daily.  Qty: 180 capsule, Refills: 0    Associated Diagnoses: Diabetic peripheral neuropathy (H)      insulin glargine (LANTUS PEN) 100 UNIT/ML pen Inject 35-40 Units Subcutaneous 2 times daily  Qty: 24 mL, Refills: 11    Comments: If Lantus is not covered by insurance, may substitute Basaglar or Semglee or other insulin glargine product per insurance preference at same dose and frequency.    Associated Diagnoses: Type 2 diabetes mellitus with hyperglycemia, with long-term current use of insulin (H)      lidocaine (LIDODERM) 5 % patch Place onto the skin every 24 hours. To prevent lidocaine toxicity, patient should be patch free for 12 hrs daily.  Lower Back      LORazepam (ATIVAN) 0.5 MG tablet TAKE 1 TABLET (0.5 MG) BY MOUTH EVERY 6 HOURS AS NEEDED FOR ANXIETY  Qty: 20 tablet, Refills: 0    Associated Diagnoses:  "Panic attack      methocarbamol (ROBAXIN) 750 MG tablet Take 1 tablet (750 mg) by mouth 2 times daily  Qty: 60 tablet, Refills: 11    Associated Diagnoses: Pain of right lower leg      naloxone (NARCAN) 4 MG/0.1ML nasal spray Spray 4 mg into one nostril alternating nostrils as needed for opioid reversal every 2-3 minutes until assistance arrives      omeprazole (PRILOSEC) 40 MG DR capsule Take 1 capsule (40 mg) by mouth daily To protect your stomach.  Qty: 90 capsule, Refills: 3    Associated Diagnoses: Dyspepsia      ondansetron (ZOFRAN) 8 MG tablet Take 1 tablet (8 mg) by mouth every 8 hours as needed for nausea  Qty: 20 tablet, Refills: 3    Associated Diagnoses: Nausea      topiramate (TOPAMAX) 50 MG tablet Take 100 mg by mouth 2 times daily      triamcinolone (KENALOG) 0.1 % external ointment Apply topically 2 times daily Apply twice a day to vaginal rash until resolved  Qty: 80 g, Refills: 0    Associated Diagnoses: Itching       !! - Potential duplicate medications found. Please discuss with provider.        STOP taking these medications       nabumetone (RELAFEN) 500 MG tablet Comments:   Reason for Stopping:             Allergies   Allergies   Allergen Reactions    Haloperidol Unknown     Patient states it stops her heart      Haloperidol Angioedema    Hydroxyzine Angioedema    Meperidine And Related Angioedema, Difficulty breathing, Other (See Comments), Rash and Shortness Of Breath     Throat closes  Other reaction(s): Breathing Difficulty  Other reaction(s): Breathing Difficulty      Phenothiazines Other (See Comments)     Other reaction(s): CARDIAC DISTURBANCES  Patient states it stops her heart  \"I swell up\"  \"stopped by heart\"  \"I swell up\"  \"I swell up\"      Phenothiazines Angioedema    Tramadol Other (See Comments)     Other reaction(s): Angioedema  Throat itch      Tramadol Angioedema    Januvia [Sitagliptin] Swelling    Latex Itching    Haloperidol And Related Angioedema    Januvia [Sitagliptin] " Other (See Comments)     Swelling in the neck     Latex Itching    Lisinopril Other (See Comments)     ACE cough  Itchy throat  Throat itches  Itchy throat      Nortriptyline Unknown     Fainting, unclear if it was related    Penicillins Swelling    Hydroxyzine Other (See Comments) and Rash     Tongue swelling  Tongue swelling  Tongue swelling      Lisinopril Cough

## 2024-09-05 NOTE — PROGRESS NOTES
Care Management Discharge Note    Discharge Date: 09/05/2024       Discharge Disposition: Home with Home Care    Discharge Services: Home Care    Discharge DME: None    Discharge Transportation: family or friend will provide    Private pay costs discussed: Not applicable    Does the patient's insurance plan have a 3 day qualifying hospital stay waiver?  Yes     Which insurance plan 3 day waiver is available? Alternative insurance waiver    Will the waiver be used for post-acute placement? No    PAS Confirmation Code: N/A  Patient/family educated on Medicare website which has current facility and service quality ratings: yes    Education Provided on the Discharge Plan: Yes  Persons Notified of Discharge Plans: Patient  Patient/Family in Agreement with the Plan: yes    Handoff Referral Completed: Yes, [facility] PCP: Internal handoff referral completed    Additional Information:  8:54 AM  SW received a phone call from [facility] reporting that they will have to decline Pt's referral due to staffing. Additional home care referrals sent.     10:49 AM  14 different home care agencies have declined Pt's home care referrals due to payer capacity, inability to staff, or not accepting Pt's insurance. MD updated regarding difficulties with establishing home care for discharge.     11:31 AM  Pt was accepted by [facility] for home RN services to assist with wound cares. [facility] is unable to start care until early next week. CM confirmed with MD that the delay in the start of care would not be an issue.    12:28 PM  SW updated Pt regarding home care accepting for home RN services. Pt reported understanding and agreement with the plan for discharge. Pt denies any further questions or needs from CM prior to discharge.     Friend or family will transport.     2:02 PM  Home care orders sent.     CM will sign off. Please contact CM if any additional needs arise prior to discharge.      Joleen Hernandez  LSW

## 2024-09-05 NOTE — PROVIDER NOTIFICATION
MD asked if event monitor be placed before as an inpatient. Patient would like to leave at 1500 and [facility] leaves at 1500.

## 2024-09-05 NOTE — PROGRESS NOTES
"   09/05/24 0750   Appointment Info   Signing Clinician's Name / Credentials (OT) Yanely Beach OTD OTR/L   Living Environment   People in Home alone   Current Living Arrangements apartment   Home Accessibility no concerns   Living Environment Comments Tub/shower and walk in shower, standard toilets   Self-Care   Fall history within last six months yes   Number of times patient has fallen within last six months 4   Activity/Exercise/Self-Care Comment Typically ind with most ADLs - has PCA but unable to state how many hours, reports \"doing everything\" for herself   General Information   Onset of Illness/Injury or Date of Surgery 09/01/24   Referring Physician Lillie Saravia MD   Patient/Family Therapy Goal Statement (OT) To return home   Additional Occupational Profile Info/Pertinent History of Current Problem Sarah Rahman is a 59 year old female with diabetes mellitus type II, coronary artery disease s/p coronary bypass, hypertension, asthma, COPD, hyperlipidemia, anxiety, prior CVA, basilar artery stenosis, and schizoaffective disorder, frequent ED visits and hospitalizations for R sided weakness, hospitalized 8/26-8/30 for dog bite on R leg also had R sided weakness consistent with stroke, here after a fall.   Existing Precautions/Restrictions fall   Right Lower Extremity (Weight-bearing Status) full weight-bearing (FWB)  (R dog bit wound present)   Cognitive Status Examination   Orientation Status orientation to person, place and time   Affect/Mental Status (Cognitive) WFL   Follows Commands WFL   Safety Deficit insight into deficits/self-awareness;problem-solving   Cognitive Status Comments Some inconsistencies from pt report vs. chart review, however demos appropriate functional cognition   Visual Perception   Visual Impairment/Limitations WNL   Posture   Posture not impaired   Range of Motion Comprehensive   General Range of Motion bilateral upper extremity ROM WNL   Strength Comprehensive (MMT) "   General Manual Muscle Testing (MMT) Assessment no strength deficits identified   Coordination   Upper Extremity Coordination No deficits were identified   Bed Mobility   Bed Mobility supine-sit   Supine-Sit [place] (Bed Mobility) independent   Transfers   Transfers sit-stand transfer;toilet transfer   Sit-Stand Transfer   Sit-Stand [place] (Transfers) supervision   Toilet Transfer   [place] Level (Toilet Transfer) supervision   Balance   Balance Assessment standing dynamic balance   Standing Balance: Dynamic supervision   Activities of Daily Living   BADL Assessment/Intervention lower body dressing;toileting;grooming   Lower Body Dressing Assessment/Training   [place] Level (Lower Body Dressing) set up   Grooming Assessment/Training   [place] Level (Grooming) set up   Toileting   [place] Level (Toileting) supervision   Clinical Impression   Criteria for Skilled Therapeutic Interventions Met (OT) Yes, treatment indicated   OT Diagnosis Decreased ind with ADLs/safety   Influenced by the following impairments Dog bite RLE, stroke-like symptoms   OT Problem List-Impairments impacting ADL activity tolerance impaired;pain   Assessment of Occupational Performance 1-3 Performance Deficits   Identified Performance Deficits activity tolerance, safety with ADLs   Planned Therapy Interventions (OT) ADL retraining;risk factor education   Clinical Decision Making Complexity (OT) problem focused assessment/low complexity   Risk & Benefits of therapy have been explained evaluation/treatment results reviewed;care plan/treatment goals reviewed;risks/benefits reviewed;current/potential barriers reviewed;patient   OT Total Evaluation Time   OT Eval, Low Complexity Minutes (30936) 8   OT Goals   Therapy Frequency (OT) One time eval and treatment   OT Predicted Duration/Target Date for Goal Attainment 09/05/24   OT Goals OT Goal 1;Toilet Transfer/Toileting   OT: Toilet Transfer/Toileting Independent;toilet  transfer;cleaning and garment management;Goal Met   OT: Goal 1 Pt will demo understanding of at least 2 energy conservation strategies with min cueing; Goal Met   Self-Care/Home Management   Self-Care/Home Mgmt/ADL, Compensatory, Meal Prep Minutes (14017) 10   Symptoms Noted During/After Treatment (Meal Preparation/Planning Training) none   Treatment Detail/Skilled Intervention Eval completed, treatment initiated. Fxl mob in room and hallway Ind with no AD, no LOB noted, steady gait - reporting less RLE pain this date. Min cueing for hand placement with transfers for safety, min cueing for safe set up prior to transfers to prevent falls in room. Completed toileting ind following cueing. Fxl mob in hallway ind with no AD - instructed on energy conservation strategies for home setting to decrease falls - pt demos understanding. Returned to bedside - completed bed mobility and repositioning ind.   OT Discharge Planning   OT Plan d/c OT   OT Discharge Recommendation (DC Rec) home with assist;home with home care occupational therapy   OT Rationale for DC Rec Pt has assist from PCA as needed, mobilizing near baseline - met all OT goals, may benefit from home OT to enhance ind and safety with ADLs   OT Brief overview of current status SBA-ind with all ADLs and fxl mobility   Total Session Time   Timed Code Treatment Minutes 10   Total Session Time (sum of timed and untimed services) 18

## 2024-09-05 NOTE — PLAN OF CARE
Physical Therapy Discharge Summary    Reason for therapy discharge:    Discharged to home with home therapy.    Progress towards therapy goal(s). See goals on Care Plan in [facility] electronic health record for goal details.  Goals partially met.  Barriers to achieving goals:   discharge from facility.    Therapy recommendation(s):    Continued therapy is recommended.  Rationale/Recommendations:  PT goals not fully met.

## 2024-09-05 NOTE — PLAN OF CARE
"  Problem: Adult Inpatient Plan of Care  Goal: Plan of Care Review  Description: The Plan of Care Review/Shift note should be completed every shift.  The Outcome Evaluation is a brief statement about your assessment that the patient is improving, declining, or no change.  This information will be displayed automatically on your shift  note.  Outcome: Met  Goal: Patient-Specific Goal (Individualized)  Description: You can add care plan individualizations to a care plan. Examples of Individualization might be:  \"Parent requests to be called daily at 9am for status\", \"I have a hard time hearing out of my right ear\", or \"Do not touch me to wake me up as it startles  me\".  Outcome: Met  Goal: Absence of Hospital-Acquired Illness or Injury  Outcome: Met  Intervention: Identify and Manage Fall Risk  Recent Flowsheet Documentation  Taken 9/5/2024 1157 by Catarina Isaac RN  Safety Promotion/Fall Prevention:   activity supervised   assistive device/personal items within reach   clutter free environment maintained   nonskid shoes/slippers when out of bed   room organization consistent   safety round/check completed  Taken 9/5/2024 0820 by Catarina Isaac RN  Safety Promotion/Fall Prevention:   activity supervised   assistive device/personal items within reach   clutter free environment maintained   nonskid shoes/slippers when out of bed   room organization consistent   safety round/check completed  Intervention: Prevent Skin Injury  Recent Flowsheet Documentation  Taken 9/5/2024 1157 by Catarina Isaac RN  Body Position: position changed independently  Taken 9/5/2024 0820 by Catarina Isaac RN  Body Position:   turned   left   position changed independently  Intervention: Prevent and Manage VTE (Venous Thromboembolism) Risk  Recent Flowsheet Documentation  Taken 9/5/2024 1157 by Catarina Isaac RN  VTE Prevention/Management: (up independently, SBA at times) SCDs off (sequential compression devices)  Taken 9/5/2024 0820 by Marlin" Catarina MCLEAN RN  VTE Prevention/Management: (up independently, SBA at times) SCDs off (sequential compression devices)  Goal: Optimal Comfort and Wellbeing  Outcome: Met  Intervention: Monitor Pain and Promote Comfort  Recent Flowsheet Documentation  Taken 9/5/2024 0824 by Catarina Isaac RN  Pain Management Interventions:   medication (see MAR)   distraction   emotional support   rest  Goal: Readiness for Transition of Care  Outcome: Met     Problem: Comorbidity Management  Goal: Maintenance of Seizure Control  Outcome: Met  Intervention: Maintain Seizure Symptom Control  Recent Flowsheet Documentation  Taken 9/5/2024 1157 by Catarina Isaac RN  Medication Review/Management: medications reviewed  Taken 9/5/2024 0820 by Catarina Isaac RN  Medication Review/Management: medications reviewed  Goal: Blood Glucose Levels Within Targeted Range  Outcome: Met  Intervention: Monitor and Manage Glycemia  Recent Flowsheet Documentation  Taken 9/5/2024 1157 by Catarina Isaac RN  Medication Review/Management: medications reviewed  Taken 9/5/2024 0820 by Catarina Isaac RN  Medication Review/Management: medications reviewed     Problem: Stroke, Ischemic (Includes Transient Ischemic Attack)  Goal: Optimal Coping  Outcome: Met  Goal: Effective Bowel Elimination  Outcome: Met  Goal: Optimal Cerebral Tissue Perfusion  Outcome: Met  Goal: Optimal Cognitive Function  Outcome: Met  Goal: Improved Communication Skills  Outcome: Met  Goal: Optimal Nutrition Intake  Outcome: Met  Goal: Effective Oxygenation and Ventilation  Outcome: Met  Goal: Improved Sensorimotor Function  Outcome: Met  Goal: Safe and Effective Swallow  Outcome: Met  Goal: Effective Urinary Elimination  Outcome: Met   Goal Outcome Evaluation:       Patient reported moderate pain prior to discharge; pain medications given earlier. Wound dressing done and patient education provided about wound care. Patient verbalized understanding. Wound dressing supplies sent with patient.  Discharge instructions discussed with patient. Copy of AVS handed to patient. Patient will  discharge medications at the pharmacy. Patient left with all personal belongings including purse.

## 2024-09-05 NOTE — PLAN OF CARE
Complained of 10/10 pain at bedtime. PRN dilaudid given as well as PO oxycodone later in the night. Patient refused 0200 BG check and 0400 NIH assessment.     Problem: Adult Inpatient Plan of Care  Goal: Plan of Care Review  Description: The Plan of Care Review/Shift note should be completed every shift.  The Outcome Evaluation is a brief statement about your assessment that the patient is improving, declining, or no change.  This information will be displayed automatically on your shift  note.  Outcome: Progressing     Problem: Adult Inpatient Plan of Care  Goal: Absence of Hospital-Acquired Illness or Injury  Intervention: Prevent Skin Injury  Recent Flowsheet Documentation  Taken 9/4/2024 2052 by Deana Moya RN  Body Position: position changed independently     Problem: Adult Inpatient Plan of Care  Goal: Optimal Comfort and Wellbeing  Outcome: Progressing     Problem: Comorbidity Management  Goal: Blood Glucose Levels Within Targeted Range  Outcome: Progressing  Intervention: Monitor and Manage Glycemia  Recent Flowsheet Documentation  Taken 9/4/2024 2052 by Deana Moya RN  Medication Review/Management: medications reviewed     Problem: Comorbidity Management  Goal: Blood Pressure in Desired Range  Intervention: Maintain Blood Pressure Management  Recent Flowsheet Documentation  Taken 9/4/2024 2052 by Deana Moya RN  Medication Review/Management: medications reviewed

## 2024-09-05 NOTE — PROGRESS NOTES
Occupational Therapy Discharge Summary    Reason for therapy discharge:    All goals and outcomes met, no further needs identified.    Progress towards therapy goal(s). See goals on Care Plan in [facility] electronic health record for goal details.  Goals met    Therapy recommendation(s):    Continued therapy is recommended.  Rationale/Recommendations:  Home OT as needed.

## 2024-09-06 LAB
BACTERIA WND CULT: ABNORMAL
GRAM STAIN RESULT: ABNORMAL
GRAM STAIN RESULT: ABNORMAL

## 2024-09-07 ENCOUNTER — PATIENT OUTREACH (OUTPATIENT)
Dept: CARE COORDINATION | Facility: CLINIC | Age: 59
End: 2024-09-07
Payer: MEDICAID

## 2024-09-07 NOTE — PROGRESS NOTES
Clinic Care Coordination Contact  [facility] [[email or web address]]/Voicemail    Clinical Data: Care Coordinator Outreach    Outreach Documentation Number of Outreach Attempt   8/2/2024   2:31 PM 2   9/7/2024  11:28 AM 1       Left message on patient's voicemail with call back information and requested return call.    Plan: Care Coordinator will try to reach patient again in 1-2 business days.    David Myhre, RN   [facility] RN  [phone]

## 2024-09-08 ENCOUNTER — PATIENT OUTREACH (OUTPATIENT)
Dept: CARE COORDINATION | Facility: CLINIC | Age: 59
End: 2024-09-08
Payer: MEDICAID

## 2024-09-08 NOTE — LETTER
[facility] CARE COORDINATION  [facility]     September 8, 2024    Sarah Rahman  [address]   [place] [address]      Dear Sarah,    I am a clinic care coordinator who works with Kike David MD with the [facility]. I have been trying to reach you recently to introduce Clinic Care Coordination. Below is a description of clinic care coordination and how I can further assist you.       The clinic care coordination team is made up of a registered nurse, , financial resource worker and community health worker who understand the health care system. The goal of clinic care coordination is to help you manage your health and improve access to the health care system. Our team works alongside your provider to assist you in determining your health and social needs. We can help you obtain health care and community resources, providing you with necessary information and education. We can work with you through any barriers and develop a care plan that helps coordinate and strengthen the communication between you and your care team.  Our services are voluntary and are offered without charge to you personally.    Please feel free to contact me with any questions or concerns regarding care coordination and what we can offer.      We are focused on providing you with the highest-quality healthcare experience possible.    Sincerely,     David Myhre, RN   [facility] RN  [phone]

## 2024-09-08 NOTE — PROGRESS NOTES
Clinic Care Coordination Contact  [facility]/Voicemail    Clinical Data: Care Coordinator Outreach    Outreach Documentation Number of Outreach Attempt   8/2/2024   2:31 PM 2   9/7/2024  11:28 AM 1   9/8/2024  11:37 AM 2       Left message on patient's voicemail with call back information and requested return call.    Plan: Care Coordinator will send care coordination introduction letter with care coordinator contact information and explanation of care coordination services via mail. Care Coordinator will do no further outreaches at this time.    David Myhre, RN   [facility] RN

## 2024-09-09 ENCOUNTER — TELEPHONE (OUTPATIENT)
Dept: INTERNAL MEDICINE | Facility: CLINIC | Age: 59
End: 2024-09-09
Payer: MEDICAID

## 2024-09-09 NOTE — TELEPHONE ENCOUNTER
"Patient calling visibly upset. Patient calling to report that she was recently in the ED for a few days due to a dog bite. Patient reports \"there is a hole in my leg\" and states she has been calling around trying to get home care nurse out for wound care and \"keeps getting the run around\" and has not received a call for this. Patient states she is not going back to the hospital and continues to use foul language with writer. Patient asked for writer name in which writer provided to the patient. Patient requesting to speak with the nurse who called her which appears to be the [facility] staff.     Patient continues to say \"hates the doctor\" and nurses and \"I need to find a new doctor.\"     Writer unable to ask patient further questions without being interrupted by the patient during this call. Writer relayed will get message to PCP as well about getting her in for an appt. Patient states \"this is an emergency\" and patient states refuses to wait to be seen.     Call ended.     Writer reached out directly to [facility] as well in regards to patient as they have tried to reach her as well.   "

## 2024-09-09 NOTE — TELEPHONE ENCOUNTER
Antonio David,     I spoke with Home Care agency this morning. They are planning to come out to see her this week. Patient appeared distressed at hearing that they did not have a specific date. She said her leg has increased in size since she has been home, is red, hot and painful. She stated she was unable to sleep last night related to the pain. Patient agreed to go the ED to be evaluated. She said she as going to call for ambulance to bring her there.

## 2024-09-11 ENCOUNTER — APPOINTMENT (OUTPATIENT)
Dept: CT IMAGING | Facility: HOSPITAL | Age: 59
End: 2024-09-11
Attending: EMERGENCY MEDICINE
Payer: MEDICAID

## 2024-09-11 ENCOUNTER — NURSE TRIAGE (OUTPATIENT)
Dept: INTERNAL MEDICINE | Facility: CLINIC | Age: 59
End: 2024-09-11
Payer: MEDICAID

## 2024-09-11 ENCOUNTER — HOSPITAL ENCOUNTER (EMERGENCY)
Facility: HOSPITAL | Age: 59
Discharge: LEFT BEFORE DISCHARGE | End: 2024-09-11
Attending: EMERGENCY MEDICINE | Admitting: EMERGENCY MEDICINE
Payer: MEDICAID

## 2024-09-11 VITALS
RESPIRATION RATE: 20 BRPM | HEART RATE: 90 BPM | DIASTOLIC BLOOD PRESSURE: 78 MMHG | SYSTOLIC BLOOD PRESSURE: 160 MMHG | OXYGEN SATURATION: 97 % | TEMPERATURE: 98.7 F | WEIGHT: 178 LBS | BODY MASS INDEX: 28.61 KG/M2 | HEIGHT: 66 IN

## 2024-09-11 DIAGNOSIS — R07.9 CHEST PAIN, UNSPECIFIED TYPE: ICD-10-CM

## 2024-09-11 DIAGNOSIS — R53.1 RIGHT SIDED WEAKNESS: ICD-10-CM

## 2024-09-11 LAB
ANION GAP SERPL CALCULATED.3IONS-SCNC: 11 MMOL/L (ref 7–15)
APTT PPP: 28 SECONDS (ref 22–38)
BASOPHILS # BLD AUTO: 0 10E3/UL (ref 0–0.2)
BASOPHILS NFR BLD AUTO: 1 %
BUN SERPL-MCNC: 32.7 MG/DL (ref 8–23)
CALCIUM SERPL-MCNC: 9.4 MG/DL (ref 8.8–10.4)
CHLORIDE SERPL-SCNC: 109 MMOL/L (ref 98–107)
CREAT SERPL-MCNC: 1.27 MG/DL (ref 0.51–0.95)
EGFRCR SERPLBLD CKD-EPI 2021: 48 ML/MIN/1.73M2
EOSINOPHIL # BLD AUTO: 0.2 10E3/UL (ref 0–0.7)
EOSINOPHIL NFR BLD AUTO: 3 %
ERYTHROCYTE [DISTWIDTH] IN BLOOD BY AUTOMATED COUNT: 13.6 % (ref 10–15)
GLUCOSE SERPL-MCNC: 327 MG/DL (ref 70–99)
HCO3 SERPL-SCNC: 21 MMOL/L (ref 22–29)
HCT VFR BLD AUTO: 33.2 % (ref 35–47)
HGB BLD-MCNC: 10.3 G/DL (ref 11.7–15.7)
IMM GRANULOCYTES # BLD: 0 10E3/UL
IMM GRANULOCYTES NFR BLD: 0 %
INR PPP: 1.1 (ref 0.85–1.15)
LYMPHOCYTES # BLD AUTO: 2.4 10E3/UL (ref 0.8–5.3)
LYMPHOCYTES NFR BLD AUTO: 39 %
MCH RBC QN AUTO: 27.3 PG (ref 26.5–33)
MCHC RBC AUTO-ENTMCNC: 31 G/DL (ref 31.5–36.5)
MCV RBC AUTO: 88 FL (ref 78–100)
MONOCYTES # BLD AUTO: 0.3 10E3/UL (ref 0–1.3)
MONOCYTES NFR BLD AUTO: 5 %
NEUTROPHILS # BLD AUTO: 3.2 10E3/UL (ref 1.6–8.3)
NEUTROPHILS NFR BLD AUTO: 52 %
NRBC # BLD AUTO: 0 10E3/UL
NRBC BLD AUTO-RTO: 0 /100
PLATELET # BLD AUTO: 243 10E3/UL (ref 150–450)
POTASSIUM SERPL-SCNC: 4.1 MMOL/L (ref 3.4–5.3)
RBC # BLD AUTO: 3.77 10E6/UL (ref 3.8–5.2)
SODIUM SERPL-SCNC: 141 MMOL/L (ref 135–145)
TROPONIN T SERPL HS-MCNC: 18 NG/L
WBC # BLD AUTO: 6.2 10E3/UL (ref 4–11)

## 2024-09-11 PROCEDURE — 999N000157 HC STATISTIC RCP TIME EA 10 MIN

## 2024-09-11 PROCEDURE — 99285 EMERGENCY DEPT VISIT HI MDM: CPT | Mod: 25

## 2024-09-11 PROCEDURE — 85004 AUTOMATED DIFF WBC COUNT: CPT | Performed by: EMERGENCY MEDICINE

## 2024-09-11 PROCEDURE — 250N000011 HC RX IP 250 OP 636: Performed by: EMERGENCY MEDICINE

## 2024-09-11 PROCEDURE — 85610 PROTHROMBIN TIME: CPT | Performed by: EMERGENCY MEDICINE

## 2024-09-11 PROCEDURE — 85730 THROMBOPLASTIN TIME PARTIAL: CPT | Performed by: EMERGENCY MEDICINE

## 2024-09-11 PROCEDURE — 70496 CT ANGIOGRAPHY HEAD: CPT

## 2024-09-11 PROCEDURE — 93005 ELECTROCARDIOGRAM TRACING: CPT | Performed by: EMERGENCY MEDICINE

## 2024-09-11 PROCEDURE — 84484 ASSAY OF TROPONIN QUANT: CPT | Performed by: EMERGENCY MEDICINE

## 2024-09-11 PROCEDURE — 80048 BASIC METABOLIC PNL TOTAL CA: CPT | Performed by: EMERGENCY MEDICINE

## 2024-09-11 PROCEDURE — 36415 COLL VENOUS BLD VENIPUNCTURE: CPT | Performed by: EMERGENCY MEDICINE

## 2024-09-11 RX ORDER — ACETAMINOPHEN 325 MG/1
975 TABLET ORAL ONCE
Status: COMPLETED | OUTPATIENT
Start: 2024-09-11 | End: 2024-09-11

## 2024-09-11 RX ORDER — LORAZEPAM 2 MG/ML
1 INJECTION INTRAMUSCULAR ONCE
Status: DISCONTINUED | OUTPATIENT
Start: 2024-09-11 | End: 2024-09-12 | Stop reason: HOSPADM

## 2024-09-11 RX ORDER — IOPAMIDOL 755 MG/ML
67 INJECTION, SOLUTION INTRAVASCULAR ONCE
Status: DISCONTINUED | OUTPATIENT
Start: 2024-09-11 | End: 2024-09-11

## 2024-09-11 RX ORDER — IOPAMIDOL 755 MG/ML
75 INJECTION, SOLUTION INTRAVASCULAR ONCE
Status: COMPLETED | OUTPATIENT
Start: 2024-09-11 | End: 2024-09-11

## 2024-09-11 RX ADMIN — IOPAMIDOL 75 ML: 755 INJECTION, SOLUTION INTRAVENOUS at 19:08

## 2024-09-11 ASSESSMENT — ENCOUNTER SYMPTOMS
CHEST TIGHTNESS: 1
NUMBNESS: 1
NECK PAIN: 1
HEADACHES: 1
SHORTNESS OF BREATH: 1
WEAKNESS: 1
LIGHT-HEADEDNESS: 1

## 2024-09-11 ASSESSMENT — ACTIVITIES OF DAILY LIVING (ADL)
ADLS_ACUITY_SCORE: 37

## 2024-09-11 NOTE — TELEPHONE ENCOUNTER
Patient unable to come to [facility] until later this afternoon, per Mara Scott's recommendation advised patient to be evaluated at the ED.

## 2024-09-11 NOTE — ED TRIAGE NOTES
The pt presents for evaluation of R sided numbness and chest pain. The numbness began 20 minutes ago. CP began shortly thereafter, pt unable to provide accurate timeline. She was brought to a room and a provider was requested to perform a neuro exam, and stat EKG requested.

## 2024-09-11 NOTE — CONSULTS
"  [facility]    Stroke Telephone Note    I was called by Tasneem Johnson on 09/11/24 regarding patient Sarah Rahman. The patient is a 59 year old female 59 year old -handed female with past medical history significant for hypertension, hyperlipidemia, ischemic stroke and residual right sided weakness and diabetes mellitus presents with right-sided weakness/numbness which started last night at unknown time. Woke up from a nap 20 min ago with worsening R sided weakness and unable to lift it.    Vitals  BP: (!) 148/77   Pulse: 86   Resp: 22       Weight: 80.7 kg (178 lb)    Stroke Code Data (for stroke code without tele)  Stroke code activated 09/11/24  1826   Stroke provider first response 09/11/24  1826   Last known normal 09/10/24  night  (unknown)  time    Time of discovery (or onset of symptoms) 09/11/24  1800   Head CT read by Stroke Neuro Provider 09/11/24  1902   Was stroke code de-escalated? Yes  09/11/24  1904     Imaging Findings  CT head: no hemorrhage   CTA head/neck:  no lvo    Intravenous Thrombolysis  Not given due to:   - unclear or unfavorable risk-benefit profile for extended window thrombolysis beyond the conventional 4.5 hour time window    Endovascular Treatment  Not initiated due to absence of proximal vessel occlusion    Impression  R Sided weakness/numbness      Recommendations   Mri brain w/wo con  Consult neurology service at [facility]     My recommendations are based on the information provided over the phone by Sarah Rahman's in-person providers. They are not intended to replace the clinical judgment of her in-person providers. I was not requested to personally see or examine the patient at this time.     The Stroke Staff is Dr. Catherine.    Fidel Khan MD  Vascular Neurology Fellow    To page me or covering stroke neurology team member, click here: [facility]  Choose \"On Call\" tab at top, then select \"NEUROLOGY/ALL SITES\" from middle drop-down " "box, press Enter, then look for \"stroke\" or \"telestroke\" for your site.   "

## 2024-09-11 NOTE — TELEPHONE ENCOUNTER
I'm concerned about the fever and her medical co-morbidities that I think this is more appropriate for ED than [facility]

## 2024-09-11 NOTE — ED NOTES
Patient states she developed right sided weakness after getting up from her nap today.  She becomes easily frustrated and weepy when is questioned regarding the timing of her nap and onset of symptoms. Having difficulty answering so the onset of symptoms is still questionable.  She states right sided weakness.  When asked to raise right arm she appears to struggle.  It is noted that she appears to lift it more easily when done so spontaneous. Symptoms appear to wax and wean. Speech is clear, not droops noted. Patient taken to CT scan accompanied by RN.

## 2024-09-11 NOTE — ED PROVIDER NOTES
EMERGENCY DEPARTMENT ENCOUNTER      NAME: Sarah Rahman  AGE: 59 year old female  YOB: 1965  MRN: [id number]  EVALUATION DATE & TIME: 2024  6:15 PM    PCP: Kike David    ED PROVIDER: Tasneem Johnson DO      Chief Complaint   Patient presents with    Numbness           Chest Pain         FINAL IMPRESSION:  1. Right sided weakness    2. Chest pain, unspecified type          ED COURSE & MEDICAL DECISION MAKIN-year-old female presented to the ED for evaluation of sudden onset right arm and leg weakness.  I was called to the patient's room to evaluate the patient for a possible stroke.  The patient indicated initially that her symptoms began less than 30 minutes prior to ED arrival.  She then stated that she may have been experiencing some weakness on the right side the night before.  In addition to the right-sided weakness the patient also reported a headache as well as chest pain.  The patient was slightly hypertensive upon arrival but she was otherwise hemodynamic stable.  When I initially walked into the room the patient was observed moving her right arm and leg.  However, on her stroke evaluation the patient stated that she could not lift her right arm or leg.  The patient also reported diminished sensation in both the right arm and leg as well as the upper and lower portions of the right side of her face.  There was no facial droop or slurred speech noted.  No other focal neurologic deficits were noted.    Following her initial the patient a tier 1 stroke was called.  The patient's case was discussed with the on-call stroke neurologist.  The patient's case was also discussed with the radiologist who stated that the initial CT and CTA of the head and neck did not show evidence of acute infarction or significant new or worsening vessel stenosis or occlusion.      The patient's chart was reviewed.  The patient presented to the ED on 2024 for similar right arm and leg numbness.   "CT/CTA was nondiagnostic at that time.  An MRI was also obtained which was was limited secondary to artifact.  The patient also presented on 9/1/2024 for the same right arm and leg numbness.  Again the CT/CTA were nondiagnostic and an MRI was not helpful because the patient was unable to completed it.      The patient's case was again discussed with the stroke neurologist who also reviewed the patient's chart and noted the previous visits that were similar in nature.  The stroke code was canceled.  An MRI of the brain with and without contrast was then ordered.    An EKG was obtained which revealed sinus rhythm without any new or concerning ST or T wave changes.    The laboratory tests show a CBC which was unremarkable or unchanged from baseline.  The patient's glucose level was slightly elevated 327.  There is no evidence of DKA.  Creatinine was also slightly elevated when compared to her normal baseline.  The remainder of the BMP labs were unremarkable.  Initial troponin was minimally elevated at 18.    The patient was reevaluated and informed of the initial lab, EKG, and CT scan results.  The patient was quite agitated at the time of reevaluation.  The patient stated that no one in this hospital cares for her or  takes her symptoms seriously \"because I'm black.\"  I again informed the patient that we are taking her seriously which is why we called a tier 1 stroke code when she arrived and rushed her off to the CT scanner.  This is also why a follow-up MRI was ordered to further evaluate her symptoms to ensure that she is not having a stroke.  Despite multiple attempts to reassure and redirect the patient, she stated that she wanted to leave the ED and to go to another hospital.  The patient was informed that she will need to sign out AMA  given the fact that she is currently being worked up for a possible stroke and chest pain.  The patient declined to sign any paperwork.  She was observed walking out of the ED " using her right leg with a slight limp.  She was also observed using her right arm without any difficulty as she walked out of the ED.         Pertinent Labs & Imaging studies reviewed. (See chart for details)  6:15 PM I met with the patient to gather history and to perform my initial exam. We discussed plans for the ED course, including diagnostic testing and treatment.   6:22 PM I called for a tier 1 stroke code.   6:28 PM I spoke to stroke neurology.  7:07 PM I spoke to stroke neurology.      At the conclusion of the encounter I discussed the results of all of the tests and the disposition. The questions were answered. The patient or family acknowledged understanding and was agreeable with the care plan.     Medical Decision Making    History:  Supplemental history from: N/A  External Record(s) reviewed: Documented in chart and Inpatient Record: Admission on 8/26-8/30/2024 at [facility]    Work Up:  Chart documentation includes differential considered and any EKGs or imaging independently interpreted by provider, where specified.  In additional to work up documented, I considered the following work up: Documented in chart, if applicable.    External consultation:  Discussion of management with another provider: Documented in chart, if applicable and Other: Stroke Neurology    Complicating factors:  Care impacted by chronic illness: Cerebrovascular Disease, Chronic Lung Disease, Diabetes, and Heart Disease  Care affected by social determinants of health: N/A    Disposition considerations:  AMA        PPE worn: n95 mask, goggles    MEDICATIONS GIVEN IN THE EMERGENCY:  Medications   LORazepam (ATIVAN) injection 1 mg (has no administration in time range)   iopamidol (ISOVUE-370) solution 75 mL (75 mLs Intravenous $Given 9/11/24 1908)   acetaminophen (TYLENOL) tablet 975 mg (975 mg Oral Not Given 9/11/24 2036)       NEW PRESCRIPTIONS STARTED AT TODAY'S ER VISIT  Discharge Medication List as  of 9/11/2024 10:00 PM             =================================================================    HPI    Patient information was obtained from: Patient    Use of : N/A         Sarah Rahman is a 59 year old female with a pertinent history of coronary artery disease, diabetes, asthma, and anemia who presents to this ED by walk-in for evaluation of numbness and chest pain.    Per chart review, patient was admitted on 8/26-8/30/2024 at [facility] for stroke like symptoms and RLE cellulitis related to dog bite. Patient presented with sudden onset of right-sided arm and leg weakness. CT were negative but provider suspected the patient had a stroke despite negative imaging. Patient was prescribed Lipitor, Cleocin, Plavix, Monodox, and Neurontin. She was discharged home in stable condition.    Patient explains yesterday night (9/10/2024), she started experiencing right left weakness but was able to move and lift it. Today around 5 PM, she took a nap and woke up at 6 PM with increasing weakness and numbness in her right leg. In addition to her leg, patient started experiencing right arm numbness, chest pain, shortness of breath, a headache, lightheadedness, and blurry vision.     Currently in the ED, patient states she is unable to move or lift her right leg. She has a history of past strokes, with a symptom from that experience being a loss of hearing in her right ear. Patient is otherwise in her normal state of health with no other concerns.     REVIEW OF SYSTEMS   Review of Systems   Eyes:  Positive for visual disturbance.   Respiratory:  Positive for chest tightness and shortness of breath.    Cardiovascular:  Positive for chest pain.   Musculoskeletal:  Positive for neck pain.        Positive for inability to move RLE and RUE.    Neurological:  Positive for weakness, light-headedness, numbness and headaches.        Positive for numbness and weakness to her RLE and RUE.    All other systems reviewed and are negative.       PAST MEDICAL HISTORY:  Past Medical History:   Diagnosis Date    Asthma     CAD (coronary artery disease)     COPD (chronic obstructive pulmonary disease) (H)     CVA (cerebral infarction)     Dyshidrosis (pompholyx) 10/21/2016    GERD (gastroesophageal reflux disease)     History of CVA (cerebrovascular accident) 04/01/2012    Formatting of this note might be different from the original.  Overview:   Bilateral subacute cerebellar infarcts seen on MRI at [facility] 4/2012.  Pt was noted to have no residual deficits at [facility].  Formatting of this note might be different from the original.  Bilateral subacute cerebellar infarcts seen on MRI at [facility] 4/2012.  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 Hamm at [facility]    Schizoaffective disorder (H)     Sleep difficulties 07/17/2022       PAST SURGICAL HISTORY:  Past Surgical History:   Procedure Laterality Date    BYPASS GRAFT ARTERY CORONARY  01/01/2009    x2    CAROTID ENDARTERECTOMY Left 12/2021    CORONARY STENT PLACEMENT      CV ANGIOGRAM CORONARY GRAFT N/A 1/16/2024    Procedure: Coronary Angiogram Graft;  Surgeon: Spencer Diez MD;  Location: [facility] CATH LAB CV    CV CORONARY ANGIOGRAM N/A 06/02/2021    Procedure: Coronary Angiogram;  Surgeon: Juventino Rivera MD;  Location: [facility] Cardiac Cath Lab;  Service: Cardiology    HYSTERECTOMY TOTAL ABDOMINAL      HYSTERECTOMY TOTAL ABDOMINAL, BILATERAL SALPINGO-OOPHORECTOMY, COMBINED      IR LUMBAR PUNCTURE  7/23/2019    IR TRANSLAMINAR EPIDURAL LUMBAR INJ INCL IMAGING  09/27/2022    LUMBAR DISCECTOMY Right 06/13/2019    L5-S1 - Dr. Hamm    NASAL  "FRACTURE SURGERY      ORIF ULNAR / RADIAL SHAFT FRACTURE Right            CURRENT MEDICATIONS:    acetaminophen (TYLENOL) 500 MG tablet  albuterol (PROAIR HFA) 108 (90 Base) MCG/ACT inhaler  aspirin 81 MG EC tablet  aspirin 81 MG EC tablet  atorvastatin (LIPITOR) 40 MG tablet  clopidogrel (PLAVIX) 75 MG tablet  dulaglutide (TRULICITY) 0.75 MG/0.5ML pen  DULoxetine (CYMBALTA) 60 MG capsule  fluticasone (FLONASE) 50 MCG/ACT nasal spray  gabapentin (NEURONTIN) 100 MG capsule  insulin glargine (LANTUS PEN) 100 UNIT/ML pen  lidocaine (LIDODERM) 5 % patch  LORazepam (ATIVAN) 0.5 MG tablet  methocarbamol (ROBAXIN) 750 MG tablet  naloxone (NARCAN) 4 MG/0.1ML nasal spray  omeprazole (PRILOSEC) 40 MG DR capsule  ondansetron (ZOFRAN) 8 MG tablet  topiramate (TOPAMAX) 50 MG tablet  triamcinolone (KENALOG) 0.1 % external ointment        ALLERGIES:  Allergies   Allergen Reactions    Haloperidol Unknown     Patient states it stops her heart      Haloperidol Angioedema    Hydroxyzine Angioedema    Meperidine And Related Angioedema, Difficulty breathing, Other (See Comments), Rash and Shortness Of Breath     Throat closes  Other reaction(s): Breathing Difficulty  Other reaction(s): Breathing Difficulty      Phenothiazines Other (See Comments)     Other reaction(s): CARDIAC DISTURBANCES  Patient states it stops her heart  \"I swell up\"  \"stopped by heart\"  \"I swell up\"  \"I swell up\"      Phenothiazines Angioedema    Tramadol Other (See Comments)     Other reaction(s): Angioedema  Throat itch      Tramadol Angioedema    Januvia [Sitagliptin] Swelling    Latex Itching    Haloperidol And Related Angioedema    Januvia [Sitagliptin] Other (See Comments)     Swelling in the neck     Latex Itching    Lisinopril Other (See Comments)     ACE cough  Itchy throat  Throat itches  Itchy throat      Nortriptyline Unknown     Fainting, unclear if it was related    Penicillins Swelling    Hydroxyzine Other (See Comments) and Rash     Tongue " swelling  Tongue swelling  Tongue swelling      Lisinopril Cough       FAMILY HISTORY:  Family History   Problem Relation Age of Onset    Heart Disease Mother     Heart Disease Father     Heart Disease Sister     Heart Disease Sister     Diabetes Brother     Coronary Artery Disease Brother         MI       SOCIAL HISTORY:   Social History     Socioeconomic History    Marital status:    Tobacco Use    Smoking status: Every Day     Types: Cigarettes    Smokeless tobacco: Never    Tobacco comments:     Seen IP by [facility] on 7/28/23 and declined counseling services and resource packet Smokes 1 cigarettes per day   Vaping Use    Vaping status: Never Used   Substance and Sexual Activity    Alcohol use: Not Currently     Comment: Alcoholic Drinks/day: occ    Drug use: No    Sexual activity: Not Currently   Social History Narrative    Lives alone in a condo.          On disability.  Three living children.          Has one small dog as her .        Has personal care attendant (PCA) services during the daytime and sometimes in the evening.     Social Determinants of Health     Financial Resource Strain: Low Risk  (9/3/2024)    Financial Resource Strain     Within the past 12 months, have you or your family members you live with been unable to get utilities (heat, electricity) when it was really needed?: No   Transportation Needs: Low Risk  (9/3/2024)    Transportation Needs     Within the past 12 months, has lack of transportation kept you from medical appointments, getting your medicines, non-medical meetings or appointments, work, or from getting things that you need?: No   Physical Activity: Not on File (12/4/2021)    Received from [facility]    Physical Activity     Physical Activity: 0   Stress: Not on File (12/4/2021)    Received from [facility]    Stress     Stress: 0    Received from [facility], [facility]    Social Connections  "  Interpersonal Safety: Low Risk  (9/3/2024)    Interpersonal Safety     Do you feel physically and emotionally safe where you currently live?: Yes     Within the past 12 months, have you been hit, slapped, kicked or otherwise physically hurt by someone?: No     Within the past 12 months, have you been humiliated or emotionally abused in other ways by your partner or ex-partner?: No   Housing Stability: Low Risk  (9/3/2024)    Housing Stability     Do you have housing? : Yes     Are you worried about losing your housing?: No       VITALS:  BP (!) 160/78   Pulse 90   Temp 98.7  F (37.1  C) (Oral)   Resp 20   Ht 1.676 m (5' 6\")   Wt 80.7 kg (178 lb)   LMP  (LMP Unknown)   SpO2 97%   BMI 28.73 kg/m      PHYSICAL EXAM    General presentation: Alert, Vital signs reviewed. NAD. Anxious appearing.   HENT: ENT inspection is normal. Oropharynx is moist and clear.   Eye: Pupils are equal and reactive to light. EOMI  Neck: The neck is supple, with full ROM, with no evidence of meningismus.  Pulmonary: Currently in no acute respiratory distress. Normal, non labored respirations, the lung sounds are normal with good equal air movement. Clear to auscultation bilaterally.   Circulatory: Regular rate and rhythm. Peripheral pulses are strong and equal. No murmurs, rubs, or gallops.   Abdominal: The abdomen is soft. Nontender. No rigidity, guarding, or rebound. Bowel sounds normal.   Neurologic: Alert, oriented to person, place, and time.  Weakness noted within the right arm and leg.  Sensory discrepancies to light touch noted with the right arm and leg.  Sensory discrepancies to light touch noted to the right upper and lower portion of her face.  Cranial nerves II through XII are otherwise intact.  Musculoskeletal: No extremity tenderness. Full range of motion in all extremities. No extremity edema.   Skin: Skin color is normal. No rash. Warm. Dry to touch.      National Institutes of Health Stroke Scale  Exam Interval: " Baseline   Score    Level of consciousness: (0)   Alert, keenly responsive    LOC questions: (0)   Answers both questions correctly    LOC commands: (0)   Performs both tasks correctly    Best gaze: (0)   Normal    Visual: (0)   No visual loss    Facial palsy: (0)   Normal symmetrical movements    Motor arm (left): (0)   No drift    Motor arm (right): (2)   Some effort against gravity    Motor leg (left): (0)   No drift    Motor leg (right): (2)   Some effort against gravity    Limb ataxia: (0)   Absent    Sensory: (1)   Mild to moderate sensory loss    Best language: (0)   Normal- no aphasia    Dysarthria: (0)   Normal    Extinction and inattention: (0)   No abnormality        Total Score:  5         LAB:  All pertinent labs reviewed and interpreted.  Results for orders placed or performed during the hospital encounter of 09/11/24   CTA Head Neck with Contrast    Impression    IMPRESSION:   HEAD CT:  1.  No CT evidence for acute intracranial process.  2.  Brain atrophy and presumed chronic microvascular ischemic changes as above.    HEAD CTA:   1.  Unchanged diffuse irregularity of the intracranial arteries resulting in multifocal stenosis as detailed above. No new stenosis or occlusion.    NECK CTA:  1.  No hemodynamically significant stenosis in the neck vessels.   2.  No evidence for dissection.   Basic metabolic panel   Result Value Ref Range    Sodium 141 135 - 145 mmol/L    Potassium 4.1 3.4 - 5.3 mmol/L    Chloride 109 (H) 98 - 107 mmol/L    Carbon Dioxide (CO2) 21 (L) 22 - 29 mmol/L    Anion Gap 11 7 - 15 mmol/L    Urea Nitrogen 32.7 (H) 8.0 - 23.0 mg/dL    Creatinine 1.27 (H) 0.51 - 0.95 mg/dL    GFR Estimate 48 (L) >60 mL/min/1.73m2    Calcium 9.4 8.8 - 10.4 mg/dL    Glucose 327 (H) 70 - 99 mg/dL   Result Value Ref Range    INR 1.10 0.85 - 1.15   Partial thromboplastin time   Result Value Ref Range    aPTT 28 22 - 38 Seconds   Result Value Ref Range    Troponin T, High Sensitivity 18 (H) <=14 ng/L   CBC  with platelets and differential   Result Value Ref Range    WBC Count 6.2 4.0 - 11.0 10e3/uL    RBC Count 3.77 (L) 3.80 - 5.20 10e6/uL    Hemoglobin 10.3 (L) 11.7 - 15.7 g/dL    Hematocrit 33.2 (L) 35.0 - 47.0 %    MCV 88 78 - 100 fL    MCH 27.3 26.5 - 33.0 pg    MCHC 31.0 (L) 31.5 - 36.5 g/dL    RDW 13.6 10.0 - 15.0 %    Platelet Count 243 150 - 450 10e3/uL    % Neutrophils 52 %    % Lymphocytes 39 %    % Monocytes 5 %    % Eosinophils 3 %    % Basophils 1 %    % Immature Granulocytes 0 %    NRBCs per 100 WBC 0 <1 /100    Absolute Neutrophils 3.2 1.6 - 8.3 10e3/uL    Absolute Lymphocytes 2.4 0.8 - 5.3 10e3/uL    Absolute Monocytes 0.3 0.0 - 1.3 10e3/uL    Absolute Eosinophils 0.2 0.0 - 0.7 10e3/uL    Absolute Basophils 0.0 0.0 - 0.2 10e3/uL    Absolute Immature Granulocytes 0.0 <=0.4 10e3/uL    Absolute NRBCs 0.0 10e3/uL       RADIOLOGY:  Reviewed all pertinent imaging. Please see official radiology report.  CTA Head Neck with Contrast   Final Result   Addendum (preliminary) 1 of 1      Preliminary findings were discussed with Dr. Johnson on 9/11/2024 7:08 PM CDT.      Additional Indication: Right-sided chest numbness and chest pain.      END ADDENDUM      Final   IMPRESSION:    HEAD CT:   1.  No CT evidence for acute intracranial process.   2.  Brain atrophy and presumed chronic microvascular ischemic changes as above.      HEAD CTA:    1.  Unchanged diffuse irregularity of the intracranial arteries resulting in multifocal stenosis as detailed above. No new stenosis or occlusion.      NECK CTA:   1.  No hemodynamically significant stenosis in the neck vessels.    2.  No evidence for dissection.      MR Brain w/o & w Contrast    (Results Pending)   Cervical spine MRI w/o contrast    (Results Pending)       EKG:    Normal sinus rhythm.  Rate of 98.  Normal QRS.  Normal QT.  Nonspecific ST segment changes noted.  Compared to the EKG on 9/1/2024 no significant changes are noted.    I have independently reviewed and  interpreted the EKG(s) documented above.        I, Dior Epstein, am serving as a scribe to document services personally performed by Tasneem Johnson DO based on my observation and the provider's statements to me. I, Tasneem Johnson, attest that Dior Epstein is acting in a scribe capacity, has observed my performance of the services and has documented them in accordance with my direction.    Tasneem Johnson DO  Emergency Medicine  [facility] EMERGENCY DEPARTMENT  [address] [place] [id number]  [phone]     Tasneem Johnson DO  09/12/24 0015

## 2024-09-11 NOTE — TELEPHONE ENCOUNTER
"S-(situation): wound    B-(background): dog bite on 8/25/24. Pt finished antibiotics. Pt states went to ED on 9/9 but after waiting 3 hours, went home.     A-(assessment): swelling above knee, some right sided facial swelling also noted 9/10.  10/10 pain.   Patient states leg is more pink than red.   Increased Yellow drainage from old dog bite wound.   Pt noticed fever starting yesterday, unable to tell writer how high.     R-(recommendations): ED/ [facility] now (or to office with PCP approval). Pt does not want to go to ED due to wait time. Pt open to ADS if provider approves.     Reason for Disposition   SEVERE swelling (e.g., swelling extends above knee, entire leg is swollen, weeping fluid)    Additional Information   Negative: Chest pain   Negative: Followed an insect bite and has localized swelling (e.g., small area of puffy or swollen skin)   Negative: Followed a knee injury   Negative: Ankle or foot injury   Negative: Pregnant with leg swelling or edema   Negative: Difficulty breathing at rest   Negative: Entire foot is cool or blue in comparison to other side    Answer Assessment - Initial Assessment Questions  1. ONSET: \"When did the swelling start?\" (e.g., minutes, hours, days)      Started when she finished antibiotics, 9/9?  2. LOCATION: \"What part of the leg is swollen?\"  \"Are both legs swollen or just one leg?\"      Right leg  3. SEVERITY: \"How bad is the swelling?\" (e.g., localized; mild, moderate, severe)    - Localized: Small area of swelling localized to one leg.    - MILD pedal edema: Swelling limited to foot and ankle, pitting edema < 1/4 inch (6 mm) deep, rest and elevation eliminate most or all swelling.    - MODERATE edema: Swelling of lower leg to knee, pitting edema > 1/4 inch (6 mm) deep, rest and elevation only partially reduce swelling.    - SEVERE edema: Swelling extends above knee, facial or hand swelling present.       Severe to leg as it is above knee, facial swelling started yesterday  4. " "REDNESS: \"Does the swelling look red or infected?\"      Pink.   5. PAIN: \"Is the swelling painful to touch?\" If Yes, ask: \"How painful is it?\"   (Scale 1-10; mild, moderate or severe)      10/10  6. FEVER: \"Do you have a fever?\" If Yes, ask: \"What is it, how was it measured, and when did it start?\"       Yes, 9/10   7. CAUSE: \"What do you think is causing the leg swelling?\"      From past dog bite  8. MEDICAL HISTORY: \"Do you have a history of blood clots (e.g., DVT), cancer, heart failure, kidney disease, or liver failure?\"      NA  9. RECURRENT SYMPTOM: \"Have you had leg swelling before?\" If Yes, ask: \"When was the last time?\" \"What happened that time?\"      Yes, last week pt was admitted for IV antibiotics  10. OTHER SYMPTOMS: \"Do you have any other symptoms?\" (e.g., chest pain, difficulty breathing)        Right side chest pain, started 9/10 took 2 nitros and that helped her chest pain but not leg pain   11. PREGNANCY: \"Is there any chance you are pregnant?\" \"When was your last menstrual period?\"        NA    Protocols used: Leg Swelling and Edema-A-OH    "

## 2024-09-11 NOTE — PROGRESS NOTES
Stoke code evaluation, Room air SpO2 99 %, BS diminished bases. RR WNL, pt upset and crying. RN/MD at bedside. RT available as needed.     Pulse 98   Resp 29   LMP  (LMP Unknown)   SpO2 99%

## 2024-09-12 ENCOUNTER — TELEPHONE (OUTPATIENT)
Dept: INTERNAL MEDICINE | Facility: CLINIC | Age: 59
End: 2024-09-12
Payer: MEDICAID

## 2024-09-12 NOTE — PROGRESS NOTES
Care management received a call from [facility] requesting a return call to [phone]. Writer left a brief message and awaiting call back.    Tiara Aquino RN

## 2024-09-12 NOTE — TELEPHONE ENCOUNTER
This will be addressed by the clinic manager.  Discussed today.    Kike David MD  General Internal Medicine  [facility]  9/12/2024, 1:03 PM

## 2024-09-12 NOTE — DISCHARGE INSTRUCTIONS
Please follow-up with neurology for further evaluation of your intermittent right-sided weakness.  Return to ED sooner for any worsening numbness, tingling, weakness, chest pain or pressure, or any other new or concerning symptoms.

## 2024-09-12 NOTE — TELEPHONE ENCOUNTER
Reason for Call:  Appointment Request    Patient requesting this type of appt:  Hospital/ED Follow-Up     Requested provider: Kike David    Reason patient unable to be scheduled: Not with their preferred provider and provider     When does patient want to be seen/preferred time:  Today or tomorrow     Comments: Patient is very upset and frustrated.  Was back in ERD yesterday for infection. Per patient you nurses would just like me to get my leg amputated. I am going to talk to my  so I can get away from Dr. David and his nurses, except Maynor.  Maynor is helpful and kind. Relayed to patient that message will be sent to Dr. David to see if we can work into his schedule - patient continues to insult Writer related to poor care, does not need small talk demands to speak to Maynor. Repeats frustration.     Okay to leave a detailed message?: Yes at Home number on file [phone] (home)    Call taken on 9/12/2024 at 10:47 AM by Maryjane Pelayo

## 2024-09-12 NOTE — TELEPHONE ENCOUNTER
"PATIENT DISMISSAL    09/12/24 From agent: \"I just had a phone call where the patient was irate and stated that if she didn't get to see or speak to Dr. David at the [facility] she would come to the clinic and we would need to call the  and the swat team.\"     Clinic manager met with security and risk management. Patient as of 09/12/24 has been dismissed of [facility]. Certified letter is going out.  "

## 2024-09-13 ENCOUNTER — TELEPHONE (OUTPATIENT)
Dept: INTERNAL MEDICINE | Facility: CLINIC | Age: 59
End: 2024-09-13
Payer: MEDICAID

## 2024-09-13 NOTE — TELEPHONE ENCOUNTER
"Patient calling clinic \"I need to talk to Dr. David's nurse right now!\" and stating she needs a hospital follow up appt right away.     Due to patient dismissal, writer states to patient that \"someone will reach back out to the patient.\"     During this call, patient expresses frustration.     Writer discussed and informed clinic manager of this call.     IF PATIENT CALLS CLINIC BACK- PLEASE INFORM CLINIC MANAGER JEN GUTHRIE.   "

## 2024-09-16 DIAGNOSIS — E11.65 TYPE 2 DIABETES MELLITUS WITH HYPERGLYCEMIA, WITH LONG-TERM CURRENT USE OF INSULIN (H): ICD-10-CM

## 2024-09-16 DIAGNOSIS — Z79.4 TYPE 2 DIABETES MELLITUS WITH HYPERGLYCEMIA, WITH LONG-TERM CURRENT USE OF INSULIN (H): ICD-10-CM

## 2024-09-16 RX ORDER — GLIPIZIDE 10 MG/1
10 TABLET, FILM COATED, EXTENDED RELEASE ORAL DAILY
Qty: 30 TABLET | Refills: 0 | Status: SHIPPED | OUTPATIENT
Start: 2024-09-16 | End: 2024-10-16

## 2024-09-16 NOTE — TELEPHONE ENCOUNTER
Hello,     Patient is looking to refill their Glipizide ER 10mg tablets.     Thank you,    Ani Joseph, T  [facility]

## 2024-09-30 LAB
ATRIAL RATE - MUSE: 98 BPM
DIASTOLIC BLOOD PRESSURE - MUSE: NORMAL MMHG
INTERPRETATION ECG - MUSE: NORMAL
P AXIS - MUSE: 72 DEGREES
PR INTERVAL - MUSE: 176 MS
QRS DURATION - MUSE: 76 MS
QT - MUSE: 350 MS
QTC - MUSE: 446 MS
R AXIS - MUSE: 54 DEGREES
SYSTOLIC BLOOD PRESSURE - MUSE: NORMAL MMHG
T AXIS - MUSE: 93 DEGREES
VENTRICULAR RATE- MUSE: 98 BPM

## 2024-10-01 DIAGNOSIS — E11.65 TYPE 2 DIABETES MELLITUS WITH HYPERGLYCEMIA, WITH LONG-TERM CURRENT USE OF INSULIN (H): ICD-10-CM

## 2024-10-01 DIAGNOSIS — Z79.4 TYPE 2 DIABETES MELLITUS WITH HYPERGLYCEMIA, WITH LONG-TERM CURRENT USE OF INSULIN (H): ICD-10-CM

## 2024-10-01 RX ORDER — GLIPIZIDE 10 MG/1
10 TABLET, FILM COATED, EXTENDED RELEASE ORAL DAILY
Qty: 30 TABLET | Refills: 0 | OUTPATIENT
Start: 2024-10-01

## 2024-10-08 DIAGNOSIS — E11.65 TYPE 2 DIABETES MELLITUS WITH HYPERGLYCEMIA, WITH LONG-TERM CURRENT USE OF INSULIN (H): ICD-10-CM

## 2024-10-08 DIAGNOSIS — Z79.4 TYPE 2 DIABETES MELLITUS WITH HYPERGLYCEMIA, WITH LONG-TERM CURRENT USE OF INSULIN (H): ICD-10-CM

## 2024-10-08 RX ORDER — GLIPIZIDE 10 MG/1
10 TABLET, FILM COATED, EXTENDED RELEASE ORAL DAILY
Qty: 30 TABLET | Refills: 0 | OUTPATIENT
Start: 2024-10-08

## 2024-11-13 NOTE — PLAN OF CARE
Problem: Pain Acute  Goal: Optimal Pain Control and Function  Intervention: Prevent or Manage Pain  Recent Flowsheet Documentation  Taken 5/8/2023 0445 by Judie Power RN  Medication Review/Management: medications reviewed  Taken 5/8/2023 0000 by Judie Power RN  Medication Review/Management: medications reviewed     Problem: Behavioral Health Comorbidity  Goal: Maintenance of Behavioral Health Symptom Control  Outcome: Progressing  Intervention: Maintain Behavioral Health Symptom Control  Recent Flowsheet Documentation  Taken 5/8/2023 0445 by Judie Power RN  Medication Review/Management: medications reviewed  Taken 5/8/2023 0000 by Judie Power RN  Medication Review/Management: medications reviewed     Pt's vital signs were stable. She was NSR on telemetry. She c/o pain in her lower legs and received her scheduled methocarbamol. She did not receive any PRN pain meds due to being drowsy and falling asleep during assessment. She has appeared to be sleeping comfortably throughout the night. Her IV at 0500 was noted to be dislodged and kinked under the dressing. She stated that she is a difficult IV start and they normally need to use the ultrasound to get one placed. The PICC team was paged this morning to come place a new one. She is able to make needs known. Call light is within reach.   
  Problem: Pain Acute  Goal: Optimal Pain Control and Function  Outcome: Progressing  Intervention: Develop Pain Management Plan  Recent Flowsheet Documentation  Taken 5/7/2023 0800 by Ellie Urena RN  Pain Management Interventions: medication (see MAR)     Problem: Behavioral Health Comorbidity  Goal: Maintenance of Behavioral Health Symptom Control  Outcome: Progressing     Patient constantly 10/10 pain. Has scheduled lidocaine patch and prn oxycodone. On and off nausea throughout the day and received zofran. She was able to eat 100% of her breakfast and lunch. She states that she is dizzy all day today and asking for help to use the bedside commode because of it. Patient remained calm all throughout the day.   
  Problem: Pain Chronic (Persistent) (Comorbidity Management)  Goal: Acceptable Pain Control and Functional Ability  Outcome: Progressing   Goal Outcome Evaluation:    Complained of lower back pain- scheduled subaxone and robaxin given.  PRN oxycodone given as well with some relief. Psyche to see in am.                         
  Problem: Pain Chronic (Persistent) (Comorbidity Management)  Goal: Acceptable Pain Control and Functional Ability  Outcome: Progressing   Goal Outcome Evaluation:    Patient is very much alert and oriented. BS was 185  Complained of back pain- scheduled Suboxone and prn oxycodone given.  Patient  states that she is looking forward to be discharged today                      
  Problem: Plan of Care - These are the overarching goals to be used throughout the patient stay.    Goal: Optimal Comfort and Wellbeing  Outcome: Not Progressing  Intervention: Monitor Pain and Promote Comfort  Recent Flowsheet Documentation  Taken 5/6/2023 0208 by Kaity Phillips RN  Pain Management Interventions: medication (see MAR)     Problem: Violence Risk or Actual  Goal: Anger and Impulse Control  Outcome: Not Progressing     Problem: Pain Acute  Goal: Optimal Pain Control and Function  Outcome: Not Progressing  Intervention: Develop Pain Management Plan  Recent Flowsheet Documentation  Taken 5/6/2023 0208 by Kaity Phillips RN  Pain Management Interventions: medication (see MAR)   Goal Outcome Evaluation:       Pt. Angry and agitated all shift. Demanding pain medicine. Updated MD X3. Given a 1 time dose of Morphine. Voltaren gel to foot. PRN Tylenol, scheduled Robaxin given. Pt. Demanding to go to [facility]. On phone with . Updated dr. Pham. She is seeing admissions right now. If pt. Leaves it will be AMA. Had a liquid emesis after eating red jello and upset with nurse when reminded pt. She had just ate red jello. Pt. Insisting it was blood and not red jello. Up to BSC X2. Voiding well. IVF infusing. Vital signs stable. Will continue to monitor.    Kaity Phillips RN                   
  Problem: Plan of Care - These are the overarching goals to be used throughout the patient stay.    Goal: Plan of Care Review  Description: The Plan of Care Review/Shift note should be completed every shift.  The Outcome Evaluation is a brief statement about your assessment that the patient is improving, declining, or no change.  This information will be displayed automatically on your shift note.  Outcome: Progressing   Goal Outcome Evaluation:       Pt irritable this am about overall care at [facility]. Pt reports having no cares done, no housekeeping, terrible food. Terrible nursing and next time will go to [facility]. Pt was NPO and went down for lexiscan. Denies chest pain or SOB. Pt does seem unsteady on feet and reports some dizziness with walking but improves with walking. Pt reports feeling deconditioned. VSS. NSR. C/o chronic back pain and headaches after taking subaxone which is short lived. Prn tylenol given with some relief.                  
  Problem: Plan of Care - These are the overarching goals to be used throughout the patient stay.    Goal: Plan of Care Review  Description: The Plan of Care Review/Shift note should be completed every shift.  The Outcome Evaluation is a brief statement about your assessment that the patient is improving, declining, or no change.  This information will be displayed automatically on your shift note.  Outcome: Progressing   Goal Outcome Evaluation:  Pt going home today.  to pick her up. Pt given new script for lipitor for cholesterol after CABG surgery. Pt c/o chronic pain, taking scheduled suboxone, robaxin. Pt is sleepy in between interactions and often repeats herself. Psych to see today. Pt continues to be somewhat unsteady and SBA. Denies dizziness or chest pain.                       
  Problem: Violence Risk or Actual  Goal: Anger and Impulse Control  Outcome: Not Progressing     Problem: Pain Acute  Goal: Optimal Pain Control and Function  Outcome: Not Progressing  Intervention: Develop Pain Management Plan  Recent Flowsheet Documentation  Taken 5/6/2023 1020 by Ellie Urena RN  Pain Management Interventions: medication (see MAR)     Patient very fearful, scared, anxious and crying on and off throughout the day. Her thoughts are uncoordinated and all over the place. Complains of 10/10 left lower back and chest pain. Lidocaine patch placed on back, scheduled suboxone, and one time IV morphine given. She has numerous amounts of requests throughout the day. While talking to family she gets more anxious and her blood pressure went up to 211/172. After relaxing and calming down it came down to 129/71. Encouraged her to try to remain calm. MD ordered prn ativan if she needs it later. IVF's discontinued and regular diet ordered.   
Occupational Therapy: Orders received. Chart reviewed and discussed with care team.? Occupational Therapy not indicated due to OT has attempted to eval pt for previous 3 days. Per PT eval, pt does not have specific OT needs. OT eval will be deferred to avoid a duplication of services.? Defer discharge recommendations to PT and medical care team.? Will complete orders.     
PRIMARY DIAGNOSIS: CHEST PAIN  OUTPATIENT/OBSERVATION GOALS TO BE MET BEFORE DISCHARGE:  1. Ruled out acute coronary syndrome (negative or stable Troponin):  Yes  2. Pain Status: Improved-controlled with oral pain medications.  3. Appropriate provocative testing performed: Yes  - Stress Test Procedure: ischemic workup to happen in the am  - Interpretation of cardiac rhythm per telemetry tech: NSR    4. Cleared by Consultants (if applicable):No  5. Return to near baseline physical activity: No  Discharge Planner Nurse   Safe discharge environment identified: Yes  Barriers to discharge: Yes       Entered by: Tiara Esquivel RN 05/07/2023 9:45 PM     Please review provider order for any additional goals.   Nurse to notify provider when observation goals have been met and patient is ready for discharge.  Goal Outcome Evaluation:                        
Problem: Violence Risk or Actual  Goal: Anger and Impulse Control  Outcome: Progressing  Intervention: Minimize Safety Risk  Problem: Behavioral Health Comorbidity  Goal: Maintenance of Behavioral Health Symptom Control  Outcome: Progressing  Intervention: Maintain Behavioral Health Symptom Control  Patient calm and cooperative. Respectful of staff. Sleeping most of shift.    Problem: Pain Acute  Goal: Optimal Pain Control and Function  Outcome: Progressing  Intervention: Develop Pain Management Plan  Intervention: Prevent or Manage Pain  VSS. Patient reports severe left lower back pain. Given scheduled robaxin. Patient reported nausea as well, given PRN zofran. Continuing to monitor.    
Chart(s)

## 2024-11-23 DIAGNOSIS — E11.65 TYPE 2 DIABETES MELLITUS WITH HYPERGLYCEMIA, WITH LONG-TERM CURRENT USE OF INSULIN (H): ICD-10-CM

## 2024-11-23 DIAGNOSIS — G45.9 TIA (TRANSIENT ISCHEMIC ATTACK): ICD-10-CM

## 2024-11-23 DIAGNOSIS — Z79.4 TYPE 2 DIABETES MELLITUS WITH HYPERGLYCEMIA, WITH LONG-TERM CURRENT USE OF INSULIN (H): ICD-10-CM

## 2024-11-23 RX ORDER — GLIPIZIDE 10 MG/1
10 TABLET, FILM COATED, EXTENDED RELEASE ORAL DAILY
Qty: 30 TABLET | Refills: 0 | OUTPATIENT
Start: 2024-11-23

## 2024-11-23 RX ORDER — ASPIRIN 81 MG/1
81 TABLET, COATED ORAL DAILY
Qty: 90 TABLET | Refills: 3 | OUTPATIENT
Start: 2024-11-23

## 2024-12-01 ENCOUNTER — APPOINTMENT (OUTPATIENT)
Dept: MRI IMAGING | Facility: HOSPITAL | Age: 59
End: 2024-12-01
Attending: EMERGENCY MEDICINE
Payer: MEDICAID

## 2024-12-01 ENCOUNTER — APPOINTMENT (OUTPATIENT)
Dept: CT IMAGING | Facility: HOSPITAL | Age: 59
End: 2024-12-01
Attending: EMERGENCY MEDICINE
Payer: MEDICAID

## 2024-12-01 ENCOUNTER — APPOINTMENT (OUTPATIENT)
Dept: RADIOLOGY | Facility: HOSPITAL | Age: 59
End: 2024-12-01
Attending: EMERGENCY MEDICINE
Payer: MEDICAID

## 2024-12-01 ENCOUNTER — HOSPITAL ENCOUNTER (EMERGENCY)
Facility: HOSPITAL | Age: 59
Discharge: HOME | End: 2024-12-01
Attending: EMERGENCY MEDICINE | Admitting: EMERGENCY MEDICINE
Payer: MEDICAID

## 2024-12-01 VITALS
DIASTOLIC BLOOD PRESSURE: 82 MMHG | SYSTOLIC BLOOD PRESSURE: 189 MMHG | RESPIRATION RATE: 21 BRPM | OXYGEN SATURATION: 99 % | HEART RATE: 76 BPM

## 2024-12-01 DIAGNOSIS — R20.2 PARESTHESIA: ICD-10-CM

## 2024-12-01 LAB
ANION GAP SERPL CALCULATED.3IONS-SCNC: 10 MMOL/L (ref 7–15)
APTT PPP: 27 SECONDS (ref 22–38)
ATRIAL RATE - MUSE: 80 BPM
BASOPHILS # BLD AUTO: 0 10E3/UL (ref 0–0.2)
BASOPHILS NFR BLD AUTO: 1 %
BUN SERPL-MCNC: 17.1 MG/DL (ref 8–23)
CALCIUM SERPL-MCNC: 8.3 MG/DL (ref 8.8–10.4)
CHLORIDE SERPL-SCNC: 112 MMOL/L (ref 98–107)
CREAT SERPL-MCNC: 0.88 MG/DL (ref 0.51–0.95)
DIASTOLIC BLOOD PRESSURE - MUSE: 76 MMHG
EGFRCR SERPLBLD CKD-EPI 2021: 75 ML/MIN/1.73M2
EOSINOPHIL # BLD AUTO: 0.1 10E3/UL (ref 0–0.7)
EOSINOPHIL NFR BLD AUTO: 2 %
ERYTHROCYTE [DISTWIDTH] IN BLOOD BY AUTOMATED COUNT: 13.1 % (ref 10–15)
GLUCOSE BLDC GLUCOMTR-MCNC: 173 MG/DL (ref 70–99)
GLUCOSE SERPL-MCNC: 168 MG/DL (ref 70–99)
HCO3 SERPL-SCNC: 19 MMOL/L (ref 22–29)
HCT VFR BLD AUTO: 32.2 % (ref 35–47)
HGB BLD-MCNC: 9.8 G/DL (ref 11.7–15.7)
IMM GRANULOCYTES # BLD: 0 10E3/UL
IMM GRANULOCYTES NFR BLD: 0 %
INR PPP: 1.03 (ref 0.85–1.15)
INTERPRETATION ECG - MUSE: NORMAL
LYMPHOCYTES # BLD AUTO: 2.1 10E3/UL (ref 0.8–5.3)
LYMPHOCYTES NFR BLD AUTO: 36 %
MCH RBC QN AUTO: 26.8 PG (ref 26.5–33)
MCHC RBC AUTO-ENTMCNC: 30.4 G/DL (ref 31.5–36.5)
MCV RBC AUTO: 88 FL (ref 78–100)
MONOCYTES # BLD AUTO: 0.3 10E3/UL (ref 0–1.3)
MONOCYTES NFR BLD AUTO: 6 %
NEUTROPHILS # BLD AUTO: 3.2 10E3/UL (ref 1.6–8.3)
NEUTROPHILS NFR BLD AUTO: 55 %
NRBC # BLD AUTO: 0 10E3/UL
NRBC BLD AUTO-RTO: 0 /100
P AXIS - MUSE: NORMAL DEGREES
PLATELET # BLD AUTO: 195 10E3/UL (ref 150–450)
POTASSIUM SERPL-SCNC: 3.6 MMOL/L (ref 3.4–5.3)
PR INTERVAL - MUSE: 182 MS
QRS DURATION - MUSE: 84 MS
QT - MUSE: 394 MS
QTC - MUSE: 454 MS
R AXIS - MUSE: 177 DEGREES
RBC # BLD AUTO: 3.65 10E6/UL (ref 3.8–5.2)
SODIUM SERPL-SCNC: 141 MMOL/L (ref 135–145)
SYSTOLIC BLOOD PRESSURE - MUSE: 162 MMHG
T AXIS - MUSE: 91 DEGREES
TROPONIN T SERPL HS-MCNC: 15 NG/L
TROPONIN T SERPL HS-MCNC: 15 NG/L
VENTRICULAR RATE- MUSE: 80 BPM
WBC # BLD AUTO: 5.9 10E3/UL (ref 4–11)

## 2024-12-01 PROCEDURE — 250N000011 HC RX IP 250 OP 636: Performed by: EMERGENCY MEDICINE

## 2024-12-01 PROCEDURE — 84484 ASSAY OF TROPONIN QUANT: CPT | Performed by: EMERGENCY MEDICINE

## 2024-12-01 PROCEDURE — 250N000009 HC RX 250: Performed by: EMERGENCY MEDICINE

## 2024-12-01 PROCEDURE — 36415 COLL VENOUS BLD VENIPUNCTURE: CPT | Performed by: EMERGENCY MEDICINE

## 2024-12-01 PROCEDURE — 80048 BASIC METABOLIC PNL TOTAL CA: CPT | Performed by: EMERGENCY MEDICINE

## 2024-12-01 PROCEDURE — 70496 CT ANGIOGRAPHY HEAD: CPT

## 2024-12-01 PROCEDURE — 999N000248 HC STATISTIC IV INSERT WITH US BY RN

## 2024-12-01 PROCEDURE — 85730 THROMBOPLASTIN TIME PARTIAL: CPT | Performed by: EMERGENCY MEDICINE

## 2024-12-01 PROCEDURE — 999N000157 HC STATISTIC RCP TIME EA 10 MIN

## 2024-12-01 PROCEDURE — 85610 PROTHROMBIN TIME: CPT | Performed by: EMERGENCY MEDICINE

## 2024-12-01 PROCEDURE — 71045 X-RAY EXAM CHEST 1 VIEW: CPT

## 2024-12-01 PROCEDURE — 70551 MRI BRAIN STEM W/O DYE: CPT

## 2024-12-01 PROCEDURE — 82962 GLUCOSE BLOOD TEST: CPT

## 2024-12-01 PROCEDURE — 93005 ELECTROCARDIOGRAM TRACING: CPT | Performed by: EMERGENCY MEDICINE

## 2024-12-01 PROCEDURE — G0508 CRIT CARE TELEHEA CONSULT 60: HCPCS | Mod: G0 | Performed by: STUDENT IN AN ORGANIZED HEALTH CARE EDUCATION/TRAINING PROGRAM

## 2024-12-01 PROCEDURE — 99285 EMERGENCY DEPT VISIT HI MDM: CPT | Mod: 25

## 2024-12-01 PROCEDURE — 96374 THER/PROPH/DIAG INJ IV PUSH: CPT | Mod: 59

## 2024-12-01 PROCEDURE — 85025 COMPLETE CBC W/AUTO DIFF WBC: CPT | Performed by: EMERGENCY MEDICINE

## 2024-12-01 RX ORDER — IOPAMIDOL 755 MG/ML
67 INJECTION, SOLUTION INTRAVASCULAR ONCE
Status: COMPLETED | OUTPATIENT
Start: 2024-12-01 | End: 2024-12-01

## 2024-12-01 RX ORDER — LORAZEPAM 2 MG/ML
1 INJECTION INTRAMUSCULAR
Status: COMPLETED | OUTPATIENT
Start: 2024-12-01 | End: 2024-12-01

## 2024-12-01 RX ADMIN — LORAZEPAM 1 MG: 2 INJECTION INTRAMUSCULAR; INTRAVENOUS at 16:50

## 2024-12-01 RX ADMIN — SODIUM CHLORIDE 100 ML: 9 INJECTION, SOLUTION INTRAVENOUS at 15:19

## 2024-12-01 RX ADMIN — IOPAMIDOL 67 ML: 755 INJECTION, SOLUTION INTRAVENOUS at 15:19

## 2024-12-01 ASSESSMENT — ACTIVITIES OF DAILY LIVING (ADL)
ADLS_ACUITY_SCORE: 58

## 2024-12-01 NOTE — ED TRIAGE NOTES
Pt to triage with c/o numbness and SOB, unable to get words out. Was brought back to room for triage. Woke up with right sided numbness and weakness. Unable to assess LKWT or answer questions fully     Triage Assessment (Adult)       Row Name 12/01/24 8622          Triage Assessment    Airway WDL WDL        Respiratory WDL    Respiratory WDL X;rhythm/pattern     Rhythm/Pattern, Respiratory shortness of breath;tachypneic        Skin Circulation/Temperature WDL    Skin Circulation/Temperature WDL WDL        Cardiac WDL    Cardiac WDL X;rhythm     Pulse Rate & Regularity tachycardic        Peripheral/Neurovascular WDL    Peripheral Neurovascular WDL X        Cognitive/Neuro/Behavioral WDL    Cognitive/Neuro/Behavioral WDL X     Level of Consciousness alert     Arousal Level opens eyes spontaneously     Mood/Behavior anxious
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
## 2024-12-01 NOTE — CONSULTS
[facility]     Stroke Code Note          History of Present Illness     Chief Complaint: Shortness of Breath and Numbness    Sarah Rahman is a 59 year old female who presented with left leg weakness and right foot tingling that started an hour ago. When I got on camera, RN informed me that her last known well is now clear and reportedly had symptoms on waking up at 10 AM this morning.     Patient states that she has been having right sided weakness since this morning and denies any complaints on the left. She is awake, alert, answering questions and following commands. She has a stuttering speech and has no aphasia or dysarthria. She has some movements on the right arm/leg, refuses to lift up when coached and started crying. She was able to do finger to nose testing well and demonstrated good antigravity strength while doing that on the right.     Of note, she has had several presentations in the past with negative MRIs:  Left sided weakness - 6/2012, 11/2012, 5/2013  Right sided weakness - 4/3/22, 5/24/22, 7/17/22, 10/6/22, 2/22/23, 7/27/23 (received TNK), 1/14/24, 5/30/24, 8/27/24, 9/1/24         Past Medical History     Stroke risk factors: HTN, T2DM, CAD s/p CABG, Schizoaffective disorder  Preadmission antithrombotic regimen: Aspirin, Plavix    Modified Larry Score (Pre-morbid)  2-Slight disability; unable to carry out all previous activities, but able to look after own affairs                   Assessment and Plan       Right sided weakness  Functional neurological disorder     Intravenous Thrombolysis  Not given due to:   - stroke mimic: functional neurological disorder  - unclear or unfavorable risk-benefit profile for extended window thrombolysis beyond the conventional 4.5 hour time window     Endovascular Treatment  Not initiated due to absence of proximal vessel occlusion     Plan:  Low suspicion for stroke given numerous similar presentations and negative MRIs. I  "suspect functional neurological disorder, no need for further neuroimaging. Consider PT/OT evaluation to assess safety prior to discharge. Recommend psychology follow up for cognitive behavioral therapy.      ___________________________________________________________________    John Cho MD       Department of Neurology       Securely message with the Vocera Web Console (learn more here)   To page me or covering stroke neurology team member, click here: AMCOM  Choose \"On Call\" tab at top, then select \"NEUROLOGY/ALL SITES\" from middle drop-down box, press Enter, then look for \"stroke\" or \"telestroke\" for your site.  ___________________________________________________________________        Imaging/Labs   (personally reviewed images below)    CT head: No acute changes noted  CTA head/neck: No LVO or flow limiting stenosis         Physical Examination         Pulse: 79   Resp: 24           SpO2: 100 %            Wt Readings from Last 2 Encounters:   09/11/24 80.7 kg (178 lb)   08/27/24 80.7 kg (177 lb 14.6 oz)       Neuro Exam  Mental Status:  alert, oriented x 3, follows commands. Stuttering speech, no aphasia or dysarthria  Cranial Nerves:  right hemianopia, EOMI with normal smooth pursuit, facial sensation intact and symmetric (tested by nurse), facial movements symmetric  Motor:  no abnormal movements, able to left side without difficulty. Minimal movement on the right, refuses to lift arm/leg when coached but she is able to lift her right arm up antigravity when testing FTN  Reflexes:  unable to test (telestroke)  Sensory:  decreased on the right side  Coordination:  normal finger-to-nose and heel-to-shin bilaterally without dysmetria  Station/Gait:  deferred        Stroke Scales       NIHSS  1a. Level of Consciousness 0-->Alert, keenly responsive   1b. LOC Questions 0-->Answers both questions correctly   1c. LOC Commands 0-->Performs both tasks correctly   2.   Best Gaze 0-->Normal "   3.   Visual 2-->Complete hemianopia   4.   Facial Palsy 0-->Normal symmetrical movements   5a. Motor Arm, Left 0-->No drift, limb holds 90 (or 45) degrees for full 10 secs   5b. Motor Arm, Right 3-->No effort against gravity, limb falls   6a. Motor Leg, Left 0-->No drift, leg holds 30 degree position for full 5 secs   6b. Motor Leg, right 3-->No effort against gravity, leg falls to bed immediately   7.   Limb Ataxia 0-->Absent   8.   Sensory 1-->Mild-to-moderate sensory loss, patient feels pinprick is less sharp or is dull on the affected side, or there is a loss of superficial pain with pinprick, but patient is aware of being touched   9.   Best Language 0-->No aphasia, normal   10. Dysarthria 0-->Normal   11. Extinction and Inattention  0-->No abnormality   Total 9 (12/01/24 1540)            Labs     CBC  Lab Results   Component Value Date    HGB 9.8 (L) 12/01/2024    HCT 32.2 (L) 12/01/2024    WBC 5.9 12/01/2024     12/01/2024       BMP  Lab Results   Component Value Date     12/01/2024    POTASSIUM 3.6 12/01/2024    CHLORIDE 112 (H) 12/01/2024    CO2 19 (L) 12/01/2024    BUN 17.1 12/01/2024    CR 0.88 12/01/2024     (H) 12/01/2024    MAXIMO 8.3 (L) 12/01/2024       INR  INR   Date Value Ref Range Status   12/01/2024 1.03 0.85 - 1.15 Final   09/11/2024 1.10 0.85 - 1.15 Final   09/01/2024 1.00 0.85 - 1.15 Final       Data   Stroke Code Data  (for stroke code with tele)  Stroke code activated 12/01/24  1453   First stroke provider response 12/01/24  1456   Video start time 12/01/24  1513   Video end time 12/01/24  1544   Last known normal 12/01/24   (Unknown)   Time of discovery (or onset of symptoms)  12/01/24  1000   Head CT read by Stroke Neuro Provider 12/01/24  1503   Was stroke code de-escalated? Yes  12/01/24  1540     Telestroke Service Details  Type of service telemedicine diagnostic assessment of acute neurological changes   Reason telemedicine is appropriate patient requires assessment  with a specialist for diagnosis and treatment of neurological symptoms   Mode of transmission secure interactive audio and video communication per Jonathan   Originating site (patient location) [facility]    Distant site (provider location) Provider remote site           # Hyperchloremia: Highest Cl = 112 mmol/L in last 2 days, will monitor as appropriate            # Drug Induced Platelet Defect: home medication list includes an antiplatelet medication   # Hypertension: Noted on problem list      # Anemia: based on hgb <11      # DMII: A1C = N/A within past 6 months          # Financial/Environmental Concerns:    # Asthma: noted on problem list  # History of CABG: noted on surgical history      Time Spent on this Encounter   Billing: I personally examined and evaluated the patient today. At the time of my evaluation and management the patient was critical condition today due to possible acute ischemic stroke. Key decisions made today included eligibility regarding thrombolysis. I spent a total of 50 minutes providing critical care services, evaluating the patient, directing care and reviewing laboratory values and radiologic reports.
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
## 2025-04-06 ENCOUNTER — APPOINTMENT (OUTPATIENT)
Dept: RADIOLOGY | Facility: HOSPITAL | Age: 60
End: 2025-04-06
Attending: HOSPITALIST
Payer: MEDICAID

## 2025-04-06 ENCOUNTER — APPOINTMENT (OUTPATIENT)
Dept: CT IMAGING | Facility: HOSPITAL | Age: 60
End: 2025-04-06
Attending: EMERGENCY MEDICINE
Payer: MEDICAID

## 2025-04-06 ENCOUNTER — APPOINTMENT (OUTPATIENT)
Dept: ULTRASOUND IMAGING | Facility: HOSPITAL | Age: 60
End: 2025-04-06
Attending: EMERGENCY MEDICINE
Payer: MEDICAID

## 2025-04-06 ENCOUNTER — HOSPITAL ENCOUNTER (OUTPATIENT)
Facility: HOSPITAL | Age: 60
Setting detail: OBSERVATION
Discharge: HOME | End: 2025-04-08
Attending: EMERGENCY MEDICINE | Admitting: STUDENT IN AN ORGANIZED HEALTH CARE EDUCATION/TRAINING PROGRAM
Payer: MEDICAID

## 2025-04-06 DIAGNOSIS — R91.8 PULMONARY NODULES: ICD-10-CM

## 2025-04-06 DIAGNOSIS — I10 HYPERTENSION, UNSPECIFIED TYPE: ICD-10-CM

## 2025-04-06 DIAGNOSIS — M79.604 RIGHT LEG PAIN: ICD-10-CM

## 2025-04-06 DIAGNOSIS — R07.9 ACUTE CHEST PAIN: ICD-10-CM

## 2025-04-06 DIAGNOSIS — E11.42 DIABETIC POLYNEUROPATHY ASSOCIATED WITH TYPE 2 DIABETES MELLITUS (H): Primary | ICD-10-CM

## 2025-04-06 DIAGNOSIS — R55 SYNCOPE AND COLLAPSE: ICD-10-CM

## 2025-04-06 DIAGNOSIS — H81.10 BENIGN PAROXYSMAL POSITIONAL VERTIGO, UNSPECIFIED LATERALITY: ICD-10-CM

## 2025-04-06 PROBLEM — J44.9 CHRONIC OBSTRUCTIVE PULMONARY DISEASE (H): Status: ACTIVE | Noted: 2022-01-15

## 2025-04-06 LAB
ALBUMIN SERPL BCG-MCNC: 4.2 G/DL (ref 3.5–5.2)
ALBUMIN UR-MCNC: 20 MG/DL
ALP SERPL-CCNC: 86 U/L (ref 40–150)
ALT SERPL W P-5'-P-CCNC: 17 U/L (ref 0–50)
ANION GAP SERPL CALCULATED.3IONS-SCNC: 9 MMOL/L (ref 7–15)
APPEARANCE UR: CLEAR
AST SERPL W P-5'-P-CCNC: 16 U/L (ref 0–45)
BACTERIA #/AREA URNS HPF: ABNORMAL /HPF
BASOPHILS # BLD AUTO: 0 10E3/UL (ref 0–0.2)
BASOPHILS NFR BLD AUTO: 1 %
BILIRUB DIRECT SERPL-MCNC: 0.11 MG/DL (ref 0–0.3)
BILIRUB SERPL-MCNC: 0.3 MG/DL
BILIRUB UR QL STRIP: NEGATIVE
BUN SERPL-MCNC: 11.7 MG/DL (ref 8–23)
CALCIUM SERPL-MCNC: 8.9 MG/DL (ref 8.8–10.4)
CHLORIDE SERPL-SCNC: 99 MMOL/L (ref 98–107)
CK SERPL-CCNC: 59 U/L (ref 26–192)
COLOR UR AUTO: ABNORMAL
CREAT SERPL-MCNC: 0.71 MG/DL (ref 0.51–0.95)
EGFRCR SERPLBLD CKD-EPI 2021: >90 ML/MIN/1.73M2
EOSINOPHIL # BLD AUTO: 0.2 10E3/UL (ref 0–0.7)
EOSINOPHIL NFR BLD AUTO: 3 %
ERYTHROCYTE [DISTWIDTH] IN BLOOD BY AUTOMATED COUNT: 12.7 % (ref 10–15)
EST. AVERAGE GLUCOSE BLD GHB EST-MCNC: 326 MG/DL
GLUCOSE BLDC GLUCOMTR-MCNC: 306 MG/DL (ref 70–99)
GLUCOSE BLDC GLUCOMTR-MCNC: 343 MG/DL (ref 70–99)
GLUCOSE BLDC GLUCOMTR-MCNC: 480 MG/DL (ref 70–99)
GLUCOSE BLDC GLUCOMTR-MCNC: 506 MG/DL (ref 70–99)
GLUCOSE BLDC GLUCOMTR-MCNC: 530 MG/DL (ref 70–99)
GLUCOSE BLDC GLUCOMTR-MCNC: 531 MG/DL (ref 70–99)
GLUCOSE SERPL-MCNC: 427 MG/DL (ref 70–99)
GLUCOSE UR STRIP-MCNC: >1000 MG/DL
HBA1C MFR BLD: 13 %
HCO3 SERPL-SCNC: 27 MMOL/L (ref 22–29)
HCT VFR BLD AUTO: 38.8 % (ref 35–47)
HGB BLD-MCNC: 12.3 G/DL (ref 11.7–15.7)
HGB UR QL STRIP: NEGATIVE
HOLD SPECIMEN: NORMAL
IMM GRANULOCYTES # BLD: 0 10E3/UL
IMM GRANULOCYTES NFR BLD: 0 %
KETONES UR STRIP-MCNC: NEGATIVE MG/DL
LEUKOCYTE ESTERASE UR QL STRIP: NEGATIVE
LIPASE SERPL-CCNC: 13 U/L (ref 13–60)
LYMPHOCYTES # BLD AUTO: 1.8 10E3/UL (ref 0.8–5.3)
LYMPHOCYTES NFR BLD AUTO: 30 %
MAGNESIUM SERPL-MCNC: 1.9 MG/DL (ref 1.7–2.3)
MCH RBC QN AUTO: 26.9 PG (ref 26.5–33)
MCHC RBC AUTO-ENTMCNC: 31.7 G/DL (ref 31.5–36.5)
MCV RBC AUTO: 85 FL (ref 78–100)
MONOCYTES # BLD AUTO: 0.4 10E3/UL (ref 0–1.3)
MONOCYTES NFR BLD AUTO: 6 %
NEUTROPHILS # BLD AUTO: 3.7 10E3/UL (ref 1.6–8.3)
NEUTROPHILS NFR BLD AUTO: 61 %
NITRATE UR QL: NEGATIVE
NRBC # BLD AUTO: 0 10E3/UL
NRBC BLD AUTO-RTO: 0 /100
NT-PROBNP SERPL-MCNC: 205 PG/ML (ref 0–900)
PH UR STRIP: 6 [PH] (ref 5–7)
PLATELET # BLD AUTO: 201 10E3/UL (ref 150–450)
POTASSIUM SERPL-SCNC: 4 MMOL/L (ref 3.4–5.3)
PROT SERPL-MCNC: 7.1 G/DL (ref 6.4–8.3)
RBC # BLD AUTO: 4.58 10E6/UL (ref 3.8–5.2)
RBC URINE: 2 /HPF
SODIUM SERPL-SCNC: 135 MMOL/L (ref 135–145)
SP GR UR STRIP: 1.01 (ref 1–1.03)
SQUAMOUS EPITHELIAL: 1 /HPF
TROPONIN T SERPL HS-MCNC: 12 NG/L
TROPONIN T SERPL HS-MCNC: 14 NG/L
TSH SERPL DL<=0.005 MIU/L-ACNC: 1.01 UIU/ML (ref 0.3–4.2)
UROBILINOGEN UR STRIP-MCNC: NORMAL MG/DL
WBC # BLD AUTO: 6.2 10E3/UL (ref 4–11)
WBC URINE: 1 /HPF

## 2025-04-06 PROCEDURE — 99255 IP/OBS CONSLTJ NEW/EST HI 80: CPT | Performed by: PSYCHIATRY & NEUROLOGY

## 2025-04-06 PROCEDURE — 93005 ELECTROCARDIOGRAM TRACING: CPT | Performed by: STUDENT IN AN ORGANIZED HEALTH CARE EDUCATION/TRAINING PROGRAM

## 2025-04-06 PROCEDURE — 250N000011 HC RX IP 250 OP 636: Performed by: EMERGENCY MEDICINE

## 2025-04-06 PROCEDURE — 83735 ASSAY OF MAGNESIUM: CPT | Performed by: EMERGENCY MEDICINE

## 2025-04-06 PROCEDURE — 82962 GLUCOSE BLOOD TEST: CPT

## 2025-04-06 PROCEDURE — 80053 COMPREHEN METABOLIC PANEL: CPT | Performed by: EMERGENCY MEDICINE

## 2025-04-06 PROCEDURE — 250N000013 HC RX MED GY IP 250 OP 250 PS 637: Performed by: EMERGENCY MEDICINE

## 2025-04-06 PROCEDURE — 250N000013 HC RX MED GY IP 250 OP 250 PS 637: Performed by: PSYCHIATRY & NEUROLOGY

## 2025-04-06 PROCEDURE — 99223 1ST HOSP IP/OBS HIGH 75: CPT | Performed by: HOSPITALIST

## 2025-04-06 PROCEDURE — 70496 CT ANGIOGRAPHY HEAD: CPT

## 2025-04-06 PROCEDURE — 250N000011 HC RX IP 250 OP 636: Mod: JZ | Performed by: EMERGENCY MEDICINE

## 2025-04-06 PROCEDURE — 73630 X-RAY EXAM OF FOOT: CPT | Mod: RT

## 2025-04-06 PROCEDURE — G0378 HOSPITAL OBSERVATION PER HR: HCPCS

## 2025-04-06 PROCEDURE — 96376 TX/PRO/DX INJ SAME DRUG ADON: CPT

## 2025-04-06 PROCEDURE — 85025 COMPLETE CBC W/AUTO DIFF WBC: CPT | Performed by: EMERGENCY MEDICINE

## 2025-04-06 PROCEDURE — 83036 HEMOGLOBIN GLYCOSYLATED A1C: CPT | Performed by: HOSPITALIST

## 2025-04-06 PROCEDURE — 250N000013 HC RX MED GY IP 250 OP 250 PS 637: Performed by: HOSPITALIST

## 2025-04-06 PROCEDURE — 74174 CTA ABD&PLVS W/CONTRAST: CPT

## 2025-04-06 PROCEDURE — 96374 THER/PROPH/DIAG INJ IV PUSH: CPT

## 2025-04-06 PROCEDURE — 84443 ASSAY THYROID STIM HORMONE: CPT | Performed by: EMERGENCY MEDICINE

## 2025-04-06 PROCEDURE — 999N000104 CT THORACIC SPINE RECONSTRUCTED

## 2025-04-06 PROCEDURE — 81001 URINALYSIS AUTO W/SCOPE: CPT | Performed by: EMERGENCY MEDICINE

## 2025-04-06 PROCEDURE — 82550 ASSAY OF CK (CPK): CPT | Performed by: EMERGENCY MEDICINE

## 2025-04-06 PROCEDURE — 83880 ASSAY OF NATRIURETIC PEPTIDE: CPT | Performed by: EMERGENCY MEDICINE

## 2025-04-06 PROCEDURE — 250N000011 HC RX IP 250 OP 636: Mod: JZ | Performed by: HOSPITALIST

## 2025-04-06 PROCEDURE — 71275 CT ANGIOGRAPHY CHEST: CPT

## 2025-04-06 PROCEDURE — 99285 EMERGENCY DEPT VISIT HI MDM: CPT | Mod: 25

## 2025-04-06 PROCEDURE — 96375 TX/PRO/DX INJ NEW DRUG ADDON: CPT

## 2025-04-06 PROCEDURE — 999N000104 CT LUMBAR SPINE RECONSTRUCTED

## 2025-04-06 PROCEDURE — 36415 COLL VENOUS BLD VENIPUNCTURE: CPT | Performed by: EMERGENCY MEDICINE

## 2025-04-06 PROCEDURE — 999N000104 CT CERVICAL SPINE RECONSTRUCTED

## 2025-04-06 PROCEDURE — 82248 BILIRUBIN DIRECT: CPT | Performed by: EMERGENCY MEDICINE

## 2025-04-06 PROCEDURE — 93970 EXTREMITY STUDY: CPT

## 2025-04-06 PROCEDURE — 250N000012 HC RX MED GY IP 250 OP 636 PS 637: Performed by: HOSPITALIST

## 2025-04-06 PROCEDURE — 83690 ASSAY OF LIPASE: CPT | Performed by: EMERGENCY MEDICINE

## 2025-04-06 PROCEDURE — 85014 HEMATOCRIT: CPT | Performed by: EMERGENCY MEDICINE

## 2025-04-06 PROCEDURE — 84484 ASSAY OF TROPONIN QUANT: CPT | Performed by: EMERGENCY MEDICINE

## 2025-04-06 RX ORDER — NITROGLYCERIN 0.4 MG/1
0.4 TABLET SUBLINGUAL EVERY 5 MIN PRN
Status: DISCONTINUED | OUTPATIENT
Start: 2025-04-06 | End: 2025-04-08 | Stop reason: HOSPADM

## 2025-04-06 RX ORDER — LIDOCAINE 4 G/G
1 PATCH TOPICAL EVERY 24 HOURS
Status: DISCONTINUED | OUTPATIENT
Start: 2025-04-06 | End: 2025-04-08 | Stop reason: HOSPADM

## 2025-04-06 RX ORDER — TRIAMTERENE AND HYDROCHLOROTHIAZIDE 37.5; 25 MG/1; MG/1
1 CAPSULE ORAL DAILY
COMMUNITY
End: 2025-04-06

## 2025-04-06 RX ORDER — OXYCODONE AND ACETAMINOPHEN 10; 325 MG/1; MG/1
1 TABLET ORAL EVERY 6 HOURS PRN
Status: DISCONTINUED | OUTPATIENT
Start: 2025-04-06 | End: 2025-04-06

## 2025-04-06 RX ORDER — MECLIZINE HYDROCHLORIDE 25 MG/1
25 TABLET ORAL ONCE
Status: COMPLETED | OUTPATIENT
Start: 2025-04-06 | End: 2025-04-06

## 2025-04-06 RX ORDER — DULOXETIN HYDROCHLORIDE 60 MG/1
60 CAPSULE, DELAYED RELEASE ORAL 2 TIMES DAILY
COMMUNITY

## 2025-04-06 RX ORDER — HYDRALAZINE HYDROCHLORIDE 20 MG/ML
2 INJECTION INTRAMUSCULAR; INTRAVENOUS ONCE
Status: DISCONTINUED | OUTPATIENT
Start: 2025-04-06 | End: 2025-04-06

## 2025-04-06 RX ORDER — ACETAMINOPHEN 650 MG/1
650 SUPPOSITORY RECTAL EVERY 4 HOURS PRN
Status: DISCONTINUED | OUTPATIENT
Start: 2025-04-06 | End: 2025-04-08 | Stop reason: HOSPADM

## 2025-04-06 RX ORDER — AMLODIPINE BESYLATE 5 MG/1
5 TABLET ORAL DAILY
Status: DISCONTINUED | OUTPATIENT
Start: 2025-04-07 | End: 2025-04-08 | Stop reason: HOSPADM

## 2025-04-06 RX ORDER — NALOXONE HYDROCHLORIDE 0.4 MG/ML
0.4 INJECTION, SOLUTION INTRAMUSCULAR; INTRAVENOUS; SUBCUTANEOUS
Status: DISCONTINUED | OUTPATIENT
Start: 2025-04-06 | End: 2025-04-08 | Stop reason: HOSPADM

## 2025-04-06 RX ORDER — NALOXONE HYDROCHLORIDE 0.4 MG/ML
0.2 INJECTION, SOLUTION INTRAMUSCULAR; INTRAVENOUS; SUBCUTANEOUS
Status: DISCONTINUED | OUTPATIENT
Start: 2025-04-06 | End: 2025-04-08 | Stop reason: HOSPADM

## 2025-04-06 RX ORDER — HYDROMORPHONE HYDROCHLORIDE 1 MG/ML
0.5 INJECTION, SOLUTION INTRAMUSCULAR; INTRAVENOUS; SUBCUTANEOUS ONCE
Status: COMPLETED | OUTPATIENT
Start: 2025-04-06 | End: 2025-04-06

## 2025-04-06 RX ORDER — AMLODIPINE BESYLATE 2.5 MG/1
2.5 TABLET ORAL ONCE
Status: COMPLETED | OUTPATIENT
Start: 2025-04-06 | End: 2025-04-06

## 2025-04-06 RX ORDER — LOSARTAN POTASSIUM 25 MG/1
1 TABLET ORAL DAILY
COMMUNITY
End: 2025-04-06

## 2025-04-06 RX ORDER — HYDRALAZINE HYDROCHLORIDE 10 MG/1
10 TABLET, FILM COATED ORAL EVERY 4 HOURS PRN
Status: DISCONTINUED | OUTPATIENT
Start: 2025-04-06 | End: 2025-04-08 | Stop reason: HOSPADM

## 2025-04-06 RX ORDER — ACETAMINOPHEN 325 MG/1
325 TABLET ORAL EVERY 6 HOURS PRN
Status: DISCONTINUED | OUTPATIENT
Start: 2025-04-06 | End: 2025-04-08 | Stop reason: HOSPADM

## 2025-04-06 RX ORDER — BUPRENORPHINE AND NALOXONE 2; .5 MG/1; MG/1
1 FILM, SOLUBLE BUCCAL; SUBLINGUAL 3 TIMES DAILY
COMMUNITY
Start: 2024-10-31

## 2025-04-06 RX ORDER — GABAPENTIN 300 MG/1
300 CAPSULE ORAL 3 TIMES DAILY
Status: DISCONTINUED | OUTPATIENT
Start: 2025-04-06 | End: 2025-04-08 | Stop reason: HOSPADM

## 2025-04-06 RX ORDER — ONDANSETRON 4 MG/1
4 TABLET, ORALLY DISINTEGRATING ORAL EVERY 6 HOURS PRN
Status: DISCONTINUED | OUTPATIENT
Start: 2025-04-06 | End: 2025-04-08 | Stop reason: HOSPADM

## 2025-04-06 RX ORDER — HYDRALAZINE HYDROCHLORIDE 20 MG/ML
10 INJECTION INTRAMUSCULAR; INTRAVENOUS EVERY 4 HOURS PRN
Status: DISCONTINUED | OUTPATIENT
Start: 2025-04-06 | End: 2025-04-08 | Stop reason: HOSPADM

## 2025-04-06 RX ORDER — IOPAMIDOL 755 MG/ML
90 INJECTION, SOLUTION INTRAVASCULAR ONCE
Status: COMPLETED | OUTPATIENT
Start: 2025-04-06 | End: 2025-04-06

## 2025-04-06 RX ORDER — AMOXICILLIN 250 MG
2 CAPSULE ORAL 2 TIMES DAILY PRN
Status: DISCONTINUED | OUTPATIENT
Start: 2025-04-06 | End: 2025-04-08 | Stop reason: HOSPADM

## 2025-04-06 RX ORDER — ASPIRIN 81 MG/1
1 TABLET ORAL DAILY
COMMUNITY
Start: 2025-03-21 | End: 2026-03-21

## 2025-04-06 RX ORDER — OXYCODONE HYDROCHLORIDE 5 MG/1
10 TABLET ORAL EVERY 6 HOURS PRN
Status: DISCONTINUED | OUTPATIENT
Start: 2025-04-06 | End: 2025-04-07

## 2025-04-06 RX ORDER — ONDANSETRON 2 MG/ML
4 INJECTION INTRAMUSCULAR; INTRAVENOUS EVERY 6 HOURS PRN
Status: DISCONTINUED | OUTPATIENT
Start: 2025-04-06 | End: 2025-04-08 | Stop reason: HOSPADM

## 2025-04-06 RX ORDER — ASPIRIN 325 MG
325 TABLET ORAL ONCE
Status: COMPLETED | OUTPATIENT
Start: 2025-04-06 | End: 2025-04-06

## 2025-04-06 RX ORDER — BUPRENORPHINE AND NALOXONE 2; .5 MG/1; MG/1
1 FILM, SOLUBLE BUCCAL; SUBLINGUAL 3 TIMES DAILY
Status: DISCONTINUED | OUTPATIENT
Start: 2025-04-06 | End: 2025-04-08 | Stop reason: HOSPADM

## 2025-04-06 RX ORDER — ATORVASTATIN CALCIUM 40 MG/1
40 TABLET, FILM COATED ORAL EVERY EVENING
Status: DISCONTINUED | OUTPATIENT
Start: 2025-04-06 | End: 2025-04-08 | Stop reason: HOSPADM

## 2025-04-06 RX ORDER — NICOTINE POLACRILEX 4 MG
15-30 LOZENGE BUCCAL
Status: DISCONTINUED | OUTPATIENT
Start: 2025-04-06 | End: 2025-04-08 | Stop reason: HOSPADM

## 2025-04-06 RX ORDER — ACETAMINOPHEN 325 MG/1
975 TABLET ORAL ONCE
Status: COMPLETED | OUTPATIENT
Start: 2025-04-06 | End: 2025-04-06

## 2025-04-06 RX ORDER — AMLODIPINE BESYLATE 2.5 MG/1
2.5 TABLET ORAL DAILY
COMMUNITY
Start: 2025-01-08 | End: 2026-01-03

## 2025-04-06 RX ORDER — OXYCODONE AND ACETAMINOPHEN 10; 325 MG/1; MG/1
1 TABLET ORAL EVERY 6 HOURS PRN
COMMUNITY
Start: 2025-03-05

## 2025-04-06 RX ORDER — ONDANSETRON 8 MG/1
8 TABLET, ORALLY DISINTEGRATING ORAL EVERY 8 HOURS PRN
COMMUNITY
Start: 2024-06-02 | End: 2025-04-06

## 2025-04-06 RX ORDER — DEXTROSE MONOHYDRATE 25 G/50ML
25-50 INJECTION, SOLUTION INTRAVENOUS
Status: DISCONTINUED | OUTPATIENT
Start: 2025-04-06 | End: 2025-04-08 | Stop reason: HOSPADM

## 2025-04-06 RX ORDER — DIPHENHYDRAMINE HCL 25 MG
25 CAPSULE ORAL 2 TIMES DAILY
Status: DISCONTINUED | OUTPATIENT
Start: 2025-04-06 | End: 2025-04-07

## 2025-04-06 RX ORDER — ASPIRIN 81 MG/1
81 TABLET ORAL DAILY
Status: DISCONTINUED | OUTPATIENT
Start: 2025-04-07 | End: 2025-04-08 | Stop reason: HOSPADM

## 2025-04-06 RX ORDER — METHOCARBAMOL 750 MG/1
750 TABLET, FILM COATED ORAL 2 TIMES DAILY
Status: DISCONTINUED | OUTPATIENT
Start: 2025-04-06 | End: 2025-04-08 | Stop reason: HOSPADM

## 2025-04-06 RX ORDER — DIPHENHYDRAMINE HCL 25 MG
25 TABLET ORAL 2 TIMES DAILY
Status: ON HOLD | COMMUNITY
Start: 2024-11-23 | End: 2025-04-08

## 2025-04-06 RX ORDER — AMOXICILLIN 250 MG
1 CAPSULE ORAL 2 TIMES DAILY PRN
Status: DISCONTINUED | OUTPATIENT
Start: 2025-04-06 | End: 2025-04-08 | Stop reason: HOSPADM

## 2025-04-06 RX ORDER — ACETAMINOPHEN 325 MG/1
650 TABLET ORAL EVERY 4 HOURS PRN
Status: DISCONTINUED | OUTPATIENT
Start: 2025-04-06 | End: 2025-04-08 | Stop reason: HOSPADM

## 2025-04-06 RX ORDER — AMLODIPINE BESYLATE 2.5 MG/1
2.5 TABLET ORAL DAILY
Status: DISCONTINUED | OUTPATIENT
Start: 2025-04-07 | End: 2025-04-06

## 2025-04-06 RX ORDER — GLIPIZIDE 10 MG/1
10 TABLET, FILM COATED, EXTENDED RELEASE ORAL DAILY
COMMUNITY

## 2025-04-06 RX ORDER — DULOXETIN HYDROCHLORIDE 60 MG/1
60 CAPSULE, DELAYED RELEASE ORAL 2 TIMES DAILY
Status: DISCONTINUED | OUTPATIENT
Start: 2025-04-06 | End: 2025-04-08 | Stop reason: HOSPADM

## 2025-04-06 RX ADMIN — MECLIZINE HYDROCHLORIDE 25 MG: 25 TABLET ORAL at 14:15

## 2025-04-06 RX ADMIN — BUPRENORPHINE HYDROCHLORIDE, NALOXONE HYDROCHLORIDE 1 FILM: 2; .5 FILM, SOLUBLE BUCCAL; SUBLINGUAL at 16:32

## 2025-04-06 RX ADMIN — LIDOCAINE 1 PATCH: 4 PATCH TOPICAL at 16:30

## 2025-04-06 RX ADMIN — DIPHENHYDRAMINE HYDROCHLORIDE 25 MG: 25 CAPSULE ORAL at 20:11

## 2025-04-06 RX ADMIN — IOPAMIDOL 90 ML: 755 INJECTION, SOLUTION INTRAVENOUS at 12:51

## 2025-04-06 RX ADMIN — HYDRALAZINE HYDROCHLORIDE 10 MG: 20 INJECTION INTRAMUSCULAR; INTRAVENOUS at 16:36

## 2025-04-06 RX ADMIN — AMLODIPINE BESYLATE 2.5 MG: 2.5 TABLET ORAL at 18:28

## 2025-04-06 RX ADMIN — DULOXETINE HYDROCHLORIDE 60 MG: 60 CAPSULE, DELAYED RELEASE ORAL at 20:11

## 2025-04-06 RX ADMIN — ASPIRIN 325 MG ORAL TABLET 325 MG: 325 PILL ORAL at 12:13

## 2025-04-06 RX ADMIN — HYDROMORPHONE HYDROCHLORIDE 0.5 MG: 1 INJECTION, SOLUTION INTRAMUSCULAR; INTRAVENOUS; SUBCUTANEOUS at 14:03

## 2025-04-06 RX ADMIN — NITROGLYCERIN 0.4 MG: 0.4 TABLET, ORALLY DISINTEGRATING SUBLINGUAL at 12:15

## 2025-04-06 RX ADMIN — INSULIN ASPART 4 UNITS: 100 INJECTION, SOLUTION INTRAVENOUS; SUBCUTANEOUS at 16:48

## 2025-04-06 RX ADMIN — OXYCODONE HYDROCHLORIDE 10 MG: 5 TABLET ORAL at 20:21

## 2025-04-06 RX ADMIN — ACETAMINOPHEN 975 MG: 325 TABLET ORAL at 12:13

## 2025-04-06 RX ADMIN — METHOCARBAMOL 750 MG: 750 TABLET ORAL at 20:11

## 2025-04-06 RX ADMIN — GABAPENTIN 300 MG: 300 CAPSULE ORAL at 20:11

## 2025-04-06 RX ADMIN — ATORVASTATIN CALCIUM 40 MG: 40 TABLET, FILM COATED ORAL at 20:11

## 2025-04-06 RX ADMIN — INSULIN GLARGINE 26 UNITS: 100 INJECTION, SOLUTION SUBCUTANEOUS at 20:25

## 2025-04-06 RX ADMIN — AMLODIPINE BESYLATE 2.5 MG: 2.5 TABLET ORAL at 15:17

## 2025-04-06 RX ADMIN — HYDROMORPHONE HYDROCHLORIDE 0.5 MG: 1 INJECTION, SOLUTION INTRAMUSCULAR; INTRAVENOUS; SUBCUTANEOUS at 17:46

## 2025-04-06 ASSESSMENT — ACTIVITIES OF DAILY LIVING (ADL)
DEPENDENT_IADLS:: CLEANING;COOKING;LAUNDRY;SHOPPING;MEDICATION MANAGEMENT;TRANSPORTATION
ADLS_ACUITY_SCORE: 58
ADLS_ACUITY_SCORE: 61
ADLS_ACUITY_SCORE: 58
ADLS_ACUITY_SCORE: 61
ADLS_ACUITY_SCORE: 61
ADLS_ACUITY_SCORE: 58
ADLS_ACUITY_SCORE: 58
ADLS_ACUITY_SCORE: 55
ADLS_ACUITY_SCORE: 55

## 2025-04-06 ASSESSMENT — COLUMBIA-SUICIDE SEVERITY RATING SCALE - C-SSRS
2. HAVE YOU ACTUALLY HAD ANY THOUGHTS OF KILLING YOURSELF IN THE PAST MONTH?: NO
6. HAVE YOU EVER DONE ANYTHING, STARTED TO DO ANYTHING, OR PREPARED TO DO ANYTHING TO END YOUR LIFE?: NO
1. IN THE PAST MONTH, HAVE YOU WISHED YOU WERE DEAD OR WISHED YOU COULD GO TO SLEEP AND NOT WAKE UP?: NO

## 2025-04-06 ASSESSMENT — ENCOUNTER SYMPTOMS
COUGH: 0
WOUND: 1
WEAKNESS: 0
VOMITING: 0
FREQUENCY: 1
HEADACHES: 0
CONFUSION: 0
FEVER: 0
NECK PAIN: 1
ABDOMINAL PAIN: 0
NAUSEA: 0
DYSURIA: 1
DIARRHEA: 0
SHORTNESS OF BREATH: 0
NUMBNESS: 0

## 2025-04-06 NOTE — PHARMACY-ADMISSION MEDICATION HISTORY
Pharmacy Intern Admission Medication History    Admission medication history is complete. The information provided in this note is only as accurate as the sources available at the time of the update.    Information Source(s): Patient and CareEverywhere/SureScripts via in-person    Pertinent Information:     Changes made to PTA medication list:  Added: amlodipine, aspirin, suboxone, duloxetine, glipizide, oxycodone-acetaminophen, diphenhydramine  Deleted: clopidogrel, gabapentin, omeprazole, topiramate, triamcinolone  Changed: Lantus (26 units BID per patient)    Allergies reviewed with patient and updates made in EHR: yes    Medication History Completed By: JOHN HENRY 4/6/2025 12:02 PM    PTA Med List   Medication Sig Last Dose/Taking    acetaminophen (TYLENOL) 500 MG tablet Take 2 tablets (1,000 mg) by mouth every 6 hours as needed for mild pain. Max 5.5 tabs per day Taking As Needed    albuterol (PROAIR HFA) 108 (90 Base) MCG/ACT inhaler Inhale 1-2 puffs into the lungs every 4 hours as needed for shortness of breath or wheezing Past Week    amLODIPine (NORVASC) 2.5 MG tablet Take 2.5 mg by mouth daily. 4/5/2025 Morning    aspirin 81 MG EC tablet Take 1 tablet by mouth daily. Taking    atorvastatin (LIPITOR) 40 MG tablet Take 1 tablet (40 mg) by mouth every evening. 4/5/2025 Evening    buprenorphine HCl-naloxone HCl (SUBOXONE) 2-0.5 MG per film Place 1 Film under the tongue 3 times daily. Past Week    diphenhydrAMINE (BENADRYL) 25 MG tablet Take 25 mg by mouth 2 times daily. 4/5/2025 Evening    dulaglutide (TRULICITY) 0.75 MG/0.5ML pen Inject 0.75 mg Subcutaneous every 7 days 3/31/2025    DULoxetine (CYMBALTA) 60 MG capsule Take 60 mg by mouth 2 times daily. 4/5/2025 Evening    glipiZIDE (GLUCOTROL XL) 10 MG 24 hr tablet Take 10 mg by mouth daily. 4/5/2025 Morning    insulin glargine (LANTUS PEN) 100 UNIT/ML pen Inject 26 Units subcutaneously 2 times daily. 4/5/2025 Evening    lidocaine (LIDODERM) 5 % patch  Place onto the skin every 24 hours. To prevent lidocaine toxicity, patient should be patch free for 12 hrs daily.  Lower Back 4/5/2025    methocarbamol (ROBAXIN) 750 MG tablet Take 1 tablet (750 mg) by mouth 2 times daily 4/5/2025 Evening    naloxone (NARCAN) 4 MG/0.1ML nasal spray Spray 4 mg into one nostril alternating nostrils as needed for opioid reversal every 2-3 minutes until assistance arrives Taking As Needed    ondansetron (ZOFRAN) 8 MG tablet Take 1 tablet (8 mg) by mouth every 8 hours as needed for nausea 4/5/2025 Evening    oxyCODONE-acetaminophen (PERCOCET)  MG per tablet Take 1 tablet by mouth every 6 hours as needed. 4/5/2025 Evening

## 2025-04-06 NOTE — ED NOTES
[facility] ED Handoff Report    ED Chief Complaint: chest pain, dizziness, syncope    ED Diagnosis:  (R55) Syncope and collapse  Comment: Patient reports multiple syncopal episodes in the past week.   Plan: observation    (I10) Hypertension, unspecified type  Comment: patient has not taken home medications this morning. Home dose given here    (M79.604) Right leg pain  Comment: Patient reports being bitten by a pit bull some time ago, since that time pain has been increased. Dilaudid given.   Plan: observation    (R07.9) Acute chest pain  Comment: patient reports sharp left anterior chest pain that radiates to left shoulder and arm. This improved after nitroglycerin.   Plan: observation    (R91.8) Pulmonary nodules      PMH:    Past Medical History:   Diagnosis Date    Asthma     CAD (coronary artery disease)     COPD (chronic obstructive pulmonary disease) (H)     CVA (cerebral infarction)     Dyshidrosis (pompholyx) 10/21/2016    GERD (gastroesophageal reflux disease)     History of CVA (cerebrovascular accident) 04/01/2012    Formatting of this note might be different from the original.  Overview:   Bilateral subacute cerebellar infarcts seen on MRI at [facility] 4/2012.  Pt was noted to have no residual deficits at [facility].  Formatting of this note might be different from the original.  Bilateral subacute cerebellar infarcts seen on MRI at [facility] 4/2012.  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 Hamm at [facility]    Schizoaffective disorder (H)     Sleep difficulties 07/17/2022        Code Status:  Prior     Falls Risk: Yes Band: Applied    Current Living Situation/Residence: lives alone     Elimination  "Status: Continent: Yes     Activity Level: assist of one    Patients Preferred Language:  English     Needed: No    Vital Signs:  BP (!) 146/73   Pulse 82   Temp 98.6  F (37  C) (Oral)   Resp 15   Ht 1.702 m (5' 7\")   Wt 77.1 kg (170 lb)   LMP  (LMP Unknown)   SpO2 96%   BMI 26.63 kg/m       Cardiac Rhythm: SR    Pain Score: 8/10 pain has improved following medication    Is the Patient Confused:  No    Last Food or Drink: 04/06/25 at sips with meds here. Reports not eating much recently due to pain    Focused Assessment:  alert and oriented, anxious, tearful. Chest pain as mentioned above, right leg pain as mentioned above.     Tests Performed: Done: Labs and Imaging    Treatments Provided:  See MAR    Family Dynamics/Concerns: No    Family Updated On Visitor Policy: No    Plan of Care Communicated to Family: No    Who Was Updated about Plan of Care: Patient is updating family via cell phone    Medications sent with patient: none    Additional Information: call with questions    Philly Moya RN 4/6/2025 3:28 PM      "

## 2025-04-06 NOTE — CONSULTS
"Care Management Initial Consult    General Information  Assessment completed with: Patient,    Type of CM/SW Visit: Initial Assessment    Primary Care Provider verified and updated as needed: Yes   Readmission within the last 30 days: no previous admission in last 30 days      Reason for Consult: discharge planning  Advance Care Planning: Advance Care Planning Reviewed: no concerns identified          Communication Assessment  Patient's communication style: spoken language (English or Bilingual)    Hearing Difficulty or Deaf: yes        Cognitive  Cognitive/Neuro/Behavioral: .WDL except, mood/behavior           Mood/Behavior: anxious, other (see comments) (tearful)          Living Environment:   People in home: alone     Current living Arrangements: apartment      Able to return to prior arrangements: yes       Family/Social Support:  Care provided by: self, other (see comments) (PCA)  Provides care for: pet(s)  Marital Status:   Support system: Friend          Description of Support System: Supportive    Support Assessment: Patient communicates needs well met    Current Resources:   Patient receiving home care services: No        Community Resources: County Worker, PCA (\"I get meals but it's not meals on wheels, I can't remember the name\")  Equipment currently used at home: glucometer  Supplies currently used at home: Diabetic Supplies    Employment/Financial:  Employment Status: disabled        Financial Concerns: none   Referral to Financial Worker: No       Does the patient's insurance plan have a 3 day qualifying hospital stay waiver?  No    Lifestyle & Psychosocial Needs:  Social Drivers of Health     Food Insecurity: Low Risk  (4/6/2025)    Food Insecurity     Within the past 12 months, did you worry that your food would run out before you got money to buy more?: No     Within the past 12 months, did the food you bought just not last and you didn t have money to get more?: No   Depression: Not at risk " (1/30/2024)    PHQ-2     PHQ-2 Score: 0   Housing Stability: Low Risk  (4/6/2025)    Housing Stability     Do you have housing? : Yes     Are you worried about losing your housing?: No   Tobacco Use: High Risk (3/31/2025)    Received from [facility]    Patient History     Smoking Tobacco Use: Every Day     Smokeless Tobacco Use: Never     Passive Exposure: Current   Financial Resource Strain: Low Risk  (4/6/2025)    Financial Resource Strain     Within the past 12 months, have you or your family members you live with been unable to get utilities (heat, electricity) when it was really needed?: No   Alcohol Use: Not At Risk (3/30/2021)    [facility]    Alcohol Use     Total Audit-C Score: Not on file   Transportation Needs: Low Risk  (4/6/2025)    Transportation Needs     Within the past 12 months, has lack of transportation kept you from medical appointments, getting your medicines, non-medical meetings or appointments, work, or from getting things that you need?: No   Physical Activity: Not on File (12/4/2021)    Received from [facility]    Physical Activity     Physical Activity: 0   Interpersonal Safety: Unknown (9/14/2024)    Received from [facility]    Humiliation, Afraid, Rape, and Kick questionnaire     Fear of Current or Ex-Partner: Not on file     Emotionally Abused: No     Physically Abused: No     Sexually Abused: No   Stress: Not on File (12/4/2021)    Received from [facility]    Stress     Stress: 0   Social Connections: Not on File (9/23/2024)    Received from [facility]    Social Connections     Connectedness: 0   Health Literacy: Not on file       Functional Status:  Prior to admission patient needed assistance:   Dependent ADLs:: Independent, Ambulation-no assistive device  Dependent IADLs:: Cleaning, Cooking, Laundry, Shopping, Medication Management, Transportation       Mental Health Status:  Mental Health Status: Past Concern  Mental Health  "Management: Medication    Chemical Dependency Status:  Chemical Dependency Status: No Current Concerns             Values/Beliefs:  Spiritual, Cultural Beliefs, Christianity Practices, Values that affect care: no               Discussed  Partnership in Safe Discharge Planning  document with patient/family: No    Additional Information:  CM consult received for discharge planning.  Met with patient at bedside to introduce CM role and perform initial assessment.  Demographics verified and updated as needed.  Sarah lives alone in an apartment with her service dog.  She has PCA services 9hrs/day.  Patient is independent with ADLs and gets assistance from PCA for most IADLs.  She ambulates without assistive device but states she would like to get a cane for stability.  She is also interested in getting life alert pendant.  Patient has a county  and reports she has \"lots of friends\" to support her if needed.  Discussed possible need for home care services at discharge.  Patient would like home care if available.  PT recommendations pending.      Next Steps: CM to follow for medical progression of care, discharge recommendations, and final discharge plan.      Nick Davenport CRNI      "

## 2025-04-06 NOTE — ED PROVIDER NOTES
EMERGENCY DEPARTMENT ENCOUNTER      NAME: Sarah Rahman  AGE: 59 year old female  YOB: 1965  MRN: [id number]  EVALUATION DATE & TIME: 4/6/2025 10:45 AM    [facility]    ED PROVIDER: Veena Souza M.D.        Chief Complaint   Patient presents with    Chest Pain         FINAL IMPRESSION:    1. Syncope and collapse    2. Hypertension, unspecified type    3. Right leg pain    4. Acute chest pain    5. Pulmonary nodules            MEDICAL DECISION MAKING:    Sarah Rahman is a 59 year old female with history of CAD status post CABG, diabetes, schizoaffective disorder, CVA, COPD, carotid stenosis, anxiety, GERD, history of drug use, hyperlipidemia, history of syphilis, hearing loss, chronic pain, atypical chest pain, right leg weakness, diabetic peripheral neuropathy, who presents to the ER with multiple complaints of chest pain, right arm and leg pain, reports right leg swelling, right neck pain, multiple episodes of syncope, and overall not feeling well.    Workup in the ER is otherwise unremarkable at this time.  To me it sounds like her leg pain is more chronic in nature and has been followed as an outpatient.  I do not think anything emergent needs to be done for this at this time while in the emergency department for this can be further evaluated while in the hospital as they feel necessary.    When I am most concerned about his patient's report of multiple episodes of syncope given her cardiac history.  Plan at this time is admission to the hospitalist for syncope workup.  She will go to cardiac telemetry under observation status.  Hospitalist agrees with this plan.    She also describes intermittent episodes of vertigo and lightheadedness.  This has been going on now for a period of time I do not think she requires emergent MRI through the ER and I do not think represents acute stroke today.  CTA head and neck unremarkable.    Patient will go to the  hospitalist service to cardiac telemetry under observation status for ongoing management.      ED COURSE:  11:00 AM  I met with the patient to gather history and perform my exam. ED course and treatment discussed.    12:50 PM  Patient declining oral opiates and requesting IV pain medication for her arm and leg pain.    2:41 PM  Chart review shows that she was seen in clinic setting at the end of March with similar leg complaints.  Sounds like she has been asked to find a new pain clinic for this pain.  Patient now complaining of some vertigo type symptoms that have been intermittent, she reiterates these falls and passing out.  Some of this I think is more chronic in nature but given her cardiac history I do feel that she warrants a syncope workup.  Patient agrees to look into transfer to [facility].  Blood pressures have been elevated but she admits that she did not take her medications today as she slept at her girlfriend's house last night.  Will order her daily amlodipine now.  Will have nursing hold off on the hydralazine.  Current blood pressure is 182/84 with a heart rate of 85 bpm.  Patient agrees to look into [facility] or other facilities for admission as there are no beds available at our hospital.  She prefers to avoid [facility] though as she feels that is too far.  Will start with [facility].    2:50 PM  Spoke with charge RN as a sound like there is some short stay beds available here at [facility] but we have multiple borders and the preference that this patient be transferred to [facility] since we have orders already.  We will touch base with SOC and proceed with transfer to [facility] if there is a bed available.    3:02 PM  Looks like I misunderstood the charge RN.  We do have observation beds available for this patient here at [facility] and therefore we will look for a bed here at [facility].    3:20 PM  Patient has been accepted to the hospital by the hospitalist will go to cardiac telemetry  under observation status.  We discussed the possibility of neurologic process for her leg pain and her intermittent dizziness.  Certainly that on the differential but is been going on for period of time and I do not have any emergent red flags to suggest that she would need emergent therapy here and therefore any MRI imaging that may be needed can be done while in the hospital.  She is otherwise hemodynamically stable.  Most recent blood pressure is down to 146/73.  I had ordered some hydralazine but that was not given as I found that she had not yet taken her daily amlodipine and this has been ordered.    At this time I do not think that this represents CVA, TIA, SAH, glaucoma, temporal arteritis, sinus thrombosis, vascular dissection, pseudotumor cerebri, meningitis, enchephalitis, hypertensive urgency or emergency,  myocarditis, pericarditis, endocarditis, PE, ruptured AAA, pneumothorax, aortic dissection, bowel obstruction, bowel ischemia, cholecystitis, pancreatitis, diverticulitis, kidney stone, pyelonephritis, incarcerated or strangulated hernia, viscus perforation, perforated GI ulcer, cauda equina syndrome, discitis, epidural abscess, or other such etiologies at this time.    At the conclusion of the encounter I discussed the results of all of the tests and the disposition. Their questions were answered. The patient (and any family present) acknowledged understanding and were agreeable with the care plan.      CONSULTANTS:  Hospitalist - Dr. Moe     MEDICATIONS GIVEN IN THE EMERGENCY:  Medications   nitroGLYcerin (NITROSTAT) sublingual tablet 0.4 mg (0.4 mg Sublingual $Given 4/6/25 1215)   amLODIPine (NORVASC) tablet 2.5 mg (has no administration in time range)   acetaminophen (TYLENOL) tablet 975 mg (975 mg Oral $Given 4/6/25 1213)   aspirin (ASA) tablet 325 mg (325 mg Oral $Given 4/6/25 1213)   iopamidol (ISOVUE-370) solution 90 mL (90 mLs Intravenous $Given 4/6/25 1251)   HYDROmorphone (PF)  "(DILAUDID) injection 0.5 mg (0.5 mg Intravenous $Given 4/6/25 3780)   meclizine (ANTIVERT) tablet 25 mg (25 mg Oral $Given 4/6/25 1410)           NEW PRESCRIPTIONS STARTED AT TODAY'S ER VISIT  none      CONDITION:  stable        DISPOSITION:  Card tele obs as accepted by Dr. Moe, hospitalist       =================================================================  =================================================================  TRIAGE ASSESSMENT:  W/c to triage.  Pt states dropped off by .  States for about 1 wk having pain in R leg and foot and started having CP since yesterday.  Feeling lightheaded and urinating frequently.  Pt states has been passing out x 6 in the last week.  Hx of stroke last year.     Triage Assessment (Adult)       Row Name 04/06/25 1042          Triage Assessment    Airway WDL WDL        Respiratory WDL    Respiratory WDL WDL        Skin Circulation/Temperature WDL    Skin Circulation/Temperature WDL WDL        Cardiac WDL    Cardiac WDL X;chest pain;all     Pulse Rate & Regularity tachycardic        Peripheral/Neurovascular WDL    Peripheral Neurovascular WDL WDL        Cognitive/Neuro/Behavioral WDL    Cognitive/Neuro/Behavioral WDL WDL                          ED Triage Vitals [04/06/25 1040]   Encounter Vitals Group      BP (!) 216/105      Systolic BP Percentile       Diastolic BP Percentile       Pulse 104      Resp 20      Temp 98.6  F (37  C)      Temp src Oral      SpO2 100 %      Weight 77.1 kg (170 lb)      Height 1.702 m (5' 7\")          ================================================================  ================================================================    HPI    Patient information was obtained from: patient    Use of Intrepreter: N/A     Sarah CLARK Rahman is a 59 year old female with history of CAD status post CABG, diabetes, schizoaffective disorder, CVA, COPD, carotid stenosis, anxiety, GERD, history of drug use, hyperlipidemia, history of syphilis, " hearing loss, chronic pain, atypical chest pain, right leg weakness, diabetic peripheral neuropathy, who presents to the ER with multiple complaints of chest pain, right arm and leg pain, reports right leg swelling, right neck pain, multiple episodes of syncope, and overall not feeling well.    Patient is tearful here in the ER with multiple complaints.  She states over the past 6 days she has had at least 9 episodes of syncope which are unprovoked.  She says she even hit her head on the car outside 1 time and woke up on the ground outside.  Does not feel he is always coming on.    States that she did see a doctor at some point recently with regards to syncope and is told that she has a bunch of testing coming up starting on April 11.    Also complains of right neck pain that feels like her left neck pain she had when she needed what sound like a carotid endarterectomy.    Also arm pain and some right leg pain with intermittent swelling that she has been treating with witch hazel.  She states the swelling improved today because she applied a sock containing which miesha last night.    Also complains of episodes of chest pain today.    Otherwise denies fevers, cough, shortness of breath, abdominal pain, vomiting or diarrhea.  She also reports urinary frequency today.      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 being bit by a pitbull   - Previously managed by [facility], but was instructed to find a different provider  - Currently taking percocet, suboxone, gabapentin, ketamine/lidocaine/gabapentin gel    - Reports right leg stabbing and burning pain, weakness, and intermittent swelling  - Has orders for MRI and spine clinic, given lower back component to pain     Diabetes  - Has not had updated labs, most recent A1c on 02/28/2024 returned at 10.7  - was seen by endocrinology at [facility] in 2023  - uncontrolled on Trulicity, glipizide, 30  units of lantus, and metformin   - Has not taken insulin in a week, almost out of glipizide   - Reports not checking blood sugars   - reports blurry vision with low blood sugars      I have personally reviewed the patient's allergies, medications, and past medical history in detail and updated the patient record as necessary.      Observed:  BP (!) 159/87 (BP Location: Right Arm, BP Cuff Size: Regular)   Pulse 90   Wt 173 lb 12.8 oz (78.8 kg)   BMI 27.22 kg/m        REVIEW OF SYSTEMS  Review of Systems   Constitutional:  Negative for fever.   Respiratory:  Negative for cough and shortness of breath.    Cardiovascular:  Positive for chest pain.   Gastrointestinal:  Negative for abdominal pain, diarrhea, nausea and vomiting.   Genitourinary:  Positive for dysuria and frequency.   Musculoskeletal:  Positive for neck pain (right sided neck pain).   Skin:  Positive for wound (old wound to forehead and right arm after a syncope a few days ago).   Neurological:  Positive for syncope. Negative for weakness, numbness and headaches.        +sharp and burning pain to entire right side of body, fadi arm and leg   Psychiatric/Behavioral:  Negative for confusion.    All other systems reviewed and are negative.      PAST MEDICAL HISTORY:  Past Medical History:   Diagnosis Date    Asthma     CAD (coronary artery disease)     COPD (chronic obstructive pulmonary disease) (H)     CVA (cerebral infarction)     Dyshidrosis (pompholyx) 10/21/2016    GERD (gastroesophageal reflux disease)     History of CVA (cerebrovascular accident) 04/01/2012    Formatting of this note might be different from the original.  Overview:   Bilateral subacute cerebellar infarcts seen on MRI at [facility] 4/2012.  Pt was noted to have no residual deficits at [facility].  Formatting of this note might be different from the original.  Bilateral subacute cerebellar infarcts seen on MRI at [facility] 4/2012.  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 Hamm at [facility]    Schizoaffective disorder (H)     Sleep difficulties 07/17/2022         PAST SURGICAL HISTORY:  Past Surgical History:   Procedure Laterality Date    BYPASS GRAFT ARTERY CORONARY  01/01/2009    x2    CAROTID ENDARTERECTOMY Left 12/2021    CORONARY STENT PLACEMENT      CV ANGIOGRAM CORONARY GRAFT N/A 1/16/2024    Procedure: Coronary Angiogram Graft;  Surgeon: Spencer Diez MD;  Location: [facility] CATH LAB CV    CV CORONARY ANGIOGRAM N/A 06/02/2021    Procedure: Coronary Angiogram;  Surgeon: Juventino Rivera MD;  Location: [facility] Cardiac Cath Lab;  Service: Cardiology    HYSTERECTOMY TOTAL ABDOMINAL      HYSTERECTOMY TOTAL ABDOMINAL, BILATERAL SALPINGO-OOPHORECTOMY, COMBINED      IR LUMBAR PUNCTURE  7/23/2019    IR TRANSLAMINAR EPIDURAL LUMBAR INJ INCL IMAGING  09/27/2022    LUMBAR DISCECTOMY Right 06/13/2019    L5-S1 - Dr. Hamm    NASAL FRACTURE SURGERY      ORIF ULNAR / RADIAL SHAFT FRACTURE Right          CURRENT MEDICATIONS:    Prior to Admission medications    Medication Sig Start Date End Date Taking? Authorizing Provider   acetaminophen (TYLENOL) 500 MG tablet Take 2 tablets (1,000 mg) by mouth every 6 hours as needed for mild pain. Max 5.5 tabs per day 8/30/24   Alphonse Terry DO   albuterol (PROAIR HFA) 108 (90 Base) MCG/ACT inhaler Inhale 1-2 puffs into the lungs every 4 hours as needed for shortness of breath or wheezing 1/19/24   Kike David MD   atorvastatin (LIPITOR) 40 MG tablet Take 1 tablet (40 mg) by mouth every evening. 8/30/24   Alphonse Terry DO   clopidogrel (PLAVIX) 75 MG tablet Take 1 tablet (75 mg) by mouth daily. 8/30/24   Alphonse Terry DO   dulaglutide  (TRULICITY) 0.75 MG/0.5ML pen Inject 0.75 mg Subcutaneous every 7 days 1/19/24   Kike David MD   DULoxetine (CYMBALTA) 60 MG capsule Take 1 capsule (60 mg) by mouth 2 times daily 1/19/24 1/18/25  Kike David MD   fluticasone (FLONASE) 50 MCG/ACT nasal spray Spray 1 spray into both nostrils 2 times daily 7/8/24   Kike David MD   gabapentin (NEURONTIN) 100 MG capsule Take 2 capsules (200 mg) by mouth 3 times daily. 8/30/24   Alphonse Terry DO   glipiZIDE (GLUCOTROL XL) 10 MG 24 hr tablet Take 1 tablet (10 mg) by mouth daily. 9/16/24 10/16/24  Kike David MD   insulin glargine (LANTUS PEN) 100 UNIT/ML pen Inject 35-40 Units Subcutaneous 2 times daily 4/23/24 4/23/25  Kike David MD   lidocaine (LIDODERM) 5 % patch Place onto the skin every 24 hours. To prevent lidocaine toxicity, patient should be patch free for 12 hrs daily.  Lower Back    Unknown, Entered By History   LORazepam (ATIVAN) 0.5 MG tablet TAKE 1 TABLET (0.5 MG) BY MOUTH EVERY 6 HOURS AS NEEDED FOR ANXIETY 3/17/24   Kike David MD   methocarbamol (ROBAXIN) 750 MG tablet Take 1 tablet (750 mg) by mouth 2 times daily 8/5/24   Kike David MD   naloxone (NARCAN) 4 MG/0.1ML nasal spray Spray 4 mg into one nostril alternating nostrils as needed for opioid reversal every 2-3 minutes until assistance arrives    Unknown, Entered By History   omeprazole (PRILOSEC) 40 MG DR capsule Take 1 capsule (40 mg) by mouth daily To protect your stomach. 4/23/24   Kike David MD   ondansetron (ZOFRAN) 8 MG tablet Take 1 tablet (8 mg) by mouth every 8 hours as needed for nausea 5/18/23   Kike David MD   topiramate (TOPAMAX) 50 MG tablet Take 100 mg by mouth 2 times daily 4/30/24   Reported, Patient   triamcinolone (KENALOG) 0.1 % external ointment Apply topically 2 times daily Apply twice a day to vaginal rash until resolved 4/23/24   Kike David MD   pregabalin (LYRICA) 150 MG capsule Take 150 mg by mouth 3 times daily  9/29/22   "Unknown, Entered By History         ALLERGIES:  Allergies   Allergen Reactions    Haloperidol Unknown     Patient states it stops her heart      Haloperidol Angioedema    Hydroxyzine Angioedema    Meperidine And Related Angioedema, Difficulty breathing, Other (See Comments), Rash and Shortness Of Breath     Throat closes  Other reaction(s): Breathing Difficulty  Other reaction(s): Breathing Difficulty      Phenothiazines Other (See Comments)     Other reaction(s): CARDIAC DISTURBANCES  Patient states it stops her heart  \"I swell up\"  \"stopped by heart\"  \"I swell up\"  \"I swell up\"      Phenothiazines Angioedema    Tramadol Other (See Comments)     Other reaction(s): Angioedema  Throat itch      Tramadol Angioedema    Januvia [Sitagliptin] Swelling    Latex Itching    Haloperidol And Related Angioedema    Januvia [Sitagliptin] Other (See Comments)     Swelling in the neck     Latex Itching    Lisinopril Other (See Comments)     ACE cough  Itchy throat  Throat itches  Itchy throat      Nortriptyline Unknown     Fainting, unclear if it was related    Penicillins Swelling    Hydroxyzine Other (See Comments) and Rash     Tongue swelling  Tongue swelling  Tongue swelling      Lisinopril Cough         FAMILY HISTORY:  Family History   Problem Relation Age of Onset    Heart Disease Mother     Heart Disease Father     Heart Disease Sister     Heart Disease Sister     Diabetes Brother     Coronary Artery Disease Brother         MI         SOCIAL HISTORY:  Social History     Socioeconomic History    Marital status:    Tobacco Use    Smoking status: Every Day     Types: Cigarettes    Smokeless tobacco: Never    Tobacco comments:     Seen IP by [facility] on 7/28/23 and declined counseling services and resource packet Smokes 1 cigarettes per day   Vaping Use    Vaping status: Never Used   Substance and Sexual Activity    Alcohol use: Not Currently     Comment: Alcoholic Drinks/day: occ    Drug use: No    Sexual activity: Not " Currently   Social History Narrative    Lives alone in a condo.          On disability.  Three living children.          Has one small dog as her .        Has personal care attendant (PCA) services during the daytime and sometimes in the evening.     Social Drivers of Health     Financial Resource Strain: Medium Risk (9/22/2024)    Received from [facility]    Financial Resource Strain     Difficulty of Paying Living Expenses: 3   Food Insecurity: Not on File (9/26/2024)    Received from [facility]    Food Insecurity     Food: 0   Transportation Needs: No Transportation Needs (9/22/2024)    Received from [facility]    Transportation Needs     Does lack of transportation keep you from medical appointments?: 1     Does lack of transportation keep you from work, meetings or getting things that you need?: 1   Physical Activity: Not on File (12/4/2021)    Received from [facility]    Physical Activity     Physical Activity: 0   Stress: Not on File (12/4/2021)    Received from [facility]    Stress     Stress: 0   Social Connections: Not on File (9/23/2024)    Received from [facility]    Social Connections     Connectedness: 0   Interpersonal Safety: Unknown (9/14/2024)    Received from [facility]    Humiliation, Afraid, Rape, and Kick questionnaire     Emotionally Abused: No     Physically Abused: No     Sexually Abused: No   Housing Stability: Low Risk  (9/22/2024)    Received from [facility]    Housing Stability     What is your housing situation today?: 1         VITALS:  Patient Vitals for the past 24 hrs:   BP Temp Temp src Pulse Resp SpO2 Height Weight   04/06/25 1517 (!) 146/73 -- -- -- -- -- -- --   04/06/25 1430 (!) 182/84 -- -- 82 15 96 % -- --   04/06/25 1405 (!) 214/91 -- -- 94 25 98 % -- --   04/06/25 1400 (!) 170/81 -- -- 82 17 97 % -- --   04/06/25 1359 (!) 176/87 -- -- 84 20 99 % -- --   04/06/25 1239  "-- -- -- 81 22 95 % -- --   04/06/25 1234 (!) 181/89 -- -- 94 28 95 % -- --   04/06/25 1230 (!) 170/79 -- -- 83 22 95 % -- --   04/06/25 1229 -- -- -- 82 21 95 % -- --   04/06/25 1224 (!) 160/73 -- -- 85 20 93 % -- --   04/06/25 1219 (!) 159/80 -- -- 87 23 94 % -- --   04/06/25 1214 (!) 183/92 -- -- 87 23 96 % -- --   04/06/25 1209 (!) 172/89 -- -- 81 19 97 % -- --   04/06/25 1145 (!) 174/92 -- -- 75 19 97 % -- --   04/06/25 1040 (!) 216/105 98.6  F (37  C) Oral 104 20 100 % 1.702 m (5' 7\") 77.1 kg (170 lb)       Wt Readings from Last 3 Encounters:   04/06/25 77.1 kg (170 lb)   09/11/24 80.7 kg (178 lb)   08/27/24 80.7 kg (177 lb 14.6 oz)       Estimated Creatinine Clearance: 90.2 mL/min (based on SCr of 0.71 mg/dL).    PHYSICAL EXAM    Constitutional:  Well developed, Well nourished  HENT:  Normocephalic, Atraumatic, Bilateral external ears normal, Nose normal. Neck- Supple, No stridor.   Eyes:  PERRL, EOMI, Conjunctiva normal, No discharge.  Respiratory:  Normal breath sounds, No respiratory distress, No wheezing, Speaks full sentences easily. No cough.   Cardiovascular:  Normal heart rate, Regular rhythm, No murmurs, No rubs, No gallops. Chest wall nontender.   GI:  No excessive obesity.  Bowel sounds normal, Soft, No tenderness, No masses, No flank tenderness. No rebound or guarding.   : deferred  Musculoskeletal: 1+ DP pulses. No edema. No cyanosis, No clubbing. Good range of motion in all major joints. No tenderness to palpation or major deformities noted. No tenderness of the CTLS spine.  Integument:  Warm, Dry, No erythema, No rash.  No petechiae. Old healing scabs to right arm (without infection) and right forehead (without infection)  Neurologic:  Alert & oriented x 3, Grossly normal motor function, Grossly normal sensory function, No focal deficits noted.   Psychiatric:  Tearful, Cooperative         LAB:  All pertinent labs reviewed and interpreted.  Recent Results (from the past 24 hours)   Extra Blue " Top Tube    Collection Time: 04/06/25 11:19 AM   Result Value Ref Range    Hold Specimen JIC    Extra Red Top Tube    Collection Time: 04/06/25 11:19 AM   Result Value Ref Range    Hold Specimen JIC    Extra Green Top (Lithium Heparin) Tube    Collection Time: 04/06/25 11:19 AM   Result Value Ref Range    Hold Specimen JIC    Extra Purple Top Tube    Collection Time: 04/06/25 11:19 AM   Result Value Ref Range    Hold Specimen JIC    Basic metabolic panel    Collection Time: 04/06/25 11:19 AM   Result Value Ref Range    Sodium 135 135 - 145 mmol/L    Potassium 4.0 3.4 - 5.3 mmol/L    Chloride 99 98 - 107 mmol/L    Carbon Dioxide (CO2) 27 22 - 29 mmol/L    Anion Gap 9 7 - 15 mmol/L    Urea Nitrogen 11.7 8.0 - 23.0 mg/dL    Creatinine 0.71 0.51 - 0.95 mg/dL    GFR Estimate >90 >60 mL/min/1.73m2    Calcium 8.9 8.8 - 10.4 mg/dL    Glucose 427 (H) 70 - 99 mg/dL   CK total    Collection Time: 04/06/25 11:19 AM   Result Value Ref Range    CK 59 26 - 192 U/L   Hepatic function panel    Collection Time: 04/06/25 11:19 AM   Result Value Ref Range    Protein Total 7.1 6.4 - 8.3 g/dL    Albumin 4.2 3.5 - 5.2 g/dL    Bilirubin Total 0.3 <=1.2 mg/dL    Alkaline Phosphatase 86 40 - 150 U/L    AST 16 0 - 45 U/L    ALT 17 0 - 50 U/L    Bilirubin Direct 0.11 0.00 - 0.30 mg/dL   Lipase    Collection Time: 04/06/25 11:19 AM   Result Value Ref Range    Lipase 13 13 - 60 U/L   Magnesium    Collection Time: 04/06/25 11:19 AM   Result Value Ref Range    Magnesium 1.9 1.7 - 2.3 mg/dL   Nt probnp inpatient    Collection Time: 04/06/25 11:19 AM   Result Value Ref Range    N terminal Pro BNP Inpatient 205 0 - 900 pg/mL   Troponin T, High Sensitivity    Collection Time: 04/06/25 11:19 AM   Result Value Ref Range    Troponin T, High Sensitivity 14 <=14 ng/L   TSH with free T4 reflex    Collection Time: 04/06/25 11:19 AM   Result Value Ref Range    TSH 1.01 0.30 - 4.20 uIU/mL   CBC with platelets and differential    Collection Time: 04/06/25 11:19  AM   Result Value Ref Range    WBC Count 6.2 4.0 - 11.0 10e3/uL    RBC Count 4.58 3.80 - 5.20 10e6/uL    Hemoglobin 12.3 11.7 - 15.7 g/dL    Hematocrit 38.8 35.0 - 47.0 %    MCV 85 78 - 100 fL    MCH 26.9 26.5 - 33.0 pg    MCHC 31.7 31.5 - 36.5 g/dL    RDW 12.7 10.0 - 15.0 %    Platelet Count 201 150 - 450 10e3/uL    % Neutrophils 61 %    % Lymphocytes 30 %    % Monocytes 6 %    % Eosinophils 3 %    % Basophils 1 %    % Immature Granulocytes 0 %    NRBCs per 100 WBC 0 <1 /100    Absolute Neutrophils 3.7 1.6 - 8.3 10e3/uL    Absolute Lymphocytes 1.8 0.8 - 5.3 10e3/uL    Absolute Monocytes 0.4 0.0 - 1.3 10e3/uL    Absolute Eosinophils 0.2 0.0 - 0.7 10e3/uL    Absolute Basophils 0.0 0.0 - 0.2 10e3/uL    Absolute Immature Granulocytes 0.0 <=0.4 10e3/uL    Absolute NRBCs 0.0 10e3/uL   UA with Microscopic reflex to Culture    Collection Time: 04/06/25 12:12 PM    Specimen: Urine, Clean Catch   Result Value Ref Range    Color Urine Light Yellow Colorless, Straw, Light Yellow, Yellow    Appearance Urine Clear Clear    Glucose Urine >1000 (A) Negative mg/dL    Bilirubin Urine Negative Negative    Ketones Urine Negative Negative mg/dL    Specific Gravity Urine 1.010 1.003 - 1.035    Blood Urine Negative Negative    pH Urine 6.0 5.0 - 7.0    Protein Albumin Urine 20 (A) Negative mg/dL    Urobilinogen Urine Normal Normal mg/dL    Nitrite Urine Negative Negative    Leukocyte Esterase Urine Negative Negative    Bacteria Urine Few (A) None Seen /HPF    RBC Urine 2 <=2 /HPF    WBC Urine 1 <=5 /HPF    Squamous Epithelials Urine 1 <=1 /HPF   Troponin T, High Sensitivity    Collection Time: 04/06/25  2:02 PM   Result Value Ref Range    Troponin T, High Sensitivity 12 <=14 ng/L       Lab Results   Component Value Date    ABORH A POS 01/15/2024           RADIOLOGY:  Reviewed all pertinent imaging. Please see official radiology report.    US Lower Extremity Venous Duplex Bilateral   Final Result   IMPRESSION:   1.  No deep venous  thrombosis in the bilateral lower extremities.      CT Lumbar Spine Reconstructed   Final Result   IMPRESSION:     Thoracic Spine CT   1.  No evidence of acute fracture or subluxation of the thoracic spine by CT imaging.      Lumbar Spine CT:   1.  No evidence of acute fracture or subluxation of the lumbar spine by CT imaging.   2.  Degenerative lumbar spondylosis as described above.      CT Thoracic Spine Reconstructed   Final Result   IMPRESSION:     Thoracic Spine CT   1.  No evidence of acute fracture or subluxation of the thoracic spine by CT imaging.      Lumbar Spine CT:   1.  No evidence of acute fracture or subluxation of the lumbar spine by CT imaging.   2.  Degenerative lumbar spondylosis as described above.      CT Cervical Spine Reconstructed   Final Result   IMPRESSION:   1.  No fracture or posttraumatic subluxation.   2.  No high-grade spinal canal or neural foraminal stenosis.      CTA Chest Abdomen Pelvis w Contrast   Final Result   IMPRESSION:      1.  No aortic dissection or other acute aortic pathology.      2.  No acute process is identified in the chest, abdomen, or pelvis.      3.  Few scattered small pulmonary nodules measuring up to 3 mm. See follow-up guidelines below.      4.  Small hiatal hernia.      REFERENCE:   Guidelines for Management of Incidental Pulmonary Nodules Detected on CT Images: From the Fleischner Society 2017.    Guidelines apply to incidental nodules in patients who are 35 years or older.   Guidelines do not apply to lung cancer screening, patients with immunosuppression, or patients with known primary cancer.      MULTIPLE NODULES   Nodule size <6 mm   Low-risk patients: No follow-up needed.   High-risk patients: Optional follow-up at 12 months.      CTA Head Neck with Contrast   Final Result   IMPRESSION:    HEAD CT:   1.  No CT evidence for acute intracranial process.   2.  Brain atrophy and presumed chronic microvascular ischemic changes as above.   3.  Chronic left  thalamic infarct and bilateral occipital encephalomalacia/gliosis      HEAD CTA:   1.  Patent major intracranial arteries. Unchanged moderate focal stenosis in the P1 segment of right PCA.   2.  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   3.  No aneurysm.      NECK CTA:   Patent major cervical arteries. Unchanged moderate stenosis in the V1 segment of the left vertebral artery.               EKG:    Indication: Chest pain    Performed at: 10:53a  Impression: Sinus rhythm at 79 bpm.  Flipped T waves noted in lead aVR and aVL and possibly V1.  There is some motion artifact.  VA interval 170 ms, QRS 72 ms, QTc 415 ms.  No signs for delta waves or signs Brugada.  Nonspecific ST changes compared to December 1, 2024.      I have independently reviewed and interpreted the EKG(s) documented above.        PROCEDURES:  none    Medical Decision Making  I reviewed the EMR: Outpatient Record: see HPI  Care impacted by Chronic Pain and Heart Disease  I discussed the care with another health care provider: hospitalist  Admit.    MIPS (CTPE, Dental pain, Diehl, Sinusitis, Asthma/COPD, Head Trauma): Not Applicable    SEPSIS: None    Veena Souza M.D. [facility]  Emergency Medicine and Medical Toxicology  Formerly [facility] EMERGENCY DEPARTMENT  [facility] [facility] [id number] [phone]  Dept: [phone]           Veena Souza MD  04/06/25 1205

## 2025-04-06 NOTE — ED TRIAGE NOTES
W/c to triage.  Pt states dropped off by .  States for about 1 wk having pain in R leg and foot and started having CP since yesterday.  Feeling lightheaded and urinating frequently.  Pt states has been passing out x 6 in the last week.  Hx of stroke last year.     Triage Assessment (Adult)       Row Name 04/06/25 1042          Triage Assessment    Airway WDL WDL        Respiratory WDL    Respiratory WDL WDL        Skin Circulation/Temperature WDL    Skin Circulation/Temperature WDL WDL        Cardiac WDL    Cardiac WDL X;chest pain;all     Pulse Rate & Regularity tachycardic        Peripheral/Neurovascular WDL    Peripheral Neurovascular WDL WDL        Cognitive/Neuro/Behavioral WDL    Cognitive/Neuro/Behavioral WDL WDL

## 2025-04-06 NOTE — H&P
[facility]    History and Physical - Hospitalist Service       Date of Admission:  4/6/2025    Assessment & Plan    Sarah Rahman is a 59 year old female admitted on 4/6/2025 for syncope workup.     #Syncope   #History of CVA now with right sided weakness   -Ddx include vasovagal vs autonomic dysfunction vs neurogenic vs seizure  -Neurology consulted  -Cardiotelemetry monitoring  -Initial trop 14-->12  -Head CT negative for acute process. Chronic left thalamic infarct and bilateral occipital encephalomalacia.   -Head CTA with unchanged moderate focal stenosis of right PCA, unchanged moderate to severe stenosis of left vertebral artery  -CT thoracic and LS negative for acute fracture. Noted bilateral neural foraminal narrowing L3-S1  -CT Cervical spine with no fracture or neural foraminal stenosis   -Brain MRI ordered given patient stating right sided weakness. Notes indicate has been imaged in the past for right sided weakness. Exams look like they were never conclusive   -Orthostatic testing ordered  -PT and SW consult ordered  -Echo (9/2024) reviewed. LA moderately dilated, mild-moderate concentric LVH. Normal LV and RV function.  -BNP normal here and no signs of volume overload. Will defer echo for now.     #Right sided weakness and sensory changes  -Neurology consulted  -MRI brain ordered  -Imaging of CT spine and head CTA reviewed.   -PT evaluation     #Bilateral leg pain  -Described as burning and cold at times likely neuropathy  -Previously on Lyrica and Gabapentin but states no longer taking  -CK normal   -Lower extremity doppler negative for DVT  -Patient convinced has infection in right leg because was bit by Pit bull. No signs of redness, swelling, tenderness.   -Will order right foot xray given new bony growth on medial foot.   -Continue PTA Cymbalta.   -Tried to  patient that pain described is neuropathic in nature and likely will not be resolved with opioids.  "Patient at that point got upset and requested another provider who was not from a foreign country and judging people from their cultural perspective.     #Labile hypertension   -Likely driven by anxiety/agitation   -PTA amlodipine reconciled and increased to 5mg.   -Prn IV hydralazine     #Poorly controlled DM 2   -A1c 13  -Continue home lantus and sliding scale insulin   -Hold PTA Truclity and glipizide   -Patient to follow up outpatient with ophthalmology for blurry vision and podiatry for diabetic neuropathy.      #CAD   -Continue ASA and Statin   -Continue cardiotelemetry     #HLD  -Continue PTA statin     #Anxiety  #Chronic pain syndrome  -PTA medications resumed  -Avoid prn IV narcotics    #Incidental pulmonary nodules  -Multiple <6mm. Can follow up with PCP for repeat CT in 1 year         Observation Goals: -diagnostic tests and consults completed and resulted, -vital signs normal or at patient baseline, -returns to baseline functional status, -safe disposition plan has been identified, Nurse to notify provider when observation goals have been met and patient is ready for discharge.  Diet: Moderate Consistent Carb (60 g CHO per Meal) Diet  DVT Prophylaxis: Pneumatic Compression Devices  Diehl Catheter: Not present  Lines: None     Cardiac Monitoring: ACTIVE order. Indication: Syncope  Code Status: Full Code    Clinically Significant Risk Factors Present on Admission                 # Drug Induced Platelet Defect: home medication list includes an antiplatelet medication   # Hypertension: Noted on problem list           # Overweight: Estimated body mass index is 26.63 kg/m  as calculated from the following:    Height as of this encounter: 1.702 m (5' 7\").    Weight as of this encounter: 77.1 kg (170 lb).         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       Disposition Plan     Medically Ready for Discharge: Anticipated " Tomorrow           Orin Moe MD  Hospitalist Service  [facility]  Securely message with [employer] (more info)  Text page via [employer] Paging/Directory     ______________________________________________________________________    Chief Complaint   Burning leg pain     History is obtained from the patient    History of Present Illness   Sraah Rahmna is a 59 year old female with PMH of DM2, CAD s/p CABG, HTN, asthma, COPD, CVA, HLD, schizoaffective disorder, chronic pain syndrome presented with blackouts and passing out frequently for the past 2.5 weeks, with the most recent episode occurring yesterday. She describes feeling woozy and the room spinning after these episodes but no prodromal symptoms. Episodes appear to occur with standing. No recent URI symptoms or otalgia/tinnitus. She reports loss of bladder control with episodes but denies any tongue biting or witnessed muscle movements. She also reports a burning sensation in her legs, likening the pain to labor, and notes that her feet sometimes feel ice cold. She also has been experiencing weakness on her right side. She also reports having been bitten by a pit bull, which she believes may have led to an infection in her right leg and foot. Additionally, she has a history of sciatica and a slipped disc in her back.     Past Medical History    Past Medical History:   Diagnosis Date    Asthma     CAD (coronary artery disease)     COPD (chronic obstructive pulmonary disease) (H)     CVA (cerebral infarction)     Dyshidrosis (pompholyx) 10/21/2016    GERD (gastroesophageal reflux disease)     History of CVA (cerebrovascular accident) 04/01/2012    Formatting of this note might be different from the original.  Overview:   Bilateral subacute cerebellar infarcts seen on MRI at [facility] 4/2012.  Pt was noted to have no residual deficits at [facility].  Formatting of this note might be different from the original.   Bilateral subacute cerebellar infarcts seen on MRI at [facility] 4/2012.  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 Hamm at [facility]    Schizoaffective disorder (H)     Sleep difficulties 07/17/2022       Past Surgical History   Past Surgical History:   Procedure Laterality Date    BYPASS GRAFT ARTERY CORONARY  01/01/2009    x2    CAROTID ENDARTERECTOMY Left 12/2021    CORONARY STENT PLACEMENT      CV ANGIOGRAM CORONARY GRAFT N/A 1/16/2024    Procedure: Coronary Angiogram Graft;  Surgeon: Spencer Diez MD;  Location: [facility] CATH LAB CV    CV CORONARY ANGIOGRAM N/A 06/02/2021    Procedure: Coronary Angiogram;  Surgeon: Juventino Rivera MD;  Location: [facility] Cardiac Cath Lab;  Service: Cardiology    HYSTERECTOMY TOTAL ABDOMINAL      HYSTERECTOMY TOTAL ABDOMINAL, BILATERAL SALPINGO-OOPHORECTOMY, COMBINED      IR LUMBAR PUNCTURE  7/23/2019    IR TRANSLAMINAR EPIDURAL LUMBAR INJ INCL IMAGING  09/27/2022    LUMBAR DISCECTOMY Right 06/13/2019    L5-S1 - Dr. Hamm    NASAL FRACTURE SURGERY      ORIF ULNAR / RADIAL SHAFT FRACTURE Right        Prior to Admission Medications   Prior to Admission Medications   Prescriptions Last Dose Informant Patient Reported? Taking?   DULoxetine (CYMBALTA) 60 MG capsule 4/5/2025 Evening  Yes Yes   Sig: Take 60 mg by mouth 2 times daily.   acetaminophen (TYLENOL) 500 MG tablet   No Yes   Sig: Take 2 tablets (1,000 mg) by mouth every 6 hours as needed for mild pain. Max 5.5 tabs per day   albuterol (PROAIR HFA) 108 (90 Base) MCG/ACT inhaler Past Week  No Yes   Sig: Inhale 1-2 puffs into the lungs every 4 hours as needed for shortness of breath or wheezing   amLODIPine (NORVASC) 2.5 MG tablet 4/5/2025  Morning  Yes Yes   Sig: Take 2.5 mg by mouth daily.   aspirin 81 MG EC tablet   Yes Yes   Sig: Take 1 tablet by mouth daily.   atorvastatin (LIPITOR) 40 MG tablet 4/5/2025 Evening  No Yes   Sig: Take 1 tablet (40 mg) by mouth every evening.   buprenorphine HCl-naloxone HCl (SUBOXONE) 2-0.5 MG per film Past Week  Yes Yes   Sig: Place 1 Film under the tongue 3 times daily.   diphenhydrAMINE (BENADRYL) 25 MG tablet 4/5/2025 Evening  Yes Yes   Sig: Take 25 mg by mouth 2 times daily.   dulaglutide (TRULICITY) 0.75 MG/0.5ML pen 3/31/2025  No Yes   Sig: Inject 0.75 mg Subcutaneous every 7 days   glipiZIDE (GLUCOTROL XL) 10 MG 24 hr tablet 4/5/2025 Morning  Yes Yes   Sig: Take 10 mg by mouth daily.   insulin glargine (LANTUS PEN) 100 UNIT/ML pen 4/5/2025 Evening  Yes Yes   Sig: Inject 26 Units subcutaneously 2 times daily.   lidocaine (LIDODERM) 5 % patch 4/5/2025 Self Yes Yes   Sig: Place onto the skin every 24 hours. To prevent lidocaine toxicity, patient should be patch free for 12 hrs daily.  Lower Back   methocarbamol (ROBAXIN) 750 MG tablet 4/5/2025 Evening  No Yes   Sig: Take 1 tablet (750 mg) by mouth 2 times daily   naloxone (NARCAN) 4 MG/0.1ML nasal spray  Self Yes Yes   Sig: Spray 4 mg into one nostril alternating nostrils as needed for opioid reversal every 2-3 minutes until assistance arrives   ondansetron (ZOFRAN) 8 MG tablet 4/5/2025 Evening  No Yes   Sig: Take 1 tablet (8 mg) by mouth every 8 hours as needed for nausea   oxyCODONE-acetaminophen (PERCOCET)  MG per tablet 4/5/2025 Evening  Yes Yes   Sig: Take 1 tablet by mouth every 6 hours as needed.      Facility-Administered Medications: None        Review of Systems    The 10 point Review of Systems is negative other than noted in the HPI     Physical Exam   Vital Signs: Temp: 98.7  F (37.1  C) Temp src: Oral BP: (!) 203/102 Pulse: 97   Resp: 23 SpO2: 96 % O2 Device: None (Room air)    Weight: 170 lbs 0 oz    Constitutional: awake, alert, initially  cooperative but then became agitated, and painful distress  Eyes: pupils equal, round and reactive to light, extra-ocular muscles intact, and sclera clear  ENT: atraumatic, oral pharynx with moist mucus membranes, tongue at midline  Respiratory: No increased work of breathing, good air exchange, clear to auscultation bilaterally, no crackles or wheezing  Cardiovascular: Normal apical impulse, regular rate and rhythm, normal S1 and S2, no S3 or S4, and no murmur noted, regular rate and rhythm, and normal S1 and S2  GI: soft, non-distended, and non-tender  Skin: no redness, warmth, or swelling, no rashes, and no jaundice, no active wounds noted  Musculoskeletal: no spinal tenderness, no lower extremity pitting edema present  Neurologic: Awake, alert, oriented to name, place and time.  Cranial nerves II-X are grossly intact. Weakness with CN XI, normal CN XII  Motor is 5/5 left, 4/5 on right. Sensory reduced on right side.  All other exams deferred given patient request to have another provider.     Medical Decision Making       75 MINUTES SPENT BY ME on the date of service doing chart review, history, exam, documentation & further activities per the note.      Data     I have personally reviewed the following data over the past 24 hrs:    6.2  \   12.3   / 201     135 99 11.7 /  306 (H)   4.0 27 0.71 \     ALT: 17 AST: 16 AP: 86 TBILI: 0.3   ALB: 4.2 TOT PROTEIN: 7.1 LIPASE: 13     Trop: 12 BNP: 205     TSH: 1.01 T4: N/A A1C: N/A       Imaging results reviewed over the past 24 hrs:   Recent Results (from the past 24 hours)   CTA Head Neck with Contrast    Narrative    EXAM: CTA HEAD NECK W CONTRAST  LOCATION: [facility]  DATE: 4/6/2025    INDICATION: right neck pain and h o stroke  COMPARISON: CT and CTA 12/1/2024.  CONTRAST: [id number] 90ml  TECHNIQUE: Head and neck CT angiogram with IV contrast. Noncontrast head CT followed by axial helical CT images of the head and neck vessels obtained  during the arterial phase of intravenous contrast administration. Axial 2D reconstructed images and   multiplanar 3D MIP reconstructed images of the head and neck vessels were performed by the technologist. Dose reduction techniques were used. All stenosis measurements made according to NASCET criteria unless otherwise specified.    FINDINGS:   NONCONTRAST HEAD CT:   INTRACRANIAL CONTENTS: No intracranial hemorrhage, extraaxial collection, or mass effect.  Areas of encephalomalacia and gliosis in bilateral occipital lobes. Chronic left thalamic infarct. No CT evidence of acute infarct. Mild presumed chronic small   vessel ischemic changes. Normal ventricles and sulci.     VISUALIZED ORBITS/SINUSES/MASTOIDS: No intraorbital abnormality. No paranasal sinus mucosal disease. No middle ear or mastoid effusion.    BONES/SOFT TISSUES: No acute abnormality. Chronic bilateral nasal bone fractures.    HEAD CTA:  ANTERIOR CIRCULATION: Calcified atherosclerotic plaques in bilateral carotid siphons. No large vessel occlusion, high-grade, aneurysm, or high flow vascular malformation. Standard [place] anatomy.    POSTERIOR CIRCULATION: Calcified atherosclerotic plaques in V4 segment of bilateral vertebral arteries with moderate to severe stenosis on the left. Unchanged moderate focal stenosis in the P1 segment of right PCA. No large vessel occlusion, aneurysm, or   high flow vascular malformation. Balanced vertebral arteries supply a normal basilar artery.     DURAL VENOUS SINUSES: Expected enhancement of the major dural venous sinuses.    NECK CTA:  RIGHT CAROTID: Mixed calcified and soft plaques at the bifurcation without high-grade stenosis or dissection.    LEFT CAROTID: Mixed calcified and soft plaques at the bifurcation without high-grade stenosis or dissection.    VERTEBRAL ARTERIES: Unchanged moderate stenosis in the V1 segment of the left vertebral artery.. Balanced vertebral arteries.    AORTIC ARCH: Classic  aortic arch anatomy with no significant stenosis at the origin of the great vessels.    NONVASCULAR STRUCTURES: Sternotomy changes.      Impression    IMPRESSION:   HEAD CT:  1.  No CT evidence for acute intracranial process.  2.  Brain atrophy and presumed chronic microvascular ischemic changes as above.  3.  Chronic left thalamic infarct and bilateral occipital encephalomalacia/gliosis    HEAD CTA:  1.  Patent major intracranial arteries. Unchanged moderate focal stenosis in the P1 segment of right PCA.  2.  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  3.  No aneurysm.    NECK CTA:  Patent major cervical arteries. Unchanged moderate stenosis in the V1 segment of the left vertebral artery.     CTA Chest Abdomen Pelvis w Contrast    Narrative    EXAM: CTA CHEST ABDOMEN PELVIS W CONTRAST  LOCATION: [facility]  DATE: 4/6/2025    INDICATION: Syncope. Chest pain. Right arm and leg pain.  COMPARISON: None.  TECHNIQUE: CT angiogram chest abdomen pelvis during arterial phase of injection of IV contrast. 2D and 3D MIP reconstructions were performed by the CT technologist. Dose reduction techniques were used.   CONTRAST: [id number] 90ml    FINDINGS:   CT ANGIOGRAM CHEST, ABDOMEN, AND PELVIS: No aortic dissection or other acute aortic pathology. No aortic aneurysm. Moderate to severe calcified and noncalcified atherosclerotic plaque throughout the aorta extending into the bilateral iliac arteries.   Right brachiocephalic and left common carotid arteries share a common origin. Moderate stenosis of the proximal left vertebral artery. Aortic arch vessels and their visualized branches are otherwise patent. Mild stenosis at the origin of the celiac   artery, which is otherwise patent. Celiac artery branches are patent. Mild stenosis of the proximal superior mesenteric artery, which is otherwise patent. Dual left renal arteries with mild proximal stenosis of the  dominant artery and moderate ostial   stenosis of the nondominant artery supplying the inferior pole. Moderate stenosis of the proximal single right renal artery. Inferior mesenteric artery and its visualized branches are patent. Mild stenoses of the bilateral common iliac arteries. Mild to   moderate multifocal stenoses along the course of both internal iliac arteries. Bilateral external iliac and common femoral arteries are patent. Right obturator artery arises from the right inferior epigastric artery (anatomic variant). Mild to moderate   stenoses of the visualized portions of the bilateral superficial femoral arteries. Pulmonary arteries are normal in caliber. No pulmonary embolism within the main pulmonary arteries. Majority of the pulmonary arteries are poorly opacified and not well   evaluated.    LUNGS AND PLEURA: No consolidations, pleural effusions, or pneumothorax. Few scattered small pulmonary nodules measuring up to 3 mm in the right lower lobe (5/#162). Thin strands of mucus within the trachea. Central airways are otherwise patent.    MEDIASTINUM/AXILLAE: Status post CABG. No pericardial effusion. Small hiatal hernia. No enlarged thoracic lymph node.    CORONARY ARTERY CALCIFICATION: Previous intervention (stents or CABG).    HEPATOBILIARY: Small hepatic cyst adjacent to the gallbladder fundus. No calcified gallstones or biliary ductal dilation.    PANCREAS: Normal.    SPLEEN: Normal.    ADRENAL GLANDS: Normal.    KIDNEYS/BLADDER: Small right renal cyst, which does not require follow-up. No urinary calculi or hydronephrosis. Normal bladder.    BOWEL: No bowel obstruction or inflammation. Normal appendix.    LYMPH NODES: Normal.    PELVIC ORGANS: Absent uterus.    MUSCULOSKELETAL: Multiple median sternotomy wires. Demineralized bones. Multilevel degenerative changes of the spine. No acute bony abnormality or destructive bone lesions.      Impression    IMPRESSION:    1.  No aortic dissection or other  acute aortic pathology.    2.  No acute process is identified in the chest, abdomen, or pelvis.    3.  Few scattered small pulmonary nodules measuring up to 3 mm. See follow-up guidelines below.    4.  Small hiatal hernia.    REFERENCE:  Guidelines for Management of Incidental Pulmonary Nodules Detected on CT Images: From the Fleischner Society 2017.   Guidelines apply to incidental nodules in patients who are 35 years or older.  Guidelines do not apply to lung cancer screening, patients with immunosuppression, or patients with known primary cancer.    MULTIPLE NODULES  Nodule size <6 mm  Low-risk patients: No follow-up needed.  High-risk patients: Optional follow-up at 12 months.   CT Cervical Spine Reconstructed    Narrative    EXAM: CT CERVICAL SPINE RECONSTRUCTED  LOCATION: [facility]  DATE: 4/6/2025    INDICATION: syncope with falls  COMPARISON: None.  TECHNIQUE: Routine CT Cervical Spine without IV contrast. Multiplanar reformats. Dose reduction techniques were used.    FINDINGS:  VERTEBRA: Normal vertebral body heights. Straightening of normal cervical lordosis. Mild anterolisthesis at C3-4. No fracture or posttraumatic subluxation.     CANAL/FORAMINA: No canal or neural foraminal stenosis.    PARASPINAL: No extraspinal abnormality.      Impression    IMPRESSION:  1.  No fracture or posttraumatic subluxation.  2.  No high-grade spinal canal or neural foraminal stenosis.   CT Thoracic Spine Reconstructed    Narrative    EXAM: CT THORACIC SPINE RECONSTRUCTED, CT LUMBAR SPINE RECONSTRUCTED  LOCATION: [facility]  DATE: 4/6/2025    INDICATION: syncope, falls with right sided pain  COMPARISON: None.  TECHNIQUE:   1.  Thoracic spine CT without IV contrast. Dose reduction techniques were used.  2.  Lumbar spine CT without IV contrast. Dose reduction techniques were used.    FINDINGS:  Thoracic spine CT:  No evidence of acute fracture or subluxation of the thoracic  spine by CT imaging. The vertebral bodies of the thoracic spine have normal stature and alignment. The disc spaces are well-maintained. No significant degenerative changes.    No significant posterior disc bulge, spinal canal, or neural foraminal narrowing at any level within the thoracic spine.    Lumbar spine CT:  No evidence of acute fracture or subluxation of the lumbar spine by CT imaging. Anterolisthesis of L4 and L5 measuring 3 mm. The vertebral bodies of the lumbar spine otherwise have normal stature and alignment. Multilevel degenerative disc disease of the   lumbar spine with disc height loss, most pronounced at L3/L4 and L5/S1. The partially imaged intra-abdominal contents are unremarkable.    T12/L1:  No posterior disc bulge or spinal canal narrowing. No neural foraminal narrowing.    L1/L2:  No posterior disc bulge or spinal canal narrowing. No neural foraminal narrowing.    L2/L3:  No posterior disc bulge or spinal canal narrowing. No neural foraminal narrowing.    L3/L4:  No posterior disc bulge or spinal canal narrowing. Moderate bilateral neural foraminal narrowing.      L4/L5:  No posterior disc bulge or spinal canal narrowing. Moderate bilateral neural foraminal narrowing.     L5/S1: No posterior disc bulge or spinal canal narrowing. Moderate bilateral neural foraminal narrowing.       Impression    IMPRESSION:    Thoracic Spine CT  1.  No evidence of acute fracture or subluxation of the thoracic spine by CT imaging.    Lumbar Spine CT:  1.  No evidence of acute fracture or subluxation of the lumbar spine by CT imaging.  2.  Degenerative lumbar spondylosis as described above.   CT Lumbar Spine Reconstructed    Narrative    EXAM: CT THORACIC SPINE RECONSTRUCTED, CT LUMBAR SPINE RECONSTRUCTED  LOCATION: [facility]  DATE: 4/6/2025    INDICATION: syncope, falls with right sided pain  COMPARISON: None.  TECHNIQUE:   1.  Thoracic spine CT without IV contrast. Dose reduction  techniques were used.  2.  Lumbar spine CT without IV contrast. Dose reduction techniques were used.    FINDINGS:  Thoracic spine CT:  No evidence of acute fracture or subluxation of the thoracic spine by CT imaging. The vertebral bodies of the thoracic spine have normal stature and alignment. The disc spaces are well-maintained. No significant degenerative changes.    No significant posterior disc bulge, spinal canal, or neural foraminal narrowing at any level within the thoracic spine.    Lumbar spine CT:  No evidence of acute fracture or subluxation of the lumbar spine by CT imaging. Anterolisthesis of L4 and L5 measuring 3 mm. The vertebral bodies of the lumbar spine otherwise have normal stature and alignment. Multilevel degenerative disc disease of the   lumbar spine with disc height loss, most pronounced at L3/L4 and L5/S1. The partially imaged intra-abdominal contents are unremarkable.    T12/L1:  No posterior disc bulge or spinal canal narrowing. No neural foraminal narrowing.    L1/L2:  No posterior disc bulge or spinal canal narrowing. No neural foraminal narrowing.    L2/L3:  No posterior disc bulge or spinal canal narrowing. No neural foraminal narrowing.    L3/L4:  No posterior disc bulge or spinal canal narrowing. Moderate bilateral neural foraminal narrowing.      L4/L5:  No posterior disc bulge or spinal canal narrowing. Moderate bilateral neural foraminal narrowing.     L5/S1: No posterior disc bulge or spinal canal narrowing. Moderate bilateral neural foraminal narrowing.       Impression    IMPRESSION:    Thoracic Spine CT  1.  No evidence of acute fracture or subluxation of the thoracic spine by CT imaging.    Lumbar Spine CT:  1.  No evidence of acute fracture or subluxation of the lumbar spine by CT imaging.  2.  Degenerative lumbar spondylosis as described above.   US Lower Extremity Venous Duplex Bilateral    Narrative    EXAM: US LOWER EXTREMITY VENOUS DUPLEX BILATERAL  LOCATION: [facility]  DATE: 4/6/2025    INDICATION: intermittent leg swelling, chest pain  COMPARISON: None.  TECHNIQUE: Venous Duplex ultrasound of bilateral lower extremities with and without compression, augmentation and duplex. Color flow and spectral Doppler with waveform analysis performed.    FINDINGS: Exam includes the common femoral, femoral, popliteal veins as well as segmentally visualized deep calf veins and greater saphenous vein.     RIGHT: No deep vein thrombosis. No superficial thrombophlebitis. No popliteal cyst.    LEFT: No deep vein thrombosis. No superficial thrombophlebitis. No popliteal cyst.      Impression    IMPRESSION:  1.  No deep venous thrombosis in the bilateral lower extremities.

## 2025-04-06 NOTE — LETTER
[facility] EXTENDED RECOVERY AND SHORT STAY  [address] [place] [id number]  Phone: [phone]  Fax: [phone]    April 8, 2025          To whom it may concern:    RE: Davian Marcelo's family member was hospitalized for urgent medical care and needed to pick them up from the hospital on 4/8/2025. Please excuse Davian from work to bring his family member home, which could take several hours to get them home and settled in.    Please contact me for questions or concerns.      Sincerely,        Jhonatan Albrecht MD

## 2025-04-06 NOTE — PLAN OF CARE
Goal Outcome Evaluation:      Plan of Care Reviewed With: patient          Outcome Evaluation: Anticipate return home with home care at discharge.

## 2025-04-06 NOTE — PROGRESS NOTES
Patient admitted to room 12 at approximately 1600 via cart from emergency room.  Reason for Admission:   Report received from:   Patient was accompanied by Self.  Discharge transportation provided by:  Patient ambulated/transferred:  with stand-by assist. self.  Patient is alert and orientated x 4  Outpatient Observation education provided to: (patient, family, friend)  MDRO Education done if applicable (MRSA, VRE, etc)  Safety risks were identified during admission:  fall.   Yellow risk/fall band applied:  Yes  Home meds sent home: No  Home meds sent to pharmacy:No IF YES add 1/2 sheet laminated page reminder to chart/clipboard   Detailed Belongings:  See NA note

## 2025-04-07 LAB
ANION GAP SERPL CALCULATED.3IONS-SCNC: 10 MMOL/L (ref 7–15)
ANION GAP SERPL CALCULATED.3IONS-SCNC: 11 MMOL/L (ref 7–15)
B-OH-BUTYR SERPL-SCNC: <0.18 MMOL/L
BUN SERPL-MCNC: 13.9 MG/DL (ref 8–23)
BUN SERPL-MCNC: 16.3 MG/DL (ref 8–23)
CALCIUM SERPL-MCNC: 8.8 MG/DL (ref 8.8–10.4)
CALCIUM SERPL-MCNC: 9.2 MG/DL (ref 8.8–10.4)
CHLORIDE SERPL-SCNC: 102 MMOL/L (ref 98–107)
CHLORIDE SERPL-SCNC: 103 MMOL/L (ref 98–107)
CREAT SERPL-MCNC: 0.62 MG/DL (ref 0.51–0.95)
CREAT SERPL-MCNC: 0.73 MG/DL (ref 0.51–0.95)
EGFRCR SERPLBLD CKD-EPI 2021: >90 ML/MIN/1.73M2
EGFRCR SERPLBLD CKD-EPI 2021: >90 ML/MIN/1.73M2
ERYTHROCYTE [DISTWIDTH] IN BLOOD BY AUTOMATED COUNT: 12.6 % (ref 10–15)
GLUCOSE BLDC GLUCOMTR-MCNC: 126 MG/DL (ref 70–99)
GLUCOSE BLDC GLUCOMTR-MCNC: 153 MG/DL (ref 70–99)
GLUCOSE BLDC GLUCOMTR-MCNC: 189 MG/DL (ref 70–99)
GLUCOSE BLDC GLUCOMTR-MCNC: 317 MG/DL (ref 70–99)
GLUCOSE BLDC GLUCOMTR-MCNC: 321 MG/DL (ref 70–99)
GLUCOSE BLDC GLUCOMTR-MCNC: 322 MG/DL (ref 70–99)
GLUCOSE BLDC GLUCOMTR-MCNC: 85 MG/DL (ref 70–99)
GLUCOSE SERPL-MCNC: 177 MG/DL (ref 70–99)
GLUCOSE SERPL-MCNC: 267 MG/DL (ref 70–99)
HCO3 SERPL-SCNC: 24 MMOL/L (ref 22–29)
HCO3 SERPL-SCNC: 26 MMOL/L (ref 22–29)
HCT VFR BLD AUTO: 38.5 % (ref 35–47)
HGB BLD-MCNC: 12.5 G/DL (ref 11.7–15.7)
LACTATE SERPL-SCNC: 2.1 MMOL/L (ref 0.7–2)
MCH RBC QN AUTO: 27.2 PG (ref 26.5–33)
MCHC RBC AUTO-ENTMCNC: 32.5 G/DL (ref 31.5–36.5)
MCV RBC AUTO: 84 FL (ref 78–100)
PLATELET # BLD AUTO: 202 10E3/UL (ref 150–450)
POTASSIUM SERPL-SCNC: 3.7 MMOL/L (ref 3.4–5.3)
POTASSIUM SERPL-SCNC: 3.8 MMOL/L (ref 3.4–5.3)
RBC # BLD AUTO: 4.59 10E6/UL (ref 3.8–5.2)
SODIUM SERPL-SCNC: 137 MMOL/L (ref 135–145)
SODIUM SERPL-SCNC: 139 MMOL/L (ref 135–145)
WBC # BLD AUTO: 5.8 10E3/UL (ref 4–11)

## 2025-04-07 PROCEDURE — 250N000011 HC RX IP 250 OP 636: Performed by: HOSPITALIST

## 2025-04-07 PROCEDURE — 80048 BASIC METABOLIC PNL TOTAL CA: CPT | Performed by: INTERNAL MEDICINE

## 2025-04-07 PROCEDURE — 83605 ASSAY OF LACTIC ACID: CPT | Performed by: INTERNAL MEDICINE

## 2025-04-07 PROCEDURE — 36415 COLL VENOUS BLD VENIPUNCTURE: CPT | Performed by: INTERNAL MEDICINE

## 2025-04-07 PROCEDURE — 82962 GLUCOSE BLOOD TEST: CPT

## 2025-04-07 PROCEDURE — 96375 TX/PRO/DX INJ NEW DRUG ADDON: CPT

## 2025-04-07 PROCEDURE — 250N000013 HC RX MED GY IP 250 OP 250 PS 637: Performed by: HOSPITALIST

## 2025-04-07 PROCEDURE — 85041 AUTOMATED RBC COUNT: CPT | Performed by: HOSPITALIST

## 2025-04-07 PROCEDURE — 82435 ASSAY OF BLOOD CHLORIDE: CPT | Performed by: INTERNAL MEDICINE

## 2025-04-07 PROCEDURE — 250N000011 HC RX IP 250 OP 636: Mod: JZ | Performed by: STUDENT IN AN ORGANIZED HEALTH CARE EDUCATION/TRAINING PROGRAM

## 2025-04-07 PROCEDURE — 250N000013 HC RX MED GY IP 250 OP 250 PS 637: Performed by: INTERNAL MEDICINE

## 2025-04-07 PROCEDURE — 36415 COLL VENOUS BLD VENIPUNCTURE: CPT | Performed by: HOSPITALIST

## 2025-04-07 PROCEDURE — 99233 SBSQ HOSP IP/OBS HIGH 50: CPT | Performed by: STUDENT IN AN ORGANIZED HEALTH CARE EDUCATION/TRAINING PROGRAM

## 2025-04-07 PROCEDURE — 85014 HEMATOCRIT: CPT | Performed by: HOSPITALIST

## 2025-04-07 PROCEDURE — 258N000003 HC RX IP 258 OP 636: Performed by: INTERNAL MEDICINE

## 2025-04-07 PROCEDURE — 80048 BASIC METABOLIC PNL TOTAL CA: CPT | Performed by: HOSPITALIST

## 2025-04-07 PROCEDURE — G0378 HOSPITAL OBSERVATION PER HR: HCPCS

## 2025-04-07 PROCEDURE — 82310 ASSAY OF CALCIUM: CPT | Performed by: HOSPITALIST

## 2025-04-07 PROCEDURE — 250N000013 HC RX MED GY IP 250 OP 250 PS 637: Performed by: PSYCHIATRY & NEUROLOGY

## 2025-04-07 PROCEDURE — 250N000012 HC RX MED GY IP 250 OP 636 PS 637: Performed by: HOSPITALIST

## 2025-04-07 PROCEDURE — 250N000013 HC RX MED GY IP 250 OP 250 PS 637: Performed by: STUDENT IN AN ORGANIZED HEALTH CARE EDUCATION/TRAINING PROGRAM

## 2025-04-07 PROCEDURE — 82010 KETONE BODYS QUAN: CPT | Performed by: INTERNAL MEDICINE

## 2025-04-07 PROCEDURE — 99232 SBSQ HOSP IP/OBS MODERATE 35: CPT | Performed by: PSYCHIATRY & NEUROLOGY

## 2025-04-07 RX ORDER — OXYCODONE AND ACETAMINOPHEN 5; 325 MG/1; MG/1
1 TABLET ORAL EVERY 4 HOURS PRN
Status: DISCONTINUED | OUTPATIENT
Start: 2025-04-07 | End: 2025-04-08 | Stop reason: HOSPADM

## 2025-04-07 RX ORDER — MECLIZINE HYDROCHLORIDE 25 MG/1
25 TABLET ORAL 3 TIMES DAILY
Status: DISCONTINUED | OUTPATIENT
Start: 2025-04-07 | End: 2025-04-08 | Stop reason: HOSPADM

## 2025-04-07 RX ORDER — OXYCODONE AND ACETAMINOPHEN 5; 325 MG/1; MG/1
1 TABLET ORAL ONCE
Status: COMPLETED | OUTPATIENT
Start: 2025-04-07 | End: 2025-04-07

## 2025-04-07 RX ORDER — METOCLOPRAMIDE HYDROCHLORIDE 5 MG/ML
10 INJECTION INTRAMUSCULAR; INTRAVENOUS EVERY 6 HOURS PRN
Status: DISCONTINUED | OUTPATIENT
Start: 2025-04-07 | End: 2025-04-08 | Stop reason: HOSPADM

## 2025-04-07 RX ORDER — GABAPENTIN 300 MG/1
300 CAPSULE ORAL 3 TIMES DAILY
Status: DISCONTINUED | OUTPATIENT
Start: 2025-04-07 | End: 2025-04-07

## 2025-04-07 RX ORDER — DIPHENHYDRAMINE HYDROCHLORIDE 50 MG/ML
25 INJECTION, SOLUTION INTRAMUSCULAR; INTRAVENOUS EVERY 6 HOURS PRN
Status: DISCONTINUED | OUTPATIENT
Start: 2025-04-07 | End: 2025-04-08 | Stop reason: HOSPADM

## 2025-04-07 RX ADMIN — OXYCODONE HYDROCHLORIDE AND ACETAMINOPHEN 1 TABLET: 5; 325 TABLET ORAL at 00:56

## 2025-04-07 RX ADMIN — MECLIZINE HYDROCHLORIDE 25 MG: 25 TABLET ORAL at 20:55

## 2025-04-07 RX ADMIN — GABAPENTIN 300 MG: 300 CAPSULE ORAL at 15:20

## 2025-04-07 RX ADMIN — ONDANSETRON 4 MG: 4 TABLET, ORALLY DISINTEGRATING ORAL at 05:24

## 2025-04-07 RX ADMIN — DIPHENHYDRAMINE HYDROCHLORIDE 25 MG: 50 INJECTION INTRAMUSCULAR; INTRAVENOUS at 01:47

## 2025-04-07 RX ADMIN — METOCLOPRAMIDE 10 MG: 5 INJECTION, SOLUTION INTRAMUSCULAR; INTRAVENOUS at 08:35

## 2025-04-07 RX ADMIN — MECLIZINE HYDROCHLORIDE 25 MG: 25 TABLET ORAL at 15:20

## 2025-04-07 RX ADMIN — GABAPENTIN 300 MG: 300 CAPSULE ORAL at 20:55

## 2025-04-07 RX ADMIN — OXYCODONE HYDROCHLORIDE AND ACETAMINOPHEN 1 TABLET: 5; 325 TABLET ORAL at 05:23

## 2025-04-07 RX ADMIN — INSULIN ASPART 4 UNITS: 100 INJECTION, SOLUTION INTRAVENOUS; SUBCUTANEOUS at 12:35

## 2025-04-07 RX ADMIN — GABAPENTIN 300 MG: 300 CAPSULE ORAL at 10:50

## 2025-04-07 RX ADMIN — INSULIN ASPART 1 UNITS: 100 INJECTION, SOLUTION INTRAVENOUS; SUBCUTANEOUS at 16:49

## 2025-04-07 RX ADMIN — LIDOCAINE 1 PATCH: 4 PATCH TOPICAL at 16:00

## 2025-04-07 RX ADMIN — ATORVASTATIN CALCIUM 40 MG: 40 TABLET, FILM COATED ORAL at 20:55

## 2025-04-07 RX ADMIN — METHOCARBAMOL 750 MG: 750 TABLET ORAL at 20:55

## 2025-04-07 RX ADMIN — METHOCARBAMOL 750 MG: 750 TABLET ORAL at 10:50

## 2025-04-07 RX ADMIN — INSULIN GLARGINE 26 UNITS: 100 INJECTION, SOLUTION SUBCUTANEOUS at 21:02

## 2025-04-07 RX ADMIN — DULOXETINE HYDROCHLORIDE 60 MG: 60 CAPSULE, DELAYED RELEASE ORAL at 20:55

## 2025-04-07 RX ADMIN — ASPIRIN 81 MG: 81 TABLET, COATED ORAL at 10:50

## 2025-04-07 RX ADMIN — SODIUM CHLORIDE 500 ML: 0.9 INJECTION, SOLUTION INTRAVENOUS at 00:56

## 2025-04-07 RX ADMIN — INSULIN GLARGINE 26 UNITS: 100 INJECTION, SOLUTION SUBCUTANEOUS at 10:51

## 2025-04-07 RX ADMIN — OXYCODONE HYDROCHLORIDE AND ACETAMINOPHEN 1 TABLET: 5; 325 TABLET ORAL at 23:41

## 2025-04-07 RX ADMIN — DULOXETINE HYDROCHLORIDE 60 MG: 60 CAPSULE, DELAYED RELEASE ORAL at 10:50

## 2025-04-07 RX ADMIN — DIPHENHYDRAMINE HYDROCHLORIDE 25 MG: 25 CAPSULE ORAL at 10:50

## 2025-04-07 RX ADMIN — AMLODIPINE BESYLATE 5 MG: 5 TABLET ORAL at 10:50

## 2025-04-07 RX ADMIN — OXYCODONE HYDROCHLORIDE AND ACETAMINOPHEN 1 TABLET: 5; 325 TABLET ORAL at 15:19

## 2025-04-07 ASSESSMENT — ACTIVITIES OF DAILY LIVING (ADL)
ADLS_ACUITY_SCORE: 58

## 2025-04-07 NOTE — PROGRESS NOTES
[facility]    Medicine Progress Note - Hospitalist Service    Date of Admission:  4/6/2025    Assessment & Plan   Sarah Rahman is a 59 year old female admitted on 4/6/2025 for syncope workup.     #Possible syncope  #Vertigo  The patient reports several brief episodes of syncope over the past 2-3 weeks. These happen randomly. Frequently occur at rest. No head-strikes or significant falls. Feels lightheaded before and after. Very difficult to obtain a good history and I'm not sure if she's truly passing out or just experiencing pre-syncope. When pressed, she described room-spinning dizziness during these episodes with associated nausea that has made her feel off balance. CT head with chronic left thalamic infarct and bilateral occipital encephalomalacia. Head CTA with unchanged moderate focal stenosis of right PCA, unchanged moderate to severe stenosis of left vertebral artery. She has horizontal nystagmus on exam. I think her she may have BPPV.  - Neurology consulted  - MRI brain and EEG recommended - patient refused these as she wound need to remove her hair extensions, which contain metal anchors, and she won't do this  - Start meclizine 25mg PO TID, stop PTA Benadryl while on this  - PT/OT  - Echo (9/2024) reviewed. LA moderately dilated, mild-moderate concentric LVH. Normal LV and RV function.  - BNP normal here and no signs of volume overload  - Monitor on tele tonight, can likely discharge home 4/8 if stable    #History of CVA with chronic right sided weakness  Has presented to several hospitals with waxing/waning right-sided weakness. MRIs have been attempted but her metal anchors in hair extensions make MRIs unreadable.  -Neurology and PT consulted  -Continue PTA ASA and atorvastatin    #Chronic bilateral lower extremity peripheral neuropathy  #Chronic right lower extremity radicular pain  Chronic pain due to these problems. Previously managed by [facility], but was  asked to leave the clinic.  - Continue PTA Cymbalta 60mg BID  - Continue PTA gabapentin 300mg TID  - Continue PTA Suboxone 2mg TID  - Continue PTA Percocet  - Continue PTA Robaxin BID  - Continue PTA ketamine/gabapentin/lidocaine cream, compound pharmacy delivering this today  - Lower extremity doppler negative for DVT  - Patient was bit by a dog and has been treated for this, she mentions as infection, but no signs of infection  - Right foot xray without acute process    #Labile hypertension   -PTA amlodipine reconciled and increased to 5mg  -Prn IV hydralazine     #DM2 with long-term insulin use, poorly controlled  - A1c 13  - Continue PTA Lantus 26u BID  - Hold PTA Truclity and glipizide   - Patient to follow up outpatient with ophthalmology for blurry vision and PCP for diabetic neuropathy     #CAD   -Continue ASA and Statin     #HLD  -Continue PTA statin     #Anxiety  #Chronic pain syndrome  -PTA medications resumed  -Avoid prn IV narcotics    #Incidental pulmonary nodules  -Multiple <6mm. Can follow up with PCP for repeat CT in 1 year         Observation Goals: -diagnostic tests and consults completed and resulted, -vital signs normal or at patient baseline, -returns to baseline functional status, -safe disposition plan has been identified, Nurse to notify provider when observation goals have been met and patient is ready for discharge.  Diet: Moderate Consistent Carb (60 g CHO per Meal) Diet    DVT Prophylaxis: Pneumatic Compression Devices  Diehl Catheter: Not present  Lines: None     Cardiac Monitoring: ACTIVE order. Indication: Syncope  Code Status: Full Code      Clinically Significant Risk Factors Present on Admission                 # Drug Induced Platelet Defect: home medication list includes an antiplatelet medication   # Hypertension: Noted on problem list          # DMII: A1C = 13.0 % (Ref range: <5.7 %) within past 6 months    # Overweight: Estimated body mass index is 26.63 kg/m  as calculated  "from the following:    Height as of this encounter: 1.702 m (5' 7\").    Weight as of this encounter: 77.1 kg (170 lb).       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       Social Drivers of Health    Tobacco Use: High Risk (3/31/2025)    Received from [facility]    Patient History     Smoking Tobacco Use: Every Day     Smokeless Tobacco Use: Never     Passive Exposure: Current   Physical Activity: Not on File (12/4/2021)    Received from [facility]    Physical Activity     Physical Activity: 0   Interpersonal Safety: Unknown (9/14/2024)    Received from [facility]    Humiliation, Afraid, Rape, and Kick questionnaire     Emotionally Abused: No     Physically Abused: No     Sexually Abused: No   Stress: Not on File (12/4/2021)    Received from [facility]    Stress     Stress: 0   Social Connections: Not on File (9/23/2024)    Received from [facility]    Social Connections     Connectedness: 0          Disposition Plan     Medically Ready for Discharge: Anticipated Tomorrow             DEBBIE LARSON MD  Hospitalist Service  [facility]  Securely message with [email or web address] (more info)  Text page via [email or web address] Paging/Directory   ______________________________________________________________________    Interval History   Continues to feel some intermittent room-spinning dizziness. Nausea improved with Reglan.    Physical Exam   Vital Signs: Temp: 98.6  F (37  C) Temp src: Oral BP: (!) 162/90 Pulse: 80   Resp: 20 SpO2: 96 % O2 Device: None (Room air)    Weight: 170 lbs 0 oz    General: No overt distress, conversational, non-toxic appearing  HEENT: MMM  Pulmonary: CTAB; no crackles, wheezing, or rhonchi, normal effort  Cardiac: RRR. No m/r/g  Abdomen: Soft. NT, ND.  Extremities: No bilateral LE edema  Neuro: alert and awake, Ox4    Medical Decision Making       >50 MINUTES SPENT BY ME on the date of service doing chart " review, history, exam, documentation & further activities per the note.      Data     I have personally reviewed the following data over the past 24 hrs:    5.8  \   12.5   / 202     139 103 13.9 /  317 (H)   3.7 26 0.62 \     Trop: N/A BNP: N/A     TSH: N/A T4: N/A A1C: N/A     Procal: N/A CRP: N/A Lactic Acid: 2.1 (H)         Imaging results reviewed over the past 24 hrs:   Recent Results (from the past 24 hours)   XR Foot Right G/E 3 Views    Narrative    EXAM: XR FOOT RIGHT G/E 3 VIEWS  LOCATION: [facility]  DATE: 4/6/2025    INDICATION: pain in foot  COMPARISON: None.      Impression    IMPRESSION: No fracture. Degenerative changes which are moderate in the first MTP joint and mild in the midfoot and interphalangeal joints. Plantar calcaneal heel spur. Pes planus.

## 2025-04-07 NOTE — PLAN OF CARE
Goal Outcome Evaluation:       PRIMARY DIAGNOSIS: SYNCOPE/TIA  OUTPATIENT/OBSERVATION GOALS TO BE MET BEFORE DISCHARGE:  1. Orthostatic performed: N/A    2. Diagnostic testing complete & at baseline neurologic testing: Yes    3. Cleared by consultants (if involved): No    4. Interpretation of cardiac rhythm per telemetry tech: NSR     5. Tolerating adequate PO diet and medications: Yes    6. Return to near baseline physical activity or neurologic status: Yes    Discharge Planner Nurse   Safe discharge environment identified: Yes  Barriers to discharge: Yes       Entered by: Arminda Dean RN 04/07/2025 11:49 AM     Please review provider order for any additional goals.   Nurse to notify provider when observation goals have been met and patient is ready for discharge.      Denies syncope thus far this shift. Up to bathroom with SBA. TELE NSR. Reports LLE chronic pain. Oral medications administered. Pt refused suboxone. Nausea early AM; IV Reglan given and effective. Pt able to tolerate breakfast.

## 2025-04-07 NOTE — CONSULTS
NEUROLOGY CONSULTATION NOTE     Sarah Rahman,  1965, MRN [id number] Date: 2025     [facility]   Code status:  Full Code   PCP: [facility], [phone]      ASSESSMENT & PLAN   Diagnosis code: Syncopal spells    Syncopal spells-rule out seizure  Leg pain-rule out neuropathic pain from diabetic neuropathy  Right arm and leg weakness    Clinically patient spells are suggestive of syncopal spells  Head CT negative for acute structural lesions  Check EEG to evaluate for epilepsy  Hold off on antiepileptic medication  CT angiogram does show multifocal bilateral vertebral artery stenosis though there is no positional component to the patient's spells and vertebrobasilar insufficiency would be less likely.  Consider cardiac evaluation.  Defer to primary team.  She has extensive cardiac history  Seizure precautions    For right arm and weakness extensive workup was previously done with no clear cause.  She could not get an MRI because of metal.  She does have a history of left carotid endarterectomy and moderate spinal stenosis on the CT of the C-spine.  Recommend physical therapy and monitoring for right now.    Her leg pain is suggestive of neuropathic pain from diabetic neuropathy.  She was getting gabapentin/lidocaine/ketamine cream through the [place] [facility].  Will try to represcribe with the hospital otherwise she can use gabapentin.  Patient agreement with the plan.    Discharge:-Per primary team.       Chief Complaint   Patient presents with    Chest Pain        HISTORY OF PRESENT ILLNESS     We have been requested by Dr. Moe to evaluate Sarah Rahman who is a 59 year old  female for evaluation of syncopal spell/rule out seizure.  This is a 59-year-old female with history of type 2 diabetes, coronary artery disease status post CABG, hypertension, asthma, COPD, stroke, hyperlipidemia, schizoaffective disorder, chronic pain syndrome who presented with  multiple blackout events over the last 2-1/2 weeks.  She describes before the spells she will feel lightheaded and will have tunnel vision/blurred vision and then will pass out.  She is not sure for how long she is out for the with one of the spells she was told that she was out for 3 seconds.  No convulsive activity.  No significant tongue biting though with some episodes she has been herself.  There is no other provoking factor.  Spells can happen with sitting in the chair or standing up.  Denies any chest pains or palpitations.  Does have significant cardiac history.    She reports that over the last 2 weeks her right sided weakness is getting worse.  This had improved since the previous hospitalization though now is getting worse again.  Denies any trauma.  No new associated symptoms.    She further reports pain in her feet.  She has been seen by [facility] and has been given ketamine, gabapentin, lidocaine cream which she is finding beneficial.  She would like to restart in the hospital.  Does have a history of diabetes and possibly has diabetic neuropathy in the feet.     PAST MEDICAL & SURGICAL HISTORY     Medical History  Past Medical History:   Diagnosis Date    Asthma     CAD (coronary artery disease)     COPD (chronic obstructive pulmonary disease) (H)     CVA (cerebral infarction)     Dyshidrosis (pompholyx) 10/21/2016    GERD (gastroesophageal reflux disease)     History of CVA (cerebrovascular accident) 04/01/2012    Formatting of this note might be different from the original.  Overview:   Bilateral subacute cerebellar infarcts seen on MRI at [facility] 4/2012.  Pt was noted to have no residual deficits at [facility].  Formatting of this note might be different from the original.  Bilateral subacute cerebellar infarcts seen on MRI at [facility] 4/2012.  Pt was noted to have no residu    Hypertension     Intercostal neuritis 02/06/2018    Lumbar disc herniation  06/14/2019    Noncompliance 10/08/2021    Polysubstance abuse (H)     Post-COVID syndrome 07/11/2021    S/P CABG (coronary artery bypass graft)     S/P lumbar discectomy 06/13/2019    L5/S1 by  Dr. Hamm at [facility]    Schizoaffective disorder (H)     Sleep difficulties 07/17/2022     Surgical History  Past Surgical History:   Procedure Laterality Date    BYPASS GRAFT ARTERY CORONARY  01/01/2009    x2    CAROTID ENDARTERECTOMY Left 12/2021    CORONARY STENT PLACEMENT      CV ANGIOGRAM CORONARY GRAFT N/A 1/16/2024    Procedure: Coronary Angiogram Graft;  Surgeon: Spencer Diez MD;  Location: [facility] CATH LAB CV    CV CORONARY ANGIOGRAM N/A 06/02/2021    Procedure: Coronary Angiogram;  Surgeon: Juventino Rivera MD;  Location: [facility] Cardiac Cath Lab;  Service: Cardiology    HYSTERECTOMY TOTAL ABDOMINAL      HYSTERECTOMY TOTAL ABDOMINAL, BILATERAL SALPINGO-OOPHORECTOMY, COMBINED      IR LUMBAR PUNCTURE  7/23/2019    IR TRANSLAMINAR EPIDURAL LUMBAR INJ INCL IMAGING  09/27/2022    LUMBAR DISCECTOMY Right 06/13/2019    L5-S1 - Dr. Hamm    NASAL FRACTURE SURGERY      ORIF ULNAR / RADIAL SHAFT FRACTURE Right         SOCIAL HISTORY     Social History     Tobacco Use    Smoking status: Every Day     Types: Cigarettes    Smokeless tobacco: Never    Tobacco comments:     Seen IP by [facility] on 7/28/23 and declined counseling services and resource packet Smokes 1 cigarettes per day   Vaping Use    Vaping status: Never Used   Substance Use Topics    Alcohol use: Not Currently     Comment: Alcoholic Drinks/day: occ    Drug use: No        FAMILY HISTORY     Reviewed, and family history includes Coronary Artery Disease in her brother; Diabetes in her brother; Heart Disease in her father, mother, sister, and sister.     ALLERGIES     Allergies   Allergen Reactions    Haloperidol Unknown     Patient states it stops her heart      Haloperidol Angioedema    Hydroxyzine Angioedema    Meperidine And Related Angioedema,  "Difficulty breathing, Other (See Comments), Rash and Shortness Of Breath     Throat closes  Other reaction(s): Breathing Difficulty  Other reaction(s): Breathing Difficulty      Phenothiazines Other (See Comments)     Other reaction(s): CARDIAC DISTURBANCES  Patient states it stops her heart  \"I swell up\"  \"stopped by heart\"  \"I swell up\"  \"I swell up\"      Phenothiazines Angioedema    Tramadol Other (See Comments)     Other reaction(s): Angioedema  Throat itch      Tramadol Angioedema    Januvia [Sitagliptin] Swelling    Latex Itching    Haloperidol And Related Angioedema    Januvia [Sitagliptin] Other (See Comments)     Swelling in the neck     Latex Itching    Lisinopril Other (See Comments)     ACE cough  Itchy throat  Throat itches  Itchy throat      Nortriptyline Unknown     Fainting, unclear if it was related    Penicillins Swelling    Hydroxyzine Other (See Comments) and Rash     Tongue swelling  Tongue swelling  Tongue swelling      Lisinopril Cough        REVIEW OF SYSTEMS     12 System ROS was done other than the HPI this was negative.  Pertinent positives noted in the HPI.     HOME & HOSPITAL MEDICATIONS     Prior to Admission Medications  Medications Prior to Admission   Medication Sig Dispense Refill Last Dose/Taking    acetaminophen (TYLENOL) 500 MG tablet Take 2 tablets (1,000 mg) by mouth every 6 hours as needed for mild pain. Max 5.5 tabs per day   Taking As Needed    albuterol (PROAIR HFA) 108 (90 Base) MCG/ACT inhaler Inhale 1-2 puffs into the lungs every 4 hours as needed for shortness of breath or wheezing 18 g 11 Past Week    amLODIPine (NORVASC) 2.5 MG tablet Take 2.5 mg by mouth daily.   4/5/2025 Morning    aspirin 81 MG EC tablet Take 1 tablet by mouth daily.   Taking    atorvastatin (LIPITOR) 40 MG tablet Take 1 tablet (40 mg) by mouth every evening. 30 tablet 1 4/5/2025 Evening    buprenorphine HCl-naloxone HCl (SUBOXONE) 2-0.5 MG per film Place 1 Film under the tongue 3 times daily.   " Past Week    diphenhydrAMINE (BENADRYL) 25 MG tablet Take 25 mg by mouth 2 times daily.   4/5/2025 Evening    dulaglutide (TRULICITY) 0.75 MG/0.5ML pen Inject 0.75 mg Subcutaneous every 7 days 6 mL 3 3/31/2025    DULoxetine (CYMBALTA) 60 MG capsule Take 60 mg by mouth 2 times daily.   4/5/2025 Evening    glipiZIDE (GLUCOTROL XL) 10 MG 24 hr tablet Take 10 mg by mouth daily.   4/5/2025 Morning    insulin glargine (LANTUS PEN) 100 UNIT/ML pen Inject 26 Units subcutaneously 2 times daily.   4/5/2025 Evening    lidocaine (LIDODERM) 5 % patch Place onto the skin every 24 hours. To prevent lidocaine toxicity, patient should be patch free for 12 hrs daily.  Lower Back   4/5/2025    methocarbamol (ROBAXIN) 750 MG tablet Take 1 tablet (750 mg) by mouth 2 times daily 60 tablet 11 4/5/2025 Evening    naloxone (NARCAN) 4 MG/0.1ML nasal spray Spray 4 mg into one nostril alternating nostrils as needed for opioid reversal every 2-3 minutes until assistance arrives   Taking As Needed    ondansetron (ZOFRAN) 8 MG tablet Take 1 tablet (8 mg) by mouth every 8 hours as needed for nausea 20 tablet 3 4/5/2025 Evening    oxyCODONE-acetaminophen (PERCOCET)  MG per tablet Take 1 tablet by mouth every 6 hours as needed.   4/5/2025 Evening       Hospital Medications  Current Facility-Administered Medications   Medication Dose Route Frequency Provider Last Rate Last Admin    [START ON 4/7/2025] amLODIPine (NORVASC) tablet 5 mg  5 mg Oral Daily Orin Moe MD        [START ON 4/7/2025] aspirin EC tablet 81 mg  81 mg Oral Daily Orin Moe MD        atorvastatin (LIPITOR) tablet 40 mg  40 mg Oral QPM Orin Moe MD        buprenorphine HCl-naloxone HCl (SUBOXONE) 2-0.5 MG per film 1 Film  1 Film Sublingual TID Orin Moe MD   1 Film at 04/06/25 1632    diphenhydrAMINE (BENADRYL) capsule 25 mg  25 mg Oral BID Orin Moe MD        DULoxetine (CYMBALTA) DR capsule 60 mg  60 mg Oral BID Orin Moe MD         "gabapentin (NEURONTIN) capsule 300 mg  300 mg Oral TID Lefty Wadsworth MD        insulin aspart (NovoLOG) injection (RAPID ACTING)  1-7 Units Subcutaneous TID AC Orin Moe MD   4 Units at 04/06/25 1648    insulin aspart (NovoLOG) injection (RAPID ACTING)  1-5 Units Subcutaneous At Bedtime Orin Moe MD        insulin glargine (LANTUS PEN) injection 26 Units  26 Units Subcutaneous BID Orin Moe MD        ketamine 5%-gabapentin 8%-lidocaine 2.5% in PLO cream 0.25 g  0.25 g Topical TID Lefty Wadsworth MD        Lidocaine (LIDOCARE) 4 % Patch 1 patch  1 patch Transdermal Q24H Orin Moe MD   1 patch at 04/06/25 1630    methocarbamol (ROBAXIN) tablet 750 mg  750 mg Oral BID Orin Moe MD            PHYSICAL EXAM     Vital signs  Temp:  [98.3  F (36.8  C)-98.7  F (37.1  C)] 98.3  F (36.8  C)  Pulse:  [] 114  Resp:  [15-28] 23  BP: (146-221)/() 157/83  SpO2:  [93 %-100 %] 97 %    PHYSICAL EXAMINATION  VITALS: BP (!) 157/83 (BP Location: Right arm)   Pulse 114   Temp 98.3  F (36.8  C) (Oral)   Resp 23   Ht 1.702 m (5' 7\")   Wt 77.1 kg (170 lb)   LMP  (LMP Unknown)   SpO2 97%   BMI 26.63 kg/m    GENERAL -Health appearing, No apparent distress  EYES- No scleral icterus, no eyelid droop, Pupils - see Neuro section  HEENT - Normocephalic, atraumatic, Hearing grossly intact; Oral mucosa moist and pink in color. External Ears and nose intact.   Neck - supple  PULM - Good spontaneous respiratory effort; Normal palpation of chest.   CV- Pedal pulses present with no peripheral edema/ No significant varicosities.  MSK- Gait - see Neuro section; Strength and tone- see Neuro section; Range of motion grossly intact.  PSYCH -cooperative    Neurological  Mental status - Patient is awake and grossly oriented to self, place and time. Attention span is normal. Language is fluent and follows commands appropriately.   Cranial nerves - CN II-XII intact. Pupils are reactive and symmetric; EOMI, " VFIT, NLF symmetric  Motor - Motor exam shows 5/5 strength on the left side.  3/5 strength in the right arm and leg.  Tone - Tone is symmetric bilaterally in upper and lower extremities.  Reflexes - Reflexes are decreased.  Sensation - Sensory exam is grossly intact to light touch, pain.  Coordination - Finger to nose is without dysmetria on the left side.  Unable to do heel-to-shin.  Unable to do right finger-to-nose due to weakness.  Gait and station --unable to safely ambulate due to weakness.  Formal gait testing cannot be done due to safety concerns from ongoing medical issues.       DIAGNOSTIC STUDIES     Pertinent Radiology   HEAD CT:  1.  No acute intracranial hemorrhage.     HEAD CTA:   1.  No evidence of vascular occlusion.     NECK CTA:  1.  Atheromatous disease involves the origin of the left vertebral artery without high-grade stenosis. Postprocedural changes of left carotid endarterectomy, stable from the prior study.     XRAY leg  IMPRESSION: Anatomic alignment right tibia and fibula. No acute displaced tibia or fibula fracture is identified. Degenerative change right knee is similar to prior and most advanced in the patellofemoral compartment. New mild right leg soft tissue   swelling with soft tissue gas in the medial proximal one-third shaft right tibia region. Findings are compatible with soft tissue infection or penetrating trauma. No radiopaque adjacent soft tissue foreign body. Arterial calcification.     ECHO  Left ventricular size, wall motion and function are normal. The ejection  fraction is 60-65%.  There is mild concentric left ventricular hypertrophy.  Normal right ventricle size and systolic function.  Right ventricular systolic pressure is elevated, consistent with mild  pulmonary hypertension.  There is moderate (2+) tricuspid regurgitation.  There is mild mitral stenosis.  There is mild to moderate (1-2+) mitral regurgitation.     Head CT  IMPRESSION:  1.  No acute intracranial  process.     CT C spine  IMPRESSION:  1.  No evidence of acute fracture.  2.  At C3-C4, there is moderate spinal canal stenosis due to a prominent left paracentral disc protrusion.  3.  At C5-C6, there is mild-to-moderate stenosis of the spinal canal and right neural foramen.  4.  Additional degenerative changes as above.    HEAD CT:  1.  No CT evidence for acute intracranial process.  2.  Brain atrophy and presumed chronic microvascular ischemic changes as above.  3.  Chronic left thalamic infarct and bilateral occipital encephalomalacia/gliosis     HEAD CTA:  1.  Patent major intracranial arteries. Unchanged moderate focal stenosis in the P1 segment of right PCA.  2.  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  3.  No aneurysm.     NECK CTA:  Patent major cervical arteries. Unchanged moderate stenosis in the V1 segment of the left vertebral artery.    CT C spine  1.  No fracture or posttraumatic subluxation.  2.  No high-grade spinal canal or neural foraminal stenosis.    Recent Results (from the past 24 hours)   Extra Blue Top Tube    Collection Time: 04/06/25 11:19 AM   Result Value Ref Range    Hold Specimen JIC    Extra Red Top Tube    Collection Time: 04/06/25 11:19 AM   Result Value Ref Range    Hold Specimen JIC    Extra Green Top (Lithium Heparin) Tube    Collection Time: 04/06/25 11:19 AM   Result Value Ref Range    Hold Specimen JIC    Extra Purple Top Tube    Collection Time: 04/06/25 11:19 AM   Result Value Ref Range    Hold Specimen JIC    Basic metabolic panel    Collection Time: 04/06/25 11:19 AM   Result Value Ref Range    Sodium 135 135 - 145 mmol/L    Potassium 4.0 3.4 - 5.3 mmol/L    Chloride 99 98 - 107 mmol/L    Carbon Dioxide (CO2) 27 22 - 29 mmol/L    Anion Gap 9 7 - 15 mmol/L    Urea Nitrogen 11.7 8.0 - 23.0 mg/dL    Creatinine 0.71 0.51 - 0.95 mg/dL    GFR Estimate >90 >60 mL/min/1.73m2    Calcium 8.9 8.8 - 10.4 mg/dL    Glucose 427 (H) 70 - 99  mg/dL   CK total    Collection Time: 04/06/25 11:19 AM   Result Value Ref Range    CK 59 26 - 192 U/L   Hepatic function panel    Collection Time: 04/06/25 11:19 AM   Result Value Ref Range    Protein Total 7.1 6.4 - 8.3 g/dL    Albumin 4.2 3.5 - 5.2 g/dL    Bilirubin Total 0.3 <=1.2 mg/dL    Alkaline Phosphatase 86 40 - 150 U/L    AST 16 0 - 45 U/L    ALT 17 0 - 50 U/L    Bilirubin Direct 0.11 0.00 - 0.30 mg/dL   Lipase    Collection Time: 04/06/25 11:19 AM   Result Value Ref Range    Lipase 13 13 - 60 U/L   Magnesium    Collection Time: 04/06/25 11:19 AM   Result Value Ref Range    Magnesium 1.9 1.7 - 2.3 mg/dL   Nt probnp inpatient    Collection Time: 04/06/25 11:19 AM   Result Value Ref Range    N terminal Pro BNP Inpatient 205 0 - 900 pg/mL   Troponin T, High Sensitivity    Collection Time: 04/06/25 11:19 AM   Result Value Ref Range    Troponin T, High Sensitivity 14 <=14 ng/L   TSH with free T4 reflex    Collection Time: 04/06/25 11:19 AM   Result Value Ref Range    TSH 1.01 0.30 - 4.20 uIU/mL   CBC with platelets and differential    Collection Time: 04/06/25 11:19 AM   Result Value Ref Range    WBC Count 6.2 4.0 - 11.0 10e3/uL    RBC Count 4.58 3.80 - 5.20 10e6/uL    Hemoglobin 12.3 11.7 - 15.7 g/dL    Hematocrit 38.8 35.0 - 47.0 %    MCV 85 78 - 100 fL    MCH 26.9 26.5 - 33.0 pg    MCHC 31.7 31.5 - 36.5 g/dL    RDW 12.7 10.0 - 15.0 %    Platelet Count 201 150 - 450 10e3/uL    % Neutrophils 61 %    % Lymphocytes 30 %    % Monocytes 6 %    % Eosinophils 3 %    % Basophils 1 %    % Immature Granulocytes 0 %    NRBCs per 100 WBC 0 <1 /100    Absolute Neutrophils 3.7 1.6 - 8.3 10e3/uL    Absolute Lymphocytes 1.8 0.8 - 5.3 10e3/uL    Absolute Monocytes 0.4 0.0 - 1.3 10e3/uL    Absolute Eosinophils 0.2 0.0 - 0.7 10e3/uL    Absolute Basophils 0.0 0.0 - 0.2 10e3/uL    Absolute Immature Granulocytes 0.0 <=0.4 10e3/uL    Absolute NRBCs 0.0 10e3/uL   Hemoglobin A1c    Collection Time: 04/06/25 11:19 AM   Result Value  Ref Range    Estimated Average Glucose 326 (H) <117 mg/dL    Hemoglobin A1C 13.0 (H) <5.7 %   UA with Microscopic reflex to Culture    Collection Time: 04/06/25 12:12 PM    Specimen: Urine, Clean Catch   Result Value Ref Range    Color Urine Light Yellow Colorless, Straw, Light Yellow, Yellow    Appearance Urine Clear Clear    Glucose Urine >1000 (A) Negative mg/dL    Bilirubin Urine Negative Negative    Ketones Urine Negative Negative mg/dL    Specific Gravity Urine 1.010 1.003 - 1.035    Blood Urine Negative Negative    pH Urine 6.0 5.0 - 7.0    Protein Albumin Urine 20 (A) Negative mg/dL    Urobilinogen Urine Normal Normal mg/dL    Nitrite Urine Negative Negative    Leukocyte Esterase Urine Negative Negative    Bacteria Urine Few (A) None Seen /HPF    RBC Urine 2 <=2 /HPF    WBC Urine 1 <=5 /HPF    Squamous Epithelials Urine 1 <=1 /HPF   Troponin T, High Sensitivity    Collection Time: 04/06/25  2:02 PM   Result Value Ref Range    Troponin T, High Sensitivity 12 <=14 ng/L   Glucose by meter    Collection Time: 04/06/25  3:39 PM   Result Value Ref Range    GLUCOSE BY METER POCT 343 (H) 70 - 99 mg/dL   Glucose by meter    Collection Time: 04/06/25  4:14 PM   Result Value Ref Range    GLUCOSE BY METER POCT 306 (H) 70 - 99 mg/dL       Total time spent for face to face visit, reviewing labs/imaging studies, counseling and coordination of care was: Over 80 min More than 50% of this time was spent on counseling and coordination of care.    Counseled patient.  Reviewing chart.  Multiple problems reviewed.  Testing reviewed.  Patient new to me.    Lefty Wadsworth MD  Neurologist  [facility]  Tel:- [phone]

## 2025-04-07 NOTE — PLAN OF CARE
Problem: Adult Inpatient Plan of Care  Goal: Absence of Hospital-Acquired Illness or Injury  Intervention: Identify and Manage Fall Risk  Recent Flowsheet Documentation  Taken 4/6/2025 1621 by Holli Mathews, RN  Safety Promotion/Fall Prevention:   activity supervised   clutter free environment maintained   nonskid shoes/slippers when out of bed   safety round/check completed  Goal: Optimal Comfort and Wellbeing  Intervention: Monitor Pain and Promote Comfort  Recent Flowsheet Documentation  Taken 4/6/2025 2021 by Holli Mathews, RN  Pain Management Interventions: medication (see MAR)  Taken 4/6/2025 1746 by Holli Mathews, RN  Pain Management Interventions: medication (see MAR)  Taken 4/6/2025 1632 by Holli Mathews, RN  Pain Management Interventions: medication (see MAR)   Goal Outcome Evaluation:       Alert& O, On RA, ST on tele, PRN Hydralazine administer for B/P of 221/100, Provider notify, refused scheduled SUBOXONE, and request for PRN Oxycodone, BG high 480 & 530, Cross cover notify and put in one time order of 10 units insulin Novolog, recheck is 531, Cross cover notify again, Up with Ax1 to the bathroom.

## 2025-04-07 NOTE — PLAN OF CARE
"PRIMARY DIAGNOSIS: \"GENERIC\" NURSING  OUTPATIENT/OBSERVATION GOALS TO BE MET BEFORE DISCHARGE:  ADLs back to baseline: No    Activity and level of assistance: Assist of 1    Pain status: Improved-controlled with oral pain medications.    Return to near baseline physical activity: No     Discharge Planner Nurse   Safe discharge environment identified: No  Barriers to discharge: Yes       Entered by: Holli Matehws RN 04/06/2025 8:53 PM     Please review provider order for any additional goals.   Nurse to notify provider when observation goals have been met and patient is ready for discharge.Goal Outcome Evaluation:                        "

## 2025-04-07 NOTE — PLAN OF CARE
PRIMARY DIAGNOSIS: SYNCOPE/TIA  OUTPATIENT/OBSERVATION GOALS TO BE MET BEFORE DISCHARGE:  1. Orthostatic performed: No    2. Diagnostic testing complete & at baseline neurologic testing: Yes    3. Cleared by consultants (if involved): Yes    4. Interpretation of cardiac rhythm per telemetry tech: NSR    5. Tolerating adequate PO diet and medications: Yes    6. Return to near baseline physical activity or neurologic status: Yes    Discharge Planner Nurse   Safe discharge environment identified: Yes  Barriers to discharge: Yes       Entered by: Ivone Swanson RN 04/07/2025 2:30 AM     Please review provider order for any additional goals.   Nurse to notify provider when observation goals have been met and patient is ready for discharge.Goal Outcome Evaluation:

## 2025-04-07 NOTE — PLAN OF CARE
Problem: Adult Inpatient Plan of Care  Goal: Optimal Comfort and Wellbeing  Outcome: Progressing  Intervention: Monitor Pain and Promote Comfort  Recent Flowsheet Documentation  Taken 4/7/2025 0523 by Ivone Swanson RN  Pain Management Interventions: medication (see MAR)  Taken 4/7/2025 0426 by Ivone Swanson RN  Pain Management Interventions:   rest   quiet environment facilitated  Taken 4/7/2025 0140 by Ivone Swanson RN  Pain Management Interventions:   rest   quiet environment facilitated  Taken 4/7/2025 0056 by Ivone Swanson RN  Pain Management Interventions: medication (see MAR)     Problem: Pain Acute  Goal: Optimal Pain Control and Function  Outcome: Progressing  Intervention: Develop Pain Management Plan  Recent Flowsheet Documentation  Taken 4/7/2025 0523 by Ivone Swanson RN  Pain Management Interventions: medication (see MAR)  Taken 4/7/2025 0426 by Ivone Swanson RN  Pain Management Interventions:   rest   quiet environment facilitated  Taken 4/7/2025 0140 by Ivone Swanson RN  Pain Management Interventions:   rest   quiet environment facilitated  Taken 4/7/2025 0056 by Ivone Swanson RN  Pain Management Interventions: medication (see MAR)  Intervention: Prevent or Manage Pain  Recent Flowsheet Documentation  Taken 4/7/2025 0427 by Ivone Swanson RN  Medication Review/Management: medications reviewed  Taken 4/7/2025 0101 by Ivone Swanson RN  Medication Review/Management: medications reviewed     Problem: Diabetes  Goal: Optimal Coping  Outcome: Progressing     Problem: Diabetes  Goal: Blood Glucose Level Within Target Range  Outcome: Progressing     Problem: COPD (Chronic Obstructive Pulmonary Disease)  Goal: Optimal Chronic Illness Coping  Outcome: Progressing     Problem: COPD (Chronic Obstructive Pulmonary Disease)  Goal: Effective Oxygenation and Ventilation  Outcome: Progressing  Intervention: Promote Airway Secretion Clearance  Recent Flowsheet  Documentation  Taken 4/7/2025 0427 by Ivone Swanson RN  Activity Management: activity adjusted per tolerance  Taken 4/7/2025 0101 by Ivone Swanson RN  Activity Management: activity adjusted per tolerance  Intervention: Optimize Oxygenation and Ventilation  Recent Flowsheet Documentation  Taken 4/7/2025 0427 by Ivone Swanson RN  Head of Bed (HOB) Positioning: HOB at 20-30 degrees  Taken 4/7/2025 0101 by Ivone Swanson RN  Head of Bed (HOB) Positioning: HOB at 20-30 degrees     Problem: Nausea and Vomiting  Goal: Nausea and Vomiting Relief  Outcome: Progressing     Problem: Adult Inpatient Plan of Care  Goal: Absence of Hospital-Acquired Illness or Injury  Intervention: Prevent Skin Injury  Recent Flowsheet Documentation  Taken 4/7/2025 0427 by Ivone Swanson RN  Body Position: position changed independently  Taken 4/7/2025 0101 by Ivone Swanson RN  Body Position: position changed independently   Goal Outcome Evaluation:       Patient admitted for syncope. VSS except BP in the 160s. AOX4. RA. On tele with NSR @ 87. Right PIV saline locked. 60 gm carb diet. Assist of 1 to the bathroom two times during the night. Percocet administered X2 for pain; Benadryl X1 for itching and Zofran X1 for nausea throughout the night. PCD in place. PT consulted. BS @ 0500 was 126. Sept well from 0100.

## 2025-04-07 NOTE — PROGRESS NOTES
NEUROLOGY PROGRESS NOTE     Sarah Rahman,  1965, MRN [id number] Date: 2025     [facility]   Code status:  Full Code   PCP: [facility], [phone]      ASSESSMENT & PLAN   Diagnosis code: Syncopal spells    Syncopal spells-rule out seizure  Leg pain-rule out neuropathic pain from diabetic neuropathy  Right arm and leg weakness    Clinically patient spells are suggestive of syncopal spells  Head CT negative for acute structural lesions  Patient refused the EEG because of her head extensions.  Hold off on antiepileptic medication as clinically spells and not suggestive of seizures.  CT angiogram does show multifocal bilateral vertebral artery stenosis though there is no positional component to the patient's spells and vertebrobasilar insufficiency would be less likely.  Consider cardiac evaluation.  Defer to primary team.  She has extensive cardiac history.  Patient being monitored through telemetry overnight.  Seizure precautions    For right arm and leg weakness extensive workup was previously done with no clear cause.  She could not get an MRI because of metal.  She does have a history of left carotid endarterectomy and moderate spinal stenosis on the CT of the C-spine.  Recommend physical therapy and monitoring for right now.    Her leg pain is suggestive of neuropathic pain from diabetic neuropathy.  She was getting gabapentin/lidocaine/ketamine cream through the [place] pain [facility].  This is not available in the hospital and will use gabapentin 300 mg 3 times daily while in the hospital.  Should follow back with the [place] pain clinic.      Discharge:-Per primary team.  Will sign off.  Please call with any further questions       Chief Complaint   Patient presents with    Chest Pain        HISTORY OF PRESENT ILLNESS     We have been requested by Dr. Moe to evaluate Sarah Rahman who is a 59 year old  female for evaluation of syncopal spell/rule out  seizure.  This is a 59-year-old female with history of type 2 diabetes, coronary artery disease status post CABG, hypertension, asthma, COPD, stroke, hyperlipidemia, schizoaffective disorder, chronic pain syndrome who presented with multiple blackout events over the last 2-1/2 weeks.  She describes before the spells she will feel lightheaded and will have tunnel vision/blurred vision and then will pass out.  She is not sure for how long she is out for the with one of the spells she was told that she was out for 3 seconds.  No convulsive activity.  No significant tongue biting though with some episodes she has been herself.  There is no other provoking factor.  Spells can happen with sitting in the chair or standing up.  Denies any chest pains or palpitations.  Does have significant cardiac history.    She reports that over the last 2 weeks her right sided weakness is getting worse.  This had improved since the previous hospitalization though now is getting worse again.  Denies any trauma.  No new associated symptoms.    She further reports pain in her feet.  She has been seen by [place] [facility] and has been given ketamine, gabapentin, lidocaine cream which she is finding beneficial.  She would like to restart in the hospital.  Does have a history of diabetes and possibly has diabetic neuropathy in the feet.    4/7  No further spells of loss of consciousness.  The gabapentin given to her has not really helped.  Was unable to get her ketamine and gabapentin. Has refused the EEG because of head extensions.     PAST MEDICAL & SURGICAL HISTORY     Medical History  Past Medical History:   Diagnosis Date    Asthma     CAD (coronary artery disease)     COPD (chronic obstructive pulmonary disease) (H)     CVA (cerebral infarction)     Dyshidrosis (pompholyx) 10/21/2016    GERD (gastroesophageal reflux disease)     History of CVA (cerebrovascular accident) 04/01/2012    Formatting of this note might be different from the  original.  Overview:   Bilateral subacute cerebellar infarcts seen on MRI at [facility] 4/2012.  Pt was noted to have no residual deficits at [facility].  Formatting of this note might be different from the original.  Bilateral subacute cerebellar infarcts seen on MRI at [facility] 4/2012.  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 Hamm at [facility]    Schizoaffective disorder (H)     Sleep difficulties 07/17/2022     Surgical History  Past Surgical History:   Procedure Laterality Date    BYPASS GRAFT ARTERY CORONARY  01/01/2009    x2    CAROTID ENDARTERECTOMY Left 12/2021    CORONARY STENT PLACEMENT      CV ANGIOGRAM CORONARY GRAFT N/A 1/16/2024    Procedure: Coronary Angiogram Graft;  Surgeon: Spencer Diez MD;  Location: [facility] CATH LAB CV    CV CORONARY ANGIOGRAM N/A 06/02/2021    Procedure: Coronary Angiogram;  Surgeon: Juventino Rivera MD;  Location: [facility] Cardiac Cath Lab;  Service: Cardiology    HYSTERECTOMY TOTAL ABDOMINAL      HYSTERECTOMY TOTAL ABDOMINAL, BILATERAL SALPINGO-OOPHORECTOMY, COMBINED      IR LUMBAR PUNCTURE  7/23/2019    IR TRANSLAMINAR EPIDURAL LUMBAR INJ INCL IMAGING  09/27/2022    LUMBAR DISCECTOMY Right 06/13/2019    L5-S1 - Dr. Hamm    NASAL FRACTURE SURGERY      ORIF ULNAR / RADIAL SHAFT FRACTURE Right         SOCIAL HISTORY     Social History     Tobacco Use    Smoking status: Every Day     Types: Cigarettes    Smokeless tobacco: Never    Tobacco comments:     Seen IP by [facility] on 7/28/23 and declined counseling services and resource packet Smokes 1 cigarettes per day   Vaping Use    Vaping status: Never Used   Substance Use Topics    Alcohol use: Not Currently     Comment:  "Alcoholic Drinks/day: occ    Drug use: No        FAMILY HISTORY     Reviewed, and family history includes Coronary Artery Disease in her brother; Diabetes in her brother; Heart Disease in her father, mother, sister, and sister.     ALLERGIES     Allergies   Allergen Reactions    Haloperidol Unknown     Patient states it stops her heart      Haloperidol Angioedema    Hydroxyzine Angioedema    Meperidine And Related Angioedema, Difficulty breathing, Other (See Comments), Rash and Shortness Of Breath     Throat closes  Other reaction(s): Breathing Difficulty  Other reaction(s): Breathing Difficulty      Phenothiazines Other (See Comments)     Other reaction(s): CARDIAC DISTURBANCES  Patient states it stops her heart  \"I swell up\"  \"stopped by heart\"  \"I swell up\"  \"I swell up\"      Phenothiazines Angioedema    Tramadol Other (See Comments)     Other reaction(s): Angioedema  Throat itch      Tramadol Angioedema    Januvia [Sitagliptin] Swelling    Latex Itching    Haloperidol And Related Angioedema    Januvia [Sitagliptin] Other (See Comments)     Swelling in the neck     Latex Itching    Lisinopril Other (See Comments)     ACE cough  Itchy throat  Throat itches  Itchy throat      Nortriptyline Unknown     Fainting, unclear if it was related    Penicillins Swelling    Hydroxyzine Other (See Comments) and Rash     Tongue swelling  Tongue swelling  Tongue swelling      Lisinopril Cough        REVIEW OF SYSTEMS     12 System ROS was done other than the HPI this was negative.  Pertinent positives noted in the HPI.     HOME & HOSPITAL MEDICATIONS     Prior to Admission Medications  Medications Prior to Admission   Medication Sig Dispense Refill Last Dose/Taking    acetaminophen (TYLENOL) 500 MG tablet Take 2 tablets (1,000 mg) by mouth every 6 hours as needed for mild pain. Max 5.5 tabs per day   Taking As Needed    albuterol (PROAIR HFA) 108 (90 Base) MCG/ACT inhaler Inhale 1-2 puffs into the lungs every 4 hours as needed " for shortness of breath or wheezing 18 g 11 Past Week    amLODIPine (NORVASC) 2.5 MG tablet Take 2.5 mg by mouth daily.   4/5/2025 Morning    aspirin 81 MG EC tablet Take 1 tablet by mouth daily.   Taking    atorvastatin (LIPITOR) 40 MG tablet Take 1 tablet (40 mg) by mouth every evening. 30 tablet 1 4/5/2025 Evening    buprenorphine HCl-naloxone HCl (SUBOXONE) 2-0.5 MG per film Place 1 Film under the tongue 3 times daily.   Past Week    diphenhydrAMINE (BENADRYL) 25 MG tablet Take 25 mg by mouth 2 times daily.   4/5/2025 Evening    dulaglutide (TRULICITY) 0.75 MG/0.5ML pen Inject 0.75 mg Subcutaneous every 7 days 6 mL 3 3/31/2025    DULoxetine (CYMBALTA) 60 MG capsule Take 60 mg by mouth 2 times daily.   4/5/2025 Evening    glipiZIDE (GLUCOTROL XL) 10 MG 24 hr tablet Take 10 mg by mouth daily.   4/5/2025 Morning    insulin glargine (LANTUS PEN) 100 UNIT/ML pen Inject 26 Units subcutaneously 2 times daily.   4/5/2025 Evening    lidocaine (LIDODERM) 5 % patch Place onto the skin every 24 hours. To prevent lidocaine toxicity, patient should be patch free for 12 hrs daily.  Lower Back   4/5/2025    methocarbamol (ROBAXIN) 750 MG tablet Take 1 tablet (750 mg) by mouth 2 times daily 60 tablet 11 4/5/2025 Evening    naloxone (NARCAN) 4 MG/0.1ML nasal spray Spray 4 mg into one nostril alternating nostrils as needed for opioid reversal every 2-3 minutes until assistance arrives   Taking As Needed    ondansetron (ZOFRAN) 8 MG tablet Take 1 tablet (8 mg) by mouth every 8 hours as needed for nausea 20 tablet 3 4/5/2025 Evening    oxyCODONE-acetaminophen (PERCOCET)  MG per tablet Take 1 tablet by mouth every 6 hours as needed.   4/5/2025 Evening       Hospital Medications  Current Facility-Administered Medications   Medication Dose Route Frequency Provider Last Rate Last Admin    amLODIPine (NORVASC) tablet 5 mg  5 mg Oral Daily Orin Moe MD   5 mg at 04/07/25 1050    aspirin EC tablet 81 mg  81 mg Oral Daily  "Orin Moe MD   81 mg at 04/07/25 1050    atorvastatin (LIPITOR) tablet 40 mg  40 mg Oral QPM Orin Moe MD   40 mg at 04/06/25 2011    buprenorphine HCl-naloxone HCl (SUBOXONE) 2-0.5 MG per film 1 Film  1 Film Sublingual TID Orin Moe MD   1 Film at 04/06/25 1632    DULoxetine (CYMBALTA) DR capsule 60 mg  60 mg Oral BID Orin Moe MD   60 mg at 04/07/25 1050    gabapentin (NEURONTIN) capsule 300 mg  300 mg Oral TID Lefty Wadsworth MD   300 mg at 04/07/25 1050    insulin aspart (NovoLOG) injection (RAPID ACTING)  1-7 Units Subcutaneous TID AC Orin Moe MD   4 Units at 04/07/25 1235    insulin aspart (NovoLOG) injection (RAPID ACTING)  1-5 Units Subcutaneous At Bedtime Orin Moe MD   5 Units at 04/06/25 2137    insulin glargine (LANTUS PEN) injection 26 Units  26 Units Subcutaneous BID Orin Moe MD   26 Units at 04/07/25 1051    ketamine 5%-gabapentin 8%-lidocaine 2.5% in PLO cream 0.25 g  0.25 g Topical TID Lefty Wadsworth MD        Lidocaine (LIDOCARE) 4 % Patch 1 patch  1 patch Transdermal Q24H Orin Moe MD   1 patch at 04/06/25 1630    meclizine (ANTIVERT) tablet 25 mg  25 mg Oral TID Jhonatan Albrecht MD        methocarbamol (ROBAXIN) tablet 750 mg  750 mg Oral BID Orin Moe MD   750 mg at 04/07/25 1050        PHYSICAL EXAM     Vital signs  Temp:  [98.1  F (36.7  C)-98.7  F (37.1  C)] 98.6  F (37  C)  Pulse:  [] 80  Resp:  [19-23] 20  BP: (146-221)/() 162/90  SpO2:  [96 %-98 %] 96 %    PHYSICAL EXAMINATION  VITALS: BP (!) 162/90 (BP Location: Left arm)   Pulse 80   Temp 98.6  F (37  C) (Oral)   Resp 20   Ht 1.702 m (5' 7\")   Wt 77.1 kg (170 lb)   LMP  (LMP Unknown)   SpO2 96%   BMI 26.63 kg/m    GENERAL -Health appearing, No apparent distress  EYES- No scleral icterus, no eyelid droop, Pupils - see Neuro section  HEENT - Normocephalic, atraumatic, Hearing grossly intact; Oral mucosa moist and pink in color. External Ears and nose intact. "   Neck - supple  PULM - Good spontaneous respiratory effort; Normal palpation of chest.   CV- Pedal pulses present with no peripheral edema/ No significant varicosities.  MSK- Gait - see Neuro section; Strength and tone- see Neuro section; Range of motion grossly intact.  PSYCH -cooperative    Neurological  Mental status - Patient is awake and grossly oriented to self, place and time. Attention span is normal. Language is fluent and follows commands appropriately.   Cranial nerves - CN II-XII intact. Pupils are reactive and symmetric; EOMI, VFIT, NLF symmetric  Motor - Motor exam shows 5/5 strength on the left side.  3/5 strength in the right arm and leg.  Tone - Tone is symmetric bilaterally in upper and lower extremities.  Reflexes - Reflexes are decreased.  Sensation - Sensory exam is grossly intact to light touch, pain.  Coordination - Finger to nose is without dysmetria on the left side.  Unable to do heel-to-shin.  Unable to do right finger-to-nose due to weakness.  Gait and station --unable to safely ambulate due to weakness.  Formal gait testing cannot be done due to safety concerns from ongoing medical issues.  Exam stable.     DIAGNOSTIC STUDIES     Pertinent Radiology   HEAD CT:  1.  No acute intracranial hemorrhage.     HEAD CTA:   1.  No evidence of vascular occlusion.     NECK CTA:  1.  Atheromatous disease involves the origin of the left vertebral artery without high-grade stenosis. Postprocedural changes of left carotid endarterectomy, stable from the prior study.     XRAY leg  IMPRESSION: Anatomic alignment right tibia and fibula. No acute displaced tibia or fibula fracture is identified. Degenerative change right knee is similar to prior and most advanced in the patellofemoral compartment. New mild right leg soft tissue   swelling with soft tissue gas in the medial proximal one-third shaft right tibia region. Findings are compatible with soft tissue infection or penetrating trauma. No radiopaque  adjacent soft tissue foreign body. Arterial calcification.     ECHO  Left ventricular size, wall motion and function are normal. The ejection  fraction is 60-65%.  There is mild concentric left ventricular hypertrophy.  Normal right ventricle size and systolic function.  Right ventricular systolic pressure is elevated, consistent with mild  pulmonary hypertension.  There is moderate (2+) tricuspid regurgitation.  There is mild mitral stenosis.  There is mild to moderate (1-2+) mitral regurgitation.     Head CT  IMPRESSION:  1.  No acute intracranial process.     CT C spine  IMPRESSION:  1.  No evidence of acute fracture.  2.  At C3-C4, there is moderate spinal canal stenosis due to a prominent left paracentral disc protrusion.  3.  At C5-C6, there is mild-to-moderate stenosis of the spinal canal and right neural foramen.  4.  Additional degenerative changes as above.    HEAD CT:  1.  No CT evidence for acute intracranial process.  2.  Brain atrophy and presumed chronic microvascular ischemic changes as above.  3.  Chronic left thalamic infarct and bilateral occipital encephalomalacia/gliosis     HEAD CTA:  1.  Patent major intracranial arteries. Unchanged moderate focal stenosis in the P1 segment of right PCA.  2.  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  3.  No aneurysm.     NECK CTA:  Patent major cervical arteries. Unchanged moderate stenosis in the V1 segment of the left vertebral artery.    CT C spine  1.  No fracture or posttraumatic subluxation.  2.  No high-grade spinal canal or neural foraminal stenosis.    Recent Results (from the past 24 hours)   Glucose by meter    Collection Time: 04/06/25  3:39 PM   Result Value Ref Range    GLUCOSE BY METER POCT 343 (H) 70 - 99 mg/dL   Glucose by meter    Collection Time: 04/06/25  4:14 PM   Result Value Ref Range    GLUCOSE BY METER POCT 306 (H) 70 - 99 mg/dL   Glucose by meter    Collection Time: 04/06/25  8:14 PM    Result Value Ref Range    GLUCOSE BY METER POCT 480 (H) 70 - 99 mg/dL   Glucose by meter    Collection Time: 04/06/25  9:32 PM   Result Value Ref Range    GLUCOSE BY METER POCT 530 (HH) 70 - 99 mg/dL   Glucose by meter    Collection Time: 04/06/25 10:21 PM   Result Value Ref Range    GLUCOSE BY METER POCT 506 (HH) 70 - 99 mg/dL   Glucose by meter    Collection Time: 04/06/25 10:59 PM   Result Value Ref Range    GLUCOSE BY METER POCT 531 (HH) 70 - 99 mg/dL   Glucose by meter    Collection Time: 04/06/25 11:52 PM   Result Value Ref Range    GLUCOSE BY METER POCT 321 (H) 70 - 99 mg/dL   Basic metabolic panel    Collection Time: 04/07/25 12:36 AM   Result Value Ref Range    Sodium 137 135 - 145 mmol/L    Potassium 3.8 3.4 - 5.3 mmol/L    Chloride 102 98 - 107 mmol/L    Carbon Dioxide (CO2) 24 22 - 29 mmol/L    Anion Gap 11 7 - 15 mmol/L    Urea Nitrogen 16.3 8.0 - 23.0 mg/dL    Creatinine 0.73 0.51 - 0.95 mg/dL    GFR Estimate >90 >60 mL/min/1.73m2    Calcium 9.2 8.8 - 10.4 mg/dL    Glucose 267 (H) 70 - 99 mg/dL   Ketone Beta-Hydroxybutyrate Quantitative    Collection Time: 04/07/25 12:36 AM   Result Value Ref Range    Ketone (Beta-Hydroxybutyrate) Quantitative <0.18 <=0.30 mmol/L   Lactic acid whole blood    Collection Time: 04/07/25 12:36 AM   Result Value Ref Range    Lactic Acid 2.1 (H) 0.7 - 2.0 mmol/L   Glucose by meter    Collection Time: 04/07/25  3:04 AM   Result Value Ref Range    GLUCOSE BY METER POCT 85 70 - 99 mg/dL   Glucose by meter    Collection Time: 04/07/25  5:12 AM   Result Value Ref Range    GLUCOSE BY METER POCT 126 (H) 70 - 99 mg/dL   Basic metabolic panel    Collection Time: 04/07/25  7:07 AM   Result Value Ref Range    Sodium 139 135 - 145 mmol/L    Potassium 3.7 3.4 - 5.3 mmol/L    Chloride 103 98 - 107 mmol/L    Carbon Dioxide (CO2) 26 22 - 29 mmol/L    Anion Gap 10 7 - 15 mmol/L    Urea Nitrogen 13.9 8.0 - 23.0 mg/dL    Creatinine 0.62 0.51 - 0.95 mg/dL    GFR Estimate >90 >60 mL/min/1.73m2     Calcium 8.8 8.8 - 10.4 mg/dL    Glucose 177 (H) 70 - 99 mg/dL   CBC with platelets    Collection Time: 04/07/25  7:07 AM   Result Value Ref Range    WBC Count 5.8 4.0 - 11.0 10e3/uL    RBC Count 4.59 3.80 - 5.20 10e6/uL    Hemoglobin 12.5 11.7 - 15.7 g/dL    Hematocrit 38.5 35.0 - 47.0 %    MCV 84 78 - 100 fL    MCH 27.2 26.5 - 33.0 pg    MCHC 32.5 31.5 - 36.5 g/dL    RDW 12.6 10.0 - 15.0 %    Platelet Count 202 150 - 450 10e3/uL   Glucose by meter    Collection Time: 04/07/25  7:27 AM   Result Value Ref Range    GLUCOSE BY METER POCT 189 (H) 70 - 99 mg/dL   Glucose by meter    Collection Time: 04/07/25 12:12 PM   Result Value Ref Range    GLUCOSE BY METER POCT 317 (H) 70 - 99 mg/dL       Total time spent for face to face visit, reviewing labs/imaging studies, counseling and coordination of care was: Over 35 min More than 50% of this time was spent on counseling and coordination of care.    Counseling patient.  Reviewing chart.    Lefty Wadsworth MD  Neurologist  [facility]  Tel:- [phone]

## 2025-04-07 NOTE — PROGRESS NOTES
No further care management intervention anticipated at this time.  Please re-consult if further needs arise.  Care management signing off.    10:19 AM    Brenna Kjellberg, BSW LSW  4/7/2025

## 2025-04-08 VITALS
DIASTOLIC BLOOD PRESSURE: 84 MMHG | HEART RATE: 86 BPM | OXYGEN SATURATION: 92 % | BODY MASS INDEX: 26.68 KG/M2 | TEMPERATURE: 97.9 F | WEIGHT: 170 LBS | HEIGHT: 67 IN | SYSTOLIC BLOOD PRESSURE: 149 MMHG | RESPIRATION RATE: 18 BRPM

## 2025-04-08 LAB
GLUCOSE BLDC GLUCOMTR-MCNC: 116 MG/DL (ref 70–99)
GLUCOSE BLDC GLUCOMTR-MCNC: 177 MG/DL (ref 70–99)
GLUCOSE BLDC GLUCOMTR-MCNC: 262 MG/DL (ref 70–99)

## 2025-04-08 PROCEDURE — 82962 GLUCOSE BLOOD TEST: CPT

## 2025-04-08 PROCEDURE — 250N000013 HC RX MED GY IP 250 OP 250 PS 637: Performed by: HOSPITALIST

## 2025-04-08 PROCEDURE — G0378 HOSPITAL OBSERVATION PER HR: HCPCS

## 2025-04-08 PROCEDURE — 99239 HOSP IP/OBS DSCHRG MGMT >30: CPT | Performed by: STUDENT IN AN ORGANIZED HEALTH CARE EDUCATION/TRAINING PROGRAM

## 2025-04-08 PROCEDURE — 250N000013 HC RX MED GY IP 250 OP 250 PS 637: Performed by: PSYCHIATRY & NEUROLOGY

## 2025-04-08 PROCEDURE — 250N000013 HC RX MED GY IP 250 OP 250 PS 637: Performed by: STUDENT IN AN ORGANIZED HEALTH CARE EDUCATION/TRAINING PROGRAM

## 2025-04-08 RX ORDER — GABAPENTIN 300 MG/1
300 CAPSULE ORAL 3 TIMES DAILY
Qty: 90 CAPSULE | Refills: 0 | Status: SHIPPED | OUTPATIENT
Start: 2025-04-08

## 2025-04-08 RX ORDER — MECLIZINE HYDROCHLORIDE 25 MG/1
25 TABLET ORAL 3 TIMES DAILY PRN
Qty: 90 TABLET | Refills: 0 | Status: SHIPPED | OUTPATIENT
Start: 2025-04-08

## 2025-04-08 RX ADMIN — GABAPENTIN 300 MG: 300 CAPSULE ORAL at 08:49

## 2025-04-08 RX ADMIN — INSULIN GLARGINE 26 UNITS: 100 INJECTION, SOLUTION SUBCUTANEOUS at 08:51

## 2025-04-08 RX ADMIN — MECLIZINE HYDROCHLORIDE 25 MG: 25 TABLET ORAL at 08:49

## 2025-04-08 RX ADMIN — INSULIN ASPART 3 UNITS: 100 INJECTION, SOLUTION INTRAVENOUS; SUBCUTANEOUS at 08:51

## 2025-04-08 RX ADMIN — AMLODIPINE BESYLATE 5 MG: 5 TABLET ORAL at 08:49

## 2025-04-08 RX ADMIN — ASPIRIN 81 MG: 81 TABLET, COATED ORAL at 08:49

## 2025-04-08 RX ADMIN — METHOCARBAMOL 750 MG: 750 TABLET ORAL at 08:49

## 2025-04-08 RX ADMIN — OXYCODONE HYDROCHLORIDE AND ACETAMINOPHEN 1 TABLET: 5; 325 TABLET ORAL at 04:50

## 2025-04-08 RX ADMIN — DULOXETINE HYDROCHLORIDE 60 MG: 60 CAPSULE, DELAYED RELEASE ORAL at 08:49

## 2025-04-08 ASSESSMENT — ACTIVITIES OF DAILY LIVING (ADL)
ADLS_ACUITY_SCORE: 58
ADLS_ACUITY_SCORE: 58
ADLS_ACUITY_SCORE: 62
ADLS_ACUITY_SCORE: 57
ADLS_ACUITY_SCORE: 58
ADLS_ACUITY_SCORE: 58
ADLS_ACUITY_SCORE: 57
ADLS_ACUITY_SCORE: 58
ADLS_ACUITY_SCORE: 57
ADLS_ACUITY_SCORE: 58
ADLS_ACUITY_SCORE: 57
ADLS_ACUITY_SCORE: 58

## 2025-04-08 NOTE — PROGRESS NOTES
Pt is A+O x4, on RA. Has pain in bilateral feet, managed by prn percocet x1. Denies lightheadedness/dizziness. Up standby to bathroom. NSR on tele. 0200 . Pt is able to make her needs knwown.   Connie Carmona RN.     PRIMARY DIAGNOSIS: SYNCOPE/TIA  OUTPATIENT/OBSERVATION GOALS TO BE MET BEFORE DISCHARGE:  1. Orthostatic performed: N/A    2. Diagnostic testing complete & at baseline neurologic testing: No- pt cannot get MRI or EEG with metal in hair.     3. Cleared by consultants (if involved): Yes    4. Interpretation of cardiac rhythm per telemetry tech: NSR    5. Tolerating adequate PO diet and medications: Yes    6. Return to near baseline physical activity or neurologic status: Yes    Discharge Planner Nurse   Safe discharge environment identified: Yes  Barriers to discharge: No       Entered by: Connie Carmona RN 04/08/2025 1:51 AM     Please review provider order for any additional goals.   Nurse to notify provider when observation goals have been met and patient is ready for discharge.

## 2025-04-08 NOTE — PLAN OF CARE
Patient has intermittent pain in her BLE. Started wearing the SCDs but the patient requested later to take them off. Pharmacy not able to send ketamine/gabapentin/lidocaine cream due to it being unavailable. Pharmacy stated that they will reach out again tomorrow to another pharmacy. Patient ambulated in the hallway and to the bathroom with SBA.     Problem: Adult Inpatient Plan of Care  Goal: Plan of Care Review  Description: The Plan of Care Review/Shift note should be completed every shift.  The Outcome Evaluation is a brief statement about your assessment that the patient is improving, declining, or no change.  This information will be displayed automatically on your shiftnote.  Outcome: Progressing     Problem: Adult Inpatient Plan of Care  Goal: Optimal Comfort and Wellbeing  Outcome: Progressing     Problem: Pain Acute  Goal: Optimal Pain Control and Function  Outcome: Progressing     Problem: Nausea and Vomiting  Goal: Nausea and Vomiting Relief  Outcome: Progressing  Intervention: Prevent and Manage Nausea and Vomiting  Recent Flowsheet Documentation  Taken 4/7/2025 2054 by Deana Moya, RN  Nausea/Vomiting Interventions: (intermittent, no complaints now) other (see comments)

## 2025-04-08 NOTE — DISCHARGE SUMMARY
"[facility]  Hospitalist Discharge Summary      Date of Admission:  4/6/2025  Date of Discharge:  4/8/2025 12:48 PM  Discharging Provider: DEBBIE LARSON MD  Discharge Service: Hospitalist Service    Discharge Diagnoses     #BPPV  #History of CVA with chronic right sided weakness   #Chronic bilateral lower extremity peripheral neuropathy  #Chronic right lower extremity radicular pain  #Labile hypertension   #DM2 with long-term insulin use, poorly controlled   #CAD   #HLD   #Incidental pulmonary nodules     Clinically Significant Risk Factors     # DMII: A1C = 13.0 % (Ref range: <5.7 %) within past 6 months  # Overweight: Estimated body mass index is 26.63 kg/m  as calculated from the following:    Height as of this encounter: 1.702 m (5' 7\").    Weight as of this encounter: 77.1 kg (170 lb).       Follow-ups Needed After Discharge   Follow-up Appointments       Hospital Follow-up with Existing Primary Care Provider (PCP)          Schedule Primary Care visit within: 14 Days               Unresulted Labs Ordered in the Past 30 Days of this Admission       No orders found from 3/7/2025 to 4/7/2025.            Discharge Disposition   Discharged to home  Condition at discharge: Stable    Hospital Course   Sarah Rahman is a 59 year old female admitted on 4/6/2025 for syncope workup.     #BPPV  The patient reports several brief episodes of syncope over the past 2-3 weeks. These happen randomly. Frequently occur at rest. No head-strikes or significant falls. Feels lightheaded before and after. Difficult to obtain a good history and I'm not sure if she's truly passing out or just experiencing pre-syncope. When pressed, she described room-spinning dizziness during these episodes with associated nausea that has made her feel off balance. CT head with chronic left thalamic infarct and bilateral occipital encephalomalacia. Head CTA with unchanged moderate focal stenosis of right PCA, unchanged " moderate to severe stenosis of left vertebral artery. She has horizontal nystagmus on exam. I believe she has BPPV. Meclizine was started and improved her symptoms significantly.  - Neurology consulted  - MRI brain and EEG recommended - patient declined these as she wound need to remove her hair extensions, which contain metal anchors, and she won't do this  - Started meclizine 25mg PO TID with good effect, continue PRN at discharge  - PT/OT ordered, but patient did not want to work with them  - Echo (9/2024) reviewed. LA moderately dilated, mild-moderate concentric LVH. Normal LV and RV function.  - BNP normal here and no signs of volume overload    #History of CVA with chronic right sided weakness  Has presented to several hospitals with waxing/waning right-sided weakness. MRIs have been attempted but her metal anchors in hair extensions make MRIs unreadable.  -Neurology and PT consulted  -Continue PTA ASA and atorvastatin    #Chronic bilateral lower extremity peripheral neuropathy  #Chronic right lower extremity radicular pain  Chronic pain due to these problems. Previously managed by [facility], follows with  primary care.  - Continue PTA Cymbalta 60mg BID  - Start gabapentin 300mg TID  - Continue PTA Suboxone 2mg TID  - Continue PTA Percocet  - Continue PTA Robaxin BID  - Continue PTA ketamine/gabapentin/lidocaine cream, compound pharmacy delivering this today  - Lower extremity doppler negative for DVT  - Patient was bit by a dog and has been treated for this, she mentions as infection, but no signs of infection  - Right foot xray without acute process    #Labile hypertension   -PTA amlodipine reconciled, continue  -Prn IV hydralazine     #DM2 with long-term insulin use, poorly controlled  - A1c 13  - Continue PTA Lantus 26u BID  - Hold PTA Truclity and glipizide, resume at discharge  - Patient to follow up outpatient with ophthalmology for blurry vision and PCP for diabetic neuropathy     #CAD    -Continue ASA and Statin     #HLD  -Continue PTA statin     #Incidental pulmonary nodules  -Multiple <6mm. Can follow up with PCP for repeat CT in 1 year      Consultations This Hospital Stay   CARE MANAGEMENT / SOCIAL WORK IP CONSULT  PHYSICAL THERAPY ADULT IP CONSULT  NEUROLOGY IP CONSULT    Code Status   Full Code    Time Spent on this Encounter   I, DEBBIE LARSON MD, personally saw the patient today and spent greater than 30 minutes discharging this patient.       DEBBIE LARSON MD  [facility] EXTENDED RECOVERY AND SHORT STAY  [address] [place] [id number]  Phone: [phone]  Fax: [phone]  ______________________________________________________________________    Physical Exam   Vital Signs: Temp: 97.9  F (36.6  C) Temp src: Oral BP: (!) 149/84 Pulse: 86   Resp: 18 SpO2: 92 % O2 Device: None (Room air)    Weight: 170 lbs 0 oz    General: No overt distress, conversational, non-toxic appearing  HEENT: MMM  Pulmonary: CTAB; no crackles, wheezing, or rhonchi, normal effort  Cardiac: RRR. No m/r/g  Abdomen: Soft. NT, ND.  Extremities: No bilateral LE edema  Neuro: alert and awake, Ox4       Primary Care Physician   [facility]    Discharge Orders      Reason for your hospital stay    Admitted for dizziness and found to have vertigo that responded well to our medications     Activity    Your activity upon discharge: activity as tolerated     Diet    Follow this diet upon discharge: Current Diet:Orders Placed This Encounter      Moderate Consistent Carb (60 g CHO per Meal) Diet     Hospital Follow-up with Existing Primary Care Provider (PCP)            Significant Results and Procedures   Most Recent 3 CBC's:  Recent Labs   Lab Test 04/07/25  0707 04/06/25  1119 12/01/24  1507   WBC 5.8 6.2 5.9   HGB 12.5 12.3 9.8*   MCV 84 85 88    201 195     Most Recent 3 BMP's:  Recent Labs   Lab Test 04/08/25  1128 04/08/25  0729 04/08/25  0217  04/07/25  0727 04/07/25  0707 04/07/25  0304 04/07/25  0036 04/06/25  1539 04/06/25  1119   NA  --   --   --   --  139  --  137  --  135   POTASSIUM  --   --   --   --  3.7  --  3.8  --  4.0   CHLORIDE  --   --   --   --  103  --  102  --  99   CO2  --   --   --   --  26  --  24  --  27   BUN  --   --   --   --  13.9  --  16.3  --  11.7   CR  --   --   --   --  0.62  --  0.73  --  0.71   ANIONGAP  --   --   --   --  10  --  11  --  9   MAXIMO  --   --   --   --  8.8  --  9.2  --  8.9   * 262* 116*   < > 177*   < > 267*   < > 427*    < > = values in this interval not displayed.   ,   Results for orders placed or performed during the hospital encounter of 04/06/25   CTA Head Neck with Contrast    Narrative    EXAM: CTA HEAD NECK W CONTRAST  LOCATION: [facility]  DATE: 4/6/2025    INDICATION: right neck pain and h o stroke  COMPARISON: CT and CTA 12/1/2024.  CONTRAST: [id number] 90ml  TECHNIQUE: Head and neck CT angiogram with IV contrast. Noncontrast head CT followed by axial helical CT images of the head and neck vessels obtained during the arterial phase of intravenous contrast administration. Axial 2D reconstructed images and   multiplanar 3D MIP reconstructed images of the head and neck vessels were performed by the technologist. Dose reduction techniques were used. All stenosis measurements made according to NASCET criteria unless otherwise specified.    FINDINGS:   NONCONTRAST HEAD CT:   INTRACRANIAL CONTENTS: No intracranial hemorrhage, extraaxial collection, or mass effect.  Areas of encephalomalacia and gliosis in bilateral occipital lobes. Chronic left thalamic infarct. No CT evidence of acute infarct. Mild presumed chronic small   vessel ischemic changes. Normal ventricles and sulci.     VISUALIZED ORBITS/SINUSES/MASTOIDS: No intraorbital abnormality. No paranasal sinus mucosal disease. No middle ear or mastoid effusion.    BONES/SOFT TISSUES: No acute abnormality. Chronic  bilateral nasal bone fractures.    HEAD CTA:  ANTERIOR CIRCULATION: Calcified atherosclerotic plaques in bilateral carotid siphons. No large vessel occlusion, high-grade, aneurysm, or high flow vascular malformation. Standard [place] anatomy.    POSTERIOR CIRCULATION: Calcified atherosclerotic plaques in V4 segment of bilateral vertebral arteries with moderate to severe stenosis on the left. Unchanged moderate focal stenosis in the P1 segment of right PCA. No large vessel occlusion, aneurysm, or   high flow vascular malformation. Balanced vertebral arteries supply a normal basilar artery.     DURAL VENOUS SINUSES: Expected enhancement of the major dural venous sinuses.    NECK CTA:  RIGHT CAROTID: Mixed calcified and soft plaques at the bifurcation without high-grade stenosis or dissection.    LEFT CAROTID: Mixed calcified and soft plaques at the bifurcation without high-grade stenosis or dissection.    VERTEBRAL ARTERIES: Unchanged moderate stenosis in the V1 segment of the left vertebral artery.. Balanced vertebral arteries.    AORTIC ARCH: Classic aortic arch anatomy with no significant stenosis at the origin of the great vessels.    NONVASCULAR STRUCTURES: Sternotomy changes.      Impression    IMPRESSION:   HEAD CT:  1.  No CT evidence for acute intracranial process.  2.  Brain atrophy and presumed chronic microvascular ischemic changes as above.  3.  Chronic left thalamic infarct and bilateral occipital encephalomalacia/gliosis    HEAD CTA:  1.  Patent major intracranial arteries. Unchanged moderate focal stenosis in the P1 segment of right PCA.  2.  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  3.  No aneurysm.    NECK CTA:  Patent major cervical arteries. Unchanged moderate stenosis in the V1 segment of the left vertebral artery.     US Lower Extremity Venous Duplex Bilateral    Narrative    EXAM: US LOWER EXTREMITY VENOUS DUPLEX BILATERAL  LOCATION: [facility]  DATE: 4/6/2025    INDICATION: intermittent leg swelling, chest pain  COMPARISON: None.  TECHNIQUE: Venous Duplex ultrasound of bilateral lower extremities with and without compression, augmentation and duplex. Color flow and spectral Doppler with waveform analysis performed.    FINDINGS: Exam includes the common femoral, femoral, popliteal veins as well as segmentally visualized deep calf veins and greater saphenous vein.     RIGHT: No deep vein thrombosis. No superficial thrombophlebitis. No popliteal cyst.    LEFT: No deep vein thrombosis. No superficial thrombophlebitis. No popliteal cyst.      Impression    IMPRESSION:  1.  No deep venous thrombosis in the bilateral lower extremities.   CT Cervical Spine Reconstructed    Narrative    EXAM: CT CERVICAL SPINE RECONSTRUCTED  LOCATION: [facility]  DATE: 4/6/2025    INDICATION: syncope with falls  COMPARISON: None.  TECHNIQUE: Routine CT Cervical Spine without IV contrast. Multiplanar reformats. Dose reduction techniques were used.    FINDINGS:  VERTEBRA: Normal vertebral body heights. Straightening of normal cervical lordosis. Mild anterolisthesis at C3-4. No fracture or posttraumatic subluxation.     CANAL/FORAMINA: No canal or neural foraminal stenosis.    PARASPINAL: No extraspinal abnormality.      Impression    IMPRESSION:  1.  No fracture or posttraumatic subluxation.  2.  No high-grade spinal canal or neural foraminal stenosis.   CT Thoracic Spine Reconstructed    Narrative    EXAM: CT THORACIC SPINE RECONSTRUCTED, CT LUMBAR SPINE RECONSTRUCTED  LOCATION: [facility]  DATE: 4/6/2025    INDICATION: syncope, falls with right sided pain  COMPARISON: None.  TECHNIQUE:   1.  Thoracic spine CT without IV contrast. Dose reduction techniques were used.  2.  Lumbar spine CT without IV contrast. Dose reduction techniques were used.    FINDINGS:  Thoracic spine CT:  No evidence of acute  fracture or subluxation of the thoracic spine by CT imaging. The vertebral bodies of the thoracic spine have normal stature and alignment. The disc spaces are well-maintained. No significant degenerative changes.    No significant posterior disc bulge, spinal canal, or neural foraminal narrowing at any level within the thoracic spine.    Lumbar spine CT:  No evidence of acute fracture or subluxation of the lumbar spine by CT imaging. Anterolisthesis of L4 and L5 measuring 3 mm. The vertebral bodies of the lumbar spine otherwise have normal stature and alignment. Multilevel degenerative disc disease of the   lumbar spine with disc height loss, most pronounced at L3/L4 and L5/S1. The partially imaged intra-abdominal contents are unremarkable.    T12/L1:  No posterior disc bulge or spinal canal narrowing. No neural foraminal narrowing.    L1/L2:  No posterior disc bulge or spinal canal narrowing. No neural foraminal narrowing.    L2/L3:  No posterior disc bulge or spinal canal narrowing. No neural foraminal narrowing.    L3/L4:  No posterior disc bulge or spinal canal narrowing. Moderate bilateral neural foraminal narrowing.      L4/L5:  No posterior disc bulge or spinal canal narrowing. Moderate bilateral neural foraminal narrowing.     L5/S1: No posterior disc bulge or spinal canal narrowing. Moderate bilateral neural foraminal narrowing.       Impression    IMPRESSION:    Thoracic Spine CT  1.  No evidence of acute fracture or subluxation of the thoracic spine by CT imaging.    Lumbar Spine CT:  1.  No evidence of acute fracture or subluxation of the lumbar spine by CT imaging.  2.  Degenerative lumbar spondylosis as described above.   CT Lumbar Spine Reconstructed    Narrative    EXAM: CT THORACIC SPINE RECONSTRUCTED, CT LUMBAR SPINE RECONSTRUCTED  LOCATION: [facility]  DATE: 4/6/2025    INDICATION: syncope, falls with right sided pain  COMPARISON: None.  TECHNIQUE:   1.  Thoracic spine CT  without IV contrast. Dose reduction techniques were used.  2.  Lumbar spine CT without IV contrast. Dose reduction techniques were used.    FINDINGS:  Thoracic spine CT:  No evidence of acute fracture or subluxation of the thoracic spine by CT imaging. The vertebral bodies of the thoracic spine have normal stature and alignment. The disc spaces are well-maintained. No significant degenerative changes.    No significant posterior disc bulge, spinal canal, or neural foraminal narrowing at any level within the thoracic spine.    Lumbar spine CT:  No evidence of acute fracture or subluxation of the lumbar spine by CT imaging. Anterolisthesis of L4 and L5 measuring 3 mm. The vertebral bodies of the lumbar spine otherwise have normal stature and alignment. Multilevel degenerative disc disease of the   lumbar spine with disc height loss, most pronounced at L3/L4 and L5/S1. The partially imaged intra-abdominal contents are unremarkable.    T12/L1:  No posterior disc bulge or spinal canal narrowing. No neural foraminal narrowing.    L1/L2:  No posterior disc bulge or spinal canal narrowing. No neural foraminal narrowing.    L2/L3:  No posterior disc bulge or spinal canal narrowing. No neural foraminal narrowing.    L3/L4:  No posterior disc bulge or spinal canal narrowing. Moderate bilateral neural foraminal narrowing.      L4/L5:  No posterior disc bulge or spinal canal narrowing. Moderate bilateral neural foraminal narrowing.     L5/S1: No posterior disc bulge or spinal canal narrowing. Moderate bilateral neural foraminal narrowing.       Impression    IMPRESSION:    Thoracic Spine CT  1.  No evidence of acute fracture or subluxation of the thoracic spine by CT imaging.    Lumbar Spine CT:  1.  No evidence of acute fracture or subluxation of the lumbar spine by CT imaging.  2.  Degenerative lumbar spondylosis as described above.   CTA Chest Abdomen Pelvis w Contrast    Narrative    EXAM: CTA CHEST ABDOMEN PELVIS W  CONTRAST  LOCATION: [facility]  DATE: 4/6/2025    INDICATION: Syncope. Chest pain. Right arm and leg pain.  COMPARISON: None.  TECHNIQUE: CT angiogram chest abdomen pelvis during arterial phase of injection of IV contrast. 2D and 3D MIP reconstructions were performed by the CT technologist. Dose reduction techniques were used.   CONTRAST: [id number] 90ml    FINDINGS:   CT ANGIOGRAM CHEST, ABDOMEN, AND PELVIS: No aortic dissection or other acute aortic pathology. No aortic aneurysm. Moderate to severe calcified and noncalcified atherosclerotic plaque throughout the aorta extending into the bilateral iliac arteries.   Right brachiocephalic and left common carotid arteries share a common origin. Moderate stenosis of the proximal left vertebral artery. Aortic arch vessels and their visualized branches are otherwise patent. Mild stenosis at the origin of the celiac   artery, which is otherwise patent. Celiac artery branches are patent. Mild stenosis of the proximal superior mesenteric artery, which is otherwise patent. Dual left renal arteries with mild proximal stenosis of the dominant artery and moderate ostial   stenosis of the nondominant artery supplying the inferior pole. Moderate stenosis of the proximal single right renal artery. Inferior mesenteric artery and its visualized branches are patent. Mild stenoses of the bilateral common iliac arteries. Mild to   moderate multifocal stenoses along the course of both internal iliac arteries. Bilateral external iliac and common femoral arteries are patent. Right obturator artery arises from the right inferior epigastric artery (anatomic variant). Mild to moderate   stenoses of the visualized portions of the bilateral superficial femoral arteries. Pulmonary arteries are normal in caliber. No pulmonary embolism within the main pulmonary arteries. Majority of the pulmonary arteries are poorly opacified and not well   evaluated.    LUNGS AND PLEURA:  No consolidations, pleural effusions, or pneumothorax. Few scattered small pulmonary nodules measuring up to 3 mm in the right lower lobe (5/#162). Thin strands of mucus within the trachea. Central airways are otherwise patent.    MEDIASTINUM/AXILLAE: Status post CABG. No pericardial effusion. Small hiatal hernia. No enlarged thoracic lymph node.    CORONARY ARTERY CALCIFICATION: Previous intervention (stents or CABG).    HEPATOBILIARY: Small hepatic cyst adjacent to the gallbladder fundus. No calcified gallstones or biliary ductal dilation.    PANCREAS: Normal.    SPLEEN: Normal.    ADRENAL GLANDS: Normal.    KIDNEYS/BLADDER: Small right renal cyst, which does not require follow-up. No urinary calculi or hydronephrosis. Normal bladder.    BOWEL: No bowel obstruction or inflammation. Normal appendix.    LYMPH NODES: Normal.    PELVIC ORGANS: Absent uterus.    MUSCULOSKELETAL: Multiple median sternotomy wires. Demineralized bones. Multilevel degenerative changes of the spine. No acute bony abnormality or destructive bone lesions.      Impression    IMPRESSION:    1.  No aortic dissection or other acute aortic pathology.    2.  No acute process is identified in the chest, abdomen, or pelvis.    3.  Few scattered small pulmonary nodules measuring up to 3 mm. See follow-up guidelines below.    4.  Small hiatal hernia.    REFERENCE:  Guidelines for Management of Incidental Pulmonary Nodules Detected on CT Images: From the Fleischner Society 2017.   Guidelines apply to incidental nodules in patients who are 35 years or older.  Guidelines do not apply to lung cancer screening, patients with immunosuppression, or patients with known primary cancer.    MULTIPLE NODULES  Nodule size <6 mm  Low-risk patients: No follow-up needed.  High-risk patients: Optional follow-up at 12 months.   XR Foot Right G/E 3 Views    Narrative    EXAM: XR FOOT RIGHT G/E 3 VIEWS  LOCATION: [facility]  DATE:  4/6/2025    INDICATION: pain in foot  COMPARISON: None.      Impression    IMPRESSION: No fracture. Degenerative changes which are moderate in the first MTP joint and mild in the midfoot and interphalangeal joints. Plantar calcaneal heel spur. Pes planus.       Discharge Medications   Discharge Medication List as of 4/8/2025 10:54 AM        START taking these medications    Details   gabapentin (NEURONTIN) 300 MG capsule Take 1 capsule (300 mg) by mouth 3 times daily., Disp-90 capsule, R-0, E-Prescribe      ketamine 5%-gabapentin 8%-lidocaine 2.5% in PLO cream Apply 1 click (0.25 g) topically 3 times daily. Apply to skin of right or left leg where pain is., No Print OutDispense in dosing applicator. 1 click = 0.25g of product.      meclizine (ANTIVERT) 25 MG tablet Take 1 tablet (25 mg) by mouth 3 times daily as needed for dizziness., Disp-90 tablet, R-0, E-Prescribe           CONTINUE these medications which have NOT CHANGED    Details   acetaminophen (TYLENOL) 500 MG tablet Take 2 tablets (1,000 mg) by mouth every 6 hours as needed for mild pain. Max 5.5 tabs per day, OTC      albuterol (PROAIR HFA) 108 (90 Base) MCG/ACT inhaler Inhale 1-2 puffs into the lungs every 4 hours as needed for shortness of breath or wheezing, Disp-18 g, R-11, E-PrescribePharmacy may dispense brand covered by insurance (Proair, or proventil or ventolin or generic albuterol inhaler)      amLODIPine (NORVASC) 2.5 MG tablet Take 2.5 mg by mouth daily., Historical      aspirin 81 MG EC tablet Take 1 tablet by mouth daily., Historical      atorvastatin (LIPITOR) 40 MG tablet Take 1 tablet (40 mg) by mouth every evening., Disp-30 tablet, R-1, E-Prescribe      buprenorphine HCl-naloxone HCl (SUBOXONE) 2-0.5 MG per film Place 1 Film under the tongue 3 times daily., Historical      dulaglutide (TRULICITY) 0.75 MG/0.5ML pen Inject 0.75 mg Subcutaneous every 7 days, Disp-6 mL, R-3, E-Prescribe      DULoxetine (CYMBALTA) 60 MG capsule Take 60 mg by  "mouth 2 times daily., Historical      glipiZIDE (GLUCOTROL XL) 10 MG 24 hr tablet Take 10 mg by mouth daily., Historical      insulin glargine (LANTUS PEN) 100 UNIT/ML pen Inject 26 Units subcutaneously 2 times daily., Historical      lidocaine (LIDODERM) 5 % patch Place onto the skin every 24 hours. To prevent lidocaine toxicity, patient should be patch free for 12 hrs daily.  Lower BackHistorical      methocarbamol (ROBAXIN) 750 MG tablet Take 1 tablet (750 mg) by mouth 2 times daily, Disp-60 tablet, R-11, E-Prescribe      naloxone (NARCAN) 4 MG/0.1ML nasal spray Spray 4 mg into one nostril alternating nostrils as needed for opioid reversal every 2-3 minutes until assistance arrives, Historical      ondansetron (ZOFRAN) 8 MG tablet Take 1 tablet (8 mg) by mouth every 8 hours as needed for nausea, Disp-20 tablet, R-3, E-Prescribe      oxyCODONE-acetaminophen (PERCOCET)  MG per tablet Take 1 tablet by mouth every 6 hours as needed., Historical           STOP taking these medications       diphenhydrAMINE (BENADRYL) 25 MG tablet Comments:   Reason for Stopping:             Allergies   Allergies   Allergen Reactions    Haloperidol Unknown     Patient states it stops her heart      Haloperidol Angioedema    Hydroxyzine Angioedema    Meperidine And Related Angioedema, Difficulty breathing, Other (See Comments), Rash and Shortness Of Breath     Throat closes  Other reaction(s): Breathing Difficulty  Other reaction(s): Breathing Difficulty      Phenothiazines Other (See Comments)     Other reaction(s): CARDIAC DISTURBANCES  Patient states it stops her heart  \"I swell up\"  \"stopped by heart\"  \"I swell up\"  \"I swell up\"      Phenothiazines Angioedema    Tramadol Other (See Comments)     Other reaction(s): Angioedema  Throat itch      Tramadol Angioedema    Januvia [Sitagliptin] Swelling    Latex Itching    Haloperidol And Related Angioedema    Januvia [Sitagliptin] Other (See Comments)     Swelling in the neck     " Latex Itching    Lisinopril Other (See Comments)     ACE cough  Itchy throat  Throat itches  Itchy throat      Nortriptyline Unknown     Fainting, unclear if it was related    Penicillins Swelling    Hydroxyzine Other (See Comments) and Rash     Tongue swelling  Tongue swelling  Tongue swelling      Lisinopril Cough

## 2025-04-08 NOTE — PLAN OF CARE
PRIMARY DIAGNOSIS: SYNCOPE/TIA  OUTPATIENT/OBSERVATION GOALS TO BE MET BEFORE DISCHARGE:  1. Orthostatic performed: No    2. Diagnostic testing complete & at baseline neurologic testing: No, pt refused due to metal clps in hair    3. Cleared by consultants (if involved): No    4. Interpretation of cardiac rhythm per telemetry tech: NS    5. Tolerating adequate PO diet and medications: Yes    6. Return to near baseline physical activity or neurologic status: Yes    Discharge Planner Nurse   Safe discharge environment identified: No  Barriers to discharge: Yes       Entered by: Melvi Green RN 04/08/2025 6:33 AM     Please review provider order for any additional goals.   Nurse to notify provider when observation goals have been met and patient is ready for discharge.Goal     Outcome Evaluation: Pt slept between cares. C/o back and bilateral lower extremity pain. Rate pain 10/10. Pain was manage with percocet. VSS. Afebrile. Denies any dizziness. Ambulated to bathroom with SBA. Bed alarm on for safety.

## 2025-04-08 NOTE — PLAN OF CARE
PRIMARY DIAGNOSIS: SYNCOPE/TIA  OUTPATIENT/OBSERVATION GOALS TO BE MET BEFORE DISCHARGE:  1. Orthostatic performed: Yes:          Lying Orthostatic BP: 149/84         Sitting Orthostatic BP: 151/89         Standing Orthostatic BP: 152/93     2. Diagnostic testing complete & at baseline neurologic testing: Yes    3. Cleared by consultants (if involved): Yes    4. Interpretation of cardiac rhythm per telemetry tech: NSR     5. Tolerating adequate PO diet and medications: Yes    6. Return to near baseline physical activity or neurologic status: Yes    Discharge Planner Nurse   Safe discharge environment identified: Yes  Barriers to discharge: No       Entered by: Arminda Dean RN 04/08/2025 9:10 AM     Please review provider order for any additional goals.   Nurse to notify provider when observation goals have been met and patient is ready for discharge.Goal Outcome Evaluation:       Pt reports feeling better today. Ortho BP negative. Chronic lower extremity pain. Oral pain meds given. TELE NSR. BG elevate; insulin administered. Up IND to restroom. No dizziness or nausea thus far today. Refused physical therapy today.

## 2025-04-09 LAB
ATRIAL RATE - MUSE: 79 BPM
DIASTOLIC BLOOD PRESSURE - MUSE: NORMAL MMHG
INTERPRETATION ECG - MUSE: NORMAL
P AXIS - MUSE: 60 DEGREES
PR INTERVAL - MUSE: 170 MS
QRS DURATION - MUSE: 72 MS
QT - MUSE: 362 MS
QTC - MUSE: 415 MS
R AXIS - MUSE: 21 DEGREES
SYSTOLIC BLOOD PRESSURE - MUSE: NORMAL MMHG
T AXIS - MUSE: 71 DEGREES
VENTRICULAR RATE- MUSE: 79 BPM

## 2025-04-10 ENCOUNTER — PATIENT OUTREACH (OUTPATIENT)
Dept: CARE COORDINATION | Facility: CLINIC | Age: 60
End: 2025-04-10
Payer: MEDICAID

## 2025-04-10 NOTE — PROGRESS NOTES
[facility] Care Resource Center Contact  [facility]/Voicemail     Clinical Data: Post-Discharge Outreach     Outreach attempted x 2.  Left message on patient's voicemail, providing [facility]'s central phone number of [phone] ([phone]) for questions/concerns and/or to schedule an appt with an [facility] provider, if they do not have a PCP.      Plan:  [facility] will do no further outreaches at this time.     AIMEE Good  [phone]  [facility]     *Connected Care Resource Team does NOT follow patient ongoing. Referrals are identified based on internal discharge reports and the outreach is to ensure patient has an understanding of their discharge instructions.

## 2025-04-22 ENCOUNTER — APPOINTMENT (OUTPATIENT)
Dept: CT IMAGING | Facility: HOSPITAL | Age: 60
End: 2025-04-22
Attending: STUDENT IN AN ORGANIZED HEALTH CARE EDUCATION/TRAINING PROGRAM
Payer: MEDICAID

## 2025-04-22 ENCOUNTER — APPOINTMENT (OUTPATIENT)
Dept: MRI IMAGING | Facility: HOSPITAL | Age: 60
End: 2025-04-22
Attending: STUDENT IN AN ORGANIZED HEALTH CARE EDUCATION/TRAINING PROGRAM
Payer: MEDICAID

## 2025-04-22 ENCOUNTER — HOSPITAL ENCOUNTER (OUTPATIENT)
Facility: HOSPITAL | Age: 60
Setting detail: OBSERVATION
End: 2025-04-22
Attending: STUDENT IN AN ORGANIZED HEALTH CARE EDUCATION/TRAINING PROGRAM | Admitting: HOSPITALIST
Payer: MEDICAID

## 2025-04-22 DIAGNOSIS — R53.1 RIGHT SIDED WEAKNESS: ICD-10-CM

## 2025-04-22 DIAGNOSIS — T40.2X5A THERAPEUTIC OPIOID INDUCED CONSTIPATION: ICD-10-CM

## 2025-04-22 DIAGNOSIS — G89.4 CHRONIC PAIN SYNDROME: Chronic | ICD-10-CM

## 2025-04-22 DIAGNOSIS — I63.9 ACUTE CVA (CEREBROVASCULAR ACCIDENT) (H): ICD-10-CM

## 2025-04-22 DIAGNOSIS — M54.41 BILATERAL LOW BACK PAIN WITH RIGHT-SIDED SCIATICA, UNSPECIFIED CHRONICITY: Primary | ICD-10-CM

## 2025-04-22 DIAGNOSIS — Z79.4 TYPE 2 DIABETES MELLITUS WITH DIABETIC POLYNEUROPATHY, WITH LONG-TERM CURRENT USE OF INSULIN (H): ICD-10-CM

## 2025-04-22 DIAGNOSIS — K59.03 THERAPEUTIC OPIOID INDUCED CONSTIPATION: ICD-10-CM

## 2025-04-22 DIAGNOSIS — E11.42 TYPE 2 DIABETES MELLITUS WITH DIABETIC POLYNEUROPATHY, WITH LONG-TERM CURRENT USE OF INSULIN (H): ICD-10-CM

## 2025-04-22 DIAGNOSIS — R47.9 DIFFICULTY WITH SPEECH: ICD-10-CM

## 2025-04-22 DIAGNOSIS — M79.601 RIGHT ARM PAIN: ICD-10-CM

## 2025-04-22 DIAGNOSIS — R29.90 STROKE-LIKE SYMPTOMS: ICD-10-CM

## 2025-04-22 DIAGNOSIS — R29.898 RIGHT LEG WEAKNESS: ICD-10-CM

## 2025-04-22 DIAGNOSIS — F44.4 FUNCTIONAL NEUROLOGICAL SYMPTOM DISORDER WITH ABNORMAL MOVEMENT: ICD-10-CM

## 2025-04-22 LAB
ANION GAP SERPL CALCULATED.3IONS-SCNC: 16 MMOL/L (ref 7–15)
APTT PPP: 25 SECONDS (ref 22–38)
BASOPHILS # BLD AUTO: 0.1 10E3/UL (ref 0–0.2)
BASOPHILS NFR BLD AUTO: 1 %
BUN SERPL-MCNC: 11.5 MG/DL (ref 8–23)
CALCIUM SERPL-MCNC: 9.6 MG/DL (ref 8.8–10.4)
CHLORIDE SERPL-SCNC: 100 MMOL/L (ref 98–107)
CHOLEST SERPL-MCNC: 179 MG/DL
CREAT SERPL-MCNC: 0.71 MG/DL (ref 0.51–0.95)
EGFRCR SERPLBLD CKD-EPI 2021: >90 ML/MIN/1.73M2
EOSINOPHIL # BLD AUTO: 0.1 10E3/UL (ref 0–0.7)
EOSINOPHIL NFR BLD AUTO: 2 %
ERYTHROCYTE [DISTWIDTH] IN BLOOD BY AUTOMATED COUNT: 13.1 % (ref 10–15)
GLUCOSE BLDC GLUCOMTR-MCNC: 271 MG/DL (ref 70–99)
GLUCOSE BLDC GLUCOMTR-MCNC: 272 MG/DL (ref 70–99)
GLUCOSE BLDC GLUCOMTR-MCNC: 341 MG/DL (ref 70–99)
GLUCOSE SERPL-MCNC: 398 MG/DL (ref 70–99)
HCO3 SERPL-SCNC: 23 MMOL/L (ref 22–29)
HCT VFR BLD AUTO: 39.3 % (ref 35–47)
HDLC SERPL-MCNC: 46 MG/DL
HGB BLD-MCNC: 12.5 G/DL (ref 11.7–15.7)
HOLD SPECIMEN: NORMAL
IMM GRANULOCYTES # BLD: 0 10E3/UL
IMM GRANULOCYTES NFR BLD: 0 %
INR PPP: 0.95 (ref 0.85–1.15)
LDLC SERPL CALC-MCNC: 112 MG/DL
LYMPHOCYTES # BLD AUTO: 1.7 10E3/UL (ref 0.8–5.3)
LYMPHOCYTES NFR BLD AUTO: 24 %
MAGNESIUM SERPL-MCNC: 1.7 MG/DL (ref 1.7–2.3)
MCH RBC QN AUTO: 27 PG (ref 26.5–33)
MCHC RBC AUTO-ENTMCNC: 31.8 G/DL (ref 31.5–36.5)
MCV RBC AUTO: 85 FL (ref 78–100)
MONOCYTES # BLD AUTO: 0.2 10E3/UL (ref 0–1.3)
MONOCYTES NFR BLD AUTO: 4 %
NEUTROPHILS # BLD AUTO: 4.8 10E3/UL (ref 1.6–8.3)
NEUTROPHILS NFR BLD AUTO: 69 %
NONHDLC SERPL-MCNC: 133 MG/DL
NRBC # BLD AUTO: 0 10E3/UL
NRBC BLD AUTO-RTO: 0 /100
PHOSPHATE SERPL-MCNC: 3 MG/DL (ref 2.5–4.5)
PLATELET # BLD AUTO: 285 10E3/UL (ref 150–450)
POTASSIUM SERPL-SCNC: 4.3 MMOL/L (ref 3.4–5.3)
RBC # BLD AUTO: 4.63 10E6/UL (ref 3.8–5.2)
SODIUM SERPL-SCNC: 139 MMOL/L (ref 135–145)
TRIGL SERPL-MCNC: 103 MG/DL
TROPONIN T SERPL HS-MCNC: 14 NG/L
TROPONIN T SERPL HS-MCNC: 14 NG/L
WBC # BLD AUTO: 6.8 10E3/UL (ref 4–11)

## 2025-04-22 PROCEDURE — 85730 THROMBOPLASTIN TIME PARTIAL: CPT | Performed by: STUDENT IN AN ORGANIZED HEALTH CARE EDUCATION/TRAINING PROGRAM

## 2025-04-22 PROCEDURE — 84484 ASSAY OF TROPONIN QUANT: CPT | Performed by: STUDENT IN AN ORGANIZED HEALTH CARE EDUCATION/TRAINING PROGRAM

## 2025-04-22 PROCEDURE — 96374 THER/PROPH/DIAG INJ IV PUSH: CPT | Mod: 59

## 2025-04-22 PROCEDURE — 80061 LIPID PANEL: CPT | Performed by: STUDENT IN AN ORGANIZED HEALTH CARE EDUCATION/TRAINING PROGRAM

## 2025-04-22 PROCEDURE — 0042T CT HEAD PERFUSION W CONTRAST: CPT

## 2025-04-22 PROCEDURE — 70496 CT ANGIOGRAPHY HEAD: CPT

## 2025-04-22 PROCEDURE — 93005 ELECTROCARDIOGRAM TRACING: CPT | Performed by: STUDENT IN AN ORGANIZED HEALTH CARE EDUCATION/TRAINING PROGRAM

## 2025-04-22 PROCEDURE — 99223 1ST HOSP IP/OBS HIGH 75: CPT | Performed by: STUDENT IN AN ORGANIZED HEALTH CARE EDUCATION/TRAINING PROGRAM

## 2025-04-22 PROCEDURE — 99207 PR NO CHARGE LOS: CPT | Performed by: NURSE PRACTITIONER

## 2025-04-22 PROCEDURE — 70551 MRI BRAIN STEM W/O DYE: CPT

## 2025-04-22 PROCEDURE — 250N000013 HC RX MED GY IP 250 OP 250 PS 637: Performed by: STUDENT IN AN ORGANIZED HEALTH CARE EDUCATION/TRAINING PROGRAM

## 2025-04-22 PROCEDURE — 82962 GLUCOSE BLOOD TEST: CPT

## 2025-04-22 PROCEDURE — 250N000012 HC RX MED GY IP 250 OP 636 PS 637: Performed by: STUDENT IN AN ORGANIZED HEALTH CARE EDUCATION/TRAINING PROGRAM

## 2025-04-22 PROCEDURE — 85610 PROTHROMBIN TIME: CPT | Performed by: STUDENT IN AN ORGANIZED HEALTH CARE EDUCATION/TRAINING PROGRAM

## 2025-04-22 PROCEDURE — 84100 ASSAY OF PHOSPHORUS: CPT | Performed by: STUDENT IN AN ORGANIZED HEALTH CARE EDUCATION/TRAINING PROGRAM

## 2025-04-22 PROCEDURE — 36415 COLL VENOUS BLD VENIPUNCTURE: CPT | Performed by: STUDENT IN AN ORGANIZED HEALTH CARE EDUCATION/TRAINING PROGRAM

## 2025-04-22 PROCEDURE — 80048 BASIC METABOLIC PNL TOTAL CA: CPT | Performed by: STUDENT IN AN ORGANIZED HEALTH CARE EDUCATION/TRAINING PROGRAM

## 2025-04-22 PROCEDURE — 85004 AUTOMATED DIFF WBC COUNT: CPT | Performed by: STUDENT IN AN ORGANIZED HEALTH CARE EDUCATION/TRAINING PROGRAM

## 2025-04-22 PROCEDURE — 83735 ASSAY OF MAGNESIUM: CPT | Performed by: STUDENT IN AN ORGANIZED HEALTH CARE EDUCATION/TRAINING PROGRAM

## 2025-04-22 PROCEDURE — 99291 CRITICAL CARE FIRST HOUR: CPT | Mod: 25

## 2025-04-22 PROCEDURE — 258N000003 HC RX IP 258 OP 636: Performed by: STUDENT IN AN ORGANIZED HEALTH CARE EDUCATION/TRAINING PROGRAM

## 2025-04-22 PROCEDURE — 120N000001 HC R&B MED SURG/OB

## 2025-04-22 PROCEDURE — 250N000011 HC RX IP 250 OP 636: Performed by: STUDENT IN AN ORGANIZED HEALTH CARE EDUCATION/TRAINING PROGRAM

## 2025-04-22 RX ORDER — ASPIRIN 81 MG/1
81 TABLET ORAL DAILY
Status: DISCONTINUED | OUTPATIENT
Start: 2025-04-22 | End: 2025-04-24

## 2025-04-22 RX ORDER — ACETAMINOPHEN 325 MG/1
650 TABLET ORAL EVERY 4 HOURS PRN
Status: DISCONTINUED | OUTPATIENT
Start: 2025-04-22 | End: 2025-04-25 | Stop reason: HOSPADM

## 2025-04-22 RX ORDER — ONDANSETRON 2 MG/ML
4 INJECTION INTRAMUSCULAR; INTRAVENOUS EVERY 6 HOURS PRN
Status: DISCONTINUED | OUTPATIENT
Start: 2025-04-22 | End: 2025-04-25 | Stop reason: HOSPADM

## 2025-04-22 RX ORDER — DEXTROSE MONOHYDRATE 25 G/50ML
25-50 INJECTION, SOLUTION INTRAVENOUS
Status: DISCONTINUED | OUTPATIENT
Start: 2025-04-22 | End: 2025-04-25 | Stop reason: HOSPADM

## 2025-04-22 RX ORDER — DULOXETIN HYDROCHLORIDE 60 MG/1
60 CAPSULE, DELAYED RELEASE ORAL 2 TIMES DAILY
Status: DISCONTINUED | OUTPATIENT
Start: 2025-04-22 | End: 2025-04-25 | Stop reason: HOSPADM

## 2025-04-22 RX ORDER — IOPAMIDOL 755 MG/ML
117 INJECTION, SOLUTION INTRAVASCULAR ONCE
Status: COMPLETED | OUTPATIENT
Start: 2025-04-22 | End: 2025-04-22

## 2025-04-22 RX ORDER — MECLIZINE HYDROCHLORIDE 25 MG/1
25 TABLET ORAL 3 TIMES DAILY PRN
Status: DISCONTINUED | OUTPATIENT
Start: 2025-04-22 | End: 2025-04-25 | Stop reason: HOSPADM

## 2025-04-22 RX ORDER — ACETAMINOPHEN 325 MG/10.15ML
650 LIQUID ORAL EVERY 4 HOURS PRN
Status: DISCONTINUED | OUTPATIENT
Start: 2025-04-22 | End: 2025-04-25 | Stop reason: HOSPADM

## 2025-04-22 RX ORDER — OXYCODONE HYDROCHLORIDE 5 MG/1
5 TABLET ORAL ONCE
Status: COMPLETED | OUTPATIENT
Start: 2025-04-22 | End: 2025-04-22

## 2025-04-22 RX ORDER — LIDOCAINE 40 MG/G
CREAM TOPICAL
Status: DISCONTINUED | OUTPATIENT
Start: 2025-04-22 | End: 2025-04-25 | Stop reason: HOSPADM

## 2025-04-22 RX ORDER — ATORVASTATIN CALCIUM 40 MG/1
40 TABLET, FILM COATED ORAL EVERY EVENING
Status: DISCONTINUED | OUTPATIENT
Start: 2025-04-22 | End: 2025-04-22

## 2025-04-22 RX ORDER — ATORVASTATIN CALCIUM 40 MG/1
80 TABLET, FILM COATED ORAL EVERY EVENING
Status: DISCONTINUED | OUTPATIENT
Start: 2025-04-22 | End: 2025-04-25 | Stop reason: HOSPADM

## 2025-04-22 RX ORDER — AMLODIPINE BESYLATE 2.5 MG/1
2.5 TABLET ORAL DAILY
Status: DISCONTINUED | OUTPATIENT
Start: 2025-04-22 | End: 2025-04-25 | Stop reason: HOSPADM

## 2025-04-22 RX ORDER — OXYCODONE HYDROCHLORIDE 5 MG/1
5 TABLET ORAL EVERY 4 HOURS PRN
Status: DISCONTINUED | OUTPATIENT
Start: 2025-04-22 | End: 2025-04-25 | Stop reason: HOSPADM

## 2025-04-22 RX ORDER — ALBUTEROL SULFATE 90 UG/1
1-2 INHALANT RESPIRATORY (INHALATION) EVERY 4 HOURS PRN
Status: DISCONTINUED | OUTPATIENT
Start: 2025-04-22 | End: 2025-04-25 | Stop reason: HOSPADM

## 2025-04-22 RX ORDER — ONDANSETRON 4 MG/1
4 TABLET, ORALLY DISINTEGRATING ORAL EVERY 6 HOURS PRN
Status: DISCONTINUED | OUTPATIENT
Start: 2025-04-22 | End: 2025-04-25 | Stop reason: HOSPADM

## 2025-04-22 RX ORDER — ACETAMINOPHEN 650 MG/1
650 SUPPOSITORY RECTAL EVERY 4 HOURS PRN
Status: DISCONTINUED | OUTPATIENT
Start: 2025-04-22 | End: 2025-04-25 | Stop reason: HOSPADM

## 2025-04-22 RX ORDER — HYDRALAZINE HYDROCHLORIDE 20 MG/ML
10 INJECTION INTRAMUSCULAR; INTRAVENOUS EVERY 6 HOURS PRN
Status: DISCONTINUED | OUTPATIENT
Start: 2025-04-22 | End: 2025-04-25 | Stop reason: HOSPADM

## 2025-04-22 RX ORDER — GABAPENTIN 300 MG/1
300 CAPSULE ORAL 3 TIMES DAILY
Status: DISCONTINUED | OUTPATIENT
Start: 2025-04-22 | End: 2025-04-25 | Stop reason: HOSPADM

## 2025-04-22 RX ORDER — MAGNESIUM SULFATE 4 G/50ML
4 INJECTION INTRAVENOUS ONCE
Status: COMPLETED | OUTPATIENT
Start: 2025-04-22 | End: 2025-04-23

## 2025-04-22 RX ORDER — SODIUM CHLORIDE 9 MG/ML
INJECTION, SOLUTION INTRAVENOUS CONTINUOUS
Status: DISCONTINUED | OUTPATIENT
Start: 2025-04-22 | End: 2025-04-23

## 2025-04-22 RX ORDER — NICOTINE POLACRILEX 4 MG
15-30 LOZENGE BUCCAL
Status: DISCONTINUED | OUTPATIENT
Start: 2025-04-22 | End: 2025-04-25 | Stop reason: HOSPADM

## 2025-04-22 RX ADMIN — MAGNESIUM SULFATE 4 G: 4 INJECTION INTRAVENOUS at 22:40

## 2025-04-22 RX ADMIN — DULOXETINE HYDROCHLORIDE 60 MG: 60 CAPSULE, DELAYED RELEASE PELLETS ORAL at 20:57

## 2025-04-22 RX ADMIN — INSULIN GLARGINE 26 UNITS: 100 INJECTION, SOLUTION SUBCUTANEOUS at 21:06

## 2025-04-22 RX ADMIN — GABAPENTIN 300 MG: 300 CAPSULE ORAL at 20:56

## 2025-04-22 RX ADMIN — ACETAMINOPHEN 650 MG: 325 TABLET ORAL at 21:05

## 2025-04-22 RX ADMIN — IOPAMIDOL 117 ML: 755 INJECTION, SOLUTION INTRAVENOUS at 13:37

## 2025-04-22 RX ADMIN — ATORVASTATIN CALCIUM 80 MG: 40 TABLET, FILM COATED ORAL at 21:10

## 2025-04-22 RX ADMIN — OXYCODONE HYDROCHLORIDE 5 MG: 5 TABLET ORAL at 16:15

## 2025-04-22 RX ADMIN — ASPIRIN 81 MG: 81 TABLET, COATED ORAL at 20:56

## 2025-04-22 RX ADMIN — SODIUM CHLORIDE: 0.9 INJECTION, SOLUTION INTRAVENOUS at 20:42

## 2025-04-22 RX ADMIN — OXYCODONE HYDROCHLORIDE 5 MG: 5 TABLET ORAL at 21:03

## 2025-04-22 ASSESSMENT — ACTIVITIES OF DAILY LIVING (ADL)
ADLS_ACUITY_SCORE: 58

## 2025-04-22 ASSESSMENT — COLUMBIA-SUICIDE SEVERITY RATING SCALE - C-SSRS
2. HAVE YOU ACTUALLY HAD ANY THOUGHTS OF KILLING YOURSELF IN THE PAST MONTH?: NO
1. IN THE PAST MONTH, HAVE YOU WISHED YOU WERE DEAD OR WISHED YOU COULD GO TO SLEEP AND NOT WAKE UP?: NO
6. HAVE YOU EVER DONE ANYTHING, STARTED TO DO ANYTHING, OR PREPARED TO DO ANYTHING TO END YOUR LIFE?: NO

## 2025-04-22 NOTE — ED TRIAGE NOTES
Pt BIBA f/home after waking up on the floor.  LKW last night at 9pm when patient went to bed.  Pt c/o right arm and right foot weakness. Tier 2 stroke called- pt back from CT now.  Pt is tearful. Speech clear.  Disoriented to date/time.      Triage Assessment (Adult)       Row Name 04/22/25 135          Triage Assessment    Airway WDL WDL        Respiratory WDL    Respiratory WDL WDL        Skin Circulation/Temperature WDL    Skin Circulation/Temperature WDL WDL        Cardiac WDL    Cardiac WDL WDL        Peripheral/Neurovascular WDL    Peripheral Neurovascular WDL WDL        Cognitive/Neuro/Behavioral WDL    Cognitive/Neuro/Behavioral WDL X;orientation     Level of Consciousness alert;confused     Arousal Level opens eyes spontaneously     Orientation disoriented to;time     Speech clear;spontaneous     Mood/Behavior sad        [place] Coma Scale    Best Eye Response 4-->(E4) spontaneous     Best Motor Response 6-->(M6) obeys commands     Best Verbal Response 4-->(V4) confused     [place] Coma Scale Score 14

## 2025-04-22 NOTE — H&P
[facility]    History and Physical - Hospitalist Service       Date of Admission:  4/22/2025    Assessment & Plan    Assessment:  Sarah Rahman is a 59 year old female with a past medical history of CAD, COPD, hypertension, polysubstance abuse, shies affective disorder, complex regional pain syndrome, diabetes mellitus, hyperlipidemia presented to the ED with complaint of right-sided weakness and speech difficulty, CT head and neck and CT head perfusion negative for acute process, stroke neurology was consulted and recommended to admit and general neurology consult, advised to continue with PTA aspirin and atorvastatin, patient after initial CT scans did not have significant improvement in symptoms other than speech which appeared to be somewhat improved as per ED provider, received some pain control medication in the ED and is going to be admitted to rule out acute CVA.    Problem list and plan:  Strokelike symptoms rule out acute CVA  Patient presented to the ED with complaint of right-sided weakness and speech difficulty  She woke up with symptoms this morning, last known well was last night around 9 PM  Has had several similar presentations with neurological symptoms with negative MRI examination in the past as per neurology note  In the ER vitals significant for significantly elevated blood pressure, which has since improved  Patient also appeared to be somewhat tachypneic which is since resolved  CT head and neck and CT head perfusion negative for acute process  Please review imaging for full details  Stroke neurology was consulted and recommended to admit and general neurology consult, advised to continue with PTA aspirin and atorvastatin  Patient after initial CT scans did not have significant improvement in symptoms other than speech which appeared to be somewhat improved as per ED provider  Received some pain control medication in the ED and is going to be admitted to rule  out acute CVA.  Follow-up general neurology  Follow-up echocardiogram  Follow-up MRI of brain to rule out acute process  Stroke protocol order set ordered  Gentle IV hydration  Aspiration, fall and seizure precautions  Monitor and telemetry monitoring  Continue with neurochecks  Passed her swallow eval so started on diet    Electrolyte abnormality/hypomagnesemia  Magnesium repleted, monitor magnesium levels    History of hypertension with high blood pressure  Monitor vital signs as per protocol  IV hydralazine as needed for elevated blood pressure above 200  Holding amlodipine for now for permissive control of blood pressure, can restart in the morning as appropriate    History of insulin dependent type 2 DM with hyperglycemia  Neuropathy/chronic regional pain syndrome  Recent Hba1c 13  Monitor Blood sugar level   Hypoglycemia protocol  Started on sliding scale  Continue with home lantus  Holding home Glipizide, Trulicity  C/w Gabapentin, Cymbalta  Continue with pain management as per protocol    History of hyperlipidemia  Total cholesterol of 179, LDL of 112  Continue with aspirin and atorvastatin, increase the dose of Lipitor to 80    Miscellaneous meds  Continue with meclizine, albuterol    DVT PPx  Intermittent pneumatic compression    CODE STATUS  Full code as per discussion with the patient          Diet: Regular Diet Adult    DVT Prophylaxis: Pneumatic Compression Devices  Diehl Catheter: Not present  Lines: None     Cardiac Monitoring: ACTIVE order. Indication: Stroke, acute (48 hours)  Code Status: Full Code  Mental status: Patient is alert awake and oriented x 3, patient is a good Historian and most of the history was obtained from the patient, some history was obtained from chart review and the rest of the history was obtained from discussion with the ER physician  Clinically Significant Risk Factors Present on Admission                 # Drug Induced Platelet Defect: home medication list includes an  "antiplatelet medication   # Hypertension: Noted on problem list          # DMII: A1C = 13.0 % (Ref range: <5.7 %) within past 6 months    # Overweight: Estimated body mass index is 28.49 kg/m  as calculated from the following:    Height as of this encounter: 1.702 m (5' 7\").    Weight as of this encounter: 82.5 kg (181 lb 14.4 oz).         # Financial/Environmental Concerns:    # Asthma: noted on problem list  # History of CABG: noted on surgical history       Disposition Plan     Medically Ready for Discharge: Anticipated in 2-4 Days     Saad J. Gondal, MD  Hospitalist Service  [facility]  Securely message with [employer] (more info)  Text page via [employer] Paging/Directory     ______________________________________________________________________    Chief Complaint   Strokelike symptoms    History is obtained from the patient and chart review    History of Present Illness   Sarah Rahman is a 59 year old female with a past medical history of CAD, COPD, hypertension, polysubstance abuse, shies affective disorder, complex regional pain syndrome, diabetes mellitus, hyperlipidemia presented to the ED with complaint of right-sided weakness and speech difficulty, she woke up with symptoms this morning, last known well was last night around 9 PM, has had several similar presentations with neurological symptoms with negative MRI examination in the past as per neurology note, in the ER vitals significant for significantly elevated blood pressure, which has since improved, patient also appeared to be somewhat tachypneic which is since resolved, labs significant for hyperglycemia, CT head and neck and CT head perfusion negative for acute process, stroke neurology was consulted and recommended to admit and general neurology consult, advised to continue with PTA aspirin and atorvastatin, patient after initial CT scans did not have significant improvement in symptoms other than speech which appeared to " be somewhat improved as per ED provider, received some pain control medication in the ED and is going to be admitted to rule out acute CVA      Past Medical History    Past Medical History:   Diagnosis Date    Asthma     CAD (coronary artery disease)     COPD (chronic obstructive pulmonary disease) (H)     CVA (cerebral infarction)     Dyshidrosis (pompholyx) 10/21/2016    GERD (gastroesophageal reflux disease)     History of CVA (cerebrovascular accident) 04/01/2012    Formatting of this note might be different from the original.  Overview:   Bilateral subacute cerebellar infarcts seen on MRI at [facility] 4/2012.  Pt was noted to have no residual deficits at [facility].  Formatting of this note might be different from the original.  Bilateral subacute cerebellar infarcts seen on MRI at [facility] 4/2012.  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 Hamm at [facility]    Schizoaffective disorder (H)     Sleep difficulties 07/17/2022       Past Surgical History   Past Surgical History:   Procedure Laterality Date    BYPASS GRAFT ARTERY CORONARY  01/01/2009    x2    CAROTID ENDARTERECTOMY Left 12/2021    CORONARY STENT PLACEMENT      CV ANGIOGRAM CORONARY GRAFT N/A 1/16/2024    Procedure: Coronary Angiogram Graft;  Surgeon: Spencer Diez MD;  Location: [facility] CATH LAB CV    CV CORONARY ANGIOGRAM N/A 06/02/2021    Procedure: Coronary Angiogram;  Surgeon: Juventino Rivera MD;  Location: [facility] Cardiac Cath Lab;  Service: Cardiology    HYSTERECTOMY TOTAL ABDOMINAL      HYSTERECTOMY TOTAL ABDOMINAL, BILATERAL SALPINGO-OOPHORECTOMY, COMBINED      IR LUMBAR PUNCTURE  7/23/2019    IR TRANSLAMINAR EPIDURAL LUMBAR INJ INCL  IMAGING  09/27/2022    LUMBAR DISCECTOMY Right 06/13/2019    L5-S1 - Dr. Hamm    NASAL FRACTURE SURGERY      ORIF ULNAR / RADIAL SHAFT FRACTURE Right        Prior to Admission Medications   Prior to Admission Medications   Prescriptions Last Dose Informant Patient Reported? Taking?   DULoxetine (CYMBALTA) 60 MG capsule 4/21/2025 Evening  Yes Yes   Sig: Take 60 mg by mouth 2 times daily.   albuterol (PROAIR HFA) 108 (90 Base) MCG/ACT inhaler   No Yes   Sig: Inhale 1-2 puffs into the lungs every 4 hours as needed for shortness of breath or wheezing   amLODIPine (NORVASC) 2.5 MG tablet 4/21/2025 Morning  Yes Yes   Sig: Take 2.5 mg by mouth daily.   aspirin 81 MG EC tablet 4/21/2025 Morning  Yes Yes   Sig: Take 1 tablet by mouth daily.   atorvastatin (LIPITOR) 40 MG tablet 4/21/2025 Evening  No Yes   Sig: Take 1 tablet (40 mg) by mouth every evening.   dulaglutide (TRULICITY) 0.75 MG/0.5ML pen 4/17/2025  No Yes   Sig: Inject 0.75 mg Subcutaneous every 7 days   gabapentin (NEURONTIN) 300 MG capsule 4/21/2025 Evening  No Yes   Sig: Take 1 capsule (300 mg) by mouth 3 times daily.   glipiZIDE (GLUCOTROL XL) 10 MG 24 hr tablet 4/21/2025 Morning  Yes Yes   Sig: Take 10 mg by mouth daily.   insulin glargine (LANTUS PEN) 100 UNIT/ML pen 4/21/2025 Evening  Yes Yes   Sig: Inject 26 Units subcutaneously 2 times daily.   ketamine 5%-gabapentin 8%-lidocaine 2.5% in PLO cream   No Yes   Sig: Apply 1 click (0.25 g) topically 3 times daily. Apply to skin of right or left leg where pain is.   lidocaine (LIDODERM) 5 % patch  Self Yes Yes   Sig: Place onto the skin daily as needed. To prevent lidocaine toxicity, patient should be patch free for 12 hrs daily.  Lower Back   meclizine (ANTIVERT) 25 MG tablet   No Yes   Sig: Take 1 tablet (25 mg) by mouth 3 times daily as needed for dizziness.   oxyCODONE-acetaminophen (PERCOCET)  MG per tablet 4/21/2025 Evening  Yes Yes   Sig: Take 1 tablet by mouth every 6 hours as needed.       Facility-Administered Medications: None        Review of Systems    The 10 point Review of Systems is negative other than noted in the HPI or here.  Strokelike symptoms    Physical Exam   Vital Signs: Temp: 98.5  F (36.9  C) Temp src: Oral BP: 133/63 Pulse: 84   Resp: 20 SpO2: 99 % O2 Device: None (Room air)    Weight: 181 lbs 14.4 oz    GENERAL: Patient was seen and examined at bedside with no acute distress  HENT:  Head is normocephalic, atraumatic.   EYES:  Eyes have anicteric sclerae without conjunctival injection   LUNGS: Bilateral air entry, clear to auscultation bilaterally  CARDIOVASCULAR:  Regular rate and rhythm with no murmurs, gallops or rubs.  ABDOMEN:  Normal bowel sounds. Soft; nontender   EXT: no edema    SKIN:  No acute rashes.   NEUROLOGIC:  Right upper and lower extremity weakness      Medical Decision Making       80 MINUTES SPENT BY ME on the date of service doing chart review, history, exam, documentation & further activities per the note.      Data     I have personally reviewed the following data over the past 24 hrs:    6.8  \   12.5   / 285     139 100 11.5 /  272 (H)   4.3 23 0.71 \     Trop: 14 BNP: N/A     INR:  0.95 PTT:  25   D-dimer:  N/A Fibrinogen:  N/A       Imaging results reviewed over the past 24 hrs:   Recent Results (from the past 24 hours)   CTA Head Neck with Contrast    [facility]  CTA HEAD NECK W CONTRAST  4/22/2025 1:37 PM CDT     INDICATION: Right arm and leg weakness.  TECHNIQUE: Head and neck CT angiogram with IV contrast. Noncontrast head CT followed by axial helical CT images of the head and neck vessels obtained during the arterial phase of intravenous contrast administration. Axial helical 2D reconstructed images   and multiplanar 3D MIP reconstructed images of the head and neck vessels were performed by the technologist. Dose reduction techniques were used.  CONTRAST: 117 mL Isovue-370.  COMPARISON: CT/CTA 4/6/2025. Brain  MRI 12/1/2024.    FINDINGS:   NONCONTRAST HEAD CT:   INTRACRANIAL CONTENTS: No intracranial hemorrhage, extraaxial collection, or mass effect.  No CT evidence of acute infarct. Mild presumed chronic small vessel ischemic changes. Redemonstrated encephalomalacia of the occipital poles. Small chronic left   thalamic infarction again demonstrated. Age-appropriate ventricles and sulci. The sella is unremarkable for technique. The cerebellar tonsils are appropriately positioned.     VISUALIZED ORBITS/SINUSES/MASTOIDS: No intraorbital abnormality. No paranasal sinus mucosal disease. No middle ear or mastoid effusion.    BONES/SOFT TISSUES: No scalp hematoma. No skull fracture.    HEAD CTA:   ANTERIOR CIRCULATION: The intracranial internal carotid arteries again demonstrate moderate atherosclerotic calcification. There is moderate left and mild right supraclinoid ICA stenosis. The anterior cerebral arteries demonstrate no proximal occlusion   or flow-limiting stenosis. A moderate proximal right posterior M2 segment stenosis is unchanged. No aneurysm or high flow vascular malformation is demonstrated.    POSTERIOR CIRCULATION: Moderate to severe pre-PICA left V4 segment stenosis, unchanged. Mild stenosis of the right intradural vertebral artery. The basilar and visualized proximal cerebellar arteries appear patent. There is an unchanged moderate right P1   segment stenosis. The left posterior cerebral artery and straight no flow-limiting stenosis. No evidence for aneurysm or high flow vascular malformation.     DURAL VENOUS SINUSES: Expected enhancement of the major dural venous sinuses.    NECK CTA:  RIGHT CAROTID: Less than 50% stenosis in the right ICA based on NASCET criteria.    LEFT CAROTID: No measurable stenosis in the left ICA based on NASCET criteria. Prior endarterectomy.    VERTEBRAL ARTERIES: No flow-limiting stenosis of the dominant right vertebral artery. Redemonstrated moderate-to-severe stenosis of the left  vertebral artery origin/proximal V1 segment.    AORTIC ARCH: There is a left-sided aortic arch. No flow-limiting stenosis is apparent at the origins of the brachiocephalic, common carotid, subclavian or vertebral arteries.    MISCELLANEOUS: The visualized lung apices are well aerated. Status post median sternotomy. Cervical spine appears grossly intact with multilevel degenerative change. The soft tissues of the neck, including the thyroid and parotid glands, are grossly   unremarkable for technique.       Impression    CONCLUSION:   HEAD CT:  1.  No evidence of acute hemorrhage, mass effect, or acute vascular territorial infarction. Consider MRI for further evaluation, as clinically appropriate.  2.  Chronic ischemic changes and age-related changes as above.    HEAD CTA:   1.  No evidence of proximal large vessel occlusion.  2.  Unchanged moderate proximal right M2 segment stenosis.  3.  Unchanged moderate right P1 segment stenosis.  4.  Unchanged moderate-to-severe proximal left V4 segment stenosis.    NECK CTA:  1.  No evidence of hemodynamically significant carotid stenosis based on NASCET criteria.  2.  Prior left carotid endarterectomy.  3.  Stable moderate-to-severe stenosis of the left vertebral artery origin/proximal V1 segment.  4.  No evidence for dissection.    Results discussed with Fredo Farrar on 4/22/2025 1:47 PM CDT.   CT Head Perfusion w Contrast - For Tier 2 Stroke    Narrative    EXAM: CT HEAD PERFUSION W CONTRAST  LOCATION: [facility]  DATE: 4/22/2025    INDICATION: Right-sided weakness.  COMPARISON: None.  TECHNIQUE: CT cerebral perfusion was performed utilizing a contrast bolus. Perfusion data were post processed with generation of standard perfusion maps and estimation of ischemic/infarcted volumes utilizing standard threshold values. Dose reduction   techniques were used.   CONTRAST: 117 mL Isovue-370.    FINDINGS:   TOTAL HYPOPERFUSION: Using the threshold of  Tmax greater than 6 seconds, no regional hypoperfusion is demonstrated.    CORE INFARCTION: Using the threshold of CBF less than 30%, no core infarction is demonstrated.    PENUMBRA: The penumbra volume (mismatch volume) is 0 mL.      Impression    IMPRESSION:   HEAD CT:  1.  No evidence of core infarction or regional hypoperfusion.    Results discussed with Fredo Farrar on 4/22/2025 1:47 PM CDT.   MR Brain w/o Contrast    Narrative    EXAM: MR BRAIN W/O CONTRAST  LOCATION: [facility]  DATE: 4/22/2025    INDICATION: stroke symptoms  COMPARISON: None.  TECHNIQUE: MRI attempted but failed.      Impression    IMPRESSION:  Localizer images were obtained. Examination was ordered as patient was unable to tolerate the examination.

## 2025-04-22 NOTE — CONSULTS
[facility]    Stroke Telephone Note    I was called by Fredo Farrar on 04/22/25 regarding patient Sarah Rahman. The patient is a 59 year old female with PMHx significant for CAD, COPD, HTN, polysubstance use, schizoaffective disorder, left CEA, tobacco use, complex regional pain syndrome, DM2, HLD, hearing loss. She presented to the ED today with right sided weakness and speech difficulty. She woke up with symptoms this morning, unclear time. LKW was last night at 2100.     She has had several presentations with neurological symptoms w/ negative MRIs:    Left sided weakness - 6/2012, 11/2012, 5/2013  Right sided weakness - 4/3/22, 5/24/22, 7/17/22, 10/6/22, 2/22/23, 7/27/23 (received TNK), 1/14/24, 5/30/24, 8/27/24, 9/1/24, 12/1/24    Vitals  BP: (!) 221/108   Pulse: 86   Resp: 13   Temp: 98.5  F (36.9  C)   Weight: 82.5 kg (181 lb 14.4 oz)    Stroke Code Data (for stroke code without tele)  Stroke code activated 04/22/25  1259   Stroke provider first response 04/22/25  1301   Last known normal 04/21/25  2100      Time of discovery (or onset of symptoms) 04/22/25      Head CT read by Stroke Neuro Provider 04/22/25      Was stroke code de-escalated? Yes  04/22/25  1345     Imaging Findings  HEAD CT:  1.  No evidence of acute hemorrhage, mass effect, or acute vascular territorial infarction. Consider MRI for further evaluation, as clinically appropriate.  2.  Chronic ischemic changes and age-related changes as above.     HEAD CTA:   1.  No evidence of proximal large vessel occlusion.  2.  Unchanged moderate proximal right M2 segment stenosis.  3.  Unchanged moderate right P1 segment stenosis.  4.  Unchanged moderate-to-severe proximal left V4 segment stenosis.     NECK CTA:  1.  No evidence of hemodynamically significant carotid stenosis based on NASCET criteria.  2.  Prior left carotid endarterectomy.  3.  Stable moderate-to-severe stenosis of the left vertebral artery  "origin/proximal V1 segment.  4.  No evidence for dissection.    CT PERFUSION:   No evidence of core infarction or regional hypoperfusion.     Intravenous Thrombolysis  Not given due to:   - unclear or unfavorable risk-benefit profile for extended window thrombolysis beyond the conventional 4.5 hour time window    Endovascular Treatment  Not initiated due to absence of proximal vessel occlusion    Impression  Recurrent right hemiparesis  Expressive speech difficulty     Recommendations   - if symptoms are persistent, admit for obs and consult gen neuro (defer to gen neuro regarding need for additional imaging)  - if symptoms have resolved, no further neurological work up required  - normotension, BP management per ED provider   - continue PTA aspirin 81 mg and atorvastatin 40 mg     Case discussed with vascular neurology attending Dr. Hutchison.    My recommendations are based on the information provided over the phone by Sarah Rahman's in-person providers. They are not intended to replace the clinical judgment of her in-person providers. I was not requested to personally see or examine the patient at this time.     Tiara Lambert NP  Vascular Neurology    To page me or covering stroke neurology team member, click here: [facility]  Choose \"On Call\" tab at top, then select \"NEUROLOGY/ALL SITES\" from middle drop-down box, press Enter, then look for \"stroke\" or \"telestroke\" for your site.   "

## 2025-04-22 NOTE — MEDICATION SCRIBE - ADMISSION MEDICATION HISTORY
Medication Scribe Admission Medication History    Admission medication history is complete. The information provided in this note is only as accurate as the sources available at the time of the update.    Information Source(s): Patient via in-person    Pertinent Information: Patient reports self management of medications.     Patient reports no longer taking: Tylenol, Suboxone, Robaxin, Narcan, Zofran     Changes made to PTA medication list:  Added: None  Deleted: Tylenol, Suboxone, Robaxin, Narcan, Zofran   Changed: None    Allergies reviewed with patient and updates made in EHR: yes    Medication History Completed By: Santi Ochoa 4/22/2025 5:03 PM    PTA Med List   Medication Sig Note Last Dose/Taking    albuterol (PROAIR HFA) 108 (90 Base) MCG/ACT inhaler Inhale 1-2 puffs into the lungs every 4 hours as needed for shortness of breath or wheezing  Taking As Needed    amLODIPine (NORVASC) 2.5 MG tablet Take 2.5 mg by mouth daily.  4/21/2025 Morning    aspirin 81 MG EC tablet Take 1 tablet by mouth daily.  4/21/2025 Morning    atorvastatin (LIPITOR) 40 MG tablet Take 1 tablet (40 mg) by mouth every evening.  4/21/2025 Evening    dulaglutide (TRULICITY) 0.75 MG/0.5ML pen Inject 0.75 mg Subcutaneous every 7 days 4/22/2025: Thursdays 4/17/2025    DULoxetine (CYMBALTA) 60 MG capsule Take 60 mg by mouth 2 times daily.  4/21/2025 Evening    gabapentin (NEURONTIN) 300 MG capsule Take 1 capsule (300 mg) by mouth 3 times daily.  4/21/2025 Evening    glipiZIDE (GLUCOTROL XL) 10 MG 24 hr tablet Take 10 mg by mouth daily.  4/21/2025 Morning    insulin glargine (LANTUS PEN) 100 UNIT/ML pen Inject 26 Units subcutaneously 2 times daily.  4/21/2025 Evening    ketamine 5%-gabapentin 8%-lidocaine 2.5% in PLO cream Apply 1 click (0.25 g) topically 3 times daily. Apply to skin of right or left leg where pain is. 4/22/2025: Patient reports not yet starting  Taking    lidocaine (LIDODERM) 5 % patch Place onto the skin daily as needed. To  prevent lidocaine toxicity, patient should be patch free for 12 hrs daily.  Lower Back  Taking As Needed    meclizine (ANTIVERT) 25 MG tablet Take 1 tablet (25 mg) by mouth 3 times daily as needed for dizziness.  Taking As Needed    oxyCODONE-acetaminophen (PERCOCET)  MG per tablet Take 1 tablet by mouth every 6 hours as needed.  4/21/2025 Evening

## 2025-04-22 NOTE — ED NOTES
"[facility] ED Handoff Report    ED Chief Complaint: Right sided weakness    ED Diagnosis:  (R53.1) Right sided weakness  Comment: LKW 2100 yesterday. CT neg.   Plan: Neurology to see patient, possible MRI.     (R47.9) Difficulty with speech    (R29.90) Stroke-like symptoms       PMH:    Past Medical History:   Diagnosis Date    Asthma     CAD (coronary artery disease)     COPD (chronic obstructive pulmonary disease) (H)     CVA (cerebral infarction)     Dyshidrosis (pompholyx) 10/21/2016    GERD (gastroesophageal reflux disease)     History of CVA (cerebrovascular accident) 04/01/2012    Formatting of this note might be different from the original.  Overview:   Bilateral subacute cerebellar infarcts seen on MRI at [facility] 4/2012.  Pt was noted to have no residual deficits at [facility].  Formatting of this note might be different from the original.  Bilateral subacute cerebellar infarcts seen on MRI at [facility] 4/2012.  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 Hamm at [facility]    Schizoaffective disorder (H)     Sleep difficulties 07/17/2022        Code Status:  Prior     Falls Risk: Yes Band: Applied    Current Living Situation/Residence: lives in a house     Elimination Status: Continent: Yes -  purwick in place, pt still on bedrest per orders for stroke pt.     Patients Preferred Language:  English     Needed: No    Vital Signs:  BP (!) 148/77   Pulse 82   Temp 98.5  F (36.9  C) (Oral)   Resp 20   Ht 1.702 m (5' 7\")   Wt 82.5 kg (181 lb 14.4 oz)   LMP  (LMP Unknown)   SpO2 97%   BMI 28.49 kg/m       Cardiac Rhythm: SR    Pain Score: 0/10    Is the Patient Confused:  No    Last Food or " Drink: 4/21/25     Focused Assessment:  right arm and right leg weakness with intermittent word finding trouble. NIH- 6    Tests Performed: Done: Labs and Imaging    Treatments Provided:  see mar    Family Dynamics/Concerns: No    Family Updated On Visitor Policy: Yes    Plan of Care Communicated to Family: Yes    Who Was Updated about Plan of Care: mother    Medications sent with patient: n/a    Covid: asymptomatic , not tested    Additional Information: NPO. Tier 2 stroke code deescalated @1345.      RN: Veena Pelayo RN   4/22/2025 5:24 PM

## 2025-04-22 NOTE — ED PROVIDER NOTES
EMERGENCY DEPARTMENT ENCOUNTER       ED Course & Medical Decision Making     12:57 PM I met with the patient, obtained history, performed an initial exam, and discussed options and plan for diagnostics and treatment here in the ED.  1:02 PM I spoke with the stroke team about the patient.   1:46 PM I spoke to radiology about the patient.  5:01 PM I spoke with Dr. Gondal, the hospitalist, about the patient.    Final Impression  59 year old female presents for evaluation of right-sided weakness distress upon waking up this morning.  Last known normal was when she went to bed last night at about 8 PM or so.  Patient reports that she had similar symptoms 3 weeks ago, though cannot provide any additional details due to significant speech difficulties.  EMS states that the right-sided weakness was present when they arrived, though the significant word finding difficulty and aphasia is new and occurred in router just before they got to the hospital.  Patient having trouble finding additional history due to aphasia.  Has a little dog on her lap with her.  Exam notable for significant right arm and leg weakness and aphasia/word finding difficulty.    Chart review shows quite a few visits for fairly similar right sided weakness, including right arm and leg pain, and occasionally some difficulty with speech, has had several hospitalizations and workup for largely similar presentations that have not shown acute stroke.  September/2024 admission [facility] also noted possible inconsistency per neurology, raising concern for possible underlying functional neurologic disorder, also has complex regional pain syndrome which could be contributing.  On reevaluation patient does not report any improvement in her right arm or leg weakness, though at this juncture a significant portion of this seems to be chronic.  Will admit for likely MRI and neurology consult.  Also complaining of right arm pain which also sounds somewhat chronic  based on chart review.  Dose of pain medication given in the ED.    Prior to making a final disposition on this patient the results of patient's tests and other diagnostic studies were discussed with the patient. All questions were answered. Patient expressed understanding of the plan and was amenable to it.    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  Toi also struggles with complex regional pain syndrome, likely exacerbating her weakness.   Chart documentation includes differential considered  EKGs or imaging independently interpreted my me (see Labs & Imaging and EKG sections).  Discussion of management with another provider: Radiology, stroke neurology  Care impacted by chronic illness: CVA, COPD, schizoaffective disorder, CAD s/p CABG  Disposition considerations: Admit.      The patient has stroke symptoms:         ED Stroke specific documentation           NIHSS PDF     Patient last known well time: 8pm last night  ED Provider first to bedside at: 12:57 PM  CT Results received at: See radiology report    Thrombolytics:   Not given due to:   - unclear or unfavorable risk-benefit profile for extended window thrombolysis beyond the conventional 4.5 hour time window    If treating with thrombolytics: Ensure SBP<180 and DBP<105 prior to treatment with thrombolytics.  Administering thrombolytics after treatment with IV labetalol, hydralazine, or nicardipine is reasonable once BP control is established.    Endovascular Retrieval:  Not initiated due to absence of proximal vessel occlusion    National [employer] Stroke Scale (Baseline)  Time Performed:  arrival     Score    Level of consciousness: (0)   Alert, keenly responsive    LOC questions: (0)   Answers both questions correctly    LOC commands: (0)   Performs both tasks correctly    Best gaze: (0)   Normal    Visual: (0)   No visual loss    Facial palsy: (0)   Normal symmetrical movements    Motor arm (left): (0)   No drift    Motor arm (right): (2)   Some effort against gravity    Motor leg (left): (0)   No drift    Motor leg (right): (2)   Some effort against gravity    Limb ataxia: (1)   Present in one limb    Sensory: (0)   Normal- no sensory loss    Best language: (2)   Severe aphasia    Dysarthria: (0)   Normal    Extinction and inattention: (0)   No abnormality        Total Score:  7      Stroke Mimics were considered (including migraine headache, seizure disorder, hypoglycemia (or hyperglycemia), head or spinal trauma, CNS infection, Toxin ingestion and shock state (e.g. sepsis) .      Medications   iopamidol (ISOVUE-370) solution 117 mL (117 mLs Intravenous $Given 4/22/25 1337)     And   sodium chloride 0.9 % bag for CT scan flush (has no administration in time range)   oxyCODONE (ROXICODONE) tablet 5 mg (5 mg Oral $Given 4/22/25 1615)       New Prescriptions    No medications on file     Modified Medications    No medications on file       Final Impression     1. Right sided weakness    2. Difficulty with speech    3. Stroke-like symptoms        Critical Care     Performed by: Fredo Farrar MD  Authorized by: Fredo Farrar MD                   Total critical care time: 30 minutes  Critical care was necessary to treat or prevent imminent or life-threatening deterioration of the following conditions: Stroke symptoms (right-sided weakness, speech difficulties)  Critical care was time spent personally by me on the following activities: development of treatment plan with patient or surrogate, discussions with consultants, examination of patient, evaluation of patient's response to treatment,  obtaining history from patient or surrogate, ordering and performing treatments and interventions, ordering and review of laboratory studies, ordering and review of radiographic studies, re-evaluation of patient's condition and monitoring for potential decompensation.  Critical care time was exclusive of separately billable procedures and treating other patients.    Chief Complaint     Chief Complaint   Patient presents with    Stroke Symptoms     HPI     Sarah Rahman is a 59 year old female who presents for evaluation of right sided weakness.    Per patient and EMS, she awoke today (04/22/2025) with right sided weakness. Three weeks ago she had similar symptoms. Here in the ED she was having some difficulties speaking which EMS said was new. When she went to bed last night (04/21/2025) she was fine.     She did not mention taking anything for her symptoms today. No daily medications were mentioned.    Chart review:  Per Chart Review, the patient was seen from 04/06/2025-04/08/2025 at [facility] for evaluation of syncope. It was noted that she had a history of CVA with right sided weakness. However, her hair extension had made MRI's unreadable and she would not remove her hair extension. It was noted that she had BPPV and 25 mg meclizine was started which worked well so she was to use it at discharge. Also she was started on gabapentin for her chronic bilateral lower extemity peripheral neuropathy. She was to continue her Cymbalta, Suboxone, Percocet, Robaxin, ketamine/gabapentin/lidocaine cream, atorvastatin, amlodipine, Truclity, glipizide, and Lantus at discharge.     I, Ervin Branch am serving as a scribe to document services personally performed by Dr. Fredo Farrar MD, based on my observation and the provider's statements to me. I, Dr. Fredo Farrar MD attest that Ervin Branch is acting in a scribe capacity, has observed my performance of the services and has documented them in  "accordance with my direction.    Physical Exam     BP (!) 196/101   Pulse 88   Temp 98.5  F (36.9  C) (Oral)   Resp 30   Ht 1.702 m (5' 7\")   Wt 82.5 kg (181 lb 14.4 oz)   LMP  (LMP Unknown)   SpO2 100%   BMI 28.49 kg/m    Constitutional: Awake, alert, in no acute distress.  Head: Normocephalic, atraumatic.  ENT: Mucous membranes moist.  Eyes: Conjunctiva normal.  Respiratory: Respirations even, unlabored, in no acute respiratory distress.  Cardiovascular: Regular rate and rhythm. Good peripheral perfusion.  GI: Abdomen soft, non-tender.  Musculoskeletal: Moves all 4 extremities equally.  Integument: Warm, dry.  Neurologic: Alert, though moderate amount of word finding difficulty.  Significant weakness of the right upper and right lower extremity.  Normal strength sensation in left upper and lower extremity.  Psychiatric: Normal mood      Labs & Imaging     Results for orders placed or performed during the hospital encounter of 04/22/25   CTA Head Neck with Contrast    Impression    CONCLUSION:   HEAD CT:  1.  No evidence of acute hemorrhage, mass effect, or acute vascular territorial infarction. Consider MRI for further evaluation, as clinically appropriate.  2.  Chronic ischemic changes and age-related changes as above.    HEAD CTA:   1.  No evidence of proximal large vessel occlusion.  2.  Unchanged moderate proximal right M2 segment stenosis.  3.  Unchanged moderate right P1 segment stenosis.  4.  Unchanged moderate-to-severe proximal left V4 segment stenosis.    NECK CTA:  1.  No evidence of hemodynamically significant carotid stenosis based on NASCET criteria.  2.  Prior left carotid endarterectomy.  3.  Stable moderate-to-severe stenosis of the left vertebral artery origin/proximal V1 segment.  4.  No evidence for dissection.    Results discussed with Fredo Farrar on 4/22/2025 1:47 PM CDT.   CT Head Perfusion w Contrast - For Tier 2 Stroke    Impression    IMPRESSION:   HEAD CT:  1.  No evidence " of core infarction or regional hypoperfusion.    Results discussed with Fredo Farrar on 4/22/2025 1:47 PM CDT.   Basic metabolic panel   Result Value Ref Range    Sodium 139 135 - 145 mmol/L    Potassium 4.3 3.4 - 5.3 mmol/L    Chloride 100 98 - 107 mmol/L    Carbon Dioxide (CO2) 23 22 - 29 mmol/L    Anion Gap 16 (H) 7 - 15 mmol/L    Urea Nitrogen 11.5 8.0 - 23.0 mg/dL    Creatinine 0.71 0.51 - 0.95 mg/dL    GFR Estimate >90 >60 mL/min/1.73m2    Calcium 9.6 8.8 - 10.4 mg/dL    Glucose 398 (H) 70 - 99 mg/dL   Result Value Ref Range    INR 0.95 0.85 - 1.15   Partial thromboplastin time   Result Value Ref Range    aPTT 25 22 - 38 Seconds   Result Value Ref Range    Troponin T, High Sensitivity 14 <=14 ng/L   CBC with platelets and differential   Result Value Ref Range    WBC Count 6.8 4.0 - 11.0 10e3/uL    RBC Count 4.63 3.80 - 5.20 10e6/uL    Hemoglobin 12.5 11.7 - 15.7 g/dL    Hematocrit 39.3 35.0 - 47.0 %    MCV 85 78 - 100 fL    MCH 27.0 26.5 - 33.0 pg    MCHC 31.8 31.5 - 36.5 g/dL    RDW 13.1 10.0 - 15.0 %    Platelet Count 285 150 - 450 10e3/uL    % Neutrophils 69 %    % Lymphocytes 24 %    % Monocytes 4 %    % Eosinophils 2 %    % Basophils 1 %    % Immature Granulocytes 0 %    NRBCs per 100 WBC 0 <1 /100    Absolute Neutrophils 4.8 1.6 - 8.3 10e3/uL    Absolute Lymphocytes 1.7 0.8 - 5.3 10e3/uL    Absolute Monocytes 0.2 0.0 - 1.3 10e3/uL    Absolute Eosinophils 0.1 0.0 - 0.7 10e3/uL    Absolute Basophils 0.1 0.0 - 0.2 10e3/uL    Absolute Immature Granulocytes 0.0 <=0.4 10e3/uL    Absolute NRBCs 0.0 10e3/uL   Extra Red Top Tube   Result Value Ref Range    Hold Specimen JIC    Glucose by meter   Result Value Ref Range    GLUCOSE BY METER POCT 341 (H) 70 - 99 mg/dL       EKG     EKG reviewed and independently interpreted as below;  Sinus rhythm, rate 89.  .  QRS 74.  QTc 464.  No STEMI.    Procedures          Fredo Farrar MD  04/22/25 4033

## 2025-04-23 ENCOUNTER — APPOINTMENT (OUTPATIENT)
Dept: PHYSICAL THERAPY | Facility: HOSPITAL | Age: 60
End: 2025-04-23
Attending: STUDENT IN AN ORGANIZED HEALTH CARE EDUCATION/TRAINING PROGRAM
Payer: MEDICAID

## 2025-04-23 ENCOUNTER — APPOINTMENT (OUTPATIENT)
Dept: SPEECH THERAPY | Facility: HOSPITAL | Age: 60
End: 2025-04-23
Attending: STUDENT IN AN ORGANIZED HEALTH CARE EDUCATION/TRAINING PROGRAM
Payer: MEDICAID

## 2025-04-23 ENCOUNTER — APPOINTMENT (OUTPATIENT)
Dept: OCCUPATIONAL THERAPY | Facility: HOSPITAL | Age: 60
End: 2025-04-23
Attending: STUDENT IN AN ORGANIZED HEALTH CARE EDUCATION/TRAINING PROGRAM
Payer: MEDICAID

## 2025-04-23 ENCOUNTER — APPOINTMENT (OUTPATIENT)
Dept: CARDIOLOGY | Facility: HOSPITAL | Age: 60
End: 2025-04-23
Attending: STUDENT IN AN ORGANIZED HEALTH CARE EDUCATION/TRAINING PROGRAM
Payer: MEDICAID

## 2025-04-23 PROBLEM — I63.9 ACUTE CVA (CEREBROVASCULAR ACCIDENT) (H): Status: RESOLVED | Noted: 2023-07-27 | Resolved: 2025-04-23

## 2025-04-23 PROBLEM — G89.4 CHRONIC PAIN SYNDROME: Chronic | Status: ACTIVE | Noted: 2022-07-17

## 2025-04-23 PROBLEM — R55 SYNCOPE: Status: RESOLVED | Noted: 2023-05-09 | Resolved: 2025-04-23

## 2025-04-23 PROBLEM — F44.4 FUNCTIONAL NEUROLOGICAL SYMPTOM DISORDER WITH ABNORMAL MOVEMENT: Status: ACTIVE | Noted: 2024-09-14

## 2025-04-23 PROBLEM — I67.2 INTRACRANIAL ATHEROSCLEROSIS: Status: ACTIVE | Noted: 2023-09-19

## 2025-04-23 PROBLEM — E78.5 DYSLIPIDEMIA: Status: ACTIVE | Noted: 2021-05-02

## 2025-04-23 PROBLEM — J33.9 NASAL POLYP: Status: RESOLVED | Noted: 2022-06-10 | Resolved: 2025-04-23

## 2025-04-23 LAB
ANION GAP SERPL CALCULATED.3IONS-SCNC: 14 MMOL/L (ref 7–15)
ATRIAL RATE - MUSE: 75 BPM
BUN SERPL-MCNC: 15.4 MG/DL (ref 8–23)
CALCIUM SERPL-MCNC: 8.8 MG/DL (ref 8.8–10.4)
CHLORIDE SERPL-SCNC: 101 MMOL/L (ref 98–107)
CREAT SERPL-MCNC: 0.66 MG/DL (ref 0.51–0.95)
DIASTOLIC BLOOD PRESSURE - MUSE: NORMAL MMHG
EGFRCR SERPLBLD CKD-EPI 2021: >90 ML/MIN/1.73M2
ERYTHROCYTE [DISTWIDTH] IN BLOOD BY AUTOMATED COUNT: 12.9 % (ref 10–15)
GLUCOSE BLDC GLUCOMTR-MCNC: 140 MG/DL (ref 70–99)
GLUCOSE BLDC GLUCOMTR-MCNC: 214 MG/DL (ref 70–99)
GLUCOSE BLDC GLUCOMTR-MCNC: 220 MG/DL (ref 70–99)
GLUCOSE BLDC GLUCOMTR-MCNC: 274 MG/DL (ref 70–99)
GLUCOSE BLDC GLUCOMTR-MCNC: 312 MG/DL (ref 70–99)
GLUCOSE BLDC GLUCOMTR-MCNC: 446 MG/DL (ref 70–99)
GLUCOSE SERPL-MCNC: 204 MG/DL (ref 70–99)
HCO3 SERPL-SCNC: 24 MMOL/L (ref 22–29)
HCT VFR BLD AUTO: 37 % (ref 35–47)
HGB BLD-MCNC: 11.5 G/DL (ref 11.7–15.7)
INTERPRETATION ECG - MUSE: NORMAL
LVEF ECHO: NORMAL
MAGNESIUM SERPL-MCNC: 2.3 MG/DL (ref 1.7–2.3)
MCH RBC QN AUTO: 26.4 PG (ref 26.5–33)
MCHC RBC AUTO-ENTMCNC: 31.1 G/DL (ref 31.5–36.5)
MCV RBC AUTO: 85 FL (ref 78–100)
P AXIS - MUSE: 42 DEGREES
PHOSPHATE SERPL-MCNC: 3.7 MG/DL (ref 2.5–4.5)
PLATELET # BLD AUTO: 255 10E3/UL (ref 150–450)
POTASSIUM SERPL-SCNC: 3.8 MMOL/L (ref 3.4–5.3)
PR INTERVAL - MUSE: 170 MS
QRS DURATION - MUSE: 84 MS
QT - MUSE: 408 MS
QTC - MUSE: 455 MS
R AXIS - MUSE: 18 DEGREES
RBC # BLD AUTO: 4.36 10E6/UL (ref 3.8–5.2)
SODIUM SERPL-SCNC: 139 MMOL/L (ref 135–145)
SYSTOLIC BLOOD PRESSURE - MUSE: NORMAL MMHG
T AXIS - MUSE: 109 DEGREES
TROPONIN T SERPL HS-MCNC: 12 NG/L
TROPONIN T SERPL HS-MCNC: 14 NG/L
VENTRICULAR RATE- MUSE: 75 BPM
WBC # BLD AUTO: 6.7 10E3/UL (ref 4–11)

## 2025-04-23 PROCEDURE — 84100 ASSAY OF PHOSPHORUS: CPT | Performed by: STUDENT IN AN ORGANIZED HEALTH CARE EDUCATION/TRAINING PROGRAM

## 2025-04-23 PROCEDURE — 97116 GAIT TRAINING THERAPY: CPT | Mod: GP

## 2025-04-23 PROCEDURE — 96361 HYDRATE IV INFUSION ADD-ON: CPT

## 2025-04-23 PROCEDURE — 97535 SELF CARE MNGMENT TRAINING: CPT | Mod: GO

## 2025-04-23 PROCEDURE — 83735 ASSAY OF MAGNESIUM: CPT | Performed by: STUDENT IN AN ORGANIZED HEALTH CARE EDUCATION/TRAINING PROGRAM

## 2025-04-23 PROCEDURE — 258N000003 HC RX IP 258 OP 636: Performed by: STUDENT IN AN ORGANIZED HEALTH CARE EDUCATION/TRAINING PROGRAM

## 2025-04-23 PROCEDURE — 97530 THERAPEUTIC ACTIVITIES: CPT | Mod: GP

## 2025-04-23 PROCEDURE — 250N000013 HC RX MED GY IP 250 OP 250 PS 637: Performed by: STUDENT IN AN ORGANIZED HEALTH CARE EDUCATION/TRAINING PROGRAM

## 2025-04-23 PROCEDURE — 99233 SBSQ HOSP IP/OBS HIGH 50: CPT | Performed by: HOSPITALIST

## 2025-04-23 PROCEDURE — 82962 GLUCOSE BLOOD TEST: CPT

## 2025-04-23 PROCEDURE — 93005 ELECTROCARDIOGRAM TRACING: CPT

## 2025-04-23 PROCEDURE — 36415 COLL VENOUS BLD VENIPUNCTURE: CPT

## 2025-04-23 PROCEDURE — G0378 HOSPITAL OBSERVATION PER HR: HCPCS

## 2025-04-23 PROCEDURE — 97166 OT EVAL MOD COMPLEX 45 MIN: CPT | Mod: GO

## 2025-04-23 PROCEDURE — 250N000013 HC RX MED GY IP 250 OP 250 PS 637: Performed by: HOSPITALIST

## 2025-04-23 PROCEDURE — 250N000012 HC RX MED GY IP 250 OP 636 PS 637: Performed by: STUDENT IN AN ORGANIZED HEALTH CARE EDUCATION/TRAINING PROGRAM

## 2025-04-23 PROCEDURE — 85018 HEMOGLOBIN: CPT | Performed by: STUDENT IN AN ORGANIZED HEALTH CARE EDUCATION/TRAINING PROGRAM

## 2025-04-23 PROCEDURE — 93010 ELECTROCARDIOGRAM REPORT: CPT | Performed by: STUDENT IN AN ORGANIZED HEALTH CARE EDUCATION/TRAINING PROGRAM

## 2025-04-23 PROCEDURE — 97162 PT EVAL MOD COMPLEX 30 MIN: CPT | Mod: GP

## 2025-04-23 PROCEDURE — 92523 SPEECH SOUND LANG COMPREHEN: CPT | Mod: GN

## 2025-04-23 PROCEDURE — 99255 IP/OBS CONSLTJ NEW/EST HI 80: CPT | Performed by: PSYCHIATRY & NEUROLOGY

## 2025-04-23 PROCEDURE — 36415 COLL VENOUS BLD VENIPUNCTURE: CPT | Performed by: STUDENT IN AN ORGANIZED HEALTH CARE EDUCATION/TRAINING PROGRAM

## 2025-04-23 PROCEDURE — 84484 ASSAY OF TROPONIN QUANT: CPT

## 2025-04-23 PROCEDURE — 80048 BASIC METABOLIC PNL TOTAL CA: CPT | Performed by: STUDENT IN AN ORGANIZED HEALTH CARE EDUCATION/TRAINING PROGRAM

## 2025-04-23 RX ORDER — CARBOXYMETHYLCELLULOSE SODIUM 5 MG/ML
1 SOLUTION/ DROPS OPHTHALMIC 3 TIMES DAILY PRN
Status: DISCONTINUED | OUTPATIENT
Start: 2025-04-23 | End: 2025-04-25 | Stop reason: HOSPADM

## 2025-04-23 RX ORDER — POLYETHYLENE GLYCOL 3350 17 G/17G
17 POWDER, FOR SOLUTION ORAL DAILY
Status: DISCONTINUED | OUTPATIENT
Start: 2025-04-23 | End: 2025-04-25 | Stop reason: HOSPADM

## 2025-04-23 RX ORDER — LORAZEPAM 0.5 MG/1
0.5 TABLET ORAL ONCE
Status: COMPLETED | OUTPATIENT
Start: 2025-04-23 | End: 2025-04-23

## 2025-04-23 RX ORDER — GLIPIZIDE 5 MG/1
5 TABLET ORAL
Status: DISCONTINUED | OUTPATIENT
Start: 2025-04-23 | End: 2025-04-24

## 2025-04-23 RX ADMIN — ACETAMINOPHEN 650 MG: 325 TABLET ORAL at 05:20

## 2025-04-23 RX ADMIN — OXYCODONE HYDROCHLORIDE 5 MG: 5 TABLET ORAL at 18:21

## 2025-04-23 RX ADMIN — SODIUM CHLORIDE: 0.9 INJECTION, SOLUTION INTRAVENOUS at 09:00

## 2025-04-23 RX ADMIN — INSULIN GLARGINE 26 UNITS: 100 INJECTION, SOLUTION SUBCUTANEOUS at 21:25

## 2025-04-23 RX ADMIN — GABAPENTIN 300 MG: 300 CAPSULE ORAL at 13:02

## 2025-04-23 RX ADMIN — OXYCODONE HYDROCHLORIDE 5 MG: 5 TABLET ORAL at 13:02

## 2025-04-23 RX ADMIN — DULOXETINE HYDROCHLORIDE 60 MG: 60 CAPSULE, DELAYED RELEASE PELLETS ORAL at 21:28

## 2025-04-23 RX ADMIN — GABAPENTIN 300 MG: 300 CAPSULE ORAL at 21:28

## 2025-04-23 RX ADMIN — GABAPENTIN 300 MG: 300 CAPSULE ORAL at 08:56

## 2025-04-23 RX ADMIN — LORAZEPAM 0.5 MG: 0.5 TABLET ORAL at 06:01

## 2025-04-23 RX ADMIN — INSULIN GLARGINE 26 UNITS: 100 INJECTION, SOLUTION SUBCUTANEOUS at 08:56

## 2025-04-23 RX ADMIN — ASPIRIN 81 MG: 81 TABLET, COATED ORAL at 08:56

## 2025-04-23 RX ADMIN — ATORVASTATIN CALCIUM 80 MG: 40 TABLET, FILM COATED ORAL at 21:29

## 2025-04-23 RX ADMIN — OXYCODONE HYDROCHLORIDE 5 MG: 5 TABLET ORAL at 05:20

## 2025-04-23 RX ADMIN — INSULIN ASPART 2 UNITS: 100 INJECTION, SOLUTION INTRAVENOUS; SUBCUTANEOUS at 09:12

## 2025-04-23 RX ADMIN — GLIPIZIDE 5 MG: 5 TABLET ORAL at 11:31

## 2025-04-23 RX ADMIN — MECLIZINE HYDROCHLORIDE 25 MG: 25 TABLET ORAL at 21:49

## 2025-04-23 RX ADMIN — INSULIN ASPART 4 UNITS: 100 INJECTION, SOLUTION INTRAVENOUS; SUBCUTANEOUS at 17:09

## 2025-04-23 RX ADMIN — DULOXETINE HYDROCHLORIDE 60 MG: 60 CAPSULE, DELAYED RELEASE PELLETS ORAL at 08:56

## 2025-04-23 RX ADMIN — POLYETHYLENE GLYCOL 3350 17 G: 17 POWDER, FOR SOLUTION ORAL at 11:31

## 2025-04-23 RX ADMIN — INSULIN ASPART 3 UNITS: 100 INJECTION, SOLUTION INTRAVENOUS; SUBCUTANEOUS at 12:48

## 2025-04-23 ASSESSMENT — ACTIVITIES OF DAILY LIVING (ADL)
ADLS_ACUITY_SCORE: 49
ADLS_ACUITY_SCORE: 49
ADLS_ACUITY_SCORE: 59
ADLS_ACUITY_SCORE: 58
ADLS_ACUITY_SCORE: 54
ADLS_ACUITY_SCORE: 59
ADLS_ACUITY_SCORE: 49
ADLS_ACUITY_SCORE: 59
ADLS_ACUITY_SCORE: 58
DEPENDENT_IADLS:: CLEANING;COOKING;LAUNDRY;SHOPPING;MEDICATION MANAGEMENT;TRANSPORTATION
ADLS_ACUITY_SCORE: 59
ADLS_ACUITY_SCORE: 54
ADLS_ACUITY_SCORE: 49
ADLS_ACUITY_SCORE: 49
ADLS_ACUITY_SCORE: 59
ADLS_ACUITY_SCORE: 54
ADLS_ACUITY_SCORE: 49
ADLS_ACUITY_SCORE: 59
ADLS_ACUITY_SCORE: 49
ADLS_ACUITY_SCORE: 59
ADLS_ACUITY_SCORE: 50
ADLS_ACUITY_SCORE: 49

## 2025-04-23 NOTE — PROGRESS NOTES
[facility]    Medicine Progress Note - Hospitalist Service    Date of Admission:  4/22/2025    Assessment & Plan                Sarah Rahman is a 59 year old female with IDDM, coronary artery disease s/p CABG (2009), hypertension, anxiety, prior CVA, basilar artery stenosis, and schizoaffective disorder, frequent ED visits and hospitalizations for R sided weakness, here for right sided weakness once again. Hospital Day: 2       #Years of fluctuating R sided weakness  -has had years of fluctuating weakness on R side attributed to cerebrovascular disease. History of left carotid endarterectomy and has known basilar artery stenosis. Multiple CV risk factors.  -CTA head and neck showed no changes from previous. Has findings of chronic small vessel ischemia.  -Unable to obtain MRI due to patient unable to tolerate lying still for the exam, similar issues during multiple previous hospitalizations.  Last useful MRI result May 2024 did not show any stroke, just the aforementioned chronic small vessel ischemic changes.  -Seen by neurology, diagnosis favored to be functional in origin.  -continue home statin, ASA  - Patient would benefit from quitting smoking, increased efforts at stress relief, structured exercise program such as pool therapy etc.  Seems to be significant psychosocial barriers to accomplishing all of this.  -somewhat concerned that she may be out of Percocet prompting current presentation.     #Diabetes mellitus insulin-dependent  - Poorly controlled with hyperglycemia  - HbA1c 13.0  - Home regimen: Lantus 26u bid, glipizide 10mg, Trulicity weekly  - patient likely missing a lot of doses of meds.  - continue current sliding scale insulin  - continue home Lantus  -resume glipizide at lower dose 5mg and watch for hypoglycemia  -recommend continued efforts at carb consistent diet, I think this is difficult for her due to limited medical literacy.  I do not see any recent visits  "with the dietitian, recommend complete this as outpatient    #Chronic pain syndrome  -recently fired by her pain clinic, PCP has been temporarily prescribing her pain meds, received 15 day supply of Percocet on 3/31  -previously on suboxone but has not been using  -recently prescribed and filled topical ketamine but has not been using  -continue home gabapentin  -here complains not receiving her full dose of oxycodone, does not seem overtly in pain. Suspect may be out of meds at home. No apparent withdrawal. Continue current lower dose of oxycodone.     #BPPV  -continue meclizine PRN started at recent hospital stay    #Hyperlipidemia  - continue Atorvastatin 40mg    #Constipation, miralax     #HTN, resume home amlodipine  # Neuropathy, home Cymbalta and gabapentin.  #Asthma, home albuterol          Diet: Regular Diet Adult    DVT Prophylaxis: Moderate risk.   Pneumatic Compression Devices  Diehl Catheter: Not present  Lines: None     Cardiac Monitoring: ACTIVE order. Indication: Stroke, acute (48 hours)  Code Status: Full Code      Clinically Significant Risk Factors Present on Admission                 # Drug Induced Platelet Defect: home medication list includes an antiplatelet medication   # Hypertension: Noted on problem list          # DMII: A1C = 13.0 % (Ref range: <5.7 %) within past 6 months    # Overweight: Estimated body mass index is 28.49 kg/m  as calculated from the following:    Height as of this encounter: 1.702 m (5' 7\").    Weight as of this encounter: 82.5 kg (181 lb 14.4 oz).       # Financial/Environmental Concerns:    # Asthma: noted on problem list  # History of CABG: noted on surgical history       Disposition Plan     Medically Ready for Discharge: Anticipated Tomorrow         Discharge barrier(s): fluctuating symptoms  Care discussed with: patient, care mgr      Sunshine Fowler MD  Hospitalist Service  [facility]  Securely message with [employer] (more info)  Text page via " [facility] Paging/Directory   ______________________________________________________________________      Physical Exam   Vital Signs: Temp: 98.3  F (36.8  C) Temp src: Oral BP: (!) 173/84 Pulse: 80   Resp: 16 SpO2: 100 % O2 Device: None (Room air)    Weight: 181 lbs 14.4 oz    General: in no apparent distress, non-toxic, and alert female sitting in bedside chair oriented x3  HEENT: Head normocephalic atraumatic, oral mucosa moist. Sclerae anicteric  CV: Regular rhythm, normal rate, no murmurs  Resp: No wheezes, no rales or rhonchi, no focal consolidations  GI: Belly soft, nondistended, nontender, bowel sounds present  Skin: No rashes or lesions  Extremities: No peripheral edema  Psych: Normal affect, mood euthymic  Neuro:  not voluntarily moving RUE but when she uses her other hand to lift it and drop it, it lowers to the chair slower than expected if truly flaccid.      Medical Decision Making       50 MINUTES SPENT BY ME on the date of service doing chart review, history, exam, documentation & further activities per the note.      Data   Recent Results (from the past 16 hours)   Glucose by meter    Collection Time: 04/23/25  2:14 AM   Result Value Ref Range    GLUCOSE BY METER POCT 446 (H) 70 - 99 mg/dL   ECG 12-LEAD WITH MUSE (LHE)    Collection Time: 04/23/25  5:26 AM   Result Value Ref Range    Systolic Blood Pressure  mmHg    Diastolic Blood Pressure  mmHg    Ventricular Rate 75 BPM    Atrial Rate 75 BPM    MN Interval 170 ms    QRS Duration 84 ms     ms    QTc 455 ms    P Axis 42 degrees    R AXIS 18 degrees    T Axis 109 degrees    Interpretation ECG       Sinus rhythm  Nonspecific ST and T wave abnormality  Abnormal ECG  When compared with ECG of 22-Apr-2025 13:50,  No significant change was found  Confirmed by PRANAY RAMOS MD LOC:JN ([id number]) on 4/23/2025 8:06:58 AM     Glucose by meter    Collection Time: 04/23/25  6:34 AM   Result Value Ref Range    GLUCOSE BY METER POCT 214 (H) 70 - 99 mg/dL   CBC  with platelets    Collection Time: 04/23/25  7:14 AM   Result Value Ref Range    WBC Count 6.7 4.0 - 11.0 10e3/uL    RBC Count 4.36 3.80 - 5.20 10e6/uL    Hemoglobin 11.5 (L) 11.7 - 15.7 g/dL    Hematocrit 37.0 35.0 - 47.0 %    MCV 85 78 - 100 fL    MCH 26.4 (L) 26.5 - 33.0 pg    MCHC 31.1 (L) 31.5 - 36.5 g/dL    RDW 12.9 10.0 - 15.0 %    Platelet Count 255 150 - 450 10e3/uL   Basic metabolic panel    Collection Time: 04/23/25  7:14 AM   Result Value Ref Range    Sodium 139 135 - 145 mmol/L    Potassium 3.8 3.4 - 5.3 mmol/L    Chloride 101 98 - 107 mmol/L    Carbon Dioxide (CO2) 24 22 - 29 mmol/L    Anion Gap 14 7 - 15 mmol/L    Urea Nitrogen 15.4 8.0 - 23.0 mg/dL    Creatinine 0.66 0.51 - 0.95 mg/dL    GFR Estimate >90 >60 mL/min/1.73m2    Calcium 8.8 8.8 - 10.4 mg/dL    Glucose 204 (H) 70 - 99 mg/dL   Phosphorus    Collection Time: 04/23/25  7:14 AM   Result Value Ref Range    Phosphorus 3.7 2.5 - 4.5 mg/dL   Magnesium    Collection Time: 04/23/25  7:14 AM   Result Value Ref Range    Magnesium 2.3 1.7 - 2.3 mg/dL   Troponin T, High Sensitivity    Collection Time: 04/23/25  7:14 AM   Result Value Ref Range    Troponin T, High Sensitivity 12 <=14 ng/L   Glucose by meter    Collection Time: 04/23/25  9:02 AM   Result Value Ref Range    GLUCOSE BY METER POCT 220 (H) 70 - 99 mg/dL   Troponin T, High Sensitivity    Collection Time: 04/23/25  9:48 AM   Result Value Ref Range    Troponin T, High Sensitivity 14 <=14 ng/L   Glucose by meter    Collection Time: 04/23/25 12:48 PM   Result Value Ref Range    GLUCOSE BY METER POCT 274 (H) 70 - 99 mg/dL   Echocardiogram Complete w Bubble Study - For age < 60 yrs    Collection Time: 04/23/25  1:41 PM   Result Value Ref Range    LVEF  60-65%        Interval History     Patient complains still R sided numbness and weakness. Complains blurry vision both eyes. States on track with DM eye exams, and have been normal. Discussed this may be related to TIA activity she experiences.  Recommended better DM control and she should also quit smoking for cerebrovascular perfusion. Patient lacks insight. Also seems to lack knowledge of her issues despite frequent presentations with same. Discussed DM diet and carb restriction, she admits to eating [employer] and hoagies, we discussed maybe taking half of the bread out. Neuro have signed off. Short term she is stable to discharge home, long term she is going to develop further vascular complications and very high risk for readmission.

## 2025-04-23 NOTE — SIGNIFICANT EVENT
"Significant Event Note    Time of event: 5:16 AM April 23, 2025    Description of event:  Notified by RN that patient with chest pain.   Went to assess patient. Developed several minutes ago upon waking up. Radiates into L arm. Not reproducible or change with position or inspiration. Some dizziness but no SOB, HA, nausea. Rates 10/10. Becomes tearful, is very anxious, \"what is wrong with me?\"     Per RN, had similar episode earlier in night with previous provider.     Will get EKG and trop. Suspect anxiety-driven.     Chucho Cox MD    "

## 2025-04-23 NOTE — PLAN OF CARE
Goal Outcome Evaluation:      Plan of Care Reviewed With: patient          Outcome Evaluation: Anticipate Pt to discharge home pending PT/OT consults.    FLORENCE Reyes

## 2025-04-23 NOTE — PROGRESS NOTES
04/23/25 1435   Appointment Info   Signing Clinician's Name / Credentials (PT) Christine Hughes, OJNY   Quick Adds   Quick Adds Certification   Living Environment   People in Home alone   Current Living Arrangements apartment;other (see comments)  (4 th floor apt)   Home Accessibility no concerns   Self-Care   Usual Activity Tolerance good   Current Activity Tolerance moderate   Equipment Currently Used at Home   (none)   Fall history within last six months yes   Number of times patient has fallen within last six months 5   Activity/Exercise/Self-Care Comment Indep with ADKs, Indep with mobility, and does drive  (Has a PCA for 9 hours that does assist with home ADLs per the chart,)   General Information   Onset of Illness/Injury or Date of Surgery 04/22/25   Referring Physician Gondal, Saad J, MD   Patient/Family Therapy Goals Statement (PT) don't know yet.   Pertinent History of Current Problem (include personal factors and/or comorbidities that impact the POC) Per the chart, Sarah Rahman is a 59 year old female with IDDM, coronary artery disease s/p CABG (2009), hypertension, anxiety, prior CVA, basilar artery stenosis, and schizoaffective disorder, frequent ED visits and hospitalizations for R sided weakness, here for right sided weakness once again. Hospital Day: 2   Existing Precautions/Restrictions fall  (Pt is impulsive and has dec sensation and R hemiparesis.)   Weight-Bearing Status - LLE weight-bearing as tolerated   Weight-Bearing Status - RLE weight-bearing as tolerated   Cognition   Orientation Status (Cognition) oriented to;person;place   Safety Deficit (Cognition) impulsivity;insight into deficits/self-awareness   Pain Assessment   Patient Currently in Pain No  (R sided weakness.)   Posture    Posture Comments Some cues for direction and safety  with the walker and mobility.   Range of Motion (ROM)   Range of Motion ROM is WNL   ROM Comment PROM WFL bilat LEs,  Dec AROM upon request R LE   Strength  (Manual Muscle Testing)   Strength Comments L LE WFL ,  R LE DF 2, PF 2, knee 3+, Hip groosly 3+/5.   Bed Mobility   Comment, (Bed Mobility) Supine>sit with CGA   Transfers   Comment, (Transfers) Sit<>stand with FWW with min A x 1.   Gait/Stairs (Locomotion)   Travis Level (Gait) minimum assist (75% patient effort);1 person assist   Assistive Device (Gait) walker, front-wheeled  (FWW)   Distance in Feet (Gait) 15'   Pattern (Gait) swing-to  (dec step length and pt does catches R LE with swing phase.)   Deviations/Abnormal Patterns (Gait) gait speed decreased;terry decreased   Balance   Balance Comments min A x 1 with FWW   Sensory Examination   Sensory Perception other (describe)   Sensory Perception Quick Adds Light touch;Proprioception   Sensation Light Touch   RLE mild impairment   Proprioception    RLE moderate impairment   Coordination   Coordination Comments dec coordination R UE and LE,   Muscle Tone   Muscle Tone other (see comments)   Muscle Tone Comments Increased tremors R UE with the pt using the walker.   Clinical Impression   Criteria for Skilled Therapeutic Intervention Yes, treatment indicated   PT Diagnosis (PT) Impaired functional mobility.   Influenced by the following impairments R hemiparesis, dec bal,  dec endurance.   Functional limitations due to impairments bed mobility, transfers, gait.   Clinical Presentation (PT Evaluation Complexity) stable   Clinical Presentation Rationale Pt presents medically diagnosed.   Clinical Decision Making (Complexity) moderate complexity   Planned Therapy Interventions (PT) balance training;bed mobility training;gait training;neuromuscular re-education;strengthening;transfer training   Risk & Benefits of therapy have been explained evaluation/treatment results reviewed;care plan/treatment goals reviewed;risks/benefits reviewed;patient;participants voiced agreement with care plan   PT Total Evaluation Time   PT Rashida, Moderate Complexity Minutes (55635)  15   Therapy Certification   Start of care date 04/23/25   Certification date from 04/23/25   Certification date to 04/30/25   Medical Diagnosis stroke like symtoms, right sided weakness, difficulty with speech   Physical Therapy Goals   PT Frequency Daily   PT Predicted Duration/Target Date for Goal Attainment 04/30/25   PT Goals Bed Mobility;Transfers;Gait;PT Goal 1   PT: Bed Mobility Supervision/stand-by assist;Supine to/from sit;Rolling   PT: Transfers Supervision/stand-by assist;Sit to/from stand;Bed to/from chair;Assistive device   PT: Gait Supervision/stand-by assist;Rolling walker;100 feet   PT: Goal 1 Pt to dian bilat LE ex x 20 reps to increase strength for mobility.   Interventions   Interventions Quick Adds Gait Training;Therapeutic Activity   Therapeutic Activity   Therapeutic Activities: dynamic activities to improve functional performance Minutes (25601) 15   Treatment Detail/Skilled Intervention Supine>sit with SBA with cues for technique.  No c/o dizziness.  Bed<>stand with FWW with min A x 1 with cues for hand placement..  Pt needed assist to keep her R hand on the walker.   Toilet transfers with the FWW with min A x 1 with cues for walker safety and hand placement.  Pt used the walker and walked to the sink with min A x 1.  Min A with bal at the sink.  Stand >sit with min Ax 1 with tabs alarm on and call light on.  Nursing notified pt is in the chair.   Gait Training   Gait Training Minutes (96314) 13   Symptoms Noted During/After Treatment (Gait Training) fatigue   Treatment Detail/Skilled Intervention The pt walked with the FWW and needed min A x 1 and min A to keep her R hand on the walker.  Pt does take smaller steps and does catch the R LE with swing phase.   She does not have a good R LE HS.  Pt is easily distracted during tasks. Pt is not safe to walk alone at this time.   Distance in Feet 130' and 20'   Bates Level (Gait Training) minimum assist (75% patient effort)   Physical  Assistance Level (Gait Training) verbal cues;1 person assist   Weight Bearing (Gait Training) weight-bearing as tolerated   Assistive Device (Gait Training) rolling walker  (FWW)   Pattern Analysis (Gait Training) swing-through gait  (step to occ with the R LE)   Gait Analysis Deviations decreased terry;decreased swing-to-stance ratio;decreased toe-to-floor clearance   Impairments (Gait Analysis/Training) balance impaired;sensation decreased;strength decreased;coordination impaired;motor control impaired   PT Discharge Planning   PT Plan gait w FWW, LE ex, stand bal, NDT for R hemiparesis.  .   PT Discharge Recommendation (DC Rec) Transitional Care Facility;Acute Rehab Center-Motivated patient will benefit from intensive, interdisciplinary therapy.  Anticipate will be able to tolerate 3 hours of therapy per day   PT Rationale for DC Rec The pt does have R hemiparesis and does have dec bal. She is not safe to walk alone and must have assist with transfers and gait.  TCU or possible ARF is recommended.   PT Brief overview of current status PT eval, bed mobility with SBA, multiple transfers with FWW with min A x 1, toilet transfer with min A x 1.  Assist  with ADLs, ambulated with FWW with min A x 1.   PT Total Distance Amb During Session (feet) 175   PT Equipment Needed at Discharge walker, rolling  (FWW)   Physical Therapy Time and Intention   Timed Code Treatment Minutes 28   Total Session Time (sum of timed and untimed services) 43   [facility]                                                                                   OUTPATIENT PHYSICAL THERAPY    PLAN OF TREATMENT FOR OUTPATIENT REHABILITATION   Patient's Last Name, First Name, Sarah Frausto YOB: 1965   Provider's Name   [facility]   Medical Record No.  [id number]     Onset Date: 04/22/25 Start of Care Date: 04/23/25     Medical Diagnosis:  stroke like symtoms,  right sided weakness, difficulty with speech               PT Diagnosis:  Impaired functional mobility. Certification Dates:  From: 04/23/25  To: 04/30/25       See note for plan of treatment, functional goals, and certification details.    I CERTIFY THE NEED FOR THESE SERVICES FURNISHED UNDER        THIS PLAN OF TREATMENT AND WHILE UNDER MY CARE (Physician co-signature of this document indicates review and certification of the therapy plan).

## 2025-04-23 NOTE — PLAN OF CARE
Problem: Stroke, Ischemic (Includes Transient Ischemic Attack)  Goal: Optimal Coping  4/23/2025 0708 by Aleena Arriola, RN  Outcome: Progressing   Goal Outcome Evaluation:         Pt A&Ox4, VSS on room air. Reporting neck and chest pain, HO paged, ordered EKG and tropes. Echo to be completed in the morning. PRN oxycodone and tylenol given pt reporting somewhat relief. PRN ativan given for anxiety. Scoring 9 on NIH for right sided weakness and numbness, increased right eye blurriness. SCDs on. NSR on tele.  overnight, MD notified 8 units of novolog given per MD. Bed alarm on, call light within reach.

## 2025-04-23 NOTE — PROGRESS NOTES
[facility]                                                                                   OUTPATIENT OCCUPATIONAL THERAPY    PLAN OF TREATMENT FOR OUTPATIENT REHABILITATION   Patient's Last Name, First Name, Sarah Frausto YOB: 1965   Provider's Name   [facility]   Medical Record No.  [id number]     Onset Date: 04/22/25 Start of Care Date: 04/23/25     Medical Diagnosis:  Stroke               OT Diagnosis:  Decreased IND with ADLs, transfers, and mobility Certification Dates:  From: 04/23/25  To: 04/30/25     See note for plan of treatment, functional goals, and certification details.    I CERTIFY THE NEED FOR THESE SERVICES FURNISHED UNDER        THIS PLAN OF TREATMENT AND WHILE UNDER MY CARE (Physician co-signature of this document indicates review and certification of the therapy plan).             OT Evaluation     04/23/25 1300   Appointment Info   Signing Clinician's Name / Credentials (OT) Mina Brink OTR/L   Quick Adds   Quick Adds Certification   Living Environment   People in Home alone   Current Living Arrangements apartment   Home Accessibility no concerns   Transportation Anticipated family or friend will provide;agency   Living Environment Comments Patient lives on 4th floor (elevator access), washer/dryer in unit for laundry. Patient's apartment has 2 bathrooms, one bathroom has walk-in shower with grab bars and standard toilet, other bathroom has tub/shower combo with bars and standard toilet   Self-Care   Usual Activity Tolerance moderate   Current Activity Tolerance poor   Equipment Currently Used at Home glucometer   Fall history within last six months yes   Number of times patient has fallen within last six months 10  (Per patient report)   Activity/Exercise/Self-Care Comment Patient reports being IND with ADLs, reports not using gait aid. Patient has a PCA for 9/hrs day   Instrumental Activities of  Daily Living (IADL)   IADL Comments Patient's PCA assists with home mangement, patient reports she does cook.   General Information   Onset of Illness/Injury or Date of Surgery 04/22/25   Referring Physician Gondal, Saad J, MD   Patient/Family Therapy Goal Statement (OT) None stated   Additional Occupational Profile Info/Pertinent History of Current Problem Sarah Rahman is a 59 year old female with a past medical history of CAD, COPD, hypertension, polysubstance abuse, shies affective disorder, complex regional pain syndrome, diabetes mellitus, hyperlipidemia presented to the ED with complaint of right-sided weakness and speech difficulty,   Existing Precautions/Restrictions fall   Cognitive Status Examination   Orientation Status orientation to person, place and time   Visual Perception   Visual Impairment/Limitations WFL   Sensory   Sensory Quick Adds sensation intact   Posture   Posture forward head position   Range of Motion Comprehensive   General Range of Motion upper extremity range of motion deficits identified   General Upper Extremity Assessment (Range of Motion)   Comment: Upper Extremity ROM L UE WNL, R UE (shoulder and elbow impaired)   Upper Extremity: Range of Motion LUE ROM WFL;shoulder, right: UE ROM   Strength Comprehensive (MMT)   Comment, General Manual Muscle Testing (MMT) Assessment Generalized weakness   Muscle Tone Assessment   Muscle Tone Quick Adds No deficits were identified   Coordination   Upper Extremity Coordination Right UE impaired   Bed Mobility   Bed Mobility supine-sit   Supine-Sit Todd (Bed Mobility) minimum assist (75% patient effort)   Transfers   Transfers sit-stand transfer   Sit-Stand Transfer   Sit-Stand Todd (Transfers) moderate assist (50% patient effort)   Sit/Stand Transfer Comments Mod assist x1 for increased safety   Balance   Balance Assessment sitting static balance;sitting dynamic balance;sit to stand dynamic balance;standing static  balance;standing dynamic balance   Sitting Balance: Static supervision   Sitting Balance: Dynamic supervision   Position, Sitting Balance unsupported;sitting edge of bed   Sit-to-Stand Balance maximum assist   Standing Balance: Static moderate assist;maximum assist   Standing Balance: Dynamic moderate assist;maximum assist   Balance Comments Mod-max assist x1 for increased safety   Activities of Daily Living   BADL Assessment/Intervention lower body dressing   Lower Body Dressing Assessment/Training   Comment, (Lower Body Dressing) Setup for gathering clothing   [place] Level (Lower Body Dressing) don;socks;set up   Clinical Impression   Criteria for Skilled Therapeutic Interventions Met (OT) Yes, treatment indicated   OT Diagnosis Decreased IND with ADLs, transfers, and mobility   OT Problem List-Impairments impacting ADL problems related to;activity tolerance impaired;balance;coordination;mobility;range of motion (ROM);strength   Assessment of Occupational Performance 3-5 Performance Deficits   Identified Performance Deficits Dressing, Bathing, Toileting, Transfers, Mobility   Planned Therapy Interventions (OT) ADL retraining;ROM;strengthening;stretching;transfer training;home program guidelines;progressive activity/exercise   Clinical Decision Making Complexity (OT) detailed assessment/moderate complexity   Risk & Benefits of therapy have been explained evaluation/treatment results reviewed   OT Total Evaluation Time   OT Eval, Moderate Complexity Minutes (10788) 10   Therapy Certification   Medical Diagnosis Stroke   Start of Care Date 04/23/25   Certification date from 04/23/25   Certification date to 04/30/25   OT Goals   Therapy Frequency (OT) 5 times/week   OT Predicted Duration/Target Date for Goal Attainment 04/30/25   OT Goals Hygiene/Grooming;Upper Body Dressing;Lower Body Dressing;Toilet Transfer/Toileting   OT: Hygiene/Grooming supervision/stand-by assist   OT: Upper Body Dressing  Supervision/stand-by assist   OT: Lower Body Dressing Supervision/stand-by assist   OT: Toilet Transfer/Toileting Supervision/stand-by assist   Interventions   Interventions Quick Adds Self-Care/Home Management   Self-Care/Home Management   Self-Care/Home Mgmt/ADL, Compensatory, Meal Prep Minutes (82868) 23   Symptoms Noted During/After Treatment (Meal Preparation/Planning Training) fatigue   Treatment Detail/Skilled Intervention Patient supine when approached, agreeable to OT eval and treat. Eval completed and treat initiated. Facilitated bed mobility by providing min assist x1 for increased safety. Facilitated aspects of LB dressing (socks) by providing setup assist for gathering clothing. Facilitated sit<>stands from EOB by providing mod-maximal assistance x1 for increased safety and upright posture. Facilitated transfer to/from bedside chair by providing mod-maximal asssitance x1 for increased safety and controlled ascent/descent. Facilitated feeding task by providing setup assistance secondary to limited B UE coordination. Session ended with patient sitting in bedside chair with call light within reach and all questions answered.   OT Discharge Planning   OT Plan Next session: AROM, transfers, toileting, standing grooming   OT Discharge Recommendation (DC Rec) Transitional Care Facility   OT Rationale for DC Rec Patient would benefit from continued skilled therapy services to progress towards baseline and to optimize safe return to daily activity   OT Brief overview of current status Assist x1 for bed mobility, transfers, ADLs, and mobility   OT Total Distance Amb During Session (feet) 3   Total Session Time   Timed Code Treatment Minutes 23   Total Session Time (sum of timed and untimed services) 33

## 2025-04-23 NOTE — PROGRESS NOTES
SPEECH PATHOLOGY SPEECH/LANGUAGE EVALUATION     04/23/25 3463   Appointment Info   Signing Clinician's Name / Credentials (SLP) Kevin Hoang MA [facility] SLP   General Information   Onset of Illness/Injury or Date of Surgery 04/22/25   Referring Physician Gondal, Saad J, MD   Patient/Family Therapy Goal Statement (SLP) find words better   Pertinent History of Current Problem 59 year old female with a past medical history of CAD, COPD, hypertension, polysubstance abuse, shies affective disorder, complex regional pain syndrome, diabetes mellitus, hyperlipidemia presented to the ED with complaint of right-sided weakness and speech difficulty, CT head and neck and CT head perfusion negative for acute process, stroke neurology was consulted and recommended to admit and general neurology consult, advised to continue with PTA aspirin and atorvastatin, patient after initial CT scans did not have significant improvement in symptoms other than speech which appeared to be somewhat improved as per ED provider, received some pain control medication in the ED and is going to be admitted to rule out acute CVA. Unable to complete MRI as patient was unable to tolerate. She has passed the nursing dysphagia screen and has been eating meals and taking meds without difficulty.   General Observations Pt awake, cooperative.   Type of Evaluation   Type of Evaluation Speech, Language, Cognition   Oral Motor   Oral Musculature generally intact   Mucosal Quality adequate   Dentition (Oral Motor)   Dentition (Oral Motor) adequate dentition   Facial Symmetry (Oral Motor)   Facial Symmetry (Oral Motor) WNL   Lip Function (Oral Motor)   Comment, Lip Function (Oral Motor) WFL, some generalized weakness   Tongue Function (Oral Motor)   Comment, Tongue Function (Oral Motor) WFL, some generalized weakness   Jaw Function (Oral Motor)   Jaw Function (Oral Motor) WNL   Cough/Swallow/Gag Reflex (Oral Motor)   Volitional Throat Clear/Cough (Oral Motor) WNL    Volitional Swallow (Oral Motor) WNL   Vocal Quality/Secretion Management (Oral Motor)   Vocal Quality (Oral Motor) WFL   Secretion Management (Oral Motor) WNL   Motor Speech   Vocal Loudness (Motor Speech) WFL   Speech Intelligibility (Motor Speech) WFL   Breath Support (Motor Speech) intact   Comment, Motor Speech Assessment speech appears at baseline, pt agrees   Articulation (Motor Speech) WFL   Respiration (motor speech) None   Auditory Comprehension   Follows Commands (Auditory Comprehension) 1-step command   Paragraph Comprehension (Auditory Comprehension) 50-74% accuracy   Comment, Assessment (Auditory Comprehension) slow processing, decreased attention/attention to details   Yes/No Questions (Auditory Comprehension) biographical/personal questions;simple/factual questions   1 Step, Follows Commands (Auditory Comprehension) 50-74% accuracy   Biographical/Personal Questions (Auditory Comprehension) over 90% accuracy   Simple/Factual Questions (Auditory Comprehension) 50-74% accuracy   Verbal Expression   Comment, Assessment (Verbal Expression) word finding difficulty, aud comprehension also impacts expressive communication   Confrontational Naming (Verbal Expression) WFL;objects   Conversational Speech (Verbal Expression) connected speech   Automatic Speech (Verbal Expression) WFL   Narrative Speech (Verbal Expression) storytelling/retelling   Word Finding Skills (Verbal Expression) generative naming   Generative Naming, Word Finding (Verbal Expression) impaired   Storytelling/Retelling, Narrative Speech (Verbal Expression) moderate impairment   Connected Speech, Conversational (Verbal Expression) moderate impairment   Pragmatic Language   Verbal Skills (Pragmatic Language) topic maintenance;verbal initiation   Nonverbal Skills (Pragmatic Language) eye contact;facial affect   Facial Affect, Nonverbal Skills (Pragmatic Language) intact   Eye Contact, Nonverbal Skills (Pragmatic Language) intact   Topic  Maintenance, Verbal Skills (Pragmatic Language) minimal impairment   Verbal Initiation, Verbal Skills (Pragmatic Language) intact   Reading Comprehension   Comment, Assessment (Reading Comprehension) not assessed   Written Language   Comment, Assessment (Written Language) not assessed   Cognition   Cognitive Status Exam Comments decreased attention, questionable insight into deficits/medical situation, partial errors with time orientation.   Orientation Status (Cognition) oriented to;person;place   Clinical Impression   Criteria for Skilled Therapeutic Interventions Met (SLP Eval) Yes, treatment indicated   SLP Diagnosis aphasia   Problem List (SLP) word finding and aud comprehension impairment   Risks & Benefits of therapy have been explained evaluation/treatment results reviewed;care plan/treatment goals reviewed;risks/benefits reviewed;current/potential barriers reviewed;participants voiced agreement with care plan;participants included;patient   Clinical Impression Comments Patient presents with word finding difficulty, auditory comprehension impairment, and reduced attention. She is able to communicate basic wants/needs but may insert word substitutions, pauses, or lack content with narrative speech, she is oriented to self, place, general time but difficulty with specific day/month. She has difficulty generating items in a category +6 animal, +0 states independently. + 1 with cues. Further assessment of cognitive linguistic function is recommended.   SLP Total Evaluation Time   Eval: Sound production with lang comprehension and expression Minutes (08563) 20   SLP Goals   Therapy Frequency (SLP Eval) 5 times/week   SLP Predicted Duration/Target Date for Goal Attainment 04/30/25   SLP Goals Language Comprehension;Communication   SLP: Improve language comprehension for interaction with caregivers/environment auditory;minimal assist   SLP: Communicate basic wants and needs moderate assist;verbally   SLP Discharge  Planning   SLP Plan Thur 0/5: generative naming, mod level word finding, attention to task, 1 step dir, CLQT   SLP Discharge Recommendation Transitional Care Facility   SLP Rationale for DC Rec expressive/receptive language impairment, needs further cog-linguistic assessment.   SLP Brief overview of current status  Difficulty with word finding, auditory comprehension, and attention.

## 2025-04-23 NOTE — PROGRESS NOTES
Pt came to the floor at [phone] from MRI. She stated that they told her that she could not have the MRI bc they thought that she had metal in her hair?  She stated that the hair piece she had on was glued on and had no metal in it.  She also stated that her eyelashes were plastic stick ons no metal.    Pt was very sad and tearful, she has definite right side deficits with some speech word finding difficulty during assessment.    NIH was -12    Pt denied pain at 1845.    PIV noted in left arm.    Settled pt discussed wit family at bedside and passed on to oncoming RN pt's needs and ongoing POC.

## 2025-04-23 NOTE — CONSULTS
Care Management Initial Consult    General Information  Assessment completed with: Patient,    Type of CM/SW Visit: Initial Assessment    Primary Care Provider verified and updated as needed: No   Readmission within the last 30 days: unable to assess         Advance Care Planning:            Communication Assessment  Patient's communication style: spoken language (English or Bilingual)    Hearing Difficulty or Deaf: yes   Wear Glasses or Blind: yes    Cognitive  Cognitive/Neuro/Behavioral: .WDL except, speech, mood/behavior  Level of Consciousness: alert  Arousal Level: opens eyes spontaneously  Orientation: oriented x 4  Mood/Behavior: calm, cooperative  Best Language: 1 - Mild to moderate  Speech: slurred, garbled    Living Environment:   People in home: alone     Current living Arrangements: apartment      Able to return to prior arrangements: yes       Family/Social Support:  Care provided by: self, other (see comments)  Provides care for: pet(s)  Marital Status: Single  Support system: Friend          Description of Support System: Supportive, Involved    Support Assessment: Adequate family and caregiver support    Current Resources:   Patient receiving home care services: No        Community Resources: County Worker, PCA  Equipment currently used at home: glucometer  Supplies currently used at home: Diabetic Supplies    Employment/Financial:  Employment Status: disabled        Financial Concerns: none           Does the patient's insurance plan have a 3 day qualifying hospital stay waiver?  Yes     Which insurance plan 3 day waiver is available? Alternative insurance waiver    Will the waiver be used for post-acute placement? Undetermined at this time    Lifestyle & Psychosocial Needs:  Social Drivers of Health     Food Insecurity: Low Risk  (4/23/2025)    Food Insecurity     Within the past 12 months, did you worry that your food would run out before you got money to buy more?: No     Within the past 12  months, did the food you bought just not last and you didn t have money to get more?: No   Depression: Not at risk (1/30/2024)    PHQ-2     PHQ-2 Score: 0   Housing Stability: Low Risk  (4/23/2025)    Housing Stability     Do you have housing? : Yes     Are you worried about losing your housing?: No   Tobacco Use: High Risk (3/31/2025)    Received from [facility]    Patient History     Smoking Tobacco Use: Every Day     Smokeless Tobacco Use: Never     Passive Exposure: Current   Financial Resource Strain: Low Risk  (4/23/2025)    Financial Resource Strain     Within the past 12 months, have you or your family members you live with been unable to get utilities (heat, electricity) when it was really needed?: No   Alcohol Use: Not At Risk (3/30/2021)    [facility]    Alcohol Use     Total Audit-C Score: Not on file   Transportation Needs: Low Risk  (4/23/2025)    Transportation Needs     Within the past 12 months, has lack of transportation kept you from medical appointments, getting your medicines, non-medical meetings or appointments, work, or from getting things that you need?: No   Physical Activity: Not on File (12/4/2021)    Received from [facility]    Physical Activity     Physical Activity: 0   Interpersonal Safety: Low Risk  (4/23/2025)    Interpersonal Safety     Do you feel physically and emotionally safe where you currently live?: Yes     Within the past 12 months, have you been hit, slapped, kicked or otherwise physically hurt by someone?: No     Within the past 12 months, have you been humiliated or emotionally abused in other ways by your partner or ex-partner?: No   Stress: Not on File (12/4/2021)    Received from [facility]    Stress     Stress: 0   Social Connections: Not on File (9/23/2024)    Received from [facility]    Social Connections     Connectedness: 0   Health Literacy: Not on file       Functional Status:  Prior to admission patient  "needed assistance:   Dependent ADLs:: Independent, Ambulation-no assistive device  Dependent IADLs:: Cleaning, Cooking, Laundry, Shopping, Medication Management, Transportation       Mental Health Status:  Mental Health Status: No Current Concerns       Chemical Dependency Status:  Chemical Dependency Status: No Current Concerns             Values/Beliefs:  Spiritual, Cultural Beliefs, Sikhism Practices, Values that affect care: no               Discussed  Partnership in Safe Discharge Planning  document with patient/family: No    Additional Information:  Per chart review, Pt recently discharged from [facility] on 4/8. SW met with Pt at bedside to complete initial assessment. SW confirmed information from previous assessment during last admission. Pt lives alone in an apartment with her service dog.  She has PCA services 9hrs/day.  Patient is independent with ADLs and gets assistance from PCA for most IADLs.  She ambulates without assistive device but states she would like to get a cane for stability. Patient has a county  and reports she has \"lots of friends\" to support her if needed. Pt confirms her goal is to return home at discharge.    PT/OT consults pending.     Next Steps:   Follow for PT/OT recs.     ROVERTO ReyesW     "

## 2025-04-23 NOTE — CONSULTS
NEUROLOGY INPATIENT CONSULTATION NOTE       [facility]  [address] [place] [address]  Tel: [phone]  Fax: [phone]  [email or web address]     Sarah Rahman,  1965, MRN [id number]  PCP: [facility]  Date: 2025     ASSESSMENT & PLAN     Diagnosis code: Stroke like symptom    Strokelike symptoms; likely functional  59-year-old female with history of CAD s/p CABG, DM2, HLD, HTN, diabetic polyneuropathy, COPD, polysubstance abuse, schizoaffective disorder, complex regional pain syndrome, left carotid stenosis s/p endarterectomy who presented to the emergency room with complaint of right-sided weakness and speech difficulty.  She had multiple such admission in the past with negative workup  CT of the head, CTA head and neck shows no acute ischemia  Intracranial atherosclerosis noted with right M2, right P1 and left V4 stenosis  MRI brain attempted but could not be completed as patient was restless  Continue aspirin and Lipitor  Physical and Occupational Therapy consult  Echocardiogram pending  Patient can be discharged from neurology standpoint if echocardiogram is unremarkable    Thank you again for this referral, please feel free to contact me if you have any questions.    Ferdinand Castillo MD  [facility]     CHIEF COMPLAINT Stroke-like symptoms     HISTORY OF PRESENT ILLNESS     We have been requested by Dr. Fowler to evaluate Sarah Rahman who is a 59 year old  female for strokelike symptoms    Patient is a 59-year-old female with history of CAD s/p CABG, DM2, HLD, HTN, diabetic polyneuropathy, COPD, polysubstance abuse, schizoaffective disorder, complex regional pain syndrome, left carotid stenosis s/p endarterectomy who presented to the emergency room with complaint of right-sided weakness and speech difficulty.  She woke up yesterday with those symptoms and her last known well was at 9 PM.   Patient had multiple such presentation in the past and had negative MRI scan.  She had a CT of the head and CTA that showed no acute abnormality.  CT perfusion is unremarkable.  MRI brain was attempted but could not be completed due to movement.  Patient was continued on aspirin and atorvastatin.    Patient was admitted to the hospital earlier this month with left leg pain, right arm weakness and had a negative CT.  She had multiple such presentation and in 2023 received tenecteplase.  30-day event monitor was ordered in the past not completed     PROBLEM LIST      Patient Active Problem List   Diagnosis    CAD -- S/P CABG    Type 2 diabetes mellitus with hyperglycemia, with long-term current use of insulin (H)    Schizoaffective disorder (H)    Bilateral low back pain with right-sided sciatica, unspecified chronicity    Cerebrovascular accident (CVA) due to stenosis of carotid artery (H)    Chronic obstructive pulmonary disease (H)    Anemia    Carotid stenosis, left    Depression with anxiety    Mixed hyperlipidemia    GERD (gastroesophageal reflux disease)    History of drug abuse (H)    Hx of syphilis    Primary hypertension    Menopausal flushing    Moderate persistent asthma    Myelomalacia of cervical cord (H)    Nephrolithiasis    Obesity    Seasonal allergies    Sensorineural hearing loss (SNHL) of right ear    Smoker    Chronic pain syndrome    Atypical chest pain -- Normal NM stress 5/8/23    Right renal artery stenosis    Intracranial atherosclerosis    Syncope and collapse    Right leg weakness    Chest pain, unspecified type    Unstable angina (H)    Frailty    Difficulty walking    Diabetic peripheral neuropathy (H)    Basilar artery stenosis    Right sided weakness    Dog bite of lower leg, right, subsequent encounter    Stroke-like symptoms    Difficulty with speech    Type 2 diabetes mellitus with diabetic polyneuropathy, with long-term current use of insulin (H)    Dyslipidemia    Functional  "neurological symptom disorder with abnormal movement      Clinically Significant Risk Factors Present on Admission                   # Drug Induced Platelet Defect: home medication list includes an antiplatelet medication       # Hypertension: Noted on problem list            # DMII: A1C = 13.0 % (Ref range: <5.7 %) within past 6 months      # Overweight: Estimated body mass index is 28.49 kg/m  as calculated from the following:    Height as of this encounter: 1.702 m (5' 7\").    Weight as of this encounter: 82.5 kg (181 lb 14.4 oz).           # Financial/Environmental Concerns:      # Asthma: noted on problem list  # History of CABG: noted on surgical history       PAST MEDICAL & SURGICAL HISTORY     Past Medical History: Patient  has a past medical history of Asthma, CAD (coronary artery disease), COPD (chronic obstructive pulmonary disease) (H), CVA (cerebral infarction), 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    Past Surgical History: She  has a past surgical history that includes Bypass graft artery coronary (01/01/2009); Hysterectomy total abdominal, bilateral salpingo-oophorectomy, combined; Orif Ulnar / Radial Shaft Fracture (Right); Coronary Stent Placement; Cv Coronary Angiogram (N/A, 06/02/2021); IR Translaminar Epidural Lumbar Inj Incl Imaging (09/27/2022); Hysterectomy total abdominal; Nasal Fracture Surgery; Lumbar Discectomy (Right, 06/13/2019); carotid endarterectomy (Left, 12/2021); Coronary Angiogram Graft (N/A, 1/16/2024); and IR Lumbar Puncture (7/23/2019).     SOCIAL HISTORY     Reviewed, " "and she  reports that she has been smoking cigarettes. She has never used smokeless tobacco. She reports that she does not currently use alcohol. She reports that she does not use drugs.     FAMILY HISTORY     Reviewed, and family history includes Coronary Artery Disease in her brother; Diabetes in her brother; Heart Disease in her father, mother, sister, and sister.     ALLERGIES     Allergies   Allergen Reactions    Haloperidol Unknown     Patient states it stops her heart      Haloperidol Angioedema    Hydroxyzine Angioedema    Meperidine And Related Angioedema, Difficulty breathing, Other (See Comments), Rash and Shortness Of Breath     Throat closes  Other reaction(s): Breathing Difficulty  Other reaction(s): Breathing Difficulty      Phenothiazines Other (See Comments)     Other reaction(s): CARDIAC DISTURBANCES  Patient states it stops her heart  \"I swell up\"  \"stopped by heart\"  \"I swell up\"  \"I swell up\"      Phenothiazines Angioedema    Tramadol Other (See Comments)     Other reaction(s): Angioedema  Throat itch      Tramadol Angioedema    Januvia [Sitagliptin] Swelling    Latex Itching    Haloperidol And Related Angioedema    Januvia [Sitagliptin] Other (See Comments)     Swelling in the neck     Latex Itching    Lisinopril Other (See Comments)     ACE cough  Itchy throat  Throat itches  Itchy throat      Nortriptyline Unknown     Fainting, unclear if it was related    Penicillins Swelling    Hydroxyzine Other (See Comments) and Rash     Tongue swelling  Tongue swelling  Tongue swelling      Lisinopril Cough        REVIEW OF SYSTEMS     Pertinent items are noted in HPI.     HOME & HOSPITAL MEDICATIONS     Prior to Admission Medications  Medications Prior to Admission   Medication Sig Dispense Refill Last Dose/Taking    albuterol (PROAIR HFA) 108 (90 Base) MCG/ACT inhaler Inhale 1-2 puffs into the lungs every 4 hours as needed for shortness of breath or wheezing 18 g 11 Taking As Needed    amLODIPine " (NORVASC) 2.5 MG tablet Take 2.5 mg by mouth daily.   4/21/2025 Morning    aspirin 81 MG EC tablet Take 1 tablet by mouth daily.   4/21/2025 Morning    atorvastatin (LIPITOR) 40 MG tablet Take 1 tablet (40 mg) by mouth every evening. 30 tablet 1 4/21/2025 Evening    dulaglutide (TRULICITY) 0.75 MG/0.5ML pen Inject 0.75 mg Subcutaneous every 7 days 6 mL 3 4/17/2025    DULoxetine (CYMBALTA) 60 MG capsule Take 60 mg by mouth 2 times daily.   4/21/2025 Evening    gabapentin (NEURONTIN) 300 MG capsule Take 1 capsule (300 mg) by mouth 3 times daily. 90 capsule 0 4/21/2025 Evening    glipiZIDE (GLUCOTROL XL) 10 MG 24 hr tablet Take 10 mg by mouth daily.   4/21/2025 Morning    insulin glargine (LANTUS PEN) 100 UNIT/ML pen Inject 26 Units subcutaneously 2 times daily.   4/21/2025 Evening    ketamine 5%-gabapentin 8%-lidocaine 2.5% in PLO cream Apply 1 click (0.25 g) topically 3 times daily. Apply to skin of right or left leg where pain is.   Taking    lidocaine (LIDODERM) 5 % patch Place onto the skin daily as needed. To prevent lidocaine toxicity, patient should be patch free for 12 hrs daily.  Lower Back   Taking As Needed    meclizine (ANTIVERT) 25 MG tablet Take 1 tablet (25 mg) by mouth 3 times daily as needed for dizziness. 90 tablet 0 Taking As Needed    oxyCODONE-acetaminophen (PERCOCET)  MG per tablet Take 1 tablet by mouth every 6 hours as needed.   4/21/2025 Evening       Hospital Medications  Current Facility-Administered Medications   Medication Dose Route Frequency Provider Last Rate Last Admin    [Held by provider] amLODIPine (NORVASC) tablet 2.5 mg  2.5 mg Oral Daily Gondal, Saad J, MD        aspirin EC tablet 81 mg  81 mg Oral Daily Gondal, Saad J, MD   81 mg at 04/22/25 2056    atorvastatin (LIPITOR) tablet 80 mg  80 mg Oral QPM Gondal, Saad J, MD   80 mg at 04/22/25 2110    DULoxetine (CYMBALTA) DR capsule 60 mg  60 mg Oral BID Gondal, Saad J, MD   60 mg at 04/22/25 2057    gabapentin (NEURONTIN)  capsule 300 mg  300 mg Oral TID Gondal, Saad J, MD   300 mg at 04/22/25 2056    insulin aspart (NovoLOG) injection (RAPID ACTING)  1-7 Units Subcutaneous TID AC Gondal, Saad J, MD        insulin aspart (NovoLOG) injection (RAPID ACTING)  1-5 Units Subcutaneous At Bedtime Gondal, Saad J, MD   2 Units at 04/22/25 2106    insulin glargine (LANTUS PEN) injection 26 Units  26 Units Subcutaneous BID Gondal, Saad J, MD   26 Units at 04/22/25 2106    sodium chloride (PF) 0.9% PF flush 3 mL  3 mL Intracatheter Q8H TREVOR Gondal, Saad J, MD   3 mL at 04/22/25 2242        PHYSICAL EXAM     Vital signs  Temp:  [98.2  F (36.8  C)-98.6  F (37  C)] 98.3  F (36.8  C)  Pulse:  [79-99] 84  Resp:  [13-39] 18  BP: (133-223)/() 174/79  SpO2:  [95 %-100 %] 97 %    General Physical Exam: Patient is alert and oriented x 3. Vital signs were reviewed and are documented in EMR. Neck was supple, no carotid bruit, thyromegaly, JVD or lymphadenopathy noted.  Neurological Exam:  Patient is alert and oriented x 3 speech normal with no dysarthria or aphasia.  Cranial nerves II through XII are intact.  She has giveaway weakness on the right side.  On the left strength 5/5.  Reflexes are 1+ in the upper extremity toes downgoing.  Gait testing deferred     DIAGNOSTIC STUDIES     Pertinent Radiology   Radiology Results: Reviewed impression and images     CT  HEAD CT:  1.  No evidence of acute hemorrhage, mass effect, or acute vascular territorial infarction. Consider MRI for further evaluation, as clinically appropriate.  2.  Chronic ischemic changes and age-related changes as above.     HEAD CTA:   1.  No evidence of proximal large vessel occlusion.  2.  Unchanged moderate proximal right M2 segment stenosis.  3.  Unchanged moderate right P1 segment stenosis.  4.  Unchanged moderate-to-severe proximal left V4 segment stenosis.     NECK CTA:  1.  No evidence of hemodynamically significant carotid stenosis based on NASCET criteria.  2.  Prior left  carotid endarterectomy.  3.  Stable moderate-to-severe stenosis of the left vertebral artery origin/proximal V1 segment.  4.  No evidence for dissection.    Pertinent Labs   Lab Results: Personally Reviewed   Recent Results (from the past 24 hours)   Basic metabolic panel    Collection Time: 04/22/25  1:34 PM   Result Value Ref Range    Sodium 139 135 - 145 mmol/L    Potassium 4.3 3.4 - 5.3 mmol/L    Chloride 100 98 - 107 mmol/L    Carbon Dioxide (CO2) 23 22 - 29 mmol/L    Anion Gap 16 (H) 7 - 15 mmol/L    Urea Nitrogen 11.5 8.0 - 23.0 mg/dL    Creatinine 0.71 0.51 - 0.95 mg/dL    GFR Estimate >90 >60 mL/min/1.73m2    Calcium 9.6 8.8 - 10.4 mg/dL    Glucose 398 (H) 70 - 99 mg/dL   INR    Collection Time: 04/22/25  1:34 PM   Result Value Ref Range    INR 0.95 0.85 - 1.15   Partial thromboplastin time    Collection Time: 04/22/25  1:34 PM   Result Value Ref Range    aPTT 25 22 - 38 Seconds   Troponin T, High Sensitivity    Collection Time: 04/22/25  1:34 PM   Result Value Ref Range    Troponin T, High Sensitivity 14 <=14 ng/L   CBC with platelets and differential    Collection Time: 04/22/25  1:34 PM   Result Value Ref Range    WBC Count 6.8 4.0 - 11.0 10e3/uL    RBC Count 4.63 3.80 - 5.20 10e6/uL    Hemoglobin 12.5 11.7 - 15.7 g/dL    Hematocrit 39.3 35.0 - 47.0 %    MCV 85 78 - 100 fL    MCH 27.0 26.5 - 33.0 pg    MCHC 31.8 31.5 - 36.5 g/dL    RDW 13.1 10.0 - 15.0 %    Platelet Count 285 150 - 450 10e3/uL    % Neutrophils 69 %    % Lymphocytes 24 %    % Monocytes 4 %    % Eosinophils 2 %    % Basophils 1 %    % Immature Granulocytes 0 %    NRBCs per 100 WBC 0 <1 /100    Absolute Neutrophils 4.8 1.6 - 8.3 10e3/uL    Absolute Lymphocytes 1.7 0.8 - 5.3 10e3/uL    Absolute Monocytes 0.2 0.0 - 1.3 10e3/uL    Absolute Eosinophils 0.1 0.0 - 0.7 10e3/uL    Absolute Basophils 0.1 0.0 - 0.2 10e3/uL    Absolute Immature Granulocytes 0.0 <=0.4 10e3/uL    Absolute NRBCs 0.0 10e3/uL   Extra Red Top Tube    Collection Time:  04/22/25  1:34 PM   Result Value Ref Range    Hold Specimen JIC    Glucose by meter    Collection Time: 04/22/25  2:23 PM   Result Value Ref Range    GLUCOSE BY METER POCT 341 (H) 70 - 99 mg/dL   Lipid panel reflex to direct LDL: Non-fasting    Collection Time: 04/22/25  4:54 PM   Result Value Ref Range    Cholesterol 179 <200 mg/dL    Triglycerides 103 <150 mg/dL    Direct Measure HDL 46 (L) >=50 mg/dL    LDL Cholesterol Calculated 112 (H) <100 mg/dL    Non HDL Cholesterol 133 (H) <130 mg/dL   Magnesium    Collection Time: 04/22/25  4:54 PM   Result Value Ref Range    Magnesium 1.7 1.7 - 2.3 mg/dL   Phosphorus    Collection Time: 04/22/25  4:54 PM   Result Value Ref Range    Phosphorus 3.0 2.5 - 4.5 mg/dL   Troponin T, High Sensitivity    Collection Time: 04/22/25  5:36 PM   Result Value Ref Range    Troponin T, High Sensitivity 14 <=14 ng/L   Glucose by meter    Collection Time: 04/22/25  6:04 PM   Result Value Ref Range    GLUCOSE BY METER POCT 271 (H) 70 - 99 mg/dL   Glucose by meter    Collection Time: 04/22/25  7:43 PM   Result Value Ref Range    GLUCOSE BY METER POCT 272 (H) 70 - 99 mg/dL   Glucose by meter    Collection Time: 04/23/25  2:14 AM   Result Value Ref Range    GLUCOSE BY METER POCT 446 (H) 70 - 99 mg/dL   ECG 12-LEAD WITH MUSE (LHE)    Collection Time: 04/23/25  5:26 AM   Result Value Ref Range    Systolic Blood Pressure  mmHg    Diastolic Blood Pressure  mmHg    Ventricular Rate 75 BPM    Atrial Rate 75 BPM    NV Interval 170 ms    QRS Duration 84 ms     ms    QTc 455 ms    P Axis 42 degrees    R AXIS 18 degrees    T Axis 109 degrees    Interpretation ECG       Sinus rhythm  Nonspecific ST and T wave abnormality  Abnormal ECG  When compared with ECG of 22-Apr-2025 13:50,  No significant change was found  Confirmed by PRANAY RAMOS MD LOC:[facility] ([id number]) on 4/23/2025 8:06:58 AM     Glucose by meter    Collection Time: 04/23/25  6:34 AM   Result Value Ref Range    GLUCOSE BY METER POCT 214 (H) 70  - 99 mg/dL   CBC with platelets    Collection Time: 04/23/25  7:14 AM   Result Value Ref Range    WBC Count 6.7 4.0 - 11.0 10e3/uL    RBC Count 4.36 3.80 - 5.20 10e6/uL    Hemoglobin 11.5 (L) 11.7 - 15.7 g/dL    Hematocrit 37.0 35.0 - 47.0 %    MCV 85 78 - 100 fL    MCH 26.4 (L) 26.5 - 33.0 pg    MCHC 31.1 (L) 31.5 - 36.5 g/dL    RDW 12.9 10.0 - 15.0 %    Platelet Count 255 150 - 450 10e3/uL   Basic metabolic panel    Collection Time: 04/23/25  7:14 AM   Result Value Ref Range    Sodium 139 135 - 145 mmol/L    Potassium 3.8 3.4 - 5.3 mmol/L    Chloride 101 98 - 107 mmol/L    Carbon Dioxide (CO2) 24 22 - 29 mmol/L    Anion Gap 14 7 - 15 mmol/L    Urea Nitrogen 15.4 8.0 - 23.0 mg/dL    Creatinine 0.66 0.51 - 0.95 mg/dL    GFR Estimate >90 >60 mL/min/1.73m2    Calcium 8.8 8.8 - 10.4 mg/dL    Glucose 204 (H) 70 - 99 mg/dL   Phosphorus    Collection Time: 04/23/25  7:14 AM   Result Value Ref Range    Phosphorus 3.7 2.5 - 4.5 mg/dL   Magnesium    Collection Time: 04/23/25  7:14 AM   Result Value Ref Range    Magnesium 2.3 1.7 - 2.3 mg/dL   Troponin T, High Sensitivity    Collection Time: 04/23/25  7:14 AM   Result Value Ref Range    Troponin T, High Sensitivity 12 <=14 ng/L       Total time spent for face to face visit, reviewing labs/imaging studies, counseling and coordination of care was: 1 Hour 30 Minutes More than 50% of this time was spent on counseling and coordination of care.    This note was dictated using voice recognition software.  Any grammatical or context distortions are unintentional and inherent to the software.

## 2025-04-24 ENCOUNTER — APPOINTMENT (OUTPATIENT)
Dept: OCCUPATIONAL THERAPY | Facility: HOSPITAL | Age: 60
End: 2025-04-24
Payer: MEDICAID

## 2025-04-24 VITALS
RESPIRATION RATE: 16 BRPM | HEART RATE: 79 BPM | WEIGHT: 181.9 LBS | DIASTOLIC BLOOD PRESSURE: 68 MMHG | SYSTOLIC BLOOD PRESSURE: 145 MMHG | OXYGEN SATURATION: 95 % | TEMPERATURE: 97.7 F | BODY MASS INDEX: 28.55 KG/M2 | HEIGHT: 67 IN

## 2025-04-24 PROBLEM — M79.601 RIGHT ARM PAIN: Status: ACTIVE | Noted: 2025-04-24

## 2025-04-24 LAB
D DIMER PPP FEU-MCNC: 0.58 UG/ML FEU (ref 0–0.5)
GLUCOSE BLDC GLUCOMTR-MCNC: 141 MG/DL (ref 70–99)
GLUCOSE BLDC GLUCOMTR-MCNC: 153 MG/DL (ref 70–99)
GLUCOSE BLDC GLUCOMTR-MCNC: 162 MG/DL (ref 70–99)
GLUCOSE BLDC GLUCOMTR-MCNC: 197 MG/DL (ref 70–99)
GLUCOSE BLDC GLUCOMTR-MCNC: 198 MG/DL (ref 70–99)
HOLD SPECIMEN: NORMAL
HOLD SPECIMEN: NORMAL

## 2025-04-24 PROCEDURE — G0378 HOSPITAL OBSERVATION PER HR: HCPCS

## 2025-04-24 PROCEDURE — 250N000013 HC RX MED GY IP 250 OP 250 PS 637: Performed by: HOSPITALIST

## 2025-04-24 PROCEDURE — 97112 NEUROMUSCULAR REEDUCATION: CPT | Mod: GO

## 2025-04-24 PROCEDURE — 82962 GLUCOSE BLOOD TEST: CPT

## 2025-04-24 PROCEDURE — 250N000013 HC RX MED GY IP 250 OP 250 PS 637: Performed by: STUDENT IN AN ORGANIZED HEALTH CARE EDUCATION/TRAINING PROGRAM

## 2025-04-24 PROCEDURE — 85379 FIBRIN DEGRADATION QUANT: CPT | Performed by: INTERNAL MEDICINE

## 2025-04-24 PROCEDURE — 36415 COLL VENOUS BLD VENIPUNCTURE: CPT | Performed by: INTERNAL MEDICINE

## 2025-04-24 PROCEDURE — 99232 SBSQ HOSP IP/OBS MODERATE 35: CPT | Performed by: PSYCHIATRY & NEUROLOGY

## 2025-04-24 PROCEDURE — 250N000013 HC RX MED GY IP 250 OP 250 PS 637: Performed by: PSYCHIATRY & NEUROLOGY

## 2025-04-24 PROCEDURE — 99233 SBSQ HOSP IP/OBS HIGH 50: CPT | Performed by: HOSPITALIST

## 2025-04-24 RX ORDER — NALOXONE HYDROCHLORIDE 0.4 MG/ML
0.2 INJECTION, SOLUTION INTRAMUSCULAR; INTRAVENOUS; SUBCUTANEOUS
Status: DISCONTINUED | OUTPATIENT
Start: 2025-04-24 | End: 2025-04-25 | Stop reason: HOSPADM

## 2025-04-24 RX ORDER — GUAIFENESIN 600 MG/1
600 TABLET, EXTENDED RELEASE ORAL ONCE
Status: COMPLETED | OUTPATIENT
Start: 2025-04-24 | End: 2025-04-24

## 2025-04-24 RX ORDER — NALOXONE HYDROCHLORIDE 0.4 MG/ML
0.4 INJECTION, SOLUTION INTRAMUSCULAR; INTRAVENOUS; SUBCUTANEOUS
Status: DISCONTINUED | OUTPATIENT
Start: 2025-04-24 | End: 2025-04-25 | Stop reason: HOSPADM

## 2025-04-24 RX ORDER — GUAIFENESIN 600 MG/1
600 TABLET, EXTENDED RELEASE ORAL 2 TIMES DAILY
Status: DISCONTINUED | OUTPATIENT
Start: 2025-04-24 | End: 2025-04-25 | Stop reason: HOSPADM

## 2025-04-24 RX ORDER — GLIPIZIDE 10 MG/1
10 TABLET, FILM COATED, EXTENDED RELEASE ORAL
Status: DISCONTINUED | OUTPATIENT
Start: 2025-04-24 | End: 2025-04-25 | Stop reason: HOSPADM

## 2025-04-24 RX ORDER — SENNOSIDES 8.6 MG
325 CAPSULE ORAL DAILY
Status: DISCONTINUED | OUTPATIENT
Start: 2025-04-24 | End: 2025-04-25 | Stop reason: HOSPADM

## 2025-04-24 RX ADMIN — INSULIN ASPART 1 UNITS: 100 INJECTION, SOLUTION INTRAVENOUS; SUBCUTANEOUS at 17:04

## 2025-04-24 RX ADMIN — OXYCODONE HYDROCHLORIDE 5 MG: 5 TABLET ORAL at 12:28

## 2025-04-24 RX ADMIN — DULOXETINE HYDROCHLORIDE 60 MG: 60 CAPSULE, DELAYED RELEASE PELLETS ORAL at 08:32

## 2025-04-24 RX ADMIN — GUAIFENESIN 600 MG: 600 TABLET ORAL at 20:08

## 2025-04-24 RX ADMIN — GUAIFENESIN 600 MG: 600 TABLET ORAL at 17:03

## 2025-04-24 RX ADMIN — OXYCODONE HYDROCHLORIDE 5 MG: 5 TABLET ORAL at 08:31

## 2025-04-24 RX ADMIN — OXYCODONE HYDROCHLORIDE 5 MG: 5 TABLET ORAL at 04:06

## 2025-04-24 RX ADMIN — OXYCODONE HYDROCHLORIDE 5 MG: 5 TABLET ORAL at 17:04

## 2025-04-24 RX ADMIN — INSULIN ASPART 1 UNITS: 100 INJECTION, SOLUTION INTRAVENOUS; SUBCUTANEOUS at 08:35

## 2025-04-24 RX ADMIN — ATORVASTATIN CALCIUM 80 MG: 40 TABLET, FILM COATED ORAL at 20:08

## 2025-04-24 RX ADMIN — GLIPIZIDE 10 MG: 10 TABLET, FILM COATED, EXTENDED RELEASE ORAL at 09:34

## 2025-04-24 RX ADMIN — GABAPENTIN 300 MG: 300 CAPSULE ORAL at 20:08

## 2025-04-24 RX ADMIN — GABAPENTIN 300 MG: 300 CAPSULE ORAL at 13:54

## 2025-04-24 RX ADMIN — ACETAMINOPHEN 650 MG: 325 TABLET ORAL at 00:02

## 2025-04-24 RX ADMIN — INSULIN GLARGINE 26 UNITS: 100 INJECTION, SOLUTION SUBCUTANEOUS at 08:35

## 2025-04-24 RX ADMIN — ACETAMINOPHEN 650 MG: 325 TABLET ORAL at 20:08

## 2025-04-24 RX ADMIN — ASPIRIN 325 MG: 325 TABLET, COATED ORAL at 08:32

## 2025-04-24 RX ADMIN — AMLODIPINE BESYLATE 2.5 MG: 2.5 TABLET ORAL at 08:32

## 2025-04-24 RX ADMIN — INSULIN GLARGINE 26 UNITS: 100 INJECTION, SOLUTION SUBCUTANEOUS at 21:22

## 2025-04-24 RX ADMIN — GABAPENTIN 300 MG: 300 CAPSULE ORAL at 08:31

## 2025-04-24 RX ADMIN — INSULIN ASPART 2 UNITS: 100 INJECTION, SOLUTION INTRAVENOUS; SUBCUTANEOUS at 12:29

## 2025-04-24 RX ADMIN — OXYCODONE HYDROCHLORIDE 5 MG: 5 TABLET ORAL at 00:01

## 2025-04-24 RX ADMIN — ACETAMINOPHEN 650 MG: 325 TABLET ORAL at 12:28

## 2025-04-24 RX ADMIN — ACETAMINOPHEN 650 MG: 325 TABLET ORAL at 04:06

## 2025-04-24 RX ADMIN — ACETAMINOPHEN 650 MG: 325 TABLET ORAL at 08:31

## 2025-04-24 RX ADMIN — MECLIZINE HYDROCHLORIDE 25 MG: 25 TABLET ORAL at 12:57

## 2025-04-24 RX ADMIN — DULOXETINE HYDROCHLORIDE 60 MG: 60 CAPSULE, DELAYED RELEASE PELLETS ORAL at 20:08

## 2025-04-24 ASSESSMENT — ACTIVITIES OF DAILY LIVING (ADL)
ADLS_ACUITY_SCORE: 54
ADLS_ACUITY_SCORE: 54
ADLS_ACUITY_SCORE: 57
ADLS_ACUITY_SCORE: 54
ADLS_ACUITY_SCORE: 57
ADLS_ACUITY_SCORE: 54
ADLS_ACUITY_SCORE: 57
ADLS_ACUITY_SCORE: 54
ADLS_ACUITY_SCORE: 57
ADLS_ACUITY_SCORE: 54

## 2025-04-24 NOTE — PLAN OF CARE
PRIMARY DIAGNOSIS: SYNCOPE/TIA  OUTPATIENT/OBSERVATION GOALS TO BE MET BEFORE DISCHARGE:  1. Orthostatic performed: N/A    2. Diagnostic testing complete & at baseline neurologic testing: Yes    3. Cleared by consultants (if involved): No    4. Interpretation of cardiac rhythm per telemetry tech: NSR    5. Tolerating adequate PO diet and medications: Yes    6. Return to near baseline physical activity or neurologic status: Yes    Discharge Planner Nurse   Safe discharge environment identified: Yes  Barriers to discharge: Yes       Entered by: Supriya Shaw RN 04/24/2025 2:45 PM     Please review provider order for any additional goals.   Nurse to notify provider when observation goals have been met and patient is ready for discharge.Goal Outcome Evaluation:

## 2025-04-24 NOTE — PROGRESS NOTES
NEUROLOGY INPATIENT PROGRESS NOTE       [facility] NEUROLOGY [place]  [address] [place] [address]  Tel: [phone]  Fax: [phone]  [email or web address]     ASSESSMENT & PLAN   Date: 04/24/2025  Hospital Day#: 1  Visit diagnosis: Strokelike symptoms    Stroke-like symptoms  59-year-old female with history of CAD s/p CABG, DM2, HLD, HTN, diabetic polyneuropathy, COPD, polysubstance abuse, schizoaffective disorder, complex regional pain syndrome, left carotid stenosis s/p endarterectomy who presented to the emergency room with complaint of right-sided weakness and speech difficulty.  She had multiple such admission in the past with negative workup  CT of the head, CTA head and neck shows no acute ischemia  Intracranial atherosclerosis noted with right M2, right P1 and left V4 stenosis  MRI brain attempted but could not be completed as patient was restless.  She is willing to attempt it today and feels she should be able to lay still.  Continue aspirin and Lipitor but change a dose of aspirin to 325 mg daily  Echocardiogram shows normal ejection fraction with dilated left atrium 2+ mitral regurgitation, tricuspid regurgitation with dilatation of ascending aorta  Dilated left atrium raise the possibility of atrial fibrillation, previously Holter monitor was recommended multiple times but never completed.  I have strongly encourage patient to schedule outpatient 30-day event monitor to rule out cardiac arrhythmia  Physical and Occupational Therapy recommend TCU    Neurology Discharge Planning :   Nothing more to add from neurology standpoint, I will sign off    Ferdinand Castillo MD  [facility]     PROBLEM LIST      Patient Active Problem List   Diagnosis    CAD -- S/P CABG    Type 2 diabetes mellitus with hyperglycemia, with long-term current use of insulin (H)    Schizoaffective disorder (H)    Bilateral low back pain with right-sided sciatica, unspecified  chronicity    Cerebrovascular accident (CVA) due to stenosis of carotid artery (H)    Chronic obstructive pulmonary disease (H)    Anemia    Carotid stenosis, left    Depression with anxiety    Mixed hyperlipidemia    GERD (gastroesophageal reflux disease)    History of drug abuse (H)    Hx of syphilis    Primary hypertension    Menopausal flushing    Moderate persistent asthma    Myelomalacia of cervical cord (H)    Nephrolithiasis    Obesity    Seasonal allergies    Sensorineural hearing loss (SNHL) of right ear    Smoker    Chronic pain syndrome    Atypical chest pain -- Normal NM stress 5/8/23    Right renal artery stenosis    Intracranial atherosclerosis    Syncope and collapse    Right leg weakness    Chest pain, unspecified type    Unstable angina (H)    Frailty    Difficulty walking    Diabetic peripheral neuropathy (H)    Basilar artery stenosis    Right sided weakness    Dog bite of lower leg, right, subsequent encounter    Stroke-like symptoms    Difficulty with speech    Type 2 diabetes mellitus with diabetic polyneuropathy, with long-term current use of insulin (H)    Dyslipidemia    Functional neurological symptom disorder with abnormal movement       Past Medical History:   Patient  has a past medical history of Asthma, CAD (coronary artery disease), COPD (chronic obstructive pulmonary disease) (H), CVA (cerebral infarction), 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     SUBJECTIVE     Patient anxious and tearful at time feels her family take advantage of her for money.  She was able to walk in the  [facility] with the help of physical therapy     OBJECTIVE     Vital signs in last 24 hours  Temp:  [97.8  F (36.6  C)-98.3  F (36.8  C)] 98.1  F (36.7  C)  Pulse:  [72-87] 72  Resp:  [16-20] 16  BP: (133-187)/(75-88) 133/88  SpO2:  [92 %-100 %] 96 %    Review of Systems   Pertinent items are noted in HPI.    General Physical Exam: Patient is alert and oriented x 3. Vital signs were reviewed and are documented in EMR. Neck was supple, no carotid bruit, thyromegaly, JVD or lymphadenopathy noted.  Neurological Exam:  Patient is alert and oriented x 3 speech normal with no dysarthria or aphasia.  Cranial nerves II through XII are intact.  She has giveaway weakness on the right side.  On the left strength 5/5.  Reflexes are 1+ in the upper extremity toes downgoing.  Gait testing deferred     DIAGNOSTIC STUDIES     Pertinent Radiology   Following imaging studies were reviewed:    CT  HEAD CT:  1.  No evidence of acute hemorrhage, mass effect, or acute vascular territorial infarction. Consider MRI for further evaluation, as clinically appropriate.  2.  Chronic ischemic changes and age-related changes as above.     HEAD CTA:   1.  No evidence of proximal large vessel occlusion.  2.  Unchanged moderate proximal right M2 segment stenosis.  3.  Unchanged moderate right P1 segment stenosis.  4.  Unchanged moderate-to-severe proximal left V4 segment stenosis.     NECK CTA:  1.  No evidence of hemodynamically significant carotid stenosis based on NASCET criteria.  2.  Prior left carotid endarterectomy.  3.  Stable moderate-to-severe stenosis of the left vertebral artery origin/proximal V1 segment.  4.  No evidence for dissection.    MRI  Localizer images were obtained. Examination was ordered as patient was unable to tolerate the examination.     Echocardiogram  The left ventricle is normal in size. There is mild to moderate concentric  left ventricular hypertrophy.  Left ventricular systolic function is normal. The visual ejection  fraction is  60-65%. No regional wall motion abnormalities noted.  Grade II or moderate diastolic dysfunction.     The right ventricle is normal in size and function.  The left atrium is severely dilated. The right atrium is mildly dilated.  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  There is moderate (2+) mitral regurgitation.  There is mild mitral stenosis with a mean pressure gradient across the valve  of 6 mmHg at a heart rate of 79 bpm.  There is mild (1+) tricuspid regurgitation.  Right ventricle systolic pressure estimate normal  IVC diameter <2.1 cm collapsing >50% with sniff suggests a normal RA pressure  of 3 mmHg.  Ascending Aorta dilatation is present.  When compared with previous study, rheumatic morphology of the mitral valve is  more apparent. Valve function is stable.    Pertinent Labs   Lab Results: Personally Reviewed  Recent Results (from the past 24 hours)   Troponin T, High Sensitivity    Collection Time: 04/23/25  9:48 AM   Result Value Ref Range    Troponin T, High Sensitivity 14 <=14 ng/L   Glucose by meter    Collection Time: 04/23/25 12:48 PM   Result Value Ref Range    GLUCOSE BY METER POCT 274 (H) 70 - 99 mg/dL   Echocardiogram Complete w Bubble Study - For age < 60 yrs    Collection Time: 04/23/25  1:41 PM   Result Value Ref Range    LVEF  60-65%    Glucose by meter    Collection Time: 04/23/25  4:23 PM   Result Value Ref Range    GLUCOSE BY METER POCT 312 (H) 70 - 99 mg/dL   Glucose by meter    Collection Time: 04/23/25  9:16 PM   Result Value Ref Range    GLUCOSE BY METER POCT 140 (H) 70 - 99 mg/dL   Glucose by meter    Collection Time: 04/24/25  1:59 AM   Result Value Ref Range    GLUCOSE BY METER POCT 197 (H) 70 - 99 mg/dL   D dimer quantitative    Collection Time: 04/24/25  7:11 AM   Result Value Ref Range    D-Dimer Quantitative 0.58 (H) 0.00 - 0.50 ug/mL FEU   Extra Green Top Tube (LAB USE ONLY)    Collection Time: 04/24/25  7:11 AM   Result Value Ref Range    Hold Specimen JIC    Extra  Purple Top EDTA (LAB USE ONLY)    Collection Time: 04/24/25  7:11 AM   Result Value Ref Range    Hold Specimen JIC    Glucose by meter    Collection Time: 04/24/25  8:16 AM   Result Value Ref Range    GLUCOSE BY METER POCT 141 (H) 70 - 99 mg/dL         HOSPITAL MEDICATIONS     Current Facility-Administered Medications   Medication Dose Route Frequency Provider Last Rate Last Admin    amLODIPine (NORVASC) tablet 2.5 mg  2.5 mg Oral Daily Sunshine Fowler MD   2.5 mg at 04/24/25 0832    aspirin (ASA) EC tablet 325 mg  325 mg Oral Daily Ferdinand Castillo MD   325 mg at 04/24/25 0832    atorvastatin (LIPITOR) tablet 80 mg  80 mg Oral QPM Gondal, Saad J, MD   80 mg at 04/23/25 2129    DULoxetine (CYMBALTA) DR capsule 60 mg  60 mg Oral BID Gondal, Saad J, MD   60 mg at 04/24/25 0832    gabapentin (NEURONTIN) capsule 300 mg  300 mg Oral TID Gondal, Saad J, MD   300 mg at 04/24/25 0831    glipiZIDE (GLUCOTROL XL) 24 hr tablet 10 mg  10 mg Oral Daily with breakfast Sunshine Fowler MD   10 mg at 04/24/25 0934    insulin aspart (NovoLOG) injection (RAPID ACTING)  1-7 Units Subcutaneous TID AC Gondal, Saad J, MD   1 Units at 04/24/25 0835    insulin glargine (LANTUS PEN) injection 26 Units  26 Units Subcutaneous BID Gondal, Saad J, MD   26 Units at 04/24/25 0835    polyethylene glycol (MIRALAX) Packet 17 g  17 g Oral Daily Sunshine Fowler MD   17 g at 04/23/25 1131    sodium chloride (PF) 0.9% PF flush 3 mL  3 mL Intracatheter Q8H [facility] Gondal, Saad J, MD   3 mL at 04/23/25 2132        Total time spent for face to face visit, reviewing labs/imaging studies, counseling and coordination of care was: 45 Minutes More than 50% of this time was spent on counseling and coordination of care.      This note was dictated using voice recognition software.  Any grammatical or context distortions are unintentional and inherent to the software.

## 2025-04-24 NOTE — PROGRESS NOTES
[facility]                                                                                   OUTPATIENT SPEECH LANGUAGE PATHOLOGY    PLAN OF TREATMENT FOR OUTPATIENT REHABILITATION   Patient's Last Name, First Name, DEBISarah Cody YOB: 1965   Provider's Name   [facility]   Medical Record No.  [id number]     Onset Date: 04/22/25 Start of Care Date: 04/23/25     Medical Diagnosis:  CVA               SLP Diagnosis:  aphasia Certification Dates:  From: 04/23/25  To: 04/30/25     See note for plan of treatment, functional goals, and certification details.    I CERTIFY THE NEED FOR THESE SERVICES FURNISHED UNDER        THIS PLAN OF TREATMENT AND WHILE UNDER MY CARE (Physician co-signature of this document indicates review and certification of the therapy plan).

## 2025-04-24 NOTE — PLAN OF CARE
PRIMARY DIAGNOSIS: ACUTE PAIN  OUTPATIENT/OBSERVATION GOALS TO BE MET BEFORE DISCHARGE:  1. Pain Status: PRN oral pain medications- patient reports minimal relief.     2. Return to near baseline physical activity: No    3. Cleared for discharge by consultants (if involved): No    Discharge Planner Nurse   Safe discharge environment identified: No  Barriers to discharge: Yes       Entered by: Mary Coe RN 04/24/2025 3:04 AM     Please review provider order for any additional goals.   Nurse to notify provider when observation goals have been met and patient is ready for discharge.

## 2025-04-24 NOTE — PLAN OF CARE
Goal Outcome Evaluation:    Pt AO x4. C/o Pain, PRN oxy x1 given for R leg pain. Able to make needs known. Tele NSR. NIH score was 8.

## 2025-04-24 NOTE — PROGRESS NOTES
Care Management Follow Up    Length of Stay (days): 1    Expected Discharge Date: 04/25/2025    Anticipated Discharge Plan:   TBD    Transportation: Anticipate medical transport    PT Recommendations: Transitional Care Facility, Acute Rehab Center-Motivated patient will benefit from intensive, interdisciplinary therapy.  Anticipate will be able to tolerate 3 hours of therapy per day  OT Recommendations:  Transitional Care Facility     Barriers to Discharge: medical stability, placement, pain management     Prior Living Situation: apartment with alone    Discussed  Partnership in Safe Discharge Planning  document with patient/family: No     Handoff Completed: No, handoff not indicated or clinically appropriate    Patient/Spokesperson Updated: Yes. Who? Patient, bedside RN     Additional Information:    CM involved for discharge planning and support.  Went to pt's room, RN is present assisting her with making a phone call.  Patient is noticeably upset via her voice, repeating stories and information, as well as some trembling of her upper limbs and some stuttering/word finding difficulties.  Patient repeats multiple times of attempts to have her PCP assist with writing scripts and/or getting her into pain management clinic; she repeats several times her clinic across the street and her PCP is only 25yo.  She talks of how her pain is not managed here in the hospital and she wants to get home to her home medication, and she doesn't know how she will get her pain meds.      Writer and bedside RN attempted several times during visit to review and discuss that we can assist her in scheduling post-hospitalization visit in the clinic.  Attempted to review the process for getting a pain clinic appointment.  Patient continues to interrupt both of us, stating we don't understand, then repeats her same concerns of not having the pain meds she has prescribed at home and her PCP being 25yo.      Writer did talk with her about  therapy recs for TCU/ARU, talking of how supportive this could be for her.  Patient denied this could be helpful or supportive for her; she states she would rather go home with her dog and stay alone in her apartment.  She then talks again of needing her pain meds as she has had before.  She states she is not getting what she has at home.    Patient did report she has PCA support in her home and a  via [employer] ([phone]).  Called, advised her  is Veena Mccoy but she is out this week; forwarded to manager Zay Manuelito ([phone]).  LVM with Zay, requesting call back to discuss and confirm her current services and supports.     Next Steps:     CM continues to be involved to assist and support with discharge planning.  Will attempt to meet with patient later today and/or tomorrow and discuss recs for TCU/ARU, attempt to get choices from patient.    ROVERTO MaW

## 2025-04-24 NOTE — PROGRESS NOTES
[facility]    Medicine Progress Note - Hospitalist Service    Date of Admission:  4/22/2025    Assessment & Plan                Sarah Rahman is a 59 year old female with IDDM, coronary artery disease s/p CABG (2009), hypertension, anxiety, prior CVA, basilar artery stenosis, and schizoaffective disorder, frequent ED visits and hospitalizations for R sided weakness, here for right sided weakness once again. Hospital Day: 3       #Years of fluctuating R sided weakness  -has had years of fluctuating weakness on R side attributed to cerebrovascular disease. History of left carotid endarterectomy and has known basilar artery stenosis. Multiple CV risk factors.  -CTA head and neck showed no changes from previous. Has findings of chronic small vessel ischemia.  -Unable to obtain MRI due to patient unable to tolerate lying still for the exam, similar issues during multiple previous hospitalizations.  Last useful MRI result May 2024 did not show any stroke, just the aforementioned chronic small vessel ischemic changes. Neurology has convinced her to attempt MRI again, not completed yet.  -Seen by neurology, diagnosis favored to be functional in origin.  -continue home statin, ASA  - Patient would benefit from quitting smoking, increased efforts at stress relief, structured exercise program such as pool therapy etc.  Seems to be significant psychosocial barriers to accomplishing all of this.  -concerned that she may be manifesting these symptoms either consciously or unconscious due to being out of Percocet at home.     #Diabetes mellitus insulin-dependent  - Poorly controlled with hyperglycemia  - Home regimen: Lantus 26u bid, glipizide 10mg, Trulicity weekly  - HbA1c 13.0. Admits to missing doses of meds, minimizes the frequency this is happening, but BG here has been controlled on home regimen.  - continue current sliding scale insulin  - continue home Lantus  -increase glipizide back to  "home dosage  -recommend continued efforts at carb consistent diet, I think this is difficult for her due to limited medical literacy.  I do not see any recent visits with the dietitian, recommend complete this as outpatient    #Chronic pain syndrome  -recently fired by her pain clinic, PCP has been temporarily prescribing her pain meds, received 15 day supply of Percocet on 3/31. She confirms she is out of Percocet at home. We have her on a lower dose 5mg oxycodone here and she has displayed no withdrawal symptoms. She is becoming verbally abusive demanding a higher dose. I am concerned it may be difficult for her to find a clinic to take over opioid prescribing for her, and will be safer for her to remain on lower dose and move toward discontinuation. Placed referral to [facility]. May also benefit from Addiction Med but expect this could be determined at her Pain Clinic intake.  -recently prescribed and filled topical ketamine but has not been using  -continue home gabapentin     #BPPV  -continue meclizine PRN started at recent hospital stay    #Hyperlipidemia  - continue Atorvastatin 40mg    #Constipation, miralax. Patient refused dose today     #HTN, resume home amlodipine  # Neuropathy, home Cymbalta and gabapentin.  #Asthma, home albuterol          Diet: Regular Diet Adult    DVT Prophylaxis: Moderate risk.   Pneumatic Compression Devices  Diehl Catheter: Not present  Lines: None     Cardiac Monitoring: ACTIVE order. Indication: Stroke, acute (48 hours)  Code Status: Full Code      Clinically Significant Risk Factors Present on Admission                 # Drug Induced Platelet Defect: home medication list includes an antiplatelet medication   # Hypertension: Noted on problem list          # DMII: A1C = 13.0 % (Ref range: <5.7 %) within past 6 months    # Overweight: Estimated body mass index is 28.49 kg/m  as calculated from the following:    Height as of this encounter: 1.702 m (5' 7\").    Weight as of " this encounter: 82.5 kg (181 lb 14.4 oz).         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       Disposition Plan     Medically Ready for Discharge: Anticipated Tomorrow         Discharge barrier(s): MRI  Care discussed with: patient, RN      Sunshine Fowelr MD  Hospitalist Service  [facility]  Securely message with [employer] (more info)  Text page via [employer] Paging/Directory   ______________________________________________________________________      Physical Exam   Vital Signs: Temp: 98.1  F (36.7  C) Temp src: Oral BP: (!) 133/91 Pulse: 77   Resp: 18 SpO2: 100 % O2 Device: None (Room air)    Weight: 181 lbs 14.4 oz    General: in no apparent distress, non-toxic, and alert female sitting in bedside chair oriented x3  HEENT: Head normocephalic atraumatic, oral mucosa moist. Sclerae anicteric  Skin: No rashes or lesions  Extremities: No peripheral edema  Psych: Normal affect, mood euthymic  Neuro: moving BUE and BLE well. Noticed and commented on a fruit fly in the room.      Medical Decision Making       55 MINUTES SPENT BY ME on the date of service doing chart review, history, exam, documentation & further activities per the note.      Data   Recent Results (from the past 16 hours)   Glucose by meter    Collection Time: 04/24/25  1:59 AM   Result Value Ref Range    GLUCOSE BY METER POCT 197 (H) 70 - 99 mg/dL   D dimer quantitative    Collection Time: 04/24/25  7:11 AM   Result Value Ref Range    D-Dimer Quantitative 0.58 (H) 0.00 - 0.50 ug/mL FEU   Extra Green Top Tube (LAB USE ONLY)    Collection Time: 04/24/25  7:11 AM   Result Value Ref Range    Hold Specimen JIC    Extra Purple Top EDTA (LAB USE ONLY)    Collection Time: 04/24/25  7:11 AM   Result Value Ref Range    Hold Specimen JIC    Glucose by meter    Collection Time: 04/24/25  8:16 AM   Result Value Ref Range    GLUCOSE BY METER POCT 141 (H) 70 - 99 mg/dL  "  Glucose by meter    Collection Time: 04/24/25 12:16 PM   Result Value Ref Range    GLUCOSE BY METER POCT 198 (H) 70 - 99 mg/dL       Interval History     Patient complains blurry vision, ongoing R sided pain. She noticed a fruit fly flying across the room and was using BUE well throughout visit.    Patient upset about current oxycodone dose (2.5-5mg). She is requesting \"what I take at home\" which per records is oxycodone 10mg. Tried to explain to patient that these meds are not helpful for chronic pain, I think long term she will be more comfortable if she uses other options. Patient not open to this. I asked her if she has supply of medication at home- she does not. I asked her if she has appt with new pain clinic- she does not. I told her I am hesitant to increase her oxycodone dose here as it will be a more abrupt transition to home where she has no meds. Patient asked if I could prescribe her some oxycodone and I told her I could, if she has a pain clinic appt I can provide supply to get her to that appt. I would prescribe the 5mg that she is getting here. Patient became upset and called me \"b**\" and \"m**f**\" and stated did not want to talk to me anymore so I left the room.    Apparently patient has agreed to attempt MRI which is fine. No medical indication for ongoing hospitalization, if patient wishes to leave I will discharge her. I can refer her to a pain clinic, nurse is going to help her schedule since she expressed some helplessness about this.  "

## 2025-04-24 NOTE — PLAN OF CARE
"PRIMARY DIAGNOSIS: \"GENERIC\" NURSING  OUTPATIENT/OBSERVATION GOALS TO BE MET BEFORE DISCHARGE:  ADLs back to baseline: No    Activity and level of assistance: Up with standby assistance.    Pain status: Reports pain somewhat controlled with PO pain meds    Return to near baseline physical activity: No     Discharge Planner Nurse   Safe discharge environment identified: No  Barriers to discharge: Yes       Entered by: Mary Coe RN 04/24/2025 5:53 AM     Please review provider order for any additional goals.   Nurse to notify provider when observation goals have been met and patient is ready for discharge.      Patient AOX4. PRN oxycodone and tylenol given. Reports pain to R foot and leg, and that they feel swollen and tight. Updated MD. RA. Denies SOB. Weakness to R side present. Reported some dizziness in evening, prn meclizine given. Patient refused NIH assessment during the overnight, reported not feeling well and not wanting to do. NIH on evening was an 8. Slept well overnight. Tele- SR/ST.          "

## 2025-04-24 NOTE — PLAN OF CARE
"  Problem: Adult Inpatient Plan of Care  Goal: Plan of Care Review  Description: The Plan of Care Review/Shift note should be completed every shift.  The Outcome Evaluation is a brief statement about your assessment that the patient is improving, declining, or no change.  This information will be displayed automatically on your shiftnote.  Outcome: Progressing   Goal Outcome Evaluation:         Pt was very sad and tearful, this am as her ext cath had leaked. Reassurance provided and calming environment provided and pt was cleaned up. Encouraged the pt to call staff for voiding needs vs using the purewick as she knows when she has to void. Pt finally agreed to stop the ext cath use this afternoon and has called appropriately to use bathroom.  Pt A/O x4, with difficulty still in her word finding at times.  Her speech is no longer garbled but slow in nature and expressive aphasia is noted.    Pt did walk this afternoon in the hallway with [employer] using a gait belt and walker. She was noted to have some right sided deficits and dragging her right foot, also noted some inattention while walking.     NIH this shift was -8 at the start and improved to 7     Pt c/o pain this shift and was given oxycodone x 2 .pain ranges from \"7-8/10\", but per pt report was \"3-4/10\" after pain medication.    Pt anxious at times and tearful when she talked about her family and her  as she feels that they \"take advantage of me for money\". She is also very concerned over the welfare of her dog named \"josue\" as this is her therapy dog. She called the lady who was supposed to be caring for her and the lady told her her dog was not with her. This put the pt into an almost panic attack and with calming and reassurance and a few phone calls to family members the safety and security of her dog was confirmed.     PIV in left arm P/I. Fluids dc'd this am.    Tele-NSR  BP's running high as well, MD aware.    Blood sugars have been still " consistently high ranging from the 200's to 400's. Will ask MD to review and also have MD look at pt's current ordered diet as it is a regular diet.     POC is ongoing.

## 2025-04-24 NOTE — PLAN OF CARE
PRIMARY DIAGNOSIS: SYNCOPE/TIA  OUTPATIENT/OBSERVATION GOALS TO BE MET BEFORE DISCHARGE:  1. Orthostatic performed: N/A    2. Diagnostic testing complete & at baseline neurologic testing: Yes    3. Cleared by consultants (if involved): No    4. Interpretation of cardiac rhythm per telemetry tech: NSR      5. Tolerating adequate PO diet and medications: Yes    6. Return to near baseline physical activity or neurologic status: Yes    Discharge Planner Nurse   Safe discharge environment identified: Yes  Barriers to discharge: Yes       Entered by: Supriya Shaw RN 04/24/2025 2:47 PM     Please review provider order for any additional goals.   Nurse to notify provider when observation goals have been met and patient is ready for discharge.

## 2025-04-25 ENCOUNTER — APPOINTMENT (OUTPATIENT)
Dept: SPEECH THERAPY | Facility: HOSPITAL | Age: 60
End: 2025-04-25
Payer: MEDICAID

## 2025-04-25 ENCOUNTER — APPOINTMENT (OUTPATIENT)
Dept: CARDIOLOGY | Facility: HOSPITAL | Age: 60
End: 2025-04-25
Attending: HOSPITALIST
Payer: MEDICAID

## 2025-04-25 VITALS
HEIGHT: 67 IN | TEMPERATURE: 98 F | RESPIRATION RATE: 18 BRPM | OXYGEN SATURATION: 100 % | SYSTOLIC BLOOD PRESSURE: 134 MMHG | WEIGHT: 181.9 LBS | HEART RATE: 79 BPM | BODY MASS INDEX: 28.55 KG/M2 | DIASTOLIC BLOOD PRESSURE: 82 MMHG

## 2025-04-25 LAB
GLUCOSE BLDC GLUCOMTR-MCNC: 148 MG/DL (ref 70–99)
GLUCOSE BLDC GLUCOMTR-MCNC: 153 MG/DL (ref 70–99)
GLUCOSE BLDC GLUCOMTR-MCNC: 74 MG/DL (ref 70–99)

## 2025-04-25 PROCEDURE — 93270 REMOTE 30 DAY ECG REV/REPORT: CPT

## 2025-04-25 PROCEDURE — 99239 HOSP IP/OBS DSCHRG MGMT >30: CPT | Performed by: HOSPITALIST

## 2025-04-25 PROCEDURE — G0378 HOSPITAL OBSERVATION PER HR: HCPCS

## 2025-04-25 PROCEDURE — 99232 SBSQ HOSP IP/OBS MODERATE 35: CPT | Performed by: PSYCHIATRY & NEUROLOGY

## 2025-04-25 PROCEDURE — 82962 GLUCOSE BLOOD TEST: CPT

## 2025-04-25 PROCEDURE — 250N000013 HC RX MED GY IP 250 OP 250 PS 637: Performed by: STUDENT IN AN ORGANIZED HEALTH CARE EDUCATION/TRAINING PROGRAM

## 2025-04-25 PROCEDURE — 92507 TX SP LANG VOICE COMM INDIV: CPT | Mod: GN

## 2025-04-25 PROCEDURE — 250N000013 HC RX MED GY IP 250 OP 250 PS 637: Performed by: PSYCHIATRY & NEUROLOGY

## 2025-04-25 PROCEDURE — 250N000013 HC RX MED GY IP 250 OP 250 PS 637: Performed by: HOSPITALIST

## 2025-04-25 RX ORDER — OXYCODONE AND ACETAMINOPHEN 10; 325 MG/1; MG/1
.5-1 TABLET ORAL EVERY 6 HOURS PRN
Qty: 15 TABLET | Refills: 0 | Status: SHIPPED | OUTPATIENT
Start: 2025-04-25

## 2025-04-25 RX ORDER — POLYETHYLENE GLYCOL 3350 17 G/17G
17 POWDER, FOR SOLUTION ORAL DAILY
Qty: 510 G | Refills: 0 | Status: SHIPPED | OUTPATIENT
Start: 2025-04-25

## 2025-04-25 RX ORDER — ASPIRIN 81 MG/1
325 TABLET ORAL DAILY
Qty: 30 TABLET | Refills: 0 | Status: SHIPPED | OUTPATIENT
Start: 2025-04-25 | End: 2025-04-25

## 2025-04-25 RX ORDER — ATORVASTATIN CALCIUM 80 MG/1
80 TABLET, FILM COATED ORAL EVERY EVENING
Qty: 30 TABLET | Refills: 0 | Status: SHIPPED | OUTPATIENT
Start: 2025-04-25

## 2025-04-25 RX ORDER — SENNOSIDES 8.6 MG
325 CAPSULE ORAL DAILY
Qty: 30 TABLET | Refills: 0 | Status: SHIPPED | OUTPATIENT
Start: 2025-04-25

## 2025-04-25 RX ADMIN — ACETAMINOPHEN 650 MG: 325 TABLET ORAL at 01:16

## 2025-04-25 RX ADMIN — ASPIRIN 325 MG: 325 TABLET, COATED ORAL at 08:56

## 2025-04-25 RX ADMIN — GLIPIZIDE 10 MG: 10 TABLET, FILM COATED, EXTENDED RELEASE ORAL at 08:56

## 2025-04-25 RX ADMIN — OXYCODONE HYDROCHLORIDE 5 MG: 5 TABLET ORAL at 00:49

## 2025-04-25 RX ADMIN — POLYETHYLENE GLYCOL 3350 17 G: 17 POWDER, FOR SOLUTION ORAL at 09:02

## 2025-04-25 RX ADMIN — GABAPENTIN 300 MG: 300 CAPSULE ORAL at 08:56

## 2025-04-25 RX ADMIN — DULOXETINE HYDROCHLORIDE 60 MG: 60 CAPSULE, DELAYED RELEASE PELLETS ORAL at 08:56

## 2025-04-25 RX ADMIN — OXYCODONE HYDROCHLORIDE 2.5 MG: 5 TABLET ORAL at 08:55

## 2025-04-25 RX ADMIN — OXYCODONE HYDROCHLORIDE 5 MG: 5 TABLET ORAL at 04:35

## 2025-04-25 RX ADMIN — INSULIN GLARGINE 26 UNITS: 100 INJECTION, SOLUTION SUBCUTANEOUS at 09:00

## 2025-04-25 RX ADMIN — ACETAMINOPHEN 650 MG: 325 TABLET ORAL at 06:39

## 2025-04-25 RX ADMIN — AMLODIPINE BESYLATE 2.5 MG: 2.5 TABLET ORAL at 08:56

## 2025-04-25 RX ADMIN — GUAIFENESIN 600 MG: 600 TABLET ORAL at 08:55

## 2025-04-25 ASSESSMENT — ACTIVITIES OF DAILY LIVING (ADL)
ADLS_ACUITY_SCORE: 54
ADLS_ACUITY_SCORE: 54
ADLS_ACUITY_SCORE: 50
ADLS_ACUITY_SCORE: 54
ADLS_ACUITY_SCORE: 50
ADLS_ACUITY_SCORE: 54
ADLS_ACUITY_SCORE: 54
ADLS_ACUITY_SCORE: 50
ADLS_ACUITY_SCORE: 54
ADLS_ACUITY_SCORE: 50
ADLS_ACUITY_SCORE: 54

## 2025-04-25 NOTE — DISCHARGE INSTRUCTIONS
Post-hospital follow up appointment:  Monday, April 28, 2025 - 11:05am  [facility]  Awilda Fair NP   [address], [place] [id number] [phone]

## 2025-04-25 NOTE — DISCHARGE SUMMARY
[facility] MEDICINE  DISCHARGE SUMMARY     Primary Care Physician: [facility]  Admission Date: 4/22/2025   Discharge Provider: Sunshine Fowler MD Discharge Date: 4/25/2025   Diet:   Active Diet and Nourishment Order   Procedures    Regular Diet Adult    Diet       Code Status: Full Code   Activity: DCACTIVITY: Activity as tolerated        Condition at Discharge: Stable     REASON FOR PRESENTATION(See Admission Note for Details)   R sided numbness and weakness    PRINCIPAL & ACTIVE DISCHARGE DIAGNOSES     Principal Problem:    Stroke-like symptoms  Active Problems:    CAD -- S/P CABG    Type 2 diabetes mellitus with hyperglycemia, with long-term current use of insulin (H)    Schizoaffective disorder (H)    Smoker    Chronic pain syndrome    Intracranial atherosclerosis    Right sided weakness    Difficulty with speech    Right arm pain      PENDING LABS     Unresulted Labs Ordered in the Past 30 Days of this Admission       No orders found from 3/23/2025 to 4/23/2025.            PROCEDURES ( this hospitalization only)      none    RECOMMENDATIONS TO OUTPATIENT PROVIDER FOR F/U VISIT     Follow-up Appointments       Follow Up      Follow up with pain clinic to establish care. Referral sent        Hospital Follow-up with Existing Primary Care Provider (PCP)          Schedule Primary Care visit within: 7 Days               DISPOSITION     Home with home care    SUMMARY OF HOSPITAL COURSE:      Sarah Rahman is a 59 year old female with IDDM, coronary artery disease s/p CABG (2009), hypertension, anxiety, prior CVA, basilar artery stenosis, and schizoaffective disorder, frequent ED visits and hospitalizations for R sided weakness, here for right sided weakness once again. Increasing concern during hospital stay that current presentation may have manifested due to her being out of oxycodone at home. Hospital Day: 4        #Years of fluctuating R sided  weakness  #Somatization vs malingering causing worsening of chronic intermittent R sided weakness  -has had years of fluctuating weakness on R side attributed to cerebrovascular disease. History of left carotid endarterectomy and has known basilar artery stenosis. Multiple CV risk factors.  -CTA head and neck showed no changes from previous. Has findings of chronic small vessel ischemia.  -Unable to obtain MRI due to patient unable to tolerate lying still for the exam, similar issues during multiple previous hospitalizations.  Last useful MRI result May 2024 did not show any stroke, just the aforementioned chronic small vessel ischemic changes.  -Seen by neurology, diagnosis favored to be functional in origin. Did recommend increasing her ASA and statin dosages. Also recommended 30 day cardiac event monitor, as this has been recommended multiple times in the past and patient never followed up on it. We placed the monitor here before she left.  - Patient would benefit from quitting smoking, increased efforts at stress relief, structured exercise program such as pool therapy etc.  Seems to be significant psychosocial barriers to accomplishing all of this.  -PT and OT recommended TCU but patient declined. I spoke with her  Davian who agrees with patient that she has enough support at home currently. Patient demonstrated spontaneous resolution of weakness when having a confrontation with nurse about bed alarm. Home care PT and OT arranged.  -suspect that she may be manifesting these symptoms either consciously or unconsciously due to being out of Percocet at home.      #Chronic pain syndrome  -recently fired by her pain clinic, PCP has been temporarily prescribing her pain meds, received 15 day supply of Percocet on 3/31. She is out of Percocet at home.   -not a good candidate for chronic opioids and would recommend tapering and discontinuation. Patient is not open to this currently. Consider referral to  Addiction Medicine. I did prescribe her a 3 day supply of Percocet to last her until Primary Care followup (which our SW helped her schedule).  - Placed referral to  Pain clinic for multimodal pain treatment.  -recently prescribed and filled topical ketamine but has not been using. Would be reasonable to start using this.  -continue home gabapentin, Cymbalta     #Diabetes mellitus insulin-dependent  - Home regimen: Lantus 26u bid, glipizide 10mg, Trulicity weekly  - HbA1c 13.0. Admits to missing doses of meds, minimizes the frequency this is happening, but BG here has been controlled on home regimen.  -recommend continued efforts at carb consistent diet, I think this is difficult for her due to limited medical literacy.  I do not see any recent visits with the dietitian, recommend complete this as outpatient.  -instructed to resume home regimen at discharge    #Opioid induced constipation, miralax.       Discharge Medications with Med changes:     Current Discharge Medication List        START taking these medications    Details   polyethylene glycol (MIRALAX) 17 GM/Dose powder Take 17 g by mouth daily.  Qty: 510 g, Refills: 0    Associated Diagnoses: Therapeutic opioid induced constipation           CONTINUE these medications which have CHANGED    Details   aspirin 81 MG EC tablet Take 4 tablets (325 mg) by mouth daily.  Qty: 30 tablet, Refills: 0    Associated Diagnoses: Stroke-like symptoms      atorvastatin (LIPITOR) 80 MG tablet Take 1 tablet (80 mg) by mouth every evening.  Qty: 30 tablet, Refills: 0    Associated Diagnoses: Acute CVA (cerebrovascular accident) (H)      oxyCODONE-acetaminophen (PERCOCET)  MG per tablet Take 0.5-1 tablets by mouth every 6 hours as needed for severe pain.  Qty: 15 tablet, Refills: 0    Associated Diagnoses: Chronic pain syndrome           CONTINUE these medications which have NOT CHANGED    Details   albuterol (PROAIR HFA) 108 (90 Base) MCG/ACT inhaler Inhale 1-2 puffs  into the lungs every 4 hours as needed for shortness of breath or wheezing  Qty: 18 g, Refills: 11    Comments: Pharmacy may dispense brand covered by insurance (Proair, or proventil or ventolin or generic albuterol inhaler)  Associated Diagnoses: Chronic obstructive pulmonary disease, unspecified COPD type (H)      amLODIPine (NORVASC) 2.5 MG tablet Take 2.5 mg by mouth daily.      dulaglutide (TRULICITY) 0.75 MG/0.5ML pen Inject 0.75 mg Subcutaneous every 7 days  Qty: 6 mL, Refills: 3    Associated Diagnoses: Type 2 diabetes mellitus with hyperglycemia, with long-term current use of insulin (H)      DULoxetine (CYMBALTA) 60 MG capsule Take 60 mg by mouth 2 times daily.      gabapentin (NEURONTIN) 300 MG capsule Take 1 capsule (300 mg) by mouth 3 times daily.  Qty: 90 capsule, Refills: 0    Associated Diagnoses: Diabetic polyneuropathy associated with type 2 diabetes mellitus (H)      glipiZIDE (GLUCOTROL XL) 10 MG 24 hr tablet Take 10 mg by mouth daily.      insulin glargine (LANTUS PEN) 100 UNIT/ML pen Inject 26 Units subcutaneously 2 times daily.      ketamine 5%-gabapentin 8%-lidocaine 2.5% in PLO cream Apply 1 click (0.25 g) topically 3 times daily. Apply to skin of right or left leg where pain is.    Comments: Dispense in dosing applicator. 1 click = 0.25g of product.  Associated Diagnoses: Diabetic polyneuropathy associated with type 2 diabetes mellitus (H)      lidocaine (LIDODERM) 5 % patch Place onto the skin daily as needed. To prevent lidocaine toxicity, patient should be patch free for 12 hrs daily.  Lower Back      meclizine (ANTIVERT) 25 MG tablet Take 1 tablet (25 mg) by mouth 3 times daily as needed for dizziness.  Qty: 90 tablet, Refills: 0    Associated Diagnoses: Benign paroxysmal positional vertigo, unspecified laterality               Consults     NEUROLOGY IP CONSULT  SPEECH LANGUAGE PATH ADULT IP CONSULT  PHARMACY IP CONSULT  PHARMACY IP CONSULT  PHYSICAL THERAPY ADULT IP  CONSULT  OCCUPATIONAL THERAPY ADULT IP CONSULT  REHAB ADMISSIONS LIAISON IP CONSULT  CARE MANAGEMENT / SOCIAL WORK IP CONSULT  CARE MANAGEMENT / SOCIAL WORK IP CONSULT  SMOKING CESSATION PROGRAM IP CONSULT        SIGNIFICANT IMAGING FINDINGS     Results for orders placed or performed during the hospital encounter of 04/22/25   CTA Head Neck with Contrast    Impression    CONCLUSION:   HEAD CT:  1.  No evidence of acute hemorrhage, mass effect, or acute vascular territorial infarction. Consider MRI for further evaluation, as clinically appropriate.  2.  Chronic ischemic changes and age-related changes as above.    HEAD CTA:   1.  No evidence of proximal large vessel occlusion.  2.  Unchanged moderate proximal right M2 segment stenosis.  3.  Unchanged moderate right P1 segment stenosis.  4.  Unchanged moderate-to-severe proximal left V4 segment stenosis.    NECK CTA:  1.  No evidence of hemodynamically significant carotid stenosis based on NASCET criteria.  2.  Prior left carotid endarterectomy.  3.  Stable moderate-to-severe stenosis of the left vertebral artery origin/proximal V1 segment.  4.  No evidence for dissection.    Results discussed with Fredo Farrar on 4/22/2025 1:47 PM CDT.   CT Head Perfusion w Contrast - For Tier 2 Stroke    Impression    IMPRESSION:   HEAD CT:  1.  No evidence of core infarction or regional hypoperfusion.    Results discussed with Fredo Farrar on 4/22/2025 1:47 PM CDT.   MR Brain w/o Contrast    Impression    IMPRESSION:  Localizer images were obtained. Examination was ordered as patient was unable to tolerate the examination.   Echocardiogram Complete w Bubble Study - For age < 60 yrs   Result Value Ref Range    LVEF  60-65%        SIGNIFICANT LABORATORY FINDINGS     Most Recent Cholesterol Panel:  Recent Labs   Lab Test 04/22/25  1654   CHOL 179   *   HDL 46*   TRIG 103       Discharge Orders        Pain Management  Referral      Home Care Referral      Reason  for your hospital stay    Stroke like symptoms. Out of pain meds     Activity    Your activity upon discharge: activity as tolerated     When to contact your care team    Call your primary doctor if you have any of the following: chest pain, shortness of breath, fever, chills, fainting, dizziness, vomiting, constipation, dehydration, worsening pain, bleeding, or trouble urinating, or any other symptoms that are new or concerning to you.     Follow Up    Follow up with pain clinic to establish care. Referral sent     Diet    Follow this diet upon discharge: Diabetic diet     Hospital Follow-up with Existing Primary Care Provider (PCP)            Examination   Physical Exam   Temp:  [97.7  F (36.5  C)-98.2  F (36.8  C)] 98  F (36.7  C)  Pulse:  [77-87] 79  Resp:  [16-18] 18  BP: (133-185)/(68-91) 134/82  SpO2:  [93 %-100 %] 100 %  Wt Readings from Last 1 Encounters:   04/22/25 82.5 kg (181 lb 14.4 oz)       General: in no apparent distress, non-toxic, and alert female lying in bed oriented x3  HEENT: Head normocephalic atraumatic, oral mucosa moist. Sclerae anicteric  Skin: No rashes or lesions  Extremities: No peripheral edema  Psych: Normal affect, mood euthymic  Neuro: moving BUE and BLE well. Stutters and possibly word finding difficulty at times.    Please see EMR for more detailed significant labs, imaging, consultant notes etc.    ISunshine MD, personally saw the patient today and spent greater than 30 minutes discharging this patient.    Sunshine Fowler MD  [facility]    CC:[facility], [facility] ([place], [place])

## 2025-04-25 NOTE — PLAN OF CARE
"PRIMARY DIAGNOSIS: Stroke-like symptoms   OUTPATIENT/OBSERVATION GOALS TO BE MET BEFORE DISCHARGE  1. Orthostatic performed: N/A    2. Tolerating PO medications: Yes    3. Return to near baseline physical activity: Yes    4. Cleared for discharge by consultants (if involved): No    Discharge Planner Nurse   Safe discharge environment identified:  TBD  Barriers to discharge: Yes       Entered by: Veena Dejesus RN 04/25/2025 10:01 AM     Please review provider order for any additional goals.   Nurse to notify provider when observation goals have been met and patient is ready for discharge.Goal Outcome Evaluation:       /70 (BP Location: Right arm)   Pulse 79   Temp 97.7  F (36.5  C) (Oral)   Resp 16   Ht 1.702 m (5' 7\")   Wt 82.5 kg (181 lb 14.4 oz)   LMP  (LMP Unknown)   SpO2 99%   BMI 28.49 kg/m    VSS A&O x4  requested PRN for RLE pain reported as chronic.  Good appetite, good hydration.  Will continue to monitor.  Tasha Dejesus RN                  "

## 2025-04-25 NOTE — CARE PLAN
PRIMARY DIAGNOSIS: SYNCOPE/TIA  OUTPATIENT/OBSERVATION GOALS TO BE MET BEFORE DISCHARGE:  1. Orthostatic performed: No    2. Diagnostic testing complete & at baseline neurologic testing: Yes    3. Cleared by consultants (if involved): No    4. Interpretation of cardiac rhythm per telemetry tech: NSR    5. Tolerating adequate PO diet and medications: Yes    6. Return to near baseline physical activity or neurologic status: Yes    Discharge Planner Nurse   Safe discharge environment identified: Yes  Barriers to discharge: Yes       Entered by: Jhonatan Argueta RN 04/25/2025 6:54 AM

## 2025-04-25 NOTE — PROGRESS NOTES
NEUROLOGY INPATIENT PROGRESS NOTE       [facility] NEUROLOGY [place]  [address] [place] [address]  Tel: [phone]  Fax: [phone]  [email or web address]     ASSESSMENT & PLAN   Date: 04/25/2025  Hospital Day#: 1  Visit diagnosis: Strokelike symptoms    Stroke-like symptoms  59-year-old female with history of CAD s/p CABG, DM2, HLD, HTN, diabetic polyneuropathy, COPD, polysubstance abuse, schizoaffective disorder, complex regional pain syndrome, left carotid stenosis s/p endarterectomy who presented to the emergency room with complaint of right-sided weakness and speech difficulty.  She had multiple such admission in the past with negative workup  CT of the head, CTA head and neck shows no acute ischemia  Intracranial atherosclerosis noted with right M2, right P1 and left V4 stenosis  Unable to do MRI scan and was canceled yesterday  Continue aspirin 325 mg and Lipitor  Echocardiogram shows normal ejection fraction with dilated left atrium 2+ mitral regurgitation, tricuspid regurgitation with dilatation of ascending aorta  Dilated left atrium raise the possibility of atrial fibrillation, previously Holter monitor was recommended multiple times but never completed.  Please schedule outpatient 30-day event monitor before she is discharged as previously in spite of multiple referral patient did not follow through.  Physical and Occupational Therapy recommend TCU    Neurology Discharge Planning :   Okay to discharge from neurology standpoint    Ferdinand Castillo MD  [facility] NEUROLOGY, [place]     PROBLEM LIST      Patient Active Problem List   Diagnosis    CAD -- S/P CABG    Type 2 diabetes mellitus with hyperglycemia, with long-term current use of insulin (H)    Schizoaffective disorder (H)    Bilateral low back pain with right-sided sciatica, unspecified chronicity    Cerebrovascular accident (CVA) due to stenosis of carotid artery (H)    Chronic obstructive pulmonary disease  (H)    Anemia    Carotid stenosis, left    Depression with anxiety    Mixed hyperlipidemia    GERD (gastroesophageal reflux disease)    History of drug abuse (H)    Hx of syphilis    Primary hypertension    Menopausal flushing    Moderate persistent asthma    Myelomalacia of cervical cord (H)    Nephrolithiasis    Obesity    Seasonal allergies    Sensorineural hearing loss (SNHL) of right ear    Smoker    Chronic pain syndrome    Atypical chest pain -- Normal NM stress 5/8/23    Right renal artery stenosis    Intracranial atherosclerosis    Syncope and collapse    Right leg weakness    Chest pain, unspecified type    Unstable angina (H)    Frailty    Difficulty walking    Diabetic peripheral neuropathy (H)    Basilar artery stenosis    Right sided weakness    Dog bite of lower leg, right, subsequent encounter    Stroke-like symptoms    Difficulty with speech    Type 2 diabetes mellitus with diabetic polyneuropathy, with long-term current use of insulin (H)    Dyslipidemia    Functional neurological symptom disorder with abnormal movement    Right arm pain       Past Medical History:   Patient  has a past medical history of Asthma, CAD (coronary artery disease), COPD (chronic obstructive pulmonary disease) (H), CVA (cerebral infarction), 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     SUBJECTIVE     Patient anxious complaining of right leg pain received pain medication.  Upset that her primary care physician is not addressing her pain     OBJECTIVE     Vital signs in last 24 hours  Temp:  [97.7  F (36.5  C)-98.2  F  (36.8  C)] 97.8  F (36.6  C)  Pulse:  [72-87] 82  Resp:  [16-18] 16  BP: (133-185)/(68-91) 185/88  SpO2:  [93 %-100 %] 94 %    Review of Systems   Pertinent items are noted in HPI.    General Physical Exam: Patient is alert and oriented x 3. Vital signs were reviewed and are documented in EMR. Neck was supple, no carotid bruit, thyromegaly, JVD or lymphadenopathy noted.  Neurological Exam:  Patient is alert and oriented x 3 speech normal with no dysarthria or aphasia.  Cranial nerves II through XII are intact.  She has giveaway weakness on the right side.  On the left strength 5/5.  Reflexes are 1+ in the upper extremity toes downgoing.  Gait testing deferred     DIAGNOSTIC STUDIES     Pertinent Radiology   Following imaging studies were reviewed:    CT  HEAD CT:  1.  No evidence of acute hemorrhage, mass effect, or acute vascular territorial infarction. Consider MRI for further evaluation, as clinically appropriate.  2.  Chronic ischemic changes and age-related changes as above.     HEAD CTA:   1.  No evidence of proximal large vessel occlusion.  2.  Unchanged moderate proximal right M2 segment stenosis.  3.  Unchanged moderate right P1 segment stenosis.  4.  Unchanged moderate-to-severe proximal left V4 segment stenosis.     NECK CTA:  1.  No evidence of hemodynamically significant carotid stenosis based on NASCET criteria.  2.  Prior left carotid endarterectomy.  3.  Stable moderate-to-severe stenosis of the left vertebral artery origin/proximal V1 segment.  4.  No evidence for dissection.    MRI  Localizer images were obtained. Examination was ordered as patient was unable to tolerate the examination.     Echocardiogram  The left ventricle is normal in size. There is mild to moderate concentric  left ventricular hypertrophy.  Left ventricular systolic function is normal. The visual ejection fraction is  60-65%. No regional wall motion abnormalities noted.  Grade II or moderate diastolic dysfunction.     The  right ventricle is normal in size and function.  The left atrium is severely dilated. The right atrium is mildly dilated.  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  There is moderate (2+) mitral regurgitation.  There is mild mitral stenosis with a mean pressure gradient across the valve  of 6 mmHg at a heart rate of 79 bpm.  There is mild (1+) tricuspid regurgitation.  Right ventricle systolic pressure estimate normal  IVC diameter <2.1 cm collapsing >50% with sniff suggests a normal RA pressure  of 3 mmHg.  Ascending Aorta dilatation is present.  When compared with previous study, rheumatic morphology of the mitral valve is  more apparent. Valve function is stable.    Pertinent Labs   Lab Results: Personally Reviewed  Recent Results (from the past 24 hours)   D dimer quantitative    Collection Time: 04/24/25  7:11 AM   Result Value Ref Range    D-Dimer Quantitative 0.58 (H) 0.00 - 0.50 ug/mL FEU   Extra Green Top Tube (LAB USE ONLY)    Collection Time: 04/24/25  7:11 AM   Result Value Ref Range    Hold Specimen JIC    Extra Purple Top EDTA (LAB USE ONLY)    Collection Time: 04/24/25  7:11 AM   Result Value Ref Range    Hold Specimen JIC    Glucose by meter    Collection Time: 04/24/25  8:16 AM   Result Value Ref Range    GLUCOSE BY METER POCT 141 (H) 70 - 99 mg/dL   Glucose by meter    Collection Time: 04/24/25 12:16 PM   Result Value Ref Range    GLUCOSE BY METER POCT 198 (H) 70 - 99 mg/dL   Glucose by meter    Collection Time: 04/24/25  4:47 PM   Result Value Ref Range    GLUCOSE BY METER POCT 162 (H) 70 - 99 mg/dL   Glucose by meter    Collection Time: 04/24/25  8:36 PM   Result Value Ref Range    GLUCOSE BY METER POCT 153 (H) 70 - 99 mg/dL   Glucose by meter    Collection Time: 04/25/25  2:01 AM   Result Value Ref Range    GLUCOSE BY METER POCT 148 (H) 70 - 99 mg/dL         HOSPITAL MEDICATIONS     Current Facility-Administered Medications   Medication Dose Route Frequency Provider Last Rate Last Admin    amLODIPine  (NORVASC) tablet 2.5 mg  2.5 mg Oral Daily Sunshine Fowler MD   2.5 mg at 04/24/25 0832    aspirin (ASA) EC tablet 325 mg  325 mg Oral Daily Ferdinand Castillo MD   325 mg at 04/24/25 0832    atorvastatin (LIPITOR) tablet 80 mg  80 mg Oral QPM Gondal, Saad J, MD   80 mg at 04/24/25 2008    DULoxetine (CYMBALTA) DR capsule 60 mg  60 mg Oral BID Gondal, Saad J, MD   60 mg at 04/24/25 2008    gabapentin (NEURONTIN) capsule 300 mg  300 mg Oral TID Gondal, Saad J, MD   300 mg at 04/24/25 2008    glipiZIDE (GLUCOTROL XL) 24 hr tablet 10 mg  10 mg Oral Daily with breakfast Sunshine Fowler MD   10 mg at 04/24/25 0934    guaiFENesin (MUCINEX) 12 hr tablet 600 mg  600 mg Oral BID Sunshine Fowler MD   600 mg at 04/24/25 2008    insulin aspart (NovoLOG) injection (RAPID ACTING)  1-7 Units Subcutaneous TID AC Gondal, Saad J, MD   1 Units at 04/24/25 1704    insulin glargine (LANTUS PEN) injection 26 Units  26 Units Subcutaneous BID Gondal, Saad J, MD   26 Units at 04/24/25 2122    polyethylene glycol (MIRALAX) Packet 17 g  17 g Oral Daily Sunshine Fowler MD   17 g at 04/23/25 1131    sodium chloride (PF) 0.9% PF flush 3 mL  3 mL Intracatheter Q8H [facility] Gondal, Saad J, MD   3 mL at 04/24/25 2123        Total time spent for face to face visit, reviewing labs/imaging studies, counseling and coordination of care was: 45 Minutes More than 50% of this time was spent on counseling and coordination of care.      This note was dictated using voice recognition software.  Any grammatical or context distortions are unintentional and inherent to the software.

## 2025-04-25 NOTE — PLAN OF CARE
Physical Therapy Discharge Summary    Reason for therapy discharge:    Discharged to home.    Progress towards therapy goal(s). See goals on Care Plan in [facility] electronic health record for goal details.  Goals partially met.  Barriers to achieving goals:   discharge from facility.    Therapy recommendation(s):    Continued therapy is recommended.  Rationale/Recommendations:  TCU was recommended.

## 2025-04-25 NOTE — CARE PLAN
PRIMARY DIAGNOSIS: SYNCOPE/TIA  OUTPATIENT/OBSERVATION GOALS TO BE MET BEFORE DISCHARGE:  1. Orthostatic performed: No    2. Diagnostic testing complete & at baseline neurologic testing: Yes    3. Cleared by consultants (if involved): No    4. Interpretation of cardiac rhythm per telemetry tech: NSR    5. Tolerating adequate PO diet and medications: Yes    6. Return to near baseline physical activity or neurologic status: Yes    Discharge Planner Nurse   Safe discharge environment identified: Yes  Barriers to discharge: Yes       Entered by: Jhonatan Argueta RN 04/24/2025 10:13 PM     Please review provider order for any additional goals.   Nurse to notify provider when observation goals have been met and patient is ready for discharge.

## 2025-04-25 NOTE — PLAN OF CARE
Speech Language Therapy Discharge Summary    Reason for therapy discharge:    Discharged to home.    Progress towards therapy goal(s). See goals on Care Plan in [facility] electronic health record for goal details.  Goals partially met.  Barriers to achieving goals:   discharge from facility.    Therapy recommendation(s):    Continued therapy is recommended.  Rationale/Recommendations:  if patient would like to pursue op therapy for high level word finding difficulty.

## 2025-04-25 NOTE — PLAN OF CARE
"  Problem: Stroke, Ischemic (Includes Transient Ischemic Attack)  Goal: Optimal Cerebral Tissue Perfusion  Outcome: Adequate for Care Transition  Intervention: Protect and Optimize Cerebral Perfusion  Recent Flowsheet Documentation  Taken 4/25/2025 0856 by Veena Dejesus RN  Cerebral Perfusion Promotion: blood pressure monitored     Problem: Stroke, Ischemic (Includes Transient Ischemic Attack)  Goal: Safe and Effective Swallow  Outcome: Adequate for Care Transition     Problem: Stroke, Ischemic (Includes Transient Ischemic Attack)  Goal: Effective Urinary Elimination  Outcome: Adequate for Care Transition     Problem: Comorbidity Management  Goal: Blood Pressure in Desired Range  4/25/2025 1340 by Veena Dejesus RN  Outcome: Adequate for Care Transition  4/25/2025 1000 by Veena Dejesus RN  Outcome: Progressing  Intervention: Maintain Blood Pressure Management  Recent Flowsheet Documentation  Taken 4/25/2025 0856 by Veena Dejesus RN  Medication Review/Management:   high-risk medications identified   medications reviewed   Goal Outcome Evaluation:      /82 (BP Location: Right arm)   Pulse 79   Temp 98  F (36.7  C) (Oral)   Resp 18   Ht 1.702 m (5' 7\")   Wt 82.5 kg (181 lb 14.4 oz)   LMP  (LMP Unknown)   SpO2 100%   BMI 28.49 kg/m    A&O x4 VSS  patient has received discharge orders    Patient is discharging to home with transportation provided by family member.   has written a note per patient's request documenting the date of admit and discharge; this has been printed out and given to patient. All belongings remained with patient, patient gathered all belongings.  Cardiac Monitor placed by [facility] with education provided directly to patient.  Follow up appointments reviewed by this RN with patient x3, discharge summary thoroughly  reviewed. Discharge medications will be picked up at [facility] by patient. At time of preparing for discharge, patient was very " "upset that this RN maintained alarms on chair and bed as patient had reported R leg weakness and reported limited ROM during all assessments this day.  Patient repeatedly stated that she could \"walk with no problem\" and insisted on demonstrating this without a walker or assist.  It is clear at discharge that patients mobility is greater than demonstrated upon assessments this day by this RN.  Courtesy wheelchair provided, patient being transported to front of [facility] to meet her transportation home.  Tasha Dejesus RN                  "

## 2025-04-25 NOTE — PROGRESS NOTES
Occupational Therapy Discharge Summary    Reason for therapy discharge:    Discharged to home.    Progress towards therapy goal(s). See goals on Care Plan in [facility] electronic health record for goal details.  Goals partially met.  Barriers to achieving goals:   discharge from facility.    Therapy recommendation(s):    Continued therapy is recommended.  Rationale/Recommendations:  ARU recommended - pt declined.

## 2025-04-25 NOTE — PROGRESS NOTES
"\"Please schedule outpatient 30-day event monitor before she is discharged as previously in spite of multiple referral patient did not follow through\".  [facility] called at 1108 to make them aware of Dr Castillo's request.  No discharge orders are in place at this time, however, [facility] states that they will watch for an In Patient order to place this 30 day event monitor.  Provider notified at 1115 Tasha Dejesus RN   "

## 2025-04-25 NOTE — CARE PLAN
PRIMARY DIAGNOSIS: SYNCOPE/TIA  OUTPATIENT/OBSERVATION GOALS TO BE MET BEFORE DISCHARGE:  1. Orthostatic performed: No    2. Diagnostic testing complete & at baseline neurologic testing: Yes    3. Cleared by consultants (if involved): No    4. Interpretation of cardiac rhythm per telemetry tech: NSR    5. Tolerating adequate PO diet and medications: Yes    6. Return to near baseline physical activity or neurologic status: Yes    Discharge Planner Nurse   Safe discharge environment identified: Yes  Barriers to discharge: Yes       Entered by: Jhonatan Argueta RN 04/25/2025 1:32 AM

## 2025-04-25 NOTE — PLAN OF CARE
Problem: Stroke, Ischemic (Includes Transient Ischemic Attack)  Goal: Improved Communication Skills  Outcome: Progressing  Intervention: Optimize Communication Skills  Recent Flowsheet Documentation  Taken 4/25/2025 0018 by Jhonatan Argueta, RN  Communication Enhancement Strategies:   call light answered in person   extra time allowed for response  Taken 4/24/2025 2012 by Jhonatan Argueta, RN  Communication Enhancement Strategies:   call light answered in person   extra time allowed for response  Goal: Optimal Functional Ability  Outcome: Progressing     Problem: Comorbidity Management  Goal: Blood Glucose Levels Within Targeted Range  Outcome: Progressing  Intervention: Monitor and Manage Glycemia  Recent Flowsheet Documentation  Taken 4/25/2025 0018 by Jhonatan Argueta, RN  Medication Review/Management:   medications reviewed   high-risk medications identified  Taken 4/24/2025 2012 by Jhonatan Argueta, RN  Medication Review/Management:   medications reviewed   high-risk medications identified   Goal Outcome Evaluation:       Pt alert and oriented x4. Up to the bathroom with SBA using walker. Pt complained of lower back pain radiating to right leg. Pain managed by PRN oxycodone and tylenol with some relief. Scored 7 and 6 on NIH 4am NIH refused. NSR on telemetry. BG checked overnight 153 and 148. VSS on room air. Bed alarm on for safety.

## 2025-04-25 NOTE — PROGRESS NOTES
Care Management Follow Up    Length of Stay (days): 1    Expected Discharge Date: 04/25/2025    Anticipated Discharge Plan:   home with family     Transportation: Anticipate Family/friend    PT Recommendations: Transitional Care Facility, Acute Rehab Center-Motivated patient will benefit from intensive, interdisciplinary therapy.  Anticipate will be able to tolerate 3 hours of therapy per day  OT Recommendations:  Transitional Care Facility     Barriers to Discharge: medical stability, clinic follow up appt     Prior Living Situation: apartment with alone    Discussed  Partnership in Safe Discharge Planning  document with patient/family: No     Handoff Completed: No, handoff not indicated or clinically appropriate    Patient/Spokesperson Updated: Yes. Who? Patient, bedside RN     Additional Information:    Update received from hospitalist, requesting to schedule post-hospital follow up appt for patient; states if this can be scheduled within a few days, will discharge patient today.  Called [facility] scheduling line ([phone]); appt scheduled for Monday, 04/28 @11am with Awilda Waller NP; per schedule, this provider is listed as her PCP.      Met with patient to discuss and advise of this scheduled appt.  Patient is asking to help with scheduling a pain clinic appointment; reviewed and advised that she will need to discuss and request this assistance with her PCP at her appointment on Monday, 04/28.  Patient denied this, saying her PCP won't assist her; reviewed and advised that when RN attempted to assist her with calling the pain clinic yesterday, patient was told by them she needs to discuss with and request assistance from her PCP with this need.      Updated hospitalist and bedside RN of scheduled appt on 04/28.  Anticipate family will provide transportation home.     FLORENCE Ma    Post-hospital clinic follow up appointment information added to discharge documentation.    12:43  PM

## 2025-04-25 NOTE — PROGRESS NOTES
Attending has just been bedside with patient, and patient is now calling this RN bedside.  Patient denies that she has answers to her questions regarding discharge.  This RN reminded patient of conversation she had just had with Attending. Social Work has additionally been bedside.  Tasha Dejesus RN

## 2025-04-28 ENCOUNTER — PATIENT OUTREACH (OUTPATIENT)
Dept: CARE COORDINATION | Facility: CLINIC | Age: 60
End: 2025-04-28
Payer: MEDICAID

## 2025-04-28 NOTE — PROGRESS NOTES
"  [facility]: Transitions of Care Outreach  Chief Complaint   Patient presents with    Clinic Care Coordination - Post Hospital       Most Recent Admission Date: 4/22/2025   Most Recent Admission Diagnosis: Right sided weakness - R53.1  Stroke-like symptoms - R29.90  Difficulty with speech - R47.9     Most Recent Discharge Date: 4/25/2025   Most Recent Discharge Diagnosis: Right sided weakness - R53.1  Difficulty with speech - R47.9  Stroke-like symptoms - R29.90  Bilateral low back pain with right-sided sciatica, unspecified chronicity - M54.41  Right arm pain - M79.601  Acute CVA (cerebrovascular accident) (H) - I63.9  Chronic pain syndrome - G89.4  Therapeutic opioid induced constipation - K59.03, T40.2X5A  Functional neurological symptom disorder with abnormal movement - F44.4  Type 2 diabetes mellitus with diabetic polyneuropathy, with long-term current use of insulin (H) - E11.42, Z79.4  Right leg weakness - R29.898     Transitions of Care Assessment    Discharge Assessment  How are you doing now that you are home?: \" I am not happy about my care, they just cancelled my follow-up appointment\"  How are your symptoms? (Red Flag symptoms escalate to triage hotline per guidelines): Other  Do you know how to contact your clinic care team if you have future questions or changes to your health status? : Yes  Does the patient have their discharge instructions? : Yes  Does the patient have questions regarding their discharge instructions? : No  Were you started on any new medications or were there changes to any of your previous medications? : Yes  Does the patient have all of their medications?: Yes  Do you have questions regarding any of your medications? : No  Do you have all of your needed medical supplies or equipment (DME)?  (i.e. oxygen tank, CPAP, cane, etc.): Yes    Post-op ([facility] CTA Only)  If the patient had a surgery or procedure, do they have any questions for a nurse?: No     "       Follow up Plan     Discharge Follow-Up  Discharge follow up appointment scheduled in alignment with recommended follow up timeframe or Transitions of Risk Category? (Low = within 30 days; Moderate= within 14 days; High= within 7 days): Yes  Discharge Follow Up Appointment Date: 05/01/25  Discharge Follow Up Appointment Scheduled with?: Primary Care Provider    No future appointments.    Outpatient Plan as outlined on AVS reviewed with patient.    For any urgent concerns, please contact our 24 hour nurse triage line: [phone] ([phone])       AIMEE Du

## 2025-05-01 LAB
ATRIAL RATE - MUSE: 89 BPM
DIASTOLIC BLOOD PRESSURE - MUSE: 108 MMHG
INTERPRETATION ECG - MUSE: NORMAL
P AXIS - MUSE: 73 DEGREES
PR INTERVAL - MUSE: 170 MS
QRS DURATION - MUSE: 74 MS
QT - MUSE: 382 MS
QTC - MUSE: 464 MS
R AXIS - MUSE: 4 DEGREES
SYSTOLIC BLOOD PRESSURE - MUSE: 221 MMHG
T AXIS - MUSE: 87 DEGREES
VENTRICULAR RATE- MUSE: 89 BPM

## 2025-05-30 PROCEDURE — 93272 ECG/REVIEW INTERPRET ONLY: CPT | Performed by: INTERNAL MEDICINE

## 2025-05-31 ENCOUNTER — RESULTS FOLLOW-UP (OUTPATIENT)
Dept: FAMILY MEDICINE | Facility: CLINIC | Age: 60
End: 2025-05-31

## 2025-06-28 ENCOUNTER — HOSPITAL ENCOUNTER (EMERGENCY)
Facility: HOSPITAL | Age: 60
Discharge: HOME | End: 2025-06-28
Attending: EMERGENCY MEDICINE
Payer: MEDICAID

## 2025-06-28 ENCOUNTER — APPOINTMENT (OUTPATIENT)
Dept: MRI IMAGING | Facility: HOSPITAL | Age: 60
End: 2025-06-28
Attending: EMERGENCY MEDICINE
Payer: MEDICAID

## 2025-06-28 ENCOUNTER — APPOINTMENT (OUTPATIENT)
Dept: ULTRASOUND IMAGING | Facility: HOSPITAL | Age: 60
End: 2025-06-28
Attending: EMERGENCY MEDICINE
Payer: MEDICAID

## 2025-06-28 VITALS
HEART RATE: 70 BPM | SYSTOLIC BLOOD PRESSURE: 187 MMHG | TEMPERATURE: 98.1 F | RESPIRATION RATE: 22 BRPM | DIASTOLIC BLOOD PRESSURE: 88 MMHG | OXYGEN SATURATION: 96 %

## 2025-06-28 DIAGNOSIS — M79.604 PAIN OF RIGHT LOWER EXTREMITY: ICD-10-CM

## 2025-06-28 LAB
ALBUMIN SERPL BCG-MCNC: 3.8 G/DL (ref 3.5–5.2)
ALBUMIN UR-MCNC: 20 MG/DL
ALP SERPL-CCNC: 77 U/L (ref 40–150)
ALT SERPL W P-5'-P-CCNC: 15 U/L (ref 0–50)
ANION GAP SERPL CALCULATED.3IONS-SCNC: 7 MMOL/L (ref 7–15)
APPEARANCE UR: CLEAR
AST SERPL W P-5'-P-CCNC: 11 U/L (ref 0–45)
B-OH-BUTYR SERPL-SCNC: <0.18 MMOL/L
BACTERIA #/AREA URNS HPF: ABNORMAL /HPF
BASE EXCESS BLDV CALC-SCNC: 2.9 MMOL/L (ref -3–3)
BASOPHILS # BLD AUTO: 0.1 10E3/UL (ref 0–0.2)
BASOPHILS NFR BLD AUTO: 1 %
BILIRUB DIRECT SERPL-MCNC: <0.08 MG/DL (ref 0–0.3)
BILIRUB SERPL-MCNC: 0.2 MG/DL
BILIRUB UR QL STRIP: NEGATIVE
BUN SERPL-MCNC: 20.7 MG/DL (ref 8–23)
CALCIUM SERPL-MCNC: 8.9 MG/DL (ref 8.8–10.4)
CHLORIDE SERPL-SCNC: 105 MMOL/L (ref 98–107)
COLOR UR AUTO: ABNORMAL
CREAT SERPL-MCNC: 0.87 MG/DL (ref 0.51–0.95)
EGFRCR SERPLBLD CKD-EPI 2021: 76 ML/MIN/1.73M2
EOSINOPHIL # BLD AUTO: 0.2 10E3/UL (ref 0–0.7)
EOSINOPHIL NFR BLD AUTO: 4 %
ERYTHROCYTE [DISTWIDTH] IN BLOOD BY AUTOMATED COUNT: 13.2 % (ref 10–15)
GLUCOSE BLDC GLUCOMTR-MCNC: 245 MG/DL (ref 70–99)
GLUCOSE SERPL-MCNC: 241 MG/DL (ref 70–99)
GLUCOSE UR STRIP-MCNC: >1000 MG/DL
HCO3 BLDV-SCNC: 29 MMOL/L (ref 21–28)
HCO3 SERPL-SCNC: 28 MMOL/L (ref 22–29)
HCT VFR BLD AUTO: 35.1 % (ref 35–47)
HGB BLD-MCNC: 10.9 G/DL (ref 11.7–15.7)
HGB UR QL STRIP: NEGATIVE
IMM GRANULOCYTES # BLD: 0 10E3/UL
IMM GRANULOCYTES NFR BLD: 0 %
KETONES UR STRIP-MCNC: NEGATIVE MG/DL
LACTATE SERPL-SCNC: 0.9 MMOL/L (ref 0.7–2)
LEUKOCYTE ESTERASE UR QL STRIP: ABNORMAL
LYMPHOCYTES # BLD AUTO: 2.4 10E3/UL (ref 0.8–5.3)
LYMPHOCYTES NFR BLD AUTO: 40 %
MCH RBC QN AUTO: 27.2 PG (ref 26.5–33)
MCHC RBC AUTO-ENTMCNC: 31.1 G/DL (ref 31.5–36.5)
MCV RBC AUTO: 88 FL (ref 78–100)
MONOCYTES # BLD AUTO: 0.3 10E3/UL (ref 0–1.3)
MONOCYTES NFR BLD AUTO: 6 %
NEUTROPHILS # BLD AUTO: 2.9 10E3/UL (ref 1.6–8.3)
NEUTROPHILS NFR BLD AUTO: 49 %
NITRATE UR QL: NEGATIVE
NRBC # BLD AUTO: 0 10E3/UL
NRBC BLD AUTO-RTO: 0 /100
O2/TOTAL GAS SETTING VFR VENT: 21 %
OXYHGB MFR BLDV: 52 % (ref 70–75)
PCO2 BLDV: 49 MM HG (ref 40–50)
PH BLDV: 7.38 [PH] (ref 7.32–7.43)
PH UR STRIP: 6.5 [PH] (ref 5–7)
PLATELET # BLD AUTO: 212 10E3/UL (ref 150–450)
PO2 BLDV: 29 MM HG (ref 25–47)
POTASSIUM SERPL-SCNC: 4.2 MMOL/L (ref 3.4–5.3)
PROCALCITONIN SERPL IA-MCNC: 0.04 NG/ML
PROT SERPL-MCNC: 6.4 G/DL (ref 6.4–8.3)
RBC # BLD AUTO: 4.01 10E6/UL (ref 3.8–5.2)
RBC URINE: 1 /HPF
SAO2 % BLDV: 54.6 % (ref 70–75)
SODIUM SERPL-SCNC: 140 MMOL/L (ref 135–145)
SP GR UR STRIP: 1.02 (ref 1–1.03)
SQUAMOUS EPITHELIAL: 1 /HPF
TROPONIN T SERPL HS-MCNC: 11 NG/L
TROPONIN T SERPL HS-MCNC: 12 NG/L
UROBILINOGEN UR STRIP-MCNC: NORMAL MG/DL
WBC # BLD AUTO: 5.9 10E3/UL (ref 4–11)
WBC URINE: 2 /HPF

## 2025-06-28 PROCEDURE — 81001 URINALYSIS AUTO W/SCOPE: CPT | Performed by: EMERGENCY MEDICINE

## 2025-06-28 PROCEDURE — 96374 THER/PROPH/DIAG INJ IV PUSH: CPT

## 2025-06-28 PROCEDURE — 82310 ASSAY OF CALCIUM: CPT | Performed by: EMERGENCY MEDICINE

## 2025-06-28 PROCEDURE — 85025 COMPLETE CBC W/AUTO DIFF WBC: CPT | Performed by: EMERGENCY MEDICINE

## 2025-06-28 PROCEDURE — 84145 PROCALCITONIN (PCT): CPT | Performed by: EMERGENCY MEDICINE

## 2025-06-28 PROCEDURE — 87086 URINE CULTURE/COLONY COUNT: CPT | Performed by: EMERGENCY MEDICINE

## 2025-06-28 PROCEDURE — 82248 BILIRUBIN DIRECT: CPT | Performed by: EMERGENCY MEDICINE

## 2025-06-28 PROCEDURE — 82374 ASSAY BLOOD CARBON DIOXIDE: CPT | Performed by: EMERGENCY MEDICINE

## 2025-06-28 PROCEDURE — 250N000011 HC RX IP 250 OP 636: Mod: JZ | Performed by: EMERGENCY MEDICINE

## 2025-06-28 PROCEDURE — 83605 ASSAY OF LACTIC ACID: CPT | Performed by: EMERGENCY MEDICINE

## 2025-06-28 PROCEDURE — 99285 EMERGENCY DEPT VISIT HI MDM: CPT | Mod: 25

## 2025-06-28 PROCEDURE — 84484 ASSAY OF TROPONIN QUANT: CPT | Performed by: EMERGENCY MEDICINE

## 2025-06-28 PROCEDURE — 93971 EXTREMITY STUDY: CPT | Mod: RT

## 2025-06-28 PROCEDURE — 82962 GLUCOSE BLOOD TEST: CPT

## 2025-06-28 PROCEDURE — 72148 MRI LUMBAR SPINE W/O DYE: CPT

## 2025-06-28 PROCEDURE — 82805 BLOOD GASES W/O2 SATURATION: CPT | Performed by: EMERGENCY MEDICINE

## 2025-06-28 PROCEDURE — 80076 HEPATIC FUNCTION PANEL: CPT | Performed by: EMERGENCY MEDICINE

## 2025-06-28 PROCEDURE — 250N000013 HC RX MED GY IP 250 OP 250 PS 637: Performed by: EMERGENCY MEDICINE

## 2025-06-28 PROCEDURE — 36415 COLL VENOUS BLD VENIPUNCTURE: CPT | Performed by: EMERGENCY MEDICINE

## 2025-06-28 PROCEDURE — 96375 TX/PRO/DX INJ NEW DRUG ADDON: CPT

## 2025-06-28 PROCEDURE — 82010 KETONE BODYS QUAN: CPT | Performed by: EMERGENCY MEDICINE

## 2025-06-28 RX ORDER — KETOROLAC TROMETHAMINE 15 MG/ML
15 INJECTION, SOLUTION INTRAMUSCULAR; INTRAVENOUS ONCE
Status: COMPLETED | OUTPATIENT
Start: 2025-06-28 | End: 2025-06-28

## 2025-06-28 RX ORDER — GABAPENTIN 300 MG/1
600 CAPSULE ORAL ONCE
Status: COMPLETED | OUTPATIENT
Start: 2025-06-28 | End: 2025-06-28

## 2025-06-28 RX ORDER — GABAPENTIN 400 MG/1
400 CAPSULE ORAL 3 TIMES DAILY
Qty: 60 CAPSULE | Refills: 0 | Status: SHIPPED | OUTPATIENT
Start: 2025-06-28

## 2025-06-28 RX ORDER — LORAZEPAM 0.5 MG/1
1 TABLET ORAL ONCE
Status: COMPLETED | OUTPATIENT
Start: 2025-06-28 | End: 2025-06-28

## 2025-06-28 RX ADMIN — KETOROLAC TROMETHAMINE 15 MG: 15 INJECTION, SOLUTION INTRAMUSCULAR; INTRAVENOUS at 17:54

## 2025-06-28 RX ADMIN — HYDROMORPHONE HYDROCHLORIDE 1 MG: 1 INJECTION, SOLUTION INTRAMUSCULAR; INTRAVENOUS; SUBCUTANEOUS at 17:02

## 2025-06-28 RX ADMIN — GABAPENTIN 600 MG: 300 CAPSULE ORAL at 19:20

## 2025-06-28 RX ADMIN — LORAZEPAM 1 MG: 0.5 TABLET ORAL at 17:45

## 2025-06-28 ASSESSMENT — ACTIVITIES OF DAILY LIVING (ADL)
ADLS_ACUITY_SCORE: 57

## 2025-06-28 NOTE — ED TRIAGE NOTES
Pt reports for last 4 days has had significant weakness and burning pain to RLE.  Pt has some swelling to RLE.  Pt reports previous hx of stroke, also previously was attacked by a pitbull on same leg. Pt reports she has been taking her DM medications as prescribed but still having some elevated BG.  Pt also reports frequent urination, concerned about her diabetes.      Triage Assessment (Adult)       Row Name 06/28/25 1543          Triage Assessment    Airway WDL WDL        Respiratory WDL    Respiratory WDL WDL        Skin Circulation/Temperature WDL    Skin Circulation/Temperature WDL WDL        Cardiac WDL    Cardiac WDL WDL        Peripheral/Neurovascular WDL    Peripheral Neurovascular WDL WDL        Cognitive/Neuro/Behavioral WDL    Cognitive/Neuro/Behavioral WDL WDL

## 2025-06-28 NOTE — ED PROVIDER NOTES
EMERGENCY DEPARTMENT NOTE     Name: Sarah Rahman    Age/Sex: 59 year old female   MRN: [id number]   Evaluation Date & Time:  6/28/2025  3:44 PM    PCP:    No Ref-Primary, Physician   ED Provider: Diomedes Waller D.O.       CHIEF COMPLAINT    blood sugar issues, Leg Pain, and Polyuria (/)     HISTORY OF PRESENT ILLNESS BRIEF   Sarah Rahman is a 59 year old year old female with a relevant past history of type 2 diabetes mellitus, CVA, hypertension, chronic pain, bilateral low back pain, right leg weakness, diabetic peripheral neuropathy, who presents to the ED  for evaluation of weakness and right leg pain.     Patient came to the ED after having falls for the past 4 days due to right leg weakness and pain. Patient reports having a stroke one month ago and since having right sided weakness and right sided hearing loss. In the past few days her right sided leg weakness has been worse and her right foot has had limited movement and numbness. She also endorses inner calf burning and stinging pain. She tried a lidocaine patch but the pain is getting worse. The pains is so bad she has been unable to sleep and the oxycodone she gets from her pain clinic does not help. Reports that a few days ago her feet became swollen and she recalled when that happened to her brother and his foot had to be amputated. Patient is scared and anxious. Endorses increased urinary frequency with decreased volume, but denies groin numbness or urinary/bowl incontinence.        DIAGNOSIS & DISPOSITION/MEDICAL DECISION MAKING     1. Pain of right lower extremity        EMERGENCY DEPARTMENT COURSE   4:26 PM I met with the patient to gather history and to perform my initial exam.  We discussed treatment options and the plan for care while in the Emergency Department.  Triage vital signs: BP (!) 162/91   Pulse 76   Temp 98.1  F (36.7  C) (Temporal)   Resp 22   LMP  (LMP Unknown)   SpO2 100%    Differential diagnosis considered  included but not limited to: For right lower extremity pain Central cord syndrome, lumbar radiculopathy with acute nerve root impingement, DVT.  Patient herself is concerned that symptoms may represent a foot infection that her brother had amputations for abdominal exam is benign.  Patient also had complaints of generalized weakness without specific chest pain, shortness of breath or abdominal pain he considered ACS, endocrine process including DKA, sepsis    MDM: Patient on exam had normal cardiopulmonary exam, abdomen soft and nontender.  Peripheral pulses were somewhat diminished but equal and physiologic and palpable does not appear to have an ischemic limb.  Weakness in the right lower extremity 4 and 5 motor strength compared to the left.  To difficulty with dorsiflexion and plantarflexion of the foot but ambulated does not have foot drop.  She is able to flex at the hip without difficulty.   Diagnostic studies:  MRI of the spine multilevel disc degeneration mild spinal column stenosis at L3-L4 and L4-5 with severe foraminal stenosis at L4-L5 and more moderate L3-L4  Right lower extremity ultrasound: No DVT  Laboratory studies obtained interpreted by myself: significant for hemoglobin 10.9 stable chronic anemia glucose 245 normal CO2 and anion gap, VBG without acidosis, serum ketones nonelevated  WBC 5.9 lactic acid 0.9, procalcitonin 0.04, UA with few bacteria nitrate negative  Urinalysis without pyuria or bacteriuria  Patient had initial pain management with IV hydromorphone.  She was given lorazepam for anxiety regarding MRI.  She had subsequent pain management with ketorolac and gabapentin.  Current studies were negative for acute concerning process.  Patient has longstanding history of pain particular in right lower extremity may be secondary to diabetic neuropathy and/or sciatica.  Patient currently identifies no primary care provider and will be given referral, have also referred her to the spine clinic  for follow-up.  Patient was ambulated and has some unsteadiness but no definitive foot drop.  She was discharged with a walker.  Patient previously prescribed oxycodone and has this available and we will continue this with Tylenol ibuprofen for pain management and will increase gabapentin to 400 mg 3 times a day.  Patient will return if uncontrolled pain or progressive problems.  Discharge Vital Signs: BP (!) 187/88   Pulse 70   Temp 98.1  F (36.7  C) (Temporal)   Resp 22   LMP  (LMP Unknown)   SpO2 96%    PROCEDURES:   None  Diagnostic studies:  Lumbar spine MRI w/o contrast   Final Result   IMPRESSION:   1.  Multilevel disc degeneration and facet hypertrophy as described above. This includes mild spinal canal stenosis at L3-4 and L4-5 with severe subarticular stenosis at L3-4 and moderate subarticular stenosis at L5-S1.   2.  Moderate neural foraminal stenosis at L5-S1.         US Lower Extremity Venous Duplex Right   Final Result   IMPRESSION:      1.  No deep venous thrombosis in the right lower extremity.        Labs Ordered and Resulted from Time of ED Arrival to Time of ED Departure   GLUCOSE BY METER - Abnormal       Result Value    GLUCOSE BY METER POCT 245 (*)    BASIC METABOLIC PANEL - Abnormal    Sodium 140      Potassium 4.2      Chloride 105      Carbon Dioxide (CO2) 28      Anion Gap 7      Urea Nitrogen 20.7      Creatinine 0.87      GFR Estimate 76      Calcium 8.9      Glucose 241 (*)    ROUTINE UA WITH MICROSCOPIC REFLEX TO CULTURE - Abnormal    Color Urine Light Yellow      Appearance Urine Clear      Glucose Urine >1000 (*)     Bilirubin Urine Negative      Ketones Urine Negative      Specific Gravity Urine 1.024      Blood Urine Negative      pH Urine 6.5      Protein Albumin Urine 20 (*)     Urobilinogen Urine Normal      Nitrite Urine Negative      Leukocyte Esterase Urine 250 [place]/uL (*)     Bacteria Urine Few (*)     RBC Urine 1      WBC Urine 2      Squamous Epithelials Urine 1      BLOOD GAS VENOUS - Abnormal    pH Venous 7.38      pCO2 Venous 49      pO2 Venous 29      Bicarbonate Venous 29 (*)     Base Excess/Deficit Venous 2.9      FIO2 21      Oxyhemoglobin Venous 52 (*)     O2 Sat, Venous 54.6 (*)    CBC WITH PLATELETS AND DIFFERENTIAL - Abnormal    WBC Count 5.9      RBC Count 4.01      Hemoglobin 10.9 (*)     Hematocrit 35.1      MCV 88      MCH 27.2      MCHC 31.1 (*)     RDW 13.2      Platelet Count 212      % Neutrophils 49      % Lymphocytes 40      % Monocytes 6      % Eosinophils 4      % Basophils 1      % Immature Granulocytes 0      NRBCs per 100 WBC 0      Absolute Neutrophils 2.9      Absolute Lymphocytes 2.4      Absolute Monocytes 0.3      Absolute Eosinophils 0.2      Absolute Basophils 0.1      Absolute Immature Granulocytes 0.0      Absolute NRBCs 0.0     HEPATIC FUNCTION PANEL - Normal    Protein Total 6.4      Albumin 3.8      Bilirubin Total 0.2      Alkaline Phosphatase 77      AST 11      ALT 15      Bilirubin Direct <0.08     LACTIC ACID WHOLE BLOOD WITH 1X REPEAT IN 2 HR WHEN >2 - Normal    Lactic Acid, Initial 0.9     TROPONIN T, HIGH SENSITIVITY - Normal    Troponin T, High Sensitivity 12     PROCALCITONIN - Normal    Procalcitonin 0.04     KETONE BETA-HYDROXYBUTYRATE QUANTITATIVE, RAPID - Normal    Ketone (Beta-Hydroxybutyrate) Quantitative <0.18     TROPONIN T, HIGH SENSITIVITY - Normal    Troponin T, High Sensitivity 11     URINE CULTURE     ED INTERVENTIONS     Medications   HYDROmorphone (DILAUDID) injection 1 mg (1 mg Intravenous $Given 6/28/25 1702)   LORazepam (ATIVAN) tablet 1 mg (1 mg Oral $Given 6/28/25 1745)   ketorolac (TORADOL) injection 15 mg (15 mg Intravenous $Given 6/28/25 1754)   gabapentin (NEURONTIN) capsule 600 mg (600 mg Oral $Given 6/28/25 1920)     TOTAL CRITICAL CARE TIME (EXCLUDING PROCEDURES): Not applicable      DISCHARGE MEDICATIONS        Review of your medicines        UNREVIEWED medicines. Ask your doctor about these medicines         Dose / Directions   albuterol 108 (90 Base) MCG/ACT inhaler  Commonly known as: ProAir HFA  Used for: Chronic obstructive pulmonary disease, unspecified COPD type (H)      Dose: 1-2 puff  Inhale 1-2 puffs into the lungs every 4 hours as needed for shortness of breath or wheezing  Quantity: 18 g  Refills: 11     amLODIPine 2.5 MG tablet  Commonly known as: NORVASC      Dose: 2.5 mg  Take 2.5 mg by mouth daily.  Refills: 0     aspirin 325 MG EC tablet  Commonly known as: ASA  Used for: Stroke-like symptoms      Dose: 325 mg  Take 1 tablet (325 mg) by mouth daily.  Quantity: 30 tablet  Refills: 0     atorvastatin 80 MG tablet  Commonly known as: LIPITOR  Used for: Acute CVA (cerebrovascular accident) (H)      Dose: 80 mg  Take 1 tablet (80 mg) by mouth every evening.  Quantity: 30 tablet  Refills: 0     dulaglutide 0.75 MG/0.5ML pen  Commonly known as: TRULICITY  Used for: Type 2 diabetes mellitus with hyperglycemia, with long-term current use of insulin (H)      Dose: 0.75 mg  Inject 0.75 mg Subcutaneous every 7 days  Quantity: 6 mL  Refills: 3     DULoxetine 60 MG capsule  Commonly known as: CYMBALTA      Dose: 60 mg  Take 60 mg by mouth 2 times daily.  Refills: 0     glipiZIDE 10 MG 24 hr tablet  Commonly known as: GLUCOTROL XL      Dose: 10 mg  Take 10 mg by mouth daily.  Refills: 0     insulin glargine 100 UNIT/ML pen  Commonly known as: LANTUS PEN      Dose: 26 Units  Inject 26 Units subcutaneously 2 times daily.  Refills: 0     ketamine 5%-gabapentin 8%-lidocaine 2.5% in PLO cream  Used for: Diabetic polyneuropathy associated with type 2 diabetes mellitus (H)      Dose: 0.25 g  Apply 1 click (0.25 g) topically 3 times daily. Apply to skin of right or left leg where pain is.  Refills: 0     lidocaine 5 % patch  Commonly known as: LIDODERM      Place onto the skin daily as needed. To prevent lidocaine toxicity, patient should be patch free for 12 hrs daily.  Lower Back  Refills: 0     meclizine 25 MG  tablet  Commonly known as: ANTIVERT  Used for: Benign paroxysmal positional vertigo, unspecified laterality      Dose: 25 mg  Take 1 tablet (25 mg) by mouth 3 times daily as needed for dizziness.  Quantity: 90 tablet  Refills: 0     oxyCODONE-acetaminophen  MG per tablet  Commonly known as: PERCOCET  Used for: Chronic pain syndrome      Dose: 0.5-1 tablet  Take 0.5-1 tablets by mouth every 6 hours as needed for severe pain.  Quantity: 15 tablet  Refills: 0     polyethylene glycol 17 GM/Dose powder  Commonly known as: MIRALAX  Used for: Therapeutic opioid induced constipation      Dose: 17 g  Take 17 g by mouth daily.  Quantity: 510 g  Refills: 0            CONTINUE these medicines which may have CHANGED, or have new prescriptions. If we are uncertain of the size of tablets/capsules you have at home, strength may be listed as something that might have changed.        Dose / Directions   gabapentin 400 MG capsule  Commonly known as: NEURONTIN  This may have changed:   medication strength  how much to take      Dose: 400 mg  Take 1 capsule (400 mg) by mouth 3 times daily.  Quantity: 60 capsule  Refills: 0               Where to get your medicines        Some of these will need a paper prescription and others can be bought over the counter. Ask your nurse if you have questions.    Bring a paper prescription for each of these medications  gabapentin 400 MG capsule       DISPOSITION: Home    At the conclusion of the encounter I discussed the results of all of the tests and the disposition. The questions were answered. The patient or family acknowledged understanding and was agreeable with the care plan.        HISTORY OF PRESENT ILLNESS   Sarah Rahman is a 59 year old year old female with a relevant past history of type 2 diabetes mellitus, CVA, hypertension, chronic pain, bilateral low back pain, right leg weakness, diabetic peripheral neuropathy, who presents to the ED  for evaluation of weakness and right  leg pain.      INFORMATION SOURCE AND LIMITATIONS    History/Exam limitations: None  Patient information was obtained from: Patient   Use of : N/A      REVIEW OF SYSTEMS:   All other systems reviewed and are negative except as noted above in HPI.    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    S/P CABG (coronary artery bypass graft)     S/P lumbar discectomy 06/13/2019    Schizoaffective disorder (H)     Sleep difficulties 07/17/2022     Patient Active Problem List   Diagnosis    CAD -- S/P CABG    Type 2 diabetes mellitus with hyperglycemia, with long-term current use of insulin (H)    Schizoaffective disorder (H)    Bilateral low back pain with right-sided sciatica, unspecified chronicity    Cerebrovascular accident (CVA) due to stenosis of carotid artery (H)    Chronic obstructive pulmonary disease (H)    Anemia    Carotid stenosis, left    Depression with anxiety    Mixed hyperlipidemia    GERD (gastroesophageal reflux disease)    History of drug abuse (H)    Hx of syphilis    Primary hypertension    Menopausal flushing    Moderate persistent asthma    Myelomalacia of cervical cord (H)    Nephrolithiasis    Obesity    Seasonal allergies    Sensorineural hearing loss (SNHL) of right ear    Smoker    Chronic pain syndrome    Atypical chest pain -- Normal NM stress 5/8/23    Right renal artery stenosis    Intracranial atherosclerosis    Syncope and collapse    Right leg weakness    Chest pain, unspecified type    Unstable angina (H)    Frailty     "Difficulty walking    Diabetic peripheral neuropathy (H)    Basilar artery stenosis    Right sided weakness    Dog bite of lower leg, right, subsequent encounter    Stroke-like symptoms    Difficulty with speech    Type 2 diabetes mellitus with diabetic polyneuropathy, with long-term current use of insulin (H)    Dyslipidemia    Functional neurological symptom disorder with abnormal movement    Right arm pain     Past Surgical History:   Procedure Laterality Date    BYPASS GRAFT ARTERY CORONARY  01/01/2009    x2    CAROTID ENDARTERECTOMY Left 12/2021    CORONARY STENT PLACEMENT      CV ANGIOGRAM CORONARY GRAFT N/A 1/16/2024    Procedure: Coronary Angiogram Graft;  Surgeon: Spencer Diez MD;  Location: [facility] CATH LAB CV    CV CORONARY ANGIOGRAM N/A 06/02/2021    Procedure: Coronary Angiogram;  Surgeon: Juventino Rivera MD;  Location: [facility] Cardiac Cath Lab;  Service: Cardiology    HYSTERECTOMY TOTAL ABDOMINAL      HYSTERECTOMY TOTAL ABDOMINAL, BILATERAL SALPINGO-OOPHORECTOMY, COMBINED      IR LUMBAR PUNCTURE  7/23/2019    IR TRANSLAMINAR EPIDURAL LUMBAR INJ INCL IMAGING  09/27/2022    LUMBAR DISCECTOMY Right 06/13/2019    L5-S1 - Dr. Hamm    NASAL FRACTURE SURGERY      ORIF ULNAR / RADIAL SHAFT FRACTURE Right        Allergies   Allergen Reactions    Haloperidol Unknown     Patient states it stops her heart      Haloperidol Angioedema    Hydroxyzine Angioedema    Meperidine And Related Angioedema, Difficulty breathing, Other (See Comments), Rash and Shortness Of Breath     Throat closes  Other reaction(s): Breathing Difficulty  Other reaction(s): Breathing Difficulty      Phenothiazines Other (See Comments)     Other reaction(s): CARDIAC DISTURBANCES  Patient states it stops her heart  \"I swell up\"  \"stopped by heart\"  \"I swell up\"  \"I swell up\"      Phenothiazines Angioedema    Tramadol Other (See Comments)     Other reaction(s): Angioedema  Throat itch      Tramadol Angioedema    Januvia [Sitagliptin] " Swelling    Latex Itching    Haloperidol And Related Angioedema    Januvia [Sitagliptin] Other (See Comments)     Swelling in the neck     Latex Itching    Lisinopril Other (See Comments)     ACE cough  Itchy throat  Throat itches  Itchy throat      Nortriptyline Unknown     Fainting, unclear if it was related    Penicillins Swelling    Hydroxyzine Other (See Comments) and Rash     Tongue swelling  Tongue swelling  Tongue swelling      Lisinopril Cough       OUTPATIENT MEDICATIONS     Discharge Medication List as of 6/28/2025  9:25 PM         Vitals:    06/28/25 1745 06/28/25 1800 06/28/25 1815 06/28/25 2035   BP:  (!) 186/77 (!) 187/88    Pulse: 74 71 74 70   Resp:       Temp:       TempSrc:       SpO2: 100% 97% 95% 96%       Physical Exam   Constitutional: Oriented to person, place, and time. Appears well-developed and well-nourished.   Cardiovascular: Normal rate, regular rhythm and normal heart sounds.  Dorsalis pedis and posterior tibial pulses are palpable but somewhat diminished lower extremities compared to upper  Pulmonary/Chest: Normal effort  and breath sounds normal.   Abdominal: Soft. Bowel sounds are normal.   Musculoskeletal: Joint swelling or erythema  Neurological: Alert and oriented to person, place, and time.  5 motor strength right lower extremity with some weakness dorsi flexion and plantarflexion of foot but normal strength with flexion of the hip.  No sensory deficit. No cranial nerve deficit.  Skin: Skin is warm and dry.   Psychiatric: Normal mood and affect. Behavior is normal. Thought content normal.     DIAGNOSTICS    LABORATORY FINDINGS (REVIEWED AND INTERPRETED):  Labs Ordered and Resulted from Time of ED Arrival to Time of ED Departure   GLUCOSE BY METER - Abnormal       Result Value    GLUCOSE BY METER POCT 245 (*)    BASIC METABOLIC PANEL - Abnormal    Sodium 140      Potassium 4.2      Chloride 105      Carbon Dioxide (CO2) 28      Anion Gap 7      Urea Nitrogen 20.7      Creatinine  0.87      GFR Estimate 76      Calcium 8.9      Glucose 241 (*)    ROUTINE UA WITH MICROSCOPIC REFLEX TO CULTURE - Abnormal    Color Urine Light Yellow      Appearance Urine Clear      Glucose Urine >1000 (*)     Bilirubin Urine Negative      Ketones Urine Negative      Specific Gravity Urine 1.024      Blood Urine Negative      pH Urine 6.5      Protein Albumin Urine 20 (*)     Urobilinogen Urine Normal      Nitrite Urine Negative      Leukocyte Esterase Urine 250 [place]ika/uL (*)     Bacteria Urine Few (*)     RBC Urine 1      WBC Urine 2      Squamous Epithelials Urine 1     BLOOD GAS VENOUS - Abnormal    pH Venous 7.38      pCO2 Venous 49      pO2 Venous 29      Bicarbonate Venous 29 (*)     Base Excess/Deficit Venous 2.9      FIO2 21      Oxyhemoglobin Venous 52 (*)     O2 Sat, Venous 54.6 (*)    CBC WITH PLATELETS AND DIFFERENTIAL - Abnormal    WBC Count 5.9      RBC Count 4.01      Hemoglobin 10.9 (*)     Hematocrit 35.1      MCV 88      MCH 27.2      MCHC 31.1 (*)     RDW 13.2      Platelet Count 212      % Neutrophils 49      % Lymphocytes 40      % Monocytes 6      % Eosinophils 4      % Basophils 1      % Immature Granulocytes 0      NRBCs per 100 WBC 0      Absolute Neutrophils 2.9      Absolute Lymphocytes 2.4      Absolute Monocytes 0.3      Absolute Eosinophils 0.2      Absolute Basophils 0.1      Absolute Immature Granulocytes 0.0      Absolute NRBCs 0.0     HEPATIC FUNCTION PANEL - Normal    Protein Total 6.4      Albumin 3.8      Bilirubin Total 0.2      Alkaline Phosphatase 77      AST 11      ALT 15      Bilirubin Direct <0.08     LACTIC ACID WHOLE BLOOD WITH 1X REPEAT IN 2 HR WHEN >2 - Normal    Lactic Acid, Initial 0.9     TROPONIN T, HIGH SENSITIVITY - Normal    Troponin T, High Sensitivity 12     PROCALCITONIN - Normal    Procalcitonin 0.04     KETONE BETA-HYDROXYBUTYRATE QUANTITATIVE, RAPID - Normal    Ketone (Beta-Hydroxybutyrate) Quantitative <0.18     TROPONIN T, HIGH SENSITIVITY - Normal     Troponin T, High Sensitivity 11     URINE CULTURE         IMAGING (REVIEWED AND INTERPRETED):  Lumbar spine MRI w/o contrast   Final Result   IMPRESSION:   1.  Multilevel disc degeneration and facet hypertrophy as described above. This includes mild spinal canal stenosis at L3-4 and L4-5 with severe subarticular stenosis at L3-4 and moderate subarticular stenosis at L5-S1.   2.  Moderate neural foraminal stenosis at L5-S1.         US Lower Extremity Venous Duplex Right   Final Result   IMPRESSION:      1.  No deep venous thrombosis in the right lower extremity.            ECG (REVIEWED AND INTERPRETED):   N/A      Billing Documentation    I obtained additional history from these independent historians:    I reviewed these outside records: ED admission [facility] from 4/22/25-4/25/25 for stroke like symptoms including right sided weakness.    I noted these abnormal vital signs / labs:    Compliance Documentation  MIPS Documentation Medical Decision Making  I reviewed the EMR: Inpatient Record: See Above  Care impacted by Chronic Pain and Diabetes  I considered additional work up, including imaging of the chest or abdomen including CT scan, but deferred suspicion for acute cardiopulmonary intra-abdominal process  Discharge. I prescribed additional prescription strength medication(s) as charted. I considered admission, but discharged the patient after share decision making conversation.    MIPS (CTPE, Dental pain, Diehl, Sinusitis, Asthma/COPD, Head Trauma): Not Applicable    SEPSIS: None        At the time of my evaluation, I do not feel the patient s symptoms are caused by sepsis          Diomedes Waller D.O.  EMERGENCY MEDICINE   06/28/25  [facility] EMERGENCY DEPARTMENT  [address] [place] [id number]  [phone]  Dept: [phone]      Diomedes Waller DO  06/30/25 2096

## 2025-06-29 LAB — BACTERIA UR CULT: NORMAL

## 2025-06-29 NOTE — ED NOTES
Patient tried to ambulate and stated she felt very unsteady and weak on the right leg. Patient was able to stand but didn't ambulate.

## 2025-06-29 NOTE — DISCHARGE INSTRUCTIONS
Take ibuprofen 400 mg every 8 hours and Tylenol 650 m g every 6 hours for pain management Continue oxycodone as previously prescribed and increase your gabapentin to 400 mg 3 times a day.  You have been given a referral to the spine clinic for follow-up and also primary care referral to to reestablish primary care.  Return to the emergency department if you have uncontrolled pain or problems with follow-up.

## 2025-07-06 ENCOUNTER — APPOINTMENT (OUTPATIENT)
Dept: MRI IMAGING | Facility: HOSPITAL | Age: 60
End: 2025-07-06
Attending: STUDENT IN AN ORGANIZED HEALTH CARE EDUCATION/TRAINING PROGRAM
Payer: MEDICAID

## 2025-07-06 ENCOUNTER — HOSPITAL ENCOUNTER (EMERGENCY)
Facility: HOSPITAL | Age: 60
Discharge: HOME | End: 2025-07-07
Attending: STUDENT IN AN ORGANIZED HEALTH CARE EDUCATION/TRAINING PROGRAM | Admitting: STUDENT IN AN ORGANIZED HEALTH CARE EDUCATION/TRAINING PROGRAM
Payer: MEDICAID

## 2025-07-06 ENCOUNTER — APPOINTMENT (OUTPATIENT)
Dept: CT IMAGING | Facility: HOSPITAL | Age: 60
End: 2025-07-06
Attending: STUDENT IN AN ORGANIZED HEALTH CARE EDUCATION/TRAINING PROGRAM
Payer: MEDICAID

## 2025-07-06 ENCOUNTER — APPOINTMENT (OUTPATIENT)
Dept: RADIOLOGY | Facility: HOSPITAL | Age: 60
End: 2025-07-06
Attending: STUDENT IN AN ORGANIZED HEALTH CARE EDUCATION/TRAINING PROGRAM
Payer: MEDICAID

## 2025-07-06 DIAGNOSIS — R20.2 PARESTHESIAS: ICD-10-CM

## 2025-07-06 DIAGNOSIS — M54.41 BILATERAL LOW BACK PAIN WITH RIGHT-SIDED SCIATICA, UNSPECIFIED CHRONICITY: ICD-10-CM

## 2025-07-06 LAB
ALBUMIN SERPL BCG-MCNC: 4 G/DL (ref 3.5–5.2)
ALP SERPL-CCNC: 90 U/L (ref 40–150)
ALT SERPL W P-5'-P-CCNC: 15 U/L (ref 0–50)
ANION GAP SERPL CALCULATED.3IONS-SCNC: 10 MMOL/L (ref 7–15)
AST SERPL W P-5'-P-CCNC: 12 U/L (ref 0–45)
BASOPHILS # BLD AUTO: 0.1 10E3/UL (ref 0–0.2)
BASOPHILS NFR BLD AUTO: 1 %
BILIRUB SERPL-MCNC: 0.4 MG/DL
BUN SERPL-MCNC: 21.1 MG/DL (ref 8–23)
CALCIUM SERPL-MCNC: 8.8 MG/DL (ref 8.8–10.4)
CHLORIDE SERPL-SCNC: 105 MMOL/L (ref 98–107)
CREAT SERPL-MCNC: 0.82 MG/DL (ref 0.51–0.95)
D DIMER PPP FEU-MCNC: 0.53 UG/ML FEU (ref 0–0.5)
EGFRCR SERPLBLD CKD-EPI 2021: 81 ML/MIN/1.73M2
EOSINOPHIL # BLD AUTO: 0.3 10E3/UL (ref 0–0.7)
EOSINOPHIL NFR BLD AUTO: 5 %
ERYTHROCYTE [DISTWIDTH] IN BLOOD BY AUTOMATED COUNT: 13 % (ref 10–15)
GLUCOSE SERPL-MCNC: 170 MG/DL (ref 70–99)
HCO3 SERPL-SCNC: 23 MMOL/L (ref 22–29)
HCT VFR BLD AUTO: 38.3 % (ref 35–47)
HGB BLD-MCNC: 12 G/DL (ref 11.7–15.7)
HOLD SPECIMEN: NORMAL
HOLD SPECIMEN: NORMAL
IMM GRANULOCYTES # BLD: 0 10E3/UL
IMM GRANULOCYTES NFR BLD: 0 %
LYMPHOCYTES # BLD AUTO: 2.2 10E3/UL (ref 0.8–5.3)
LYMPHOCYTES NFR BLD AUTO: 39 %
MCH RBC QN AUTO: 27 PG (ref 26.5–33)
MCHC RBC AUTO-ENTMCNC: 31.3 G/DL (ref 31.5–36.5)
MCV RBC AUTO: 86 FL (ref 78–100)
MONOCYTES # BLD AUTO: 0.3 10E3/UL (ref 0–1.3)
MONOCYTES NFR BLD AUTO: 6 %
NEUTROPHILS # BLD AUTO: 2.7 10E3/UL (ref 1.6–8.3)
NEUTROPHILS NFR BLD AUTO: 49 %
NRBC # BLD AUTO: 0 10E3/UL
NRBC BLD AUTO-RTO: 0 /100
PLATELET # BLD AUTO: 239 10E3/UL (ref 150–450)
POTASSIUM SERPL-SCNC: 3.7 MMOL/L (ref 3.4–5.3)
PROT SERPL-MCNC: 7 G/DL (ref 6.4–8.3)
RBC # BLD AUTO: 4.44 10E6/UL (ref 3.8–5.2)
SODIUM SERPL-SCNC: 138 MMOL/L (ref 135–145)
TROPONIN T SERPL HS-MCNC: 14 NG/L
WBC # BLD AUTO: 5.6 10E3/UL (ref 4–11)

## 2025-07-06 PROCEDURE — 85025 COMPLETE CBC W/AUTO DIFF WBC: CPT | Performed by: STUDENT IN AN ORGANIZED HEALTH CARE EDUCATION/TRAINING PROGRAM

## 2025-07-06 PROCEDURE — 250N000013 HC RX MED GY IP 250 OP 250 PS 637: Performed by: STUDENT IN AN ORGANIZED HEALTH CARE EDUCATION/TRAINING PROGRAM

## 2025-07-06 PROCEDURE — 70551 MRI BRAIN STEM W/O DYE: CPT

## 2025-07-06 PROCEDURE — 96374 THER/PROPH/DIAG INJ IV PUSH: CPT | Mod: 59

## 2025-07-06 PROCEDURE — 99207 PR NO BILLABLE SERVICE THIS VISIT: CPT | Performed by: STUDENT IN AN ORGANIZED HEALTH CARE EDUCATION/TRAINING PROGRAM

## 2025-07-06 PROCEDURE — 250N000011 HC RX IP 250 OP 636: Performed by: STUDENT IN AN ORGANIZED HEALTH CARE EDUCATION/TRAINING PROGRAM

## 2025-07-06 PROCEDURE — 93005 ELECTROCARDIOGRAM TRACING: CPT | Performed by: STUDENT IN AN ORGANIZED HEALTH CARE EDUCATION/TRAINING PROGRAM

## 2025-07-06 PROCEDURE — 84484 ASSAY OF TROPONIN QUANT: CPT | Performed by: STUDENT IN AN ORGANIZED HEALTH CARE EDUCATION/TRAINING PROGRAM

## 2025-07-06 PROCEDURE — 71046 X-RAY EXAM CHEST 2 VIEWS: CPT

## 2025-07-06 PROCEDURE — 70496 CT ANGIOGRAPHY HEAD: CPT

## 2025-07-06 PROCEDURE — 85379 FIBRIN DEGRADATION QUANT: CPT | Performed by: STUDENT IN AN ORGANIZED HEALTH CARE EDUCATION/TRAINING PROGRAM

## 2025-07-06 PROCEDURE — 82247 BILIRUBIN TOTAL: CPT | Performed by: STUDENT IN AN ORGANIZED HEALTH CARE EDUCATION/TRAINING PROGRAM

## 2025-07-06 PROCEDURE — 70551 MRI BRAIN STEM W/O DYE: CPT | Mod: 76

## 2025-07-06 PROCEDURE — 99285 EMERGENCY DEPT VISIT HI MDM: CPT | Mod: 25

## 2025-07-06 PROCEDURE — 36415 COLL VENOUS BLD VENIPUNCTURE: CPT | Performed by: STUDENT IN AN ORGANIZED HEALTH CARE EDUCATION/TRAINING PROGRAM

## 2025-07-06 RX ORDER — LORAZEPAM 2 MG/ML
1 INJECTION INTRAMUSCULAR ONCE
Status: COMPLETED | OUTPATIENT
Start: 2025-07-06 | End: 2025-07-06

## 2025-07-06 RX ORDER — ACETAMINOPHEN 500 MG
1000 TABLET ORAL ONCE
Status: COMPLETED | OUTPATIENT
Start: 2025-07-06 | End: 2025-07-06

## 2025-07-06 RX ORDER — IBUPROFEN 600 MG/1
600 TABLET, FILM COATED ORAL ONCE
Status: COMPLETED | OUTPATIENT
Start: 2025-07-06 | End: 2025-07-06

## 2025-07-06 RX ORDER — IOPAMIDOL 755 MG/ML
67 INJECTION, SOLUTION INTRAVASCULAR ONCE
Status: COMPLETED | OUTPATIENT
Start: 2025-07-06 | End: 2025-07-06

## 2025-07-06 RX ORDER — LORAZEPAM 0.5 MG/1
0.5 TABLET ORAL ONCE
Status: COMPLETED | OUTPATIENT
Start: 2025-07-06 | End: 2025-07-06

## 2025-07-06 RX ADMIN — ACETAMINOPHEN 1000 MG: 500 TABLET ORAL at 18:46

## 2025-07-06 RX ADMIN — IBUPROFEN 600 MG: 600 TABLET ORAL at 18:46

## 2025-07-06 RX ADMIN — IOPAMIDOL 67 ML: 755 INJECTION, SOLUTION INTRAVENOUS at 19:57

## 2025-07-06 RX ADMIN — LORAZEPAM 1 MG: 2 INJECTION, SOLUTION INTRAMUSCULAR; INTRAVENOUS at 19:21

## 2025-07-06 RX ADMIN — LORAZEPAM 0.5 MG: 0.5 TABLET ORAL at 20:13

## 2025-07-06 ASSESSMENT — ACTIVITIES OF DAILY LIVING (ADL)
ADLS_ACUITY_SCORE: 57

## 2025-07-06 NOTE — ED PROVIDER NOTES
Emergency Department Encounter         FINAL IMPRESSION:      PARESTHESIAS, CONVERSION DISORDER           ED COURSE AND MEDICAL DECISION MAKING       ED Course as of 07/07/25 0029   Sun Jul 06, 2025   1821 EKG is normal sinus rhythm 97, patient with isolated inversion in aVL as well as minimal inversion in lead I which are chronic and unchanged from April 2025.   1836 Chart reviewed 4/6/2025 admission.  Patient presented with strokelike symptoms, BPPV as well as dizziness.  At that point MRI attempted however patient refused to take out her hair extensions.    Chart reviewed/4/22/2025.  Unable to sit still during MRI and refused to attempt again.  Neurology saw her and diagnosed her with most likely functional right-sided weakness.  Was eventually discharged after she was seen moving her right side and nursing staff.   1839 Also reviewed chart 9/20/2024, similar admission at [facility] for right-sided weakness and diagnosed with functional neurologic disorder by neurology as an inpatient.  Unable to tolerate MRI at that point again   1841 Review chart 9/1/2024, again admission for right-sided weakness and refused MRI.   1841 Reviewed 8/26/2024, at that point a potential diagnosis of stroke was made however MRI was not performed because of metal again in her hair extensions.   1842 5/31/2024 again admission to hospital for right sided weakness, at that point they documented multiple MRIs and CTAs which were normal, and patient again was seen once admitted in the hospital using her right side without difficulty.   1842 Patient is a morbidly obese 59-year-old history of hypertension, hyperlipidemia, diabetes, schizoaffective disorder, chronic pain, multiple admissions as documented here in this chart of right sided weakness.  She is been admitted multiple times for this.  Each time she is evaluated by neurology and deemed most likely functional neurologic syndrome/conversion disorder/somatization.  Today she  "arrives with her  with concerns of weakness on the right side of her body that began this morning.  She is out of the window for any sort of neurovascular intervention or tPA.  States that \"I have been crawling around\" because of her weakness.  Also endorsing chest discomfort that began 20 minutes prior to arrival with no radiation into her back or abd suggesting dissection.  Recent MRI of her lumbar spine which was done within the last week and a half was overall normal with no central cord complaints however she does have some disc disease and nerve root disease.  No actual trauma today.  No fevers chills.  No bowel or bladder issues.  No headache.  Patient states her vision is intermittently blurry.  Denies any shortness of breath.  No neck pain.  No dysuria or bowel changes.  No black or bloody stool.  No fevers.  Denies any  typical cardiac symptoms including dyspnea on exertion or chest discomfort made worse with exertion over the last few weeks.   1913 Age adjusted dimer negative, my clinical suspicioun is low for PE/dissection   1916 Reevaluation of patient she is still complaining of bilateral leg paresthesias and numbness and tingling, she is moving her legs no back-and-forth.  Reports no more chest pain and is asking for stronger pain meds for her back. I told her that she appears too sedated with the ativan, I do not feel comfortable giving her narcotic pain meds due to respiratory depression   2248 Patient was unable to complete the first MRI because of metal in her hair.  She initially refused take it out however after further discussion, she decided to take it out because she \"wants answers.\"  Is moving her bilateral legs now.  Moving her right upper extremity as well.  Without difficulty.  Called MRI and MRI will be taking patient again for repeat imaging   2304 Spoke with stroke neuro who is recommending continued use of 325 aspirin, and agrees with MRI workup.   - The MRI initially was " unsuccessful due to a large metal clip the patient had on the back part of her head that was holding on her wig.  This was cut out.  - Second MRI done shows no acute infarct.  Patient complaining of bilateral lower extremity paresthesias.  Does take gabapentin.  She was given an extra dose here.  Recommending patient continue to use gabapentin at home and follow-up with her primary care team.  - I discussed with her the narrowing of the arteries in her brain.  Recommend continued use of 325 mg aspirin.  - no chest pain on discharge  - Before discharge, patient was able to successfully use the bathroom by herself by putting weight on her own legs.  She was also seen successfully using her phone with her right hand.  She also was seen using her right leg to scoop her flip-flop off of the ground without difficulty.             Medical Decision Making  I reviewed the EMR: Inpatient Record: [facility]'s admission 4/22/2025  Discharge. I recommended the patient continue their current prescription strength medication(s): gabapentin. See documentation for any additional details.    MIPS (CTPE, Dental pain, Diehl, Sinusitis, Asthma/COPD, Head Trauma): Not Applicable    SEPSIS: None            At the conclusion of the encounter I discussed the results of all the tests and the disposition. The questions were answered. The patient or family acknowledged understanding and was agreeable with the care plan.        MEDICATIONS GIVEN IN THE EMERGENCY DEPARTMENT:  Medications - No data to display    NEW PRESCRIPTIONS STARTED AT TODAY'S ED VISIT:  New Prescriptions    No medications on file       HPI     Patient information obtained from: Patient and her     Use of : N/A    Sarah CLARK Rahman is a 59 year old female with a pertinent history of type 2 diabetes mellitus, CVA, hypertension, chronic pain, bilateral low back pain, right leg weakness, diabetic peripheral neuropathy, who presents to this ED via private  car for evaluation of weakness/numbness.    Patient woke up this morning with bilateral leg weakness (R>L) and a numb right side. She woke up to use the bathroom and fell onto her bedroom carpet secondary to not being able to move her right leg. She states her bilateral legs and arms were hot/burning/stinging at that time. She is currently also experiencing lightheadedness and blurred vision.     20 minutes PTA, developed left sided chest pain. Patient was unable to get up on her own due to right sided numbness. She took aspirin without improvement to the pain and otherwise does not take anticoagulants    Patient denies bowel or bladder dysfunction, melena, hematochezia, abdominal pain, cough, or congestion.       MEDICAL HISTORY     Past Medical History:   Diagnosis Date    Asthma     CAD (coronary artery disease)     COPD (chronic obstructive pulmonary disease) (H)     CVA (cerebral infarction)     Dyshidrosis (pompholyx) 10/21/2016    GERD (gastroesophageal reflux disease)     History of CVA (cerebrovascular accident) 04/01/2012    Hypertension     Intercostal neuritis 02/06/2018    Lumbar disc herniation 06/14/2019    Noncompliance 10/08/2021    Polysubstance abuse (H)     Post-COVID syndrome 07/11/2021    S/P CABG (coronary artery bypass graft)     S/P lumbar discectomy 06/13/2019    Schizoaffective disorder (H)     Sleep difficulties 07/17/2022       Past Surgical History:   Procedure Laterality Date    BYPASS GRAFT ARTERY CORONARY  01/01/2009    x2    CAROTID ENDARTERECTOMY Left 12/2021    CORONARY STENT PLACEMENT      CV ANGIOGRAM CORONARY GRAFT N/A 1/16/2024    Procedure: Coronary Angiogram Graft;  Surgeon: Spencer Diez MD;  Location: [facility] CATH LAB CV    CV CORONARY ANGIOGRAM N/A 06/02/2021    Procedure: Coronary Angiogram;  Surgeon: Juventino Rivera MD;  Location: [facility] Cardiac Cath Lab;  Service: Cardiology    HYSTERECTOMY TOTAL ABDOMINAL      HYSTERECTOMY TOTAL ABDOMINAL, BILATERAL  "SALPINGO-OOPHORECTOMY, COMBINED      IR LUMBAR PUNCTURE  7/23/2019    IR TRANSLAMINAR EPIDURAL LUMBAR INJ INCL IMAGING  09/27/2022    LUMBAR DISCECTOMY Right 06/13/2019    L5-S1 - Dr. Hamm    NASAL FRACTURE SURGERY      ORIF ULNAR / RADIAL SHAFT FRACTURE Right        Social History     Tobacco Use    Smoking status: Every Day     Types: Cigarettes    Smokeless tobacco: Never    Tobacco comments:     Seen IP by [facility] on 7/28/23 and declined counseling services and resource packet Smokes 1 cigarettes per day   Vaping Use    Vaping status: Never Used   Substance Use Topics    Alcohol use: Not Currently     Comment: Alcoholic Drinks/day: occ    Drug use: No       albuterol (PROAIR HFA) 108 (90 Base) MCG/ACT inhaler  amLODIPine (NORVASC) 2.5 MG tablet  aspirin (ASA) 325 MG EC tablet  atorvastatin (LIPITOR) 80 MG tablet  dulaglutide (TRULICITY) 0.75 MG/0.5ML pen  DULoxetine (CYMBALTA) 60 MG capsule  gabapentin (NEURONTIN) 400 MG capsule  glipiZIDE (GLUCOTROL XL) 10 MG 24 hr tablet  insulin glargine (LANTUS PEN) 100 UNIT/ML pen  ketamine 5%-gabapentin 8%-lidocaine 2.5% in PLO cream  lidocaine (LIDODERM) 5 % patch  meclizine (ANTIVERT) 25 MG tablet  oxyCODONE-acetaminophen (PERCOCET)  MG per tablet  polyethylene glycol (MIRALAX) 17 GM/Dose powder            PHYSICAL EXAM     /89   Pulse 81   Temp 98.3  F (36.8  C) (Oral)   Resp 28   Ht 1.702 m (5' 7\")   Wt 86.6 kg (191 lb)   LMP  (LMP Unknown)   SpO2 99%   BMI 29.91 kg/m        PHYSICAL EXAM:     General: Patient appears well, nontoxic, comfortable  HEENT: Moist mucous membranes,  No head trauma.    Cardiovascular: Normal rate, normal rhythm, no extremity edema.  No appreciable murmur.  Respiratory: No signs of respiratory distress, lungs are clear to auscultation bilaterally with no wheezes rhonchi or rales.  Abdominal: Soft, nontender, nondistended, no palpable masses, no guarding, no rebound  Musculoskeletal: Full range of motion of joints, no " deformities appreciated.  Neurological: Alert and oriented, patient with intermittent motion of bilateral lower extremities left greater than right.  Normal upper extremities.  Normal sensation.  Normal cranial nerves.  Psychological: Normal affect and mood.  Integument: No rashes appreciated          RESULTS       Labs Ordered and Resulted from Time of ED Arrival to Time of ED Departure   CBC WITH PLATELETS AND DIFFERENTIAL - Abnormal       Result Value    WBC Count 5.6      RBC Count 4.44      Hemoglobin 12.0      Hematocrit 38.3      MCV 86      MCH 27.0      MCHC 31.3 (*)     RDW 13.0      Platelet Count 239      % Neutrophils 49      % Lymphocytes 39      % Monocytes 6      % Eosinophils 5      % Basophils 1      % Immature Granulocytes 0      NRBCs per 100 WBC 0      Absolute Neutrophils 2.7      Absolute Lymphocytes 2.2      Absolute Monocytes 0.3      Absolute Eosinophils 0.3      Absolute Basophils 0.1      Absolute Immature Granulocytes 0.0      Absolute NRBCs 0.0     COMPREHENSIVE METABOLIC PANEL   TROPONIN T, HIGH SENSITIVITY   D DIMER QUANTITATIVE       MR Brain w/o Contrast   Final Result   IMPRESSION:   1.  Limited exam described above.      2.  No acute infarct identified.      3.  Chronic intracranial changes described above.      MR Brain w/o Contrast   Final Result   IMPRESSION:   1.  Near nondiagnostic study secondary to excessive motion. In particular, the diffusion weighted sequences are nondiagnostic for assessment of recent infarct.      Chest XR,  PA & LAT   Final Result   IMPRESSION: Mildly hypoexpanded lungs. Lungs are grossly clear. No pleural effusion or pneumothorax. Mildly enlarged cardiac silhouette, exaggerated by technique. No pulmonary edema. Prior sternotomy and CABG.         CTA Head Neck with Contrast   Preliminary Result   IMPRESSION:    HEAD CT:   1.  No CT evidence for acute intracranial process.   2.  Brain atrophy and presumed chronic microvascular ischemic changes as  above.      HEAD CTA:   1.  No proximal large vessel occlusion.   2.  Moderate to severe narrowing involving the proximal right M2 inferior division middle cerebral artery.   3.  Moderate to severe narrowing involving the proximal right A2 anterior cerebral artery.   4.  Moderate to severe narrowing involving the proximal left supraclinoid ICA.   5.  Severe narrowing involving the left proximal intradural vertebral artery.   6.  Severe short segment narrowing involving the mid basilar artery.   7.  Moderate to severe narrowing involving the distal right P2 posterior cerebral artery.   8.  Additional stenoses, as above.      NECK CTA:   1.  Severe narrowing involving the left V1 vertebral artery.   2.  Mild narrowing involving the proximal right ICA measuring less than 50%.   3.  No significant stenosis or dissection involving the bilateral carotid systems.               PROCEDURES:  Procedures:  Procedures       I, Tania Dhillon am serving as a scribe to document services personally performed by Alvin Stack DO, based on my observations and the provider's statements to me.  I, Alvin Stack DO, attest that Tania Dhillon is acting in a scribe capacity, has observed my performance of the services and has documented them in accordance with my direction.    Alvin Stack DO  Emergency Medicine  [facility] EMERGENCY DEPARTMENT       Alvin Stack DO  07/07/25 0031       Alvin Stack DO  07/07/25 0113       Alvin Stack DO  07/07/25 0117

## 2025-07-06 NOTE — ED TRIAGE NOTES
Bilateral leg weakness and sharp chest pain rated 5/10. Right leg numbness started this morning. Hx of previous heart surgery. Had a fall this morning onto a carpeted floor.

## 2025-07-07 VITALS
HEART RATE: 79 BPM | SYSTOLIC BLOOD PRESSURE: 187 MMHG | OXYGEN SATURATION: 100 % | HEIGHT: 67 IN | RESPIRATION RATE: 26 BRPM | TEMPERATURE: 98.3 F | DIASTOLIC BLOOD PRESSURE: 91 MMHG | WEIGHT: 191 LBS | BODY MASS INDEX: 29.98 KG/M2

## 2025-07-07 LAB
ATRIAL RATE - MUSE: 97 BPM
DIASTOLIC BLOOD PRESSURE - MUSE: NORMAL MMHG
INTERPRETATION ECG - MUSE: NORMAL
P AXIS - MUSE: 62 DEGREES
PR INTERVAL - MUSE: 172 MS
QRS DURATION - MUSE: 76 MS
QT - MUSE: 352 MS
QTC - MUSE: 447 MS
R AXIS - MUSE: -4 DEGREES
SYSTOLIC BLOOD PRESSURE - MUSE: NORMAL MMHG
T AXIS - MUSE: 91 DEGREES
VENTRICULAR RATE- MUSE: 97 BPM

## 2025-07-07 PROCEDURE — 250N000013 HC RX MED GY IP 250 OP 250 PS 637: Performed by: STUDENT IN AN ORGANIZED HEALTH CARE EDUCATION/TRAINING PROGRAM

## 2025-07-07 RX ORDER — GABAPENTIN 100 MG/1
100 CAPSULE ORAL ONCE
Status: COMPLETED | OUTPATIENT
Start: 2025-07-07 | End: 2025-07-07

## 2025-07-07 RX ADMIN — GABAPENTIN 100 MG: 100 CAPSULE ORAL at 00:51

## 2025-07-07 ASSESSMENT — ACTIVITIES OF DAILY LIVING (ADL): ADLS_ACUITY_SCORE: 57

## 2025-07-07 NOTE — CONSULTS
"  [facility]    Stroke Telephone Note    I was called by  on 07/07/25 regarding patient Sarah Rahman. The patient is a 59 year old female with HTN, DM, CAD, polysubstance use, she is affective disorder, known intracranial atherosclerotic disease, left carotid stenosis status post CEA with recurrent admissions for right-sided weakness and unremarkable workup.   Today patient comes in again with bilateral leg weakness right-sided more than left and paresthesia involving arm limbs with significant right-sided numbness.  CT head unremarkable.  CTA head and neck revealed multifocal intra and extracranial atherochanges as known from prior.  MRI brain completed that did not reveal any evidence of acute stroke.    Vitals  BP: (!) 187/91   Pulse: 79   Resp: 26   Temp: 98.3  F (36.8  C)   Weight: 86.6 kg (191 lb)    Impression  Paresthesia  Transient bilateral lower extremity weakness (R>L) but noticed to have improved in ED  Intracranial atherosclerotic changes.    Recommendations  No further stroke workup at this point.  Continue home aspirin 325 mg for secondary stroke prevention  Risk factor management with PCP outpatient.  Outpatient neurology follow-up.    My recommendations are based on the information provided over the phone by Sarah Rahman's in-person providers. They are not intended to replace the clinical judgment of her in-person providers. I was not requested to personally see or examine the patient at this time.     Margaret Hutchison MD  Vascular Neurology    To page me or covering stroke neurology team member, click here: [email or web address]  Choose \"On Call\" tab at top, then select \"NEUROLOGY/ALL SITES\" from middle drop-down box, press Enter, then look for \"stroke\" or \"telestroke\" for your site.    "

## 2025-07-07 NOTE — DISCHARGE INSTRUCTIONS
Please continue to take your gabapentin at home for your numbness and tingling in your lower extremities.    The CT scan of your head today showed some narrowing of the arteries in your brain.  Will be very important for you to take a full dose 325 mg aspirin daily.  The MRI of your brain today showed no stroke.  Your blood work today showed no heart attack or blood clot.  The chest x-ray was clear without signs of pneumonia or enlarged heart.  Your kidney function test was normal as well.    Return to the hospital for any worsening symptoms.  Continue to follow-up with your primary care team.

## 2025-08-01 ENCOUNTER — DOCUMENTATION ONLY (OUTPATIENT)
Dept: EMERGENCY MEDICINE | Facility: HOSPITAL | Age: 60
End: 2025-08-01

## 2025-08-01 DIAGNOSIS — M79.604 PAIN OF RIGHT LOWER EXTREMITY: Primary | ICD-10-CM

## 2025-08-18 ENCOUNTER — DOCUMENTATION ONLY (OUTPATIENT)
Dept: EMERGENCY MEDICINE | Facility: HOSPITAL | Age: 60
End: 2025-08-18
Payer: MEDICAID

## 2025-08-18 DIAGNOSIS — M79.604 PAIN OF RIGHT LOWER EXTREMITY: Primary | ICD-10-CM

## 2025-08-20 ENCOUNTER — APPOINTMENT (OUTPATIENT)
Dept: ULTRASOUND IMAGING | Facility: HOSPITAL | Age: 60
End: 2025-08-20
Attending: PHYSICIAN ASSISTANT
Payer: MEDICAID

## 2025-08-20 ENCOUNTER — HOSPITAL ENCOUNTER (EMERGENCY)
Facility: HOSPITAL | Age: 60
Discharge: HOME | End: 2025-08-20
Payer: MEDICAID

## 2025-08-20 VITALS
WEIGHT: 190 LBS | HEART RATE: 84 BPM | RESPIRATION RATE: 18 BRPM | TEMPERATURE: 98.4 F | DIASTOLIC BLOOD PRESSURE: 86 MMHG | HEIGHT: 67 IN | BODY MASS INDEX: 29.82 KG/M2 | OXYGEN SATURATION: 96 % | SYSTOLIC BLOOD PRESSURE: 179 MMHG

## 2025-08-20 DIAGNOSIS — M79.604 BILATERAL LEG PAIN: ICD-10-CM

## 2025-08-20 DIAGNOSIS — M79.605 BILATERAL LEG PAIN: ICD-10-CM

## 2025-08-20 DIAGNOSIS — R42 LIGHTHEADEDNESS: ICD-10-CM

## 2025-08-20 DIAGNOSIS — B34.9 VIRAL SYNDROME: Primary | ICD-10-CM

## 2025-08-20 DIAGNOSIS — H93.8X1 EAR FULLNESS, RIGHT: ICD-10-CM

## 2025-08-20 DIAGNOSIS — E11.65 TYPE 2 DIABETES MELLITUS WITH HYPERGLYCEMIA, UNSPECIFIED WHETHER LONG TERM INSULIN USE (H): ICD-10-CM

## 2025-08-20 DIAGNOSIS — R09.81 NASAL CONGESTION: ICD-10-CM

## 2025-08-20 DIAGNOSIS — M54.2 NECK PAIN: ICD-10-CM

## 2025-08-20 DIAGNOSIS — M79.89 RIGHT LEG SWELLING: ICD-10-CM

## 2025-08-20 LAB
ANION GAP SERPL CALCULATED.3IONS-SCNC: 12 MMOL/L (ref 7–15)
BASOPHILS # BLD AUTO: 0.05 10E3/UL (ref 0–0.2)
BASOPHILS NFR BLD AUTO: 0.9 %
BUN SERPL-MCNC: 13.8 MG/DL (ref 8–23)
CALCIUM SERPL-MCNC: 9.1 MG/DL (ref 8.8–10.4)
CHLORIDE SERPL-SCNC: 104 MMOL/L (ref 98–107)
CK SERPL-CCNC: 73 U/L (ref 26–192)
CREAT SERPL-MCNC: 0.81 MG/DL (ref 0.51–0.95)
EGFRCR SERPLBLD CKD-EPI 2021: 83 ML/MIN/1.73M2
EOSINOPHIL # BLD AUTO: 0.16 10E3/UL (ref 0–0.7)
EOSINOPHIL NFR BLD AUTO: 2.9 %
ERYTHROCYTE [DISTWIDTH] IN BLOOD BY AUTOMATED COUNT: 12.8 % (ref 10–15)
FLUAV RNA SPEC QL NAA+PROBE: NEGATIVE
FLUBV RNA RESP QL NAA+PROBE: NEGATIVE
GLUCOSE SERPL-MCNC: 246 MG/DL (ref 70–99)
HCO3 SERPL-SCNC: 24 MMOL/L (ref 22–29)
HCT VFR BLD AUTO: 36.9 % (ref 35–47)
HGB BLD-MCNC: 11.2 G/DL (ref 11.7–15.7)
IMM GRANULOCYTES # BLD: <0.03 10E3/UL
IMM GRANULOCYTES NFR BLD: 0.4 %
LYMPHOCYTES # BLD AUTO: 2.48 10E3/UL (ref 0.8–5.3)
LYMPHOCYTES NFR BLD AUTO: 44.4 %
MCH RBC QN AUTO: 26.3 PG (ref 26.5–33)
MCHC RBC AUTO-ENTMCNC: 30.4 G/DL (ref 31.5–36.5)
MCV RBC AUTO: 86.6 FL (ref 78–100)
MONOCYTES # BLD AUTO: 0.33 10E3/UL (ref 0–1.3)
MONOCYTES NFR BLD AUTO: 5.9 %
NEUTROPHILS # BLD AUTO: 2.55 10E3/UL (ref 1.6–8.3)
NEUTROPHILS NFR BLD AUTO: 45.5 %
NRBC # BLD AUTO: <0.03 10E3/UL
NRBC BLD AUTO-RTO: 0 /100
PLAT MORPH BLD: ABNORMAL
PLATELET # BLD AUTO: 234 10E3/UL (ref 150–450)
POLYCHROMASIA BLD QL SMEAR: SLIGHT
POTASSIUM SERPL-SCNC: 4.3 MMOL/L (ref 3.4–5.3)
RBC # BLD AUTO: 4.26 10E6/UL (ref 3.8–5.2)
RBC MORPH BLD: ABNORMAL
RSV RNA SPEC NAA+PROBE: NEGATIVE
SARS-COV-2 RNA RESP QL NAA+PROBE: NEGATIVE
SODIUM SERPL-SCNC: 140 MMOL/L (ref 135–145)
VARIANT LYMPHS BLD QL SMEAR: PRESENT
WBC # BLD AUTO: 5.59 10E3/UL (ref 4–11)

## 2025-08-20 PROCEDURE — 250N000013 HC RX MED GY IP 250 OP 250 PS 637: Performed by: PHYSICIAN ASSISTANT

## 2025-08-20 PROCEDURE — 82550 ASSAY OF CK (CPK): CPT | Performed by: PHYSICIAN ASSISTANT

## 2025-08-20 PROCEDURE — 36415 COLL VENOUS BLD VENIPUNCTURE: CPT | Performed by: PHYSICIAN ASSISTANT

## 2025-08-20 PROCEDURE — 93005 ELECTROCARDIOGRAM TRACING: CPT | Performed by: STUDENT IN AN ORGANIZED HEALTH CARE EDUCATION/TRAINING PROGRAM

## 2025-08-20 PROCEDURE — 85025 COMPLETE CBC W/AUTO DIFF WBC: CPT | Performed by: PHYSICIAN ASSISTANT

## 2025-08-20 PROCEDURE — 99285 EMERGENCY DEPT VISIT HI MDM: CPT | Mod: 25

## 2025-08-20 PROCEDURE — 93970 EXTREMITY STUDY: CPT

## 2025-08-20 PROCEDURE — 87637 SARSCOV2&INF A&B&RSV AMP PRB: CPT | Performed by: PHYSICIAN ASSISTANT

## 2025-08-20 PROCEDURE — 80048 BASIC METABOLIC PNL TOTAL CA: CPT | Performed by: PHYSICIAN ASSISTANT

## 2025-08-20 PROCEDURE — 999N000248 HC STATISTIC IV INSERT WITH US BY RN

## 2025-08-20 RX ORDER — OXYCODONE AND ACETAMINOPHEN 5; 325 MG/1; MG/1
2 TABLET ORAL ONCE
Refills: 0 | Status: COMPLETED | OUTPATIENT
Start: 2025-08-20 | End: 2025-08-20

## 2025-08-20 RX ADMIN — OXYCODONE HYDROCHLORIDE AND ACETAMINOPHEN 2 TABLET: 5; 325 TABLET ORAL at 13:52

## 2025-08-20 ASSESSMENT — ACTIVITIES OF DAILY LIVING (ADL)
ADLS_ACUITY_SCORE: 57

## 2025-08-21 LAB
ATRIAL RATE - MUSE: 83 BPM
DIASTOLIC BLOOD PRESSURE - MUSE: NORMAL MMHG
INTERPRETATION ECG - MUSE: NORMAL
P AXIS - MUSE: 51 DEGREES
PR INTERVAL - MUSE: 182 MS
QRS DURATION - MUSE: 82 MS
QT - MUSE: 380 MS
QTC - MUSE: 446 MS
R AXIS - MUSE: 34 DEGREES
SYSTOLIC BLOOD PRESSURE - MUSE: NORMAL MMHG
T AXIS - MUSE: 94 DEGREES
VENTRICULAR RATE- MUSE: 83 BPM

## (undated) DEVICE — CATH ANGIO INFINITI INTERNAL MAMMARY 6FRX100CM 534-660T

## (undated) DEVICE — KIT HAND CONTROL ACIST 014644 AR-P54

## (undated) DEVICE — CATH ANGIO JUDKINS JL4 6FRX100CM INFINITI 534620T

## (undated) DEVICE — ELECTRODE DEFIB CADENCE 22550R

## (undated) DEVICE — SHEATH EXT 15CM STANDTALL ST0015L

## (undated) DEVICE — SLEEVE TR BAND RADIAL COMPRESSION DEVICE 24CM TRB24-REG

## (undated) DEVICE — SYR ANGIOGRAPHY MULTIUSE KIT ACIST 014612

## (undated) DEVICE — CATH ANGIO JUDKINS R4 6FRX100CM INFINITI 534621T

## (undated) DEVICE — CUSTOM PACK CORONARY SAN5BCRHEA

## (undated) DEVICE — MANIFOLD KIT ANGIO AUTOMATED 014613

## (undated) DEVICE — SHTH INTRO 0.021IN ID 6FR DIA

## (undated) RX ORDER — FENTANYL CITRATE 50 UG/ML
INJECTION, SOLUTION INTRAMUSCULAR; INTRAVENOUS
Status: DISPENSED
Start: 2022-09-27

## (undated) RX ORDER — LIDOCAINE HYDROCHLORIDE 10 MG/ML
INJECTION, SOLUTION EPIDURAL; INFILTRATION; INTRACAUDAL; PERINEURAL
Status: DISPENSED
Start: 2022-09-27

## (undated) RX ORDER — DIAZEPAM 10 MG
TABLET ORAL
Status: DISPENSED
Start: 2024-01-16

## (undated) RX ORDER — DEXAMETHASONE SODIUM PHOSPHATE 10 MG/ML
INJECTION, SOLUTION INTRAMUSCULAR; INTRAVENOUS
Status: DISPENSED
Start: 2022-09-26

## (undated) RX ORDER — ASPIRIN 81 MG/1
TABLET, CHEWABLE ORAL
Status: DISPENSED
Start: 2024-01-16

## (undated) RX ORDER — FENTANYL CITRATE 50 UG/ML
INJECTION, SOLUTION INTRAMUSCULAR; INTRAVENOUS
Status: DISPENSED
Start: 2024-01-16

## (undated) RX ORDER — LIDOCAINE HYDROCHLORIDE 10 MG/ML
INJECTION, SOLUTION EPIDURAL; INFILTRATION; INTRACAUDAL; PERINEURAL
Status: DISPENSED
Start: 2022-09-26

## (undated) RX ORDER — DEXAMETHASONE SODIUM PHOSPHATE 10 MG/ML
INJECTION, SOLUTION INTRAMUSCULAR; INTRAVENOUS
Status: DISPENSED
Start: 2022-09-27